# Patient Record
Sex: MALE | Race: WHITE | NOT HISPANIC OR LATINO | Employment: UNEMPLOYED | URBAN - METROPOLITAN AREA
[De-identification: names, ages, dates, MRNs, and addresses within clinical notes are randomized per-mention and may not be internally consistent; named-entity substitution may affect disease eponyms.]

---

## 2017-02-09 ENCOUNTER — OFFICE VISIT (OUTPATIENT)
Dept: TRANSPLANT | Facility: CLINIC | Age: 42
End: 2017-02-09
Attending: INTERNAL MEDICINE
Payer: COMMERCIAL

## 2017-02-09 ENCOUNTER — APPOINTMENT (OUTPATIENT)
Dept: LAB | Facility: CLINIC | Age: 42
End: 2017-02-09
Attending: INTERNAL MEDICINE
Payer: COMMERCIAL

## 2017-02-09 VITALS
RESPIRATION RATE: 16 BRPM | SYSTOLIC BLOOD PRESSURE: 125 MMHG | HEART RATE: 80 BPM | WEIGHT: 152.7 LBS | BODY MASS INDEX: 22.62 KG/M2 | DIASTOLIC BLOOD PRESSURE: 90 MMHG | TEMPERATURE: 97.8 F

## 2017-02-09 DIAGNOSIS — Z71.9 VISIT FOR COUNSELING: Primary | ICD-10-CM

## 2017-02-09 DIAGNOSIS — Z79.01 LONG TERM CURRENT USE OF ANTICOAGULANT THERAPY: Primary | ICD-10-CM

## 2017-02-09 DIAGNOSIS — D46.9 MDS (MYELODYSPLASTIC SYNDROME) (H): ICD-10-CM

## 2017-02-09 DIAGNOSIS — L03.031 CELLULITIS OF TOE OF RIGHT FOOT: ICD-10-CM

## 2017-02-09 DIAGNOSIS — D46.9 MDS (MYELODYSPLASTIC SYNDROME) (H): Primary | ICD-10-CM

## 2017-02-09 LAB
ABO + RH BLD: NORMAL
ABO + RH BLD: NORMAL
ALBUMIN SERPL-MCNC: 3.4 G/DL (ref 3.4–5)
ALP SERPL-CCNC: 92 U/L (ref 40–150)
ALT SERPL W P-5'-P-CCNC: 21 U/L (ref 0–70)
ANION GAP SERPL CALCULATED.3IONS-SCNC: 8 MMOL/L (ref 3–14)
AST SERPL W P-5'-P-CCNC: 15 U/L (ref 0–45)
BASOPHILS # BLD AUTO: 0 10E9/L (ref 0–0.2)
BASOPHILS NFR BLD AUTO: 0.5 %
BILIRUB SERPL-MCNC: 0.2 MG/DL (ref 0.2–1.3)
BLD GP AB SCN SERPL QL: NORMAL
BLOOD BANK CMNT PATIENT-IMP: NORMAL
BUN SERPL-MCNC: 16 MG/DL (ref 7–30)
CALCIUM SERPL-MCNC: 9 MG/DL (ref 8.5–10.1)
CHLORIDE SERPL-SCNC: 105 MMOL/L (ref 94–109)
CO2 SERPL-SCNC: 29 MMOL/L (ref 20–32)
CREAT SERPL-MCNC: 0.83 MG/DL (ref 0.66–1.25)
DIFFERENTIAL METHOD BLD: ABNORMAL
EOSINOPHIL # BLD AUTO: 0.1 10E9/L (ref 0–0.7)
EOSINOPHIL NFR BLD AUTO: 1 %
ERYTHROCYTE [DISTWIDTH] IN BLOOD BY AUTOMATED COUNT: 14.4 % (ref 10–15)
GFR SERPL CREATININE-BSD FRML MDRD: NORMAL ML/MIN/1.7M2
GLUCOSE SERPL-MCNC: 81 MG/DL (ref 70–99)
HCT VFR BLD AUTO: 39.1 % (ref 40–53)
HGB BLD-MCNC: 12.8 G/DL (ref 13.3–17.7)
IMM GRANULOCYTES # BLD: 0.1 10E9/L (ref 0–0.4)
IMM GRANULOCYTES NFR BLD: 1.3 %
INR PPP: 1.22 (ref 0.86–1.14)
LYMPHOCYTES # BLD AUTO: 2.5 10E9/L (ref 0.8–5.3)
LYMPHOCYTES NFR BLD AUTO: 39.7 %
MCH RBC QN AUTO: 31.8 PG (ref 26.5–33)
MCHC RBC AUTO-ENTMCNC: 32.7 G/DL (ref 31.5–36.5)
MCV RBC AUTO: 97 FL (ref 78–100)
MONOCYTES # BLD AUTO: 0.5 10E9/L (ref 0–1.3)
MONOCYTES NFR BLD AUTO: 7.3 %
NEUTROPHILS # BLD AUTO: 3.2 10E9/L (ref 1.6–8.3)
NEUTROPHILS NFR BLD AUTO: 50.2 %
NRBC # BLD AUTO: 0 10*3/UL
NRBC BLD AUTO-RTO: 0 /100
PLATELET # BLD AUTO: 317 10E9/L (ref 150–450)
POTASSIUM SERPL-SCNC: 4.1 MMOL/L (ref 3.4–5.3)
PROT SERPL-MCNC: 8.7 G/DL (ref 6.8–8.8)
RBC # BLD AUTO: 4.02 10E12/L (ref 4.4–5.9)
RETICS # AUTO: 78 10E9/L (ref 25–95)
RETICS/RBC NFR AUTO: 1.9 % (ref 0.5–2)
SODIUM SERPL-SCNC: 141 MMOL/L (ref 133–144)
SPECIMEN EXP DATE BLD: NORMAL
WBC # BLD AUTO: 6.3 10E9/L (ref 4–11)

## 2017-02-09 PROCEDURE — 85045 AUTOMATED RETICULOCYTE COUNT: CPT | Performed by: INTERNAL MEDICINE

## 2017-02-09 PROCEDURE — 86850 RBC ANTIBODY SCREEN: CPT | Performed by: INTERNAL MEDICINE

## 2017-02-09 PROCEDURE — 85610 PROTHROMBIN TIME: CPT | Performed by: INTERNAL MEDICINE

## 2017-02-09 PROCEDURE — 80053 COMPREHEN METABOLIC PANEL: CPT | Performed by: INTERNAL MEDICINE

## 2017-02-09 PROCEDURE — 86901 BLOOD TYPING SEROLOGIC RH(D): CPT | Performed by: INTERNAL MEDICINE

## 2017-02-09 PROCEDURE — 85025 COMPLETE CBC W/AUTO DIFF WBC: CPT | Performed by: INTERNAL MEDICINE

## 2017-02-09 PROCEDURE — 36415 COLL VENOUS BLD VENIPUNCTURE: CPT

## 2017-02-09 PROCEDURE — 86900 BLOOD TYPING SEROLOGIC ABO: CPT | Performed by: INTERNAL MEDICINE

## 2017-02-09 RX ORDER — SENNOSIDES 8.6 MG
1 TABLET ORAL DAILY PRN
COMMUNITY
End: 2023-08-02

## 2017-02-09 RX ORDER — CLOPIDOGREL BISULFATE 75 MG/1
TABLET ORAL
Qty: 30 TABLET | COMMUNITY
Start: 2017-02-09 | End: 2018-07-11

## 2017-02-09 RX ORDER — ASPIRIN 325 MG
TABLET ORAL DAILY
COMMUNITY
End: 2018-07-11

## 2017-02-09 RX ORDER — CEPHALEXIN 500 MG/1
500 CAPSULE ORAL 2 TIMES DAILY
Qty: 20 CAPSULE | Refills: 0 | Status: SHIPPED | OUTPATIENT
Start: 2017-02-09 | End: 2017-03-29

## 2017-02-09 NOTE — MR AVS SNAPSHOT
After Visit Summary   2/9/2017    Ziggy Hallman    MRN: [id number]           Patient Information     Date Of Birth          1975        Visit Information        Provider Department      2/9/2017 9:00 AM Olive Valverde MD [facility] Blood and Marrow Transplant        Today's Diagnoses     Long term current use of anticoagulant therapy    -  1    Cellulitis of toe of right foot        MDS (myelodysplastic syndrome) (H)              [facility] ([facility])  [address] [place] [address]  Phone: [phone]  Clinic Hours:   Monday-Friday:    8am to 4:30pm   Weekends and holidays:    8am to noon (in general)  If your fever is 100.5  or greater,   call the clinic.  After hours call the   hospital at [phone] or   [phone]. Ask for the BMT   fellow for pediatric or adult patients           Care Instructions    2/13 @ 3:45p Dr. Craven    3/8 @ 1:45p labs        Follow-ups after your visit        Your next 10 appointments already scheduled     Feb 13, 2017  3:45 PM CST   (Arrive by 3:30 PM)   New Patient Visit with Janine Craven MD   [facility] ([facility])    [address] [place] [address] [phone]            Mar 08, 2017  1:45 PM CST   Masonic Lab Draw with  MASONIC LAB DRAW   [facility])    [address] [place] [address] [phone]            Mar 08, 2017  2:15 PM CST   RETURN ONC with Olive Valverde MD   [facility] Blood and Marrow Transplant ([facility])    [address] [place] [address] [phone]              Who to contact     If you have questions or need follow up information about today's clinic visit or your schedule please contact [facility] BLOOD AND MARROW TRANSPLANT directly at [phone].  Normal or non-critical lab and  imaging results will be communicated to you by WonderHowTohart, letter or phone within 4 business days after the clinic has received the results. If you do not hear from us within 7 days, please contact the clinic through locr or phone. If you have a critical or abnormal lab result, we will notify you by phone as soon as possible.  Submit refill requests through locr or call your pharmacy and they will forward the refill request to us. Please allow 3 business days for your refill to be completed.          Additional Information About Your Visit        locr Information     locr gives you secure access to your electronic health record. If you see a primary care provider, you can also send messages to your care team and make appointments. If you have questions, please call your primary care clinic.  If you do not have a primary care provider, please call [phone] and they will assist you.        Care EveryWhere ID     This is your Care EveryWhere ID. This could be used by other organizations to access your [place] medical records  [id number]        Your Vitals Were     Pulse Temperature Respirations BMI (Body Mass Index)          80 97.8  F (36.6  C) (Oral) 16 22.62 kg/m2         Blood Pressure from Last 3 Encounters:   02/09/17 125/90   11/20/15 135/89   09/01/15 115/74    Weight from Last 3 Encounters:   02/09/17 69.3 kg (152 lb 11.2 oz)   11/20/15 81.8 kg (180 lb 5.4 oz)   09/01/15 82.1 kg (180 lb 14.4 oz)              We Performed the Following     ABO/Rh type and screen     CBC with platelets differential     Comprehensive metabolic panel     INR     Reticulocyte count          Today's Medication Changes          These changes are accurate as of: 2/9/17 11:59 PM.  If you have any questions, ask your nurse or doctor.               Start taking these medicines.        Dose/Directions    cephALEXin 500 MG capsule   Commonly known as:  KEFLEX   Used for:  Cellulitis of toe of right foot   Started by:   Olive Valverde MD        Dose:  500 mg   Take 1 capsule (500 mg) by mouth 2 times daily   Quantity:  20 capsule   Refills:  0         These medicines have changed or have updated prescriptions.        Dose/Directions    PLAVIX 75 MG tablet   This may have changed:    - how much to take  - how to take this  - when to take this  - additional instructions   Used for:  Long term current use of anticoagulant therapy   Generic drug:  clopidogrel   Changed by:  Olive Valverde MD        Prescribed but patient says he is not taking this   Quantity:  30 tablet   Refills:  0         Stop taking these medicines if you haven't already. Please contact your care team if you have questions.     enoxaparin 60 MG/0.6ML injection   Commonly known as:  LOVENOX   Stopped by:  Olive Valverde MD           IBUPROFEN PO   Stopped by:  Olive Valverde MD           salicylic acid 17 % topical gel   Stopped by:  Olive Valverde MD                Where to get your medicines      These medications were sent to [id number] - [place], [place] - [address] AT [facility] [address], [place] [facility] [address]     Phone:  [phone]     cephALEXin 500 MG capsule                Recent Review Flowsheet Data     BMT Recent Results Latest Ref Rng & Units 9/3/2015 9/17/2015 10/1/2015 11/10/2015 11/19/2015 1/11/2016 2/9/2017    WBC 4.0 - 11.0 10e9/L - - - - - - 6.3    Hemoglobin 13.3 - 17.7 g/dL - - - - - - 12.8(L)    Platelet Count 150 - 450 10e9/L - - - - - - 317    Neutrophils (Absolute) 1.6 - 8.3 10e9/L - - - - - - 3.2    INR 0.86 - 1.14 2.2(A) 2.3(A) 2.5(A) 1.3(A) 2.6(A) 2.3(A) 1.22(H)    Sodium 133 - 144 mmol/L - - - - - - 141    Potassium 3.4 - 5.3 mmol/L - - - - - - 4.1    Chloride 94 - 109 mmol/L - - - - - - 105    Glucose 70 - 99 mg/dL - - - - - - 81    Urea Nitrogen 7 - 30 mg/dL - - - - - - 16    Creatinine 0.66 - 1.25 mg/dL - - - - - - 0.83    Calcium (Total) 8.5 - 10.1  mg/dL - - - - - - 9.0    Protein (Total) 6.8 - 8.8 g/dL - - - - - - 8.7    Albumin 3.4 - 5.0 g/dL - - - - - - 3.4    Alkaline Phosphatase 40 - 150 U/L - - - - - - 92    AST 0 - 45 U/L - - - - - - 15    ALT 0 - 70 U/L - - - - - - 21    MCV 78 - 100 fl - - - - - - 97               Primary Care Provider Office Phone # Fax #    Adeel Morris [phone]       [facility] [place] [address] [facility] [id number]        Thank you!     Thank you for choosing [facility] BLOOD AND MARROW TRANSPLANT  for your care. Our goal is always to provide you with excellent care. Hearing back from our patients is one way we can continue to improve our services. Please take a few minutes to complete the written survey that you may receive in the mail after your visit with us. Thank you!             Your Updated Medication List - Protect others around you: Learn how to safely use, store and throw away your medicines at www.disposemymeds.org.          This list is accurate as of: 2/9/17 11:59 PM.  Always use your most recent med list.                   Brand Name Dispense Instructions for use    AMBIEN PO      Take by mouth as needed for sleep       aspirin 325 MG tablet      Take by mouth daily       cephALEXin 500 MG capsule    KEFLEX    20 capsule    Take 1 capsule (500 mg) by mouth 2 times daily       GABAPENTIN PO      Take 300 mg by mouth 3 times daily       HYDROMORPHONE HCL PO      Take 2 mg by mouth every 6 hours as needed for moderate to severe pain       LIPITOR 80 MG tablet   Generic drug:  atorvastatin      Take 80 mg by mouth daily       methylPREDNISolone 4 MG tablet    MEDROL DOSEPAK    21 tablet    FOLLOW PACKAGE INSTRUCTIONS       PLAVIX 75 MG tablet   Generic drug:  clopidogrel     30 tablet    Prescribed but patient says he is not taking this       sennosides 8.6 MG tablet    SENOKOT     Take 1 tablet by mouth daily as needed for constipation       VISTARIL PO      Take 25 mg by mouth  every 4 hours as needed for itching       warfarin 5 MG tablet    COUMADIN    30 tablet    Take 10 mg daily or as advised by coumadin clinic

## 2017-02-09 NOTE — MR AVS SNAPSHOT
After Visit Summary   2/9/2017    Ziggy Hallman    MRN: [id number]           Patient Information     Date Of Birth          1975        Visit Information        Provider Department      2/9/2017 10:30 AM Coordinator,  Bmt Nurse;  2 [address] CONSULT [facility] Blood and Marrow Transplant        Today's Diagnoses     MDS (myelodysplastic syndrome) (H)    -  1          [facility] ([facility])  [address] [place] [address]  Phone: [phone]  Clinic Hours:   Monday-Friday:    8am to 4:30pm   Weekends and holidays:    8am to noon (in general)  If your fever is 100.5  or greater,   call the clinic.  After hours call the   hospital at [phone] or   [phone]. Ask for the BMT   fellow for pediatric or adult patients            Follow-ups after your visit        Your next 10 appointments already scheduled     Mar 08, 2017  1:45 PM CST   Masonic Lab Draw with  MASONIC LAB DRAW   [facility] Masonic Lab Draw ([facility])    [address] [place] [address] [phone]            Mar 08, 2017  2:15 PM CST   RETURN ONC with Olive Valverde MD   [facility] Blood and Marrow Transplant ([facility])    [address] [place] [address] [phone]              Who to contact     If you have questions or need follow up information about today's clinic visit or your schedule please contact [facility] BLOOD AND MARROW TRANSPLANT directly at [phone].  Normal or non-critical lab and imaging results will be communicated to you by MyChart, letter or phone within 4 business days after the clinic has received the results. If you do not hear from us within 7 days, please contact the clinic through MyChart or phone. If you have a critical or abnormal lab result, we will notify you by phone as soon as possible.  Submit refill requests through Vivonet or call your pharmacy and they  will forward the refill request to us. Please allow 3 business days for your refill to be completed.          Additional Information About Your Visit        [employer] Information     [employer] gives you secure access to your electronic health record. If you see a primary care provider, you can also send messages to your care team and make appointments. If you have questions, please call your primary care clinic.  If you do not have a primary care provider, please call [phone] and they will assist you.        Care EveryWhere ID     This is your Care EveryWhere ID. This could be used by other organizations to access your [place] medical records  [id number]         Blood Pressure from Last 3 Encounters:   02/09/17 125/90   11/20/15 135/89   09/01/15 115/74    Weight from Last 3 Encounters:   02/09/17 69.3 kg (152 lb 11.2 oz)   11/20/15 81.8 kg (180 lb 5.4 oz)   09/01/15 82.1 kg (180 lb 14.4 oz)              Today, you had the following     No orders found for display         Today's Medication Changes          These changes are accurate as of: 2/9/17 11:59 PM.  If you have any questions, ask your nurse or doctor.               Start taking these medicines.        Dose/Directions    cephALEXin 500 MG capsule   Commonly known as:  KEFLEX   Used for:  Cellulitis of toe of right foot   Started by:  Olive Valverde MD        Dose:  500 mg   Take 1 capsule (500 mg) by mouth 2 times daily   Quantity:  20 capsule   Refills:  0         These medicines have changed or have updated prescriptions.        Dose/Directions    PLAVIX 75 MG tablet   This may have changed:    - how much to take  - how to take this  - when to take this  - additional instructions   Used for:  Long term current use of anticoagulant therapy   Generic drug:  clopidogrel   Changed by:  Olive Valverde MD        Prescribed but patient says he is not taking this   Quantity:  30 tablet   Refills:  0         Stop taking these medicines if you  haven't already. Please contact your care team if you have questions.     enoxaparin 60 MG/0.6ML injection   Commonly known as:  LOVENOX   Stopped by:  Olive Valverde MD           IBUPROFEN PO   Stopped by:  Olive Valverde MD           salicylic acid 17 % topical gel   Stopped by:  Olive Valverde MD                Where to get your medicines      These medications were sent to [id number] - [place], [place] - [address] AT [facility] [address], [place] [address]     Phone:  [phone]     cephALEXin 500 MG capsule                Recent Review Flowsheet Data     BMT Recent Results Latest Ref Rng & Units 9/3/2015 9/17/2015 10/1/2015 11/10/2015 11/19/2015 1/11/2016 2/9/2017    WBC 4.0 - 11.0 10e9/L - - - - - - 6.3    Hemoglobin 13.3 - 17.7 g/dL - - - - - - 12.8(L)    Platelet Count 150 - 450 10e9/L - - - - - - 317    Neutrophils (Absolute) 1.6 - 8.3 10e9/L - - - - - - 3.2    INR 0.86 - 1.14 2.2(A) 2.3(A) 2.5(A) 1.3(A) 2.6(A) 2.3(A) 1.22(H)    Sodium 133 - 144 mmol/L - - - - - - 141    Potassium 3.4 - 5.3 mmol/L - - - - - - 4.1    Chloride 94 - 109 mmol/L - - - - - - 105    Glucose 70 - 99 mg/dL - - - - - - 81    Urea Nitrogen 7 - 30 mg/dL - - - - - - 16    Creatinine 0.66 - 1.25 mg/dL - - - - - - 0.83    Calcium (Total) 8.5 - 10.1 mg/dL - - - - - - 9.0    Protein (Total) 6.8 - 8.8 g/dL - - - - - - 8.7    Albumin 3.4 - 5.0 g/dL - - - - - - 3.4    Alkaline Phosphatase 40 - 150 U/L - - - - - - 92    AST 0 - 45 U/L - - - - - - 15    ALT 0 - 70 U/L - - - - - - 21    MCV 78 - 100 fl - - - - - - 97               Primary Care Provider Office Phone # Fax #    Adeel Morris [phone]       [facility] [place] [address] [facility]  [place] [address]        Thank you!     Thank you for choosing [facility] BLOOD AND MARROW TRANSPLANT  for your care. Our goal is always to provide you with excellent care. Hearing back from our patients is  one way we can continue to improve our services. Please take a few minutes to complete the written survey that you may receive in the mail after your visit with us. Thank you!             Your Updated Medication List - Protect others around you: Learn how to safely use, store and throw away your medicines at www.disposemymeds.org.          This list is accurate as of: 2/9/17 11:59 PM.  Always use your most recent med list.                   Brand Name Dispense Instructions for use    AMBIEN PO      Take by mouth as needed for sleep       aspirin 325 MG tablet      Take by mouth daily       cephALEXin 500 MG capsule    KEFLEX    20 capsule    Take 1 capsule (500 mg) by mouth 2 times daily       GABAPENTIN PO      Take 300 mg by mouth 3 times daily       HYDROMORPHONE HCL PO      Take 2 mg by mouth every 6 hours as needed for moderate to severe pain       LIPITOR 80 MG tablet   Generic drug:  atorvastatin      Take 80 mg by mouth daily       methylPREDNISolone 4 MG tablet    MEDROL DOSEPAK    21 tablet    FOLLOW PACKAGE INSTRUCTIONS       PLAVIX 75 MG tablet   Generic drug:  clopidogrel     30 tablet    Prescribed but patient says he is not taking this       sennosides 8.6 MG tablet    SENOKOT     Take 1 tablet by mouth daily as needed for constipation       VISTARIL PO      Take 25 mg by mouth every 4 hours as needed for itching       warfarin 5 MG tablet    COUMADIN    30 tablet    Take 10 mg daily or as advised by coumadin clinic

## 2017-02-09 NOTE — PROGRESS NOTES
BMT Repeat Consult for MDS  2/9/2017    Disease and Treatment History:  1. Ill-defined hypercoagulable state who has had numerous embolic and arterial thrombotic events.    - Renal infarct in November 2011,  - Left Popliteal embolic episode in December 2011 and was treated with surgery, aspirin and Coumadin.   - Embolic episode with his right carotid artery and had some TIA/stroke-like symptoms: December 2012  - He also had a right renal infarct in October 2013.  -  Embolic MI in July 2015.   - He notes another Renal infarct April but unclear if 2015 or 2016.   ---- He was initially treated with aspirin in 2011 and was transitioned to aspirin plus Coumadin in December 2011.  Plavix was added to the combination in October 2013 and he has remained on those medications minus the aspirin going forward.   2. He was found to have some renal artery stenosis approximately 50% to 60% on a full body angiogram and was seen by Vascular Surgery in October 2015, and they did not feel that invasive interventions were needed and felt like it could cause more harm than good.    3. He has had extensive hypercoagulable workup to date with a factor V and factor II mutation analysis that was negative, protein S and C and antithrombin III levels that were within normal limits, homocysteine that was within normal limits, and lupus anticoagulant and beta-2 glycoprotein assessments that were all negative.  He has had JAK2 testing which was negative and PNH testing which was negative.    4. He was noted to have some mild anemia dropping down into the 11 range.  He had B12 and folate levels that were within normal limits.  He had an EPO level that was 25.  He had a retic count of 2.5% and had a ferritin that was slightly elevated at 350.   -- Bone marrow biopsy on 10/07/2015 and this was read as trilineage hematopoiesis with dysgranulopoiesis and dysmegakaryopoiesis with less than 1% blasts consistent with RCMD 40% to 50% cellular.  Notably,  the cytogenetics were complex with a karyotype of 45,XY, a derivative of 5 and 17, addition of 9p13, trisomy 11, deletion 111, a minus 17 in 18 cells, and 46,XY in 3.   --IPSS scoring system for which he would get 4 points for cytogenetics, 0 points for blast percentage, 0 points for hemoglobin, 0 points for platelets and 0 points for ANC, giving him a total score of 4, putting him in the intermediate risk category.   5. Consult in BMT clinic 11/2015 for MDS. Siblings typed and 2 sibs are full matches    HPI: HISTORY OF PRESENT ILLNESS:  Since I last saw Mr. Hallman, he has had an unbelievably bad year.  They lost their home in [place] to [facility], and had moved to [place]; his wife has been struggling with drug abuse with methamphetamines, and also has been having some psychiatric breakdown with some paranoid schizophrenic type issues; his wife lost her job and was on unemployment, and is now intermittently back at work.  Medically, he lost his primary hematologist, Dr. Davis, in the summer of 2016, and, unfortunately, he has not had anyone following him closely since that time.  His primary doctor has been following his INR, but over the last 6 weeks he has had increasing right great toe pain, and has been in and out of numerous emergency rooms.  There was no major intervention completed until he finally developed some blackness to the toe, and was found to have a blockage.  He then underwent 2 different vascular surgeries in January.  He notes significant nerve pain, and they are talking about amputation a part of the toe.  Of note, he had in the fall stopped his Plavix and aspirin, and was really only taking warfarin when all this developed.      When he was discharged from the vascular surgery at [facility], he was discharged on aspirin, Lovenox as a bridge, and warfarin, and he is currently only taking warfarin at this point.  He has been following with the [facility], and they have been  having his INR goal be 2.5-3.5.      He comes in today for a repeat consult, as he has had no followup, in terms of his myelodysplastic syndrome, and is wondering what the next steps are there.  He currently notes some significant foot pain and redness.  He notes no fevers, chills or night sweats.  No chest pain, but does have some dyspnea on exertion and need to take a deep breath.  He notes episodes where he will get boils in the skin and then pop them, and a pus type fluid comes out.  He notes no recent pneumonias, no recent sinus infections or bladder infections.  He notes no recent kidney stones, and notes no recent bleeding issues.  He has lost about 40 pounds over the last year, but gained some of that back, and he attributes a lot of that to stress.  He is tearful in our discussion today, going through all of the issues that he has been going through.       Past medical history is notable for:    1.  Numerous embolic clotting events as noted in HPI with recent LILIAM showing an EF of 60% with a small PFO.   2.  History of significant pneumonia at age 14 that required hospitalization x2 with high fevers to greater than 105 and a recent pneumonia about 3 years ago.    3.  History of back and neck issues with some disk issues in his lower back and bone spurs in his neck.      Medications are updated in Epic     Social History:  He is  to his wife of 15+ years.  They have three children, ages 18, 15 and 11, all boys.  He is a  and is independently employed.  He has had major stressors since our last visit. Mother and Father both . Wife has gotten into methamphetamine abuse and is having some psychiatric issues of paranoia and has been on and off work. They lost their house in [place] and moved here to [place] but are planning on moving again shortly. He smokes intermittent pot, drinks 1-2+ drinks a night and has tried the methamphetamines a couple of times. He is struggling  emotionally with all of this. He spent some time out of the house for a couple months when his wife was heavy into the drugs but then came back     Family history is notable for a mother who  of a stroke within the last year and a father who  of a stroke within the las 1-2 years.  His sister recently had a clotting event and was found to be factor V Leiden positive, and there is extensive factor V Leiden noted on his father's side.  His other sister, Radha, has no clotting disorders that she is aware of. There are no other noted cancers.      10 point ROS otherwise negative     Physical Examination:    /90 mmHg  Pulse 80  Temp(Src) 97.8  F (36.6  C) (Oral)  Resp 16  Wt 69.264 kg (152 lb 11.2 oz)    General:  He has normal development, quite tearful on exam today  HEENT:  No icterus or injection.  Oropharynx is clear, no thrush.   Lymphatics:  No palpable cervical or supraclavicular lymphadenopathy.   Lungs:  Clear to auscultation bilaterally.   Cardiac:  Regular without any rubs, murmurs or gallops.   Abdomen:  Soft, nontender, no hepatosplenomegaly.   Extremities:  No edema, no petechiae.  Right great toe with dark, necrotic area and erythema around a swollen toe. See picture below     2017     Labs:  Results for TOD WHARTON (MRN [id number]) as of 2017 13:08   Ref. Range 2017 10:47   Sodium Latest Ref Range: 133 - 144 mmol/L 141   Potassium Latest Ref Range: 3.4 - 5.3 mmol/L 4.1   Chloride Latest Ref Range: 94 - 109 mmol/L 105   Carbon Dioxide Latest Ref Range: 20 - 32 mmol/L 29   Urea Nitrogen Latest Ref Range: 7 - 30 mg/dL 16   Creatinine Latest Ref Range: 0.66 - 1.25 mg/dL 0.83   GFR Estimate Latest Ref Range: >60 mL/min/1.7m2 >90...   GFR Estimate If Black Latest Ref Range: >60 mL/min/1.7m2 >90...   Calcium Latest Ref Range: 8.5 - 10.1 mg/dL 9.0   Anion Gap Latest Ref Range: 3 - 14 mmol/L 8   Albumin Latest Ref Range: 3.4 - 5.0 g/dL 3.4   Protein Total Latest Ref Range: 6.8  - 8.8 g/dL 8.7   Bilirubin Total Latest Ref Range: 0.2 - 1.3 mg/dL 0.2   Alkaline Phosphatase Latest Ref Range: 40 - 150 U/L 92   ALT Latest Ref Range: 0 - 70 U/L 21   AST Latest Ref Range: 0 - 45 U/L 15   Glucose Latest Ref Range: 70 - 99 mg/dL 81   WBC Latest Ref Range: 4.0 - 11.0 10e9/L 6.3   Hemoglobin Latest Ref Range: 13.3 - 17.7 g/dL 12.8 (L)   Hematocrit Latest Ref Range: 40.0 - 53.0 % 39.1 (L)   Platelet Count Latest Ref Range: 150 - 450 10e9/L 317     INR 1.22    Assessment and Plan:  Mr. Hallman is a 40-year-old gentleman with an extensive clotting history, on anticoagulation currently with no identified specific abnormality, who now is diagnosed with myelodysplastic syndrome with complex karyotype.      1.  Myelodysplastic syndrome. Ziggy had a lot of questions about his MDS treatment and next steps and timing of transplant. We discussed today that his counts are still quite stable and he hasn't required any transfusion or had any infections. We discussed that it would be ideal to have a repeat bone marrow biopsy to reassess the MDS and see if there are any new cytogenetic changes or change in blasts. We discussed that this will help further determine timing of the transplant. We discussed that we will also do some additional molecular testing on his sample to assess for any TP53 mutations which could help guide next steps as well. We also discussed that with his current social situation, now is not the time to move forward to a transplant.  -- He will plan for follow with me going forward for his MDS        2.  Coagulopathy: Previously followed by Dr. Payne at [facility] but has not had follow up since she left. Discussed his case with Dr. Craven who will see him as a new patient on Monday, 2/13. In the meantime I encouraged him to continue the warfarin and restart the aspirin for now since he hasn't been taking this and she will come up with his long term plan going forward.    3. ID: His toe shows  surrounding erythema concerning for cellulitis. Prescribed keflex X 10 days for this. Told him this would push up his INR so he needed to have that monitored.    4. Toe: following with podiatry. Scheduled to see them on 2/14 or 2/15. At this point I would wait to see what Dr. Craven says before doing any debridement to the toe.    5. Social Issues: Spent a lot of time discussing the drug issues with his wife, his use, the safety of their kids during this, and his alcohol, marijuana use. Lizzie spent a great deal of time helping with this as well    A total of 80 minutes spent with Mr. Hallman today rediscussing things, coordinating care for Dr. Craven appointment, etc.    He will return to see me in 1 month on 3/8 with labs prior      Olive Valverde

## 2017-02-10 NOTE — PROGRESS NOTES
"Clinical   Blood and Marrow Transplant Service    Significant time spent with patient talking about his home situation, relationship with their children, relationship with his wife, chemical and mental health concerns. CP report made to [facility]. Patient is not a transplant candidate at this time. Provided written material and we talked about how to find individual and family counseling options through his [facility] insurance. Also provided Cancer Legal line information. Updated MD. Angelesian was tearful during our conversation - he indicated no history of depression, anxiety or other mental health concerns. We talked about self-harm - he endorsed having thoughts of self-harm during his lifetime but not currently, has had no plan, and \"would never do that to my kids.\" We talked about ensuring that he has the support he needs. During our meeting Ziggy's affect was flat, he displayed ally candor re: his situation, and was interactive. He declined to take community resource information about crisis services. Writer encouraged him to seek follow-up re: counseling.     Lizzie TINEO LICSW  2/10/2017       "

## 2017-02-12 ENCOUNTER — TELEPHONE (OUTPATIENT)
Dept: NURSING | Facility: CLINIC | Age: 42
End: 2017-02-12

## 2017-02-12 NOTE — TELEPHONE ENCOUNTER
"Call Type: Triage Call    Presenting Problem: Ziggy calling to inquire if he can get refill of  Neurontin which was originally prescribed by [facility]  provider, for great toe pain after \"blood clot surgery\".  Med is  listed in EPIC as \"patient reported\", advised we will not refill  this med.  Ziggy went \"through a month's worth of meds in 2 weeks\"  and pharmacy will not refill.  Advised he can try to call [facility],  which  may or may not work.  Otherwise he will have to go to UC/ED  despite the long waits he complains about.  Triage Note:  Guideline Title: Medication Questions - Adult  Recommended Disposition: Provide Health Information  Original Inclination: Wanted to speak with a nurse  Override Disposition:  Intended Action: Follow advice given  Physician Contacted: No  Caller has medication question(s) that was answered with available resources ?  YES  Pregnant and has medication questions regarding prescribed and/or nonprescribed  medication(s) not covered by available resources ? NO  Breastfeeding and has medication questions regarding prescribed and/or  nonprescribed medication(s) not covered by available resources ? NO  Requested information on how to safely dispose of  or unused medications ?  NO  Sign(s) or symptom(s) associated with a diagnosed condition or with a new illness  ? NO  Prescription ordered today and not available at pharmacy putting patient at  clinical risk ? NO  Recurrence of a symptom(s) or illness post prescribed medication treatment AND  provider instructed patient to call if symptom(s) returned. ? NO  Unable to obtain prescribed medication related to available resources AND  situation poses immediate clinical risk ? NO  Pharmacy calling to clarify prescription order. ? NO  Requests refill of prescribed medication that does NOT have a valid refill; lack  of medication may cause clinical risk to patient if not available. ? NO  Has questions about prescribed and/or " nonprescribed medications not covered by  available resources ? NO  Pharmacy calling with prescription question; answered per department policy. ? NO  Requests refill of prescribed medication without valid refills OR requests refill  of prescribed medication with valid refills but does not have prescription number  (no RX container); lack of medication does not put patient at clinical risk ? NO  Physician Instructions:  Care Advice:

## 2017-02-20 NOTE — PROGRESS NOTES
Met with patient after new evaluation with Dr Valverde regarding plan and BMT nurse coordinator role. Provided patient with Jeny Steiner's contact information for future questions and plan. HLA already done. Patient verbalized understanding of provided information.

## 2017-03-01 ENCOUNTER — CARE COORDINATION (OUTPATIENT)
Dept: ONCOLOGY | Facility: CLINIC | Age: 42
End: 2017-03-01

## 2017-03-01 NOTE — PROGRESS NOTES
Called patient this afternoon per the request of Dr. Valverde to follow up with him to make sure that he is okay as he had missed his scheduled appointment with Dr. Craven.  Patient did not answer his phone so a detailed message was left for him stating that we were concerned with how he is doing and asked him to call back with an update.  Will attempt to call him tomorrow if he does not call back today.

## 2017-03-02 ENCOUNTER — CARE COORDINATION (OUTPATIENT)
Dept: ONCOLOGY | Facility: CLINIC | Age: 42
End: 2017-03-02

## 2017-03-07 ENCOUNTER — TELEPHONE (OUTPATIENT)
Dept: ONCOLOGY | Facility: CLINIC | Age: 42
End: 2017-03-07

## 2017-03-07 NOTE — TELEPHONE ENCOUNTER
Patient called to cancel his appointment with Dr. Olive Valverde for 3/8/17.  He reports he is scheduled to have his toe amputated at [facility] on 3/8/17 at 8am.   He reports with the added stress of recent family member's murder and the pain from his toe, he forgot about his appointment with Dr. Craven.  He wishes to reschedule with Dr. Valverde after he recovers a bit from his same day procedure tomorrow.

## 2017-03-29 ENCOUNTER — APPOINTMENT (OUTPATIENT)
Dept: LAB | Facility: CLINIC | Age: 42
End: 2017-03-29
Attending: INTERNAL MEDICINE
Payer: COMMERCIAL

## 2017-03-29 ENCOUNTER — OFFICE VISIT (OUTPATIENT)
Dept: TRANSPLANT | Facility: CLINIC | Age: 42
End: 2017-03-29
Attending: INTERNAL MEDICINE
Payer: COMMERCIAL

## 2017-03-29 VITALS
WEIGHT: 162 LBS | DIASTOLIC BLOOD PRESSURE: 82 MMHG | BODY MASS INDEX: 23.99 KG/M2 | RESPIRATION RATE: 16 BRPM | TEMPERATURE: 97.6 F | HEART RATE: 99 BPM | OXYGEN SATURATION: 99 % | SYSTOLIC BLOOD PRESSURE: 130 MMHG

## 2017-03-29 DIAGNOSIS — D46.9 MDS (MYELODYSPLASTIC SYNDROME) (H): ICD-10-CM

## 2017-03-29 DIAGNOSIS — Z79.01 LONG TERM CURRENT USE OF ANTICOAGULANT THERAPY: ICD-10-CM

## 2017-03-29 DIAGNOSIS — F32.89 OTHER DEPRESSION: Primary | ICD-10-CM

## 2017-03-29 LAB
ANION GAP SERPL CALCULATED.3IONS-SCNC: 9 MMOL/L (ref 3–14)
BASOPHILS # BLD AUTO: 0 10E9/L (ref 0–0.2)
BASOPHILS NFR BLD AUTO: 0.6 %
BUN SERPL-MCNC: 17 MG/DL (ref 7–30)
CALCIUM SERPL-MCNC: 8.7 MG/DL (ref 8.5–10.1)
CHLORIDE SERPL-SCNC: 110 MMOL/L (ref 94–109)
CO2 SERPL-SCNC: 25 MMOL/L (ref 20–32)
CREAT SERPL-MCNC: 0.79 MG/DL (ref 0.66–1.25)
DIFFERENTIAL METHOD BLD: ABNORMAL
EOSINOPHIL # BLD AUTO: 0.1 10E9/L (ref 0–0.7)
EOSINOPHIL NFR BLD AUTO: 1.8 %
ERYTHROCYTE [DISTWIDTH] IN BLOOD BY AUTOMATED COUNT: 14.7 % (ref 10–15)
GFR SERPL CREATININE-BSD FRML MDRD: ABNORMAL ML/MIN/1.7M2
GLUCOSE SERPL-MCNC: 118 MG/DL (ref 70–99)
HCT VFR BLD AUTO: 40.7 % (ref 40–53)
HGB BLD-MCNC: 13.6 G/DL (ref 13.3–17.7)
IMM GRANULOCYTES # BLD: 0.1 10E9/L (ref 0–0.4)
IMM GRANULOCYTES NFR BLD: 1.4 %
INR PPP: 2.68 (ref 0.86–1.14)
LYMPHOCYTES # BLD AUTO: 1.9 10E9/L (ref 0.8–5.3)
LYMPHOCYTES NFR BLD AUTO: 38.8 %
MCH RBC QN AUTO: 31.3 PG (ref 26.5–33)
MCHC RBC AUTO-ENTMCNC: 33.4 G/DL (ref 31.5–36.5)
MCV RBC AUTO: 94 FL (ref 78–100)
MONOCYTES # BLD AUTO: 0.3 10E9/L (ref 0–1.3)
MONOCYTES NFR BLD AUTO: 6.7 %
NEUTROPHILS # BLD AUTO: 2.5 10E9/L (ref 1.6–8.3)
NEUTROPHILS NFR BLD AUTO: 50.7 %
NRBC # BLD AUTO: 0 10*3/UL
NRBC BLD AUTO-RTO: 0 /100
PLATELET # BLD AUTO: 359 10E9/L (ref 150–450)
POTASSIUM SERPL-SCNC: 3.9 MMOL/L (ref 3.4–5.3)
RBC # BLD AUTO: 4.35 10E12/L (ref 4.4–5.9)
SODIUM SERPL-SCNC: 144 MMOL/L (ref 133–144)
WBC # BLD AUTO: 4.9 10E9/L (ref 4–11)

## 2017-03-29 PROCEDURE — 85025 COMPLETE CBC W/AUTO DIFF WBC: CPT | Performed by: INTERNAL MEDICINE

## 2017-03-29 PROCEDURE — 85610 PROTHROMBIN TIME: CPT | Performed by: INTERNAL MEDICINE

## 2017-03-29 PROCEDURE — 99212 OFFICE O/P EST SF 10 MIN: CPT

## 2017-03-29 PROCEDURE — 80048 BASIC METABOLIC PNL TOTAL CA: CPT | Performed by: INTERNAL MEDICINE

## 2017-03-29 PROCEDURE — 36415 COLL VENOUS BLD VENIPUNCTURE: CPT

## 2017-03-29 NOTE — MR AVS SNAPSHOT
After Visit Summary   3/29/2017    Ziggy Hallman    MRN: [id number]           Patient Information     Date Of Birth          1975        Visit Information        Provider Department      3/29/2017 2:15 PM Olive Valverde MD [facility] Blood and Marrow Transplant        Today's Diagnoses     Other depression    -  1    Long term current use of anticoagulant therapy        MDS (myelodysplastic syndrome) ()              [facility] ([facility])  [address] [place] [address]  Phone: [phone]  Clinic Hours:   Monday-Friday:    8am to 4:30pm   Weekends and holidays:    8am to noon (in general)  If your fever is 100.5  or greater,   call the clinic.  After hours call the   hospital at [phone] or   [phone]. Ask for the BMT   fellow for pediatric or adult patients            Follow-ups after your visit        Additional Services     MENTAL HEALTH REFERRAL       steve or other health psychology apt as soon as possible                  Your next 10 appointments already scheduled     Apr 06, 2017  9:00 AM CDT   (Arrive by 8:45 AM)   New Patient Visit with Janine Craven MD   [facility] ([facility])    [address] [place] [id number] [phone]            Jun 29, 2017  3:00 PM CDT   [facility] Lab Draw with  [facility] LAB DRAW   [facility] ([facility])    [address] [place] [id number] [phone]            Jun 29, 2017  3:30 PM CDT   RETURN ONC with Olive Valverde MD   [facility] Blood and Marrow Transplant ([facility])    [address] [place] [id number] [phone]              Who to contact     If you have questions or need follow up information about today's clinic visit or your schedule please contact [facility] BLOOD AND MARROW TRANSPLANT directly at  [phone].  Normal or non-critical lab and imaging results will be communicated to you by Workfacehart, letter or phone within 4 business days after the clinic has received the results. If you do not hear from us within 7 days, please contact the clinic through [facility] or phone. If you have a critical or abnormal lab result, we will notify you by phone as soon as possible.  Submit refill requests through Portapure or call your pharmacy and they will forward the refill request to us. Please allow 3 business days for your refill to be completed.          Additional Information About Your Visit        Workfacehart Information     Portapure gives you secure access to your electronic health record. If you see a primary care provider, you can also send messages to your care team and make appointments. If you have questions, please call your primary care clinic.  If you do not have a primary care provider, please call [phone] and they will assist you.        Care EveryWhere ID     This is your Care EveryWhere ID. This could be used by other organizations to access your [facility] medical records  [id number]        Your Vitals Were     Pulse Temperature Respirations Pulse Oximetry BMI (Body Mass Index)       99 97.6  F (36.4  C) (Tympanic) 16 99% 23.99 kg/m2        Blood Pressure from Last 3 Encounters:   03/29/17 130/82   02/09/17 125/90   11/20/15 135/89    Weight from Last 3 Encounters:   03/29/17 73.5 kg (162 lb)   02/09/17 69.3 kg (152 lb 11.2 oz)   11/20/15 81.8 kg (180 lb 5.4 oz)              We Performed the Following     Basic metabolic panel     CBC with platelets differential     INR     MENTAL HEALTH REFERRAL          Today's Medication Changes          These changes are accurate as of: 3/29/17  3:07 PM.  If you have any questions, ask your nurse or doctor.               Stop taking these medicines if you haven't already. Please contact your care team if you have questions.     cephALEXin 500 MG capsule   Commonly  known as:  KEFLEX   Stopped by:  Olive Valverde MD           methylPREDNISolone 4 MG tablet   Commonly known as:  MEDROL DOSEPAK   Stopped by:  Olive Valverde MD           VISTARIL PO   Stopped by:  Olive Valverde MD                    Recent Review Flowsheet Data     BMT Recent Results Latest Ref Rng & Units 9/17/2015 10/1/2015 11/10/2015 11/19/2015 1/11/2016 2/9/2017 3/29/2017    WBC 4.0 - 11.0 10e9/L - - - - - 6.3 4.9    Hemoglobin 13.3 - 17.7 g/dL - - - - - 12.8(L) 13.6    Platelet Count 150 - 450 10e9/L - - - - - 317 359    Neutrophils (Absolute) 1.6 - 8.3 10e9/L - - - - - 3.2 2.5    INR 0.86 - 1.14 2.3(A) 2.5(A) 1.3(A) 2.6(A) 2.3(A) 1.22(H) 2.68(H)    Sodium 133 - 144 mmol/L - - - - - 141 144    Potassium 3.4 - 5.3 mmol/L - - - - - 4.1 3.9    Chloride 94 - 109 mmol/L - - - - - 105 110(H)    Glucose 70 - 99 mg/dL - - - - - 81 118(H)    Urea Nitrogen 7 - 30 mg/dL - - - - - 16 17    Creatinine 0.66 - 1.25 mg/dL - - - - - 0.83 0.79    Calcium (Total) 8.5 - 10.1 mg/dL - - - - - 9.0 8.7    Protein (Total) 6.8 - 8.8 g/dL - - - - - 8.7 -    Albumin 3.4 - 5.0 g/dL - - - - - 3.4 -    Alkaline Phosphatase 40 - 150 U/L - - - - - 92 -    AST 0 - 45 U/L - - - - - 15 -    ALT 0 - 70 U/L - - - - - 21 -    MCV 78 - 100 fl - - - - - 97 94               Primary Care Provider Office Phone # Fax #    Adeel Morris [phone]       [facility] [place] [address] [facility]  [place] [address]        Thank you!     Thank you for choosing [facility] BLOOD AND MARROW TRANSPLANT  for your care. Our goal is always to provide you with excellent care. Hearing back from our patients is one way we can continue to improve our services. Please take a few minutes to complete the written survey that you may receive in the mail after your visit with us. Thank you!             Your Updated Medication List - Protect others around you: Learn how to safely use, store and throw away your medicines at  www.disposemymeds.org.          This list is accurate as of: 3/29/17  3:07 PM.  Always use your most recent med list.                   Brand Name Dispense Instructions for use    AMBIEN PO      Take by mouth as needed for sleep       aspirin 325 MG tablet      Take by mouth daily       GABAPENTIN PO      Take 300 mg by mouth 3 times daily       HYDROMORPHONE HCL PO      Take 2 mg by mouth every 6 hours as needed for moderate to severe pain       LIPITOR 80 MG tablet   Generic drug:  atorvastatin      Take 80 mg by mouth daily Reported on 3/29/2017       PLAVIX 75 MG tablet   Generic drug:  clopidogrel     30 tablet    Prescribed but patient says he is not taking this       sennosides 8.6 MG tablet    SENOKOT     Take 1 tablet by mouth daily as needed for constipation Reported on 3/29/2017       warfarin 5 MG tablet    COUMADIN    30 tablet    Take 10 mg daily or as advised by coumadin clinic

## 2017-03-29 NOTE — PROGRESS NOTES
[facility] Follow-Up  3/29/2017    Disease and Treatment History:  1. Ill-defined hypercoagulable state who has had numerous embolic and arterial thrombotic events.    - Renal infarct in November 2011,  - Left Popliteal embolic episode in December 2011 and was treated with surgery, aspirin and Coumadin.   - Embolic episode with his right carotid artery and had some TIA/stroke-like symptoms: December 2012  - He also had a right renal infarct in October 2013.  -  Embolic MI in July 2015.   - He notes another Renal infarct April but unclear if 2015 or 2016.   ---- He was initially treated with aspirin in 2011 and was transitioned to aspirin plus Coumadin in December 2011.  Plavix was added to the combination in October 2013 and he has remained on those medications minus the aspirin going forward.   2. He was found to have some renal artery stenosis approximately 50% to 60% on a full body angiogram and was seen by Vascular Surgery in October 2015, and they did not feel that invasive interventions were needed and felt like it could cause more harm than good.    3. He has had extensive hypercoagulable workup to date with a factor V and factor II mutation analysis that was negative, protein S and C and antithrombin III levels that were within normal limits, homocysteine that was within normal limits, and lupus anticoagulant and beta-2 glycoprotein assessments that were all negative.  He has had JAK2 testing which was negative and PNH testing which was negative.    4. He was noted to have some mild anemia dropping down into the 11 range.  He had B12 and folate levels that were within normal limits.  He had an EPO level that was 25.  He had a retic count of 2.5% and had a ferritin that was slightly elevated at 350.   -- Bone marrow biopsy on 10/07/2015 and this was read as trilineage hematopoiesis with dysgranulopoiesis and dysmegakaryopoiesis with less than 1% blasts consistent with RCMD 40% to 50% cellular.  Notably, the  cytogenetics were complex with a karyotype of 45,XY, a derivative of 5 and 17, addition of 9p13, trisomy 11, deletion 111, a minus 17 in 18 cells, and 46,XY in 3.   --IPSS scoring system for which he would get 4 points for cytogenetics, 0 points for blast percentage, 0 points for hemoglobin, 0 points for platelets and 0 points for ANC, giving him a total score of 4, putting him in the intermediate risk category.   5. Consult in BMT clinic 11/2015 for MDS. Siblings typed and 2 sibs are full matches    HPI: HISTORY OF PRESENT ILLNESS:  I last saw Ziggy in early February for repeat BMT consult. He had had recent vascular event again leading to gangrenous right toe requiring vascular surgery and recently had amputation about 3 weeks ago. No current fevers or chills, no chest pain or SOB, no nausea or vomiting. Taking the coumadin and the aspirin but not taking plavix. Never ended up going to the coagulation consult with Dr. Craven due to some social issues but wants to get established here. No recent infections. Notes that he did go back to work and feels like the toe stitches busted up a little. Also interested in getting into some counseling here. Notes his wife stopped the meth but is still having paranoid thoughts.     Past medical history is notable for:    1.  Numerous embolic clotting events as noted in HPI with recent LILIAM showing an EF of 60% with a small PFO.   2.  History of significant pneumonia at age 14 that required hospitalization x2 with high fevers to greater than 105 and a recent pneumonia about 3 years ago.    3.  History of back and neck issues with some disk issues in his lower back and bone spurs in his neck.      Medications are updated in Epic         10 point ROS otherwise negative     Physical Examination:    /82  Pulse 99  Temp 97.6  F (36.4  C) (Tympanic)  Resp 16  Wt 73.5 kg (162 lb)  SpO2 99%  BMI 23.99 kg/m2    General:  He has normal development. KPS 80  HEENT:  No icterus or  injection.  Oropharynx is clear, no thrush.   Lymphatics:  No palpable cervical or supraclavicular lymphadenopathy.   Lungs:  Clear to auscultation bilaterally.   Cardiac:  Regular without any rubs, murmurs or gallops.   Abdomen:  Soft, nontender, no hepatosplenomegaly.   Extremities:  No edema, no petechiae.  Right great toe amputation well healed aside from one area of clear drainage. Sutures still in place     Labs:  Results for TOD WHARTON (MRN [id number]) as of 3/29/2017 16:12   Ref. Range 3/29/2017 14:31   Sodium Latest Ref Range: 133 - 144 mmol/L 144   Potassium Latest Ref Range: 3.4 - 5.3 mmol/L 3.9   Chloride Latest Ref Range: 94 - 109 mmol/L 110 (H)   Carbon Dioxide Latest Ref Range: 20 - 32 mmol/L 25   Urea Nitrogen Latest Ref Range: 7 - 30 mg/dL 17   Creatinine Latest Ref Range: 0.66 - 1.25 mg/dL 0.79   GFR Estimate Latest Ref Range: >60 mL/min/1.7m2 >90...   GFR Estimate If Black Latest Ref Range: >60 mL/min/1.7m2 >90...   Calcium Latest Ref Range: 8.5 - 10.1 mg/dL 8.7   Anion Gap Latest Ref Range: 3 - 14 mmol/L 9   Glucose Latest Ref Range: 70 - 99 mg/dL 118 (H)   WBC Latest Ref Range: 4.0 - 11.0 10e9/L 4.9   Hemoglobin Latest Ref Range: 13.3 - 17.7 g/dL 13.6   Hematocrit Latest Ref Range: 40.0 - 53.0 % 40.7   Platelet Count Latest Ref Range: 150 - 450 10e9/L 359       Assessment and Plan:  Mr. Wharton is a 40-year-old gentleman with an extensive clotting history, on anticoagulation currently with no identified specific abnormality, who now is diagnosed with myelodysplastic syndrome with complex karyotype.      1.  Myelodysplastic syndrome. Counts stable. Currently monitoring        2.  Coagulopathy: Previously followed by Dr. Payne at [facility] but has not had follow up since she left. Discussed his case with Dr. Craven who was going to see him as a new patient on Monday, 2/13, but he couldn't keep that so I put in a repeat referral to get him scheduled with her    3. ID: No active issues    4.  Toe:status post amputation. Has follow-up with surgeon in 1 week    5. Depression: health psychology referral    Final Plan:  -  Data evaluation/coagulation consult  - Lab follow-up with me in 3 months with labs prior      Olive Valverde

## 2017-03-29 NOTE — NURSING NOTE
"Ziggy Hallman is a 41 year old male who presents for:  Chief Complaint   Patient presents with     Blood Draw     Oncology Clinic Visit     Patient with MDS here for provider visit and lab reveiw         Initial Vitals:  /82  Pulse 99  Temp 97.6  F (36.4  C) (Tympanic)  Resp 16  Wt 73.5 kg (162 lb)  SpO2 99%  BMI 23.99 kg/m2 Estimated body mass index is 23.99 kg/(m^2) as calculated from the following:    Height as of 11/20/15: 1.75 m (5' 8.9\").    Weight as of this encounter: 73.5 kg (162 lb).. Body surface area is 1.89 meters squared. vitals were done in the lab   Data Unavailable No LMP for male patient. Allergies and medications reviewed.     Medications:   Pharmacy name entered into [facility]:    [facility] [address] [place], [place] - [address] [id number] - [place], [place] - [address] AT [facility] [address]    Comments:     5 minutes for nursing intake (face to face time)   Grace Diggs CMA        "

## 2017-03-29 NOTE — NURSING NOTE
Chief Complaint   Patient presents with     Blood Draw     Vitals done and labs drawn peripherally from right arm.    Elisa Bean, SALEEMN

## 2017-04-11 ENCOUNTER — CARE COORDINATION (OUTPATIENT)
Dept: ONCOLOGY | Facility: CLINIC | Age: 42
End: 2017-04-11

## 2017-04-11 NOTE — PROGRESS NOTES
Called to follow up with patient as to why he has missed his new patient appointment with Dr. Craven yet again.  Both the patient's number and his significant others number are invalid and out of service.  Will update Dr. Valverde.

## 2017-05-15 ENCOUNTER — TELEPHONE (OUTPATIENT)
Dept: ONCOLOGY | Facility: CLINIC | Age: 42
End: 2017-05-15

## 2017-05-15 NOTE — TELEPHONE ENCOUNTER
Pt's sister called to let Dr Valverde know Ziggy is in [facility]. He had a clot in his cardiac stent. They were able to place a new stent inside the old one. Pt is on Plavix. Sister is asking if pt's warfarin could ne changed to xarelto.  Pt was to see  Dr Craven as a new pt for consult last month. I let her know the appt would be rescheduled and we would let him know.  She was fine with this plan. Message sent to scheduling pool.

## 2017-05-19 ENCOUNTER — ONCOLOGY VISIT (OUTPATIENT)
Dept: ONCOLOGY | Facility: CLINIC | Age: 42
End: 2017-05-19
Attending: INTERNAL MEDICINE
Payer: COMMERCIAL

## 2017-05-19 VITALS
SYSTOLIC BLOOD PRESSURE: 152 MMHG | OXYGEN SATURATION: 98 % | HEART RATE: 95 BPM | RESPIRATION RATE: 18 BRPM | TEMPERATURE: 98.3 F | BODY MASS INDEX: 23.91 KG/M2 | HEIGHT: 69 IN | DIASTOLIC BLOOD PRESSURE: 98 MMHG | WEIGHT: 161.4 LBS

## 2017-05-19 DIAGNOSIS — Z98.61 CAD S/P PERCUTANEOUS CORONARY ANGIOPLASTY: ICD-10-CM

## 2017-05-19 DIAGNOSIS — D68.59 HYPERCOAGULABLE STATE (H): Primary | ICD-10-CM

## 2017-05-19 DIAGNOSIS — I25.10 CAD S/P PERCUTANEOUS CORONARY ANGIOPLASTY: ICD-10-CM

## 2017-05-19 LAB
CHOLEST SERPL-MCNC: 209 MG/DL
HDLC SERPL-MCNC: 49 MG/DL
LDLC SERPL CALC-MCNC: 138 MG/DL
NONHDLC SERPL-MCNC: 160 MG/DL
PLATELET FUNCTION ASA: 537 ARU
TRIGL SERPL-MCNC: 109 MG/DL

## 2017-05-19 PROCEDURE — 99212 OFFICE O/P EST SF 10 MIN: CPT | Mod: ZF

## 2017-05-19 PROCEDURE — 36415 COLL VENOUS BLD VENIPUNCTURE: CPT | Performed by: INTERNAL MEDICINE

## 2017-05-19 PROCEDURE — 85576 BLOOD PLATELET AGGREGATION: CPT | Performed by: INTERNAL MEDICINE

## 2017-05-19 PROCEDURE — 99215 OFFICE O/P EST HI 40 MIN: CPT | Mod: ZP | Performed by: INTERNAL MEDICINE

## 2017-05-19 PROCEDURE — 80061 LIPID PANEL: CPT | Performed by: INTERNAL MEDICINE

## 2017-05-19 PROCEDURE — 36415 COLL VENOUS BLD VENIPUNCTURE: CPT | Mod: ZF

## 2017-05-19 PROCEDURE — 99358 PROLONG SERVICE W/O CONTACT: CPT | Mod: ZP | Performed by: INTERNAL MEDICINE

## 2017-05-19 ASSESSMENT — PAIN SCALES - GENERAL: PAINLEVEL: NO PAIN (0)

## 2017-05-19 NOTE — LETTER
5/19/2017       RE: Ziggy Hallman  [address]  [place] [address]     Dear Colleague,    Thank you for referring your patient, Ziggy Hallman, to the [facility]. Please see a copy of my visit note below.    CHIEF COMPLAINT:  History of numerous arterial thrombotic events.      HISTORY OF PRESENT ILLNESS:  The patient is a 41-year-old man referred by Dr. Olive Valverde for consultation regarding repeated arterial thrombotic events.  The patient's history is quite complex and dates back to 2011, when he presented with flank pain and was found to have a right renal artery branch vessel thrombosis.  He had a hypercoagulable workup on 11/27/2011 which was negative for a lupus anticoagulant, anticardiolipin antibodies, beta-2 glycoprotein 1 antibodies, factor V Leiden mutation and prothrombin gene mutation.  An DIXON was negative.  C- and P-ANCA were negative.  Also, protein S was 89%, protein C 103% and antithrombin 99% (all normal).  He had an echocardiogram which showed a small patent foramen ovale.  He had lower extremity venous Dopplers and no DVT was found.  He was placed on aspirin and warfarin.       About a month later he presented with left leg pain and had a leg venous ultrasound which was negative for thrombosis.  He had lower extremity arterial ultrasound with ankle brachial index on 12/21/2011 which showed little or no detectable flow below the left popliteal artery, suggesting an embolic phenomenon.  Angiography showed that he had a popliteal artery with 100% occlusion.  He underwent thrombectomy for that.  On 12/05/2012 he presented with some arm numbness.  Upper extremity venous ultrasound was negative.  MR angio showed mild narrowing of the proximal right internal artery with a small filling defect in the right ICA that could possibly be acute thrombus.  With these events he had been on warfarin and aspirin, but at times his INR was subtherapeutic.       He presented on  "01/30/2014 with right lower leg numbness and tingling and had a venous ultrasound which showed no DVT.       On 07/16/2015 he presented with chest pain and a non-STEMI and was found to have single vessel coronary artery disease with  99% thrombotic occlusion in the mid LAD.  He underwent thrombectomy and a Promus drug-eluting stent was placed.  After that, he was placed on aspirin, Plavix and warfarin.       On 09/07/2016 He presented with shortness of breath.  He had a chest PE protocol which was negative for PE or any acute cardiopulmonary process.  At that same time, a right leg ultrasound was negative for DVT.       On 01/04/2017 he presented with right toe ischemia and was found to have the right posterior tibial artery occluded.  He underwent thrombolysis and balloon angioplasty of the dorsalis pedis and laser atherectomy of the dorsalis pedis on 01/12/2017.  On 02/16/2017 he again had evidence of toe ischemia and was shown to have evidence of diffuse below-the-knee atherosclerotic disease.  There was no evidence of focal stenosis.  He underwent right toe amputation on 03/08/2017.       Then most recently on 05/14/17 he had chest pain for 24 hours and was diagnosed with an NSTEMI.  He initially was checked for a PE with chest PE protocol which was negative.  Coronary angiogram showed \"60% hazy in-stent restenosis in the proximal LAD.\"  He had a new drug-eluting stent placed.       Throughout all this, the patient admits that prior to this most recent NSTEMI he was not taking his aspirin and Plavix.  He would take his warfarin, but occasionally missed doses.  The goal INR had been raised to 2.5-3.5, but he was not always at that level.  With this most recent event he was advised to be on beta blocker and ACE inhibitor, but has refused to take them because he says he does not normally have high blood pressure and he does not like the effect of decreased sexual function.  He has not been on a statin at all " recently because he says his cholesterol was never all that high.  He had a total cholesterol of 178 on 07/17/2015 with a low HDL.       The patient is a smoker, about a half a pack a day.  He says he quit with the event on 05/14/2017.  He says that he has not tolerated nicotine gum or patch in the past.  He is not on Wellbutrin or any medications to help him quit smoking.  He has a distant history of methamphetamine and cocaine use.  He denies using any such drugs currently.  He says that he occasionally smokes marijuana, especially at night to help relieve anxiety.  He says that since his most recent cardiac stent placement, he has been taking the aspirin and Plavix, along with the warfarin.      PAST MEDICAL HISTORY:   1.  Arterial thrombotic events as mentioned above.  He has been seen by numerous cardiologists and assessment when he had some of his first events and the PFO was found.  Assessment was that it was not large enough to consider closure.    2.  Myelodysplastic syndrome.  He has been seen by Dr. Valverde here.  He had a bone marrow biopsy and aspirate done at an outside institution.  I have been unable to find actual report.  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  However, recently his CBC has been entirely normal, dipping into a very mild anemia intermittently.    3.  Hypertension.   4.  Anxiety.    5.  Status post arthroscopic knee surgery, both right and left.      FAMILY HISTORY:  I had a difficult time getting a clear history from him.  Apparently his mother has some cardiovascular disease, as does his father.  He has a sister who has maybe had some cardiovascular disease, but it is not clear to me if that is venous versus arterial clot.       SOCIAL HISTORY:  As mentioned above, he smokes half a pack a day and just quit.  He has a distant history of substance abuse.  He drinks alcohol about 1 drink per night.  He is currently homeless and is staying  "with a friend along with one of his children.  The other two children are staying with an uncle.  His wife has had problems with substance abuse. 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       MEDICATIONS:   1.  Flexeril 10 mg p.r.n., not very often.    2.  Percocet p.r.n.   3.  Gabapentin 300 mg each day at bedtime.   4.  Warfarin as directed.    5.  Enoxaparin 80 mg q.12 h.   6.  Aspirin 81 mg daily (he says he is taking it now in the evening).   7.  Plavix 75 mg daily.    8.  Zolpidem 10 mg each day at bedtime.      REVIEW OF SYSTEMS:  As in HPI. The patient complains of a lot of anxiety with what has been going on with his health and with his family and the homelessness.  .   He has seen a psychologist and psychiatrist and was given medication at one time, but said that it did not help him and he only tried it for 3 weeks.  He says he does have an upcoming visit with a psychologist.  He has no significant issues with bleeding. He has significant pain on the lateral aspect of his right foot. He is wondering about whether he really has a bone marrow problem, since his CBC is normal. The rest of the >10 pont ROS is negative.       PHYSICAL EXAMINATION:  BP (!) 152/98 (BP Location: Left arm, Patient Position: Chair, Cuff Size: Adult Regular)  Pulse 95  Temp 98.3  F (36.8  C) (Oral)  Resp 18  Ht 1.75 m (5' 8.9\")  Wt 73.2 kg (161 lb 6.4 oz)  SpO2 98%  BMI 23.91 kg/m2    General appearance:  Patient is 42 yo man in no acute distress.     HEENT:  No pallor, icterus, or mucositis.  No thyromegaly.   Lymph nodes:  No cervical, supraclavicular, axillary, or inguinal lymphadenopathy.   Lungs:  Clear to auscultation bilaterally.   Heart:  Regular rate and rhythm; no S3 S4 or murmer.     Abdomen:  Positive bowel sounds, soft and nontender, nondistended.  No hepatomegaly. No splenomegaly appreciated.    Extremities:  Normal left foot with good DP and PT pulses.  The right foot " has had an amputated great toe that is reddish with a small ulcerated area, and the lateral side of the foot has mild erythema with an approximately 3-mm blistering area that is tender to palpate.  The DP and PT pulses are not palpable on the right. There is poor capillary refill in toes on the right.   Skin:  No rash, no petechiae or ecchymoses.    Labs from [facility] reviewed.   Non-fasting lipid profile:   Results for TOD WHARTON (MRN [id number]) as of 5/27/2017 08:17   Ref. Range 5/19/2017 14:03   Cholesterol Latest Ref Range: <200 mg/dL 209 (H)   HDL Cholesterol Latest Ref Range: >39 mg/dL 49   LDL Cholesterol Calculated Latest Ref Range: <100 mg/dL 138 (H)   Non HDL Cholesterol Latest Ref Range: <130 mg/dL 160 (H)   Triglycerides Latest Ref Range: <150 mg/dL 109     Platelet Function    ARU Final 05/19/2017  2:03 PM 51      Comment:     Aids in the detection of platelet inhibition due to aspirin therapy. >550 is   non    therapeutic for aspirin, and <550 is considered therapeutic for aspirin.        ASSESSMENT :  This is a 41-year-old man who has had problems with arterial thrombotic events.  There has been concern that some of these have been embolic events, especially related to the right kidney.  The initial lower extremity problems may have been embolic,but this is not clear.  On more recent imaging (2/16/17),  least on the right, have some evidence of some below-the-knee atherosclerosis.  He has had over the past 4 years numerous examinations of the venous system of the legs including leg venous ultrasound, arm venous ultrasound and chest CT angiograms, and he has never had any evidence of venous thrombosis.  He has been checked for venous hypercoagulable problems such as factor V Leiden, prothrombin gene mutation, protein C, protein S and antithrombin.  Those are all normal.  He has been checked twice for lupus anticoagulant, beta-2 glycoprotein-1 and anticardiolipin, and they  have always been normal, so he does not have antiphospholipid syndrome as the cause of his problems.  He does not have evidence of a vasculitis on the p-ANCA, and c-ANCA and DIXON have been checked a couple of times.     He does have risk factors for atherosclerosis, includingfamily history of cardiovascular disease, although it is rather vague, along with hypertension, smoking, and hypercholesterolemia. He also has not been routinely taking aspirin and Plavix.   This overall picture is not really consistent with Buerger's disease, at least not the kidney and the coronary artery disease, but the peripheral vascular disease does seem to be a Buerger's-like situation.  Certainly smoking is not helping his situation. Based on PFA testing, his platelets are aspirin sensitive.      I discussed with him that given all of his events have been arterial, which in rather simplistic terms is viewed as more related to platelets than a clotting factors. At the time of his most recent cardiac ischemia, he was taking his warfarin and his INR was 2.0, but he said he had not been taking his aspirin and Plavix.  I emphasized  it is extremely important that he stays on his aspirin and Plavix.  He was unaware of the risk of sudden stent thrombosis if the aspirin and plavix are not taken.   I also emphasized the importance of not smoking.  He is having an active issue right now with his right foot and I encouraged him to follow up with a podiatrist regarding this.        There has been a question of why he is on warfarin and if he can be on a direct oral anticoagulant (DOAC).  Being on a direct oral anticoagulant will not really help with compliance (if a person skips warfarin, thy are probably just as likely to skip a DOAC). But a DOAC does not have the frustration of frequent INRs, especially if he is having difficulty staying in range.  I think rivaroxaban is a good medication in the fact that it is only a once a day, which makes it  easier to take.  The downside of rivaroxaban and the other DOACs it that their half-life is much shorter, so if he misses doses, then he will not being anticoagulated.  Again, it is not completely clear to me the role of warfarin in his overall management since he has not had overt venous thrombosis.  However, given the serious nature of all of his thrombotic events and that platelets and coagulation factors are both needed to form clots, it is reasonable for him to be on triple therapy.  I went ahead and prescribed rivaroxaban 20 mg once daily.  I told him to stop the warfarin and Lovenox only after he obtains the rivaroxaban prescription. If  there are any problems with big copays or lack of coverage with the rivaroxaban, then he should stay with the plan of the Lovenox transitioning to warfarin.  I only gave him a 1-month supply of rivaroxaban because I am not going to be seeing him on any sort of routine basis, and either his primary care or cardiologist can be managing his overall anticoagulation and antiplatelet agents.          RECOMMENDATIONS:  1) Switch from warfarin to rivaroxaban 20 mg daily, if insurance will pay for it. Anticoagulate indefinitely.  2) Continue aspirin and Plavix indefinitely.  3) Stop smoking- contact primary care physician regarding aids to smoking abstinence  4) Antihypertensive agent- discuss with cardiologist or PCP about agents that have fewer side effects  5) Start a statin  6) f/u with podiatrist regarding new foot lesion  7) f/u with Dr. Valverde about MDS. If considering bone marrow biopsy, hold off for at least 3 months.  Would be ok to hold rivaroxaban for procedure, but must stay on aspirin and Plavix.    8) It is important that he follow up with his cardiologist and primary care physician, who are the specialists who normally handle these sorts of issues. I did not schedule a routine followup with me.     I spent 60 minutes with the patient , of which >50 % was in  counseling.    I spent an additional 65 minutes extended non-contact care reviewing records from [facility] and [place].   Janine Craven MD  Hematology

## 2017-05-19 NOTE — PROGRESS NOTES
CHIEF COMPLAINT:  History of numerous arterial thrombotic events.      HISTORY OF PRESENT ILLNESS:  The patient is a 41-year-old man referred by Dr. Olive Valverde for consultation regarding repeated arterial thrombotic events.  The patient's history is quite complex and dates back to 2011, when he presented with flank pain and was found to have a right renal artery branch vessel thrombosis.  He had a hypercoagulable workup on 11/27/2011 which was negative for a lupus anticoagulant, anticardiolipin antibodies, beta-2 glycoprotein 1 antibodies, factor V Leiden mutation and prothrombin gene mutation.  An DIXON was negative.  C- and P-ANCA were negative.  Also, protein S was 89%, protein C 103% and antithrombin 99% (all normal).  He had an echocardiogram which showed a small patent foramen ovale.  He had lower extremity venous Dopplers and no DVT was found.  He was placed on aspirin and warfarin.       About a month later he presented with left leg pain and had a leg venous ultrasound which was negative for thrombosis.  He had lower extremity arterial ultrasound with ankle brachial index on 12/21/2011 which showed little or no detectable flow below the left popliteal artery, suggesting an embolic phenomenon.  Angiography showed that he had a popliteal artery with 100% occlusion.  He underwent thrombectomy for that.  On 12/05/2012 he presented with some arm numbness.  Upper extremity venous ultrasound was negative.  MR angio showed mild narrowing of the proximal right internal artery with a small filling defect in the right ICA that could possibly be acute thrombus.  With these events he had been on warfarin and aspirin, but at times his INR was subtherapeutic.       He presented on 01/30/2014 with right lower leg numbness and tingling and had a venous ultrasound which showed no DVT.       On 07/16/2015 he presented with chest pain and a non-STEMI and was found to have single vessel coronary artery disease with  99%  "thrombotic occlusion in the mid LAD.  He underwent thrombectomy and a Promus drug-eluting stent was placed.  After that, he was placed on aspirin, Plavix and warfarin.       On 09/07/2016 He presented with shortness of breath.  He had a chest PE protocol which was negative for PE or any acute cardiopulmonary process.  At that same time, a right leg ultrasound was negative for DVT.       On 01/04/2017 he presented with right toe ischemia and was found to have the right posterior tibial artery occluded.  He underwent thrombolysis and balloon angioplasty of the dorsalis pedis and laser atherectomy of the dorsalis pedis on 01/12/2017.  On 02/16/2017 he again had evidence of toe ischemia and was shown to have evidence of diffuse below-the-knee atherosclerotic disease.  There was no evidence of focal stenosis.  He underwent right toe amputation on 03/08/2017.       Then most recently on 05/14/17 he had chest pain for 24 hours and was diagnosed with an NSTEMI.  He initially was checked for a PE with chest PE protocol which was negative.  Coronary angiogram showed \"60% hazy in-stent restenosis in the proximal LAD.\"  He had a new drug-eluting stent placed.       Throughout all this, the patient admits that prior to this most recent NSTEMI he was not taking his aspirin and Plavix.  He would take his warfarin, but occasionally missed doses.  The goal INR had been raised to 2.5-3.5, but he was not always at that level.  With this most recent event he was advised to be on beta blocker and ACE inhibitor, but has refused to take them because he says he does not normally have high blood pressure and he does not like the effect of decreased sexual function.  He has not been on a statin at all recently because he says his cholesterol was never all that high.  He had a total cholesterol of 178 on 07/17/2015 with a low HDL.       The patient is a smoker, about a half a pack a day.  He says he quit with the event on 05/14/2017.  He says " that he has not tolerated nicotine gum or patch in the past.  He is not on Wellbutrin or any medications to help him quit smoking.  He has a distant history of methamphetamine and cocaine use.  He denies using any such drugs currently.  He says that he occasionally smokes marijuana, especially at night to help relieve anxiety.  He says that since his most recent cardiac stent placement, he has been taking the aspirin and Plavix, along with the warfarin.      PAST MEDICAL HISTORY:   1.  Arterial thrombotic events as mentioned above.  He has been seen by numerous cardiologists and assessment when he had some of his first events and the PFO was found.  Assessment was that it was not large enough to consider closure.    2.  Myelodysplastic syndrome.  He has been seen by Dr. Valverde here.  He had a bone marrow biopsy and aspirate done at an outside institution.  I have been unable to find actual report.  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  However, recently his CBC has been entirely normal, dipping into a very mild anemia intermittently.    3.  Hypertension.   4.  Anxiety.    5.  Status post arthroscopic knee surgery, both right and left.      FAMILY HISTORY:  I had a difficult time getting a clear history from him.  Apparently his mother has some cardiovascular disease, as does his father.  He has a sister who has maybe had some cardiovascular disease, but it is not clear to me if that is venous versus arterial clot.       SOCIAL HISTORY:  As mentioned above, he smokes half a pack a day and just quit.  He has a distant history of substance abuse.  He drinks alcohol about 1 drink per night.  He is currently homeless and is staying with a friend along with one of his children.  The other two children are staying with an uncle.  His wife has had problems with substance abuse. He works placing [employer], but this ha been interrupted because of his health issues.      "  MEDICATIONS:   1.  Flexeril 10 mg p.r.n., not very often.    2.  Percocet p.r.n.   3.  Gabapentin 300 mg each day at bedtime.   4.  Warfarin as directed.    5.  Enoxaparin 80 mg q.12 h.   6.  Aspirin 81 mg daily (he says he is taking it now in the evening).   7.  Plavix 75 mg daily.    8.  Zolpidem 10 mg each day at bedtime.      REVIEW OF SYSTEMS:  As in HPI. The patient complains of a lot of anxiety with what has been going on with his health and with his family and the homelessness.  .   He has seen a psychologist and psychiatrist and was given medication at one time, but said that it did not help him and he only tried it for 3 weeks.  He says he does have an upcoming visit with a psychologist.  He has no significant issues with bleeding. He has significant pain on the lateral aspect of his right foot. He is wondering about whether he really has a bone marrow problem, since his CBC is normal. The rest of the >10 pont ROS is negative.       PHYSICAL EXAMINATION:  BP (!) 152/98 (BP Location: Left arm, Patient Position: Chair, Cuff Size: Adult Regular)  Pulse 95  Temp 98.3  F (36.8  C) (Oral)  Resp 18  Ht 1.75 m (5' 8.9\")  Wt 73.2 kg (161 lb 6.4 oz)  SpO2 98%  BMI 23.91 kg/m2    General appearance:  Patient is 42 yo man in no acute distress.     HEENT:  No pallor, icterus, or mucositis.  No thyromegaly.   Lymph nodes:  No cervical, supraclavicular, axillary, or inguinal lymphadenopathy.   Lungs:  Clear to auscultation bilaterally.   Heart:  Regular rate and rhythm; no S3 S4 or murmer.     Abdomen:  Positive bowel sounds, soft and nontender, nondistended.  No hepatomegaly. No splenomegaly appreciated.    Extremities:  Normal left foot with good DP and PT pulses.  The right foot has had an amputated great toe that is reddish with a small ulcerated area, and the lateral side of the foot has mild erythema with an approximately 3-mm blistering area that is tender to palpate.  The DP and PT pulses are not " palpable on the right. There is poor capillary refill in toes on the right.   Skin:  No rash, no petechiae or ecchymoses.    Labs from [facility] reviewed.   Non-fasting lipid profile:   Results for TOD WHARTON (MRN [id number]) as of 5/27/2017 08:17   Ref. Range 5/19/2017 14:03   Cholesterol Latest Ref Range: <200 mg/dL 209 (H)   HDL Cholesterol Latest Ref Range: >39 mg/dL 49   LDL Cholesterol Calculated Latest Ref Range: <100 mg/dL 138 (H)   Non HDL Cholesterol Latest Ref Range: <130 mg/dL 160 (H)   Triglycerides Latest Ref Range: <150 mg/dL 109     Platelet Function    ARU Final 05/19/2017  2:03 PM 51      Comment:     Aids in the detection of platelet inhibition due to aspirin therapy. >550 is   non    therapeutic for aspirin, and <550 is considered therapeutic for aspirin.        ASSESSMENT :  This is a 41-year-old man who has had problems with arterial thrombotic events.  There has been concern that some of these have been embolic events, especially related to the right kidney.  The initial lower extremity problems may have been embolic,but this is not clear.  On more recent imaging (2/16/17),  least on the right, have some evidence of some below-the-knee atherosclerosis.  He has had over the past 4 years numerous examinations of the venous system of the legs including leg venous ultrasound, arm venous ultrasound and chest CT angiograms, and he has never had any evidence of venous thrombosis.  He has been checked for venous hypercoagulable problems such as factor V Leiden, prothrombin gene mutation, protein C, protein S and antithrombin.  Those are all normal.  He has been checked twice for lupus anticoagulant, beta-2 glycoprotein-1 and anticardiolipin, and they have always been normal, so he does not have antiphospholipid syndrome as the cause of his problems.  He does not have evidence of a vasculitis on the p-ANCA, and c-ANCA and DIXON have been checked a couple of times.     He does  have risk factors for atherosclerosis, includingfamily history of cardiovascular disease, although it is rather vague, along with hypertension, smoking, and hypercholesterolemia. He also has not been routinely taking aspirin and Plavix.   This overall picture is not really consistent with Buerger's disease, at least not the kidney and the coronary artery disease, but the peripheral vascular disease does seem to be a Buerger's-like situation.  Certainly smoking is not helping his situation. Based on PFA testing, his platelets are aspirin sensitive.      I discussed with him that given all of his events have been arterial, which in rather simplistic terms is viewed as more related to platelets than a clotting factors. At the time of his most recent cardiac ischemia, he was taking his warfarin and his INR was 2.0, but he said he had not been taking his aspirin and Plavix.  I emphasized  it is extremely important that he stays on his aspirin and Plavix.  He was unaware of the risk of sudden stent thrombosis if the aspirin and plavix are not taken.   I also emphasized the importance of not smoking.  He is having an active issue right now with his right foot and I encouraged him to follow up with a podiatrist regarding this.        There has been a question of why he is on warfarin and if he can be on a direct oral anticoagulant (DOAC).  Being on a direct oral anticoagulant will not really help with compliance (if a person skips warfarin, thy are probably just as likely to skip a DOAC). But a DOAC does not have the frustration of frequent INRs, especially if he is having difficulty staying in range.  I think rivaroxaban is a good medication in the fact that it is only a once a day, which makes it easier to take.  The downside of rivaroxaban and the other DOACs it that their half-life is much shorter, so if he misses doses, then he will not being anticoagulated.  Again, it is not completely clear to me the role of warfarin  in his overall management since he has not had overt venous thrombosis.  However, given the serious nature of all of his thrombotic events and that platelets and coagulation factors are both needed to form clots, it is reasonable for him to be on triple therapy.  I went ahead and prescribed rivaroxaban 20 mg once daily.  I told him to stop the warfarin and Lovenox only after he obtains the rivaroxaban prescription. If  there are any problems with big copays or lack of coverage with the rivaroxaban, then he should stay with the plan of the Lovenox transitioning to warfarin.  I only gave him a 1-month supply of rivaroxaban because I am not going to be seeing him on any sort of routine basis, and either his primary care or cardiologist can be managing his overall anticoagulation and antiplatelet agents.          RECOMMENDATIONS:  1) Switch from warfarin to rivaroxaban 20 mg daily, if insurance will pay for it. Anticoagulate indefinitely.  2) Continue aspirin and Plavix indefinitely.  3) Stop smoking- contact primary care physician regarding aids to smoking abstinence  4) Antihypertensive agent- discuss with cardiologist or PCP about agents that have fewer side effects  5) Start a statin  6) f/u with podiatrist regarding new foot lesion  7) f/u with Dr. Valverde about MDS. If considering bone marrow biopsy, hold off for at least 3 months.  Would be ok to hold rivaroxaban for procedure, but must stay on aspirin and Plavix.    8) It is important that he follow up with his cardiologist and primary care physician, who are the specialists who normally handle these sorts of issues. I did not schedule a routine followup with me.     I spent 60 minutes with the patient , of which >50 % was in counseling.    I spent an additional 65 minutes extended non-contact care reviewing records from [facility] and [facility].   Janine Craven MD  Hematology

## 2017-05-19 NOTE — MR AVS SNAPSHOT
After Visit Summary   5/19/2017    Ziggy Hallman    MRN: [id number]           Patient Information     Date Of Birth          1975        Visit Information        Provider Department      5/19/2017 12:00 PM Janine Craven MD [facility]        Today's Diagnoses     Hypercoagulable state (H)    -  1    CAD S/P percutaneous coronary angioplasty           Follow-ups after your visit        Your next 10 appointments already scheduled     Jun 29, 2017  3:00 PM CDT   [facility] Draw with  [facility] DRAW   [facility] ([facility])    [address] [place] [id number] [phone]            Jun 29, 2017  3:30 PM CDT   RETURN ONC with Olive Valverde MD   [facility] Blood and Marrow Transplant ([facility])    [address] [place] [id number] [phone]              Who to contact     If you have questions or need follow up information about today's clinic visit or your schedule please contact [facility] directly at [phone].  Normal or non-critical lab and imaging results will be communicated to you by [employer], letter or phone within 4 business days after the clinic has received the results. If you do not hear from us within 7 days, please contact the clinic through [employer] or phone. If you have a critical or abnormal lab result, we will notify you by phone as soon as possible.  Submit refill requests through [employer] or call your pharmacy and they will forward the refill request to us. Please allow 3 business days for your refill to be completed.          Additional Information About Your Visit        MyChart Information     [employer] gives you secure access to your electronic health record. If you see a primary care provider, you can also send messages to your care team and make appointments. If you have questions, please call your  "primary care clinic.  If you do not have a primary care provider, please call [phone] and they will assist you.        Care [facility] ID     This is your Care [facility] ID. This could be used by other organizations to access your [facility] medical records  [id number]        Your Vitals Were     Pulse Temperature Respirations Height Pulse Oximetry BMI (Body Mass Index)    95 98.3  F (36.8  C) (Oral) 18 1.75 m (5' 8.9\") 98% 23.91 kg/m2       Blood Pressure from Last 3 Encounters:   05/19/17 (!) 152/98   03/29/17 130/82   02/09/17 125/90    Weight from Last 3 Encounters:   05/19/17 73.2 kg (161 lb 6.4 oz)   03/29/17 73.5 kg (162 lb)   02/09/17 69.3 kg (152 lb 11.2 oz)              We Performed the Following     Lipid Profile     Platelet Function ASA          Today's Medication Changes          These changes are accurate as of: 5/19/17 11:59 PM.  If you have any questions, ask your nurse or doctor.               Start taking these medicines.        Dose/Directions    rivaroxaban ANTICOAGULANT 20 MG Tabs tablet   Commonly known as:  XARELTO   Used for:  Hypercoagulable state (H)   Started by:  Janine Craven MD        Dose:  20 mg   Take 1 tablet (20 mg) by mouth daily (with dinner)   Quantity:  30 tablet   Refills:  0         Stop taking these medicines if you haven't already. Please contact your care team if you have questions.     warfarin 5 MG tablet   Commonly known as:  COUMADIN   Stopped by:  Janine Craven MD                Where to get your medicines      These medications were sent to [id number] - [place], [place] - [address] AT [facility] [address], [facility] [address]     Phone:  [phone]     rivaroxaban ANTICOAGULANT 20 MG Tabs tablet                Primary Care Provider Office Phone # Fax #    Adeel Morris [phone]       [facility] [id number] [place] [address]        Thank you!     " Thank you for choosing [facility]  for your care. Our goal is always to provide you with excellent care. Hearing back from our patients is one way we can continue to improve our services. Please take a few minutes to complete the written survey that you may receive in the mail after your visit with us. Thank you!             Your Updated Medication List - Protect others around you: Learn how to safely use, store and throw away your medicines at www.disposemymeds.org.          This list is accurate as of: 5/19/17 11:59 PM.  Always use your most recent med list.                   Brand Name Dispense Instructions for use    AMBIEN PO      Take by mouth as needed for sleep       aspirin 325 MG tablet      Take by mouth daily       GABAPENTIN PO      Take 300 mg by mouth 3 times daily       HYDROMORPHONE HCL PO      Take 2 mg by mouth every 6 hours as needed for moderate to severe pain       LIPITOR 80 MG tablet   Generic drug:  atorvastatin      Take 80 mg by mouth daily Reported on 3/29/2017       PLAVIX 75 MG tablet   Generic drug:  clopidogrel     30 tablet    Prescribed but patient says he is not taking this       rivaroxaban ANTICOAGULANT 20 MG Tabs tablet    XARELTO    30 tablet    Take 1 tablet (20 mg) by mouth daily (with dinner)       sennosides 8.6 MG tablet    SENOKOT     Take 1 tablet by mouth daily as needed for constipation Reported on 3/29/2017

## 2017-05-19 NOTE — NURSING NOTE
"Oncology Rooming Note    May 19, 2017 12:17 PM   Ziggy Hallman is a 41 year old male who presents for:    Chief Complaint   Patient presents with     Oncology Clinic Visit     MDS (myelodysplastic syndrome)      Initial Vitals: BP (!) 152/98 (BP Location: Left arm, Patient Position: Chair, Cuff Size: Adult Regular)  Pulse 95  Temp 98.3  F (36.8  C) (Oral)  Resp 18  Ht 1.75 m (5' 8.9\")  Wt 73.2 kg (161 lb 6.4 oz)  SpO2 98%  BMI 23.91 kg/m2 Estimated body mass index is 23.91 kg/(m^2) as calculated from the following:    Height as of this encounter: 1.75 m (5' 8.9\").    Weight as of this encounter: 73.2 kg (161 lb 6.4 oz). Body surface area is 1.89 meters squared.  No Pain (0) Comment: Data Unavailable   No LMP for male patient.  Allergies reviewed: Yes  Medications reviewed: Yes    Medications: Medication refills not needed today.  Pharmacy name entered into [facility]:    [facility] [address] [place], [place] - [address] [facility] [address] [place], [place] - [address] [facility] [address]    Clinical concerns: No new concerns Provider was notified.    7 minutes for nursing intake (face to face time)     Gayla Curry MA              "

## 2017-06-22 DIAGNOSIS — D68.59 HYPERCOAGULABLE STATE (H): ICD-10-CM

## 2017-06-25 ENCOUNTER — TELEPHONE (OUTPATIENT)
Dept: OTHER | Facility: CLINIC | Age: 42
End: 2017-06-25

## 2017-06-25 DIAGNOSIS — I74.9 ARTERIAL THROMBOSIS (H): Primary | ICD-10-CM

## 2017-06-25 NOTE — TELEPHONE ENCOUNTER
Paged by patient regarding need for refill of rivaroxaban.    Chart reviewed, last saw Herber 5/19 and prescribed a month's supply. Notes indicate recommendation for lifelong rivaroxaban.    Patient reports tolerating medication well. No issues with bleeding, no melena, no headaches.    30 tabs with 3 refills sent to preferred pharmacy ([facility] in [facility]).    Will route message to Dr. Craven.

## 2017-06-29 ENCOUNTER — OFFICE VISIT (OUTPATIENT)
Dept: TRANSPLANT | Facility: CLINIC | Age: 42
End: 2017-06-29
Attending: INTERNAL MEDICINE
Payer: COMMERCIAL

## 2017-06-29 ENCOUNTER — APPOINTMENT (OUTPATIENT)
Dept: LAB | Facility: CLINIC | Age: 42
End: 2017-06-29
Attending: INTERNAL MEDICINE
Payer: COMMERCIAL

## 2017-06-29 VITALS
WEIGHT: 164.6 LBS | TEMPERATURE: 98.3 F | HEART RATE: 88 BPM | BODY MASS INDEX: 24.38 KG/M2 | OXYGEN SATURATION: 97 % | DIASTOLIC BLOOD PRESSURE: 83 MMHG | SYSTOLIC BLOOD PRESSURE: 135 MMHG | RESPIRATION RATE: 18 BRPM

## 2017-06-29 DIAGNOSIS — E78.00 HYPERCHOLESTEROLEMIA: Primary | ICD-10-CM

## 2017-06-29 DIAGNOSIS — G62.9 NEUROPATHY: ICD-10-CM

## 2017-06-29 DIAGNOSIS — D46.9 MDS (MYELODYSPLASTIC SYNDROME) (H): ICD-10-CM

## 2017-06-29 LAB
ALBUMIN SERPL-MCNC: 3.5 G/DL (ref 3.4–5)
ALP SERPL-CCNC: 76 U/L (ref 40–150)
ALT SERPL W P-5'-P-CCNC: 22 U/L (ref 0–70)
ANION GAP SERPL CALCULATED.3IONS-SCNC: 8 MMOL/L (ref 3–14)
AST SERPL W P-5'-P-CCNC: 15 U/L (ref 0–45)
BASOPHILS # BLD AUTO: 0 10E9/L (ref 0–0.2)
BASOPHILS NFR BLD AUTO: 0.4 %
BILIRUB SERPL-MCNC: 0.3 MG/DL (ref 0.2–1.3)
BUN SERPL-MCNC: 16 MG/DL (ref 7–30)
CALCIUM SERPL-MCNC: 9 MG/DL (ref 8.5–10.1)
CHLORIDE SERPL-SCNC: 109 MMOL/L (ref 94–109)
CO2 SERPL-SCNC: 25 MMOL/L (ref 20–32)
CREAT SERPL-MCNC: 0.82 MG/DL (ref 0.66–1.25)
DIFFERENTIAL METHOD BLD: ABNORMAL
EOSINOPHIL # BLD AUTO: 0.1 10E9/L (ref 0–0.7)
EOSINOPHIL NFR BLD AUTO: 2 %
ERYTHROCYTE [DISTWIDTH] IN BLOOD BY AUTOMATED COUNT: 14.5 % (ref 10–15)
GFR SERPL CREATININE-BSD FRML MDRD: ABNORMAL ML/MIN/1.7M2
GLUCOSE SERPL-MCNC: 106 MG/DL (ref 70–99)
HCT VFR BLD AUTO: 36.6 % (ref 40–53)
HGB BLD-MCNC: 12.1 G/DL (ref 13.3–17.7)
IMM GRANULOCYTES # BLD: 0.1 10E9/L (ref 0–0.4)
IMM GRANULOCYTES NFR BLD: 1.6 %
INR PPP: 1.09 (ref 0.86–1.14)
LYMPHOCYTES # BLD AUTO: 2 10E9/L (ref 0.8–5.3)
LYMPHOCYTES NFR BLD AUTO: 35.3 %
MCH RBC QN AUTO: 32.2 PG (ref 26.5–33)
MCHC RBC AUTO-ENTMCNC: 33.1 G/DL (ref 31.5–36.5)
MCV RBC AUTO: 97 FL (ref 78–100)
MONOCYTES # BLD AUTO: 0.3 10E9/L (ref 0–1.3)
MONOCYTES NFR BLD AUTO: 6 %
NEUTROPHILS # BLD AUTO: 3 10E9/L (ref 1.6–8.3)
NEUTROPHILS NFR BLD AUTO: 54.7 %
NRBC # BLD AUTO: 0 10*3/UL
NRBC BLD AUTO-RTO: 0 /100
PLATELET # BLD AUTO: 310 10E9/L (ref 150–450)
POTASSIUM SERPL-SCNC: 3.7 MMOL/L (ref 3.4–5.3)
PROT SERPL-MCNC: 7.7 G/DL (ref 6.8–8.8)
RBC # BLD AUTO: 3.76 10E12/L (ref 4.4–5.9)
SODIUM SERPL-SCNC: 141 MMOL/L (ref 133–144)
WBC # BLD AUTO: 5.5 10E9/L (ref 4–11)

## 2017-06-29 PROCEDURE — 36415 COLL VENOUS BLD VENIPUNCTURE: CPT | Performed by: INTERNAL MEDICINE

## 2017-06-29 PROCEDURE — 80053 COMPREHEN METABOLIC PANEL: CPT | Performed by: INTERNAL MEDICINE

## 2017-06-29 PROCEDURE — 85610 PROTHROMBIN TIME: CPT | Performed by: INTERNAL MEDICINE

## 2017-06-29 PROCEDURE — 99212 OFFICE O/P EST SF 10 MIN: CPT

## 2017-06-29 PROCEDURE — 85025 COMPLETE CBC W/AUTO DIFF WBC: CPT | Performed by: INTERNAL MEDICINE

## 2017-06-29 RX ORDER — HYDROMORPHONE HYDROCHLORIDE 2 MG/1
2 TABLET ORAL EVERY 6 HOURS PRN
Qty: 20 TABLET | Refills: 0 | Status: SHIPPED | OUTPATIENT
Start: 2017-06-29 | End: 2017-06-29

## 2017-06-29 RX ORDER — HYDROMORPHONE HYDROCHLORIDE 2 MG/1
2 TABLET ORAL EVERY 6 HOURS PRN
Qty: 20 TABLET | Refills: 0 | Status: SHIPPED | OUTPATIENT
Start: 2017-06-29 | End: 2023-08-02

## 2017-06-29 RX ORDER — HYDROXYZINE PAMOATE 25 MG/1
CAPSULE ORAL
Refills: 0 | COMMUNITY
Start: 2017-01-31 | End: 2018-07-11

## 2017-06-29 RX ORDER — GABAPENTIN 100 MG/1
100 CAPSULE ORAL 3 TIMES DAILY
Qty: 90 CAPSULE | Refills: 1 | Status: SHIPPED | OUTPATIENT
Start: 2017-06-29 | End: 2018-07-11

## 2017-06-29 RX ORDER — ATORVASTATIN CALCIUM 40 MG/1
40 TABLET, FILM COATED ORAL DAILY
Qty: 90 TABLET | Refills: 3 | Status: SHIPPED | OUTPATIENT
Start: 2017-06-29 | End: 2018-07-11

## 2017-06-29 ASSESSMENT — PAIN SCALES - GENERAL: PAINLEVEL: MILD PAIN (3)

## 2017-06-29 NOTE — NURSING NOTE
Chief Complaint   Patient presents with     Blood Draw     Vitals and peripheral labs completed in lab by LPN.     See flowsheet    Nga Roy LPN

## 2017-06-29 NOTE — MR AVS SNAPSHOT
After Visit Summary   6/29/2017    Ziggy Hallman    MRN: [id number]           Patient Information     Date Of Birth          1975        Visit Information        Provider Department      6/29/2017 3:30 PM Olive aVlverde MD [facility] Blood and Marrow Transplant        Today's Diagnoses     Hypercholesterolemia    -  1    MDS (myelodysplastic syndrome) (H)        Neuropathy (H)              [facility] ([facility] [place] [place])  [address] [place] [address]  Phone: [phone]  Clinic Hours:   Monday-Thursday:7am to 7pm   Friday: 7am to 5pm   Weekends and holidays:    8am to noon (in general)  If your fever is 100.5  or greater,   call the clinic.  After hours call the   hospital at [phone] or   [phone]. Ask for the BMT   fellow on-call           Care Instructions    [facility] in 4 months with labs prior          Follow-ups after your visit        Your next 10 appointments already scheduled     Oct 25, 2017  2:15 PM CDT   [facility] with  [facility]   [facility] ([facility])    [address] [place] [id number] [phone]            Oct 25, 2017  2:45 PM CDT   RETURN ONC with Olive Valverde MD   [facility] Blood and Marrow Transplant ([facility])    [address] [place] [id number] [phone]              Who to contact     If you have questions or need follow up information about today's clinic visit or your schedule please contact [facility] BLOOD AND MARROW TRANSPLANT directly at [phone].  Normal or non-critical lab and imaging results will be communicated to you by MyChart, letter or phone within 4 business days after the clinic has received the results. If you do not hear from us within 7 days, please contact the clinic through MyChart or phone. If you have a critical or abnormal lab result, we will notify you by phone  as soon as possible.  Submit refill requests through [facility] or call your pharmacy and they will forward the refill request to us. Please allow 3 business days for your refill to be completed.          Additional Information About Your Visit        [facility] [employer] Information     [facility] gives you secure access to your electronic health record. If you see a primary care provider, you can also send messages to your care team and make appointments. If you have questions, please call your primary care clinic.  If you do not have a primary care provider, please call [phone] and they will assist you.        Care EveryWhere ID     This is your Care EveryWhere ID. This could be used by other organizations to access your [facility] medical records  [id number]        Your Vitals Were     Pulse Temperature Respirations Pulse Oximetry BMI (Body Mass Index)       88 98.3  F (36.8  C) (Oral) 18 97% 24.38 kg/m2        Blood Pressure from Last 3 Encounters:   06/29/17 135/83   05/19/17 (!) 152/98   03/29/17 130/82    Weight from Last 3 Encounters:   06/29/17 74.7 kg (164 lb 9.6 oz)   05/19/17 73.2 kg (161 lb 6.4 oz)   03/29/17 73.5 kg (162 lb)              We Performed the Following     CBC with platelets differential     Comprehensive metabolic panel     INR          Today's Medication Changes          These changes are accurate as of: 6/29/17  4:28 PM.  If you have any questions, ask your nurse or doctor.               These medicines have changed or have updated prescriptions.        Dose/Directions    atorvastatin 40 MG tablet   Commonly known as:  LIPITOR   This may have changed:    - medication strength  - how much to take  - additional instructions   Used for:  Hypercholesterolemia   Changed by:  Olive Valverde MD        Dose:  40 mg   Take 1 tablet (40 mg) by mouth daily   Quantity:  90 tablet   Refills:  3       * GABAPENTIN PO   This may have changed:  Another medication with the same name was added. Make sure you  understand how and when to take each.   Changed by:  Olive Valverde MD        Dose:  300 mg   Take 300 mg by mouth daily   Refills:  0       * gabapentin 100 MG capsule   Commonly known as:  NEURONTIN   This may have changed:  You were already taking a medication with the same name, and this prescription was added. Make sure you understand how and when to take each.   Used for:  Neuropathy (H)   Changed by:  Olive Valverde MD        Dose:  100 mg   Take 1 capsule (100 mg) by mouth 3 times daily   Quantity:  90 capsule   Refills:  1       HYDROmorphone 2 MG tablet   Commonly known as:  DILAUDID   This may have changed:  medication strength   Used for:  Neuropathy (H)   Changed by:  Olive Valverde MD        Dose:  2 mg   Take 1 tablet (2 mg) by mouth every 6 hours as needed for moderate to severe pain   Quantity:  20 tablet   Refills:  0       rivaroxaban ANTICOAGULANT 20 MG Tabs tablet   Commonly known as:  XARELTO   This may have changed:  Another medication with the same name was removed. Continue taking this medication, and follow the directions you see here.   Used for:  Arterial thrombosis (H)   Changed by:  Paty Mack MD        Dose:  20 mg   Take 1 tablet (20 mg) by mouth daily (with dinner)   Quantity:  30 tablet   Refills:  3       * Notice:  This list has 2 medication(s) that are the same as other medications prescribed for you. Read the directions carefully, and ask your doctor or other care provider to review them with you.         Where to get your medicines      These medications were sent to [facility] - [place], [place] - [address] AT [facility] [address], [facility] [address]     Phone:  [phone]     atorvastatin 40 MG tablet    gabapentin 100 MG capsule         Some of these will need a paper prescription and others can be bought over the counter.  Ask your nurse if you have questions.     Bring a paper  prescription for each of these medications     HYDROmorphone 2 MG tablet                Recent Review Flowsheet Data     BMT Recent Results Latest Ref Rng & Units 10/1/2015 11/10/2015 11/19/2015 1/11/2016 2/9/2017 3/29/2017 6/29/2017    WBC 4.0 - 11.0 10e9/L - - - - 6.3 4.9 5.5    Hemoglobin 13.3 - 17.7 g/dL - - - - 12.8(L) 13.6 12.1(L)    Platelet Count 150 - 450 10e9/L - - - - 317 359 310    Neutrophils (Absolute) 1.6 - 8.3 10e9/L - - - - 3.2 2.5 3.0    INR 0.86 - 1.14 2.5(A) 1.3(A) 2.6(A) 2.3(A) 1.22(H) 2.68(H) 1.09    Sodium 133 - 144 mmol/L - - - - 141 144 141    Potassium 3.4 - 5.3 mmol/L - - - - 4.1 3.9 3.7    Chloride 94 - 109 mmol/L - - - - 105 110(H) 109    Glucose 70 - 99 mg/dL - - - - 81 118(H) 106(H)    Urea Nitrogen 7 - 30 mg/dL - - - - 16 17 16    Creatinine 0.66 - 1.25 mg/dL - - - - 0.83 0.79 0.82    Calcium (Total) 8.5 - 10.1 mg/dL - - - - 9.0 8.7 9.0    Protein (Total) 6.8 - 8.8 g/dL - - - - 8.7 - 7.7    Albumin 3.4 - 5.0 g/dL - - - - 3.4 - 3.5    Alkaline Phosphatase 40 - 150 U/L - - - - 92 - 76    AST 0 - 45 U/L - - - - 15 - 15    ALT 0 - 70 U/L - - - - 21 - 22    MCV 78 - 100 fl - - - - 97 94 97               Primary Care Provider Office Phone # Fax #    Adeel Morris [phone]       [facility] [place] [address] [facility]  [place] [address]        Equal Access to Services     CASSIE BISHOP : Hadii ingris Uribe, wadustinda rupinder, qaconita kaalsonali muro, chandrika villaseñor. So [facility] [phone].    ATENCIÓN: Si habla español, tiene a murphy disposición servicios gratuitos de asistencia lingüística. Llame al [phone].    We comply with applicable federal civil rights laws and [place] laws. We do not discriminate on the basis of race, color, national origin, age, disability sex, sexual orientation or gender identity.            Thank you!     Thank you for choosing [facility] BLOOD [facility] MARROW TRANSPLANT  for your care. Our goal is  always to provide you with excellent care. Hearing back from our patients is one way we can continue to improve our services. Please take a few minutes to complete the written survey that you may receive in the mail after your visit with us. Thank you!             Your Updated Medication List - Protect others around you: Learn how to safely use, store and throw away your medicines at www.disposemymeds.org.          This list is accurate as of: 6/29/17  4:28 PM.  Always use your most recent med list.                   Brand Name Dispense Instructions for use Diagnosis    AMBIEN PO      Take by mouth as needed for sleep    Long term current use of anticoagulant therapy       aspirin 325 MG tablet      Take by mouth daily        atorvastatin 40 MG tablet    LIPITOR    90 tablet    Take 1 tablet (40 mg) by mouth daily    Hypercholesterolemia       * GABAPENTIN PO      Take 300 mg by mouth daily        * gabapentin 100 MG capsule    NEURONTIN    90 capsule    Take 1 capsule (100 mg) by mouth 3 times daily    Neuropathy (H)       HYDROmorphone 2 MG tablet    DILAUDID    20 tablet    Take 1 tablet (2 mg) by mouth every 6 hours as needed for moderate to severe pain    Neuropathy (H)       hydrOXYzine 25 MG capsule    VISTARIL     TK 1 TO 2 CS PO Q 4 H PRA OR ITCHING OR PAIN        PLAVIX 75 MG tablet   Generic drug:  clopidogrel     30 tablet    Prescribed but patient says he is not taking this    Long term current use of anticoagulant therapy       rivaroxaban ANTICOAGULANT 20 MG Tabs tablet    XARELTO    30 tablet    Take 1 tablet (20 mg) by mouth daily (with dinner)    Arterial thrombosis (H)       sennosides 8.6 MG tablet    SENOKOT     Take 1 tablet by mouth daily as needed for constipation Reported on 3/29/2017        * Notice:  This list has 2 medication(s) that are the same as other medications prescribed for you. Read the directions carefully, and ask your doctor or other care provider to review them with you.

## 2017-06-29 NOTE — PROGRESS NOTES
[facility] Follow-Up  6/29/2017    Disease and Treatment History:  1. Ill-defined hypercoagulable state who has had numerous embolic and arterial thrombotic events.    - Renal infarct in November 2011,  - Left Popliteal embolic episode in December 2011 and was treated with surgery, aspirin and Coumadin.   - Embolic episode with his right carotid artery and had some TIA/stroke-like symptoms: December 2012  - He also had a right renal infarct in October 2013.  -  Embolic MI in July 2015.   - He notes another Renal infarct April but unclear if 2015 or 2016.   ---- He was initially treated with aspirin in 2011 and was transitioned to aspirin plus Coumadin in December 2011.  Plavix was added to the combination in October 2013 and he has remained on those medications minus the aspirin going forward.   2. He was found to have some renal artery stenosis approximately 50% to 60% on a full body angiogram and was seen by Vascular Surgery in October 2015, and they did not feel that invasive interventions were needed and felt like it could cause more harm than good.    3. He has had extensive hypercoagulable workup to date with a factor V and factor II mutation analysis that was negative, protein S and C and antithrombin III levels that were within normal limits, homocysteine that was within normal limits, and lupus anticoagulant and beta-2 glycoprotein assessments that were all negative.  He has had JAK2 testing which was negative and PNH testing which was negative.    4. He was noted to have some mild anemia dropping down into the 11 range.  He had B12 and folate levels that were within normal limits.  He had an EPO level that was 25.  He had a retic count of 2.5% and had a ferritin that was slightly elevated at 350.   -- Bone marrow biopsy on 10/07/2015 and this was read as trilineage hematopoiesis with dysgranulopoiesis and dysmegakaryopoiesis with less than 1% blasts consistent with RCMD 40% to 50% cellular.  Notably, the  cytogenetics were complex with a karyotype of 45,XY, a derivative of 5 and 17, addition of 9p13, trisomy 11, deletion 111, a minus 17 in 18 cells, and 46,XY in 3.   --IPSS scoring system for which he would get 4 points for cytogenetics, 0 points for blast percentage, 0 points for hemoglobin, 0 points for platelets and 0 points for ANC, giving him a total score of 4, putting him in the intermediate risk category.   5. Consult in BMT clinic 11/2015 for MDS. Siblings typed and 2 sibs are full matches  6. Repeat consult in 2/2017 with stable MDS. Transfer of care here  7. Winter 2017 gangrenous right great toe requiring vascular surgery and amputation  8. 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 Saw Dr. Craven in 5/2017 and importance of plavix and aspirin stressed and switched to Xarelto.    HPI: HISTORY OF PRESENT ILLNESS:  I last saw Ziggy in March and we got him referred to Dr. Craven who he saw in May after a recent NSTEMI. He was switched to xarelto and the importance of plavix and aspirin stressed. Since then he has overall been doing ok. Still has significant pain in the right foot that is burning/sharp. Is taking gabapentin in the evening to help with sleep, it also helps with his pain, but he is having a hard time waking up in the morning. He is no longer with his wife and is working to get a place for he and his kids. Currently living with his sister. He notes no current chest pain or SOB, no nausea or vomiting, no bleeding or bruising. Back at work       Medications are updated in Epic         10 point ROS otherwise negative     Physical Examination:    /83  Pulse 88  Temp 98.3  F (36.8  C) (Oral)  Resp 18  Wt 74.7 kg (164 lb 9.6 oz)  SpO2 97%  BMI 24.38 kg/m2    General:  He has normal development. KPS 80  HEENT:  No icterus or injection.  Oropharynx is clear, no thrush.   Lungs:  Clear to auscultation bilaterally.   Cardiac:  Regular without any rubs, murmurs or gallops.   Abdomen:  Soft, nontender,  no hepatosplenomegaly.   Extremities:  No edema, no petechiae.  Right great toe amputation well healed      Labs:  Results for TOD WHARTON (MRN [id number]) as of 6/29/2017 16:28   Ref. Range 6/29/2017 15:28   Sodium Latest Ref Range: 133 - 144 mmol/L 141   Potassium Latest Ref Range: 3.4 - 5.3 mmol/L 3.7   Chloride Latest Ref Range: 94 - 109 mmol/L 109   Carbon Dioxide Latest Ref Range: 20 - 32 mmol/L 25   Urea Nitrogen Latest Ref Range: 7 - 30 mg/dL 16   Creatinine Latest Ref Range: 0.66 - 1.25 mg/dL 0.82   GFR Estimate Latest Ref Range: >60 mL/min/1.7m2 >90...   GFR Estimate If Black Latest Ref Range: >60 mL/min/1.7m2 >90...   Calcium Latest Ref Range: 8.5 - 10.1 mg/dL 9.0   Anion Gap Latest Ref Range: 3 - 14 mmol/L 8   Albumin Latest Ref Range: 3.4 - 5.0 g/dL 3.5   Protein Total Latest Ref Range: 6.8 - 8.8 g/dL 7.7   Bilirubin Total Latest Ref Range: 0.2 - 1.3 mg/dL 0.3   Alkaline Phosphatase Latest Ref Range: 40 - 150 U/L 76   ALT Latest Ref Range: 0 - 70 U/L 22   AST Latest Ref Range: 0 - 45 U/L 15   Glucose Latest Ref Range: 70 - 99 mg/dL 106 (H)   WBC Latest Ref Range: 4.0 - 11.0 10e9/L 5.5   Hemoglobin Latest Ref Range: 13.3 - 17.7 g/dL 12.1 (L)   Hematocrit Latest Ref Range: 40.0 - 53.0 % 36.6 (L)   Platelet Count Latest Ref Range: 150 - 450 10e9/L 310         Assessment and Plan:  Mr. Wharton is a 40-year-old gentleman with an extensive clotting history, on anticoagulation currently with no identified specific abnormality, who now is diagnosed with myelodysplastic syndrome with complex karyotype.      1.  Myelodysplastic syndrome. Counts stable. Currently monitoring. No indication for treatment or transplant at this time.         2.  Coagulopathy: Previously followed by Dr. Payne at [facility] but has not had follow up since she left.No following with Dr. Craven. Continues on Plavix and aspirin and now on Xarelto    3. ID: No active issues    4. Toe:status post amputation. Healing well    5. Neuropathy  Pain: Changed his gabapentin to 100 mg TID for better pain control and less sleepiness as opposed to the 300 mg at bedtime. Gave him a script of dilaudid for 2 mg tabs X 20 to help get through while ramping up/adjusting the gabapentin    Final Plan:  - change gabapentin dosing  - RTC 4 months      Olive Valverde

## 2017-06-29 NOTE — NURSING NOTE
"Oncology Rooming Note    June 29, 2017 3:40 PM   Ziggy Hallman is a 41 year old male who presents for:    Chief Complaint   Patient presents with     Blood Draw     Vitals and peripheral labs completed in lab by LPN.     Initial Vitals: /83  Pulse 88  Temp 98.3  F (36.8  C) (Oral)  Resp 18  Wt 74.7 kg (164 lb 9.6 oz)  SpO2 97%  BMI 24.38 kg/m2 Estimated body mass index is 24.38 kg/(m^2) as calculated from the following:    Height as of 5/19/17: 1.75 m (5' 8.9\").    Weight as of this encounter: 74.7 kg (164 lb 9.6 oz). Body surface area is 1.91 meters squared.  Mild Pain (3) Comment: Right Foot   No LMP for male patient.  Allergies reviewed: Yes  Medications reviewed: Yes    Medications: Medication refills not needed today.  Pharmacy name entered into [facility]:    [facility] [address] [place], [place] - [address] [facility] [id number] [place] [address] - [address] AT [facility] OF [facility] [address]    Clinical concerns: none MD was NOT notified.    6 minutes for nursing intake (face to face time)     Julychristopher Bhatt MA              "

## 2018-05-07 ENCOUNTER — TELEPHONE (OUTPATIENT)
Dept: ONCOLOGY | Facility: CLINIC | Age: 43
End: 2018-05-07

## 2018-05-07 NOTE — TELEPHONE ENCOUNTER
[facility] Telephone Triage Note    Assessment: Patient called in to triage reporting the following symptoms: shortness of breath and all over bone pain. Bone pain x 1-2 months. This is affecting his sleep. Has dilaudid for pain - he cannot remember if this is effective. OTC pain medication (tyelnol/ibuprofen) not effective. Shortness of breath started this weekend. He has woke up for the past 2-3 night feeling short of breath. He said the out of breath feeling lasted up to several hours. Denies chest pain (he says he's had a heart attack before so he know what cardiac pain feels like). He also denied pleuritic pain. He is not currently SOB when talking to me. Negative for: dry cough, fever. Positive for fatigue. He has a hx of blood clots, on warfarin and aspirin. He says his anticoagulants are managed by his PCP. Denies any new leg pain/redness/warmth/swelling. He's familiar with s/s of DVT. He would like to see Dr. Valverde sooner regarding his symptoms as he thinks they are due to his MDS. .    Recommendations: .  If SOB occurs again, given his hx, he should go to ED. He verbalized understanding. .    Follow-Up: Patient voiced understanding of advice and/or instructions given.  - Will route to Dr. Valverde to see if she would like to add him onto her schedule this week.

## 2018-05-08 DIAGNOSIS — D46.9 MDS (MYELODYSPLASTIC SYNDROME) (H): Primary | ICD-10-CM

## 2018-05-08 NOTE — TELEPHONE ENCOUNTER
RN[facility] called patient this afternoon in regards to the [facility] Telephone Triage Note from yesterday (5/7/18) evening.  RNCC per Dr. Valverde's request scheduled patient for labs and follow up with N[facility] tomorrow evening.

## 2018-05-09 ENCOUNTER — ONCOLOGY VISIT (OUTPATIENT)
Dept: ONCOLOGY | Facility: CLINIC | Age: 43
End: 2018-05-09
Attending: PHYSICIAN ASSISTANT
Payer: COMMERCIAL

## 2018-05-09 VITALS
RESPIRATION RATE: 18 BRPM | TEMPERATURE: 97.4 F | WEIGHT: 173.28 LBS | OXYGEN SATURATION: 96 % | SYSTOLIC BLOOD PRESSURE: 182 MMHG | HEART RATE: 95 BPM | BODY MASS INDEX: 25.67 KG/M2 | HEIGHT: 69 IN | DIASTOLIC BLOOD PRESSURE: 103 MMHG

## 2018-05-09 DIAGNOSIS — D46.9 MDS (MYELODYSPLASTIC SYNDROME) (H): ICD-10-CM

## 2018-05-09 DIAGNOSIS — D46.9 MDS (MYELODYSPLASTIC SYNDROME) (H): Primary | ICD-10-CM

## 2018-05-09 LAB
BASOPHILS # BLD AUTO: 0 10E9/L (ref 0–0.2)
BASOPHILS NFR BLD AUTO: 0.6 %
DIFFERENTIAL METHOD BLD: ABNORMAL
EOSINOPHIL # BLD AUTO: 0.1 10E9/L (ref 0–0.7)
EOSINOPHIL NFR BLD AUTO: 1 %
ERYTHROCYTE [DISTWIDTH] IN BLOOD BY AUTOMATED COUNT: 14.9 % (ref 10–15)
HCT VFR BLD AUTO: 38.1 % (ref 40–53)
HGB BLD-MCNC: 12.8 G/DL (ref 13.3–17.7)
IMM GRANULOCYTES # BLD: 0.1 10E9/L (ref 0–0.4)
IMM GRANULOCYTES NFR BLD: 1.4 %
LYMPHOCYTES # BLD AUTO: 2.1 10E9/L (ref 0.8–5.3)
LYMPHOCYTES NFR BLD AUTO: 42.8 %
MCH RBC QN AUTO: 32 PG (ref 26.5–33)
MCHC RBC AUTO-ENTMCNC: 33.6 G/DL (ref 31.5–36.5)
MCV RBC AUTO: 95 FL (ref 78–100)
MONOCYTES # BLD AUTO: 0.3 10E9/L (ref 0–1.3)
MONOCYTES NFR BLD AUTO: 6.7 %
NEUTROPHILS # BLD AUTO: 2.4 10E9/L (ref 1.6–8.3)
NEUTROPHILS NFR BLD AUTO: 47.5 %
NRBC # BLD AUTO: 0 10*3/UL
NRBC BLD AUTO-RTO: 0 /100
PLATELET # BLD AUTO: 307 10E9/L (ref 150–450)
RBC # BLD AUTO: 4 10E12/L (ref 4.4–5.9)
WBC # BLD AUTO: 5 10E9/L (ref 4–11)

## 2018-05-09 PROCEDURE — G0463 HOSPITAL OUTPT CLINIC VISIT: HCPCS | Mod: ZF

## 2018-05-09 PROCEDURE — 85025 COMPLETE CBC W/AUTO DIFF WBC: CPT

## 2018-05-09 PROCEDURE — 99214 OFFICE O/P EST MOD 30 MIN: CPT | Mod: ZP | Performed by: PHYSICIAN ASSISTANT

## 2018-05-09 RX ORDER — WARFARIN SODIUM 5 MG/1
10 TABLET ORAL 2 TIMES DAILY
COMMUNITY
Start: 2018-04-24 | End: 2020-05-28

## 2018-05-09 RX ORDER — LISINOPRIL 5 MG/1
5 TABLET ORAL DAILY
COMMUNITY
Start: 2017-10-07 | End: 2018-07-11

## 2018-05-09 ASSESSMENT — PAIN SCALES - GENERAL: PAINLEVEL: MODERATE PAIN (4)

## 2018-05-09 NOTE — MR AVS SNAPSHOT
"              After Visit Summary   5/9/2018    Ziggy Hallman    MRN: [id number]           Patient Information     Date Of Birth          1975        Visit Information        Provider Department      5/9/2018 6:00 PM Lalo Thrasher PA [facility]        Today's Diagnoses     MDS (myelodysplastic syndrome) (H)    -  1       Follow-ups after your visit        Who to contact     If you have questions or need follow up information about today's clinic visit or your schedule please contact [facility] directly at [phone].  Normal or non-critical lab and imaging results will be communicated to you by [employer], letter or phone within 4 business days after the clinic has received the results. If you do not hear from us within 7 days, please contact the clinic through [employer] or phone. If you have a critical or abnormal lab result, we will notify you by phone as soon as possible.  Submit refill requests through [employer]iq or call your pharmacy and they will forward the refill request to us. Please allow 3 business days for your refill to be completed.          Additional Information About Your Visit        MyChart Information     Physiq gives you secure access to your electronic health record. If you see a primary care provider, you can also send messages to your care team and make appointments. If you have questions, please call your primary care clinic.  If you do not have a primary care provider, please call [phone] and they will assist you.        Care EveryWhere ID     This is your Care EveryWhere ID. This could be used by other organizations to access your [place] medical records  [id number]        Your Vitals Were     Pulse Temperature Respirations Height Pulse Oximetry BMI (Body Mass Index)    95 97.4  F (36.3  C) (Oral) 18 1.75 m (5' 8.9\") 96% 25.66 kg/m2       Blood Pressure from Last 3 Encounters:   05/09/18 (!) 182/103   06/29/17 135/83   05/19/17 (!) " 152/98    Weight from Last 3 Encounters:   05/09/18 78.6 kg (173 lb 4.5 oz)   06/29/17 74.7 kg (164 lb 9.6 oz)   05/19/17 73.2 kg (161 lb 6.4 oz)              Today, you had the following     No orders found for display       Primary Care Provider Office Phone # Fax #    Adeel Morris [phone] [address] [facility] [address]        Equal Access to Services     CASSIE BISHOP : Hadii aad ku hadasho Soomaali, waaxda luqadaha, qaybta kaalmada adeegyada, waxay inderjitin hayaan juan stout . So [facility] [phone].    ATENCIÓN: Si habla español, tiene a murphy disposición servicios gratuitos de asistencia lingüística. [facility] [phone].    We comply with applicable federal civil rights laws and [place] laws. We do not discriminate on the basis of race, color, national origin, age, disability, sex, sexual orientation, or gender identity.            Thank you!     Thank you for choosing [facility]  for your care. Our goal is always to provide you with excellent care. Hearing back from our patients is one way we can continue to improve our services. Please take a few minutes to complete the written survey that you may receive in the mail after your visit with us. Thank you!             Your Updated Medication List - Protect others around you: Learn how to safely use, store and throw away your medicines at www.disposemymeds.org.          This list is accurate as of 5/9/18 11:59 PM.  Always use your most recent med list.                   Brand Name Dispense Instructions for use Diagnosis    AMBIEN PO      Take by mouth as needed for sleep    Long term current use of anticoagulant therapy       aspirin 325 MG tablet      Take by mouth daily        atorvastatin 40 MG tablet    LIPITOR    90 tablet    Take 1 tablet (40 mg) by mouth daily    Hypercholesterolemia       * GABAPENTIN PO      Take 300 mg by mouth daily        * gabapentin 100 MG capsule    NEURONTIN     90 capsule    Take 1 capsule (100 mg) by mouth 3 times daily    Neuropathy       HYDROmorphone 2 MG tablet    DILAUDID    20 tablet    Take 1 tablet (2 mg) by mouth every 6 hours as needed for moderate to severe pain    Neuropathy       hydrOXYzine 25 MG capsule    VISTARIL     TK 1 TO 2 CS PO Q 4 H PRA OR ITCHING OR PAIN        lisinopril 5 MG tablet    PRINIVIL/ZESTRIL     Take 5 mg by mouth daily        PLAVIX 75 MG tablet   Generic drug:  clopidogrel     30 tablet    Prescribed but patient says he is not taking this    Long term current use of anticoagulant therapy       rivaroxaban ANTICOAGULANT 20 MG Tabs tablet    XARELTO    30 tablet    Take 1 tablet (20 mg) by mouth daily (with dinner)    Arterial thrombosis (H)       sennosides 8.6 MG tablet    SENOKOT     Take 1 tablet by mouth daily as needed for constipation Reported on 3/29/2017        warfarin 5 MG tablet    COUMADIN     Take 10 mg by mouth 2 times daily        * Notice:  This list has 2 medication(s) that are the same as other medications prescribed for you. Read the directions carefully, and ask your doctor or other care provider to review them with you.

## 2018-05-09 NOTE — NURSING NOTE
"Oncology Rooming Note    May 9, 2018 5:52 PM   Ziggy Hallman is a 42 year old male who presents for:    Chief Complaint   Patient presents with     Oncology Clinic Visit     MDS (myelodysplastic syndrome)      Initial Vitals: BP (!) 178/112 (BP Location: Left arm, Patient Position: Chair, Cuff Size: Adult Large)  Pulse 95  Temp 97.4  F (36.3  C) (Oral)  Resp 18  Ht 1.75 m (5' 8.9\")  Wt 78.6 kg (173 lb 4.5 oz)  SpO2 96%  BMI 25.66 kg/m2 Estimated body mass index is 25.66 kg/(m^2) as calculated from the following:    Height as of this encounter: 1.75 m (5' 8.9\").    Weight as of this encounter: 78.6 kg (173 lb 4.5 oz). Body surface area is 1.95 meters squared.  Moderate Pain (4) Comment: hands, legs    No LMP for male patient.  Allergies reviewed: Yes  Medications reviewed: Yes    Medications: Medication refills not needed today.  Pharmacy name entered into [facility]:    [facility] - [place], [place] - [address] [facility] [place] [facility] [address] [place], [place] - [address] [facility] [address]    Clinical concerns:  No new concerns  Provider was notified.    5 minutes for nursing intake (face to face time)     Gayla Curry MA              "

## 2018-05-09 NOTE — LETTER
5/9/2018       RE: Ziggy Hallman  [address] [place] [address]     Dear Colleague,    Thank you for referring your patient, Ziggy Hallman, to the [facility]. Please see a copy of my visit note below.    No notes on file    Again, thank you for allowing me to participate in the care of your patient.      Sincerely,    DELL Coburn

## 2018-05-09 NOTE — PROGRESS NOTES
Oncology/Hematology Visit Note  May 9, 2018    Reason for Visit: Follow up of MDS    History of Present Illness: Ziggy Hallman is a 42 year old male with MDS. His hematologic history is as follows:    1. Ill-defined hypercoagulable state who has had numerous embolic and arterial thrombotic events.    - Renal infarct in November 2011,  - Left Popliteal embolic episode in December 2011 and was treated with surgery, aspirin and Coumadin.   - Embolic episode with his right carotid artery and had some TIA/stroke-like symptoms: December 2012  - He also had a right renal infarct in October 2013.  -  Embolic MI in July 2015.   - He notes another Renal infarct April but unclear if 2015 or 2016.   ---- He was initially treated with aspirin in 2011 and was transitioned to aspirin plus Coumadin in December 2011.  Plavix was added to the combination in October 2013 and he has remained on those medications minus the aspirin going forward.   2. He was found to have some renal artery stenosis approximately 50% to 60% on a full body angiogram and was seen by Vascular Surgery in October 2015, and they did not feel that invasive interventions were needed and felt like it could cause more harm than good.    3. He has had extensive hypercoagulable workup to date with a factor V and factor II mutation analysis that was negative, protein S and C and antithrombin III levels that were within normal limits, homocysteine that was within normal limits, and lupus anticoagulant and beta-2 glycoprotein assessments that were all negative.  He has had JAK2 testing which was negative and PNH testing which was negative.    4. He was noted to have some mild anemia dropping down into the 11 range.  He had B12 and folate levels that were within normal limits.  He had an EPO level that was 25.  He had a retic count of 2.5% and had a ferritin that was slightly elevated at 350.   -- Bone marrow biopsy on 10/07/2015 and this was read as trilineage  hematopoiesis with dysgranulopoiesis and dysmegakaryopoiesis with less than 1% blasts consistent with RCMD 40% to 50% cellular.  Notably, the cytogenetics were complex with a karyotype of 45,XY, a derivative of 5 and 17, addition of 9p13, trisomy 11, deletion 111, a minus 17 in 18 cells, and 46,XY in 3.   --IPSS scoring system for which he would get 4 points for cytogenetics, 0 points for blast percentage, 0 points for hemoglobin, 0 points for platelets and 0 points for ANC, giving him a total score of 4, putting him in the intermediate risk category.   5. Consult in BMT clinic 11/2015 for MDS. Siblings typed and 2 sibs are full matches  6. Repeat consult in 2/2017 with stable MDS. Transfer of care here  7. Winter 2017 gangrenous right great toe requiring vascular surgery and amputation  8. 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 Saw Dr. Craven in 5/2017 and importance of plavix and aspirin stressed and switched to Xarelto.  10. Saw Dr. Valverde June 2017. MDS stable. Plan to follow-up in 4 months but patient missed appointment.    Mr. Hallman returns to clinic today as an add on for a few months of joint aches and one episode of shortness of breath and concern for MDS progression.     Interval History:  Mr. Hallman returns to clinic today unaccompanied. He states that for the past 3-4 months he has noted more generalized joint aches. He states it is in his arms and legs with no one particular joint that is bothering him. 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 He denies rashes or joint swelling. No personal or family history of arthritis. No extremity weakness. 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 No dizziness. No vision changes other that slight blurred vision as he ages.    His other complaint is one episode a few nights ago where he woke up and found it hard the breath. This lasted through the night  but then resolved. He has not had any recurrent episodes of SOB since then and denies any breathing concerns at this time. No chest pain or cough. No peripheral edema. His last lower extremity DVT was sometime last year but he doesn't know exactly when.    He tells me he had two strokes this last December for which he was hospitalized. He has not had any hypercoagulable events since that time. He is currently on ASA, Warfarin, and Effient for his hypercoagulability. He was prescribed blood pressure medications but is not taking them currently. He denies fevers, chills, night sweats, lymphadenopathy, bleeding issues, or bruising. No GI concerns.     Current Outpatient Prescriptions   Medication Sig Dispense Refill     HYDROmorphone (DILAUDID) 2 MG tablet Take 1 tablet (2 mg) by mouth every 6 hours as needed for moderate to severe pain 20 tablet 0     lisinopril (PRINIVIL/ZESTRIL) 5 MG tablet Take 5 mg by mouth daily       warfarin (COUMADIN) 5 MG tablet Take 10 mg by mouth 2 times daily       Zolpidem Tartrate (AMBIEN PO) Take by mouth as needed for sleep       aspirin 325 MG tablet Take by mouth daily       atorvastatin (LIPITOR) 40 MG tablet Take 1 tablet (40 mg) by mouth daily (Patient not taking: Reported on 5/9/2018) 90 tablet 3     clopidogrel (PLAVIX) 75 MG tablet Prescribed but patient says he is not taking this 30 tablet      gabapentin (NEURONTIN) 100 MG capsule Take 1 capsule (100 mg) by mouth 3 times daily (Patient not taking: Reported on 5/9/2018) 90 capsule 1     GABAPENTIN PO Take 300 mg by mouth daily        hydrOXYzine (VISTARIL) 25 MG capsule TK 1 TO 2 CS PO Q 4 H PRA OR ITCHING OR PAIN  0     rivaroxaban ANTICOAGULANT (XARELTO) 20 MG TABS tablet Take 1 tablet (20 mg) by mouth daily (with dinner) (Patient not taking: Reported on 6/29/2017) 30 tablet 3     sennosides (SENOKOT) 8.6 MG tablet Take 1 tablet by mouth daily as needed for constipation Reported on 3/29/2017         Physical Examination:  BP  "(!) 178/112 (BP Location: Left arm, Patient Position: Chair, Cuff Size: Adult Large)  Pulse 95  Temp 97.4  F (36.3  C) (Oral)  Resp 18  Ht 1.75 m (5' 8.9\")  Wt 78.6 kg (173 lb 4.5 oz)  SpO2 96%  BMI 25.66 kg/m2  Wt Readings from Last 10 Encounters:   05/09/18 78.6 kg (173 lb 4.5 oz)   06/29/17 74.7 kg (164 lb 9.6 oz)   05/19/17 73.2 kg (161 lb 6.4 oz)   03/29/17 73.5 kg (162 lb)   02/09/17 69.3 kg (152 lb 11.2 oz)   11/20/15 81.8 kg (180 lb 5.4 oz)   09/01/15 82.1 kg (180 lb 14.4 oz)   08/18/15 80.3 kg (177 lb)     Constitutional: Well-appearing male in no acute distress.  Eyes: EOMI, PERRL. No scleral icterus.  ENT: Oral mucosa is moist without lesions or thrush.   Lymphatic: Neck is supple without cervical or supraclavicular lymphadenopathy.   Cardiovascular: Regular rate and rhythm. No murmurs, gallops, or rubs. No peripheral edema.  Respiratory: Clear to auscultation bilaterally. No wheezes or crackles.  Gastrointestinal: Bowel sounds present. Abdomen soft, non-tender. No palpable hepatosplenomegaly or masses.   Neurologic: Cranial nerves II through XII are grossly intact.  Skin: No rashes, petechiae, or bruising noted on exposed skin.  Musculoskeletal: No swelling or rashes over extremity joints.    Laboratory Data:  Results for TOD WHARTON (MRN [id number]) as of 5/10/2018 09:15   5/9/2018 17:36   WBC 5.0   Hemoglobin 12.8 (L)   Hematocrit 38.1 (L)   Platelet Count 307   RBC Count 4.00 (L)   MCV 95   MCH 32.0   MCHC 33.6   RDW 14.9   Diff Method Automated Method   % Neutrophils 47.5   % Lymphocytes 42.8   % Monocytes 6.7   % Eosinophils 1.0   % Basophils 0.6   % Immature Granulocytes 1.4   Nucleated RBCs 0   Absolute Neutrophil 2.4   Absolute Lymphocytes 2.1   Absolute Monocytes 0.3   Absolute Eosinophils 0.1   Absolute Basophils 0.0   Abs Immature Granulocytes 0.1   Absolute Nucleated RBC 0.0       Assessment and Plan:  1. Heme  Diagnosed with MDS with complex karyotype 2015. Counts continue to be " stable today. He had a second opinion done at [place] in November where they performed a bone marrow biopsy that was also stable. Very unlikely his below symptoms are related to MDS given no change in counts. Will have him see Dr. Valverde in the next month or so to discuss a plan moving forward.    In regards to his hypercoagulable state, he has not had any clotting events since December 2017. He is currently on ASA, Warfarin, and Effient and follows with his PCP. He is due to have his INR checked at his local clinic and plans to do so this next week. Will ask care coordinator to see if he would like to reestablish care with Dr. Craven.    2. Musculoskeletal  Non-specific arthralgias that have been ongoing for the past few months. No edema or rashes over joints and as above, no progression of MDS to explain these findings from a hematology prespective. Asked him to talk to his PCP about this to see if perhaps a rheumatology work-up is necessary.    3. Pulm  One episode of SOB a few nights ago with no recurrent respiratory symptoms. Did walk him in clinic and SpO2 95-97% at all times. No signs or symptoms to suggest PE. Did discuss these with him though given his extensive clotting history.    4. Cards  History of HTN and he tells me he was prescribed amlodipine and lisinopril but hasn't been taking them. Today his BP is quite high at 178/112, recheck 180/103. He is asymptomatic from this. Instructed him to start his Lisinopril tonight and he needs to follow-up with his PCP in the next week or so to discuss BP management.    Lalo Thrasher PA-C  [facility]  [address] [place] [address]  [phone]

## 2018-05-09 NOTE — LETTER
5/9/2018      RE: Ziggy Hallman  [address] [place] [address]       Oncology/Hematology Visit Note  May 9, 2018    Reason for Visit: Follow up of MDS    History of Present Illness: Ziggy Hallman is a 42 year old male with MDS. His hematologic history is as follows:    1. Ill-defined hypercoagulable state who has had numerous embolic and arterial thrombotic events.    - Renal infarct in November 2011,  - Left Popliteal embolic episode in December 2011 and was treated with surgery, aspirin and Coumadin.   - Embolic episode with his right carotid artery and had some TIA/stroke-like symptoms: December 2012  - He also had a right renal infarct in October 2013.  -  Embolic MI in July 2015.   - He notes another Renal infarct April but unclear if 2015 or 2016.   ---- He was initially treated with aspirin in 2011 and was transitioned to aspirin plus Coumadin in December 2011.  Plavix was added to the combination in October 2013 and he has remained on those medications minus the aspirin going forward.   2. He was found to have some renal artery stenosis approximately 50% to 60% on a full body angiogram and was seen by Vascular Surgery in October 2015, and they did not feel that invasive interventions were needed and felt like it could cause more harm than good.    3. He has had extensive hypercoagulable workup to date with a factor V and factor II mutation analysis that was negative, protein S and C and antithrombin III levels that were within normal limits, homocysteine that was within normal limits, and lupus anticoagulant and beta-2 glycoprotein assessments that were all negative.  He has had JAK2 testing which was negative and PNH testing which was negative.    4. He was noted to have some mild anemia dropping down into the 11 range.  He had B12 and folate levels that were within normal limits.  He had an EPO level that was 25.  He had a retic count of 2.5% and had a ferritin that was slightly elevated at 350.    -- Bone marrow biopsy on 10/07/2015 and this was read as trilineage hematopoiesis with dysgranulopoiesis and dysmegakaryopoiesis with less than 1% blasts consistent with RCMD 40% to 50% cellular.  Notably, the cytogenetics were complex with a karyotype of 45,XY, a derivative of 5 and 17, addition of 9p13, trisomy 11, deletion 111, a minus 17 in 18 cells, and 46,XY in 3.   --IPSS scoring system for which he would get 4 points for cytogenetics, 0 points for blast percentage, 0 points for hemoglobin, 0 points for platelets and 0 points for ANC, giving him a total score of 4, putting him in the intermediate risk category.   5. Consult in BMT clinic 11/2015 for MDS. Siblings typed and 2 sibs are full matches  6. Repeat consult in 2/2017 with stable MDS. Transfer of care here  7. Winter 2017 gangrenous right great toe requiring vascular surgery and amputation  8. 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 Saw Dr. Craven in 5/2017 and importance of plavix and aspirin stressed and switched to Xarelto.  10. Saw Dr. Valverde June 2017. MDS stable. Plan to follow-up in 4 months but patient missed appointment.    Mr. Hallman returns to clinic today as an add on for a few months of joint aches and one episode of shortness of breath and concern for MDS progression.     Interval History:  Mr. Hallman returns to clinic today unaccompanied. He states that for the past 3-4 months he has noted more generalized joint aches. He states it is in his arms and legs with no one particular joint that is bothering him. 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 He denies rashes or joint swelling. No personal or family history of arthritis. No extremity weakness. 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 No dizziness. No vision changes other that slight blurred vision as he ages.    His other complaint is one episode a few nights ago where he  woke up and found it hard the breath. This lasted through the night but then resolved. He has not had any recurrent episodes of SOB since then and denies any breathing concerns at this time. No chest pain or cough. No peripheral edema. His last lower extremity DVT was sometime last year but he doesn't know exactly when.    He tells me he had two strokes this last December for which he was hospitalized. He has not had any hypercoagulable events since that time. He is currently on ASA, Warfarin, and Effient for his hypercoagulability. He was prescribed blood pressure medications but is not taking them currently. He denies fevers, chills, night sweats, lymphadenopathy, bleeding issues, or bruising. No GI concerns.     Current Outpatient Prescriptions   Medication Sig Dispense Refill     HYDROmorphone (DILAUDID) 2 MG tablet Take 1 tablet (2 mg) by mouth every 6 hours as needed for moderate to severe pain 20 tablet 0     lisinopril (PRINIVIL/ZESTRIL) 5 MG tablet Take 5 mg by mouth daily       warfarin (COUMADIN) 5 MG tablet Take 10 mg by mouth 2 times daily       Zolpidem Tartrate (AMBIEN PO) Take by mouth as needed for sleep       aspirin 325 MG tablet Take by mouth daily       atorvastatin (LIPITOR) 40 MG tablet Take 1 tablet (40 mg) by mouth daily (Patient not taking: Reported on 5/9/2018) 90 tablet 3     clopidogrel (PLAVIX) 75 MG tablet Prescribed but patient says he is not taking this 30 tablet      gabapentin (NEURONTIN) 100 MG capsule Take 1 capsule (100 mg) by mouth 3 times daily (Patient not taking: Reported on 5/9/2018) 90 capsule 1     GABAPENTIN PO Take 300 mg by mouth daily        hydrOXYzine (VISTARIL) 25 MG capsule TK 1 TO 2 CS PO Q 4 H PRA OR ITCHING OR PAIN  0     rivaroxaban ANTICOAGULANT (XARELTO) 20 MG TABS tablet Take 1 tablet (20 mg) by mouth daily (with dinner) (Patient not taking: Reported on 6/29/2017) 30 tablet 3     sennosides (SENOKOT) 8.6 MG tablet Take 1 tablet by mouth daily as needed for  "constipation Reported on 3/29/2017         Physical Examination:  BP (!) 178/112 (BP Location: Left arm, Patient Position: Chair, Cuff Size: Adult Large)  Pulse 95  Temp 97.4  F (36.3  C) (Oral)  Resp 18  Ht 1.75 m (5' 8.9\")  Wt 78.6 kg (173 lb 4.5 oz)  SpO2 96%  BMI 25.66 kg/m2  Wt Readings from Last 10 Encounters:   05/09/18 78.6 kg (173 lb 4.5 oz)   06/29/17 74.7 kg (164 lb 9.6 oz)   05/19/17 73.2 kg (161 lb 6.4 oz)   03/29/17 73.5 kg (162 lb)   02/09/17 69.3 kg (152 lb 11.2 oz)   11/20/15 81.8 kg (180 lb 5.4 oz)   09/01/15 82.1 kg (180 lb 14.4 oz)   08/18/15 80.3 kg (177 lb)     Constitutional: Well-appearing male in no acute distress.  Eyes: EOMI, PERRL. No scleral icterus.  ENT: Oral mucosa is moist without lesions or thrush.   Lymphatic: Neck is supple without cervical or supraclavicular lymphadenopathy.   Cardiovascular: Regular rate and rhythm. No murmurs, gallops, or rubs. No peripheral edema.  Respiratory: Clear to auscultation bilaterally. No wheezes or crackles.  Gastrointestinal: Bowel sounds present. Abdomen soft, non-tender. No palpable hepatosplenomegaly or masses.   Neurologic: Cranial nerves II through XII are grossly intact.  Skin: No rashes, petechiae, or bruising noted on exposed skin.  Musculoskeletal: No swelling or rashes over extremity joints.    Laboratory Data:  Results for TOD WHARTON (MRN [id number]) as of 5/10/2018 09:15   5/9/2018 17:36   WBC 5.0   Hemoglobin 12.8 (L)   Hematocrit 38.1 (L)   Platelet Count 307   RBC Count 4.00 (L)   MCV 95   MCH 32.0   MCHC 33.6   RDW 14.9   Diff Method Automated Method   % Neutrophils 47.5   % Lymphocytes 42.8   % Monocytes 6.7   % Eosinophils 1.0   % Basophils 0.6   % Immature Granulocytes 1.4   Nucleated RBCs 0   Absolute Neutrophil 2.4   Absolute Lymphocytes 2.1   Absolute Monocytes 0.3   Absolute Eosinophils 0.1   Absolute Basophils 0.0   Abs Immature Granulocytes 0.1   Absolute Nucleated RBC 0.0       Assessment and Plan:  1. " Heme  Diagnosed with MDS with complex karyotype 2015. Counts continue to be stable today. He had a second opinion done at [facility] in November where they performed a bone marrow biopsy that was also stable. Very unlikely his below symptoms are related to MDS given no change in counts. Will have him see Dr. Valverde in the next month or so to discuss a plan moving forward.    In regards to his hypercoagulable state, he has not had any clotting events since December 2017. He is currently on ASA, Warfarin, and Effient and follows with his PCP. He is due to have his INR checked at his local clinic and plans to do so this next week. Will ask care coordinator to see if he would like to reestablish care with Dr. Craven.    2. Musculoskeletal  Non-specific arthralgias that have been ongoing for the past few months. No edema or rashes over joints and as above, no progression of MDS to explain these findings from a hematology prespective. Asked him to talk to his PCP about this to see if perhaps a rheumatology work-up is necessary.    3. Pulm  One episode of SOB a few nights ago with no recurrent respiratory symptoms. Did walk him in clinic and SpO2 95-97% at all times. No signs or symptoms to suggest PE. Did discuss these with him though given his extensive clotting history.    4. Cards  History of HTN and he tells me he was prescribed amlodipine and lisinopril but hasn't been taking them. Today his BP is quite high at 178/112, recheck 180/103. He is asymptomatic from this. Instructed him to start his Lisinopril tonight and he needs to follow-up with his PCP in the next week or so to discuss BP management.    Lalo Thrasher PA-C  [facility]  [address] [place] [address] [phone]

## 2018-07-11 ENCOUNTER — OFFICE VISIT (OUTPATIENT)
Dept: TRANSPLANT | Facility: CLINIC | Age: 43
End: 2018-07-11
Attending: INTERNAL MEDICINE
Payer: COMMERCIAL

## 2018-07-11 ENCOUNTER — APPOINTMENT (OUTPATIENT)
Dept: LAB | Facility: CLINIC | Age: 43
End: 2018-07-11
Attending: INTERNAL MEDICINE
Payer: COMMERCIAL

## 2018-07-11 VITALS
RESPIRATION RATE: 16 BRPM | HEART RATE: 79 BPM | TEMPERATURE: 97.9 F | BODY MASS INDEX: 25.55 KG/M2 | HEIGHT: 69 IN | DIASTOLIC BLOOD PRESSURE: 105 MMHG | OXYGEN SATURATION: 97 % | WEIGHT: 172.5 LBS | SYSTOLIC BLOOD PRESSURE: 172 MMHG

## 2018-07-11 DIAGNOSIS — I10 HYPERTENSION, UNSPECIFIED TYPE: ICD-10-CM

## 2018-07-11 DIAGNOSIS — D46.9 MDS (MYELODYSPLASTIC SYNDROME) (H): Primary | ICD-10-CM

## 2018-07-11 LAB
ALBUMIN SERPL-MCNC: 3.5 G/DL (ref 3.4–5)
ALP SERPL-CCNC: 101 U/L (ref 40–150)
ALT SERPL W P-5'-P-CCNC: 21 U/L (ref 0–70)
ANION GAP SERPL CALCULATED.3IONS-SCNC: 6 MMOL/L (ref 3–14)
AST SERPL W P-5'-P-CCNC: 18 U/L (ref 0–45)
BASOPHILS # BLD AUTO: 0 10E9/L (ref 0–0.2)
BASOPHILS NFR BLD AUTO: 0.6 %
BILIRUB SERPL-MCNC: 0.4 MG/DL (ref 0.2–1.3)
BUN SERPL-MCNC: 16 MG/DL (ref 7–30)
CALCIUM SERPL-MCNC: 9.3 MG/DL (ref 8.5–10.1)
CHLORIDE SERPL-SCNC: 108 MMOL/L (ref 94–109)
CO2 SERPL-SCNC: 24 MMOL/L (ref 20–32)
CREAT SERPL-MCNC: 0.78 MG/DL (ref 0.66–1.25)
DIFFERENTIAL METHOD BLD: ABNORMAL
EOSINOPHIL # BLD AUTO: 0.1 10E9/L (ref 0–0.7)
EOSINOPHIL NFR BLD AUTO: 2.3 %
ERYTHROCYTE [DISTWIDTH] IN BLOOD BY AUTOMATED COUNT: 15.1 % (ref 10–15)
GFR SERPL CREATININE-BSD FRML MDRD: >90 ML/MIN/1.7M2
GLUCOSE SERPL-MCNC: 86 MG/DL (ref 70–99)
HCT VFR BLD AUTO: 38.6 % (ref 40–53)
HGB BLD-MCNC: 13 G/DL (ref 13.3–17.7)
IMM GRANULOCYTES # BLD: 0.1 10E9/L (ref 0–0.4)
IMM GRANULOCYTES NFR BLD: 1.5 %
LYMPHOCYTES # BLD AUTO: 2 10E9/L (ref 0.8–5.3)
LYMPHOCYTES NFR BLD AUTO: 41.8 %
MCH RBC QN AUTO: 32.4 PG (ref 26.5–33)
MCHC RBC AUTO-ENTMCNC: 33.7 G/DL (ref 31.5–36.5)
MCV RBC AUTO: 96 FL (ref 78–100)
MONOCYTES # BLD AUTO: 0.3 10E9/L (ref 0–1.3)
MONOCYTES NFR BLD AUTO: 5.2 %
NEUTROPHILS # BLD AUTO: 2.3 10E9/L (ref 1.6–8.3)
NEUTROPHILS NFR BLD AUTO: 48.6 %
NRBC # BLD AUTO: 0 10*3/UL
NRBC BLD AUTO-RTO: 0 /100
PLATELET # BLD AUTO: 293 10E9/L (ref 150–450)
POTASSIUM SERPL-SCNC: 3.9 MMOL/L (ref 3.4–5.3)
PROT SERPL-MCNC: 8.5 G/DL (ref 6.8–8.8)
RBC # BLD AUTO: 4.01 10E12/L (ref 4.4–5.9)
SODIUM SERPL-SCNC: 137 MMOL/L (ref 133–144)
WBC # BLD AUTO: 4.8 10E9/L (ref 4–11)

## 2018-07-11 PROCEDURE — 80053 COMPREHEN METABOLIC PANEL: CPT | Performed by: INTERNAL MEDICINE

## 2018-07-11 PROCEDURE — G0463 HOSPITAL OUTPT CLINIC VISIT: HCPCS

## 2018-07-11 PROCEDURE — 36415 COLL VENOUS BLD VENIPUNCTURE: CPT

## 2018-07-11 PROCEDURE — 85025 COMPLETE CBC W/AUTO DIFF WBC: CPT | Performed by: INTERNAL MEDICINE

## 2018-07-11 RX ORDER — LISINOPRIL 5 MG/1
10 TABLET ORAL DAILY
Qty: 30 TABLET | COMMUNITY
Start: 2018-07-11 | End: 2020-05-28

## 2018-07-11 RX ORDER — AMLODIPINE BESYLATE 10 MG/1
10 TABLET ORAL DAILY
Qty: 30 TABLET | COMMUNITY
Start: 2018-07-11 | End: 2023-08-02

## 2018-07-11 RX ORDER — PRASUGREL 10 MG/1
TABLET, FILM COATED ORAL
Refills: 2 | Status: ON HOLD | COMMUNITY
Start: 2018-06-11 | End: 2024-02-17

## 2018-07-11 ASSESSMENT — PAIN SCALES - GENERAL: PAINLEVEL: NO PAIN (0)

## 2018-07-11 NOTE — NURSING NOTE
"Oncology Rooming Note    July 11, 2018 2:52 PM   Ziggy Hallman is a 42 year old male who presents for:    Chief Complaint   Patient presents with     Blood Draw     no orders in chart for labs, please place orders and draw in clinic     Oncology Clinic Visit     Return: MDS (myelodysplastic syndrome)     Initial Vitals: BP (!) 172/105 (BP Location: Right arm, Patient Position: Sitting, Cuff Size: Adult Regular)  Pulse 79  Temp 97.9  F (36.6  C) (Oral)  Resp 16  Ht 1.75 m (5' 8.9\")  Wt 78.2 kg (172 lb 8 oz)  SpO2 97%  BMI 25.55 kg/m2 Estimated body mass index is 25.55 kg/(m^2) as calculated from the following:    Height as of this encounter: 1.75 m (5' 8.9\").    Weight as of this encounter: 78.2 kg (172 lb 8 oz). Body surface area is 1.95 meters squared.  No Pain (0) Comment: Data Unavailable   No LMP for male patient.  Allergies reviewed: Yes  Medications reviewed: Yes    Medications: MEDICATION REFILLS NEEDED TODAY. Provider was notified.  Pharmacy name entered into [employer]:    [facility] - [place], [place] - [address] [id number] - [place], [place] - [address] AT [facility] OF [facility] [address]    Clinical concerns: Pt requesting refill of Warfarin and Prasugrel. Dr. Valverde was notified.    5 minutes for nursing intake (face to face time)     Lucinda Deluca CMA              "

## 2018-07-11 NOTE — NURSING NOTE
Chief Complaint   Patient presents with     Blood Draw     no orders in chart for labs, please place orders and draw in clinic    Patient seated down in provider hallway so vitals not taken by lab.    Lurdes Guo CMA on 7/11/2018 at 2:45 PM

## 2018-07-11 NOTE — PATIENT INSTRUCTIONS
Double check the blood pressure doses you are taking and call us    Get a blood pressure cuff to check at home    See primary doc within the next week to get blood pressure controlled    Nicolle in 3 months with labs prior

## 2018-07-11 NOTE — MR AVS SNAPSHOT
After Visit Summary   7/11/2018    Ziggy Hallman    MRN: [id number]           Patient Information     Date Of Birth          1975        Visit Information        Provider Department      7/11/2018 2:45 PM Olive Valverde MD [facility]        Today's Diagnoses     MDS (myelodysplastic syndrome) (H)    -  1    Hypertension, unspecified type              [facility]  [address] [place] [address]  Phone: [phone]  Clinic Hours:   Monday-Thursday:7am to 7pm   Friday: 7am to 5pm   Weekends and holidays:    8am to noon (in general)  If your fever is 100.5  or greater,   call the clinic.  After hours call the   hospital at [phone] or   [phone]. Ask for the BMT   fellow on-call           Care Instructions    Double check the blood pressure doses you are taking and call us    Get a blood pressure cuff to check at home    See primary doc within the next week to get blood pressure controlled    Nicolle in 3 months with labs prior           Follow-ups after your visit        Future tests that were ordered for you today     Open Future Orders        Priority Expected Expires Ordered    Comprehensive metabolic panel Routine 10/11/2018 12/11/2018 7/11/2018    CBC with platelets differential Routine 10/11/2018 12/11/2018 7/11/2018            Who to contact     If you have questions or need follow up information about today's clinic visit or your schedule please contact [facility] directly at [phone].  Normal or non-critical lab and imaging results will be communicated to you by MyChart, letter or phone within 4 business days after the clinic has received the results. If you do not hear from us within 7 days, please contact the clinic through [employer] or phone. If you have a critical or abnormal lab result, we will notify you by phone as soon as possible.  Submit refill requests through [employer] or call  "your pharmacy and they will forward the refill request to us. Please allow 3 business days for your refill to be completed.          Additional Information About Your Visit        "[employer]hart Information     [employer] gives you secure access to your electronic health record. If you see a primary care provider, you can also send messages to your care team and make appointments. If you have questions, please call your primary care clinic.  If you do not have a primary care provider, please call [phone] and they will assist you.        Care EveryWhere ID     This is your Care EveryWhere ID. This could be used by other organizations to access your [place] medical records  [id number]        Your Vitals Were     Pulse Temperature Respirations Height Pulse Oximetry BMI (Body Mass Index)    79 97.9  F (36.6  C) (Oral) 16 1.75 m (5' 8.9\") 97% 25.55 kg/m2       Blood Pressure from Last 3 Encounters:   07/11/18 (!) 172/105   05/09/18 (!) 182/103   06/29/17 135/83    Weight from Last 3 Encounters:   07/11/18 78.2 kg (172 lb 8 oz)   05/09/18 78.6 kg (173 lb 4.5 oz)   06/29/17 74.7 kg (164 lb 9.6 oz)              We Performed the Following     CBC with platelets differential     Comprehensive metabolic panel          Today's Medication Changes          These changes are accurate as of 7/11/18  3:49 PM.  If you have any questions, ask your nurse or doctor.               These medicines have changed or have updated prescriptions.        Dose/Directions    lisinopril 5 MG tablet   Commonly known as:  PRINIVIL/ZESTRIL   This may have changed:  how much to take   Changed by:  Olive Valverde MD        Dose:  10 mg   Take 2 tablets (10 mg) by mouth daily   Quantity:  30 tablet   Refills:  0         Stop taking these medicines if you haven't already. Please contact your care team if you have questions.     aspirin 325 MG tablet   Stopped by:  Olive Valverde MD           atorvastatin 40 MG tablet   Commonly known as:  " LIPITOR   Stopped by:  Olive Valverde MD           gabapentin 100 MG capsule   Commonly known as:  NEURONTIN   Stopped by:  Olive Valverde MD           GABAPENTIN PO   Stopped by:  Olive Valverde MD           hydrOXYzine 25 MG capsule   Commonly known as:  VISTARIL   Stopped by:  Olive Valverde MD           PLAVIX 75 MG tablet   Generic drug:  clopidogrel   Stopped by:  Olive Valverde MD           rivaroxaban ANTICOAGULANT 20 MG Tabs tablet   Commonly known as:  XARELTO   Stopped by:  Olive Valverde MD                    Recent Review Flowsheet Data     BMT Recent Results Latest Ref Rng & Units 11/19/2015 1/11/2016 2/9/2017 3/29/2017 6/29/2017 5/9/2018 7/11/2018    WBC 4.0 - 11.0 10e9/L - - 6.3 4.9 5.5 5.0 4.8    Hemoglobin 13.3 - 17.7 g/dL - - 12.8(L) 13.6 12.1(L) 12.8(L) 13.0(L)    Platelet Count 150 - 450 10e9/L - - 317 359 310 307 293    Neutrophils (Absolute) 1.6 - 8.3 10e9/L - - 3.2 2.5 3.0 2.4 2.3    INR 0.86 - 1.14 2.6(A) 2.3(A) 1.22(H) 2.68(H) 1.09 - -    Sodium 133 - 144 mmol/L - - 141 144 141 - 137    Potassium 3.4 - 5.3 mmol/L - - 4.1 3.9 3.7 - 3.9    Chloride 94 - 109 mmol/L - - 105 110(H) 109 - 108    Glucose 70 - 99 mg/dL - - 81 118(H) 106(H) - 86    Urea Nitrogen 7 - 30 mg/dL - - 16 17 16 - 16    Creatinine 0.66 - 1.25 mg/dL - - 0.83 0.79 0.82 - 0.78    Calcium (Total) 8.5 - 10.1 mg/dL - - 9.0 8.7 9.0 - 9.3    Protein (Total) 6.8 - 8.8 g/dL - - 8.7 - 7.7 - 8.5    Albumin 3.4 - 5.0 g/dL - - 3.4 - 3.5 - 3.5    Alkaline Phosphatase 40 - 150 U/L - - 92 - 76 - 101    AST 0 - 45 U/L - - 15 - 15 - 18    ALT 0 - 70 U/L - - 21 - 22 - 21    MCV 78 - 100 fl - - 97 94 97 95 96               Primary Care Provider Office Phone # Fax #    Adeel Mook Morris [phone] [address] [facility] [address]        Equal Access to Services     CASSIE BISHOP AH: Nancy Uribe, michael bucio, chandrika tavares  ahmetwilliammayela kernKirtiaan ah. So [facility] [phone].    ATENCIÓN: Si awilda swift, tiene a murphy disposición servicios gratuitos de asistencia lingüística. Henok gomez [phone].    We comply with applicable federal civil rights laws and [place] laws. We do not discriminate on the basis of race, color, national origin, age, disability, sex, sexual orientation, or gender identity.            Thank you!     Thank you for choosing [facility]  for your care. Our goal is always to provide you with excellent care. Hearing back from our patients is one way we can continue to improve our services. Please take a few minutes to complete the written survey that you may receive in the mail after your visit with us. Thank you!             Your Updated Medication List - Protect others around you: Learn how to safely use, store and throw away your medicines at www.disposemymeds.org.          This list is accurate as of 7/11/18  3:49 PM.  Always use your most recent med list.                   Brand Name Dispense Instructions for use Diagnosis    AMBIEN PO      Take by mouth as needed for sleep    Long term current use of anticoagulant therapy       amLODIPine 10 MG tablet    NORVASC    30 tablet    Take 1 tablet (10 mg) by mouth daily    Hypertension, unspecified type       HYDROmorphone 2 MG tablet    DILAUDID    20 tablet    Take 1 tablet (2 mg) by mouth every 6 hours as needed for moderate to severe pain    Neuropathy       lisinopril 5 MG tablet    PRINIVIL/ZESTRIL    30 tablet    Take 2 tablets (10 mg) by mouth daily        prasugrel 10 MG Tabs tablet    EFFIENT     TK 1 T PO QAM        sennosides 8.6 MG tablet    SENOKOT     Take 1 tablet by mouth daily as needed for constipation Reported on 3/29/2017        warfarin 5 MG tablet    COUMADIN     Take 10 mg by mouth 2 times daily

## 2019-09-29 ENCOUNTER — HEALTH MAINTENANCE LETTER (OUTPATIENT)
Age: 44
End: 2019-09-29

## 2020-04-02 ENCOUNTER — PATIENT OUTREACH (OUTPATIENT)
Dept: ONCOLOGY | Facility: CLINIC | Age: 45
End: 2020-04-02

## 2020-04-02 NOTE — PROGRESS NOTES
Writer contacted pt to let him know that Dr Valverde has reviewed his chart and  recent admission events and that she feels that the lab/MD visit today is not needed as he has stable blood counts  ,MDS is stable, recommends rescheduling around June 2020 and we will try to have the visit coincide with a Dr. Craven follow up as well for the clotting issues. Pt voiced understanding of above instructions and information and denied further questions. Notfied pt to expect appt list mailed to him from [facility] Scheduling dept with dates/times, inbasket message sent to scheduling pool re: same.  Phuong Garcia, RN  RN Care Coordinator  [facility]  [address] [place] [address] [phone]

## 2020-05-27 DIAGNOSIS — D46.9 MDS (MYELODYSPLASTIC SYNDROME) (H): Primary | ICD-10-CM

## 2020-05-27 NOTE — PROGRESS NOTES
In person interview and exam     Hematology Clinic consult note    Reasons for Consult: - h/o arterial thormbosis    History of Present Illness: Mr. Hallman is a 44 year old man with an extensive history of arterial thrombosis(see my note 5/19/17). I saw him just that one time in consultation.  At that time he had advised him to switch from warfarin to rivaroxaban, mainly because it was easier because it can require monitoring.  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  He was treated with heparin and then enoxaparin and was told to follow-up with me which he did not.  At some point he was switched to apixaban.  He had a second pending consultation at [facility] and they did P2 Y 12 testing which indicated that he was a poor responder to clopidogrel, so we switched to Prasugrel (I do not have those records).  He then presented to [facility] on 12/17/2017 with CVA symptoms and was found to have thrombus in the left carotid artery.  He was started on enoxaparin and switched warfarin and Prasugrel.  He continued to be noted to occasionally miss doses of his anticoagulation.      He was admitted to [facility] with hypertensive emergency and non-ST elevation MI on 12/5/2018.  Coronary angiogram showed no new lesions, EF 40%.  He was started on simvastatin and no other changes  Made.  He was admitted to [facility] on 1/22/2024 another CVA.  He had been taking his warfarin and was therapeutic, but he had not been taking the prasugrel.  Again transthoracic echo showed a PFO and it was suggested he follow-up with cardiology.  He underwent closure of the PFO on 1/19/2020.  In spite of this, he suffered a left parietal lobe and temporal lobe CVA on 3/30/2020.  His INR was subtherapeutic at 1.5 admission and it was not clear if he was taking the Prasugrel.  He was told to  stop the warfarin and switched back to rivaroxaban.  He had a follow-up with his neurologist on 5/14/2020 who made several recommendations, and recommended follow-up with primary care physician    Today he says he is feeling okay.  He reports that he still has numbness in his right third fourth and fifth fingers extending up to the wrist, and that he has decreased .  He is right-handed.  He denies any other weakness or blurred or double vision.  He states that he is taking rivaroxaban and Prasugrel.  He says he has not been on any aspirin for a long time-he does not know why, he denies being allergic to aspirin.  He says he is taking one blood pressure medicine and that he was supposed to have a refill of metoprolol, but that is clinic refused to sign a repeat fill because it was prescribed in the hospital.  He has not been back to the cardiologist since having his PFO closed.  He reports that he is now try to keep better track of his medications by using a pillbox.      Past Medical History:  -1.  Arterial thrombotic events- MI, CVA, peripheral vascular disease, real artery thrombosis. All of the thromboses have been arterial.    He has never had a DVT or PE.    2.  Myelodysplastic syndrome.  He has been seen by Dr. Valverde here.  He had a bone marrow biopsy and aspirate done at an outside institution.  I have been unable to find actual report.  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  However, recently his CBC has been entirely normal, dipping into a very mild anemia intermittently.    3.  Hypertension.   4.  Anxiety.    5.  h/o substance abuse- metamphetamine, cocaine; current marijuana use  6.  Poor adherence to medications  7. H/o GERD- EGD 4/18/19 - biopsy showed non-erosive reactive gastropathy, negative h pylori      Medications:  - Rivaroxaban 20 mg daily  - Vygmxemly26 mg daily  -Pantoprazole 20 mg daily  -Nitroglycerin as needed  Prescribed but currently  not taking- metoprolol 25 mg twice daily, Atorvastatin 40 mg nightly.    Family History: mother- of a stroke at age 72, father-patient does not know much about his father's history, but thinks he had a stroke, ,  siblings -1 sister with factor V Leiden, possibly a DVT.    Social History: smoking-many years at 1/2-1 pack/day, says he quit in late 2019.  Uses chewing tobacco.  Alcohol- drinks alcohol once a week.  He smokes marijuana every single evening.  He denies other illicit drugs.    Review of systems:  As in HPI.  He reports in mid winter having total body aches that lasted for about a month but are now improved.  I confirmed with him that to his knowledge he has never had a DVT or PE.  The rest of the > 10 point review of systems was negative.      PHYSICAL EXAMINATION:  BP (!) 137/91   Pulse 95   Temp 98.1  F (36.7  C) (Oral)   Resp 16   Wt 80.9 kg (178 lb 6.4 oz)   SpO2 98%   BMI 26.42 kg/m      General appearance:  Patient is 44 year old man in no acute distress.     HEENT:  No pallor, icterus, or mucositis.  No thyromegaly.   Lymph nodes:  No cervical, supraclavicular, axillary, or inguinal lymphadenopathy.   Lungs:  Clear to auscultation bilaterally.   Heart:  Regular rate and rhythm; no S3 S4 or murmer.     Abdomen:  Positive bowel sounds, soft and nontender, nondistended.  No hepatomegaly. No splenomegaly appreciated.    Extremities:  No joint swelling or tenderness.  No ankle edema.     Skin:  No rash, no petechiae or ecchymoses.  Neurologic: Alert, oriented x3, speech normal, cranial nerves II through XII grossly intact, I did not formally test strength or sensation.  He was able to climb up onto the exam table without difficulty.  His gait is normal.      Labs: Extensively reviewed in epic and care everywhere- below are a few of the most relevant for this consultation.   [facility] [place] 2017  JAK2 [id number] and exon 12-15, MPL exon 10 and CALR mutation all negative   PNH  screen negative   Lupus Anticoagulant  Negative  Beta-2 glycoprotein 1 IgG, IgM negative  Phospholipid antibody IgG IgM negative    [facility]  12/19/2017   lupus anticoagulant  Negative cardiolipin IgA, IgG, IgM 12/19/2017, 10/5/2013, 12/22/2011  Negative beta-2 glycoprotein 1 IgA, IgE IgM  Negative C-ANCA, negative P-ANCA 10/6/2013, 12/22/2011    Results for TOD WHARTON (MRN [id number]) as of 5/28/2020 12:33   Ref. Range 5/28/2020 10:18   Sodium Latest Ref Range: 133 - 144 mmol/L 138   Potassium Latest Ref Range: 3.4 - 5.3 mmol/L 3.8   Chloride Latest Ref Range: 94 - 109 mmol/L 107   Carbon Dioxide Latest Ref Range: 20 - 32 mmol/L 24   Urea Nitrogen Latest Ref Range: 7 - 30 mg/dL 10   Creatinine Latest Ref Range: 0.66 - 1.25 mg/dL 0.80   GFR Estimate Latest Ref Range: >60 mL/min/1.73_m2 >90   GFR Estimate If Black Latest Ref Range: >60 mL/min/1.73_m2 >90   Calcium Latest Ref Range: 8.5 - 10.1 mg/dL 9.3   Anion Gap Latest Ref Range: 3 - 14 mmol/L 7   Albumin Latest Ref Range: 3.4 - 5.0 g/dL 3.4   Protein Total Latest Ref Range: 6.8 - 8.8 g/dL 8.7   Bilirubin Total Latest Ref Range: 0.2 - 1.3 mg/dL 0.8   Alkaline Phosphatase Latest Ref Range: 40 - 150 U/L 114   ALT Latest Ref Range: 0 - 70 U/L 25   AST Latest Ref Range: 0 - 45 U/L 20   Glucose Latest Ref Range: 70 - 99 mg/dL 102 (H)   WBC Latest Ref Range: 4.0 - 11.0 10e9/L 4.9   Hemoglobin Latest Ref Range: 13.3 - 17.7 g/dL 12.6 (L)   Hematocrit Latest Ref Range: 40.0 - 53.0 % 37.6 (L)   Platelet Count Latest Ref Range: 150 - 450 10e9/L 280   RBC Count Latest Ref Range: 4.4 - 5.9 10e12/L 3.86 (L)   MCV Latest Ref Range: 78 - 100 fl 97   MCH Latest Ref Range: 26.5 - 33.0 pg 32.6   MCHC Latest Ref Range: 31.5 - 36.5 g/dL 33.5   RDW Latest Ref Range: 10.0 - 15.0 % 15.1 (H)   Diff Method Unknown Automated Method   % Neutrophils Latest Units: % 59.2   % Lymphocytes Latest Units: % 29.4   % Monocytes Latest Units: % 7.0   % Eosinophils Latest Units: % 2.0    % Basophils Latest Units: % 0.4   % Immature Granulocytes Latest Units: % 2.0   Nucleated RBCs Latest Ref Range: 0 /100 0   Absolute Neutrophil Latest Ref Range: 1.6 - 8.3 10e9/L 2.9   Absolute Lymphocytes Latest Ref Range: 0.8 - 5.3 10e9/L 1.4   Absolute Monocytes Latest Ref Range: 0.0 - 1.3 10e9/L 0.3   Absolute Eosinophils Latest Ref Range: 0.0 - 0.7 10e9/L 0.1   Absolute Basophils Latest Ref Range: 0.0 - 0.2 10e9/L 0.0   Abs Immature Granulocytes Latest Ref Range: 0 - 0.4 10e9/L 0.1   Absolute Nucleated RBC Unknown 0.0       Imaging:  I reviewed numerous imaging reports- the ones below seem to be the most relevant at this time.  EXAM: MR HEAD BRAIN WO  LOCATION: [facility]  DATE/TIME: 3/30/2020 6:51 PM    INDICATION: Left ear pain, right hand numbness for 2 days, history of prior  strokes. (No obvious focal deficits today).  COMPARISON: 03/11/2020 brain MRI.  TECHNIQUE: Routine multiplanar multisequence head MRI without intravenous  contrast.    FINDINGS:  INTRACRANIAL CONTENTS: Multiple new acute cortical infarcts involving the  postcentral gyrus in the region of the hand knob, superior and inferior parietal  lobules and adjacent subcortical parietal white matter, and at the left  temporoparietal junction. There is associated FLAIR hyperintensity indicating  infarction greater than 6 hours of age. No hemorrhagic transformation. Old right  temporoparietal junction infarction with laminar necrosis and petechial  hemorrhage. Old left parietal and occipitotemporal infarctions. No shift of  midline or basal cistern effacement. No intracranial hemorrhage. Normal  ventricles. No hydrocephalus. The posterior fossa structures are unremarkable.    SELLA: No abnormality accounting for technique.    OSSEOUS STRUCTURES/SOFT TISSUES: Normal marrow signal. The major intracranial  vascular flow voids are maintained.     ORBITS: No abnormality accounting for technique.     SINUSES/MASTOIDS: No paranasal sinus mucosal  disease. No middle ear or mastoid  effusion.     IMPRESSION:  1.  Multifocal acute cortical and subcortical infarcts involving the left  parietal lobe and the left temporoparietal junction. These are new since the  comparison examination of 2020.  2.  Old right temporoparietal junction, left parietal and occipitotemporal  junction infarcts    ECHOCARDIOGRAM    TOD WHARTON  MRN:    [id number]        Accession#:   [id number]  :    1975 44 years Study Date:   2020 12:52:17 PM  Gender: M                 BP:           155/95 mmHg  Height: 175.00 cm         BSA:          1.95 mÂ   Weight: 79.00 kg          Tech:         SPK                            Referring MD: LIBRADO CARRIZALES  Site:             ANW  Reading Location: ANW IP      Procedure: Limited Echo w/ Bubbles, Color Doppler and Limited Spectral Doppler.    Indication for study: CVA, recent PFO closure.  Cardiac Rhythm: Regular.Study quality: Good.    Final Impressions:  Limited Echocardiogram performed   1. Echo contrast was administrered to enhance visualization of all left ventricular segments.   2. Mildly increased LV size, normal global systolic function with an estimated EF of 55 - 60%.   3. Normal right ventricular systolic function.   4. An ASD or PFO occluder device is seen on the atrial septum.   5. No shunt seen across the interatrial septum. Negative bubble study.   6. No significant valve disease detected.    Comparison  Compared to prior exam of 20:  There has been interval placement of an ASD/PFO closure device.  Chamber Sizes and Function  Mildly increased left ventricular size, normal global systolic function with an estimated EF of 55 - 60%. Left atrial size is normal. The right atrium is normal.    Valves, RV Pressures and Diastolic Function    The aortic valve is normal in structure and trileaflet, no stenosis and no regurgitation. The mitral valve is normal in structure, trace mitral regurgitation. The tricuspid valve is  normal in structure. Tricuspid regurgitation is not evident. The pulmonic valve is not well visualized. Trace pulmonic regurgitation is present on color flow.    Masses, Effusion, Shunts  An ASD/PFO occluder device is seen on the atrial septum. No left to right shunting was detected by limited color flow Doppler interrogation of the interatrial septum. Agitated saline injection showed no right to left shunt at rest or following Valsalva manuever.    MEASUREMENTS AND CALCULATIONS    2-D Measurements and LV Function:    LVID (d) 5.7 cm LV FS% (2D)   31 %  LVID (s) 3.9 cm LVOT diameter 2.0 cm  IVS (d)  1.0 cm HR            61 bpm  LVPW (d) 1.0 cm  Ao Sinus 3.2 cm  Asc Ao   3.2 cm    Diastology:  Mitral          Tissue Doppler  E Peak 0.74 m/s e', Septum     0.10 m/s  A Peak 0.65 m/s e', Lateral    0.12 m/s  E/A    1.1      E/e' Average   6.83  DT     216 msec    Aortic Valve:    Vmax       1.2 m/s  CARLIE (V)   2.93 cmÂ   VTI        0.25 m   CARLIE (I)   2.74 cmÂ   LVOT V max 1.1 m/s  Max PG    6 mmHg  LVOT VTI   0.21 m   Mean PG   3 mmHg  SV         69 ml    Dim Index 0.83  SV index   35 ml/mÂ  CO        4.2 l/min                      CI        2.2 l/min/mÂ     Mitral Valve:    MVA      3.5 cmÂ   MV P 1/2 63 msec      Contrast documentation: 2 ml diluted Definity was administered peripherally to enhance visualization of all left ventricular segments.    Electronically signed by Tripp Batista MD on 4/1/2020 2:37:21 PM.    Abdominal CT 8/14/2018  Interface, [employer] - 08/14/2018 11:00 PM CDT  Clinical History: Abdominal Pain    Technique: Following the administration of intravenous contrast, a CT angiogram was obtained of the chest and abdomen. 3D Maximum intensity projections (MIPS) were obtained. 3D rendering was performed on an independent workstation. This CT Scan used dose modulation, iterative reconstruction, and/or weight based dosing when appropriate to reduce radiation dose to as low as reasonably achievable.      Comparison: None.    Findings:    Aorta: No evidence of aneurysm, dissection, or significant stenosis.    Major Aortic Branches: The innominate artery and proximal portions of bilateral subclavian and common carotid arteries are patent. The celiac artery, SMA, and RUDDY  are patent. There is focal thrombus within the abdominal aorta at the origin of the right renal artery with severe atrophy of the right kidney that appears chronic.    Additional Findings: No large central pulmonary emboli. No pulmonary infiltrates, consolidation, or nodules. Within the limitations of an arterial phase study, the visualized portions of the liver, pancreas, spleen, kidneys, and adrenal glands are unremarkable. Colonic diverticulosis.    Impression:   1. No evidence of aortic aneurysm or dissection.  2. Focal thrombus is present within the abdominal aorta at the origin of the right renal artery with severe atrophy of the right kidney that appears chronic.  3. Diverticulosis. No evidence of diverticulitis.    [facility]    Abdominal CT 1/19/18  1. Chronic, near total occlusion of the right renal artery.   2. Moderate amount of mural thrombus throughout the abdominal aorta.     FINDINGS     VASCULAR:   Normal caliber thoracic and abdominal aorta. The innominate, left common carotid, left subclavian, celiac, superior mesenteric, left renal and inferior mesenteric arteries are widely patent. No intracardiac mass or filling defect. No central pulmonary artery filling defect.     Chronic occlusion of the right renal artery. The mid and distal portions of the right renal artery reconstitute with flow, likely secondary to subtotal proximal occlusion or arterial collateralization.     Moderate amount of noncalcified mural thrombus extending from the mid abdominal aorta through the iliac bifurcation. No significant narrowing of the abdominal aorta. The common and external iliac arteries are normal in caliber  bilaterally. Moderate narrowing of the proximal right superficial femoral artery secondary to atherosclerotic plaque.     NONVASCULAR:   Azygos fissure. Atrophic right kidney with delayed nephrogram.   LAD stent.       Electronically signed by:    Jaden Self MD  [id number] 19-Jan-2018 12:53        CARYN Danielle -[id number] 19-Jan-2018 12:53   Assessment: -  This is a 44-year-old man with recurrent arterial thromboses.      He has never had a DVT or PE.      The risk factors for arterial versus venous thromboses are different.  Importantly he has been tested numerous times for antiphospholipid syndrome and he does not have that.  He also has been checked and was negative for myeloproliferative disorder (Ronen 2, CALR, MPL mutatios) and for PNH-which are unusual thrombophilias that could cause arterial clotting.  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  This does not seem to be progressing, although there is an association of clonal hematopoiesis and cardiovascular disease.  This is only in association, and there would be no reason to give him chemotherapy at this time as a means to try to prevent further arterial thromboses.    He does have several risk factors for arterial thrombosis, including a strong family history of stroke, uncontrolled hypertension, and smoking-cigarettes and marijuana.  His cholesterol has not been high, but the HDL cholesterol has been low.  He has not been routinely on a statin in spite of having these multiple arterial thrombosis events.  There was concern that he may have a patent foramen ovale that led to paradoxical emboli and stroke,  unfortunately he still had a stroke after the PFO was closed.  He does in 2018 have documented thrombus in his aorta on abdominal CT imaging that could be chronically present and embolizing.  There is nothing on the brain MRI reports that suggest he has vasculitis.  His blood pressure is not optimally  managed- with his history, he should have a goal blood pressure of 120/80.    Unfortunately he has not always been diligent about taking his medications and also he has seen multiple different providers at different systems.  He does not appear to have one physician who is prescribing his medications, monitoring his condition and counseling.  He does not have any thrombotic disorder that is traditionally managed by hematologist.  I agree with Dr. Martin's recommendations (his neurologist).  The one thing I would suggest adding is that he be on dual anti--platelet therapy, adding aspirin 81 mg daily.    Recommendations-   - Continue rivaroxaban 20 mg daily  -Continue prasugrel 10 mg daily  - Add aspirin 81 mg daily  - Restart atorvastatin 40 mg nightly  - Restart metoprolol  - Add additional blood pressure meds as needed to control blood pressure with a goal of <120/80  - No cigarette smoking  - No marijuana smoking    All of these recommendations are best managed by his primary care physician or his cardiologist.  I strongly recommend that only one physician be in charge of managing all these medications and that he follow-up frequently with that physician.  I strongly urged him to contact his primary care physician for follow-up. I also strongly recommended to him to make sure he does not miss medications, and that he not smoke tobacco or marijuana.     He does not need routine follow-up in hematology clinic regarding his arterial thrombosis problems.    Janine Craven MD  Hematology- coagulation specialist

## 2020-05-28 ENCOUNTER — VIRTUAL VISIT (OUTPATIENT)
Dept: ONCOLOGY | Facility: CLINIC | Age: 45
End: 2020-05-28
Attending: INTERNAL MEDICINE
Payer: COMMERCIAL

## 2020-05-28 ENCOUNTER — OFFICE VISIT (OUTPATIENT)
Dept: TRANSPLANT | Facility: CLINIC | Age: 45
End: 2020-05-28
Attending: INTERNAL MEDICINE
Payer: COMMERCIAL

## 2020-05-28 VITALS
SYSTOLIC BLOOD PRESSURE: 137 MMHG | WEIGHT: 178.4 LBS | TEMPERATURE: 98.1 F | BODY MASS INDEX: 26.42 KG/M2 | RESPIRATION RATE: 16 BRPM | HEART RATE: 95 BPM | DIASTOLIC BLOOD PRESSURE: 91 MMHG | OXYGEN SATURATION: 98 %

## 2020-05-28 VITALS
HEART RATE: 95 BPM | TEMPERATURE: 98.1 F | RESPIRATION RATE: 16 BRPM | DIASTOLIC BLOOD PRESSURE: 91 MMHG | SYSTOLIC BLOOD PRESSURE: 137 MMHG | OXYGEN SATURATION: 98 % | BODY MASS INDEX: 26.42 KG/M2 | WEIGHT: 178.4 LBS

## 2020-05-28 DIAGNOSIS — Z86.73 HISTORY OF CVA (CEREBROVASCULAR ACCIDENT): Primary | ICD-10-CM

## 2020-05-28 DIAGNOSIS — I25.2 H/O ACUTE MYOCARDIAL INFARCTION: ICD-10-CM

## 2020-05-28 DIAGNOSIS — D46.9 MDS (MYELODYSPLASTIC SYNDROME) (H): ICD-10-CM

## 2020-05-28 DIAGNOSIS — I73.9 PERIPHERAL VASCULAR DISEASE (H): ICD-10-CM

## 2020-05-28 DIAGNOSIS — D46.9 MDS (MYELODYSPLASTIC SYNDROME) (H): Primary | ICD-10-CM

## 2020-05-28 LAB
ALBUMIN SERPL-MCNC: 3.4 G/DL (ref 3.4–5)
ALP SERPL-CCNC: 114 U/L (ref 40–150)
ALT SERPL W P-5'-P-CCNC: 25 U/L (ref 0–70)
ANION GAP SERPL CALCULATED.3IONS-SCNC: 7 MMOL/L (ref 3–14)
AST SERPL W P-5'-P-CCNC: 20 U/L (ref 0–45)
BASOPHILS # BLD AUTO: 0 10E9/L (ref 0–0.2)
BASOPHILS NFR BLD AUTO: 0.4 %
BILIRUB SERPL-MCNC: 0.8 MG/DL (ref 0.2–1.3)
BUN SERPL-MCNC: 10 MG/DL (ref 7–30)
CALCIUM SERPL-MCNC: 9.3 MG/DL (ref 8.5–10.1)
CHLORIDE SERPL-SCNC: 107 MMOL/L (ref 94–109)
CO2 SERPL-SCNC: 24 MMOL/L (ref 20–32)
CREAT SERPL-MCNC: 0.8 MG/DL (ref 0.66–1.25)
DIFFERENTIAL METHOD BLD: ABNORMAL
EOSINOPHIL # BLD AUTO: 0.1 10E9/L (ref 0–0.7)
EOSINOPHIL NFR BLD AUTO: 2 %
ERYTHROCYTE [DISTWIDTH] IN BLOOD BY AUTOMATED COUNT: 15.1 % (ref 10–15)
GFR SERPL CREATININE-BSD FRML MDRD: >90 ML/MIN/{1.73_M2}
GLUCOSE SERPL-MCNC: 102 MG/DL (ref 70–99)
HCT VFR BLD AUTO: 37.6 % (ref 40–53)
HGB BLD-MCNC: 12.6 G/DL (ref 13.3–17.7)
IMM GRANULOCYTES # BLD: 0.1 10E9/L (ref 0–0.4)
IMM GRANULOCYTES NFR BLD: 2 %
LYMPHOCYTES # BLD AUTO: 1.4 10E9/L (ref 0.8–5.3)
LYMPHOCYTES NFR BLD AUTO: 29.4 %
MCH RBC QN AUTO: 32.6 PG (ref 26.5–33)
MCHC RBC AUTO-ENTMCNC: 33.5 G/DL (ref 31.5–36.5)
MCV RBC AUTO: 97 FL (ref 78–100)
MONOCYTES # BLD AUTO: 0.3 10E9/L (ref 0–1.3)
MONOCYTES NFR BLD AUTO: 7 %
NEUTROPHILS # BLD AUTO: 2.9 10E9/L (ref 1.6–8.3)
NEUTROPHILS NFR BLD AUTO: 59.2 %
NRBC # BLD AUTO: 0 10*3/UL
NRBC BLD AUTO-RTO: 0 /100
PLATELET # BLD AUTO: 280 10E9/L (ref 150–450)
POTASSIUM SERPL-SCNC: 3.8 MMOL/L (ref 3.4–5.3)
PROT SERPL-MCNC: 8.7 G/DL (ref 6.8–8.8)
RBC # BLD AUTO: 3.86 10E12/L (ref 4.4–5.9)
SODIUM SERPL-SCNC: 138 MMOL/L (ref 133–144)
WBC # BLD AUTO: 4.9 10E9/L (ref 4–11)

## 2020-05-28 PROCEDURE — 85025 COMPLETE CBC W/AUTO DIFF WBC: CPT | Performed by: INTERNAL MEDICINE

## 2020-05-28 PROCEDURE — G0463 HOSPITAL OUTPT CLINIC VISIT: HCPCS | Mod: ZF

## 2020-05-28 PROCEDURE — 80053 COMPREHEN METABOLIC PANEL: CPT | Performed by: INTERNAL MEDICINE

## 2020-05-28 PROCEDURE — 36415 COLL VENOUS BLD VENIPUNCTURE: CPT

## 2020-05-28 PROCEDURE — 99204 OFFICE O/P NEW MOD 45 MIN: CPT | Mod: ZP | Performed by: INTERNAL MEDICINE

## 2020-05-28 RX ORDER — RIVAROXABAN 20 MG/1
TABLET, FILM COATED ORAL
COMMUNITY
Start: 2020-05-05 | End: 2023-08-02

## 2020-05-28 ASSESSMENT — PAIN SCALES - GENERAL
PAINLEVEL: NO PAIN (0)
PAINLEVEL: NO PAIN (0)

## 2020-05-28 NOTE — PROGRESS NOTES
[facility] Follow-Up  May 28, 2020      Disease and Treatment History:  1. Ill-defined hypercoagulable state who has had numerous embolic and arterial thrombotic events.    - Renal infarct in November 2011,  - Left Popliteal embolic episode in December 2011 and was treated with surgery, aspirin and Coumadin.   - Embolic episode with his right carotid artery and had some TIA/stroke-like symptoms: December 2012  - He also had a right renal infarct in October 2013.  -  Embolic MI in July 2015.   - He notes another Renal infarct April but unclear if 2015 or 2016.   ---- He was initially treated with aspirin in 2011 and was transitioned to aspirin plus Coumadin in December 2011.  Plavix was added to the combination in October 2013 and he has remained on those medications minus the aspirin going forward.   2. He was found to have some renal artery stenosis approximately 50% to 60% on a full body angiogram and was seen by Vascular Surgery in October 2015, and they did not feel that invasive interventions were needed and felt like it could cause more harm than good.    3. He has had extensive hypercoagulable workup to date with a factor V and factor II mutation analysis that was negative, protein S and C and antithrombin III levels that were within normal limits, homocysteine that was within normal limits, and lupus anticoagulant and beta-2 glycoprotein assessments that were all negative.  He has had JAK2 testing which was negative and PNH testing which was negative.    4. He was noted to have some mild anemia dropping down into the 11 range.  He had B12 and folate levels that were within normal limits.  He had an EPO level that was 25.  He had a retic count of 2.5% and had a ferritin that was slightly elevated at 350.   -- Bone marrow biopsy on 10/07/2015 and this was read as trilineage hematopoiesis with dysgranulopoiesis and dysmegakaryopoiesis with less than 1% blasts consistent with RCMD 40% to 50% cellular.  Notably, the  cytogenetics were complex with a karyotype of 45,XY, a derivative of 5 and 17, addition of 9p13, trisomy 11, deletion 111, a minus 17 in 18 cells, and 46,XY in 3.   --IPSS scoring system for which he would get 4 points for cytogenetics, 0 points for blast percentage, 0 points for hemoglobin, 0 points for platelets and 0 points for ANC, giving him a total score of 4, putting him in the intermediate risk category.   5. Consult in BMT clinic 11/2015 for MDS. Siblings typed and 2 sibs are full matches  6. Repeat consult in 2/2017 with stable MDS. Transfer of care here  7. Winter 2017 gangrenous right great toe requiring vascular surgery and amputation  8. 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 Saw Dr. Craven in 5/2017 and importance of plavix and aspirin stressed and switched to Xarelto.  10. Last visit with me in 2018 and then lost to follow-up  11. Recent admission for stroke in 3.2020 with multifocal cortical and subcortical infarctions. Changed to Rivaroxaban and continue Prasugrel     HPI:  I last saw Tod in 2018. Since then he missed appointments here and had another stroke and was switched from Warfarin to Rivaroxaban. Presents today for MDS follow-up and to see Dr. Craven regarding hypercoagulable state. He notes some residual right hand weakness and numbness from the stroke. No other current chest pain or SOB. Hasn't taken his blood pressure meds for 3 weeks as hasn't reached out to his primary doc to get the refill       Medications are updated in Epic         10 point ROS otherwise negative     Physical Examination:    BP (!) 137/91   Pulse 95   Temp 98.1  F (36.7  C) (Oral)   Resp 16   Wt 80.9 kg (178 lb 6.4 oz)   SpO2 98%   BMI 26.42 kg/m      General:  Looks well. KPS 80-90  Good speech. No issues  Normal gait  No rash       Labs:  Results for TOD WHARTON (MRN [id number]) as of 5/28/2020 11:18   Ref. Range 5/28/2020 10:18   Sodium Latest Ref Range: 133 - 144 mmol/L 138   Potassium Latest Ref Range:  3.4 - 5.3 mmol/L 3.8   Chloride Latest Ref Range: 94 - 109 mmol/L 107   Carbon Dioxide Latest Ref Range: 20 - 32 mmol/L 24   Urea Nitrogen Latest Ref Range: 7 - 30 mg/dL 10   Creatinine Latest Ref Range: 0.66 - 1.25 mg/dL 0.80   GFR Estimate Latest Ref Range: >60 mL/min/1.73_m2 >90   GFR Estimate If Black Latest Ref Range: >60 mL/min/1.73_m2 >90   Calcium Latest Ref Range: 8.5 - 10.1 mg/dL 9.3   Anion Gap Latest Ref Range: 3 - 14 mmol/L 7   Albumin Latest Ref Range: 3.4 - 5.0 g/dL 3.4   Protein Total Latest Ref Range: 6.8 - 8.8 g/dL 8.7   Bilirubin Total Latest Ref Range: 0.2 - 1.3 mg/dL 0.8   Alkaline Phosphatase Latest Ref Range: 40 - 150 U/L 114   ALT Latest Ref Range: 0 - 70 U/L 25   AST Latest Ref Range: 0 - 45 U/L 20   Glucose Latest Ref Range: 70 - 99 mg/dL 102 (H)   WBC Latest Ref Range: 4.0 - 11.0 10e9/L 4.9   Hemoglobin Latest Ref Range: 13.3 - 17.7 g/dL 12.6 (L)   Hematocrit Latest Ref Range: 40.0 - 53.0 % 37.6 (L)   Platelet Count Latest Ref Range: 150 - 450 10e9/L 280   RBC Count Latest Ref Range: 4.4 - 5.9 10e12/L 3.86 (L)   MCV Latest Ref Range: 78 - 100 fl 97   MCH Latest Ref Range: 26.5 - 33.0 pg 32.6   MCHC Latest Ref Range: 31.5 - 36.5 g/dL 33.5   RDW Latest Ref Range: 10.0 - 15.0 % 15.1 (H)   Diff Method Unknown Automated Method   % Neutrophils Latest Units: % 59.2   % Lymphocytes Latest Units: % 29.4   % Monocytes Latest Units: % 7.0   % Eosinophils Latest Units: % 2.0   % Basophils Latest Units: % 0.4   % Immature Granulocytes Latest Units: % 2.0   Nucleated RBCs Latest Ref Range: 0 /100 0   Absolute Neutrophil Latest Ref Range: 1.6 - 8.3 10e9/L 2.9   Absolute Lymphocytes Latest Ref Range: 0.8 - 5.3 10e9/L 1.4   Absolute Monocytes Latest Ref Range: 0.0 - 1.3 10e9/L 0.3   Absolute Eosinophils Latest Ref Range: 0.0 - 0.7 10e9/L 0.1   Absolute Basophils Latest Ref Range: 0.0 - 0.2 10e9/L 0.0   Abs Immature Granulocytes Latest Ref Range: 0 - 0.4 10e9/L 0.1   Absolute Nucleated RBC Unknown 0.0        Assessment and Plan:  Mr. Hallman is a 44-year-old gentleman with an extensive clotting history, on anticoagulation currently with no identified specific abnormality, diagnosed with myelodysplastic syndrome with complex karyotype.      1.  Myelodysplastic syndrome. Counts stable. Currently monitoring. No indication for treatment or transplant at this time. Repeat evaluation at [facility] in 11/2017 and no sign of progression or change. Did have a variant of unknown significance in TP53 that was not the standard higher risk mutation.    Stable counts. Recommend 6 month follow-up for the MDS        2.  Coagulopathy: now on Rivaroxaban and Prasugrel. Per hematology, Dr. Craven, aspirin recommended as well.     3. ID: No active issues    4. CV: BP elevated but not dramatically. Expressed the importance of blood pressure and cholesterol control too. Encouraged him to call his primary doc to get his metoprolol and other meds filled     Plan:  - [facility] in 6 months with labs prior    Olive Valverde

## 2020-05-28 NOTE — LETTER
5/28/2020     RE: Ziggy Hallman  [address] [place] [facility] [place] [address]    Dear Colleague,    Thank you for referring your patient, Ziggy Hallman, to the [facility]. Please see a copy of my visit note below.        In person interview and exam     Hematology Clinic consult note    Reasons for Consult: - h/o arterial thormbosis    History of Present Illness: Mr. Hallman is a 44 year old man with an extensive history of arterial thrombosis(see my note 5/19/17). I saw him just that one time in consultation.  At that time he had advised him to switch from warfarin to rivaroxaban, mainly because it was easier because it can require monitoring.  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  He was treated with heparin and then enoxaparin and was told to follow-up with me which he did not.  At some point he was switched to apixaban.  He had a second pending consultation at [facility] and they did P2 Y 12 testing which indicated that he was a poor responder to clopidogrel, so we switched to Prasugrel (I do not have those records).  He then presented to [facility] on 12/17/2017 with CVA symptoms and was found to have thrombus in the left carotid artery.  He was started on enoxaparin and switched warfarin and Prasugrel.  He continued to be noted to occasionally miss doses of his anticoagulation.      He was admitted to [facility] with hypertensive emergency and non-ST elevation MI on 12/5/2018.  Coronary angiogram showed no new lesions, EF 40%.  He was started on simvastatin and no other changes  Made.  He was admitted to [facility] on 1/22/2024 another CVA.  He had been taking his warfarin and was therapeutic, but he had not been taking the prasugrel.  Again transthoracic echo showed a PFO and it was suggested he follow-up with cardiology.  He underwent  closure of the PFO on 1/19/2020.  In spite of this, he suffered a left parietal lobe and temporal lobe CVA on 3/30/2020.  His INR was subtherapeutic at 1.5 admission and it was not clear if he was taking the Prasugrel.  He was told to stop the warfarin and switched back to rivaroxaban.  He had a follow-up with his neurologist on 5/14/2020 who made several recommendations, and recommended follow-up with primary care physician    Today he says he is feeling okay.  He reports that he still has numbness in his right third fourth and fifth fingers extending up to the wrist, and that he has decreased .  He is right-handed.  He denies any other weakness or blurred or double vision.  He states that he is taking rivaroxaban and Prasugrel.  He says he has not been on any aspirin for a long time-he does not know why, he denies being allergic to aspirin.  He says he is taking one blood pressure medicine and that he was supposed to have a refill of metoprolol, but that is clinic refused to sign a repeat fill because it was prescribed in the hospital.  He has not been back to the cardiologist since having his PFO closed.  He reports that he is now try to keep better track of his medications by using a pillbox.      Past Medical History:  -1.  Arterial thrombotic events- MI, CVA, peripheral vascular disease, real artery thrombosis. All of the thromboses have been arterial.    He has never had a DVT or PE.    2.  Myelodysplastic syndrome.  He has been seen by Dr. Valverde here.  He had a bone marrow biopsy and aspirate done at an outside institution.  I have been unable to find actual report.  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  However, recently his CBC has been entirely normal, dipping into a very mild anemia intermittently.    3.  Hypertension.   4.  Anxiety.    5.  h/o substance abuse- metamphetamine, cocaine; current marijuana use  6.  Poor adherence to medications  7. H/o  GERD- EGD 19 - biopsy showed non-erosive reactive gastropathy, negative h pylori      Medications:  - Rivaroxaban 20 mg daily  - [id number] mg daily  -Pantoprazole 20 mg daily  -Nitroglycerin as needed  Prescribed but currently not taking- metoprolol 25 mg twice daily, Atorvastatin 40 mg nightly.    Family History: mother- of a stroke at age 72, father-patient does not know much about his father's history, but thinks he had a stroke, ,  siblings -1 sister with factor V Leiden, possibly a DVT.    Social History: smoking-many years at 1/2-1 pack/day, says he quit in late 2019.  Uses chewing tobacco.  Alcohol- drinks alcohol once a week.  He smokes marijuana every single evening.  He denies other illicit drugs.    Review of systems:  As in HPI.  He reports in mid winter having total body aches that lasted for about a month but are now improved.  I confirmed with him that to his knowledge he has never had a DVT or PE.  The rest of the > 10 point review of systems was negative.      PHYSICAL EXAMINATION:  BP (!) 137/91   Pulse 95   Temp 98.1  F (36.7  C) (Oral)   Resp 16   Wt 80.9 kg (178 lb 6.4 oz)   SpO2 98%   BMI 26.42 kg/m      General appearance:  Patient is 44 year old man in no acute distress.     HEENT:  No pallor, icterus, or mucositis.  No thyromegaly.   Lymph nodes:  No cervical, supraclavicular, axillary, or inguinal lymphadenopathy.   Lungs:  Clear to auscultation bilaterally.   Heart:  Regular rate and rhythm; no S3 S4 or murmer.     Abdomen:  Positive bowel sounds, soft and nontender, nondistended.  No hepatomegaly. No splenomegaly appreciated.    Extremities:  No joint swelling or tenderness.  No ankle edema.     Skin:  No rash, no petechiae or ecchymoses.  Neurologic: Alert, oriented x3, speech normal, cranial nerves II through XII grossly intact, I did not formally test strength or sensation.  He was able to climb up onto the exam table without difficulty.  His gait is  normal.      Labs: Extensively reviewed in epic and care everywhere- below are a few of the most relevant for this consultation.   [facility] 11/27/2017  JAK2 [id number] and exon 12-15, MPL exon 10 and CALR mutation all negative   PNH screen negative   Lupus Anticoagulant  Negative  Beta-2 glycoprotein 1 IgG, IgM negative  Phospholipid antibody IgG IgM negative    [facility]  12/19/2017   lupus anticoagulant  Negative cardiolipin IgA, IgG, IgM 12/19/2017, 10/5/2013, 12/22/2011  Negative beta-2 glycoprotein 1 IgA, IgE IgM  Negative C-ANCA, negative P-ANCA 10/6/2013, 12/22/2011    Results for TOD WHARTON (MRN [id number]) as of 5/28/2020 12:33   Ref. Range 5/28/2020 10:18   Sodium Latest Ref Range: 133 - 144 mmol/L 138   Potassium Latest Ref Range: 3.4 - 5.3 mmol/L 3.8   Chloride Latest Ref Range: 94 - 109 mmol/L 107   Carbon Dioxide Latest Ref Range: 20 - 32 mmol/L 24   Urea Nitrogen Latest Ref Range: 7 - 30 mg/dL 10   Creatinine Latest Ref Range: 0.66 - 1.25 mg/dL 0.80   GFR Estimate Latest Ref Range: >60 mL/min/1.73_m2 >90   GFR Estimate If Black Latest Ref Range: >60 mL/min/1.73_m2 >90   Calcium Latest Ref Range: 8.5 - 10.1 mg/dL 9.3   Anion Gap Latest Ref Range: 3 - 14 mmol/L 7   Albumin Latest Ref Range: 3.4 - 5.0 g/dL 3.4   Protein Total Latest Ref Range: 6.8 - 8.8 g/dL 8.7   Bilirubin Total Latest Ref Range: 0.2 - 1.3 mg/dL 0.8   Alkaline Phosphatase Latest Ref Range: 40 - 150 U/L 114   ALT Latest Ref Range: 0 - 70 U/L 25   AST Latest Ref Range: 0 - 45 U/L 20   Glucose Latest Ref Range: 70 - 99 mg/dL 102 (H)   WBC Latest Ref Range: 4.0 - 11.0 10e9/L 4.9   Hemoglobin Latest Ref Range: 13.3 - 17.7 g/dL 12.6 (L)   Hematocrit Latest Ref Range: 40.0 - 53.0 % 37.6 (L)   Platelet Count Latest Ref Range: 150 - 450 10e9/L 280   RBC Count Latest Ref Range: 4.4 - 5.9 10e12/L 3.86 (L)   MCV Latest Ref Range: 78 - 100 fl 97   MCH Latest Ref Range: 26.5 - 33.0 pg 32.6   MCHC Latest Ref Range: 31.5 - 36.5  g/dL 33.5   RDW Latest Ref Range: 10.0 - 15.0 % 15.1 (H)   Diff Method Unknown Automated Method   % Neutrophils Latest Units: % 59.2   % Lymphocytes Latest Units: % 29.4   % Monocytes Latest Units: % 7.0   % Eosinophils Latest Units: % 2.0   % Basophils Latest Units: % 0.4   % Immature Granulocytes Latest Units: % 2.0   Nucleated RBCs Latest Ref Range: 0 /100 0   Absolute Neutrophil Latest Ref Range: 1.6 - 8.3 10e9/L 2.9   Absolute Lymphocytes Latest Ref Range: 0.8 - 5.3 10e9/L 1.4   Absolute Monocytes Latest Ref Range: 0.0 - 1.3 10e9/L 0.3   Absolute Eosinophils Latest Ref Range: 0.0 - 0.7 10e9/L 0.1   Absolute Basophils Latest Ref Range: 0.0 - 0.2 10e9/L 0.0   Abs Immature Granulocytes Latest Ref Range: 0 - 0.4 10e9/L 0.1   Absolute Nucleated RBC Unknown 0.0       Imaging:  I reviewed numerous imaging reports- the ones below seem to be the most relevant at this time.  EXAM: MR HEAD BRAIN WO  LOCATION: [facility]  DATE/TIME: 3/30/2020 6:51 PM    INDICATION: Left ear pain, right hand numbness for 2 days, history of prior  strokes. (No obvious focal deficits today).  COMPARISON: 03/11/2020 brain MRI.  TECHNIQUE: Routine multiplanar multisequence head MRI without intravenous  contrast.    FINDINGS:  INTRACRANIAL CONTENTS: Multiple new acute cortical infarcts involving the  postcentral gyrus in the region of the hand knob, superior and inferior parietal  lobules and adjacent subcortical parietal white matter, and at the left  temporoparietal junction. There is associated FLAIR hyperintensity indicating  infarction greater than 6 hours of age. No hemorrhagic transformation. Old right  temporoparietal junction infarction with laminar necrosis and petechial  hemorrhage. Old left parietal and occipitotemporal infarctions. No shift of  midline or basal cistern effacement. No intracranial hemorrhage. Normal  ventricles. No hydrocephalus. The posterior fossa structures are unremarkable.    SELLA: No abnormality  accounting for technique.    OSSEOUS STRUCTURES/SOFT TISSUES: Normal marrow signal. The major intracranial  vascular flow voids are maintained.     ORBITS: No abnormality accounting for technique.     SINUSES/MASTOIDS: No paranasal sinus mucosal disease. No middle ear or mastoid  effusion.     IMPRESSION:  1.  Multifocal acute cortical and subcortical infarcts involving the left  parietal lobe and the left temporoparietal junction. These are new since the  comparison examination of 2020.  2.  Old right temporoparietal junction, left parietal and occipitotemporal  junction infarcts    ECHOCARDIOGRAM    TOD WHARTON  MRN:    [id number]        Accession#:   [id number]  :    1975 44 years Study Date:   2020 12:52:17 PM  Gender: M                 BP:           155/95 mmHg  Height: 175.00 cm         BSA:          1.95 mÂ   Weight: 79.00 kg          Tech:         ISABELA                            Referring MD: LIBRADO CARRIZALES  Site:             ANW  Reading Location: ANW IP      Procedure: Limited Echo w/ Bubbles, Color Doppler and Limited Spectral Doppler.    Indication for study: CVA, recent PFO closure.  Cardiac Rhythm: Regular.Study quality: Good.    Final Impressions:  Limited Echocardiogram performed   1. Echo contrast was administrered to enhance visualization of all left ventricular segments.   2. Mildly increased LV size, normal global systolic function with an estimated EF of 55 - 60%.   3. Normal right ventricular systolic function.   4. An ASD or PFO occluder device is seen on the atrial septum.   5. No shunt seen across the interatrial septum. Negative bubble study.   6. No significant valve disease detected.    Comparison  Compared to prior exam of 20:  There has been interval placement of an ASD/PFO closure device.  Chamber Sizes and Function  Mildly increased left ventricular size, normal global systolic function with an estimated EF of 55 - 60%. Left atrial size is normal. The right  atrium is normal.    Valves, RV Pressures and Diastolic Function    The aortic valve is normal in structure and trileaflet, no stenosis and no regurgitation. The mitral valve is normal in structure, trace mitral regurgitation. The tricuspid valve is normal in structure. Tricuspid regurgitation is not evident. The pulmonic valve is not well visualized. Trace pulmonic regurgitation is present on color flow.    Masses, Effusion, Shunts  An ASD/PFO occluder device is seen on the atrial septum. No left to right shunting was detected by limited color flow Doppler interrogation of the interatrial septum. Agitated saline injection showed no right to left shunt at rest or following Valsalva manuever.    MEASUREMENTS AND CALCULATIONS    2-D Measurements and LV Function:    LVID (d) 5.7 cm LV FS% (2D)   31 %  LVID (s) 3.9 cm LVOT diameter 2.0 cm  IVS (d)  1.0 cm HR            61 bpm  LVPW (d) 1.0 cm  Ao Sinus 3.2 cm  Asc Ao   3.2 cm    Diastology:  Mitral          Tissue Doppler  E Peak 0.74 m/s e', Septum     0.10 m/s  A Peak 0.65 m/s e', Lateral    0.12 m/s  E/A    1.1      E/e' Average   6.83  DT     216 msec    Aortic Valve:    Vmax       1.2 m/s  CARLIE (V)   2.93 cmÂ   VTI        0.25 m   CARLIE (I)   2.74 cmÂ   LVOT V max 1.1 m/s  Max PG    6 mmHg  LVOT VTI   0.21 m   Mean PG   3 mmHg  SV         69 ml    Dim Index 0.83  SV index   35 ml/mÂ  CO        4.2 l/min                      CI        2.2 l/min/mÂ     Mitral Valve:    MVA      3.5 cmÂ   MV P 1/2 63 msec      Contrast documentation: 2 ml diluted Definity was administered peripherally to enhance visualization of all left ventricular segments.    Electronically signed by Tripp Batista MD on 4/1/2020 2:37:21 PM.    Abdominal CT 8/14/2018  Interface, [employer] - 08/14/2018 11:00 PM CDT  Clinical History: Abdominal Pain    Technique: Following the administration of intravenous contrast, a CT angiogram was obtained of the chest and abdomen. 3D Maximum intensity projections  (MIPS) were obtained. 3D rendering was performed on an independent workstation. This CT Scan used dose modulation, iterative reconstruction, and/or weight based dosing when appropriate to reduce radiation dose to as low as reasonably achievable.     Comparison: None.    Findings:    Aorta: No evidence of aneurysm, dissection, or significant stenosis.    Major Aortic Branches: The innominate artery and proximal portions of bilateral subclavian and common carotid arteries are patent. The celiac artery, SMA, and RUDDY  are patent. There is focal thrombus within the abdominal aorta at the origin of the right renal artery with severe atrophy of the right kidney that appears chronic.    Additional Findings: No large central pulmonary emboli. No pulmonary infiltrates, consolidation, or nodules. Within the limitations of an arterial phase study, the visualized portions of the liver, pancreas, spleen, kidneys, and adrenal glands are unremarkable. Colonic diverticulosis.    Impression:   1. No evidence of aortic aneurysm or dissection.  2. Focal thrombus is present within the abdominal aorta at the origin of the right renal artery with severe atrophy of the right kidney that appears chronic.  3. Diverticulosis. No evidence of diverticulitis.    [facility]    Abdominal CT 1/19/18  1. Chronic, near total occlusion of the right renal artery.   2. Moderate amount of mural thrombus throughout the abdominal aorta.     FINDINGS     VASCULAR:   Normal caliber thoracic and abdominal aorta. The innominate, left common carotid, left subclavian, celiac, superior mesenteric, left renal and inferior mesenteric arteries are widely patent. No intracardiac mass or filling defect. No central pulmonary artery filling defect.     Chronic occlusion of the right renal artery. The mid and distal portions of the right renal artery reconstitute with flow, likely secondary to subtotal proximal occlusion or arterial  collateralization.     Moderate amount of noncalcified mural thrombus extending from the mid abdominal aorta through the iliac bifurcation. No significant narrowing of the abdominal aorta. The common and external iliac arteries are normal in caliber bilaterally. Moderate narrowing of the proximal right superficial femoral artery secondary to atherosclerotic plaque.     NONVASCULAR:   Azygos fissure. Atrophic right kidney with delayed nephrogram.   LAD stent.       Electronically signed by:    Jaden Self MD  [id number] 19-Jan-2018 12:53        CARYN Danielle -[id number] 19-Jan-2018 12:53   Assessment: -  This is a 44-year-old man with recurrent arterial thromboses.      He has never had a DVT or PE.      The risk factors for arterial versus venous thromboses are different.  Importantly he has been tested numerous times for antiphospholipid syndrome and he does not have that.  He also has been checked and was negative for myeloproliferative disorder (Ronen 2, CALR, MPL mutatios) and for PNH-which are unusual thrombophilias that could cause arterial clotting.  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  This does not seem to be progressing, although there is an association of clonal hematopoiesis and cardiovascular disease.  This is only in association, and there would be no reason to give him chemotherapy at this time as a means to try to prevent further arterial thromboses.    He does have several risk factors for arterial thrombosis, including a strong family history of stroke, uncontrolled hypertension, and smoking-cigarettes and marijuana.  His cholesterol has not been high, but the HDL cholesterol has been low.  He has not been routinely on a statin in spite of having these multiple arterial thrombosis events.  There was concern that he may have a patent foramen ovale that led to paradoxical emboli and stroke,  unfortunately he still had a stroke after the PFO was  closed.  He does in 2018 have documented thrombus in his aorta on abdominal CT imaging that could be chronically present and embolizing.  There is nothing on the brain MRI reports that suggest he has vasculitis.  His blood pressure is not optimally managed- with his history, he should have a goal blood pressure of 120/80.    Unfortunately he has not always been diligent about taking his medications and also he has seen multiple different providers at different systems.  He does not appear to have one physician who is prescribing his medications, monitoring his condition and counseling.  He does not have any thrombotic disorder that is traditionally managed by hematologist.  I agree with Dr. Martin's recommendations (his neurologist).  The one thing I would suggest adding is that he be on dual anti--platelet therapy, adding aspirin 81 mg daily.    Recommendations-   - Continue rivaroxaban 20 mg daily  -Continue prasugrel 10 mg daily  - Add aspirin 81 mg daily  - Restart atorvastatin 40 mg nightly  - Restart metoprolol  - Add additional blood pressure meds as needed to control blood pressure with a goal of <120/80  - No cigarette smoking  - No marijuana smoking    All of these recommendations are best managed by his primary care physician or his cardiologist.  I strongly recommend that only one physician be in charge of managing all these medications and that he follow-up frequently with that physician.  I strongly urged him to contact his primary care physician for follow-up. I also strongly recommended to him to make sure he does not miss medications, and that he not smoke tobacco or marijuana.     He does not need routine follow-up in hematology clinic regarding his arterial thrombosis problems.    Janine Craven MD  Hematology- coagulation specialist

## 2020-05-28 NOTE — LETTER
5/28/2020     RE: Ziggy Hallman  [address] [facility] [address]      Dear Colleague,    Thank you for referring your patient, Ziggy Hallman, to the [facility]OD AND MARROW TRANSPL[facility]. Please see a copy of my visit note below.    Chief Complaint   Patient presents with     Blood Draw     VItals and blood drawn by LPN.      [facility] Follow-Up  May 28, 2020      Disease and Treatment History:  1. Ill-defined hypercoagulable state who has had numerous embolic and arterial thrombotic events.    - Renal infarct in November 2011,  - Left Popliteal embolic episode in December 2011 and was treated with surgery, aspirin and Coumadin.   - Embolic episode with his right carotid artery and had some TIA/stroke-like symptoms: December 2012  - He also had a right renal infarct in October 2013.  -  Embolic MI in July 2015.   - He notes another Renal infarct April but unclear if 2015 or 2016.   ---- He was initially treated with aspirin in 2011 and was transitioned to aspirin plus Coumadin in December 2011.  Plavix was added to the combination in October 2013 and he has remained on those medications minus the aspirin going forward.   2. He was found to have some renal artery stenosis approximately 50% to 60% on a full body angiogram and was seen by Vascular Surgery in October 2015, and they did not feel that invasive interventions were needed and felt like it could cause more harm than good.    3. He has had extensive hypercoagulable workup to date with a factor V and factor II mutation analysis that was negative, protein S and C and antithrombin III levels that were within normal limits, homocysteine that was within normal limits, and lupus anticoagulant and beta-2 glycoprotein assessments that were all negative.  He has had JAK2 testing which was negative and PNH testing which was negative.    4. He was noted to have some mild anemia dropping down into the 11 range.  He had B12 and folate levels that were  within normal limits.  He had an EPO level that was 25.  He had a retic count of 2.5% and had a ferritin that was slightly elevated at 350.   -- Bone marrow biopsy on 10/07/2015 and this was read as trilineage hematopoiesis with dysgranulopoiesis and dysmegakaryopoiesis with less than 1% blasts consistent with RCMD 40% to 50% cellular.  Notably, the cytogenetics were complex with a karyotype of 45,XY, a derivative of 5 and 17, addition of 9p13, trisomy 11, deletion 111, a minus 17 in 18 cells, and 46,XY in 3.   --IPSS scoring system for which he would get 4 points for cytogenetics, 0 points for blast percentage, 0 points for hemoglobin, 0 points for platelets and 0 points for ANC, giving him a total score of 4, putting him in the intermediate risk category.   5. Consult in BMT clinic 11/2015 for MDS. Siblings typed and 2 sibs are full matches  6. Repeat consult in 2/2017 with stable MDS. Transfer of care here  7. Winter 2017 gangrenous right great toe requiring vascular surgery and amputation  8. 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 Saw Dr. Craven in 5/2017 and importance of plavix and aspirin stressed and switched to Xarelto.  10. Last visit with me in 2018 and then lost to follow-up  11. Recent admission for stroke in 3.2020 with multifocal cortical and subcortical infarctions. Changed to Rivaroxaban and continue Prasugrel     HPI:  I last saw Ziggy in 2018. Since then he missed appointments here and had another stroke and was switched from Warfarin to Rivaroxaban. Presents today for MDS follow-up and to see Dr. Craven regarding hypercoagulable state. He notes some residual right hand weakness and numbness from the stroke. No other current chest pain or SOB. Hasn't taken his blood pressure meds for 3 weeks as hasn't reached out to his primary doc to get the refill       Medications are updated in Epic         10 point ROS otherwise negative     Physical Examination:    BP (!) 137/91   Pulse 95   Temp 98.1  F (36.7   C) (Oral)   Resp 16   Wt 80.9 kg (178 lb 6.4 oz)   SpO2 98%   BMI 26.42 kg/m      General:  Looks well. KPS 80-90  Good speech. No issues  Normal gait  No rash       Labs:  Results for TOD WHARTON (MRN [id number]) as of 5/28/2020 11:18   Ref. Range 5/28/2020 10:18   Sodium Latest Ref Range: 133 - 144 mmol/L 138   Potassium Latest Ref Range: 3.4 - 5.3 mmol/L 3.8   Chloride Latest Ref Range: 94 - 109 mmol/L 107   Carbon Dioxide Latest Ref Range: 20 - 32 mmol/L 24   Urea Nitrogen Latest Ref Range: 7 - 30 mg/dL 10   Creatinine Latest Ref Range: 0.66 - 1.25 mg/dL 0.80   GFR Estimate Latest Ref Range: >60 mL/min/1.73_m2 >90   GFR Estimate If Black Latest Ref Range: >60 mL/min/1.73_m2 >90   Calcium Latest Ref Range: 8.5 - 10.1 mg/dL 9.3   Anion Gap Latest Ref Range: 3 - 14 mmol/L 7   Albumin Latest Ref Range: 3.4 - 5.0 g/dL 3.4   Protein Total Latest Ref Range: 6.8 - 8.8 g/dL 8.7   Bilirubin Total Latest Ref Range: 0.2 - 1.3 mg/dL 0.8   Alkaline Phosphatase Latest Ref Range: 40 - 150 U/L 114   ALT Latest Ref Range: 0 - 70 U/L 25   AST Latest Ref Range: 0 - 45 U/L 20   Glucose Latest Ref Range: 70 - 99 mg/dL 102 (H)   WBC Latest Ref Range: 4.0 - 11.0 10e9/L 4.9   Hemoglobin Latest Ref Range: 13.3 - 17.7 g/dL 12.6 (L)   Hematocrit Latest Ref Range: 40.0 - 53.0 % 37.6 (L)   Platelet Count Latest Ref Range: 150 - 450 10e9/L 280   RBC Count Latest Ref Range: 4.4 - 5.9 10e12/L 3.86 (L)   MCV Latest Ref Range: 78 - 100 fl 97   MCH Latest Ref Range: 26.5 - 33.0 pg 32.6   MCHC Latest Ref Range: 31.5 - 36.5 g/dL 33.5   RDW Latest Ref Range: 10.0 - 15.0 % 15.1 (H)   Diff Method Unknown Automated Method   % Neutrophils Latest Units: % 59.2   % Lymphocytes Latest Units: % 29.4   % Monocytes Latest Units: % 7.0   % Eosinophils Latest Units: % 2.0   % Basophils Latest Units: % 0.4   % Immature Granulocytes Latest Units: % 2.0   Nucleated RBCs Latest Ref Range: 0 /100 0   Absolute Neutrophil Latest Ref Range: 1.6 - 8.3 10e9/L 2.9    Absolute Lymphocytes Latest Ref Range: 0.8 - 5.3 10e9/L 1.4   Absolute Monocytes Latest Ref Range: 0.0 - 1.3 10e9/L 0.3   Absolute Eosinophils Latest Ref Range: 0.0 - 0.7 10e9/L 0.1   Absolute Basophils Latest Ref Range: 0.0 - 0.2 10e9/L 0.0   Abs Immature Granulocytes Latest Ref Range: 0 - 0.4 10e9/L 0.1   Absolute Nucleated RBC Unknown 0.0       Assessment and Plan:  Mr. Hallman is a 44-year-old gentleman with an extensive clotting history, on anticoagulation currently with no identified specific abnormality, diagnosed with myelodysplastic syndrome with complex karyotype.      1.  Myelodysplastic syndrome. Counts stable. Currently monitoring. No indication for treatment or transplant at this time. Repeat evaluation at [facility] in 11/2017 and no sign of progression or change. Did have a variant of unknown significance in TP53 that was not the standard higher risk mutation.    Stable counts. Recommend 6 month follow-up for the MDS        2.  Coagulopathy: now on Rivaroxaban and Prasugrel. Per hematology, Dr. Craven, aspirin recommended as well.     3. ID: No active issues    4. CV: BP elevated but not dramatically. Expressed the importance of blood pressure and cholesterol control too. Encouraged him to call his primary doc to get his metoprolol and other meds filled     Plan:  - [facility] [facility] in 6 months with labs prior    Olive Valverde

## 2020-05-28 NOTE — NURSING NOTE
"Oncology Rooming Note    May 28, 2020 11:26 AM   Ziggy Hallman is a 44 year old male who presents for:    Chief Complaint   Patient presents with     Blood Draw     VItals and blood drawn by LPN.      Oncology Clinic Visit     MDS     Initial Vitals: BP (!) 137/91   Pulse 95   Temp 98.1  F (36.7  C) (Oral)   Resp 16   Wt 80.9 kg (178 lb 6.4 oz)   SpO2 98%   BMI 26.42 kg/m   Estimated body mass index is 26.42 kg/m  as calculated from the following:    Height as of 7/11/18: 1.75 m (5' 8.9\").    Weight as of this encounter: 80.9 kg (178 lb 6.4 oz). Body surface area is 1.98 meters squared.  No Pain (0) Comment: Data Unavailable   No LMP for male patient.  Allergies reviewed: Yes  Medications reviewed: Yes    Medications: Medication refills not needed today.  Pharmacy name entered into [facility]:    [facility] - [place], [place] - [address] [id number] - [place], [place] - [address] AT [facility] & [address] [place] [address] [facility]ARMACY #[id number] - [place], [place] - [address]    Clinical concerns: None       Concetta Nieves CMA              "

## 2020-05-28 NOTE — PROGRESS NOTES
Chief Complaint   Patient presents with     Blood Draw     VItals and blood drawn by LPN.      LUCIE Zimmerman LPN

## 2020-05-28 NOTE — NURSING NOTE
"Oncology Rooming Note    May 28, 2020 10:54 AM   Ziggy Hallman is a 44 year old male who presents for:    Chief Complaint   Patient presents with     Telephone     MDS     Initial Vitals: BP (!) 137/91   Pulse 95   Temp 98.1  F (36.7  C) (Oral)   Resp 16   Wt 80.9 kg (178 lb 6.4 oz)   SpO2 98%   BMI 26.42 kg/m   Estimated body mass index is 26.42 kg/m  as calculated from the following:    Height as of 7/11/18: 1.75 m (5' 8.9\").    Weight as of this encounter: 80.9 kg (178 lb 6.4 oz). Body surface area is 1.98 meters squared.  No Pain (0) Comment: Data Unavailable   No LMP for male patient.  Allergies reviewed: Yes  Medications reviewed: Yes    Medications: Medication refills not needed today.  Pharmacy name entered into EPIC:    [facility] [address] [place] - [address] [id number] - [facility] [address] [facility][id number] - [place], [place] - [address]    Clinical concerns: No new concerns. Provider was notified.      Martin Martinez LPN            "

## 2020-11-23 ENCOUNTER — PATIENT OUTREACH (OUTPATIENT)
Dept: ONCOLOGY | Facility: CLINIC | Age: 45
End: 2020-11-23

## 2020-11-24 NOTE — PROGRESS NOTES
Ziggy is due for follow up with Dr. Olive Valverde on 12/3. Sent orders for local lab, including CBC w/ diff to fax #: [phone]

## 2020-12-01 ENCOUNTER — VIRTUAL VISIT (OUTPATIENT)
Dept: TRANSPLANT | Facility: CLINIC | Age: 45
End: 2020-12-01
Attending: INTERNAL MEDICINE
Payer: COMMERCIAL

## 2020-12-01 DIAGNOSIS — D46.9 MDS (MYELODYSPLASTIC SYNDROME) (H): Primary | ICD-10-CM

## 2020-12-01 PROCEDURE — 99211 OFF/OP EST MAY X REQ PHY/QHP: CPT | Mod: 95 | Performed by: INTERNAL MEDICINE

## 2020-12-01 NOTE — LETTER
"    12/1/2020         RE: Ziggy Hallman  [address] [facility] [address]        Dear Colleague,    Thank you for referring your patient, Ziggy Hallman, to the [facility] BLOOD AND MARROW TRANSPLANT PROGRAM [place]. Please see a copy of my visit note below.    Ziggy Hallman is a 45 year old male who is being evaluated via a billable video visit.      The patient has been notified of following:     \"This video visit will be conducted via a call between you and your physician/provider. We have found that certain health care needs can be provided without the need for an in-person physical exam.  This service lets us provide the care you need with a video conversation.  If a prescription is necessary we can send it directly to your pharmacy.  If lab work is needed we can place an order for that and you can then stop by our lab to have the test done at a later time.    Video visits are billed at different rates depending on your insurance coverage.  Please reach out to your insurance provider with any questions.    If during the course of the call the physician/provider feels a video visit is not appropriate, you will not be charged for this service.\"    Patient has given verbal consent for Video visit? Yes  How would you like to obtain your AVS? MyChart  If you are dropped from the video visit, the video invite should be resent to: Text to cell phone: [phone]  Will anyone else be joining your video visit? No        ADLEA Toussaint      Video-Visit Details    Type of service:  Video Visit-- started as video and converted to phone due to technical difficulties    Video Start Time: 12:04  Phone End Time: 12:10  Care coordination: 1 minute  Total time: 7 minutes    Originating Location (pt. Location): Home    Distant Location (provider location):  [facility] BLOOD AND MARROW TRANSPLANT PROGRAM [place]     Platform used for Video Visit: [employer] Video Visit  Dec 1, " 2020          Disease and Treatment History:  1. Ill-defined hypercoagulable state who has had numerous embolic and arterial thrombotic events.    - Renal infarct in November 2011,  - Left Popliteal embolic episode in December 2011 and was treated with surgery, aspirin and Coumadin.   - Embolic episode with his right carotid artery and had some TIA/stroke-like symptoms: December 2012  - He also had a right renal infarct in October 2013.  -  Embolic MI in July 2015.   - He notes another Renal infarct April but unclear if 2015 or 2016.   ---- He was initially treated with aspirin in 2011 and was transitioned to aspirin plus Coumadin in December 2011.  Plavix was added to the combination in October 2013 and he has remained on those medications minus the aspirin going forward.   2. He was found to have some renal artery stenosis approximately 50% to 60% on a full body angiogram and was seen by Vascular Surgery in October 2015, and they did not feel that invasive interventions were needed and felt like it could cause more harm than good.    3. He has had extensive hypercoagulable workup to date with a factor V and factor II mutation analysis that was negative, protein S and C and antithrombin III levels that were within normal limits, homocysteine that was within normal limits, and lupus anticoagulant and beta-2 glycoprotein assessments that were all negative.  He has had JAK2 testing which was negative and PNH testing which was negative.    4. He was noted to have some mild anemia dropping down into the 11 range.  He had B12 and folate levels that were within normal limits.  He had an EPO level that was 25.  He had a retic count of 2.5% and had a ferritin that was slightly elevated at 350.   -- Bone marrow biopsy on 10/07/2015 and this was read as trilineage hematopoiesis with dysgranulopoiesis and dysmegakaryopoiesis with less than 1% blasts consistent with RCMD 40% to 50% cellular.  Notably, the cytogenetics were  complex with a karyotype of 45,XY, a derivative of 5 and 17, addition of 9p13, trisomy 11, deletion 111, a minus 17 in 18 cells, and 46,XY in 3.   --IPSS scoring system for which he would get 4 points for cytogenetics, 0 points for blast percentage, 0 points for hemoglobin, 0 points for platelets and 0 points for ANC, giving him a total score of 4, putting him in the intermediate risk category.   5. Consult in BMT clinic 2015 for MDS. Siblings typed and 2 sibs are full matches  6. Repeat consult in 2017 with stable MDS. Transfer of care here  7. Winter 2017 gangrenous right great toe requiring vascular surgery and amputation  8. 2017 MI (in stent restenosis)   9. Saw Dr. Craven in 2017 and importance of plavix and aspirin stressed and switched to Xarelto.  10. Last visit with me in  and then lost to follow-up  11. Recent admission for stroke in 3.2020 with multifocal cortical and subcortical infarctions. Changed to Rivaroxaban and continue Prasugrel      HPI:  I last saw Ziggy in 2020. He went back to the hospital in 2020 with some new MRI changes but all symptoms went away. Has some issues with ongoing numbness in his hand that was an issue last spring. Notes he remains on the Xarelto, Effient, and aspirin. He notes infection issues or bleeding issues. Has been able to avoid COVID. Some employees have had it but have recovered.  Uncle (mid 70's)  just  from acute leukemia. Has been working. Has been compliant with meds.     Medications are updated in Epic          10 point ROS otherwise negative           Labs:  Care Everywhere 2020:  WBC 4.7  Hgb 11.4  Platelets 259    Assessment and Plan:  Mr. Hallman is a 45-year-old gentleman with an extensive clotting history, on anticoagulation currently with no identified specific abnormality, diagnosed with myelodysplastic syndrome with complex karyotype.       1.  Myelodysplastic syndrome. Counts remain stable. Currently monitoring. No indication for  treatment or transplant at this time. Repeat evaluation at [facility] in 11/2017 and no sign of progression or change. Did have a variant of unknown significance in TP53 that was not the standard higher risk mutation.     Stable counts for now 5 years from diagnosis. Thus, space visits out to annual         2.  Coagulopathy: now on Rivaroxaban and Prasugrel. Per hematology, Dr. Craven, aspirin recommended as well.      3. ID: No active issues    Final Plan:  - [facility] for MDS follow-up in 1 year with labs prior    Olive Valverde MD

## 2020-12-01 NOTE — PROGRESS NOTES
"Ziggy Hallman is a 45 year old male who is being evaluated via a billable video visit.      The patient has been notified of following:     \"This video visit will be conducted via a call between you and your physician/provider. We have found that certain health care needs can be provided without the need for an in-person physical exam.  This service lets us provide the care you need with a video conversation.  If a prescription is necessary we can send it directly to your pharmacy.  If lab work is needed we can place an order for that and you can then stop by our lab to have the test done at a later time.    Video visits are billed at different rates depending on your insurance coverage.  Please reach out to your insurance provider with any questions.    If during the course of the call the physician/provider feels a video visit is not appropriate, you will not be charged for this service.\"    Patient has given verbal consent for Video visit? Yes  How would you like to obtain your AVS? MyChart  If you are dropped from the video visit, the video invite should be resent to: Text to cell phone: [phone]  Will anyone else be joining your video visit? No        ADELA Toussaint      Video-Visit Details    Type of service:  Video Visit-- started as video and converted to phone due to technical difficulties    Video Start Time: 12:04  Phone End Time: 12:10  Care coordination: 1 minute  Total time: 7 minutes    Originating Location (pt. Location): Home    Distant Location (provider location):  [facility] BLOOD AND MARROW TRANSPLANT PROGRAM [place]     Platform used for Video Visit: [employer] Video Visit  Dec 1, 2020          Disease and Treatment History:  1. Ill-defined hypercoagulable state who has had numerous embolic and arterial thrombotic events.    - Renal infarct in November 2011,  - Left Popliteal embolic episode in December 2011 and was treated with surgery, aspirin and Coumadin.   - " Embolic episode with his right carotid artery and had some TIA/stroke-like symptoms: December 2012  - He also had a right renal infarct in October 2013.  -  Embolic MI in July 2015.   - He notes another Renal infarct April but unclear if 2015 or 2016.   ---- He was initially treated with aspirin in 2011 and was transitioned to aspirin plus Coumadin in December 2011.  Plavix was added to the combination in October 2013 and he has remained on those medications minus the aspirin going forward.   2. He was found to have some renal artery stenosis approximately 50% to 60% on a full body angiogram and was seen by Vascular Surgery in October 2015, and they did not feel that invasive interventions were needed and felt like it could cause more harm than good.    3. He has had extensive hypercoagulable workup to date with a factor V and factor II mutation analysis that was negative, protein S and C and antithrombin III levels that were within normal limits, homocysteine that was within normal limits, and lupus anticoagulant and beta-2 glycoprotein assessments that were all negative.  He has had JAK2 testing which was negative and PNH testing which was negative.    4. He was noted to have some mild anemia dropping down into the 11 range.  He had B12 and folate levels that were within normal limits.  He had an EPO level that was 25.  He had a retic count of 2.5% and had a ferritin that was slightly elevated at 350.   -- Bone marrow biopsy on 10/07/2015 and this was read as trilineage hematopoiesis with dysgranulopoiesis and dysmegakaryopoiesis with less than 1% blasts consistent with RCMD 40% to 50% cellular.  Notably, the cytogenetics were complex with a karyotype of 45,XY, a derivative of 5 and 17, addition of 9p13, trisomy 11, deletion 111, a minus 17 in 18 cells, and 46,XY in 3.   --IPSS scoring system for which he would get 4 points for cytogenetics, 0 points for blast percentage, 0 points for hemoglobin, 0 points for  platelets and 0 points for ANC, giving him a total score of 4, putting him in the intermediate risk category.   5. Consult in BMT clinic 2015 for MDS. Siblings typed and 2 sibs are full matches  6. Repeat consult in 2017 with stable MDS. Transfer of care here  7. Winter 2017 gangrenous right great toe requiring vascular surgery and amputation  8. 2017 MI (in stent restenosis)   9. Saw Dr. Craven in 2017 and importance of plavix and aspirin stressed and switched to Xarelto.  10. Last visit with me in  and then lost to follow-up  11. Recent admission for stroke in 3.2020 with multifocal cortical and subcortical infarctions. Changed to Rivaroxaban and continue Prasugrel      HPI:  I last saw Ziggy in 2020. He went back to the hospital in 2020 with some new MRI changes but all symptoms went away. Has some issues with ongoing numbness in his hand that was an issue last spring. Notes he remains on the Xarelto, Effient, and aspirin. He notes infection issues or bleeding issues. Has been able to avoid COVID. Some employees have had it but have recovered.  Uncle (mid 70's)  just  from acute leukemia. Has been working. Has been compliant with meds.     Medications are updated in Epic          10 point ROS otherwise negative           Labs:  Care Everywhere 2020:  WBC 4.7  Hgb 11.4  Platelets 259    Assessment and Plan:  Mr. Hallman is a 45-year-old gentleman with an extensive clotting history, on anticoagulation currently with no identified specific abnormality, diagnosed with myelodysplastic syndrome with complex karyotype.       1.  Myelodysplastic syndrome. Counts remain stable. Currently monitoring. No indication for treatment or transplant at this time. Repeat evaluation at [place] in 2017 and no sign of progression or change. Did have a variant of unknown significance in TP53 that was not the standard higher risk mutation.     Stable counts for now 5 years from diagnosis. Thus, space visits out to  annual         2.  Coagulopathy: now on Rivaroxaban and Prasugrel. Per hematology, Dr. Craven, aspirin recommended as well.      3. ID: No active issues    Final Plan:  - [facility] for MDS follow-up in 1 year with labs prior    Olive Valverde MD

## 2021-01-15 ENCOUNTER — HEALTH MAINTENANCE LETTER (OUTPATIENT)
Age: 46
End: 2021-01-15

## 2021-10-24 ENCOUNTER — HEALTH MAINTENANCE LETTER (OUTPATIENT)
Age: 46
End: 2021-10-24

## 2021-12-01 DIAGNOSIS — D46.9 MDS (MYELODYSPLASTIC SYNDROME) (H): Primary | ICD-10-CM

## 2022-02-13 ENCOUNTER — HEALTH MAINTENANCE LETTER (OUTPATIENT)
Age: 47
End: 2022-02-13

## 2022-10-15 ENCOUNTER — HEALTH MAINTENANCE LETTER (OUTPATIENT)
Age: 47
End: 2022-10-15

## 2023-01-15 ENCOUNTER — MEDICAL CORRESPONDENCE (OUTPATIENT)
Dept: TRANSPLANT | Facility: CLINIC | Age: 48
End: 2023-01-15

## 2023-02-20 DIAGNOSIS — D46.9 MDS (MYELODYSPLASTIC SYNDROME) (H): Primary | ICD-10-CM

## 2023-04-18 ENCOUNTER — LAB (OUTPATIENT)
Dept: LAB | Facility: CLINIC | Age: 48
End: 2023-04-18
Payer: COMMERCIAL

## 2023-04-18 DIAGNOSIS — D46.9 MDS (MYELODYSPLASTIC SYNDROME) (H): ICD-10-CM

## 2023-04-18 LAB
ALBUMIN SERPL BCG-MCNC: 2.7 G/DL (ref 3.5–5.2)
ALP SERPL-CCNC: 101 U/L (ref 40–129)
ALT SERPL W P-5'-P-CCNC: 25 U/L (ref 10–50)
ANION GAP SERPL CALCULATED.3IONS-SCNC: 9 MMOL/L (ref 7–15)
AST SERPL W P-5'-P-CCNC: 28 U/L (ref 10–50)
BASOPHILS # BLD AUTO: 0 10E3/UL (ref 0–0.2)
BASOPHILS NFR BLD AUTO: 1 %
BILIRUB SERPL-MCNC: 0.3 MG/DL
BUN SERPL-MCNC: 29.4 MG/DL (ref 6–20)
CALCIUM SERPL-MCNC: 9.1 MG/DL (ref 8.6–10)
CHLORIDE SERPL-SCNC: 108 MMOL/L (ref 98–107)
CREAT SERPL-MCNC: 0.92 MG/DL (ref 0.67–1.17)
DEPRECATED HCO3 PLAS-SCNC: 22 MMOL/L (ref 22–29)
EOSINOPHIL # BLD AUTO: 0.1 10E3/UL (ref 0–0.7)
EOSINOPHIL NFR BLD AUTO: 2 %
ERYTHROCYTE [DISTWIDTH] IN BLOOD BY AUTOMATED COUNT: 14.9 % (ref 10–15)
FERRITIN SERPL-MCNC: 541 NG/ML (ref 31–409)
GFR SERPL CREATININE-BSD FRML MDRD: >90 ML/MIN/1.73M2
GLUCOSE SERPL-MCNC: 110 MG/DL (ref 70–99)
HCT VFR BLD AUTO: 28.3 % (ref 40–53)
HGB BLD-MCNC: 9.5 G/DL (ref 13.3–17.7)
IMM GRANULOCYTES # BLD: 0.1 10E3/UL
IMM GRANULOCYTES NFR BLD: 1 %
IRON BINDING CAPACITY (ROCHE): 249 UG/DL (ref 240–430)
IRON SATN MFR SERPL: 43 % (ref 15–46)
IRON SERPL-MCNC: 108 UG/DL (ref 61–157)
LYMPHOCYTES # BLD AUTO: 1.6 10E3/UL (ref 0.8–5.3)
LYMPHOCYTES NFR BLD AUTO: 40 %
MCH RBC QN AUTO: 35.1 PG (ref 26.5–33)
MCHC RBC AUTO-ENTMCNC: 33.6 G/DL (ref 31.5–36.5)
MCV RBC AUTO: 104 FL (ref 78–100)
MONOCYTES # BLD AUTO: 0.2 10E3/UL (ref 0–1.3)
MONOCYTES NFR BLD AUTO: 6 %
NEUTROPHILS # BLD AUTO: 2 10E3/UL (ref 1.6–8.3)
NEUTROPHILS NFR BLD AUTO: 50 %
NRBC # BLD AUTO: 0 10E3/UL
NRBC BLD AUTO-RTO: 0 /100
PLATELET # BLD AUTO: 298 10E3/UL (ref 150–450)
POTASSIUM SERPL-SCNC: 4.3 MMOL/L (ref 3.4–5.3)
PROT SERPL-MCNC: 7.2 G/DL (ref 6.4–8.3)
RBC # BLD AUTO: 2.71 10E6/UL (ref 4.4–5.9)
SODIUM SERPL-SCNC: 139 MMOL/L (ref 136–145)
VIT B12 SERPL-MCNC: 312 PG/ML (ref 232–1245)
WBC # BLD AUTO: 3.9 10E3/UL (ref 4–11)

## 2023-04-18 PROCEDURE — 36415 COLL VENOUS BLD VENIPUNCTURE: CPT

## 2023-04-18 PROCEDURE — 82728 ASSAY OF FERRITIN: CPT

## 2023-04-18 PROCEDURE — 82668 ASSAY OF ERYTHROPOIETIN: CPT

## 2023-04-18 PROCEDURE — 83550 IRON BINDING TEST: CPT

## 2023-04-18 PROCEDURE — 82607 VITAMIN B-12: CPT

## 2023-04-18 PROCEDURE — 85025 COMPLETE CBC W/AUTO DIFF WBC: CPT

## 2023-04-18 PROCEDURE — 80053 COMPREHEN METABOLIC PANEL: CPT

## 2023-04-19 LAB — EPO SERPL-ACNC: 178 MU/ML

## 2023-04-24 ENCOUNTER — ONCOLOGY VISIT (OUTPATIENT)
Dept: ONCOLOGY | Facility: CLINIC | Age: 48
End: 2023-04-24
Attending: INTERNAL MEDICINE
Payer: COMMERCIAL

## 2023-04-24 VITALS
OXYGEN SATURATION: 97 % | SYSTOLIC BLOOD PRESSURE: 139 MMHG | HEIGHT: 68 IN | TEMPERATURE: 98.1 F | DIASTOLIC BLOOD PRESSURE: 86 MMHG | BODY MASS INDEX: 28.64 KG/M2 | WEIGHT: 189 LBS | HEART RATE: 74 BPM | RESPIRATION RATE: 12 BRPM

## 2023-04-24 DIAGNOSIS — D46.9 MDS (MYELODYSPLASTIC SYNDROME) (H): Primary | ICD-10-CM

## 2023-04-24 PROCEDURE — 99215 OFFICE O/P EST HI 40 MIN: CPT | Performed by: INTERNAL MEDICINE

## 2023-04-24 PROCEDURE — G0463 HOSPITAL OUTPT CLINIC VISIT: HCPCS | Performed by: INTERNAL MEDICINE

## 2023-04-24 RX ORDER — AMLODIPINE BESYLATE 5 MG/1
1 TABLET ORAL DAILY
Status: ON HOLD | COMMUNITY
Start: 2022-11-29 | End: 2024-02-17

## 2023-04-24 RX ORDER — CYCLOBENZAPRINE HCL 10 MG
TABLET ORAL
COMMUNITY
Start: 2022-07-27 | End: 2024-01-17

## 2023-04-24 RX ORDER — ATORVASTATIN CALCIUM 40 MG/1
40 TABLET, FILM COATED ORAL DAILY
Status: ON HOLD | COMMUNITY
Start: 2023-03-24 | End: 2024-02-17

## 2023-04-24 ASSESSMENT — PAIN SCALES - GENERAL: PAINLEVEL: NO PAIN (0)

## 2023-04-24 NOTE — PROGRESS NOTES
[facility] Hematology and Oncology Consult Note    Patient: Ziggy Hallman  MRN: [id number]  Date of Service: 04/24/2023      Reason for Visit    Chief Complaint   Patient presents with     Oncology Clinic Visit     MDS (myelodysplastic syndrome) - Provider visit only         Assessment/Plan    Problem List Items Addressed This Visit        Hematologic    MDS (myelodysplastic syndrome) (H) - Primary    Relevant Orders    Bone marrow biopsy (Completed)    Flow Cytometry Bone Marrow (Completed)    CHROMOSOME ANALYSIS, BONE MARROW, DIAGNOSIS/RELAPSE With Professional Interpretation    FISH With Professional Interpretation (Completed)    Myeloid Malignancy Comprehensive NGS Panel (Completed)    Process and hold DNA - Bone Marrow     Low risk myelodysplastic syndrome  Reviewed his chart in detail.  His history of MDS dates back to at least 2015.  At the time he looked like he had a complex cytogenetics.  Counts were okay.  Was evaluated for transplant.  Looks like one of his siblings was a full match.  Pursued observation only since his counts were okay.  He was lost to follow-up.  Recently noticed more fatigue and tiredness.  Lab studies have shown drop in his hemoglobin levels.  Reviewed his labs from last week.  Total white count is low at 3.9.  Hemoglobin is 9.5.  Platelet count is normal at 298.  MCV was 104.  CMP shows normal LFTs.  Creatinine is normal at 0.9.  Electrolytes are okay.  Calcium is 9.1.  Iron studies show a transferrin saturation of 43%.  Ferritin was 541.  Vitamin B12 was 312.    In the setting I reviewed diagnosis and natural history of MDS.  I explained to him that MDS is a dynamic problem which can evolve over the period of time.  With the drop in his counts I am worried about clonal evolution.  I recommended repeating a bone marrow biopsy to reevaluate the problem.  We will also send for cytogenetics and comprehensive myeloid NGS panel.  Recently I checked his erythropoietin level which  was up at 178. If he still has low risk MDS without any increase in blasts or high risk molecular mutations, potentially we could consider EPO supplementation to improve his hemoglobin levels.  But if he has evolved into a higher risk MDS then considering his age I think he is a candidate for bone marrow transplant and we will make appropriate referral.  He is agreeable to the plan.    I reviewed potential complications associated with bone marrow biopsy.  He has agreed to proceed with the procedure.      Recurrent arterial thromboembolism  Currently on apixaban and prasugrel. Managed by vascular surgery.        ECOG Performance    0 - Independent    Problem List    Patient Active Problem List   Diagnosis     Hyperlipidemia LDL goal <100     Hypercoagulable state (H)     CVA (cerebrovascular accident) (H)     CAD S/P percutaneous coronary angioplasty     Low back pain     MDS (myelodysplastic syndrome) (H)     ______________________________________________________________________________    Staging History     Cancer Staging   No matching staging information was found for the patient.      History of presenting illness:  Ziggy Hallman is a 47-year-old male with history of MDS with refractory cytopenias, complex cytogenetics, history of recurrent malignant thromboembolic events currently on anticoagulation with apixaban who is here to establish care.    He was seen in hematology clinic 3 years ago for the above-mentioned problems.  His history of MDS dates back to 2015.  He had a bone marrow biopsy at that time which was done as a part of evaluation for anemia.  Bone marrow biopsy at that time apparently showed trilineage hematopoiesis with dysgranulopoiesis and dysmegakaryopoesis less than 1% blasts.  Cellularity was 40 to 50%.  Cytogenetics showed a complex karyotype with 45, XY,a derivative of chromosome 5 and 17, addition of 9p13, trisomy 11, deletion 17 in 18 cells and normal karyotype with 46 XY in the 3 of 20  cells.  IPSS was 4 which put him in the intermediate risk category.  He also saw BMT in November 2015.  Underwent evaluation for potential allogenic stem cell transplant.  2 of his siblings were a full match.  Since his counts were pretty stable he was just followed.  His EPO level was 25.  He has not had any RICHMOND supplementation.  Looks like he also underwent evaluation for bone marrow failure syndromes.  Genetic studies for Fanconi anemia and dyskeratosis congenita were negative.    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  Also had an MI in May 2017 with in-stent restenosis.  Is currently on apixaban and prasugrel.  Denies any bleeding issues.    Complains of significant fatigue.  His hemoglobin has down trended.  Labs a couple months ago showed that hemoglobin has dropped to less than 10.  Has no energy.  Denies any weight loss.  No recurrent infections.  He is here to discuss further management.    Former smoker.  Does drink alcohol regularly.      Past History    Past Medical History:   Diagnosis Date     AMI anterior wall (H)     Mid LAD on cath with stent     CAD S/P percutaneous coronary angioplasty 7/2016    Unstable agina with anterior myocardial damage reported without mycardial damage by patient's history     CVA (cerebrovascular accident) (H) 2012    Reporting thromboembolic episode on the right neck      Hypercoagulable state (H)     Uncertain etiology--seeing Hematologist     MEDICAL HISTORY OF -     Possibly PFO    Family History   Problem Relation Age of Onset     Diabetes No family hx of      Coronary Artery Disease No family hx of      Hypertension No family hx of      Hyperlipidemia No family hx of      Breast Cancer No family hx of       Past Surgical History:   Procedure Laterality Date     ARTHROSCOPY KNEE RT/LT      Right       ARTHROSCOPY KNEE RT/LT      Left     CARDIAC CATHERIZATION      With stent placement    Social History     Socioeconomic History     Marital status: Single     Spouse name: Not on file     Number of children: Not on file     Years of education: Not on file     Highest education level: Not on file   Occupational History     Not on file   Tobacco Use     Smoking status: Former     Packs/day: 0.50     Types: Cigarettes     Smokeless tobacco: Never     Tobacco comments:     cigarette once in a while   Vaping Use     Vaping status: Not on file   Substance and Sexual Activity     Alcohol use: Yes     Alcohol/week: 0.0 standard drinks of alcohol     Comment: 4 times per week 5 drinks     Drug use: No     Sexual activity: Yes     Partners: Female   Other Topics Concern     Parent/sibling w/ CABG, MI or angioplasty before 65F 55M? No   Social History Narrative     Not on file     Social Determinants of Health     Financial Resource Strain: Not on file   Food Insecurity: Not on file   Transportation Needs: Not on file   Physical Activity: Not on file   Stress: Not on file   Social Connections: Not on file   Intimate Partner Violence: Not on file   Housing Stability: Not on file        Allergies    No Known Allergies    Review of Systems    Pertinent items are noted in HPI.      Physical Exam        5/3/2023     2:04 PM   Oncology Vitals   /69   Pulse 75   Temp 98.1  F (36.7  C)   Temp src Oral   SpO2 97 %   Pain Score 0 (None)       General: alert and cooperative  HEENT: Head: Normal, normocephalic, atraumatic.  Eye: Normal external eye, conjunctiva, lids cornea, GARY.  Chest: Clear to auscultation bilaterally  Cardiac: S1, S2 normal, regular rate and rhythm  Abdomen: Soft and nontender.  No splenomegaly  Extremities: atraumatic, no peripheral edema  Skin: Rashes or bruises  CNS: Alert and oriented x3, neurologic exam grossly normal.  Lymphatics: No significant peripheral adenopathy noted      Lab Results    No results  found for this or any previous visit (from the past 168 hour(s)).    Imaging Results    No results found.    A total of 45 minutes was spent today on this visit including face to face conversation with the patient, EMR review (labs, imaging studies, pathology reports and outside records), counseling and care co-ordination and documentation.    Complex patient with multiple medical issues requiring complex coordination of care.    Signed by: Lynda Mcbride MD

## 2023-04-24 NOTE — LETTER
"    4/24/2023         RE: Ziggy Hallman  [address] [facility] [address]        Dear Colleague,    Thank you for referring your patient, Ziggy Hallman, to the [facility]. Please see a copy of my visit note below.    Oncology Rooming Note    April 24, 2023 8:06 AM   Ziggy Hallman is a 47 year old male who presents for:    Chief Complaint   Patient presents with     Oncology Clinic Visit     MDS (myelodysplastic syndrome) - Provider visit only     Initial Vitals: /86 (BP Location: Right arm, Patient Position: Sitting, Cuff Size: Adult Regular)   Pulse 74   Temp 98.1  F (36.7  C) (Tympanic)   Resp 12   Ht 1.735 m (5' 8.31\")   Wt 85.7 kg (189 lb)   SpO2 97%   BMI 28.48 kg/m   Estimated body mass index is 28.48 kg/m  as calculated from the following:    Height as of this encounter: 1.735 m (5' 8.31\").    Weight as of this encounter: 85.7 kg (189 lb). Body surface area is 2.03 meters squared.  No Pain (0) Comment: Data Unavailable   No LMP for male patient.  Allergies reviewed: Yes  Medications reviewed: Yes    Medications: Medication refills not needed today.  Pharmacy name entered into [facility]:    [facility] - [place], [place] - [address] [facility][id number] - [place], [place] - [address] [facility] [address]  [facility][id number] - [place], [place] - [address]  [facility][id number] - [place], [place] - [address] [facility]    Clinical concerns:  None      Elena Reyes, [facility] Hematology and Oncology Consult Note    Patient: Ziggy Hallman  MRN: [id number]  Date of Service: 04/24/2023      Reason for Visit    Chief Complaint   Patient presents with     Oncology Clinic Visit     MDS (myelodysplastic syndrome) - Provider visit only         Assessment/Plan    Problem List Items Addressed This Visit        Hematologic    MDS (myelodysplastic syndrome) (H) - Primary    Relevant Orders    Bone marrow " biopsy (Completed)    Flow Cytometry Bone Marrow (Completed)    CHROMOSOME ANALYSIS, BONE MARROW, DIAGNOSIS/RELAPSE With Professional Interpretation    FISH With Professional Interpretation (Completed)    Myeloid Malignancy Comprehensive NGS Panel (Completed)    Process and hold DNA - Bone Marrow     Low risk myelodysplastic syndrome  Reviewed his chart in detail.  His history of MDS dates back to at least 2015.  At the time he looked like he had a complex cytogenetics.  Counts were okay.  Was evaluated for transplant.  Looks like one of his siblings was a full match.  Pursued observation only since his counts were okay.  He was lost to follow-up.  Recently noticed more fatigue and tiredness.  Lab studies have shown drop in his hemoglobin levels.  Reviewed his labs from last week.  Total white count is low at 3.9.  Hemoglobin is 9.5.  Platelet count is normal at 298.  MCV was 104.  CMP shows normal LFTs.  Creatinine is normal at 0.9.  Electrolytes are okay.  Calcium is 9.1.  Iron studies show a transferrin saturation of 43%.  Ferritin was 541.  Vitamin B12 was 312.    In the setting I reviewed diagnosis and natural history of MDS.  I explained to him that MDS is a dynamic problem which can evolve over the period of time.  With the drop in his counts I am worried about clonal evolution.  I recommended repeating a bone marrow biopsy to reevaluate the problem.  We will also send for cytogenetics and comprehensive myeloid NGS panel.  Recently I checked his erythropoietin level which was up at 178. If he still has low risk MDS without any increase in blasts or high risk molecular mutations, potentially we could consider EPO supplementation to improve his hemoglobin levels.  But if he has evolved into a higher risk MDS then considering his age I think he is a candidate for bone marrow transplant and we will make appropriate referral.  He is agreeable to the plan.    I reviewed potential complications associated with bone  marrow biopsy.  He has agreed to proceed with the procedure.      Recurrent arterial thromboembolism  Currently on apixaban and prasugrel. Managed by vascular surgery.        ECOG Performance    0 - Independent    Problem List    Patient Active Problem List   Diagnosis     Hyperlipidemia LDL goal <100     Hypercoagulable state (H)     CVA (cerebrovascular accident) (H)     CAD S/P percutaneous coronary angioplasty     Low back pain     MDS (myelodysplastic syndrome) (H)     ______________________________________________________________________________    Staging History     Cancer Staging   No matching staging information was found for the patient.      History of presenting illness:  Ziggy Hallman is a 47-year-old male with history of MDS with refractory cytopenias, complex cytogenetics, history of recurrent malignant thromboembolic events currently on anticoagulation with apixaban who is here to establish care.    He was seen in hematology clinic 3 years ago for the above-mentioned problems.  His history of MDS dates back to 2015.  He had a bone marrow biopsy at that time which was done as a part of evaluation for anemia.  Bone marrow biopsy at that time apparently showed trilineage hematopoiesis with dysgranulopoiesis and dysmegakaryopoesis less than 1% blasts.  Cellularity was 40 to 50%.  Cytogenetics showed a complex karyotype with 45, XY,a derivative of chromosome 5 and 17, addition of 9p13, trisomy 11, deletion 17 in 18 cells and normal karyotype with 46 XY in the 3 of 20 cells.  IPSS was 4 which put him in the intermediate risk category.  He also saw BMT in November 2015.  Underwent evaluation for potential allogenic stem cell transplant.  2 of his siblings were a full match.  Since his counts were pretty stable he was just followed.  His EPO level was 25.  He has not had any RICHMOND supplementation.  Looks like he also underwent evaluation for bone marrow failure syndromes.  Genetic studies for Fanconi anemia  and dyskeratosis congenita were negative.    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  Also had an MI in May 2017 with in-stent restenosis.  Is currently on apixaban and prasugrel.  Denies any bleeding issues.    Complains of significant fatigue.  His hemoglobin has down trended.  Labs a couple months ago showed that hemoglobin has dropped to less than 10.  Has no energy.  Denies any weight loss.  No recurrent infections.  He is here to discuss further management.    Former smoker.  Does drink alcohol regularly.      Past History    Past Medical History:   Diagnosis Date     AMI anterior wall (H)     Mid LAD on cath with stent     CAD S/P percutaneous coronary angioplasty 7/2016    Unstable agina with anterior myocardial damage reported without mycardial damage by patient's history     CVA (cerebrovascular accident) (H) 2012    Reporting thromboembolic episode on the right neck      Hypercoagulable state (H)     Uncertain etiology--seeing Hematologist     MEDICAL HISTORY OF -     Possibly PFO    Family History   Problem Relation Age of Onset     Diabetes No family hx of      Coronary Artery Disease No family hx of      Hypertension No family hx of      Hyperlipidemia No family hx of      Breast Cancer No family hx of       Past Surgical History:   Procedure Laterality Date     ARTHROSCOPY KNEE RT/LT      Right      ARTHROSCOPY KNEE RT/LT      Left     CARDIAC CATHERIZATION      With stent placement    Social History     Socioeconomic History     Marital status: Single     Spouse name: Not on file     Number of children: Not on file     Years of education: Not on file     Highest education level: Not on file   Occupational History     Not on file   Tobacco Use     Smoking status: Former     Packs/day: 0.50     Types: Cigarettes     Smokeless tobacco: Never      Tobacco comments:     cigarette once in a while   Vaping Use     Vaping status: Not on file   Substance and Sexual Activity     Alcohol use: Yes     Alcohol/week: 0.0 standard drinks of alcohol     Comment: 4 times per week 5 drinks     Drug use: No     Sexual activity: Yes     Partners: Female   Other Topics Concern     Parent/sibling w/ CABG, MI or angioplasty before 65F 55M? No   Social History Narrative     Not on file     Social Determinants of Health     Financial Resource Strain: Not on file   Food Insecurity: Not on file   Transportation Needs: Not on file   Physical Activity: Not on file   Stress: Not on file   Social Connections: Not on file   Intimate Partner Violence: Not on file   Housing Stability: Not on file        Allergies    No Known Allergies    Review of Systems    Pertinent items are noted in HPI.      Physical Exam        5/3/2023     2:04 PM   Oncology Vitals   /69   Pulse 75   Temp 98.1  F (36.7  C)   Temp src Oral   SpO2 97 %   Pain Score 0 (None)       General: alert and cooperative  HEENT: Head: Normal, normocephalic, atraumatic.  Eye: Normal external eye, conjunctiva, lids cornea, GARY.  Chest: Clear to auscultation bilaterally  Cardiac: S1, S2 normal, regular rate and rhythm  Abdomen: Soft and nontender.  No splenomegaly  Extremities: atraumatic, no peripheral edema  Skin: Rashes or bruises  CNS: Alert and oriented x3, neurologic exam grossly normal.  Lymphatics: No significant peripheral adenopathy noted      Lab Results    No results found for this or any previous visit (from the past 168 hour(s)).    Imaging Results    No results found.    A total of 45 minutes was spent today on this visit including face to face conversation with the patient, EMR review (labs, imaging studies, pathology reports and outside records), counseling and care co-ordination and documentation.    Complex patient with multiple medical issues requiring complex coordination of care.    Signed by:  Lynda Mcbride MD      Again, thank you for allowing me to participate in the care of your patient.        Sincerely,        Lynda Mcbride MD

## 2023-04-24 NOTE — PROGRESS NOTES
"Oncology Rooming Note    April 24, 2023 8:06 AM   Ziggy Hallman is a 47 year old male who presents for:    Chief Complaint   Patient presents with     Oncology Clinic Visit     MDS (myelodysplastic syndrome) - Provider visit only     Initial Vitals: /86 (BP Location: Right arm, Patient Position: Sitting, Cuff Size: Adult Regular)   Pulse 74   Temp 98.1  F (36.7  C) (Tympanic)   Resp 12   Ht 1.735 m (5' 8.31\")   Wt 85.7 kg (189 lb)   SpO2 97%   BMI 28.48 kg/m   Estimated body mass index is 28.48 kg/m  as calculated from the following:    Height as of this encounter: 1.735 m (5' 8.31\").    Weight as of this encounter: 85.7 kg (189 lb). Body surface area is 2.03 meters squared.  No Pain (0) Comment: Data Unavailable   No LMP for male patient.  Allergies reviewed: Yes  Medications reviewed: Yes    Medications: Medication refills not needed today.  Pharmacy name entered into [facility]:    [facility] [address] - [place], [place] - [address] [facility] #[id number] - [place], [place] - [address] [facility] [address]  [facility] #[id number] - [place], [place] - [address]  [facility] #[id number] - [place], [place] - [address] [facility]    Clinical concerns:  None      Elena Reyes CMA            "

## 2023-05-03 ENCOUNTER — PROCEDURE ONLY VISIT (OUTPATIENT)
Dept: ONCOLOGY | Facility: HOSPITAL | Age: 48
End: 2023-05-03
Attending: INTERNAL MEDICINE
Payer: COMMERCIAL

## 2023-05-03 ENCOUNTER — LAB (OUTPATIENT)
Dept: INFUSION THERAPY | Facility: HOSPITAL | Age: 48
End: 2023-05-03
Attending: INTERNAL MEDICINE
Payer: COMMERCIAL

## 2023-05-03 VITALS
DIASTOLIC BLOOD PRESSURE: 69 MMHG | OXYGEN SATURATION: 97 % | SYSTOLIC BLOOD PRESSURE: 128 MMHG | WEIGHT: 184.2 LBS | TEMPERATURE: 98.1 F | BODY MASS INDEX: 27.76 KG/M2 | HEART RATE: 75 BPM | RESPIRATION RATE: 12 BRPM

## 2023-05-03 DIAGNOSIS — D46.9 MDS (MYELODYSPLASTIC SYNDROME) (H): ICD-10-CM

## 2023-05-03 DIAGNOSIS — D46.9 MDS (MYELODYSPLASTIC SYNDROME) (H): Primary | ICD-10-CM

## 2023-05-03 LAB
BASOPHILS # BLD AUTO: 0 10E3/UL (ref 0–0.2)
BASOPHILS NFR BLD AUTO: 0 %
EOSINOPHIL # BLD AUTO: 0.1 10E3/UL (ref 0–0.7)
EOSINOPHIL NFR BLD AUTO: 3 %
ERYTHROCYTE [DISTWIDTH] IN BLOOD BY AUTOMATED COUNT: 14.7 % (ref 10–15)
HCT VFR BLD AUTO: 26.7 % (ref 40–53)
HGB BLD-MCNC: 9.3 G/DL (ref 13.3–17.7)
HOLD SPECIMEN: NORMAL
IMM GRANULOCYTES # BLD: 0.1 10E3/UL
IMM GRANULOCYTES NFR BLD: 1 %
LAB DIRECTOR COMMENTS: NORMAL
LAB DIRECTOR DISCLAIMER: NORMAL
LAB DIRECTOR INTERPRETATION: NORMAL
LAB DIRECTOR METHODOLOGY: NORMAL
LAB DIRECTOR RESULTS: NORMAL
LYMPHOCYTES # BLD AUTO: 1.8 10E3/UL (ref 0.8–5.3)
LYMPHOCYTES NFR BLD AUTO: 48 %
MCH RBC QN AUTO: 34.8 PG (ref 26.5–33)
MCHC RBC AUTO-ENTMCNC: 34.8 G/DL (ref 31.5–36.5)
MCV RBC AUTO: 100 FL (ref 78–100)
MONOCYTES # BLD AUTO: 0.2 10E3/UL (ref 0–1.3)
MONOCYTES NFR BLD AUTO: 5 %
NEUTROPHILS # BLD AUTO: 1.6 10E3/UL (ref 1.6–8.3)
NEUTROPHILS NFR BLD AUTO: 43 %
NRBC # BLD AUTO: 0 10E3/UL
NRBC BLD AUTO-RTO: 0 /100
PLATELET # BLD AUTO: 332 10E3/UL (ref 150–450)
RBC # BLD AUTO: 2.67 10E6/UL (ref 4.4–5.9)
SPECIMEN DESCRIPTION: NORMAL
WBC # BLD AUTO: 3.7 10E3/UL (ref 4–11)

## 2023-05-03 PROCEDURE — 88189 FLOWCYTOMETRY/READ 16 & >: CPT | Performed by: PATHOLOGY

## 2023-05-03 PROCEDURE — 88291 CYTO/MOLECULAR REPORT: CPT | Performed by: MEDICAL GENETICS

## 2023-05-03 PROCEDURE — 36415 COLL VENOUS BLD VENIPUNCTURE: CPT

## 2023-05-03 PROCEDURE — 88365 INSITU HYBRIDIZATION (FISH): CPT | Mod: 26 | Performed by: PATHOLOGY

## 2023-05-03 PROCEDURE — 88365 INSITU HYBRIDIZATION (FISH): CPT | Mod: TC | Performed by: INTERNAL MEDICINE

## 2023-05-03 PROCEDURE — 88311 DECALCIFY TISSUE: CPT | Mod: 26 | Performed by: PATHOLOGY

## 2023-05-03 PROCEDURE — 88305 TISSUE EXAM BY PATHOLOGIST: CPT | Mod: TC | Performed by: INTERNAL MEDICINE

## 2023-05-03 PROCEDURE — G0452 MOLECULAR PATHOLOGY INTERPR: HCPCS | Mod: 26 | Performed by: PATHOLOGY

## 2023-05-03 PROCEDURE — 88271 CYTOGENETICS DNA PROBE: CPT | Performed by: INTERNAL MEDICINE

## 2023-05-03 PROCEDURE — 85004 AUTOMATED DIFF WBC COUNT: CPT

## 2023-05-03 PROCEDURE — 88368 INSITU HYBRIDIZATION MANUAL: CPT | Mod: 26 | Performed by: MEDICAL GENETICS

## 2023-05-03 PROCEDURE — 88342 IMHCHEM/IMCYTCHM 1ST ANTB: CPT | Mod: 26 | Performed by: PATHOLOGY

## 2023-05-03 PROCEDURE — 88369 M/PHMTRC ALYSISHQUANT/SEMIQ: CPT | Mod: 26 | Performed by: MEDICAL GENETICS

## 2023-05-03 PROCEDURE — 38222 DX BONE MARROW BX & ASPIR: CPT

## 2023-05-03 PROCEDURE — 81450 HL NEO GSAP 5-50DNA/DNA&RNA: CPT | Performed by: INTERNAL MEDICINE

## 2023-05-03 PROCEDURE — 88313 SPECIAL STAINS GROUP 2: CPT | Mod: 26 | Performed by: PATHOLOGY

## 2023-05-03 PROCEDURE — 88264 CHROMOSOME ANALYSIS 20-25: CPT | Performed by: INTERNAL MEDICINE

## 2023-05-03 PROCEDURE — 88305 TISSUE EXAM BY PATHOLOGIST: CPT | Mod: 26 | Performed by: PATHOLOGY

## 2023-05-03 PROCEDURE — 88341 IMHCHEM/IMCYTCHM EA ADD ANTB: CPT | Mod: 26 | Performed by: PATHOLOGY

## 2023-05-03 PROCEDURE — 85097 BONE MARROW INTERPRETATION: CPT | Performed by: PATHOLOGY

## 2023-05-03 PROCEDURE — 38222 DX BONE MARROW BX & ASPIR: CPT | Performed by: INTERNAL MEDICINE

## 2023-05-03 PROCEDURE — 88184 FLOWCYTOMETRY/ TC 1 MARKER: CPT | Performed by: INTERNAL MEDICINE

## 2023-05-03 PROCEDURE — 88364 INSITU HYBRIDIZATION (FISH): CPT | Mod: 26 | Performed by: PATHOLOGY

## 2023-05-03 ASSESSMENT — PAIN SCALES - GENERAL
PAINLEVEL: NO PAIN (0)
PAINLEVEL: NO PAIN (0)

## 2023-05-03 NOTE — PROGRESS NOTES
"Oncology Rooming Note    May 3, 2023 2:03 PM   Ziggy Hallman is a 47 year old male who presents for:    Chief Complaint   Patient presents with     Oncology Clinic Visit     Initial Vitals: /63 (BP Location: Left arm, Patient Position: Sitting, Cuff Size: Adult Regular)   Pulse 69   Temp 98.2  F (36.8  C) (Oral)   Resp 12   Wt 83.6 kg (184 lb 3.2 oz)   SpO2 97%   BMI 27.76 kg/m   Estimated body mass index is 27.76 kg/m  as calculated from the following:    Height as of 4/24/23: 1.735 m (5' 8.31\").    Weight as of this encounter: 83.6 kg (184 lb 3.2 oz). Body surface area is 2.01 meters squared.  No Pain (0) Comment: Data Unavailable   No LMP for male patient.  Allergies reviewed: Yes  Medications reviewed: Yes    Medications: Medication refills not needed today.  Pharmacy name entered into [facility]:    [facility] [address] - [place], [place] - [address] [facility] #[id number] - [place], [place] - [address] [facility] [address]  [facility] #[id number] - [place], [place] - [address]  [facility] #[id number] - [place], [place] - [address] [facility]    Clinical concerns: SHELLEY Pedersen RN            "

## 2023-05-03 NOTE — PROGRESS NOTES
Adult Bone Marrow Biopsy Procedure Note  May 3, 2023  /63 (BP Location: Left arm, Patient Position: Sitting, Cuff Size: Adult Regular)   Pulse 69   Temp 98.2  F (36.8  C) (Oral)   Resp 12   Wt 83.6 kg (184 lb 3.2 oz)   SpO2 97%   BMI 27.76 kg/m       DIAGNOSIS: MDS    PROCEDURE: Unilateral Bone Marrow Biopsy    LOCATION: [facility]    Patient s identification was positively verified by verbal identification and invasive procedure safety checklist was completed. Informed consent was obtained. Patient was placed in the prone position and prepped and draped in a sterile manner. Approximately 7 cc of 1% Lidocaine was used over the left posterior iliac spine. Following this a 3 mm incision was made. Trephine bone marrow core(s) was (were) obtained from the [facility]. Bone marrow aspirates were obtained from the [facility]. Aspirates were sent for morphology, immunophenotyping, cytogenetics and molecular studies. A total of approximately 20 ml of marrow was aspirated. Following this procedure a sterile dressing was applied to the bone marrow biopsy site(s). The patient was placed in the supine position to maintain pressure on the biopsy site. Post-procedure wound care instructions were given.     Complications: NO    Interventions: NO    Length of procedure:21 minutes to 45 minutes    Procedure performed by: Lynda Mcbride MD

## 2023-05-05 LAB
PATH REPORT.COMMENTS IMP SPEC: NORMAL
PATH REPORT.FINAL DX SPEC: NORMAL
PATH REPORT.MICROSCOPIC SPEC OTHER STN: NORMAL
PATH REPORT.RELEVANT HX SPEC: NORMAL

## 2023-05-08 LAB
PATH REPORT.COMMENTS IMP SPEC: ABNORMAL
PATH REPORT.COMMENTS IMP SPEC: YES
PATH REPORT.FINAL DX SPEC: ABNORMAL
PATH REPORT.MICROSCOPIC SPEC OTHER STN: ABNORMAL
PATH REPORT.RELEVANT HX SPEC: ABNORMAL

## 2023-05-12 LAB
CULTURE HARVEST COMPLETE DATE: NORMAL
INTERPRETATION: NORMAL
INTERPRETATION: NORMAL
SIGNIFICANT RESULTS: NORMAL
TEST DETAILS, MDL: NORMAL

## 2023-05-19 LAB
ADDITIONAL COMMENTS: NORMAL
CULTURE HARVEST COMPLETE DATE: NORMAL
INTERPRETATION: NORMAL
ISCN: NORMAL
METHODS: NORMAL

## 2023-05-26 ENCOUNTER — VIRTUAL VISIT (OUTPATIENT)
Dept: ONCOLOGY | Facility: HOSPITAL | Age: 48
End: 2023-05-26
Attending: INTERNAL MEDICINE
Payer: COMMERCIAL

## 2023-05-26 DIAGNOSIS — D46.9 MDS (MYELODYSPLASTIC SYNDROME) (H): ICD-10-CM

## 2023-05-26 DIAGNOSIS — D64.9 ANEMIA, UNSPECIFIED TYPE: Primary | ICD-10-CM

## 2023-05-26 DIAGNOSIS — I25.10 CAD S/P PERCUTANEOUS CORONARY ANGIOPLASTY: ICD-10-CM

## 2023-05-26 DIAGNOSIS — Z98.61 CAD S/P PERCUTANEOUS CORONARY ANGIOPLASTY: ICD-10-CM

## 2023-05-26 PROCEDURE — 99215 OFFICE O/P EST HI 40 MIN: CPT | Mod: 95 | Performed by: INTERNAL MEDICINE

## 2023-05-26 RX ORDER — LANOLIN ALCOHOL/MO/W.PET/CERES
1000 CREAM (GRAM) TOPICAL DAILY
Qty: 60 TABLET | Refills: 1 | Status: SHIPPED | OUTPATIENT
Start: 2023-05-26 | End: 2023-08-02

## 2023-05-26 NOTE — PROGRESS NOTES
Virtual Visit Details    Type of service:  Video Visit   Video Start Time: 10:31 AM  Video End Time: 10:47 AM    Originating Location (pt. Location): Home    Distant Location (provider location):  On-site  Platform used for Video Visit: Richard Noriega LPN on 5/26/2023 at 10:10 AM        [facility] Hematology and Oncology Progress Note    Patient: Ziggy Hallman  MRN: [id number]  Date of Service: 05/26/2023        Reason for Visit    Chief Complaint   Patient presents with     Oncology Clinic Visit     MDS (myelodysplastic syndrome) (H)         Problem List Items Addressed This Visit        Circulatory    CAD S/P percutaneous coronary angioplasty - Primary    Relevant Orders    Echocardiogram Complete       Hematologic    MDS (myelodysplastic syndrome) (H)    Relevant Orders    Blood and Marrow Transplant (Adult)   Other Visit Diagnoses     Anemia, unspecified type        Relevant Medications    cyanocobalamin (VITAMIN B-12) 1000 MCG tablet            Assessment and Plan  Myelodysplastic syndrome excess blasts 1  Cytogenetics: 45,XY,-5,add(9)(p13),-17,add(17)(p13),+mar[20]  FISH and comprehensive myeloid neoplasm NGS: Loss of 5 p15, no loss of T p53 noted, FLT3 wild-type  R-IPSS; high risk if we consider him as complex karyotype (more than 3 abnormalities on cytogenetics along with a bone marrow blast percentage of 5 to 10%)    I reviewed his recent bone marrow biopsy report in detail with him.  It is showing hypocellular marrow involved by MDS with a blast percentage of 5 to 10%.  Also has significant fibrosis at 3+.  Karyotyping came back showing complex karyotype in all the examined cells with hyperdiploid clone characterized by loss of 1 copy of chromosome 5 and 17 with an unknown material replacing the short arms of chromosome 9 and of the remaining chromosome 17 along with a marker chromosome.  No losses of 5q31 or TP53 noted by FISH.  Comprehensive myeloid NGS does not show any high  risk mutations.  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    This is a interesting and complex situation.  He had similar chromosome abnormalities back in 2015.  There has been some clonal evolution but nothing very significant.  His bone marrow blast percentage has significantly increased (on the core biopsy).  Flow did not show any evidence of increase in blasts but this could have been contaminated by peripheral blood.  I had some difficulty in pulling the aspirate from his marrow which in retrospect could be due to significant bone marrow fibrosis.    Reviewed these findings with him in detail.  His baseline April level is at 178.  Iron studies show iron sufficiency.  We will was borderline low at 312 and recommended to start oral B12 supplementation just in case.  He is significantly symptomatic with hemoglobin less than 10.  Previously he was evaluated for potential allogenic bone marrow transplant.  I think it is time to revisit this issue.  On paper he looks like he has high risk MDS with complex karyotype and increased bone marrow blast percentage but clinically he is not behaving like 1. So I would like to get an opinion from the transplant team again to see if he is a candidate for allogenic transplant now or in the near future.  If he is a candidate for another esophageal transplant that probably will need induction with HMA to decrease the blast percentage.  He does not have any targetable mutation.    He also has significant cardiovascular disease history and is status post stent placement.  2 some extent his degree of fatigue and shortness of breath on exertion cannot be solely explained by a hemoglobin of 9-10.  So I would like to get an echocardiogram.  Previously he has been extensively worked up for hypercoagulable states and so far all the tests have been negative.  He continues to be on apixaban and prasugrel.  He was not taking Dexilant appropriately as prescribed.  He was only taking 5 mg once a day  and at times even missed doses.  I have strongly encouraged him to stick with 5 mg twice daily as it is very important to prevent further arterial/venous thromboembolism.  Looks like he has been doing so.  Denies any bleeding issues.      Cancer Staging   No matching staging information was found for the patient.    ECOG Performance    0 - Independent         Problem List    Patient Active Problem List   Diagnosis     Hyperlipidemia LDL goal <100     Hypercoagulable state (H)     CVA (cerebrovascular accident) (H)     CAD S/P percutaneous coronary angioplasty     Low back pain     MDS (myelodysplastic syndrome) (H)        Oncology history  His history of MDS dates back to 2015.  He had a bone marrow biopsy at that time which was done as a part of evaluation for anemia.  Bone marrow biopsy at that time apparently showed trilineage hematopoiesis with dysgranulopoiesis and dysmegakaryopoesis less than 1% blasts.  Cellularity was 40 to 50%.  Cytogenetics showed a complex karyotype with 45, XY,a derivative of chromosome 5 and 17, addition of 9p13, trisomy 11, deletion 17 in 18 cells and normal karyotype with 46 XY in the 3 of 20 cells.  IPSS was 4 which put him in the intermediate risk category.  He also saw BMT in November 2015.  Underwent evaluation for potential allogenic stem cell transplant.  2 of his siblings were a full match.  Since his counts were pretty stable he was just followed.  His EPO level was 25.  He has not had any RICHMOND supplementation.  Looks like he also underwent evaluation for bone marrow failure syndromes.  Genetic studies for Fanconi anemia and dyskeratosis congenita were negative.     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  Also had an MI in May 2017 with in-stent restenosis.  Is currently on apixaban and prasugrel.    Interval History    Ziggy Hallman is a 47 year old male with history of low risk MDS who is here to discuss recent bone marrow biopsy report and make further follow-up plans.  This is a video visit.    Continues to have significant fatigue issues.  Denies any chest pain.  He does have significant cardiovascular disease history and thromboembolism.  On apixaban and prasugrel.  No bleeding reported.  He has not been taking his apixaban as prescribed.  He was taking only 5 mg once daily.  But after I saw him I commended to stick with the regimen and has been on 5 mg twice daily since then.        Review of Systems  A comprehensive review of systems was negative except for what is noted in the interval history    Current Outpatient Medications   Medication     amLODIPine (NORVASC) 10 MG tablet     amLODIPine (NORVASC) 5 MG tablet     apixaban ANTICOAGULANT (ELIQUIS) 5 MG tablet     atorvastatin (LIPITOR) 40 MG tablet     cyanocobalamin (VITAMIN B-12) 1000 MCG tablet     cyclobenzaprine (FLEXERIL) 10 MG tablet     prasugrel (EFFIENT) 10 MG TABS tablet     Zolpidem Tartrate (AMBIEN PO)     HYDROmorphone (DILAUDID) 2 MG tablet     sennosides (SENOKOT) 8.6 MG tablet     XARELTO ANTICOAGULANT 20 MG TABS tablet     No current facility-administered medications for this visit.        Physical Exam    No flowsheet data found.    General: alert and cooperative      Lab Results    No results found for this or any previous visit (from the past 168 hour(s)).    Imaging    No results found.    Very complex patient.    Signed by: Lynda Mcbride MD

## 2023-05-31 ENCOUNTER — TELEPHONE (OUTPATIENT)
Dept: TRANSPLANT | Facility: CLINIC | Age: 48
End: 2023-05-31
Payer: COMMERCIAL

## 2023-05-31 DIAGNOSIS — D46.9 MDS (MYELODYSPLASTIC SYNDROME) (H): Primary | ICD-10-CM

## 2023-06-26 ENCOUNTER — CARE COORDINATION (OUTPATIENT)
Dept: TRANSPLANT | Facility: CLINIC | Age: 48
End: 2023-06-26
Payer: COMMERCIAL

## 2023-06-26 NOTE — PROGRESS NOTES
[facility] BMT and Cell Therapy Program  RN Coordinator Pre-Visit Documentation      Ziggy Hallman is a 47 year old male who has been referred to the [facility] BMT and Cell Therapy Program for hematopoietic cell transplant or immune effector cell therapy.      Referring MD Name: Dr Mcbride    Referring RN Coordinator: Jennifer    Reason for referral: Allo consult for MDS    Link to BMT & CT Program Algorithms      For allos only:    Previous HLA typing? Yes - should get updated high res typing    Previous formal donor search? No    PRA needed? Yes    CMV IGG needed? Yes    and ABO needed? Yes    Potential family donors to type? Yes - has matched sibs at low res, will need high res    Need URD consents? Yes    For CAR-T Candidates Only:    Orders placed for virologies: N/A      All relevant clinical notes, labs, imaging, and pathology may be reviewed in Epic Bookmarks under name: Vesta Cotton      Patient Care Team       Relationship Specialty Notifications Start End    Adeel Morris MD PCP - General [facility]  8/18/15          Lynda Mcbride MD Assigned Cancer Care Provider   5/6/23     Phone: [phone] Fax: [phone]         [facility] [facility] [address]            Vesta Cotton RN

## 2023-07-07 ENCOUNTER — ONCOLOGY VISIT (OUTPATIENT)
Dept: ONCOLOGY | Facility: HOSPITAL | Age: 48
End: 2023-07-07
Attending: INTERNAL MEDICINE
Payer: COMMERCIAL

## 2023-07-07 VITALS
OXYGEN SATURATION: 97 % | RESPIRATION RATE: 20 BRPM | TEMPERATURE: 98.8 F | DIASTOLIC BLOOD PRESSURE: 82 MMHG | BODY MASS INDEX: 28.34 KG/M2 | WEIGHT: 187 LBS | HEART RATE: 84 BPM | HEIGHT: 68 IN | SYSTOLIC BLOOD PRESSURE: 141 MMHG

## 2023-07-07 DIAGNOSIS — D64.9 ANEMIA, UNSPECIFIED TYPE: ICD-10-CM

## 2023-07-07 LAB
BASOPHILS # BLD AUTO: 0 10E3/UL (ref 0–0.2)
BASOPHILS NFR BLD AUTO: 1 %
EOSINOPHIL # BLD AUTO: 0.1 10E3/UL (ref 0–0.7)
EOSINOPHIL NFR BLD AUTO: 3 %
ERYTHROCYTE [DISTWIDTH] IN BLOOD BY AUTOMATED COUNT: 16.4 % (ref 10–15)
HCT VFR BLD AUTO: 26.6 % (ref 40–53)
HGB BLD-MCNC: 8.9 G/DL (ref 13.3–17.7)
IMM GRANULOCYTES # BLD: 0.1 10E3/UL
IMM GRANULOCYTES NFR BLD: 2 %
LYMPHOCYTES # BLD AUTO: 1.5 10E3/UL (ref 0.8–5.3)
LYMPHOCYTES NFR BLD AUTO: 46 %
MCH RBC QN AUTO: 35.6 PG (ref 26.5–33)
MCHC RBC AUTO-ENTMCNC: 33.5 G/DL (ref 31.5–36.5)
MCV RBC AUTO: 106 FL (ref 78–100)
MONOCYTES # BLD AUTO: 0.2 10E3/UL (ref 0–1.3)
MONOCYTES NFR BLD AUTO: 6 %
NEUTROPHILS # BLD AUTO: 1.4 10E3/UL (ref 1.6–8.3)
NEUTROPHILS NFR BLD AUTO: 42 %
NRBC # BLD AUTO: 0 10E3/UL
NRBC BLD AUTO-RTO: 0 /100
PLATELET # BLD AUTO: 276 10E3/UL (ref 150–450)
RBC # BLD AUTO: 2.5 10E6/UL (ref 4.4–5.9)
WBC # BLD AUTO: 3.3 10E3/UL (ref 4–11)

## 2023-07-07 PROCEDURE — 85025 COMPLETE CBC W/AUTO DIFF WBC: CPT | Performed by: INTERNAL MEDICINE

## 2023-07-07 PROCEDURE — 99214 OFFICE O/P EST MOD 30 MIN: CPT | Performed by: INTERNAL MEDICINE

## 2023-07-07 PROCEDURE — G0463 HOSPITAL OUTPT CLINIC VISIT: HCPCS | Performed by: INTERNAL MEDICINE

## 2023-07-07 PROCEDURE — 36415 COLL VENOUS BLD VENIPUNCTURE: CPT | Performed by: INTERNAL MEDICINE

## 2023-07-07 RX ORDER — ACETAMINOPHEN 500 MG
500 TABLET ORAL EVERY 6 HOURS PRN
COMMUNITY
End: 2023-08-02

## 2023-07-07 ASSESSMENT — PAIN SCALES - GENERAL: PAINLEVEL: SEVERE PAIN (7)

## 2023-07-07 NOTE — PROGRESS NOTES
[facility] Hematology and Oncology Progress Note    Patient: Ziggy Hallman  MRN: [id number]  Date of Service: 05/26/2023        Reason for Visit    Chief Complaint   Patient presents with     Oncology Clinic Visit     4 week return MDS (myelodysplastic syndrome), review labs          Problem List Items Addressed This Visit    None  Visit Diagnoses     Anemia, unspecified type                Assessment and Plan  Myelodysplastic syndrome excess blasts 1  Cytogenetics: 45,XY,-5,add(9)(p13),-17,add(17)(p13),+mar[20]  FISH and comprehensive myeloid neoplasm NGS: Loss of 5 p15, no loss of T p53 noted, FLT3 wild-type  R-IPSS; high risk if we consider him as complex karyotype (more than 3 abnormalities on cytogenetics along with a bone marrow blast percentage of 5 to 10%)    He had a repeat bone marrow biopsy recently.  It showed hypocellular marrow involved by MDS with a blast percentage of 5 to 10%.  Also has significant fibrosis at 3+.  Karyotyping came back showing complex karyotype in all the examined cells with hyperdiploid clone characterized by loss of 1 copy of chromosome 5 and 17 with an unknown material replacing the short arms of chromosome 9 and of the remaining chromosome 17 along with a marker chromosome.  No losses of 5q31 or TP53 noted by FISH.  Comprehensive myeloid NGS does not show any high risk mutations.  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    Referred him to the transplant team for consideration for allogenic stem cell transplant in the setting of MDS with excess blasts 1 and complex karyotype.  But he missed that appointment.  It has been rescheduled to August 1.  I strongly encouraged him to keep up with that appointment which will be important going forward.  Considering that he has further decline in his hemoglobin levels and the fact that he has evidence of clonal evolution, I think he will be a candidate for allogenic stem cell transplant in the near future.  With increase in the blast  percentage may need HMA therapy prior to that.  It looks like his sister is a full match?.  We will look forward to his meeting with the bone marrow transplant team before making further treatment plans.  I will see him back in 6 weeks with repeat labs.  If his hemoglobin further declines and if he were to need transfusion support then would recommend using irradiated blood.    He also has significant cardiovascular disease history and is status post stent placement.  I repeated an echocardiogram but I do not have the results.  He continues to take apixaban 5 mg twice daily.  He has an undiagnosed clotting disorder and has had recurrent arterial clots.  Prior extensive testing for hypercoagulable status has been negative.  Fortunately no evidence of any recurrent clots in the recent past.       Cancer Staging   No matching staging information was found for the patient.    ECOG Performance    0 - Independent         Problem List    Patient Active Problem List   Diagnosis     Hyperlipidemia LDL goal <100     Hypercoagulable state (H)     CVA (cerebrovascular accident) (H)     CAD S/P percutaneous coronary angioplasty     Low back pain     MDS (myelodysplastic syndrome) (H)        Oncology history  His history of MDS dates back to 2015.  He had a bone marrow biopsy at that time which was done as a part of evaluation for anemia.  Bone marrow biopsy at that time apparently showed trilineage hematopoiesis with dysgranulopoiesis and dysmegakaryopoesis less than 1% blasts.  Cellularity was 40 to 50%.  Cytogenetics showed a complex karyotype with 45, XY,a derivative of chromosome 5 and 17, addition of 9p13, trisomy 11, deletion 17 in 18 cells and normal karyotype with 46 XY in the 3 of 20 cells.  IPSS was 4 which put him in the intermediate risk category.  He also saw BMT in November 2015.  Underwent evaluation for potential allogenic stem cell transplant.  2 of his siblings were a full match.  Since his counts were pretty  stable he was just followed.  His EPO level was 25.  He has not had any RICHMOND supplementation.  Looks like he also underwent evaluation for bone marrow failure syndromes.  Genetic studies for Fanconi anemia and dyskeratosis congenita were negative.     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  Also had an MI in May 2017 with in-stent restenosis.  Is currently on apixaban and prasugrel.    Interval History   Ziggy Hallman is a 47 year old male with MDS with excess blasts 1 with complex karyotype who is here for follow-up.    He had an appointment with bone marrow transplant team on 6/27/2023 but he missed that appointment.  He has been rescheduled to 8/1/2023.  His hemoglobin continues to be on the lower side.  It has come down to less than 9 now.  He continues to have some shortness of breath on exertion.  No infectious symptoms.  No bleeding issues.  He continues to be on apixaban 5 mg twice daily.  No evidence of recurrent VTE or arterial thrombosis.        Review of Systems  A comprehensive review of systems was negative except for what is noted in the interval history    Current Outpatient Medications   Medication     acetaminophen (TYLENOL) 500 MG tablet     amLODIPine (NORVASC) 10 MG tablet     apixaban ANTICOAGULANT (ELIQUIS) 5 MG tablet     atorvastatin (LIPITOR) 40 MG tablet     cyclobenzaprine (FLEXERIL) 10 MG tablet     prasugrel (EFFIENT) 10 MG TABS tablet     Zolpidem Tartrate (AMBIEN PO)     amLODIPine (NORVASC) 5 MG tablet     cyanocobalamin (VITAMIN B-12) 1000 MCG tablet     HYDROmorphone (DILAUDID) 2 MG tablet     sennosides (SENOKOT) 8.6 MG tablet     XARELTO ANTICOAGULANT 20 MG TABS tablet     No current facility-administered medications for this visit.        Physical Exam    No flowsheet data found.    General: alert and  cooperative      Lab Results    Recent Results (from the past 168 hour(s))   CBC with platelets and differential   Result Value Ref Range    WBC Count 3.3 (L) 4.0 - 11.0 10e3/uL    RBC Count 2.50 (L) 4.40 - 5.90 10e6/uL    Hemoglobin 8.9 (L) 13.3 - 17.7 g/dL    Hematocrit 26.6 (L) 40.0 - 53.0 %     (H) 78 - 100 fL    MCH 35.6 (H) 26.5 - 33.0 pg    MCHC 33.5 31.5 - 36.5 g/dL    RDW 16.4 (H) 10.0 - 15.0 %    Platelet Count 276 150 - 450 10e3/uL    % Neutrophils 42 %    % Lymphocytes 46 %    % Monocytes 6 %    % Eosinophils 3 %    % Basophils 1 %    % Immature Granulocytes 2 %    NRBCs per 100 WBC 0 <1 /100    Absolute Neutrophils 1.4 (L) 1.6 - 8.3 10e3/uL    Absolute Lymphocytes 1.5 0.8 - 5.3 10e3/uL    Absolute Monocytes 0.2 0.0 - 1.3 10e3/uL    Absolute Eosinophils 0.1 0.0 - 0.7 10e3/uL    Absolute Basophils 0.0 0.0 - 0.2 10e3/uL    Absolute Immature Granulocytes 0.1 <=0.4 10e3/uL    Absolute NRBCs 0.0 10e3/uL       Imaging    No results found.    Very complex patient.    Signed by: Lynda Mcbride MD

## 2023-07-07 NOTE — LETTER
"    7/7/2023         RE: Ziggy Hallman  [address] [address]        Dear Colleague,    Thank you for referring your patient, Ziggy Hallman, to the [facility] [place]. Please see a copy of my visit note below.    Oncology Rooming Note    July 7, 2023 10:20 AM   Ziggy Hallman is a 47 year old male who presents for:    Chief Complaint   Patient presents with     Oncology Clinic Visit     4 week return MDS (myelodysplastic syndrome), review labs      Initial Vitals: BP (!) 141/82 (BP Location: Right arm, Patient Position: Sitting, Cuff Size: Adult Regular)   Pulse 84   Temp 98.8  F (37.1  C) (Oral)   Resp 20   Ht 1.735 m (5' 8.31\")   Wt 84.8 kg (187 lb)   SpO2 97%   BMI 28.18 kg/m   Estimated body mass index is 28.18 kg/m  as calculated from the following:    Height as of this encounter: 1.735 m (5' 8.31\").    Weight as of this encounter: 84.8 kg (187 lb). Body surface area is 2.02 meters squared.  Severe Pain (7) Comment: Data Unavailable   No LMP for male patient.  Allergies reviewed: Yes  Medications reviewed: Yes    Medications: Medication refills not needed today.  Pharmacy name entered into [employer]: [id number] - [address]    Clinical concerns: 4 week return MDS (myelodysplastic syndrome), review labs       Leah Bahen[place], [facility] Hematology and Oncology Progress Note    Patient: Ziggy Hallman  MRN: [id number]  Date of Service: 05/26/2023        Reason for Visit    Chief Complaint   Patient presents with     Oncology Clinic Visit     4 week return MDS (myelodysplastic syndrome), review labs          Problem List Items Addressed This Visit    None  Visit Diagnoses     Anemia, unspecified type                Assessment and Plan  Myelodysplastic syndrome excess blasts 1  Cytogenetics: 45,XY,-5,add(9)(p13),-17,add(17)(p13),+mar[20]  FISH and comprehensive myeloid neoplasm NGS: Loss of 5 p15, no loss " of T p53 noted, FLT3 wild-type  R-IPSS; high risk if we consider him as complex karyotype (more than 3 abnormalities on cytogenetics along with a bone marrow blast percentage of 5 to 10%)    He had a repeat bone marrow biopsy recently.  It showed hypocellular marrow involved by MDS with a blast percentage of 5 to 10%.  Also has significant fibrosis at 3+.  Karyotyping came back showing complex karyotype in all the examined cells with hyperdiploid clone characterized by loss of 1 copy of chromosome 5 and 17 with an unknown material replacing the short arms of chromosome 9 and of the remaining chromosome 17 along with a marker chromosome.  No losses of 5q31 or TP53 noted by FISH.  Comprehensive myeloid NGS does not show any high risk mutations.  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    Referred him to the transplant team for consideration for allogenic stem cell transplant in the setting of MDS with excess blasts 1 and complex karyotype.  But he missed that appointment.  It has been rescheduled to August 1.  I strongly encouraged him to keep up with that appointment which will be important going forward.  Considering that he has further decline in his hemoglobin levels and the fact that he has evidence of clonal evolution, I think he will be a candidate for allogenic stem cell transplant in the near future.  With increase in the blast percentage may need HMA therapy prior to that.  It looks like his sister is a full match?.  We will look forward to his meeting with the bone marrow transplant team before making further treatment plans.  I will see him back in 6 weeks with repeat labs.  If his hemoglobin further declines and if he were to need transfusion support then would recommend using irradiated blood.    He also has significant cardiovascular disease history and is status post stent placement.  I repeated an echocardiogram but I do not have the results.  He continues to take apixaban 5 mg twice daily.  He has an  undiagnosed clotting disorder and has had recurrent arterial clots.  Prior extensive testing for hypercoagulable status has been negative.  Fortunately no evidence of any recurrent clots in the recent past.       Cancer Staging   No matching staging information was found for the patient.    ECOG Performance    0 - Independent         Problem List    Patient Active Problem List   Diagnosis     Hyperlipidemia LDL goal <100     Hypercoagulable state (H)     CVA (cerebrovascular accident) (H)     CAD S/P percutaneous coronary angioplasty     Low back pain     MDS (myelodysplastic syndrome) (H)        Oncology history  His history of MDS dates back to 2015.  He had a bone marrow biopsy at that time which was done as a part of evaluation for anemia.  Bone marrow biopsy at that time apparently showed trilineage hematopoiesis with dysgranulopoiesis and dysmegakaryopoesis less than 1% blasts.  Cellularity was 40 to 50%.  Cytogenetics showed a complex karyotype with 45, XY,a derivative of chromosome 5 and 17, addition of 9p13, trisomy 11, deletion 17 in 18 cells and normal karyotype with 46 XY in the 3 of 20 cells.  IPSS was 4 which put him in the intermediate risk category.  He also saw BMT in November 2015.  Underwent evaluation for potential allogenic stem cell transplant.  2 of his siblings were a full match.  Since his counts were pretty stable he was just followed.  His EPO level was 25.  He has not had any RICHMOND supplementation.  Looks like he also underwent evaluation for bone marrow failure syndromes.  Genetic studies for Fanconi anemia and dyskeratosis congenita were negative.     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  Also had an MI in May 2017 with in-stent restenosis.  Is currently on apixaban and prasugrel.    Interval History   Ziggy Hallman is a 47  year old male with MDS with excess blasts 1 with complex karyotype who is here for follow-up.    He had an appointment with bone marrow transplant team on 6/27/2023 but he missed that appointment.  He has been rescheduled to 8/1/2023.  His hemoglobin continues to be on the lower side.  It has come down to less than 9 now.  He continues to have some shortness of breath on exertion.  No infectious symptoms.  No bleeding issues.  He continues to be on apixaban 5 mg twice daily.  No evidence of recurrent VTE or arterial thrombosis.        Review of Systems  A comprehensive review of systems was negative except for what is noted in the interval history    Current Outpatient Medications   Medication     acetaminophen (TYLENOL) 500 MG tablet     amLODIPine (NORVASC) 10 MG tablet     apixaban ANTICOAGULANT (ELIQUIS) 5 MG tablet     atorvastatin (LIPITOR) 40 MG tablet     cyclobenzaprine (FLEXERIL) 10 MG tablet     prasugrel (EFFIENT) 10 MG TABS tablet     Zolpidem Tartrate (AMBIEN PO)     amLODIPine (NORVASC) 5 MG tablet     cyanocobalamin (VITAMIN B-12) 1000 MCG tablet     HYDROmorphone (DILAUDID) 2 MG tablet     sennosides (SENOKOT) 8.6 MG tablet     XARELTO ANTICOAGULANT 20 MG TABS tablet     No current facility-administered medications for this visit.        Physical Exam    No flowsheet data found.    General: alert and cooperative      Lab Results    Recent Results (from the past 168 hour(s))   CBC with platelets and differential   Result Value Ref Range    WBC Count 3.3 (L) 4.0 - 11.0 10e3/uL    RBC Count 2.50 (L) 4.40 - 5.90 10e6/uL    Hemoglobin 8.9 (L) 13.3 - 17.7 g/dL    Hematocrit 26.6 (L) 40.0 - 53.0 %     (H) 78 - 100 fL    MCH 35.6 (H) 26.5 - 33.0 pg    MCHC 33.5 31.5 - 36.5 g/dL    RDW 16.4 (H) 10.0 - 15.0 %    Platelet Count 276 150 - 450 10e3/uL    % Neutrophils 42 %    % Lymphocytes 46 %    % Monocytes 6 %    % Eosinophils 3 %    % Basophils 1 %    % Immature Granulocytes 2 %    NRBCs per 100 WBC 0  <1 /100    Absolute Neutrophils 1.4 (L) 1.6 - 8.3 10e3/uL    Absolute Lymphocytes 1.5 0.8 - 5.3 10e3/uL    Absolute Monocytes 0.2 0.0 - 1.3 10e3/uL    Absolute Eosinophils 0.1 0.0 - 0.7 10e3/uL    Absolute Basophils 0.0 0.0 - 0.2 10e3/uL    Absolute Immature Granulocytes 0.1 <=0.4 10e3/uL    Absolute NRBCs 0.0 10e3/uL       Imaging    No results found.    Very complex patient.    Signed by: Lynda Mcbride MD      Again, thank you for allowing me to participate in the care of your patient.        Sincerely,        Lynda Mcbride MD

## 2023-07-07 NOTE — PROGRESS NOTES
"Oncology Rooming Note    July 7, 2023 10:20 AM   Ziggy Hallman is a 47 year old male who presents for:    Chief Complaint   Patient presents with     Oncology Clinic Visit     4 week return MDS (myelodysplastic syndrome), review labs      Initial Vitals: BP (!) 141/82 (BP Location: Right arm, Patient Position: Sitting, Cuff Size: Adult Regular)   Pulse 84   Temp 98.8  F (37.1  C) (Oral)   Resp 20   Ht 1.735 m (5' 8.31\")   Wt 84.8 kg (187 lb)   SpO2 97%   BMI 28.18 kg/m   Estimated body mass index is 28.18 kg/m  as calculated from the following:    Height as of this encounter: 1.735 m (5' 8.31\").    Weight as of this encounter: 84.8 kg (187 lb). Body surface area is 2.02 meters squared.  Severe Pain (7) Comment: Data Unavailable   No LMP for male patient.  Allergies reviewed: Yes  Medications reviewed: Yes    Medications: Medication refills not needed today.  Pharmacy name entered into [employer]: [id number] - [place], [place] - [address] [place] [address]    Clinical concerns: 4 week return MDS (myelodysplastic syndrome), review labs       Leah Bahena CMA            "

## 2023-07-28 ENCOUNTER — TELEPHONE (OUTPATIENT)
Dept: TRANSPLANT | Facility: CLINIC | Age: 48
End: 2023-07-28
Payer: COMMERCIAL

## 2023-07-31 LAB
ABO/RH(D): NORMAL
ANTIBODY SCREEN: NEGATIVE
SPECIMEN EXPIRATION DATE: NORMAL

## 2023-07-31 NOTE — PROGRESS NOTES
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      Disease presentation and baseline characteristics:   Bone marrow biopsy on 10/07/2015 due top mild anemia in the context of his hypercoaguable state, and this was read as trilineage hematopoiesis with dysgranulopoiesis and dysmegakaryopoiesis with less than 1% blasts consistent with RCMD 40% to 50% cellular.  Cytogenetics were complex with a karyotype of 45,XY, a derivative of 5 and 17, addition of 9p13, trisomy 11, deletion 111, a minus 17 in 18 cells, and 46,XY in 3.   IPSS was 4 points for cytogenetics, 0 points for blast percentage, 0 points for hemoglobin, 0 points for platelets and 0 points for ANC, giving him a total score of 4, putting him in the intermediate risk category. Consult in BMT clinic 11/2015 for MDS. Siblings typed and 2 sibs (sisters) are full matches.  Worsening anemia in 4/2023, with hemoglobin down to 9 range. Repeat bone marrow biopsy was normocellular at 40-50%. Trilineage hematopoiesis is left shifted and dysplastic.  Reticulin stain demonstrates at least 3+ reticulin fibrosis.  CD34/ positive blasts are increased at 5-10%.  CD56 expression approximately 5%.  No increase in CD20 positive B cells and CD3 positive T cells.   positive plasma cells are somewhat increased at 10-15%; kappa and lambda immunohistochemistry are negative for clonality. NGS negative, FISH with Loss of 5p15.2 (37.625%) and negative for TP53 loss. Cytogenetics showed 45,XY, -5, add(9)(p13), -17, add(17)(p13), +mar[20].  He has not had any therapy, and is here to discuss transplant       Other notable history:  Ill-defined hypercoagulable state with numerous arterial embolic/thrombotic events.  Renal infarct in November 2011  Left Popliteal embolic episode in December 2011 and was treated with surgery, aspirin and Coumadin.   Embolic episode with his right carotid artery  and had some TIA/stroke-like symptoms: December 2012  Right renal infarct in October 2013 and again in ~2015 He was found to have some renal artery stenosis approximately 50% to 60% on a full body angiogram and was seen by Vascular Surgery in October 2015, and they did not feel that invasive interventions were needed and felt like it could cause more harm than good.    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 Changed to Rivaroxaban and continue Prasugrel.  PFO closed 4/2020     He was initially treated with aspirin in 2011 and was transitioned to aspirin plus Coumadin in December 2011.  Plavix was added to the combination in October 2013. Extensive hypercoagulable workup to date with a factor V and factor II mutation analysis that was negative, protein S and C and antithrombin III levels that were within normal limits, homocysteine that was within normal limits, and lupus anticoagulant and beta-2 glycoprotein assessments that were all negative.  He has had JAK2 testing which was negative and PNH testing which was negative. He was noted to have an elevated IgA of unknown significance, no clear sign of plasma cell disorder.      HPI:  Please see my entry above for hematologic history.      Ziggy is here with his significant other, and his sister Radha is on the phone.    Ziggy has been feeling more tired and rundown over the past few months, with slow worsening of symptoms for about a year or so.  He is having difficulty walking up stairs due to dyspnea.  At this point, he would have to stop to catch his breath going up 1-2 flights of stairs.  Previously, he had been walking 15-18k steps per day at his job.  In the last year and a half his functional status has worsened.      ASSESSMENT AND PLAN:  Ziggy is here today to discuss transplant options  for his MDS. His initial IPSS was ~4 in 2015, mostly due to cytogenetics. Currently, with his worsening counts and 5-10% marrow blasts, his IPSS-R is 7, putting him in the very high risk category. Generally, the recommendation at this point would be to move forward with alloHSCT upon selection of a suitable donor. However, the most recent bone marrow biopsy showed at least 3+ reticulin fibrosis, suggesting significant fibrotic features in the marrow. Marrow fibrosis reduces conditioning efficacy, and can increase the risk of primary transplant failure. Furthermore, without having had any therapy thus far, he is overall immunocompetent in the T-cell compartment, which increases the risk of graft rejection. With this in mind, we discussed that 4 cycles of HMA +/- Bradford would likely be beneficial prior to transplant, and we will discuss this with Dr. Mcbride.    In terms of the transplant itself, there are several factors that will need to be considered. Ziggy's performance status is worrying, especially as it pertains to his fitness for transplant and the selection of conditioning regimen. With fibrosis and his age, we would prefer a myeloablative transplant, but he is unable to walk up stairs without getting winded, suggesting that his performance status may preclude myeloablative conditioning. We encouraged him to increase resistance training and activity as best he can in the coming months ahead of pre-transplant work-up. If this is related to his disease/worsening Hgb, then he may improve with his therapy. His last TTE in 2/2023 showed some LVH, but was grossly normal, and he has never had a PE to raise the spectre of pulmonary HTN. Pending his pre-transplant evaluation, and performance status after 4 cycles of HMA, we can again discuss OTIS vs myeloablative strategies.     His recurrent arterial thromboses, and the multitude of sequelae, is difficult to account for in his HCTCI/sHCTCI, but would be considered an  additional risk factor for adverse outcomes related to transplant as well. We would plan to continue his apixaban and prasugrel as long as possible in the daniel-transplant period to mitigate these risk of further arterial thromboses.     We discussed the above, including risks related to transplant, including organ toxicity, infections and GVHD, as well as strategies to mitigate relapse post-transplant, including starting maintenance therapy post-transplant, which we would recommend in the setting of very high risk MDS.     Ziggy and his family asked many excellent questions, and we are all in agreement with the plan for HMA, followed by pre-transplant evaluation after repeat BMBx. His sister (56 years old) has already had repeat high resolution HLA typing, and appears to be a match based on the previous typing we have from Ziggy, although this is from 2015. We will repeat high resolution typing of Ziggy to ensure a match; there is no need for MUD search at this time.    Will a transplant be safe?  HCTCI estimated to be at least 2 (cardiac, CVA). Simplified comorbidity index estimated to be at least 1 (composite cardiac). Estimated 2-year non-relapse mortality is at least 10-15%. With PTCy as the backbone of GVHD prophylaxis, life-threatening grade III-IV acute GVHD and moderate/severe chronic GVHD incidence is <10%.    Will a transplant be efficacious?   CIBMTR Risk Calculator predicts 1-year survival to be 83%. Relapse of the underlying disease is the most frequent cause of transplant failure. Relapse risk is approximately 20% at 1 year and as high as 40% at 1 years. This risk might be mitigated with post-transplant HMA maintenance. He also has a significant risk of graft rejection given immune competence and marrow fibrosis, which is why I would suggest a longer course of MDS-directed therapy before moving forward with transplant.      Summary: Proceed with high resolution typing of patient. No need for MUD search  at the moment. Will discuss with Dr. Mcbride about 4 cycles of HMA +/- Bradford and then repeat BMBx for additional evaluation. In the interim, encouraged patient to increase his resistance training/activity and muscle mass, as best he can.     I spent 92 minutes in the care of this patient today, which included time necessary for preparation for the visit, obtaining history, ordering medications/tests/procedures as medically indicated, review of pertinent medical literature, counseling of the patient, communication of recommendations to the care team, and documentation time.    Patient seen and staffed with Dr. Cabral who agrees with the above assessment and plan.     Chapin George MD PhD  Heme/Onc/Transplant Fellow  Pgr #[id number]    _______________________________________________    ATTENDING NOTE    I have seen and personally evaluated the patient today.  I reviewed vitals, medications, laboratory results, and I viewed pertinent imaging studies.  After doing so, I formulated a plan with Dr. George as documented in this note.  I spent >50% of a 92 minute in person visit personally communicating my assessment and plan. I personally performed all of the medical decision making associated with this visit regarding monitoring on immune function, prevention of infections, prevention/management of GVHD, and organ toxicities of transplantation. I was face to face with the patient for the entire visit.       Mariluz Cabral MD         ROS: Pertinent positive and negative systems described in HPI; the remainder of the 14 systems are negative       Past Medical History:   Diagnosis Date    AMI anterior wall (H)     Mid LAD on cath with stent    CAD S/P percutaneous coronary angioplasty 7/2016    Unstable agina with anterior myocardial damage reported without mycardial damage by patient's history    CVA (cerebrovascular accident) (H) 2012    Reporting thromboembolic episode on the right neck     Hypercoagulable  state (H)     Uncertain etiology--seeing Hematologist    MEDICAL HISTORY OF -     Possibly PFO       Past Surgical History:   Procedure Laterality Date    ARTHROSCOPY KNEE RT/LT      Right     ARTHROSCOPY KNEE RT/LT      Left    CARDIAC CATHERIZATION      With stent placement       Family History   Problem Relation Age of Onset    Diabetes No family hx of     Coronary Artery Disease No family hx of     Hypertension No family hx of     Hyperlipidemia No family hx of     Breast Cancer No family hx of        Social History     Tobacco Use    Smoking status: Former     Packs/day: 0.50     Types: Cigarettes     Passive exposure: Past (Mom smoked cigs indoors)    Smokeless tobacco: Never    Tobacco comments:     cigarette once in a while   Substance Use Topics    Alcohol use: Yes     Alcohol/week: 0.0 standard drinks of alcohol     Comment: 4 times per week 5 drinks    Drug use: No         No Known Allergies     Current Outpatient Medications   Medication Sig Dispense Refill    acetaminophen (TYLENOL) 500 MG tablet Take 500 mg by mouth every 6 hours as needed for mild pain      amLODIPine (NORVASC) 10 MG tablet Take 1 tablet (10 mg) by mouth daily 30 tablet     amLODIPine (NORVASC) 5 MG tablet Take 1 tablet by mouth daily      apixaban ANTICOAGULANT (ELIQUIS) 5 MG tablet       atorvastatin (LIPITOR) 40 MG tablet Take 40 mg by mouth daily      cyanocobalamin (VITAMIN B-12) 1000 MCG tablet Take 1 tablet (1,000 mcg) by mouth daily (Patient not taking: Reported on 7/7/2023) 60 tablet 1    cyclobenzaprine (FLEXERIL) 10 MG tablet Take 1 Tablet (10 mg) by mouth at bedtime if needed for Muscle Spasm.      HYDROmorphone (DILAUDID) 2 MG tablet Take 1 tablet (2 mg) by mouth every 6 hours as needed for moderate to severe pain (Patient not taking: Reported on 7/7/2023) 20 tablet 0    prasugrel (EFFIENT) 10 MG TABS tablet TK 1 T PO QAM  2    sennosides (SENOKOT) 8.6 MG tablet Take 1 tablet by mouth daily as needed for constipation  Reported on 3/29/2017 (Patient not taking: Reported on 7/7/2023)      XARELTO ANTICOAGULANT 20 MG TABS tablet       Zolpidem Tartrate (AMBIEN PO) Take by mouth as needed for sleep           Physical Exam:     Physical exam  BP (!) 165/108 (BP Location: Right arm, Patient Position: Sitting, Cuff Size: Adult Regular)   Pulse 95   Temp 97.9  F (36.6  C) (Oral)   Resp 16   Wt 82.3 kg (181 lb 8 oz)   SpO2 98%   BMI 27.35 kg/m      General: Well appearing.  HEENT: Sclerae anicteric.  Lungs: Breathing comfortably. No cough.  CV: Warm and well perfused  MSK: Moving all extremities without deficits.  Neuro: Grossly non-focal.  Skin/access: Normal skin tone.  Psych: Alert and oriented. No distress.    KPS:  70    Blood Counts       Recent Labs   Lab Test 07/07/23  1004 05/03/23  1142 04/18/23  0914   HGB 8.9* 9.3* 9.5*   HCT 26.6* 26.7* 28.3*   WBC 3.3* 3.7* 3.9*   ANEUTAUTO 1.4* 1.6 2.0   ALYMPAUTO 1.5 1.8 1.6   AMONOAUTO 0.2 0.2 0.2   AEOSAUTO 0.1 0.1 0.1   ABSBASO 0.0 0.0 0.0   NRBCMAN 0.0 0.0 0.0    332 298           Chemistries     Basic Panel  Recent Labs   Lab Test 04/18/23 0914 05/28/20  1018 07/11/18  1458    138 137   POTASSIUM 4.3 3.8 3.9   CHLORIDE 108* 107 108   CO2 22 24 24   BUN 29.4* 10 16   CR 0.92 0.80 0.78   * 102* 86        Calcium, Magnesium, Phosphorus  Recent Labs   Lab Test 04/18/23 0914 05/28/20  1018 07/11/18  1458   REMY 9.1 9.3 9.3        LFTs  Recent Labs   Lab Test 04/18/23  0914 05/28/20  1018 07/11/18  1458   BILITOTAL 0.3 0.8 0.4   ALKPHOS 101 114 101   AST 28 20 18   ALT 25 25 21   ALBUMIN 2.7* 3.4 3.5       Bone Marrow Biopsy       Morphology    Results for orders placed or performed in visit on 05/03/23 (from the past 8760 hour(s))   Bone marrow biopsy   Result Value    Final Diagnosis      Peripheral blood  - Anemia with borderline macrocytosis  - Leukopenia    Bone marrow aspirate clot section and biopsy  - Hypercellular marrow involved by myelodysplastic  syndrome with excess blasts-1 and fibrosis  - Adequate iron stores  - Pending [facility] studies      Comment      The clinical history of myelodysplastic syndrome with refractory cytopenias and a complex karyotype (2015), thrombosis and elevated IgA  is reviewed; the diagnosis may change based on the original diagnostic study and/or additional studies.      Prior immunofixation studies and current immunoarchitecture are negative for IgA clonality.  Recommend evaluating for PNH given history of thrombosis.  Status post bone marrow transplant.  I am happy to review any subsequent data.      Clinical Information      MDS      Disclaimer      Analyte Specific Reagents (ASRs) are used in many laboratory tests necessary for standard medical care and generally do not require FDA approval. This test was developed and its performance characteristics determined by [facility]. It has not been cleared or approved by the U.S. Food and Drug Administration.  [facility] are certified for the performance of high-complexity clinical testing under the Clinical Laboratory Improvement Amendments of 1988 (CLIA), and in keeping with the certification requirements, the laboratory has verified this test's accuracy, precision and/or validity of the method.        Microscopic Description      2 touch preparation slides labeled B1.    3 Iron stains performed, A1 aspirate smear and A2 and A3 clot sections.    Neutrophils and precursors 45% erythroids 16.5% lymphocytes 32% eosinophils 3% plasma cells 3.5% blasts 0%.  M:E ratio equals 2-3: 1.    Core Biopsy/Aspirate Clot:    Tissue block B2: Bone marrow biopsy sections are normocellular at 40-50%. Trilineage hematopoiesis is left shifted and dysplastic.  Reticulin stain demonstrates at least 3+3 reticulin fibrosis.  CD34/ positive blasts are increased at 5-10%.  CD56 expression approximately 5%.  No increase in CD20 positive B cells and  CD3 positive T cells.   positive plasma cells are somewhat increased at 10-15%; kappa and lambda immunohistochemistry are negative for clonality.      Tissue block A2: Bone marrow clot sections are hypocellular at 20%. Trilineage hematopoiesis is left shifted and dysplastic.  No increase in CD20 positive B cells are CD3 positive T cells.   positive plasma cells are increased at 10-20%.  No aberrant coexpression of CD56 on plasma cells.  Kappa and lambda in situ hybridization studies appear negative for clonality.  P53 demonstrates a wild-type expression pattern.    All controls stain appropriately.     When performed, bone marrow core biopsies are decalcified prior to tissue processing.       MCRS Yes (A)    Performing Labs      The technical component of this testing was completed at the [facility] and [facility]          Ziggy understood the above assessment and recommendations.  Multiple questions answered.  No barriers to learning identified.       Known issues that I take into account for medical decisions, with salient changes to the plan considering these complexities noted above.    Patient Active Problem List   Diagnosis    Hyperlipidemia LDL goal <100    Hypercoagulable state (H)    CVA (cerebrovascular accident) (H)    CAD S/P percutaneous coronary angioplasty    Low back pain    MDS (myelodysplastic syndrome) (H)   - Undefined clotting disorder    ------------------------------------------------------------------------------------------------------------------------------------------------    Patient Care Team         Relationship Specialty Notifications Start End    Ronald Chamberlain PCP - General Family Medicine  7/7/23     Phone: [phone] Fax: [phone] [address] [place] [address]    Lynda Mcbride MD Assigned Cancer Care Provider   5/6/23     Phone: [phone] Fax: [phone] [address]  [facility] [address]    Jennifer Pedersen, RN Specialty Care Coordinator Hematology & Oncology Admissions 7/7/23

## 2023-08-01 ENCOUNTER — OFFICE VISIT (OUTPATIENT)
Dept: TRANSPLANT | Facility: CLINIC | Age: 48
End: 2023-08-01
Attending: INTERNAL MEDICINE
Payer: COMMERCIAL

## 2023-08-01 VITALS
TEMPERATURE: 97.9 F | HEART RATE: 95 BPM | BODY MASS INDEX: 27.35 KG/M2 | WEIGHT: 181.5 LBS | OXYGEN SATURATION: 98 % | SYSTOLIC BLOOD PRESSURE: 111 MMHG | DIASTOLIC BLOOD PRESSURE: 76 MMHG | RESPIRATION RATE: 16 BRPM

## 2023-08-01 DIAGNOSIS — D46.9 MDS (MYELODYSPLASTIC SYNDROME) (H): Primary | ICD-10-CM

## 2023-08-01 DIAGNOSIS — Z71.9 VISIT FOR COUNSELING: Primary | ICD-10-CM

## 2023-08-01 PROCEDURE — 86850 RBC ANTIBODY SCREEN: CPT | Performed by: INTERNAL MEDICINE

## 2023-08-01 PROCEDURE — G0463 HOSPITAL OUTPT CLINIC VISIT: HCPCS | Performed by: INTERNAL MEDICINE

## 2023-08-01 PROCEDURE — 86828 HLA CLASS I&II ANTIBODY QUAL: CPT | Performed by: INTERNAL MEDICINE

## 2023-08-01 PROCEDURE — 86644 CMV ANTIBODY: CPT | Performed by: INTERNAL MEDICINE

## 2023-08-01 PROCEDURE — 36415 COLL VENOUS BLD VENIPUNCTURE: CPT | Performed by: INTERNAL MEDICINE

## 2023-08-01 PROCEDURE — 99215 OFFICE O/P EST HI 40 MIN: CPT | Mod: GC | Performed by: INTERNAL MEDICINE

## 2023-08-01 PROCEDURE — 86901 BLOOD TYPING SEROLOGIC RH(D): CPT | Performed by: INTERNAL MEDICINE

## 2023-08-01 PROCEDURE — 99417 PROLNG OP E/M EACH 15 MIN: CPT | Performed by: INTERNAL MEDICINE

## 2023-08-01 PROCEDURE — 81378 HLA I & II TYPING HR: CPT | Performed by: INTERNAL MEDICINE

## 2023-08-01 ASSESSMENT — PAIN SCALES - GENERAL: PAINLEVEL: NO PAIN (0)

## 2023-08-01 NOTE — LETTER
8/1/2023         RE: Ziggy Hallman  [address] [facility] [address]        Dear Colleague,    Thank you for referring your patient, Ziggy Hallman, to the [facility] BLOOD AND MARROW TRANSPLANT PROGRAM [place]. Please see a copy of my visit note below.           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      Disease presentation and baseline characteristics:   Bone marrow biopsy on 10/07/2015 due top mild anemia in the context of his hypercoaguable state, and this was read as trilineage hematopoiesis with dysgranulopoiesis and dysmegakaryopoiesis with less than 1% blasts consistent with RCMD 40% to 50% cellular.  Cytogenetics were complex with a karyotype of 45,XY, a derivative of 5 and 17, addition of 9p13, trisomy 11, deletion 111, a minus 17 in 18 cells, and 46,XY in 3.   IPSS was 4 points for cytogenetics, 0 points for blast percentage, 0 points for hemoglobin, 0 points for platelets and 0 points for ANC, giving him a total score of 4, putting him in the intermediate risk category. Consult in BMT clinic 11/2015 for MDS. Siblings typed and 2 sibs (sisters) are full matches.  Worsening anemia in 4/2023, with hemoglobin down to 9 range. Repeat bone marrow biopsy was normocellular at 40-50%. Trilineage hematopoiesis is left shifted and dysplastic.  Reticulin stain demonstrates at least 3+ reticulin fibrosis.  CD34/ positive blasts are increased at 5-10%.  CD56 expression approximately 5%.  No increase in CD20 positive B cells and CD3 positive T cells.   positive plasma cells are somewhat increased at 10-15%; kappa and lambda immunohistochemistry are negative for clonality. NGS negative, FISH with Loss of 5p15.2 (37.625%) and negative for TP53 loss. Cytogenetics showed 45,XY, -5, add(9)(p13), -17, add(17)(p13), +mar[20].  He has not had any therapy, and is here to discuss transplant       Other  notable history:  Ill-defined hypercoagulable state with numerous arterial embolic/thrombotic events.  Renal infarct in November 2011  Left Popliteal embolic episode in December 2011 and was treated with surgery, aspirin and Coumadin.   Embolic episode with his right carotid artery and had some TIA/stroke-like symptoms: December 2012  Right renal infarct in October 2013 and again in ~2015 He was found to have some renal artery stenosis approximately 50% to 60% on a full body angiogram and was seen by Vascular Surgery in October 2015, and they did not feel that invasive interventions were needed and felt like it could cause more harm than good.    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 Changed to Rivaroxaban and continue Prasugrel.  PFO closed 4/2020     He was initially treated with aspirin in 2011 and was transitioned to aspirin plus Coumadin in December 2011.  Plavix was added to the combination in October 2013. Extensive hypercoagulable workup to date with a factor V and factor II mutation analysis that was negative, protein S and C and antithrombin III levels that were within normal limits, homocysteine that was within normal limits, and lupus anticoagulant and beta-2 glycoprotein assessments that were all negative.  He has had JAK2 testing which was negative and PNH testing which was negative. He was noted to have an elevated IgA of unknown significance, no clear sign of plasma cell disorder.      HPI:  Please see my entry above for hematologic history.      Ziggy is here with his significant other, and his sister Radha is on the phone.    Ziggy has been feeling more tired and rundown over the past few months, with slow worsening of symptoms for about a year or so.  He is having difficulty walking up stairs due to dyspnea.  At this point,  he would have to stop to catch his breath going up 1-2 flights of stairs.  Previously, he had been walking 15-18k steps per day at his job.  In the last year and a half his functional status has worsened.      ASSESSMENT AND PLAN:  Ziggy is here today to discuss transplant options for his MDS. His initial IPSS was ~4 in 2015, mostly due to cytogenetics. Currently, with his worsening counts and 5-10% marrow blasts, his IPSS-R is 7, putting him in the very high risk category. Generally, the recommendation at this point would be to move forward with alloHSCT upon selection of a suitable donor. However, the most recent bone marrow biopsy showed at least 3+ reticulin fibrosis, suggesting significant fibrotic features in the marrow. Marrow fibrosis reduces conditioning efficacy, and can increase the risk of primary transplant failure. Furthermore, without having had any therapy thus far, he is overall immunocompetent in the T-cell compartment, which increases the risk of graft rejection. With this in mind, we discussed that 4 cycles of HMA +/- Bradford would likely be beneficial prior to transplant, and we will discuss this with Dr. Mcbride.    In terms of the transplant itself, there are several factors that will need to be considered. Ziggy's performance status is worrying, especially as it pertains to his fitness for transplant and the selection of conditioning regimen. With fibrosis and his age, we would prefer a myeloablative transplant, but he is unable to walk up stairs without getting winded, suggesting that his performance status may preclude myeloablative conditioning. We encouraged him to increase resistance training and activity as best he can in the coming months ahead of pre-transplant work-up. If this is related to his disease/worsening Hgb, then he may improve with his therapy. His last TTE in 2/2023 showed some LVH, but was grossly normal, and he has never had a PE to raise the spectre of pulmonary HTN.  Pending his pre-transplant evaluation, and performance status after 4 cycles of HMA, we can again discuss OTIS vs myeloablative strategies.     His recurrent arterial thromboses, and the multitude of sequelae, is difficult to account for in his HCTCI/sHCTCI, but would be considered an additional risk factor for adverse outcomes related to transplant as well. We would plan to continue his apixaban and prasugrel as long as possible in the daniel-transplant period to mitigate these risk of further arterial thromboses.     We discussed the above, including risks related to transplant, including organ toxicity, infections and GVHD, as well as strategies to mitigate relapse post-transplant, including starting maintenance therapy post-transplant, which we would recommend in the setting of very high risk MDS.     Ziggy and his family asked many excellent questions, and we are all in agreement with the plan for HMA, followed by pre-transplant evaluation after repeat BMBx. His sister (56 years old) has already had repeat high resolution HLA typing, and appears to be a match based on the previous typing we have from Ziggy, although this is from 2015. We will repeat high resolution typing of Ziggy to ensure a match; there is no need for MUD search at this time.    Will a transplant be safe?  HCTCI estimated to be at least 2 (cardiac, CVA). Simplified comorbidity index estimated to be at least 1 (composite cardiac). Estimated 2-year non-relapse mortality is at least 10-15%. With PTCy as the backbone of GVHD prophylaxis, life-threatening grade III-IV acute GVHD and moderate/severe chronic GVHD incidence is <10%.    Will a transplant be efficacious?   CIBMTR Risk Calculator predicts 1-year survival to be 83%. Relapse of the underlying disease is the most frequent cause of transplant failure. Relapse risk is approximately 20% at 1 year and as high as 40% at 1 years. This risk might be mitigated with post-transplant HMA maintenance. He  also has a significant risk of graft rejection given immune competence and marrow fibrosis, which is why I would suggest a longer course of MDS-directed therapy before moving forward with transplant.      Summary: Proceed with high resolution typing of patient. No need for MUD search at the moment. Will discuss with Dr. Mcbride about 4 cycles of HMA +/- Bradford and then repeat BMBx for additional evaluation. In the interim, encouraged patient to increase his resistance training/activity and muscle mass, as best he can.     I spent 92 minutes in the care of this patient today, which included time necessary for preparation for the visit, obtaining history, ordering medications/tests/procedures as medically indicated, review of pertinent medical literature, counseling of the patient, communication of recommendations to the care team, and documentation time.    Patient seen and staffed with Dr. Cabral who agrees with the above assessment and plan.     Chapin George MD PhD  Heme/Onc/Transplant Fellow  Pgr #[id number]    _______________________________________________    ATTENDING NOTE    I have seen and personally evaluated the patient today.  I reviewed vitals, medications, laboratory results, and I viewed pertinent imaging studies.  After doing so, I formulated a plan with Dr. Geogre as documented in this note.  I spent >50% of a 92 minute in person visit personally communicating my assessment and plan. I personally performed all of the medical decision making associated with this visit regarding monitoring on immune function, prevention of infections, prevention/management of GVHD, and organ toxicities of transplantation. I was face to face with the patient for the entire visit.       Mariluz Cabral MD         ROS: Pertinent positive and negative systems described in HPI; the remainder of the 14 systems are negative       Past Medical History:   Diagnosis Date    AMI anterior wall (H)     Mid LAD on cath  with stent    CAD S/P percutaneous coronary angioplasty 7/2016    Unstable agina with anterior myocardial damage reported without mycardial damage by patient's history    CVA (cerebrovascular accident) (H) 2012    Reporting thromboembolic episode on the right neck     Hypercoagulable state (H)     Uncertain etiology--seeing Hematologist    MEDICAL HISTORY OF -     Possibly PFO       Past Surgical History:   Procedure Laterality Date    ARTHROSCOPY KNEE RT/LT      Right     ARTHROSCOPY KNEE RT/LT      Left    CARDIAC CATHERIZATION      With stent placement       Family History   Problem Relation Age of Onset    Diabetes No family hx of     Coronary Artery Disease No family hx of     Hypertension No family hx of     Hyperlipidemia No family hx of     Breast Cancer No family hx of        Social History     Tobacco Use    Smoking status: Former     Packs/day: 0.50     Types: Cigarettes     Passive exposure: Past (Mom smoked cigs indoors)    Smokeless tobacco: Never    Tobacco comments:     cigarette once in a while   Substance Use Topics    Alcohol use: Yes     Alcohol/week: 0.0 standard drinks of alcohol     Comment: 4 times per week 5 drinks    Drug use: No         No Known Allergies     Current Outpatient Medications   Medication Sig Dispense Refill    acetaminophen (TYLENOL) 500 MG tablet Take 500 mg by mouth every 6 hours as needed for mild pain      amLODIPine (NORVASC) 10 MG tablet Take 1 tablet (10 mg) by mouth daily 30 tablet     amLODIPine (NORVASC) 5 MG tablet Take 1 tablet by mouth daily      apixaban ANTICOAGULANT (ELIQUIS) 5 MG tablet       atorvastatin (LIPITOR) 40 MG tablet Take 40 mg by mouth daily      cyanocobalamin (VITAMIN B-12) 1000 MCG tablet Take 1 tablet (1,000 mcg) by mouth daily (Patient not taking: Reported on 7/7/2023) 60 tablet 1    cyclobenzaprine (FLEXERIL) 10 MG tablet Take 1 Tablet (10 mg) by mouth at bedtime if needed for Muscle Spasm.      HYDROmorphone (DILAUDID) 2 MG tablet Take 1  tablet (2 mg) by mouth every 6 hours as needed for moderate to severe pain (Patient not taking: Reported on 7/7/2023) 20 tablet 0    prasugrel (EFFIENT) 10 MG TABS tablet TK 1 T PO QAM  2    sennosides (SENOKOT) 8.6 MG tablet Take 1 tablet by mouth daily as needed for constipation Reported on 3/29/2017 (Patient not taking: Reported on 7/7/2023)      XARELTO ANTICOAGULANT 20 MG TABS tablet       Zolpidem Tartrate (AMBIEN PO) Take by mouth as needed for sleep           Physical Exam:     Physical exam  BP (!) 165/108 (BP Location: Right arm, Patient Position: Sitting, Cuff Size: Adult Regular)   Pulse 95   Temp 97.9  F (36.6  C) (Oral)   Resp 16   Wt 82.3 kg (181 lb 8 oz)   SpO2 98%   BMI 27.35 kg/m      General: Well appearing.  HEENT: Sclerae anicteric.  Lungs: Breathing comfortably. No cough.  CV: Warm and well perfused  MSK: Moving all extremities without deficits.  Neuro: Grossly non-focal.  Skin/access: Normal skin tone.  Psych: Alert and oriented. No distress.    KPS:  70    Blood Counts       Recent Labs   Lab Test 07/07/23  1004 05/03/23  1142 04/18/23  0914   HGB 8.9* 9.3* 9.5*   HCT 26.6* 26.7* 28.3*   WBC 3.3* 3.7* 3.9*   ANEUTAUTO 1.4* 1.6 2.0   ALYMPAUTO 1.5 1.8 1.6   AMONOAUTO 0.2 0.2 0.2   AEOSAUTO 0.1 0.1 0.1   ABSBASO 0.0 0.0 0.0   NRBCMAN 0.0 0.0 0.0    332 298           Chemistries     Basic Panel  Recent Labs   Lab Test 04/18/23  0914 05/28/20  1018 07/11/18  1458    138 137   POTASSIUM 4.3 3.8 3.9   CHLORIDE 108* 107 108   CO2 22 24 24   BUN 29.4* 10 16   CR 0.92 0.80 0.78   * 102* 86        Calcium, Magnesium, Phosphorus  Recent Labs   Lab Test 04/18/23  0914 05/28/20  1018 07/11/18  1458   REMY 9.1 9.3 9.3        LFTs  Recent Labs   Lab Test 04/18/23  0914 05/28/20  1018 07/11/18  1458   BILITOTAL 0.3 0.8 0.4   ALKPHOS 101 114 101   AST 28 20 18   ALT 25 25 21   ALBUMIN 2.7* 3.4 3.5       Bone Marrow Biopsy       Morphology    Results for orders placed or performed in  visit on 05/03/23 (from the past 8760 hour(s))   Bone marrow biopsy   Result Value    Final Diagnosis      Peripheral blood  - Anemia with borderline macrocytosis  - Leukopenia    Bone marrow aspirate clot section and biopsy  - Hypercellular marrow involved by myelodysplastic syndrome with excess blasts-1 and fibrosis  - Adequate iron stores  - Pending [facility] studies      Comment      The clinical history of myelodysplastic syndrome with refractory cytopenias and a complex karyotype (2015), thrombosis and elevated IgA  is reviewed; the diagnosis may change based on the original diagnostic study and/or additional studies.      Prior immunofixation studies and current immunoarchitecture are negative for IgA clonality.  Recommend evaluating for PNH given history of thrombosis.  Status post bone marrow transplant.  I am happy to review any subsequent data.      Clinical Information      MDS      Disclaimer      Analyte Specific Reagents (ASRs) are used in many laboratory tests necessary for standard medical care and generally do not require FDA approval. This test was developed and its performance characteristics determined by [facility]. It has not been cleared or approved by the U.S. Food and Drug Administration.  [facility] are certified for the performance of high-complexity clinical testing under the Clinical Laboratory Improvement Amendments of 1988 (CLIA), and in keeping with the certification requirements, the laboratory has verified this test's accuracy, precision and/or validity of the method.        Microscopic Description      2 touch preparation slides labeled B1.    3 Iron stains performed, A1 aspirate smear and A2 and A3 clot sections.    Neutrophils and precursors 45% erythroids 16.5% lymphocytes 32% eosinophils 3% plasma cells 3.5% blasts 0%.  M:E ratio equals 2-3: 1.    Core Biopsy/Aspirate Clot:    Tissue block B2: Bone marrow biopsy sections  are normocellular at 40-50%. Trilineage hematopoiesis is left shifted and dysplastic.  Reticulin stain demonstrates at least 3+3 reticulin fibrosis.  CD34/ positive blasts are increased at 5-10%.  CD56 expression approximately 5%.  No increase in CD20 positive B cells and CD3 positive T cells.   positive plasma cells are somewhat increased at 10-15%; kappa and lambda immunohistochemistry are negative for clonality.      Tissue block A2: Bone marrow clot sections are hypocellular at 20%. Trilineage hematopoiesis is left shifted and dysplastic.  No increase in CD20 positive B cells are CD3 positive T cells.   positive plasma cells are increased at 10-20%.  No aberrant coexpression of CD56 on plasma cells.  Kappa and lambda in situ hybridization studies appear negative for clonality.  P53 demonstrates a wild-type expression pattern.    All controls stain appropriately.     When performed, bone marrow core biopsies are decalcified prior to tissue processing.       MCRS Yes (A)    Performing Labs      The technical component of this testing was completed at the [facility] and [facility]          Ziggy understood the above assessment and recommendations.  Multiple questions answered.  No barriers to learning identified.       Known issues that I take into account for medical decisions, with salient changes to the plan considering these complexities noted above.    Patient Active Problem List   Diagnosis    Hyperlipidemia LDL goal <100    Hypercoagulable state (H)    CVA (cerebrovascular accident) (H)    CAD S/P percutaneous coronary angioplasty    Low back pain    MDS (myelodysplastic syndrome) (H)   - Undefined clotting disorder    ------------------------------------------------------------------------------------------------------------------------------------------------    Patient Care Team         Relationship Specialty Notifications Start End     Ronald Chamberlain PCP - General Family Medicine  7/7/23     Phone: [phone] Fax: [phone] [address] [place] [address]    Lynda Mcbride MD Assigned Cancer Care Provider   5/6/23     Phone: [phone] Fax: [phone]         [facility] [address]    Jennifer Pedersen, RN Specialty Care Coordinator Hematology & Oncology Admissions 7/7/23             Mariluz Cabral MD

## 2023-08-01 NOTE — NURSING NOTE
"Oncology Rooming Note    August 1, 2023 12:15 PM   Ziggy Hallman is a 47 year old male who presents for:    Chief Complaint   Patient presents with    Oncology Clinic Visit     Myelodysplastic syndrome     Initial Vitals: /76 (BP Location: Right arm, Patient Position: Sitting, Cuff Size: Adult Regular)   Pulse 95   Temp 97.9  F (36.6  C) (Oral)   Resp 16   Wt 82.3 kg (181 lb 8 oz)   SpO2 98%   BMI 27.35 kg/m   Estimated body mass index is 27.35 kg/m  as calculated from the following:    Height as of 7/7/23: 1.735 m (5' 8.31\").    Weight as of this encounter: 82.3 kg (181 lb 8 oz). Body surface area is 1.99 meters squared.  No Pain (0) Comment: Data Unavailable   No LMP for male patient.  Allergies reviewed: Yes  Medications reviewed: Yes    Medications: Medication refills not needed today.  Pharmacy name entered into [employer]: [id number] - [place], [place] - [address] [place] [address]    Clinical concerns:       Jamar Powell              "

## 2023-08-01 NOTE — PROGRESS NOTES
Impression: Dry eye syndrome of bilateral lacrimal glands: H04.123. Plan:  For dry eye syndrome, I have recommended patient use a good quality artificial tear 4-6 times per day Spoke with Ziggy and his partner , patient's following new transplant visit with Dr. Cabral and Dr Steele. Reviewed plan of care per NT conversation for Stem Cell Transplant. Explained role of the Nurse Coordinator throughout the BMT process as well as general time line and expectations for transplant. Discussed necessity of caregiver and program's proximity requirements. All questions were answered.     Plan: Allogeneic Transplant, pending four more cycles of therapy and response to treatment.    Timeline Notes:roughly four months    Contact information provided for :  yes    Allo:  HLA typing drawn: Yes - fausto confirmatory typing today - patient needed high resolution HLA    PRA typing drawn:  Yes    CMV-IgG and ABO-Rh drawn or in record: Yes    Contact information provided for : No    Will sibling typing kits need to be sent? No - already have typing on matched sib    Financial Release for URD search obtained:  No - patient has matched sibling      EOC Reason updated: yes

## 2023-08-02 LAB
CMV IGG SERPL IA-ACNC: <0.2 U/ML
CMV IGG SERPL IA-ACNC: NORMAL
SCR 1 TEST METHOD: NORMAL
SCR1 CELL: NORMAL
SCR1 RESULT: NORMAL
SCR2 CELL: NORMAL
SCR2 RESULT: NORMAL
SCR2 TEST METHOD: NORMAL
ZZZSCR1 COMMENTS: NORMAL
ZZZSCR2 COMMENTS: NORMAL

## 2023-08-02 NOTE — PROGRESS NOTES
"BMT Clinical Social Work New Transplant Evaluation    Information for this evaluation on 2023 was collected via Pt and significant other report, consultation with medical team, and medical chart review.     Present:  Patient: Ziggy Hallman   Significant Other: Mireya Collins  Sister: Radha \"Yaritza\" Lexx - on telephone  Clinical  (CSW): RIVKA Euceda, [facility]    Medical Team:   Nurse Coordinator: Vesta Cotton RN  BMT Physician: Mariluz Cabral MD    Diagnosis: Myelodysplastic Syndrome (MDS)  Diagnosis Date: 10/2015      Presenting Information:   Pt is a 47 year old male diagnosed with MDS who presents to [facility] for consultation regarding an allogeneic stem cell transplant. Pt was accompanied by his significant other Mireya.      Contact Information:  Pt Phone: [phone]  Pt Email: [email or web address]  Pt's significant other Mireya Phone: [phone]    Special Needs:  No needs identified at this time. Ziggy lives in [place], [place] with his 2 son's and does not need to relocate.     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 He is planning to give his 2 weeks notice soon now that he will be starting treatment again.     Finances/Insurance:  Ziggy is currently supported by his [employer], but this will stop at resignation. He does not have the options for short or long term disability at his job. He is worried about income once he stops and how he will make ends meet. CSW encourage him to consider talking to the county about support (SNAP, energy assistance, etc) as well as possible grants to apply for. CSW also discussed the value of applying for SSDI sooner rather then later.     CSW provided information regarding the " insurance authorization process and the role of the BMT financial case-mangers. CSW provided contact info for the BMT financial case-managers and referred pt to them for future insurance questions.     Caregiver:  CSW discussed with Pt the caregiver role and expectation at length. Pt is agreeable to having a full time caregiver for a minimum of 100 days until cleared by the BMT physician. Pt's identified caregivers are his significant other and family. Caregiver education and information provided. No caregiver concerns identified.     Healthcare Directive:  No. CSW provided education and forms. CSW encouraged Pt to have discussions with their family regarding their health care wishes.     Resources Provided:  BMT Information Book   BMT Resources Packet:   Healthcare Directive:   Tour of Unit NO   Transplant unit description and information provided.     Identified Concerns:   No concerns identified at this time.     Summary:   Pt presents to [facility] for consultation regarding an allogeneic stem cell transplant. Pt/family asked good questions regarding psychosocial factors related to BMT; all of which were answered. Pt presented as friendly and open. Pt's affect was engaged. Family's affect was engaged and supportive. Pt's sister did ask for the BMT book be emailed to her, this was emailed to [email or web address]      Plan:   CSW provided contact information and encouraged Pt to contact CSW with questions, concerns, resources and for support. CSW will continue to follow Pt to provide supportive counseling and assistance with resources as needed.      RIVKA Euceda, [facility]  Phone [phone]  Pager [phone]

## 2023-08-09 DIAGNOSIS — D46.9 MDS (MYELODYSPLASTIC SYNDROME) (H): Primary | ICD-10-CM

## 2023-08-09 LAB
A*: NORMAL
A*LOCUS SEROLOGIC EQUIVALENT: 1
A*LOCUS: NORMAL
A*SEROLOGIC EQUIVALENT: 2
ABTEST METHOD: NORMAL
B*: NORMAL
B*LOCUS SEROLOGIC EQUIVALENT: 7
B*LOCUS: NORMAL
B*SEROLOGIC EQUIVALENT: 8
BW-1: NORMAL
C*: NORMAL
C*LOCUS SEROLOGIC EQUIVALENT: 7
C*LOCUS: NORMAL
C*SEROLOGIC EQUIVALENT: 7
DPA1*: NORMAL
DPA1*LOCUS: NORMAL
DPB1*: NORMAL
DPB1*LOCUS NMDP: NORMAL
DPB1*LOCUS: NORMAL
DPB1*NMDP: NORMAL
DQA1*: NORMAL
DQA1*LOCUS: NORMAL
DQB1*: NORMAL
DQB1*LOCUS SEROLOGIC EQUIVALENT: 9
DQB1*LOCUS: NORMAL
DQB1*SEROLOGIC EQUIVALENT: 6
DRB1*: NORMAL
DRB1*LOCUS SEROLOGIC EQUIVALENT: 9
DRB1*LOCUS: NORMAL
DRB1*SEROLOGIC EQUIVALENT: 15
DRB4*LOCUS SEROLOGIC EQUIVALENT: 53
DRB4*LOCUS: NORMAL
DRB5*: NORMAL
DRB5*SEROLOGIC EQUIVALENT: 51
DRSSO TEST METHOD: NORMAL

## 2023-08-18 ENCOUNTER — LAB (OUTPATIENT)
Dept: INFUSION THERAPY | Facility: HOSPITAL | Age: 48
End: 2023-08-18
Attending: INTERNAL MEDICINE
Payer: COMMERCIAL

## 2023-08-18 ENCOUNTER — ONCOLOGY VISIT (OUTPATIENT)
Dept: ONCOLOGY | Facility: HOSPITAL | Age: 48
End: 2023-08-18
Attending: INTERNAL MEDICINE
Payer: COMMERCIAL

## 2023-08-18 VITALS
BODY MASS INDEX: 28.22 KG/M2 | RESPIRATION RATE: 18 BRPM | TEMPERATURE: 98.1 F | SYSTOLIC BLOOD PRESSURE: 140 MMHG | OXYGEN SATURATION: 97 % | HEART RATE: 78 BPM | DIASTOLIC BLOOD PRESSURE: 78 MMHG | WEIGHT: 187.3 LBS

## 2023-08-18 DIAGNOSIS — D46.9 MDS (MYELODYSPLASTIC SYNDROME) (H): ICD-10-CM

## 2023-08-18 DIAGNOSIS — D46.9 MDS (MYELODYSPLASTIC SYNDROME) (H): Primary | ICD-10-CM

## 2023-08-18 LAB
BASOPHILS # BLD AUTO: 0 10E3/UL (ref 0–0.2)
BASOPHILS NFR BLD AUTO: 1 %
EOSINOPHIL # BLD AUTO: 0.1 10E3/UL (ref 0–0.7)
EOSINOPHIL NFR BLD AUTO: 2 %
ERYTHROCYTE [DISTWIDTH] IN BLOOD BY AUTOMATED COUNT: 15.5 % (ref 10–15)
HCT VFR BLD AUTO: 28.1 % (ref 40–53)
HGB BLD-MCNC: 9.3 G/DL (ref 13.3–17.7)
IMM GRANULOCYTES # BLD: 0.1 10E3/UL
IMM GRANULOCYTES NFR BLD: 2 %
LYMPHOCYTES # BLD AUTO: 1.5 10E3/UL (ref 0.8–5.3)
LYMPHOCYTES NFR BLD AUTO: 37 %
MCH RBC QN AUTO: 35.9 PG (ref 26.5–33)
MCHC RBC AUTO-ENTMCNC: 33.1 G/DL (ref 31.5–36.5)
MCV RBC AUTO: 109 FL (ref 78–100)
MONOCYTES # BLD AUTO: 0.3 10E3/UL (ref 0–1.3)
MONOCYTES NFR BLD AUTO: 6 %
NEUTROPHILS # BLD AUTO: 2.1 10E3/UL (ref 1.6–8.3)
NEUTROPHILS NFR BLD AUTO: 52 %
NRBC # BLD AUTO: 0 10E3/UL
NRBC BLD AUTO-RTO: 0 /100
PLATELET # BLD AUTO: 301 10E3/UL (ref 150–450)
RBC # BLD AUTO: 2.59 10E6/UL (ref 4.4–5.9)
WBC # BLD AUTO: 3.9 10E3/UL (ref 4–11)

## 2023-08-18 PROCEDURE — 36415 COLL VENOUS BLD VENIPUNCTURE: CPT

## 2023-08-18 PROCEDURE — G0463 HOSPITAL OUTPT CLINIC VISIT: HCPCS | Performed by: INTERNAL MEDICINE

## 2023-08-18 PROCEDURE — 85025 COMPLETE CBC W/AUTO DIFF WBC: CPT

## 2023-08-18 PROCEDURE — 99215 OFFICE O/P EST HI 40 MIN: CPT | Performed by: INTERNAL MEDICINE

## 2023-08-18 NOTE — PROGRESS NOTES
[facility] Hematology and Oncology Progress Note    Patient: Ziggy Hallman  MRN: [id number]  Date of Service: 05/26/2023        Reason for Visit    No chief complaint on file.        Problem List Items Addressed This Visit          Hematologic    MDS (myelodysplastic syndrome) (H) - Primary           Assessment and Plan  Myelodysplastic syndrome excess blasts 1  Cytogenetics: 45,XY,-5,add(9)(p13),-17,add(17)(p13),+mar[20]  FISH and comprehensive myeloid neoplasm NGS: Loss of 5 p15, no loss of T p53 noted, FLT3 wild-type  R-IPSS; very high risk if we consider him as complex karyotype (more than 3 abnormalities on cytogenetics along with a bone marrow blast percentage of 5 to 10%)    He had a repeat bone marrow biopsy recently.  It showed hypocellular marrow involved by MDS with a blast percentage of 5 to 10%.  Also has significant fibrosis at 3+.  Karyotyping came back showing complex karyotype in all the examined cells with hyperdiploid clone characterized by loss of 1 copy of chromosome 5 and 17 with an unknown material replacing the short arms of chromosome 9 and of the remaining chromosome 17 along with a marker chromosome.  No losses of 5q31 or TP53 noted by FISH.  Comprehensive myeloid NGS does not show any high risk mutations.  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    Referred him to the transplant team for consideration for allogenic stem cell transplant in the setting of MDS with excess blasts 1 and complex karyotype.  They are in agreement with allogeneic transplant but recommended 4 cycles of HMA to decrease the blast percentage.    Today I reviewed the rationale for doing HMA therapy prior to transplant.  I explained to him that the idea is to decrease the blast percentage prior to proceeding with stem cell transplant.  I reviewed potential options including Vidaza or decitabine with or without venetoclax.  I reviewed the potential side effects and complications including but not limited to cytopenias,  transfusion requirement, infections, etc..  My plan is to give him 4 cycles of Vidaza.  Dose will be 75 mg per metered squared for 7 consecutive days every 4 weeks (or we can do 5 days from Monday through Friday and then 2 days off and resume day 6 and 7 the next week).  Since his blast percentage is less than 10, I do not think adding venetoclax will give us a huge benefit but definitely increased toxicity.  I explained to him that we expect significant cytopenias after starting hypomethylating agents and he will probably require transfusion support.  He is also at a very high risk for infections.  He will need a repeat bone marrow biopsy probably at the end of 4 cycles to look for response.    Since and anticipating repeat blood draw sentences a hard stick I think a central venous line placement is not unreasonable and we can give Vidaza as an IV infusion.  Explained to him that we also have a subcutaneous injection but he will need repeat labs and it will be easier to draw it on a port.  He is agreeable to the plan.    With him being on prasugrel and Eliquis, he is at risk for bleeding.  So we will have to monitor his platelet count pretty closely and my threshold for transfusion will be higher than it would be for other patients.    I gave him information on Vidaza today.  We will schedule follow-ups in [place] where he wants to continue his treatment.       Cancer Staging   No matching staging information was found for the patient.    ECOG Performance    0 - Independent         Problem List    Patient Active Problem List   Diagnosis    Hyperlipidemia LDL goal <100    Hypercoagulable state (H)    CVA (cerebrovascular accident) (H)    CAD S/P percutaneous coronary angioplasty    Low back pain    MDS (myelodysplastic syndrome) (H)        Oncology history  His history of MDS dates back to 2015.  He had a bone marrow biopsy at that time which was done as a part of evaluation for anemia.  Bone marrow biopsy at that  time apparently showed trilineage hematopoiesis with dysgranulopoiesis and dysmegakaryopoesis less than 1% blasts.  Cellularity was 40 to 50%.  Cytogenetics showed a complex karyotype with 45, XY,a derivative of chromosome 5 and 17, addition of 9p13, trisomy 11, deletion 17 in 18 cells and normal karyotype with 46 XY in the 3 of 20 cells.  IPSS was 4 which put him in the intermediate risk category.  He also saw BMT in November 2015.  Underwent evaluation for potential allogenic stem cell transplant.  2 of his siblings were a full match.  Since his counts were pretty stable he was just followed.  His EPO level was 25.  He has not had any [place] supplementation.  Looks like he also underwent evaluation for bone marrow failure syndromes.  Genetic studies for Fanconi anemia and dyskeratosis congenita were negative.     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  Also had an MI in May 2017 with in-stent restenosis.  Is currently on apixaban and prasugrel.    Interval History   Ziggy Hallman is a 47 year old male with MDS with excess blasts 1 with complex karyotype who is here for follow-up.    Bone marrow biopsy recently showed increased blast percentage between 5 to 10% and with complex karyotype.  He belongs to very high risk category on our IPSS scoring system.  I sent him to bone marrow transplant team at the [facility] for consideration for allogenic stem cell transplant.  I anticipated that there would need some pretransplant HMA therapy.  He has met with them since and they do recommend stem cell transplant but after about 4 cycles of HMA with or without venetoclax.  He is here to discuss the same.        Review of Systems  A comprehensive review of systems was negative except for what is noted in the interval history    Current Outpatient Medications    Medication    amLODIPine (NORVASC) 5 MG tablet    apixaban ANTICOAGULANT (ELIQUIS) 5 MG tablet    atorvastatin (LIPITOR) 40 MG tablet    cyclobenzaprine (FLEXERIL) 10 MG tablet    prasugrel (EFFIENT) 10 MG TABS tablet    Zolpidem Tartrate (AMBIEN PO)     No current facility-administered medications for this visit.        Physical Exam      General: alert and cooperative      Lab Results    Recent Results (from the past 168 hour(s))   CBC with platelets and differential   Result Value Ref Range    WBC Count 3.9 (L) 4.0 - 11.0 10e3/uL    RBC Count 2.59 (L) 4.40 - 5.90 10e6/uL    Hemoglobin 9.3 (L) 13.3 - 17.7 g/dL    Hematocrit 28.1 (L) 40.0 - 53.0 %     (H) 78 - 100 fL    MCH 35.9 (H) 26.5 - 33.0 pg    MCHC 33.1 31.5 - 36.5 g/dL    RDW 15.5 (H) 10.0 - 15.0 %    Platelet Count 301 150 - 450 10e3/uL    % Neutrophils 52 %    % Lymphocytes 37 %    % Monocytes 6 %    % Eosinophils 2 %    % Basophils 1 %    % Immature Granulocytes 2 %    NRBCs per 100 WBC 0 <1 /100    Absolute Neutrophils 2.1 1.6 - 8.3 10e3/uL    Absolute Lymphocytes 1.5 0.8 - 5.3 10e3/uL    Absolute Monocytes 0.3 0.0 - 1.3 10e3/uL    Absolute Eosinophils 0.1 0.0 - 0.7 10e3/uL    Absolute Basophils 0.0 0.0 - 0.2 10e3/uL    Absolute Immature Granulocytes 0.1 <=0.4 10e3/uL    Absolute NRBCs 0.0 10e3/uL         Imaging    CT ANGIO HEAD AND NECK CAROTID    Result Date: 7/26/2023  For Patients: As a result of the 21st Century Cures Act, medical imaging exams and procedure reports are released immediately into your electronic medical record. You may view this report before your referring provider. If you have questions, please contact your health care provider. EXAM: CT ANGIO HEAD AND NECK CAROTID LOCATION: [facility] DATE: 7/26/2023 INDICATION: 2 wks ha, hx of stroke, on eliquis COMPARISON: Head and neck CTA 11/01/2022, 03/11/2020. CONTRAST: Omnipaque 350 40cc TECHNIQUE: Head and neck CT angiogram with IV contrast. Axial helical CT images of the  head and neck vessels obtained during the arterial phase of intravenous contrast administration. Axial 2D reconstructed images and multiplanar 3D MIP reconstructed images of the head and neck vessels were performed by the technologist. Dose reduction techniques were used. All stenosis measurements made according to NASCET criteria unless otherwise specified. FINDINGS: HEAD CTA: ANTERIOR CIRCULATION: No stenosis/occlusion, aneurysm, or high flow vascular malformation. Standard [place] anatomy. Stable left posterior indicating artery infundibulum dating back to at least 03/11/2020. POSTERIOR CIRCULATION: No stenosis/occlusion, aneurysm, or high flow vascular malformation. Balanced vertebral arteries supply a normal basilar artery. DURAL VENOUS SINUSES: Expected enhancement of the major dural venous sinuses. NECK CTA: RIGHT CAROTID: Mild eccentric atherosclerotic plaque in the mid to distal common carotid artery and at the carotid bifurcation. No focal plaque ulceration. No measurable stenosis or evidence of dissection. LEFT CAROTID: Mild stenosis of the distal common carotid artery due to eccentric fibrofatty atherosclerotic plaque. No focal plaque ulceration or evidence of dissection. The internal carotid artery is widely patent. VERTEBRAL ARTERIES: No focal stenosis or dissection. Balanced vertebral arteries. AORTIC ARCH: Classic aortic arch anatomy with no significant stenosis at the origin of the great vessels. NONVASCULAR STRUCTURES: Cervical spondylosis.    HEAD CTA: 1.  Normal CTA [place]. No significant change since 11/01/2022. NECK CTA: 1.  No significant change since 11/01/2022. Patent major cervical arteries. No large vessel occlusion or evidence of dissection. 2.  Mild stenosis of the distal left common carotid artery. Widely patent left internal carotid artery. 3.  Mild atherosclerotic plaque in the mid to distal right common carotid artery and right carotid bifurcation. No significant  right carotid stenosis. 4.  Widely patent vertebral arteries.    CT HEAD W/O CONTRAST    Result Date: 7/26/2023  For Patients: As a result of the 21st Century Cures Act, medical imaging exams and procedure reports are released immediately into your electronic medical record. You may view this report before your referring provider. If you have questions, please contact your health care provider. EXAM: CT HEAD BRAIN WO LOCATION: [facility] DATE: 7/26/2023 INDICATION: Headache constant, 2-week duration COMPARISON: None. TECHNIQUE: Routine CT Head without IV contrast. Multiplanar reformats. Dose reduction techniques were used. FINDINGS: INTRACRANIAL CONTENTS: No evidence of acute intracranial hemorrhage or mass effect. Chronic right parietal lobe infarct. Scattered foci of decreased attenuation within the cerebral hemispheric white matter which are nonspecific, though most commonly ascribed to chronic small vessel ischemic disease. The ventricles and sulci are prominent consistent with mild brain parenchymal volume loss. Gray-white matter differentiation is maintained. The basilar cisterns are patent. VISUALIZED ORBITS/SINUSES/MASTOIDS: The globes are unremarkable. The partially imaged paranasal sinuses, mastoid air cells and middle ear cavities are unremarkable. BONES/SOFT TISSUES: The visualized skull base and calvarium are unremarkable.    1.  No evidence of acute intracranial hemorrhage or mass effect. 2.  Chronic right parietal lobe infarct. 3.  Mild nonspecific white matter changes. 4.  Mild brain parenchymal volume loss.     A total of 40 min were spent today on this visit which included face to face conversation with the patient, EMR review, literature review, counseling and co-ordination of care and medical documentation.      Signed by: Lynda Mcbride MD

## 2023-08-18 NOTE — LETTER
8/18/2023         RE: Ziggy Hallman  [address] [facility] [address]        Dear Colleague,    Thank you for referring your patient, Ziggy Hallman, to the [facility] [place]. Please see a copy of my visit note below.    [facility] Hematology and Oncology Progress Note    Patient: Ziggy Hallman  MRN: [id number]  Date of Service: 05/26/2023        Reason for Visit    No chief complaint on file.        Problem List Items Addressed This Visit          Hematologic    MDS (myelodysplastic syndrome) (H) - Primary           Assessment and Plan  Myelodysplastic syndrome excess blasts 1  Cytogenetics: 45,XY,-5,add(9)(p13),-17,add(17)(p13),+mar[20]  FISH and comprehensive myeloid neoplasm NGS: Loss of 5 p15, no loss of T p53 noted, FLT3 wild-type  R-IPSS; very high risk if we consider him as complex karyotype (more than 3 abnormalities on cytogenetics along with a bone marrow blast percentage of 5 to 10%)    He had a repeat bone marrow biopsy recently.  It showed hypocellular marrow involved by MDS with a blast percentage of 5 to 10%.  Also has significant fibrosis at 3+.  Karyotyping came back showing complex karyotype in all the examined cells with hyperdiploid clone characterized by loss of 1 copy of chromosome 5 and 17 with an unknown material replacing the short arms of chromosome 9 and of the remaining chromosome 17 along with a marker chromosome.  No losses of 5q31 or TP53 noted by FISH.  Comprehensive myeloid NGS does not show any high risk mutations.  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    Referred him to the transplant team for consideration for allogenic stem cell transplant in the setting of MDS with excess blasts 1 and complex karyotype.  They are in agreement with allogeneic transplant but recommended 4 cycles of HMA to decrease the blast percentage.    Today I reviewed the rationale for doing HMA therapy prior to transplant.  I explained to him that the idea is to  decrease the blast percentage prior to proceeding with stem cell transplant.  I reviewed potential options including Vidaza or decitabine with or without venetoclax.  I reviewed the potential side effects and complications including but not limited to cytopenias, transfusion requirement, infections, etc..  My plan is to give him 4 cycles of Vidaza.  Dose will be 75 mg per metered squared for 7 consecutive days every 4 weeks (or we can do 5 days from Monday through Friday and then 2 days off and resume day 6 and 7 the next week).  Since his blast percentage is less than 10, I do not think adding venetoclax will give us a huge benefit but definitely increased toxicity.  I explained to him that we expect significant cytopenias after starting hypomethylating agents and he will probably require transfusion support.  He is also at a very high risk for infections.  He will need a repeat bone marrow biopsy probably at the end of 4 cycles to look for response.    Since and anticipating repeat blood draw sentences a hard stick I think a central venous line placement is not unreasonable and we can give Vidaza as an IV infusion.  Explained to him that we also have a subcutaneous injection but he will need repeat labs and it will be easier to draw it on a port.  He is agreeable to the plan.    With him being on prasugrel and Eliquis, he is at risk for bleeding.  So we will have to monitor his platelet count pretty closely and my threshold for transfusion will be higher than it would be for other patients.    I gave him information on Vidaza today.  We will schedule follow-ups in [place] where he wants to continue his treatment.       Cancer Staging   No matching staging information was found for the patient.    ECOG Performance    0 - Independent         Problem List    Patient Active Problem List   Diagnosis     Hyperlipidemia LDL goal <100     Hypercoagulable state (H)     CVA (cerebrovascular accident) (H)     CAD S/P  percutaneous coronary angioplasty     Low back pain     MDS (myelodysplastic syndrome) (H)        Oncology history  His history of MDS dates back to 2015.  He had a bone marrow biopsy at that time which was done as a part of evaluation for anemia.  Bone marrow biopsy at that time apparently showed trilineage hematopoiesis with dysgranulopoiesis and dysmegakaryopoesis less than 1% blasts.  Cellularity was 40 to 50%.  Cytogenetics showed a complex karyotype with 45, XY,a derivative of chromosome 5 and 17, addition of 9p13, trisomy 11, deletion 17 in 18 cells and normal karyotype with 46 XY in the 3 of 20 cells.  IPSS was 4 which put him in the intermediate risk category.  He also saw BMT in November 2015.  Underwent evaluation for potential allogenic stem cell transplant.  2 of his siblings were a full match.  Since his counts were pretty stable he was just followed.  His EPO level was 25.  He has not had any [place] supplementation.  Looks like he also underwent evaluation for bone marrow failure syndromes.  Genetic studies for Fanconi anemia and dyskeratosis congenita were negative.     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  Also had an MI in May 2017 with in-stent restenosis.  Is currently on apixaban and prasugrel.    Interval History   Ziggy Hallman is a 47 year old male with MDS with excess blasts 1 with complex karyotype who is here for follow-up.    Bone marrow biopsy recently showed increased blast percentage between 5 to 10% and with complex karyotype.  He belongs to very high risk category on our IPSS scoring system.  I sent him to bone marrow transplant team at the [place] for consideration for allogenic stem cell transplant.  I anticipated that there would need some pretransplant HMA therapy.  He has met with them since and they do recommend stem  cell transplant but after about 4 cycles of HMA with or without venetoclax.  He is here to discuss the same.        Review of Systems  A comprehensive review of systems was negative except for what is noted in the interval history    Current Outpatient Medications   Medication     amLODIPine (NORVASC) 5 MG tablet     apixaban ANTICOAGULANT (ELIQUIS) 5 MG tablet     atorvastatin (LIPITOR) 40 MG tablet     cyclobenzaprine (FLEXERIL) 10 MG tablet     prasugrel (EFFIENT) 10 MG TABS tablet     Zolpidem Tartrate (AMBIEN PO)     No current facility-administered medications for this visit.        Physical Exam      General: alert and cooperative      Lab Results    Recent Results (from the past 168 hour(s))   CBC with platelets and differential   Result Value Ref Range    WBC Count 3.9 (L) 4.0 - 11.0 10e3/uL    RBC Count 2.59 (L) 4.40 - 5.90 10e6/uL    Hemoglobin 9.3 (L) 13.3 - 17.7 g/dL    Hematocrit 28.1 (L) 40.0 - 53.0 %     (H) 78 - 100 fL    MCH 35.9 (H) 26.5 - 33.0 pg    MCHC 33.1 31.5 - 36.5 g/dL    RDW 15.5 (H) 10.0 - 15.0 %    Platelet Count 301 150 - 450 10e3/uL    % Neutrophils 52 %    % Lymphocytes 37 %    % Monocytes 6 %    % Eosinophils 2 %    % Basophils 1 %    % Immature Granulocytes 2 %    NRBCs per 100 WBC 0 <1 /100    Absolute Neutrophils 2.1 1.6 - 8.3 10e3/uL    Absolute Lymphocytes 1.5 0.8 - 5.3 10e3/uL    Absolute Monocytes 0.3 0.0 - 1.3 10e3/uL    Absolute Eosinophils 0.1 0.0 - 0.7 10e3/uL    Absolute Basophils 0.0 0.0 - 0.2 10e3/uL    Absolute Immature Granulocytes 0.1 <=0.4 10e3/uL    Absolute NRBCs 0.0 10e3/uL         Imaging    CT ANGIO HEAD AND NECK CAROTID    Result Date: 7/26/2023  For Patients: As a result of the 21st Century Cures Act, medical imaging exams and procedure reports are released immediately into your electronic medical record. You may view this report before your referring provider. If you have questions, please contact your health care provider. EXAM: CT ANGIO HEAD AND  NECK CAROTID LOCATION: [facility] DATE: 7/26/2023 INDICATION: 2 wks ha, hx of stroke, on eliquis COMPARISON: Head and neck CTA 11/01/2022, 03/11/2020. CONTRAST: Omnipaque 350 40cc TECHNIQUE: Head and neck CT angiogram with IV contrast. Axial helical CT images of the head and neck vessels obtained during the arterial phase of intravenous contrast administration. Axial 2D reconstructed images and multiplanar 3D MIP reconstructed images of the head and neck vessels were performed by the technologist. Dose reduction techniques were used. All stenosis measurements made according to NASCET criteria unless otherwise specified. FINDINGS: HEAD CTA: ANTERIOR CIRCULATION: No stenosis/occlusion, aneurysm, or high flow vascular malformation. Standard [place] anatomy. Stable left posterior indicating artery infundibulum dating back to at least 03/11/2020. POSTERIOR CIRCULATION: No stenosis/occlusion, aneurysm, or high flow vascular malformation. Balanced vertebral arteries supply a normal basilar artery. DURAL VENOUS SINUSES: Expected enhancement of the major dural venous sinuses. NECK CTA: RIGHT CAROTID: Mild eccentric atherosclerotic plaque in the mid to distal common carotid artery and at the carotid bifurcation. No focal plaque ulceration. No measurable stenosis or evidence of dissection. LEFT CAROTID: Mild stenosis of the distal common carotid artery due to eccentric fibrofatty atherosclerotic plaque. No focal plaque ulceration or evidence of dissection. The internal carotid artery is widely patent. VERTEBRAL ARTERIES: No focal stenosis or dissection. Balanced vertebral arteries. AORTIC ARCH: Classic aortic arch anatomy with no significant stenosis at the origin of the great vessels. NONVASCULAR STRUCTURES: Cervical spondylosis.    HEAD CTA: 1.  Normal CTA [place]. No significant change since 11/01/2022. NECK CTA: 1.  No significant change since 11/01/2022. Patent major cervical arteries. No large  vessel occlusion or evidence of dissection. 2.  Mild stenosis of the distal left common carotid artery. Widely patent left internal carotid artery. 3.  Mild atherosclerotic plaque in the mid to distal right common carotid artery and right carotid bifurcation. No significant right carotid stenosis. 4.  Widely patent vertebral arteries.    CT HEAD W/O CONTRAST    Result Date: 7/26/2023  For Patients: As a result of the 21st Century Cures Act, medical imaging exams and procedure reports are released immediately into your electronic medical record. You may view this report before your referring provider. If you have questions, please contact your health care provider. EXAM: CT HEAD BRAIN WO LOCATION: [facility] DATE: 7/26/2023 INDICATION: Headache constant, 2-week duration COMPARISON: None. TECHNIQUE: Routine CT Head without IV contrast. Multiplanar reformats. Dose reduction techniques were used. FINDINGS: INTRACRANIAL CONTENTS: No evidence of acute intracranial hemorrhage or mass effect. Chronic right parietal lobe infarct. Scattered foci of decreased attenuation within the cerebral hemispheric white matter which are nonspecific, though most commonly ascribed to chronic small vessel ischemic disease. The ventricles and sulci are prominent consistent with mild brain parenchymal volume loss. Gray-white matter differentiation is maintained. The basilar cisterns are patent. VISUALIZED ORBITS/SINUSES/MASTOIDS: The globes are unremarkable. The partially imaged paranasal sinuses, mastoid air cells and middle ear cavities are unremarkable. BONES/SOFT TISSUES: The visualized skull base and calvarium are unremarkable.    1.  No evidence of acute intracranial hemorrhage or mass effect. 2.  Chronic right parietal lobe infarct. 3.  Mild nonspecific white matter changes. 4.  Mild brain parenchymal volume loss.     A total of 40 min were spent today on this visit which included face to face conversation with the patient, EMR  "review, literature review, counseling and co-ordination of care and medical documentation.      Signed by: Lynda Mcbride MD    Oncology Rooming Note    August 18, 2023 9:30 AM   Ziggy Hallman is a 48 year old male who presents for:    No chief complaint on file.    Initial Vitals: BP (!) 140/78 (BP Location: Right arm, Patient Position: Sitting, Cuff Size: Adult Regular)   Pulse 78   Temp 98.1  F (36.7  C)   Resp 18   Wt 85 kg (187 lb 4.8 oz)   SpO2 97%   BMI 28.22 kg/m   Estimated body mass index is 28.22 kg/m  as calculated from the following:    Height as of 7/7/23: 1.735 m (5' 8.31\").    Weight as of this encounter: 85 kg (187 lb 4.8 oz). Body surface area is 2.02 meters squared.  Data Unavailable Comment: Data Unavailable   No LMP for male patient.  Allergies reviewed: Yes  Medications reviewed: Yes    Medications: Medication refills not needed today.  Pharmacy name entered into [facility]: [id number] [address]    Clinical concerns: Increase Headaches and fatigue  Dr. Mcbride was notified.      Sherie Tellez RN                 Again, thank you for allowing me to participate in the care of your patient.        Sincerely,        Lynda Mcbride MD  "

## 2023-08-18 NOTE — PROGRESS NOTES
"Oncology Rooming Note    August 18, 2023 9:30 AM   Ziggy Hallman is a 48 year old male who presents for:    No chief complaint on file.    Initial Vitals: BP (!) 140/78 (BP Location: Right arm, Patient Position: Sitting, Cuff Size: Adult Regular)   Pulse 78   Temp 98.1  F (36.7  C)   Resp 18   Wt 85 kg (187 lb 4.8 oz)   SpO2 97%   BMI 28.22 kg/m   Estimated body mass index is 28.22 kg/m  as calculated from the following:    Height as of 7/7/23: 1.735 m (5' 8.31\").    Weight as of this encounter: 85 kg (187 lb 4.8 oz). Body surface area is 2.02 meters squared.  Data Unavailable Comment: Data Unavailable   No LMP for male patient.  Allergies reviewed: Yes  Medications reviewed: Yes    Medications: Medication refills not needed today.  Pharmacy name entered into [employer]: [id number] - [place], [place] - [address] [place] [address]    Clinical concerns: Increase Headaches and fatigue  Dr. Mcbride was notified.      Sherie Tellez RN             "

## 2023-08-20 ENCOUNTER — HEALTH MAINTENANCE LETTER (OUTPATIENT)
Age: 48
End: 2023-08-20

## 2023-09-07 ENCOUNTER — TELEPHONE (OUTPATIENT)
Dept: INTERVENTIONAL RADIOLOGY/VASCULAR | Facility: HOSPITAL | Age: 48
End: 2023-09-07
Payer: COMMERCIAL

## 2023-09-12 NOTE — PROGRESS NOTES
Interventional Radiology - Pre-Procedure Note:  [facility]  09/13/2023     Procedure Requested: Port placement  Requested by: Lynda Mcbride MD    History and Physical Reviewed: H&P documented within 30 days (by Lynda Mcbride MD on 08/18/2023). I have personally reviewed the patient's medical history and have updated the medical record as necessary.    Brief HPI: Ziggy Hallman is a 48 year old male with a PMH of AMI, CAD s/p angioplasty/stent, CVA, HLD, PAD s/p right toe amputation, and myelodysplastic syndrome who presents for a port placement. Originally diagnosed in 2015. He recently underwent bone marrow biopsy which demonstrated excess blasts. He was referred to the transplant team for consideration of allogenic stem cell transplant, who recommended 4 cycles of HMA prior to transplant. Requesting port placement as he will require repeat blood draws and infusions.    He endorses some body aches, but denies any fever/chills or other symptoms of infection. WBC 3.9 today.     IMAGING:  CT CHEST PE STUDY 02/06/2023  EXAM: CT CHEST PE STUDY   LOCATION: [facility]   DATE/TIME: 2/6/2023 11:23 AM     INDICATION: Pulmonary embolism (PE) suspected, unknown D-dimer. Hemoptysis.   COMPARISON: CT of the chest, abdomen and pelvis dated 12/10/2019. Chest x-ray dated 10/31/2022.   TECHNIQUE: CT chest pulmonary angiogram during arterial phase injection of IV contrast. Multiplanar reformats and MIP reconstructions were performed. Dose reduction techniques were used.   CONTRAST: Omnipaque 350, 60.     FINDINGS:   ANGIOGRAM CHEST: Pulmonary arteries are normal caliber and negative for pulmonary emboli. Curvilinear hypoattenuation along the wall of the descending thoracic aorta, without significant stenosis (series 4, image 165). Appearance is suggestive of moderate noncalcified atherosclerosis. However, this is new since 12/10/2019. No intimal flap or perfused portions  identified. No CT evidence of right heart strain.     LUNGS AND PLEURA: No consolidation, infiltrate or mass. Minimal linear atelectasis/scarring in the right lower lobe. Accessory azygos fissure. Few punctate calcified granulomas. No pleural effusion or pneumothorax.     MEDIASTINUM/AXILLAE: Cardiac size is within normal limits. Atrial septal closure device is in place. No pericardial effusion. No lymphadenopathy.     CORONARY ARTERY CALCIFICATION: Previous intervention (stent in the LAD).     UPPER ABDOMEN: Normal.     MUSCULOSKELETAL: Minimal hypertrophic degenerative changes of the thoracic spine.     Impression    1.  No pulmonary embolism.    2.  No consolidation or infiltrate.    3.  Diffuse curvilinear hypoattenuation along the wall of the descending thoracic aorta. Appearance is suggestive of moderate noncalcified atherosclerosis, new since 12/10/2019. However, given the rapid progression intraluminal thrombus or thrombosed aortic dissection is in the differential. Recommend surveillance. An MR angiogram may be able to exclude dissection.    NPO: Midnight  ANTICOAGULANTS:  Eliquis BID, does not need to hold for procedure  Prasugrel daily, does not need to hold for procedure  ANTIBIOTICS: Ancef 2 g    ALLERGIES  No Known Allergies      LABS:   INR   Date Value Ref Range Status   06/29/2017 1.09 0.86 - 1.14 Final      Hemoglobin   Date Value Ref Range Status   09/13/2023 8.4 (L) 13.3 - 17.7 g/dL Final   05/28/2020 12.6 (L) 13.3 - 17.7 g/dL Final     Platelet Count   Date Value Ref Range Status   09/13/2023 273 150 - 450 10e3/uL Final   05/28/2020 280 150 - 450 10e9/L Final     Creatinine   Date Value Ref Range Status   04/18/2023 0.92 0.67 - 1.17 mg/dL Final   05/28/2020 0.80 0.66 - 1.25 mg/dL Final     Potassium   Date Value Ref Range Status   04/18/2023 4.3 3.4 - 5.3 mmol/L Final   05/28/2020 3.8 3.4 - 5.3 mmol/L Final     WBC   Date Value Ref Range Status   05/28/2020 4.9 4.0 - 11.0 10e9/L Final     WBC  "Count   Date Value Ref Range Status   09/13/2023 3.9 (L) 4.0 - 11.0 10e3/uL Final       EXAM:  BP (!) 171/101 (BP Location: Left arm, Cuff Size: Adult Regular)   Pulse 79   Temp 98.1  F (36.7  C) (Oral)   Resp 16   Ht 1.753 m (5' 9\")   Wt 81.6 kg (180 lb)   SpO2 99%   BMI 26.58 kg/m    General:  Stable.  In no acute distress.    Neuro:  A&O x 3. Moves all extremities equally.  Resp:  Lungs clear to auscultation bilaterally.  Cardio:  S1S2 and reg, without murmur, clicks or rubs.   Skin:  Without excoriations, ecchymosis, erythema, lesions or open sores on chest.    Pre-Sedation Assessment:  Mallampati Airway Classification:  I - Faucial pillars, soft palate, and uvula are visible  Previous reaction to anesthesia/sedation:  No  Sedation plan based on assessment: Moderate (conscious) sedation  ASA Classification: Class 3 - SEVERE SYSTEMIC DISEASE, DEFINITE FUNCTIONAL LIMITATIONS.   Code Status: FULL CODE      ASSESSMENT/PLAN:   Discussed with Dr. Calderon. Ok to place port.    Port placement with sedation, laterality per proceduralist. No contraindications to RIGHT sided port placement.    Procedural education reviewed with patient/family in detail including, but not limited to risks, benefits and alternatives with understanding verbalized by patient/family.    Total time spent on the date of the encounter: 30 minutes.      MARYURI MORLEY CNP  Interventional Radiology    "

## 2023-09-13 ENCOUNTER — HOSPITAL ENCOUNTER (OUTPATIENT)
Dept: INTERVENTIONAL RADIOLOGY/VASCULAR | Facility: HOSPITAL | Age: 48
Discharge: HOME | End: 2023-09-13
Attending: INTERNAL MEDICINE | Admitting: RADIOLOGY
Payer: COMMERCIAL

## 2023-09-13 VITALS
RESPIRATION RATE: 16 BRPM | HEIGHT: 69 IN | SYSTOLIC BLOOD PRESSURE: 170 MMHG | DIASTOLIC BLOOD PRESSURE: 87 MMHG | TEMPERATURE: 97.6 F | WEIGHT: 180 LBS | OXYGEN SATURATION: 99 % | BODY MASS INDEX: 26.66 KG/M2 | HEART RATE: 78 BPM

## 2023-09-13 DIAGNOSIS — D46.9 MDS (MYELODYSPLASTIC SYNDROME) (H): ICD-10-CM

## 2023-09-13 LAB
HGB BLD-MCNC: 8.4 G/DL (ref 13.3–17.7)
PLATELET # BLD AUTO: 273 10E3/UL (ref 150–450)
WBC # BLD AUTO: 3.9 10E3/UL (ref 4–11)

## 2023-09-13 PROCEDURE — 85018 HEMOGLOBIN: CPT

## 2023-09-13 PROCEDURE — C1769 GUIDE WIRE: HCPCS

## 2023-09-13 PROCEDURE — 99152 MOD SED SAME PHYS/QHP 5/>YRS: CPT

## 2023-09-13 PROCEDURE — 250N000011 HC RX IP 250 OP 636: Mod: JZ | Performed by: RADIOLOGY

## 2023-09-13 PROCEDURE — 250N000009 HC RX 250: Performed by: RADIOLOGY

## 2023-09-13 PROCEDURE — 76937 US GUIDE VASCULAR ACCESS: CPT

## 2023-09-13 PROCEDURE — 99153 MOD SED SAME PHYS/QHP EA: CPT

## 2023-09-13 PROCEDURE — 85049 AUTOMATED PLATELET COUNT: CPT

## 2023-09-13 PROCEDURE — 250N000009 HC RX 250

## 2023-09-13 PROCEDURE — 250N000011 HC RX IP 250 OP 636

## 2023-09-13 PROCEDURE — C1788 PORT, INDWELLING, IMP: HCPCS

## 2023-09-13 PROCEDURE — 85048 AUTOMATED LEUKOCYTE COUNT: CPT

## 2023-09-13 PROCEDURE — 272N000500 HC NEEDLE CR2

## 2023-09-13 PROCEDURE — 36415 COLL VENOUS BLD VENIPUNCTURE: CPT

## 2023-09-13 RX ORDER — FENTANYL CITRATE 50 UG/ML
25-50 INJECTION, SOLUTION INTRAMUSCULAR; INTRAVENOUS EVERY 5 MIN PRN
Status: DISCONTINUED | OUTPATIENT
Start: 2023-09-13 | End: 2023-09-14 | Stop reason: HOSPADM

## 2023-09-13 RX ORDER — LIDOCAINE HYDROCHLORIDE AND EPINEPHRINE 10; 10 MG/ML; UG/ML
10 INJECTION, SOLUTION INFILTRATION; PERINEURAL ONCE
Status: COMPLETED | OUTPATIENT
Start: 2023-09-13 | End: 2023-09-13

## 2023-09-13 RX ORDER — SODIUM CHLORIDE 9 MG/ML
INJECTION, SOLUTION INTRAVENOUS CONTINUOUS
Status: DISCONTINUED | OUTPATIENT
Start: 2023-09-13 | End: 2023-09-14 | Stop reason: HOSPADM

## 2023-09-13 RX ORDER — CEFAZOLIN SODIUM/WATER 2 G/20 ML
2 SYRINGE (ML) INTRAVENOUS
Status: COMPLETED | OUTPATIENT
Start: 2023-09-13 | End: 2023-09-13

## 2023-09-13 RX ORDER — NALOXONE HYDROCHLORIDE 0.4 MG/ML
0.2 INJECTION, SOLUTION INTRAMUSCULAR; INTRAVENOUS; SUBCUTANEOUS
Status: DISCONTINUED | OUTPATIENT
Start: 2023-09-13 | End: 2023-09-14 | Stop reason: HOSPADM

## 2023-09-13 RX ORDER — NALOXONE HYDROCHLORIDE 0.4 MG/ML
0.4 INJECTION, SOLUTION INTRAMUSCULAR; INTRAVENOUS; SUBCUTANEOUS
Status: DISCONTINUED | OUTPATIENT
Start: 2023-09-13 | End: 2023-09-14 | Stop reason: HOSPADM

## 2023-09-13 RX ORDER — FLUMAZENIL 0.1 MG/ML
0.2 INJECTION, SOLUTION INTRAVENOUS
Status: DISCONTINUED | OUTPATIENT
Start: 2023-09-13 | End: 2023-09-14 | Stop reason: HOSPADM

## 2023-09-13 RX ORDER — HEPARIN SODIUM (PORCINE) LOCK FLUSH IV SOLN 100 UNIT/ML 100 UNIT/ML
500 SOLUTION INTRAVENOUS ONCE
Status: COMPLETED | OUTPATIENT
Start: 2023-09-13 | End: 2023-09-13

## 2023-09-13 RX ORDER — LIDOCAINE 40 MG/G
CREAM TOPICAL
Status: DISCONTINUED | OUTPATIENT
Start: 2023-09-13 | End: 2023-09-14 | Stop reason: HOSPADM

## 2023-09-13 RX ADMIN — FENTANYL CITRATE 25 MCG: 50 INJECTION, SOLUTION INTRAMUSCULAR; INTRAVENOUS at 10:52

## 2023-09-13 RX ADMIN — MIDAZOLAM HYDROCHLORIDE 0.5 MG: 1 INJECTION, SOLUTION INTRAMUSCULAR; INTRAVENOUS at 10:50

## 2023-09-13 RX ADMIN — Medication 500 UNITS: at 10:58

## 2023-09-13 RX ADMIN — FENTANYL CITRATE 50 MCG: 50 INJECTION, SOLUTION INTRAMUSCULAR; INTRAVENOUS at 10:43

## 2023-09-13 RX ADMIN — LIDOCAINE HYDROCHLORIDE 15 ML: 10 INJECTION, SOLUTION INFILTRATION; PERINEURAL at 10:54

## 2023-09-13 RX ADMIN — MIDAZOLAM HYDROCHLORIDE 2 MG: 1 INJECTION, SOLUTION INTRAMUSCULAR; INTRAVENOUS at 10:38

## 2023-09-13 RX ADMIN — LIDOCAINE HYDROCHLORIDE 1 ML: 10 INJECTION, SOLUTION EPIDURAL; INFILTRATION; INTRACAUDAL; PERINEURAL at 10:48

## 2023-09-13 RX ADMIN — LIDOCAINE HYDROCHLORIDE,EPINEPHRINE BITARTRATE 10 ML: 10; .01 INJECTION, SOLUTION INFILTRATION; PERINEURAL at 10:54

## 2023-09-13 RX ADMIN — Medication 2 G: at 10:36

## 2023-09-13 NOTE — PRE-PROCEDURE
GENERAL PRE-PROCEDURE:   Procedure:  Port placement  Date/Time:  9/13/2023 10:17 AM    Written consent obtained?: Yes    Risks and benefits: Risks, benefits and alternatives were discussed    Consent given by:  Patient  Patient states understanding of procedure being performed: Yes    Patient's understanding of procedure matches consent: Yes    Procedure consent matches procedure scheduled: Yes    Expected level of sedation:  Moderate  Appropriately NPO:  Yes  ASA Class:  3  Mallampati  :  Grade 1- soft palate, uvula, tonsillar pillars, and posterior pharyngeal wall visible  Lungs:  Lungs clear with good breath sounds bilaterally  Heart:  Normal heart sounds and rate  History & Physical reviewed:  History and physical reviewed and no updates needed  Statement of review:  I have reviewed the lab findings, diagnostic data, medications, and the plan for sedation

## 2023-09-13 NOTE — DISCHARGE INSTRUCTIONS
Port Placement Procedure Discharge Instructions:  You had a port placed. A port is a small medical device that is placed under the skin and is connected to a vein with a catheter (thin, flexible tube). Ports can be used to administer IV medications (including chemotherapy), fluids or blood products or for blood lab draws. Please follow the below instructions after your procedure:    Care Instructions:  - If you received sedation for your procedure, do not drive or operate heavy machinery for the rest of the day.  - You may shower beginning tomorrow (post procedure day #1). Do not scrub site until well healed; pat dry gently with a towel.  - You likely have skin adhesive over your port site. Skin adhesive works like a bandage to keep the site covered and protected. Do not use antibiotic ointment or creams/lotions over adhesive as it can break it down. The skin adhesive will peel off on its own (typically in 5-14 days).  - Avoid submerging the port site under water (ex: tub baths, Jacuzzis, lakes, hot tubs and pools) for 10 days or until your site is well healed.  - You may have some discomfort, minimal swelling, redness and/or bruising at your port site/procedure site. You may take over the counter pain medication for discomfort (follow the package directions) or apply an ice pack wrapped in a towel over the site (rotating 20 minutes with ice pack on and 20 minutes with ice pack off) for comfort as needed. It can take several days for these to resolve.  - Avoid heavy lifting (greater than 10 pounds) and strenuous activities for 2 days following your procedure.   - If you experience significant bleeding at site, apply pressure with hands above the clavicle bone, sit upright and seek immediate medical assistance.  - Ports need to be flushed approximately every 4-6 weeks, if not being used more frequently. Follow up with the provider who ordered your port placement for further instructions for this.    Seek medical  evaluation or contact [facility] ([phone])*** if you experience the following:   - Uncontrolled bleeding from port site  - Fever (greater than 101 F (38.3C))  - Purulent (yellow/green/foul smelling) drainage from port insertion site  - Increasing pain at port site  - Increasing redness at port site

## 2023-09-13 NOTE — PROCEDURES
[facility]    Procedure: IR Procedure Note    Date/Time: 9/13/2023 11:05 AM    Performed by: Piyush Calderon MD  Authorized by: Piyush Calderon MD      UNIVERSAL PROTOCOL   Site Marked: NA  Prior Images Obtained and Reviewed:  Yes  Required items: Required blood products, implants, devices and special equipment available    Patient identity confirmed:  Verbally with patient, arm band, provided demographic data and hospital-assigned identification number  Patient was reevaluated immediately before administering moderate or deep sedation or anesthesia  Confirmation Checklist:  Patient's identity using two indicators, relevant allergies, procedure was appropriate and matched the consent or emergent situation and correct equipment/implants were available  Time out: Immediately prior to the procedure a time out was called    Universal Protocol: the Joint Commission Universal Protocol was followed    Preparation: Patient was prepped and draped in usual sterile fashion    ESBL (mL):  0     ANESTHESIA    Anesthesia:  Local infiltration  Local Anesthetic:  Lidocaine 1% without epinephrine  Anesthetic Total (mL):  25      SEDATION  Patient Sedated: Yes    Sedation Type:  Moderate (conscious) sedation  Vital signs: Vital signs monitored during sedation    Fluoroscopy Time: 1 minute(s)  See dictated procedure note for full details.  Findings: Successful placement of right internal jugular port.  OK to use port.  Discharge in 1 hour.    Specimens: none    Complications: None    Condition: Stable    Plan: Successful placement of right internal jugular port.  OK to use port.  Discharge in 1 hour.      PROCEDURE  Describe Procedure: Successful placement of right internal jugular port.  OK to use port.  Discharge in 1 hour.  Patient Tolerance:  Patient tolerated the procedure well with no immediate complications  Length of time physician/provider present for 1:1 monitoring during sedation: 30

## 2023-09-13 NOTE — PROVIDER NOTIFICATION
Discharged ambulatory with , both verbalized understanding of discharge instructions. Belongings returned.

## 2023-09-18 ENCOUNTER — TELEPHONE (OUTPATIENT)
Dept: TRANSPLANT | Facility: CLINIC | Age: 48
End: 2023-09-18

## 2023-09-18 ENCOUNTER — INFUSION THERAPY VISIT (OUTPATIENT)
Dept: INFUSION THERAPY | Facility: CLINIC | Age: 48
End: 2023-09-18
Attending: INTERNAL MEDICINE
Payer: COMMERCIAL

## 2023-09-18 ENCOUNTER — PATIENT OUTREACH (OUTPATIENT)
Dept: ONCOLOGY | Facility: CLINIC | Age: 48
End: 2023-09-18

## 2023-09-18 VITALS — DIASTOLIC BLOOD PRESSURE: 90 MMHG | TEMPERATURE: 98 F | HEART RATE: 89 BPM | SYSTOLIC BLOOD PRESSURE: 174 MMHG

## 2023-09-18 DIAGNOSIS — D46.9 MDS (MYELODYSPLASTIC SYNDROME) (H): Primary | ICD-10-CM

## 2023-09-18 LAB
ALBUMIN SERPL BCG-MCNC: 2.1 G/DL (ref 3.5–5.2)
ALP SERPL-CCNC: 106 U/L (ref 40–129)
ALT SERPL W P-5'-P-CCNC: 17 U/L (ref 0–70)
ANION GAP SERPL CALCULATED.3IONS-SCNC: 7 MMOL/L (ref 7–15)
AST SERPL W P-5'-P-CCNC: 26 U/L (ref 0–45)
BASOPHILS # BLD AUTO: 0 10E3/UL (ref 0–0.2)
BASOPHILS NFR BLD AUTO: 0 %
BILIRUB SERPL-MCNC: 0.2 MG/DL
BUN SERPL-MCNC: 19.5 MG/DL (ref 6–20)
CALCIUM SERPL-MCNC: 8.6 MG/DL (ref 8.6–10)
CHLORIDE SERPL-SCNC: 106 MMOL/L (ref 98–107)
CREAT SERPL-MCNC: 0.98 MG/DL (ref 0.67–1.17)
DEPRECATED HCO3 PLAS-SCNC: 27 MMOL/L (ref 22–29)
EGFRCR SERPLBLD CKD-EPI 2021: >90 ML/MIN/1.73M2
EOSINOPHIL # BLD AUTO: 0.1 10E3/UL (ref 0–0.7)
EOSINOPHIL NFR BLD AUTO: 2 %
ERYTHROCYTE [DISTWIDTH] IN BLOOD BY AUTOMATED COUNT: 14.5 % (ref 10–15)
GLUCOSE SERPL-MCNC: 112 MG/DL (ref 70–99)
HCT VFR BLD AUTO: 22.7 % (ref 40–53)
HGB BLD-MCNC: 7.7 G/DL (ref 13.3–17.7)
IMM GRANULOCYTES # BLD: 0.1 10E3/UL
IMM GRANULOCYTES NFR BLD: 2 %
LYMPHOCYTES # BLD AUTO: 1.5 10E3/UL (ref 0.8–5.3)
LYMPHOCYTES NFR BLD AUTO: 39 %
MCH RBC QN AUTO: 35.3 PG (ref 26.5–33)
MCHC RBC AUTO-ENTMCNC: 33.9 G/DL (ref 31.5–36.5)
MCV RBC AUTO: 104 FL (ref 78–100)
MONOCYTES # BLD AUTO: 0.2 10E3/UL (ref 0–1.3)
MONOCYTES NFR BLD AUTO: 6 %
NEUTROPHILS # BLD AUTO: 2 10E3/UL (ref 1.6–8.3)
NEUTROPHILS NFR BLD AUTO: 51 %
NRBC # BLD AUTO: 0 10E3/UL
NRBC BLD AUTO-RTO: 1 /100
PLAT MORPH BLD: NORMAL
PLATELET # BLD AUTO: 213 10E3/UL (ref 150–450)
POTASSIUM SERPL-SCNC: 3.7 MMOL/L (ref 3.4–5.3)
PROT SERPL-MCNC: 5.8 G/DL (ref 6.4–8.3)
RBC # BLD AUTO: 2.18 10E6/UL (ref 4.4–5.9)
RBC MORPH BLD: NORMAL
SODIUM SERPL-SCNC: 140 MMOL/L (ref 136–145)
WBC # BLD AUTO: 3.9 10E3/UL (ref 4–11)

## 2023-09-18 PROCEDURE — 85025 COMPLETE CBC W/AUTO DIFF WBC: CPT | Performed by: INTERNAL MEDICINE

## 2023-09-18 PROCEDURE — 80053 COMPREHEN METABOLIC PANEL: CPT | Performed by: INTERNAL MEDICINE

## 2023-09-18 PROCEDURE — 96409 CHEMO IV PUSH SNGL DRUG: CPT

## 2023-09-18 PROCEDURE — 96375 TX/PRO/DX INJ NEW DRUG ADDON: CPT

## 2023-09-18 PROCEDURE — 258N000003 HC RX IP 258 OP 636: Performed by: INTERNAL MEDICINE

## 2023-09-18 PROCEDURE — 250N000011 HC RX IP 250 OP 636: Mod: JZ | Performed by: INTERNAL MEDICINE

## 2023-09-18 RX ORDER — HEPARIN SODIUM,PORCINE 10 UNIT/ML
5-20 VIAL (ML) INTRAVENOUS DAILY PRN
Status: CANCELLED | OUTPATIENT
Start: 2023-09-18

## 2023-09-18 RX ORDER — METHYLPREDNISOLONE SODIUM SUCCINATE 125 MG/2ML
125 INJECTION, POWDER, LYOPHILIZED, FOR SOLUTION INTRAMUSCULAR; INTRAVENOUS
Status: CANCELLED
Start: 2023-09-26

## 2023-09-18 RX ORDER — DIPHENHYDRAMINE HYDROCHLORIDE 50 MG/ML
50 INJECTION INTRAMUSCULAR; INTRAVENOUS
Status: CANCELLED
Start: 2023-09-25

## 2023-09-18 RX ORDER — ONDANSETRON 2 MG/ML
8 INJECTION INTRAMUSCULAR; INTRAVENOUS ONCE
Status: COMPLETED | OUTPATIENT
Start: 2023-09-18 | End: 2023-09-18

## 2023-09-18 RX ORDER — HEPARIN SODIUM,PORCINE 10 UNIT/ML
5-20 VIAL (ML) INTRAVENOUS DAILY PRN
Status: CANCELLED | OUTPATIENT
Start: 2023-09-26

## 2023-09-18 RX ORDER — ALBUTEROL SULFATE 0.83 MG/ML
2.5 SOLUTION RESPIRATORY (INHALATION)
Status: CANCELLED | OUTPATIENT
Start: 2023-09-21

## 2023-09-18 RX ORDER — ALBUTEROL SULFATE 90 UG/1
1-2 AEROSOL, METERED RESPIRATORY (INHALATION)
Status: CANCELLED
Start: 2023-09-18

## 2023-09-18 RX ORDER — HEPARIN SODIUM (PORCINE) LOCK FLUSH IV SOLN 100 UNIT/ML 100 UNIT/ML
5 SOLUTION INTRAVENOUS
Status: CANCELLED | OUTPATIENT
Start: 2023-09-19

## 2023-09-18 RX ORDER — MEPERIDINE HYDROCHLORIDE 25 MG/ML
25 INJECTION INTRAMUSCULAR; INTRAVENOUS; SUBCUTANEOUS EVERY 30 MIN PRN
Status: CANCELLED | OUTPATIENT
Start: 2023-09-25

## 2023-09-18 RX ORDER — ONDANSETRON 2 MG/ML
8 INJECTION INTRAMUSCULAR; INTRAVENOUS ONCE
Status: CANCELLED | OUTPATIENT
Start: 2023-09-20

## 2023-09-18 RX ORDER — ALBUTEROL SULFATE 90 UG/1
1-2 AEROSOL, METERED RESPIRATORY (INHALATION)
Status: CANCELLED
Start: 2023-09-26

## 2023-09-18 RX ORDER — HEPARIN SODIUM (PORCINE) LOCK FLUSH IV SOLN 100 UNIT/ML 100 UNIT/ML
5 SOLUTION INTRAVENOUS
Status: CANCELLED | OUTPATIENT
Start: 2023-09-26

## 2023-09-18 RX ORDER — ALBUTEROL SULFATE 90 UG/1
1-2 AEROSOL, METERED RESPIRATORY (INHALATION)
Status: CANCELLED
Start: 2023-09-21

## 2023-09-18 RX ORDER — HEPARIN SODIUM,PORCINE 10 UNIT/ML
5-20 VIAL (ML) INTRAVENOUS DAILY PRN
Status: CANCELLED | OUTPATIENT
Start: 2023-09-21

## 2023-09-18 RX ORDER — EPINEPHRINE 1 MG/ML
0.3 INJECTION, SOLUTION INTRAMUSCULAR; SUBCUTANEOUS EVERY 5 MIN PRN
Status: CANCELLED | OUTPATIENT
Start: 2023-09-19

## 2023-09-18 RX ORDER — ALBUTEROL SULFATE 0.83 MG/ML
2.5 SOLUTION RESPIRATORY (INHALATION)
Status: CANCELLED | OUTPATIENT
Start: 2023-09-20

## 2023-09-18 RX ORDER — MEPERIDINE HYDROCHLORIDE 25 MG/ML
25 INJECTION INTRAMUSCULAR; INTRAVENOUS; SUBCUTANEOUS EVERY 30 MIN PRN
Status: CANCELLED | OUTPATIENT
Start: 2023-09-18

## 2023-09-18 RX ORDER — ALBUTEROL SULFATE 90 UG/1
1-2 AEROSOL, METERED RESPIRATORY (INHALATION)
Status: CANCELLED
Start: 2023-09-22

## 2023-09-18 RX ORDER — HEPARIN SODIUM,PORCINE 10 UNIT/ML
5-20 VIAL (ML) INTRAVENOUS DAILY PRN
Status: CANCELLED | OUTPATIENT
Start: 2023-09-20

## 2023-09-18 RX ORDER — MEPERIDINE HYDROCHLORIDE 25 MG/ML
25 INJECTION INTRAMUSCULAR; INTRAVENOUS; SUBCUTANEOUS EVERY 30 MIN PRN
Status: CANCELLED | OUTPATIENT
Start: 2023-09-20

## 2023-09-18 RX ORDER — HEPARIN SODIUM,PORCINE 10 UNIT/ML
5-20 VIAL (ML) INTRAVENOUS DAILY PRN
Status: CANCELLED | OUTPATIENT
Start: 2023-09-22

## 2023-09-18 RX ORDER — ALBUTEROL SULFATE 0.83 MG/ML
2.5 SOLUTION RESPIRATORY (INHALATION)
Status: CANCELLED | OUTPATIENT
Start: 2023-09-26

## 2023-09-18 RX ORDER — LORAZEPAM 2 MG/ML
0.5 INJECTION INTRAMUSCULAR EVERY 4 HOURS PRN
Status: CANCELLED | OUTPATIENT
Start: 2023-09-25

## 2023-09-18 RX ORDER — ONDANSETRON 2 MG/ML
8 INJECTION INTRAMUSCULAR; INTRAVENOUS ONCE
Status: CANCELLED | OUTPATIENT
Start: 2023-09-18

## 2023-09-18 RX ORDER — DIPHENHYDRAMINE HYDROCHLORIDE 50 MG/ML
50 INJECTION INTRAMUSCULAR; INTRAVENOUS
Status: CANCELLED
Start: 2023-09-18

## 2023-09-18 RX ORDER — MEPERIDINE HYDROCHLORIDE 25 MG/ML
25 INJECTION INTRAMUSCULAR; INTRAVENOUS; SUBCUTANEOUS EVERY 30 MIN PRN
Status: CANCELLED | OUTPATIENT
Start: 2023-09-19

## 2023-09-18 RX ORDER — MEPERIDINE HYDROCHLORIDE 25 MG/ML
25 INJECTION INTRAMUSCULAR; INTRAVENOUS; SUBCUTANEOUS EVERY 30 MIN PRN
Status: CANCELLED | OUTPATIENT
Start: 2023-09-26

## 2023-09-18 RX ORDER — HEPARIN SODIUM (PORCINE) LOCK FLUSH IV SOLN 100 UNIT/ML 100 UNIT/ML
5 SOLUTION INTRAVENOUS
Status: CANCELLED | OUTPATIENT
Start: 2023-09-20

## 2023-09-18 RX ORDER — DIPHENHYDRAMINE HYDROCHLORIDE 50 MG/ML
50 INJECTION INTRAMUSCULAR; INTRAVENOUS
Status: CANCELLED
Start: 2023-09-20

## 2023-09-18 RX ORDER — LORAZEPAM 2 MG/ML
0.5 INJECTION INTRAMUSCULAR EVERY 4 HOURS PRN
Status: CANCELLED | OUTPATIENT
Start: 2023-09-21

## 2023-09-18 RX ORDER — HEPARIN SODIUM (PORCINE) LOCK FLUSH IV SOLN 100 UNIT/ML 100 UNIT/ML
5 SOLUTION INTRAVENOUS
Status: CANCELLED | OUTPATIENT
Start: 2023-09-25

## 2023-09-18 RX ORDER — ALBUTEROL SULFATE 0.83 MG/ML
2.5 SOLUTION RESPIRATORY (INHALATION)
Status: CANCELLED | OUTPATIENT
Start: 2023-09-19

## 2023-09-18 RX ORDER — ALBUTEROL SULFATE 0.83 MG/ML
2.5 SOLUTION RESPIRATORY (INHALATION)
Status: CANCELLED | OUTPATIENT
Start: 2023-09-18

## 2023-09-18 RX ORDER — EPINEPHRINE 1 MG/ML
0.3 INJECTION, SOLUTION INTRAMUSCULAR; SUBCUTANEOUS EVERY 5 MIN PRN
Status: CANCELLED | OUTPATIENT
Start: 2023-09-20

## 2023-09-18 RX ORDER — ALBUTEROL SULFATE 90 UG/1
1-2 AEROSOL, METERED RESPIRATORY (INHALATION)
Status: CANCELLED
Start: 2023-09-25

## 2023-09-18 RX ORDER — METHYLPREDNISOLONE SODIUM SUCCINATE 125 MG/2ML
125 INJECTION, POWDER, LYOPHILIZED, FOR SOLUTION INTRAMUSCULAR; INTRAVENOUS
Status: CANCELLED
Start: 2023-09-19

## 2023-09-18 RX ORDER — MEPERIDINE HYDROCHLORIDE 25 MG/ML
25 INJECTION INTRAMUSCULAR; INTRAVENOUS; SUBCUTANEOUS EVERY 30 MIN PRN
Status: CANCELLED | OUTPATIENT
Start: 2023-09-22

## 2023-09-18 RX ORDER — DIPHENHYDRAMINE HYDROCHLORIDE 50 MG/ML
50 INJECTION INTRAMUSCULAR; INTRAVENOUS
Status: CANCELLED
Start: 2023-09-26

## 2023-09-18 RX ORDER — EPINEPHRINE 1 MG/ML
0.3 INJECTION, SOLUTION INTRAMUSCULAR; SUBCUTANEOUS EVERY 5 MIN PRN
Status: CANCELLED | OUTPATIENT
Start: 2023-09-21

## 2023-09-18 RX ORDER — ALBUTEROL SULFATE 90 UG/1
1-2 AEROSOL, METERED RESPIRATORY (INHALATION)
Status: CANCELLED
Start: 2023-09-19

## 2023-09-18 RX ORDER — METHYLPREDNISOLONE SODIUM SUCCINATE 125 MG/2ML
125 INJECTION, POWDER, LYOPHILIZED, FOR SOLUTION INTRAMUSCULAR; INTRAVENOUS
Status: CANCELLED
Start: 2023-09-20

## 2023-09-18 RX ORDER — LORAZEPAM 2 MG/ML
0.5 INJECTION INTRAMUSCULAR EVERY 4 HOURS PRN
Status: CANCELLED | OUTPATIENT
Start: 2023-09-26

## 2023-09-18 RX ORDER — ONDANSETRON 2 MG/ML
8 INJECTION INTRAMUSCULAR; INTRAVENOUS ONCE
Status: CANCELLED | OUTPATIENT
Start: 2023-09-26

## 2023-09-18 RX ORDER — EPINEPHRINE 1 MG/ML
0.3 INJECTION, SOLUTION INTRAMUSCULAR; SUBCUTANEOUS EVERY 5 MIN PRN
Status: CANCELLED | OUTPATIENT
Start: 2023-09-22

## 2023-09-18 RX ORDER — HEPARIN SODIUM,PORCINE 10 UNIT/ML
5-20 VIAL (ML) INTRAVENOUS DAILY PRN
Status: CANCELLED | OUTPATIENT
Start: 2023-09-25

## 2023-09-18 RX ORDER — METHYLPREDNISOLONE SODIUM SUCCINATE 125 MG/2ML
125 INJECTION, POWDER, LYOPHILIZED, FOR SOLUTION INTRAMUSCULAR; INTRAVENOUS
Status: CANCELLED
Start: 2023-09-21

## 2023-09-18 RX ORDER — METHYLPREDNISOLONE SODIUM SUCCINATE 125 MG/2ML
125 INJECTION, POWDER, LYOPHILIZED, FOR SOLUTION INTRAMUSCULAR; INTRAVENOUS
Status: CANCELLED
Start: 2023-09-18

## 2023-09-18 RX ORDER — EPINEPHRINE 1 MG/ML
0.3 INJECTION, SOLUTION INTRAMUSCULAR; SUBCUTANEOUS EVERY 5 MIN PRN
Status: CANCELLED | OUTPATIENT
Start: 2023-09-18

## 2023-09-18 RX ORDER — ALBUTEROL SULFATE 0.83 MG/ML
2.5 SOLUTION RESPIRATORY (INHALATION)
Status: CANCELLED | OUTPATIENT
Start: 2023-09-22

## 2023-09-18 RX ORDER — LORAZEPAM 2 MG/ML
0.5 INJECTION INTRAMUSCULAR EVERY 4 HOURS PRN
Status: CANCELLED | OUTPATIENT
Start: 2023-09-20

## 2023-09-18 RX ORDER — ONDANSETRON 2 MG/ML
8 INJECTION INTRAMUSCULAR; INTRAVENOUS ONCE
Status: CANCELLED | OUTPATIENT
Start: 2023-09-21

## 2023-09-18 RX ORDER — EPINEPHRINE 1 MG/ML
0.3 INJECTION, SOLUTION INTRAMUSCULAR; SUBCUTANEOUS EVERY 5 MIN PRN
Status: CANCELLED | OUTPATIENT
Start: 2023-09-25

## 2023-09-18 RX ORDER — ONDANSETRON 2 MG/ML
8 INJECTION INTRAMUSCULAR; INTRAVENOUS ONCE
Status: CANCELLED | OUTPATIENT
Start: 2023-09-22

## 2023-09-18 RX ORDER — HEPARIN SODIUM,PORCINE 10 UNIT/ML
5-20 VIAL (ML) INTRAVENOUS DAILY PRN
Status: CANCELLED | OUTPATIENT
Start: 2023-09-19

## 2023-09-18 RX ORDER — ALBUTEROL SULFATE 90 UG/1
1-2 AEROSOL, METERED RESPIRATORY (INHALATION)
Status: CANCELLED
Start: 2023-09-20

## 2023-09-18 RX ORDER — HEPARIN SODIUM (PORCINE) LOCK FLUSH IV SOLN 100 UNIT/ML 100 UNIT/ML
5 SOLUTION INTRAVENOUS
Status: DISCONTINUED | OUTPATIENT
Start: 2023-09-18 | End: 2023-09-18 | Stop reason: HOSPADM

## 2023-09-18 RX ORDER — DIPHENHYDRAMINE HYDROCHLORIDE 50 MG/ML
50 INJECTION INTRAMUSCULAR; INTRAVENOUS
Status: CANCELLED
Start: 2023-09-22

## 2023-09-18 RX ORDER — LORAZEPAM 2 MG/ML
0.5 INJECTION INTRAMUSCULAR EVERY 4 HOURS PRN
Status: CANCELLED | OUTPATIENT
Start: 2023-09-22

## 2023-09-18 RX ORDER — HEPARIN SODIUM (PORCINE) LOCK FLUSH IV SOLN 100 UNIT/ML 100 UNIT/ML
5 SOLUTION INTRAVENOUS
Status: CANCELLED | OUTPATIENT
Start: 2023-09-18

## 2023-09-18 RX ORDER — ONDANSETRON 2 MG/ML
8 INJECTION INTRAMUSCULAR; INTRAVENOUS ONCE
Status: CANCELLED | OUTPATIENT
Start: 2023-09-25

## 2023-09-18 RX ORDER — METHYLPREDNISOLONE SODIUM SUCCINATE 125 MG/2ML
125 INJECTION, POWDER, LYOPHILIZED, FOR SOLUTION INTRAMUSCULAR; INTRAVENOUS
Status: CANCELLED
Start: 2023-09-22

## 2023-09-18 RX ORDER — ALBUTEROL SULFATE 0.83 MG/ML
2.5 SOLUTION RESPIRATORY (INHALATION)
Status: CANCELLED | OUTPATIENT
Start: 2023-09-25

## 2023-09-18 RX ORDER — PROCHLORPERAZINE MALEATE 10 MG
10 TABLET ORAL EVERY 6 HOURS PRN
Qty: 30 TABLET | Refills: 2 | Status: ON HOLD | OUTPATIENT
Start: 2023-09-18 | End: 2024-01-19

## 2023-09-18 RX ORDER — LORAZEPAM 2 MG/ML
0.5 INJECTION INTRAMUSCULAR EVERY 4 HOURS PRN
Status: CANCELLED | OUTPATIENT
Start: 2023-09-18

## 2023-09-18 RX ORDER — DIPHENHYDRAMINE HYDROCHLORIDE 50 MG/ML
50 INJECTION INTRAMUSCULAR; INTRAVENOUS
Status: CANCELLED
Start: 2023-09-19

## 2023-09-18 RX ORDER — MEPERIDINE HYDROCHLORIDE 25 MG/ML
25 INJECTION INTRAMUSCULAR; INTRAVENOUS; SUBCUTANEOUS EVERY 30 MIN PRN
Status: CANCELLED | OUTPATIENT
Start: 2023-09-21

## 2023-09-18 RX ORDER — METHYLPREDNISOLONE SODIUM SUCCINATE 125 MG/2ML
125 INJECTION, POWDER, LYOPHILIZED, FOR SOLUTION INTRAMUSCULAR; INTRAVENOUS
Status: CANCELLED
Start: 2023-09-25

## 2023-09-18 RX ORDER — EPINEPHRINE 1 MG/ML
0.3 INJECTION, SOLUTION INTRAMUSCULAR; SUBCUTANEOUS EVERY 5 MIN PRN
Status: CANCELLED | OUTPATIENT
Start: 2023-09-26

## 2023-09-18 RX ORDER — LORAZEPAM 2 MG/ML
0.5 INJECTION INTRAMUSCULAR EVERY 4 HOURS PRN
Status: CANCELLED | OUTPATIENT
Start: 2023-09-19

## 2023-09-18 RX ORDER — HEPARIN SODIUM (PORCINE) LOCK FLUSH IV SOLN 100 UNIT/ML 100 UNIT/ML
5 SOLUTION INTRAVENOUS
Status: CANCELLED | OUTPATIENT
Start: 2023-09-22

## 2023-09-18 RX ORDER — HEPARIN SODIUM (PORCINE) LOCK FLUSH IV SOLN 100 UNIT/ML 100 UNIT/ML
5 SOLUTION INTRAVENOUS
Status: CANCELLED | OUTPATIENT
Start: 2023-09-21

## 2023-09-18 RX ORDER — ONDANSETRON 2 MG/ML
8 INJECTION INTRAMUSCULAR; INTRAVENOUS ONCE
Status: CANCELLED | OUTPATIENT
Start: 2023-09-19

## 2023-09-18 RX ORDER — DIPHENHYDRAMINE HYDROCHLORIDE 50 MG/ML
50 INJECTION INTRAMUSCULAR; INTRAVENOUS
Status: CANCELLED
Start: 2023-09-21

## 2023-09-18 RX ADMIN — ONDANSETRON 8 MG: 2 INJECTION INTRAMUSCULAR; INTRAVENOUS at 12:21

## 2023-09-18 RX ADMIN — SODIUM CHLORIDE 250 ML: 9 INJECTION, SOLUTION INTRAVENOUS at 12:17

## 2023-09-18 RX ADMIN — SODIUM CHLORIDE, PRESERVATIVE FREE 5 ML: 5 INJECTION INTRAVENOUS at 12:50

## 2023-09-18 RX ADMIN — AZACITIDINE 150 MG: 100 INJECTION, POWDER, LYOPHILIZED, FOR SOLUTION INTRAVENOUS; SUBCUTANEOUS at 12:30

## 2023-09-18 NOTE — PROGRESS NOTES
Infusion Nursing Note:  Ziggydana Hallman presents today for C1D1 Vidaza.    Patient seen by provider today: No   present during visit today: Not Applicable.    Note: Answered all questions regarding starting this treatment and procedure for blood/platelet transfusions since it is all new to him.      Intravenous Access:  Implanted Port.    Treatment Conditions:  Results reviewed, labs MET treatment parameters, ok to proceed with treatment.      Post Infusion Assessment:  Patient tolerated infusion without incident.  Blood return noted pre and post infusion.  Site patent and intact, free from redness, edema or discomfort.  No evidence of extravasations.  Needle remains intact for use tomorrow.       Discharge Plan:   Patient discharged in stable condition accompanied by: S.O.  Departure Mode: Ambulatory.      Marlen Salas RN

## 2023-09-18 NOTE — PROGRESS NOTES
[facility]: Cancer Care Plan of Care Education Note                                    Discussion with Patient:                                                      CHemo teach completed     Antiemetics: Compazine        Assessment:                                                      Assessment completed with:: Patient;Friend    Current living arrangement  I live in a private home with spouse    Plan of Care Education   Yearly learning assessment completed?: Yes (see Education tab)  Diagnosis:: MDS  Does patient understand diagnosis?: Yes  Tx plan/regimen:: Vidaza infusion - 7 days  Does patient understand treatment plan/regimen?: Yes  Preparing for treatment:: Reviewed treatment preparation information with patient (vascular access, day of chemo, visitor policy, what to bring, etc.)  Vascular access:: Port  Side effect education:: Diarrhea/Constipation;Infection;Lab value monitoring (anemia, neutropenia, thrombocytopenia);Mylosuppression;Nausea/Vomiting  Safety/self care at home reviewed with patient:: Yes  Informal Support system:: Significant other  Plan of Care:: [facility] follow-up appointment;Treatment schedule  When to call provider:: Bleeding;Increased shortness of breath;Temperature >100.4F;Shaking chills;New/worsening pain;Uncontrolled diarrhea/constipation;Uncontrolled nausea/vomiting    Evaluation of Learning  Patient Education Provided: Yes  Readiness:: Acceptance  Method:: Booklet/Handout;Explanation  Response:: Verbalizes understanding    No assessment indicated    Intervention/Education provided during outreach:                                                       Pan to start Vidaza today days 1-5 this week and 6-7 next week.   Weekly CBC - plan for 1 unit of PRBC if HGB <7  1 week follow up next week with Lurdes with tentative blood transfusion    Patient to follow up as scheduled at next appt    Signature:  Mercedes Maradiaga RN

## 2023-09-19 ENCOUNTER — INFUSION THERAPY VISIT (OUTPATIENT)
Dept: INFUSION THERAPY | Facility: CLINIC | Age: 48
End: 2023-09-19
Attending: INTERNAL MEDICINE
Payer: COMMERCIAL

## 2023-09-19 VITALS — SYSTOLIC BLOOD PRESSURE: 170 MMHG | DIASTOLIC BLOOD PRESSURE: 89 MMHG | HEART RATE: 74 BPM | TEMPERATURE: 98.4 F

## 2023-09-19 DIAGNOSIS — D46.9 MDS (MYELODYSPLASTIC SYNDROME) (H): Primary | ICD-10-CM

## 2023-09-19 PROCEDURE — 258N000003 HC RX IP 258 OP 636: Performed by: INTERNAL MEDICINE

## 2023-09-19 PROCEDURE — 96409 CHEMO IV PUSH SNGL DRUG: CPT

## 2023-09-19 PROCEDURE — 96375 TX/PRO/DX INJ NEW DRUG ADDON: CPT

## 2023-09-19 PROCEDURE — 250N000011 HC RX IP 250 OP 636: Mod: JZ | Performed by: INTERNAL MEDICINE

## 2023-09-19 RX ORDER — ONDANSETRON 2 MG/ML
8 INJECTION INTRAMUSCULAR; INTRAVENOUS ONCE
Status: COMPLETED | OUTPATIENT
Start: 2023-09-19 | End: 2023-09-19

## 2023-09-19 RX ORDER — HEPARIN SODIUM (PORCINE) LOCK FLUSH IV SOLN 100 UNIT/ML 100 UNIT/ML
5 SOLUTION INTRAVENOUS
Status: DISCONTINUED | OUTPATIENT
Start: 2023-09-19 | End: 2023-09-19 | Stop reason: HOSPADM

## 2023-09-19 RX ADMIN — SODIUM CHLORIDE 250 ML: 9 INJECTION, SOLUTION INTRAVENOUS at 12:35

## 2023-09-19 RX ADMIN — ONDANSETRON 8 MG: 2 INJECTION INTRAMUSCULAR; INTRAVENOUS at 12:55

## 2023-09-19 RX ADMIN — FAMOTIDINE 20 MG: 10 INJECTION INTRAVENOUS at 12:58

## 2023-09-19 RX ADMIN — AZACITIDINE 150 MG: 100 INJECTION, POWDER, LYOPHILIZED, FOR SOLUTION INTRAVENOUS; SUBCUTANEOUS at 13:05

## 2023-09-19 RX ADMIN — Medication 5 ML: at 13:20

## 2023-09-19 NOTE — TELEPHONE ENCOUNTER
BMT CLINICAL SOCIAL WORK NOTE:    Focus:Resources    Data: Pt is a 48 year old  male who had a NT with the BMT program on 8/1/23 to discuss possible BMT.    Interventions: Clinical  (CSW) received call from the Pt's sister Yaritza to assist with questions she has.  Yaritza asked for an updated regarding the pt's SSDI application. 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 We also discussed during his NT we talked about [employer] helping the pt, so the pt may have reached out to them. Yaritza also asked about a letter for the pt's work to indicate why he can not work. CSW explained that BMT can provided a letter once it is decided that he will return to the BMT program, but at this time he is still working with the oncology clinic for his chemo, so he should reach out to them to discuss his current limitations at work with his chemo. Yaritza expressed understanding of this. Yaritza did ask if the pt signed an ALEJANDRA for family to communicate with the pt's medial team. CSW explained that it does not appear we a have a form on file. CSW agreed to send the pt a consent to communicate form and the pt could return to an any [facility] clinic once completed.   Yaritza expressed an understanding of this all and will follow-up with her brother on where things are at.    Plan: CSW will continue to work with Pt and family to provide supportive counseling and assist with resources as needed. CSW will continue to collaborate with multidisciplinary team regarding Pt's plan of care.     RIVKA Euceda, [facility]  Pager: [phone]  Phone: [phone]

## 2023-09-19 NOTE — PROGRESS NOTES
Infusion Nursing Note:  Ziggy Hallman presents today for C1D2 Vidaza.    Patient seen by provider today: No   present during visit today: Not Applicable.    Note: N/A.      Intravenous Access:  Implanted Port; accessed yesterday.    Treatment Conditions:  Not Applicable.      Post Infusion Assessment:  Patient tolerated infusion without incident.  Blood return noted pre and post infusion.  Site patent and intact, free from redness, edema or discomfort.  No evidence of extravasations.  Port flushed per protocol, needle left intact for use tomorrow.       Discharge Plan:   Patient discharged in stable condition accompanied by: self.  Departure Mode: Ambulatory.      Marlen Salas RN

## 2023-09-20 ENCOUNTER — INFUSION THERAPY VISIT (OUTPATIENT)
Dept: INFUSION THERAPY | Facility: CLINIC | Age: 48
End: 2023-09-20
Attending: INTERNAL MEDICINE
Payer: COMMERCIAL

## 2023-09-20 VITALS — SYSTOLIC BLOOD PRESSURE: 125 MMHG | TEMPERATURE: 98.4 F | DIASTOLIC BLOOD PRESSURE: 82 MMHG | HEART RATE: 86 BPM

## 2023-09-20 DIAGNOSIS — D46.9 MDS (MYELODYSPLASTIC SYNDROME) (H): Primary | ICD-10-CM

## 2023-09-20 PROCEDURE — 96409 CHEMO IV PUSH SNGL DRUG: CPT

## 2023-09-20 PROCEDURE — 250N000011 HC RX IP 250 OP 636: Mod: JZ | Performed by: INTERNAL MEDICINE

## 2023-09-20 PROCEDURE — 258N000003 HC RX IP 258 OP 636: Performed by: INTERNAL MEDICINE

## 2023-09-20 PROCEDURE — 96375 TX/PRO/DX INJ NEW DRUG ADDON: CPT

## 2023-09-20 RX ORDER — HEPARIN SODIUM (PORCINE) LOCK FLUSH IV SOLN 100 UNIT/ML 100 UNIT/ML
5 SOLUTION INTRAVENOUS
Status: DISCONTINUED | OUTPATIENT
Start: 2023-09-20 | End: 2023-09-20 | Stop reason: HOSPADM

## 2023-09-20 RX ORDER — ONDANSETRON 2 MG/ML
8 INJECTION INTRAMUSCULAR; INTRAVENOUS ONCE
Status: COMPLETED | OUTPATIENT
Start: 2023-09-20 | End: 2023-09-20

## 2023-09-20 RX ADMIN — FAMOTIDINE 20 MG: 10 INJECTION INTRAVENOUS at 12:57

## 2023-09-20 RX ADMIN — SODIUM CHLORIDE 250 ML: 9 INJECTION, SOLUTION INTRAVENOUS at 12:54

## 2023-09-20 RX ADMIN — AZACITIDINE 150 MG: 100 INJECTION, POWDER, LYOPHILIZED, FOR SOLUTION INTRAVENOUS; SUBCUTANEOUS at 13:07

## 2023-09-20 RX ADMIN — ONDANSETRON 8 MG: 2 INJECTION INTRAMUSCULAR; INTRAVENOUS at 12:54

## 2023-09-20 RX ADMIN — Medication 5 ML: at 13:23

## 2023-09-20 NOTE — PROGRESS NOTES
Infusion Nursing Note:  Ziggy Hallman presents today for C1D3 Vidaza.    Patient seen by provider today: No   present during visit today: Not Applicable.    Note: N/A.      Intravenous Access:  Implanted Port.    Treatment Conditions:  Not Applicable.      Post Infusion Assessment:  Patient tolerated infusion without incident.  Blood return noted pre and post infusion.  Site patent and intact, free from redness, edema or discomfort.  No evidence of extravasations.  Needle remains intact for use tomorrow.       Discharge Plan:   Patient discharged in stable condition accompanied by: self.  Departure Mode: Ambulatory.      Marlen Salas RN

## 2023-09-21 ENCOUNTER — INFUSION THERAPY VISIT (OUTPATIENT)
Dept: INFUSION THERAPY | Facility: CLINIC | Age: 48
End: 2023-09-21
Attending: INTERNAL MEDICINE
Payer: COMMERCIAL

## 2023-09-21 VITALS
DIASTOLIC BLOOD PRESSURE: 75 MMHG | RESPIRATION RATE: 18 BRPM | TEMPERATURE: 98.7 F | HEART RATE: 75 BPM | SYSTOLIC BLOOD PRESSURE: 145 MMHG

## 2023-09-21 DIAGNOSIS — D46.9 MDS (MYELODYSPLASTIC SYNDROME) (H): Primary | ICD-10-CM

## 2023-09-21 PROCEDURE — 258N000003 HC RX IP 258 OP 636: Performed by: INTERNAL MEDICINE

## 2023-09-21 PROCEDURE — 250N000011 HC RX IP 250 OP 636: Mod: JW | Performed by: INTERNAL MEDICINE

## 2023-09-21 PROCEDURE — 250N000011 HC RX IP 250 OP 636: Mod: JZ

## 2023-09-21 PROCEDURE — 96375 TX/PRO/DX INJ NEW DRUG ADDON: CPT

## 2023-09-21 PROCEDURE — 96409 CHEMO IV PUSH SNGL DRUG: CPT

## 2023-09-21 RX ORDER — ONDANSETRON 2 MG/ML
8 INJECTION INTRAMUSCULAR; INTRAVENOUS ONCE
Status: COMPLETED | OUTPATIENT
Start: 2023-09-21 | End: 2023-09-21

## 2023-09-21 RX ORDER — HEPARIN SODIUM (PORCINE) LOCK FLUSH IV SOLN 100 UNIT/ML 100 UNIT/ML
SOLUTION INTRAVENOUS
Status: COMPLETED
Start: 2023-09-21 | End: 2023-09-21

## 2023-09-21 RX ADMIN — ONDANSETRON 8 MG: 2 INJECTION INTRAMUSCULAR; INTRAVENOUS at 12:45

## 2023-09-21 RX ADMIN — SODIUM CHLORIDE 250 ML: 9 INJECTION, SOLUTION INTRAVENOUS at 12:50

## 2023-09-21 RX ADMIN — AZACITIDINE 150 MG: 100 INJECTION, POWDER, LYOPHILIZED, FOR SOLUTION INTRAVENOUS; SUBCUTANEOUS at 13:13

## 2023-09-21 RX ADMIN — Medication 500 UNITS: at 13:26

## 2023-09-21 RX ADMIN — FAMOTIDINE 20 MG: 10 INJECTION INTRAVENOUS at 12:45

## 2023-09-21 NOTE — PROGRESS NOTES
Infusion Nursing Note:  Ziggy Hallman presents today for Vidaza C4D1.    Patient seen by provider today: No   present during visit today: Not Applicable.    Note: denies any new medical concerns since yesterday.    Intravenous Access:  Implanted Port.    Treatment Conditions:  Results reviewed, labs MET treatment parameters, ok to proceed with treatment.    Post Infusion Assessment:  Patient tolerated infusion without incident.  Blood return noted pre and post infusion.  Site patent and intact, free from redness, edema or discomfort.  No evidence of extravasations.  Access discontinued per protocol.     Discharge Plan:   Discharge instructions reviewed with: Patient.  Patient and/or family verbalized understanding of discharge instructions and all questions answered.  AVS to patient via [employer].  Patient will return 9/22/2023 for next appointment.   Patient discharged in stable condition accompanied by: self.  Departure Mode: Ambulatory.    Blanka Esparza RN

## 2023-09-22 ENCOUNTER — INFUSION THERAPY VISIT (OUTPATIENT)
Dept: INFUSION THERAPY | Facility: CLINIC | Age: 48
End: 2023-09-22
Attending: INTERNAL MEDICINE
Payer: COMMERCIAL

## 2023-09-22 VITALS — DIASTOLIC BLOOD PRESSURE: 79 MMHG | HEART RATE: 80 BPM | SYSTOLIC BLOOD PRESSURE: 137 MMHG | TEMPERATURE: 98.2 F

## 2023-09-22 DIAGNOSIS — D46.9 MDS (MYELODYSPLASTIC SYNDROME) (H): Primary | ICD-10-CM

## 2023-09-22 PROCEDURE — 96375 TX/PRO/DX INJ NEW DRUG ADDON: CPT

## 2023-09-22 PROCEDURE — 250N000011 HC RX IP 250 OP 636: Mod: JZ | Performed by: INTERNAL MEDICINE

## 2023-09-22 PROCEDURE — 96409 CHEMO IV PUSH SNGL DRUG: CPT

## 2023-09-22 PROCEDURE — 258N000003 HC RX IP 258 OP 636: Performed by: INTERNAL MEDICINE

## 2023-09-22 RX ORDER — HEPARIN SODIUM (PORCINE) LOCK FLUSH IV SOLN 100 UNIT/ML 100 UNIT/ML
5 SOLUTION INTRAVENOUS
Status: DISCONTINUED | OUTPATIENT
Start: 2023-09-22 | End: 2023-09-22 | Stop reason: HOSPADM

## 2023-09-22 RX ORDER — ONDANSETRON 2 MG/ML
8 INJECTION INTRAMUSCULAR; INTRAVENOUS ONCE
Status: COMPLETED | OUTPATIENT
Start: 2023-09-22 | End: 2023-09-22

## 2023-09-22 RX ADMIN — Medication 5 ML: at 13:53

## 2023-09-22 RX ADMIN — AZACITIDINE 150 MG: 100 INJECTION, POWDER, LYOPHILIZED, FOR SOLUTION INTRAVENOUS; SUBCUTANEOUS at 13:36

## 2023-09-22 RX ADMIN — ONDANSETRON 8 MG: 2 INJECTION INTRAMUSCULAR; INTRAVENOUS at 12:57

## 2023-09-22 RX ADMIN — SODIUM CHLORIDE 250 ML: 9 INJECTION, SOLUTION INTRAVENOUS at 13:39

## 2023-09-22 NOTE — PROGRESS NOTES
Infusion Nursing Note:  Ziggy Hallman presents today for Vidaza .    Patient seen by provider today: No   present during visit today: Not Applicable.    Note: N/A.    Intravenous Access:  Implanted Port.    Treatment Conditions:  Results reviewed, labs MET treatment parameters, ok to proceed with treatment.    Post Infusion Assessment:  Patient tolerated infusion without incident.  Blood return noted pre and post infusion.  Site patent and intact, free from redness, edema or discomfort.  No evidence of extravasations.  Access discontinued per protocol.     Discharge Plan:   Patient discharged in stable condition accompanied by: self.  Departure Mode: Ambulatory.    Jayesh Burdick RN

## 2023-09-25 ENCOUNTER — LAB (OUTPATIENT)
Dept: INFUSION THERAPY | Facility: CLINIC | Age: 48
End: 2023-09-25
Attending: INTERNAL MEDICINE
Payer: COMMERCIAL

## 2023-09-25 ENCOUNTER — ONCOLOGY VISIT (OUTPATIENT)
Dept: ONCOLOGY | Facility: CLINIC | Age: 48
End: 2023-09-25
Attending: NURSE PRACTITIONER
Payer: COMMERCIAL

## 2023-09-25 VITALS
DIASTOLIC BLOOD PRESSURE: 77 MMHG | SYSTOLIC BLOOD PRESSURE: 114 MMHG | HEIGHT: 69 IN | TEMPERATURE: 97.8 F | BODY MASS INDEX: 27.4 KG/M2 | RESPIRATION RATE: 12 BRPM | HEART RATE: 81 BPM | WEIGHT: 185 LBS | OXYGEN SATURATION: 96 %

## 2023-09-25 VITALS — SYSTOLIC BLOOD PRESSURE: 127 MMHG | DIASTOLIC BLOOD PRESSURE: 83 MMHG | HEART RATE: 79 BPM

## 2023-09-25 DIAGNOSIS — D46.9 MDS (MYELODYSPLASTIC SYNDROME) (H): Primary | ICD-10-CM

## 2023-09-25 DIAGNOSIS — T45.1X5A ANTINEOPLASTIC CHEMOTHERAPY INDUCED PANCYTOPENIA (H): ICD-10-CM

## 2023-09-25 DIAGNOSIS — R11.0 CHEMOTHERAPY-INDUCED NAUSEA: ICD-10-CM

## 2023-09-25 DIAGNOSIS — T45.1X5A CHEMOTHERAPY-INDUCED NAUSEA: ICD-10-CM

## 2023-09-25 DIAGNOSIS — D46.9 MDS (MYELODYSPLASTIC SYNDROME) (H): ICD-10-CM

## 2023-09-25 DIAGNOSIS — D61.810 ANTINEOPLASTIC CHEMOTHERAPY INDUCED PANCYTOPENIA (H): ICD-10-CM

## 2023-09-25 LAB
BASOPHILS # BLD MANUAL: 0 10E3/UL (ref 0–0.2)
BASOPHILS NFR BLD MANUAL: 0 %
EOSINOPHIL # BLD MANUAL: 0 10E3/UL (ref 0–0.7)
EOSINOPHIL NFR BLD MANUAL: 1 %
ERYTHROCYTE [DISTWIDTH] IN BLOOD BY AUTOMATED COUNT: 14.7 % (ref 10–15)
HCT VFR BLD AUTO: 26.4 % (ref 40–53)
HGB BLD-MCNC: 9 G/DL (ref 13.3–17.7)
LYMPHOCYTES # BLD MANUAL: 2.4 10E3/UL (ref 0.8–5.3)
LYMPHOCYTES NFR BLD MANUAL: 50 %
MCH RBC QN AUTO: 35.2 PG (ref 26.5–33)
MCHC RBC AUTO-ENTMCNC: 34.1 G/DL (ref 31.5–36.5)
MCV RBC AUTO: 103 FL (ref 78–100)
METAMYELOCYTES # BLD MANUAL: 0.4 10E3/UL
METAMYELOCYTES NFR BLD MANUAL: 8 %
MONOCYTES # BLD MANUAL: 0.1 10E3/UL (ref 0–1.3)
MONOCYTES NFR BLD MANUAL: 3 %
MYELOCYTES # BLD MANUAL: 0.1 10E3/UL
MYELOCYTES NFR BLD MANUAL: 3 %
NEUTROPHILS # BLD MANUAL: 1.6 10E3/UL (ref 1.6–8.3)
NEUTROPHILS NFR BLD MANUAL: 35 %
PLAT MORPH BLD: ABNORMAL
PLATELET # BLD AUTO: 396 10E3/UL (ref 150–450)
RBC # BLD AUTO: 2.56 10E6/UL (ref 4.4–5.9)
RBC MORPH BLD: ABNORMAL
WBC # BLD AUTO: 4.7 10E3/UL (ref 4–11)

## 2023-09-25 PROCEDURE — 258N000003 HC RX IP 258 OP 636: Performed by: INTERNAL MEDICINE

## 2023-09-25 PROCEDURE — 85007 BL SMEAR W/DIFF WBC COUNT: CPT | Performed by: INTERNAL MEDICINE

## 2023-09-25 PROCEDURE — 36591 DRAW BLOOD OFF VENOUS DEVICE: CPT

## 2023-09-25 PROCEDURE — 250N000011 HC RX IP 250 OP 636: Mod: JZ | Performed by: INTERNAL MEDICINE

## 2023-09-25 PROCEDURE — 85027 COMPLETE CBC AUTOMATED: CPT | Performed by: INTERNAL MEDICINE

## 2023-09-25 PROCEDURE — 96413 CHEMO IV INFUSION 1 HR: CPT

## 2023-09-25 PROCEDURE — 96375 TX/PRO/DX INJ NEW DRUG ADDON: CPT

## 2023-09-25 PROCEDURE — 99214 OFFICE O/P EST MOD 30 MIN: CPT | Performed by: NURSE PRACTITIONER

## 2023-09-25 PROCEDURE — G0463 HOSPITAL OUTPT CLINIC VISIT: HCPCS | Mod: 25 | Performed by: NURSE PRACTITIONER

## 2023-09-25 RX ORDER — ONDANSETRON 2 MG/ML
8 INJECTION INTRAMUSCULAR; INTRAVENOUS ONCE
Status: COMPLETED | OUTPATIENT
Start: 2023-09-25 | End: 2023-09-25

## 2023-09-25 RX ORDER — HEPARIN SODIUM (PORCINE) LOCK FLUSH IV SOLN 100 UNIT/ML 100 UNIT/ML
5 SOLUTION INTRAVENOUS
Status: DISCONTINUED | OUTPATIENT
Start: 2023-09-25 | End: 2023-09-25 | Stop reason: HOSPADM

## 2023-09-25 RX ORDER — ONDANSETRON 8 MG/1
8 TABLET, FILM COATED ORAL EVERY 8 HOURS PRN
Qty: 20 TABLET | Refills: 1 | Status: ON HOLD | OUTPATIENT
Start: 2023-09-25 | End: 2024-02-17

## 2023-09-25 RX ADMIN — Medication 5 ML: at 08:59

## 2023-09-25 RX ADMIN — AZACITIDINE 150 MG: 100 INJECTION, POWDER, LYOPHILIZED, FOR SOLUTION INTRAVENOUS; SUBCUTANEOUS at 08:42

## 2023-09-25 RX ADMIN — FAMOTIDINE 20 MG: 10 INJECTION INTRAVENOUS at 08:36

## 2023-09-25 RX ADMIN — SODIUM CHLORIDE 250 ML: 9 INJECTION, SOLUTION INTRAVENOUS at 08:36

## 2023-09-25 RX ADMIN — ONDANSETRON 8 MG: 2 INJECTION INTRAMUSCULAR; INTRAVENOUS at 08:36

## 2023-09-25 ASSESSMENT — PAIN SCALES - GENERAL: PAINLEVEL: NO PAIN (0)

## 2023-09-25 NOTE — PROGRESS NOTES
Infusion Nursing Note:  Ziggy Domingoel presents today for Vidaza C1D8.    Patient seen by provider today: Yes: Lurdes Dee NP therefore, assessment deferred   present during visit today: Not Applicable.    Note: N/A.    Intravenous Access:  Implanted Port.    Treatment Conditions:  Lab Results   Component Value Date    HGB 9.0 (L) 09/25/2023    WBC 4.7 09/25/2023    ANEU 1.6 09/25/2023    ANEUTAUTO 2.0 09/18/2023     09/25/2023     Post Infusion Assessment:  Patient tolerated infusion without incident.  Blood return noted pre and post infusion.  Site patent and intact, free from redness, edema or discomfort.  No evidence of extravasations.  Access discontinued per protocol.     Discharge Plan:   Discharge instructions reviewed with: Patient.  Patient and/or family verbalized understanding of discharge instructions and all questions answered.  AVS to patient via [employer].  Patient will return 9/26/2023 for next appointment.   Patient discharged in stable condition accompanied by: self.  Departure Mode: Ambulatory.    Blanka Esparza RN

## 2023-09-25 NOTE — PROGRESS NOTES
"Oncology Rooming Note    September 25, 2023 8:10 AM   Ziggy Hallman is a 48 year old male who presents for:    Chief Complaint   Patient presents with    Oncology Clinic Visit     MDS (myelodysplastic syndrome) - Labs provider and infusion     Initial Vitals: /77 (BP Location: Right arm, Patient Position: Sitting, Cuff Size: Adult Regular)   Pulse 81   Temp 97.8  F (36.6  C) (Tympanic)   Resp 12   Ht 1.753 m (5' 9\")   Wt 83.9 kg (185 lb)   SpO2 96%   BMI 27.32 kg/m   Estimated body mass index is 27.32 kg/m  as calculated from the following:    Height as of this encounter: 1.753 m (5' 9\").    Weight as of this encounter: 83.9 kg (185 lb). Body surface area is 2.02 meters squared.  No Pain (0) Comment: Data Unavailable   No LMP for male patient.  Allergies reviewed: Yes  Medications reviewed: Yes    Medications: Medication refills not needed today.  Pharmacy name entered into [employer]: [id number] - [place], [place] - [address] [place] [address]    Clinical concerns:  None      Elena Reyes CMA              "

## 2023-09-25 NOTE — PROGRESS NOTES
[facility] Hematology and Oncology Progress Note    Patient: Ziggy Hallman  MRN: [id number]  Date of Service: Sep 25, 2023          Reason for Visit    MDS    Primary hematologist: Dr. Mcbride      Assessment and Plan  Myelodysplastic syndrome excess blasts 1  -Cytogenetics: 45,XY,-5,add(9)(p13),-17,add(17)(p13),+mar[20]  -FISH and comprehensive myeloid neoplasm NGS: Loss of 5 p15, no loss of T p53 noted, FLT3 wild-type  -R-IPSS; very high risk if we consider him as complex karyotype (more than 3 abnormalities on cytogenetics along with a bone marrow blast percentage of 5 to 10%)    Referred back to transplant team for consideration for allogenic stem cell transplant in the setting of MDS with excess blasts and complex karyotype.  BMT recommended allogeneic transplant after completing 4 cycles of HMA to decrease the blast percentage.    He initiated Vidaza a week ago.  He is on a 7-day regimen and is due for days 8 and 9 to complete his first cycle.  So far, tolerating this fair with some fatigue and nausea (without emesis).    Labwork: Hemoglobin 9.0, WBC/ANC normal, platelets normal    We expect significant cytopenias on his treatments.  High risk for infection.  He is on anticoagulation/antiplatelet therapy so is at a high risk for bleeding if he has thrombocytopenia.  May need transfusion support.     Plan:  -Proceed with cycle 1 days 8 + 9 Vidaza today and tomorrow  -Weekly lab work to monitor cytopenias, with transfusion support as needed  -Threshold for platelet transfusion will be platelets <40K due to him being on antiplatelet and anticoagulation that should not be interrupted, placing him at higher risk for bleeding  -RBC tx parameters: <7 g/dL  -Return in 3 weeks, ahead of cycle 2.   -Planning to complete 4 cycles   -Following completion, repeat bone marrow biopsy to gauge response and consider transplant     Chemo nausea  He did not see a lot of benefit with Compazine.    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  Given his history of in-stent restenosis, these should not be held/interrupted if possible.      Plan:   -We will monitor for thrombocytopenia closely and transfuse if platelets are less than 40K  -If platelets running consistently <40K, we will also consider decreasing apixaban dose to prophylactic dosing, but will need to continue prasugrel due to his prior in-stent restenois      ECOG Performance    0 - Independent       Hematologic history  2015: MDS  -Presented with anemia  -Bone marrow biopsy: 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 <1% blasts. Cellularity 40 - 50%.  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  IPSS was 4 = intermediate risk category.    -EPO level 25   -Genetic testing to assess for bone marrow failure syndromes (Fanconi anemia and dyskeratosis congenita) were negative he also saw BMT in November 2015.      5/2023 repeat bone marrow biopsy: hypocellular marrow involved by MDS with blast percentage of 5 -10%. Significant fibrosis at 3+.  Karyotyping came back showing complex karyotype in all the examined cells with hyperdiploid clone characterized by loss of 1 copy of chromosome 5 and 17 with an unknown material replacing the short arms of chromosome 9 and of the remaining chromosome 17 along with a marker chromosome.  No losses of 5q31 or TP53 noted by FISH.  Comprehensive myeloid NGS does not show any high risk mutations.  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    Treatment:   2015 BMT eval for potential allogenic stem cell transplant.  2 siblings were a full match.  Deferred since counts generally stable.        2023 BMT re- eval.  Allogenic stem cell transplant recommended after induction HMA    9/18/2023 -present: Vidaza 75 mg/m2 x 7 days every 4 weeks (planning 4 cycles)    Interval History   Ziggy Hallman is a 48 year old male with MDS with excess blasts 1 with complex karyotype.  He is undergoing induction chemotherapy for a future allogenic stem cell transplant.  He initiated 5 days a (7-day regimen) a week ago.  Returns ahead of days 8 + 9 this week.    He is tolerating this generally well.  Notes fatigue. Nausea without emesis, compazine as not effective. Eating/drinking well.  Bowels are regular.  No stomatitis.  No neuropathy.  No bleeding.  No fevers.      Physical Exam      General: alert and cooperative  HEENT: No alopecia  Lymph: No palpable cervical adenopathy  Heart: RRR  Lungs: Clear bilaterally  Abd: Nondistended  Extremities: No edema    Lab Results    Recent Results (from the past 168 hour(s))   Comprehensive metabolic panel   Result Value Ref Range    Sodium 140 136 - 145 mmol/L    Potassium 3.7 3.4 - 5.3 mmol/L    Chloride 106 98 - 107 mmol/L    Carbon Dioxide (CO2) 27 22 - 29 mmol/L    Anion Gap 7 7 - 15 mmol/L    Urea Nitrogen 19.5 6.0 - 20.0 mg/dL    Creatinine 0.98 0.67 - 1.17 mg/dL    Calcium 8.6 8.6 - 10.0 mg/dL    Glucose 112 (H) 70 - 99 mg/dL    Alkaline Phosphatase 106 40 - 129 U/L    AST 26 0 - 45 U/L    ALT 17 0 - 70 U/L    Protein Total 5.8 (L) 6.4 - 8.3 g/dL    Albumin 2.1 (L) 3.5 - 5.2 g/dL    Bilirubin Total 0.2 <=1.2 mg/dL    GFR Estimate >90 >60 mL/min/1.73m2   CBC with platelets and differential   Result Value Ref Range    WBC Count 3.9 (L) 4.0 - 11.0 10e3/uL    RBC Count 2.18 (L) 4.40 - 5.90 10e6/uL    Hemoglobin 7.7 (L) 13.3 - 17.7 g/dL    Hematocrit 22.7 (L) 40.0 - 53.0 %     (H) 78 - 100 fL    MCH 35.3 (H) 26.5 - 33.0 pg    MCHC 33.9 31.5 - 36.5 g/dL    RDW 14.5 10.0 - 15.0 %    Platelet Count 213 150 - 450 10e3/uL    % Neutrophils 51 %    % Lymphocytes 39 %    %  Monocytes 6 %    % Eosinophils 2 %    % Basophils 0 %    % Immature Granulocytes 2 %    NRBCs per 100 WBC 1 (H) <1 /100    Absolute Neutrophils 2.0 1.6 - 8.3 10e3/uL    Absolute Lymphocytes 1.5 0.8 - 5.3 10e3/uL    Absolute Monocytes 0.2 0.0 - 1.3 10e3/uL    Absolute Eosinophils 0.1 0.0 - 0.7 10e3/uL    Absolute Basophils 0.0 0.0 - 0.2 10e3/uL    Absolute Immature Granulocytes 0.1 <=0.4 10e3/uL    Absolute NRBCs 0.0 10e3/uL   RBC and Platelet Morphology   Result Value Ref Range    Platelet Assessment  Automated Count Confirmed. Platelet morphology is normal.     Automated Count Confirmed. Platelet morphology is normal.    RBC Morphology Confirmed RBC Indices    CBC with platelets and differential   Result Value Ref Range    WBC Count 4.7 4.0 - 11.0 10e3/uL    RBC Count 2.56 (L) 4.40 - 5.90 10e6/uL    Hemoglobin 9.0 (L) 13.3 - 17.7 g/dL    Hematocrit 26.4 (L) 40.0 - 53.0 %     (H) 78 - 100 fL    MCH 35.2 (H) 26.5 - 33.0 pg    MCHC 34.1 31.5 - 36.5 g/dL    RDW 14.7 10.0 - 15.0 %    Platelet Count 396 150 - 450 10e3/uL   Manual Differential   Result Value Ref Range    % Neutrophils 35 %    % Lymphocytes 50 %    % Monocytes 3 %    % Eosinophils 1 %    % Basophils 0 %    % Metamyelocytes 8 %    % Myelocytes 3 %    Absolute Neutrophils 1.6 1.6 - 8.3 10e3/uL    Absolute Lymphocytes 2.4 0.8 - 5.3 10e3/uL    Absolute Monocytes 0.1 0.0 - 1.3 10e3/uL    Absolute Eosinophils 0.0 0.0 - 0.7 10e3/uL    Absolute Basophils 0.0 0.0 - 0.2 10e3/uL    Absolute Metamyelocytes 0.4 (H) <=0.0 10e3/uL    Absolute Myelocytes 0.1 (H) <=0.0 10e3/uL    RBC Morphology Confirmed RBC Indices     Platelet Assessment  Automated Count Confirmed. Platelet morphology is normal.     Automated Count Confirmed. Platelet morphology is normal.         Imaging    IR Chest Port Placement > 5 Yrs of Age    Result Date: 9/13/2023  [place] [facility] LOCATION: [facility] DATE: 9/13/2023 PROCEDURE: IMPLANTABLE VENOUS PORT PLACEMENT:  1. ULTRASOUND GUIDANCE FOR VASCULAR ACCESS. A PERMANENT IMAGE WAS STORED. 2. IMPLANTABLE VENOUS ACCESS PORT PLACEMENT. 3. FLUOROSCOPIC GUIDANCE FOR CENTRAL VENOUS ACCESS DEVICE PLACEMENT. INTERVENTIONAL RADIOLOGIST: Piyush Calderon MD. INDICATION: Myelodysplastic syndrome with plans for therapy requesting port placement. CONSENT: The risks, benefits and alternatives of port placement were discussed with the patient  in detail. All questions were answered. Informed consent was given to proceed with the procedure. MODERATE SEDATION: Versed 2.5 mg IV; Fentanyl 75 mcg IV.  Under physician supervision, Versed and fentanyl were administered for moderate sedation. Pulse oximetry, heart rate and blood pressure were continuously monitored by an independent trained observer. The physician spent 30 minutes of face-to-face sedation time with the patient. During the timeout, immediately prior to administration of medications, the patient was reassessed for adequacy to receive conscious sedation. CONTRAST: None ANTIBIOTICS: 2 g of Ancef IV ADDITIONAL MEDICATIONS: None. FLUOROSCOPIC TIME: 0.5 minutes. RADIATION DOSE: Air Kerma: 10 mGy. COMPLICATIONS: No immediate complications. STERILE BARRIER TECHNIQUE: Maximum sterile barrier technique was used. Cutaneous antisepsis was performed at the operative site with application of 2% chlorhexidine and large sterile drape. Prior to the procedure, the  and assistant performed hand hygiene and wore hat, mask, sterile gown, and sterile gloves during the entire procedure. PROCEDURE: Ultrasound demonstrated a patent and compressible right internal  jugular vein, and an ultrasound image was obtained and placed in the patient's permanent medical record. The right neck and chest were prepped and draped in usual sterile fashion. Using real-time ultrasound guidance, the right internal jugular vein was accessed. A subcutaneous pocket was created and irrigated with sterile normal saline. The  catheter was tunneled in an antegrade fashion. Over the guidewire, a peel-away sheath was advanced with fluoroscopic monitoring. Through the peel-away sheath, the catheter was advanced until the tip was at the cavoatrial junction. The catheter was cut to length and attached firmly to the port. The port was anchored with 2-0 Prolene in the subcutaneous pocket.  The incisions were closed with layered absorbable suture and surgical glue. FINDINGS: Ultrasound shows an anechoic and compressible jugular vein. At the completion of the study, the port tip lies at the cavoatrial junction. The central venous catheter insertion checklist was reviewed prior to placement and followed throughout the procedure.     IMPRESSION: Successful power-injectable venous port placement. CPT codes for physician reference only: 31854 [id number]     Total time 30 minutes, to include face to face visit, review of EMR, ordering, documentation and coordination of care on date of service      Signed by: Lurdes Dee NP

## 2023-09-25 NOTE — LETTER
9/25/2023         RE: Ziggy Hallman  [address] [facility] [address]        Dear Colleague,    Thank you for referring your patient, Ziggy Hallman, to the [facility] [place]. Please see a copy of my visit note below.    [facility] Hematology and Oncology Progress Note    Patient: Ziggy Hallman  MRN: [id number]  Date of Service: Sep 25, 2023          Reason for Visit    MDS    Primary hematologist: Dr. Mcbride      Assessment and Plan  Myelodysplastic syndrome excess blasts 1  -Cytogenetics: 45,XY,-5,add(9)(p13),-17,add(17)(p13),+mar[20]  -FISH and comprehensive myeloid neoplasm NGS: Loss of 5 p15, no loss of T p53 noted, FLT3 wild-type  -R-IPSS; very high risk if we consider him as complex karyotype (more than 3 abnormalities on cytogenetics along with a bone marrow blast percentage of 5 to 10%)    Referred back to transplant team for consideration for allogenic stem cell transplant in the setting of MDS with excess blasts and complex karyotype.  BMT recommended allogeneic transplant after completing 4 cycles of HMA to decrease the blast percentage.    He initiated Vidaza a week ago.  He is on a 7-day regimen and is due for days 8 and 9 to complete his first cycle.  So far, tolerating this fair with some fatigue and nausea (without emesis).    Labwork: Hemoglobin 9.0, WBC/ANC normal, platelets normal    We expect significant cytopenias on his treatments.  High risk for infection.  He is on anticoagulation/antiplatelet therapy so is at a high risk for bleeding if he has thrombocytopenia.  May need transfusion support.     Plan:  -Proceed with cycle 1 days 8 + 9 Vidaza today and tomorrow  -Weekly lab work to monitor cytopenias, with transfusion support as needed  -Threshold for platelet transfusion will be higher (30-50K) due to him being on antiplatelet and anticoagulation that should not be interrupted, placing him at higher risk for bleeding  -Return in 3 weeks, ahead of  cycle 2.   -Planning to complete 4 cycles   -Following completion, repeat bone marrow biopsy to gauge response and consider transplant     Chemo nausea  He did not see a lot of benefit with Compazine.    Plan:  -Prescription for Zofran 8 mg sent  -We will call if he is not getting good relief so we can try something different      Arterial thrombosis history  Stroke history  MI history, with in-stent restenosis  On apixaban and prasugrel.  Given his history of in-stent restenosis, these should not be held/interrupted if possible.      Plan:   -We will monitor for thrombocytopenia closely and transfuse if platelets are less than 30-50K      ECOG Performance    0 - Independent       Hematologic history  2015: MDS  -Presented with anemia  -Bone marrow biopsy: 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 <1% blasts. Cellularity 40 - 50%.  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  IPSS was 4 = intermediate risk category.    -EPO level 25   -Genetic testing to assess for bone marrow failure syndromes (Fanconi anemia and dyskeratosis congenita) were negative he also saw BMT in November 2015.      5/2023 repeat bone marrow biopsy: hypocellular marrow involved by MDS with blast percentage of 5 -10%. Significant fibrosis at 3+.  Karyotyping came back showing complex karyotype in all the examined cells with hyperdiploid clone characterized by loss of 1 copy of chromosome 5 and 17 with an unknown material replacing the short arms of chromosome 9 and of the remaining chromosome 17 along with a marker chromosome.  No losses of 5q31 or TP53 noted by FISH.  Comprehensive myeloid NGS does not show any high risk mutations.  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    Treatment:   2015 BMT eval for potential allogenic stem cell transplant.  2 siblings were a full  match.  Deferred since counts generally stable.       2023 BMT re- eval.  Allogenic stem cell transplant recommended after induction HMA    9/18/2023 -present: Vidaza 75 mg/m2 x 7 days every 4 weeks (planning 4 cycles)    Interval History   Ziggy Hallman is a 48 year old male with MDS with excess blasts 1 with complex karyotype.  He is undergoing induction chemotherapy for a future allogenic stem cell transplant.  He initiated 5 days a (7-day regimen) a week ago.  Returns ahead of days 8 + 9 this week.    He is tolerating this generally well.  Notes fatigue. Nausea without emesis, compazine as not effective. Eating/drinking well.  Bowels are regular.  No stomatitis.  No neuropathy.  No bleeding.  No fevers.      Physical Exam      General: alert and cooperative  HEENT: No alopecia  Lymph: No palpable cervical adenopathy  Heart: RRR  Lungs: Clear bilaterally  Abd: Nondistended  Extremities: No edema    Lab Results    Recent Results (from the past 168 hour(s))   Comprehensive metabolic panel   Result Value Ref Range    Sodium 140 136 - 145 mmol/L    Potassium 3.7 3.4 - 5.3 mmol/L    Chloride 106 98 - 107 mmol/L    Carbon Dioxide (CO2) 27 22 - 29 mmol/L    Anion Gap 7 7 - 15 mmol/L    Urea Nitrogen 19.5 6.0 - 20.0 mg/dL    Creatinine 0.98 0.67 - 1.17 mg/dL    Calcium 8.6 8.6 - 10.0 mg/dL    Glucose 112 (H) 70 - 99 mg/dL    Alkaline Phosphatase 106 40 - 129 U/L    AST 26 0 - 45 U/L    ALT 17 0 - 70 U/L    Protein Total 5.8 (L) 6.4 - 8.3 g/dL    Albumin 2.1 (L) 3.5 - 5.2 g/dL    Bilirubin Total 0.2 <=1.2 mg/dL    GFR Estimate >90 >60 mL/min/1.73m2   CBC with platelets and differential   Result Value Ref Range    WBC Count 3.9 (L) 4.0 - 11.0 10e3/uL    RBC Count 2.18 (L) 4.40 - 5.90 10e6/uL    Hemoglobin 7.7 (L) 13.3 - 17.7 g/dL    Hematocrit 22.7 (L) 40.0 - 53.0 %     (H) 78 - 100 fL    MCH 35.3 (H) 26.5 - 33.0 pg    MCHC 33.9 31.5 - 36.5 g/dL    RDW 14.5 10.0 - 15.0 %    Platelet Count 213 150 - 450 10e3/uL    %  Neutrophils 51 %    % Lymphocytes 39 %    % Monocytes 6 %    % Eosinophils 2 %    % Basophils 0 %    % Immature Granulocytes 2 %    NRBCs per 100 WBC 1 (H) <1 /100    Absolute Neutrophils 2.0 1.6 - 8.3 10e3/uL    Absolute Lymphocytes 1.5 0.8 - 5.3 10e3/uL    Absolute Monocytes 0.2 0.0 - 1.3 10e3/uL    Absolute Eosinophils 0.1 0.0 - 0.7 10e3/uL    Absolute Basophils 0.0 0.0 - 0.2 10e3/uL    Absolute Immature Granulocytes 0.1 <=0.4 10e3/uL    Absolute NRBCs 0.0 10e3/uL   RBC and Platelet Morphology   Result Value Ref Range    Platelet Assessment  Automated Count Confirmed. Platelet morphology is normal.     Automated Count Confirmed. Platelet morphology is normal.    RBC Morphology Confirmed RBC Indices    CBC with platelets and differential   Result Value Ref Range    WBC Count 4.7 4.0 - 11.0 10e3/uL    RBC Count 2.56 (L) 4.40 - 5.90 10e6/uL    Hemoglobin 9.0 (L) 13.3 - 17.7 g/dL    Hematocrit 26.4 (L) 40.0 - 53.0 %     (H) 78 - 100 fL    MCH 35.2 (H) 26.5 - 33.0 pg    MCHC 34.1 31.5 - 36.5 g/dL    RDW 14.7 10.0 - 15.0 %    Platelet Count 396 150 - 450 10e3/uL   Manual Differential   Result Value Ref Range    % Neutrophils 35 %    % Lymphocytes 50 %    % Monocytes 3 %    % Eosinophils 1 %    % Basophils 0 %    % Metamyelocytes 8 %    % Myelocytes 3 %    Absolute Neutrophils 1.6 1.6 - 8.3 10e3/uL    Absolute Lymphocytes 2.4 0.8 - 5.3 10e3/uL    Absolute Monocytes 0.1 0.0 - 1.3 10e3/uL    Absolute Eosinophils 0.0 0.0 - 0.7 10e3/uL    Absolute Basophils 0.0 0.0 - 0.2 10e3/uL    Absolute Metamyelocytes 0.4 (H) <=0.0 10e3/uL    Absolute Myelocytes 0.1 (H) <=0.0 10e3/uL    RBC Morphology Confirmed RBC Indices     Platelet Assessment  Automated Count Confirmed. Platelet morphology is normal.     Automated Count Confirmed. Platelet morphology is normal.         Imaging    IR Chest Port Placement > 5 Yrs of Age    Result Date: 9/13/2023  [facility] LOCATION: [facility] DATE: 9/13/2023  PROCEDURE: IMPLANTABLE VENOUS PORT PLACEMENT: 1. ULTRASOUND GUIDANCE FOR VASCULAR ACCESS. A PERMANENT IMAGE WAS STORED. 2. IMPLANTABLE VENOUS ACCESS PORT PLACEMENT. 3. FLUOROSCOPIC GUIDANCE FOR CENTRAL VENOUS ACCESS DEVICE PLACEMENT. INTERVENTIONAL RADIOLOGIST: Piyush Calderon MD. INDICATION: Myelodysplastic syndrome with plans for therapy requesting port placement. CONSENT: The risks, benefits and alternatives of port placement were discussed with the patient  in detail. All questions were answered. Informed consent was given to proceed with the procedure. MODERATE SEDATION: Versed 2.5 mg IV; Fentanyl 75 mcg IV.  Under physician supervision, Versed and fentanyl were administered for moderate sedation. Pulse oximetry, heart rate and blood pressure were continuously monitored by an independent trained observer. The physician spent 30 minutes of face-to-face sedation time with the patient. During the timeout, immediately prior to administration of medications, the patient was reassessed for adequacy to receive conscious sedation. CONTRAST: None ANTIBIOTICS: 2 g of Ancef IV ADDITIONAL MEDICATIONS: None. FLUOROSCOPIC TIME: 0.5 minutes. RADIATION DOSE: Air Kerma: 10 mGy. COMPLICATIONS: No immediate complications. STERILE BARRIER TECHNIQUE: Maximum sterile barrier technique was used. Cutaneous antisepsis was performed at the operative site with application of 2% chlorhexidine and large sterile drape. Prior to the procedure, the  and assistant performed hand hygiene and wore hat, mask, sterile gown, and sterile gloves during the entire procedure. PROCEDURE: Ultrasound demonstrated a patent and compressible right internal  jugular vein, and an ultrasound image was obtained and placed in the patient's permanent medical record. The right neck and chest were prepped and draped in usual sterile fashion. Using real-time ultrasound guidance, the right internal jugular vein was accessed. A subcutaneous pocket was created and  "irrigated with sterile normal saline. The catheter was tunneled in an antegrade fashion. Over the guidewire, a peel-away sheath was advanced with fluoroscopic monitoring. Through the peel-away sheath, the catheter was advanced until the tip was at the cavoatrial junction. The catheter was cut to length and attached firmly to the port. The port was anchored with 2-0 Prolene in the subcutaneous pocket.  The incisions were closed with layered absorbable suture and surgical glue. FINDINGS: Ultrasound shows an anechoic and compressible jugular vein. At the completion of the study, the port tip lies at the cavoatrial junction. The central venous catheter insertion checklist was reviewed prior to placement and followed throughout the procedure.     IMPRESSION: Successful power-injectable venous port placement. CPT codes for physician reference only: 29718 [id number]     Total time 30 minutes, to include face to face visit, review of EMR, ordering, documentation and coordination of care on date of service      Signed by: Lurdes Dee NP    Oncology Rooming Note    September 25, 2023 8:10 AM   Ziggy Hallman is a 48 year old male who presents for:    Chief Complaint   Patient presents with     Oncology Clinic Visit     MDS (myelodysplastic syndrome) - Labs provider and infusion     Initial Vitals: /77 (BP Location: Right arm, Patient Position: Sitting, Cuff Size: Adult Regular)   Pulse 81   Temp 97.8  F (36.6  C) (Tympanic)   Resp 12   Ht 1.753 m (5' 9\")   Wt 83.9 kg (185 lb)   SpO2 96%   BMI 27.32 kg/m   Estimated body mass index is 27.32 kg/m  as calculated from the following:    Height as of this encounter: 1.753 m (5' 9\").    Weight as of this encounter: 83.9 kg (185 lb). Body surface area is 2.02 meters squared.  No Pain (0) Comment: Data Unavailable   No LMP for male patient.  Allergies reviewed: Yes  Medications reviewed: Yes    Medications: Medication refills not needed today.  Pharmacy name entered " into [facility]: [id number] - [place], [place] - [address]    Clinical concerns:  None      Elena Reyes, [facility]                Again, thank you for allowing me to participate in the care of your patient.        Sincerely,        Lurdes Dee, NP

## 2023-09-26 ENCOUNTER — INFUSION THERAPY VISIT (OUTPATIENT)
Dept: INFUSION THERAPY | Facility: CLINIC | Age: 48
End: 2023-09-26
Attending: INTERNAL MEDICINE
Payer: COMMERCIAL

## 2023-09-26 VITALS
HEART RATE: 86 BPM | TEMPERATURE: 97.6 F | RESPIRATION RATE: 18 BRPM | DIASTOLIC BLOOD PRESSURE: 72 MMHG | SYSTOLIC BLOOD PRESSURE: 110 MMHG

## 2023-09-26 DIAGNOSIS — D46.9 MDS (MYELODYSPLASTIC SYNDROME) (H): Primary | ICD-10-CM

## 2023-09-26 PROBLEM — T45.1X5A ANTINEOPLASTIC CHEMOTHERAPY INDUCED PANCYTOPENIA (H): Status: ACTIVE | Noted: 2023-09-26

## 2023-09-26 PROBLEM — D61.810 ANTINEOPLASTIC CHEMOTHERAPY INDUCED PANCYTOPENIA (H): Status: ACTIVE | Noted: 2023-09-26

## 2023-09-26 PROCEDURE — 96375 TX/PRO/DX INJ NEW DRUG ADDON: CPT

## 2023-09-26 PROCEDURE — 96409 CHEMO IV PUSH SNGL DRUG: CPT

## 2023-09-26 PROCEDURE — 250N000011 HC RX IP 250 OP 636: Mod: JZ | Performed by: INTERNAL MEDICINE

## 2023-09-26 PROCEDURE — 258N000003 HC RX IP 258 OP 636: Performed by: INTERNAL MEDICINE

## 2023-09-26 RX ORDER — ONDANSETRON 2 MG/ML
8 INJECTION INTRAMUSCULAR; INTRAVENOUS ONCE
Status: COMPLETED | OUTPATIENT
Start: 2023-09-26 | End: 2023-09-26

## 2023-09-26 RX ORDER — HEPARIN SODIUM,PORCINE 10 UNIT/ML
5-20 VIAL (ML) INTRAVENOUS DAILY PRN
Status: CANCELLED | OUTPATIENT
Start: 2023-09-26

## 2023-09-26 RX ORDER — HEPARIN SODIUM (PORCINE) LOCK FLUSH IV SOLN 100 UNIT/ML 100 UNIT/ML
5 SOLUTION INTRAVENOUS
Status: DISCONTINUED | OUTPATIENT
Start: 2023-09-26 | End: 2023-09-26 | Stop reason: HOSPADM

## 2023-09-26 RX ORDER — HEPARIN SODIUM (PORCINE) LOCK FLUSH IV SOLN 100 UNIT/ML 100 UNIT/ML
5 SOLUTION INTRAVENOUS
Status: CANCELLED | OUTPATIENT
Start: 2023-09-26

## 2023-09-26 RX ADMIN — ONDANSETRON 8 MG: 2 INJECTION INTRAMUSCULAR; INTRAVENOUS at 13:35

## 2023-09-26 RX ADMIN — FAMOTIDINE 20 MG: 10 INJECTION INTRAVENOUS at 13:35

## 2023-09-26 RX ADMIN — AZACITIDINE 150 MG: 100 INJECTION, POWDER, LYOPHILIZED, FOR SOLUTION INTRAVENOUS; SUBCUTANEOUS at 14:23

## 2023-09-26 RX ADMIN — Medication 5 ML: at 14:39

## 2023-09-26 RX ADMIN — SODIUM CHLORIDE 250 ML: 9 INJECTION, SOLUTION INTRAVENOUS at 13:34

## 2023-09-26 NOTE — PROGRESS NOTES
Infusion Nursing Note:  Ziggy Hallman presents today for Vidaza C1D9.    Patient seen by provider today: No   present during visit today: Not Applicable.    Note: N/A.    Intravenous Access:  Implanted Port.    Treatment Conditions:  Results reviewed, labs MET treatment parameters, ok to proceed with treatment.    Post Infusion Assessment:  Patient tolerated infusion without incident.  Blood return noted pre and post infusion.  Site patent and intact, free from redness, edema or discomfort.  No evidence of extravasations.  Access discontinued per protocol.     Discharge Plan:   Discharge instructions reviewed with: Patient.  Patient and/or family verbalized understanding of discharge instructions and all questions answered.  AVS to patient via [employer].  Patient will return 10/2/2023 for next appointment.   Patient discharged in stable condition accompanied by: self.  Departure Mode: Ambulatory.    Blanka Esparza RN

## 2023-10-04 ENCOUNTER — LAB (OUTPATIENT)
Dept: INFUSION THERAPY | Facility: CLINIC | Age: 48
End: 2023-10-04
Attending: INTERNAL MEDICINE
Payer: COMMERCIAL

## 2023-10-04 DIAGNOSIS — T45.1X5A ANTINEOPLASTIC CHEMOTHERAPY INDUCED PANCYTOPENIA (H): Primary | ICD-10-CM

## 2023-10-04 DIAGNOSIS — D46.9 MDS (MYELODYSPLASTIC SYNDROME) (H): ICD-10-CM

## 2023-10-04 DIAGNOSIS — D46.9 MDS (MYELODYSPLASTIC SYNDROME) (H): Primary | ICD-10-CM

## 2023-10-04 DIAGNOSIS — D61.810 ANTINEOPLASTIC CHEMOTHERAPY INDUCED PANCYTOPENIA (H): Primary | ICD-10-CM

## 2023-10-04 LAB
ACANTHOCYTES BLD QL SMEAR: ABNORMAL
AUER BODIES BLD QL SMEAR: ABNORMAL
BASO STIPL BLD QL SMEAR: ABNORMAL
BASO+EOS+MONOS # BLD AUTO: ABNORMAL 10*3/UL
BASO+EOS+MONOS NFR BLD AUTO: ABNORMAL %
BASOPHILS # BLD AUTO: 0 10E3/UL (ref 0–0.2)
BASOPHILS NFR BLD AUTO: 1 %
BITE CELLS BLD QL SMEAR: ABNORMAL
BLISTER CELLS BLD QL SMEAR: ABNORMAL
BURR CELLS BLD QL SMEAR: ABNORMAL
DACRYOCYTES BLD QL SMEAR: ABNORMAL
ELLIPTOCYTES BLD QL SMEAR: ABNORMAL
EOSINOPHIL # BLD AUTO: 0.1 10E3/UL (ref 0–0.7)
EOSINOPHIL NFR BLD AUTO: 2 %
ERYTHROCYTE [DISTWIDTH] IN BLOOD BY AUTOMATED COUNT: 15.2 % (ref 10–15)
FRAGMENTS BLD QL SMEAR: ABNORMAL
HCT VFR BLD AUTO: 23.6 % (ref 40–53)
HGB BLD-MCNC: 8 G/DL (ref 13.3–17.7)
HGB C CRYSTALS: ABNORMAL
HOWELL-JOLLY BOD BLD QL SMEAR: ABNORMAL
IMM GRANULOCYTES # BLD: 0.1 10E3/UL
IMM GRANULOCYTES NFR BLD: 2 %
LYMPHOCYTES # BLD AUTO: 1.8 10E3/UL (ref 0.8–5.3)
LYMPHOCYTES NFR BLD AUTO: 45 %
MCH RBC QN AUTO: 34.8 PG (ref 26.5–33)
MCHC RBC AUTO-ENTMCNC: 33.9 G/DL (ref 31.5–36.5)
MCV RBC AUTO: 103 FL (ref 78–100)
MONOCYTES # BLD AUTO: 0.2 10E3/UL (ref 0–1.3)
MONOCYTES NFR BLD AUTO: 5 %
NEUTROPHILS # BLD AUTO: 1.8 10E3/UL (ref 1.6–8.3)
NEUTROPHILS NFR BLD AUTO: 45 %
NEUTS HYPERSEG BLD QL SMEAR: ABNORMAL
NRBC # BLD AUTO: 0 10E3/UL
NRBC BLD AUTO-RTO: 0 /100
PLAT MORPH BLD: ABNORMAL
PLATELET # BLD AUTO: 287 10E3/UL (ref 150–450)
POLYCHROMASIA BLD QL SMEAR: ABNORMAL
RBC # BLD AUTO: 2.3 10E6/UL (ref 4.4–5.9)
RBC AGGLUT BLD QL: ABNORMAL
RBC MORPH BLD: ABNORMAL
ROULEAUX BLD QL SMEAR: ABNORMAL
SICKLE CELLS BLD QL SMEAR: ABNORMAL
SMUDGE CELLS BLD QL SMEAR: ABNORMAL
SPHEROCYTES BLD QL SMEAR: ABNORMAL
STOMATOCYTES BLD QL SMEAR: ABNORMAL
TARGETS BLD QL SMEAR: ABNORMAL
TOXIC GRANULES BLD QL SMEAR: ABNORMAL
VARIANT LYMPHS BLD QL SMEAR: PRESENT
WBC # BLD AUTO: 3.9 10E3/UL (ref 4–11)

## 2023-10-04 PROCEDURE — 250N000011 HC RX IP 250 OP 636: Mod: JZ | Performed by: INTERNAL MEDICINE

## 2023-10-04 PROCEDURE — 85025 COMPLETE CBC W/AUTO DIFF WBC: CPT | Performed by: NURSE PRACTITIONER

## 2023-10-04 PROCEDURE — 36591 DRAW BLOOD OFF VENOUS DEVICE: CPT

## 2023-10-04 RX ORDER — HEPARIN SODIUM (PORCINE) LOCK FLUSH IV SOLN 100 UNIT/ML 100 UNIT/ML
5 SOLUTION INTRAVENOUS
Status: CANCELLED | OUTPATIENT
Start: 2023-10-04

## 2023-10-04 RX ORDER — HEPARIN SODIUM (PORCINE) LOCK FLUSH IV SOLN 100 UNIT/ML 100 UNIT/ML
5 SOLUTION INTRAVENOUS ONCE
Status: COMPLETED | OUTPATIENT
Start: 2023-10-04 | End: 2023-10-04

## 2023-10-04 RX ORDER — HEPARIN SODIUM,PORCINE 10 UNIT/ML
5-20 VIAL (ML) INTRAVENOUS DAILY PRN
Status: CANCELLED | OUTPATIENT
Start: 2023-10-04

## 2023-10-04 RX ADMIN — Medication 5 ML: at 10:15

## 2023-10-04 NOTE — PROGRESS NOTES
PAC accessed and labs drawn per protocol, for possible blood transfusion today. Marlen Salas RN

## 2023-10-09 ENCOUNTER — LAB (OUTPATIENT)
Dept: INFUSION THERAPY | Facility: CLINIC | Age: 48
End: 2023-10-09
Attending: INTERNAL MEDICINE
Payer: COMMERCIAL

## 2023-10-09 VITALS
OXYGEN SATURATION: 98 % | HEART RATE: 75 BPM | RESPIRATION RATE: 16 BRPM | DIASTOLIC BLOOD PRESSURE: 78 MMHG | SYSTOLIC BLOOD PRESSURE: 120 MMHG

## 2023-10-09 DIAGNOSIS — D46.9 MDS (MYELODYSPLASTIC SYNDROME) (H): Primary | ICD-10-CM

## 2023-10-09 DIAGNOSIS — D46.9 MDS (MYELODYSPLASTIC SYNDROME) (H): ICD-10-CM

## 2023-10-09 LAB
ACANTHOCYTES BLD QL SMEAR: NORMAL
AUER BODIES BLD QL SMEAR: NORMAL
BASO STIPL BLD QL SMEAR: NORMAL
BASO+EOS+MONOS # BLD AUTO: ABNORMAL 10*3/UL
BASO+EOS+MONOS NFR BLD AUTO: ABNORMAL %
BASOPHILS # BLD AUTO: 0 10E3/UL (ref 0–0.2)
BASOPHILS NFR BLD AUTO: 1 %
BITE CELLS BLD QL SMEAR: NORMAL
BLISTER CELLS BLD QL SMEAR: NORMAL
BURR CELLS BLD QL SMEAR: NORMAL
DACRYOCYTES BLD QL SMEAR: NORMAL
ELLIPTOCYTES BLD QL SMEAR: NORMAL
EOSINOPHIL # BLD AUTO: 0 10E3/UL (ref 0–0.7)
EOSINOPHIL NFR BLD AUTO: 1 %
ERYTHROCYTE [DISTWIDTH] IN BLOOD BY AUTOMATED COUNT: 15.9 % (ref 10–15)
FRAGMENTS BLD QL SMEAR: NORMAL
HCT VFR BLD AUTO: 21.7 % (ref 40–53)
HGB BLD-MCNC: 7.4 G/DL (ref 13.3–17.7)
HGB C CRYSTALS: NORMAL
HOWELL-JOLLY BOD BLD QL SMEAR: NORMAL
IMM GRANULOCYTES # BLD: 0 10E3/UL
IMM GRANULOCYTES NFR BLD: 1 %
LYMPHOCYTES # BLD AUTO: 2 10E3/UL (ref 0.8–5.3)
LYMPHOCYTES NFR BLD AUTO: 58 %
MCH RBC QN AUTO: 35.2 PG (ref 26.5–33)
MCHC RBC AUTO-ENTMCNC: 34.1 G/DL (ref 31.5–36.5)
MCV RBC AUTO: 103 FL (ref 78–100)
MONOCYTES # BLD AUTO: 0.2 10E3/UL (ref 0–1.3)
MONOCYTES NFR BLD AUTO: 5 %
NEUTROPHILS # BLD AUTO: 1.2 10E3/UL (ref 1.6–8.3)
NEUTROPHILS NFR BLD AUTO: 34 %
NEUTS HYPERSEG BLD QL SMEAR: NORMAL
NRBC # BLD AUTO: 0 10E3/UL
NRBC BLD AUTO-RTO: 1 /100
PLAT MORPH BLD: NORMAL
PLATELET # BLD AUTO: 275 10E3/UL (ref 150–450)
POLYCHROMASIA BLD QL SMEAR: NORMAL
RBC # BLD AUTO: 2.1 10E6/UL (ref 4.4–5.9)
RBC AGGLUT BLD QL: NORMAL
RBC MORPH BLD: NORMAL
ROULEAUX BLD QL SMEAR: NORMAL
SICKLE CELLS BLD QL SMEAR: NORMAL
SMUDGE CELLS BLD QL SMEAR: NORMAL
SPHEROCYTES BLD QL SMEAR: NORMAL
STOMATOCYTES BLD QL SMEAR: NORMAL
TARGETS BLD QL SMEAR: NORMAL
TOXIC GRANULES BLD QL SMEAR: NORMAL
VARIANT LYMPHS BLD QL SMEAR: NORMAL
WBC # BLD AUTO: 3.4 10E3/UL (ref 4–11)

## 2023-10-09 PROCEDURE — 85025 COMPLETE CBC W/AUTO DIFF WBC: CPT | Performed by: NURSE PRACTITIONER

## 2023-10-09 PROCEDURE — 250N000011 HC RX IP 250 OP 636: Mod: JZ | Performed by: INTERNAL MEDICINE

## 2023-10-09 PROCEDURE — 36591 DRAW BLOOD OFF VENOUS DEVICE: CPT

## 2023-10-09 RX ORDER — HEPARIN SODIUM (PORCINE) LOCK FLUSH IV SOLN 100 UNIT/ML 100 UNIT/ML
5 SOLUTION INTRAVENOUS ONCE
Status: COMPLETED | OUTPATIENT
Start: 2023-10-09 | End: 2023-10-09

## 2023-10-09 RX ADMIN — Medication 5 ML: at 09:39

## 2023-10-09 NOTE — PROGRESS NOTES
Infusion Nursing Note:  Ziggyadna Hallman presents today for possible blood transfusion.    Patient seen by provider today: No   present during visit today: Not Applicable.    Note: N/A.      Intravenous Access:  Implanted Port.    Treatment Conditions:  Lab Results   Component Value Date    HGB 7.4 (L) 10/09/2023    WBC 3.4 (L) 10/09/2023    ANEU 1.6 09/25/2023    ANEUTAUTO 1.8 10/04/2023     10/09/2023        Results reviewed, labs did NOT meet treatment parameters: Hgb >7.0, VSS & asymptomatic with th exception of fatigue.      Post Infusion Assessment:  Site patent and intact, free from redness, edema or discomfort.  No evidence of extravasations.  Access discontinued per protocol.       Discharge Plan:   Patient discharged in stable condition accompanied by: self.  Departure Mode: Ambulatory.      Richard Mae RN

## 2023-10-16 ENCOUNTER — VIRTUAL VISIT (OUTPATIENT)
Dept: ONCOLOGY | Facility: HOSPITAL | Age: 48
End: 2023-10-16
Attending: NURSE PRACTITIONER
Payer: COMMERCIAL

## 2023-10-16 ENCOUNTER — INFUSION THERAPY VISIT (OUTPATIENT)
Dept: INFUSION THERAPY | Facility: CLINIC | Age: 48
End: 2023-10-16
Attending: INTERNAL MEDICINE
Payer: COMMERCIAL

## 2023-10-16 VITALS
SYSTOLIC BLOOD PRESSURE: 152 MMHG | TEMPERATURE: 97.9 F | WEIGHT: 190.4 LBS | DIASTOLIC BLOOD PRESSURE: 85 MMHG | BODY MASS INDEX: 28.12 KG/M2

## 2023-10-16 VITALS — DIASTOLIC BLOOD PRESSURE: 85 MMHG | SYSTOLIC BLOOD PRESSURE: 136 MMHG

## 2023-10-16 DIAGNOSIS — D46.9 MDS (MYELODYSPLASTIC SYNDROME) (H): Primary | ICD-10-CM

## 2023-10-16 LAB
ALBUMIN SERPL BCG-MCNC: 2.5 G/DL (ref 3.5–5.2)
ALP SERPL-CCNC: 103 U/L (ref 40–129)
ALT SERPL W P-5'-P-CCNC: 14 U/L (ref 0–70)
ANION GAP SERPL CALCULATED.3IONS-SCNC: 9 MMOL/L (ref 7–15)
AST SERPL W P-5'-P-CCNC: 26 U/L (ref 0–45)
BASO+EOS+MONOS # BLD AUTO: ABNORMAL 10*3/UL
BASO+EOS+MONOS NFR BLD AUTO: ABNORMAL %
BASOPHILS # BLD AUTO: 0 10E3/UL (ref 0–0.2)
BASOPHILS NFR BLD AUTO: 1 %
BILIRUB SERPL-MCNC: 0.3 MG/DL
BUN SERPL-MCNC: 14.9 MG/DL (ref 6–20)
CALCIUM SERPL-MCNC: 9.1 MG/DL (ref 8.6–10)
CHLORIDE SERPL-SCNC: 105 MMOL/L (ref 98–107)
CREAT SERPL-MCNC: 0.89 MG/DL (ref 0.67–1.17)
DEPRECATED HCO3 PLAS-SCNC: 25 MMOL/L (ref 22–29)
EGFRCR SERPLBLD CKD-EPI 2021: >90 ML/MIN/1.73M2
EOSINOPHIL # BLD AUTO: 0.1 10E3/UL (ref 0–0.7)
EOSINOPHIL NFR BLD AUTO: 3 %
ERYTHROCYTE [DISTWIDTH] IN BLOOD BY AUTOMATED COUNT: 15.5 % (ref 10–15)
GLUCOSE SERPL-MCNC: 101 MG/DL (ref 70–99)
HCT VFR BLD AUTO: 22.7 % (ref 40–53)
HGB BLD-MCNC: 7.6 G/DL (ref 13.3–17.7)
IMM GRANULOCYTES # BLD: 0.1 10E3/UL
IMM GRANULOCYTES NFR BLD: 2 %
LYMPHOCYTES # BLD AUTO: 1.4 10E3/UL (ref 0.8–5.3)
LYMPHOCYTES NFR BLD AUTO: 37 %
MCH RBC QN AUTO: 34.2 PG (ref 26.5–33)
MCHC RBC AUTO-ENTMCNC: 33.5 G/DL (ref 31.5–36.5)
MCV RBC AUTO: 102 FL (ref 78–100)
MONOCYTES # BLD AUTO: 0.1 10E3/UL (ref 0–1.3)
MONOCYTES NFR BLD AUTO: 3 %
NEUTROPHILS # BLD AUTO: 2 10E3/UL (ref 1.6–8.3)
NEUTROPHILS NFR BLD AUTO: 54 %
NRBC # BLD AUTO: 0 10E3/UL
NRBC BLD AUTO-RTO: 0 /100
PLATELET # BLD AUTO: 343 10E3/UL (ref 150–450)
POTASSIUM SERPL-SCNC: 3.7 MMOL/L (ref 3.4–5.3)
PROT SERPL-MCNC: 7 G/DL (ref 6.4–8.3)
RBC # BLD AUTO: 2.22 10E6/UL (ref 4.4–5.9)
SODIUM SERPL-SCNC: 139 MMOL/L (ref 135–145)
WBC # BLD AUTO: 3.8 10E3/UL (ref 4–11)

## 2023-10-16 PROCEDURE — 250N000011 HC RX IP 250 OP 636: Mod: JZ | Performed by: NURSE PRACTITIONER

## 2023-10-16 PROCEDURE — 36591 DRAW BLOOD OFF VENOUS DEVICE: CPT | Performed by: NURSE PRACTITIONER

## 2023-10-16 PROCEDURE — 258N000003 HC RX IP 258 OP 636: Performed by: NURSE PRACTITIONER

## 2023-10-16 PROCEDURE — 99213 OFFICE O/P EST LOW 20 MIN: CPT | Mod: VID | Performed by: NURSE PRACTITIONER

## 2023-10-16 PROCEDURE — 96375 TX/PRO/DX INJ NEW DRUG ADDON: CPT

## 2023-10-16 PROCEDURE — 96413 CHEMO IV INFUSION 1 HR: CPT

## 2023-10-16 PROCEDURE — 85004 AUTOMATED DIFF WBC COUNT: CPT | Performed by: NURSE PRACTITIONER

## 2023-10-16 PROCEDURE — 80053 COMPREHEN METABOLIC PANEL: CPT | Performed by: NURSE PRACTITIONER

## 2023-10-16 RX ORDER — DIPHENHYDRAMINE HYDROCHLORIDE 50 MG/ML
50 INJECTION INTRAMUSCULAR; INTRAVENOUS
Status: CANCELLED
Start: 2023-10-23

## 2023-10-16 RX ORDER — ONDANSETRON 2 MG/ML
8 INJECTION INTRAMUSCULAR; INTRAVENOUS ONCE
Status: COMPLETED | OUTPATIENT
Start: 2023-10-16 | End: 2023-10-16

## 2023-10-16 RX ORDER — LORAZEPAM 2 MG/ML
0.5 INJECTION INTRAMUSCULAR EVERY 4 HOURS PRN
Status: CANCELLED | OUTPATIENT
Start: 2023-10-24

## 2023-10-16 RX ORDER — EPINEPHRINE 1 MG/ML
0.3 INJECTION, SOLUTION INTRAMUSCULAR; SUBCUTANEOUS EVERY 5 MIN PRN
Status: CANCELLED | OUTPATIENT
Start: 2023-10-20

## 2023-10-16 RX ORDER — HEPARIN SODIUM (PORCINE) LOCK FLUSH IV SOLN 100 UNIT/ML 100 UNIT/ML
5 SOLUTION INTRAVENOUS
Status: CANCELLED | OUTPATIENT
Start: 2023-10-18

## 2023-10-16 RX ORDER — ONDANSETRON 2 MG/ML
8 INJECTION INTRAMUSCULAR; INTRAVENOUS ONCE
Status: CANCELLED | OUTPATIENT
Start: 2023-10-24

## 2023-10-16 RX ORDER — ALBUTEROL SULFATE 90 UG/1
1-2 AEROSOL, METERED RESPIRATORY (INHALATION)
Status: CANCELLED
Start: 2023-10-24

## 2023-10-16 RX ORDER — METHYLPREDNISOLONE SODIUM SUCCINATE 125 MG/2ML
125 INJECTION, POWDER, LYOPHILIZED, FOR SOLUTION INTRAMUSCULAR; INTRAVENOUS
Status: CANCELLED
Start: 2023-10-16

## 2023-10-16 RX ORDER — METHYLPREDNISOLONE SODIUM SUCCINATE 125 MG/2ML
125 INJECTION, POWDER, LYOPHILIZED, FOR SOLUTION INTRAMUSCULAR; INTRAVENOUS
Status: CANCELLED
Start: 2023-10-17

## 2023-10-16 RX ORDER — MEPERIDINE HYDROCHLORIDE 25 MG/ML
25 INJECTION INTRAMUSCULAR; INTRAVENOUS; SUBCUTANEOUS EVERY 30 MIN PRN
Status: CANCELLED | OUTPATIENT
Start: 2023-10-20

## 2023-10-16 RX ORDER — ALBUTEROL SULFATE 0.83 MG/ML
2.5 SOLUTION RESPIRATORY (INHALATION)
Status: CANCELLED | OUTPATIENT
Start: 2023-10-24

## 2023-10-16 RX ORDER — ALBUTEROL SULFATE 0.83 MG/ML
2.5 SOLUTION RESPIRATORY (INHALATION)
Status: CANCELLED | OUTPATIENT
Start: 2023-10-23

## 2023-10-16 RX ORDER — LORAZEPAM 2 MG/ML
0.5 INJECTION INTRAMUSCULAR EVERY 4 HOURS PRN
Status: CANCELLED | OUTPATIENT
Start: 2023-10-23

## 2023-10-16 RX ORDER — ALBUTEROL SULFATE 90 UG/1
1-2 AEROSOL, METERED RESPIRATORY (INHALATION)
Status: CANCELLED
Start: 2023-10-18

## 2023-10-16 RX ORDER — ALBUTEROL SULFATE 90 UG/1
1-2 AEROSOL, METERED RESPIRATORY (INHALATION)
Status: CANCELLED
Start: 2023-10-20

## 2023-10-16 RX ORDER — LORAZEPAM 2 MG/ML
0.5 INJECTION INTRAMUSCULAR EVERY 4 HOURS PRN
Status: CANCELLED | OUTPATIENT
Start: 2023-10-20

## 2023-10-16 RX ORDER — MEPERIDINE HYDROCHLORIDE 25 MG/ML
25 INJECTION INTRAMUSCULAR; INTRAVENOUS; SUBCUTANEOUS EVERY 30 MIN PRN
Status: CANCELLED | OUTPATIENT
Start: 2023-10-23

## 2023-10-16 RX ORDER — DIPHENHYDRAMINE HYDROCHLORIDE 50 MG/ML
50 INJECTION INTRAMUSCULAR; INTRAVENOUS
Status: CANCELLED
Start: 2023-10-17

## 2023-10-16 RX ORDER — EPINEPHRINE 1 MG/ML
0.3 INJECTION, SOLUTION INTRAMUSCULAR; SUBCUTANEOUS EVERY 5 MIN PRN
Status: CANCELLED | OUTPATIENT
Start: 2023-10-16

## 2023-10-16 RX ORDER — LORAZEPAM 2 MG/ML
0.5 INJECTION INTRAMUSCULAR EVERY 4 HOURS PRN
Status: CANCELLED | OUTPATIENT
Start: 2023-10-16

## 2023-10-16 RX ORDER — DIPHENHYDRAMINE HYDROCHLORIDE 50 MG/ML
50 INJECTION INTRAMUSCULAR; INTRAVENOUS
Status: CANCELLED
Start: 2023-10-16

## 2023-10-16 RX ORDER — DIPHENHYDRAMINE HYDROCHLORIDE 50 MG/ML
50 INJECTION INTRAMUSCULAR; INTRAVENOUS
Status: CANCELLED
Start: 2023-10-20

## 2023-10-16 RX ORDER — HEPARIN SODIUM (PORCINE) LOCK FLUSH IV SOLN 100 UNIT/ML 100 UNIT/ML
5 SOLUTION INTRAVENOUS
Status: CANCELLED | OUTPATIENT
Start: 2023-10-17

## 2023-10-16 RX ORDER — ALBUTEROL SULFATE 0.83 MG/ML
2.5 SOLUTION RESPIRATORY (INHALATION)
Status: CANCELLED | OUTPATIENT
Start: 2023-10-16

## 2023-10-16 RX ORDER — ALBUTEROL SULFATE 90 UG/1
1-2 AEROSOL, METERED RESPIRATORY (INHALATION)
Status: CANCELLED
Start: 2023-10-17

## 2023-10-16 RX ORDER — EPINEPHRINE 1 MG/ML
0.3 INJECTION, SOLUTION INTRAMUSCULAR; SUBCUTANEOUS EVERY 5 MIN PRN
Status: CANCELLED | OUTPATIENT
Start: 2023-10-19

## 2023-10-16 RX ORDER — HEPARIN SODIUM (PORCINE) LOCK FLUSH IV SOLN 100 UNIT/ML 100 UNIT/ML
5 SOLUTION INTRAVENOUS
Status: DISCONTINUED | OUTPATIENT
Start: 2023-10-16 | End: 2023-10-16 | Stop reason: HOSPADM

## 2023-10-16 RX ORDER — MEPERIDINE HYDROCHLORIDE 25 MG/ML
25 INJECTION INTRAMUSCULAR; INTRAVENOUS; SUBCUTANEOUS EVERY 30 MIN PRN
Status: CANCELLED | OUTPATIENT
Start: 2023-10-17

## 2023-10-16 RX ORDER — EPINEPHRINE 1 MG/ML
0.3 INJECTION, SOLUTION INTRAMUSCULAR; SUBCUTANEOUS EVERY 5 MIN PRN
Status: CANCELLED | OUTPATIENT
Start: 2023-10-24

## 2023-10-16 RX ORDER — EPINEPHRINE 1 MG/ML
0.3 INJECTION, SOLUTION INTRAMUSCULAR; SUBCUTANEOUS EVERY 5 MIN PRN
Status: CANCELLED | OUTPATIENT
Start: 2023-10-23

## 2023-10-16 RX ORDER — LORAZEPAM 2 MG/ML
0.5 INJECTION INTRAMUSCULAR EVERY 4 HOURS PRN
Status: CANCELLED | OUTPATIENT
Start: 2023-10-19

## 2023-10-16 RX ORDER — METHYLPREDNISOLONE SODIUM SUCCINATE 125 MG/2ML
125 INJECTION, POWDER, LYOPHILIZED, FOR SOLUTION INTRAMUSCULAR; INTRAVENOUS
Status: CANCELLED
Start: 2023-10-24

## 2023-10-16 RX ORDER — ALBUTEROL SULFATE 0.83 MG/ML
2.5 SOLUTION RESPIRATORY (INHALATION)
Status: CANCELLED | OUTPATIENT
Start: 2023-10-19

## 2023-10-16 RX ORDER — METHYLPREDNISOLONE SODIUM SUCCINATE 125 MG/2ML
125 INJECTION, POWDER, LYOPHILIZED, FOR SOLUTION INTRAMUSCULAR; INTRAVENOUS
Status: CANCELLED
Start: 2023-10-23

## 2023-10-16 RX ORDER — MEPERIDINE HYDROCHLORIDE 25 MG/ML
25 INJECTION INTRAMUSCULAR; INTRAVENOUS; SUBCUTANEOUS EVERY 30 MIN PRN
Status: CANCELLED | OUTPATIENT
Start: 2023-10-24

## 2023-10-16 RX ORDER — ONDANSETRON 2 MG/ML
8 INJECTION INTRAMUSCULAR; INTRAVENOUS ONCE
Status: CANCELLED | OUTPATIENT
Start: 2023-10-18

## 2023-10-16 RX ORDER — ALBUTEROL SULFATE 0.83 MG/ML
2.5 SOLUTION RESPIRATORY (INHALATION)
Status: CANCELLED | OUTPATIENT
Start: 2023-10-18

## 2023-10-16 RX ORDER — HEPARIN SODIUM (PORCINE) LOCK FLUSH IV SOLN 100 UNIT/ML 100 UNIT/ML
5 SOLUTION INTRAVENOUS
Status: CANCELLED | OUTPATIENT
Start: 2023-10-24

## 2023-10-16 RX ORDER — ONDANSETRON 2 MG/ML
8 INJECTION INTRAMUSCULAR; INTRAVENOUS ONCE
Status: CANCELLED | OUTPATIENT
Start: 2023-10-16

## 2023-10-16 RX ORDER — ALBUTEROL SULFATE 90 UG/1
1-2 AEROSOL, METERED RESPIRATORY (INHALATION)
Status: CANCELLED
Start: 2023-10-19

## 2023-10-16 RX ORDER — ALBUTEROL SULFATE 0.83 MG/ML
2.5 SOLUTION RESPIRATORY (INHALATION)
Status: CANCELLED | OUTPATIENT
Start: 2023-10-20

## 2023-10-16 RX ORDER — LORAZEPAM 2 MG/ML
0.5 INJECTION INTRAMUSCULAR EVERY 4 HOURS PRN
Status: CANCELLED | OUTPATIENT
Start: 2023-10-17

## 2023-10-16 RX ORDER — ALBUTEROL SULFATE 90 UG/1
1-2 AEROSOL, METERED RESPIRATORY (INHALATION)
Status: CANCELLED
Start: 2023-10-23

## 2023-10-16 RX ORDER — LORAZEPAM 2 MG/ML
0.5 INJECTION INTRAMUSCULAR EVERY 4 HOURS PRN
Status: CANCELLED | OUTPATIENT
Start: 2023-10-18

## 2023-10-16 RX ORDER — DIPHENHYDRAMINE HYDROCHLORIDE 50 MG/ML
50 INJECTION INTRAMUSCULAR; INTRAVENOUS
Status: CANCELLED
Start: 2023-10-24

## 2023-10-16 RX ORDER — ONDANSETRON 2 MG/ML
8 INJECTION INTRAMUSCULAR; INTRAVENOUS ONCE
Status: CANCELLED | OUTPATIENT
Start: 2023-10-17

## 2023-10-16 RX ORDER — HEPARIN SODIUM (PORCINE) LOCK FLUSH IV SOLN 100 UNIT/ML 100 UNIT/ML
5 SOLUTION INTRAVENOUS
Status: CANCELLED | OUTPATIENT
Start: 2023-10-19

## 2023-10-16 RX ORDER — ONDANSETRON 2 MG/ML
8 INJECTION INTRAMUSCULAR; INTRAVENOUS ONCE
Status: CANCELLED | OUTPATIENT
Start: 2023-10-20

## 2023-10-16 RX ORDER — METHYLPREDNISOLONE SODIUM SUCCINATE 125 MG/2ML
125 INJECTION, POWDER, LYOPHILIZED, FOR SOLUTION INTRAMUSCULAR; INTRAVENOUS
Status: CANCELLED
Start: 2023-10-20

## 2023-10-16 RX ORDER — HEPARIN SODIUM (PORCINE) LOCK FLUSH IV SOLN 100 UNIT/ML 100 UNIT/ML
5 SOLUTION INTRAVENOUS
Status: CANCELLED | OUTPATIENT
Start: 2023-10-20

## 2023-10-16 RX ORDER — MEPERIDINE HYDROCHLORIDE 25 MG/ML
25 INJECTION INTRAMUSCULAR; INTRAVENOUS; SUBCUTANEOUS EVERY 30 MIN PRN
Status: CANCELLED | OUTPATIENT
Start: 2023-10-19

## 2023-10-16 RX ORDER — MEPERIDINE HYDROCHLORIDE 25 MG/ML
25 INJECTION INTRAMUSCULAR; INTRAVENOUS; SUBCUTANEOUS EVERY 30 MIN PRN
Status: CANCELLED | OUTPATIENT
Start: 2023-10-18

## 2023-10-16 RX ORDER — ONDANSETRON 2 MG/ML
8 INJECTION INTRAMUSCULAR; INTRAVENOUS ONCE
Status: CANCELLED | OUTPATIENT
Start: 2023-10-23

## 2023-10-16 RX ORDER — METHYLPREDNISOLONE SODIUM SUCCINATE 125 MG/2ML
125 INJECTION, POWDER, LYOPHILIZED, FOR SOLUTION INTRAMUSCULAR; INTRAVENOUS
Status: CANCELLED
Start: 2023-10-18

## 2023-10-16 RX ORDER — MEPERIDINE HYDROCHLORIDE 25 MG/ML
25 INJECTION INTRAMUSCULAR; INTRAVENOUS; SUBCUTANEOUS EVERY 30 MIN PRN
Status: CANCELLED | OUTPATIENT
Start: 2023-10-16

## 2023-10-16 RX ORDER — ALBUTEROL SULFATE 0.83 MG/ML
2.5 SOLUTION RESPIRATORY (INHALATION)
Status: CANCELLED | OUTPATIENT
Start: 2023-10-17

## 2023-10-16 RX ORDER — ONDANSETRON 2 MG/ML
8 INJECTION INTRAMUSCULAR; INTRAVENOUS ONCE
Status: CANCELLED | OUTPATIENT
Start: 2023-10-19

## 2023-10-16 RX ORDER — EPINEPHRINE 1 MG/ML
0.3 INJECTION, SOLUTION INTRAMUSCULAR; SUBCUTANEOUS EVERY 5 MIN PRN
Status: CANCELLED | OUTPATIENT
Start: 2023-10-17

## 2023-10-16 RX ORDER — METHYLPREDNISOLONE SODIUM SUCCINATE 125 MG/2ML
125 INJECTION, POWDER, LYOPHILIZED, FOR SOLUTION INTRAMUSCULAR; INTRAVENOUS
Status: CANCELLED
Start: 2023-10-19

## 2023-10-16 RX ORDER — HEPARIN SODIUM (PORCINE) LOCK FLUSH IV SOLN 100 UNIT/ML 100 UNIT/ML
5 SOLUTION INTRAVENOUS
Status: CANCELLED | OUTPATIENT
Start: 2023-10-16

## 2023-10-16 RX ORDER — DIPHENHYDRAMINE HYDROCHLORIDE 50 MG/ML
50 INJECTION INTRAMUSCULAR; INTRAVENOUS
Status: CANCELLED
Start: 2023-10-18

## 2023-10-16 RX ORDER — ALBUTEROL SULFATE 90 UG/1
1-2 AEROSOL, METERED RESPIRATORY (INHALATION)
Status: CANCELLED
Start: 2023-10-16

## 2023-10-16 RX ORDER — DIPHENHYDRAMINE HYDROCHLORIDE 50 MG/ML
50 INJECTION INTRAMUSCULAR; INTRAVENOUS
Status: CANCELLED
Start: 2023-10-19

## 2023-10-16 RX ORDER — HEPARIN SODIUM (PORCINE) LOCK FLUSH IV SOLN 100 UNIT/ML 100 UNIT/ML
5 SOLUTION INTRAVENOUS
Status: CANCELLED | OUTPATIENT
Start: 2023-10-23

## 2023-10-16 RX ORDER — EPINEPHRINE 1 MG/ML
0.3 INJECTION, SOLUTION INTRAMUSCULAR; SUBCUTANEOUS EVERY 5 MIN PRN
Status: CANCELLED | OUTPATIENT
Start: 2023-10-18

## 2023-10-16 RX ADMIN — Medication 5 ML: at 10:30

## 2023-10-16 RX ADMIN — SODIUM CHLORIDE 250 ML: 9 INJECTION, SOLUTION INTRAVENOUS at 09:37

## 2023-10-16 RX ADMIN — ONDANSETRON 8 MG: 2 INJECTION INTRAMUSCULAR; INTRAVENOUS at 09:37

## 2023-10-16 RX ADMIN — AZACITIDINE 150 MG: 100 INJECTION, POWDER, LYOPHILIZED, FOR SOLUTION INTRAVENOUS; SUBCUTANEOUS at 10:13

## 2023-10-16 ASSESSMENT — PAIN SCALES - GENERAL: PAINLEVEL: MODERATE PAIN (5)

## 2023-10-16 NOTE — PROGRESS NOTES
PAC labs drawn without difficulty via site protocol. Patient tolerated well.    MARIE BRENNAN RN on 10/16/2023 at 8:28 AM

## 2023-10-16 NOTE — PROGRESS NOTES
"Oncology Rooming Note    October 16, 2023 9:13 AM   Ziggy Hallman is a 48 year old male who presents for:    Chief Complaint   Patient presents with    Video Visit     Virtual return with labs/infusion related to MDS (myelodysplastic syndrome)     Initial Vitals: There were no vitals taken for this visit. Estimated body mass index is 28.12 kg/m  as calculated from the following:    Height as of 9/25/23: 1.753 m (5' 9\").    Weight as of an earlier encounter on 10/16/23: 86.4 kg (190 lb 6.4 oz). There is no height or weight on file to calculate BSA.  Data Unavailable Comment: Data Unavailable   No LMP for male patient.  Allergies reviewed: No  Medications reviewed: No    Medications: UNKNOWN  Pharmacy name entered into [employer]: [id number] - [place], [place] - [address] [place] [address]    Clinical concerns: Pt is currently located in [facility], RN called stated rooming staff refusing to use iPad to log pt on for apt. I informed RN I would text pt a link to use his smart phone to log on for visit today. Medications and allergies were not reviewed, due to pt being offsite.     MDS (myelodysplastic syndrome)       JACKIE SOLIS CMA              "

## 2023-10-16 NOTE — PROGRESS NOTES
[facility] Hematology and Oncology Progress Note    Patient: Ziggy Hallman  MRN: [id number]  Date of Service: 10/16/2023        Reason for Visit    Chief Complaint   Patient presents with    Video Visit     Virtual return with labs/infusion related to MDS (myelodysplastic syndrome)       Assessment and Plan     Cancer Staging   No matching staging information was found for the patient.    MDS: currently on vidaza in preparation for BMT. Due for cycle 2 today.  Patient tolerated cycle 1 pretty well.  He will continue his current dosing.  He will return to see Lurdes in 4 weeks for cycle 3.  I believe the overall plan is to do 4 cycles and then head over to the [facility] for transplant.    Pancytopenia: This is overall stable.  No need for any blood products.  I did tell him we would likely want to do a blood transfusion if he is less than 7 and platelets if he is less than 10.  We will check weekly labs.    Nausea: This is likely from the Vidaza.  He says it was mild.  He did take one of his antiemetics and it did not seem to help.  Encouraged him to take the other 1.  Eat frequently.  Use karsten products.  Try heartburn medication like Tums or Pepcid.    ECOG Performance    0 - Independent    Distress Screening (within last 30 days)    1. How concerned are you about your ability to eat? : 0  2. How concerned are you about unintended weight loss or your current weight? : 0  3. How concerned are you about feeling depressed or very sad? : 0  4. How concerned are you about feeling anxious or very scared? : 0  5. Do you struggle with the loss of meaning and stanislaw in your life? : Not at all  6. How concerned are you about work and home life issues that may be affected by your cancer? : 0  7. How concerned are you about knowing what resources are available to help you? : 0  8. Do you currently have what you would describe as Restorationism or spiritual struggles?: Not at all       Pain       Problem  List    Patient Active Problem List   Diagnosis    Hyperlipidemia LDL goal <100    Hypercoagulable state (H24)    CVA (cerebrovascular accident) (H)    CAD S/P percutaneous coronary angioplasty    Low back pain    MDS (myelodysplastic syndrome) (H)    Antineoplastic chemotherapy induced pancytopenia         ______________________________________________________________________________    History of Present Illness    Hematologic history  2015: MDS  -Presented with anemia  -Bone marrow biopsy: 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 <1% blasts. Cellularity 40 - 50%.  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  IPSS was 4 = intermediate risk category.    -EPO level 25   -Genetic testing to assess for bone marrow failure syndromes (Fanconi anemia and dyskeratosis congenita) were negative he also saw BMT in November 2015.       5/2023 repeat bone marrow biopsy: hypocellular marrow involved by MDS with blast percentage of 5 -10%. Significant fibrosis at 3+.  Karyotyping came back showing complex karyotype in all the examined cells with hyperdiploid clone characterized by loss of 1 copy of chromosome 5 and 17 with an unknown material replacing the short arms of chromosome 9 and of the remaining chromosome 17 along with a marker chromosome.  No losses of 5q31 or TP53 noted by FISH.  Comprehensive myeloid NGS does not show any high risk mutations.  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     Treatment:   2015 BMT eval for potential allogenic stem cell transplant.  2 siblings were a full match.  Deferred since counts generally stable.       2023 BMT re- eval.  Allogenic stem cell transplant recommended after induction HMA     9/18/2023 -present: Vidaza 75 mg/m2 x 7 days every 4 weeks (planning 4 cycles).  Today is cycle 2     Interval History   Ziggy is  "a pleasant 48-year-old man who is here today to continue his treatment.  He states that the first cycle which was in September went pretty well.  He said he mainly does have some mild nausea.  The antiemetic that he was taking did not really seem to help but he only took it once.  No vomiting.  Appetite is okay.  He said he did fall 2 weeks ago and hurt his ribs and they are still pretty tender.  He got some pain medication from his primary care doctor.  He states that yesterday was the first day that he feels like it was slightly better.  He denies any other new bone or back pain.  Denies any weight loss.  Denies any constipation from the treatment.  Denies any infectious complaints.  Denies any bleeding or bruising.    Review of Systems    Pertinent items are noted in HPI.    Past History    Past Medical History:   Diagnosis Date    AMI anterior wall (H)     Mid LAD on cath with stent    CAD S/P percutaneous coronary angioplasty 07/01/2016    Unstable agina with anterior myocardial damage reported without mycardial damage by patient's history    CVA (cerebrovascular accident) (H) 01/01/2012    Reporting thromboembolic episode on the right neck. Pt states \"I've had ten strokes.\"    Hypercoagulable state (H)     Uncertain etiology--seeing Hematologist    MEDICAL HISTORY OF -     Possibly PFO       PHYSICAL EXAM  There were no vitals taken for this visit.    GENERAL: no acute distress. Cooperative in conversation. Alone on video  RESP: Regular respiratory rate. No expiratory wheezes   NEURO: non focal. Alert and oriented x3.   PSYCH: within normal limits. No depression or anxiety.  SKIN: exposed skin is dry intact.     Lab Results    Recent Results (from the past 168 hour(s))   Comprehensive metabolic panel   Result Value Ref Range    Sodium 139 135 - 145 mmol/L    Potassium 3.7 3.4 - 5.3 mmol/L    Carbon Dioxide (CO2) 25 22 - 29 mmol/L    Anion Gap 9 7 - 15 mmol/L    Urea Nitrogen 14.9 6.0 - 20.0 mg/dL    Creatinine " 0.89 0.67 - 1.17 mg/dL    GFR Estimate >90 >60 mL/min/1.73m2    Calcium 9.1 8.6 - 10.0 mg/dL    Chloride 105 98 - 107 mmol/L    Glucose 101 (H) 70 - 99 mg/dL    Alkaline Phosphatase 103 40 - 129 U/L    AST 26 0 - 45 U/L    ALT 14 0 - 70 U/L    Protein Total 7.0 6.4 - 8.3 g/dL    Albumin 2.5 (L) 3.5 - 5.2 g/dL    Bilirubin Total 0.3 <=1.2 mg/dL   CBC with platelets and differential   Result Value Ref Range    WBC Count 3.8 (L) 4.0 - 11.0 10e3/uL    RBC Count 2.22 (L) 4.40 - 5.90 10e6/uL    Hemoglobin 7.6 (L) 13.3 - 17.7 g/dL    Hematocrit 22.7 (L) 40.0 - 53.0 %     (H) 78 - 100 fL    MCH 34.2 (H) 26.5 - 33.0 pg    MCHC 33.5 31.5 - 36.5 g/dL    RDW 15.5 (H) 10.0 - 15.0 %    Platelet Count 343 150 - 450 10e3/uL    % Neutrophils 54 %    % Lymphocytes 37 %    % Monocytes 3 %    Mids % (Monos, Eos, Basos)      % Eosinophils 3 %    % Basophils 1 %    % Immature Granulocytes 2 %    NRBCs per 100 WBC 0 <1 /100    Absolute Neutrophils 2.0 1.6 - 8.3 10e3/uL    Absolute Lymphocytes 1.4 0.8 - 5.3 10e3/uL    Absolute Monocytes 0.1 0.0 - 1.3 10e3/uL    Mids Abs (Monos, Eos, Basos)      Absolute Eosinophils 0.1 0.0 - 0.7 10e3/uL    Absolute Basophils 0.0 0.0 - 0.2 10e3/uL    Absolute Immature Granulocytes 0.1 <=0.4 10e3/uL    Absolute NRBCs 0.0 10e3/uL       Imaging    XR CHEST 2 VIEWS PA AND LATERAL    Result Date: 10/6/2023  For Patients: As a result of the 21st Century Cures Act, medical imaging exams and procedure reports are released immediately into your electronic medical record. You may view this report before your referring provider. If you have questions, please contact your health care provider. EXAM: XR CHEST 2 VIEWS PA AND LATERAL LOCATION: [facility] DATE: 10/6/2023 INDICATION: Trauma COMPARISON: 10/31/2022    Right IJ chest port with distal tip at the superior cavoatrial junction. ASD closure device noted. Coronary artery stent noted. Cardiac and mediastinal silhouettes unremarkable. Mild probable right  basilar atelectasis or scarring. No pleural effusion, pneumothorax or suspicious focal consolidation.     Virtual Visit Details    Type of service:  Video Visit   Video Start Time:  9:15am  Video End Time: 9:23am    Originating Location (pt. Location): Other cancer clinic    Distant Location (provider location):  On-site  Platform used for Video Visit: [employer]    Total time spent with patient in face to face time, chart review and documentation was 25 minutes.        Signed by: MARYURI Barrett CNP

## 2023-10-16 NOTE — LETTER
10/16/2023         RE: Ziggy Hallman  [address] [facility] [address]        Dear Colleague,    Thank you for referring your patient, Ziggy Hallman, to the [facility] [place]. Please see a copy of my visit note below.    [facility] Hematology and Oncology Progress Note    Patient: Ziggy Hallman  MRN: [id number]  Date of Service: 10/16/2023        Reason for Visit    Chief Complaint   Patient presents with     Video Visit     Virtual return with labs/infusion related to MDS (myelodysplastic syndrome)       Assessment and Plan     Cancer Staging   No matching staging information was found for the patient.    MDS: currently on vidaza in preparation for BMT. Due for cycle 2 today.  Patient tolerated cycle 1 pretty well.  He will continue his current dosing.  He will return to see Lurdes in 4 weeks for cycle 3.  I believe the overall plan is to do 4 cycles and then head over to the [facility] for transplant.    Pancytopenia: This is overall stable.  No need for any blood products.  I did tell him we would likely want to do a blood transfusion if he is less than 7 and platelets if he is less than 10.  We will check weekly labs.    Nausea: This is likely from the Vidaza.  He says it was mild.  He did take one of his antiemetics and it did not seem to help.  Encouraged him to take the other 1.  Eat frequently.  Use karsten products.  Try heartburn medication like Tums or Pepcid.    ECOG Performance    0 - Independent    Distress Screening (within last 30 days)    1. How concerned are you about your ability to eat? : 0  2. How concerned are you about unintended weight loss or your current weight? : 0  3. How concerned are you about feeling depressed or very sad? : 0  4. How concerned are you about feeling anxious or very scared? : 0  5. Do you struggle with the loss of meaning and stanislaw in your life? : Not at all  6. How concerned are you about work and home life issues  that may be affected by your cancer? : 0  7. How concerned are you about knowing what resources are available to help you? : 0  8. Do you currently have what you would describe as Confucianism or spiritual struggles?: Not at all       Pain       Problem List    Patient Active Problem List   Diagnosis     Hyperlipidemia LDL goal <100     Hypercoagulable state (H24)     CVA (cerebrovascular accident) (H)     CAD S/P percutaneous coronary angioplasty     Low back pain     MDS (myelodysplastic syndrome) (H)     Antineoplastic chemotherapy induced pancytopenia         ______________________________________________________________________________    History of Present Illness    Hematologic history  2015: MDS  -Presented with anemia  -Bone marrow biopsy: 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 <1% blasts. Cellularity 40 - 50%.  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  IPSS was 4 = intermediate risk category.    -EPO level 25   -Genetic testing to assess for bone marrow failure syndromes (Fanconi anemia and dyskeratosis congenita) were negative he also saw BMT in November 2015.       5/2023 repeat bone marrow biopsy: hypocellular marrow involved by MDS with blast percentage of 5 -10%. Significant fibrosis at 3+.  Karyotyping came back showing complex karyotype in all the examined cells with hyperdiploid clone characterized by loss of 1 copy of chromosome 5 and 17 with an unknown material replacing the short arms of chromosome 9 and of the remaining chromosome 17 along with a marker chromosome.  No losses of 5q31 or TP53 noted by FISH.  Comprehensive myeloid NGS does not show any high risk mutations.  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     Treatment:   2015 BMT eval for potential allogenic stem cell transplant.  2 siblings were a full match.   "Deferred since counts generally stable.       2023 BMT re- eval.  Allogenic stem cell transplant recommended after induction HMA     9/18/2023 -present: Vidaza 75 mg/m2 x 7 days every 4 weeks (planning 4 cycles).  Today is cycle 2     Interval History   Ziggy is a pleasant 48-year-old man who is here today to continue his treatment.  He states that the first cycle which was in September went pretty well.  He said he mainly does have some mild nausea.  The antiemetic that he was taking did not really seem to help but he only took it once.  No vomiting.  Appetite is okay.  He said he did fall 2 weeks ago and hurt his ribs and they are still pretty tender.  He got some pain medication from his primary care doctor.  He states that yesterday was the first day that he feels like it was slightly better.  He denies any other new bone or back pain.  Denies any weight loss.  Denies any constipation from the treatment.  Denies any infectious complaints.  Denies any bleeding or bruising.    Review of Systems    Pertinent items are noted in HPI.    Past History    Past Medical History:   Diagnosis Date     AMI anterior wall (H)     Mid LAD on cath with stent     CAD S/P percutaneous coronary angioplasty 07/01/2016    Unstable agina with anterior myocardial damage reported without mycardial damage by patient's history     CVA (cerebrovascular accident) (H) 01/01/2012    Reporting thromboembolic episode on the right neck. Pt states \"I've had ten strokes.\"     Hypercoagulable state (H)     Uncertain etiology--seeing Hematologist     MEDICAL HISTORY OF -     Possibly PFO       PHYSICAL EXAM  There were no vitals taken for this visit.    GENERAL: no acute distress. Cooperative in conversation. Alone on video  RESP: Regular respiratory rate. No expiratory wheezes   NEURO: non focal. Alert and oriented x3.   PSYCH: within normal limits. No depression or anxiety.  SKIN: exposed skin is dry intact.     Lab Results    Recent Results (from " the past 168 hour(s))   Comprehensive metabolic panel   Result Value Ref Range    Sodium 139 135 - 145 mmol/L    Potassium 3.7 3.4 - 5.3 mmol/L    Carbon Dioxide (CO2) 25 22 - 29 mmol/L    Anion Gap 9 7 - 15 mmol/L    Urea Nitrogen 14.9 6.0 - 20.0 mg/dL    Creatinine 0.89 0.67 - 1.17 mg/dL    GFR Estimate >90 >60 mL/min/1.73m2    Calcium 9.1 8.6 - 10.0 mg/dL    Chloride 105 98 - 107 mmol/L    Glucose 101 (H) 70 - 99 mg/dL    Alkaline Phosphatase 103 40 - 129 U/L    AST 26 0 - 45 U/L    ALT 14 0 - 70 U/L    Protein Total 7.0 6.4 - 8.3 g/dL    Albumin 2.5 (L) 3.5 - 5.2 g/dL    Bilirubin Total 0.3 <=1.2 mg/dL   CBC with platelets and differential   Result Value Ref Range    WBC Count 3.8 (L) 4.0 - 11.0 10e3/uL    RBC Count 2.22 (L) 4.40 - 5.90 10e6/uL    Hemoglobin 7.6 (L) 13.3 - 17.7 g/dL    Hematocrit 22.7 (L) 40.0 - 53.0 %     (H) 78 - 100 fL    MCH 34.2 (H) 26.5 - 33.0 pg    MCHC 33.5 31.5 - 36.5 g/dL    RDW 15.5 (H) 10.0 - 15.0 %    Platelet Count 343 150 - 450 10e3/uL    % Neutrophils 54 %    % Lymphocytes 37 %    % Monocytes 3 %    Mids % (Monos, Eos, Basos)      % Eosinophils 3 %    % Basophils 1 %    % Immature Granulocytes 2 %    NRBCs per 100 WBC 0 <1 /100    Absolute Neutrophils 2.0 1.6 - 8.3 10e3/uL    Absolute Lymphocytes 1.4 0.8 - 5.3 10e3/uL    Absolute Monocytes 0.1 0.0 - 1.3 10e3/uL    Mids Abs (Monos, Eos, Basos)      Absolute Eosinophils 0.1 0.0 - 0.7 10e3/uL    Absolute Basophils 0.0 0.0 - 0.2 10e3/uL    Absolute Immature Granulocytes 0.1 <=0.4 10e3/uL    Absolute NRBCs 0.0 10e3/uL       Imaging    XR CHEST 2 VIEWS PA AND LATERAL    Result Date: 10/6/2023  For Patients: As a result of the 21st Century Cures Act, medical imaging exams and procedure reports are released immediately into your electronic medical record. You may view this report before your referring provider. If you have questions, please contact your health care provider. EXAM: XR CHEST 2 VIEWS PA AND LATERAL LOCATION: [facility]  "HOSPITAL DATE: 10/6/2023 INDICATION: Trauma COMPARISON: 10/31/2022    Right IJ chest port with distal tip at the superior cavoatrial junction. ASD closure device noted. Coronary artery stent noted. Cardiac and mediastinal silhouettes unremarkable. Mild probable right basilar atelectasis or scarring. No pleural effusion, pneumothorax or suspicious focal consolidation.     Virtual Visit Details    Type of service:  Video Visit   Video Start Time:  9:15am  Video End Time: 9:23am    Originating Location (pt. Location): Other cancer clinic    Distant Location (provider location):  On-site  Platform used for Video Visit: Departing    Total time spent with patient in face to face time, chart review and documentation was 25 minutes.        Signed by: MARYURI Barrett CNP    Oncology Rooming Note    October 16, 2023 9:13 AM   Ziggy Hallman is a 48 year old male who presents for:    Chief Complaint   Patient presents with     Video Visit     Virtual return with labs/infusion related to MDS (myelodysplastic syndrome)     Initial Vitals: There were no vitals taken for this visit. Estimated body mass index is 28.12 kg/m  as calculated from the following:    Height as of 9/25/23: 1.753 m (5' 9\").    Weight as of an earlier encounter on 10/16/23: 86.4 kg (190 lb 6.4 oz). There is no height or weight on file to calculate BSA.  Data Unavailable Comment: Data Unavailable   No LMP for male patient.  Allergies reviewed: No  Medications reviewed: No    Medications: UNKNOWN  Pharmacy name entered into [employer]: [id number] - [place], [place] - [address] [place] [address]    Clinical concerns: Pt is currently located in [facility], RN called stated rooming staff refusing to use iPad to log pt on for apt. I informed RN I would text pt a link to use his smart phone to log on for visit today. Medications and allergies were not reviewed, due to pt being offsite.     MDS (myelodysplastic syndrome)       JACKIE SOLIS, " CMA                Again, thank you for allowing me to participate in the care of your patient.        Sincerely,        MARYURI Barrett CNP   DISPLAY PLAN FREE TEXT

## 2023-10-16 NOTE — PROGRESS NOTES
Infusion Nursing Note:  Ziggy Hallman presents today for Vidaza.    Patient seen by provider today: Yes: Sarah Bradley NP therefore assessment deferred   present during visit today: Not Applicable.    Note: N/A.      Intravenous Access:  Implanted Port.    Treatment Conditions:  Lab Results   Component Value Date    HGB 7.6 (L) 10/16/2023    WBC 3.8 (L) 10/16/2023    ANEU 1.6 09/25/2023    ANEUTAUTO 2.0 10/16/2023     10/16/2023        Results reviewed, labs MET treatment parameters, ok to proceed with treatment.      Post Infusion Assessment:  Patient tolerated infusion without incident.  Blood return noted pre and post infusion.  Site patent and intact, free from redness, edema or discomfort.  No evidence of extravasations.   Port left accessed per pt request for tomorrow's infusion      Discharge Plan:   Copy of AVS reviewed with patient and/or family.  Patient will return 10/17/23 for next appointment.  Patient discharged in stable condition accompanied by: self.  Departure Mode: Ambulatory.      MARIE BRENNAN RN

## 2023-10-17 ENCOUNTER — INFUSION THERAPY VISIT (OUTPATIENT)
Dept: INFUSION THERAPY | Facility: CLINIC | Age: 48
End: 2023-10-17
Attending: INTERNAL MEDICINE
Payer: COMMERCIAL

## 2023-10-17 VITALS — HEART RATE: 91 BPM | SYSTOLIC BLOOD PRESSURE: 129 MMHG | DIASTOLIC BLOOD PRESSURE: 77 MMHG

## 2023-10-17 DIAGNOSIS — D46.9 MDS (MYELODYSPLASTIC SYNDROME) (H): Primary | ICD-10-CM

## 2023-10-17 PROCEDURE — 250N000011 HC RX IP 250 OP 636: Mod: JZ | Performed by: NURSE PRACTITIONER

## 2023-10-17 PROCEDURE — 96409 CHEMO IV PUSH SNGL DRUG: CPT

## 2023-10-17 PROCEDURE — 258N000003 HC RX IP 258 OP 636: Performed by: NURSE PRACTITIONER

## 2023-10-17 PROCEDURE — 96375 TX/PRO/DX INJ NEW DRUG ADDON: CPT

## 2023-10-17 RX ORDER — HEPARIN SODIUM (PORCINE) LOCK FLUSH IV SOLN 100 UNIT/ML 100 UNIT/ML
5 SOLUTION INTRAVENOUS
Status: DISCONTINUED | OUTPATIENT
Start: 2023-10-17 | End: 2023-10-17 | Stop reason: HOSPADM

## 2023-10-17 RX ORDER — ONDANSETRON 2 MG/ML
8 INJECTION INTRAMUSCULAR; INTRAVENOUS ONCE
Status: COMPLETED | OUTPATIENT
Start: 2023-10-17 | End: 2023-10-17

## 2023-10-17 RX ADMIN — AZACITIDINE 150 MG: 100 INJECTION, POWDER, LYOPHILIZED, FOR SOLUTION INTRAVENOUS; SUBCUTANEOUS at 12:45

## 2023-10-17 RX ADMIN — ONDANSETRON 8 MG: 2 INJECTION INTRAMUSCULAR; INTRAVENOUS at 11:56

## 2023-10-17 RX ADMIN — SODIUM CHLORIDE 250 ML: 9 INJECTION, SOLUTION INTRAVENOUS at 11:53

## 2023-10-17 RX ADMIN — Medication 5 ML: at 13:06

## 2023-10-17 NOTE — PROGRESS NOTES
Infusion Nursing Note:  Ziggy Hallman presents today for Day 2 Vidaza.    Patient seen by provider today: No   present during visit today: Not Applicable.    Note: N/A.      Intravenous Access:  Implanted Port.    Treatment Conditions:  Results reviewed, labs MET treatment parameters, ok to proceed with treatment.      Post Infusion Assessment:  Patient tolerated infusion without incident.  Blood return noted pre and post infusion.       Discharge Plan:   [facility]  Patient will return tomorrow for next appointment.   Patient discharged in stable condition accompanied by: self.  Departure Mode: Ambulatory.      Nella Pinzon RN

## 2023-10-18 ENCOUNTER — INFUSION THERAPY VISIT (OUTPATIENT)
Dept: INFUSION THERAPY | Facility: CLINIC | Age: 48
End: 2023-10-18
Attending: INTERNAL MEDICINE
Payer: COMMERCIAL

## 2023-10-18 VITALS
TEMPERATURE: 98 F | HEART RATE: 79 BPM | RESPIRATION RATE: 16 BRPM | DIASTOLIC BLOOD PRESSURE: 86 MMHG | SYSTOLIC BLOOD PRESSURE: 137 MMHG

## 2023-10-18 DIAGNOSIS — D46.9 MDS (MYELODYSPLASTIC SYNDROME) (H): Primary | ICD-10-CM

## 2023-10-18 PROCEDURE — 258N000003 HC RX IP 258 OP 636: Performed by: NURSE PRACTITIONER

## 2023-10-18 PROCEDURE — 96375 TX/PRO/DX INJ NEW DRUG ADDON: CPT

## 2023-10-18 PROCEDURE — 250N000011 HC RX IP 250 OP 636: Mod: JZ | Performed by: NURSE PRACTITIONER

## 2023-10-18 PROCEDURE — 96409 CHEMO IV PUSH SNGL DRUG: CPT

## 2023-10-18 RX ORDER — HEPARIN SODIUM (PORCINE) LOCK FLUSH IV SOLN 100 UNIT/ML 100 UNIT/ML
5 SOLUTION INTRAVENOUS
Status: DISCONTINUED | OUTPATIENT
Start: 2023-10-18 | End: 2023-10-18 | Stop reason: HOSPADM

## 2023-10-18 RX ORDER — ONDANSETRON 2 MG/ML
8 INJECTION INTRAMUSCULAR; INTRAVENOUS ONCE
Status: COMPLETED | OUTPATIENT
Start: 2023-10-18 | End: 2023-10-18

## 2023-10-18 RX ADMIN — ONDANSETRON 8 MG: 2 INJECTION INTRAMUSCULAR; INTRAVENOUS at 13:35

## 2023-10-18 RX ADMIN — Medication 5 ML: at 14:24

## 2023-10-18 RX ADMIN — AZACITIDINE 150 MG: 100 INJECTION, POWDER, LYOPHILIZED, FOR SOLUTION INTRAVENOUS; SUBCUTANEOUS at 14:07

## 2023-10-18 RX ADMIN — SODIUM CHLORIDE, PRESERVATIVE FREE 250 ML: 5 INJECTION INTRAVENOUS at 13:00

## 2023-10-18 NOTE — PROGRESS NOTES
Infusion Nursing Note:  Ziggy Hallman presents today for C2D3 Vidaza.    Patient seen by provider today: No   present during visit today: Not Applicable.    Note: N/A.      Intravenous Access:  Implanted Port.    Treatment Conditions:  Results reviewed, labs MET treatment parameters, ok to proceed with treatment.      Post Infusion Assessment:  Patient tolerated infusion without incident.  Blood return noted pre and post infusion.  Site patent and intact, free from redness, edema or discomfort.  No evidence of extravasations.   Port left accessed and heparin locked for appointment tomorrow 10/19/23    Discharge Plan:   AVS to patient via MYCHART.  Patient will return 10/19/23 at 1:30 pm for next appointment.   Patient discharged in stable condition accompanied by: self.  Departure Mode: Ambulatory.      Mary Rick RN

## 2023-10-19 ENCOUNTER — INFUSION THERAPY VISIT (OUTPATIENT)
Dept: INFUSION THERAPY | Facility: CLINIC | Age: 48
End: 2023-10-19
Attending: INTERNAL MEDICINE
Payer: COMMERCIAL

## 2023-10-19 VITALS
TEMPERATURE: 98 F | HEART RATE: 106 BPM | SYSTOLIC BLOOD PRESSURE: 179 MMHG | DIASTOLIC BLOOD PRESSURE: 76 MMHG | RESPIRATION RATE: 16 BRPM

## 2023-10-19 DIAGNOSIS — D46.9 MDS (MYELODYSPLASTIC SYNDROME) (H): Primary | ICD-10-CM

## 2023-10-19 PROCEDURE — 96413 CHEMO IV INFUSION 1 HR: CPT

## 2023-10-19 PROCEDURE — 250N000011 HC RX IP 250 OP 636: Performed by: NURSE PRACTITIONER

## 2023-10-19 PROCEDURE — 96375 TX/PRO/DX INJ NEW DRUG ADDON: CPT

## 2023-10-19 PROCEDURE — 258N000003 HC RX IP 258 OP 636: Performed by: NURSE PRACTITIONER

## 2023-10-19 RX ORDER — ONDANSETRON 2 MG/ML
8 INJECTION INTRAMUSCULAR; INTRAVENOUS ONCE
Status: COMPLETED | OUTPATIENT
Start: 2023-10-19 | End: 2023-10-19

## 2023-10-19 RX ORDER — HEPARIN SODIUM (PORCINE) LOCK FLUSH IV SOLN 100 UNIT/ML 100 UNIT/ML
5 SOLUTION INTRAVENOUS
Status: DISCONTINUED | OUTPATIENT
Start: 2023-10-19 | End: 2023-10-19 | Stop reason: HOSPADM

## 2023-10-19 RX ADMIN — Medication 5 ML: at 15:06

## 2023-10-19 RX ADMIN — ONDANSETRON 8 MG: 2 INJECTION INTRAMUSCULAR; INTRAVENOUS at 13:59

## 2023-10-19 RX ADMIN — AZACITIDINE 150 MG: 100 INJECTION, POWDER, LYOPHILIZED, FOR SOLUTION INTRAVENOUS; SUBCUTANEOUS at 14:45

## 2023-10-19 RX ADMIN — SODIUM CHLORIDE 250 ML: 9 INJECTION, SOLUTION INTRAVENOUS at 13:45

## 2023-10-19 ASSESSMENT — PAIN SCALES - GENERAL: PAINLEVEL: MODERATE PAIN (5)

## 2023-10-19 NOTE — PROGRESS NOTES
Infusion Nursing Note:  Ziggy Hallman presents today for Vidaza.    Patient seen by provider today: No   present during visit today: Not Applicable.    Note: N/A.      Intravenous Access:  Implanted Port.    Treatment Conditions:  Not Applicable.      Post Infusion Assessment:  Patient tolerated infusion without incident.  Blood return noted pre and post infusion.  Site patent and intact, free from redness, edema or discomfort.  No evidence of extravasations.   Port left accessed for infusion tomorrow.      Discharge Plan:   Copy of AVS reviewed with patient and/or family.  Patient will return 10/20/23 for next appointment.  Patient discharged in stable condition accompanied by: self.  Departure Mode: Ambulatory.      MARIE BRENNAN RN

## 2023-10-20 ENCOUNTER — INFUSION THERAPY VISIT (OUTPATIENT)
Dept: INFUSION THERAPY | Facility: CLINIC | Age: 48
End: 2023-10-20
Attending: INTERNAL MEDICINE
Payer: COMMERCIAL

## 2023-10-20 VITALS — HEART RATE: 70 BPM | DIASTOLIC BLOOD PRESSURE: 89 MMHG | SYSTOLIC BLOOD PRESSURE: 146 MMHG | TEMPERATURE: 97.9 F

## 2023-10-20 DIAGNOSIS — D46.9 MDS (MYELODYSPLASTIC SYNDROME) (H): Primary | ICD-10-CM

## 2023-10-20 PROCEDURE — 258N000003 HC RX IP 258 OP 636: Performed by: NURSE PRACTITIONER

## 2023-10-20 PROCEDURE — 250N000011 HC RX IP 250 OP 636: Mod: JZ | Performed by: NURSE PRACTITIONER

## 2023-10-20 PROCEDURE — 96409 CHEMO IV PUSH SNGL DRUG: CPT

## 2023-10-20 PROCEDURE — 96375 TX/PRO/DX INJ NEW DRUG ADDON: CPT

## 2023-10-20 RX ORDER — ONDANSETRON 2 MG/ML
8 INJECTION INTRAMUSCULAR; INTRAVENOUS ONCE
Status: COMPLETED | OUTPATIENT
Start: 2023-10-20 | End: 2023-10-20

## 2023-10-20 RX ORDER — HEPARIN SODIUM (PORCINE) LOCK FLUSH IV SOLN 100 UNIT/ML 100 UNIT/ML
5 SOLUTION INTRAVENOUS
Status: DISCONTINUED | OUTPATIENT
Start: 2023-10-20 | End: 2023-10-20 | Stop reason: HOSPADM

## 2023-10-20 RX ADMIN — ONDANSETRON 8 MG: 2 INJECTION INTRAMUSCULAR; INTRAVENOUS at 13:07

## 2023-10-20 RX ADMIN — FAMOTIDINE 20 MG: 10 INJECTION INTRAVENOUS at 13:04

## 2023-10-20 RX ADMIN — Medication 5 ML: at 14:23

## 2023-10-20 RX ADMIN — SODIUM CHLORIDE 250 ML: 9 INJECTION, SOLUTION INTRAVENOUS at 13:11

## 2023-10-20 RX ADMIN — AZACITIDINE 150 MG: 100 INJECTION, POWDER, LYOPHILIZED, FOR SOLUTION INTRAVENOUS; SUBCUTANEOUS at 14:09

## 2023-10-20 NOTE — PROGRESS NOTES
Infusion Nursing Note:  Ziggy Hallman presents today for Vidaza.    Patient seen by provider today: No   present during visit today: Not Applicable.    Note: N/A.    Intravenous Access:  Implanted Port.    Treatment Conditions:  Lab Results   Component Value Date    HGB 7.6 (L) 10/16/2023    WBC 3.8 (L) 10/16/2023    ANEU 1.6 09/25/2023    ANEUTAUTO 2.0 10/16/2023     10/16/2023        Results reviewed, labs MET treatment parameters, ok to proceed with treatment.    Post Infusion Assessment:  Patient tolerated infusion without incident.  Blood return noted pre and post infusion.  Site patent and intact, free from redness, edema or discomfort.  No evidence of extravasations.  Access discontinued per protocol.     Discharge Plan:   Patient discharged in stable condition accompanied by: self.  Departure Mode: Ambulatory.    Jayesh Burdick RN

## 2023-10-23 ENCOUNTER — INFUSION THERAPY VISIT (OUTPATIENT)
Dept: INFUSION THERAPY | Facility: CLINIC | Age: 48
End: 2023-10-23
Attending: INTERNAL MEDICINE
Payer: COMMERCIAL

## 2023-10-23 VITALS — HEART RATE: 71 BPM | SYSTOLIC BLOOD PRESSURE: 132 MMHG | DIASTOLIC BLOOD PRESSURE: 83 MMHG | TEMPERATURE: 98 F

## 2023-10-23 DIAGNOSIS — D46.9 MDS (MYELODYSPLASTIC SYNDROME) (H): Primary | ICD-10-CM

## 2023-10-23 PROCEDURE — 96413 CHEMO IV INFUSION 1 HR: CPT

## 2023-10-23 PROCEDURE — 258N000003 HC RX IP 258 OP 636: Performed by: NURSE PRACTITIONER

## 2023-10-23 PROCEDURE — 96375 TX/PRO/DX INJ NEW DRUG ADDON: CPT

## 2023-10-23 PROCEDURE — 250N000011 HC RX IP 250 OP 636: Mod: JZ | Performed by: NURSE PRACTITIONER

## 2023-10-23 RX ORDER — ONDANSETRON 2 MG/ML
8 INJECTION INTRAMUSCULAR; INTRAVENOUS ONCE
Status: COMPLETED | OUTPATIENT
Start: 2023-10-23 | End: 2023-10-23

## 2023-10-23 RX ORDER — HEPARIN SODIUM (PORCINE) LOCK FLUSH IV SOLN 100 UNIT/ML 100 UNIT/ML
5 SOLUTION INTRAVENOUS
Status: DISCONTINUED | OUTPATIENT
Start: 2023-10-23 | End: 2023-10-23 | Stop reason: HOSPADM

## 2023-10-23 RX ADMIN — Medication 5 ML: at 14:07

## 2023-10-23 RX ADMIN — ONDANSETRON 8 MG: 2 INJECTION INTRAMUSCULAR; INTRAVENOUS at 13:30

## 2023-10-23 RX ADMIN — FAMOTIDINE 20 MG: 10 INJECTION INTRAVENOUS at 13:42

## 2023-10-23 RX ADMIN — AZACITIDINE 150 MG: 100 INJECTION, POWDER, LYOPHILIZED, FOR SOLUTION INTRAVENOUS; SUBCUTANEOUS at 13:49

## 2023-10-23 RX ADMIN — SODIUM CHLORIDE 250 ML: 9 INJECTION, SOLUTION INTRAVENOUS at 13:20

## 2023-10-23 NOTE — PROGRESS NOTES
Infusion Nursing Note:  Ziggy Hallman presents today for C2D8 Vidaza.    Patient seen by provider today: No   present during visit today: Not Applicable.    Note: N/A.      Intravenous Access:  Implanted Port.    Treatment Conditions:  Not Applicable.      Post Infusion Assessment:  Patient tolerated infusion without incident.  Blood return noted pre and post infusion.  Site patent and intact, free from redness, edema or discomfort.  No evidence of extravasations.  Port remains accessed for use tomorrow.       Discharge Plan:   Patient discharged in stable condition accompanied by: self.  Departure Mode: Ambulatory.      Marlen Salas RN

## 2023-10-24 ENCOUNTER — INFUSION THERAPY VISIT (OUTPATIENT)
Dept: INFUSION THERAPY | Facility: CLINIC | Age: 48
End: 2023-10-24
Attending: INTERNAL MEDICINE
Payer: COMMERCIAL

## 2023-10-24 VITALS — HEART RATE: 87 BPM | SYSTOLIC BLOOD PRESSURE: 149 MMHG | DIASTOLIC BLOOD PRESSURE: 96 MMHG

## 2023-10-24 DIAGNOSIS — D46.9 MDS (MYELODYSPLASTIC SYNDROME) (H): Primary | ICD-10-CM

## 2023-10-24 PROCEDURE — 96375 TX/PRO/DX INJ NEW DRUG ADDON: CPT

## 2023-10-24 PROCEDURE — 96409 CHEMO IV PUSH SNGL DRUG: CPT

## 2023-10-24 PROCEDURE — 250N000011 HC RX IP 250 OP 636: Mod: JZ | Performed by: NURSE PRACTITIONER

## 2023-10-24 PROCEDURE — 258N000003 HC RX IP 258 OP 636: Performed by: NURSE PRACTITIONER

## 2023-10-24 RX ORDER — HEPARIN SODIUM (PORCINE) LOCK FLUSH IV SOLN 100 UNIT/ML 100 UNIT/ML
5 SOLUTION INTRAVENOUS
Status: DISCONTINUED | OUTPATIENT
Start: 2023-10-24 | End: 2023-10-24 | Stop reason: HOSPADM

## 2023-10-24 RX ORDER — ONDANSETRON 2 MG/ML
8 INJECTION INTRAMUSCULAR; INTRAVENOUS ONCE
Status: COMPLETED | OUTPATIENT
Start: 2023-10-24 | End: 2023-10-24

## 2023-10-24 RX ADMIN — FAMOTIDINE 20 MG: 10 INJECTION INTRAVENOUS at 13:18

## 2023-10-24 RX ADMIN — AZACITIDINE 150 MG: 100 INJECTION, POWDER, LYOPHILIZED, FOR SOLUTION INTRAVENOUS; SUBCUTANEOUS at 13:32

## 2023-10-24 RX ADMIN — Medication 5 ML: at 13:46

## 2023-10-24 RX ADMIN — ONDANSETRON 8 MG: 2 INJECTION INTRAMUSCULAR; INTRAVENOUS at 12:58

## 2023-10-24 RX ADMIN — SODIUM CHLORIDE 250 ML: 9 INJECTION, SOLUTION INTRAVENOUS at 12:54

## 2023-10-24 NOTE — PROGRESS NOTES
Infusion Nursing Note:  Ziggy Hallman presents today for Vidaza C2D9.    Patient seen by provider today: No   present during visit today: Not Applicable.    Note: Denies any new medical concerns since yesterday.    Intravenous Access:  Implanted Port.    Treatment Conditions:  Results reviewed, labs MET treatment parameters, ok to proceed with treatment.    Post Infusion Assessment:  Patient tolerated infusion without incident.  Blood return noted pre and post infusion.  Site patent and intact, free from redness, edema or discomfort.  No evidence of extravasations.  Access discontinued per protocol.     Discharge Plan:   Discharge instructions reviewed with: Patient.  Patient and/or family verbalized understanding of discharge instructions and all questions answered.  [facility]  Patient will return 11/13/2023 for next appointment.   Patient discharged in stable condition accompanied by: self.  Departure Mode: Ambulatory.    Blanka Esparza RN

## 2023-11-06 ENCOUNTER — LAB (OUTPATIENT)
Dept: INFUSION THERAPY | Facility: CLINIC | Age: 48
End: 2023-11-06
Attending: NURSE PRACTITIONER
Payer: COMMERCIAL

## 2023-11-06 DIAGNOSIS — D46.9 MDS (MYELODYSPLASTIC SYNDROME) (H): ICD-10-CM

## 2023-11-06 DIAGNOSIS — D46.9 MDS (MYELODYSPLASTIC SYNDROME) (H): Primary | ICD-10-CM

## 2023-11-06 DIAGNOSIS — D61.810 ANTINEOPLASTIC CHEMOTHERAPY INDUCED PANCYTOPENIA (H): Primary | ICD-10-CM

## 2023-11-06 DIAGNOSIS — T45.1X5A ANTINEOPLASTIC CHEMOTHERAPY INDUCED PANCYTOPENIA (H): Primary | ICD-10-CM

## 2023-11-06 LAB
ACANTHOCYTES BLD QL SMEAR: ABNORMAL
AUER BODIES BLD QL SMEAR: ABNORMAL
BASO STIPL BLD QL SMEAR: ABNORMAL
BASOPHILS # BLD AUTO: 0 10E3/UL (ref 0–0.2)
BASOPHILS NFR BLD AUTO: 1 %
BITE CELLS BLD QL SMEAR: ABNORMAL
BLISTER CELLS BLD QL SMEAR: ABNORMAL
BURR CELLS BLD QL SMEAR: ABNORMAL
DACRYOCYTES BLD QL SMEAR: SLIGHT
ELLIPTOCYTES BLD QL SMEAR: ABNORMAL
EOSINOPHIL # BLD AUTO: 0.1 10E3/UL (ref 0–0.7)
EOSINOPHIL NFR BLD AUTO: 3 %
ERYTHROCYTE [DISTWIDTH] IN BLOOD BY AUTOMATED COUNT: 16.4 % (ref 10–15)
FRAGMENTS BLD QL SMEAR: ABNORMAL
HCT VFR BLD AUTO: 24.8 % (ref 40–53)
HGB BLD-MCNC: 8.2 G/DL (ref 13.3–17.7)
HGB C CRYSTALS: ABNORMAL
HOWELL-JOLLY BOD BLD QL SMEAR: ABNORMAL
IMM GRANULOCYTES # BLD: 0.1 10E3/UL
IMM GRANULOCYTES NFR BLD: 2 %
LYMPHOCYTES # BLD AUTO: 2.1 10E3/UL (ref 0.8–5.3)
LYMPHOCYTES NFR BLD AUTO: 47 %
MCH RBC QN AUTO: 34.6 PG (ref 26.5–33)
MCHC RBC AUTO-ENTMCNC: 33.1 G/DL (ref 31.5–36.5)
MCV RBC AUTO: 105 FL (ref 78–100)
MONOCYTES # BLD AUTO: 0.2 10E3/UL (ref 0–1.3)
MONOCYTES NFR BLD AUTO: 4 %
NEUTROPHILS # BLD AUTO: 1.9 10E3/UL (ref 1.6–8.3)
NEUTROPHILS NFR BLD AUTO: 43 %
NEUTS HYPERSEG BLD QL SMEAR: ABNORMAL
NRBC # BLD AUTO: 0 10E3/UL
NRBC BLD AUTO-RTO: 1 /100
PLAT MORPH BLD: ABNORMAL
PLATELET # BLD AUTO: 248 10E3/UL (ref 150–450)
POLYCHROMASIA BLD QL SMEAR: ABNORMAL
RBC # BLD AUTO: 2.37 10E6/UL (ref 4.4–5.9)
RBC AGGLUT BLD QL: ABNORMAL
RBC MORPH BLD: ABNORMAL
ROULEAUX BLD QL SMEAR: ABNORMAL
SICKLE CELLS BLD QL SMEAR: ABNORMAL
SMUDGE CELLS BLD QL SMEAR: ABNORMAL
SPHEROCYTES BLD QL SMEAR: ABNORMAL
STOMATOCYTES BLD QL SMEAR: ABNORMAL
TARGETS BLD QL SMEAR: ABNORMAL
TOXIC GRANULES BLD QL SMEAR: ABNORMAL
VARIANT LYMPHS BLD QL SMEAR: ABNORMAL
WBC # BLD AUTO: 4.4 10E3/UL (ref 4–11)

## 2023-11-06 PROCEDURE — 36591 DRAW BLOOD OFF VENOUS DEVICE: CPT

## 2023-11-06 PROCEDURE — 250N000011 HC RX IP 250 OP 636: Mod: JZ | Performed by: NURSE PRACTITIONER

## 2023-11-06 PROCEDURE — 85025 COMPLETE CBC W/AUTO DIFF WBC: CPT | Performed by: NURSE PRACTITIONER

## 2023-11-06 RX ORDER — HEPARIN SODIUM,PORCINE 10 UNIT/ML
5-20 VIAL (ML) INTRAVENOUS DAILY PRN
Status: CANCELLED | OUTPATIENT
Start: 2023-11-06

## 2023-11-06 RX ORDER — HEPARIN SODIUM (PORCINE) LOCK FLUSH IV SOLN 100 UNIT/ML 100 UNIT/ML
5 SOLUTION INTRAVENOUS
Status: DISCONTINUED | OUTPATIENT
Start: 2023-11-06 | End: 2023-11-06 | Stop reason: HOSPADM

## 2023-11-06 RX ORDER — HEPARIN SODIUM (PORCINE) LOCK FLUSH IV SOLN 100 UNIT/ML 100 UNIT/ML
5 SOLUTION INTRAVENOUS
Status: CANCELLED | OUTPATIENT
Start: 2023-11-06

## 2023-11-06 RX ADMIN — Medication 5 ML: at 13:38

## 2023-11-13 ENCOUNTER — INFUSION THERAPY VISIT (OUTPATIENT)
Dept: INFUSION THERAPY | Facility: CLINIC | Age: 48
End: 2023-11-13
Attending: INTERNAL MEDICINE
Payer: COMMERCIAL

## 2023-11-13 ENCOUNTER — ONCOLOGY VISIT (OUTPATIENT)
Dept: ONCOLOGY | Facility: CLINIC | Age: 48
End: 2023-11-13
Attending: NURSE PRACTITIONER
Payer: COMMERCIAL

## 2023-11-13 VITALS
WEIGHT: 195 LBS | BODY MASS INDEX: 28.88 KG/M2 | RESPIRATION RATE: 12 BRPM | OXYGEN SATURATION: 96 % | SYSTOLIC BLOOD PRESSURE: 141 MMHG | TEMPERATURE: 98.3 F | HEIGHT: 69 IN | DIASTOLIC BLOOD PRESSURE: 84 MMHG | HEART RATE: 94 BPM

## 2023-11-13 VITALS — SYSTOLIC BLOOD PRESSURE: 144 MMHG | HEART RATE: 86 BPM | DIASTOLIC BLOOD PRESSURE: 84 MMHG

## 2023-11-13 DIAGNOSIS — T45.1X5A ANTINEOPLASTIC CHEMOTHERAPY INDUCED PANCYTOPENIA (H): ICD-10-CM

## 2023-11-13 DIAGNOSIS — D46.9 MDS (MYELODYSPLASTIC SYNDROME) (H): Primary | ICD-10-CM

## 2023-11-13 DIAGNOSIS — D61.810 ANTINEOPLASTIC CHEMOTHERAPY INDUCED PANCYTOPENIA (H): ICD-10-CM

## 2023-11-13 LAB
ALBUMIN SERPL BCG-MCNC: 2.4 G/DL (ref 3.5–5.2)
ALP SERPL-CCNC: 114 U/L (ref 40–129)
ALT SERPL W P-5'-P-CCNC: 15 U/L (ref 0–70)
ANION GAP SERPL CALCULATED.3IONS-SCNC: 8 MMOL/L (ref 7–15)
AST SERPL W P-5'-P-CCNC: 23 U/L (ref 0–45)
BASOPHILS # BLD AUTO: 0 10E3/UL (ref 0–0.2)
BASOPHILS NFR BLD AUTO: 1 %
BILIRUB SERPL-MCNC: 0.3 MG/DL
BUN SERPL-MCNC: 14.4 MG/DL (ref 6–20)
CALCIUM SERPL-MCNC: 8.9 MG/DL (ref 8.6–10)
CHLORIDE SERPL-SCNC: 104 MMOL/L (ref 98–107)
CREAT SERPL-MCNC: 0.88 MG/DL (ref 0.67–1.17)
DEPRECATED HCO3 PLAS-SCNC: 26 MMOL/L (ref 22–29)
EGFRCR SERPLBLD CKD-EPI 2021: >90 ML/MIN/1.73M2
EOSINOPHIL # BLD AUTO: 0.1 10E3/UL (ref 0–0.7)
EOSINOPHIL NFR BLD AUTO: 2 %
ERYTHROCYTE [DISTWIDTH] IN BLOOD BY AUTOMATED COUNT: 16.1 % (ref 10–15)
GLUCOSE SERPL-MCNC: 112 MG/DL (ref 70–99)
HCT VFR BLD AUTO: 27 % (ref 40–53)
HGB BLD-MCNC: 9.2 G/DL (ref 13.3–17.7)
IMM GRANULOCYTES # BLD: 0.1 10E3/UL
IMM GRANULOCYTES NFR BLD: 1 %
LYMPHOCYTES # BLD AUTO: 2 10E3/UL (ref 0.8–5.3)
LYMPHOCYTES NFR BLD AUTO: 38 %
MCH RBC QN AUTO: 35 PG (ref 26.5–33)
MCHC RBC AUTO-ENTMCNC: 34.1 G/DL (ref 31.5–36.5)
MCV RBC AUTO: 103 FL (ref 78–100)
MONOCYTES # BLD AUTO: 0.2 10E3/UL (ref 0–1.3)
MONOCYTES NFR BLD AUTO: 3 %
NEUTROPHILS # BLD AUTO: 2.9 10E3/UL (ref 1.6–8.3)
NEUTROPHILS NFR BLD AUTO: 55 %
NRBC # BLD AUTO: 0 10E3/UL
NRBC BLD AUTO-RTO: 0 /100
PLATELET # BLD AUTO: 384 10E3/UL (ref 150–450)
POTASSIUM SERPL-SCNC: 3.8 MMOL/L (ref 3.4–5.3)
PROT SERPL-MCNC: 7.1 G/DL (ref 6.4–8.3)
RBC # BLD AUTO: 2.63 10E6/UL (ref 4.4–5.9)
SODIUM SERPL-SCNC: 138 MMOL/L (ref 135–145)
WBC # BLD AUTO: 5.2 10E3/UL (ref 4–11)

## 2023-11-13 PROCEDURE — 36591 DRAW BLOOD OFF VENOUS DEVICE: CPT | Performed by: INTERNAL MEDICINE

## 2023-11-13 PROCEDURE — G0463 HOSPITAL OUTPT CLINIC VISIT: HCPCS | Performed by: NURSE PRACTITIONER

## 2023-11-13 PROCEDURE — 80053 COMPREHEN METABOLIC PANEL: CPT | Performed by: INTERNAL MEDICINE

## 2023-11-13 PROCEDURE — 99214 OFFICE O/P EST MOD 30 MIN: CPT | Performed by: NURSE PRACTITIONER

## 2023-11-13 PROCEDURE — 96375 TX/PRO/DX INJ NEW DRUG ADDON: CPT

## 2023-11-13 PROCEDURE — 96409 CHEMO IV PUSH SNGL DRUG: CPT

## 2023-11-13 PROCEDURE — 85025 COMPLETE CBC W/AUTO DIFF WBC: CPT | Performed by: INTERNAL MEDICINE

## 2023-11-13 PROCEDURE — 250N000011 HC RX IP 250 OP 636: Performed by: NURSE PRACTITIONER

## 2023-11-13 PROCEDURE — 258N000003 HC RX IP 258 OP 636: Performed by: NURSE PRACTITIONER

## 2023-11-13 RX ORDER — HEPARIN SODIUM (PORCINE) LOCK FLUSH IV SOLN 100 UNIT/ML 100 UNIT/ML
5 SOLUTION INTRAVENOUS
Status: CANCELLED | OUTPATIENT
Start: 2023-11-17

## 2023-11-13 RX ORDER — LORAZEPAM 2 MG/ML
0.5 INJECTION INTRAMUSCULAR EVERY 4 HOURS PRN
Status: CANCELLED | OUTPATIENT
Start: 2023-11-13

## 2023-11-13 RX ORDER — ONDANSETRON 2 MG/ML
8 INJECTION INTRAMUSCULAR; INTRAVENOUS ONCE
Status: CANCELLED | OUTPATIENT
Start: 2023-11-21

## 2023-11-13 RX ORDER — ONDANSETRON 2 MG/ML
8 INJECTION INTRAMUSCULAR; INTRAVENOUS ONCE
Status: CANCELLED | OUTPATIENT
Start: 2023-11-15

## 2023-11-13 RX ORDER — HEPARIN SODIUM (PORCINE) LOCK FLUSH IV SOLN 100 UNIT/ML 100 UNIT/ML
5 SOLUTION INTRAVENOUS
Status: CANCELLED | OUTPATIENT
Start: 2023-11-15

## 2023-11-13 RX ORDER — EPINEPHRINE 1 MG/ML
0.3 INJECTION, SOLUTION, CONCENTRATE INTRAVENOUS EVERY 5 MIN PRN
Status: CANCELLED | OUTPATIENT
Start: 2023-11-20

## 2023-11-13 RX ORDER — ALBUTEROL SULFATE 90 UG/1
1-2 AEROSOL, METERED RESPIRATORY (INHALATION)
Status: CANCELLED
Start: 2023-11-14

## 2023-11-13 RX ORDER — MEPERIDINE HYDROCHLORIDE 25 MG/ML
25 INJECTION INTRAMUSCULAR; INTRAVENOUS; SUBCUTANEOUS EVERY 30 MIN PRN
Status: CANCELLED | OUTPATIENT
Start: 2023-11-13

## 2023-11-13 RX ORDER — MEPERIDINE HYDROCHLORIDE 25 MG/ML
25 INJECTION INTRAMUSCULAR; INTRAVENOUS; SUBCUTANEOUS EVERY 30 MIN PRN
Status: CANCELLED | OUTPATIENT
Start: 2023-11-16

## 2023-11-13 RX ORDER — MEPERIDINE HYDROCHLORIDE 25 MG/ML
25 INJECTION INTRAMUSCULAR; INTRAVENOUS; SUBCUTANEOUS EVERY 30 MIN PRN
Status: CANCELLED | OUTPATIENT
Start: 2023-11-17

## 2023-11-13 RX ORDER — ALBUTEROL SULFATE 0.83 MG/ML
2.5 SOLUTION RESPIRATORY (INHALATION)
Status: CANCELLED | OUTPATIENT
Start: 2023-11-16

## 2023-11-13 RX ORDER — HEPARIN SODIUM (PORCINE) LOCK FLUSH IV SOLN 100 UNIT/ML 100 UNIT/ML
5 SOLUTION INTRAVENOUS
Status: CANCELLED | OUTPATIENT
Start: 2023-11-13

## 2023-11-13 RX ORDER — ALBUTEROL SULFATE 0.83 MG/ML
2.5 SOLUTION RESPIRATORY (INHALATION)
Status: CANCELLED | OUTPATIENT
Start: 2023-11-17

## 2023-11-13 RX ORDER — DIPHENHYDRAMINE HYDROCHLORIDE 50 MG/ML
50 INJECTION INTRAMUSCULAR; INTRAVENOUS
Status: CANCELLED
Start: 2023-11-20

## 2023-11-13 RX ORDER — MEPERIDINE HYDROCHLORIDE 25 MG/ML
25 INJECTION INTRAMUSCULAR; INTRAVENOUS; SUBCUTANEOUS EVERY 30 MIN PRN
Status: CANCELLED | OUTPATIENT
Start: 2023-11-21

## 2023-11-13 RX ORDER — EPINEPHRINE 1 MG/ML
0.3 INJECTION, SOLUTION, CONCENTRATE INTRAVENOUS EVERY 5 MIN PRN
Status: CANCELLED | OUTPATIENT
Start: 2023-11-17

## 2023-11-13 RX ORDER — MEPERIDINE HYDROCHLORIDE 25 MG/ML
25 INJECTION INTRAMUSCULAR; INTRAVENOUS; SUBCUTANEOUS EVERY 30 MIN PRN
Status: CANCELLED | OUTPATIENT
Start: 2023-11-15

## 2023-11-13 RX ORDER — DIPHENHYDRAMINE HYDROCHLORIDE 50 MG/ML
50 INJECTION INTRAMUSCULAR; INTRAVENOUS
Status: CANCELLED
Start: 2023-11-21

## 2023-11-13 RX ORDER — ONDANSETRON 2 MG/ML
8 INJECTION INTRAMUSCULAR; INTRAVENOUS ONCE
Status: CANCELLED | OUTPATIENT
Start: 2023-11-13

## 2023-11-13 RX ORDER — METHYLPREDNISOLONE SODIUM SUCCINATE 125 MG/2ML
125 INJECTION, POWDER, LYOPHILIZED, FOR SOLUTION INTRAMUSCULAR; INTRAVENOUS
Status: CANCELLED
Start: 2023-11-16

## 2023-11-13 RX ORDER — HEPARIN SODIUM (PORCINE) LOCK FLUSH IV SOLN 100 UNIT/ML 100 UNIT/ML
5 SOLUTION INTRAVENOUS
Status: CANCELLED | OUTPATIENT
Start: 2023-11-21

## 2023-11-13 RX ORDER — ALBUTEROL SULFATE 90 UG/1
1-2 AEROSOL, METERED RESPIRATORY (INHALATION)
Status: CANCELLED
Start: 2023-11-21

## 2023-11-13 RX ORDER — ALBUTEROL SULFATE 0.83 MG/ML
2.5 SOLUTION RESPIRATORY (INHALATION)
Status: CANCELLED | OUTPATIENT
Start: 2023-11-21

## 2023-11-13 RX ORDER — LORAZEPAM 2 MG/ML
0.5 INJECTION INTRAMUSCULAR EVERY 4 HOURS PRN
Status: CANCELLED | OUTPATIENT
Start: 2023-11-20

## 2023-11-13 RX ORDER — ALBUTEROL SULFATE 90 UG/1
1-2 AEROSOL, METERED RESPIRATORY (INHALATION)
Status: CANCELLED
Start: 2023-11-16

## 2023-11-13 RX ORDER — METHYLPREDNISOLONE SODIUM SUCCINATE 125 MG/2ML
125 INJECTION, POWDER, LYOPHILIZED, FOR SOLUTION INTRAMUSCULAR; INTRAVENOUS
Status: CANCELLED
Start: 2023-11-21

## 2023-11-13 RX ORDER — LORAZEPAM 2 MG/ML
0.5 INJECTION INTRAMUSCULAR EVERY 4 HOURS PRN
Status: CANCELLED | OUTPATIENT
Start: 2023-11-15

## 2023-11-13 RX ORDER — ALBUTEROL SULFATE 0.83 MG/ML
2.5 SOLUTION RESPIRATORY (INHALATION)
Status: CANCELLED | OUTPATIENT
Start: 2023-11-20

## 2023-11-13 RX ORDER — ALBUTEROL SULFATE 0.83 MG/ML
2.5 SOLUTION RESPIRATORY (INHALATION)
Status: CANCELLED | OUTPATIENT
Start: 2023-11-14

## 2023-11-13 RX ORDER — EPINEPHRINE 1 MG/ML
0.3 INJECTION, SOLUTION, CONCENTRATE INTRAVENOUS EVERY 5 MIN PRN
Status: CANCELLED | OUTPATIENT
Start: 2023-11-21

## 2023-11-13 RX ORDER — METHYLPREDNISOLONE SODIUM SUCCINATE 125 MG/2ML
125 INJECTION, POWDER, LYOPHILIZED, FOR SOLUTION INTRAMUSCULAR; INTRAVENOUS
Status: CANCELLED
Start: 2023-11-15

## 2023-11-13 RX ORDER — MEPERIDINE HYDROCHLORIDE 25 MG/ML
25 INJECTION INTRAMUSCULAR; INTRAVENOUS; SUBCUTANEOUS EVERY 30 MIN PRN
Status: CANCELLED | OUTPATIENT
Start: 2023-11-14

## 2023-11-13 RX ORDER — EPINEPHRINE 1 MG/ML
0.3 INJECTION, SOLUTION, CONCENTRATE INTRAVENOUS EVERY 5 MIN PRN
Status: CANCELLED | OUTPATIENT
Start: 2023-11-13

## 2023-11-13 RX ORDER — ONDANSETRON 2 MG/ML
8 INJECTION INTRAMUSCULAR; INTRAVENOUS ONCE
Status: CANCELLED | OUTPATIENT
Start: 2023-11-14

## 2023-11-13 RX ORDER — ALBUTEROL SULFATE 0.83 MG/ML
2.5 SOLUTION RESPIRATORY (INHALATION)
Status: CANCELLED | OUTPATIENT
Start: 2023-11-15

## 2023-11-13 RX ORDER — DIPHENHYDRAMINE HYDROCHLORIDE 50 MG/ML
50 INJECTION INTRAMUSCULAR; INTRAVENOUS
Status: CANCELLED
Start: 2023-11-17

## 2023-11-13 RX ORDER — EPINEPHRINE 1 MG/ML
0.3 INJECTION, SOLUTION, CONCENTRATE INTRAVENOUS EVERY 5 MIN PRN
Status: CANCELLED | OUTPATIENT
Start: 2023-11-15

## 2023-11-13 RX ORDER — METHYLPREDNISOLONE SODIUM SUCCINATE 125 MG/2ML
125 INJECTION, POWDER, LYOPHILIZED, FOR SOLUTION INTRAMUSCULAR; INTRAVENOUS
Status: CANCELLED
Start: 2023-11-13

## 2023-11-13 RX ORDER — ONDANSETRON 2 MG/ML
8 INJECTION INTRAMUSCULAR; INTRAVENOUS ONCE
Status: CANCELLED | OUTPATIENT
Start: 2023-11-20

## 2023-11-13 RX ORDER — DIPHENHYDRAMINE HYDROCHLORIDE 50 MG/ML
50 INJECTION INTRAMUSCULAR; INTRAVENOUS
Status: CANCELLED
Start: 2023-11-13

## 2023-11-13 RX ORDER — ONDANSETRON 2 MG/ML
8 INJECTION INTRAMUSCULAR; INTRAVENOUS ONCE
Status: CANCELLED | OUTPATIENT
Start: 2023-11-17

## 2023-11-13 RX ORDER — LORAZEPAM 2 MG/ML
0.5 INJECTION INTRAMUSCULAR EVERY 4 HOURS PRN
Status: CANCELLED | OUTPATIENT
Start: 2023-11-16

## 2023-11-13 RX ORDER — ONDANSETRON 2 MG/ML
8 INJECTION INTRAMUSCULAR; INTRAVENOUS ONCE
Status: COMPLETED | OUTPATIENT
Start: 2023-11-13 | End: 2023-11-13

## 2023-11-13 RX ORDER — EPINEPHRINE 1 MG/ML
0.3 INJECTION, SOLUTION, CONCENTRATE INTRAVENOUS EVERY 5 MIN PRN
Status: CANCELLED | OUTPATIENT
Start: 2023-11-14

## 2023-11-13 RX ORDER — HEPARIN SODIUM (PORCINE) LOCK FLUSH IV SOLN 100 UNIT/ML 100 UNIT/ML
5 SOLUTION INTRAVENOUS
Status: CANCELLED | OUTPATIENT
Start: 2023-11-20

## 2023-11-13 RX ORDER — EPINEPHRINE 1 MG/ML
0.3 INJECTION, SOLUTION, CONCENTRATE INTRAVENOUS EVERY 5 MIN PRN
Status: CANCELLED | OUTPATIENT
Start: 2023-11-16

## 2023-11-13 RX ORDER — HEPARIN SODIUM (PORCINE) LOCK FLUSH IV SOLN 100 UNIT/ML 100 UNIT/ML
5 SOLUTION INTRAVENOUS
Status: DISCONTINUED | OUTPATIENT
Start: 2023-11-13 | End: 2023-11-13 | Stop reason: HOSPADM

## 2023-11-13 RX ORDER — DIPHENHYDRAMINE HYDROCHLORIDE 50 MG/ML
50 INJECTION INTRAMUSCULAR; INTRAVENOUS
Status: CANCELLED
Start: 2023-11-14

## 2023-11-13 RX ORDER — HEPARIN SODIUM (PORCINE) LOCK FLUSH IV SOLN 100 UNIT/ML 100 UNIT/ML
5 SOLUTION INTRAVENOUS
Status: CANCELLED | OUTPATIENT
Start: 2023-11-16

## 2023-11-13 RX ORDER — MEPERIDINE HYDROCHLORIDE 25 MG/ML
25 INJECTION INTRAMUSCULAR; INTRAVENOUS; SUBCUTANEOUS EVERY 30 MIN PRN
Status: CANCELLED | OUTPATIENT
Start: 2023-11-20

## 2023-11-13 RX ORDER — ONDANSETRON 2 MG/ML
8 INJECTION INTRAMUSCULAR; INTRAVENOUS ONCE
Status: CANCELLED | OUTPATIENT
Start: 2023-11-16

## 2023-11-13 RX ORDER — HEPARIN SODIUM (PORCINE) LOCK FLUSH IV SOLN 100 UNIT/ML 100 UNIT/ML
5 SOLUTION INTRAVENOUS
Status: CANCELLED | OUTPATIENT
Start: 2023-11-14

## 2023-11-13 RX ORDER — LORAZEPAM 2 MG/ML
0.5 INJECTION INTRAMUSCULAR EVERY 4 HOURS PRN
Status: CANCELLED | OUTPATIENT
Start: 2023-11-21

## 2023-11-13 RX ORDER — METHYLPREDNISOLONE SODIUM SUCCINATE 125 MG/2ML
125 INJECTION, POWDER, LYOPHILIZED, FOR SOLUTION INTRAMUSCULAR; INTRAVENOUS
Status: CANCELLED
Start: 2023-11-17

## 2023-11-13 RX ORDER — ALBUTEROL SULFATE 0.83 MG/ML
2.5 SOLUTION RESPIRATORY (INHALATION)
Status: CANCELLED | OUTPATIENT
Start: 2023-11-13

## 2023-11-13 RX ORDER — METHYLPREDNISOLONE SODIUM SUCCINATE 125 MG/2ML
125 INJECTION, POWDER, LYOPHILIZED, FOR SOLUTION INTRAMUSCULAR; INTRAVENOUS
Status: CANCELLED
Start: 2023-11-20

## 2023-11-13 RX ORDER — ALBUTEROL SULFATE 90 UG/1
1-2 AEROSOL, METERED RESPIRATORY (INHALATION)
Status: CANCELLED
Start: 2023-11-15

## 2023-11-13 RX ORDER — ALBUTEROL SULFATE 90 UG/1
1-2 AEROSOL, METERED RESPIRATORY (INHALATION)
Status: CANCELLED
Start: 2023-11-17

## 2023-11-13 RX ORDER — METHYLPREDNISOLONE SODIUM SUCCINATE 125 MG/2ML
125 INJECTION, POWDER, LYOPHILIZED, FOR SOLUTION INTRAMUSCULAR; INTRAVENOUS
Status: CANCELLED
Start: 2023-11-14

## 2023-11-13 RX ORDER — LORAZEPAM 2 MG/ML
0.5 INJECTION INTRAMUSCULAR EVERY 4 HOURS PRN
Status: CANCELLED | OUTPATIENT
Start: 2023-11-14

## 2023-11-13 RX ORDER — ALBUTEROL SULFATE 90 UG/1
1-2 AEROSOL, METERED RESPIRATORY (INHALATION)
Status: CANCELLED
Start: 2023-11-13

## 2023-11-13 RX ORDER — DIPHENHYDRAMINE HYDROCHLORIDE 50 MG/ML
50 INJECTION INTRAMUSCULAR; INTRAVENOUS
Status: CANCELLED
Start: 2023-11-16

## 2023-11-13 RX ORDER — ALBUTEROL SULFATE 90 UG/1
1-2 AEROSOL, METERED RESPIRATORY (INHALATION)
Status: CANCELLED
Start: 2023-11-20

## 2023-11-13 RX ORDER — DIPHENHYDRAMINE HYDROCHLORIDE 50 MG/ML
50 INJECTION INTRAMUSCULAR; INTRAVENOUS
Status: CANCELLED
Start: 2023-11-15

## 2023-11-13 RX ORDER — LORAZEPAM 2 MG/ML
0.5 INJECTION INTRAMUSCULAR EVERY 4 HOURS PRN
Status: CANCELLED | OUTPATIENT
Start: 2023-11-17

## 2023-11-13 RX ADMIN — FAMOTIDINE 20 MG: 10 INJECTION INTRAVENOUS at 14:38

## 2023-11-13 RX ADMIN — ONDANSETRON 8 MG: 2 INJECTION INTRAMUSCULAR; INTRAVENOUS at 14:37

## 2023-11-13 RX ADMIN — Medication 5 ML: at 15:02

## 2023-11-13 RX ADMIN — SODIUM CHLORIDE 250 ML: 9 INJECTION, SOLUTION INTRAVENOUS at 14:18

## 2023-11-13 RX ADMIN — AZACITIDINE 150 MG: 100 INJECTION, POWDER, LYOPHILIZED, FOR SOLUTION INTRAVENOUS; SUBCUTANEOUS at 14:48

## 2023-11-13 ASSESSMENT — PAIN SCALES - GENERAL: PAINLEVEL: NO PAIN (0)

## 2023-11-13 NOTE — PROGRESS NOTES
Infusion Nursing Note:  Ziggy Hallman presents today for Vidaza C3D1.    Patient seen by provider today: Yes: Lurdes Dee NP; therefore, assessment deferred   present during visit today: Not Applicable.    Note: N/A.    Intravenous Access:  Implanted Port.    Treatment Conditions:  Lab Results   Component Value Date    HGB 9.2 (L) 11/13/2023    WBC 5.2 11/13/2023    ANEU 1.6 09/25/2023    ANEUTAUTO 2.9 11/13/2023     11/13/2023        Lab Results   Component Value Date     11/13/2023    POTASSIUM 3.8 11/13/2023    CR 0.88 11/13/2023    REMY 8.9 11/13/2023    BILITOTAL 0.3 11/13/2023    ALBUMIN 2.4 (L) 11/13/2023    ALT 15 11/13/2023    AST 23 11/13/2023   Results reviewed, labs MET treatment parameters, ok to proceed with treatment.    Post Infusion Assessment:  Patient tolerated infusion without incident.  Blood return noted pre and post infusion.  Site patent and intact, free from redness, edema or discomfort.  No evidence of extravasations.  Port left accessed for tomorrow's infusion.     Discharge Plan:   Discharge instructions reviewed with: Patient.  Patient and/or family verbalized understanding of discharge instructions and all questions answered.  AVS to patient via [employer].  Patient will return 11/14/2023 for next appointment.   Patient discharged in stable condition accompanied by: self.  Departure Mode: Ambulatory.    Blanka Esparza RN

## 2023-11-13 NOTE — PROGRESS NOTES
"Oncology Rooming Note    November 13, 2023 1:42 PM   Ziggy Hallman is a 48 year old male who presents for:    Chief Complaint   Patient presents with    Oncology Clinic Visit     MDS (myelodysplastic syndrome) - Labs provider and infusion     Initial Vitals: BP (!) 141/84 (BP Location: Right arm, Patient Position: Sitting, Cuff Size: Adult Regular)   Pulse 94   Temp 98.3  F (36.8  C) (Oral)   Resp 12   Ht 1.753 m (5' 9\")   Wt 88.5 kg (195 lb)   SpO2 96%   BMI 28.80 kg/m   Estimated body mass index is 28.8 kg/m  as calculated from the following:    Height as of this encounter: 1.753 m (5' 9\").    Weight as of this encounter: 88.5 kg (195 lb). Body surface area is 2.08 meters squared.  No Pain (0) Comment: Data Unavailable   No LMP for male patient.  Allergies reviewed: Yes  Medications reviewed: Yes    Medications: Medication refills not needed today.  Pharmacy name entered into [place]: [id number] - [place], [place] - [address]    Clinical concerns:  None      Elena Reyes CMA              "

## 2023-11-13 NOTE — PROGRESS NOTES
[facility] Hematology and Oncology Progress Note    Patient: Ziggy Hallman  MRN: [id number]  Date of Service: Nov 13, 2023          Reason for Visit    MDS    Primary hematologist: Dr. Mcbride    Assessment and Plan    MDS:   Currently on induction vidaza in preparation for BMT.   Has completed 2 cycles with good tolerance. Ongoing fatigue.     Interval labs: hgb 7.6-8.2, WBC and platelets WNL  Current labs: hgb 9.2, WBC and platelets WNL. CMP unremarkable.     Plan:  -Proceed with cycle 3 Vidaza. Continue same dosing 75 m/m2 days 1-5 + 8-9  -Return in 4 weeks for cycle 4  -Overall plan is to do 4 cycles and then return to [facility] for transplant.    Pancytopenia:   This is overall stable.    No need for any blood products so far.    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 <1% blasts. Cellularity 40 - 50%.  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  IPSS was 4 = intermediate risk category.    -EPO level 25   -Genetic testing to assess for bone marrow failure syndromes (Fanconi anemia and dyskeratosis congenita) were negative he also saw BMT in November 2015.       5/2023 repeat bone marrow biopsy: hypocellular marrow involved by MDS with blast percentage of 5 -10%. Significant fibrosis at 3+.  Karyotyping came back showing complex karyotype in all the examined cells with hyperdiploid clone characterized by loss of 1 copy of chromosome 5 and 17 with an  "unknown material replacing the short arms of chromosome 9 and of the remaining chromosome 17 along with a marker chromosome.  No losses of 5q31 or TP53 noted by FISH.  Comprehensive myeloid NGS does not show any high risk mutations.  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     Treatment:   2015 BMT eval for potential allogenic stem cell transplant.  2 siblings were a full match. Deferred since counts generally stable.       2023 BMT re- eval.  Allogenic stem cell transplant recommended after induction HMA     9/18/2023 -present: Vidaza 75 mg/m2 x 7 days every 4 weeks (planning 4 cycles).  Today is cycle 3     Interval History   Ziggy is a pleasant 48-year-old man who is here today to continue induction Vidaza, due for cycle 3.    Tolerating this generally well. Chronic fatigue not significantly changed. Mild nausea, no emesis.   Fair appetite, weight is stable. Bowels regular. No bleeding/bruising. No fevers. He's not required any interval transfusions.    He fell and injured his rib cage last month. Pain is resolved.    ECOG Performance    0 - Independent      PHYSICAL EXAM  BP (!) 141/84 (BP Location: Right arm, Patient Position: Sitting, Cuff Size: Adult Regular)   Pulse 94   Temp 98.3  F (36.8  C) (Oral)   Resp 12   Ht 1.753 m (5' 9\")   Wt 88.5 kg (195 lb)   SpO2 96%   BMI 28.80 kg/m      GENERAL: no acute distress. Cooperative in conversation. Alone.  LYMPH: no palpable cervical or axillary adenopathy  RESP: Regular respiratory rate. Clear bilaterally  ABD: No splenomegaly  NEURO: non focal. Alert and oriented x3.   PSYCH: within normal limits. No depression or anxiety.  SKIN: exposed skin is dry intact.     Lab Results    Recent Results (from the past 168 hour(s))   Comprehensive metabolic panel   Result Value Ref Range    Sodium 138 135 - 145 mmol/L    Potassium 3.8 3.4 - 5.3 mmol/L    Carbon Dioxide (CO2) 26 22 - 29 mmol/L    Anion Gap 8 7 - 15 mmol/L    Urea Nitrogen 14.4 6.0 - 20.0 mg/dL    Creatinine 0.88 " 0.67 - 1.17 mg/dL    GFR Estimate >90 >60 mL/min/1.73m2    Calcium 8.9 8.6 - 10.0 mg/dL    Chloride 104 98 - 107 mmol/L    Glucose 112 (H) 70 - 99 mg/dL    Alkaline Phosphatase 114 40 - 129 U/L    AST 23 0 - 45 U/L    ALT 15 0 - 70 U/L    Protein Total 7.1 6.4 - 8.3 g/dL    Albumin 2.4 (L) 3.5 - 5.2 g/dL    Bilirubin Total 0.3 <=1.2 mg/dL   CBC with platelets and differential   Result Value Ref Range    WBC Count 5.2 4.0 - 11.0 10e3/uL    RBC Count 2.63 (L) 4.40 - 5.90 10e6/uL    Hemoglobin 9.2 (L) 13.3 - 17.7 g/dL    Hematocrit 27.0 (L) 40.0 - 53.0 %     (H) 78 - 100 fL    MCH 35.0 (H) 26.5 - 33.0 pg    MCHC 34.1 31.5 - 36.5 g/dL    RDW 16.1 (H) 10.0 - 15.0 %    Platelet Count 384 150 - 450 10e3/uL    % Neutrophils 55 %    % Lymphocytes 38 %    % Monocytes 3 %    % Eosinophils 2 %    % Basophils 1 %    % Immature Granulocytes 1 %    NRBCs per 100 WBC 0 <1 /100    Absolute Neutrophils 2.9 1.6 - 8.3 10e3/uL    Absolute Lymphocytes 2.0 0.8 - 5.3 10e3/uL    Absolute Monocytes 0.2 0.0 - 1.3 10e3/uL    Absolute Eosinophils 0.1 0.0 - 0.7 10e3/uL    Absolute Basophils 0.0 0.0 - 0.2 10e3/uL    Absolute Immature Granulocytes 0.1 <=0.4 10e3/uL    Absolute NRBCs 0.0 10e3/uL       Imaging    No results found.    Total time 30 minutes, to include face to face visit, review of EMR, ordering, documentation and coordination of care on date of service      Signed by: Lurdes Dee, NP

## 2023-11-13 NOTE — LETTER
11/13/2023         RE: Ziggy Hallman  [address] [facility] [id number]        Dear Colleague,    Thank you for referring your patient, Ziggy Hallman, to the [facility] [place]. Please see a copy of my visit note below.    [facility] Hematology and Oncology Progress Note    Patient: Ziggy Hallman  MRN: [id number]  Date of Service: Nov 13, 2023          Reason for Visit    MDS    Primary hematologist: Dr. Mcbride    Assessment and Plan    MDS:   Currently on induction vidaza in preparation for BMT.   Has completed 2 cycles with good tolerance. Ongoing fatigue.     Interval labs: hgb 7.6-8.2, WBC and platelets WNL  Current labs: hgb 9.2, WBC and platelets WNL. CMP unremarkable.     Plan:  -Proceed with cycle 3 Vidaza. Continue same dosing 75 m/m2 days 1-5 + 8-9  -Return in 4 weeks for cycle 4  -Overall plan is to do 4 cycles and then return to [facility] for transplant.    Pancytopenia:   This is overall stable.    No need for any blood products so far.    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 <1% blasts. Cellularity 40 - 50%.  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  IPSS was 4 = intermediate risk category.    -EPO level 25   -Genetic testing to assess for bone marrow failure syndromes (Fanconi anemia and dyskeratosis congenita) were negative he also saw BMT in November 2015.       5/2023 repeat bone marrow biopsy:  "hypocellular marrow involved by MDS with blast percentage of 5 -10%. Significant fibrosis at 3+.  Karyotyping came back showing complex karyotype in all the examined cells with hyperdiploid clone characterized by loss of 1 copy of chromosome 5 and 17 with an unknown material replacing the short arms of chromosome 9 and of the remaining chromosome 17 along with a marker chromosome.  No losses of 5q31 or TP53 noted by FISH.  Comprehensive myeloid NGS does not show any high risk mutations.  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     Treatment:   2015 BMT eval for potential allogenic stem cell transplant.  2 siblings were a full match. Deferred since counts generally stable.       2023 BMT re- eval.  Allogenic stem cell transplant recommended after induction HMA     9/18/2023 -present: Vidaza 75 mg/m2 x 7 days every 4 weeks (planning 4 cycles).  Today is cycle 3     Interval History   Ziggy is a pleasant 48-year-old man who is here today to continue induction Vidaza, due for cycle 3.    Tolerating this generally well. Chronic fatigue not significantly changed. Mild nausea, no emesis.   Fair appetite, weight is stable. Bowels regular. No bleeding/bruising. No fevers. He's not required any interval transfusions.    He fell and injured his rib cage last month. Pain is resolved.    ECOG Performance    0 - Independent      PHYSICAL EXAM  BP (!) 141/84 (BP Location: Right arm, Patient Position: Sitting, Cuff Size: Adult Regular)   Pulse 94   Temp 98.3  F (36.8  C) (Oral)   Resp 12   Ht 1.753 m (5' 9\")   Wt 88.5 kg (195 lb)   SpO2 96%   BMI 28.80 kg/m      GENERAL: no acute distress. Cooperative in conversation. Alone.  LYMPH: no palpable cervical or axillary adenopathy  RESP: Regular respiratory rate. Clear bilaterally  ABD: No splenomegaly  NEURO: non focal. Alert and oriented x3.   PSYCH: within normal limits. No depression or anxiety.  SKIN: exposed skin is dry intact.     Lab Results    Recent Results (from the past 168 " hour(s))   Comprehensive metabolic panel   Result Value Ref Range    Sodium 138 135 - 145 mmol/L    Potassium 3.8 3.4 - 5.3 mmol/L    Carbon Dioxide (CO2) 26 22 - 29 mmol/L    Anion Gap 8 7 - 15 mmol/L    Urea Nitrogen 14.4 6.0 - 20.0 mg/dL    Creatinine 0.88 0.67 - 1.17 mg/dL    GFR Estimate >90 >60 mL/min/1.73m2    Calcium 8.9 8.6 - 10.0 mg/dL    Chloride 104 98 - 107 mmol/L    Glucose 112 (H) 70 - 99 mg/dL    Alkaline Phosphatase 114 40 - 129 U/L    AST 23 0 - 45 U/L    ALT 15 0 - 70 U/L    Protein Total 7.1 6.4 - 8.3 g/dL    Albumin 2.4 (L) 3.5 - 5.2 g/dL    Bilirubin Total 0.3 <=1.2 mg/dL   CBC with platelets and differential   Result Value Ref Range    WBC Count 5.2 4.0 - 11.0 10e3/uL    RBC Count 2.63 (L) 4.40 - 5.90 10e6/uL    Hemoglobin 9.2 (L) 13.3 - 17.7 g/dL    Hematocrit 27.0 (L) 40.0 - 53.0 %     (H) 78 - 100 fL    MCH 35.0 (H) 26.5 - 33.0 pg    MCHC 34.1 31.5 - 36.5 g/dL    RDW 16.1 (H) 10.0 - 15.0 %    Platelet Count 384 150 - 450 10e3/uL    % Neutrophils 55 %    % Lymphocytes 38 %    % Monocytes 3 %    % Eosinophils 2 %    % Basophils 1 %    % Immature Granulocytes 1 %    NRBCs per 100 WBC 0 <1 /100    Absolute Neutrophils 2.9 1.6 - 8.3 10e3/uL    Absolute Lymphocytes 2.0 0.8 - 5.3 10e3/uL    Absolute Monocytes 0.2 0.0 - 1.3 10e3/uL    Absolute Eosinophils 0.1 0.0 - 0.7 10e3/uL    Absolute Basophils 0.0 0.0 - 0.2 10e3/uL    Absolute Immature Granulocytes 0.1 <=0.4 10e3/uL    Absolute NRBCs 0.0 10e3/uL       Imaging    No results found.    Total time 30 minutes, to include face to face visit, review of EMR, ordering, documentation and coordination of care on date of service      Signed by: Lurdes Dee NP    Oncology Rooming Note    November 13, 2023 1:42 PM   Ziggy Hallman is a 48 year old male who presents for:    Chief Complaint   Patient presents with     Oncology Clinic Visit     MDS (myelodysplastic syndrome) - Labs provider and infusion     Initial Vitals: BP (!) 141/84 (BP Location:  "Right arm, Patient Position: Sitting, Cuff Size: Adult Regular)   Pulse 94   Temp 98.3  F (36.8  C) (Oral)   Resp 12   Ht 1.753 m (5' 9\")   Wt 88.5 kg (195 lb)   SpO2 96%   BMI 28.80 kg/m   Estimated body mass index is 28.8 kg/m  as calculated from the following:    Height as of this encounter: 1.753 m (5' 9\").    Weight as of this encounter: 88.5 kg (195 lb). Body surface area is 2.08 meters squared.  No Pain (0) Comment: Data Unavailable   No LMP for male patient.  Allergies reviewed: Yes  Medications reviewed: Yes    Medications: Medication refills not needed today.  Pharmacy name entered into [facility]: [id number] - [place], [place] - [address] [place] [address]    Clinical concerns:  None      Elena Reyes, [facility]                Again, thank you for allowing me to participate in the care of your patient.        Sincerely,        Lurdes Dee NP  "

## 2023-11-14 ENCOUNTER — INFUSION THERAPY VISIT (OUTPATIENT)
Dept: INFUSION THERAPY | Facility: CLINIC | Age: 48
End: 2023-11-14
Attending: INTERNAL MEDICINE
Payer: COMMERCIAL

## 2023-11-14 VITALS
DIASTOLIC BLOOD PRESSURE: 75 MMHG | RESPIRATION RATE: 18 BRPM | HEART RATE: 97 BPM | TEMPERATURE: 97.6 F | SYSTOLIC BLOOD PRESSURE: 148 MMHG

## 2023-11-14 DIAGNOSIS — D46.9 MDS (MYELODYSPLASTIC SYNDROME) (H): Primary | ICD-10-CM

## 2023-11-14 PROCEDURE — 96375 TX/PRO/DX INJ NEW DRUG ADDON: CPT

## 2023-11-14 PROCEDURE — 250N000011 HC RX IP 250 OP 636: Performed by: NURSE PRACTITIONER

## 2023-11-14 PROCEDURE — 96409 CHEMO IV PUSH SNGL DRUG: CPT

## 2023-11-14 PROCEDURE — 258N000003 HC RX IP 258 OP 636: Performed by: NURSE PRACTITIONER

## 2023-11-14 RX ORDER — ONDANSETRON 2 MG/ML
8 INJECTION INTRAMUSCULAR; INTRAVENOUS ONCE
Status: COMPLETED | OUTPATIENT
Start: 2023-11-14 | End: 2023-11-14

## 2023-11-14 RX ORDER — HEPARIN SODIUM (PORCINE) LOCK FLUSH IV SOLN 100 UNIT/ML 100 UNIT/ML
5 SOLUTION INTRAVENOUS
Status: DISCONTINUED | OUTPATIENT
Start: 2023-11-14 | End: 2023-11-14 | Stop reason: HOSPADM

## 2023-11-14 RX ADMIN — ONDANSETRON 8 MG: 2 INJECTION INTRAMUSCULAR; INTRAVENOUS at 13:36

## 2023-11-14 RX ADMIN — Medication 5 ML: at 14:42

## 2023-11-14 RX ADMIN — SODIUM CHLORIDE 250 ML: 9 INJECTION, SOLUTION INTRAVENOUS at 13:36

## 2023-11-14 RX ADMIN — FAMOTIDINE 20 MG: 10 INJECTION INTRAVENOUS at 13:36

## 2023-11-14 RX ADMIN — AZACITIDINE 150 MG: 100 INJECTION, POWDER, LYOPHILIZED, FOR SOLUTION INTRAVENOUS; SUBCUTANEOUS at 14:29

## 2023-11-14 NOTE — PROGRESS NOTES
Infusion Nursing Note:  Ziggy Hallman presents today for Vidaza C3D2    Patient seen by provider today: No   present during visit today: Not Applicable.    Note: N/A.    Intravenous Access:  Implanted Port.    Treatment Conditions:  Results reviewed, labs MET treatment parameters, ok to proceed with treatment.    Post Infusion Assessment:  Patient tolerated infusion without incident.  Blood return noted pre and post infusion.  Site patent and intact, free from redness, edema or discomfort.  No evidence of extravasations.  Port left accessed for tomorrow's infusion.    Discharge Plan:   Discharge instructions reviewed with: Patient.  Patient and/or family verbalized understanding of discharge instructions and all questions answered.  AVS to patient via [employer].  Patient will return 11/15/2023 for next appointment.   Patient discharged in stable condition accompanied by: self.  Departure Mode: Ambulatory.    Blanka Esparza RN

## 2023-11-15 ENCOUNTER — INFUSION THERAPY VISIT (OUTPATIENT)
Dept: INFUSION THERAPY | Facility: CLINIC | Age: 48
End: 2023-11-15
Attending: INTERNAL MEDICINE
Payer: COMMERCIAL

## 2023-11-15 VITALS — DIASTOLIC BLOOD PRESSURE: 76 MMHG | SYSTOLIC BLOOD PRESSURE: 151 MMHG | TEMPERATURE: 98.3 F

## 2023-11-15 DIAGNOSIS — D46.9 MDS (MYELODYSPLASTIC SYNDROME) (H): Primary | ICD-10-CM

## 2023-11-15 PROCEDURE — 250N000011 HC RX IP 250 OP 636: Performed by: NURSE PRACTITIONER

## 2023-11-15 PROCEDURE — 96413 CHEMO IV INFUSION 1 HR: CPT

## 2023-11-15 PROCEDURE — 258N000003 HC RX IP 258 OP 636: Performed by: NURSE PRACTITIONER

## 2023-11-15 PROCEDURE — 96375 TX/PRO/DX INJ NEW DRUG ADDON: CPT

## 2023-11-15 RX ORDER — ONDANSETRON 2 MG/ML
8 INJECTION INTRAMUSCULAR; INTRAVENOUS ONCE
Status: COMPLETED | OUTPATIENT
Start: 2023-11-15 | End: 2023-11-15

## 2023-11-15 RX ORDER — HEPARIN SODIUM (PORCINE) LOCK FLUSH IV SOLN 100 UNIT/ML 100 UNIT/ML
5 SOLUTION INTRAVENOUS
Status: DISCONTINUED | OUTPATIENT
Start: 2023-11-15 | End: 2023-11-15 | Stop reason: HOSPADM

## 2023-11-15 RX ADMIN — AZACITIDINE 150 MG: 100 INJECTION, POWDER, LYOPHILIZED, FOR SOLUTION INTRAVENOUS; SUBCUTANEOUS at 14:35

## 2023-11-15 RX ADMIN — Medication 5 ML: at 14:53

## 2023-11-15 RX ADMIN — SODIUM CHLORIDE 250 ML: 9 INJECTION, SOLUTION INTRAVENOUS at 13:42

## 2023-11-15 RX ADMIN — ONDANSETRON 8 MG: 2 INJECTION INTRAMUSCULAR; INTRAVENOUS at 13:40

## 2023-11-15 NOTE — PROGRESS NOTES
Infusion Nursing Note:  Ziggy Hallman presents today for Vidaza.    Patient seen by provider today: No   present during visit today: Not Applicable.    Note: N/A.      Intravenous Access:  Implanted Port.    Treatment Conditions:  Not Applicable.      Post Infusion Assessment:  Patient tolerated infusion without incident.  Blood return noted pre and post infusion.  Site patent and intact, free from redness, edema or discomfort.  No evidence of extravasations.   Port left accessed for infusion tomorrow.    Discharge Plan:   Copy of AVS reviewed with patient and/or family.  Patient will return 11/16/23 for next appointment.  Patient discharged in stable condition accompanied by: self.  Departure Mode: Ambulatory.      MARIE BRENNAN RN

## 2023-11-16 ENCOUNTER — INFUSION THERAPY VISIT (OUTPATIENT)
Dept: INFUSION THERAPY | Facility: CLINIC | Age: 48
End: 2023-11-16
Attending: INTERNAL MEDICINE
Payer: COMMERCIAL

## 2023-11-16 VITALS
HEART RATE: 105 BPM | TEMPERATURE: 98.1 F | SYSTOLIC BLOOD PRESSURE: 154 MMHG | DIASTOLIC BLOOD PRESSURE: 81 MMHG | RESPIRATION RATE: 16 BRPM

## 2023-11-16 DIAGNOSIS — D46.9 MDS (MYELODYSPLASTIC SYNDROME) (H): Primary | ICD-10-CM

## 2023-11-16 PROCEDURE — 96409 CHEMO IV PUSH SNGL DRUG: CPT

## 2023-11-16 PROCEDURE — 96375 TX/PRO/DX INJ NEW DRUG ADDON: CPT

## 2023-11-16 PROCEDURE — 258N000003 HC RX IP 258 OP 636: Performed by: NURSE PRACTITIONER

## 2023-11-16 PROCEDURE — 250N000011 HC RX IP 250 OP 636: Mod: JZ | Performed by: NURSE PRACTITIONER

## 2023-11-16 RX ORDER — HEPARIN SODIUM (PORCINE) LOCK FLUSH IV SOLN 100 UNIT/ML 100 UNIT/ML
5 SOLUTION INTRAVENOUS
Status: DISCONTINUED | OUTPATIENT
Start: 2023-11-16 | End: 2023-11-16 | Stop reason: HOSPADM

## 2023-11-16 RX ORDER — ONDANSETRON 2 MG/ML
8 INJECTION INTRAMUSCULAR; INTRAVENOUS ONCE
Status: COMPLETED | OUTPATIENT
Start: 2023-11-16 | End: 2023-11-16

## 2023-11-16 RX ADMIN — SODIUM CHLORIDE 250 ML: 9 INJECTION, SOLUTION INTRAVENOUS at 13:42

## 2023-11-16 RX ADMIN — Medication 5 ML: at 15:02

## 2023-11-16 RX ADMIN — ONDANSETRON 8 MG: 2 INJECTION INTRAMUSCULAR; INTRAVENOUS at 13:44

## 2023-11-16 RX ADMIN — AZACITIDINE 150 MG: 100 INJECTION, POWDER, LYOPHILIZED, FOR SOLUTION INTRAVENOUS; SUBCUTANEOUS at 14:49

## 2023-11-16 NOTE — PROGRESS NOTES
Infusion Nursing Note:  Ziggy Hallman presents today for C3D4 Vidaza.    Patient seen by provider today: No   present during visit today: Not Applicable.    Note: Pt report difficulty sleeping at night.      Intravenous Access:  Implanted Port.    Treatment Conditions:  Not Applicable.      Post Infusion Assessment:  Patient tolerated infusion without incident.  Blood return noted pre and post infusion.  Site patent and intact, free from redness, edema or discomfort.  No evidence of extravasations.  Access hep locked for tomorrow's appointment.      Discharge Plan:   Discharge instructions reviewed with: Patient.  Patient and/or family verbalized understanding of discharge instructions and all questions answered.  Patient discharged in stable condition accompanied by: self.  Departure Mode: Ambulatory.      Yudelka Astorga RN

## 2023-11-17 ENCOUNTER — INFUSION THERAPY VISIT (OUTPATIENT)
Dept: INFUSION THERAPY | Facility: CLINIC | Age: 48
End: 2023-11-17
Attending: INTERNAL MEDICINE
Payer: COMMERCIAL

## 2023-11-17 VITALS — DIASTOLIC BLOOD PRESSURE: 85 MMHG | SYSTOLIC BLOOD PRESSURE: 127 MMHG | HEART RATE: 96 BPM | TEMPERATURE: 98.6 F

## 2023-11-17 DIAGNOSIS — D46.9 MDS (MYELODYSPLASTIC SYNDROME) (H): Primary | ICD-10-CM

## 2023-11-17 PROCEDURE — 96413 CHEMO IV INFUSION 1 HR: CPT

## 2023-11-17 PROCEDURE — 258N000003 HC RX IP 258 OP 636: Performed by: NURSE PRACTITIONER

## 2023-11-17 PROCEDURE — 96375 TX/PRO/DX INJ NEW DRUG ADDON: CPT

## 2023-11-17 PROCEDURE — 250N000011 HC RX IP 250 OP 636: Mod: JW | Performed by: NURSE PRACTITIONER

## 2023-11-17 RX ORDER — ONDANSETRON 2 MG/ML
8 INJECTION INTRAMUSCULAR; INTRAVENOUS ONCE
Status: COMPLETED | OUTPATIENT
Start: 2023-11-17 | End: 2023-11-17

## 2023-11-17 RX ORDER — HEPARIN SODIUM (PORCINE) LOCK FLUSH IV SOLN 100 UNIT/ML 100 UNIT/ML
SOLUTION INTRAVENOUS
Status: DISPENSED
Start: 2023-11-17 | End: 2023-11-18

## 2023-11-17 RX ORDER — HEPARIN SODIUM (PORCINE) LOCK FLUSH IV SOLN 100 UNIT/ML 100 UNIT/ML
5 SOLUTION INTRAVENOUS
Status: DISCONTINUED | OUTPATIENT
Start: 2023-11-17 | End: 2023-11-17 | Stop reason: HOSPADM

## 2023-11-17 RX ADMIN — AZACITIDINE 150 MG: 100 INJECTION, POWDER, LYOPHILIZED, FOR SOLUTION INTRAVENOUS; SUBCUTANEOUS at 13:56

## 2023-11-17 RX ADMIN — SODIUM CHLORIDE 250 ML: 9 INJECTION, SOLUTION INTRAVENOUS at 13:53

## 2023-11-17 RX ADMIN — Medication 5 ML: at 14:17

## 2023-11-17 RX ADMIN — ONDANSETRON 8 MG: 2 INJECTION INTRAMUSCULAR; INTRAVENOUS at 13:52

## 2023-11-17 NOTE — PROGRESS NOTES
Infusion Nursing Note:  Ziggy Hallman presents today for C3D5 Vidaza.    Patient seen by provider today: No   present during visit today: Not Applicable.    Note: N/A.      Intravenous Access:  Implanted Port.    Treatment Conditions:  Lab Results   Component Value Date    HGB 9.2 (L) 11/13/2023    WBC 5.2 11/13/2023    ANEU 1.6 09/25/2023    ANEUTAUTO 2.9 11/13/2023     11/13/2023        Lab Results   Component Value Date     11/13/2023    POTASSIUM 3.8 11/13/2023    CR 0.88 11/13/2023    REMY 8.9 11/13/2023    BILITOTAL 0.3 11/13/2023    ALBUMIN 2.4 (L) 11/13/2023    ALT 15 11/13/2023    AST 23 11/13/2023         Post Infusion Assessment:  Patient tolerated infusion without incident.  Blood return noted pre and post infusion.  Site patent and intact, free from redness, edema or discomfort.  No evidence of extravasations.  Access discontinued per protocol.       Discharge Plan:   Patient discharged in stable condition accompanied by: self.  Departure Mode: Ambulatory.  Pt to return on 11/20/23 at 1:15 pm for labs followed by NOHEMY Buck.       Aleja Mercado RN

## 2023-11-20 ENCOUNTER — INFUSION THERAPY VISIT (OUTPATIENT)
Dept: INFUSION THERAPY | Facility: CLINIC | Age: 48
End: 2023-11-20
Attending: INTERNAL MEDICINE
Payer: COMMERCIAL

## 2023-11-20 ENCOUNTER — LAB (OUTPATIENT)
Dept: INFUSION THERAPY | Facility: CLINIC | Age: 48
End: 2023-11-20
Attending: NURSE PRACTITIONER
Payer: COMMERCIAL

## 2023-11-20 VITALS
HEART RATE: 87 BPM | DIASTOLIC BLOOD PRESSURE: 97 MMHG | BODY MASS INDEX: 28.91 KG/M2 | SYSTOLIC BLOOD PRESSURE: 164 MMHG | TEMPERATURE: 98.1 F | WEIGHT: 195.8 LBS

## 2023-11-20 DIAGNOSIS — D46.9 MDS (MYELODYSPLASTIC SYNDROME) (H): Primary | ICD-10-CM

## 2023-11-20 PROCEDURE — 258N000003 HC RX IP 258 OP 636: Performed by: NURSE PRACTITIONER

## 2023-11-20 PROCEDURE — 96409 CHEMO IV PUSH SNGL DRUG: CPT

## 2023-11-20 PROCEDURE — 250N000011 HC RX IP 250 OP 636: Mod: JZ | Performed by: NURSE PRACTITIONER

## 2023-11-20 PROCEDURE — 96375 TX/PRO/DX INJ NEW DRUG ADDON: CPT

## 2023-11-20 RX ORDER — ONDANSETRON 2 MG/ML
8 INJECTION INTRAMUSCULAR; INTRAVENOUS ONCE
Status: COMPLETED | OUTPATIENT
Start: 2023-11-20 | End: 2023-11-20

## 2023-11-20 RX ORDER — HEPARIN SODIUM (PORCINE) LOCK FLUSH IV SOLN 100 UNIT/ML 100 UNIT/ML
5 SOLUTION INTRAVENOUS
Status: DISCONTINUED | OUTPATIENT
Start: 2023-11-20 | End: 2023-11-20 | Stop reason: HOSPADM

## 2023-11-20 RX ADMIN — SODIUM CHLORIDE 250 ML: 9 INJECTION, SOLUTION INTRAVENOUS at 14:08

## 2023-11-20 RX ADMIN — ONDANSETRON 8 MG: 2 INJECTION INTRAMUSCULAR; INTRAVENOUS at 14:08

## 2023-11-20 RX ADMIN — FAMOTIDINE 20 MG: 10 INJECTION INTRAVENOUS at 14:11

## 2023-11-20 RX ADMIN — AZACITIDINE 150 MG: 100 INJECTION, POWDER, LYOPHILIZED, FOR SOLUTION INTRAVENOUS; SUBCUTANEOUS at 14:15

## 2023-11-20 RX ADMIN — Medication 5 ML: at 14:34

## 2023-11-20 NOTE — PROGRESS NOTES
Infusion Nursing Note:  Ziggy Domingoel presents today for C3D8 Vidaza.    Patient seen by provider today: No   present during visit today: Not Applicable.    Note: N/A.      Intravenous Access:  Implanted Port.    Treatment Conditions:  Lab Results   Component Value Date    HGB 9.2 (L) 11/13/2023    WBC 5.2 11/13/2023    ANEU 1.6 09/25/2023    ANEUTAUTO 2.9 11/13/2023     11/13/2023        Lab Results   Component Value Date     11/13/2023    POTASSIUM 3.8 11/13/2023    CR 0.88 11/13/2023    REMY 8.9 11/13/2023    BILITOTAL 0.3 11/13/2023    ALBUMIN 2.4 (L) 11/13/2023    ALT 15 11/13/2023    AST 23 11/13/2023         Post Infusion Assessment:  Patient tolerated infusion without incident.  Blood return noted pre and post infusion.  Site patent and intact, free from redness, edema or discomfort.  No evidence of extravasations.  Access left in for tomorrow's Vidaza.       Discharge Plan:   Patient discharged in stable condition accompanied by: self.  Departure Mode: Ambulatory.  Pt to return tomorrow at 1:15 pm for labs followed by ALEX Buck.       Aleja Mercado RN

## 2023-11-21 ENCOUNTER — LAB (OUTPATIENT)
Dept: INFUSION THERAPY | Facility: CLINIC | Age: 48
End: 2023-11-21
Attending: NURSE PRACTITIONER
Payer: COMMERCIAL

## 2023-11-21 ENCOUNTER — INFUSION THERAPY VISIT (OUTPATIENT)
Dept: INFUSION THERAPY | Facility: CLINIC | Age: 48
End: 2023-11-21
Attending: INTERNAL MEDICINE
Payer: COMMERCIAL

## 2023-11-21 VITALS — TEMPERATURE: 98 F | HEART RATE: 77 BPM | DIASTOLIC BLOOD PRESSURE: 73 MMHG | SYSTOLIC BLOOD PRESSURE: 114 MMHG

## 2023-11-21 DIAGNOSIS — D46.9 MDS (MYELODYSPLASTIC SYNDROME) (H): Primary | ICD-10-CM

## 2023-11-21 DIAGNOSIS — D46.9 MDS (MYELODYSPLASTIC SYNDROME) (H): ICD-10-CM

## 2023-11-21 LAB
BASOPHILS # BLD AUTO: ABNORMAL 10*3/UL
BASOPHILS # BLD MANUAL: 0 10E3/UL (ref 0–0.2)
BASOPHILS NFR BLD AUTO: ABNORMAL %
BASOPHILS NFR BLD MANUAL: 0 %
EOSINOPHIL # BLD AUTO: ABNORMAL 10*3/UL
EOSINOPHIL # BLD MANUAL: 0 10E3/UL (ref 0–0.7)
EOSINOPHIL NFR BLD AUTO: ABNORMAL %
EOSINOPHIL NFR BLD MANUAL: 0 %
ERYTHROCYTE [DISTWIDTH] IN BLOOD BY AUTOMATED COUNT: 15.8 % (ref 10–15)
HCT VFR BLD AUTO: 24.8 % (ref 40–53)
HGB BLD-MCNC: 8.3 G/DL (ref 13.3–17.7)
IMM GRANULOCYTES # BLD: ABNORMAL 10*3/UL
IMM GRANULOCYTES NFR BLD: ABNORMAL %
LYMPHOCYTES # BLD AUTO: ABNORMAL 10*3/UL
LYMPHOCYTES # BLD MANUAL: 1.1 10E3/UL (ref 0.8–5.3)
LYMPHOCYTES NFR BLD AUTO: ABNORMAL %
LYMPHOCYTES NFR BLD MANUAL: 25 %
MCH RBC QN AUTO: 34.9 PG (ref 26.5–33)
MCHC RBC AUTO-ENTMCNC: 33.5 G/DL (ref 31.5–36.5)
MCV RBC AUTO: 104 FL (ref 78–100)
METAMYELOCYTES # BLD MANUAL: 0.2 10E3/UL
METAMYELOCYTES NFR BLD MANUAL: 5 %
MONOCYTES # BLD AUTO: ABNORMAL 10*3/UL
MONOCYTES # BLD MANUAL: 0.3 10E3/UL (ref 0–1.3)
MONOCYTES NFR BLD AUTO: ABNORMAL %
MONOCYTES NFR BLD MANUAL: 7 %
NEUTROPHILS # BLD AUTO: ABNORMAL 10*3/UL
NEUTROPHILS # BLD MANUAL: 2.6 10E3/UL (ref 1.6–8.3)
NEUTROPHILS NFR BLD AUTO: ABNORMAL %
NEUTROPHILS NFR BLD MANUAL: 59 %
NRBC # BLD AUTO: 0 10E3/UL
NRBC BLD AUTO-RTO: 1 /100
PLAT MORPH BLD: ABNORMAL
PLATELET # BLD AUTO: 284 10E3/UL (ref 150–450)
PROMYELOCYTES # BLD MANUAL: 0.2 10E3/UL
PROMYELOCYTES NFR BLD MANUAL: 4 %
RBC # BLD AUTO: 2.38 10E6/UL (ref 4.4–5.9)
RBC MORPH BLD: ABNORMAL
WBC # BLD AUTO: 4.4 10E3/UL (ref 4–11)

## 2023-11-21 PROCEDURE — 250N000011 HC RX IP 250 OP 636: Mod: JZ | Performed by: NURSE PRACTITIONER

## 2023-11-21 PROCEDURE — 96375 TX/PRO/DX INJ NEW DRUG ADDON: CPT

## 2023-11-21 PROCEDURE — 258N000003 HC RX IP 258 OP 636: Performed by: NURSE PRACTITIONER

## 2023-11-21 PROCEDURE — 85007 BL SMEAR W/DIFF WBC COUNT: CPT | Performed by: NURSE PRACTITIONER

## 2023-11-21 PROCEDURE — 85027 COMPLETE CBC AUTOMATED: CPT | Performed by: NURSE PRACTITIONER

## 2023-11-21 PROCEDURE — 96409 CHEMO IV PUSH SNGL DRUG: CPT

## 2023-11-21 PROCEDURE — 36591 DRAW BLOOD OFF VENOUS DEVICE: CPT

## 2023-11-21 RX ORDER — ONDANSETRON 2 MG/ML
8 INJECTION INTRAMUSCULAR; INTRAVENOUS ONCE
Status: COMPLETED | OUTPATIENT
Start: 2023-11-21 | End: 2023-11-21

## 2023-11-21 RX ORDER — HEPARIN SODIUM (PORCINE) LOCK FLUSH IV SOLN 100 UNIT/ML 100 UNIT/ML
5 SOLUTION INTRAVENOUS
Status: DISCONTINUED | OUTPATIENT
Start: 2023-11-21 | End: 2023-11-21 | Stop reason: HOSPADM

## 2023-11-21 RX ADMIN — Medication 5 ML: at 14:30

## 2023-11-21 RX ADMIN — SODIUM CHLORIDE 250 ML: 9 INJECTION, SOLUTION INTRAVENOUS at 14:09

## 2023-11-21 RX ADMIN — AZACITIDINE 150 MG: 100 INJECTION, POWDER, LYOPHILIZED, FOR SOLUTION INTRAVENOUS; SUBCUTANEOUS at 14:15

## 2023-11-21 RX ADMIN — ONDANSETRON 8 MG: 2 INJECTION INTRAMUSCULAR; INTRAVENOUS at 14:10

## 2023-11-21 NOTE — PROGRESS NOTES
Infusion Nursing Note:  Ziggy ITA Hallman presents today for C3D9 Vidaza.    Patient seen by provider today: No   present during visit today: Not Applicable.    Note: Pt reports difficulty sleeping, appears groggy and states he's fatigued. Reporting itching that he states his care team is aware of.      Intravenous Access:  Implanted Port.    Treatment Conditions:  Lab Results   Component Value Date    HGB 8.3 (L) 11/21/2023    WBC 4.4 11/21/2023    ANEU 2.6 11/21/2023    ANEUTAUTO 2.9 11/13/2023     11/21/2023        Results reviewed, labs MET treatment parameters, ok to proceed with treatment.      Post Infusion Assessment:  Patient tolerated infusion without incident.  Blood return noted pre and post infusion.  Site patent and intact, free from redness, edema or discomfort.  No evidence of extravasations.       Discharge Plan:   Discharge instructions reviewed with: Patient.  Patient and/or family verbalized understanding of discharge instructions and all questions answered.  Patient discharged in stable condition accompanied by: self.  Departure Mode: Ambulatory.      Yudelka Astorga RN

## 2023-11-21 NOTE — PROGRESS NOTES
PAC labs drawn and sent from port. Needle in place from treatment on 11/20/23. Good blood return noted.    MWwilder RN

## 2023-11-27 ENCOUNTER — LAB (OUTPATIENT)
Dept: INFUSION THERAPY | Facility: CLINIC | Age: 48
End: 2023-11-27
Attending: NURSE PRACTITIONER
Payer: COMMERCIAL

## 2023-11-27 DIAGNOSIS — D46.9 MDS (MYELODYSPLASTIC SYNDROME) (H): Primary | ICD-10-CM

## 2023-11-27 LAB
BASOPHILS # BLD AUTO: 0.1 10E3/UL (ref 0–0.2)
BASOPHILS NFR BLD AUTO: 1 %
EOSINOPHIL # BLD AUTO: 0.3 10E3/UL (ref 0–0.7)
EOSINOPHIL NFR BLD AUTO: 5 %
ERYTHROCYTE [DISTWIDTH] IN BLOOD BY AUTOMATED COUNT: 15.9 % (ref 10–15)
HCT VFR BLD AUTO: 27.3 % (ref 40–53)
HGB BLD-MCNC: 9 G/DL (ref 13.3–17.7)
IMM GRANULOCYTES # BLD: 0.1 10E3/UL
IMM GRANULOCYTES NFR BLD: 2 %
LYMPHOCYTES # BLD AUTO: 1.8 10E3/UL (ref 0.8–5.3)
LYMPHOCYTES NFR BLD AUTO: 34 %
MCH RBC QN AUTO: 33.6 PG (ref 26.5–33)
MCHC RBC AUTO-ENTMCNC: 33 G/DL (ref 31.5–36.5)
MCV RBC AUTO: 102 FL (ref 78–100)
MONOCYTES # BLD AUTO: 0.3 10E3/UL (ref 0–1.3)
MONOCYTES NFR BLD AUTO: 5 %
NEUTROPHILS # BLD AUTO: 2.9 10E3/UL (ref 1.6–8.3)
NEUTROPHILS NFR BLD AUTO: 53 %
NRBC # BLD AUTO: 0 10E3/UL
NRBC BLD AUTO-RTO: 0 /100
PLATELET # BLD AUTO: 290 10E3/UL (ref 150–450)
RBC # BLD AUTO: 2.68 10E6/UL (ref 4.4–5.9)
WBC # BLD AUTO: 5.4 10E3/UL (ref 4–11)

## 2023-11-27 PROCEDURE — 250N000011 HC RX IP 250 OP 636: Mod: JZ | Performed by: NURSE PRACTITIONER

## 2023-11-27 PROCEDURE — 36591 DRAW BLOOD OFF VENOUS DEVICE: CPT

## 2023-11-27 PROCEDURE — 85025 COMPLETE CBC W/AUTO DIFF WBC: CPT | Performed by: NURSE PRACTITIONER

## 2023-11-27 RX ORDER — HEPARIN SODIUM (PORCINE) LOCK FLUSH IV SOLN 100 UNIT/ML 100 UNIT/ML
5 SOLUTION INTRAVENOUS
Status: DISCONTINUED | OUTPATIENT
Start: 2023-11-27 | End: 2023-11-27 | Stop reason: HOSPADM

## 2023-11-27 RX ORDER — HEPARIN SODIUM (PORCINE) LOCK FLUSH IV SOLN 100 UNIT/ML 100 UNIT/ML
5 SOLUTION INTRAVENOUS
Status: CANCELLED | OUTPATIENT
Start: 2023-11-27

## 2023-11-27 RX ORDER — HEPARIN SODIUM,PORCINE 10 UNIT/ML
5-20 VIAL (ML) INTRAVENOUS DAILY PRN
OUTPATIENT
Start: 2023-11-27

## 2023-11-27 RX ADMIN — Medication 5 ML: at 13:40

## 2023-11-27 NOTE — PROGRESS NOTES
Infusion Nursing Note:  Ziggy Hallman presents today for Port labs.    Patient seen by provider today: No   present during visit today: Not Applicable.    Note: N/A.    Intravenous Access:  Implanted Port.    Treatment Conditions:  Not Applicable.    Post Infusion Assessment:  Blood return noted.  Site patent and intact, free from redness, edema or discomfort.  No evidence of extravasations.  Access discontinued per protocol.     Discharge Plan:   Discharge instructions reviewed with: Patient.  Patient and/or family verbalized understanding of discharge instructions and all questions answered.  AVS to patient via [employer].  Patient will return 12/11/2023 for next appointment.   Patient discharged in stable condition accompanied by: self.  Departure Mode: Ambulatory.    Blanka Esparza RN

## 2023-12-11 ENCOUNTER — ONCOLOGY VISIT (OUTPATIENT)
Dept: ONCOLOGY | Facility: CLINIC | Age: 48
End: 2023-12-11
Attending: NURSE PRACTITIONER
Payer: COMMERCIAL

## 2023-12-11 ENCOUNTER — INFUSION THERAPY VISIT (OUTPATIENT)
Dept: INFUSION THERAPY | Facility: CLINIC | Age: 48
End: 2023-12-11
Attending: NURSE PRACTITIONER
Payer: COMMERCIAL

## 2023-12-11 ENCOUNTER — APPOINTMENT (OUTPATIENT)
Dept: LAB | Facility: CLINIC | Age: 48
End: 2023-12-11
Payer: COMMERCIAL

## 2023-12-11 VITALS
DIASTOLIC BLOOD PRESSURE: 108 MMHG | WEIGHT: 196 LBS | HEIGHT: 69 IN | OXYGEN SATURATION: 98 % | SYSTOLIC BLOOD PRESSURE: 145 MMHG | TEMPERATURE: 98.6 F | HEART RATE: 95 BPM | BODY MASS INDEX: 29.03 KG/M2 | RESPIRATION RATE: 12 BRPM

## 2023-12-11 VITALS
OXYGEN SATURATION: 100 % | SYSTOLIC BLOOD PRESSURE: 171 MMHG | DIASTOLIC BLOOD PRESSURE: 98 MMHG | RESPIRATION RATE: 16 BRPM | HEART RATE: 84 BPM

## 2023-12-11 DIAGNOSIS — L29.9 ITCHING: ICD-10-CM

## 2023-12-11 DIAGNOSIS — D46.9 MDS (MYELODYSPLASTIC SYNDROME) (H): Primary | ICD-10-CM

## 2023-12-11 LAB
ALBUMIN SERPL BCG-MCNC: 2.1 G/DL (ref 3.5–5.2)
ALP SERPL-CCNC: 105 U/L (ref 40–150)
ALT SERPL W P-5'-P-CCNC: 14 U/L (ref 0–70)
ANION GAP SERPL CALCULATED.3IONS-SCNC: 7 MMOL/L (ref 7–15)
AST SERPL W P-5'-P-CCNC: 23 U/L (ref 0–45)
BASOPHILS # BLD AUTO: 0.1 10E3/UL (ref 0–0.2)
BASOPHILS NFR BLD AUTO: 1 %
BILIRUB SERPL-MCNC: 0.2 MG/DL
BUN SERPL-MCNC: 12.1 MG/DL (ref 6–20)
CALCIUM SERPL-MCNC: 8.8 MG/DL (ref 8.6–10)
CHLORIDE SERPL-SCNC: 107 MMOL/L (ref 98–107)
CREAT SERPL-MCNC: 0.81 MG/DL (ref 0.67–1.17)
DEPRECATED HCO3 PLAS-SCNC: 23 MMOL/L (ref 22–29)
EGFRCR SERPLBLD CKD-EPI 2021: >90 ML/MIN/1.73M2
EOSINOPHIL # BLD AUTO: 0.1 10E3/UL (ref 0–0.7)
EOSINOPHIL NFR BLD AUTO: 2 %
ERYTHROCYTE [DISTWIDTH] IN BLOOD BY AUTOMATED COUNT: 15.2 % (ref 10–15)
GLUCOSE SERPL-MCNC: 109 MG/DL (ref 70–99)
HCT VFR BLD AUTO: 28.1 % (ref 40–53)
HGB BLD-MCNC: 9.5 G/DL (ref 13.3–17.7)
IMM GRANULOCYTES # BLD: 0.1 10E3/UL
IMM GRANULOCYTES NFR BLD: 1 %
LYMPHOCYTES # BLD AUTO: 1.8 10E3/UL (ref 0.8–5.3)
LYMPHOCYTES NFR BLD AUTO: 34 %
MCH RBC QN AUTO: 33.8 PG (ref 26.5–33)
MCHC RBC AUTO-ENTMCNC: 33.8 G/DL (ref 31.5–36.5)
MCV RBC AUTO: 100 FL (ref 78–100)
MONOCYTES # BLD AUTO: 0.1 10E3/UL (ref 0–1.3)
MONOCYTES NFR BLD AUTO: 3 %
NEUTROPHILS # BLD AUTO: 3.1 10E3/UL (ref 1.6–8.3)
NEUTROPHILS NFR BLD AUTO: 59 %
NRBC # BLD AUTO: 0 10E3/UL
NRBC BLD AUTO-RTO: 0 /100
PLATELET # BLD AUTO: 320 10E3/UL (ref 150–450)
POTASSIUM SERPL-SCNC: 3.9 MMOL/L (ref 3.4–5.3)
PROT SERPL-MCNC: 6.5 G/DL (ref 6.4–8.3)
RBC # BLD AUTO: 2.81 10E6/UL (ref 4.4–5.9)
SODIUM SERPL-SCNC: 137 MMOL/L (ref 135–145)
WBC # BLD AUTO: 5.3 10E3/UL (ref 4–11)

## 2023-12-11 PROCEDURE — 36591 DRAW BLOOD OFF VENOUS DEVICE: CPT | Performed by: NURSE PRACTITIONER

## 2023-12-11 PROCEDURE — 96375 TX/PRO/DX INJ NEW DRUG ADDON: CPT

## 2023-12-11 PROCEDURE — 36593 DECLOT VASCULAR DEVICE: CPT

## 2023-12-11 PROCEDURE — 250N000011 HC RX IP 250 OP 636: Mod: JZ | Performed by: NURSE PRACTITIONER

## 2023-12-11 PROCEDURE — G0463 HOSPITAL OUTPT CLINIC VISIT: HCPCS | Performed by: NURSE PRACTITIONER

## 2023-12-11 PROCEDURE — 96409 CHEMO IV PUSH SNGL DRUG: CPT

## 2023-12-11 PROCEDURE — 85014 HEMATOCRIT: CPT | Performed by: NURSE PRACTITIONER

## 2023-12-11 PROCEDURE — 250N000011 HC RX IP 250 OP 636: Mod: JW | Performed by: NURSE PRACTITIONER

## 2023-12-11 PROCEDURE — 84450 TRANSFERASE (AST) (SGOT): CPT | Performed by: NURSE PRACTITIONER

## 2023-12-11 PROCEDURE — 99214 OFFICE O/P EST MOD 30 MIN: CPT | Performed by: NURSE PRACTITIONER

## 2023-12-11 PROCEDURE — 258N000003 HC RX IP 258 OP 636: Performed by: NURSE PRACTITIONER

## 2023-12-11 RX ORDER — HYDROXYZINE HYDROCHLORIDE 25 MG/1
25 TABLET, FILM COATED ORAL 3 TIMES DAILY PRN
Qty: 20 TABLET | Refills: 0 | Status: ON HOLD | OUTPATIENT
Start: 2023-12-11 | End: 2024-02-17

## 2023-12-11 RX ORDER — ALBUTEROL SULFATE 90 UG/1
1-2 AEROSOL, METERED RESPIRATORY (INHALATION)
Status: CANCELLED
Start: 2023-12-11

## 2023-12-11 RX ORDER — LORAZEPAM 2 MG/ML
0.5 INJECTION INTRAMUSCULAR EVERY 4 HOURS PRN
Status: CANCELLED | OUTPATIENT
Start: 2023-12-15

## 2023-12-11 RX ORDER — ALBUTEROL SULFATE 90 UG/1
1-2 AEROSOL, METERED RESPIRATORY (INHALATION)
Status: CANCELLED
Start: 2023-12-12

## 2023-12-11 RX ORDER — ONDANSETRON 2 MG/ML
8 INJECTION INTRAMUSCULAR; INTRAVENOUS ONCE
Status: CANCELLED | OUTPATIENT
Start: 2023-12-14

## 2023-12-11 RX ORDER — METHYLPREDNISOLONE SODIUM SUCCINATE 125 MG/2ML
125 INJECTION, POWDER, LYOPHILIZED, FOR SOLUTION INTRAMUSCULAR; INTRAVENOUS
Status: CANCELLED
Start: 2023-12-14

## 2023-12-11 RX ORDER — ALBUTEROL SULFATE 0.83 MG/ML
2.5 SOLUTION RESPIRATORY (INHALATION)
Status: CANCELLED | OUTPATIENT
Start: 2023-12-13

## 2023-12-11 RX ORDER — HEPARIN SODIUM (PORCINE) LOCK FLUSH IV SOLN 100 UNIT/ML 100 UNIT/ML
5 SOLUTION INTRAVENOUS
Status: CANCELLED | OUTPATIENT
Start: 2023-12-12

## 2023-12-11 RX ORDER — METHYLPREDNISOLONE SODIUM SUCCINATE 125 MG/2ML
125 INJECTION, POWDER, LYOPHILIZED, FOR SOLUTION INTRAMUSCULAR; INTRAVENOUS
Status: CANCELLED
Start: 2023-12-19

## 2023-12-11 RX ORDER — ALBUTEROL SULFATE 90 UG/1
1-2 AEROSOL, METERED RESPIRATORY (INHALATION)
Status: CANCELLED
Start: 2023-12-18

## 2023-12-11 RX ORDER — ALBUTEROL SULFATE 0.83 MG/ML
2.5 SOLUTION RESPIRATORY (INHALATION)
Status: CANCELLED | OUTPATIENT
Start: 2023-12-12

## 2023-12-11 RX ORDER — ONDANSETRON 2 MG/ML
8 INJECTION INTRAMUSCULAR; INTRAVENOUS ONCE
Status: CANCELLED | OUTPATIENT
Start: 2023-12-15

## 2023-12-11 RX ORDER — DIPHENHYDRAMINE HYDROCHLORIDE 50 MG/ML
50 INJECTION INTRAMUSCULAR; INTRAVENOUS
Status: CANCELLED
Start: 2023-12-11

## 2023-12-11 RX ORDER — ONDANSETRON 2 MG/ML
8 INJECTION INTRAMUSCULAR; INTRAVENOUS ONCE
Status: CANCELLED | OUTPATIENT
Start: 2023-12-12

## 2023-12-11 RX ORDER — ONDANSETRON 2 MG/ML
8 INJECTION INTRAMUSCULAR; INTRAVENOUS ONCE
Status: CANCELLED | OUTPATIENT
Start: 2023-12-13

## 2023-12-11 RX ORDER — ALBUTEROL SULFATE 0.83 MG/ML
2.5 SOLUTION RESPIRATORY (INHALATION)
Status: CANCELLED | OUTPATIENT
Start: 2023-12-14

## 2023-12-11 RX ORDER — ALBUTEROL SULFATE 90 UG/1
1-2 AEROSOL, METERED RESPIRATORY (INHALATION)
Status: CANCELLED
Start: 2023-12-13

## 2023-12-11 RX ORDER — HEPARIN SODIUM (PORCINE) LOCK FLUSH IV SOLN 100 UNIT/ML 100 UNIT/ML
5 SOLUTION INTRAVENOUS
Status: CANCELLED | OUTPATIENT
Start: 2023-12-19

## 2023-12-11 RX ORDER — ONDANSETRON 2 MG/ML
8 INJECTION INTRAMUSCULAR; INTRAVENOUS ONCE
Status: CANCELLED | OUTPATIENT
Start: 2023-12-18

## 2023-12-11 RX ORDER — ALBUTEROL SULFATE 90 UG/1
1-2 AEROSOL, METERED RESPIRATORY (INHALATION)
Status: CANCELLED
Start: 2023-12-19

## 2023-12-11 RX ORDER — ALBUTEROL SULFATE 0.83 MG/ML
2.5 SOLUTION RESPIRATORY (INHALATION)
Status: CANCELLED | OUTPATIENT
Start: 2023-12-11

## 2023-12-11 RX ORDER — LORAZEPAM 2 MG/ML
0.5 INJECTION INTRAMUSCULAR EVERY 4 HOURS PRN
Status: CANCELLED | OUTPATIENT
Start: 2023-12-18

## 2023-12-11 RX ORDER — METHYLPREDNISOLONE SODIUM SUCCINATE 125 MG/2ML
125 INJECTION, POWDER, LYOPHILIZED, FOR SOLUTION INTRAMUSCULAR; INTRAVENOUS
Status: CANCELLED
Start: 2023-12-11

## 2023-12-11 RX ORDER — EPINEPHRINE 1 MG/ML
0.3 INJECTION, SOLUTION, CONCENTRATE INTRAVENOUS EVERY 5 MIN PRN
Status: CANCELLED | OUTPATIENT
Start: 2023-12-11

## 2023-12-11 RX ORDER — LORAZEPAM 2 MG/ML
0.5 INJECTION INTRAMUSCULAR EVERY 4 HOURS PRN
Status: CANCELLED | OUTPATIENT
Start: 2023-12-11

## 2023-12-11 RX ORDER — DIPHENHYDRAMINE HYDROCHLORIDE 50 MG/ML
50 INJECTION INTRAMUSCULAR; INTRAVENOUS
Status: CANCELLED
Start: 2023-12-14

## 2023-12-11 RX ORDER — EPINEPHRINE 1 MG/ML
0.3 INJECTION, SOLUTION, CONCENTRATE INTRAVENOUS EVERY 5 MIN PRN
Status: CANCELLED | OUTPATIENT
Start: 2023-12-19

## 2023-12-11 RX ORDER — ALBUTEROL SULFATE 90 UG/1
1-2 AEROSOL, METERED RESPIRATORY (INHALATION)
Status: CANCELLED
Start: 2023-12-15

## 2023-12-11 RX ORDER — HEPARIN SODIUM (PORCINE) LOCK FLUSH IV SOLN 100 UNIT/ML 100 UNIT/ML
5 SOLUTION INTRAVENOUS
Status: CANCELLED | OUTPATIENT
Start: 2023-12-18

## 2023-12-11 RX ORDER — MEPERIDINE HYDROCHLORIDE 25 MG/ML
25 INJECTION INTRAMUSCULAR; INTRAVENOUS; SUBCUTANEOUS EVERY 30 MIN PRN
Status: CANCELLED | OUTPATIENT
Start: 2023-12-11

## 2023-12-11 RX ORDER — DIPHENHYDRAMINE HYDROCHLORIDE 50 MG/ML
50 INJECTION INTRAMUSCULAR; INTRAVENOUS
Status: CANCELLED
Start: 2023-12-19

## 2023-12-11 RX ORDER — MEPERIDINE HYDROCHLORIDE 25 MG/ML
25 INJECTION INTRAMUSCULAR; INTRAVENOUS; SUBCUTANEOUS EVERY 30 MIN PRN
Status: CANCELLED | OUTPATIENT
Start: 2023-12-18

## 2023-12-11 RX ORDER — MEPERIDINE HYDROCHLORIDE 25 MG/ML
25 INJECTION INTRAMUSCULAR; INTRAVENOUS; SUBCUTANEOUS EVERY 30 MIN PRN
Status: CANCELLED | OUTPATIENT
Start: 2023-12-15

## 2023-12-11 RX ORDER — LORAZEPAM 2 MG/ML
0.5 INJECTION INTRAMUSCULAR EVERY 4 HOURS PRN
Status: CANCELLED | OUTPATIENT
Start: 2023-12-13

## 2023-12-11 RX ORDER — EPINEPHRINE 1 MG/ML
0.3 INJECTION, SOLUTION, CONCENTRATE INTRAVENOUS EVERY 5 MIN PRN
Status: CANCELLED | OUTPATIENT
Start: 2023-12-18

## 2023-12-11 RX ORDER — LORAZEPAM 2 MG/ML
0.5 INJECTION INTRAMUSCULAR EVERY 4 HOURS PRN
Status: CANCELLED | OUTPATIENT
Start: 2023-12-14

## 2023-12-11 RX ORDER — ALBUTEROL SULFATE 90 UG/1
1-2 AEROSOL, METERED RESPIRATORY (INHALATION)
Status: CANCELLED
Start: 2023-12-14

## 2023-12-11 RX ORDER — LORAZEPAM 2 MG/ML
0.5 INJECTION INTRAMUSCULAR EVERY 4 HOURS PRN
Status: CANCELLED | OUTPATIENT
Start: 2023-12-12

## 2023-12-11 RX ORDER — HEPARIN SODIUM (PORCINE) LOCK FLUSH IV SOLN 100 UNIT/ML 100 UNIT/ML
5 SOLUTION INTRAVENOUS
Status: CANCELLED | OUTPATIENT
Start: 2023-12-13

## 2023-12-11 RX ORDER — EPINEPHRINE 1 MG/ML
0.3 INJECTION, SOLUTION, CONCENTRATE INTRAVENOUS EVERY 5 MIN PRN
Status: CANCELLED | OUTPATIENT
Start: 2023-12-13

## 2023-12-11 RX ORDER — EPINEPHRINE 1 MG/ML
0.3 INJECTION, SOLUTION, CONCENTRATE INTRAVENOUS EVERY 5 MIN PRN
Status: CANCELLED | OUTPATIENT
Start: 2023-12-14

## 2023-12-11 RX ORDER — ONDANSETRON 2 MG/ML
8 INJECTION INTRAMUSCULAR; INTRAVENOUS ONCE
Status: COMPLETED | OUTPATIENT
Start: 2023-12-11 | End: 2023-12-11

## 2023-12-11 RX ORDER — ONDANSETRON 2 MG/ML
8 INJECTION INTRAMUSCULAR; INTRAVENOUS ONCE
Status: CANCELLED | OUTPATIENT
Start: 2023-12-11

## 2023-12-11 RX ORDER — ALBUTEROL SULFATE 0.83 MG/ML
2.5 SOLUTION RESPIRATORY (INHALATION)
Status: CANCELLED | OUTPATIENT
Start: 2023-12-15

## 2023-12-11 RX ORDER — MEPERIDINE HYDROCHLORIDE 25 MG/ML
25 INJECTION INTRAMUSCULAR; INTRAVENOUS; SUBCUTANEOUS EVERY 30 MIN PRN
Status: CANCELLED | OUTPATIENT
Start: 2023-12-13

## 2023-12-11 RX ORDER — HEPARIN SODIUM (PORCINE) LOCK FLUSH IV SOLN 100 UNIT/ML 100 UNIT/ML
5 SOLUTION INTRAVENOUS
Status: CANCELLED | OUTPATIENT
Start: 2023-12-11

## 2023-12-11 RX ORDER — DIPHENHYDRAMINE HYDROCHLORIDE 50 MG/ML
50 INJECTION INTRAMUSCULAR; INTRAVENOUS
Status: CANCELLED
Start: 2023-12-18

## 2023-12-11 RX ORDER — MEPERIDINE HYDROCHLORIDE 25 MG/ML
25 INJECTION INTRAMUSCULAR; INTRAVENOUS; SUBCUTANEOUS EVERY 30 MIN PRN
Status: CANCELLED | OUTPATIENT
Start: 2023-12-19

## 2023-12-11 RX ORDER — LORAZEPAM 2 MG/ML
0.5 INJECTION INTRAMUSCULAR EVERY 4 HOURS PRN
Status: CANCELLED | OUTPATIENT
Start: 2023-12-19

## 2023-12-11 RX ORDER — HEPARIN SODIUM (PORCINE) LOCK FLUSH IV SOLN 100 UNIT/ML 100 UNIT/ML
5 SOLUTION INTRAVENOUS
Status: CANCELLED | OUTPATIENT
Start: 2023-12-14

## 2023-12-11 RX ORDER — DIPHENHYDRAMINE HYDROCHLORIDE 50 MG/ML
50 INJECTION INTRAMUSCULAR; INTRAVENOUS
Status: CANCELLED
Start: 2023-12-15

## 2023-12-11 RX ORDER — HEPARIN SODIUM (PORCINE) LOCK FLUSH IV SOLN 100 UNIT/ML 100 UNIT/ML
5 SOLUTION INTRAVENOUS
Status: DISCONTINUED | OUTPATIENT
Start: 2023-12-11 | End: 2023-12-11 | Stop reason: HOSPADM

## 2023-12-11 RX ORDER — METHYLPREDNISOLONE SODIUM SUCCINATE 125 MG/2ML
125 INJECTION, POWDER, LYOPHILIZED, FOR SOLUTION INTRAMUSCULAR; INTRAVENOUS
Status: CANCELLED
Start: 2023-12-13

## 2023-12-11 RX ORDER — DIPHENHYDRAMINE HYDROCHLORIDE 50 MG/ML
50 INJECTION INTRAMUSCULAR; INTRAVENOUS
Status: CANCELLED
Start: 2023-12-13

## 2023-12-11 RX ORDER — METHYLPREDNISOLONE SODIUM SUCCINATE 125 MG/2ML
125 INJECTION, POWDER, LYOPHILIZED, FOR SOLUTION INTRAMUSCULAR; INTRAVENOUS
Status: CANCELLED
Start: 2023-12-12

## 2023-12-11 RX ORDER — EPINEPHRINE 1 MG/ML
0.3 INJECTION, SOLUTION, CONCENTRATE INTRAVENOUS EVERY 5 MIN PRN
Status: CANCELLED | OUTPATIENT
Start: 2023-12-15

## 2023-12-11 RX ORDER — METHYLPREDNISOLONE SODIUM SUCCINATE 125 MG/2ML
125 INJECTION, POWDER, LYOPHILIZED, FOR SOLUTION INTRAMUSCULAR; INTRAVENOUS
Status: CANCELLED
Start: 2023-12-15

## 2023-12-11 RX ORDER — ALBUTEROL SULFATE 0.83 MG/ML
2.5 SOLUTION RESPIRATORY (INHALATION)
Status: CANCELLED | OUTPATIENT
Start: 2023-12-18

## 2023-12-11 RX ORDER — MEPERIDINE HYDROCHLORIDE 25 MG/ML
25 INJECTION INTRAMUSCULAR; INTRAVENOUS; SUBCUTANEOUS EVERY 30 MIN PRN
Status: CANCELLED | OUTPATIENT
Start: 2023-12-14

## 2023-12-11 RX ORDER — ONDANSETRON 2 MG/ML
8 INJECTION INTRAMUSCULAR; INTRAVENOUS ONCE
Status: CANCELLED | OUTPATIENT
Start: 2023-12-19

## 2023-12-11 RX ORDER — EPINEPHRINE 1 MG/ML
0.3 INJECTION, SOLUTION, CONCENTRATE INTRAVENOUS EVERY 5 MIN PRN
Status: CANCELLED | OUTPATIENT
Start: 2023-12-12

## 2023-12-11 RX ORDER — DIPHENHYDRAMINE HYDROCHLORIDE 50 MG/ML
50 INJECTION INTRAMUSCULAR; INTRAVENOUS
Status: CANCELLED
Start: 2023-12-12

## 2023-12-11 RX ORDER — MEPERIDINE HYDROCHLORIDE 25 MG/ML
25 INJECTION INTRAMUSCULAR; INTRAVENOUS; SUBCUTANEOUS EVERY 30 MIN PRN
Status: CANCELLED | OUTPATIENT
Start: 2023-12-12

## 2023-12-11 RX ORDER — ALBUTEROL SULFATE 0.83 MG/ML
2.5 SOLUTION RESPIRATORY (INHALATION)
Status: CANCELLED | OUTPATIENT
Start: 2023-12-19

## 2023-12-11 RX ORDER — HEPARIN SODIUM (PORCINE) LOCK FLUSH IV SOLN 100 UNIT/ML 100 UNIT/ML
5 SOLUTION INTRAVENOUS
Status: CANCELLED | OUTPATIENT
Start: 2023-12-15

## 2023-12-11 RX ORDER — METHYLPREDNISOLONE SODIUM SUCCINATE 125 MG/2ML
125 INJECTION, POWDER, LYOPHILIZED, FOR SOLUTION INTRAMUSCULAR; INTRAVENOUS
Status: CANCELLED
Start: 2023-12-18

## 2023-12-11 RX ADMIN — ONDANSETRON 8 MG: 2 INJECTION INTRAMUSCULAR; INTRAVENOUS at 14:49

## 2023-12-11 RX ADMIN — ALTEPLASE 2 MG: 2.2 INJECTION, POWDER, LYOPHILIZED, FOR SOLUTION INTRAVENOUS at 13:44

## 2023-12-11 RX ADMIN — SODIUM CHLORIDE 250 ML: 9 INJECTION, SOLUTION INTRAVENOUS at 14:47

## 2023-12-11 RX ADMIN — Medication 5 ML: at 16:02

## 2023-12-11 RX ADMIN — AZACITIDINE 150 MG: 100 INJECTION, POWDER, LYOPHILIZED, FOR SOLUTION INTRAVENOUS; SUBCUTANEOUS at 15:16

## 2023-12-11 RX ADMIN — FAMOTIDINE 20 MG: 10 INJECTION INTRAVENOUS at 14:51

## 2023-12-11 RX ADMIN — ALTEPLASE 2 MG: 2.2 INJECTION, POWDER, LYOPHILIZED, FOR SOLUTION INTRAVENOUS at 15:27

## 2023-12-11 ASSESSMENT — PAIN SCALES - GENERAL: PAINLEVEL: NO PAIN (0)

## 2023-12-11 NOTE — PROGRESS NOTES
[facility] Hematology and Oncology Progress Note    Patient: Ziggy Hallman  MRN: [id number]  Date of Service: Dec 11, 2023          Reason for Visit    MDS    Primary hematologist: Dr. Mcbride    Assessment and Plan    MDS:   Currently on induction vidaza in preparation for BMT.   Has completed 3 cycles with good tolerance. Ongoing fatigue.   He's not had significant cytopenias on chemo.    Interval labs stable: hgb 8-9, WBC and platelets WNL  Current labs: hgb 9.5, WBC and platelets WNL. CMP unremarkable.     Plan:  -Proceed with cycle 4 Vidaza. Continue same dosing 75 m/m2 days 1-5 + 8-9  -Labs every 2 weeks  -Overall plan is to return to [facility] for consideration of transplant following 4 cycles. Will communicate back with his BMT team to update them on the status of completing 4 cycles and coordinate restaging/follow-up.    ADDENDUM:  [facility] BMT is requesting we get bone marrow biopsy done here and they will see a week later.  We will order BMT to be done with Dr. Mcbride at [facility] in about 3 weeks.      Pancytopenia:   This is overall stable.    No need for any blood products so far.    Plan:  -Continue labs every 2 weeks  -Irradiated transfusions for hgb <7 g/dL or platelets < 40 K (he is on antiplatelet therapy and anticoagulation with hx cardiac stent, so cannot hold antiplatelet med)      3.  Pruritus without rash  Likely side effect of Vidaza.     Plan:  -Keep skin well moisturized  -Prescription for Atarax to take as needed, cautioned this may make him drowsy    4.  Hypercoagulable state, NOS  5.  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    Extensive hypercoagulable workup to date has been unrevealing.  JAK2 testing negative.  PNH testing negative.  Elevated IgA of unknown significance, no evidence of plasma cell disorder.  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 <1% blasts. Cellularity 40 - 50%.  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  IPSS was 4 = intermediate risk category.    -EPO level 25   -Genetic testing to assess for bone marrow failure syndromes (Fanconi anemia and dyskeratosis congenita) were negative he also saw BMT in November 2015.       5/2023 repeat bone marrow biopsy: hypocellular marrow involved by MDS with blast percentage of 5 -10%. Significant fibrosis at 3+.  Karyotyping came back showing complex karyotype in all the examined cells with hyperdiploid clone characterized by loss of 1 copy of chromosome 5 and 17 with an unknown material replacing the short arms of chromosome 9 and of the remaining chromosome 17 along with a marker chromosome.  No losses of 5q31 or TP53 noted by FISH.  Comprehensive myeloid NGS does not show any high risk mutations.  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     Treatment:   2015 BMT eval for potential allogenic stem cell transplant.  2 siblings were a full match. Deferred since counts generally stable.       2023 BMT re- eval.  Allogenic stem cell transplant recommended after induction HMA     9/18/2023 -present: Vidaza 75 mg/m2 x 7 days every 4 weeks (planning 4 cycles).  Today is cycle 3     Interval History   Ziggy is a pleasant 48-year-old man who is here today to continue induction  "Vidaza, due for cycle 4.    Tolerating this generally well. Chronic fatigue not significantly changed. Mild nausea, no emesis.    Fair appetite, weight is stable. Bowels regular. With cycle 3, had some skin dryness/peeling and this cycle he has generalized pruritus. No rashes. Moisturizing well. Benadryl hasn't been very effective. No bleeding/bruising. No fevers. He's not required any interval transfusions.    ECOG Performance    0 - Independent      PHYSICAL EXAM  BP (!) 145/108 (BP Location: Right arm, Patient Position: Sitting, Cuff Size: Adult Regular)   Pulse 95   Temp 98.6  F (37  C) (Tympanic)   Resp 12   Ht 1.753 m (5' 9\")   Wt 88.9 kg (196 lb)   SpO2 98%   BMI 28.94 kg/m      GENERAL: no acute distress. Cooperative in conversation. Alone.  LYMPH: no palpable cervical or axillary adenopathy  RESP: Regular respiratory rate. Clear bilaterally  ABD: No splenomegaly  NEURO: non focal. Alert and oriented x3.   PSYCH: within normal limits. No depression or anxiety.  SKIN: exposed skin is dry intact.  No rashes.  No erythema.    Lab Results    Recent Results (from the past 168 hour(s))   Comprehensive metabolic panel   Result Value Ref Range    Sodium 137 135 - 145 mmol/L    Potassium 3.9 3.4 - 5.3 mmol/L    Carbon Dioxide (CO2) 23 22 - 29 mmol/L    Anion Gap 7 7 - 15 mmol/L    Urea Nitrogen 12.1 6.0 - 20.0 mg/dL    Creatinine 0.81 0.67 - 1.17 mg/dL    GFR Estimate >90 >60 mL/min/1.73m2    Calcium 8.8 8.6 - 10.0 mg/dL    Chloride 107 98 - 107 mmol/L    Glucose 109 (H) 70 - 99 mg/dL    Alkaline Phosphatase 105 40 - 150 U/L    AST 23 0 - 45 U/L    ALT 14 0 - 70 U/L    Protein Total 6.5 6.4 - 8.3 g/dL    Albumin 2.1 (L) 3.5 - 5.2 g/dL    Bilirubin Total 0.2 <=1.2 mg/dL   CBC with platelets and differential   Result Value Ref Range    WBC Count 5.3 4.0 - 11.0 10e3/uL    RBC Count 2.81 (L) 4.40 - 5.90 10e6/uL    Hemoglobin 9.5 (L) 13.3 - 17.7 g/dL    Hematocrit 28.1 (L) 40.0 - 53.0 %     78 - 100 fL    " MCH 33.8 (H) 26.5 - 33.0 pg    MCHC 33.8 31.5 - 36.5 g/dL    RDW 15.2 (H) 10.0 - 15.0 %    Platelet Count 320 150 - 450 10e3/uL    % Neutrophils 59 %    % Lymphocytes 34 %    % Monocytes 3 %    % Eosinophils 2 %    % Basophils 1 %    % Immature Granulocytes 1 %    NRBCs per 100 WBC 0 <1 /100    Absolute Neutrophils 3.1 1.6 - 8.3 10e3/uL    Absolute Lymphocytes 1.8 0.8 - 5.3 10e3/uL    Absolute Monocytes 0.1 0.0 - 1.3 10e3/uL    Absolute Eosinophils 0.1 0.0 - 0.7 10e3/uL    Absolute Basophils 0.1 0.0 - 0.2 10e3/uL    Absolute Immature Granulocytes 0.1 <=0.4 10e3/uL    Absolute NRBCs 0.0 10e3/uL         Imaging    No results found.    Total time 30 minutes, to include face to face visit, review of EMR, ordering, documentation and coordination of care on date of service      Signed by: Lurdes Dee NP

## 2023-12-11 NOTE — LETTER
12/11/2023         RE: Ziggy Hallman  [address]  [facility] [address]        Dear Colleague,    Thank you for referring your patient, Ziggy Hallman, to the [facility] [place]. Please see a copy of my visit note below.    [facility] Hematology and Oncology Progress Note    Patient: Ziggy Hallman  MRN: [id number]  Date of Service: Dec 11, 2023          Reason for Visit    MDS    Primary hematologist: Dr. Mcbride    Assessment and Plan    MDS:   Currently on induction vidaza in preparation for BMT.   Has completed 3 cycles with good tolerance. Ongoing fatigue.   He's not had significant cytopenias on chemo.    Interval labs stable: hgb 8-9, WBC and platelets WNL  Current labs: hgb 9.5, WBC and platelets WNL. CMP unremarkable.     Plan:  -Proceed with cycle 4 Vidaza. Continue same dosing 75 m/m2 days 1-5 + 8-9  -Labs every 2 weeks  -Overall plan is to return to [facility] for consideration of transplant following 4 cycles. Will communicate back with his BMT team to update them on the status of completing 4 cycles and coordinate restaging/follow-up.    Pancytopenia:   This is overall stable.    No need for any blood products so far.    Plan:  -Continue labs every 2 weeks  -Irradiated transfusions for hgb <7 g/dL or platelets < 40 K (he is on antiplatelet therapy and anticoagulation with hx cardiac stent, so cannot hold antiplatelet med)      3.  Pruritus without rash  Likely side effect of Vidaza.     Plan:  -Keep skin well moisturized  -Prescription for Atarax to take as needed, cautioned this may make him drowsy    4.  Hypercoagulable state, NOS  5.  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    Extensive hypercoagulable workup to date has been unrevealing.  JAK2 testing negative.  PNH testing negative.  Elevated IgA of unknown significance, no evidence of plasma cell disorder.  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 <1% blasts. Cellularity 40 - 50%.  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  IPSS was 4 = intermediate risk category.    -EPO level 25   -Genetic testing to assess for bone marrow failure syndromes (Fanconi anemia and dyskeratosis congenita) were negative he also saw BMT in November 2015.       5/2023 repeat bone marrow biopsy: hypocellular marrow involved by MDS with blast percentage of 5 -10%. Significant fibrosis at 3+.  Karyotyping came back showing complex karyotype in all the examined cells with hyperdiploid clone characterized by loss of 1 copy of chromosome 5 and 17 with an unknown material replacing the short arms of chromosome 9 and of the remaining chromosome 17 along with a marker chromosome.  No losses of 5q31 or TP53 noted by FISH.  Comprehensive myeloid NGS does not show any high risk mutations.  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     Treatment:   2015 BMT eval for potential allogenic stem cell transplant.  2 siblings were a full match. Deferred since counts generally stable.       2023 BMT re- eval.  Allogenic stem cell transplant recommended after induction HMA     9/18/2023 -present: Vidaza 75 mg/m2 x 7 days every 4 weeks (planning 4 cycles).  Today is cycle 3     Interval History   Ziggy is  "a pleasant 48-year-old man who is here today to continue induction Vidaza, due for cycle 4.    Tolerating this generally well. Chronic fatigue not significantly changed. Mild nausea, no emesis.    Fair appetite, weight is stable. Bowels regular. With cycle 3, had some skin dryness/peeling and this cycle he has generalized pruritus. No rashes. Moisturizing well. Benadryl hasn't been very effective. No bleeding/bruising. No fevers. He's not required any interval transfusions.    ECOG Performance    0 - Independent      PHYSICAL EXAM  BP (!) 145/108 (BP Location: Right arm, Patient Position: Sitting, Cuff Size: Adult Regular)   Pulse 95   Temp 98.6  F (37  C) (Tympanic)   Resp 12   Ht 1.753 m (5' 9\")   Wt 88.9 kg (196 lb)   SpO2 98%   BMI 28.94 kg/m      GENERAL: no acute distress. Cooperative in conversation. Alone.  LYMPH: no palpable cervical or axillary adenopathy  RESP: Regular respiratory rate. Clear bilaterally  ABD: No splenomegaly  NEURO: non focal. Alert and oriented x3.   PSYCH: within normal limits. No depression or anxiety.  SKIN: exposed skin is dry intact.  No rashes.  No erythema.    Lab Results    Recent Results (from the past 168 hour(s))   Comprehensive metabolic panel   Result Value Ref Range    Sodium 137 135 - 145 mmol/L    Potassium 3.9 3.4 - 5.3 mmol/L    Carbon Dioxide (CO2) 23 22 - 29 mmol/L    Anion Gap 7 7 - 15 mmol/L    Urea Nitrogen 12.1 6.0 - 20.0 mg/dL    Creatinine 0.81 0.67 - 1.17 mg/dL    GFR Estimate >90 >60 mL/min/1.73m2    Calcium 8.8 8.6 - 10.0 mg/dL    Chloride 107 98 - 107 mmol/L    Glucose 109 (H) 70 - 99 mg/dL    Alkaline Phosphatase 105 40 - 150 U/L    AST 23 0 - 45 U/L    ALT 14 0 - 70 U/L    Protein Total 6.5 6.4 - 8.3 g/dL    Albumin 2.1 (L) 3.5 - 5.2 g/dL    Bilirubin Total 0.2 <=1.2 mg/dL   CBC with platelets and differential   Result Value Ref Range    WBC Count 5.3 4.0 - 11.0 10e3/uL    RBC Count 2.81 (L) 4.40 - 5.90 10e6/uL    Hemoglobin 9.5 (L) 13.3 - 17.7 " "g/dL    Hematocrit 28.1 (L) 40.0 - 53.0 %     78 - 100 fL    MCH 33.8 (H) 26.5 - 33.0 pg    MCHC 33.8 31.5 - 36.5 g/dL    RDW 15.2 (H) 10.0 - 15.0 %    Platelet Count 320 150 - 450 10e3/uL    % Neutrophils 59 %    % Lymphocytes 34 %    % Monocytes 3 %    % Eosinophils 2 %    % Basophils 1 %    % Immature Granulocytes 1 %    NRBCs per 100 WBC 0 <1 /100    Absolute Neutrophils 3.1 1.6 - 8.3 10e3/uL    Absolute Lymphocytes 1.8 0.8 - 5.3 10e3/uL    Absolute Monocytes 0.1 0.0 - 1.3 10e3/uL    Absolute Eosinophils 0.1 0.0 - 0.7 10e3/uL    Absolute Basophils 0.1 0.0 - 0.2 10e3/uL    Absolute Immature Granulocytes 0.1 <=0.4 10e3/uL    Absolute NRBCs 0.0 10e3/uL         Imaging    No results found.    Total time 30 minutes, to include face to face visit, review of EMR, ordering, documentation and coordination of care on date of service      Signed by: Lurdes Dee NP    Oncology Rooming Note    December 11, 2023 1:40 PM   Ziggy Hallman is a 48 year old male who presents for:    Chief Complaint   Patient presents with     Oncology Clinic Visit     MDS (myelodysplastic syndrome) - Labs provider and infusion     Initial Vitals: BP (!) 145/108 (BP Location: Right arm, Patient Position: Sitting, Cuff Size: Adult Regular)   Pulse 95   Temp 98.6  F (37  C) (Tympanic)   Resp 12   Ht 1.753 m (5' 9\")   Wt 88.9 kg (196 lb)   SpO2 98%   BMI 28.94 kg/m   Estimated body mass index is 28.94 kg/m  as calculated from the following:    Height as of this encounter: 1.753 m (5' 9\").    Weight as of this encounter: 88.9 kg (196 lb). Body surface area is 2.08 meters squared.  No Pain (0) Comment: Data Unavailable   No LMP for male patient.  Allergies reviewed: Yes  Medications reviewed: Yes    Medications: Medication refills not needed today.  Pharmacy name entered into [employer]: [id number] - [place], [place] - [address] [place] [address]    Clinical concerns: Patient states he has been very itchy especially at night for the past " month.       Elena Reyes, DANNY                Again, thank you for allowing me to participate in the care of your patient.        Sincerely,        Lurdes Dee, NP

## 2023-12-11 NOTE — PROGRESS NOTES
"Port accessed with 20g 3/4\" non coring power needle    Flushed with 40mL NS, several attempts made is several positions to get blood return. No return noted.    Alteplase ordered and instilled for 30 minutes.  Still no return.     Alteplase ordered for second time for 90 minutes.     Patient was added to lab schedule for peripheral lab draw. Then PIV started with good blood return to start infusion process while RN continues to work on port.   "

## 2023-12-11 NOTE — PROGRESS NOTES
Infusion Nursing Note:  Ziggydana Hallman presents today for C4D1 Vidaza.    Patient seen by provider today: Yes: No change for today.    present during visit today: Not Applicable.    Note: Pt has issues with no port blood return, Atp given x2 and blood return after the second dose. Line flushed and hep locked. Port left accessed for tomorrows treatment.       Intravenous Access:  Peripheral IV placed.  Implanted Port.    Treatment Conditions:  Lab Results   Component Value Date    HGB 9.5 (L) 12/11/2023    WBC 5.3 12/11/2023    ANEU 2.6 11/21/2023    ANEUTAUTO 3.1 12/11/2023     12/11/2023        Lab Results   Component Value Date     12/11/2023    POTASSIUM 3.9 12/11/2023    CR 0.81 12/11/2023    REMY 8.8 12/11/2023    BILITOTAL 0.2 12/11/2023    ALBUMIN 2.1 (L) 12/11/2023    ALT 14 12/11/2023    AST 23 12/11/2023       Results reviewed, labs MET treatment parameters, ok to proceed with treatment.      Post Infusion Assessment:    **PIV placed due to no blood return from port.     Patient tolerated infusion without incident.  Blood return noted pre and post infusion.  Site patent and intact, free from redness, edema or discomfort.  No evidence of extravasations.  Access discontinued per protocol.       Discharge Plan:   Discharge instructions reviewed with: Patient.  Patient and/or family verbalized understanding of discharge instructions and all questions answered.  Patient discharged in stable condition accompanied by: self.  Departure Mode: Ambulatory.      Phyllis Carreno RN

## 2023-12-11 NOTE — PROGRESS NOTES
"Oncology Rooming Note    December 11, 2023 1:40 PM   Ziggy Hallman is a 48 year old male who presents for:    Chief Complaint   Patient presents with    Oncology Clinic Visit     MDS (myelodysplastic syndrome) - Labs provider and infusion     Initial Vitals: BP (!) 145/108 (BP Location: Right arm, Patient Position: Sitting, Cuff Size: Adult Regular)   Pulse 95   Temp 98.6  F (37  C) (Tympanic)   Resp 12   Ht 1.753 m (5' 9\")   Wt 88.9 kg (196 lb)   SpO2 98%   BMI 28.94 kg/m   Estimated body mass index is 28.94 kg/m  as calculated from the following:    Height as of this encounter: 1.753 m (5' 9\").    Weight as of this encounter: 88.9 kg (196 lb). Body surface area is 2.08 meters squared.  No Pain (0) Comment: Data Unavailable   No LMP for male patient.  Allergies reviewed: Yes  Medications reviewed: Yes    Medications: Medication refills not needed today.  Pharmacy name entered into [employer]: [id number] - [place], [place] - [address] [place] [address]    Clinical concerns: Patient states he has been very itchy especially at night for the past month.       Elena Reyes, DANNY              "

## 2023-12-12 ENCOUNTER — INFUSION THERAPY VISIT (OUTPATIENT)
Dept: INFUSION THERAPY | Facility: CLINIC | Age: 48
End: 2023-12-12
Attending: INTERNAL MEDICINE
Payer: COMMERCIAL

## 2023-12-12 VITALS
SYSTOLIC BLOOD PRESSURE: 134 MMHG | TEMPERATURE: 97.9 F | HEART RATE: 86 BPM | DIASTOLIC BLOOD PRESSURE: 82 MMHG | RESPIRATION RATE: 16 BRPM | OXYGEN SATURATION: 98 %

## 2023-12-12 DIAGNOSIS — D46.9 MDS (MYELODYSPLASTIC SYNDROME) (H): Primary | ICD-10-CM

## 2023-12-12 PROCEDURE — 96375 TX/PRO/DX INJ NEW DRUG ADDON: CPT

## 2023-12-12 PROCEDURE — 258N000003 HC RX IP 258 OP 636: Performed by: NURSE PRACTITIONER

## 2023-12-12 PROCEDURE — 96409 CHEMO IV PUSH SNGL DRUG: CPT

## 2023-12-12 PROCEDURE — 250N000011 HC RX IP 250 OP 636: Performed by: NURSE PRACTITIONER

## 2023-12-12 RX ORDER — ONDANSETRON 2 MG/ML
8 INJECTION INTRAMUSCULAR; INTRAVENOUS ONCE
Status: COMPLETED | OUTPATIENT
Start: 2023-12-12 | End: 2023-12-12

## 2023-12-12 RX ORDER — HEPARIN SODIUM (PORCINE) LOCK FLUSH IV SOLN 100 UNIT/ML 100 UNIT/ML
5 SOLUTION INTRAVENOUS
Status: DISCONTINUED | OUTPATIENT
Start: 2023-12-12 | End: 2023-12-12 | Stop reason: HOSPADM

## 2023-12-12 RX ADMIN — AZACITIDINE 150 MG: 100 INJECTION, POWDER, LYOPHILIZED, FOR SOLUTION INTRAVENOUS; SUBCUTANEOUS at 14:36

## 2023-12-12 RX ADMIN — ONDANSETRON 8 MG: 2 INJECTION INTRAMUSCULAR; INTRAVENOUS at 14:04

## 2023-12-12 RX ADMIN — SODIUM CHLORIDE 250 ML: 9 INJECTION, SOLUTION INTRAVENOUS at 14:00

## 2023-12-12 RX ADMIN — Medication 5 ML: at 14:54

## 2023-12-12 NOTE — PROGRESS NOTES
Infusion Nursing Note:  Ziggy Hallman presents today for Vidaza.    Patient seen by provider today: No   present during visit today: Not Applicable.    Note: N/A.      Intravenous Access:  Implanted Port.    Treatment Conditions:  Not Applicable.      Post Infusion Assessment:  Patient tolerated infusion without incident.  Blood return noted pre and post infusion.  Site patent and intact, free from redness, edema or discomfort.  No evidence of extravasations.    Port left accessed due to several apts this week.      Discharge Plan:   Discharge instructions reviewed with: Patient.  Patient and/or family verbalized understanding of discharge instructions and all questions answered.  Patient discharged in stable condition accompanied by: self.  Departure Mode: Ambulatory.      Phyllis Carreno RN

## 2023-12-13 ENCOUNTER — INFUSION THERAPY VISIT (OUTPATIENT)
Dept: INFUSION THERAPY | Facility: CLINIC | Age: 48
End: 2023-12-13
Attending: INTERNAL MEDICINE
Payer: COMMERCIAL

## 2023-12-13 VITALS — SYSTOLIC BLOOD PRESSURE: 146 MMHG | TEMPERATURE: 98 F | DIASTOLIC BLOOD PRESSURE: 85 MMHG | HEART RATE: 76 BPM

## 2023-12-13 DIAGNOSIS — D46.9 MDS (MYELODYSPLASTIC SYNDROME) (H): Primary | ICD-10-CM

## 2023-12-13 PROCEDURE — 250N000011 HC RX IP 250 OP 636: Mod: JW | Performed by: NURSE PRACTITIONER

## 2023-12-13 PROCEDURE — 96375 TX/PRO/DX INJ NEW DRUG ADDON: CPT

## 2023-12-13 PROCEDURE — 96413 CHEMO IV INFUSION 1 HR: CPT

## 2023-12-13 PROCEDURE — 258N000003 HC RX IP 258 OP 636: Performed by: NURSE PRACTITIONER

## 2023-12-13 RX ORDER — HEPARIN SODIUM (PORCINE) LOCK FLUSH IV SOLN 100 UNIT/ML 100 UNIT/ML
5 SOLUTION INTRAVENOUS
Status: DISCONTINUED | OUTPATIENT
Start: 2023-12-13 | End: 2023-12-13 | Stop reason: HOSPADM

## 2023-12-13 RX ORDER — ONDANSETRON 2 MG/ML
8 INJECTION INTRAMUSCULAR; INTRAVENOUS ONCE
Status: COMPLETED | OUTPATIENT
Start: 2023-12-13 | End: 2023-12-13

## 2023-12-13 RX ADMIN — AZACITIDINE 150 MG: 100 INJECTION, POWDER, LYOPHILIZED, FOR SOLUTION INTRAVENOUS; SUBCUTANEOUS at 14:02

## 2023-12-13 RX ADMIN — FAMOTIDINE 20 MG: 10 INJECTION INTRAVENOUS at 13:49

## 2023-12-13 RX ADMIN — Medication 5 ML: at 14:26

## 2023-12-13 RX ADMIN — ONDANSETRON 8 MG: 2 INJECTION INTRAMUSCULAR; INTRAVENOUS at 13:47

## 2023-12-13 RX ADMIN — SODIUM CHLORIDE 250 ML: 9 INJECTION, SOLUTION INTRAVENOUS at 13:43

## 2023-12-13 NOTE — PROGRESS NOTES
Infusion Nursing Note:  Ziggy Hallman presents today for C4D3 Vidaza.    Patient seen by provider today: No   present during visit today: Not Applicable.    Note: N/A.      Intravenous Access:  Implanted Port.    Post Infusion Assessment:  Patient tolerated infusion without incident.  Blood return noted pre and post infusion.  Site patent and intact, free from redness, edema or discomfort.  No evidence of extravasations.  Port remains accessed for use tomorrow.      Discharge Plan:   Patient discharged in stable condition accompanied by: self.  Departure Mode: Ambulatory.      Marlen Salas RN

## 2023-12-14 ENCOUNTER — INFUSION THERAPY VISIT (OUTPATIENT)
Dept: INFUSION THERAPY | Facility: CLINIC | Age: 48
End: 2023-12-14
Attending: INTERNAL MEDICINE
Payer: COMMERCIAL

## 2023-12-14 VITALS — SYSTOLIC BLOOD PRESSURE: 114 MMHG | TEMPERATURE: 98.1 F | DIASTOLIC BLOOD PRESSURE: 85 MMHG | HEART RATE: 83 BPM

## 2023-12-14 DIAGNOSIS — D46.9 MDS (MYELODYSPLASTIC SYNDROME) (H): Primary | ICD-10-CM

## 2023-12-14 PROCEDURE — 250N000011 HC RX IP 250 OP 636: Mod: JZ | Performed by: NURSE PRACTITIONER

## 2023-12-14 PROCEDURE — 96409 CHEMO IV PUSH SNGL DRUG: CPT

## 2023-12-14 PROCEDURE — 258N000003 HC RX IP 258 OP 636: Performed by: NURSE PRACTITIONER

## 2023-12-14 PROCEDURE — 96375 TX/PRO/DX INJ NEW DRUG ADDON: CPT

## 2023-12-14 RX ORDER — HEPARIN SODIUM (PORCINE) LOCK FLUSH IV SOLN 100 UNIT/ML 100 UNIT/ML
5 SOLUTION INTRAVENOUS
Status: DISCONTINUED | OUTPATIENT
Start: 2023-12-14 | End: 2023-12-14 | Stop reason: HOSPADM

## 2023-12-14 RX ORDER — ONDANSETRON 2 MG/ML
8 INJECTION INTRAMUSCULAR; INTRAVENOUS ONCE
Status: COMPLETED | OUTPATIENT
Start: 2023-12-14 | End: 2023-12-14

## 2023-12-14 RX ADMIN — FAMOTIDINE 20 MG: 10 INJECTION INTRAVENOUS at 13:59

## 2023-12-14 RX ADMIN — SODIUM CHLORIDE 250 ML: 9 INJECTION, SOLUTION INTRAVENOUS at 13:55

## 2023-12-14 RX ADMIN — AZACITIDINE 150 MG: 100 INJECTION, POWDER, LYOPHILIZED, FOR SOLUTION INTRAVENOUS; SUBCUTANEOUS at 14:15

## 2023-12-14 RX ADMIN — ONDANSETRON 8 MG: 2 INJECTION INTRAMUSCULAR; INTRAVENOUS at 13:56

## 2023-12-14 RX ADMIN — Medication 5 ML: at 14:33

## 2023-12-14 NOTE — PROGRESS NOTES
Infusion Nursing Note:  Ziggy Domingoel presents today for C4D4 Vidaza.    Patient seen by provider today: No   present during visit today: Not Applicable.    Note: N/A.      Intravenous Access:  Implanted Port.    Treatment Conditions:  Lab Results   Component Value Date    HGB 9.5 (L) 12/11/2023    WBC 5.3 12/11/2023    ANEU 2.6 11/21/2023    ANEUTAUTO 3.1 12/11/2023     12/11/2023        Lab Results   Component Value Date     12/11/2023    POTASSIUM 3.9 12/11/2023    CR 0.81 12/11/2023    REMY 8.8 12/11/2023    BILITOTAL 0.2 12/11/2023    ALBUMIN 2.1 (L) 12/11/2023    ALT 14 12/11/2023    AST 23 12/11/2023       Results reviewed, labs MET treatment parameters, ok to proceed with treatment.      Post Infusion Assessment:  Patient tolerated infusion without incident.  Blood return noted pre and post infusion.  Site patent and intact, free from redness, edema or discomfort.  No evidence of extravasations.  Access discontinued per protocol.       Discharge Plan:   Patient discharged in stable condition accompanied by: self.  Departure Mode: Ambulatory.  Pt to return tomorrow at 1:30 pm for C4D5 Vidaza      Aelja Mercado RN

## 2023-12-15 ENCOUNTER — INFUSION THERAPY VISIT (OUTPATIENT)
Dept: INFUSION THERAPY | Facility: CLINIC | Age: 48
End: 2023-12-15
Attending: INTERNAL MEDICINE
Payer: COMMERCIAL

## 2023-12-15 ENCOUNTER — PATIENT OUTREACH (OUTPATIENT)
Dept: ONCOLOGY | Facility: HOSPITAL | Age: 48
End: 2023-12-15
Payer: COMMERCIAL

## 2023-12-15 VITALS — TEMPERATURE: 97.9 F | SYSTOLIC BLOOD PRESSURE: 126 MMHG | DIASTOLIC BLOOD PRESSURE: 84 MMHG | HEART RATE: 78 BPM

## 2023-12-15 DIAGNOSIS — D46.9 MDS (MYELODYSPLASTIC SYNDROME) (H): Primary | ICD-10-CM

## 2023-12-15 PROCEDURE — 258N000003 HC RX IP 258 OP 636: Performed by: NURSE PRACTITIONER

## 2023-12-15 PROCEDURE — 96409 CHEMO IV PUSH SNGL DRUG: CPT

## 2023-12-15 PROCEDURE — 250N000011 HC RX IP 250 OP 636: Mod: JZ | Performed by: NURSE PRACTITIONER

## 2023-12-15 PROCEDURE — 96375 TX/PRO/DX INJ NEW DRUG ADDON: CPT

## 2023-12-15 RX ORDER — ONDANSETRON 2 MG/ML
8 INJECTION INTRAMUSCULAR; INTRAVENOUS ONCE
Status: COMPLETED | OUTPATIENT
Start: 2023-12-15 | End: 2023-12-15

## 2023-12-15 RX ORDER — HEPARIN SODIUM (PORCINE) LOCK FLUSH IV SOLN 100 UNIT/ML 100 UNIT/ML
5 SOLUTION INTRAVENOUS
Status: DISCONTINUED | OUTPATIENT
Start: 2023-12-15 | End: 2023-12-15 | Stop reason: HOSPADM

## 2023-12-15 RX ADMIN — ONDANSETRON 8 MG: 2 INJECTION INTRAMUSCULAR; INTRAVENOUS at 13:45

## 2023-12-15 RX ADMIN — AZACITIDINE 150 MG: 100 INJECTION, POWDER, LYOPHILIZED, FOR SOLUTION INTRAVENOUS; SUBCUTANEOUS at 14:08

## 2023-12-15 RX ADMIN — SODIUM CHLORIDE 250 ML: 9 INJECTION, SOLUTION INTRAVENOUS at 13:41

## 2023-12-15 RX ADMIN — FAMOTIDINE 20 MG: 10 INJECTION INTRAVENOUS at 13:43

## 2023-12-15 RX ADMIN — Medication 5 ML: at 14:25

## 2023-12-15 NOTE — PROGRESS NOTES
Infusion Nursing Note:  Ziggy Domingoel presents today for C4D5 Vidaza.    Patient seen by provider today: No   present during visit today: Not Applicable.    Note: N/A.      Intravenous Access:  Implanted Port.    Treatment Conditions:  Lab Results   Component Value Date    HGB 9.5 (L) 12/11/2023    WBC 5.3 12/11/2023    ANEU 2.6 11/21/2023    ANEUTAUTO 3.1 12/11/2023     12/11/2023        Lab Results   Component Value Date     12/11/2023    POTASSIUM 3.9 12/11/2023    CR 0.81 12/11/2023    ERMY 8.8 12/11/2023    BILITOTAL 0.2 12/11/2023    ALBUMIN 2.1 (L) 12/11/2023    ALT 14 12/11/2023    AST 23 12/11/2023       Results reviewed, labs MET treatment parameters, ok to proceed with treatment.      Post Infusion Assessment:  Patient tolerated infusion without incident.  Blood return noted pre and post infusion.  Site patent and intact, free from redness, edema or discomfort.  No evidence of extravasations.  Access discontinued per protocol.       Discharge Plan:   Patient discharged in stable condition accompanied by: self.  Departure Mode: Ambulatory.  Pt to return on 12/18/23 at 1:30 pm for Vidaza.    Aleja Mercado RN

## 2023-12-15 NOTE — PROGRESS NOTES
[facility]: Cancer Care                                                                                      RN Cancer Care Coordinator called patient to inform him that the BMBX has been scheduled for 1/3/24 at 9:00 AM, meaning he needs to arrive at 8:00 AM.      He tells writer that he has had two previous bmbx's. Asked about his experience, he reports that the first one did not have any pain, but after the most recent one he had pain for a couple of days. He said he doesn't  have anxiety about it. He would appreciate having something to take for as day or two.    Writer relayed this to Dr. Mcbride.    Signature:  Jennifer Pedersen RN

## 2023-12-18 ENCOUNTER — INFUSION THERAPY VISIT (OUTPATIENT)
Dept: INFUSION THERAPY | Facility: CLINIC | Age: 48
End: 2023-12-18
Attending: INTERNAL MEDICINE
Payer: COMMERCIAL

## 2023-12-18 VITALS — SYSTOLIC BLOOD PRESSURE: 136 MMHG | DIASTOLIC BLOOD PRESSURE: 87 MMHG | HEART RATE: 97 BPM | TEMPERATURE: 97.6 F

## 2023-12-18 DIAGNOSIS — D46.9 MDS (MYELODYSPLASTIC SYNDROME) (H): Primary | ICD-10-CM

## 2023-12-18 PROCEDURE — 96409 CHEMO IV PUSH SNGL DRUG: CPT

## 2023-12-18 PROCEDURE — 258N000003 HC RX IP 258 OP 636: Performed by: NURSE PRACTITIONER

## 2023-12-18 PROCEDURE — 250N000011 HC RX IP 250 OP 636: Performed by: NURSE PRACTITIONER

## 2023-12-18 PROCEDURE — 96375 TX/PRO/DX INJ NEW DRUG ADDON: CPT

## 2023-12-18 RX ORDER — ONDANSETRON 2 MG/ML
8 INJECTION INTRAMUSCULAR; INTRAVENOUS ONCE
Status: COMPLETED | OUTPATIENT
Start: 2023-12-18 | End: 2023-12-18

## 2023-12-18 RX ORDER — HEPARIN SODIUM (PORCINE) LOCK FLUSH IV SOLN 100 UNIT/ML 100 UNIT/ML
5 SOLUTION INTRAVENOUS
Status: DISCONTINUED | OUTPATIENT
Start: 2023-12-18 | End: 2023-12-18 | Stop reason: HOSPADM

## 2023-12-18 RX ADMIN — Medication 5 ML: at 14:27

## 2023-12-18 RX ADMIN — ONDANSETRON 8 MG: 2 INJECTION INTRAMUSCULAR; INTRAVENOUS at 13:39

## 2023-12-18 RX ADMIN — FAMOTIDINE 20 MG: 10 INJECTION INTRAVENOUS at 13:38

## 2023-12-18 RX ADMIN — SODIUM CHLORIDE 250 ML: 9 INJECTION, SOLUTION INTRAVENOUS at 13:37

## 2023-12-18 RX ADMIN — AZACITIDINE 150 MG: 100 INJECTION, POWDER, LYOPHILIZED, FOR SOLUTION INTRAVENOUS; SUBCUTANEOUS at 14:00

## 2023-12-18 NOTE — PROGRESS NOTES
Infusion Nursing Note:  Ziggy Hallman presents today for day 8 Vidaza.    Patient seen by provider today: No      Intravenous Access:  Implanted Port.    Treatment Conditions:  Labs were done before day 1 vidaza.    Post Infusion Assessment:  Patient tolerated infusion without incident.  Access discontinued per protocol.       Discharge Plan:   [facility]  Patient will return tomorrow for next appointment.   Patient discharged in stable condition accompanied by: self.  Departure Mode: Ambulatory.      Nella Pinzon RN

## 2023-12-19 ENCOUNTER — INFUSION THERAPY VISIT (OUTPATIENT)
Dept: INFUSION THERAPY | Facility: CLINIC | Age: 48
End: 2023-12-19
Attending: INTERNAL MEDICINE
Payer: COMMERCIAL

## 2023-12-19 VITALS — TEMPERATURE: 97.5 F | SYSTOLIC BLOOD PRESSURE: 130 MMHG | HEART RATE: 87 BPM | DIASTOLIC BLOOD PRESSURE: 90 MMHG

## 2023-12-19 DIAGNOSIS — D46.9 MDS (MYELODYSPLASTIC SYNDROME) (H): Primary | ICD-10-CM

## 2023-12-19 PROCEDURE — 258N000003 HC RX IP 258 OP 636: Performed by: NURSE PRACTITIONER

## 2023-12-19 PROCEDURE — 96413 CHEMO IV INFUSION 1 HR: CPT

## 2023-12-19 PROCEDURE — 250N000011 HC RX IP 250 OP 636: Mod: JZ | Performed by: NURSE PRACTITIONER

## 2023-12-19 PROCEDURE — 96375 TX/PRO/DX INJ NEW DRUG ADDON: CPT

## 2023-12-19 RX ORDER — HEPARIN SODIUM (PORCINE) LOCK FLUSH IV SOLN 100 UNIT/ML 100 UNIT/ML
5 SOLUTION INTRAVENOUS
Status: DISCONTINUED | OUTPATIENT
Start: 2023-12-19 | End: 2023-12-19 | Stop reason: HOSPADM

## 2023-12-19 RX ORDER — ONDANSETRON 2 MG/ML
8 INJECTION INTRAMUSCULAR; INTRAVENOUS ONCE
Status: COMPLETED | OUTPATIENT
Start: 2023-12-19 | End: 2023-12-19

## 2023-12-19 RX ADMIN — SODIUM CHLORIDE 250 ML: 9 INJECTION, SOLUTION INTRAVENOUS at 13:36

## 2023-12-19 RX ADMIN — AZACITIDINE 150 MG: 100 INJECTION, POWDER, LYOPHILIZED, FOR SOLUTION INTRAVENOUS; SUBCUTANEOUS at 14:05

## 2023-12-19 RX ADMIN — ONDANSETRON 8 MG: 2 INJECTION INTRAMUSCULAR; INTRAVENOUS at 13:59

## 2023-12-19 RX ADMIN — FAMOTIDINE 20 MG: 10 INJECTION INTRAVENOUS at 14:02

## 2023-12-19 RX ADMIN — Medication 5 ML: at 14:25

## 2023-12-19 NOTE — PROGRESS NOTES
Infusion Nursing Note:  Ziggy Hallman presents today for Vidaza.    Patient seen by provider today: No   present during visit today: Not Applicable.    Note: N/A.      Intravenous Access:  Implanted Port.    Treatment Conditions:  Results reviewed, labs MET treatment parameters, ok to proceed with treatment.      Post Infusion Assessment:  Patient tolerated infusion without incident.  Good blood return verified before and after chemo administration.  Access discontinued per protocol.         Discharge Plan:   [facility]  Patient will return 12/27 for next appointment.   Patient discharged in stable condition accompanied by: self.  Departure Mode: Ambulatory.      Nella Pinzon RN

## 2023-12-27 ENCOUNTER — INFUSION THERAPY VISIT (OUTPATIENT)
Dept: INFUSION THERAPY | Facility: CLINIC | Age: 48
End: 2023-12-27
Attending: INTERNAL MEDICINE
Payer: COMMERCIAL

## 2023-12-27 VITALS
RESPIRATION RATE: 14 BRPM | SYSTOLIC BLOOD PRESSURE: 135 MMHG | DIASTOLIC BLOOD PRESSURE: 87 MMHG | OXYGEN SATURATION: 98 % | HEART RATE: 81 BPM | TEMPERATURE: 98.1 F

## 2023-12-27 DIAGNOSIS — D46.9 MDS (MYELODYSPLASTIC SYNDROME) (H): ICD-10-CM

## 2023-12-27 DIAGNOSIS — T45.1X5A ANTINEOPLASTIC CHEMOTHERAPY INDUCED PANCYTOPENIA (H): Primary | ICD-10-CM

## 2023-12-27 DIAGNOSIS — D61.810 ANTINEOPLASTIC CHEMOTHERAPY INDUCED PANCYTOPENIA (H): Primary | ICD-10-CM

## 2023-12-27 LAB
ABO/RH(D): NORMAL
ANTIBODY SCREEN: NEGATIVE
BASOPHILS # BLD AUTO: 0.1 10E3/UL (ref 0–0.2)
BASOPHILS NFR BLD AUTO: 1 %
EOSINOPHIL # BLD AUTO: 0.5 10E3/UL (ref 0–0.7)
EOSINOPHIL NFR BLD AUTO: 9 %
ERYTHROCYTE [DISTWIDTH] IN BLOOD BY AUTOMATED COUNT: 15.1 % (ref 10–15)
HCT VFR BLD AUTO: 28.5 % (ref 40–53)
HGB BLD-MCNC: 9.4 G/DL (ref 13.3–17.7)
IMM GRANULOCYTES # BLD: 0.1 10E3/UL
IMM GRANULOCYTES NFR BLD: 1 %
LYMPHOCYTES # BLD AUTO: 2.3 10E3/UL (ref 0.8–5.3)
LYMPHOCYTES NFR BLD AUTO: 39 %
MCH RBC QN AUTO: 33 PG (ref 26.5–33)
MCHC RBC AUTO-ENTMCNC: 33 G/DL (ref 31.5–36.5)
MCV RBC AUTO: 100 FL (ref 78–100)
MONOCYTES # BLD AUTO: 0.3 10E3/UL (ref 0–1.3)
MONOCYTES NFR BLD AUTO: 5 %
NEUTROPHILS # BLD AUTO: 2.7 10E3/UL (ref 1.6–8.3)
NEUTROPHILS NFR BLD AUTO: 45 %
NRBC # BLD AUTO: 0 10E3/UL
NRBC BLD AUTO-RTO: 0 /100
PLATELET # BLD AUTO: 313 10E3/UL (ref 150–450)
RBC # BLD AUTO: 2.85 10E6/UL (ref 4.4–5.9)
SPECIMEN EXPIRATION DATE: NORMAL
WBC # BLD AUTO: 6 10E3/UL (ref 4–11)

## 2023-12-27 PROCEDURE — 85025 COMPLETE CBC W/AUTO DIFF WBC: CPT | Performed by: NURSE PRACTITIONER

## 2023-12-27 PROCEDURE — 86900 BLOOD TYPING SEROLOGIC ABO: CPT | Performed by: NURSE PRACTITIONER

## 2023-12-27 PROCEDURE — 36591 DRAW BLOOD OFF VENOUS DEVICE: CPT | Performed by: NURSE PRACTITIONER

## 2023-12-27 RX ORDER — HEPARIN SODIUM (PORCINE) LOCK FLUSH IV SOLN 100 UNIT/ML 100 UNIT/ML
5 SOLUTION INTRAVENOUS
Status: CANCELLED | OUTPATIENT
Start: 2023-12-27

## 2023-12-27 RX ORDER — HEPARIN SODIUM,PORCINE 10 UNIT/ML
5-20 VIAL (ML) INTRAVENOUS DAILY PRN
Status: CANCELLED | OUTPATIENT
Start: 2023-12-27

## 2023-12-27 NOTE — PROGRESS NOTES
Infusion Nursing Note:  Ziggy Hallman presents today for Possible PRBC's.    Patient seen by provider today: No   present during visit today: Not Applicable.    Note: N/A.      Intravenous Access:  Implanted Port.    Treatment Conditions:  Lab Results   Component Value Date    HGB 9.4 (L) 12/27/2023    WBC 6.0 12/27/2023    ANEU 2.6 11/21/2023    ANEUTAUTO 2.7 12/27/2023     12/27/2023        Results reviewed, labs did NOT meet treatment parameters: No blood transfusion needed.      Post Infusion Assessment:  Site patent and intact, free from redness, edema or discomfort.  No evidence of extravasations.  Access discontinued per protocol.       Discharge Plan:   Discharge instructions reviewed with: Patient.  Patient and/or family verbalized understanding of discharge instructions and all questions answered.  Patient discharged in stable condition accompanied by: self.  Departure Mode: Ambulatory.      Phyllis Carreno RN

## 2023-12-27 NOTE — PROGRESS NOTES
Right chest port easily accessed  Flushed with NaCl  Henson blood return  Labs drawn per protocol  Dressed with gauze dressing and saline locked awaiting lab results

## 2023-12-29 ENCOUNTER — PROCEDURE ONLY VISIT (OUTPATIENT)
Dept: ONCOLOGY | Facility: HOSPITAL | Age: 48
End: 2023-12-29
Attending: INTERNAL MEDICINE
Payer: COMMERCIAL

## 2023-12-29 ENCOUNTER — LAB (OUTPATIENT)
Dept: INFUSION THERAPY | Facility: HOSPITAL | Age: 48
End: 2023-12-29
Attending: INTERNAL MEDICINE
Payer: COMMERCIAL

## 2023-12-29 VITALS
SYSTOLIC BLOOD PRESSURE: 172 MMHG | TEMPERATURE: 97.6 F | HEART RATE: 90 BPM | RESPIRATION RATE: 18 BRPM | OXYGEN SATURATION: 98 % | DIASTOLIC BLOOD PRESSURE: 91 MMHG

## 2023-12-29 DIAGNOSIS — D46.9 MDS (MYELODYSPLASTIC SYNDROME) (H): Primary | ICD-10-CM

## 2023-12-29 LAB
BASOPHILS # BLD AUTO: 0 10E3/UL (ref 0–0.2)
BASOPHILS NFR BLD AUTO: 1 %
EOSINOPHIL # BLD AUTO: 0.4 10E3/UL (ref 0–0.7)
EOSINOPHIL NFR BLD AUTO: 7 %
ERYTHROCYTE [DISTWIDTH] IN BLOOD BY AUTOMATED COUNT: 15.3 % (ref 10–15)
HCT VFR BLD AUTO: 29.1 % (ref 40–53)
HGB BLD-MCNC: 9.5 G/DL (ref 13.3–17.7)
IMM GRANULOCYTES # BLD: 0 10E3/UL
IMM GRANULOCYTES NFR BLD: 1 %
LYMPHOCYTES # BLD AUTO: 1.7 10E3/UL (ref 0.8–5.3)
LYMPHOCYTES NFR BLD AUTO: 32 %
MCH RBC QN AUTO: 33.2 PG (ref 26.5–33)
MCHC RBC AUTO-ENTMCNC: 32.6 G/DL (ref 31.5–36.5)
MCV RBC AUTO: 102 FL (ref 78–100)
MONOCYTES # BLD AUTO: 0.3 10E3/UL (ref 0–1.3)
MONOCYTES NFR BLD AUTO: 6 %
NEUTROPHILS # BLD AUTO: 2.9 10E3/UL (ref 1.6–8.3)
NEUTROPHILS NFR BLD AUTO: 53 %
NRBC # BLD AUTO: 0 10E3/UL
NRBC BLD AUTO-RTO: 0 /100
PLATELET # BLD AUTO: 276 10E3/UL (ref 150–450)
RBC # BLD AUTO: 2.86 10E6/UL (ref 4.4–5.9)
WBC # BLD AUTO: 5.4 10E3/UL (ref 4–11)

## 2023-12-29 PROCEDURE — 88364 INSITU HYBRIDIZATION (FISH): CPT | Mod: 26 | Performed by: PATHOLOGY

## 2023-12-29 PROCEDURE — 88342 IMHCHEM/IMCYTCHM 1ST ANTB: CPT | Mod: 26 | Performed by: PATHOLOGY

## 2023-12-29 PROCEDURE — 88305 TISSUE EXAM BY PATHOLOGIST: CPT | Mod: 26 | Performed by: PATHOLOGY

## 2023-12-29 PROCEDURE — 38222 DX BONE MARROW BX & ASPIR: CPT

## 2023-12-29 PROCEDURE — 88311 DECALCIFY TISSUE: CPT | Mod: TC

## 2023-12-29 PROCEDURE — 88291 CYTO/MOLECULAR REPORT: CPT | Mod: 91 | Performed by: MEDICAL GENETICS

## 2023-12-29 PROCEDURE — 85097 BONE MARROW INTERPRETATION: CPT | Performed by: PATHOLOGY

## 2023-12-29 PROCEDURE — 88275 CYTOGENETICS 100-300: CPT

## 2023-12-29 PROCEDURE — 88313 SPECIAL STAINS GROUP 2: CPT | Mod: 26 | Performed by: PATHOLOGY

## 2023-12-29 PROCEDURE — 88305 TISSUE EXAM BY PATHOLOGIST: CPT | Mod: TC

## 2023-12-29 PROCEDURE — 88237 TISSUE CULTURE BONE MARROW: CPT

## 2023-12-29 PROCEDURE — 88184 FLOWCYTOMETRY/ TC 1 MARKER: CPT

## 2023-12-29 PROCEDURE — 88291 CYTO/MOLECULAR REPORT: CPT | Performed by: MEDICAL GENETICS

## 2023-12-29 PROCEDURE — 88312 SPECIAL STAINS GROUP 1: CPT | Mod: 26 | Performed by: PATHOLOGY

## 2023-12-29 PROCEDURE — 36591 DRAW BLOOD OFF VENOUS DEVICE: CPT

## 2023-12-29 PROCEDURE — 88311 DECALCIFY TISSUE: CPT | Mod: 26 | Performed by: PATHOLOGY

## 2023-12-29 PROCEDURE — 85025 COMPLETE CBC W/AUTO DIFF WBC: CPT

## 2023-12-29 PROCEDURE — 88184 FLOWCYTOMETRY/ TC 1 MARKER: CPT | Performed by: PATHOLOGY

## 2023-12-29 PROCEDURE — 38222 DX BONE MARROW BX & ASPIR: CPT | Performed by: INTERNAL MEDICINE

## 2023-12-29 PROCEDURE — 88365 INSITU HYBRIDIZATION (FISH): CPT | Mod: 26 | Performed by: PATHOLOGY

## 2023-12-29 PROCEDURE — 250N000011 HC RX IP 250 OP 636: Performed by: NURSE PRACTITIONER

## 2023-12-29 PROCEDURE — 88185 FLOWCYTOMETRY/TC ADD-ON: CPT

## 2023-12-29 PROCEDURE — 88189 FLOWCYTOMETRY/READ 16 & >: CPT | Mod: GC | Performed by: PATHOLOGY

## 2023-12-29 RX ORDER — HEPARIN SODIUM (PORCINE) LOCK FLUSH IV SOLN 100 UNIT/ML 100 UNIT/ML
5 SOLUTION INTRAVENOUS
OUTPATIENT
Start: 2023-12-29

## 2023-12-29 RX ORDER — HEPARIN SODIUM,PORCINE 10 UNIT/ML
5-20 VIAL (ML) INTRAVENOUS DAILY PRN
OUTPATIENT
Start: 2023-12-29

## 2023-12-29 RX ORDER — HEPARIN SODIUM (PORCINE) LOCK FLUSH IV SOLN 100 UNIT/ML 100 UNIT/ML
5 SOLUTION INTRAVENOUS
Status: DISCONTINUED | OUTPATIENT
Start: 2023-12-29 | End: 2023-12-29 | Stop reason: HOSPADM

## 2023-12-29 RX ADMIN — Medication 5 ML: at 10:59

## 2023-12-29 ASSESSMENT — PAIN SCALES - GENERAL
PAINLEVEL: NO PAIN (0)
PAINLEVEL: NO PAIN (0)

## 2023-12-29 NOTE — PROGRESS NOTES
Adult Bone Marrow Biopsy Procedure Note  December 29, 2023  BP (!) 159/91 (BP Location: Left arm, Patient Position: Sitting)   Pulse 94   Temp 98.3  F (36.8  C) (Oral)   Resp 16   SpO2 97%        DIAGNOSIS: MDS    PROCEDURE: Unilateral Bone Marrow Biopsy and Unilateral Aspirate    LOCATION: [facility]    Patient s identification was positively verified by verbal identification and invasive procedure safety checklist was completed. Informed consent was obtained. Patient was placed in the p6rone position and prepped and draped in a sterile manner. Approximately 6 cc of 1% Lidocaine was used over the left posterior iliac spine. Following this a 3 mm incision was made. Trephine bone marrow core(s) was (were) obtained from the [facility]. Bone marrow aspirates were obtained from the [facility]. Aspirates were sent for morphology, immunophenotyping, and cytogenetics. A total of approximately 15 ml of marrow was aspirated. Following this procedure a sterile dressing was applied to the bone marrow biopsy site(s). The patient was placed in the supine position to maintain pressure on the biopsy site. Post-procedure wound care instructions were given.     Complications: NO    Interventions: NO    Length of procedure:21 minutes to 45 minutes    Procedure performed by: Lynda Mcbride MD

## 2023-12-29 NOTE — PROGRESS NOTES
[facility]: Cancer Care                                                                                          Patient here for bone marrow biopsy.  He was given written and verbal information on biopsy.  Patient verbalized understanding and consent was signed. Procedure was tolerated, vital signs stable. Biopsy site is clean, dry and intact. Patient was escorted out to lobby.     Signature:  Sunita Thompson RN Care Coordinator  [facility]  Cancer [facility]

## 2024-01-03 ENCOUNTER — MEDICAL CORRESPONDENCE (OUTPATIENT)
Dept: TRANSPLANT | Facility: CLINIC | Age: 49
End: 2024-01-03
Payer: COMMERCIAL

## 2024-01-03 DIAGNOSIS — Z11.59 SCREENING FOR VIRAL DISEASE: ICD-10-CM

## 2024-01-03 DIAGNOSIS — Z86.2 PERSONAL HISTORY OF DISEASES OF BLOOD AND BLOOD-FORMING ORGANS: ICD-10-CM

## 2024-01-03 DIAGNOSIS — D46.9 MDS (MYELODYSPLASTIC SYNDROME) (H): ICD-10-CM

## 2024-01-03 DIAGNOSIS — Z01.818 PREOP EXAMINATION: Primary | ICD-10-CM

## 2024-01-04 ENCOUNTER — TELEPHONE (OUTPATIENT)
Dept: TRANSPLANT | Facility: CLINIC | Age: 49
End: 2024-01-04
Payer: COMMERCIAL

## 2024-01-05 LAB — INTERPRETATION: NORMAL

## 2024-01-09 LAB
ABO/RH(D): NORMAL
ANTIBODY SCREEN: NEGATIVE
SPECIMEN EXPIRATION DATE: NORMAL

## 2024-01-10 ENCOUNTER — LAB (OUTPATIENT)
Dept: LAB | Facility: CLINIC | Age: 49
End: 2024-01-10
Attending: INTERNAL MEDICINE
Payer: COMMERCIAL

## 2024-01-10 ENCOUNTER — ONCOLOGY VISIT (OUTPATIENT)
Dept: TRANSPLANT | Facility: CLINIC | Age: 49
End: 2024-01-10
Attending: INTERNAL MEDICINE
Payer: COMMERCIAL

## 2024-01-10 VITALS
HEART RATE: 101 BPM | WEIGHT: 196.8 LBS | OXYGEN SATURATION: 98 % | TEMPERATURE: 97.6 F | RESPIRATION RATE: 16 BRPM | DIASTOLIC BLOOD PRESSURE: 90 MMHG | SYSTOLIC BLOOD PRESSURE: 134 MMHG | BODY MASS INDEX: 29.06 KG/M2

## 2024-01-10 DIAGNOSIS — D46.9 MDS (MYELODYSPLASTIC SYNDROME) (H): Primary | ICD-10-CM

## 2024-01-10 DIAGNOSIS — D46.9 MDS (MYELODYSPLASTIC SYNDROME) (H): ICD-10-CM

## 2024-01-10 DIAGNOSIS — C90.30 NEOPLASM OF UNCERTAIN BEHAVIOR OF PLASMA CELLS (H): ICD-10-CM

## 2024-01-10 DIAGNOSIS — Z01.818 PREOP EXAMINATION: Primary | ICD-10-CM

## 2024-01-10 DIAGNOSIS — Z11.59 SCREENING FOR VIRAL DISEASE: ICD-10-CM

## 2024-01-10 DIAGNOSIS — Z86.2 PERSONAL HISTORY OF DISEASES OF BLOOD AND BLOOD-FORMING ORGANS: ICD-10-CM

## 2024-01-10 DIAGNOSIS — E78.5 HYPERLIPIDEMIA LDL GOAL <100: Primary | ICD-10-CM

## 2024-01-10 LAB
ALBUMIN SERPL BCG-MCNC: 2.5 G/DL (ref 3.5–5.2)
ALP SERPL-CCNC: 102 U/L (ref 40–150)
ALT SERPL W P-5'-P-CCNC: 14 U/L (ref 0–70)
ANION GAP SERPL CALCULATED.3IONS-SCNC: 7 MMOL/L (ref 7–15)
APTT PPP: 31 SECONDS (ref 22–38)
AST SERPL W P-5'-P-CCNC: 26 U/L (ref 0–45)
BASOPHILS # BLD AUTO: 0.1 10E3/UL (ref 0–0.2)
BASOPHILS NFR BLD AUTO: 1 %
BILIRUB SERPL-MCNC: 0.2 MG/DL
BMT WORKUP IRRADIATED BLOOD REQUIRED: NORMAL
BUN SERPL-MCNC: 16.5 MG/DL (ref 6–20)
CALCIUM SERPL-MCNC: 8.9 MG/DL (ref 8.6–10)
CHLORIDE SERPL-SCNC: 107 MMOL/L (ref 98–107)
CMV IGG SERPL IA-ACNC: <0.2 U/ML
CMV IGG SERPL IA-ACNC: NORMAL
CREAT SERPL-MCNC: 0.77 MG/DL (ref 0.67–1.17)
DEPRECATED HCO3 PLAS-SCNC: 24 MMOL/L (ref 22–29)
EBV VCA IGG SER IA-ACNC: 126 U/ML
EBV VCA IGG SER IA-ACNC: POSITIVE
EGFRCR SERPLBLD CKD-EPI 2021: >90 ML/MIN/1.73M2
EOSINOPHIL # BLD AUTO: 0.1 10E3/UL (ref 0–0.7)
EOSINOPHIL NFR BLD AUTO: 2 %
ERYTHROCYTE [DISTWIDTH] IN BLOOD BY AUTOMATED COUNT: 15.2 % (ref 10–15)
FERRITIN SERPL-MCNC: 357 NG/ML (ref 31–409)
GLUCOSE SERPL-MCNC: 99 MG/DL (ref 70–99)
HCT VFR BLD AUTO: 31.5 % (ref 40–53)
HGB BLD-MCNC: 10.8 G/DL (ref 13.3–17.7)
HOLD SPECIMEN: NORMAL
HOLD SPECIMEN: NORMAL
IMM GRANULOCYTES # BLD: 0.1 10E3/UL
IMM GRANULOCYTES NFR BLD: 1 %
INR PPP: 1.07 (ref 0.85–1.15)
LAB DIRECTOR DISCLAIMER: NORMAL
LAB DIRECTOR METHODOLOGY: NORMAL
LAB DIRECTOR RESULTS: NORMAL
LYMPHOCYTES # BLD AUTO: 2.1 10E3/UL (ref 0.8–5.3)
LYMPHOCYTES NFR BLD AUTO: 35 %
MCH RBC QN AUTO: 33.5 PG (ref 26.5–33)
MCHC RBC AUTO-ENTMCNC: 34.3 G/DL (ref 31.5–36.5)
MCV RBC AUTO: 98 FL (ref 78–100)
MONOCYTES # BLD AUTO: 0.1 10E3/UL (ref 0–1.3)
MONOCYTES NFR BLD AUTO: 2 %
NEUTROPHILS # BLD AUTO: 3.5 10E3/UL (ref 1.6–8.3)
NEUTROPHILS NFR BLD AUTO: 59 %
NRBC # BLD AUTO: 0 10E3/UL
NRBC BLD AUTO-RTO: 0 /100
PLATELET # BLD AUTO: 290 10E3/UL (ref 150–450)
POTASSIUM SERPL-SCNC: 3.8 MMOL/L (ref 3.4–5.3)
PROT SERPL-MCNC: 6.5 G/DL (ref 6.4–8.3)
RBC # BLD AUTO: 3.22 10E6/UL (ref 4.4–5.9)
SODIUM SERPL-SCNC: 138 MMOL/L (ref 135–145)
SPECIMEN DESCRIPTION: NORMAL
SPECIMEN EXPIRATION DATE: NORMAL
T PALLIDUM AB SER QL: NONREACTIVE
URATE SERPL-MCNC: 9.8 MG/DL (ref 3.4–7)
VZV IGG SER QL IA: 1828 INDEX
VZV IGG SER QL IA: POSITIVE
WBC # BLD AUTO: 6.1 10E3/UL (ref 4–11)

## 2024-01-10 PROCEDURE — 86695 HERPES SIMPLEX TYPE 1 TEST: CPT

## 2024-01-10 PROCEDURE — 36591 DRAW BLOOD OFF VENOUS DEVICE: CPT

## 2024-01-10 PROCEDURE — 87340 HEPATITIS B SURFACE AG IA: CPT

## 2024-01-10 PROCEDURE — 36415 COLL VENOUS BLD VENIPUNCTURE: CPT

## 2024-01-10 PROCEDURE — 84165 PROTEIN E-PHORESIS SERUM: CPT | Mod: TC | Performed by: PATHOLOGY

## 2024-01-10 PROCEDURE — 86706 HEP B SURFACE ANTIBODY: CPT

## 2024-01-10 PROCEDURE — 85730 THROMBOPLASTIN TIME PARTIAL: CPT

## 2024-01-10 PROCEDURE — 86644 CMV ANTIBODY: CPT

## 2024-01-10 PROCEDURE — 80053 COMPREHEN METABOLIC PANEL: CPT

## 2024-01-10 PROCEDURE — 86753 PROTOZOA ANTIBODY NOS: CPT

## 2024-01-10 PROCEDURE — 82784 ASSAY IGA/IGD/IGG/IGM EACH: CPT

## 2024-01-10 PROCEDURE — 86787 VARICELLA-ZOSTER ANTIBODY: CPT

## 2024-01-10 PROCEDURE — 999N001093 HLA VIRTUAL CROSSMATCH (VXM), LIVING DONOR

## 2024-01-10 PROCEDURE — 81375 HLA II TYPING AG EQUIV LR: CPT

## 2024-01-10 PROCEDURE — 250N000011 HC RX IP 250 OP 636: Performed by: INTERNAL MEDICINE

## 2024-01-10 PROCEDURE — 84550 ASSAY OF BLOOD/URIC ACID: CPT

## 2024-01-10 PROCEDURE — 83020 HEMOGLOBIN ELECTROPHORESIS: CPT

## 2024-01-10 PROCEDURE — 86696 HERPES SIMPLEX TYPE 2 TEST: CPT

## 2024-01-10 PROCEDURE — 81265 STR MARKERS SPECIMEN ANAL: CPT

## 2024-01-10 PROCEDURE — 87516 HEPATITIS B DNA AMP PROBE: CPT | Mod: XU

## 2024-01-10 PROCEDURE — 85610 PROTHROMBIN TIME: CPT

## 2024-01-10 PROCEDURE — 84165 PROTEIN E-PHORESIS SERUM: CPT | Mod: 26 | Performed by: PATHOLOGY

## 2024-01-10 PROCEDURE — 85025 COMPLETE CBC W/AUTO DIFF WBC: CPT

## 2024-01-10 PROCEDURE — 87389 HIV-1 AG W/HIV-1&-2 AB AG IA: CPT

## 2024-01-10 PROCEDURE — 86777 TOXOPLASMA ANTIBODY: CPT

## 2024-01-10 PROCEDURE — 86334 IMMUNOFIX E-PHORESIS SERUM: CPT | Mod: 26 | Performed by: PATHOLOGY

## 2024-01-10 PROCEDURE — 86780 TREPONEMA PALLIDUM: CPT

## 2024-01-10 PROCEDURE — 86803 HEPATITIS C AB TEST: CPT

## 2024-01-10 PROCEDURE — 86665 EPSTEIN-BARR CAPSID VCA: CPT

## 2024-01-10 PROCEDURE — 82728 ASSAY OF FERRITIN: CPT

## 2024-01-10 PROCEDURE — 83521 IG LIGHT CHAINS FREE EACH: CPT

## 2024-01-10 PROCEDURE — 86704 HEP B CORE ANTIBODY TOTAL: CPT

## 2024-01-10 PROCEDURE — 81382 HLA II TYPING 1 LOC HR: CPT

## 2024-01-10 PROCEDURE — G0463 HOSPITAL OUTPT CLINIC VISIT: HCPCS | Performed by: INTERNAL MEDICINE

## 2024-01-10 PROCEDURE — 86828 HLA CLASS I&II ANTIBODY QUAL: CPT

## 2024-01-10 PROCEDURE — 86334 IMMUNOFIX E-PHORESIS SERUM: CPT | Performed by: PATHOLOGY

## 2024-01-10 PROCEDURE — 86790 VIRUS ANTIBODY NOS: CPT

## 2024-01-10 PROCEDURE — 86900 BLOOD TYPING SEROLOGIC ABO: CPT

## 2024-01-10 PROCEDURE — 99215 OFFICE O/P EST HI 40 MIN: CPT | Performed by: INTERNAL MEDICINE

## 2024-01-10 PROCEDURE — 81372 HLA I TYPING COMPLETE LR: CPT

## 2024-01-10 PROCEDURE — 84155 ASSAY OF PROTEIN SERUM: CPT | Mod: 91

## 2024-01-10 RX ORDER — HEPARIN SODIUM (PORCINE) LOCK FLUSH IV SOLN 100 UNIT/ML 100 UNIT/ML
5 SOLUTION INTRAVENOUS ONCE
Status: COMPLETED | OUTPATIENT
Start: 2024-01-10 | End: 2024-01-10

## 2024-01-10 RX ADMIN — Medication 5 ML: at 14:32

## 2024-01-10 ASSESSMENT — PAIN SCALES - GENERAL: PAINLEVEL: NO PAIN (0)

## 2024-01-10 NOTE — NURSING NOTE
Chief Complaint   Patient presents with    Oncology Clinic Visit     MDS (myelodysplastic syndrome)    Port Draw     Labs drawn via port by RN     Port accessed with gripper needle by RN, labs collected, line flushed with saline and heparin.  De-accessed.  Pt unable to void at this time so UA deferred.  No further appointment(s).

## 2024-01-10 NOTE — LETTER
1/10/2024         RE: Ziggy Hallman  [address] [facility] [address]        Dear Colleague,    Thank you for referring your patient, Ziggy Hallman, to the [facility] BLOOD AND MARROW TRANSPLANT PROGRAM [place]. Please see a copy of my visit note below.    Date: Juanito 10, 2024    CC: MDS    HPI:    Was diagnosed with high risk MDS and along with many clots. 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 He comes in to discuss.    He feels tired but is still independent. He has stopped working since he started his cancer therapy.     ROS: A 14-point ROS has been performed and is negative unless otherwise specified in the HPI.    Brief Onc History:  Bone marrow biopsy on 10/07/2015 due top mild anemia in the context of his hypercoaguable state, and this was read as trilineage hematopoiesis with dysgranulopoiesis and dysmegakaryopoiesis with less than 1% blasts consistent with RCMD 40% to 50% cellular.  Cytogenetics were complex with a karyotype of 45,XY, a derivative of 5 and 17, addition of 9p13, trisomy 11, deletion 111, a minus 17 in 18 cells, and 46,XY in 3.   IPSS was 4 points for cytogenetics, 0 points for blast percentage, 0 points for hemoglobin, 0 points for platelets and 0 points for ANC, giving him a total score of 4, putting him in the intermediate risk category. Consult in BMT clinic 11/2015 for MDS. Siblings typed and 2 sibs (sisters) are full matches.  Worsening anemia in 4/2023, with hemoglobin down to 9 range. Repeat bone marrow biopsy was normocellular at 40-50%. Trilineage hematopoiesis is left shifted and dysplastic.  Reticulin stain demonstrates at least 3+ reticulin fibrosis.  CD34/ positive blasts are increased at 5-10%.  CD56 expression approximately 5%.  No increase in CD20 positive B cells and CD3 positive T cells.   positive  plasma cells are somewhat increased at 10-15%; kappa and lambda immunohistochemistry are negative for clonality. NGS negative, FISH with Loss of 5p15.2 (37.625%) and negative for TP53 loss. Cytogenetics showed 45,XY, -5, add(9)(p13), -17, add(17)(p13), +mar[20].  Renal infarct in November 2011  Left Popliteal embolic episode in December 2011 and was treated with surgery, aspirin and Coumadin.   Embolic episode with his right carotid artery and had some TIA/stroke-like symptoms: December 2012  Right renal infarct in October 2013 and again in ~2015 He was found to have some renal artery stenosis approximately 50% to 60% on a full body angiogram and was seen by Vascular Surgery in October 2015, and they did not feel that invasive interventions were needed and felt like it could cause more harm than good.    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 Changed to Rivaroxaban and continue Prasugrel.  PFO closed 4/2020    Received 4 cycles of azacitidine and had repeat marrow on 12/29/23.    PMH:  Patient Active Problem List    Diagnosis Date Noted    Antineoplastic chemotherapy induced pancytopenia  (H24) 09/26/2023     Priority: Medium    MDS (myelodysplastic syndrome) (H) 11/20/2015     Priority: Medium    Hyperlipidemia LDL goal <100 08/18/2015     Priority: Medium    Low back pain 08/18/2015     Priority: Medium     Diagnosis updated by automated process. Provider to review and confirm.      Hypercoagulable state (H24)      Priority: Medium     Uncertain etiology--seeing Hematologist      CVA (cerebrovascular accident) (H)      Priority: Medium     Reporting thromboembolic episode on the right neck       CAD S/P percutaneous coronary angioplasty      Priority: Medium        PSH:  Past Surgical History:   Procedure Laterality Date    AMPUTATE TOE(S)       ARTHROSCOPY KNEE RT/LT      Right     ARTHROSCOPY KNEE RT/LT      Left    CARDIAC CATHERIZATION      With stent placement    IR CHEST PORT PLACEMENT > 5 YRS OF AGE  9/13/2023       SH:  Social History     Tobacco Use    Smoking status: Former     Packs/day: .5     Types: Cigarettes     Passive exposure: Past (Mom smoked cigs indoors)    Smokeless tobacco: Never    Tobacco comments:     cigarette once in a while   Substance Use Topics    Alcohol use: Yes     Alcohol/week: 0.0 standard drinks of alcohol     Comment: 4 times per week 5 drinks    Drug use: No      FH:  Family History   Problem Relation Age of Onset    Diabetes No family hx of     Coronary Artery Disease No family hx of     Hypertension No family hx of     Hyperlipidemia No family hx of     Breast Cancer No family hx of         ALL:   No Known Allergies    Medications:  Current Outpatient Medications   Medication    amLODIPine (NORVASC) 5 MG tablet    apixaban ANTICOAGULANT (ELIQUIS) 5 MG tablet    atorvastatin (LIPITOR) 40 MG tablet    cyclobenzaprine (FLEXERIL) 10 MG tablet    hydrOXYzine HCl (ATARAX) 25 MG tablet    ondansetron (ZOFRAN) 8 MG tablet    prasugrel (EFFIENT) 10 MG TABS tablet    prochlorperazine (COMPAZINE) 10 MG tablet    Zolpidem Tartrate (AMBIEN PO)     No current facility-administered medications for this visit.     Physical Exam:  Vitals: BP (!) 134/90 (BP Location: Right arm, Patient Position: Sitting, Cuff Size: Adult Large)   Pulse 101   Temp 97.6  F (36.4  C) (Oral)   Resp 16   Wt 89.3 kg (196 lb 12.8 oz)   SpO2 98%   BMI 29.06 kg/m    GA: NAD, appears as stated age  Skin: No acute rashes, no lesions  Psyc: appropriate, reactive    KPS: 80    Labs, pathology and other diagnostics reviewed    A/P    MDS with high risk features  Plasma cell neoplasm  Today I discussed the above diagnosis with Ziggy Hallman. We discussed the natural history of the disease and treatment to date. We reviewed all the available diagnostic  information. We talked about general indications of transplant that include patient, disease and donor factors.     We also reviewed again the role of allogeneic stem cell transplantation in this disease. I described the process of work up of a potential recipient to confirm good organ function and reserve, reassess disease and decide on risks/benefit. We also discussed in great detail the process of the actual transplant itself, with discussion of different donor stem cell options. We discussed the role of the preparative regimen to clear any residual disease and to ablate the bone marrow, followed by infusion of the HSC graft. We discussed the expected toxicities and side effects, including organ toxicity, expected pancytopenia and need for transfusion support, risk of infection or bleeding, possibility of delayed or non-engraftment, and the risk of treatment related mortality (10-20%). We also discussed the risk of acute or chronic GVHD (overall ~50%, 10-20% for severe GVH) and the need for immunosuppressionn within the first 100 days and perhaps beyond, depending on GVHD status then. I also mentioned the possibility of relapse which I would estimate at 30-40%. We discussed supportive care, needing a line with associated complications, and the critical need for a caregiver and proximity to [facility].    All of their questions were answered to their satisfaction.  We will proceed with work up. We will review his bone marrow biopsy here given the extensive qualifications in the comment about disease status and possibly a new plasma cell neoplasm. We will review all these results with him.    Taran Bacon MD  Hematology and Bone Marrow Transplant    40 minutes spent on the date of the encounter doing chart review, interpretation of results, patient visit, documentation and coordination of care.

## 2024-01-10 NOTE — NURSING NOTE
"Oncology Rooming Note    January 10, 2024 12:19 PM   Ziggy Hallman is a 48 year old male who presents for:    Chief Complaint   Patient presents with    Oncology Clinic Visit     MDS (myelodysplastic syndrome)     Initial Vitals: BP (!) 134/90 (BP Location: Right arm, Patient Position: Sitting, Cuff Size: Adult Large)   Pulse 101   Temp 97.6  F (36.4  C) (Oral)   Resp 16   Wt 89.3 kg (196 lb 12.8 oz)   SpO2 98%   BMI 29.06 kg/m   Estimated body mass index is 29.06 kg/m  as calculated from the following:    Height as of 12/11/23: 1.753 m (5' 9\").    Weight as of this encounter: 89.3 kg (196 lb 12.8 oz). Body surface area is 2.09 meters squared.  No Pain (0) Comment: Data Unavailable   No LMP for male patient.  Allergies reviewed: Yes  Medications reviewed: Yes    Medications: Medication refills not needed today.  Pharmacy name entered into [facility]: [id number] - [place], [place] - [address] [place] [address]    Frailty Screening:   Is the patient here for a new oncology consult visit in cancer care? 2. No      Clinical concerns: none       Elissa Myles              "

## 2024-01-11 ENCOUNTER — VIRTUAL VISIT (OUTPATIENT)
Dept: TRANSPLANT | Facility: CLINIC | Age: 49
End: 2024-01-11
Attending: INTERNAL MEDICINE
Payer: COMMERCIAL

## 2024-01-11 DIAGNOSIS — Z86.2 PERSONAL HISTORY OF DISEASES OF BLOOD AND BLOOD-FORMING ORGANS: ICD-10-CM

## 2024-01-11 DIAGNOSIS — Z01.818 PREOP EXAMINATION: Primary | ICD-10-CM

## 2024-01-11 DIAGNOSIS — D46.9 MDS (MYELODYSPLASTIC SYNDROME) (H): ICD-10-CM

## 2024-01-11 DIAGNOSIS — D46.9 MDS (MYELODYSPLASTIC SYNDROME) (H): Primary | ICD-10-CM

## 2024-01-11 DIAGNOSIS — Z11.59 SCREENING FOR VIRAL DISEASE: ICD-10-CM

## 2024-01-11 LAB
ABSSPTEST METHOD: NORMAL
CULTURE HARVEST COMPLETE DATE: NORMAL
DRSSPDPA1*: NORMAL
DRSSPDPA1*LOCUS: NORMAL
DRSSPDPB1*2 NMDP: NORMAL
DRSSPDPB1*2: NORMAL
DRSSPDPB1*LOCUS: NORMAL
DRSSPDPB1*LOCUSNMDP: NORMAL
DRSSPTEST METHOD: NORMAL
FLOWPRA1 CELL: NORMAL
FLOWPRA1 RESULT: NORMAL
FLOWPRA1 TEST METHOD: NORMAL
FLOWPRA2 CELL: NORMAL
FLOWPRA2 RESULT: NORMAL
FLOWPRA2 TEST METHOD: NORMAL
HBV CORE AB SERPL QL IA: NONREACTIVE
HBV SURFACE AB SERPL IA-ACNC: <3.5 M[IU]/ML
HBV SURFACE AB SERPL IA-ACNC: NONREACTIVE M[IU]/ML
HBV SURFACE AG SERPL QL IA: NONREACTIVE
HCV AB SERPL QL IA: NONREACTIVE
HIV 1+2 AB+HIV1 P24 AG SERPL QL IA: NONREACTIVE
HSV1 IGG SERPL QL IA: 0.21 INDEX
HSV1 IGG SERPL QL IA: ABNORMAL
HSV2 IGG SERPL QL IA: 0.98 INDEX
HSV2 IGG SERPL QL IA: ABNORMAL
IGA SERPL-MCNC: 655 MG/DL (ref 84–499)
IGG SERPL-MCNC: 1209 MG/DL (ref 610–1616)
IGM SERPL-MCNC: 111 MG/DL (ref 35–242)
KAPPA LC FREE SER-MCNC: 12.61 MG/DL (ref 0.33–1.94)
KAPPA LC FREE/LAMBDA FREE SER NEPH: 1.76 {RATIO} (ref 0.26–1.65)
LAMBDA LC FREE SERPL-MCNC: 7.17 MG/DL (ref 0.57–2.63)
SSPA* LOCUS: NORMAL
SSPA*: NORMAL
SSPB* LOCUS: NORMAL
SSPB*: NORMAL
SSPBW-1: NORMAL
SSPC* LOCUS: NORMAL
SSPDQA1*: NORMAL
SSPDQA1*LOCUS: NORMAL
SSPDQB1*: NORMAL
SSPDQB1*LOCUS: NORMAL
SSPDRB1* LOCUS: NORMAL
SSPDRB1*: NORMAL
SSPDRB4* LOCUS: NORMAL
SSPDRB5*: NORMAL
SSPTEST METHOD: NORMAL
TOTAL PROTEIN SERUM FOR ELP: 5.7 G/DL (ref 6.4–8.3)
ZZZABSSP COMMENTS: NORMAL
ZZZDRSSP COMMENTS: NORMAL
ZZZFLOWPRA1 COMMENTS: NORMAL
ZZZFLOWPRA2 COMMENTS: NORMAL
ZZZSSP COMMENTS: NORMAL

## 2024-01-11 PROCEDURE — 97802 MEDICAL NUTRITION INDIV IN: CPT | Mod: TEL,95

## 2024-01-11 NOTE — PROGRESS NOTES
"CLINICAL NUTRITION SERVICES    Ziggy is a 48 year old who is being evaluated via a billable telephone visit.      REASON FOR ASSESSMENT  Ziggy Hallman is a/an 48 year old male assessed by the dietitian for a nutrition consult for education prior to a bone marrow transplant.   Referring Provider: Taran Bacon MD.    CLINICAL HISTORY   MDS     NUTRITION HISTORY  Information obtained from Ziggy. He reported prior to his diagnosis, he usually only ate once per day but now has lately been consuming two meals- a later breakfast/lunch and dinner.   Breakfast: soup or eggs- no side options   Dinner: Variety of foods- often protein and vegetable. Example: fish, chicken. Ziggy reported really likes chinese food (mostly vegetables and meat dishes). He has been limiting his carbohydrates and watches what he eats.   No food allergies or other avoidances.   No chewing/swallowing issues.   No constipation or diarrhea.       LABS   Reviewed (1/10)     MEDICATIONS   Compazine- not using currently   Zofran- not using currently     ANTHROPOMETRICS  Height: 175.3 cm (5' 9\")  IBW: 72.7 kg  BMI: Overweight BMI 25-29.9  Weight History:   Usual body weight around 175 lbs- he previously held a very active job. When he changed jobs, activity decreased and reported he hasn't been as careful with what he's eating.   Wt Readings from Last 25 Encounters:   01/10/24 89.3 kg (196 lb 12.8 oz)   12/11/23 88.9 kg (196 lb)   11/20/23 88.8 kg (195 lb 12.8 oz)   11/13/23 88.5 kg (195 lb)   10/16/23 86.4 kg (190 lb 6.4 oz)   09/25/23 83.9 kg (185 lb)   09/13/23 81.6 kg (180 lb)   08/18/23 85 kg (187 lb 4.8 oz)   08/01/23 82.3 kg (181 lb 8 oz)   07/07/23 84.8 kg (187 lb)   05/03/23 83.6 kg (184 lb 3.2 oz)   04/24/23 85.7 kg (189 lb)   05/28/20 80.9 kg (178 lb 6.4 oz)   05/28/20 80.9 kg (178 lb 6.4 oz)   07/11/18 78.2 kg (172 lb 8 oz)   05/09/18 78.6 kg (173 lb 4.5 oz)   06/29/17 74.7 kg (164 lb 9.6 oz)   05/19/17 73.2 kg (161 lb 6.4 oz)   03/29/17 " 73.5 kg (162 lb)   02/09/17 69.3 kg (152 lb 11.2 oz)   11/20/15 81.8 kg (180 lb 5.4 oz)   09/01/15 82.1 kg (180 lb 14.4 oz)   08/18/15 80.3 kg (177 lb)     Dosing Weight: 89.3 kg    ASSESSED NUTRITION NEEDS  Estimated Energy Needs: 3784-3966 kcals/day (25 - 30 kcals/kg)  Justification: Maintenance  Estimated Protein Needs: 110-135 grams protein/day (1.2 - 1.5 grams of pro/kg)  Justification: Increased needs  Estimated Fluid Needs: 0339-7983 mL/day (1 mL/kcal)   Justification: Maintenance    PHYSICAL FINDINGS  See malnutrition section below.    MALNUTRITION  Malnutrition Diagnosis: Unable to complete all parameters with phone interview at this time    NUTRITION DIAGNOSIS  Food- and nutrition-related knowledge deficit related to no previous education on nutrition changes associated with a bone marrow transplant as evidenced by MD consult and pt verbalization      INTERVENTIONS  Implementation  Nutrition Education:   Provided education on how to cope with the side effects of potential upcoming bone marrow transplant. Encouraged optimize current healthy state to maintain or regain lost weight prior to transplant. Discussed how to fortify meals and snacks with more calories and protein. Reviewed oral nutrition supplements and snack options available for consumption during inpatient stay. Reviewed guidelines for food safety during neutropenia phase and while taking immunosuppression medications. Answered all pt's current questions about nutrition.     Goals  1. Maintain weight >185 Ibs throughout SCT course.   2. Verbalized 2-3 ways to maintain nutrition status throughout SCT course.     Monitoring/Evaluation  Progress toward goals will be monitored and evaluated per protocol.    Patient Understanding: Good   Expected Compliance: Good   Follow Up: PRN-pt provided with RD contact information if needs arise.     Phone Duration: 28 minutes    Liliane Odom RD, [place]  5C/BMT pager: [phone]

## 2024-01-11 NOTE — PROGRESS NOTES
Date: Juanito 10, 2024    CC: MDS    HPI:    Was diagnosed with high risk MDS and along with many clots. 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 He comes in to discuss.    He feels tired but is still independent. He has stopped working since he started his cancer therapy.     ROS: A 14-point ROS has been performed and is negative unless otherwise specified in the HPI.    Brief Onc History:  Bone marrow biopsy on 10/07/2015 due top mild anemia in the context of his hypercoaguable state, and this was read as trilineage hematopoiesis with dysgranulopoiesis and dysmegakaryopoiesis with less than 1% blasts consistent with RCMD 40% to 50% cellular.  Cytogenetics were complex with a karyotype of 45,XY, a derivative of 5 and 17, addition of 9p13, trisomy 11, deletion 111, a minus 17 in 18 cells, and 46,XY in 3.   IPSS was 4 points for cytogenetics, 0 points for blast percentage, 0 points for hemoglobin, 0 points for platelets and 0 points for ANC, giving him a total score of 4, putting him in the intermediate risk category. Consult in BMT clinic 11/2015 for MDS. Siblings typed and 2 sibs (sisters) are full matches.  Worsening anemia in 4/2023, with hemoglobin down to 9 range. Repeat bone marrow biopsy was normocellular at 40-50%. Trilineage hematopoiesis is left shifted and dysplastic.  Reticulin stain demonstrates at least 3+ reticulin fibrosis.  CD34/ positive blasts are increased at 5-10%.  CD56 expression approximately 5%.  No increase in CD20 positive B cells and CD3 positive T cells.   positive plasma cells are somewhat increased at 10-15%; kappa and lambda immunohistochemistry are negative for clonality. NGS negative, FISH with Loss of 5p15.2 (37.625%) and negative for TP53 loss. Cytogenetics showed 45,XY, -5, add(9)(p13), -17, add(17)(p13), +mar[20].  Renal infarct in  November 2011  Left Popliteal embolic episode in December 2011 and was treated with surgery, aspirin and Coumadin.   Embolic episode with his right carotid artery and had some TIA/stroke-like symptoms: December 2012  Right renal infarct in October 2013 and again in ~2015 He was found to have some renal artery stenosis approximately 50% to 60% on a full body angiogram and was seen by Vascular Surgery in October 2015, and they did not feel that invasive interventions were needed and felt like it could cause more harm than good.    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 Changed to Rivaroxaban and continue Prasugrel.  PFO closed 4/2020    Received 4 cycles of azacitidine and had repeat marrow on 12/29/23.    PMH:  Patient Active Problem List    Diagnosis Date Noted    Antineoplastic chemotherapy induced pancytopenia  (H24) 09/26/2023     Priority: Medium    MDS (myelodysplastic syndrome) (H) 11/20/2015     Priority: Medium    Hyperlipidemia LDL goal <100 08/18/2015     Priority: Medium    Low back pain 08/18/2015     Priority: Medium     Diagnosis updated by automated process. Provider to review and confirm.      Hypercoagulable state (H24)      Priority: Medium     Uncertain etiology--seeing Hematologist      CVA (cerebrovascular accident) (H)      Priority: Medium     Reporting thromboembolic episode on the right neck       CAD S/P percutaneous coronary angioplasty      Priority: Medium        PSH:  Past Surgical History:   Procedure Laterality Date    AMPUTATE TOE(S)      ARTHROSCOPY KNEE RT/LT      Right     ARTHROSCOPY KNEE RT/LT      Left    CARDIAC CATHERIZATION      With stent placement    IR CHEST PORT PLACEMENT > 5 YRS OF AGE  9/13/2023       SH:  Social History     Tobacco Use    Smoking status: Former     Packs/day: .5     Types:  Cigarettes     Passive exposure: Past (Mom smoked cigs indoors)    Smokeless tobacco: Never    Tobacco comments:     cigarette once in a while   Substance Use Topics    Alcohol use: Yes     Alcohol/week: 0.0 standard drinks of alcohol     Comment: 4 times per week 5 drinks    Drug use: No      FH:  Family History   Problem Relation Age of Onset    Diabetes No family hx of     Coronary Artery Disease No family hx of     Hypertension No family hx of     Hyperlipidemia No family hx of     Breast Cancer No family hx of         ALL:   No Known Allergies    Medications:  Current Outpatient Medications   Medication    amLODIPine (NORVASC) 5 MG tablet    apixaban ANTICOAGULANT (ELIQUIS) 5 MG tablet    atorvastatin (LIPITOR) 40 MG tablet    cyclobenzaprine (FLEXERIL) 10 MG tablet    hydrOXYzine HCl (ATARAX) 25 MG tablet    ondansetron (ZOFRAN) 8 MG tablet    prasugrel (EFFIENT) 10 MG TABS tablet    prochlorperazine (COMPAZINE) 10 MG tablet    Zolpidem Tartrate (AMBIEN PO)     No current facility-administered medications for this visit.     Physical Exam:  Vitals: BP (!) 134/90 (BP Location: Right arm, Patient Position: Sitting, Cuff Size: Adult Large)   Pulse 101   Temp 97.6  F (36.4  C) (Oral)   Resp 16   Wt 89.3 kg (196 lb 12.8 oz)   SpO2 98%   BMI 29.06 kg/m    GA: NAD, appears as stated age  Skin: No acute rashes, no lesions  Psyc: appropriate, reactive    KPS: 80    Labs, pathology and other diagnostics reviewed    A/P    MDS with high risk features  Plasma cell neoplasm  Today I discussed the above diagnosis with Ziggy Hallman. We discussed the natural history of the disease and treatment to date. We reviewed all the available diagnostic information. We talked about general indications of transplant that include patient, disease and donor factors.     We also reviewed again the role of allogeneic stem cell transplantation in this disease. I described the process of work up of a potential recipient to confirm  good organ function and reserve, reassess disease and decide on risks/benefit. We also discussed in great detail the process of the actual transplant itself, with discussion of different donor stem cell options. We discussed the role of the preparative regimen to clear any residual disease and to ablate the bone marrow, followed by infusion of the HSC graft. We discussed the expected toxicities and side effects, including organ toxicity, expected pancytopenia and need for transfusion support, risk of infection or bleeding, possibility of delayed or non-engraftment, and the risk of treatment related mortality (10-20%). We also discussed the risk of acute or chronic GVHD (overall ~50%, 10-20% for severe GVH) and the need for immunosuppressionn within the first 100 days and perhaps beyond, depending on GVHD status then. I also mentioned the possibility of relapse which I would estimate at 30-40%. We discussed supportive care, needing a line with associated complications, and the critical need for a caregiver and proximity to [facility].    All of their questions were answered to their satisfaction.  We will proceed with work up. We will review his bone marrow biopsy here given the extensive qualifications in the comment about disease status and possibly a new plasma cell neoplasm. We will review all these results with him.    Taran Bacon MD  Hematology and Bone Marrow Transplant    40 minutes spent on the date of the encounter doing chart review, interpretation of results, patient visit, documentation and coordination of care.

## 2024-01-11 NOTE — LETTER
"    1/11/2024         RE: Ziggy Hallman  [address] [facility] [address]        Dear Colleague,    Thank you for referring your patient, Ziggy Hallman, to the [facility] BLOOD AND MARROW TRANSPLANT PROGRAM [place]. Please see a copy of my visit note below.    CLINICAL NUTRITION SERVICES    Ziggy is a 48 year old who is being evaluated via a billable telephone visit.      REASON FOR ASSESSMENT  Ziggy Hallman is a/an 48 year old male assessed by the dietitian for a nutrition consult for education prior to a bone marrow transplant.   Referring Provider: Taran Bacon MD.    CLINICAL HISTORY   MDS     NUTRITION HISTORY  Information obtained from Ziggy. He reported prior to his diagnosis, he usually only ate once per day but now has lately been consuming two meals- a later breakfast/lunch and dinner.   Breakfast: soup or eggs- no side options   Dinner: Variety of foods- often protein and vegetable. Example: fish, chicken. Ziggy reported really likes chinese food (mostly vegetables and meat dishes). He has been limiting his carbohydrates and watches what he eats.   No food allergies or other avoidances.   No chewing/swallowing issues.   No constipation or diarrhea.       LABS   Reviewed (1/10)     MEDICATIONS   Compazine- not using currently   Zofran- not using currently     ANTHROPOMETRICS  Height: 175.3 cm (5' 9\")  IBW: 72.7 kg  BMI: Overweight BMI 25-29.9  Weight History:   Usual body weight around 175 lbs- he previously held a very active job. When he changed jobs, activity decreased and reported he hasn't been as careful with what he's eating.   Wt Readings from Last 25 Encounters:   01/10/24 89.3 kg (196 lb 12.8 oz)   12/11/23 88.9 kg (196 lb)   11/20/23 88.8 kg (195 lb 12.8 oz)   11/13/23 88.5 kg (195 lb)   10/16/23 86.4 kg (190 lb 6.4 oz)   09/25/23 83.9 kg (185 lb)   09/13/23 81.6 kg (180 lb)   08/18/23 85 kg (187 lb 4.8 oz)   08/01/23 82.3 kg (181 lb 8 oz)   07/07/23 84.8 kg (187 lb) "   05/03/23 83.6 kg (184 lb 3.2 oz)   04/24/23 85.7 kg (189 lb)   05/28/20 80.9 kg (178 lb 6.4 oz)   05/28/20 80.9 kg (178 lb 6.4 oz)   07/11/18 78.2 kg (172 lb 8 oz)   05/09/18 78.6 kg (173 lb 4.5 oz)   06/29/17 74.7 kg (164 lb 9.6 oz)   05/19/17 73.2 kg (161 lb 6.4 oz)   03/29/17 73.5 kg (162 lb)   02/09/17 69.3 kg (152 lb 11.2 oz)   11/20/15 81.8 kg (180 lb 5.4 oz)   09/01/15 82.1 kg (180 lb 14.4 oz)   08/18/15 80.3 kg (177 lb)     Dosing Weight: 89.3 kg    ASSESSED NUTRITION NEEDS  Estimated Energy Needs: 1139-6193 kcals/day (25 - 30 kcals/kg)  Justification: Maintenance  Estimated Protein Needs: 110-135 grams protein/day (1.2 - 1.5 grams of pro/kg)  Justification: Increased needs  Estimated Fluid Needs: 6908-5157 mL/day (1 mL/kcal)   Justification: Maintenance    PHYSICAL FINDINGS  See malnutrition section below.    MALNUTRITION  Malnutrition Diagnosis: Unable to complete all parameters with phone interview at this time    NUTRITION DIAGNOSIS  Food- and nutrition-related knowledge deficit related to no previous education on nutrition changes associated with a bone marrow transplant as evidenced by MD consult and pt verbalization      INTERVENTIONS  Implementation  Nutrition Education:   Provided education on how to cope with the side effects of potential upcoming bone marrow transplant. Encouraged optimize current healthy state to maintain or regain lost weight prior to transplant. Discussed how to fortify meals and snacks with more calories and protein. Reviewed oral nutrition supplements and snack options available for consumption during inpatient stay. Reviewed guidelines for food safety during neutropenia phase and while taking immunosuppression medications. Answered all pt's current questions about nutrition.     Goals  1. Maintain weight >185 Ibs throughout SCT course.   2. Verbalized 2-3 ways to maintain nutrition status throughout SCT course.     Monitoring/Evaluation  Progress toward goals will be  monitored and evaluated per protocol.    Patient Understanding: Good   Expected Compliance: Good   Follow Up: PRN-pt provided with RD contact information if needs arise.     Phone Duration: 28 minutes    Liliane Odom RD, LD  5C/BMT pager: [phone]

## 2024-01-12 ENCOUNTER — OFFICE VISIT (OUTPATIENT)
Dept: TRANSPLANT | Facility: CLINIC | Age: 49
End: 2024-01-12
Attending: INTERNAL MEDICINE
Payer: COMMERCIAL

## 2024-01-12 ENCOUNTER — HOSPITAL ENCOUNTER (OUTPATIENT)
Dept: PET IMAGING | Facility: CLINIC | Age: 49
Discharge: HOME | End: 2024-01-12
Attending: INTERNAL MEDICINE | Admitting: INTERNAL MEDICINE
Payer: COMMERCIAL

## 2024-01-12 ENCOUNTER — APPOINTMENT (OUTPATIENT)
Dept: LAB | Facility: CLINIC | Age: 49
End: 2024-01-12
Attending: INTERNAL MEDICINE
Payer: COMMERCIAL

## 2024-01-12 VITALS
WEIGHT: 196 LBS | HEART RATE: 96 BPM | BODY MASS INDEX: 28.94 KG/M2 | DIASTOLIC BLOOD PRESSURE: 95 MMHG | SYSTOLIC BLOOD PRESSURE: 152 MMHG | TEMPERATURE: 97.8 F | RESPIRATION RATE: 20 BRPM | OXYGEN SATURATION: 98 %

## 2024-01-12 DIAGNOSIS — Z11.59 SCREENING FOR VIRAL DISEASE: ICD-10-CM

## 2024-01-12 DIAGNOSIS — Z86.2 PERSONAL HISTORY OF DISEASES OF BLOOD AND BLOOD-FORMING ORGANS: ICD-10-CM

## 2024-01-12 DIAGNOSIS — Z01.818 PREOP EXAMINATION: ICD-10-CM

## 2024-01-12 DIAGNOSIS — D46.9 MDS (MYELODYSPLASTIC SYNDROME) (H): ICD-10-CM

## 2024-01-12 DIAGNOSIS — Z94.81 S/P ALLOGENEIC BONE MARROW TRANSPLANT (H): Primary | ICD-10-CM

## 2024-01-12 DIAGNOSIS — C90.30 NEOPLASM OF UNCERTAIN BEHAVIOR OF PLASMA CELLS (H): ICD-10-CM

## 2024-01-12 LAB
ATRIAL RATE - MUSE: 81 BPM
BASOPHILS # BLD AUTO: 0.1 10E3/UL (ref 0–0.2)
BASOPHILS NFR BLD AUTO: 1 %
CROSSMATCHDATEVXM: NORMAL
DIASTOLIC BLOOD PRESSURE - MUSE: NORMAL MMHG
DONOR VXM: NORMAL
DSA VXMT2: NORMAL
EOSINOPHIL # BLD AUTO: 0.2 10E3/UL (ref 0–0.7)
EOSINOPHIL NFR BLD AUTO: 3 %
ERYTHROCYTE [DISTWIDTH] IN BLOOD BY AUTOMATED COUNT: 15.1 % (ref 10–15)
HBV DNA SERPL QL NAA+PROBE: NORMAL
HCT VFR BLD AUTO: 31.2 % (ref 40–53)
HCV RNA SERPL QL NAA+PROBE: NORMAL
HGB BLD-MCNC: 10.5 G/DL (ref 13.3–17.7)
HGB S BLD QL: NEGATIVE
HIV1+2 RNA SERPL QL NAA+PROBE: NORMAL
HTLV I+II AB SER QL IA: NEGATIVE
IMM GRANULOCYTES # BLD: 0.1 10E3/UL
IMM GRANULOCYTES NFR BLD: 1 %
INTERPRETATION ECG - MUSE: NORMAL
LYMPHOCYTES # BLD AUTO: 1.9 10E3/UL (ref 0.8–5.3)
LYMPHOCYTES NFR BLD AUTO: 34 %
MCH RBC QN AUTO: 33 PG (ref 26.5–33)
MCHC RBC AUTO-ENTMCNC: 33.7 G/DL (ref 31.5–36.5)
MCV RBC AUTO: 98 FL (ref 78–100)
MONOCYTES # BLD AUTO: 0.1 10E3/UL (ref 0–1.3)
MONOCYTES NFR BLD AUTO: 3 %
NEUTROPHILS # BLD AUTO: 3.2 10E3/UL (ref 1.6–8.3)
NEUTROPHILS NFR BLD AUTO: 58 %
NRBC # BLD AUTO: 0 10E3/UL
NRBC BLD AUTO-RTO: 0 /100
P AXIS - MUSE: 49 DEGREES
PLATELET # BLD AUTO: 270 10E3/UL (ref 150–450)
PR INTERVAL - MUSE: 152 MS
QRS DURATION - MUSE: 92 MS
QT - MUSE: 366 MS
QTC - MUSE: 425 MS
R AXIS - MUSE: 40 DEGREES
RBC # BLD AUTO: 3.18 10E6/UL (ref 4.4–5.9)
RESULT VXM B1: NORMAL
RESULT VXM B2: NORMAL
RESULT VXM T1: NORMAL
RESULT VXM T2: NORMAL
SERUM DATE VXM B1: NORMAL
SERUM DATE VXM B2: NORMAL
SERUM DATE VXM T1: NORMAL
SERUM DATE VXM T2: NORMAL
SYSTOLIC BLOOD PRESSURE - MUSE: NORMAL MMHG
T AXIS - MUSE: 53 DEGREES
T GONDII IGG SER-ACNC: <3 IU/ML
T GONDII IGM SER-ACNC: <3 AU/ML
TRYPANOSOMA CRUZI: NORMAL
VENTRICULAR RATE- MUSE: 81 BPM
WBC # BLD AUTO: 5.5 10E3/UL (ref 4–11)
WNV RNA SERPL DONR QL NAA+PROBE: NEGATIVE
ZZZCOMMENT VXMB1: NORMAL
ZZZCOMMENT VXMB2: NORMAL
ZZZCOMMENT VXMT2: NORMAL

## 2024-01-12 PROCEDURE — 36415 COLL VENOUS BLD VENIPUNCTURE: CPT

## 2024-01-12 PROCEDURE — 85025 COMPLETE CBC W/AUTO DIFF WBC: CPT

## 2024-01-12 PROCEDURE — 93005 ELECTROCARDIOGRAM TRACING: CPT

## 2024-01-12 PROCEDURE — G0463 HOSPITAL OUTPT CLINIC VISIT: HCPCS

## 2024-01-12 PROCEDURE — 99204 OFFICE O/P NEW MOD 45 MIN: CPT

## 2024-01-12 PROCEDURE — 250N000011 HC RX IP 250 OP 636: Performed by: INTERNAL MEDICINE

## 2024-01-12 PROCEDURE — A9552 F18 FDG: HCPCS | Performed by: INTERNAL MEDICINE

## 2024-01-12 PROCEDURE — 78816 PET IMAGE W/CT FULL BODY: CPT | Mod: 26 | Performed by: RADIOLOGY

## 2024-01-12 PROCEDURE — 343N000001 HC RX 343: Performed by: INTERNAL MEDICINE

## 2024-01-12 PROCEDURE — 78816 PET IMAGE W/CT FULL BODY: CPT | Mod: PI

## 2024-01-12 PROCEDURE — 93010 ELECTROCARDIOGRAM REPORT: CPT | Performed by: INTERNAL MEDICINE

## 2024-01-12 RX ORDER — ESCITALOPRAM OXALATE 10 MG/1
TABLET ORAL
COMMUNITY
End: 2024-01-17

## 2024-01-12 RX ORDER — METOPROLOL SUCCINATE 100 MG/1
100 TABLET, EXTENDED RELEASE ORAL DAILY
Status: ON HOLD | COMMUNITY
End: 2024-02-17

## 2024-01-12 RX ORDER — LISINOPRIL AND HYDROCHLOROTHIAZIDE 20; 25 MG/1; MG/1
1 TABLET ORAL
Status: ON HOLD | COMMUNITY
Start: 2023-09-09 | End: 2024-02-17

## 2024-01-12 RX ORDER — HEPARIN SODIUM (PORCINE) LOCK FLUSH IV SOLN 100 UNIT/ML 100 UNIT/ML
500 SOLUTION INTRAVENOUS ONCE
Status: COMPLETED | OUTPATIENT
Start: 2024-01-12 | End: 2024-01-12

## 2024-01-12 RX ADMIN — FLUDEOXYGLUCOSE F-18 11.36 MILLICURIE: 500 INJECTION, SOLUTION INTRAVENOUS at 14:11

## 2024-01-12 RX ADMIN — Medication 500 UNITS: at 14:17

## 2024-01-12 ASSESSMENT — PAIN SCALES - GENERAL: PAINLEVEL: NO PAIN (0)

## 2024-01-12 NOTE — PROGRESS NOTES
"[facility]  BMTCT OPEN VISIT    January 12, 2024      Ziggy Hallman is a 48 year old male undergoing evaluation prior to hematopoietic cell transplant or immune effector cell therapy.    Reason for BMTCT: MDS/plasma cell neoplasm    Recent chemotherapy: 12/2023--aza x 4    Recent infections: no    Blood thinner use? If yes, why? Yes; eliquis & prasugrel for 10 strokes, 3 MIs & multiple other clots    Treatment for diabetes? No    Today, the patient notes the following symptoms:  Review Of Systems  Skin: negative  Eyes: negative  Ears/Nose/Throat: negative  Respiratory: No shortness of breath, dyspnea on exertion, cough, or hemoptysis  Cardiovascular: hx of heart disease, 3 MIs, 2 stents placed; 'hole' in heart that was \"closed\"  Gastrointestinal: positive for stomach 'pains' that required hospitilization, but he cannot remember specifics  Genitourinary: hematuria secondary to supratherapuetic blood thinners; no current issues  Renal: hx of 1 kidney that \"doesn't work\" secondary to thrombus  Musculoskeletal: s/p amputation of R 1st toe  Neurologic: positive for positive for and numbness or tingling of R hand; 10 CVAs  Psychiatric: negative  Hematologic/Lymphatic/Immunologic: positive for MDS/plasma cell neoplasm; significant clot hx  Endocrine: negative      Ziggy Hallman's History    Past Medical History:   Diagnosis Date    AMI anterior wall (H)     Mid LAD on cath with stent    CAD S/P percutaneous coronary angioplasty 07/01/2016    Unstable agina with anterior myocardial damage reported without mycardial damage by patient's history    CVA (cerebrovascular accident) (H) 01/01/2012    Reporting thromboembolic episode on the right neck. Pt states \"I've had ten strokes.\"    Hypercoagulable state (H24)     Uncertain etiology--seeing Hematologist    MEDICAL HISTORY OF -     Possibly PFO       Past Surgical History:   Procedure Laterality Date    AMPUTATE TOE(S)      ARTHROSCOPY KNEE RT/LT      Right     " ARTHROSCOPY KNEE RT/LT      Left    CARDIAC CATHERIZATION      With stent placement    IR CHEST PORT PLACEMENT > 5 YRS OF AGE  9/13/2023       Family History   Problem Relation Age of Onset    Diabetes No family hx of     Coronary Artery Disease No family hx of     Hypertension No family hx of     Hyperlipidemia No family hx of     Breast Cancer No family hx of        Social History     Tobacco Use    Smoking status: Former     Packs/day: .5     Types: Cigarettes     Passive exposure: Past (Mom smoked cigs indoors)    Smokeless tobacco: Never    Tobacco comments:     cigarette once in a while   Substance Use Topics    Alcohol use: Yes     Alcohol/week: 0.0 standard drinks of alcohol     Comment: 4 times per week 5 drinks           Ziggy Lora Medications and Allergies    Current Outpatient Medications   Medication    amLODIPine (NORVASC) 5 MG tablet    apixaban ANTICOAGULANT (ELIQUIS) 5 MG tablet    atorvastatin (LIPITOR) 40 MG tablet    cyclobenzaprine (FLEXERIL) 10 MG tablet    escitalopram (LEXAPRO) 10 MG tablet    hydrOXYzine HCl (ATARAX) 25 MG tablet    lisinopril-hydrochlorothiazide (ZESTORETIC) 20-25 MG tablet    metoprolol succinate ER (TOPROL XL) 100 MG 24 hr tablet    ondansetron (ZOFRAN) 8 MG tablet    prasugrel (EFFIENT) 10 MG TABS tablet    prochlorperazine (COMPAZINE) 10 MG tablet    Zolpidem Tartrate (AMBIEN PO)     No current facility-administered medications for this visit.        No Known Allergies        Physical Examination    BP (!) 152/95 (BP Location: Right arm, Patient Position: Sitting, Cuff Size: Adult Regular)   Pulse 96   Temp 97.8  F (36.6  C) (Oral)   Resp 20   Wt 88.9 kg (196 lb)   SpO2 98%   BMI 28.94 kg/m      Exam:  Constitutional: NAD  Head: Normocephalic. No masses, lesions, tenderness or abnormalities  ENT: ENT exam normal, no neck nodes or sinus tenderness  Cardiovascular: RRR  Respiratory: CTA b/l  Gastrointestinal: Abdomen soft, non-tender. BS normal. No masses,  organomegaly  Musculoskeletal: extremities normal- no gross deformities noted, gait normal, and normal muscle tone; no notable RUE weakness on exam  Skin: no suspicious lesions or rashes  Neurologic: Gait normal. Sensation grossly WNL. C/o numbness median n. distribution of R hand  Psychiatric: mentation appears normal   Hematologic/Lymphatic/Immunologic: No appreciable LAD      Frailty Screening  BMT Fried Frailty          1/12/2024    10:12   Fried Frailty   Lost>10 pounds unintenionally last year N   Exhaustion Score 0   Slowness Score 0   Weakness/ Strength Score 1   Low Activity Level Score 0   Final Score Not Frail   Final Score Number 1   Sit Stand Assessment   Patient able to perform 5 chair stands Y   Chair Stands in seconds 8   Patient is able to perform stand with Feet Side by Side? Y   First attempt (in seconds): 10   Patient is able to perform Semi-Tandem Stand? Y   First attemp (in seconds): 10   Patient is able to perform Tandem Stand? Y   First attemp (in seconds): 10           I have reviewed the diagnostic data, medications, frailty screening, and general processes prior to BMTCT.  I have notified the Primary BMT Physician/and or Attending Physician in the clinic of any issues. We also discussed in detail the database and biorepository research for which Ziggy JEAN BAPTISTE Lexx is eligible. We discussed the potential risks and potential benefits of each of these protocols individually. We explained potential alternatives to the protocols discussed. We explained to the patient that participation is voluntary and that consent may be withdrawn at any time.       Consents Signed:   Blood transfusion consent form  Ethnicity form  [facility] database  [facility] biorepository   [facility] BMTCT Database    Present during the discussion was pt alone. Copies of the signed consent forms will be provided to the patient on admission. No procedures specific to any studies were performed prior to the patient signing the consent  form.    Ziggy Hallman had the opportunity to ask questions, and I answered all of the questions to the best of my ability.      Debora Claudio PA-C

## 2024-01-12 NOTE — LETTER
"    1/12/2024         RE: Ziggy Hallman  [address] [facility] [address]        Dear Colleague,    Thank you for referring your patient, Ziggy Hallman, to the [facility] BLOOD AND MARROW TRANSPLANT PROGRAM [place]. Please see a copy of my visit note below.    [facility]  BMTCT OPEN VISIT    January 12, 2024      Ziggy Hallman is a 48 year old male undergoing evaluation prior to hematopoietic cell transplant or immune effector cell therapy.    Reason for BMTCT: MDS/plasma cell neoplasm    Recent chemotherapy: 12/2023--aza x 4    Recent infections: no    Blood thinner use? If yes, why? Yes; eliquis & prasugrel for 10 strokes, 3 MIs & multiple other clots    Treatment for diabetes? No    Today, the patient notes the following symptoms:  Review Of Systems  Skin: negative  Eyes: negative  Ears/Nose/Throat: negative  Respiratory: No shortness of breath, dyspnea on exertion, cough, or hemoptysis  Cardiovascular: hx of heart disease, 3 MIs, 2 stents placed; 'hole' in heart that was \"closed\"  Gastrointestinal: positive for stomach 'pains' that required hospitilization, but he cannot remember specifics  Genitourinary: hematuria secondary to supratherapuetic blood thinners; no current issues  Renal: hx of 1 kidney that \"doesn't work\" secondary to thrombus  Musculoskeletal: s/p amputation of R 1st toe  Neurologic: positive for positive for and numbness or tingling of R hand; 10 CVAs  Psychiatric: negative  Hematologic/Lymphatic/Immunologic: positive for MDS/plasma cell neoplasm; significant clot hx  Endocrine: negative      Ziggy Hallman's History    Past Medical History:   Diagnosis Date    AMI anterior wall (H)     Mid LAD on cath with stent    CAD S/P percutaneous coronary angioplasty 07/01/2016    Unstable agina with anterior myocardial damage reported without mycardial damage by patient's history    CVA (cerebrovascular accident) (H) 01/01/2012    Reporting thromboembolic episode on the right " "neck. Pt states \"I've had ten strokes.\"    Hypercoagulable state (H24)     Uncertain etiology--seeing Hematologist    MEDICAL HISTORY OF -     Possibly PFO       Past Surgical History:   Procedure Laterality Date    AMPUTATE TOE(S)      ARTHROSCOPY KNEE RT/LT      Right     ARTHROSCOPY KNEE RT/LT      Left    CARDIAC CATHERIZATION      With stent placement    IR CHEST PORT PLACEMENT > 5 YRS OF AGE  9/13/2023       Family History   Problem Relation Age of Onset    Diabetes No family hx of     Coronary Artery Disease No family hx of     Hypertension No family hx of     Hyperlipidemia No family hx of     Breast Cancer No family hx of        Social History     Tobacco Use    Smoking status: Former     Packs/day: .5     Types: Cigarettes     Passive exposure: Past (Mom smoked cigs indoors)    Smokeless tobacco: Never    Tobacco comments:     cigarette once in a while   Substance Use Topics    Alcohol use: Yes     Alcohol/week: 0.0 standard drinks of alcohol     Comment: 4 times per week 5 drinks           Ziggy Lora Medications and Allergies    Current Outpatient Medications   Medication    amLODIPine (NORVASC) 5 MG tablet    apixaban ANTICOAGULANT (ELIQUIS) 5 MG tablet    atorvastatin (LIPITOR) 40 MG tablet    cyclobenzaprine (FLEXERIL) 10 MG tablet    escitalopram (LEXAPRO) 10 MG tablet    hydrOXYzine HCl (ATARAX) 25 MG tablet    lisinopril-hydrochlorothiazide (ZESTORETIC) 20-25 MG tablet    metoprolol succinate ER (TOPROL XL) 100 MG 24 hr tablet    ondansetron (ZOFRAN) 8 MG tablet    prasugrel (EFFIENT) 10 MG TABS tablet    prochlorperazine (COMPAZINE) 10 MG tablet    Zolpidem Tartrate (AMBIEN PO)     No current facility-administered medications for this visit.        No Known Allergies        Physical Examination    BP (!) 152/95 (BP Location: Right arm, Patient Position: Sitting, Cuff Size: Adult Regular)   Pulse 96   Temp 97.8  F (36.6  C) (Oral)   Resp 20   Wt 88.9 kg (196 lb)   SpO2 98%   BMI 28.94 " kg/m      Exam:  Constitutional: NAD  Head: Normocephalic. No masses, lesions, tenderness or abnormalities  ENT: ENT exam normal, no neck nodes or sinus tenderness  Cardiovascular: RRR  Respiratory: CTA b/l  Gastrointestinal: Abdomen soft, non-tender. BS normal. No masses, organomegaly  Musculoskeletal: extremities normal- no gross deformities noted, gait normal, and normal muscle tone; no notable RUE weakness on exam  Skin: no suspicious lesions or rashes  Neurologic: Gait normal. Sensation grossly WNL. C/o numbness median n. distribution of R hand  Psychiatric: mentation appears normal   Hematologic/Lymphatic/Immunologic: No appreciable LAD      Frailty Screening  BMT Fried Frailty          1/12/2024    10:12   Fried Frailty   Lost>10 pounds unintenionally last year N   Exhaustion Score 0   Slowness Score 0   Weakness/ Strength Score 1   Low Activity Level Score 0   Final Score Not Frail   Final Score Number 1   Sit Stand Assessment   Patient able to perform 5 chair stands Y   Chair Stands in seconds 8   Patient is able to perform stand with Feet Side by Side? Y   First attempt (in seconds): 10   Patient is able to perform Semi-Tandem Stand? Y   First attemp (in seconds): 10   Patient is able to perform Tandem Stand? Y   First attemp (in seconds): 10           I have reviewed the diagnostic data, medications, frailty screening, and general processes prior to BMTCT.  I have notified the Primary BMT Physician/and or Attending Physician in the clinic of any issues. We also discussed in detail the database and biorepository research for which Ziggy Hallman is eligible. We discussed the potential risks and potential benefits of each of these protocols individually. We explained potential alternatives to the protocols discussed. We explained to the patient that participation is voluntary and that consent may be withdrawn at any time.       Consents Signed:   Blood transfusion consent form  Ethnicity form  [facility]  database  [facility]   [facility] BMTCT Database    Present during the discussion was pt alone. Copies of the signed consent forms will be provided to the patient on admission. No procedures specific to any studies were performed prior to the patient signing the consent form.    Ziggy Hallman had the opportunity to ask questions, and I answered all of the questions to the best of my ability.      Debora Claudio PA-C

## 2024-01-12 NOTE — NURSING NOTE
"Oncology Rooming Note    January 12, 2024 9:57 AM   Ziggy Hallman is a 48 year old male who presents for:    Chief Complaint   Patient presents with    Oncology Clinic Visit     Myelodysplastic syndrome      Initial Vitals: BP (!) 152/95 (BP Location: Right arm, Patient Position: Sitting, Cuff Size: Adult Regular)   Pulse 96   Temp 97.8  F (36.6  C) (Oral)   Resp 20   Wt 88.9 kg (196 lb)   SpO2 98%   BMI 28.94 kg/m   Estimated body mass index is 28.94 kg/m  as calculated from the following:    Height as of 12/11/23: 1.753 m (5' 9\").    Weight as of this encounter: 88.9 kg (196 lb). Body surface area is 2.08 meters squared.  No Pain (0) Comment: Data Unavailable   No LMP for male patient.  Allergies reviewed: Yes  Medications reviewed: Yes    Medications: Medication refills not needed today.  Pharmacy name entered into [facility]: [id number] - [place], [place] - [address] [place] [address]    Frailty Screening:   Is the patient here for a new oncology consult visit in cancer care? 2. No      Clinical concerns:  none      Christen Galdamez              "

## 2024-01-12 NOTE — NURSING NOTE
[facility] JULIO CESAR Frailty assessment completed with patient in clinic. Patient had no questions and showed understanding of what assessment was being done.     Christen Galdamez on 1/12/2024 at 10:28 AM

## 2024-01-12 NOTE — LETTER
1/12/2024         RE: Ziggy Hallman  [address] [facility] [facility] [address]        Dear Colleague,    Thank you for referring your patient, Ziggy Hallman, to the [facility] BLOOD AND MARROW TRANSPLANT PROGRAM [place]. Please see a copy of my visit note below.    Pharmacy Assessment - Pre-Stem Cell Transplant    Assessments & Recommendations:  1) Avoid APAP, metronidazole around the Busulfan,   2) Target cAUC for busulfan 82.1 mg*hr/L  3) Keppra prophylaxis around Busulfan through day -1   4) Bridge antifungal with micafungin through day + 45, consider expanding coverage with an azole as he daily smokes marijuana.    5) Consider reducing apixaban to 1/2 dose when plts < 50 and hold when < 30.   6) Consider holding the lipitor during the peritransplant period.   7) Consider Heme consult on when to hold Prasugrel?   8) Ursodiol through day + 60     History of Present Illness:  Ziggy Hallman is a 48 year old year old male diagnosed with MDS.  He has been treated with Aza x 4.  he is now being work up for MA MUD Stem Cell Transplant on protocol FT2003-24, which utilizes Bu/Flu  as a conditioning regimen.    Pertinent labs/tests:  Viral Testing:  CMV(-), donor (?) / HSV(+/+) / EBV(+) / VZV (+)  Ejection Fraction: ______% (future date)  QTc: 425msec (1/12)    Weights:   Wt Readings from Last 3 Encounters:   01/12/24 88.9 kg (196 lb)   01/10/24 89.3 kg (196 lb 12.8 oz)   12/11/23 88.9 kg (196 lb)   Ideal body weight: 70.7 kg (155 lb 13.8 oz)  Adjusted ideal body weight: 78 kg (171 lb 14.7 oz)  % IBW:  126  There is no height or weight on file to calculate BMI.    Primary BMT Physician:   BMT RN Coordinator:  Kandy العلي    Past Medical History:  Past Medical History:   Diagnosis Date    AMI anterior wall (H)     Mid LAD on cath with stent    CAD S/P percutaneous coronary angioplasty 07/01/2016    Unstable agina with anterior myocardial damage reported without mycardial damage by patient's  "history    CVA (cerebrovascular accident) (H) 01/01/2012    Reporting thromboembolic episode on the right neck. Pt states \"I've had ten strokes.\"    Hypercoagulable state (H24)     Uncertain etiology--seeing Hematologist    MEDICAL HISTORY OF -     Possibly PFO       Medication Allergies:  No Known Allergies    Current Medications (pre-admit):  Current Outpatient Medications   Medication Sig Dispense Refill    amLODIPine (NORVASC) 5 MG tablet Take 1 tablet by mouth daily      apixaban ANTICOAGULANT (ELIQUIS) 5 MG tablet       atorvastatin (LIPITOR) 40 MG tablet Take 40 mg by mouth daily      cyclobenzaprine (FLEXERIL) 10 MG tablet Take 1 Tablet (10 mg) by mouth at bedtime if needed for Muscle Spasm.      escitalopram (LEXAPRO) 10 MG tablet Take  by mouth Once Daily.      hydrOXYzine HCl (ATARAX) 25 MG tablet Take 1 tablet (25 mg) by mouth 3 times daily as needed for itching 20 tablet 0    lisinopril-hydrochlorothiazide (ZESTORETIC) 20-25 MG tablet Take 1 tablet by mouth daily at 2 pm      metoprolol succinate ER (TOPROL XL) 100 MG 24 hr tablet Take 100 mg by mouth daily      ondansetron (ZOFRAN) 8 MG tablet Take 1 tablet (8 mg) by mouth every 8 hours as needed for nausea 20 tablet 1    prasugrel (EFFIENT) 10 MG TABS tablet TK 1 T PO QAM  2    prochlorperazine (COMPAZINE) 10 MG tablet Take 1 tablet (10 mg) by mouth every 6 hours as needed for nausea or vomiting 30 tablet 2    Zolpidem Tartrate (AMBIEN PO) Take by mouth as needed for sleep         Herbal Medication/Nutritional Supplements:  No issues     Smoking/Past Drug Use:  Former smoker, Etoh 4 times per week ( 5 drinks) , smokes marijuana often     Nausea/Vomiting, Pain, or other issues:  No issues     Summary:  I met with Ziggy Hallman for approximately 45 minutes.  We discussed his current and transplant medications including the conditioning chemotherapy(Busulfan/Fludarabine), antiemetics ( ondansetron, lorazepam, prochlorperazine, dexamethasone) " prophylactic antibiotics (acyclovir, bactrim, levofloxacin, micafungin) immunosuppression( ptcyclophosphamide, sirolimus, MMF) and miscellaneous medications (keppra, allopurinol, pantoprazole, filgrastim, claritin, mesna and vaccinations.         BMT Pharm D, [facility]

## 2024-01-12 NOTE — PROGRESS NOTES
"Pharmacy Assessment - Pre-Stem Cell Transplant    Assessments & Recommendations:  1) Avoid APAP, metronidazole around the Busulfan,   2) Target cAUC for busulfan 82.1 mg*hr/L  3) Keppra prophylaxis around Busulfan through day -1   4) Bridge antifungal with micafungin through day + 45, consider expanding coverage with an azole as he daily smokes marijuana.    5) Consider reducing apixaban to 1/2 dose when plts < 50 and hold when < 30.   6) Consider holding the lipitor during the peritransplant period.   7) Consider Heme consult on when to hold Prasugrel?   8) Ursodiol through day + 60     History of Present Illness:  Ziggy Hallman is a 48 year old year old male diagnosed with MDS.  He has been treated with Aza x 4.  he is now being work up for [facility] Stem Cell Transplant on protocol [id number], which utilizes Bu/Flu  as a conditioning regimen.    Pertinent labs/tests:  Viral Testing:  CMV(-), donor (?) / HSV(+/+) / EBV(+) / VZV (+)  Ejection Fraction: ______% (future date)  QTc: 425msec (1/12)    Weights:   Wt Readings from Last 3 Encounters:   01/12/24 88.9 kg (196 lb)   01/10/24 89.3 kg (196 lb 12.8 oz)   12/11/23 88.9 kg (196 lb)   Ideal body weight: 70.7 kg (155 lb 13.8 oz)  Adjusted ideal body weight: 78 kg (171 lb 14.7 oz)  % IBW:  126  There is no height or weight on file to calculate BMI.    Primary BMT Physician:   BMT RN Coordinator:  Kandy العلي    Past Medical History:  Past Medical History:   Diagnosis Date    AMI anterior wall (H)     Mid LAD on cath with stent    CAD S/P percutaneous coronary angioplasty 07/01/2016    Unstable agina with anterior myocardial damage reported without mycardial damage by patient's history    CVA (cerebrovascular accident) (H) 01/01/2012    Reporting thromboembolic episode on the right neck. Pt states \"I've had ten strokes.\"    Hypercoagulable state (H24)     Uncertain etiology--seeing Hematologist    MEDICAL HISTORY OF -     Possibly PFO       Medication " Allergies:  No Known Allergies    Current Medications (pre-admit):  Current Outpatient Medications   Medication Sig Dispense Refill    amLODIPine (NORVASC) 5 MG tablet Take 1 tablet by mouth daily      apixaban ANTICOAGULANT (ELIQUIS) 5 MG tablet       atorvastatin (LIPITOR) 40 MG tablet Take 40 mg by mouth daily      cyclobenzaprine (FLEXERIL) 10 MG tablet Take 1 Tablet (10 mg) by mouth at bedtime if needed for Muscle Spasm.      escitalopram (LEXAPRO) 10 MG tablet Take  by mouth Once Daily.      hydrOXYzine HCl (ATARAX) 25 MG tablet Take 1 tablet (25 mg) by mouth 3 times daily as needed for itching 20 tablet 0    lisinopril-hydrochlorothiazide (ZESTORETIC) 20-25 MG tablet Take 1 tablet by mouth daily at 2 pm      metoprolol succinate ER (TOPROL XL) 100 MG 24 hr tablet Take 100 mg by mouth daily      ondansetron (ZOFRAN) 8 MG tablet Take 1 tablet (8 mg) by mouth every 8 hours as needed for nausea 20 tablet 1    prasugrel (EFFIENT) 10 MG TABS tablet TK 1 T PO QAM  2    prochlorperazine (COMPAZINE) 10 MG tablet Take 1 tablet (10 mg) by mouth every 6 hours as needed for nausea or vomiting 30 tablet 2    Zolpidem Tartrate (AMBIEN PO) Take by mouth as needed for sleep         Herbal Medication/Nutritional Supplements:  No issues     Smoking/Past Drug Use:  Former smoker, Etoh 4 times per week ( 5 drinks) , smokes marijuana often     Nausea/Vomiting, Pain, or other issues:  No issues     Summary:  I met with Ziggy Hallman for approximately 45 minutes.  We discussed his current and transplant medications including the conditioning chemotherapy(Busulfan/Fludarabine), antiemetics ( ondansetron, lorazepam, prochlorperazine, dexamethasone) prophylactic antibiotics (acyclovir, bactrim, levofloxacin, micafungin) immunosuppression( ptcyclophosphamide, sirolimus, MMF) and miscellaneous medications (keppra, allopurinol, pantoprazole, filgrastim, claritin, mesna and vaccinations.

## 2024-01-12 NOTE — NURSING NOTE
EKG was performed today per order written by  Taran Bacon. Name and  verified with patient. Patient tolerated well without incident. File transmitted to chart.    Christen Galdamez on 2024 at 10:26 AM

## 2024-01-12 NOTE — NURSING NOTE
Chief Complaint   Patient presents with    Oncology Clinic Visit     Myelodysplastic syndrome     Blood Draw     Labs drawn via  by RN in lab.      Labs collected from venipuncture by RN. Checked in for appointment(s).    Tatum Abdi RN

## 2024-01-13 LAB
ADDITIONAL COMMENTS: NORMAL
INTERPRETATION: NORMAL
ISCN: NORMAL
METHODS: NORMAL

## 2024-01-14 LAB
ALBUMIN SERPL ELPH-MCNC: 2.3 G/DL (ref 3.7–5.1)
ALPHA1 GLOB SERPL ELPH-MCNC: 0.3 G/DL (ref 0.2–0.4)
ALPHA2 GLOB SERPL ELPH-MCNC: 0.8 G/DL (ref 0.5–0.9)
B-GLOBULIN SERPL ELPH-MCNC: 1.1 G/DL (ref 0.6–1)
GAMMA GLOB SERPL ELPH-MCNC: 1.2 G/DL (ref 0.7–1.6)
LOCATION OF TASK: ABNORMAL
LOCATION OF TASK: NORMAL
M PROTEIN SERPL ELPH-MCNC: 0 G/DL
PROT PATTERN SERPL ELPH-IMP: ABNORMAL
PROT PATTERN SERPL IFE-IMP: NORMAL

## 2024-01-15 ENCOUNTER — HOSPITAL ENCOUNTER (OUTPATIENT)
Dept: CARDIOLOGY | Facility: CLINIC | Age: 49
Discharge: HOME | End: 2024-01-15
Attending: INTERNAL MEDICINE
Payer: COMMERCIAL

## 2024-01-15 ENCOUNTER — HOSPITAL ENCOUNTER (OUTPATIENT)
Dept: CT IMAGING | Facility: CLINIC | Age: 49
Discharge: HOME | End: 2024-01-15
Attending: INTERNAL MEDICINE
Payer: COMMERCIAL

## 2024-01-15 ENCOUNTER — OFFICE VISIT (OUTPATIENT)
Dept: PULMONOLOGY | Facility: CLINIC | Age: 49
End: 2024-01-15
Payer: COMMERCIAL

## 2024-01-15 ENCOUNTER — ALLIED HEALTH/NURSE VISIT (OUTPATIENT)
Dept: TRANSPLANT | Facility: CLINIC | Age: 49
End: 2024-01-15
Attending: INTERNAL MEDICINE
Payer: COMMERCIAL

## 2024-01-15 ENCOUNTER — HOSPITAL ENCOUNTER (OUTPATIENT)
Dept: GENERAL RADIOLOGY | Facility: CLINIC | Age: 49
Discharge: HOME | End: 2024-01-15
Attending: INTERNAL MEDICINE
Payer: COMMERCIAL

## 2024-01-15 ENCOUNTER — LAB (OUTPATIENT)
Dept: LAB | Facility: CLINIC | Age: 49
End: 2024-01-15
Attending: INTERNAL MEDICINE
Payer: COMMERCIAL

## 2024-01-15 DIAGNOSIS — D46.9 MDS (MYELODYSPLASTIC SYNDROME) (H): Primary | ICD-10-CM

## 2024-01-15 DIAGNOSIS — Z01.818 PREOP EXAMINATION: ICD-10-CM

## 2024-01-15 DIAGNOSIS — Z71.9 VISIT FOR COUNSELING: Primary | ICD-10-CM

## 2024-01-15 DIAGNOSIS — Z11.59 SCREENING FOR VIRAL DISEASE: ICD-10-CM

## 2024-01-15 DIAGNOSIS — Z86.2 PERSONAL HISTORY OF DISEASES OF BLOOD AND BLOOD-FORMING ORGANS: ICD-10-CM

## 2024-01-15 DIAGNOSIS — D46.9 MDS (MYELODYSPLASTIC SYNDROME) (H): ICD-10-CM

## 2024-01-15 DIAGNOSIS — C94.6 MYELODYSPLASTIC DISEASE (H): Primary | ICD-10-CM

## 2024-01-15 LAB — LVEF ECHO: NORMAL

## 2024-01-15 PROCEDURE — 71046 X-RAY EXAM CHEST 2 VIEWS: CPT | Mod: 26 | Performed by: RADIOLOGY

## 2024-01-15 PROCEDURE — 93306 TTE W/DOPPLER COMPLETE: CPT

## 2024-01-15 PROCEDURE — 94726 PLETHYSMOGRAPHY LUNG VOLUMES: CPT | Performed by: INTERNAL MEDICINE

## 2024-01-15 PROCEDURE — 71250 CT THORAX DX C-: CPT | Mod: 26 | Performed by: RADIOLOGY

## 2024-01-15 PROCEDURE — 71250 CT THORAX DX C-: CPT

## 2024-01-15 PROCEDURE — 94729 DIFFUSING CAPACITY: CPT | Performed by: INTERNAL MEDICINE

## 2024-01-15 PROCEDURE — 94060 EVALUATION OF WHEEZING: CPT | Performed by: INTERNAL MEDICINE

## 2024-01-15 PROCEDURE — 93306 TTE W/DOPPLER COMPLETE: CPT | Mod: 26 | Performed by: INTERNAL MEDICINE

## 2024-01-15 PROCEDURE — 71046 X-RAY EXAM CHEST 2 VIEWS: CPT

## 2024-01-15 ASSESSMENT — ANXIETY QUESTIONNAIRES
6. BECOMING EASILY ANNOYED OR IRRITABLE: NOT AT ALL
5. BEING SO RESTLESS THAT IT IS HARD TO SIT STILL: SEVERAL DAYS
GAD7 TOTAL SCORE: 3
IF YOU CHECKED OFF ANY PROBLEMS ON THIS QUESTIONNAIRE, HOW DIFFICULT HAVE THESE PROBLEMS MADE IT FOR YOU TO DO YOUR WORK, TAKE CARE OF THINGS AT HOME, OR GET ALONG WITH OTHER PEOPLE: NOT DIFFICULT AT ALL
GAD7 TOTAL SCORE: 3
2. NOT BEING ABLE TO STOP OR CONTROL WORRYING: NOT AT ALL
3. WORRYING TOO MUCH ABOUT DIFFERENT THINGS: SEVERAL DAYS
7. FEELING AFRAID AS IF SOMETHING AWFUL MIGHT HAPPEN: NOT AT ALL
1. FEELING NERVOUS, ANXIOUS, OR ON EDGE: SEVERAL DAYS

## 2024-01-15 ASSESSMENT — PATIENT HEALTH QUESTIONNAIRE - PHQ9
SUM OF ALL RESPONSES TO PHQ QUESTIONS 1-9: 4
5. POOR APPETITE OR OVEREATING: NOT AT ALL

## 2024-01-15 NOTE — PROGRESS NOTES
"Blood and Marrow Transplant   Psychosocial Assessment with   Clinical     Assessment completed on 1/15/2024 of Pt's living situation, support system, financial status, functional status, coping, stressors, need for resources and social work intervention provided as needed.  Information for this assessment was provided by Pts report in addition to medical chart review and consultation with medical team.     Present at Assessment:   Patient: Ziggy Hallman  : RIVKA Grider LGSW    Diagnosis: MDS (Myelodysplastic Syndrome)     Date of Diagnosis: 10/2015    Transplant type: Allogeneic    Donor: Unrelated allogeneic donor stem cell transplant    Physician: Taran Bacon MD    Nurse Coordinator: Kandy العلي RN    Social Workers: RIVKA Grider LGSW    Permanent Address:   [address] [place] [address]    Living Situation: Pt lives in [place], [place] with adult sons Tolu (21) and Amandeep (18).    Local Address:   [address] [place] [id number]    Contact Information:  Pt's Home Phone: [phone]  Pt's Cell Phone: [phone]  Pt Email: [email or web address]  Pt's Ex-Significant Other Mireya Collins Phone: [phone]  Pt's Sister Radha \"Yaritza\" Lexx's Phone: [phone]  Pt's Sister Randa's Phone: [phone]    Presenting Information:  Ziggy is a 48 year old male diagnosed with MDS who presents for evaluation for Allogeneic transplant at the [facility] ([facility] Pt was accompanied to today's visit by himself.     Decision Making: Self    Health Care Directive: Pt declined a completed Health Care Directive (HCD) at this time. SW provided pt with a copy of HCD and encouraged Pt to have discussion with family members and complete form. SW provided education. SW encouraged pt to have discussions with their family regarding their health care wishes. SW explained that since pt does not have a healthcare directive, legally pts " "adult children would make decisions on his behalf, if he did not have capacity to make his own medical decisions. Pt understood.     Relationship Status: Single. Pt has  from ex-significant other Mireya who is the mother of pts sons (Tolu Santana, and Jose). They have been  for 7 years.    Special Needs: None identified at this time.     Family/Support System: Pt endorsed a good support system including family and close friends who will be available to support pt throughout transplant process.     Family Information:  Spouse: N/A  Children: Son Max (25); Son Tolu (21); and Son Jose (18)  Siblings: Sisters Radha Hallman ([place]) and Randa ([place])  Parents:   Grandchildren: N/A  Friends:  Pt endorsed a good friend support system.    Caregiver: SW discussed with pt the caregiver role and expectation at length. Pt is agreeable to having a full time caregiver for a minimum of 100 days until cleared by the BMT physician. Pt confirmed understanding of the caregiver requirement. Pt's primary caregiver will be ex-significant other Mireya with Sisters Radha and Randa as a secondary or back-up to assist as needed. Pt reviewed and signed the caregiver contract which will be scanned into the EMR. Caregiver education and resources provided. No caregiver concerns identified.     Caregiver Contact Information:  Pt's Ex-S/O Mireya Collins Phone: [phone]  Pt's Sister Radha \"Yaritza\" Lexx's Phone: [phone]  Pt's Sister Randa's Phone: [phone]    Transportation Mode: Private vehicle to be primary source of transportation anticipated. Pt is aware of driving restrictions post-BMT and the need for the caregiver is to drive until cleared to drive by the BMT physician. SW provided information on parking info and monthly parking pass options.     Insurance: Pt has [employer] [facility] health insurance. Pt denied specific insurance concerns at this time. SW reiterated information about the BMT " Financial  should specific insurance questions arise as pt moves through transplant process.     Sources of Income: Pt's current source of income is SSDI which will transition to SSI in 3/2024. No financial concerns identified at this time.  BOOKER discussed herman options and asked pt to let SW know if they would like to apply in the future.     Employment: Not currently employed. 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 His last date of employment was in 8/2023.     Mental Health: Pt denied a history of mental health concerns, specific diagnoses or medications at this time. We discussed how many patients may see an increase in feelings of anxiety or depression while hospitalized for extended periods of time and pursue  isolation precautions post-BMT. Encouraged pt to let us know if they are noticing an increase in symptoms. Pt believes he would be able to identify symptoms of depression/anxiety throughout the transplant process. We talked about the variety of modalities available to use as coping mechanisms (including but not limited to guided imagery, relaxation techniques, progressive muscle relaxation, counseling/talk therapy and medication).    PHQ-9:  Pt scored a 4 which indicates none on the depression severity scale. Pt endorses this is an accurate reflection of his emotional state.    GAD7:  Pt scored a 3 which indicates no sign of anxiety on the Generalized Anxiety Disorder Questionnaire. Pt endorses this is an accurate reflection of his emotional state.    Chemical Use:   Tobacco: No hx reported.  Alcohol: Pt endorsed alcohol usage approximately once a week. Pt reported last use of alcohol to be last weekend.  Marijuana: Pt endorsed daily usage of marijuana.  Pt shared that he had spoken to the provider about edible use.  Pt has no concerns with ceasing marijuana use during the transplant process.   Other Drugs: No hx reported.  Based on the information  "provided, there appear to be no specific risks or concerns identified at this time.     Trauma/Loss/Abuse History: Multiple losses associated with cancer diagnosis and treatment, including health, employment, changes to physical appearance, etc.     Spirituality: Pt did not identify with any spirituality or judy belief.. SW explained that there are Chaplains on the unit and pt can request to meet with a  at anytime.    Coping: Pt noted that he is currently feeling \"positive, sad, prepared, nervous, and ready to begin\". Pt shared that his main coping mechanisms are \"talking with friends/family, positive self-talk, gathering education information about cancer diagsosis, and working on his hobbies\". Pt noted that he enjoys \"fishing and working outdoors\". SW and pt discussed additional positive coping mechanisms that pt can utilize while in the hospital. While hospitalized, pt plans to \"use his Iphone, watch TV, and walk the halls\".     Caregiver Coping: No caregiver present for assessment.    Education Provided: Transplant process expectations, Caregiver requirements, Caregiver self-care, Financial issues related to transplant, Financial resources/grants available, Common psychosocial stressors pre/post transplant, Support group(s) available, Hospital resources available, Web site information, Social Work role and Resources for children/siblings    Interventions Provided: Psychosocial Support and Education     Assessment and Recommendations for Team:  Pt is a 48 year old male diagnosed with MDS who is here undergoing preparation for a planned Allogeneic transplant.     Pt is pleasant, calm and able to articulate concerns/coping mechanisms in an appropriate manner. During our meeting pt was alert, he was interactive, affect was full, he displayed appropriate eye contact, memory and thought processes. Pt feels comfortable communicating with the medical team. Pt has a strong supportive network of family and " friends who are involved. Pt has developed strong coping mechanisms.     Pt will benefit from ongoing psychosocial support in regards to coping with the adjustment to the BMT process. CSW has discussed  psychosocial support options in regards to coping with the adjustment to the BMT process and support groups opportunities.      Pt has a good support system and a confirmed caregiver plan. Pt verbalizes understanding of the transplant process and wanting to proceed. SW provided contact information and encouraged pt to contact SW with questions, concerns, resources and for support.    Per this assessment, SW did not identify any barriers to this patient moving forward with transplant.    Important Information:   None specified at this time.     Follow up Planned:   Psychosocial support    RIVKA Grider, CEDRICK  Adult Blood & Marrow Transplant   Phone: [phone]  Pager: [phone]

## 2024-01-16 ENCOUNTER — TELEPHONE (OUTPATIENT)
Dept: INTERVENTIONAL RADIOLOGY/VASCULAR | Facility: CLINIC | Age: 49
End: 2024-01-16

## 2024-01-16 DIAGNOSIS — D46.9 MDS (MYELODYSPLASTIC SYNDROME) (H): Primary | ICD-10-CM

## 2024-01-16 NOTE — TELEPHONE ENCOUNTER
[facility] Call Center    Phone Message    May a detailed message be left on voicemail: yes     Reason for Call: Medication Question or concern regarding medication   Prescription Clarification  Name of Medication: Elliquis  Prescribing Provider:    Pharmacy:    What on the order needs clarification? Kandy the nurse coordinator at [facility] called and has a question regarding the pt. Pt has an appt on 1/19 and pt is currently taking Elliquis. She wanted to know if the pt should hold that med prior to the appt or is it okay for the pt to take it as usual? Please call her back. Thanks        Action Taken: Message routed to:  [facility] ([facility] IR    Travel Screening: Not Applicable

## 2024-01-16 NOTE — TELEPHONE ENCOUNTER
Call placed to patient. Informed he may stay on Eliquis and will send a [employer] [place] message with his instructions. He has an appointment to review line care with his BMT nurse tomorrow, so he will review Mychart instructions and contact the IR nurse line if he has additional questions or concerns.    Radha Epperson RN  Interventional Radiology  [phone]

## 2024-01-17 ENCOUNTER — CARE COORDINATION (OUTPATIENT)
Dept: TRANSPLANT | Facility: CLINIC | Age: 49
End: 2024-01-17

## 2024-01-17 ENCOUNTER — OFFICE VISIT (OUTPATIENT)
Dept: TRANSPLANT | Facility: CLINIC | Age: 49
End: 2024-01-17
Attending: INTERNAL MEDICINE
Payer: COMMERCIAL

## 2024-01-17 ENCOUNTER — LAB (OUTPATIENT)
Dept: LAB | Facility: CLINIC | Age: 49
End: 2024-01-17
Attending: INTERNAL MEDICINE
Payer: COMMERCIAL

## 2024-01-17 VITALS
WEIGHT: 198.6 LBS | RESPIRATION RATE: 16 BRPM | OXYGEN SATURATION: 98 % | DIASTOLIC BLOOD PRESSURE: 91 MMHG | TEMPERATURE: 97.9 F | SYSTOLIC BLOOD PRESSURE: 149 MMHG | HEART RATE: 89 BPM | BODY MASS INDEX: 29.33 KG/M2

## 2024-01-17 DIAGNOSIS — D46.9 MDS (MYELODYSPLASTIC SYNDROME) (H): ICD-10-CM

## 2024-01-17 DIAGNOSIS — Z11.59 SCREENING FOR VIRAL DISEASE: ICD-10-CM

## 2024-01-17 DIAGNOSIS — Z86.2 PERSONAL HISTORY OF DISEASES OF BLOOD AND BLOOD-FORMING ORGANS: ICD-10-CM

## 2024-01-17 DIAGNOSIS — Z01.818 PREOP EXAMINATION: ICD-10-CM

## 2024-01-17 DIAGNOSIS — D46.9 MDS (MYELODYSPLASTIC SYNDROME) (H): Primary | ICD-10-CM

## 2024-01-17 LAB
ALBUMIN SERPL BCG-MCNC: 2.4 G/DL (ref 3.5–5.2)
ALBUMIN UR-MCNC: 600 MG/DL
ALP SERPL-CCNC: 82 U/L (ref 40–150)
ALT SERPL W P-5'-P-CCNC: 18 U/L (ref 0–70)
ANION GAP SERPL CALCULATED.3IONS-SCNC: 6 MMOL/L (ref 7–15)
APPEARANCE UR: CLEAR
AST SERPL W P-5'-P-CCNC: 33 U/L (ref 0–45)
BASOPHILS # BLD AUTO: 0.1 10E3/UL (ref 0–0.2)
BASOPHILS NFR BLD AUTO: 1 %
BILIRUB SERPL-MCNC: 0.2 MG/DL
BILIRUB UR QL STRIP: NEGATIVE
BUN SERPL-MCNC: 24.2 MG/DL (ref 6–20)
CALCIUM SERPL-MCNC: 8.5 MG/DL (ref 8.6–10)
CHLORIDE SERPL-SCNC: 110 MMOL/L (ref 98–107)
COLOR UR AUTO: ABNORMAL
CREAT SERPL-MCNC: 0.92 MG/DL (ref 0.67–1.17)
CULTURE HARVEST COMPLETE DATE: NORMAL
DEPRECATED HCO3 PLAS-SCNC: 25 MMOL/L (ref 22–29)
DLCOCOR-%PRED-PRE: 93 %
DLCOCOR-PRE: 26.63 ML/MIN/MMHG
DLCOUNC-%PRED-PRE: 80 %
DLCOUNC-PRE: 22.94 ML/MIN/MMHG
DLCOUNC-PRED: 28.44 ML/MIN/MMHG
EGFRCR SERPLBLD CKD-EPI 2021: >90 ML/MIN/1.73M2
EOSINOPHIL # BLD AUTO: 0.3 10E3/UL (ref 0–0.7)
EOSINOPHIL NFR BLD AUTO: 5 %
ERV-%PRED-PRE: 26 %
ERV-PRE: 0.43 L
ERV-PRED: 1.61 L
ERYTHROCYTE [DISTWIDTH] IN BLOOD BY AUTOMATED COUNT: 14.7 % (ref 10–15)
EXPTIME-PRE: 5.92 SEC
FEF2575-%PRED-POST: 95 %
FEF2575-%PRED-PRE: 76 %
FEF2575-POST: 3.13 L/SEC
FEF2575-PRE: 2.51 L/SEC
FEF2575-PRED: 3.29 L/SEC
FEFMAX-%PRED-PRE: 50 %
FEFMAX-PRE: 4.87 L/SEC
FEFMAX-PRED: 9.58 L/SEC
FEV1-%PRED-PRE: 73 %
FEV1-PRE: 2.6 L
FEV1FEV6-PRE: 80 %
FEV1FEV6-PRED: 80 %
FEV1FVC-PRE: 79 %
FEV1FVC-PRED: 80 %
FEV1SVC-PRE: 79 %
FEV1SVC-PRED: 76 %
FIFMAX-PRE: 4.64 L/SEC
FLUAV RNA SPEC QL NAA+PROBE: NEGATIVE
FLUBV RNA RESP QL NAA+PROBE: NEGATIVE
FRCPLETH-%PRED-PRE: 77 %
FRCPLETH-PRE: 2.69 L
FRCPLETH-PRED: 3.47 L
FVC-%PRED-PRE: 74 %
FVC-PRE: 3.28 L
FVC-PRED: 4.42 L
GLUCOSE SERPL-MCNC: 88 MG/DL (ref 70–99)
GLUCOSE UR STRIP-MCNC: NEGATIVE MG/DL
HCT VFR BLD AUTO: 32.1 % (ref 40–53)
HGB BLD-MCNC: 10.8 G/DL (ref 13.3–17.7)
HGB UR QL STRIP: ABNORMAL
IC-%PRED-PRE: 88 %
IC-PRE: 2.85 L
IC-PRED: 3.22 L
IMM GRANULOCYTES # BLD: 0.1 10E3/UL
IMM GRANULOCYTES NFR BLD: 1 %
INTERPRETATION: NORMAL
INTERPRETATION: NORMAL
KETONES UR STRIP-MCNC: NEGATIVE MG/DL
LEUKOCYTE ESTERASE UR QL STRIP: NEGATIVE
LYMPHOCYTES # BLD AUTO: 2.1 10E3/UL (ref 0.8–5.3)
LYMPHOCYTES NFR BLD AUTO: 39 %
MCH RBC QN AUTO: 33.1 PG (ref 26.5–33)
MCHC RBC AUTO-ENTMCNC: 33.6 G/DL (ref 31.5–36.5)
MCV RBC AUTO: 99 FL (ref 78–100)
MONOCYTES # BLD AUTO: 0.3 10E3/UL (ref 0–1.3)
MONOCYTES NFR BLD AUTO: 5 %
MUCOUS THREADS #/AREA URNS LPF: PRESENT /LPF
NEUTROPHILS # BLD AUTO: 2.8 10E3/UL (ref 1.6–8.3)
NEUTROPHILS NFR BLD AUTO: 49 %
NITRATE UR QL: NEGATIVE
NRBC # BLD AUTO: 0 10E3/UL
NRBC BLD AUTO-RTO: 0 /100
PH UR STRIP: 6 [PH] (ref 5–7)
PLATELET # BLD AUTO: 318 10E3/UL (ref 150–450)
POTASSIUM SERPL-SCNC: 4.5 MMOL/L (ref 3.4–5.3)
PROT SERPL-MCNC: 6.1 G/DL (ref 6.4–8.3)
RBC # BLD AUTO: 3.26 10E6/UL (ref 4.4–5.9)
RBC URINE: 2 /HPF
RSV RNA SPEC NAA+PROBE: NEGATIVE
RVPLETH-%PRED-PRE: 104 %
RVPLETH-PRE: 2.27 L
RVPLETH-PRED: 2.18 L
SARS-COV-2 RNA RESP QL NAA+PROBE: NEGATIVE
SIGNIFICANT RESULTS: NORMAL
SODIUM SERPL-SCNC: 141 MMOL/L (ref 135–145)
SP GR UR STRIP: 1.02 (ref 1–1.03)
SPECIMEN DESCRIPTION: NORMAL
TEST DETAILS, MDL: NORMAL
TLCPLETH-%PRED-PRE: 80 %
TLCPLETH-PRE: 5.54 L
TLCPLETH-PRED: 6.92 L
UROBILINOGEN UR STRIP-MCNC: NORMAL MG/DL
VA-%PRED-PRE: 78 %
VA-PRE: 5.01 L
VC-%PRED-PRE: 70 %
VC-PRE: 3.28 L
VC-PRED: 4.64 L
WBC # BLD AUTO: 5.6 10E3/UL (ref 4–11)
WBC URINE: 1 /HPF

## 2024-01-17 PROCEDURE — 99417 PROLNG OP E/M EACH 15 MIN: CPT | Performed by: INTERNAL MEDICINE

## 2024-01-17 PROCEDURE — 82040 ASSAY OF SERUM ALBUMIN: CPT

## 2024-01-17 PROCEDURE — 88184 FLOWCYTOMETRY/ TC 1 MARKER: CPT

## 2024-01-17 PROCEDURE — 87637 SARSCOV2&INF A&B&RSV AMP PRB: CPT

## 2024-01-17 PROCEDURE — 88185 FLOWCYTOMETRY/TC ADD-ON: CPT

## 2024-01-17 PROCEDURE — 88187 FLOWCYTOMETRY/READ 2-8: CPT | Performed by: PATHOLOGY

## 2024-01-17 PROCEDURE — 250N000011 HC RX IP 250 OP 636: Performed by: INTERNAL MEDICINE

## 2024-01-17 PROCEDURE — 99215 OFFICE O/P EST HI 40 MIN: CPT | Performed by: INTERNAL MEDICINE

## 2024-01-17 PROCEDURE — 85041 AUTOMATED RBC COUNT: CPT

## 2024-01-17 PROCEDURE — G0463 HOSPITAL OUTPT CLINIC VISIT: HCPCS | Performed by: INTERNAL MEDICINE

## 2024-01-17 PROCEDURE — 82374 ASSAY BLOOD CARBON DIOXIDE: CPT

## 2024-01-17 PROCEDURE — 81001 URINALYSIS AUTO W/SCOPE: CPT

## 2024-01-17 PROCEDURE — 36591 DRAW BLOOD OFF VENOUS DEVICE: CPT

## 2024-01-17 PROCEDURE — G0452 MOLECULAR PATHOLOGY INTERPR: HCPCS | Mod: 26 | Performed by: PATHOLOGY

## 2024-01-17 PROCEDURE — 81450 HL NEO GSAP 5-50DNA/DNA&RNA: CPT

## 2024-01-17 RX ORDER — HEPARIN SODIUM (PORCINE) LOCK FLUSH IV SOLN 100 UNIT/ML 100 UNIT/ML
5 SOLUTION INTRAVENOUS
Status: COMPLETED | OUTPATIENT
Start: 2024-01-17 | End: 2024-01-17

## 2024-01-17 RX ADMIN — Medication 5 ML: at 13:59

## 2024-01-17 ASSESSMENT — PAIN SCALES - GENERAL: PAINLEVEL: NO PAIN (0)

## 2024-01-17 NOTE — LETTER
1/17/2024         RE: Ziggy Hallman  [address] [address]        Dear Colleague,    Thank you for referring your patient, Ziggy Hallman, to the [facility] [place]. Please see a copy of my visit note below.    Date: Jan 17, 2024    Chief Complaint: bmt d -7     History Of Present Illness:    I met Ziggy last week. To summarize his history, he has had multiple episodes of arterial and venous thrombosis and developed cytopenias. He was seen in 2015 by Dr. 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 He was also worked up for [facility] and no mutations were found. She recommended continued observation and treating his hypercoagulability. More recently, his anemia worsened and he had a repeat bone marrow biopsy that showed 5-10% blasts. That was at [facility] and he was referred here and met with Dr. Cabral, shortly before her departure. She calculated his R-IPSS as 7.5 and recommended 4 cycles of azacitidine followed by evaluation for transplant. He has a repeat bone marrow on 12/29 also at [facility]. It indicated some improvement in blast count down to 5-6% and a plasma cell neoplasm. We did SPEP and a PET scan that was negative. We also reviewed the bone marrow here. Preliminarily, it appears that the plasma cells may not be monoclonal and the blast count might be a bit less. The flow cytometry was negative. His counts have improved. He has not had any clots for about 2.5 years now. He completed his BMT work up and comes in to discuss his eligibility.    Overall, he reports that he feels OK. 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 He comes in to discuss.    ROS: 14-point ROS is negative unless otherwise stated in HPI.    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    Medications:  Current Outpatient Medications   Medication    amLODIPine  "(NORVASC) 5 MG tablet    apixaban ANTICOAGULANT (ELIQUIS) 5 MG tablet    atorvastatin (LIPITOR) 40 MG tablet    hydrOXYzine HCl (ATARAX) 25 MG tablet    lisinopril-hydrochlorothiazide (ZESTORETIC) 20-25 MG tablet    metoprolol succinate ER (TOPROL XL) 100 MG 24 hr tablet    ondansetron (ZOFRAN) 8 MG tablet    prasugrel (EFFIENT) 10 MG TABS tablet    prochlorperazine (COMPAZINE) 10 MG tablet    Zolpidem Tartrate (AMBIEN PO)     No current facility-administered medications for this visit.     Updated PMH:  Past Medical History:   Diagnosis Date    AMI anterior wall (H)     Mid LAD on cath with stent    CAD S/P percutaneous coronary angioplasty 07/01/2016    Unstable agina with anterior myocardial damage reported without mycardial damage by patient's history    CVA (cerebrovascular accident) (H) 01/01/2012    Reporting thromboembolic episode on the right neck. Pt states \"I've had ten strokes.\"    Hypercoagulable state (H24)     Uncertain etiology--seeing Hematologist    MEDICAL HISTORY OF -     Possibly PFO     Updated PSH:  Past Surgical History:   Procedure Laterality Date    AMPUTATE TOE(S)      ARTHROSCOPY KNEE RT/LT      Right     ARTHROSCOPY KNEE RT/LT      Left    CARDIAC CATHERIZATION      With stent placement    IR CHEST PORT PLACEMENT > 5 YRS OF AGE  9/13/2023     Updated FH:  Family History   Problem Relation Age of Onset    Diabetes No family hx of     Coronary Artery Disease No family hx of     Hypertension No family hx of     Hyperlipidemia No family hx of     Breast Cancer No family hx of      Updated SH:  Social History     Tobacco Use    Smoking status: Former     Packs/day: .5     Types: Cigarettes     Passive exposure: Past (Mom smoked cigs indoors)    Smokeless tobacco: Never    Tobacco comments:     cigarette once in a while   Substance Use Topics    Alcohol use: Yes     Alcohol/week: 0.0 standard drinks of alcohol     Comment: 4 times per week 5 drinks    Drug use: No     Physical Exam:  Vitals: " BP (!) 149/91 (BP Location: Right arm, Patient Position: Sitting, Cuff Size: Adult Regular)   Pulse 89   Temp 97.9  F (36.6  C) (Oral)   Resp 16   Wt 90.1 kg (198 lb 9.6 oz)   SpO2 98%   BMI 29.33 kg/m    Wt Readings from Last 3 Encounters:   01/17/24 90.1 kg (198 lb 9.6 oz)   01/12/24 88.9 kg (196 lb)   01/10/24 89.3 kg (196 lb 12.8 oz)      GA: NAD, appears as stated age  HEENT: EOMI, PERRL, OP clear  Neck: Supple, no LAD  CV: RRR, no m/r/g  Lungs: Clear, no w/c/r  Abd: BS+, soft, NT, ND  Ext: warm and well perfused, no edema  Neuro: AAO, moves all extremities  Lymphatics: no palpable cervical, axillary, or inguinal LAD. No HSM  Skin: No acute rashes, no lesions  Psyc: appropriate, reactive  GVHD: NTD  Access: none    KPS: 80    Labs, pathology and other diagnostics reviewed    WBC   Date Value Ref Range Status   05/28/2020 4.9 4.0 - 11.0 10e9/L Final     WBC Count   Date Value Ref Range Status   01/17/2024 5.6 4.0 - 11.0 10e3/uL Final     Hemoglobin   Date Value Ref Range Status   01/17/2024 10.8 (L) 13.3 - 17.7 g/dL Final   05/28/2020 12.6 (L) 13.3 - 17.7 g/dL Final     Platelet Count   Date Value Ref Range Status   01/17/2024 318 150 - 450 10e3/uL Final   05/28/2020 280 150 - 450 10e9/L Final     Creatinine   Date Value Ref Range Status   01/17/2024 0.92 0.67 - 1.17 mg/dL Final   05/28/2020 0.80 0.66 - 1.25 mg/dL Final     Potassium   Date Value Ref Range Status   01/17/2024 4.5 3.4 - 5.3 mmol/L Final   05/28/2020 3.8 3.4 - 5.3 mmol/L Final         A/P    Date Jan 17, 2024   Diagnosis High risk MDS, R-IPSS 7.5   Planned Treatment MA Flu/Bu   GVHD Prophylaxis PTCy/Siro/MMF   HLA Match 8/8, DP permissive   PRA Negative   CMV Match -/-   Sex Match F -> M   Donor Age 28   ABO Match O -> A (minor)   KPS PS 80   BMI Body mass index is 29.33 kg/m .   PET Negative   LVEF 50-55%   Valvular abnormalities*  Mild mitral insufficiency   History of MI or arrhythmias Yes   ECG/QTC NSR/425   FEV1 73%   DLCOcor 93    Cr/GFR Creatinine   Date Value Ref Range Status   01/17/2024 0.92 0.67 - 1.17 mg/dL Final   05/28/2020 0.80 0.66 - 1.25 mg/dL Final      Bilirubin Lab Results   Component Value Date    BILITOTAL 0.2 01/17/2024    BILITOTAL 0.8 05/28/2020      ALT/AST Lab Results   Component Value Date    ALT 18 01/17/2024    ALT 25 05/28/2020    ;   AST   Date Value Ref Range Status   01/17/2024 33 0 - 45 U/L Final     Comment:     Reference intervals for this test were updated on 6/12/2023 to more accurately reflect our healthy population. There may be differences in the flagging of prior results with similar values performed with this method. Interpretation of those prior results can be made in the context of the updated reference intervals.   05/28/2020 20 0 - 45 U/L Final      Dental Clearance    Adequate Marrow reserve Yes   Chest CT Few sub 4 mm nodules (negative on PET)   Infectious work-up     Pregnancy Test    Lupron Injection    HCT CI 4 points   Disease status at transplant CR with MRD+   LDH    Planned maintenance NGS pending   Anticoagulation On prasugrel and apixaban. Hold when Plt < 50 and resume when able. High risk for thrombosis.   Toxoplasma IgG Negative   Other**    Proposed Clinical Trials      We met and discussed the above. We discussed the need to stop smoking tobacco and switch marijuana to something other than smoked. We also discussed the implications of mild cardiac and pulmonary dysfunction, history of stroke has on the outcomes of a myeloablative transplant. I again discussed the high risk nature of his disease and the need for transplant to be able to control this. We discussed his response to chemotherapy. We also went over the risks of transplant including but not limited to organ damage and death, GVHD, and relapse. He was agreeable to proceed. He said he has been thinking about this for a long time and is willing to proceed. We signed consent to proceed with our myeloablative transplant.    60  minutes spent on the date of the encounter doing chart review, interpretation of results, patient visit, documentation and coordination of care.      Again, thank you for allowing me to participate in the care of your patient.        Sincerely,        Tarna Bacon MD

## 2024-01-17 NOTE — NURSING NOTE
"Oncology Rooming Note    January 17, 2024 11:58 AM   Ziggy Hallman is a 48 year old male who presents for:    Chief Complaint   Patient presents with    Oncology Clinic Visit     MDS (myelodysplastic syndrome)     Initial Vitals: BP (!) 149/91 (BP Location: Right arm, Patient Position: Sitting, Cuff Size: Adult Regular)   Pulse 89   Temp 97.9  F (36.6  C) (Oral)   Resp 16   Wt 90.1 kg (198 lb 9.6 oz)   SpO2 98%   BMI 29.33 kg/m   Estimated body mass index is 29.33 kg/m  as calculated from the following:    Height as of 12/11/23: 1.753 m (5' 9\").    Weight as of this encounter: 90.1 kg (198 lb 9.6 oz). Body surface area is 2.09 meters squared.  No Pain (0) Comment: Data Unavailable   No LMP for male patient.  Allergies reviewed: Yes  Medications reviewed: Yes    Medications: Medication refills not needed today.  Pharmacy name entered into [facility]: [id number] - [place], [place] - [address] [place] [address]    Frailty Screening:   Is the patient here for a new oncology consult visit in cancer care? 2. No      Clinical concerns: none       Elissa Myles              "

## 2024-01-17 NOTE — PROGRESS NOTES
"Pre-admission Nursing Assessment     Patient is seen in person to review health status in preparation for planned admission on: 1/19/2024.    Does patient endorse any of the following (If yes to any question MUST provide characteristics including: location, description, duration, onset as appropriate):    New or worsening pain? No  Recent fever or other infectious symptoms, including but not limited to: runny nose, cough, localized swelling, redness, abnormal discharge, swollen or tender lymph nodes, increased fatigue. No  New rash? No  New onset N/V/D? No  New bleeding from any site? No  New injury? No  Has the patient had any known contact in the past 3 days with a sick contact? No    Has your health changed in any ways since you were last seen in our facility by BMT provider/NIDHI? No    Patient is reminded to contact the BMT office with ANY changes in their health, prior to their planned admission.    Patient has had or will have CBC/CMP draw within 7 days of admission on date: 1/17/24.     If \"YES\" to any of the above questions please contact provider and document in addendum.    Kandy العلي RN, January 17, 2024    "

## 2024-01-17 NOTE — NURSING NOTE
"Chief Complaint   Patient presents with    Lab Only     Labs drawn from port by rn.      Port accessed with 20 gauge 3/4\" gripper needle and labs drawn by rn.  Port flushed with NS and heparin then de-accessed.  Pt tolerated well.      Nasal swab obtained for Covid.  NP swab obtained for RSV, InfA/B    Urine collected and sent as well.    Destiny Fontanez RN      "

## 2024-01-17 NOTE — PROGRESS NOTES
Inpatient Admission Information:      Admit Date:  1/19/2024   Diagnosis:  MDS   Transplant Type:  Allo URD   Protocol:  [id number] Bu/Flu   Sedated bmbx needed?  No   NMDP lab (lab 7033) needed?  Yes  **If YES, NIDHI to please order with admission labs.  **If YES, this lab MUST BE DRAWN PRIOR TO ANY BMT PREP (chemo/TBI).   Notes:  Discussed 8:00am check in time for line placement. Patient verbalized understanding of pre-procedure instructions. CHG given.        New Eval Work-Up   MD Misha Gaitan         Consult Type Date   1     2     3           Long Term Follow-Up   MD Jordi Gaitan      EOC updated? Yes  Care team updated? Yes

## 2024-01-18 NOTE — PROGRESS NOTES
Date: Jan 17, 2024    Chief Complaint: bmt d -7     History Of Present Illness:    I met Ziggy last week. To summarize his history, he has had multiple episodes of arterial and venous thrombosis and developed cytopenias. He was seen in 2015 by Dr. 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 He was also worked up for DKC and no mutations were found. She recommended continued observation and treating his hypercoagulability. More recently, his anemia worsened and he had a repeat bone marrow biopsy that showed 5-10% blasts. That was at [facility] and he was referred here and met with Dr. Cabral, shortly before her departure. She calculated his R-IPSS as 7.5 and recommended 4 cycles of azacitidine followed by evaluation for transplant. He has a repeat bone marrow on 12/29 also at [facility]. It indicated some improvement in blast count down to 5-6% and a plasma cell neoplasm. We did SPEP and a PET scan that was negative. We also reviewed the bone marrow here. Preliminarily, it appears that the plasma cells may not be monoclonal and the blast count might be a bit less. The flow cytometry was negative. His counts have improved. He has not had any clots for about 2.5 years now. He completed his BMT work up and comes in to discuss his eligibility.    Overall, he reports that he feels OK. 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 He comes in to discuss.    ROS: 14-point ROS is negative unless otherwise stated in HPI.    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     Updated PMH:  Past Medical History:   Diagnosis Date    AMI anterior wall (H)     Mid LAD on cath with stent    CAD S/P percutaneous coronary angioplasty 07/01/2016    Unstable agina with anterior myocardial damage reported without mycardial damage by patient's history    CVA (cerebrovascular accident) (H) 01/01/2012    Reporting thromboembolic episode on the right neck. Pt states \"I've had ten strokes.\"    Hypercoagulable state (H24)     Uncertain etiology--seeing Hematologist    MEDICAL HISTORY OF -     Possibly PFO     Updated PSH:  Past Surgical History:   Procedure Laterality Date    AMPUTATE TOE(S)      ARTHROSCOPY KNEE RT/LT      Right     ARTHROSCOPY KNEE RT/LT      Left    CARDIAC CATHERIZATION      With stent placement    IR CHEST PORT PLACEMENT > 5 YRS OF AGE  9/13/2023     Updated FH:  Family History   Problem Relation Age of Onset    Diabetes No family hx of     Coronary Artery Disease No family hx of     Hypertension No family hx of     Hyperlipidemia No family hx of     Breast Cancer No family hx of      Updated SH:  Social History     Tobacco Use    Smoking status: Former     Packs/day: .5     Types: Cigarettes     Passive exposure: Past (Mom smoked cigs indoors)    Smokeless tobacco: Never    Tobacco comments:     cigarette once in a while   Substance Use Topics    Alcohol use: Yes     Alcohol/week: 0.0 standard drinks of alcohol     Comment: 4 times per week 5 drinks    Drug use: No     Physical Exam:  Vitals: BP (!) 149/91 (BP Location: Right arm, Patient Position: Sitting, Cuff Size: Adult Regular)   Pulse 89   Temp 97.9  F (36.6  C) (Oral)   Resp 16   Wt 90.1 kg (198 lb 9.6 oz)   SpO2 98%   BMI 29.33 kg/m    Wt Readings from Last 3 Encounters:   01/17/24 90.1 kg (198 lb 9.6 oz) "   01/12/24 88.9 kg (196 lb)   01/10/24 89.3 kg (196 lb 12.8 oz)      GA: NAD, appears as stated age  HEENT: EOMI, PERRL, OP clear  Neck: Supple, no LAD  CV: RRR, no m/r/g  Lungs: Clear, no w/c/r  Abd: BS+, soft, NT, ND  Ext: warm and well perfused, no edema  Neuro: AAO, moves all extremities  Lymphatics: no palpable cervical, axillary, or inguinal LAD. No HSM  Skin: No acute rashes, no lesions  Psyc: appropriate, reactive  GVHD: NTD  Access: none    KPS: 80    Labs, pathology and other diagnostics reviewed    WBC   Date Value Ref Range Status   05/28/2020 4.9 4.0 - 11.0 10e9/L Final     WBC Count   Date Value Ref Range Status   01/17/2024 5.6 4.0 - 11.0 10e3/uL Final     Hemoglobin   Date Value Ref Range Status   01/17/2024 10.8 (L) 13.3 - 17.7 g/dL Final   05/28/2020 12.6 (L) 13.3 - 17.7 g/dL Final     Platelet Count   Date Value Ref Range Status   01/17/2024 318 150 - 450 10e3/uL Final   05/28/2020 280 150 - 450 10e9/L Final     Creatinine   Date Value Ref Range Status   01/17/2024 0.92 0.67 - 1.17 mg/dL Final   05/28/2020 0.80 0.66 - 1.25 mg/dL Final     Potassium   Date Value Ref Range Status   01/17/2024 4.5 3.4 - 5.3 mmol/L Final   05/28/2020 3.8 3.4 - 5.3 mmol/L Final         A/P    Date Jan 17, 2024   Diagnosis High risk MDS, R-IPSS 7.5   Planned Treatment MA Flu/Bu   GVHD Prophylaxis PTCy/Siro/MMF   HLA Match 8/8, DP permissive   PRA Negative   CMV Match -/-   Sex Match F -> M   Donor Age 26   ABO Match O -> A (minor)   KPS PS 80   BMI Body mass index is 29.33 kg/m .   PET Negative   LVEF 50-55%   Valvular abnormalities*  Mild mitral insufficiency   History of MI or arrhythmias Yes   ECG/QTC NSR/425   FEV1 73%   DLCOcor 93   Cr/GFR Creatinine   Date Value Ref Range Status   01/17/2024 0.92 0.67 - 1.17 mg/dL Final   05/28/2020 0.80 0.66 - 1.25 mg/dL Final      Bilirubin Lab Results   Component Value Date    BILITOTAL 0.2 01/17/2024    BILITOTAL 0.8 05/28/2020      ALT/AST Lab Results   Component Value Date     ALT 18 01/17/2024    ALT 25 05/28/2020    ;   AST   Date Value Ref Range Status   01/17/2024 33 0 - 45 U/L Final     Comment:     Reference intervals for this test were updated on 6/12/2023 to more accurately reflect our healthy population. There may be differences in the flagging of prior results with similar values performed with this method. Interpretation of those prior results can be made in the context of the updated reference intervals.   05/28/2020 20 0 - 45 U/L Final      Dental Clearance    Adequate Marrow reserve Yes   Chest CT Few sub 4 mm nodules (negative on PET)   Infectious work-up     Pregnancy Test    Lupron Injection    HCT CI 4 points   Disease status at transplant CR with MRD+   LDH    Planned maintenance NGS pending   Anticoagulation On prasugrel and apixaban. Hold when Plt < 50 and resume when able. High risk for thrombosis.   Toxoplasma IgG Negative   Other**    Proposed Clinical Trials      We met and discussed the above. We discussed the need to stop smoking tobacco and switch marijuana to something other than smoked. We also discussed the implications of mild cardiac and pulmonary dysfunction, history of stroke has on the outcomes of a myeloablative transplant. I again discussed the high risk nature of his disease and the need for transplant to be able to control this. We discussed his response to chemotherapy. We also went over the risks of transplant including but not limited to organ damage and death, GVHD, and relapse. He was agreeable to proceed. He said he has been thinking about this for a long time and is willing to proceed. We signed consent to proceed with our myeloablative transplant.    60 minutes spent on the date of the encounter doing chart review, interpretation of results, patient visit, documentation and coordination of care.

## 2024-01-19 ENCOUNTER — HOME INFUSION (PRE-WILLOW HOME INFUSION) (OUTPATIENT)
Dept: PHARMACY | Facility: CLINIC | Age: 49
End: 2024-01-19

## 2024-01-19 ENCOUNTER — APPOINTMENT (OUTPATIENT)
Dept: INTERVENTIONAL RADIOLOGY/VASCULAR | Facility: CLINIC | Age: 49
DRG: 014 | End: 2024-01-19
Attending: INTERNAL MEDICINE
Payer: COMMERCIAL

## 2024-01-19 ENCOUNTER — APPOINTMENT (OUTPATIENT)
Dept: MEDSURG UNIT | Facility: CLINIC | Age: 49
DRG: 014 | End: 2024-01-19
Attending: INTERNAL MEDICINE
Payer: COMMERCIAL

## 2024-01-19 ENCOUNTER — HOSPITAL ENCOUNTER (INPATIENT)
Facility: CLINIC | Age: 49
LOS: 32 days | Discharge: HOME | DRG: 014 | End: 2024-02-20
Attending: INTERNAL MEDICINE | Admitting: INTERNAL MEDICINE
Payer: COMMERCIAL

## 2024-01-19 DIAGNOSIS — Z94.81 STATUS POST BONE MARROW TRANSPLANT (H): Primary | ICD-10-CM

## 2024-01-19 DIAGNOSIS — K92.2 GASTROINTESTINAL HEMORRHAGE, UNSPECIFIED GASTROINTESTINAL HEMORRHAGE TYPE: ICD-10-CM

## 2024-01-19 DIAGNOSIS — I26.93 SINGLE SUBSEGMENTAL PULMONARY EMBOLISM WITHOUT ACUTE COR PULMONALE (H): ICD-10-CM

## 2024-01-19 DIAGNOSIS — D46.9 MDS (MYELODYSPLASTIC SYNDROME) (H): ICD-10-CM

## 2024-01-19 LAB
ABO/RH(D): NORMAL
ALBUMIN SERPL BCG-MCNC: 2.3 G/DL (ref 3.5–5.2)
ALP SERPL-CCNC: 77 U/L (ref 40–150)
ALT SERPL W P-5'-P-CCNC: 18 U/L (ref 0–70)
ANION GAP SERPL CALCULATED.3IONS-SCNC: 7 MMOL/L (ref 7–15)
ANTIBODY SCREEN: NEGATIVE
APTT PPP: 25 SECONDS (ref 22–38)
AST SERPL W P-5'-P-CCNC: 24 U/L (ref 0–45)
BASOPHILS # BLD AUTO: 0.1 10E3/UL (ref 0–0.2)
BASOPHILS NFR BLD AUTO: 1 %
BILIRUB SERPL-MCNC: <0.2 MG/DL
BUN SERPL-MCNC: 21.2 MG/DL (ref 6–20)
CALCIUM SERPL-MCNC: 8.5 MG/DL (ref 8.6–10)
CHLORIDE SERPL-SCNC: 108 MMOL/L (ref 98–107)
CREAT SERPL-MCNC: 0.84 MG/DL (ref 0.67–1.17)
DEPRECATED HCO3 PLAS-SCNC: 24 MMOL/L (ref 22–29)
EGFRCR SERPLBLD CKD-EPI 2021: >90 ML/MIN/1.73M2
EOSINOPHIL # BLD AUTO: 0.3 10E3/UL (ref 0–0.7)
EOSINOPHIL NFR BLD AUTO: 5 %
ERYTHROCYTE [DISTWIDTH] IN BLOOD BY AUTOMATED COUNT: 14.8 % (ref 10–15)
GLUCOSE BLDC GLUCOMTR-MCNC: 96 MG/DL (ref 70–99)
GLUCOSE SERPL-MCNC: 92 MG/DL (ref 70–99)
HCT VFR BLD AUTO: 30 % (ref 40–53)
HGB BLD-MCNC: 10.1 G/DL (ref 13.3–17.7)
IMM GRANULOCYTES # BLD: 0.1 10E3/UL
IMM GRANULOCYTES NFR BLD: 1 %
INR PPP: 0.98 (ref 0.85–1.15)
LYMPHOCYTES # BLD AUTO: 2.2 10E3/UL (ref 0.8–5.3)
LYMPHOCYTES NFR BLD AUTO: 38 %
MAGNESIUM SERPL-MCNC: 2 MG/DL (ref 1.7–2.3)
MCH RBC QN AUTO: 33.8 PG (ref 26.5–33)
MCHC RBC AUTO-ENTMCNC: 33.7 G/DL (ref 31.5–36.5)
MCV RBC AUTO: 100 FL (ref 78–100)
MONOCYTES # BLD AUTO: 0.3 10E3/UL (ref 0–1.3)
MONOCYTES NFR BLD AUTO: 4 %
NEUTROPHILS # BLD AUTO: 2.8 10E3/UL (ref 1.6–8.3)
NEUTROPHILS NFR BLD AUTO: 51 %
NRBC # BLD AUTO: 0 10E3/UL
NRBC BLD AUTO-RTO: 0 /100
PLATELET # BLD AUTO: 292 10E3/UL (ref 150–450)
POTASSIUM SERPL-SCNC: 4.1 MMOL/L (ref 3.4–5.3)
PROT SERPL-MCNC: 5.5 G/DL (ref 6.4–8.3)
RBC # BLD AUTO: 2.99 10E6/UL (ref 4.4–5.9)
SODIUM SERPL-SCNC: 139 MMOL/L (ref 135–145)
SPECIMEN EXPIRATION DATE: NORMAL
SPECIMEN STATUS: NORMAL
URATE SERPL-MCNC: 10.6 MG/DL (ref 3.4–7)
WBC # BLD AUTO: 5.7 10E3/UL (ref 4–11)

## 2024-01-19 PROCEDURE — C1751 CATH, INF, PER/CENT/MIDLINE: HCPCS

## 2024-01-19 PROCEDURE — 36558 INSERT TUNNELED CV CATH: CPT | Mod: LT | Performed by: PHYSICIAN ASSISTANT

## 2024-01-19 PROCEDURE — 02H633Z INSERTION OF INFUSION DEVICE INTO RIGHT ATRIUM, PERCUTANEOUS APPROACH: ICD-10-PCS | Performed by: PHYSICIAN ASSISTANT

## 2024-01-19 PROCEDURE — 85025 COMPLETE CBC W/AUTO DIFF WBC: CPT | Performed by: PHYSICIAN ASSISTANT

## 2024-01-19 PROCEDURE — 3E043XZ INTRODUCTION OF VASOPRESSOR INTO CENTRAL VEIN, PERCUTANEOUS APPROACH: ICD-10-PCS | Performed by: PHYSICIAN ASSISTANT

## 2024-01-19 PROCEDURE — 77001 FLUOROGUIDE FOR VEIN DEVICE: CPT | Mod: 26 | Performed by: PHYSICIAN ASSISTANT

## 2024-01-19 PROCEDURE — 250N000011 HC RX IP 250 OP 636: Mod: JZ | Performed by: INTERNAL MEDICINE

## 2024-01-19 PROCEDURE — 85610 PROTHROMBIN TIME: CPT | Performed by: PHYSICIAN ASSISTANT

## 2024-01-19 PROCEDURE — 250N000011 HC RX IP 250 OP 636: Performed by: PHYSICIAN ASSISTANT

## 2024-01-19 PROCEDURE — 999N000132 HC STATISTIC PP CARE STAGE 1

## 2024-01-19 PROCEDURE — 258N000003 HC RX IP 258 OP 636: Performed by: PHYSICIAN ASSISTANT

## 2024-01-19 PROCEDURE — 99153 MOD SED SAME PHYS/QHP EA: CPT

## 2024-01-19 PROCEDURE — 258N000003 HC RX IP 258 OP 636: Performed by: NURSE PRACTITIONER

## 2024-01-19 PROCEDURE — 85730 THROMBOPLASTIN TIME PARTIAL: CPT | Performed by: PHYSICIAN ASSISTANT

## 2024-01-19 PROCEDURE — 272N000192 HC ACCESSORY CR2

## 2024-01-19 PROCEDURE — 250N000013 HC RX MED GY IP 250 OP 250 PS 637: Performed by: PHYSICIAN ASSISTANT

## 2024-01-19 PROCEDURE — 99418 PROLNG IP/OBS E/M EA 15 MIN: CPT | Performed by: PHYSICIAN ASSISTANT

## 2024-01-19 PROCEDURE — 250N000011 HC RX IP 250 OP 636: Performed by: NURSE PRACTITIONER

## 2024-01-19 PROCEDURE — 258N000003 HC RX IP 258 OP 636: Performed by: INTERNAL MEDICINE

## 2024-01-19 PROCEDURE — 76937 US GUIDE VASCULAR ACCESS: CPT | Mod: 26 | Performed by: PHYSICIAN ASSISTANT

## 2024-01-19 PROCEDURE — 99152 MOD SED SAME PHYS/QHP 5/>YRS: CPT | Performed by: PHYSICIAN ASSISTANT

## 2024-01-19 PROCEDURE — 272N000504 HC NEEDLE CR4

## 2024-01-19 PROCEDURE — 99223 1ST HOSP IP/OBS HIGH 75: CPT | Mod: AI | Performed by: PHYSICIAN ASSISTANT

## 2024-01-19 PROCEDURE — 80053 COMPREHEN METABOLIC PANEL: CPT | Performed by: PHYSICIAN ASSISTANT

## 2024-01-19 PROCEDURE — 77001 FLUOROGUIDE FOR VEIN DEVICE: CPT

## 2024-01-19 PROCEDURE — 3E04305 INTRODUCTION OF OTHER ANTINEOPLASTIC INTO CENTRAL VEIN, PERCUTANEOUS APPROACH: ICD-10-PCS | Performed by: INTERNAL MEDICINE

## 2024-01-19 PROCEDURE — 206N000001 HC R&B BMT UMMC

## 2024-01-19 PROCEDURE — 84550 ASSAY OF BLOOD/URIC ACID: CPT | Performed by: PHYSICIAN ASSISTANT

## 2024-01-19 PROCEDURE — 250N000009 HC RX 250: Performed by: PHYSICIAN ASSISTANT

## 2024-01-19 PROCEDURE — C1769 GUIDE WIRE: HCPCS

## 2024-01-19 PROCEDURE — 99152 MOD SED SAME PHYS/QHP 5/>YRS: CPT

## 2024-01-19 PROCEDURE — 250N000012 HC RX MED GY IP 250 OP 636 PS 637: Performed by: PHYSICIAN ASSISTANT

## 2024-01-19 PROCEDURE — 83735 ASSAY OF MAGNESIUM: CPT | Performed by: PHYSICIAN ASSISTANT

## 2024-01-19 PROCEDURE — 86900 BLOOD TYPING SEROLOGIC ABO: CPT | Performed by: PHYSICIAN ASSISTANT

## 2024-01-19 RX ORDER — FLUMAZENIL 0.1 MG/ML
0.2 INJECTION, SOLUTION INTRAVENOUS
Status: DISCONTINUED | OUTPATIENT
Start: 2024-01-19 | End: 2024-01-19 | Stop reason: HOSPADM

## 2024-01-19 RX ORDER — DIPHENHYDRAMINE HCL 25 MG
25 CAPSULE ORAL ONCE
Status: COMPLETED | OUTPATIENT
Start: 2024-01-25 | End: 2024-01-25

## 2024-01-19 RX ORDER — SULFAMETHOXAZOLE/TRIMETHOPRIM 800-160 MG
1 TABLET ORAL
Status: DISCONTINUED | OUTPATIENT
Start: 2024-02-26 | End: 2024-02-20 | Stop reason: HOSPADM

## 2024-01-19 RX ORDER — HEPARIN SODIUM,PORCINE 10 UNIT/ML
5-20 VIAL (ML) INTRAVENOUS EVERY 24 HOURS
Status: DISCONTINUED | OUTPATIENT
Start: 2024-01-19 | End: 2024-02-20 | Stop reason: HOSPADM

## 2024-01-19 RX ORDER — ACETAMINOPHEN 500 MG
500 TABLET ORAL ONCE
Status: COMPLETED | OUTPATIENT
Start: 2024-01-25 | End: 2024-01-25

## 2024-01-19 RX ORDER — NALOXONE HYDROCHLORIDE 0.4 MG/ML
0.2 INJECTION, SOLUTION INTRAMUSCULAR; INTRAVENOUS; SUBCUTANEOUS
Status: DISCONTINUED | OUTPATIENT
Start: 2024-01-19 | End: 2024-02-20 | Stop reason: HOSPADM

## 2024-01-19 RX ORDER — LORAZEPAM 0.5 MG/1
.5-1 TABLET ORAL EVERY 4 HOURS PRN
Status: DISCONTINUED | OUTPATIENT
Start: 2024-01-19 | End: 2024-02-20 | Stop reason: HOSPADM

## 2024-01-19 RX ORDER — ZOLPIDEM TARTRATE 5 MG/1
5 TABLET ORAL
Status: DISCONTINUED | OUTPATIENT
Start: 2024-01-19 | End: 2024-02-08

## 2024-01-19 RX ORDER — CEFEPIME HYDROCHLORIDE 2 G/1
2 INJECTION, POWDER, FOR SOLUTION INTRAVENOUS
Status: COMPLETED | OUTPATIENT
Start: 2024-01-19 | End: 2024-01-28

## 2024-01-19 RX ORDER — OXYCODONE HYDROCHLORIDE 5 MG/1
5 TABLET ORAL EVERY 4 HOURS PRN
Status: DISCONTINUED | OUTPATIENT
Start: 2024-01-19 | End: 2024-01-22

## 2024-01-19 RX ORDER — ACYCLOVIR 800 MG/1
800 TABLET ORAL 2 TIMES DAILY
Status: DISCONTINUED | OUTPATIENT
Start: 2024-01-21 | End: 2024-02-20 | Stop reason: HOSPADM

## 2024-01-19 RX ORDER — NALOXONE HYDROCHLORIDE 0.4 MG/ML
0.4 INJECTION, SOLUTION INTRAMUSCULAR; INTRAVENOUS; SUBCUTANEOUS
Status: DISCONTINUED | OUTPATIENT
Start: 2024-01-19 | End: 2024-01-19 | Stop reason: HOSPADM

## 2024-01-19 RX ORDER — LEVETIRACETAM 500 MG/1
500 TABLET ORAL 2 TIMES DAILY
Status: COMPLETED | OUTPATIENT
Start: 2024-01-19 | End: 2024-01-24

## 2024-01-19 RX ORDER — LORAZEPAM 2 MG/ML
.5-1 INJECTION INTRAMUSCULAR EVERY 4 HOURS PRN
Status: DISCONTINUED | OUTPATIENT
Start: 2024-01-19 | End: 2024-02-20 | Stop reason: HOSPADM

## 2024-01-19 RX ORDER — LIDOCAINE 40 MG/G
CREAM TOPICAL
Status: DISCONTINUED | OUTPATIENT
Start: 2024-01-19 | End: 2024-01-19 | Stop reason: HOSPADM

## 2024-01-19 RX ORDER — MEPERIDINE HYDROCHLORIDE 25 MG/ML
25-50 INJECTION INTRAMUSCULAR; INTRAVENOUS; SUBCUTANEOUS
Status: ACTIVE | OUTPATIENT
Start: 2024-01-25 | End: 2024-01-26

## 2024-01-19 RX ORDER — AMLODIPINE BESYLATE 5 MG/1
5 TABLET ORAL DAILY
Status: DISCONTINUED | OUTPATIENT
Start: 2024-01-19 | End: 2024-01-21

## 2024-01-19 RX ORDER — ONDANSETRON 8 MG/1
8 TABLET, FILM COATED ORAL EVERY 8 HOURS
Status: COMPLETED | OUTPATIENT
Start: 2024-01-19 | End: 2024-01-25

## 2024-01-19 RX ORDER — CEFAZOLIN SODIUM 2 G/100ML
2 INJECTION, SOLUTION INTRAVENOUS
Status: COMPLETED | OUTPATIENT
Start: 2024-01-19 | End: 2024-01-19

## 2024-01-19 RX ORDER — NALOXONE HYDROCHLORIDE 0.4 MG/ML
0.2 INJECTION, SOLUTION INTRAMUSCULAR; INTRAVENOUS; SUBCUTANEOUS
Status: DISCONTINUED | OUTPATIENT
Start: 2024-01-19 | End: 2024-01-19 | Stop reason: HOSPADM

## 2024-01-19 RX ORDER — HEPARIN SODIUM,PORCINE 10 UNIT/ML
2.5 VIAL (ML) INTRAVENOUS
Status: COMPLETED | OUTPATIENT
Start: 2024-01-19 | End: 2024-01-19

## 2024-01-19 RX ORDER — HEPARIN SODIUM (PORCINE) LOCK FLUSH IV SOLN 100 UNIT/ML 100 UNIT/ML
500 SOLUTION INTRAVENOUS
Status: DISCONTINUED | OUTPATIENT
Start: 2024-01-19 | End: 2024-02-20 | Stop reason: HOSPADM

## 2024-01-19 RX ORDER — DEXTROSE MONOHYDRATE 50 MG/ML
10-20 INJECTION, SOLUTION INTRAVENOUS
Status: DISCONTINUED | OUTPATIENT
Start: 2024-01-30 | End: 2024-02-17

## 2024-01-19 RX ORDER — HEPARIN SODIUM,PORCINE 10 UNIT/ML
5-20 VIAL (ML) INTRAVENOUS
Status: DISCONTINUED | OUTPATIENT
Start: 2024-01-19 | End: 2024-02-20 | Stop reason: HOSPADM

## 2024-01-19 RX ORDER — ALLOPURINOL 300 MG/1
300 TABLET ORAL DAILY
Status: COMPLETED | OUTPATIENT
Start: 2024-01-19 | End: 2024-01-24

## 2024-01-19 RX ORDER — ACETAMINOPHEN 325 MG/1
650 TABLET ORAL EVERY 4 HOURS PRN
Status: DISCONTINUED | OUTPATIENT
Start: 2024-01-27 | End: 2024-02-03

## 2024-01-19 RX ORDER — PANTOPRAZOLE SODIUM 40 MG/1
40 TABLET, DELAYED RELEASE ORAL DAILY
Status: DISCONTINUED | OUTPATIENT
Start: 2024-01-19 | End: 2024-02-20

## 2024-01-19 RX ORDER — ACETAMINOPHEN 325 MG/1
325-650 TABLET ORAL EVERY 4 HOURS PRN
Status: DISCONTINUED | OUTPATIENT
Start: 2024-01-27 | End: 2024-02-20 | Stop reason: HOSPADM

## 2024-01-19 RX ORDER — DEXAMETHASONE 4 MG/1
8 TABLET ORAL ONCE
Status: COMPLETED | OUTPATIENT
Start: 2024-01-23 | End: 2024-01-23

## 2024-01-19 RX ORDER — FUROSEMIDE 10 MG/ML
10 INJECTION INTRAMUSCULAR; INTRAVENOUS
Status: DISPENSED | OUTPATIENT
Start: 2024-01-28 | End: 2024-01-30

## 2024-01-19 RX ORDER — METOPROLOL SUCCINATE 50 MG/1
100 TABLET, EXTENDED RELEASE ORAL DAILY
Status: DISCONTINUED | OUTPATIENT
Start: 2024-01-19 | End: 2024-02-20 | Stop reason: HOSPADM

## 2024-01-19 RX ORDER — ACETAMINOPHEN 325 MG/1
325-650 TABLET ORAL EVERY 4 HOURS PRN
Status: DISCONTINUED | OUTPATIENT
Start: 2024-01-19 | End: 2024-01-19

## 2024-01-19 RX ORDER — NALOXONE HYDROCHLORIDE 0.4 MG/ML
0.4 INJECTION, SOLUTION INTRAMUSCULAR; INTRAVENOUS; SUBCUTANEOUS
Status: DISCONTINUED | OUTPATIENT
Start: 2024-01-19 | End: 2024-02-20 | Stop reason: HOSPADM

## 2024-01-19 RX ORDER — HYDROXYZINE HYDROCHLORIDE 25 MG/1
25 TABLET, FILM COATED ORAL 3 TIMES DAILY PRN
Status: DISCONTINUED | OUTPATIENT
Start: 2024-01-19 | End: 2024-01-24

## 2024-01-19 RX ORDER — PRASUGREL 10 MG/1
10 TABLET, FILM COATED ORAL DAILY
Status: DISCONTINUED | OUTPATIENT
Start: 2024-01-19 | End: 2024-02-01

## 2024-01-19 RX ORDER — ONDANSETRON 8 MG/1
8 TABLET, FILM COATED ORAL EVERY 8 HOURS
Status: COMPLETED | OUTPATIENT
Start: 2024-01-28 | End: 2024-01-31

## 2024-01-19 RX ORDER — LEVOFLOXACIN 250 MG/1
250 TABLET, FILM COATED ORAL
Status: DISCONTINUED | OUTPATIENT
Start: 2024-01-24 | End: 2024-02-16

## 2024-01-19 RX ORDER — FENTANYL CITRATE 50 UG/ML
25-50 INJECTION, SOLUTION INTRAMUSCULAR; INTRAVENOUS EVERY 5 MIN PRN
Status: DISCONTINUED | OUTPATIENT
Start: 2024-01-19 | End: 2024-01-19 | Stop reason: HOSPADM

## 2024-01-19 RX ORDER — PROCHLORPERAZINE MALEATE 5 MG
5-10 TABLET ORAL EVERY 6 HOURS PRN
Status: DISCONTINUED | OUTPATIENT
Start: 2024-01-19 | End: 2024-02-20 | Stop reason: HOSPADM

## 2024-01-19 RX ORDER — SIROLIMUS 2 MG/1
12 TABLET, FILM COATED ORAL ONCE
Status: COMPLETED | OUTPATIENT
Start: 2024-01-30 | End: 2024-01-30

## 2024-01-19 RX ORDER — SODIUM CHLORIDE 9 MG/ML
INJECTION, SOLUTION INTRAVENOUS CONTINUOUS
Status: DISCONTINUED | OUTPATIENT
Start: 2024-01-19 | End: 2024-01-19 | Stop reason: HOSPADM

## 2024-01-19 RX ORDER — FUROSEMIDE 10 MG/ML
20 INJECTION INTRAMUSCULAR; INTRAVENOUS EVERY 8 HOURS PRN
Status: DISPENSED | OUTPATIENT
Start: 2024-01-28 | End: 2024-01-30

## 2024-01-19 RX ORDER — URSODIOL 300 MG/1
300 CAPSULE ORAL 3 TIMES DAILY
Status: DISCONTINUED | OUTPATIENT
Start: 2024-01-19 | End: 2024-02-20 | Stop reason: HOSPADM

## 2024-01-19 RX ORDER — LISINOPRIL 10 MG/1
20 TABLET ORAL DAILY
Status: DISCONTINUED | OUTPATIENT
Start: 2024-01-19 | End: 2024-01-20

## 2024-01-19 RX ADMIN — ONDANSETRON HYDROCHLORIDE 8 MG: 8 TABLET, FILM COATED ORAL at 23:31

## 2024-01-19 RX ADMIN — Medication 2.5 ML: at 10:06

## 2024-01-19 RX ADMIN — FENTANYL CITRATE 25 MCG: 50 INJECTION, SOLUTION INTRAMUSCULAR; INTRAVENOUS at 09:44

## 2024-01-19 RX ADMIN — PANTOPRAZOLE SODIUM 40 MG: 40 TABLET, DELAYED RELEASE ORAL at 13:09

## 2024-01-19 RX ADMIN — FENTANYL CITRATE 25 MCG: 50 INJECTION, SOLUTION INTRAMUSCULAR; INTRAVENOUS at 10:02

## 2024-01-19 RX ADMIN — URSODIOL 300 MG: 300 CAPSULE ORAL at 19:34

## 2024-01-19 RX ADMIN — AMLODIPINE BESYLATE 5 MG: 5 TABLET ORAL at 13:09

## 2024-01-19 RX ADMIN — LIDOCAINE HYDROCHLORIDE 10 ML: 10 INJECTION, SOLUTION EPIDURAL; INFILTRATION; INTRACAUDAL; PERINEURAL at 10:04

## 2024-01-19 RX ADMIN — METOPROLOL SUCCINATE 100 MG: 50 TABLET, EXTENDED RELEASE ORAL at 13:09

## 2024-01-19 RX ADMIN — APIXABAN 5 MG: 5 TABLET, FILM COATED ORAL at 19:34

## 2024-01-19 RX ADMIN — LISINOPRIL 20 MG: 10 TABLET ORAL at 13:09

## 2024-01-19 RX ADMIN — DEXAMETHASONE 10 MG: 2 TABLET ORAL at 23:31

## 2024-01-19 RX ADMIN — Medication 2.5 ML: at 10:05

## 2024-01-19 RX ADMIN — APIXABAN 5 MG: 5 TABLET, FILM COATED ORAL at 13:09

## 2024-01-19 RX ADMIN — CEFAZOLIN SODIUM 2 G: 2 INJECTION, SOLUTION INTRAVENOUS at 08:53

## 2024-01-19 RX ADMIN — MIDAZOLAM 0.5 MG: 1 INJECTION INTRAMUSCULAR; INTRAVENOUS at 09:44

## 2024-01-19 RX ADMIN — Medication 500 UNITS: at 16:38

## 2024-01-19 RX ADMIN — MICAFUNGIN SODIUM 150 MG: 50 INJECTION, POWDER, LYOPHILIZED, FOR SOLUTION INTRAVENOUS at 14:50

## 2024-01-19 RX ADMIN — ALLOPURINOL 300 MG: 300 TABLET ORAL at 14:50

## 2024-01-19 RX ADMIN — LEVETIRACETAM 500 MG: 500 TABLET, FILM COATED ORAL at 19:34

## 2024-01-19 RX ADMIN — URSODIOL 300 MG: 300 CAPSULE ORAL at 13:11

## 2024-01-19 RX ADMIN — FENTANYL CITRATE 25 MCG: 50 INJECTION, SOLUTION INTRAMUSCULAR; INTRAVENOUS at 09:58

## 2024-01-19 RX ADMIN — OXYCODONE HYDROCHLORIDE 5 MG: 5 TABLET ORAL at 18:56

## 2024-01-19 RX ADMIN — PRASUGREL 10 MG: 10 TABLET, FILM COATED ORAL at 13:26

## 2024-01-19 RX ADMIN — SODIUM CHLORIDE: 9 INJECTION, SOLUTION INTRAVENOUS at 08:53

## 2024-01-19 RX ADMIN — FLUDARABINE PHOSPHATE 84 MG: 25 INJECTION, SOLUTION INTRAVENOUS at 23:52

## 2024-01-19 ASSESSMENT — ACTIVITIES OF DAILY LIVING (ADL)
ADLS_ACUITY_SCORE: 20
DIFFICULTY_COMMUNICATING: NO
DOING_ERRANDS_INDEPENDENTLY_DIFFICULTY: NO
VISION_MANAGEMENT: GLASSES
ADLS_ACUITY_SCORE: 20
TOILETING_ISSUES: NO
FALL_HISTORY_WITHIN_LAST_SIX_MONTHS: NO
WEAR_GLASSES_OR_BLIND: YES
ADLS_ACUITY_SCORE: 35
ADLS_ACUITY_SCORE: 20
ADLS_ACUITY_SCORE: 20
HEARING_DIFFICULTY_OR_DEAF: NO
DRESSING/BATHING_DIFFICULTY: NO
DIFFICULTY_EATING/SWALLOWING: NO
ADLS_ACUITY_SCORE: 20
WALKING_OR_CLIMBING_STAIRS_DIFFICULTY: NO
CONCENTRATING,_REMEMBERING_OR_MAKING_DECISIONS_DIFFICULTY: NO
CHANGE_IN_FUNCTIONAL_STATUS_SINCE_ONSET_OF_CURRENT_ILLNESS/INJURY: NO

## 2024-01-19 NOTE — PRE-PROCEDURE
GENERAL PRE-PROCEDURE:   Procedure:  TCVC  Date/Time:  1/19/2024 8:14 AM    Verbal consent obtained?: Yes    Written consent obtained?: Yes    Risks and benefits: Risks, benefits and alternatives were discussed    Consent given by:  Patient  Patient states understanding of procedure being performed: Yes    Patient's understanding of procedure matches consent: Yes    Procedure consent matches procedure scheduled: Yes    Expected level of sedation:  Moderate  Appropriately NPO:  Yes  ASA Class:  3  Mallampati  :  Grade 2- soft palate, base of uvula, tonsillar pillars, and portion of posterior pharyngeal wall visible  Lungs:  Lungs clear with good breath sounds bilaterally  Heart:  Normal heart sounds and rate  History & Physical reviewed:  History and physical reviewed and no updates needed  Statement of review:  I have reviewed the lab findings, diagnostic data, medications, and the plan for sedation

## 2024-01-19 NOTE — PHARMACY-ADMISSION MEDICATION HISTORY
Pharmacist Admission Medication History    Admission medication history is complete. The information provided in this note is only as accurate as the sources available at the time of the update.    Information Source(s): Patient via in-person    Pertinent Information: Hold lipitor,     Changes made to PTA medication list:  Added: None  Deleted: None  Changed: None    Medication Affordability:       Allergies reviewed with patient and updates made in EHR: yes    Medication History Completed By: Albert Chandler [facility] 1/19/2024 11:09 AM    PTA Med List   Medication Sig Last Dose    amLODIPine (NORVASC) 5 MG tablet Take 1 tablet by mouth daily 1/18/2024    apixaban ANTICOAGULANT (ELIQUIS) 5 MG tablet  1/18/2024    atorvastatin (LIPITOR) 40 MG tablet Take 40 mg by mouth daily 1/18/2024    hydrOXYzine HCl (ATARAX) 25 MG tablet Take 1 tablet (25 mg) by mouth 3 times daily as needed for itching Past Week    lisinopril-hydrochlorothiazide (ZESTORETIC) 20-25 MG tablet Take 1 tablet by mouth daily at 2 pm 1/18/2024    metoprolol succinate ER (TOPROL XL) 100 MG 24 hr tablet Take 100 mg by mouth daily 1/18/2024    ondansetron (ZOFRAN) 8 MG tablet Take 1 tablet (8 mg) by mouth every 8 hours as needed for nausea More than a month    prasugrel (EFFIENT) 10 MG TABS tablet TK 1 T PO QAM 1/18/2024    Zolpidem Tartrate (AMBIEN PO) Take by mouth as needed for sleep More than a month

## 2024-01-19 NOTE — CONSULTS
"  Assessment completed in BMT Clinic on 1/15/2024, please see info below for inpatient review.    Blood and Marrow Transplant   Psychosocial Assessment with   Clinical      Assessment completed on 1/15/2024 of Pt's living situation, support system, financial status, functional status, coping, stressors, need for resources and social work intervention provided as needed.  Information for this assessment was provided by Pts report in addition to medical chart review and consultation with medical team.      Present at Assessment:   Patient: Ziggy Hallman  : RIVKA Grider LGSW     Diagnosis: MDS (Myelodysplastic Syndrome)      Date of Diagnosis: 10/2015     Transplant type: Allogeneic     Donor: Unrelated allogeneic donor stem cell transplant     Physician: Taran Bacon MD     Nurse Coordinator: Kandy العلي RN     Social Workers: RIVKA Grider LGSW     Permanent Address:   [address] [place] [id number]     Living Situation: Pt lives in [place], [place] with adult sons Tolu (21) and Amandeep (18).     Local Address:   [address] [place] [address].  [place] [address]     Contact Information:  Pt's Home Phone: [phone]  Pt's Cell Phone: [phone]  Pt Email: [email or web address]  Pt's Ex-Significant Other Mireya Collins Phone: [phone]  Pt's Sister Radha \"Yaritza\" Lexx's Phone: [phone]  Pt's Sister Randa's Phone: [phone]     Presenting Information:  Ziggy is a 48 year old male diagnosed with MDS who presents for evaluation for Allogeneic transplant at the [facility] ([facility] Pt was accompanied to today's visit by himself.      Decision Making: Self     Health Care Directive: Pt declined a completed Health Care Directive (HCD) at this time. SW provided pt with a copy of HCD and encouraged Pt to have discussion with family members and complete form. SW provided education. SW encouraged pt to have discussions with their family " "regarding their health care wishes. SW explained that since pt does not have a healthcare directive, legally pts adult children would make decisions on his behalf, if he did not have capacity to make his own medical decisions. Pt understood.      Relationship Status: Single. Pt has  from ex-significant other Mireya who is the mother of pts sons (Max, Tolu, and Jose). They have been  for 7 years.     Special Needs: None identified at this time.      Family/Support System: Pt endorsed a good support system including family and close friends who will be available to support pt throughout transplant process.      Family Information:  Spouse: N/A  Children: Son Max (25); Son Tolu (21); and Son Jose (18)  Siblings: Sisters Radha Hallman ([place]) and Randa ([place])  Parents:   Grandchildren: N/A  Friends:  Pt endorsed a good friend support system.     Caregiver: SW discussed with pt the caregiver role and expectation at length. Pt is agreeable to having a full time caregiver for a minimum of 100 days until cleared by the BMT physician. Pt confirmed understanding of the caregiver requirement. Pt's primary caregiver will be ex-significant other Mireya with Sisters Radha and Randa as a secondary or back-up to assist as needed. Pt reviewed and signed the caregiver contract which will be scanned into the EMR. Caregiver education and resources provided. No caregiver concerns identified.      Caregiver Contact Information:  Pt's Ex-S/O Mireya Collins Phone: [phone]  Pt's Sister Radha \"Yaritza\" Lexx's Phone: [phone]  Pt's Sister Randa's Phone: [phone]     Transportation Mode: Private vehicle to be primary source of transportation anticipated. Pt is aware of driving restrictions post-BMT and the need for the caregiver is to drive until cleared to drive by the BMT physician. SW provided information on parking info and monthly parking pass options.      Insurance: Pt has " [facility] health insurance. Pt denied specific insurance concerns at this time. SW reiterated information about the BMT Financial  should specific insurance questions arise as pt moves through transplant process.      Sources of Income: Pt's current source of income is SSDI which will transition to SSI in 3/2024. No financial concerns identified at this time.  BOOKER discussed herman options and asked pt to let SW know if they would like to apply in the future.      Employment: Not currently employed. 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 His last date of employment was in 8/2023.      Mental Health: Pt denied a history of mental health concerns, specific diagnoses or medications at this time. We discussed how many patients may see an increase in feelings of anxiety or depression while hospitalized for extended periods of time and pursue  isolation precautions post-BMT. Encouraged pt to let us know if they are noticing an increase in symptoms. Pt believes he would be able to identify symptoms of depression/anxiety throughout the transplant process. We talked about the variety of modalities available to use as coping mechanisms (including but not limited to guided imagery, relaxation techniques, progressive muscle relaxation, counseling/talk therapy and medication).     PHQ-9:  Pt scored a 4 which indicates none on the depression severity scale. Pt endorses this is an accurate reflection of his emotional state.     GAD7:  Pt scored a 3 which indicates no sign of anxiety on the Generalized Anxiety Disorder Questionnaire. Pt endorses this is an accurate reflection of his emotional state.     Chemical Use:   Tobacco: No hx reported.  Alcohol: Pt endorsed alcohol usage approximately once a week. Pt reported last use of alcohol to be last weekend.  Marijuana: Pt endorsed daily usage of marijuana.  Pt shared that he had spoken to the provider about edible use.  Pt has no  "concerns with ceasing marijuana use during the transplant process.   Other Drugs: No hx reported.  Based on the information provided, there appear to be no specific risks or concerns identified at this time.      Trauma/Loss/Abuse History: Multiple losses associated with cancer diagnosis and treatment, including health, employment, changes to physical appearance, etc.      Spirituality: Pt did not identify with any spirituality or judy belief.. SW explained that there are Chaplains on the unit and pt can request to meet with a  at anytime.     Coping: Pt noted that he is currently feeling \"positive, sad, prepared, nervous, and ready to begin\". Pt shared that his main coping mechanisms are \"talking with friends/family, positive self-talk, gathering education information about cancer diagsosis, and working on his hobbies\". Pt noted that he enjoys \"fishing and working outdoors\". SW and pt discussed additional positive coping mechanisms that pt can utilize while in the hospital. While hospitalized, pt plans to \"use his Iphone, watch TV, and walk the halls\".      Caregiver Coping: No caregiver present for assessment.     Education Provided: Transplant process expectations, Caregiver requirements, Caregiver self-care, Financial issues related to transplant, Financial resources/grants available, Common psychosocial stressors pre/post transplant, Support group(s) available, Hospital resources available, Web site information, Social Work role and Resources for children/siblings     Interventions Provided: Psychosocial Support and Education      Assessment and Recommendations for Team:  Pt is a 48 year old male diagnosed with MDS who is here undergoing preparation for a planned Allogeneic transplant.      Pt is pleasant, calm and able to articulate concerns/coping mechanisms in an appropriate manner. During our meeting pt was alert, he was interactive, affect was full, he displayed appropriate eye contact, memory and " thought processes. Pt feels comfortable communicating with the medical team. Pt has a strong supportive network of family and friends who are involved. Pt has developed strong coping mechanisms.      Pt will benefit from ongoing psychosocial support in regards to coping with the adjustment to the BMT process. CSW has discussed  psychosocial support options in regards to coping with the adjustment to the BMT process and support groups opportunities.       Pt has a good support system and a confirmed caregiver plan. Pt verbalizes understanding of the transplant process and wanting to proceed. SW provided contact information and encouraged pt to contact SW with questions, concerns, resources and for support.     Per this assessment, SW did not identify any barriers to this patient moving forward with transplant.     Important Information:   None specified at this time.      Follow up Planned:   Psychosocial support     RIVKA Grider, BOOKER  Adult Blood & Marrow Transplant   Phone: [phone]  Pager: [phone]

## 2024-01-19 NOTE — PROGRESS NOTES
Therapy: IV Micafungin and linecare    Pt [facility]     If therapy is IV Micafungin, coverage is 100% and may have a copay per dispense on the drug through his pharmacy plan.     In reference to in-basket referral on 1/19/24 to check IV Micafungin and line care       Please contact Intake with any questions, [phone] or In Basket pool, [facility] ([phone]).

## 2024-01-19 NOTE — IR NOTE
Patient Name: Ziggy Hallman  Medical Record Number: [id number]  Today's Date: 1/19/2024    Procedure: tunneled central line placement  Proceduralist: DESIRAE Hollingsworth PA-C  Pathology present: N/A  Brief completed: N/A    Procedure Start: 0950  Procedure end: 1010  Sedation medications administered: 0.5 mg of versed, 75 mcg of fentanyl  Sedation time: 26 minutes (1120-5712)    Report given to: Evaristo BRYANT RN  : N/A    Other Notes: Pt arrived to IR room 02 from 2A. Consent reviewed. Pt denies any questions or concerns regarding procedure. Pt positioned supine and monitored per protocol. Pt tolerated procedure without any noted complications. Left chest/neck site cleansed and dressed per protocol. Left tunneled CVC placed under fluoroscopy, ready for immediate use. Both lumens of left tunneled CVC heparin locked with 25 units of heparin (2.5 mL).  1 hour bedrest till 1110. Pt transferred back to .

## 2024-01-19 NOTE — PROGRESS NOTES
Patient prepped for central line placement with planned admission to , bed 27 for bone marrow transplant. VSS. Labs drawn on 1-17. Right chest port accessed, IV antibiotic and fluids running. Appropriately NPO. Consent signed. Patient is here alone, sister Amanda is main contact per patient. [phone].

## 2024-01-19 NOTE — PLAN OF CARE
Goal Outcome Evaluation:      Plan of Care Reviewed With: patient    Overall Patient Progress: improvingOverall Patient Progress: improving    Outcome Evaluation: IDT to continue to follow for safe discharge needs pending clinical course.

## 2024-01-19 NOTE — PHARMACY-CONSULT NOTE
"Busulfan - Initial Dose Note     Busulfan is a chemotherapeutic agent used for conditioning regimens in HSCT patients.  Therapeutic drug monitoring (TDM) using area under the plasma concentration curve (AUC) analysis is recommended due to high inter-individual variability in plasma levels.      A high busulfan AUC is associated with an increased risk for sinusoidal obstruction syndrome, and a suboptimal AUC is associated with an increased risk for graft rejection or disease relapse. Levels are analyzed to optimize the targeted drug exposure and minimize drug-related toxicity.     The Goal Cumulative AUC (cAUC) for all 4 doses for this protocol is 82.1 mghr/L (range 76 - 86.2 mghr/L )     Predicted cAUC outside of this range require a dose adjustment.    Per protocol 2015-29, AUC calculations will be performed on  Days 1/2/3 as indicated.  (Note if protocol states specifics on the number of AUCs required versus if the AUC is \"in range/ in goal\" on any particular day/dose, then there is a need or no need for additional levels. Until the protocol specifically states, \"per busulfan standard of care guidelines\" we must follow the protocol to avoid protocol deviations.)    Initial Dose = 240 mg IV q24h according to protocol is based on     Model used to determine initial dose: [employer]      Regimen: 235 mg IV every 24 hours.  Start time: 15:41 on 01/19/2024  Exposure target: AUC mg.hr/L [id number] mg.hr/L cumulative   AUCcum: 82.2 mg.hr/L cumulative  Cav: 856.1 ng/mL      PLAN: Initial dose: 240 mg IV Q24H.  (Per dose rounding policy.      This recommendation is based on an ideal AUC cumulative goal of 82.1 mghr/L (     Thank you,   Albert Chandler     "

## 2024-01-19 NOTE — PROCEDURES
[facility]    Procedure: IR Procedure Note    Date/Time: 1/19/2024 10:13 AM    Performed by: Christo Hollingsworth PA-C  Authorized by: Christo Hollingsworth PA-C      UNIVERSAL PROTOCOL   Site Marked: NA  Prior Images Obtained and Reviewed:  Yes  Required items: Required blood products, implants, devices and special equipment available    Patient identity confirmed:  Verbally with patient, arm band, provided demographic data and hospital-assigned identification number  Patient was reevaluated immediately before administering moderate or deep sedation or anesthesia  Confirmation Checklist:  Patient's identity using two indicators, relevant allergies, procedure was appropriate and matched the consent or emergent situation and correct equipment/implants were available  Time out: Immediately prior to the procedure a time out was called    Universal Protocol: the Joint Commission Universal Protocol was followed    Preparation: Patient was prepped and draped in usual sterile fashion       ANESTHESIA    Anesthesia:  Local infiltration  Local Anesthetic:  Lidocaine 1% without epinephrine      SEDATION  Patient Sedated: Yes    Vital signs: Vital signs monitored during sedation    See dictated procedure note for full details.  Findings: Sedation medications administered: 0.5 mg of versed, 75 mcg of fentanyl  Sedation time: 25 minutes    Specimens: none    Complications: None    Condition: Stable      PROCEDURE  Describe Procedure: Ziggy Hallman  [id number]    Completed placement of 9.5 Nepali 31 cm dual lumen, [employer] brand, tunneled central venous access catheter via LIJV (Right chest port in place). Tip lying in the right atrium. Okay to use immediately. Dx: SURESH Hollingsworth. <1    Sedation medications administered: 0.5 mg of versed, 75 mcg of fentanyl  Sedation time: 25 minutes      Patient Tolerance:  Patient tolerated the procedure well with no immediate  complications  Length of time physician/provider present for 1:1 monitoring during sedation: 25

## 2024-01-19 NOTE — H&P
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     Today he states he is feeling well, no new illness, no sick contacts. No N/V/D. Eating and drinking well. Bowel movements normal. Ready for transplant.     Diagnosis and Treatment Summary     Disease presentation and baseline characteristics: one marrow biopsy on 10/07/2015 due top mild anemia in the context of his hypercoaguable state, and this was read as trilineage hematopoiesis with dysgranulopoiesis and dysmegakaryopoiesis with less than 1% blasts consistent with RCMD 40% to 50% cellular.  Cytogenetics were complex with a karyotype of 45,XY, a derivative of 5 and 17, addition of 9p13, trisomy 11, deletion 111, a minus 17 in 18 cells, and 46,XY in 3.   IPSS was 4 points for cytogenetics, 0 points for blast percentage, 0 points for hemoglobin, 0 points for platelets and 0 points for ANC, giving him a total score of 4, putting him in the intermediate risk category. Consult in BMT clinic 11/2015 for MDS. Siblings typed and 2 sibs  (sisters) are full matches.  Worsening anemia in 4/2023, with hemoglobin down to 9 range. Repeat bone marrow biopsy was normocellular at 40-50%. Trilineage hematopoiesis is left shifted and dysplastic.  Reticulin stain demonstrates at least 3+ reticulin fibrosis.  CD34/ positive blasts are increased at 5-10%.  CD56 expression approximately 5%.  No increase in CD20 positive B cells and CD3 positive T cells.   positive plasma cells are somewhat increased at 10-15%; kappa and lambda immunohistochemistry are negative for clonality. NGS negative, FISH with Loss of 5p15.2 (37.625%) and negative for TP53 loss. Cytogenetics showed 45,XY, -5, add(9)(p13), -17, add(17)(p13), +mar[20].   Date Treatment Name Response Side Effects / Toxicities   12/29 Azacitadinex4 cycles                                Donor Characteristics       Self or Related or Unrelated Donor: URD  Donor Match: 8/8 DP Permissive  Donor Age: 26  Donor Sex: Male  Donor ABO/Rh: O POS  Donor CMV Serostatus: Negative    Donor-Specific Antibodies:  Recent Labs   Lab Test 11/20/15  1522   [id number] A:3 23   FV7QZVNWFR A:43 66         Virtual Crossmatch:    Recent Labs   Lab Test 01/10/24  1411   RESVXMT1 No Interp   RESVXMB1 No Interp         Blood Counts       Recent Labs   Lab Test 01/17/24  1406 01/12/24  1110 01/10/24  1433   HGB 10.8* 10.5* 10.8*   HCT 32.1* 31.2* 31.5*   WBC 5.6 5.5 6.1   ANEUTAUTO 2.8 3.2 3.5   ALYMPAUTO 2.1 1.9 2.1   AMONOAUTO 0.3 0.1 0.1   AEOSAUTO 0.3 0.2 0.1   ABSBASO 0.1 0.1 0.1   NRBCMAN 0.0 0.0 0.0    270 290         Recent Labs   Lab Test 01/10/24  1433   ABORH A NEG         No lab results found.      Chemistries     Basic Panel  Recent Labs   Lab Test 01/17/24  1406 01/10/24  1433 12/11/23  1317    138 137   POTASSIUM 4.5 3.8 3.9   CHLORIDE 110* 107 107   CO2 25 24 23   BUN 24.2* 16.5 12.1   CR 0.92 0.77 0.81   GLC 88 99 109*        Calcium, Magnesium, Phosphorus  Recent Labs   Lab Test 01/17/24  1406  01/10/24  1433 12/11/23  1317   REMY 8.5* 8.9 8.8        LFTs  Recent Labs   Lab Test 01/17/24  1406 01/10/24  1433 12/11/23  1317   BILITOTAL 0.2 0.2 0.2   ALKPHOS 82 102 105   AST 33 26 23   ALT 18 14 14   ALBUMIN 2.4* 2.5* 2.1*       LDH  No lab results found.    B2-Microglobulin  No lab results found.    Vitamin D  No lab results found.      Urine Studies       Recent Labs   Lab Test 01/17/24  1413   COLOR Light Yellow   APPEARANCE Clear   URINEGLC Negative   URINEBILI Negative   URINEKETONE Negative   SG 1.022   UBLD Small*   URINEPH 6.0   PROTEIN 600*   UUROI Normal   NITRITE Negative   LEUKEST Negative   MUCUS Present*   RBCU 2   WBCU 1       Creatinine Clearance    No lab results found.      Infectious Disease Markers     [facility] IDM    Recent Labs   Lab Test 01/10/24  1433   TCRUZI Non-reactive       CMV  Recent Labs   Lab Test 01/10/24  1433   CMVIGG No detectable antibody.         EBV    Recent Labs   Lab Test 01/10/24  1433   EBVCAG Positive*       HSV 1/2    Recent Labs   Lab Test 01/10/24  1433   J6FDLMJ 0.21   H1IGG No HSV-1 IgG antibodies detected.   P2QNSBR 0.98*   H2IGG Equivocal, please recollect in 4-6 weeks or later.*         VZV    Recent Labs   Lab Test 01/10/24  1433   VZVIGG Positive         HTLV    No lab results found.      Toxoplasma  TOXO - IGM - Negative       COVID    Recent Labs   Lab Test 01/17/24  1406   [id number] Negative         Immunoglobulins     Recent Labs   Lab Test 01/10/24  1433   IGG 1,209       Recent Labs   Lab Test 01/10/24  1433   *       Recent Labs   Lab Test 01/10/24  1433            Monocloncal Protein Studies     M spike    Recent Labs   Lab Test 01/10/24  1433   ELPM 0.0       [place] FLC    Recent Labs   Lab Test 01/10/24  1433   KFLCA 12.61*       Lambda FLC    Recent Labs   Lab Test 01/10/24  1433   LFLCA 7.17*       FLC Ratio    Recent Labs   Lab Test 01/10/24  1433   KLRA 1.76*           Bone Marrow Biopsy    12/29/23    Morphology    BONE MARROW, LEFT POSTERIOR ILIAC CREST, ASPIRATION AND CORE BIOPSY:  -HYPERCELLULAR MARROW FOR AGE (70%) WITH TRILINEAGE MATURATION AND MEGAKARYOCYTIC HYPERPLASIA  -SLIGHT INCREASE IN BLASTS (APPROXIMATELY 5-6% OF NUCLEATED CELLS IN MARROW)  -MORPHOLOGIC FINDINGS CONSISTENT WITH CLINICAL HISTORY OF MYELODYSPLASTIC SYNDROME WITH COMPLEX KARYOTYPE  -APPROXIMATELY 8%  (+) PLASMA CELLS; PENDING FURTHER EVALUATION  -MICROGRANULOMA, NONNECROTIZING; PENDING SPECIAL STAINS FOR MICROORGANISMS  -NEGATIVE FOR METASTATIC CARCINOMA  -PLEASE SEE COMMENT     PERIPHERAL BLOOD:  -MACROCYTIC ANEMIA  -GRANULOCYTES WITH DYSPLASTIC CHANGES  -PLEASE SEE COMMENT      Flow Cytometry    Flow cytometric analysis of the current bone marrow specimen (see case [id number], [facility]) shows no increase in blasts; no definite immunophenotypically abnormal myeloid population reported.     The combined morphologic and immunophenotypic features of this lesion (bone marrow and blood) are most consistent with myelodysplastic syndrome.  Although the blasts are CD34 (-), they appear to be  (+), and they have features of monocytic blasts or blast equivalents.  Suggest correlation with clinical and radiographic findings.    A rare focus of nonnecrotizing granulomatous inflammation is identified in the core biopsy sample.  Special stains for acid-fast bacilli and fungi are pending, and the results will be reported in an addendum to this report, along with an immunohistochemical stain for CD68, which is being performed in an attempt to highlight monocytic blasts and/or histiocytes.     Plasma cells appear to be increased, but T-lymphocytes are also moderately increased; therefore, the plasma cells may represent a reactive/inflammatory process.  Nevertheless, an attempt will be made to rule out plasma cell clonality based on staining for kappa and lambda light chains.  The results of the  additional stains will be reported in an addendum.    No immunophenotypic evidence of paroxysmal nocturnal hemoglobinuria (PNH) or PNH-type clone     Molecular Studies          Chest X-Ray - 2 view     Results for orders placed during the hospital encounter of 01/15/24    XR CHEST 2 VIEWS    Status: Normal 1/15/2024    Narrative  Exam: XR CHEST 2 VIEWS, 1/15/2024 10:38 AM    Comparison: Same day chest CT and PET/CT from 1/12/2024    History: Preop examination; Personal history of diseases of blood and  blood-forming organs; Screening for viral disease; MDS  (myelodysplastic syndrome) (H)    Findings:  PA and lateral views of the chest. Right chest wall Port-A-Cath with  tip projecting at the cavoatrial junction. PFO closure device. Trachea  is midline. Cardiomediastinal silhouette is within normal limits. No  focal airspace opacity. No pneumothorax or pleural effusion. The  visualized upper abdomen is unremarkable. No acute osseous  abnormalities.    Impression  Impression: Prior PFO closure otherwise negative.    I have personally reviewed the examination and initial interpretation  and I agree with the findings.    BRADFORD GILLETTE MD      SYSTEM ID:  [id number]        Chest CT without Contrast       Results for orders placed during the hospital encounter of 01/15/24    CT CHEST W/O CONTRAST    Status: Normal 1/15/2024    Narrative  EXAMINATION: Chest CT  1/15/2024 10:58 AM    CLINICAL HISTORY: Preop examination; Personal history of diseases of  blood and blood-forming organs; Screening for viral disease; MDS  (myelodysplastic syndrome) (H)    COMPARISON: PET 1/12/2024    TECHNIQUE: CT imaging obtained through the chest without contrast.  Axial, coronal, and sagittal reconstructions and axial MIP reformatted  images are reviewed.    FINDINGS:    Devices: Right chest wall Port-A-Cath tip terminates at the superior  cavoatrial junction.    Lower neck and axillae: No enlarged supraclavicular nodes are present.  No  actionable nodule is present in the imaged portion of the thyroid  lobes. No axillary lymphadenopathy.    Heart: Normal heart size. No pericardial fluid or thickening. PFO  closure device. Distal LAD stent.    Mediastinum and garfield: No mediastinal mass is present. No enlarged  lymph nodes are present.    Vessels: Normal caliber of the aorta and main pulmonary artery. No  significant atherosclerotic disease.    Airways: The central tracheobronchial tree is clear.    Lungs: Azygos lobe, a normal variant. No consolidation. 3 mm solid  nodule in the right lower lobe (series 4, image 149), and a 2 mm solid  nodule in the left upper lobe (image 76).  No pleural effusion or  pneumothorax.    Upper abdomen: Asymmetric atrophy of the right kidney. The remainder  of the visualized upper abdomen is unremarkable.    Bones: No acute or aggressive osseous abnormality. Mild multilevel  degenerative changes throughout the visualized spine. Multiple chronic  left posterior lateral rib fracture deformities.    Soft tissues: Unremarkable.    Impression  IMPRESSION: 1. Few scattered sub-4 mm solid pulmonary nodules in the  lungs. Considered comparison to prior outside imaging if available to  assess for stability.  2. Asymmetric atrophy of the right kidney.    I have personally reviewed the examination and initial interpretation  and I agree with the findings.    BRADFORD GILLETTE MD      SYSTEM ID:  [id number]        PFTs     FVC%  Recent Labs   Lab Test 01/15/24  1148   10337 74       FEV1%  Recent Labs   Lab Test 01/15/24  1148   19980 73       DLCO%  Recent Labs   Lab Test 01/15/24  1148   10430 93         EKG       ECG results from 01/12/24   EKG 12-lead complete w/read - Clinics     Value    Systolic Blood Pressure     Diastolic Blood Pressure     Ventricular Rate 81    Atrial Rate 81    RI Interval 152    QRS Duration 92        QTc 425    P Axis 49    R AXIS 40    T Axis 53    Interpretation ECG      Sinus rhythm  Normal  ECG  No previous ECGs available  Confirmed by MD JOSE, KEV ([id number]) on 2024 2:43:11 PM           ECHOCARDIOGRAM       Results for orders placed during the hospital encounter of 01/15/24    ECHOCARDIOGRAM COMPLETE    Status: Normal 1/15/2024    Narrative  [id number]  [id number]  [id number]  [id number]^DALE^SOL    [facility],[place]  Echocardiography Laboratory  [address] [place] [id number]    Name: TOD WHARTON  MRN: [id number]  : 1975  Study Date: 01/15/2024 09:46 AM  Age: 48 yrs  Gender: Male  Patient Location: [facility]  Reason For Study: Preop examination, Personal history of diseases of blood and  blo  Ordering Physician: SOL HUNTER  Referring Physician: SOL HUNTER  Performed By: Kate Kaur    BSA: 2.0 m2  Height: 69 in  Weight: 196 lb  BP: 110/76 mmHg  ______________________________________________________________________________  Procedure  Echocardiogram with two-dimensional, color and spectral Doppler performed.  ______________________________________________________________________________  Interpretation Summary  Left ventricular wall thickness is normal. Left ventricular size is normal.  The visual ejection fraction is 50-55%. No regional wall motion abnormalities  are seen.  Right ventricular function, chamber size, wall motion, and thickness are  normal.  Trace to mild mitral insufficiency is present.  The inferior vena cava was normal in size with preserved respiratory  variability. No pericardial effusion is present.    There is no prior study for direct comparison.  ______________________________________________________________________________  Left Ventricle  Left ventricular wall thickness is normal. Left ventricular size is normal.  The visual ejection fraction is 50-55%. Global left ventricular function is  mildly reduced, given the presence of left ventricular volume overload. Grade  I or early diastolic dysfunction. No  regional wall motion abnormalities are  seen.    Right Ventricle  Right ventricular function, chamber size, wall motion, and thickness are  normal.    Atria  Both atria appear normal.    Mitral Valve  The mitral valve is normal. Trace to mild mitral insufficiency is present.    Aortic Valve  Aortic valve is normal in structure and function. The aortic valve is  tricuspid. No aortic regurgitation is present.    Tricuspid Valve  The tricuspid valve is normal.    Pulmonic Valve  The pulmonic valve is normal. Trace pulmonic insufficiency is present.    Vessels  The aorta root is normal. The inferior vena cava was normal in size with  preserved respiratory variability.    Pericardium  No pericardial effusion is present.    Compared to Previous Study  There is no prior study for direct comparison.  ______________________________________________________________________________  MMode/2D Measurements & Calculations    IVSd: 1.1 cm  LVIDd: 5.1 cm  LVIDs: 3.8 cm  LVPWd: 0.99 cm  FS: 25.9 %  LV mass(C)d: 197.4 grams  LV mass(C)dI: 96.4 grams/m2  Ao root diam: 3.0 cm  asc Aorta Diam: 3.2 cm  LVOT diam: 1.9 cm  LVOT area: 2.9 cm2  Ao root diam index Ht(cm/m): 1.7  Ao root diam index BSA (cm/m2): 1.5  Asc Ao diam index BSA (cm/m2): 1.6  Asc Ao diam index Ht(cm/m): 1.8  LA Volume (BP): 40.1 ml    LA Volume Index (BP): 19.6 ml/m2  RWT: 0.39  TAPSE: 1.8 cm    Doppler Measurements & Calculations  MV E max abdias: 68.3 cm/sec  MV A max abdias: 87.7 cm/sec  MV E/A: 0.78  MV dec time: 0.20 sec  Ao V2 max: 144.0 cm/sec  Ao max P.3 mmHg  Ao V2 mean: 98.7 cm/sec  Ao mean P.0 mmHg  Ao V2 VTI: 27.7 cm  CARLIE(I,D): 2.0 cm2  CARLIE(V,D): 2.1 cm2  LV V1 max P.2 mmHg  LV V1 max: 103.0 cm/sec  LV V1 VTI: 19.0 cm  SV(LVOT): 55.1 ml  SI(LVOT): 26.9 ml/m2  PA acc time: 0.12 sec  AV Abdias Ratio (DI): 0.72  CARLIE Index (cm2/m2): 0.97  E/E' av.4    Lateral E/e': 4.7  Medial E/e': 6.2  RV S Abdias: 10.6  cm/sec    ______________________________________________________________________________  Report approved by: Amos MAURICE 01/15/2024 10:30 AM        PET Scan       Results for orders placed during the hospital encounter of 01/12/24    PET ONCOLOGY WHOLE BODY    Status: Normal 1/14/2024    Narrative  Combined Report of:    PET and CT on  1/12/2024 3:28 PM :    1. PET of the neck, chest, abdomen, and pelvis.  2. PET CT Fusion for Attenuation Correction and Anatomical  Localization:  3. 3D MIP and PET-CT fused images were processed on an independent  workstation and archived to PACS and reviewed by a radiologist.    Technique:    1. PET: The patient received 11.36 mCi of F-18-FDG; the serum glucose  was 94 prior to administration, body weight was a kg. Images were  evaluated in the axial, sagittal, and coronal planes as well as the  rotational whole body MIP. Images were acquired from the Vertex to the  Feet.    UPTAKE WAS MEASURED AT 60 MINUTES.    BACKGROUND:  Liver SUV max= 3.01,   Aorta Blood SUV Max: 2.65.    2. CT: CT only obtained for attenuation correction and not diagnostic  purposes.    INDICATION: Neoplasm of uncertain behavior of plasma cells (H)    ADDITIONAL INFORMATION OBTAINED FROM EMR: High risk MDS and along with  many clots. He was treated with aza x4 and recently had a bone marrow  biopsy on 12/29 showed a normal flow cytometry but with possible  residual MDS and a plasma cell neoplasm with 8% plasma cells.      COMPARISON: CT abdomen/pelvis 11/8/2011, outside report CT abdomen and  pelvis 4/3/2019, CT had 7/26/2023    FINDINGS:    HEAD/NECK:  There is no suspicious FDG uptake in the neck. There is reduced uptake  in the right parietal lobe at the area of the known infarct.    CHEST:  There is no suspicious FDG uptake in the chest.    ABDOMEN AND PELVIS:  There is no suspicious FDG uptake in the abdomen or pelvis. The spleen  is enlarged measuring 13.78 cm. Atrophic right kidney and normal  size  of the left kidney.    LOWER EXTREMITIES:  No abnormal masses or hypermetabolic lesions.    BONES:  There is mild diffuse uptake throughout the axial skeleton and  proximal appendicular skeleton. SUV max between the 2.5 and 3.5. Old  left-sided rib fractures.    Impression  IMPRESSION: In this patient with high-risk MDS :  1. Mild increased FDG activity throughout the bones consistent with  MDS. Can also be seen in marrow stimulation.  2. Splenomegaly, spleen was reportedly normal in report of 4/3/2019.    I have personally reviewed the examination and initial interpretation  and I agree with the findings.    JOCY SANCHEZ MD      SYSTEM ID:  [id number]         MRI Brain       No results found for this or any previous visit.         CSF Studies       No lab results found.    No lab results found.    No lab results found.      I have assessed all abnormal lab values for their clinical significance and any values considered clinically significant have been addressed in the assessment and plan    Family History:   Family History   Problem Relation Age of Onset    Diabetes No family hx of     Coronary Artery Disease No family hx of     Hypertension No family hx of     Hyperlipidemia No family hx of     Breast Cancer No family hx of        Social History:   Social History     Socioeconomic History    Marital status: Single     Spouse name: Not on file    Number of children: Not on file    Years of education: Not on file    Highest education level: Not on file   Occupational History    Not on file   Tobacco Use    Smoking status: Former     Packs/day: .5     Types: Cigarettes     Passive exposure: Past (Mom smoked cigs indoors)    Smokeless tobacco: Never    Tobacco comments:     cigarette once in a while   Substance and Sexual Activity    Alcohol use: Yes     Alcohol/week: 0.0 standard drinks of alcohol     Comment: 4 times per week 5 drinks    Drug use: No    Sexual activity: Yes     Partners: Female   Other Topics  "Concern    Parent/sibling w/ CABG, MI or angioplasty before 65F 55M? No   Social History Narrative    Not on file     Social Determinants of Health     Financial Resource Strain: Not on file   Food Insecurity: Not on file   Transportation Needs: Not on file   Physical Activity: Not on file   Stress: Not on file   Social Connections: Not on file   Interpersonal Safety: Not on file   Housing Stability: Not on file       Past Medical History:   Past Medical History:   Diagnosis Date    AMI anterior wall (H)     Mid LAD on cath with stent    CAD S/P percutaneous coronary angioplasty 07/01/2016    Unstable agina with anterior myocardial damage reported without mycardial damage by patient's history    CVA (cerebrovascular accident) (H) 01/01/2012    Reporting thromboembolic episode on the right neck. Pt states \"I've had ten strokes.\"    Hypercoagulable state (H24)     Uncertain etiology--seeing Hematologist    MEDICAL HISTORY OF -     Possibly PFO        Past Surgical History:   Past Surgical History:   Procedure Laterality Date    AMPUTATE TOE(S)      ARTHROSCOPY KNEE RT/LT      Right     ARTHROSCOPY KNEE RT/LT      Left    CARDIAC CATHERIZATION      With stent placement    IR CHEST PORT PLACEMENT > 5 YRS OF AGE  9/13/2023       Allergies: No Known Allergies    Home Medications      Prior to Admission medications    Medication Sig Start Date End Date Taking? Authorizing Provider   amLODIPine (NORVASC) 5 MG tablet Take 1 tablet by mouth daily 11/29/22  Yes Reported, Patient   apixaban ANTICOAGULANT (ELIQUIS) 5 MG tablet  3/22/23  Yes Reported, Patient   atorvastatin (LIPITOR) 40 MG tablet Take 40 mg by mouth daily 3/24/23  Yes Reported, Patient   hydrOXYzine HCl (ATARAX) 25 MG tablet Take 1 tablet (25 mg) by mouth 3 times daily as needed for itching 12/11/23  Yes Lurdes Dee NP   lisinopril-hydrochlorothiazide (ZESTORETIC) 20-25 MG tablet Take 1 tablet by mouth daily at 2 pm 9/9/23  Yes Reported, Patient   metoprolol " "succinate ER (TOPROL XL) 100 MG 24 hr tablet Take 100 mg by mouth daily   Yes Reported, Patient   ondansetron (ZOFRAN) 8 MG tablet Take 1 tablet (8 mg) by mouth every 8 hours as needed for nausea 9/25/23  Yes Lurdes Dee, NP   prasugrel (EFFIENT) 10 MG TABS tablet TK 1 T PO QAM 6/11/18  Yes Reported, Patient   prochlorperazine (COMPAZINE) 10 MG tablet Take 1 tablet (10 mg) by mouth every 6 hours as needed for nausea or vomiting 9/18/23  Yes Lynad Mcbride MD   Zolpidem Tartrate (AMBIEN PO) Take by mouth as needed for sleep   Yes Reported, Patient       Review of Systems    Review of Systems:  CONSTITUTIONAL: NEGATIVE for fever, chills, change in weight  INTEGUMENTARY/SKIN: NEGATIVE for worrisome rashes, moles or lesions  EYES: NEGATIVE for vision changes or irritation  ENT/MOUTH: NEGATIVE for ear, mouth and throat problems  RESP: NEGATIVE for significant cough or SOB  BREAST: NEGATIVE for masses, tenderness or discharge  CV: NEGATIVE for chest pain, palpitations or peripheral edema  GI: NEGATIVE for nausea, abdominal pain, heartburn, or change in bowel habits  : NEGATIVE for frequency, dysuria, or hematuria  MUSCULOSKELETAL: NEGATIVE for significant arthralgias or myalgia  NEURO: NEGATIVE for weakness, dizziness or paresthesias  ENDOCRINE: NEGATIVE for temperature intolerance, skin/hair changes  HEME/ALLERGY: NEGATIVE for bleeding problems  PSYCHIATRIC: NEGATIVE for changes in mood or affect    PHYSICAL EXAM      Weight     Wt Readings from Last 3 Encounters:   01/19/24 90.6 kg (199 lb 12.8 oz)   01/17/24 90.1 kg (198 lb 9.6 oz)   01/12/24 88.9 kg (196 lb)        KPS: 80    BP (!) 146/91 (BP Location: Right arm, Patient Position: Supine, Cuff Size: Adult Regular)   Pulse 68   Temp 97.9  F (36.6  C) (Oral)   Resp 12   Ht 1.753 m (5' 9\")   Wt 90.6 kg (199 lb 12.8 oz)   SpO2 97%   BMI 29.51 kg/m       General: NAD   Eyes: GARY, sclera anicteric   Nose/Mouth/Throat: OP clear, buccal mucosa moist, no " ulcerations   Lungs: CTA bilaterally  Cardiovascular: RRR, no M/R/G   Abdominal/Rectal: +BS, soft, NT, ND, No HSM   Lymphatics: No edema  Skin: No rashes or petechaie  Neuro: A&O   Musculoskeletal: Muscle mass 5/5   Additional Findings: Powell site NT, no drainage.    Current aGVHD staging:  Skin 0, UGI 0, LGI 0, Liver 0 (keep in note through day +180 for allos)      LABS AND IMAGING: I have assessed all abnormal lab values for their clinical significance and any values considered clinically significant have been addressed in the assessment and plan.        Lab Results   Component Value Date    WBC 5.6 01/17/2024    ANEUTAUTO 2.8 01/17/2024    HGB 10.8 (L) 01/17/2024    HCT 32.1 (L) 01/17/2024     01/17/2024     01/17/2024    POTASSIUM 4.5 01/17/2024    CHLORIDE 110 (H) 01/17/2024    CO2 25 01/17/2024    GLC 88 01/17/2024    BUN 24.2 (H) 01/17/2024    CR 0.92 01/17/2024    INR 1.07 01/10/2024           SYSTEMS-BASED ASSESSMENT AND PLAN     Ziggy Hallman is a 48 year old year old male with a PMH of MDS, hx of multiple clots and MI undergoing a MA (Bu/Flu) prep for an 8/8 URD PBSCT day -6        BMT/IEC PROTOCOL for 2015-29  Chemo Prep:   Day -6: Admit  Day -5 through Day -2: Busulfan/Fludarabine  Day -1: Rest.   Keppra prophylaxis   Avoiding Tylenol for 72 hours befre and after last dose of Busulfan due to drug interaction. Ok to start day +2.     8/8 DP permissive. Cell dose pending  ABO: Donor: O+ Recipient A-  (Minor incompatability, no flush required)   GSCF plan: GCSF to start day +5 until ANC >1500 for 3 consecutive days    HEME/COAG  #Risk of Pancytopenia   - Risk of cytopenias due to chemotherapy and radiation  - Transfusion parameters: hemoglobin <7, platelets <10    #Significant Clot History   He has long history of various events including renal infarcts (2011, 2013, 2015), left popliteal embolic episode requiring surgery, anticoagulation (2011), right carotid artery embolic episode with  TIA/stroke-like symptoms (2012), embolic MI (2015), gangrenous right toe requiring vascular surgery/amputation (2017), 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   Extensive hypercoagulable workup to date has been unrevealing.  JAK2 testing negative.  PNH testing negative.  Elevated IgA of unknown significance, no evidence of plasma cell disorder.  - On Eliquis and Prasugrel, continue until platelets <50k. Place hematology consult on Monday 1/22  For further recommendations on holding/managing his anti-platelet/anti-coagulation daniel-transplant      IMMUNOCOMPROMISED  - Active infections: None      -Prophylaxis plan:   AC, (D CMV -, R CMV -),   Megan (HD- Nodules, Smoking hx Plan to continue MWF in the outpatient vs home setting prior to discharge)   Levofloxacin while neutropenic,   Bactrim to start at day +28 (Transition to pentamidine if counts poor    - Vaccination status: Influenza vaccination after day +60, COVID vaccination after day +100, followed by remaining vaccinations at 12, 14, 24, and 26 months.    RISK OF GVHD  - Prophylaxis: PtC day +3, +4, , Siro/MMF to start day +5     CARDIOVASCULAR  #CAD  #Hx of CABG  - Continue metoprolol and lisinopril (PTA lisinoril/hydrochlorothiazide) Baseline Bps around 140/80-90s. Goal to keep euvolemic daniel-transplant.     #Hyperlipidemia  - Holding atorvastatin peritransplant    - Risk of cardiomyopathy:  Baseline EF 50-55%, Global left ventricular function mildly reduced. Grade 1 or early diastolic dysfunction.   - Risk of arrhythmia: Baseline EKG showed NSR, Qtc -425    RESPIRATORY  - Baseline PFTs: Decreased   - Risk of respiratory complications: Frequent ambulation and incentive spirometer.    GI/NUTRITION  - Ulcer prophylaxis: Protonix   - VOD Prophylaxis: Ursodiol  - Risk of nausea/vomiting due to chemo: Ativan, Compazine, Zofran   - Risk of malnutrition: Nutrition to follow.     RENAL/ELECTROLYTES/  -Risk of  "Cytoxan induced SIADH: Follow Na/K BID during flush, change Flush to N.S. if Hyponatremic    - Electrolyte management: replace per sliding scale    DIABETES/ENDOCRINE  - Risk of steroid-induced hyperglyemia: Monitor BG, sliding scale if needed    MUSCULOSKELETAL/FRAILTY  - Baseline Frailty Score: 1, Comorbidity index- 4   - Patient with substantial risk of sarcopenia  - Daily PT/OT as needed while inpatient  - Cancer Rehab as needed outpatient    SYMPTOM MANAGEMENT  - Nausea from chemo Prochlorperazine, ondansetron, lorazepam.  - # Pain Assessment:      9/7/2023    10:42 PM   Current Pain Score   Patient currently in pain? denies   Ziggy medrano pain level was assessed and he currently denies pain.        SOCIAL DETERMINANTS  - Caregiver: Family   - Financial/insurance concerns: See SW notes       Known issues that I take into account for medical decisions, with salient changes to the plan considering these complexities noted above.    Patient Active Problem List   Diagnosis    Amegakaryocytic thrombocytopenia (H)    Anemia due to bone marrow failure, unspecified bone marrow failure type (H)    Aplastic anemia (H)     Clinically Significant Risk Factors Present on Admission              # Hypoalbuminemia: Lowest albumin = 2.3 g/dL at 1/19/2024 10:51 AM, will monitor as appropriate  # Drug Induced Coagulation Defect: home medication list includes an anticoagulant medication  # Drug Induced Platelet Defect: home medication list includes an antiplatelet medication   # Hypertension: Home medication list includes antihypertensive(s)      # Obesity: Estimated body mass index is 30.2 kg/m  as calculated from the following:    Height as of this encounter: 1.725 m (5' 7.91\").    Weight as of this encounter: 89.9 kg (198 lb 1.6 oz).              Today's summary: Admits for transplant     Dispo: Remain admitted through engraftment  - Follow up Micafungin discharge plan.     I spent 60 minutes in the care of this patient today, which " included time necessary for preparation for the visit, obtaining history, ordering medications/tests/procedures as medically indicated, review of pertinent medical literature, counseling of the patient, communication of recommendations to the care team, and documentation time.    Brenda Brunson PA-C  [phone]         ______________________________________________      BMT ATTENDING NOTE    Ziggy Hallman is a 48 year old male, who is day -6 of transplant for MDS/IgA kappa MGUS.     My overall impression is that Ziggy is ready to begin conditioning. No fevers or infections. Past medical history, including extensive thrombosis history, reviewed in detail, and all medical conditions are optimized for transplant. As he becomes more thrombocytopenic, we will plan to consult classical hematology on a strategy for his anticoagulation. Prevent CINV and infections. Remainder of supportive care as above.    I spent 50 minutes in the care of Ziggy today, including an independent face-to-face assessment and time monitoring for restoration of hematopoiesis, high-risk organ toxicities from chemotherapy, and GVHD, managing infectious risk due to his immunocompromised state, and encouraging nutrition and physical activity to prevent debility and sarcopenia.  I personally performed all of the medical decision making associated with this visit, counseled Ziggy on my overall assessment and recommendations, communicated my plan to the care team, and edited the above note to reflect my current plan of care.       Mariluz Cabral MD      Securely message with the Vocera Web Console

## 2024-01-20 LAB
ANION GAP SERPL CALCULATED.3IONS-SCNC: 5 MMOL/L (ref 7–15)
BASOPHILS # BLD AUTO: 0 10E3/UL (ref 0–0.2)
BASOPHILS NFR BLD AUTO: 1 %
BUN SERPL-MCNC: 22.3 MG/DL (ref 6–20)
BUSULFAN SERPL-MCNC: 2225 NG/ML
BUSULFAN SERPL-MCNC: 3174 NG/ML
BUSULFAN SERPL-MCNC: 3689 NG/ML
BUSULFAN SERPL-MCNC: 4109 NG/ML
BUSULFAN SERPL-MCNC: 4473 NG/ML
C DIFF GDH STL QL IA: POSITIVE
C DIFF TOX A+B STL QL IA: POSITIVE
C DIFF TOX B STL QL: POSITIVE
CALCIUM SERPL-MCNC: 8.4 MG/DL (ref 8.6–10)
CHLORIDE SERPL-SCNC: 109 MMOL/L (ref 98–107)
CMV DNA SPEC NAA+PROBE-ACNC: NOT DETECTED IU/ML
CREAT SERPL-MCNC: 0.91 MG/DL (ref 0.67–1.17)
DEPRECATED HCO3 PLAS-SCNC: 24 MMOL/L (ref 22–29)
EGFRCR SERPLBLD CKD-EPI 2021: >90 ML/MIN/1.73M2
EOSINOPHIL # BLD AUTO: 0 10E3/UL (ref 0–0.7)
EOSINOPHIL NFR BLD AUTO: 1 %
ERYTHROCYTE [DISTWIDTH] IN BLOOD BY AUTOMATED COUNT: 14.6 % (ref 10–15)
GLUCOSE BLDC GLUCOMTR-MCNC: 158 MG/DL (ref 70–99)
GLUCOSE SERPL-MCNC: 127 MG/DL (ref 70–99)
HCT VFR BLD AUTO: 31.1 % (ref 40–53)
HGB BLD-MCNC: 10.4 G/DL (ref 13.3–17.7)
IMM GRANULOCYTES # BLD: 0.1 10E3/UL
IMM GRANULOCYTES NFR BLD: 1 %
LYMPHOCYTES # BLD AUTO: 0.4 10E3/UL (ref 0.8–5.3)
LYMPHOCYTES NFR BLD AUTO: 8 %
MAGNESIUM SERPL-MCNC: 1.8 MG/DL (ref 1.7–2.3)
MCH RBC QN AUTO: 33.4 PG (ref 26.5–33)
MCHC RBC AUTO-ENTMCNC: 33.4 G/DL (ref 31.5–36.5)
MCV RBC AUTO: 100 FL (ref 78–100)
MONOCYTES # BLD AUTO: 0 10E3/UL (ref 0–1.3)
MONOCYTES NFR BLD AUTO: 1 %
NEUTROPHILS # BLD AUTO: 4.9 10E3/UL (ref 1.6–8.3)
NEUTROPHILS NFR BLD AUTO: 88 %
NRBC # BLD AUTO: 0 10E3/UL
NRBC BLD AUTO-RTO: 0 /100
PHOSPHATE SERPL-MCNC: 2.4 MG/DL (ref 2.5–4.5)
PLATELET # BLD AUTO: 281 10E3/UL (ref 150–450)
POTASSIUM SERPL-SCNC: 4.2 MMOL/L (ref 3.4–5.3)
RBC # BLD AUTO: 3.11 10E6/UL (ref 4.4–5.9)
SODIUM SERPL-SCNC: 138 MMOL/L (ref 135–145)
WBC # BLD AUTO: 5.5 10E3/UL (ref 4–11)

## 2024-01-20 PROCEDURE — 85025 COMPLETE CBC W/AUTO DIFF WBC: CPT | Performed by: PHYSICIAN ASSISTANT

## 2024-01-20 PROCEDURE — 206N000001 HC R&B BMT UMMC

## 2024-01-20 PROCEDURE — 82374 ASSAY BLOOD CARBON DIOXIDE: CPT | Performed by: PHYSICIAN ASSISTANT

## 2024-01-20 PROCEDURE — 250N000011 HC RX IP 250 OP 636: Performed by: PHYSICIAN ASSISTANT

## 2024-01-20 PROCEDURE — 80299 QUANTITATIVE ASSAY DRUG: CPT | Performed by: PHYSICIAN ASSISTANT

## 2024-01-20 PROCEDURE — 250N000013 HC RX MED GY IP 250 OP 250 PS 637: Performed by: PHYSICIAN ASSISTANT

## 2024-01-20 PROCEDURE — 258N000003 HC RX IP 258 OP 636: Performed by: INTERNAL MEDICINE

## 2024-01-20 PROCEDURE — 250N000012 HC RX MED GY IP 250 OP 636 PS 637: Performed by: PHYSICIAN ASSISTANT

## 2024-01-20 PROCEDURE — 83735 ASSAY OF MAGNESIUM: CPT | Performed by: INTERNAL MEDICINE

## 2024-01-20 PROCEDURE — 99418 PROLNG IP/OBS E/M EA 15 MIN: CPT | Performed by: PHYSICIAN ASSISTANT

## 2024-01-20 PROCEDURE — 258N000003 HC RX IP 258 OP 636: Performed by: PHYSICIAN ASSISTANT

## 2024-01-20 PROCEDURE — 84100 ASSAY OF PHOSPHORUS: CPT | Performed by: INTERNAL MEDICINE

## 2024-01-20 PROCEDURE — 87081 CULTURE SCREEN ONLY: CPT | Performed by: PHYSICIAN ASSISTANT

## 2024-01-20 PROCEDURE — 87324 CLOSTRIDIUM AG IA: CPT | Performed by: INTERNAL MEDICINE

## 2024-01-20 PROCEDURE — 87493 C DIFF AMPLIFIED PROBE: CPT | Performed by: INTERNAL MEDICINE

## 2024-01-20 PROCEDURE — 99233 SBSQ HOSP IP/OBS HIGH 50: CPT | Mod: 24 | Performed by: PHYSICIAN ASSISTANT

## 2024-01-20 PROCEDURE — 250N000011 HC RX IP 250 OP 636: Mod: JZ | Performed by: INTERNAL MEDICINE

## 2024-01-20 PROCEDURE — 250N000009 HC RX 250: Performed by: INTERNAL MEDICINE

## 2024-01-20 PROCEDURE — 80299 QUANTITATIVE ASSAY DRUG: CPT | Performed by: INTERNAL MEDICINE

## 2024-01-20 RX ORDER — LISINOPRIL 10 MG/1
20 TABLET ORAL ONCE
Status: COMPLETED | OUTPATIENT
Start: 2024-01-20 | End: 2024-01-20

## 2024-01-20 RX ORDER — POTASSIUM PHOS IN 0.9 % NACL 15MMOL/250
15 PLASTIC BAG, INJECTION (ML) INTRAVENOUS ONCE
Status: COMPLETED | OUTPATIENT
Start: 2024-01-20 | End: 2024-01-20

## 2024-01-20 RX ORDER — LISINOPRIL 10 MG/1
40 TABLET ORAL DAILY
Status: DISCONTINUED | OUTPATIENT
Start: 2024-01-21 | End: 2024-02-20 | Stop reason: HOSPADM

## 2024-01-20 RX ADMIN — LISINOPRIL 20 MG: 10 TABLET ORAL at 10:22

## 2024-01-20 RX ADMIN — BUSULFAN 240 MG: 6 INJECTION INTRAVENOUS at 01:12

## 2024-01-20 RX ADMIN — FLUDARABINE PHOSPHATE 84 MG: 25 INJECTION, SOLUTION INTRAVENOUS at 23:30

## 2024-01-20 RX ADMIN — POTASSIUM PHOSPHATE, MONOBASIC POTASSIUM PHOSPHATE, DIBASIC 15 MMOL: 224; 236 INJECTION, SOLUTION, CONCENTRATE INTRAVENOUS at 08:11

## 2024-01-20 RX ADMIN — URSODIOL 300 MG: 300 CAPSULE ORAL at 20:02

## 2024-01-20 RX ADMIN — ONDANSETRON HYDROCHLORIDE 8 MG: 8 TABLET, FILM COATED ORAL at 14:56

## 2024-01-20 RX ADMIN — DEXAMETHASONE 10 MG: 2 TABLET ORAL at 22:52

## 2024-01-20 RX ADMIN — APIXABAN 5 MG: 5 TABLET, FILM COATED ORAL at 08:11

## 2024-01-20 RX ADMIN — LEVETIRACETAM 500 MG: 500 TABLET, FILM COATED ORAL at 20:02

## 2024-01-20 RX ADMIN — URSODIOL 300 MG: 300 CAPSULE ORAL at 13:35

## 2024-01-20 RX ADMIN — ONDANSETRON HYDROCHLORIDE 8 MG: 8 TABLET, FILM COATED ORAL at 08:11

## 2024-01-20 RX ADMIN — LEVETIRACETAM 500 MG: 500 TABLET, FILM COATED ORAL at 08:11

## 2024-01-20 RX ADMIN — Medication 5 ML: at 13:35

## 2024-01-20 RX ADMIN — APIXABAN 5 MG: 5 TABLET, FILM COATED ORAL at 20:02

## 2024-01-20 RX ADMIN — PRASUGREL 10 MG: 10 TABLET, FILM COATED ORAL at 08:11

## 2024-01-20 RX ADMIN — LISINOPRIL 20 MG: 10 TABLET ORAL at 08:10

## 2024-01-20 RX ADMIN — AMLODIPINE BESYLATE 5 MG: 5 TABLET ORAL at 08:11

## 2024-01-20 RX ADMIN — METOPROLOL SUCCINATE 100 MG: 50 TABLET, EXTENDED RELEASE ORAL at 08:11

## 2024-01-20 RX ADMIN — ALLOPURINOL 300 MG: 300 TABLET ORAL at 08:11

## 2024-01-20 RX ADMIN — MICAFUNGIN SODIUM 150 MG: 50 INJECTION, POWDER, LYOPHILIZED, FOR SOLUTION INTRAVENOUS at 08:11

## 2024-01-20 RX ADMIN — ONDANSETRON HYDROCHLORIDE 8 MG: 8 TABLET, FILM COATED ORAL at 22:51

## 2024-01-20 RX ADMIN — URSODIOL 300 MG: 300 CAPSULE ORAL at 08:10

## 2024-01-20 RX ADMIN — PANTOPRAZOLE SODIUM 40 MG: 40 TABLET, DELAYED RELEASE ORAL at 08:11

## 2024-01-20 ASSESSMENT — ACTIVITIES OF DAILY LIVING (ADL)
ADLS_ACUITY_SCORE: 20

## 2024-01-20 NOTE — PLAN OF CARE
Afebrile. HTN. OVSS on RA. Complains of tenderness with CVC insertion site, otherwise denies pain, N/V/D and SOB. Received Fludarabine and Busulfan overnight, last Busulfan level for this dose due at 0827. Will need 15mmol phos when it arrives from pharmacy.     Problem: Adult Inpatient Plan of Care  Goal: Plan of Care Review  Description: The Plan of Care Review/Shift note should be completed every shift.  The Outcome Evaluation is a brief statement about your assessment that the patient is improving, declining, or no change.  This information will be displayed automatically on your shift  note.  Outcome: Progressing     Problem: Adult Inpatient Plan of Care  Goal: Absence of Hospital-Acquired Illness or Injury  Outcome: Progressing  Intervention: Identify and Manage Fall Risk  Recent Flowsheet Documentation  Taken 1/19/2024 1947 by Payal Hamlin RN  Safety Promotion/Fall Prevention: increase visualization of patient  Intervention: Prevent Skin Injury  Recent Flowsheet Documentation  Taken 1/19/2024 1947 by Payal Hamlin RN  Body Position: position changed independently  Intervention: Prevent Infection  Recent Flowsheet Documentation  Taken 1/19/2024 1947 by Payal Hamlin RN  Infection Prevention: rest/sleep promoted   Goal Outcome Evaluation:

## 2024-01-20 NOTE — PLAN OF CARE
Patient arrived from IR this morning after line placement. Patient hypertensive other vital signs stable. Patient oriented to floor and routines. Skin check done with Tamanna Araujo no skin issues noted , he's missing his big right toe. He was given some oxycodone this evening for line tenderness, warm packs didn't seem to help much. He will start Busulfan tonight with the timed blood draws. Continue to monitor.  Problem: Adult Inpatient Plan of Care  Goal: Plan of Care Review  Description: The Plan of Care Review/Shift note should be completed every shift.  The Outcome Evaluation is a brief statement about your assessment that the patient is improving, declining, or no change.  This information will be displayed automatically on your shift  note.  Outcome: Progressing   Goal Outcome Evaluation:

## 2024-01-20 NOTE — PROGRESS NOTES
"  BMT Progress Notes      Patient ID: Ziggy Hallman is a 48 year old year old male with a PMH of MDS, hx of multiple clots and MI undergoing a MA (Bu/Flu) prep for an 8/8 URD PBSCT currently day -5    Transplant Essential Data:   Diagnosis MDS-High risk, Plasma Cell neoplasm    BMTCT Type Allogeneic    Prep Regimen Bu/Flu    Donor Match and  Source URD 8/8 DP permissive    GVHD Prophylaxis PTCy, Siro/MMF    Primary BMT MD Bacon    Clinical Trials [id number]         Interval History: Doing well. Mild CVC tenderness relieved with oxycodone. No new complaints otherwise. No new infectious symptoms.     Review of Systems    Review of Systems: 10 point ROS negative unless stated in HPI   PHYSICAL EXAM      Weight     Wt Readings from Last 3 Encounters:   01/20/24 89.1 kg (196 lb 8 oz)   01/17/24 90.1 kg (198 lb 9.6 oz)   01/12/24 88.9 kg (196 lb)        KPS: 80    BP (!) 146/87 (BP Location: Right arm)   Pulse 66   Temp 97.8  F (36.6  C) (Oral)   Resp 16   Ht 1.725 m (5' 7.91\")   Wt 89.1 kg (196 lb 8 oz)   SpO2 97%   BMI 29.95 kg/m       General: NAD   Eyes: GARY, sclera anicteric   Nose/Mouth/Throat: OP clear, buccal mucosa moist, no ulcerations   Lungs: CTA bilaterally  Cardiovascular: RRR, no M/R/G   Abdominal/Rectal: +BS, soft, NT, ND, No HSM   Lymphatics: No edema  Skin: No rashes or petechaie  Neuro: A&O   Musculoskeletal: Muscle mass 5/5   Additional Findings: Powell site NT, no drainage.    Current aGVHD staging:  Skin 0, UGI 0, LGI 0, Liver 0 (keep in note through day +180 for allos)      LABS AND IMAGING: I have assessed all abnormal lab values for their clinical significance and any values considered clinically significant have been addressed in the assessment and plan.        Lab Results   Component Value Date    WBC 5.5 01/20/2024    ANEUTAUTO 4.9 01/20/2024    HGB 10.4 (L) 01/20/2024    HCT 31.1 (L) 01/20/2024     01/20/2024     01/20/2024    POTASSIUM 4.2 01/20/2024    CHLORIDE 109 " (H) 01/20/2024    CO2 24 01/20/2024     (H) 01/20/2024    BUN 22.3 (H) 01/20/2024    CR 0.91 01/20/2024    MAG 1.8 01/20/2024    INR 0.98 01/19/2024           SYSTEMS-BASED ASSESSMENT AND PLAN     Ziggy Hallman is a 48 year old year old male with a PMH of MDS, hx of multiple clots and MI undergoing a MA (Bu/Flu) prep for an 8/8 URD PBSCT day -5        BMT/IEC PROTOCOL for 2015-29  Chemo Prep:   Day -6: Admit  Day -5 through Day -2: Busulfan/Fludarabine  Day -1: Rest.   Keppra prophylaxis   Avoiding Tylenol for 72 hours befre and after last dose of Busulfan due to drug interaction. Ok to start day +2.     8/8 DP permissive. Cell dose pending  ABO: Donor: O+ Recipient A-  (Minor incompatability, no flush required)   GSCF plan: GCSF to start day +5 until ANC >1500 for 3 consecutive days    HEME/COAG  #Risk of Pancytopenia   - Risk of cytopenias due to chemotherapy and radiation  - Transfusion parameters: hemoglobin <7, platelets <10    #Significant Clot History   He has long history of various events including renal infarcts (2011, 2013, 2015), left popliteal embolic episode requiring surgery, anticoagulation (2011), right carotid artery embolic episode with TIA/stroke-like symptoms (2012), embolic MI (2015), gangrenous right toe requiring vascular surgery/amputation (2017), 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   Extensive hypercoagulable workup to date has been unrevealing.  JAK2 testing negative.  PNH testing negative.  Elevated IgA of unknown significance, no evidence of plasma cell disorder.  - On Eliquis and Prasugrel, continue until platelets <50k. Place hematology consult on Monday 1/22  For further recommendations on holding/managing his anti-platelet/anti-coagulation daniel-transplant      IMMUNOCOMPROMISED  - Active infections: None    -Prophylaxis plan:   ACV, (D CMV -, R CMV -),   Megan (HD- Nodules, Smoking hx Plan to continue MWF in the  outpatient vs home setting prior to discharge)   Levofloxacin while neutropenic,   Bactrim to start at day +28 (Transition to pentamidine if counts poor    - Vaccination status: Influenza vaccination after day +60, COVID vaccination after day +100, followed by remaining vaccinations at 12, 14, 24, and 26 months.    RISK OF GVHD  - Prophylaxis: PtC day +3, +4, , Siro/MMF to start day +5     CARDIOVASCULAR  #CAD  #Hx of CABG  - Continue metoprolol and lisinopril - Increased Lisinopril to 40mg every day on 1/20  (PTA lisinoril/hydrochlorothiazide) Baseline Bps around 140/80-90s. Goal to keep euvolemic daniel-transplant.     #Hyperlipidemia  - Holding atorvastatin peritransplant    - Risk of cardiomyopathy:  Baseline EF 50-55%, Global left ventricular function mildly reduced. Grade 1 or early diastolic dysfunction.   - Risk of arrhythmia: Baseline EKG showed NSR, Qtc -425    RESPIRATORY  - Baseline PFTs: Decreased   - Risk of respiratory complications: Frequent ambulation and incentive spirometer.    GI/NUTRITION  - Ulcer prophylaxis: Protonix   - VOD Prophylaxis: Ursodiol  - Risk of nausea/vomiting due to chemo: Ativan, Compazine, Zofran   - Risk of malnutrition: Nutrition to follow.     RENAL/ELECTROLYTES/  -Risk of Cytoxan induced SIADH: Follow Na/K BID during flush, change Flush to N.S. if Hyponatremic    - Electrolyte management: replace per sliding scale    DIABETES/ENDOCRINE  - Risk of steroid-induced hyperglyemia: Monitor BG, sliding scale if needed    MUSCULOSKELETAL/FRAILTY  - Baseline Frailty Score: 1, Comorbidity index- 4   - Patient with substantial risk of sarcopenia  - Daily PT/OT as needed while inpatient  - Cancer Rehab as needed outpatient    SYMPTOM MANAGEMENT  - Nausea from chemo Prochlorperazine, ondansetron, lorazepam.  - # Pain Assessment:      9/7/2023    10:42 PM   Current Pain Score   Patient currently in pain? denies   Ziggy s pain level was assessed and he currently denies pain.        SOCIAL  "DETERMINANTS  - Caregiver: Family   - Financial/insurance concerns: See SW notes       Known issues that I take into account for medical decisions, with salient changes to the plan considering these complexities noted above.    Patient Active Problem List   Diagnosis    Amegakaryocytic thrombocytopenia (H)    Anemia due to bone marrow failure, unspecified bone marrow failure type (H)    Aplastic anemia (H)     Clinically Significant Risk Factors Present on Admission              # Hypoalbuminemia: Lowest albumin = 2.3 g/dL at 1/19/2024 10:51 AM, will monitor as appropriate    # Drug Induced Coagulation Defect: home medication list includes an anticoagulant medication  # Drug Induced Platelet Defect: home medication list includes an antiplatelet medication   # Hypertension: Home medication list includes antihypertensive(s)      # Overweight: Estimated body mass index is 29.95 kg/m  as calculated from the following:    Height as of this encounter: 1.725 m (5' 7.91\").    Weight as of this encounter: 89.1 kg (196 lb 8 oz).              Today's summary: Admits for transplant     Dispo: Remain admitted through engraftment  - Follow up Micafungin discharge plan.     I spent 30 minutes in the care of this patient today, which included time necessary for preparation for the visit, obtaining history, ordering medications/tests/procedures as medically indicated, review of pertinent medical literature, counseling of the patient, communication of recommendations to the care team, and documentation time.    Brenda Brunson PA-C  [phone]         ______________________________________________      BMT ATTENDING NOTE    Ziggy Hallman is a 48 year old male, who is day -5 of transplant for MDS/IgA kappa MGUS.     My overall impression is that Ziggy is tolerating conditioning. Adjust busulfan dose based upon PK today. No fevers or infections. Past medical history, including extensive thrombosis history, reviewed in detail, and all " medical conditions are optimized for transplant. As he becomes more thrombocytopenic, we will plan to consult classical hematology on a strategy for his anticoagulation. Prevent CINV and infections. Remainder of supportive care as above.    I spent 50 minutes in the care of Ziggy today, including an independent face-to-face assessment and time monitoring for restoration of hematopoiesis, high-risk organ toxicities from chemotherapy, and GVHD, managing infectious risk due to his immunocompromised state, and encouraging nutrition and physical activity to prevent debility and sarcopenia.  I personally performed all of the medical decision making associated with this visit, counseled Ziggy on my overall assessment and recommendations, communicated my plan to the care team, and edited the above note to reflect my current plan of care.       Mariluz Cabral MD      Securely message with the Vocera Web Console

## 2024-01-20 NOTE — PROGRESS NOTES
Stop time on MAR & chart I & O  Chemo drug: Fludarabine & Busulfan  Tolerated: Well  Intervention: Premedicated with Decadron and Zofran per orders, good blood return noted pre and post each chemo administration  Response: Tolerated chemo well.   Plan: Next dose tonight

## 2024-01-20 NOTE — PHARMACY-CONSULT NOTE
"  Busulfan - Area Under the Curve  Therapeutic Drug Monitoring Pharmacokinetic Note      Busulfan is a chemotherapeutic agent used for conditioning regimens in HSCT patients.    Therapeutic drug monitoring (TDM) using area under the plasma concentration curve (AUC) analysis is recommended due to high inter-individual variability in plasma levels.       A high busulfan AUC is associated with an increased risk for sinusoidal obstruction syndrome, and a suboptimal AUC is associated with an increased risk for graft rejection or disease relapse. TDM uses busulfan levels to optimize the targeted drug exposure and minimize drug-related toxicity.      The Goal Cumulative AUC (cAUC) for all 4 doses for this protocol is 82.1 mghr/L (range 78 - 86.2 mghr/L )   Predicted cAUC outside of this range require a dose adjustment.    Per protocol 2015-29, AUC calculations will be performed on  Days 1/2/3 if needed per sop  (Note if protocol states specifics on the number of AUCs required versus if the AUC is \"in range/ in goal\" on any particular day/dose, then there is a need or no need for additional levels. Until the protocol specifically states, \"per busulfan standard of care guidelines\" we must follow the protocol to avoid protocol deviations.)    Initial Dose = 240 mg IV q24h according to protocol is based on      Model used to determine initial dose: Abebe     Current Dose = 240 mg IV q24h     Date levels were drawn: 1/20/24 Dose number: 1   Model used for TDM: Abebe  Based on busulfan drug levels and current dose, predicted cAUC = 131.5 mghr/L   T1/2 = 3.37 hr   Clearance = 8.04L/hr/kg     ASSESSMENT: The predicted busulfan cAUC is outside the goal range.     A new dose of 120 mg IV q24h will result in a predicted cAUC within goal range.     PLAN: Discussed result with BMT attending physician .     Recommend to decrease continue current busulfan dose  to 120 mg IV Q24H.    This recommendation is based on an ideal AUC " cumulative goal of 82.1 mghr/L .     Repeat levels will be performed per protocol as indicated      Thank you,   Albert Chandler

## 2024-01-21 LAB
ABO/RH(D): NORMAL
ANION GAP SERPL CALCULATED.3IONS-SCNC: 7 MMOL/L (ref 7–15)
ANTIBODY SCREEN: NEGATIVE
BASOPHILS # BLD AUTO: 0 10E3/UL (ref 0–0.2)
BASOPHILS NFR BLD AUTO: 0 %
BUN SERPL-MCNC: 30.1 MG/DL (ref 6–20)
BUSULFAN SERPL-MCNC: 1089 NG/ML
BUSULFAN SERPL-MCNC: 1538 NG/ML
BUSULFAN SERPL-MCNC: 1761 NG/ML
BUSULFAN SERPL-MCNC: 2040 NG/ML
CALCIUM SERPL-MCNC: 8.2 MG/DL (ref 8.6–10)
CHLORIDE SERPL-SCNC: 111 MMOL/L (ref 98–107)
CREAT SERPL-MCNC: 1.09 MG/DL (ref 0.67–1.17)
DEPRECATED HCO3 PLAS-SCNC: 22 MMOL/L (ref 22–29)
EGFRCR SERPLBLD CKD-EPI 2021: 83 ML/MIN/1.73M2
EOSINOPHIL # BLD AUTO: 0 10E3/UL (ref 0–0.7)
EOSINOPHIL NFR BLD AUTO: 0 %
ERYTHROCYTE [DISTWIDTH] IN BLOOD BY AUTOMATED COUNT: 14.6 % (ref 10–15)
GLUCOSE BLDC GLUCOMTR-MCNC: 148 MG/DL (ref 70–99)
GLUCOSE SERPL-MCNC: 172 MG/DL (ref 70–99)
HCT VFR BLD AUTO: 29.2 % (ref 40–53)
HGB BLD-MCNC: 9.8 G/DL (ref 13.3–17.7)
IMM GRANULOCYTES # BLD: 0.1 10E3/UL
IMM GRANULOCYTES NFR BLD: 1 %
LYMPHOCYTES # BLD AUTO: 0.5 10E3/UL (ref 0.8–5.3)
LYMPHOCYTES NFR BLD AUTO: 9 %
MAGNESIUM SERPL-MCNC: 2 MG/DL (ref 1.7–2.3)
MCH RBC QN AUTO: 33.8 PG (ref 26.5–33)
MCHC RBC AUTO-ENTMCNC: 33.6 G/DL (ref 31.5–36.5)
MCV RBC AUTO: 101 FL (ref 78–100)
MONOCYTES # BLD AUTO: 0 10E3/UL (ref 0–1.3)
MONOCYTES NFR BLD AUTO: 1 %
NEUTROPHILS # BLD AUTO: 5.2 10E3/UL (ref 1.6–8.3)
NEUTROPHILS NFR BLD AUTO: 89 %
NRBC # BLD AUTO: 0 10E3/UL
NRBC BLD AUTO-RTO: 0 /100
PHOSPHATE SERPL-MCNC: 3.1 MG/DL (ref 2.5–4.5)
PLATELET # BLD AUTO: 287 10E3/UL (ref 150–450)
POTASSIUM SERPL-SCNC: 3.9 MMOL/L (ref 3.4–5.3)
RBC # BLD AUTO: 2.9 10E6/UL (ref 4.4–5.9)
SODIUM SERPL-SCNC: 140 MMOL/L (ref 135–145)
SPECIMEN EXPIRATION DATE: NORMAL
WBC # BLD AUTO: 5.9 10E3/UL (ref 4–11)

## 2024-01-21 PROCEDURE — 85025 COMPLETE CBC W/AUTO DIFF WBC: CPT | Performed by: PHYSICIAN ASSISTANT

## 2024-01-21 PROCEDURE — 206N000001 HC R&B BMT UMMC

## 2024-01-21 PROCEDURE — 250N000011 HC RX IP 250 OP 636: Mod: JZ | Performed by: INTERNAL MEDICINE

## 2024-01-21 PROCEDURE — 80048 BASIC METABOLIC PNL TOTAL CA: CPT | Performed by: PHYSICIAN ASSISTANT

## 2024-01-21 PROCEDURE — 258N000003 HC RX IP 258 OP 636: Performed by: PHYSICIAN ASSISTANT

## 2024-01-21 PROCEDURE — 250N000012 HC RX MED GY IP 250 OP 636 PS 637: Performed by: PHYSICIAN ASSISTANT

## 2024-01-21 PROCEDURE — 99418 PROLNG IP/OBS E/M EA 15 MIN: CPT | Performed by: PHYSICIAN ASSISTANT

## 2024-01-21 PROCEDURE — 250N000013 HC RX MED GY IP 250 OP 250 PS 637: Performed by: PHYSICIAN ASSISTANT

## 2024-01-21 PROCEDURE — 80299 QUANTITATIVE ASSAY DRUG: CPT | Performed by: PHYSICIAN ASSISTANT

## 2024-01-21 PROCEDURE — 99233 SBSQ HOSP IP/OBS HIGH 50: CPT | Mod: 24 | Performed by: PHYSICIAN ASSISTANT

## 2024-01-21 PROCEDURE — 250N000011 HC RX IP 250 OP 636: Performed by: PHYSICIAN ASSISTANT

## 2024-01-21 PROCEDURE — 84100 ASSAY OF PHOSPHORUS: CPT | Performed by: INTERNAL MEDICINE

## 2024-01-21 PROCEDURE — 258N000003 HC RX IP 258 OP 636: Performed by: INTERNAL MEDICINE

## 2024-01-21 PROCEDURE — 86900 BLOOD TYPING SEROLOGIC ABO: CPT | Performed by: PHYSICIAN ASSISTANT

## 2024-01-21 PROCEDURE — 83735 ASSAY OF MAGNESIUM: CPT | Performed by: INTERNAL MEDICINE

## 2024-01-21 RX ORDER — AMLODIPINE BESYLATE 5 MG/1
5 TABLET ORAL 2 TIMES DAILY
Status: DISCONTINUED | OUTPATIENT
Start: 2024-01-21 | End: 2024-01-25

## 2024-01-21 RX ORDER — LABETALOL HYDROCHLORIDE 5 MG/ML
10 INJECTION, SOLUTION INTRAVENOUS EVERY 4 HOURS PRN
Status: DISCONTINUED | OUTPATIENT
Start: 2024-01-21 | End: 2024-02-20 | Stop reason: HOSPADM

## 2024-01-21 RX ORDER — LABETALOL HYDROCHLORIDE 5 MG/ML
20 INJECTION, SOLUTION INTRAVENOUS EVERY 4 HOURS PRN
Status: DISCONTINUED | OUTPATIENT
Start: 2024-01-21 | End: 2024-01-21

## 2024-01-21 RX ORDER — VANCOMYCIN HYDROCHLORIDE 125 MG/1
125 CAPSULE ORAL 4 TIMES DAILY
Status: COMPLETED | OUTPATIENT
Start: 2024-01-21 | End: 2024-02-04

## 2024-01-21 RX ADMIN — METOPROLOL SUCCINATE 100 MG: 50 TABLET, EXTENDED RELEASE ORAL at 07:57

## 2024-01-21 RX ADMIN — ONDANSETRON HYDROCHLORIDE 8 MG: 8 TABLET, FILM COATED ORAL at 15:17

## 2024-01-21 RX ADMIN — APIXABAN 5 MG: 5 TABLET, FILM COATED ORAL at 19:30

## 2024-01-21 RX ADMIN — LEVETIRACETAM 500 MG: 500 TABLET, FILM COATED ORAL at 19:30

## 2024-01-21 RX ADMIN — HYDROXYZINE HYDROCHLORIDE 25 MG: 25 TABLET, FILM COATED ORAL at 23:50

## 2024-01-21 RX ADMIN — BUSULFAN 120 MG: 6 INJECTION INTRAVENOUS at 00:35

## 2024-01-21 RX ADMIN — LEVETIRACETAM 500 MG: 500 TABLET, FILM COATED ORAL at 07:57

## 2024-01-21 RX ADMIN — PANTOPRAZOLE SODIUM 40 MG: 40 TABLET, DELAYED RELEASE ORAL at 07:57

## 2024-01-21 RX ADMIN — FLUDARABINE PHOSPHATE 84 MG: 25 INJECTION, SOLUTION INTRAVENOUS at 23:56

## 2024-01-21 RX ADMIN — VANCOMYCIN HYDROCHLORIDE 125 MG: 125 CAPSULE ORAL at 15:17

## 2024-01-21 RX ADMIN — ONDANSETRON HYDROCHLORIDE 8 MG: 8 TABLET, FILM COATED ORAL at 07:57

## 2024-01-21 RX ADMIN — MICAFUNGIN SODIUM 150 MG: 50 INJECTION, POWDER, LYOPHILIZED, FOR SOLUTION INTRAVENOUS at 07:54

## 2024-01-21 RX ADMIN — AMLODIPINE BESYLATE 5 MG: 5 TABLET ORAL at 07:57

## 2024-01-21 RX ADMIN — AMLODIPINE BESYLATE 5 MG: 5 TABLET ORAL at 19:30

## 2024-01-21 RX ADMIN — ACYCLOVIR 800 MG: 800 TABLET ORAL at 19:30

## 2024-01-21 RX ADMIN — LORAZEPAM 1 MG: 0.5 TABLET ORAL at 21:48

## 2024-01-21 RX ADMIN — VANCOMYCIN HYDROCHLORIDE 125 MG: 125 CAPSULE ORAL at 19:30

## 2024-01-21 RX ADMIN — DEXAMETHASONE 10 MG: 2 TABLET ORAL at 23:06

## 2024-01-21 RX ADMIN — LORAZEPAM 1 MG: 0.5 TABLET ORAL at 00:31

## 2024-01-21 RX ADMIN — ONDANSETRON HYDROCHLORIDE 8 MG: 8 TABLET, FILM COATED ORAL at 23:06

## 2024-01-21 RX ADMIN — URSODIOL 300 MG: 300 CAPSULE ORAL at 19:30

## 2024-01-21 RX ADMIN — VANCOMYCIN HYDROCHLORIDE 125 MG: 125 CAPSULE ORAL at 12:01

## 2024-01-21 RX ADMIN — AMLODIPINE BESYLATE 5 MG: 5 TABLET ORAL at 10:20

## 2024-01-21 RX ADMIN — LISINOPRIL 40 MG: 10 TABLET ORAL at 07:57

## 2024-01-21 RX ADMIN — ACYCLOVIR 800 MG: 800 TABLET ORAL at 07:57

## 2024-01-21 RX ADMIN — URSODIOL 300 MG: 300 CAPSULE ORAL at 15:17

## 2024-01-21 RX ADMIN — Medication 5 ML: at 10:20

## 2024-01-21 RX ADMIN — ALLOPURINOL 300 MG: 300 TABLET ORAL at 07:57

## 2024-01-21 RX ADMIN — APIXABAN 5 MG: 5 TABLET, FILM COATED ORAL at 07:57

## 2024-01-21 RX ADMIN — URSODIOL 300 MG: 300 CAPSULE ORAL at 07:57

## 2024-01-21 RX ADMIN — PRASUGREL 10 MG: 10 TABLET, FILM COATED ORAL at 07:57

## 2024-01-21 ASSESSMENT — ACTIVITIES OF DAILY LIVING (ADL)
ADLS_ACUITY_SCORE: 20

## 2024-01-21 NOTE — PROVIDER NOTIFICATION
"Sudarshan Lomeli Notified via [employer] text paging    \" Pt /101 Recheck 174/95. Asymptomatic. Other VSS.\"    Message read, no new orders placed.   "

## 2024-01-21 NOTE — PHARMACY-CONSULT NOTE
"  Busulfan - Area Under the Curve  Therapeutic Drug Monitoring Pharmacokinetic Note      Busulfan is a chemotherapeutic agent used for conditioning regimens in HSCT patients.    Therapeutic drug monitoring (TDM) using area under the plasma concentration curve (AUC) analysis is recommended due to high inter-individual variability in plasma levels.       A high busulfan AUC is associated with an increased risk for sinusoidal obstruction syndrome, and a suboptimal AUC is associated with an increased risk for graft rejection or disease relapse. TDM uses busulfan levels to optimize the targeted drug exposure and minimize drug-related toxicity.      The Goal Cumulative AUC (cAUC) for all 4 doses for this protocol is 82.1 mghr/L (range 78 - 86.2 mghr/L ).    Predicted cAUC outside of this range require a dose adjustment.    Per protocol 2015-29, AUC calculations will be performed on  Days 1/2 following Dose(s)240 mg/120 mg  (Note if protocol states specifics on the number of AUCs required versus if the AUC is \"in range/ in goal\" on any particular day/dose, then there is a need or no need for additional levels. Until the protocol specifically states, \"per busulfan standard of care guidelines\" we must follow the protocol to avoid protocol deviations.)  Model used to determine initial dose: Abebe     Current Dose = 120 mg IV q24h     Date levels were drawn: 1-21 Dose number: 2   Model used for TDM: Abebe    Based on busulfan drug levels and current dose, predicted cAUC = 82.9 mghr/L   Regimen: 120 mg IV every 24 hours.     Exposure target: AUC mg.hr/L cumulative 82.1 mg.hr/L cumulative   AUCcum: 82.9 mg.hr/L cumulative  Cav: 863.4 ng/mL    T1/2 = 3.4 hr   Clearance = 8.06L/hr/kg     ASSESSMENT: The predicted busulfan cAUC is within the goal range.       PLAN: Discussed result with BMT attending physician Dr Cabral.     Recommend to continue current busulfan dose 120 IV Q24H.    This recommendation is based on an ideal AUC " cumulative goal of 82.1 mghr/L   No additional levels are indicated.     Thank you,   Albert DamonD

## 2024-01-21 NOTE — PLAN OF CARE
Patient blood pressure improving with increased Lisinopril other vital signs stable. He offers no complaints, he denies nausea, he denies pain. He is positive for C Diff he was given the education hand out to read and questions were answered. He will be given his chemotherapy tonight Day 2. Continue to monitor.  Problem: Adult Inpatient Plan of Care  Goal: Plan of Care Review  Description: The Plan of Care Review/Shift note should be completed every shift.  The Outcome Evaluation is a brief statement about your assessment that the patient is improving, declining, or no change.  This information will be displayed automatically on your shift  note.  Outcome: Progressing   Goal Outcome Evaluation:

## 2024-01-21 NOTE — PROGRESS NOTES
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 Had difficult news delivered to him last night so he was upset from this but handling it okay. Feels this attributed to his high blood pressure. He continues to have elevated blood pressures today. Will plan to add amlodipine and add prn labetolol. No other concerns. No s/s of infection. To note admit c. Diff positive. No diarrhea or cramping currently.     Review of Systems    Review of Systems: 10 point ROS negative unless stated in HPI   PHYSICAL EXAM      Weight     Wt Readings from Last 3 Encounters:   01/21/24 89.9 kg (198 lb 4.8 oz)   01/17/24 90.1 kg (198 lb 9.6 oz)   01/12/24 88.9 kg (196 lb)        KPS: 80    BP (!) 168/98 (BP Location: Right arm)   Pulse 66   Temp 96.9  F (36.1  C) (Oral)   Resp 16   Ht 1.725 m (5' 7.91\")   Wt 89.9 kg (198 lb 4.8 oz)   SpO2 99%   BMI 30.23 kg/m       General: NAD   Eyes: GARY, sclera anicteric   Nose/Mouth/Throat: OP clear, buccal mucosa moist, no ulcerations   Lungs: CTA bilaterally  Cardiovascular: RRR, no M/R/G   Abdominal/Rectal: +BS, soft, NT, ND, No HSM   Lymphatics: No edema  Skin: No rashes or petechaie  Neuro: A&O   Musculoskeletal: Muscle mass 5/5   Additional Findings: Powell site NT, no drainage.    Current aGVHD staging:  Skin 0, UGI 0, LGI 0, Liver 0 (keep in note through day +180 for allos)      LABS AND IMAGING: I have assessed all abnormal lab values for their clinical significance and any values considered clinically significant have been addressed in the " assessment and plan.        Lab Results   Component Value Date    WBC 5.9 01/21/2024    ANEUTAUTO 5.2 01/21/2024    HGB 9.8 (L) 01/21/2024    HCT 29.2 (L) 01/21/2024     01/21/2024     01/21/2024    POTASSIUM 3.9 01/21/2024    CHLORIDE 111 (H) 01/21/2024    CO2 22 01/21/2024     (H) 01/21/2024    BUN 30.1 (H) 01/21/2024    CR 1.09 01/21/2024    MAG 2.0 01/21/2024    INR 0.98 01/19/2024           SYSTEMS-BASED ASSESSMENT AND PLAN     Ziggy Hallman is a 48 year old year old male with a PMH of MDS, hx of multiple clots and MI undergoing a MA (Bu/Flu) prep for an 8/8 URD PBSCT day -4        BMT/IEC PROTOCOL for 2015-29  Chemo Prep:   Day -6: Admit  Day -5 through Day -2: Busulfan/Fludarabine  Day -1: Rest.   Keppra prophylaxis   Avoiding Tylenol for 72 hours befre and after last dose of Busulfan due to drug interaction. Ok to start day +2.     8/8 DP permissive. Cell dose pending  ABO: Donor: O+ Recipient A-  (Minor incompatability, no flush required)   GSCF plan: GCSF to start day +5 until ANC >1500 for 3 consecutive days    HEME/COAG  #Risk of Pancytopenia   - Risk of cytopenias due to chemotherapy and radiation  - Transfusion parameters: hemoglobin <7, platelets <10    #Significant Clot History   He has long history of various events including renal infarcts (2011, 2013, 2015), left popliteal embolic episode requiring surgery, anticoagulation (2011), right carotid artery embolic episode with TIA/stroke-like symptoms (2012), embolic MI (2015), gangrenous right toe requiring vascular surgery/amputation (2017), 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   Extensive hypercoagulable workup to date has been unrevealing.  JAK2 testing negative.  PNH testing negative.  Elevated IgA of unknown significance, no evidence of plasma cell disorder.  - On Eliquis and Prasugrel, continue until platelets <50k. Place hematology consult on Monday 1/22  For further  recommendations on holding/managing his anti-platelet/anti-coagulation daniel-transplant      IMMUNOCOMPROMISED  C. Diff - Admit c. Diff triple+ - Start PO vanc 1/21 for 14 day course.     -Prophylaxis plan:   ACV, (D CMV -, R CMV -),   Megan (HD- Nodules, Smoking hx Plan to continue MWF in the outpatient vs home setting prior to discharge)   Levofloxacin while neutropenic,   Bactrim to start at day +28 (Transition to pentamidine if counts poor    - Vaccination status: Influenza vaccination after day +60, COVID vaccination after day +100, followed by remaining vaccinations at 12, 14, 24, and 26 months.    RISK OF GVHD  - Prophylaxis: PtC day +3, +4, , Siro/MMF to start day +5     CARDIOVASCULAR  #CAD  #Hx of CABG  #HTN   - Continue metoprolol and lisinopril - Increased Lisinopril to 40mg every day on 1/20  (PTA lisinoril/hydrochlorothiazide) Baseline Bps around 140/80-90s. Goal to keep euvolemic daniel-transplant.   - Added Amlodipine 5mg BID 1/21-x   - PRN labetolol, titrate as needed.     #Hyperlipidemia  - Holding atorvastatin peritransplant    - Risk of cardiomyopathy:  Baseline EF 50-55%, Global left ventricular function mildly reduced. Grade 1 or early diastolic dysfunction.   - Risk of arrhythmia: Baseline EKG showed NSR, Qtc -425    RESPIRATORY  - Baseline PFTs: Decreased   - Risk of respiratory complications: Frequent ambulation and incentive spirometer.    GI/NUTRITION  - Ulcer prophylaxis: Protonix   - VOD Prophylaxis: Ursodiol  - Risk of nausea/vomiting due to chemo: Ativan, Compazine, Zofran   - Risk of malnutrition: Nutrition to follow.     RENAL/ELECTROLYTES/  -Risk of Cytoxan induced SIADH: Follow Na/K BID during flush, change Flush to N.S. if Hyponatremic    - Electrolyte management: replace per sliding scale    DIABETES/ENDOCRINE  - Risk of steroid-induced hyperglyemia: Monitor BG, sliding scale if needed    MUSCULOSKELETAL/FRAILTY  - Baseline Frailty Score: 1, Comorbidity index- 4   - Patient with  "substantial risk of sarcopenia  - Daily PT/OT as needed while inpatient  - Cancer Rehab as needed outpatient    SYMPTOM MANAGEMENT  - Nausea from chemo Prochlorperazine, ondansetron, lorazepam.  - # Pain Assessment:      9/7/2023    10:42 PM   Current Pain Score   Patient currently in pain? denies   Ziggy s pain level was assessed and he currently denies pain.        SOCIAL DETERMINANTS  - Caregiver: Family   - Financial/insurance concerns: See SW notes       Known issues that I take into account for medical decisions, with salient changes to the plan considering these complexities noted above.    Patient Active Problem List   Diagnosis    Amegakaryocytic thrombocytopenia (H)    Anemia due to bone marrow failure, unspecified bone marrow failure type (H)    Aplastic anemia (H)     Clinically Significant Risk Factors              # Hypoalbuminemia: Lowest albumin = 2.3 g/dL at 1/19/2024 10:51 AM, will monitor as appropriate              # Obesity: Estimated body mass index is 30.23 kg/m  as calculated from the following:    Height as of this encounter: 1.725 m (5' 7.91\").    Weight as of this encounter: 89.9 kg (198 lb 4.8 oz)., PRESENT ON ADMISSION            Today's summary: Admits for transplant     Dispo: Remain admitted through engraftment  - Follow up Micafungin discharge plan.     I spent 30 minutes in the care of this patient today, which included time necessary for preparation for the visit, obtaining history, ordering medications/tests/procedures as medically indicated, review of pertinent medical literature, counseling of the patient, communication of recommendations to the care team, and documentation time.    Brenda Brunson, PA-C  [phone]         ______________________________________________      BMT ATTENDING NOTE    Ziggy Hallman is a 48 year old male, who is day -4 of transplant for MDS/IgA kappa MGUS.     My overall impression is that Ziggy is tolerating conditioning. Busulfan PK reviewed, continue " current busulfan dose. No fevers or infections. Past medical history, including extensive thrombosis history, reviewed in detail, and all medical conditions are optimized for transplant. As he becomes more thrombocytopenic, we will plan to consult classical hematology on a strategy for his anticoagulation. Prevent CINV and infections. Treat hypertension. Remainder of supportive care as above.    I spent 50 minutes in the care of Ziggy today, including an independent face-to-face assessment and time monitoring for restoration of hematopoiesis, high-risk organ toxicities from chemotherapy, and GVHD, managing infectious risk due to his immunocompromised state, and encouraging nutrition and physical activity to prevent debility and sarcopenia.  I personally performed all of the medical decision making associated with this visit, counseled Ziggy on my overall assessment and recommendations, communicated my plan to the care team, and edited the above note to reflect my current plan of care.       Mariluz Cabral MD      Securely message with the Vocera Web Console

## 2024-01-21 NOTE — PROGRESS NOTES
Stop time on MAR & chart I & O  Chemo drug: Fludarabine & Busulfan  Tolerated: Tolerated without issue.   Intervention: Premedication with Decadron and Zofran per orders. Good blood return noted pre and post each chemo administration.   Response: Tolerated without issue   Plan: Next dose of Fludarabine and Busulfan tonight.  Labs: Final Busulfan level for this dose due 1/21/24 at 0740

## 2024-01-21 NOTE — PLAN OF CARE
"Afebrile, HTN as high as 186/101. Hospitalist notified and no new orders,  down to 161/95 this morning. Patient reports being very upset when BP was at its highest due to personal/family events that took place yesterday. PRN ativan x1 to help with anxiety/sleep and patient was able to rest after dose. Fludarabine and Busulfan given without issue, tolerated well.  & 172 overnight. No replacements needed.   Continued C. Diff education provided, patient asking good questions. Infection prevention education reinforced along with good hand hygiene.   Continue POC.     Problem: Adult Inpatient Plan of Care  Goal: Plan of Care Review  Description: The Plan of Care Review/Shift note should be completed every shift.  The Outcome Evaluation is a brief statement about your assessment that the patient is improving, declining, or no change.  This information will be displayed automatically on your shift  note.  Outcome: Progressing     Problem: Adult Inpatient Plan of Care  Goal: Patient-Specific Goal (Individualized)  Description: You can add care plan individualizations to a care plan. Examples of Individualization might be:  \"Parent requests to be called daily at 9am for status\", \"I have a hard time hearing out of my right ear\", or \"Do not touch me to wake me up as it startles  me\".  Outcome: Progressing     Problem: Adult Inpatient Plan of Care  Goal: Absence of Hospital-Acquired Illness or Injury  Outcome: Progressing  Intervention: Identify and Manage Fall Risk  Recent Flowsheet Documentation  Taken 1/20/2024 2332 by Payal Hamlin RN  Safety Promotion/Fall Prevention: safety round/check completed  Taken 1/20/2024 2020 by Payal Hamlin RN  Safety Promotion/Fall Prevention: safety round/check completed  Intervention: Prevent Skin Injury  Recent Flowsheet Documentation  Taken 1/20/2024 2020 by Payal Hamlin, RN  Body Position: position changed independently  Intervention: Prevent Infection  Recent " Flowsheet Documentation  Taken 1/20/2024 2332 by Payal Hamlin, RN  Infection Prevention: rest/sleep promoted  Taken 1/20/2024 2020 by Payal Hamlin, RN  Infection Prevention: rest/sleep promoted   Goal Outcome Evaluation:

## 2024-01-22 ENCOUNTER — APPOINTMENT (OUTPATIENT)
Dept: OCCUPATIONAL THERAPY | Facility: CLINIC | Age: 49
DRG: 014 | End: 2024-01-22
Attending: PHYSICIAN ASSISTANT
Payer: COMMERCIAL

## 2024-01-22 LAB
ALBUMIN SERPL BCG-MCNC: 2 G/DL (ref 3.5–5.2)
ALBUMIN UR-MCNC: 600 MG/DL
ALP SERPL-CCNC: 65 U/L (ref 40–150)
ALT SERPL W P-5'-P-CCNC: 10 U/L (ref 0–70)
ANION GAP SERPL CALCULATED.3IONS-SCNC: 5 MMOL/L (ref 7–15)
APPEARANCE UR: CLEAR
AST SERPL W P-5'-P-CCNC: 16 U/L (ref 0–45)
BACTERIA #/AREA URNS HPF: ABNORMAL /HPF
BACTERIA SPEC CULT: NORMAL
BASOPHILS # BLD AUTO: 0 10E3/UL (ref 0–0.2)
BASOPHILS NFR BLD AUTO: 1 %
BILIRUB DIRECT SERPL-MCNC: <0.2 MG/DL (ref 0–0.3)
BILIRUB SERPL-MCNC: <0.2 MG/DL
BILIRUB UR QL STRIP: NEGATIVE
BUN SERPL-MCNC: 34 MG/DL (ref 6–20)
CA-I BLD-MCNC: 4.8 MG/DL (ref 4.4–5.2)
CALCIUM SERPL-MCNC: 8 MG/DL (ref 8.6–10)
CHLORIDE SERPL-SCNC: 113 MMOL/L (ref 98–107)
COLOR UR AUTO: ABNORMAL
CREAT SERPL-MCNC: 0.98 MG/DL (ref 0.67–1.17)
CREAT SERPL-MCNC: 1.11 MG/DL (ref 0.67–1.17)
CREAT UR-MCNC: 76.5 MG/DL
DEPRECATED HCO3 PLAS-SCNC: 22 MMOL/L (ref 22–29)
EGFRCR SERPLBLD CKD-EPI 2021: 81 ML/MIN/1.73M2
EOSINOPHIL # BLD AUTO: 0 10E3/UL (ref 0–0.7)
EOSINOPHIL NFR BLD AUTO: 0 %
ERYTHROCYTE [DISTWIDTH] IN BLOOD BY AUTOMATED COUNT: 14.6 % (ref 10–15)
FRACT EXCRET NA UR+SERPL-RTO: 1 %
GLUCOSE BLDC GLUCOMTR-MCNC: 149 MG/DL (ref 70–99)
GLUCOSE BLDC GLUCOMTR-MCNC: 154 MG/DL (ref 70–99)
GLUCOSE BLDC GLUCOMTR-MCNC: 155 MG/DL (ref 70–99)
GLUCOSE BLDC GLUCOMTR-MCNC: 183 MG/DL (ref 70–99)
GLUCOSE SERPL-MCNC: 145 MG/DL (ref 70–99)
GLUCOSE UR STRIP-MCNC: NEGATIVE MG/DL
HCT VFR BLD AUTO: 28.8 % (ref 40–53)
HGB BLD-MCNC: 9.4 G/DL (ref 13.3–17.7)
HGB UR QL STRIP: ABNORMAL
IMM GRANULOCYTES # BLD: 0.1 10E3/UL
IMM GRANULOCYTES NFR BLD: 2 %
INR PPP: 1.09 (ref 0.85–1.15)
KETONES UR STRIP-MCNC: NEGATIVE MG/DL
LEUKOCYTE ESTERASE UR QL STRIP: NEGATIVE
LYMPHOCYTES # BLD AUTO: 0.5 10E3/UL (ref 0.8–5.3)
LYMPHOCYTES NFR BLD AUTO: 6 %
MAGNESIUM SERPL-MCNC: 2 MG/DL (ref 1.7–2.3)
MCH RBC QN AUTO: 33 PG (ref 26.5–33)
MCHC RBC AUTO-ENTMCNC: 32.6 G/DL (ref 31.5–36.5)
MCV RBC AUTO: 101 FL (ref 78–100)
MONOCYTES # BLD AUTO: 0.1 10E3/UL (ref 0–1.3)
MONOCYTES NFR BLD AUTO: 1 %
NEUTROPHILS # BLD AUTO: 6.9 10E3/UL (ref 1.6–8.3)
NEUTROPHILS NFR BLD AUTO: 90 %
NITRATE UR QL: NEGATIVE
NRBC # BLD AUTO: 0 10E3/UL
NRBC BLD AUTO-RTO: 0 /100
OSMOLALITY SERPL: 313 MMOL/KG (ref 275–295)
OSMOLALITY UR: 767 MMOL/KG (ref 100–1200)
PH UR STRIP: 6.5 [PH] (ref 5–7)
PHOSPHATE SERPL-MCNC: 2.6 MG/DL (ref 2.5–4.5)
PLATELET # BLD AUTO: 260 10E3/UL (ref 150–450)
POTASSIUM SERPL-SCNC: 4 MMOL/L (ref 3.4–5.3)
PROT SERPL-MCNC: 4.7 G/DL (ref 6.4–8.3)
RBC # BLD AUTO: 2.85 10E6/UL (ref 4.4–5.9)
RBC URINE: 1 /HPF
SODIUM SERPL-SCNC: 140 MMOL/L (ref 135–145)
SODIUM SERPL-SCNC: 144 MMOL/L (ref 135–145)
SODIUM UR-SCNC: 112 MMOL/L
SP GR UR STRIP: 1.03 (ref 1–1.03)
UROBILINOGEN UR STRIP-MCNC: NORMAL MG/DL
WBC # BLD AUTO: 7.6 10E3/UL (ref 4–11)
WBC URINE: 1 /HPF

## 2024-01-22 PROCEDURE — 250N000011 HC RX IP 250 OP 636: Performed by: PHYSICIAN ASSISTANT

## 2024-01-22 PROCEDURE — 81001 URINALYSIS AUTO W/SCOPE: CPT

## 2024-01-22 PROCEDURE — 206N000001 HC R&B BMT UMMC

## 2024-01-22 PROCEDURE — 84300 ASSAY OF URINE SODIUM: CPT

## 2024-01-22 PROCEDURE — 84295 ASSAY OF SERUM SODIUM: CPT

## 2024-01-22 PROCEDURE — 80053 COMPREHEN METABOLIC PANEL: CPT | Performed by: PHYSICIAN ASSISTANT

## 2024-01-22 PROCEDURE — 83930 ASSAY OF BLOOD OSMOLALITY: CPT

## 2024-01-22 PROCEDURE — 82330 ASSAY OF CALCIUM: CPT

## 2024-01-22 PROCEDURE — 82565 ASSAY OF CREATININE: CPT

## 2024-01-22 PROCEDURE — 97530 THERAPEUTIC ACTIVITIES: CPT | Mod: GO

## 2024-01-22 PROCEDURE — 99233 SBSQ HOSP IP/OBS HIGH 50: CPT | Mod: 24 | Performed by: PHYSICIAN ASSISTANT

## 2024-01-22 PROCEDURE — 83935 ASSAY OF URINE OSMOLALITY: CPT

## 2024-01-22 PROCEDURE — 84100 ASSAY OF PHOSPHORUS: CPT | Performed by: PHYSICIAN ASSISTANT

## 2024-01-22 PROCEDURE — 250N000012 HC RX MED GY IP 250 OP 636 PS 637: Performed by: PHYSICIAN ASSISTANT

## 2024-01-22 PROCEDURE — 80048 BASIC METABOLIC PNL TOTAL CA: CPT | Performed by: PHYSICIAN ASSISTANT

## 2024-01-22 PROCEDURE — 250N000011 HC RX IP 250 OP 636: Mod: JZ | Performed by: INTERNAL MEDICINE

## 2024-01-22 PROCEDURE — 82248 BILIRUBIN DIRECT: CPT | Performed by: PHYSICIAN ASSISTANT

## 2024-01-22 PROCEDURE — 97165 OT EVAL LOW COMPLEX 30 MIN: CPT | Mod: GO

## 2024-01-22 PROCEDURE — 83735 ASSAY OF MAGNESIUM: CPT | Performed by: PHYSICIAN ASSISTANT

## 2024-01-22 PROCEDURE — 258N000003 HC RX IP 258 OP 636: Performed by: INTERNAL MEDICINE

## 2024-01-22 PROCEDURE — 258N000003 HC RX IP 258 OP 636: Performed by: PHYSICIAN ASSISTANT

## 2024-01-22 PROCEDURE — 99253 IP/OBS CNSLTJ NEW/EST LOW 45: CPT | Mod: 24 | Performed by: STUDENT IN AN ORGANIZED HEALTH CARE EDUCATION/TRAINING PROGRAM

## 2024-01-22 PROCEDURE — 85610 PROTHROMBIN TIME: CPT | Performed by: PHYSICIAN ASSISTANT

## 2024-01-22 PROCEDURE — 99418 PROLNG IP/OBS E/M EA 15 MIN: CPT | Performed by: PHYSICIAN ASSISTANT

## 2024-01-22 PROCEDURE — 250N000013 HC RX MED GY IP 250 OP 250 PS 637: Performed by: PHYSICIAN ASSISTANT

## 2024-01-22 PROCEDURE — 85025 COMPLETE CBC W/AUTO DIFF WBC: CPT | Performed by: PHYSICIAN ASSISTANT

## 2024-01-22 RX ADMIN — DEXAMETHASONE 10 MG: 2 TABLET ORAL at 23:05

## 2024-01-22 RX ADMIN — METOPROLOL SUCCINATE 100 MG: 50 TABLET, EXTENDED RELEASE ORAL at 09:16

## 2024-01-22 RX ADMIN — MICAFUNGIN SODIUM 150 MG: 50 INJECTION, POWDER, LYOPHILIZED, FOR SOLUTION INTRAVENOUS at 09:15

## 2024-01-22 RX ADMIN — ONDANSETRON HYDROCHLORIDE 8 MG: 8 TABLET, FILM COATED ORAL at 09:16

## 2024-01-22 RX ADMIN — LORAZEPAM 1 MG: 0.5 TABLET ORAL at 02:18

## 2024-01-22 RX ADMIN — Medication 5 ML: at 11:20

## 2024-01-22 RX ADMIN — ACYCLOVIR 800 MG: 800 TABLET ORAL at 20:31

## 2024-01-22 RX ADMIN — ONDANSETRON HYDROCHLORIDE 8 MG: 8 TABLET, FILM COATED ORAL at 14:49

## 2024-01-22 RX ADMIN — PRASUGREL 10 MG: 10 TABLET, FILM COATED ORAL at 09:16

## 2024-01-22 RX ADMIN — LISINOPRIL 40 MG: 10 TABLET ORAL at 09:16

## 2024-01-22 RX ADMIN — AMLODIPINE BESYLATE 5 MG: 5 TABLET ORAL at 20:30

## 2024-01-22 RX ADMIN — ONDANSETRON HYDROCHLORIDE 8 MG: 8 TABLET, FILM COATED ORAL at 23:05

## 2024-01-22 RX ADMIN — BUSULFAN 120 MG: 6 INJECTION INTRAVENOUS at 01:05

## 2024-01-22 RX ADMIN — APIXABAN 5 MG: 5 TABLET, FILM COATED ORAL at 20:30

## 2024-01-22 RX ADMIN — VANCOMYCIN HYDROCHLORIDE 125 MG: 125 CAPSULE ORAL at 20:29

## 2024-01-22 RX ADMIN — URSODIOL 300 MG: 300 CAPSULE ORAL at 09:16

## 2024-01-22 RX ADMIN — APIXABAN 5 MG: 5 TABLET, FILM COATED ORAL at 09:16

## 2024-01-22 RX ADMIN — AMLODIPINE BESYLATE 5 MG: 5 TABLET ORAL at 09:16

## 2024-01-22 RX ADMIN — ALLOPURINOL 300 MG: 300 TABLET ORAL at 09:16

## 2024-01-22 RX ADMIN — LEVETIRACETAM 500 MG: 500 TABLET, FILM COATED ORAL at 20:29

## 2024-01-22 RX ADMIN — VANCOMYCIN HYDROCHLORIDE 125 MG: 125 CAPSULE ORAL at 12:59

## 2024-01-22 RX ADMIN — URSODIOL 300 MG: 300 CAPSULE ORAL at 20:29

## 2024-01-22 RX ADMIN — VANCOMYCIN HYDROCHLORIDE 125 MG: 125 CAPSULE ORAL at 09:29

## 2024-01-22 RX ADMIN — PANTOPRAZOLE SODIUM 40 MG: 40 TABLET, DELAYED RELEASE ORAL at 09:16

## 2024-01-22 RX ADMIN — FLUDARABINE PHOSPHATE 84 MG: 25 INJECTION, SOLUTION INTRAVENOUS at 23:39

## 2024-01-22 RX ADMIN — LEVETIRACETAM 500 MG: 500 TABLET, FILM COATED ORAL at 09:16

## 2024-01-22 RX ADMIN — VANCOMYCIN HYDROCHLORIDE 125 MG: 125 CAPSULE ORAL at 16:49

## 2024-01-22 RX ADMIN — URSODIOL 300 MG: 300 CAPSULE ORAL at 14:49

## 2024-01-22 RX ADMIN — LABETALOL HYDROCHLORIDE 10 MG: 5 INJECTION, SOLUTION INTRAVENOUS at 09:23

## 2024-01-22 RX ADMIN — ACYCLOVIR 800 MG: 800 TABLET ORAL at 09:16

## 2024-01-22 RX ADMIN — LORAZEPAM 1 MG: 0.5 TABLET ORAL at 23:08

## 2024-01-22 ASSESSMENT — ACTIVITIES OF DAILY LIVING (ADL)
ADLS_ACUITY_SCORE: 20
PREVIOUS_RESPONSIBILITIES: MEAL PREP;HOUSEKEEPING;LAUNDRY;SHOPPING;YARDWORK;MEDICATION MANAGEMENT;FINANCES;DRIVING
ADLS_ACUITY_SCORE: 20

## 2024-01-22 NOTE — CONSULTS
Hematology Consultation    Patient: Ziggy Hallman  MRN: [id number]  : 1975  JAZMINE: 2024     Reason for Consultation: anticoagulation management in setting of BMT and significant past  thrombosis     Assessment: Ziggy Hallman is a 48 year old year old male with a PMH of MDS, hx of multiple clots and MI undergoing a MA (Bu/Flu) prep for an 8/8 URD PBSCT currently day -3 . Heme consulted for anticoagulation management.     Problem List  # MDS   #Undergoing Bone Marrow Transplant   #Risk of Pancytopenia  #Significant coagulopathy history   #CAD  #Hx of CABG  #HTN     Hematology consulted for anticoagulation management in the setting of preparation of a bone marrow transplant. Patient with extensive history of clotting without current known cause. He is currently receiving Eliquis and Effient. Platelets are currently stable at 260. There are no current clots that we are actively treating. There is a risk of clotting with discontinuation of anticoagulation however the risk of bleeding is higher with thrombocytopenia. Based off the guidelines from [facility], it is safe to continue his current anticoagulation until platelets are 50,000. Once platelets are <50,000 it is recommended to discontinue current anticoagulation and switch to prophylactic?dose low molecular weight heparin (LMWH). After platelets < 25,000, it is recommended that all anticoagulation be discontinued     Recommendations:  Recommend continuing eliquis and prasaquel until platelets less than 50,000 then stop his eliquis and prasaquel and switch to prophylactic dose low molecular weight heparin   Hold all anticoagulation once platelets < 25,000  Hold off on any platelet transfusions   Continue to trend CBC     BEZRA Michele A.A. Khorana, PHILLIP Del Valle C. Ay, M. Carrier,  Management of cancer?associated thrombosis in patients with thrombocytopenia: guidance from the SSC of the [facility], Journal of Thrombosis and  "Haemostasis, Volume 16, Issue 6, 2018, Pages 3995-3063,ISSN [id number], [email or web address]/jth.74944.    This patient was seen and discussed with Dr. Cogan Kari Falaas MD   Internal Medicine PGY1     -------------------------------    History: Ziggy Hallman is a 48 year old year old male with a PMH of MDS, hx of multiple clots and MI undergoing a MA (Bu/Flu) prep for an 8/8 URD PBSCT currently day -3 .     Heme consulted to assist with anticoagulation management in the setting of bone marrow biopsy transplant.     He is currently taking Prasurgrel and Eliquis. Tolerating prep thus far for BMT.     Reports he has had multiple clots. Last one was 2.5 years ago. He reports he was taking his anticoagulation medication every other day prior to admission to the hospital.     He has long history of various events including renal infarcts (2011, 2013, 2015), left popliteal embolic episode requiring surgery, anticoagulation (2011), right carotid artery embolic episode with TIA/stroke-like symptoms (2012), embolic MI (2015), gangrenous right toe requiring vascular surgery/amputation (2017), 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     ROS negative     Past Medical History:   Diagnosis Date    AMI anterior wall (H)     Mid LAD on cath with stent    CAD S/P percutaneous coronary angioplasty 07/01/2016    Unstable agina with anterior myocardial damage reported without mycardial damage by patient's history    CVA (cerebrovascular accident) (H) 01/01/2012    Reporting thromboembolic episode on the right neck. Pt states \"I've had ten strokes.\"    Hypercoagulable state (H24)     Uncertain etiology--seeing Hematologist    MEDICAL HISTORY OF -     Possibly PFO        Past Surgical History:   Procedure Laterality Date    AMPUTATE TOE(S)      ARTHROSCOPY KNEE RT/LT      Right     ARTHROSCOPY KNEE RT/LT      Left    CARDIAC CATHERIZATION      With stent placement    IR CHEST " PORT PLACEMENT > 5 YRS OF AGE  9/13/2023    IR CVC TUNNEL PLACEMENT > 5 YRS OF AGE  1/19/2024        Current Facility-Administered Medications   Medication    [START ON 1/27/2024] acetaminophen (TYLENOL) tablet 325-650 mg    [START ON 1/25/2024] acetaminophen (TYLENOL) tablet 500 mg    [START ON 1/27/2024] acetaminophen (TYLENOL) tablet 650 mg    acyclovir (ZOVIRAX) tablet 800 mg    allopurinol (ZYLOPRIM) tablet 300 mg    amLODIPine (NORVASC) tablet 5 mg    apixaban ANTICOAGULANT (ELIQUIS) tablet 5 mg    busulfan (GENERIC EQUIVALENT) 120 mg in sodium chloride 0.9 % 240 mL infusion    ceFEPIme (MAXIPIME) 2 g vial to attach to  mL bag for ADULTS or 50 mL bag for PEDS    [START ON 1/28/2024] Chemotherapy Infusing-Continuous Infusion    [START ON 1/28/2024] cycloPHOSphamide (CYTOXAN) 3,535 mg in sodium chloride 0.9 % 500 mL infusion    dexAMETHasone (DECADRON) tablet 10 mg    [START ON 1/24/2024] dexAMETHasone (DECADRON) tablet 6 mg    [START ON 1/23/2024] dexAMETHasone (DECADRON) tablet 8 mg    [START ON 1/30/2024] dextrose 5 % flush PRE/POST medication    [START ON 1/30/2024] dextrose 5 % flush PRE/POST medication    [START ON 1/27/2024] dextrose 5% and 0.45% NaCl infusion    [START ON 1/25/2024] diphenhydrAMINE (BENADRYL) capsule 25 mg    [START ON 1/30/2024] filgrastim-aafi (NIVESTYM) 450 mcg in D5W 30 mL infusion    FLUdarabine (FLUDARA) 84 mg in sodium chloride 0.9 % 113.36 mL infusion    [START ON 1/28/2024] furosemide (LASIX) injection 10 mg    [START ON 1/28/2024] furosemide (LASIX) injection 20 mg    heparin 100 unit/mL injection 500 Units    heparin lock flush 10 UNIT/ML injection 5-20 mL    heparin lock flush 10 UNIT/ML injection 5-20 mL    hydrOXYzine HCl (ATARAX) tablet 25 mg    labetalol (NORMODYNE/TRANDATE) injection 10 mg    levETIRAcetam (KEPPRA) tablet 500 mg    [START ON 1/24/2024] levofloxacin (LEVAQUIN) tablet 250 mg    lisinopril (ZESTRIL) tablet 40 mg    LORazepam (ATIVAN) injection 0.5-1  mg    Or    LORazepam (ATIVAN) tablet 0.5-1 mg    MEDICATION INSTRUCTION    [START ON 1/30/2024] MEDICATION INSTRUCTION    [START ON 1/25/2024] meperidine (DEMEROL) injection 25-50 mg    [START ON 1/28/2024] mesna (MESNEX) 3,540 mg in sodium chloride 0.9 % 1,085.4 mL infusion    metoprolol succinate ER (TOPROL XL) 24 hr tablet 100 mg    micafungin (MYCAMINE) 150 mg in sodium chloride 0.9 % 100 mL intermittent infusion    [START ON 1/30/2024] mycophenolate mofetil (CELLCEPT) 1,250 mg in D5W intermittent infusion    naloxone (NARCAN) injection 0.2 mg    Or    naloxone (NARCAN) injection 0.4 mg    Or    naloxone (NARCAN) injection 0.2 mg    Or    naloxone (NARCAN) injection 0.4 mg    ondansetron (ZOFRAN) tablet 8 mg    [START ON 1/28/2024] ondansetron (ZOFRAN) tablet 8 mg    pantoprazole (PROTONIX) EC tablet 40 mg    prasugrel (EFFIENT) tablet 10 mg    prochlorperazine (COMPAZINE) injection 5-10 mg    Or    prochlorperazine (COMPAZINE) tablet 5-10 mg    [START ON 1/30/2024] sirolimus (GENERIC EQUIVALENT) tablet 12 mg    [START ON 1/31/2024] sirolimus (GENERIC EQUIVALENT) tablet 5 mg    sodium chloride (PF) 0.9% PF flush 10-20 mL    sodium chloride (PF) 0.9% PF flush 10-40 mL    [START ON 2/26/2024] sulfamethoxazole-trimethoprim (BACTRIM DS) 800-160 MG per tablet 1 tablet    ursodiol (ACTIGALL) capsule 300 mg    vancomycin (VANCOCIN) capsule 125 mg    zolpidem (AMBIEN) tablet 5 mg          Physical Exam:  Vitals: B/P: 152/90, T: 97.7, P: 73, R: 10, Wt: 200 lbs 14.4 oz Body mass index is 30.63 kg/m .   Exam:   Gen: Appears well, no distress  HEENT: no scleral icterus or hemorrhage, no wet purpura, no lymphadenopathy  CV: regular, no murmurs  Pulm: clear  Abd: soft, nontender, no splenomegaly  Ext: no edema  Skin: no ecchymoses or hematomas  Neuro: no focal deficits, affect and cognition are normal    Labs:  WBC 7.6  HG 9.4  Platelets 260       Imaging: no new imaging

## 2024-01-22 NOTE — PLAN OF CARE
"Afebrile. HTN not meeting parameters for PRN labetolol. Restless most of the evening, PRN Ativan x2 for sleep and Atarax x1 for generalized itching, patient finally able to rest early this morning. Fludarabine and Busulfan given per orders, tolerated well. Continue POC.    Problem: Adult Inpatient Plan of Care  Goal: Patient-Specific Goal (Individualized)  Description: You can add care plan individualizations to a care plan. Examples of Individualization might be:  \"Parent requests to be called daily at 9am for status\", \"I have a hard time hearing out of my right ear\", or \"Do not touch me to wake me up as it startles  me\".  Outcome: Progressing     Problem: Adult Inpatient Plan of Care  Goal: Absence of Hospital-Acquired Illness or Injury  Outcome: Progressing  Intervention: Identify and Manage Fall Risk  Recent Flowsheet Documentation  Taken 1/21/2024 1930 by Payal Hamlin RN  Safety Promotion/Fall Prevention: safety round/check completed  Intervention: Prevent Skin Injury  Recent Flowsheet Documentation  Taken 1/21/2024 1930 by Payal Hamlin RN  Body Position: position changed independently  Intervention: Prevent Infection  Recent Flowsheet Documentation  Taken 1/21/2024 1930 by Payal Hamlin RN  Infection Prevention: rest/sleep promoted   Goal Outcome Evaluation:                        "

## 2024-01-22 NOTE — PLAN OF CARE
Patient hypertensive other vital signs stable, he did not meet labetalol parameters. He has offered no complaints. He denies pain, he denies nausea. He will get day 3 of chemotherapy tonight. Continue to monitor.  Problem: Adult Inpatient Plan of Care  Goal: Absence of Hospital-Acquired Illness or Injury  Intervention: Identify and Manage Fall Risk  Recent Flowsheet Documentation  Taken 1/21/2024 0800 by Evaristo Alfaro, RN  Safety Promotion/Fall Prevention:   assistive device/personal items within reach   nonskid shoes/slippers when out of bed   safety round/check completed   Goal Outcome Evaluation:

## 2024-01-22 NOTE — PROGRESS NOTES
"   Occupational Therapy Evaluation: 01/22/24 1340   Appointment Info   Signing Clinician's Name / Credentials (OT) DORIS Preston, OTR/L   Rehab Comments (OT) PT Holding until 2/2   Living Environment   People in Home child(yimi), adult  (Sons age 18 and 21.)   Current Living Arrangements house   Home Accessibility stairs to enter home;stairs within home   Number of Stairs, Main Entrance 7   Number of Stairs, Within Home, Primary greater than 10 stairs   Transportation Anticipated family or friend will provide   Living Environment Comments Pt living with 2 kids ( ages 21 and 18) in a house with 7-8 PAUL, and flight of stairs down to basement and to upstairs. Pt has bedroom and bathroom on main floor.  Bathroom with a tub shower.   Self-Care   Usual Activity Tolerance good   Current Activity Tolerance moderate   Equipment Currently Used at Home none   Fall history within last six months yes   Number of times patient has fallen within last six months 1  (Pt reports he was cleaning and bringing stuff outside, slipped on steps and fell down, cracked ribs.)   Activity/Exercise/Self-Care Comment Pt reports was ind with ADLs at baseline.   Instrumental Activities of Daily Living (IADL)   Previous Responsibilities meal prep;housekeeping;laundry;shopping;yardwork;medication management;finances;driving  (Pt retired in August, planning to return to working after All[employer] completed.)   General Information   Onset of Illness/Injury or Date of Surgery 01/19/24   Referring Physician Taran Bacon MD   Patient/Family Therapy Goal Statement (OT) Return home   Additional Occupational Profile Info/Pertinent History of Current Problem \"Ziggy Hallman is a 48 year old year old male with a PMH of MDS, hx of multiple clots and MI undergoing a MA (Bu/Flu) prep for an 8/8 URD PBSCT currently day -3.\"   Existing Precautions/Restrictions immunosuppressed   Left Upper Extremity (Weight-bearing Status) full weight-bearing (FWB)   Right Upper " Extremity (Weight-bearing Status) full weight-bearing (FWB)   Left Lower Extremity (Weight-bearing Status) full weight-bearing (FWB)   Right Lower Extremity (Weight-bearing Status) full weight-bearing (FWB)   General Observations and Info Activity: ambulate with assist.   Cognitive Status Examination   Orientation Status orientation to person, place and time   Visual Perception   Visual Impairment/Limitations WFL;other (see comments)  (Pt reports is nearsigned and wears glasses only to drive and watch TV.)   Sensory   Sensory Quick Adds sensation intact   Pain Assessment   Patient Currently in Pain No   Posture   Posture not impaired   Range of Motion Comprehensive   General Range of Motion no range of motion deficits identified   Strength Comprehensive (MMT)   Comment, General Manual Muscle Testing (MMT) Assessment At risk for deconditioning while IP for BMT.   Coordination   Upper Extremity Coordination No deficits were identified   Bed Mobility   Bed Mobility supine-sit   Comment (Bed Mobility) Ind   Transfers   Transfers toilet transfer   Transfer Comments Ind per clinical judgement   Activities of Daily Living   BADL Assessment/Intervention no deficits identified   Clinical Impression   Criteria for Skilled Therapeutic Interventions Met (OT) Yes, treatment indicated   OT Diagnosis Pt at risk for deconditioning while IP for BMT.   OT Problem List-Impairments impacting ADL other (see comments)  (Pt at risk for deconitioning while IP for BMT.)   Assessment of Occupational Performance 1-3 Performance Deficits   Identified Performance Deficits Pt at risk for deconditioning while IP for BMT.   Planned Therapy Interventions (OT) home program guidelines;progressive activity/exercise;risk factor education;strengthening   Clinical Decision Making Complexity (OT) problem focused assessment/low complexity   Risk & Benefits of therapy have been explained evaluation/treatment results reviewed;care plan/treatment goals  "reviewed;risks/benefits reviewed;current/potential barriers reviewed;participants voiced agreement with care plan;participants included;patient   Clinical Impression Comments Pt would benefit from skilled IP OT services as he is at risk for deconditioning while IP for BMT.   OT Total Evaluation Time   OT Eval, Low Complexity Minutes (69583) 5   OT Goals   Therapy Frequency (OT) 2 times/week   OT Predicted Duration/Target Date for Goal Attainment 02/27/24   OT Goals Aerobic Activity;OT Goal 1;OT Goal 2   OT: Perform aerobic activity with stable cardiovascular response continuous activity;20 minutes;ambulation;NuStep   OT: Goal 1 Independently verbalize and demonstrate awareness of platelet, hemoglobin, and neutropenic precautions as they impact exercises and functional mobility.   OT: Goal 2 Pt will ind complete >10 stairs prior to discharge to increase ind with household mobility.   OT Discharge Planning   OT Plan NuStep, Hallway ambulation   OT Discharge Recommendation (DC Rec) home with assist   OT Rationale for DC Rec Anticipate when pt medically stable, able to discharge home with assist from sons/ caregiver (ex-wife) as needed. Pt ind this date in room.   OT Brief overview of current status Ind   Total Session Time   Total Session Time (sum of timed and untimed services) 5       30-Second Sit to Stand Test:  The test is designed to be conducted with a straight back chair, without armrests, with a 17-inch seat height.  (Chair heights: High back & Folding = 18\", ICU/5C recliner & window seat = 18 1/2\"  Actual height of chair used: 18 \"    Patient Score (score =0 if must use arms) 16 reps    The 30 Second Sit to Stand Test is considered a test of fall risk.  Data from [place] [facility], cosponsored by [place] [facility] of Health:  If must use arms = High Fall Risk regardless of reps  8 or less times = High Fall Risk   9 to 12 times = Moderate Risk  13 or more times = Low Risk    The 30 Second Sit to Stand Test is also considered a " test of leg strength and endurance.   Normative Data from Doug et al,. 2001  Age                 Reps: Men        Reps: Women  60-64                14-19                       12-17                               65-69                12-18                       11-16                    70-74                12-17                       10-15              75-79                11-17                       10-15                    80-84                10-15                         9-14  85-89                 8-14                          8-13  90-94                 7-12                          4-11    Assessment (rationale for performing, application to patient s function & care plan): Pt at low fall risk at this time. Ind in room/mayer.

## 2024-01-22 NOTE — PROGRESS NOTES
"  BMT Progress Notes      Patient ID: Ziggy Hallman is a 48 year old year old male with a PMH of MDS, hx of multiple clots and MI undergoing a MA (Bu/Flu) prep for an 8/8 URD PBSCT currently day -3    Transplant Essential Data:   Diagnosis MDS-High risk, Plasma Cell neoplasm    BMTCT Type Allogeneic    Prep Regimen Bu/Flu    Donor Match and  Source URD 8/8 DP permissive    GVHD Prophylaxis PTCy, Siro/MMF    Primary BMT MD Bacon    Clinical Trials [id number]         Interval History:    Doing ok. Very tired this morning. No complaints.Good PO intake recorded.     Review of Systems    Review of Systems: 10 point ROS negative unless stated in HPI   PHYSICAL EXAM      Weight     Wt Readings from Last 3 Encounters:   01/21/24 89.9 kg (198 lb 4.8 oz)   01/17/24 90.1 kg (198 lb 9.6 oz)   01/12/24 88.9 kg (196 lb)        KPS: 80    BP (!) 169/97   Pulse 70   Temp 97.6  F (36.4  C) (Axillary)   Resp 18   Ht 1.725 m (5' 7.91\")   Wt 89.9 kg (198 lb 4.8 oz)   SpO2 99%   BMI 30.23 kg/m       General: NAD   Eyes: GARY, sclera anicteric   Nose/Mouth/Throat: OP clear, buccal mucosa moist, no ulcerations   Lungs: CTA bilaterally  Cardiovascular: RRR, no M/R/G   Abdominal/Rectal: +BS, soft, NT, ND  Lymphatics: No edema  Skin: No rashes or petechaie  Neuro: A&O   Musculoskeletal: Muscle mass 5/5   Additional Findings: Powell site NT, no drainage.    Current aGVHD staging:  Skin 0, UGI 0, LGI 0, Liver 0 (keep in note through day +180 for allos)      LABS AND IMAGING: I have assessed all abnormal lab values for their clinical significance and any values considered clinically significant have been addressed in the assessment and plan.        Lab Results   Component Value Date    WBC 7.6 01/22/2024    ANEUTAUTO 6.9 01/22/2024    HGB 9.4 (L) 01/22/2024    HCT 28.8 (L) 01/22/2024     01/22/2024     01/22/2024    POTASSIUM 4.0 01/22/2024    CHLORIDE 113 (H) 01/22/2024    CO2 22 01/22/2024     (H) 01/22/2024    " BUN 34.0 (H) 01/22/2024    CR 1.11 01/22/2024    MAG 2.0 01/22/2024    INR 1.09 01/22/2024           SYSTEMS-BASED ASSESSMENT AND PLAN     Ziggy Hallman is a 48 year old year old male with a PMH of MDS, hx of multiple clots and MI undergoing a MA (Bu/Flu) prep for an 8/8 URD PBSCT day -3    BMT/IEC PROTOCOL for 2015-29  Chemo Prep:   Day -6: Admit  Day -5 through Day -2: Busulfan/Fludarabine  Day -1: Rest.   Keppra prophylaxis   Avoiding Tylenol for 72 hours befre and after last dose of Busulfan due to drug interaction. Ok to start day +2.     8/8 DP permissive. Cell dose pending  ABO: Donor: O+ Recipient A-  (Minor incompatability, no flush required)   GSCF plan: GCSF to start day +5 until ANC >1500 for 3 consecutive days    HEME/COAG  #Risk of Pancytopenia   - Risk of cytopenias due to chemotherapy and radiation  - Transfusion parameters: hemoglobin <7, platelets <10    #Significant coagulopathy history-  He has long history of various events including renal infarcts (2011, 2013, 2015), left popliteal embolic episode requiring surgery, anticoagulation (2011), right carotid artery embolic episode with TIA/stroke-like symptoms (2012), embolic MI (2015), gangrenous right toe requiring vascular surgery/amputation (2017), 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   Extensive hypercoagulable workup to date has been unrevealing.  JAK2 testing negative.  PNH testing negative.  Elevated IgA of unknown significance, no evidence of plasma cell disorder.  - On Eliquis and Prasugrel, continue until platelets <50k. Placed hematology consult on Monday 1/22  For further recommendations on holding/managing his anti-platelet/anti-coagulation daniel-transplant    IMMUNOCOMPROMISED  C. Diff - Admit c. Diff triple+ - Start PO vanc 1/21 for 14 day course.     -Prophylaxis plan:   ACV, (D CMV -, R CMV -),   Megan (HD- Nodules, Smoking hx Plan to continue MWF in the outpatient vs home  setting prior to discharge)   Levofloxacin while neutropenic,   Bactrim to start at day +28 (Transition to pentamidine if counts poor    - Vaccination status: Influenza vaccination after day +60, COVID vaccination after day +100, followed by remaining vaccinations at 12, 14, 24, and 26 months.    RISK OF GVHD  - Prophylaxis: PtC day +3, +4, , Siro/MMF to start day +5     CARDIOVASCULAR  #CAD  #Hx of CABG  #HTN   - Continue metoprolol and lisinopril - Increased Lisinopril to 40mg every day on 1/20  (PTA lisinoril/hydrochlorothiazide) Baseline Bps around 140/80-90s. Goal to keep euvolemic daniel-transplant.   - Added Amlodipine 5mg BID 1/21-x   - PRN labetolol, titrate as needed.     #Hyperlipidemia  - Holding atorvastatin peritransplant    - Risk of cardiomyopathy:  Baseline EF 50-55%, Global left ventricular function mildly reduced. Grade 1 or early diastolic dysfunction.   - Risk of arrhythmia: Baseline EKG showed NSR, Qtc -425    RESPIRATORY  - Baseline PFTs: Decreased   - Risk of respiratory complications: Frequent ambulation and incentive spirometer.    GI/NUTRITION  - Ulcer prophylaxis: Protonix   - VOD Prophylaxis: Ursodiol  - Risk of nausea/vomiting due to chemo: Ativan, Compazine, Zofran   - Risk of malnutrition: Nutrition to follow.     RENAL/ELECTROLYTES/  - Elevated Cr: Fena, Osms pending. In setting of BUN elevation appears prerenal, but good PO intake. Lisinipril increase recently also possible etiology.  - Electrolyte management: replace per sliding scale    DIABETES/ENDOCRINE  - Risk of steroid-induced hyperglyemia: Monitor BG, sliding scale if needed    MUSCULOSKELETAL/FRAILTY  - Baseline Frailty Score: 1, Comorbidity index- 4   - Patient with substantial risk of sarcopenia  - Daily PT/OT as needed while inpatient  - Cancer Rehab as needed outpatient    SYMPTOM MANAGEMENT  - Nausea from chemo Prochlorperazine, ondansetron, lorazepam.  - # Pain Assessment:      9/7/2023    10:42 PM   Current Pain  "Score   Patient currently in pain? denies   Ziggy s pain level was assessed and he currently denies pain.        SOCIAL DETERMINANTS  - Caregiver: Family   - Financial/insurance concerns: See SW notes       Known issues that I take into account for medical decisions, with salient changes to the plan considering these complexities noted above.    Patient Active Problem List   Diagnosis    Amegakaryocytic thrombocytopenia (H)    Anemia due to bone marrow failure, unspecified bone marrow failure type (H)    Aplastic anemia (H)     Clinically Significant Risk Factors              # Hypoalbuminemia: Lowest albumin = 2 g/dL at 1/22/2024  4:12 AM, will monitor as appropriate              # Obesity: Estimated body mass index is 30.23 kg/m  as calculated from the following:    Height as of this encounter: 1.725 m (5' 7.91\").    Weight as of this encounter: 89.9 kg (198 lb 4.8 oz)., PRESENT ON ADMISSION            Today's summary: Admits for transplant     Dispo: Remain admitted through engraftment  - Follow up Micafungin discharge plan.     I spent 30 minutes in the care of this patient today, which included time necessary for preparation for the visit, obtaining history, ordering medications/tests/procedures as medically indicated, review of pertinent medical literature, counseling of the patient, communication of recommendations to the care team, and documentation time.    Toyin Herrmann PA-C  [phone]    ______________________________________________      BMT ATTENDING NOTE    Ziggy Hallman is a 48 year old male, who is day -3 of transplant for MDS/IgA kappa MGUS.     My overall impression is that Ziggy is tolerating conditioning. Busulfan PK reviewed, continue current busulfan dose. No fevers or infections. Past medical history, including extensive thrombosis history, reviewed in detail, and all medical conditions are optimized for transplant. Consult classical hematology on a strategy for his anticoagulation. Prevent " CINV and infections. Treat hypertension. Remainder of supportive care as above.    I spent 35 minutes in the care of Ziggy today, including an independent face-to-face assessment and time monitoring for restoration of hematopoiesis, high-risk organ toxicities from chemotherapy, and GVHD, managing infectious risk due to his immunocompromised state, and encouraging nutrition and physical activity to prevent debility and sarcopenia.  I personally performed all of the medical decision making associated with this visit, counseled Ziggy on my overall assessment and recommendations, communicated my plan to the care team, and edited the above note to reflect my current plan of care.       Mariluz Cabral MD      Securely message with the Vocera Web Console

## 2024-01-23 LAB
ANION GAP SERPL CALCULATED.3IONS-SCNC: 4 MMOL/L (ref 7–15)
BASOPHILS # BLD AUTO: 0 10E3/UL (ref 0–0.2)
BASOPHILS NFR BLD AUTO: 0 %
BUN SERPL-MCNC: 29.7 MG/DL (ref 6–20)
CALCIUM SERPL-MCNC: 7.8 MG/DL (ref 8.6–10)
CHLORIDE SERPL-SCNC: 114 MMOL/L (ref 98–107)
CREAT SERPL-MCNC: 0.98 MG/DL (ref 0.67–1.17)
DEPRECATED HCO3 PLAS-SCNC: 23 MMOL/L (ref 22–29)
EGFRCR SERPLBLD CKD-EPI 2021: >90 ML/MIN/1.73M2
EOSINOPHIL # BLD AUTO: 0 10E3/UL (ref 0–0.7)
EOSINOPHIL NFR BLD AUTO: 0 %
ERYTHROCYTE [DISTWIDTH] IN BLOOD BY AUTOMATED COUNT: 14.4 % (ref 10–15)
GLUCOSE BLDC GLUCOMTR-MCNC: 142 MG/DL (ref 70–99)
GLUCOSE BLDC GLUCOMTR-MCNC: 150 MG/DL (ref 70–99)
GLUCOSE BLDC GLUCOMTR-MCNC: 151 MG/DL (ref 70–99)
GLUCOSE BLDC GLUCOMTR-MCNC: 180 MG/DL (ref 70–99)
GLUCOSE SERPL-MCNC: 146 MG/DL (ref 70–99)
HCT VFR BLD AUTO: 28.7 % (ref 40–53)
HGB BLD-MCNC: 9.5 G/DL (ref 13.3–17.7)
IMM GRANULOCYTES # BLD: 0.1 10E3/UL
IMM GRANULOCYTES NFR BLD: 2 %
LYMPHOCYTES # BLD AUTO: 0.2 10E3/UL (ref 0.8–5.3)
LYMPHOCYTES NFR BLD AUTO: 4 %
MAGNESIUM SERPL-MCNC: 1.9 MG/DL (ref 1.7–2.3)
MCH RBC QN AUTO: 33.5 PG (ref 26.5–33)
MCHC RBC AUTO-ENTMCNC: 33.1 G/DL (ref 31.5–36.5)
MCV RBC AUTO: 101 FL (ref 78–100)
MONOCYTES # BLD AUTO: 0.1 10E3/UL (ref 0–1.3)
MONOCYTES NFR BLD AUTO: 1 %
NEUTROPHILS # BLD AUTO: 6 10E3/UL (ref 1.6–8.3)
NEUTROPHILS NFR BLD AUTO: 93 %
NRBC # BLD AUTO: 0 10E3/UL
NRBC BLD AUTO-RTO: 0 /100
PATH REPORT.COMMENTS IMP SPEC: ABNORMAL
PATH REPORT.COMMENTS IMP SPEC: YES
PATH REPORT.FINAL DX SPEC: ABNORMAL
PATH REPORT.GROSS SPEC: ABNORMAL
PATH REPORT.MICROSCOPIC SPEC OTHER STN: ABNORMAL
PATH REPORT.RELEVANT HX SPEC: ABNORMAL
PATH REPORT.RELEVANT HX SPEC: ABNORMAL
PATH REPORT.SITE OF ORIGIN SPEC: ABNORMAL
PHOSPHATE SERPL-MCNC: 2.9 MG/DL (ref 2.5–4.5)
PLATELET # BLD AUTO: 233 10E3/UL (ref 150–450)
POTASSIUM SERPL-SCNC: 3.8 MMOL/L (ref 3.4–5.3)
RBC # BLD AUTO: 2.84 10E6/UL (ref 4.4–5.9)
SODIUM SERPL-SCNC: 141 MMOL/L (ref 135–145)
WBC # BLD AUTO: 6.4 10E3/UL (ref 4–11)

## 2024-01-23 PROCEDURE — 250N000013 HC RX MED GY IP 250 OP 250 PS 637: Performed by: PHYSICIAN ASSISTANT

## 2024-01-23 PROCEDURE — 99233 SBSQ HOSP IP/OBS HIGH 50: CPT | Mod: 24

## 2024-01-23 PROCEDURE — 250N000011 HC RX IP 250 OP 636: Mod: JZ | Performed by: PHYSICIAN ASSISTANT

## 2024-01-23 PROCEDURE — 84100 ASSAY OF PHOSPHORUS: CPT | Performed by: INTERNAL MEDICINE

## 2024-01-23 PROCEDURE — 250N000012 HC RX MED GY IP 250 OP 636 PS 637: Performed by: PHYSICIAN ASSISTANT

## 2024-01-23 PROCEDURE — 258N000003 HC RX IP 258 OP 636: Performed by: PHYSICIAN ASSISTANT

## 2024-01-23 PROCEDURE — 250N000011 HC RX IP 250 OP 636: Performed by: PHYSICIAN ASSISTANT

## 2024-01-23 PROCEDURE — 250N000013 HC RX MED GY IP 250 OP 250 PS 637

## 2024-01-23 PROCEDURE — 82374 ASSAY BLOOD CARBON DIOXIDE: CPT | Performed by: PHYSICIAN ASSISTANT

## 2024-01-23 PROCEDURE — 206N000001 HC R&B BMT UMMC

## 2024-01-23 PROCEDURE — 83735 ASSAY OF MAGNESIUM: CPT | Performed by: INTERNAL MEDICINE

## 2024-01-23 PROCEDURE — 85025 COMPLETE CBC W/AUTO DIFF WBC: CPT | Performed by: PHYSICIAN ASSISTANT

## 2024-01-23 PROCEDURE — 250N000011 HC RX IP 250 OP 636: Mod: JZ | Performed by: INTERNAL MEDICINE

## 2024-01-23 PROCEDURE — 82565 ASSAY OF CREATININE: CPT | Performed by: PHYSICIAN ASSISTANT

## 2024-01-23 PROCEDURE — 258N000003 HC RX IP 258 OP 636: Performed by: INTERNAL MEDICINE

## 2024-01-23 PROCEDURE — 99418 PROLNG IP/OBS E/M EA 15 MIN: CPT

## 2024-01-23 RX ORDER — SALIVA STIMULANT COMB. NO.3
2 SPRAY, NON-AEROSOL (ML) MUCOUS MEMBRANE 4 TIMES DAILY
Status: DISCONTINUED | OUTPATIENT
Start: 2024-01-23 | End: 2024-02-20 | Stop reason: HOSPADM

## 2024-01-23 RX ADMIN — BUSULFAN 120 MG: 6 INJECTION INTRAVENOUS at 01:01

## 2024-01-23 RX ADMIN — ACYCLOVIR 800 MG: 800 TABLET ORAL at 08:16

## 2024-01-23 RX ADMIN — ONDANSETRON HYDROCHLORIDE 8 MG: 8 TABLET, FILM COATED ORAL at 23:21

## 2024-01-23 RX ADMIN — Medication 2 SPRAY: at 20:36

## 2024-01-23 RX ADMIN — Medication 2 SPRAY: at 16:11

## 2024-01-23 RX ADMIN — Medication 2 SPRAY: at 10:08

## 2024-01-23 RX ADMIN — APIXABAN 5 MG: 5 TABLET, FILM COATED ORAL at 20:34

## 2024-01-23 RX ADMIN — PRASUGREL 10 MG: 10 TABLET, FILM COATED ORAL at 08:16

## 2024-01-23 RX ADMIN — ONDANSETRON HYDROCHLORIDE 8 MG: 8 TABLET, FILM COATED ORAL at 14:33

## 2024-01-23 RX ADMIN — DEXAMETHASONE 8 MG: 4 TABLET ORAL at 23:21

## 2024-01-23 RX ADMIN — VANCOMYCIN HYDROCHLORIDE 125 MG: 125 CAPSULE ORAL at 20:34

## 2024-01-23 RX ADMIN — ALLOPURINOL 300 MG: 300 TABLET ORAL at 08:16

## 2024-01-23 RX ADMIN — Medication 2 SPRAY: at 12:16

## 2024-01-23 RX ADMIN — URSODIOL 300 MG: 300 CAPSULE ORAL at 08:17

## 2024-01-23 RX ADMIN — ONDANSETRON HYDROCHLORIDE 8 MG: 8 TABLET, FILM COATED ORAL at 08:16

## 2024-01-23 RX ADMIN — LEVETIRACETAM 500 MG: 500 TABLET, FILM COATED ORAL at 08:17

## 2024-01-23 RX ADMIN — AMLODIPINE BESYLATE 5 MG: 5 TABLET ORAL at 08:17

## 2024-01-23 RX ADMIN — LEVETIRACETAM 500 MG: 500 TABLET, FILM COATED ORAL at 20:34

## 2024-01-23 RX ADMIN — Medication 5 ML: at 03:22

## 2024-01-23 RX ADMIN — LISINOPRIL 40 MG: 10 TABLET ORAL at 08:16

## 2024-01-23 RX ADMIN — VANCOMYCIN HYDROCHLORIDE 125 MG: 125 CAPSULE ORAL at 08:16

## 2024-01-23 RX ADMIN — PROCHLORPERAZINE EDISYLATE 5 MG: 5 INJECTION INTRAMUSCULAR; INTRAVENOUS at 03:18

## 2024-01-23 RX ADMIN — LORAZEPAM 1 MG: 0.5 TABLET ORAL at 20:34

## 2024-01-23 RX ADMIN — Medication 5 ML: at 04:49

## 2024-01-23 RX ADMIN — PANTOPRAZOLE SODIUM 40 MG: 40 TABLET, DELAYED RELEASE ORAL at 08:17

## 2024-01-23 RX ADMIN — MICAFUNGIN SODIUM 150 MG: 50 INJECTION, POWDER, LYOPHILIZED, FOR SOLUTION INTRAVENOUS at 08:15

## 2024-01-23 RX ADMIN — APIXABAN 5 MG: 5 TABLET, FILM COATED ORAL at 08:16

## 2024-01-23 RX ADMIN — URSODIOL 300 MG: 300 CAPSULE ORAL at 20:34

## 2024-01-23 RX ADMIN — AMLODIPINE BESYLATE 5 MG: 5 TABLET ORAL at 20:34

## 2024-01-23 RX ADMIN — URSODIOL 300 MG: 300 CAPSULE ORAL at 14:34

## 2024-01-23 RX ADMIN — Medication 10 ML: at 10:08

## 2024-01-23 RX ADMIN — VANCOMYCIN HYDROCHLORIDE 125 MG: 125 CAPSULE ORAL at 16:11

## 2024-01-23 RX ADMIN — VANCOMYCIN HYDROCHLORIDE 125 MG: 125 CAPSULE ORAL at 12:16

## 2024-01-23 RX ADMIN — ACYCLOVIR 800 MG: 800 TABLET ORAL at 20:34

## 2024-01-23 RX ADMIN — METOPROLOL SUCCINATE 100 MG: 50 TABLET, EXTENDED RELEASE ORAL at 08:16

## 2024-01-23 ASSESSMENT — ACTIVITIES OF DAILY LIVING (ADL)
ADLS_ACUITY_SCORE: 20

## 2024-01-23 NOTE — PROGRESS NOTES
Stop time on MAR & chart I & O  Chemo drug: Fludarabine (Fludara).  Tolerated: Well.  Intervention: Good blood return noted pre and post Infusing or Chemotherapy.  Response: Pt denied any discomfort during infusion.  Plan: Will continue to monitor pt during shift, will report any changes to on-call provider.  Lab: Scheduled labs as ordered.  Wt/intervention: Daily weights as ordered.  Shower/drsg/linen: Per routine.      Stop time on MAR & chart I & O  Chemo drug: Busulfan.  Tolerated: Well.  Intervention: Good blood return noted pre and post chemotherapy.  Response: Pt denied any discomfort during infusion.  Plan: Will continue to monitor pt during shift, will report any changes to on-call provider.  Lab: Scheduled labs as ordered.  Wt/intervention: Daily weights as ordered.  Shower/drsg/linen: Per routine.

## 2024-01-23 NOTE — PLAN OF CARE
VSS   Afebrile  Up ad amna, steady gait and balance; alert and oriented x4.  Tolerated Fludarabine and Busulfan well.  PRN ativan at HS for anxiety and sleep.  PRN Compazine for nausea, pt verbalizing good results.  No electrolytes this AM  No blood products this AM.    Problem: Adult Inpatient Plan of Care  Goal: Absence of Hospital-Acquired Illness or Injury  Intervention: Identify and Manage Fall Risk  Recent Flowsheet Documentation  Taken 1/23/2024 0000 by Ned Waddell RN  Safety Promotion/Fall Prevention:   safety round/check completed   room organization consistent   room near nurse's station   nonskid shoes/slippers when out of bed   lighting adjusted   increase visualization of patient   increased rounding and observation   clutter free environment maintained   assistive device/personal items within reach  Taken 1/22/2024 2030 by Ned Waddell RN  Safety Promotion/Fall Prevention:   safety round/check completed   room organization consistent   room near nurse's station   nonskid shoes/slippers when out of bed   lighting adjusted   increase visualization of patient   increased rounding and observation   clutter free environment maintained   assistive device/personal items within reach     Problem: Adult Inpatient Plan of Care  Goal: Absence of Hospital-Acquired Illness or Injury  Intervention: Prevent Infection  Recent Flowsheet Documentation  Taken 1/23/2024 0000 by Ned Waddell RN  Infection Prevention:   cohorting utilized   visitors restricted/screened   single patient room provided   rest/sleep promoted   personal protective equipment utilized   hand hygiene promoted   equipment surfaces disinfected   environmental surveillance performed  Taken 1/22/2024 2030 by Ned Waddell RN  Infection Prevention:   cohorting utilized   visitors restricted/screened   single patient room provided   rest/sleep promoted   personal protective equipment utilized   hand hygiene promoted   equipment surfaces  disinfected   environmental surveillance performed     Problem: Stem Cell/Bone Marrow Transplant  Goal: Blood Counts Within Acceptable Range  Intervention: Monitor and Manage Hematologic Symptoms  Recent Flowsheet Documentation  Taken 1/23/2024 0000 by Ned Waddell RN  Bleeding Precautions:   monitored for signs of bleeding   gentle oral care promoted  Medication Review/Management: medications reviewed  Taken 1/22/2024 2030 by Ned Waddell RN  Sleep/Rest Enhancement:   awakenings minimized   natural light exposure provided   noise level reduced   regular sleep/rest pattern promoted   room darkened   snack  Bleeding Precautions:   monitored for signs of bleeding   gentle oral care promoted  Medication Review/Management: medications reviewed     Problem: Stem Cell/Bone Marrow Transplant  Goal: Absence of Infection  Intervention: Prevent and Manage Infection  Recent Flowsheet Documentation  Taken 1/23/2024 0000 by Ned Waddell RN  Infection Prevention:   cohorting utilized   visitors restricted/screened   single patient room provided   rest/sleep promoted   personal protective equipment utilized   hand hygiene promoted   equipment surfaces disinfected   environmental surveillance performed  Infection Management: aseptic technique maintained  Isolation Precautions:   cytotoxic precautions maintained   enteric precautions initiated   protective environment maintained   contact precautions maintained  Taken 1/22/2024 2030 by Ned Waddell RN  Infection Prevention:   cohorting utilized   visitors restricted/screened   single patient room provided   rest/sleep promoted   personal protective equipment utilized   hand hygiene promoted   equipment surfaces disinfected   environmental surveillance performed  Infection Management: aseptic technique maintained  Isolation Precautions:   cytotoxic precautions maintained   enteric precautions initiated   protective environment maintained   contact precautions  maintained

## 2024-01-23 NOTE — PLAN OF CARE
"BP (!) 162/94 (BP Location: Right arm)   Pulse 74   Temp 96.9  F (36.1  C) (Axillary)   Resp 16   Ht 1.725 m (5' 7.91\")   Wt 91.7 kg (202 lb 3.2 oz)   SpO2 98%   BMI 30.82 kg/m      HTN, OVSS on RA. A&O x4. Up independently. Denies pain and nausea. States his mouth and nose are dry. Started biotene and ocean spray with some relief. Pt slept most of the day. Continue with POC    Problem: Adult Inpatient Plan of Care  Goal: Optimal Comfort and Wellbeing  Outcome: Progressing     Problem: Stem Cell/Bone Marrow Transplant  Goal: Optimal Coping with Transplant  Outcome: Progressing     "

## 2024-01-23 NOTE — PROGRESS NOTES
"CLINICAL NUTRITION SERVICES - ASSESSMENT NOTE     Nutrition Prescription    RECOMMENDATIONS FOR MDs/PROVIDERS TO ORDER:  Encourage oral intake     Malnutrition Status:    Patient does not meet two criteria at this time     Recommendations already ordered by Registered Dietitian (RD):  Continue high kcal/high protein diet + snacks/supplements PRN     Future/Additional Recommendations:  Monitor appetite, oral intake, and use of supplements  Monitor ability to maintain weight   If unable to consume adequate PO intake, consider nutrition support      REASON FOR ASSESSMENT  Ziggy Hallman is a/an 48 year old male assessed by the dietitian for Nutrition Risk Monitoring    CLINICAL HISTORY   PMH of MDS, hx of multiple clots and MI undergoing a MA (Bu/Flu) prep for an 8/8 URD PBSCT currently day -2     NUTRITION HISTORY  Ziggy met with outpatient RD on 1/11/24.   Ziggy reported he has found items on the menu that he likes. He denied any concerns with his appetite at this time.     CURRENT NUTRITION ORDERS  Diet: High Kcal/High Protein + snacks/supplements PRN   Intake/Tolerance: 100%     LABS  Urea Nitrogen 29 (H)  Glucose 146 (H)    MEDICATIONS  Acyclovir  Allopurinol   Biotene  Decadron  Zofran   Protonix  Ursodiol    ANTHROPOMETRICS  Height: 172.5 cm (5' 7.913\")  Most Recent Weight: 91.7 kg (202 lb 3.2 oz)    IBW: 70 kg  BMI: Obesity Grade I BMI 30-34.9  Weight History:   Wt Readings from Last 25 Encounters:   01/23/24 91.7 kg (202 lb 3.2 oz)   01/17/24 90.1 kg (198 lb 9.6 oz)   01/12/24 88.9 kg (196 lb)   01/10/24 89.3 kg (196 lb 12.8 oz)   12/11/23 88.9 kg (196 lb)   11/20/23 88.8 kg (195 lb 12.8 oz)   11/13/23 88.5 kg (195 lb)   10/16/23 86.4 kg (190 lb 6.4 oz)   09/25/23 83.9 kg (185 lb)   09/13/23 81.6 kg (180 lb)   08/18/23 85 kg (187 lb 4.8 oz)   08/01/23 82.3 kg (181 lb 8 oz)   07/07/23 84.8 kg (187 lb)   05/03/23 83.6 kg (184 lb 3.2 oz)   04/24/23 85.7 kg (189 lb)     Dosing Weight: 75.1 kg (adjusted) "     ASSESSED NUTRITION NEEDS  Estimated Energy Needs: 7726-0302 kcals/day (25 - 30 kcals/kg)  Justification: Maintenance  Estimated Protein Needs: 115 grams protein/day (1.5 g/kg)  Justification: Increased needs  Estimated Fluid Needs: 4999-0880 mL/day (25 - 30 mL/kg)   Justification: Maintenance    PHYSICAL FINDINGS  See malnutrition section below.    MALNUTRITION  % Intake: No decreased intake noted  % Weight Loss: None noted  Subcutaneous Fat Loss: None observed  Muscle Loss: None observed  Fluid Accumulation/Edema: None noted  Malnutrition Diagnosis: Patient does not meet two of the established criteria necessary for diagnosing malnutrition but is at risk for malnutrition    NUTRITION DIAGNOSIS  Predicted inadequate nutrient intake (energy/protein intake) related to upcoming BMT as evidenced by potential for nutrition side effects      INTERVENTIONS  Implementation  Nutrition Education: RD role in care, 5c menu hacks, supplement list and Nutrition and BMT    Collaboration with other providers     Goals  Patient to consume % of nutritionally adequate meal trays TID, or the equivalent with supplements/snacks.     Monitoring/Evaluation  Progress toward goals will be monitored and evaluated per protocol.  Liliane Odom RD, LD  5C/BMT pager: [phone]

## 2024-01-23 NOTE — PLAN OF CARE
"BP (!) 142/84 (BP Location: Left arm)   Pulse 79   Temp 97.3  F (36.3  C) (Oral)   Resp 18   Ht 1.725 m (5' 7.91\")   Wt 91.1 kg (200 lb 14.4 oz)   SpO2 97%   BMI 30.63 kg/m      HTN, OVSS on RA. Received labetalol x1. Denies pain and nausea. Feeling tired and slept most of the day. Continue with POC    Problem: Adult Inpatient Plan of Care  Goal: Optimal Comfort and Wellbeing  Outcome: Progressing     Problem: Stem Cell/Bone Marrow Transplant  Goal: Optimal Coping with Transplant  Outcome: Progressing     "

## 2024-01-23 NOTE — PROGRESS NOTES
"  BMT Progress Notes      Patient ID: Ziggy Hallman is a 48 year old year old male with a PMH of MDS, hx of multiple clots and MI undergoing a MA (Bu/Flu) prep for an 8/8 URD PBSCT currently day -2    Transplant Essential Data:   Diagnosis MDS-High risk, Plasma Cell neoplasm    BMTCT Type Allogeneic    Prep Regimen Bu/Flu    Donor Match and  Source URD 8/8 DP permissive    GVHD Prophylaxis PTCy, Siro/MMF    Primary BMT MD Bacon    Clinical Trials [id number]         Interval History:    He feels like the air is really dry and causing some discomfort mucous membranes. Given biotene and ocean spray. He is eating and drinking well. Remains HTN with recent adjustments to his antihypertensives- he is asymptomatic.     Review of Systems    Review of Systems: 10 point ROS negative unless stated in HPI   PHYSICAL EXAM      Weight     Wt Readings from Last 3 Encounters:   01/23/24 91.7 kg (202 lb 3.2 oz)   01/17/24 90.1 kg (198 lb 9.6 oz)   01/12/24 88.9 kg (196 lb)        KPS: 80    BP (!) 158/100   Pulse 71   Temp 97.9  F (36.6  C) (Axillary)   Resp 16   Ht 1.725 m (5' 7.91\")   Wt 91.7 kg (202 lb 3.2 oz)   SpO2 98%   BMI 30.82 kg/m       General: NAD   Eyes: GARY, sclera anicteric   Lungs: CTA bilaterally  Cardiovascular: RRR, no M/R/G   Lymphatics: No edema  Neuro: A&O   Musculoskeletal: Muscle mass 5/5   Additional Findings: Powell site NT, no drainage.    Current aGVHD staging:  Skin 0, UGI 0, LGI 0, Liver 0 (keep in note through day +180 for allos)      LABS AND IMAGING: I have assessed all abnormal lab values for their clinical significance and any values considered clinically significant have been addressed in the assessment and plan.        Lab Results   Component Value Date    WBC 6.4 01/23/2024    ANEUTAUTO 6.0 01/23/2024    HGB 9.5 (L) 01/23/2024    HCT 28.7 (L) 01/23/2024     01/23/2024     01/23/2024    POTASSIUM 3.8 01/23/2024    CHLORIDE 114 (H) 01/23/2024    CO2 23 01/23/2024    GLC " 180 (H) 01/23/2024    BUN 29.7 (H) 01/23/2024    CR 0.98 01/23/2024    MAG 1.9 01/23/2024    INR 1.09 01/22/2024           SYSTEMS-BASED ASSESSMENT AND PLAN     Ziggy Hallman is a 48 year old year old male with a PMH of MDS, hx of multiple clots and MI undergoing a MA (Bu/Flu) prep for an 8/8 URD PBSCT day -2    BMT/IEC PROTOCOL for 2015-29  Chemo Prep:   Day -6: Admit  Day -5 through Day -2: Busulfan/Fludarabine  Day -1: Rest.   Keppra prophylaxis   Avoiding Tylenol for 72 hours befre and after last dose of Busulfan due to drug interaction. Ok to start day +2.     8/8 DP permissive. Cell dose pending  ABO: Donor: O+ Recipient A-  (Minor incompatability, no flush required)   GSCF plan: GCSF to start day +5 until ANC >1500 for 3 consecutive days    HEME/COAG  #Risk of Pancytopenia   - Risk of cytopenias due to chemotherapy and radiation  - Transfusion parameters: hemoglobin <7, platelets <10  #Recurrent arterial thromboembolic events:  He has long history of various events including renal infarcts (2011, 2013, 2015), left popliteal embolic episode requiring surgery, anticoagulation (2011), right carotid artery embolic episode with TIA/stroke-like symptoms (2012), embolic MI (2015), gangrenous right toe requiring vascular surgery/amputation (2017), 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   Extensive hypercoagulable workup to date has been unrevealing.  JAK2 testing negative.  PNH testing negative.  Elevated IgA of unknown significance, no evidence of plasma cell disorder.  - On Eliquis and Prasugrel, continue until platelets <50k. Hematology recommending to continue both medications until platelets are less than or equal to 50k, then transition to ppx lovenox dosing until platelets less than or equal to 25k and then STOP. Restart regimen once engrafted and platelets stable.     IMMUNOCOMPROMISED  C. Diff - Admit c. Diff triple+ - Start PO vanc 1/21 for 14 day  course.   -Prophylaxis plan: ACV LD, Megan HD, Levaquin, Bactrim +28    RISK OF GVHD  - Prophylaxis: PtC day +3, +4, , Siro/MMF to start day +5     CARDIOVASCULAR  #CAD  #Hx of CABG  #HTN   -PTA metoprolol, Lisinopril dose increased 2/2 HTN, Amlodipine 5mg BID added 1/21, labetolol PRN  #Hyperlipidemia: Holding atorvastatin peritransplant  - Risk of cardiomyopathy:  Baseline EF 50-55%, Global left ventricular function mildly reduced. Grade 1 or early diastolic dysfunction.   - Risk of arrhythmia: Baseline EKG showed NSR, Qtc -425    GI/NUTRITION  - Ulcer prophylaxis: Protonix   - VOD Prophylaxis: Ursodiol  - Risk of nausea/vomiting due to chemo: Ativan, Compazine, Zofran   - Risk of malnutrition: Nutrition to follow.     RENAL/ELECTROLYTES/  #Proteinuria- possibly exacerbated by persistent HTN (See CV),   #BUN/Cr elevation- FENA, osms, Na+, UA work up indicate prerenal etiology, likely intravascular depletion 2/2 osmotic diuresis with proteinuria. Will encourage PO fluid intake as Cr improved 1/23, IVF boluses as necessary, control HTN.  - Hypocalcemia in setting hypoalbuminemia, iCa2+, corrected WNL   - Electrolyte management: replace per sliding scale    DIABETES/ENDOCRINE  - Risk of steroid-induced hyperglyemia: Monitor BG, sliding scale if needed    MUSCULOSKELETAL/FRAILTY  - Baseline Frailty Score: 1, Comorbidity index- 4   - Patient with substantial risk of sarcopenia  - Daily PT/OT as needed while inpatient  - Cancer Rehab as needed outpatient    SYMPTOM MANAGEMENT  - Nausea from chemo Prochlorperazine, ondansetron, lorazepam.  - # Pain Assessment:      9/7/2023    10:42 PM   Current Pain Score   Patient currently in pain? denies   Ziggy s pain level was assessed and he currently denies pain.        SOCIAL DETERMINANTS  - Caregiver: Family   - Financial/insurance concerns: See SW notes       Known issues that I take into account for medical decisions, with salient changes to the plan considering these  "complexities noted above.    Patient Active Problem List   Diagnosis    Amegakaryocytic thrombocytopenia (H)    Anemia due to bone marrow failure, unspecified bone marrow failure type (H)    Aplastic anemia (H)     Clinically Significant Risk Factors              # Hypoalbuminemia: Lowest albumin = 2 g/dL at 1/22/2024  4:12 AM, will monitor as appropriate              # Obesity: Estimated body mass index is 30.82 kg/m  as calculated from the following:    Height as of this encounter: 1.725 m (5' 7.91\").    Weight as of this encounter: 91.7 kg (202 lb 3.2 oz)., PRESENT ON ADMISSION            Today's summary: Admits for transplant     Dispo: Remain admitted through engraftment  - Follow up Micafungin discharge plan.     I spent 30 minutes in the care of this patient today, which included time necessary for preparation for the visit, obtaining history, ordering medications/tests/procedures as medically indicated, review of pertinent medical literature, counseling of the patient, communication of recommendations to the care team, and documentation time.    Toyin Herrmann PA-C  [phone]    ______________________________________________      BMT ATTENDING NOTE    Ziggy Hallman is a 48 year old male, who is day -2 of transplant for MDS/IgA kappa MGUS.     My overall impression is that Ziggy is tolerating conditioning. Busulfan PK reviewed, continue current busulfan dose. No fevers or infections. Past medical history, including extensive thrombosis history, reviewed in detail, and all medical conditions are optimized for transplant. Consult classical hematology on a strategy for his anticoagulation; please see plan as described above for management. Prevent CINV and infections. Treat hypertension. Remainder of supportive care as above.    I spent 35 minutes in the care of Ziggy today, including an independent face-to-face assessment and time monitoring for restoration of hematopoiesis, high-risk organ toxicities from " chemotherapy, and GVHD, managing infectious risk due to his immunocompromised state, and encouraging nutrition and physical activity to prevent debility and sarcopenia.  I personally performed all of the medical decision making associated with this visit, counseled Ziggy on my overall assessment and recommendations, communicated my plan to the care team, and edited the above note to reflect my current plan of care.       Mariluz Cabral MD      Securely message with the Vocera Web Console

## 2024-01-24 LAB
ABO/RH(D): NORMAL
ANION GAP SERPL CALCULATED.3IONS-SCNC: 6 MMOL/L (ref 7–15)
ANTIBODY SCREEN: NEGATIVE
BASOPHILS # BLD AUTO: 0 10E3/UL (ref 0–0.2)
BASOPHILS NFR BLD AUTO: 1 %
BUN SERPL-MCNC: 27.7 MG/DL (ref 6–20)
CALCIUM SERPL-MCNC: 8.2 MG/DL (ref 8.6–10)
CHLORIDE SERPL-SCNC: 112 MMOL/L (ref 98–107)
CREAT SERPL-MCNC: 1.03 MG/DL (ref 0.67–1.17)
DEPRECATED HCO3 PLAS-SCNC: 22 MMOL/L (ref 22–29)
EGFRCR SERPLBLD CKD-EPI 2021: 88 ML/MIN/1.73M2
EOSINOPHIL # BLD AUTO: 0 10E3/UL (ref 0–0.7)
EOSINOPHIL NFR BLD AUTO: 0 %
ERYTHROCYTE [DISTWIDTH] IN BLOOD BY AUTOMATED COUNT: 14.5 % (ref 10–15)
GLUCOSE BLDC GLUCOMTR-MCNC: 160 MG/DL (ref 70–99)
GLUCOSE SERPL-MCNC: 172 MG/DL (ref 70–99)
HCT VFR BLD AUTO: 29.8 % (ref 40–53)
HGB BLD-MCNC: 10.1 G/DL (ref 13.3–17.7)
IMM GRANULOCYTES # BLD: 0.1 10E3/UL
IMM GRANULOCYTES NFR BLD: 2 %
LYMPHOCYTES # BLD AUTO: 0.1 10E3/UL (ref 0.8–5.3)
LYMPHOCYTES NFR BLD AUTO: 1 %
MAGNESIUM SERPL-MCNC: 1.9 MG/DL (ref 1.7–2.3)
MCH RBC QN AUTO: 33.7 PG (ref 26.5–33)
MCHC RBC AUTO-ENTMCNC: 33.9 G/DL (ref 31.5–36.5)
MCV RBC AUTO: 99 FL (ref 78–100)
MONOCYTES # BLD AUTO: 0 10E3/UL (ref 0–1.3)
MONOCYTES NFR BLD AUTO: 0 %
NEUTROPHILS # BLD AUTO: 6 10E3/UL (ref 1.6–8.3)
NEUTROPHILS NFR BLD AUTO: 96 %
NRBC # BLD AUTO: 0 10E3/UL
NRBC BLD AUTO-RTO: 0 /100
PHOSPHATE SERPL-MCNC: 2.8 MG/DL (ref 2.5–4.5)
PLATELET # BLD AUTO: 228 10E3/UL (ref 150–450)
POTASSIUM SERPL-SCNC: 3.9 MMOL/L (ref 3.4–5.3)
RBC # BLD AUTO: 3 10E6/UL (ref 4.4–5.9)
SODIUM SERPL-SCNC: 140 MMOL/L (ref 135–145)
SPECIMEN EXPIRATION DATE: NORMAL
WBC # BLD AUTO: 6.2 10E3/UL (ref 4–11)

## 2024-01-24 PROCEDURE — 250N000011 HC RX IP 250 OP 636: Performed by: PHYSICIAN ASSISTANT

## 2024-01-24 PROCEDURE — 85004 AUTOMATED DIFF WBC COUNT: CPT | Performed by: PHYSICIAN ASSISTANT

## 2024-01-24 PROCEDURE — 258N000003 HC RX IP 258 OP 636: Performed by: PHYSICIAN ASSISTANT

## 2024-01-24 PROCEDURE — 83735 ASSAY OF MAGNESIUM: CPT | Performed by: PHYSICIAN ASSISTANT

## 2024-01-24 PROCEDURE — 250N000013 HC RX MED GY IP 250 OP 250 PS 637: Performed by: PHYSICIAN ASSISTANT

## 2024-01-24 PROCEDURE — 99254 IP/OBS CNSLTJ NEW/EST MOD 60: CPT | Performed by: PSYCHIATRY & NEUROLOGY

## 2024-01-24 PROCEDURE — 250N000012 HC RX MED GY IP 250 OP 636 PS 637: Performed by: PHYSICIAN ASSISTANT

## 2024-01-24 PROCEDURE — 250N000013 HC RX MED GY IP 250 OP 250 PS 637: Performed by: STUDENT IN AN ORGANIZED HEALTH CARE EDUCATION/TRAINING PROGRAM

## 2024-01-24 PROCEDURE — 206N000001 HC R&B BMT UMMC

## 2024-01-24 PROCEDURE — 86900 BLOOD TYPING SEROLOGIC ABO: CPT | Performed by: PHYSICIAN ASSISTANT

## 2024-01-24 PROCEDURE — 250N000013 HC RX MED GY IP 250 OP 250 PS 637

## 2024-01-24 PROCEDURE — 99233 SBSQ HOSP IP/OBS HIGH 50: CPT | Mod: 24 | Performed by: PHYSICIAN ASSISTANT

## 2024-01-24 PROCEDURE — 84100 ASSAY OF PHOSPHORUS: CPT | Performed by: INTERNAL MEDICINE

## 2024-01-24 PROCEDURE — 80048 BASIC METABOLIC PNL TOTAL CA: CPT | Performed by: PHYSICIAN ASSISTANT

## 2024-01-24 RX ORDER — HYDROXYZINE HYDROCHLORIDE 25 MG/1
25 TABLET, FILM COATED ORAL EVERY 6 HOURS PRN
Status: DISCONTINUED | OUTPATIENT
Start: 2024-01-24 | End: 2024-02-20 | Stop reason: HOSPADM

## 2024-01-24 RX ORDER — OLANZAPINE 2.5 MG/1
5 TABLET, FILM COATED ORAL AT BEDTIME
Status: DISCONTINUED | OUTPATIENT
Start: 2024-01-24 | End: 2024-02-04

## 2024-01-24 RX ORDER — TRAZODONE HYDROCHLORIDE 50 MG/1
50 TABLET, FILM COATED ORAL AT BEDTIME
Status: DISCONTINUED | OUTPATIENT
Start: 2024-01-24 | End: 2024-01-26

## 2024-01-24 RX ADMIN — ZOLPIDEM TARTRATE 5 MG: 5 TABLET, FILM COATED ORAL at 21:52

## 2024-01-24 RX ADMIN — VANCOMYCIN HYDROCHLORIDE 125 MG: 125 CAPSULE ORAL at 20:59

## 2024-01-24 RX ADMIN — ONDANSETRON HYDROCHLORIDE 8 MG: 8 TABLET, FILM COATED ORAL at 16:14

## 2024-01-24 RX ADMIN — ALLOPURINOL 300 MG: 300 TABLET ORAL at 08:14

## 2024-01-24 RX ADMIN — ACYCLOVIR 800 MG: 800 TABLET ORAL at 20:59

## 2024-01-24 RX ADMIN — ACYCLOVIR 800 MG: 800 TABLET ORAL at 08:15

## 2024-01-24 RX ADMIN — LEVETIRACETAM 500 MG: 500 TABLET, FILM COATED ORAL at 20:59

## 2024-01-24 RX ADMIN — Medication 5 ML: at 04:29

## 2024-01-24 RX ADMIN — Medication 2 SPRAY: at 16:13

## 2024-01-24 RX ADMIN — METOPROLOL SUCCINATE 100 MG: 50 TABLET, EXTENDED RELEASE ORAL at 08:14

## 2024-01-24 RX ADMIN — VANCOMYCIN HYDROCHLORIDE 125 MG: 125 CAPSULE ORAL at 16:14

## 2024-01-24 RX ADMIN — DEXAMETHASONE 6 MG: 2 TABLET ORAL at 23:37

## 2024-01-24 RX ADMIN — LEVOFLOXACIN 250 MG: 250 TABLET, FILM COATED ORAL at 10:37

## 2024-01-24 RX ADMIN — Medication 2 SPRAY: at 12:37

## 2024-01-24 RX ADMIN — URSODIOL 300 MG: 300 CAPSULE ORAL at 14:22

## 2024-01-24 RX ADMIN — LEVETIRACETAM 500 MG: 500 TABLET, FILM COATED ORAL at 08:14

## 2024-01-24 RX ADMIN — Medication 5 ML: at 10:37

## 2024-01-24 RX ADMIN — APIXABAN 5 MG: 5 TABLET, FILM COATED ORAL at 20:59

## 2024-01-24 RX ADMIN — VANCOMYCIN HYDROCHLORIDE 125 MG: 125 CAPSULE ORAL at 08:14

## 2024-01-24 RX ADMIN — AMLODIPINE BESYLATE 5 MG: 5 TABLET ORAL at 20:59

## 2024-01-24 RX ADMIN — URSODIOL 300 MG: 300 CAPSULE ORAL at 20:59

## 2024-01-24 RX ADMIN — MICAFUNGIN SODIUM 150 MG: 50 INJECTION, POWDER, LYOPHILIZED, FOR SOLUTION INTRAVENOUS at 08:13

## 2024-01-24 RX ADMIN — PRASUGREL 10 MG: 10 TABLET, FILM COATED ORAL at 08:14

## 2024-01-24 RX ADMIN — VANCOMYCIN HYDROCHLORIDE 125 MG: 125 CAPSULE ORAL at 12:37

## 2024-01-24 RX ADMIN — PANTOPRAZOLE SODIUM 40 MG: 40 TABLET, DELAYED RELEASE ORAL at 08:14

## 2024-01-24 RX ADMIN — ONDANSETRON HYDROCHLORIDE 8 MG: 8 TABLET, FILM COATED ORAL at 08:14

## 2024-01-24 RX ADMIN — LABETALOL HYDROCHLORIDE 10 MG: 5 INJECTION, SOLUTION INTRAVENOUS at 12:42

## 2024-01-24 RX ADMIN — OLANZAPINE 5 MG: 2.5 TABLET, FILM COATED ORAL at 21:52

## 2024-01-24 RX ADMIN — LISINOPRIL 40 MG: 10 TABLET ORAL at 08:14

## 2024-01-24 RX ADMIN — Medication 2 SPRAY: at 21:03

## 2024-01-24 RX ADMIN — AMLODIPINE BESYLATE 5 MG: 5 TABLET ORAL at 08:14

## 2024-01-24 RX ADMIN — URSODIOL 300 MG: 300 CAPSULE ORAL at 08:14

## 2024-01-24 RX ADMIN — Medication 5 ML: at 12:42

## 2024-01-24 RX ADMIN — TRAZODONE HYDROCHLORIDE 50 MG: 50 TABLET ORAL at 23:37

## 2024-01-24 RX ADMIN — ONDANSETRON HYDROCHLORIDE 8 MG: 8 TABLET, FILM COATED ORAL at 23:37

## 2024-01-24 RX ADMIN — Medication 2 SPRAY: at 08:15

## 2024-01-24 RX ADMIN — APIXABAN 5 MG: 5 TABLET, FILM COATED ORAL at 08:14

## 2024-01-24 ASSESSMENT — ACTIVITIES OF DAILY LIVING (ADL)
ADLS_ACUITY_SCORE: 20

## 2024-01-24 NOTE — PROGRESS NOTES
"BMT CLINICAL SOCIAL WORK NOTE:    Focus: Supportive Counseling/Resources/Discharge Planning    Data:  Ziggy Hallman is a 48 year old year old male with a PMH of MDS, hx of multiple clots and MI undergoing a MA (Bu/Flu) prep for an 8/8 URD PBSCT currently day -1.    Interventions: Clinical  (CSW) met with Pt to assess coping, provide supportive counseling and assist with resources as needed. Pt shared that he has not been sleeping well due to \"racing and anxious thoughts about his work\".  Pt endorses that there are no specific issues or problems at work and cannot pinpoint the exact reasons why he is currently have work-related anxiety. Pt shared that his work is a significant part of his life and it has been challenging to \"turn off\" work-related thoughts. Pt also shared that he had received some upsetting news a couple of days ago but declined to share about it further.  Pt did not specify any coping methods that he currently uses to help manage his anxiety except for alcohol and THC.  CSW introduced various coping/relaxation techniques such as breathing exercises, meditation, and guided imagery.  CSW also offered to engage in some brief talk therapy.  However, pt is currently not interested.  Pt shared that he has already spoken with the medical team and they have decided to explore medication to help ease some of his anxiety. Pt shared that he is planning to meet with the Psychiatrist tomorrow. Pt shared that his sister is planning to visit him today and that she plans to bring him some food. CSW provided empathic listening, validation of concerns, and encouragement. CSW encouraged Pt to contact CSW for support, questions and/or resources. CSW updated NIDHI Debora with CSW's visit with patient.    Assessment: Pt presented as open and pleasant.  Pt appears to be coping appropriately at this time. Pt continues to be supported by family and friends.     Plan: CSW will continue to provide supportive " counseling and assistance with resources as needed. CSW will continue to collaborate with multidisciplinary team regarding Pt's plan of care.     RIVKA Grider, [facility]  Adult Blood & Marrow Transplant   Phone: [phone]  Pager: [phone]

## 2024-01-24 NOTE — PLAN OF CARE
VSS   Afebrile  Up ad amna, steady gait and balance.  Alert and Oriented x4.  PRN ativan for sleep and anxiety.  Pt not saving urine.  Pt educated on importance of saving urine during Cytoxan flush.  Pt educated on BID orthostatic B/P from day -1 to day +5. (01/24/2024 to 01/30/2024).  No blood products needed this AM.  No electrolytes needed this AM.      Problem: Stem Cell/Bone Marrow Transplant  Goal: Blood Counts Within Acceptable Range  Intervention: Monitor and Manage Hematologic Symptoms  Recent Flowsheet Documentation  Taken 1/24/2024 0000 by Ned Waddell RN  Bleeding Precautions: gentle oral care promoted  Medication Review/Management: medications reviewed  Taken 1/23/2024 2030 by Ned Waddell, RN  Sleep/Rest Enhancement:   awakenings minimized   consistent schedule promoted   natural light exposure provided   noise level reduced   regular sleep/rest pattern promoted   room darkened   snack  Bleeding Precautions: gentle oral care promoted  Medication Review/Management: medications reviewed     Problem: Adult Inpatient Plan of Care  Goal: Absence of Hospital-Acquired Illness or Injury  Intervention: Prevent Infection  Recent Flowsheet Documentation  Taken 1/24/2024 0000 by Ned Waddell, RN  Infection Prevention:   cohorting utilized   personal protective equipment utilized   visitors restricted/screened   single patient room provided   rest/sleep promoted   hand hygiene promoted   equipment surfaces disinfected   environmental surveillance performed  Taken 1/23/2024 2030 by Ned Waddell, RN  Infection Prevention:   cohorting utilized   personal protective equipment utilized   visitors restricted/screened   single patient room provided   rest/sleep promoted   hand hygiene promoted   equipment surfaces disinfected   environmental surveillance performed

## 2024-01-24 NOTE — CONSULTS
Initial Psychiatric Consult   Consult date: 2024         Reason for Consult, requesting source:    Anxiety   Requesting source: Mariluz Cabral    Labs and imaging reviewed.     Total time spent in chart review, patient interview and coordination of care; 65 min          HPI:   From H and P :   Patient ID: Ziggy Hallman is a 48 year old year old male with a PMH of MDS, hx of multiple clots and MI undergoing a MA (Bu/Flu) prep for an 8/8 URD PBSCT currently day -6   He is seen today by psychiatry in regards to anxiety.  During periods of stress, such as now, he will commonly develop problems with excessive rumination and a lot of worry, but no panic attacks.  Ruminations tend to keep him awake; he normally uses cannabis to quiet down his mind but this is not an option in the hospital.  He denies any significant depression with no mood swings or thought disorder symptoms.  We discussed options such as SSRIs thinks he would prefer to have something like to use just as needed.    He pointed out to me that the last 12 years have been quite stressful for him; during that time he has had several heart attacks along with some strokes in both his parents  and he had to separate from his wife 7 years ago when she got into a drug habit.        Past Psychiatric History:   No psychiatric hospitalizations.  I see a psychiatric consultation from 2016 and at that time he was prescribed Wellbutrin.  Also, according to epic he has tried Lexapro on several occasions that did not seem to be effective and he cannot remember taking it.        Substance Use and History:   He does use alcohol on a fairly regular basis and also uses cannabis to help him sleep.  He used to smoke cigars on a regular basis and now he has an occasional cigarette.  No other drug use.        Past Medical History:   PAST MEDICAL HISTORY:   Past Medical History:   Diagnosis Date    AMI anterior wall (H)     Mid LAD on cath with  "stent    CAD S/P percutaneous coronary angioplasty 07/01/2016    Unstable agina with anterior myocardial damage reported without mycardial damage by patient's history    CVA (cerebrovascular accident) (H) 01/01/2012    Reporting thromboembolic episode on the right neck. Pt states \"I've had ten strokes.\"    Hypercoagulable state (H24)     Uncertain etiology--seeing Hematologist    MEDICAL HISTORY OF -     Possibly PFO       PAST SURGICAL HISTORY:   Past Surgical History:   Procedure Laterality Date    AMPUTATE TOE(S)      ARTHROSCOPY KNEE RT/LT      Right     ARTHROSCOPY KNEE RT/LT      Left    CARDIAC CATHERIZATION      With stent placement    IR CHEST PORT PLACEMENT > 5 YRS OF AGE  9/13/2023    IR CVC TUNNEL PLACEMENT > 5 YRS OF AGE  1/19/2024             Family History:   FAMILY HISTORY:   Family History   Problem Relation Age of Onset    Diabetes No family hx of     Coronary Artery Disease No family hx of     Hypertension No family hx of     Hyperlipidemia No family hx of     Breast Cancer No family hx of    No history of anxiety, depression or substance use.        Social History:   He grew up in [place] with 2 sisters in an intact family.  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  He has a 25-year-old son who is certainly in the [employer] in [employer] and his other sons aged 18 and 21 I believe live with him..         Physical ROS:   The 10 point Review of Systems is negative other than noted in the HPI or here.           Medications:      [START ON 1/25/2024] acetaminophen  500 mg Oral Once    acyclovir  800 mg Oral BID    amLODIPine  5 mg Oral BID    apixaban ANTICOAGULANT  5 mg Oral BID    artificial saliva  2 spray Swish & Spit 4x Daily    [START ON 1/28/2024] Chemotherapy Infusing-Continuous Infusion   Does not apply Q8H    [START ON 1/28/2024] cycloPHOSphamide (CYTOXAN) 3,535 mg in sodium chloride 0.9 % " 500 mL infusion  50 mg/kg (Treatment Plan Ideal) Intravenous Q24H    dexAMETHasone  6 mg Oral Once    [START ON 1/30/2024] dextrose 5% water  10-20 mL Intracatheter Daily at 8 pm    [START ON 1/30/2024] dextrose 5% water  10-20 mL Intracatheter Daily at 8 pm    [START ON 1/25/2024] diphenhydrAMINE  25 mg Oral Once    [START ON 1/30/2024] filgrastim-aafi  5 mcg/kg (Treatment Plan Recorded) Intravenous Daily at 8 pm    heparin  500 Units Intracatheter Q30 Days    heparin lock flush  5-20 mL Intracatheter Q24H    levETIRAcetam  500 mg Oral BID    levofloxacin  250 mg Oral Daily at 10 am    lisinopril  40 mg Oral Daily    [START ON 1/28/2024] mesna (MESNEX) 3,540 mg in sodium chloride 0.9 % 1,085.4 mL infusion  50 mg/kg (Treatment Plan Ideal) Intravenous Q24H    metoprolol succinate ER  100 mg Oral Daily    micafungin (MYCAMINE) 150 mg in sodium chloride 0.9 % 100 mL intermittent infusion  150 mg Intravenous Daily    [START ON 1/30/2024] mycophenolate mofetil  1,250 mg Intravenous Q12H [facility] (08/20)    OLANZapine  5 mg Oral At Bedtime    ondansetron  8 mg Oral Q8H    [START ON 1/28/2024] ondansetron  8 mg Oral Q8H    pantoprazole  40 mg Oral Daily    prasugrel  10 mg Oral Daily    [START ON 1/30/2024] sirolimus  12 mg Oral Once    [START ON 1/31/2024] sirolimus  2.5 mg/m2 (Treatment Plan Recorded) Oral Daily    sodium chloride (PF)  10-40 mL Intracatheter Q8H    [START ON 2/26/2024] sulfamethoxazole-trimethoprim  1 tablet Oral Q Mon Tues BID    ursodiol  300 mg Oral TID    vancomycin  125 mg Oral 4x Daily              Allergies:     Allergies   Allergen Reactions    Blood Transfusion Related (Informational Only) Other (See Comments)     Give O RBC's and WBC's only per Cell Therapy    Tylenol [Acetaminophen] Other (See Comments)     DO NOT give while receiving Busulfan          Labs:     Recent Results (from the past 48 hour(s))   Sodium    Collection Time: 01/22/24  2:52 PM   Result Value Ref Range    Sodium 144 135 - 145  mmol/L   Creatinine, serum    Collection Time: 01/22/24  2:52 PM   Result Value Ref Range    Creatinine 0.98 0.67 - 1.17 mg/dL   Glucose by meter    Collection Time: 01/22/24  5:44 PM   Result Value Ref Range    GLUCOSE BY METER POCT 183 (H) 70 - 99 mg/dL   Glucose by meter    Collection Time: 01/22/24 10:07 PM   Result Value Ref Range    GLUCOSE BY METER POCT 154 (H) 70 - 99 mg/dL   Basic metabolic panel    Collection Time: 01/23/24  3:39 AM   Result Value Ref Range    Sodium 141 135 - 145 mmol/L    Potassium 3.8 3.4 - 5.3 mmol/L    Chloride 114 (H) 98 - 107 mmol/L    Carbon Dioxide (CO2) 23 22 - 29 mmol/L    Anion Gap 4 (L) 7 - 15 mmol/L    Urea Nitrogen 29.7 (H) 6.0 - 20.0 mg/dL    Creatinine 0.98 0.67 - 1.17 mg/dL    GFR Estimate >90 >60 mL/min/1.73m2    Calcium 7.8 (L) 8.6 - 10.0 mg/dL    Glucose 146 (H) 70 - 99 mg/dL   Magnesium    Collection Time: 01/23/24  3:39 AM   Result Value Ref Range    Magnesium 1.9 1.7 - 2.3 mg/dL   Phosphorus    Collection Time: 01/23/24  3:39 AM   Result Value Ref Range    Phosphorus 2.9 2.5 - 4.5 mg/dL   CBC with platelets and differential    Collection Time: 01/23/24  3:39 AM   Result Value Ref Range    WBC Count 6.4 4.0 - 11.0 10e3/uL    RBC Count 2.84 (L) 4.40 - 5.90 10e6/uL    Hemoglobin 9.5 (L) 13.3 - 17.7 g/dL    Hematocrit 28.7 (L) 40.0 - 53.0 %     (H) 78 - 100 fL    MCH 33.5 (H) 26.5 - 33.0 pg    MCHC 33.1 31.5 - 36.5 g/dL    RDW 14.4 10.0 - 15.0 %    Platelet Count 233 150 - 450 10e3/uL    % Neutrophils 93 %    % Lymphocytes 4 %    % Monocytes 1 %    % Eosinophils 0 %    % Basophils 0 %    % Immature Granulocytes 2 %    NRBCs per 100 WBC 0 <1 /100    Absolute Neutrophils 6.0 1.6 - 8.3 10e3/uL    Absolute Lymphocytes 0.2 (L) 0.8 - 5.3 10e3/uL    Absolute Monocytes 0.1 0.0 - 1.3 10e3/uL    Absolute Eosinophils 0.0 0.0 - 0.7 10e3/uL    Absolute Basophils 0.0 0.0 - 0.2 10e3/uL    Absolute Immature Granulocytes 0.1 <=0.4 10e3/uL    Absolute NRBCs 0.0 10e3/uL   Glucose  by meter    Collection Time: 01/23/24  8:57 AM   Result Value Ref Range    GLUCOSE BY METER POCT 180 (H) 70 - 99 mg/dL   Glucose by meter    Collection Time: 01/23/24 11:52 AM   Result Value Ref Range    GLUCOSE BY METER POCT 150 (H) 70 - 99 mg/dL   Glucose by meter    Collection Time: 01/23/24  5:52 PM   Result Value Ref Range    GLUCOSE BY METER POCT 142 (H) 70 - 99 mg/dL   Glucose by meter    Collection Time: 01/23/24 11:36 PM   Result Value Ref Range    GLUCOSE BY METER POCT 151 (H) 70 - 99 mg/dL   Basic metabolic panel    Collection Time: 01/24/24  4:30 AM   Result Value Ref Range    Sodium 140 135 - 145 mmol/L    Potassium 3.9 3.4 - 5.3 mmol/L    Chloride 112 (H) 98 - 107 mmol/L    Carbon Dioxide (CO2) 22 22 - 29 mmol/L    Anion Gap 6 (L) 7 - 15 mmol/L    Urea Nitrogen 27.7 (H) 6.0 - 20.0 mg/dL    Creatinine 1.03 0.67 - 1.17 mg/dL    GFR Estimate 88 >60 mL/min/1.73m2    Calcium 8.2 (L) 8.6 - 10.0 mg/dL    Glucose 172 (H) 70 - 99 mg/dL   Magnesium    Collection Time: 01/24/24  4:30 AM   Result Value Ref Range    Magnesium 1.9 1.7 - 2.3 mg/dL   Phosphorus    Collection Time: 01/24/24  4:30 AM   Result Value Ref Range    Phosphorus 2.8 2.5 - 4.5 mg/dL   CBC with platelets and differential    Collection Time: 01/24/24  4:30 AM   Result Value Ref Range    WBC Count 6.2 4.0 - 11.0 10e3/uL    RBC Count 3.00 (L) 4.40 - 5.90 10e6/uL    Hemoglobin 10.1 (L) 13.3 - 17.7 g/dL    Hematocrit 29.8 (L) 40.0 - 53.0 %    MCV 99 78 - 100 fL    MCH 33.7 (H) 26.5 - 33.0 pg    MCHC 33.9 31.5 - 36.5 g/dL    RDW 14.5 10.0 - 15.0 %    Platelet Count 228 150 - 450 10e3/uL    % Neutrophils 96 %    % Lymphocytes 1 %    % Monocytes 0 %    % Eosinophils 0 %    % Basophils 1 %    % Immature Granulocytes 2 %    NRBCs per 100 WBC 0 <1 /100    Absolute Neutrophils 6.0 1.6 - 8.3 10e3/uL    Absolute Lymphocytes 0.1 (L) 0.8 - 5.3 10e3/uL    Absolute Monocytes 0.0 0.0 - 1.3 10e3/uL    Absolute Eosinophils 0.0 0.0 - 0.7 10e3/uL    Absolute Basophils  "0.0 0.0 - 0.2 10e3/uL    Absolute Immature Granulocytes 0.1 <=0.4 10e3/uL    Absolute NRBCs 0.0 10e3/uL   Adult Type and Screen    Collection Time: 01/24/24  4:30 AM   Result Value Ref Range    ABO/RH(D) A NEG     Antibody Screen Negative Negative    SPECIMEN EXPIRATION DATE [id number]    Glucose by meter    Collection Time: 01/24/24  8:21 AM   Result Value Ref Range    GLUCOSE BY METER POCT 160 (H) 70 - 99 mg/dL          Physical and Psychiatric Examination:     BP (!) 158/98 (BP Location: Right arm)   Pulse 70   Temp 97.7  F (36.5  C) (Oral)   Resp 16   Ht 1.725 m (5' 7.91\")   Wt 90.4 kg (199 lb 4.8 oz)   SpO2 98%   BMI 30.38 kg/m    Weight is 199 lbs 4.8 oz  Body mass index is 30.38 kg/m .    Physical Exam:  I have reviewed the physical exam as documented by by the medical team and agree with findings and assessment and have no additional findings to add at this time.         MSE:   Appearance: awake, alert and adequately groomed  Attitude:  cooperative  Eye Contact:  good  Mood:  \"fair\"  Affect:  slightly restricted  Speech:  clear, coherent  Psychomotor Behavior:  no evidence of tardive dyskinesia, dystonia, or tics  Muscle strength and tone: intact   Thought Process:  logical and linear  Associations:  no loose associations  Thought Content:  no evidence of suicidal ideation or homicidal ideation and no evidence of psychotic thought  Insight:  fair  Judgement:  fair  Oriented to:  time, person, and place  Attention Span and Concentration:  intact  Recent and Remote Memory:  intact             DSM-5 Diagnosis:   300.02 (F41.1) Generalized Anxiety Disorder  Cannabis use           Assessment:   I think he may benefit from an SSRI but his preference would be to use just something as needed instead, and during the BMT process is probably best to minimize medications.          Summary of Recommendations:   For sleep he could try trazodone 50 to 100 mg at bedtime  For anxiety can try hydroxyzine 25 mg " "every 6 hours as needed.  Try to avoid 50 mg due to the anticholinergic burden.  A visit from health psychology and/or the  may help    Page me or re-consult psychiatry as needed (psychiatry is signing off).     Douglas Rogers M.D.   Consult liaison psychiatry   [facility]   Securely message with [employer] (more info)  Text page via [facility] Paging/Directory  If I am not available, then [facility] intake ([phone]) should know who   Is on call               \"This dictation was performed with voice recognition software and may contain errors,  omissions and inadvertent word substitution.\"           "

## 2024-01-24 NOTE — PROGRESS NOTES
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  He has had insomnia in the past but seems now to be worse this hospitalization.  Outpatient, he manages his anxiety by using THC edibles and alcohol.  He tells me he is active during the day.  He is eating/drinking ok.  No fever, bleeding, rash, SOB.      Review of Systems    Review of Systems: 10 point ROS negative unless stated in HPI   PHYSICAL EXAM      Weight     Wt Readings from Last 3 Encounters:   01/24/24 90.4 kg (199 lb 4.8 oz)   01/17/24 90.1 kg (198 lb 9.6 oz)   01/12/24 88.9 kg (196 lb)        KPS: 70    BP (!) 153/84 (BP Location: Right arm)   Pulse 71   Temp 97.7  F (36.5  C) (Oral)   Resp 16   Ht 1.725 m (5' 7.91\")   Wt 90.4 kg (199 lb 4.8 oz)   SpO2 99%   BMI 30.38 kg/m       General: NAD   Eyes: GARY, sclera anicteric   Lungs: CTA bilaterally  Cardiovascular: RRR, no M/R/G   Lymphatics: No edema  Neuro: A&O   Additional Findings: Powell site NT, no drainage.    Current aGVHD staging:  Skin 0, UGI 0, LGI 0, Liver 0 (keep in note through day +180 for allos)      LABS AND IMAGING: I have assessed all abnormal lab values for their clinical significance and any values considered clinically significant have been addressed in the assessment and plan.        Lab Results   Component Value Date    WBC 6.2 01/24/2024    ANEUTAUTO 6.0 01/24/2024    HGB 10.1 (L) 01/24/2024    HCT 29.8 (L) 01/24/2024     01/24/2024     01/24/2024    POTASSIUM " 3.9 01/24/2024    CHLORIDE 112 (H) 01/24/2024    CO2 22 01/24/2024     (H) 01/24/2024    BUN 27.7 (H) 01/24/2024    CR 1.03 01/24/2024    MAG 1.9 01/24/2024    INR 1.09 01/22/2024         SYSTEMS-BASED ASSESSMENT AND PLAN     Ziggy Hallman is a 48 year old year old male with a PMH of MDS, hx of multiple clots and MI undergoing a MA (Bu/Flu) prep for an 8/8 URD PBSCT day -1    BMT/IEC PROTOCOL for 2015-29  Chemo Prep:   Day -6: Admit  Day -5 through Day -2: Busulfan/Fludarabine  Day -1: Rest.   Keppra prophylaxis   Avoiding Tylenol for 72 hours befre and after last dose of Busulfan due to drug interaction. Ok to start day +2.     8/8 DP permissive. Cell dose pending  ABO: Donor: O+ Recipient A-  (Minor incompatability, no flush required)   GSCF plan: GCSF to start day +5 until ANC >1500 for 3 consecutive days    HEME/COAG  #Risk of Pancytopenia   - Risk of cytopenias due to chemotherapy and radiation  - Transfusion parameters: hemoglobin <7, platelets <10  #Recurrent arterial thromboembolic events:  He has long history of various events including renal infarcts (2011, 2013, 2015), left popliteal embolic episode requiring surgery, anticoagulation (2011), right carotid artery embolic episode with TIA/stroke-like symptoms (2012), embolic MI (2015), gangrenous right toe requiring vascular surgery/amputation (2017), 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   Extensive hypercoagulable workup to date has been unrevealing.  JAK2 testing negative.  PNH testing negative.  Elevated IgA of unknown significance, no evidence of plasma cell disorder.  - On Eliquis and Prasugrel, continue until platelets <50k. Hematology recommending to continue both medications until platelets are less than or equal to 50k, then transition to ppx lovenox dosing until platelets less than or equal to 25k and then STOP. Restart regimen once engrafted and platelets stable.      IMMUNOCOMPROMISED  C. Diff - Admit c. Diff triple+ - Start PO vanc 1/21 for 14 day course.   -Prophylaxis plan: ACV LD, Megan HD, Levaquin, Bactrim +28    RISK OF GVHD  - Prophylaxis: PtC day +3, +4, , Siro/MMF to start day +5     CARDIOVASCULAR  #CAD  #Hx of CABG  #HTN   -PTA metoprolol, Lisinopril dose increased 2/2 HTN, Amlodipine 5mg BID added 1/21, labetolol PRN; watch 1 more day; if still htn, consider adding coreg or changing norvasc to nifedipine  #Hyperlipidemia: Holding atorvastatin peritransplant  - Risk of cardiomyopathy:  Baseline EF 50-55%, Global left ventricular function mildly reduced. Grade 1 or early diastolic dysfunction.   - Risk of arrhythmia: Baseline EKG showed NSR, Qtc -425    GI/NUTRITION  - Ulcer prophylaxis: Protonix   - VOD Prophylaxis: Ursodiol  - Risk of nausea/vomiting due to chemo: Ativan, Compazine, Zofran   - Risk of malnutrition: Nutrition to follow.     RENAL/ELECTROLYTES/  #Proteinuria- possibly exacerbated by persistent HTN (See CV),   #BUN/Cr elevation- FENA, osms, Na+, UA work up indicate prerenal etiology, likely intravascular depletion 2/2 osmotic diuresis with proteinuria. Will encourage PO fluid intake as Cr improved 1/23, IVF boluses as necessary, control HTN.  - Hypocalcemia in setting hypoalbuminemia, iCa2+, corrected WNL   - Electrolyte management: replace per sliding scale    DIABETES/ENDOCRINE  - Risk of steroid-induced hyperglyemia: ok to stop glucochecks today    MUSCULOSKELETAL/FRAILTY  - Baseline Frailty Score: 1, Comorbidity index- 4   - Patient with substantial risk of sarcopenia  - Daily PT/OT as needed while inpatient  - Cancer Rehab as needed outpatient    SYMPTOM MANAGEMENT  - Nausea from chemo Prochlorperazine, ondansetron, lorazepam.  - # Pain Assessment:      9/7/2023    10:42 PM   Current Pain Score   Patient currently in pain? denies   Ziggy s pain level was assessed and he currently denies pain.          Psych:  admits to feeling anxious.  Start  "zyprexa.  Connect w/ SW.  Psychaitry consult .      SOCIAL DETERMINANTS  - Caregiver: Family   - Financial/insurance concerns: See SW notes       Known issues that I take into account for medical decisions, with salient changes to the plan considering these complexities noted above.    Patient Active Problem List   Diagnosis    Amegakaryocytic thrombocytopenia (H)    Anemia due to bone marrow failure, unspecified bone marrow failure type (H)    Aplastic anemia (H)     Clinically Significant Risk Factors              # Hypoalbuminemia: Lowest albumin = 2 g/dL at 1/22/2024  4:12 AM, will monitor as appropriate              # Obesity: Estimated body mass index is 30.38 kg/m  as calculated from the following:    Height as of this encounter: 1.725 m (5' 7.91\").    Weight as of this encounter: 90.4 kg (199 lb 4.8 oz).                 Dispo: Remain admitted through engraftment  - Follow up Micafungin discharge plan.     I spent 30 minutes in the care of this patient today, which included time necessary for preparation for the visit, obtaining history, ordering medications/tests/procedures as medically indicated, review of pertinent medical literature, counseling of the patient, communication of recommendations to the care team, and documentation time.    Debora Claudio pa-c  [phone]      ______________________________________________    Physician Attestation     I saw and evaluated Ziggy Hallman as part of a shared APRN/PA visit. I personally performed the substantive portion of the medical decision making for this visit - please see the [facility] s documentation for full details. Key management decisions made by me and carried out under my direction as below.     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 Admitted on 1/19/2024 for allogenic stem cell transplant: myeloablative conditioning with fludarabine/ busulfan, 8/8 matched unrelated donor, peripheral blood stem cell graft, GVHD ppx " with PTCy/ MMF/ sirolimus, minor ABO mismatch, donor O+, recipient A+ , and CMV donor and recipient negative. 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     BMT: D-1   Heme: anticoagulation plan as detailed above  ID: C.diff positive on admission, PO vanc started on 1/21 to continue for 14 days  HTN: on metoprolol, lisinopril and amlodipine. Will monitor, can change amlodipine to nifedipine if BP persistently elevated  Psych: endorses significant anxiety, unable to control racing thoughts, unable to sleep. Used THC gummies at home. Will consult psychiatry.   GI: monitor regimen related toxicity.  Renal/FEN: Monitor renal function and electrolytes  PPx: fabi, acyclovir, levofloxacin  Supportive care: encourage ambulation, PT/OT, optimize nutrition.    On the date of service, 01/24/2024, I spent 30 minutes on the patient unit personally reviewing medical records and medications, reviewing vital signs, labs, and imaging results as summarized above, discussing the patient's case on rounds with the [facility], obtaining a history from the patient, performing a physical exam, intensively monitoring treatments with high risk of toxicity, coordinating care, and documenting in the electronic medical record.    Capri Dawson  Department of Hematology, Oncology and Transplantation  [facility] Pager 1300/Text via "[employer]

## 2024-01-25 LAB
ANION GAP SERPL CALCULATED.3IONS-SCNC: 7 MMOL/L (ref 7–15)
BASOPHILS # BLD AUTO: 0 10E3/UL (ref 0–0.2)
BASOPHILS NFR BLD AUTO: 1 %
BILL ONLY ALLO PBPC PROCESSING: NORMAL
BUN SERPL-MCNC: 28.9 MG/DL (ref 6–20)
CALCIUM SERPL-MCNC: 8.1 MG/DL (ref 8.6–10)
CHLORIDE SERPL-SCNC: 111 MMOL/L (ref 98–107)
CREAT SERPL-MCNC: 1.05 MG/DL (ref 0.67–1.17)
DEPRECATED HCO3 PLAS-SCNC: 23 MMOL/L (ref 22–29)
EGFRCR SERPLBLD CKD-EPI 2021: 86 ML/MIN/1.73M2
EOSINOPHIL # BLD AUTO: 0 10E3/UL (ref 0–0.7)
EOSINOPHIL NFR BLD AUTO: 0 %
ERYTHROCYTE [DISTWIDTH] IN BLOOD BY AUTOMATED COUNT: 14.5 % (ref 10–15)
GLUCOSE SERPL-MCNC: 155 MG/DL (ref 70–99)
HCT VFR BLD AUTO: 31.1 % (ref 40–53)
HGB BLD-MCNC: 10.4 G/DL (ref 13.3–17.7)
IMM GRANULOCYTES # BLD: 0.2 10E3/UL
IMM GRANULOCYTES NFR BLD: 3 %
LYMPHOCYTES # BLD AUTO: 0.1 10E3/UL (ref 0.8–5.3)
LYMPHOCYTES NFR BLD AUTO: 1 %
MAGNESIUM SERPL-MCNC: 1.9 MG/DL (ref 1.7–2.3)
MCH RBC QN AUTO: 33 PG (ref 26.5–33)
MCHC RBC AUTO-ENTMCNC: 33.4 G/DL (ref 31.5–36.5)
MCV RBC AUTO: 99 FL (ref 78–100)
MONOCYTES # BLD AUTO: 0 10E3/UL (ref 0–1.3)
MONOCYTES NFR BLD AUTO: 0 %
NEUTROPHILS # BLD AUTO: 6.4 10E3/UL (ref 1.6–8.3)
NEUTROPHILS NFR BLD AUTO: 95 %
NRBC # BLD AUTO: 0 10E3/UL
NRBC BLD AUTO-RTO: 0 /100
PATH REPORT.ADDENDUM SPEC: ABNORMAL
PATH REPORT.ADDENDUM SPEC: ABNORMAL
PATH REPORT.COMMENTS IMP SPEC: ABNORMAL
PATH REPORT.COMMENTS IMP SPEC: YES
PATH REPORT.FINAL DX SPEC: ABNORMAL
PATH REPORT.GROSS SPEC: ABNORMAL
PATH REPORT.MICROSCOPIC SPEC OTHER STN: ABNORMAL
PATH REPORT.RELEVANT HX SPEC: ABNORMAL
PHOSPHATE SERPL-MCNC: 3.1 MG/DL (ref 2.5–4.5)
PLATELET # BLD AUTO: 229 10E3/UL (ref 150–450)
POTASSIUM SERPL-SCNC: 4 MMOL/L (ref 3.4–5.3)
RBC # BLD AUTO: 3.15 10E6/UL (ref 4.4–5.9)
SODIUM SERPL-SCNC: 141 MMOL/L (ref 135–145)
WBC # BLD AUTO: 6.8 10E3/UL (ref 4–11)

## 2024-01-25 PROCEDURE — 250N000013 HC RX MED GY IP 250 OP 250 PS 637: Performed by: STUDENT IN AN ORGANIZED HEALTH CARE EDUCATION/TRAINING PROGRAM

## 2024-01-25 PROCEDURE — 250N000013 HC RX MED GY IP 250 OP 250 PS 637: Performed by: PHYSICIAN ASSISTANT

## 2024-01-25 PROCEDURE — 815N000004 HC ACQUISITION BONE MARROW NMDP

## 2024-01-25 PROCEDURE — 250N000013 HC RX MED GY IP 250 OP 250 PS 637

## 2024-01-25 PROCEDURE — 85025 COMPLETE CBC W/AUTO DIFF WBC: CPT | Performed by: PHYSICIAN ASSISTANT

## 2024-01-25 PROCEDURE — 84100 ASSAY OF PHOSPHORUS: CPT | Performed by: INTERNAL MEDICINE

## 2024-01-25 PROCEDURE — 38240 TRANSPLT ALLO HCT/DONOR: CPT | Performed by: STUDENT IN AN ORGANIZED HEALTH CARE EDUCATION/TRAINING PROGRAM

## 2024-01-25 PROCEDURE — 80048 BASIC METABOLIC PNL TOTAL CA: CPT | Performed by: PHYSICIAN ASSISTANT

## 2024-01-25 PROCEDURE — 250N000011 HC RX IP 250 OP 636: Performed by: PHYSICIAN ASSISTANT

## 2024-01-25 PROCEDURE — 99233 SBSQ HOSP IP/OBS HIGH 50: CPT | Mod: 24 | Performed by: PHYSICIAN ASSISTANT

## 2024-01-25 PROCEDURE — 83735 ASSAY OF MAGNESIUM: CPT | Performed by: INTERNAL MEDICINE

## 2024-01-25 PROCEDURE — 30243Y3 TRANSFUSION OF ALLOGENEIC UNRELATED HEMATOPOIETIC STEM CELLS INTO CENTRAL VEIN, PERCUTANEOUS APPROACH: ICD-10-PCS | Performed by: STUDENT IN AN ORGANIZED HEALTH CARE EDUCATION/TRAINING PROGRAM

## 2024-01-25 PROCEDURE — 258N000003 HC RX IP 258 OP 636: Performed by: PHYSICIAN ASSISTANT

## 2024-01-25 PROCEDURE — 206N000001 HC R&B BMT UMMC

## 2024-01-25 PROCEDURE — 38214 VOLUME DEPLETE OF HARVEST: CPT | Performed by: INTERNAL MEDICINE

## 2024-01-25 RX ORDER — NIFEDIPINE 30 MG/1
30 TABLET, EXTENDED RELEASE ORAL 2 TIMES DAILY
Status: DISCONTINUED | OUTPATIENT
Start: 2024-01-25 | End: 2024-02-20 | Stop reason: HOSPADM

## 2024-01-25 RX ADMIN — DIPHENHYDRAMINE HYDROCHLORIDE 25 MG: 25 CAPSULE ORAL at 12:19

## 2024-01-25 RX ADMIN — ACYCLOVIR 800 MG: 800 TABLET ORAL at 20:00

## 2024-01-25 RX ADMIN — NIFEDIPINE 30 MG: 30 TABLET, FILM COATED, EXTENDED RELEASE ORAL at 20:00

## 2024-01-25 RX ADMIN — PANTOPRAZOLE SODIUM 40 MG: 40 TABLET, DELAYED RELEASE ORAL at 08:27

## 2024-01-25 RX ADMIN — Medication 2 SPRAY: at 12:21

## 2024-01-25 RX ADMIN — MICAFUNGIN SODIUM 150 MG: 50 INJECTION, POWDER, LYOPHILIZED, FOR SOLUTION INTRAVENOUS at 08:26

## 2024-01-25 RX ADMIN — LORAZEPAM 0.5 MG: 0.5 TABLET ORAL at 02:06

## 2024-01-25 RX ADMIN — PRASUGREL 10 MG: 10 TABLET, FILM COATED ORAL at 08:28

## 2024-01-25 RX ADMIN — URSODIOL 300 MG: 300 CAPSULE ORAL at 13:29

## 2024-01-25 RX ADMIN — Medication 5 ML: at 03:32

## 2024-01-25 RX ADMIN — AMLODIPINE BESYLATE 5 MG: 5 TABLET ORAL at 08:27

## 2024-01-25 RX ADMIN — APIXABAN 5 MG: 5 TABLET, FILM COATED ORAL at 20:00

## 2024-01-25 RX ADMIN — Medication 2 SPRAY: at 08:26

## 2024-01-25 RX ADMIN — LEVOFLOXACIN 250 MG: 250 TABLET, FILM COATED ORAL at 10:48

## 2024-01-25 RX ADMIN — Medication 2 SPRAY: at 20:02

## 2024-01-25 RX ADMIN — ACETAMINOPHEN 500 MG: 500 TABLET ORAL at 12:20

## 2024-01-25 RX ADMIN — VANCOMYCIN HYDROCHLORIDE 125 MG: 125 CAPSULE ORAL at 12:20

## 2024-01-25 RX ADMIN — Medication 10 ML: at 10:49

## 2024-01-25 RX ADMIN — VANCOMYCIN HYDROCHLORIDE 125 MG: 125 CAPSULE ORAL at 16:26

## 2024-01-25 RX ADMIN — ACYCLOVIR 800 MG: 800 TABLET ORAL at 08:27

## 2024-01-25 RX ADMIN — LISINOPRIL 40 MG: 10 TABLET ORAL at 08:27

## 2024-01-25 RX ADMIN — METOPROLOL SUCCINATE 100 MG: 50 TABLET, EXTENDED RELEASE ORAL at 08:27

## 2024-01-25 RX ADMIN — URSODIOL 300 MG: 300 CAPSULE ORAL at 20:00

## 2024-01-25 RX ADMIN — TRAZODONE HYDROCHLORIDE 50 MG: 50 TABLET ORAL at 23:01

## 2024-01-25 RX ADMIN — Medication 2 SPRAY: at 16:26

## 2024-01-25 RX ADMIN — Medication 3 CAPSULE: at 10:48

## 2024-01-25 RX ADMIN — APIXABAN 5 MG: 5 TABLET, FILM COATED ORAL at 08:27

## 2024-01-25 RX ADMIN — VANCOMYCIN HYDROCHLORIDE 125 MG: 125 CAPSULE ORAL at 20:00

## 2024-01-25 RX ADMIN — OLANZAPINE 5 MG: 2.5 TABLET, FILM COATED ORAL at 23:01

## 2024-01-25 RX ADMIN — ONDANSETRON HYDROCHLORIDE 8 MG: 8 TABLET, FILM COATED ORAL at 08:27

## 2024-01-25 RX ADMIN — ONDANSETRON HYDROCHLORIDE 8 MG: 8 TABLET, FILM COATED ORAL at 16:25

## 2024-01-25 RX ADMIN — URSODIOL 300 MG: 300 CAPSULE ORAL at 08:27

## 2024-01-25 RX ADMIN — VANCOMYCIN HYDROCHLORIDE 125 MG: 125 CAPSULE ORAL at 08:27

## 2024-01-25 ASSESSMENT — ACTIVITIES OF DAILY LIVING (ADL)
ADLS_ACUITY_SCORE: 20

## 2024-01-25 NOTE — PROGRESS NOTES
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 Tried hydroxizine and trazadone without improvement. Eating well. No N/V. Diarrhea having multiple loose stools a day, mixed texture.     Review of Systems    Review of Systems: 10 point ROS negative unless stated in HPI   PHYSICAL EXAM      Weight     Wt Readings from Last 3 Encounters:   01/25/24 90.5 kg (199 lb 9.6 oz)   01/17/24 90.1 kg (198 lb 9.6 oz)   01/12/24 88.9 kg (196 lb)        KPS: 70    BP (!) 152/91 (BP Location: Right arm)   Pulse 76   Temp (!) 96.4  F (35.8  C) (Oral)   Resp 20   Ht 1.725 m (5' 7.91\")   Wt 90.5 kg (199 lb 9.6 oz)   SpO2 98%   BMI 30.43 kg/m       General: NAD   Eyes: GARY, sclera anicteric   Lungs: CTA bilaterally  Cardiovascular: RRR, no M/R/G   Lymphatics: No edema  Neuro: A&O   Additional Findings: Powell site NT, no drainage.    Current aGVHD staging:  Skin 0, UGI 0, LGI 0, Liver 0 (keep in note through day +180 for allos)      LABS AND IMAGING: I have assessed all abnormal lab values for their clinical significance and any values considered clinically significant have been addressed in the assessment and plan.        Lab Results   Component Value Date    WBC 6.8 01/25/2024    ANEUTAUTO 6.4 01/25/2024    HGB 10.4 (L) 01/25/2024    HCT 31.1 (L) 01/25/2024     01/25/2024     01/25/2024    POTASSIUM 4.0 01/25/2024    CHLORIDE 111 (H) 01/25/2024    CO2 23 01/25/2024     " (H) 01/25/2024    BUN 28.9 (H) 01/25/2024    CR 1.05 01/25/2024    MAG 1.9 01/25/2024    INR 1.09 01/22/2024         SYSTEMS-BASED ASSESSMENT AND PLAN     Ziggy Hallman is a 48 year old year old male with a PMH of MDS, hx of multiple clots and MI undergoing a MA (Bu/Flu) prep for an 8/8 URD PBSCT day 0    BMT/IEC PROTOCOL for 2015-29  Chemo Prep:   Day -6: Admit  Day -5 through Day -2: Busulfan/Fludarabine  Day -1: Rest.   Keppra prophylaxis   Avoiding Tylenol for 72 hours befre and after last dose of Busulfan due to drug interaction. Ok to start day +2.     8/8 DP permissive. Cell dose pending  ABO: Donor: O+ Recipient A-  (Minor incompatability, no flush required)   GSCF plan: GCSF to start day +5 until ANC >1500 for 3 consecutive days    HEME/COAG  #Risk of Pancytopenia   - Risk of cytopenias due to chemotherapy and radiation  - Transfusion parameters: hemoglobin <7, platelets <10  #Recurrent arterial thromboembolic events:  He has long history of various events including renal infarcts (2011, 2013, 2015), left popliteal embolic episode requiring surgery, anticoagulation (2011), right carotid artery embolic episode with TIA/stroke-like symptoms (2012), embolic MI (2015), gangrenous right toe requiring vascular surgery/amputation (2017), 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   Extensive hypercoagulable workup to date has been unrevealing.  JAK2 testing negative.  PNH testing negative.  Elevated IgA of unknown significance, no evidence of plasma cell disorder.  - On Eliquis and Prasugrel, continue until platelets <50k. Hematology recommending to continue both medications until platelets are less than or equal to 50k, then transition to ppx lovenox dosing until platelets less than or equal to 25k and then STOP. Restart regimen once engrafted and platelets stable.     IMMUNOCOMPROMISED  C. Diff - Admit c. Diff triple+ - Start PO vanc 1/21 for 14 day course.    -Prophylaxis plan: ACV LD, Megan HD, Levaquin, Bactrim +28    RISK OF GVHD  - Prophylaxis: PtC day +3, +4, , Siro/MMF to start day +5     CARDIOVASCULAR  #CAD  #Hx of CABG  #HTN   -PTA metoprolol, Lisinopril dose increased 2/2 HTN, Amlodipine 5mg BID switch to nifidepine ER 60mg qday, labetolol PRN;   #Hyperlipidemia: Holding atorvastatin peritransplant  - Risk of cardiomyopathy:  Baseline EF 50-55%, Global left ventricular function mildly reduced. Grade 1 or early diastolic dysfunction.   - Risk of arrhythmia: Baseline EKG showed NSR, Qtc -425    GI/NUTRITION  - Ulcer prophylaxis: Protonix   - VOD Prophylaxis: Ursodiol  - Risk of nausea/vomiting due to chemo: Ativan, Compazine, Zofran   - Risk of malnutrition: Nutrition to follow.     RENAL/ELECTROLYTES/  #Proteinuria- possibly exacerbated by persistent HTN (See CV),   #BUN/Cr elevation- FENA, osms, Na+, UA work up indicate prerenal etiology, likely intravascular depletion 2/2 osmotic diuresis with proteinuria. Will encourage PO fluid intake as Cr improved 1/23, IVF boluses as necessary, control HTN.  - Hypocalcemia in setting hypoalbuminemia, iCa2+, corrected WNL   - Electrolyte management: replace per sliding scale    MUSCULOSKELETAL/FRAILTY  - Baseline Frailty Score: 1, Comorbidity index- 4   - Patient with substantial risk of sarcopenia  - Daily PT/OT as needed while inpatient  - Cancer Rehab as needed outpatient    SYMPTOM MANAGEMENT  - Nausea from chemo Prochlorperazine, ondansetron, lorazepam.  - # Pain Assessment:      9/7/2023    10:42 PM   Current Pain Score   Patient currently in pain? denies   Ziggy s pain level was assessed and he currently denies pain.          Psych:  admits to feeling anxious.  Start zyprexa.  Connect w/ SW.  Psychaitry consult .      SOCIAL DETERMINANTS  - Caregiver: Family   - Financial/insurance concerns: See SW notes       Known issues that I take into account for medical decisions, with salient changes to the plan  "considering these complexities noted above.    Patient Active Problem List   Diagnosis    Amegakaryocytic thrombocytopenia (H)    Anemia due to bone marrow failure, unspecified bone marrow failure type (H)    Aplastic anemia (H)     Clinically Significant Risk Factors              # Hypoalbuminemia: Lowest albumin = 2 g/dL at 1/22/2024  4:12 AM, will monitor as appropriate              # Obesity: Estimated body mass index is 30.43 kg/m  as calculated from the following:    Height as of this encounter: 1.725 m (5' 7.91\").    Weight as of this encounter: 90.5 kg (199 lb 9.6 oz).                 Dispo: Remain admitted through engraftment  - Follow up Micafungin discharge plan.     I spent 30 minutes in the care of this patient today, which included time necessary for preparation for the visit, obtaining history, ordering medications/tests/procedures as medically indicated, review of pertinent medical literature, counseling of the patient, communication of recommendations to the care team, and documentation time.    Toyin Herrmann PA-C  [phone]      ______________________________________________    Physician Attestation     I saw and evaluated Ziggy Hallman as part of a shared APRN/PA visit. I personally performed the substantive portion of the medical decision making for this visit - please see the NIDHI s documentation for full details. Key management decisions made by me and carried out under my direction as below.     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 Admitted on 1/19/2024 for allogenic stem cell transplant: myeloablative conditioning with fludarabine/ busulfan, 8/8 matched unrelated donor, peripheral blood stem cell graft, GVHD ppx with PTCy/ MMF/ sirolimus, minor ABO mismatch, donor O+, recipient A+ , and CMV donor and recipient negative. 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     Ziggy is very sleepy on my " exam this morning. He didn't get much sleep last night. Has started having diarrhea. BP in the 150s/90s. Creat has trended up slightly. I/Os not documented.     BMT: D0   Heme: anticoagulation plan as detailed above  ID: C.diff positive on admission, PO vanc started on 1/21 to continue for 14 days  HTN: Continue lisinopril and metoprolol. Switch amlodipine to nifedipine.   Psych: anxiety and insomnia. Declines health psychology, says that he has social support. Psychiatry consulted, recommend hydroxyzine PRN.   GI: monitor regimen related toxicity.  Renal/FEN: Monitor renal function and electrolytes  PPx: fabi, acyclovir, levofloxacin  Supportive care: encourage ambulation, PT/OT, optimize nutrition.    On the date of service, 01/25/2024, I spent 30 minutes on the patient unit personally reviewing medical records and medications, reviewing vital signs, labs, and imaging results as summarized above, discussing the patient's case on rounds with the N[facility], obtaining a history from the patient, performing a physical exam, intensively monitoring treatments with high risk of toxicity, coordinating care, and documenting in the electronic medical record.    Capri Dawson  Department of Hematology, Oncology and Transplantation  Amcom Pager [phone]/Text via [email or web address]                  [phone]

## 2024-01-25 NOTE — PLAN OF CARE
AVSS on room air. Alert & oriented x4. No complains of pain, nausea, numbness or tingling. Pt. Had trouble sleeping last night. Administered zolpidem, trazodone and ativan x 1. Pt. States meds were not helpful. Pt. Requesting to try melatonin tonight. Voiding appropriately. No BM overnight. No replacements needed. Continue plan of care.    Problem: Stem Cell/Bone Marrow Transplant  Goal: Optimal Coping with Transplant  Outcome: Progressing  Intervention: Optimize Patient/Family Adjustment to Transplant  Recent Flowsheet Documentation  Taken 1/24/2024 2000 by Geni Hood RN  Supportive Measures:   active listening utilized   decision-making supported   goal-setting facilitated   positive reinforcement provided   problem-solving facilitated   self-care encouraged   self-reflection promoted   self-responsibility promoted   verbalization of feelings encouraged     Problem: Adult Inpatient Plan of Care  Goal: Absence of Hospital-Acquired Illness or Injury  Intervention: Identify and Manage Fall Risk  Recent Flowsheet Documentation  Taken 1/25/2024 0000 by Geni Hood RN  Safety Promotion/Fall Prevention:   safety round/check completed   nonskid shoes/slippers when out of bed   patient and family education   room near nurse's station   mobility aid in reach  Taken 1/24/2024 2200 by Geni Hood RN  Safety Promotion/Fall Prevention: safety round/check completed  Taken 1/24/2024 2000 by Geni Hood RN  Safety Promotion/Fall Prevention:   safety round/check completed   clutter free environment maintained   mobility aid in reach   nonskid shoes/slippers when out of bed   patient and family education   room near nurse's station     Problem: Adult Inpatient Plan of Care  Goal: Absence of Hospital-Acquired Illness or Injury  Intervention: Prevent Skin Injury  Recent Flowsheet Documentation  Taken 1/24/2024 2000 by Geni Hood RN  Body Position: position changed independently  Skin  Protection:   adhesive use limited   transparent dressing maintained  Device Skin Pressure Protection:   adhesive use limited   tubing/devices free from skin contact

## 2024-01-25 NOTE — PROGRESS NOTES
Infusion Procedure Note    Type: Allogeneic transplant  Diagnosis: high risk MDS   Indication for Infusion: Reconstitution of hematopoiesis (transplant graft)  Reviewed and Confirmed for the Infusion: Consent previously obtained  Donor: Unrelated  Product Characteristics, Cell Dose and Volume: as described on the infusion form (641960).  Patient was Premedicated as Ordered: Yes  Complications: See infusion form    I was present at the beginning of the infusion and available on the floor/unit/clinic through the entire infusion.    FERNANDO Oconnor

## 2024-01-25 NOTE — PLAN OF CARE
"BP (!) 141/75 (BP Location: Right arm)   Pulse 74   Temp 97.6  F (36.4  C) (Axillary)   Resp 16   Ht 1.725 m (5' 7.91\")   Wt 90.5 kg (199 lb 9.6 oz)   SpO2 99%   BMI 30.43 kg/m      HTN, within parameters. OVSS on RA. A&O x4. Up independently. Diarrhea x2 and reports a sore bottom, provided creams. Pt re-educated on saving urine, still did not save this shift. Received transplant, tolerated well. Refused afternoon orthostatics, states he will do them with 2000 vitals. Continue with POC    Problem: Adult Inpatient Plan of Care  Goal: Optimal Comfort and Wellbeing  Outcome: Progressing     Problem: Stem Cell/Bone Marrow Transplant  Goal: Optimal Coping with Transplant  Outcome: Progressing  Goal: Diarrhea Symptom Control  Outcome: Progressing     "

## 2024-01-25 NOTE — PLAN OF CARE
Goal Outcome Evaluation:  Hypertensive otherwise VSS. Afebrile. Alert & oriented x4. Adequate intake & output. Denies pain, nausea, vomiting, or diarrhea. CVC caps + dressing changed. Needs CHG wipes this evening. Plan for transplant around ~1300 tomorrow. Will continue with plan of care.    Goal: Absence of Hospital-Acquired Illness or Injury  Outcome: Progressing  Intervention: Identify and Manage Fall Risk  Recent Flowsheet Documentation  Taken 1/24/2024 1600 by Leonardo De La Rosa, RN  Safety Promotion/Fall Prevention:   assistive device/personal items within reach   clutter free environment maintained   increased rounding and observation   nonskid shoes/slippers when out of bed   patient and family education   room organization consistent   safety round/check completed  Taken 1/24/2024 1200 by Leonardo De La Rosa, RN  Safety Promotion/Fall Prevention:   assistive device/personal items within reach   clutter free environment maintained   increased rounding and observation   nonskid shoes/slippers when out of bed   patient and family education   room organization consistent   safety round/check completed  Taken 1/24/2024 0800 by Leonardo De La Rosa, RN  Safety Promotion/Fall Prevention:   activity supervised   assistive device/personal items within reach   clutter free environment maintained   increased rounding and observation   nonskid shoes/slippers when out of bed   patient and family education   room organization consistent   safety round/check completed  Intervention: Prevent Skin Injury  Recent Flowsheet Documentation  Taken 1/24/2024 0800 by Leonardo De La Rosa, RN  Body Position: position changed independently  Skin Protection:   adhesive use limited   transparent dressing maintained  Device Skin Pressure Protection:   adhesive use limited   tubing/devices free from skin contact  Intervention: Prevent and Manage VTE (Venous Thromboembolism) Risk  Recent Flowsheet Documentation  Taken 1/24/2024 0800 by Leonardo De La Rosa,  RN  VTE Prevention/Management: (Ambulation promoted) other (see comments)  Intervention: Prevent Infection  Recent Flowsheet Documentation  Taken 1/24/2024 1600 by Leonardo De La Rosa RN  Infection Prevention:   cohorting utilized   environmental surveillance performed   equipment surfaces disinfected   hand hygiene promoted   personal protective equipment utilized   rest/sleep promoted   single patient room provided   visitors restricted/screened  Taken 1/24/2024 1200 by Leonardo De La Rosa RN  Infection Prevention:   cohorting utilized   environmental surveillance performed   equipment surfaces disinfected   hand hygiene promoted   personal protective equipment utilized   rest/sleep promoted   single patient room provided   visitors restricted/screened  Taken 1/24/2024 0800 by Leonardo De La Rosa RN  Infection Prevention:   cohorting utilized   environmental surveillance performed   equipment surfaces disinfected   hand hygiene promoted   personal protective equipment utilized   rest/sleep promoted   single patient room provided   visitors restricted/screened  Goal: Optimal Comfort and Wellbeing  Outcome: Progressing  Goal: Readiness for Transition of Care  Outcome: Progressing     Problem: Stem Cell/Bone Marrow Transplant  Goal: Optimal Coping with Transplant  Outcome: Progressing  Intervention: Optimize Patient/Family Adjustment to Transplant  Recent Flowsheet Documentation  Taken 1/24/2024 0800 by Leonardo De La Rosa RN  Supportive Measures:   active listening utilized   decision-making supported   goal-setting facilitated   positive reinforcement provided   problem-solving facilitated   self-care encouraged   self-reflection promoted   self-responsibility promoted   verbalization of feelings encouraged  Goal: Symptom-Free Urinary Elimination  Outcome: Progressing  Intervention: Prevent or Manage Bladder Irritation  Recent Flowsheet Documentation  Taken 1/24/2024 0800 by Leonardo De La Rosa RN  Hyperhydration Management: fluids  provided  Goal: Diarrhea Symptom Control  Outcome: Progressing  Intervention: Manage Diarrhea  Recent Flowsheet Documentation  Taken 1/24/2024 0800 by Leonardo De La Rosa RN  Skin Protection:   adhesive use limited   transparent dressing maintained  Fluid/Electrolyte Management: fluids provided  Goal: Improved Activity Tolerance  Outcome: Progressing  Intervention: Promote Improved Energy  Recent Flowsheet Documentation  Taken 1/24/2024 0800 by Leonardo De La Rosa RN  Fatigue Management: frequent rest breaks encouraged  Activity Management:   activity adjusted per tolerance   activity encouraged  Environmental Support:   calm environment promoted   caregiver consistency promoted   distractions minimized   environmental consistency promoted   personal routine supported   rest periods encouraged  Goal: Blood Counts Within Acceptable Range  Outcome: Progressing  Intervention: Monitor and Manage Hematologic Symptoms  Recent Flowsheet Documentation  Taken 1/24/2024 1600 by Leonardo De La Rosa RN  Bleeding Precautions:   blood pressure closely monitored   coagulation study results reviewed   gentle oral care promoted   monitored for signs of bleeding  Medication Review/Management: medications reviewed  Taken 1/24/2024 1200 by Leonardo De La Rosa RN  Bleeding Precautions:   blood pressure closely monitored   coagulation study results reviewed   gentle oral care promoted   monitored for signs of bleeding  Medication Review/Management: medications reviewed  Taken 1/24/2024 0800 by Leonardo De La Rosa RN  Bleeding Precautions:   blood pressure closely monitored   coagulation study results reviewed   gentle oral care promoted   monitored for signs of bleeding  Medication Review/Management: medications reviewed  Goal: Absence of Hypersensitivity Reaction  Outcome: Progressing  Goal: Absence of Infection  Outcome: Progressing  Intervention: Prevent and Manage Infection  Recent Flowsheet Documentation  Taken 1/24/2024 1600 by Leonardo De La Rosa  RN  Infection Prevention:   cohorting utilized   environmental surveillance performed   equipment surfaces disinfected   hand hygiene promoted   personal protective equipment utilized   rest/sleep promoted   single patient room provided   visitors restricted/screened  Infection Management: aseptic technique maintained  Isolation Precautions:   contact precautions maintained   enteric precautions maintained   protective environment maintained  Taken 1/24/2024 1200 by Leonardo De La Rosa RN  Infection Prevention:   cohorting utilized   environmental surveillance performed   equipment surfaces disinfected   hand hygiene promoted   personal protective equipment utilized   rest/sleep promoted   single patient room provided   visitors restricted/screened  Infection Management: aseptic technique maintained  Isolation Precautions:   contact precautions maintained   enteric precautions maintained   protective environment maintained  Taken 1/24/2024 0800 by Leonardo De La Rosa RN  Infection Prevention:   cohorting utilized   environmental surveillance performed   equipment surfaces disinfected   hand hygiene promoted   personal protective equipment utilized   rest/sleep promoted   single patient room provided   visitors restricted/screened  Infection Management: aseptic technique maintained  Isolation Precautions:   contact precautions maintained   enteric precautions maintained   protective environment maintained  Goal: Improved Oral Mucous Membrane Health and Integrity  Outcome: Progressing  Goal: Nausea and Vomiting Symptom Relief  Outcome: Progressing  Intervention: Prevent and Manage Nausea and Vomiting  Recent Flowsheet Documentation  Taken 1/24/2024 0800 by Leonardo De La Rosa RN  Nausea/Vomiting Interventions: (Denies at this time) other (see comments)  Goal: Optimal Nutrition Intake  Outcome: Progressing  Intervention: Minimize and Manage Barriers to Oral Intake  Recent Flowsheet Documentation  Taken 1/24/2024 0800 by Rj  Leonardo PETERSON RN  Oral Nutrition Promotion: rest periods promoted

## 2024-01-25 NOTE — PROGRESS NOTES
Type of transplant: Donor: Allogeneic - Unrelated  Product:   BMT INFUSION DOCUMENTATION (last 48 hours)       BMT/Cellular Product Infusion       Row Name 01/25/24 1100                [REMOVED] Product 01/25/24 1145 HPC, Apheresis    Product Details Product Release Date: 01/25/24  -MS Product Release Time: 1145  -MS Product Type: HPC, Apheresis  -MS DIN: [id number]  -MS Product Description Code: [id number]  -MS Volume Dispensed (mL): 242 mL  -MS Completion Date (RN to complete): 01/25/24  -AG Completion Time (RN to complete): 1319  -AG    Checked by (Patient RN) Lalo Rene RN  -AG       Checked by (Witness) Lizzie Rehman RN  -EE       Product Volume Infused (mL) 242 mL  -AG       Flush Volume (mL) 50 mL  -AG       Volume Dispensed (mL) --                 User Key  (r) = Recorded By, (t) = Taken By, (c) = Cosigned By      Initials Name Effective Dates    AG Lalo Rene RN 01/08/24 -     MS Mili Burgos 01/08/24 -     EE Lizzie Rehman RN 01/08/19 -                   Preparation: RN Documentation  Patient was premedicated as ordered: yes  Line Type: central line, left  Patient Stable Prior to Infusion: yes  Time Infusion Started: 1304  Teaching: side effects/monitoring  Tolerated/Reaction: Patient tolerance of product infusion  Immediate suspected transfusion reaction to the product: none  Did patient have prior history of similar signs/symptoms during this hospitalization?: NA  Symptoms during/after infusion: other (comment) (None)  Did the patient tolerate the infusion well: yes  Medications and treatment for symptoms: NA  Did the symptoms resolve?:  (NA)  Enter comments if clots, leaks, broken bag, infusion delays, other issues with bag/infusion: NA

## 2024-01-26 ENCOUNTER — APPOINTMENT (OUTPATIENT)
Dept: OCCUPATIONAL THERAPY | Facility: CLINIC | Age: 49
DRG: 014 | End: 2024-01-26
Attending: INTERNAL MEDICINE
Payer: COMMERCIAL

## 2024-01-26 LAB
ANION GAP SERPL CALCULATED.3IONS-SCNC: 5 MMOL/L (ref 7–15)
BASOPHILS # BLD AUTO: 0 10E3/UL (ref 0–0.2)
BASOPHILS NFR BLD AUTO: 0 %
BUN SERPL-MCNC: 26.7 MG/DL (ref 6–20)
CALCIUM SERPL-MCNC: 8 MG/DL (ref 8.6–10)
CHLORIDE SERPL-SCNC: 111 MMOL/L (ref 98–107)
CMV DNA SPEC NAA+PROBE-ACNC: NOT DETECTED IU/ML
CREAT SERPL-MCNC: 0.94 MG/DL (ref 0.67–1.17)
DEPRECATED HCO3 PLAS-SCNC: 25 MMOL/L (ref 22–29)
EGFRCR SERPLBLD CKD-EPI 2021: >90 ML/MIN/1.73M2
EOSINOPHIL # BLD AUTO: 0.2 10E3/UL (ref 0–0.7)
EOSINOPHIL NFR BLD AUTO: 3 %
ERYTHROCYTE [DISTWIDTH] IN BLOOD BY AUTOMATED COUNT: 14.6 % (ref 10–15)
GLUCOSE SERPL-MCNC: 97 MG/DL (ref 70–99)
HCT VFR BLD AUTO: 24.4 % (ref 40–53)
HGB BLD-MCNC: 8.3 G/DL (ref 13.3–17.7)
IMM GRANULOCYTES # BLD: 0.2 10E3/UL
IMM GRANULOCYTES NFR BLD: 3 %
LYMPHOCYTES # BLD AUTO: 0.2 10E3/UL (ref 0.8–5.3)
LYMPHOCYTES NFR BLD AUTO: 4 %
MAGNESIUM SERPL-MCNC: 1.9 MG/DL (ref 1.7–2.3)
MCH RBC QN AUTO: 33.7 PG (ref 26.5–33)
MCHC RBC AUTO-ENTMCNC: 34 G/DL (ref 31.5–36.5)
MCV RBC AUTO: 99 FL (ref 78–100)
MONOCYTES # BLD AUTO: 0.1 10E3/UL (ref 0–1.3)
MONOCYTES NFR BLD AUTO: 2 %
NEUTROPHILS # BLD AUTO: 4.8 10E3/UL (ref 1.6–8.3)
NEUTROPHILS NFR BLD AUTO: 88 %
NRBC # BLD AUTO: 0 10E3/UL
NRBC BLD AUTO-RTO: 1 /100
PHOSPHATE SERPL-MCNC: 3.4 MG/DL (ref 2.5–4.5)
PLATELET # BLD AUTO: 192 10E3/UL (ref 150–450)
POTASSIUM SERPL-SCNC: 3.9 MMOL/L (ref 3.4–5.3)
RBC # BLD AUTO: 2.46 10E6/UL (ref 4.4–5.9)
SODIUM SERPL-SCNC: 141 MMOL/L (ref 135–145)
WBC # BLD AUTO: 5.4 10E3/UL (ref 4–11)

## 2024-01-26 PROCEDURE — 250N000013 HC RX MED GY IP 250 OP 250 PS 637: Performed by: PHYSICIAN ASSISTANT

## 2024-01-26 PROCEDURE — 84100 ASSAY OF PHOSPHORUS: CPT | Performed by: INTERNAL MEDICINE

## 2024-01-26 PROCEDURE — 250N000011 HC RX IP 250 OP 636: Performed by: PHYSICIAN ASSISTANT

## 2024-01-26 PROCEDURE — 250N000013 HC RX MED GY IP 250 OP 250 PS 637

## 2024-01-26 PROCEDURE — 85025 COMPLETE CBC W/AUTO DIFF WBC: CPT | Performed by: PHYSICIAN ASSISTANT

## 2024-01-26 PROCEDURE — 258N000003 HC RX IP 258 OP 636: Performed by: PHYSICIAN ASSISTANT

## 2024-01-26 PROCEDURE — 83735 ASSAY OF MAGNESIUM: CPT | Performed by: PHYSICIAN ASSISTANT

## 2024-01-26 PROCEDURE — 80048 BASIC METABOLIC PNL TOTAL CA: CPT | Performed by: PHYSICIAN ASSISTANT

## 2024-01-26 PROCEDURE — 250N000011 HC RX IP 250 OP 636: Mod: JZ | Performed by: PHYSICIAN ASSISTANT

## 2024-01-26 PROCEDURE — 97530 THERAPEUTIC ACTIVITIES: CPT | Mod: GO

## 2024-01-26 PROCEDURE — 99233 SBSQ HOSP IP/OBS HIGH 50: CPT | Mod: 24

## 2024-01-26 PROCEDURE — 206N000001 HC R&B BMT UMMC

## 2024-01-26 RX ORDER — TRAZODONE HYDROCHLORIDE 50 MG/1
100 TABLET, FILM COATED ORAL AT BEDTIME
Status: COMPLETED | OUTPATIENT
Start: 2024-01-26 | End: 2024-01-28

## 2024-01-26 RX ADMIN — ZOLPIDEM TARTRATE 5 MG: 5 TABLET, FILM COATED ORAL at 21:57

## 2024-01-26 RX ADMIN — MICAFUNGIN SODIUM 150 MG: 50 INJECTION, POWDER, LYOPHILIZED, FOR SOLUTION INTRAVENOUS at 09:06

## 2024-01-26 RX ADMIN — LISINOPRIL 40 MG: 10 TABLET ORAL at 09:08

## 2024-01-26 RX ADMIN — APIXABAN 5 MG: 5 TABLET, FILM COATED ORAL at 09:08

## 2024-01-26 RX ADMIN — VANCOMYCIN HYDROCHLORIDE 125 MG: 125 CAPSULE ORAL at 12:28

## 2024-01-26 RX ADMIN — Medication 2 SPRAY: at 16:57

## 2024-01-26 RX ADMIN — Medication 5 ML: at 10:18

## 2024-01-26 RX ADMIN — VANCOMYCIN HYDROCHLORIDE 125 MG: 125 CAPSULE ORAL at 19:52

## 2024-01-26 RX ADMIN — URSODIOL 300 MG: 300 CAPSULE ORAL at 09:08

## 2024-01-26 RX ADMIN — OLANZAPINE 5 MG: 2.5 TABLET, FILM COATED ORAL at 21:57

## 2024-01-26 RX ADMIN — PANTOPRAZOLE SODIUM 40 MG: 40 TABLET, DELAYED RELEASE ORAL at 09:08

## 2024-01-26 RX ADMIN — NIFEDIPINE 30 MG: 30 TABLET, FILM COATED, EXTENDED RELEASE ORAL at 09:08

## 2024-01-26 RX ADMIN — Medication 2 SPRAY: at 09:08

## 2024-01-26 RX ADMIN — ACYCLOVIR 800 MG: 800 TABLET ORAL at 19:53

## 2024-01-26 RX ADMIN — URSODIOL 300 MG: 300 CAPSULE ORAL at 14:29

## 2024-01-26 RX ADMIN — URSODIOL 300 MG: 300 CAPSULE ORAL at 19:52

## 2024-01-26 RX ADMIN — METOPROLOL SUCCINATE 100 MG: 50 TABLET, EXTENDED RELEASE ORAL at 09:08

## 2024-01-26 RX ADMIN — TRAZODONE HYDROCHLORIDE 100 MG: 50 TABLET ORAL at 21:57

## 2024-01-26 RX ADMIN — ACYCLOVIR 800 MG: 800 TABLET ORAL at 09:08

## 2024-01-26 RX ADMIN — VANCOMYCIN HYDROCHLORIDE 125 MG: 125 CAPSULE ORAL at 16:57

## 2024-01-26 RX ADMIN — PRASUGREL 10 MG: 10 TABLET, FILM COATED ORAL at 09:08

## 2024-01-26 RX ADMIN — NIFEDIPINE 30 MG: 30 TABLET, FILM COATED, EXTENDED RELEASE ORAL at 19:52

## 2024-01-26 RX ADMIN — Medication 2 SPRAY: at 12:30

## 2024-01-26 RX ADMIN — VANCOMYCIN HYDROCHLORIDE 125 MG: 125 CAPSULE ORAL at 09:08

## 2024-01-26 RX ADMIN — Medication 5 ML: at 02:52

## 2024-01-26 RX ADMIN — ZOLPIDEM TARTRATE 5 MG: 5 TABLET, FILM COATED ORAL at 02:40

## 2024-01-26 RX ADMIN — Medication 3 CAPSULE: at 09:08

## 2024-01-26 RX ADMIN — LEVOFLOXACIN 250 MG: 250 TABLET, FILM COATED ORAL at 09:08

## 2024-01-26 RX ADMIN — APIXABAN 5 MG: 5 TABLET, FILM COATED ORAL at 19:53

## 2024-01-26 ASSESSMENT — ACTIVITIES OF DAILY LIVING (ADL)
ADLS_ACUITY_SCORE: 20

## 2024-01-26 NOTE — PROGRESS NOTES
BMT CLINICAL SOCIAL WORK NOTE-MISSED VISIT:    Focus: Supportive Counseling/Resources/Discharge Planning    Data: Ziggy Hallman is a 48 year old year old male with a PMH of MDS, hx of multiple clots and MI undergoing a MA (Bu/Flu) prep for an 8/8 URD PBSCT currently day 1.    Interventions: Clinical  (CSW) attempted to meet with Pt to assess coping, provide supportive counseling and assist with resources as needed. However, pt asleep at time of visit.  Pt has been reporting a difficult time sleeping while admitted.  Therefore, CSW did not disturb Ziggy. CSW previously encouraged Pt to contact CSW for support, questions and/or resources.     Assessment:Pt continues to be supported by friends/family.     Plan: CSW will continue to provide supportive counseling and assistance with resources as needed. CSW will continue to collaborate with multidisciplinary team regarding Pt's plan of care.     RIVKA Grider, [facility]  Adult Blood & Marrow Transplant   Phone: [phone]  Pager: [phone]

## 2024-01-26 NOTE — PLAN OF CARE
Alert & oriented x 4. AVSS on room air. Hypertensive, within labetalol parameters. Denies pain, nausea, numbness and tingling. C/O of difficulty falling asleep. Ambien administered. Voiding appropriately. Saved urine overnight. No BM this shift. No replacements needed. Continue plan of care.    Problem: Stem Cell/Bone Marrow Transplant  Goal: Optimal Coping with Transplant  1/26/2024 0350 by Geni Hood RN  Outcome: Progressing  1/26/2024 0238 by Geni Hood RN  Outcome: Progressing  Intervention: Optimize Patient/Family Adjustment to Transplant  Recent Flowsheet Documentation  Taken 1/25/2024 2000 by Geni Hood RN  Supportive Measures:   active listening utilized   decision-making supported   goal-setting facilitated   positive reinforcement provided   problem-solving facilitated   self-care encouraged   self-reflection promoted   self-responsibility promoted   verbalization of feelings encouraged     Problem: Adult Inpatient Plan of Care  Goal: Absence of Hospital-Acquired Illness or Injury  Intervention: Identify and Manage Fall Risk  Recent Flowsheet Documentation  Taken 1/26/2024 0000 by Geni Hood RN  Safety Promotion/Fall Prevention: safety round/check completed  Taken 1/25/2024 2200 by Geni Hood RN  Safety Promotion/Fall Prevention: safety round/check completed  Taken 1/25/2024 2000 by Geni Hood RN  Safety Promotion/Fall Prevention:   safety round/check completed   nonskid shoes/slippers when out of bed   patient and family education   room near nurse's station   clutter free environment maintained  I

## 2024-01-26 NOTE — PROGRESS NOTES
BMT/Cellular Allogeneic Product Infusion       Patient Vitals for the past 24 hrs:   Temp Temp src Pulse Resp BP   01/25/24 1300 97  F (36.1  C) -- 83 18 (!) 158/83   01/25/24 1319 96.8  F (36  C) -- 77 16 136/80   01/25/24 1408 (!) 96.6  F (35.9  C) Axillary 90 18 (!) 150/96   01/25/24 1616 97.6  F (36.4  C) Axillary 74 16 (!) 141/75   01/25/24 2026 97.5  F (36.4  C) Oral 73 16 (!) 147/81   01/25/24 2029 -- -- -- -- (!) 147/81   01/25/24 2032 -- -- -- -- (!) 141/102   01/25/24 2304 97.5  F (36.4  C) Oral 71 20 (!) 163/93   01/26/24 0301 98.1  F (36.7  C) Oral 75 18 (!) 156/81   01/26/24 0750 (!) 96.6  F (35.9  C) Oral 77 18 --   01/26/24 1101 97.6  F (36.4  C) Oral 77 16 (!) 144/85      BMT INFUSION DOCUMENTATION (last 48 hours)       BMT/Cellular Product Infusion       Row Name 01/25/24 1100                [REMOVED] Product 01/25/24 1145 HPC, Apheresis    Product Details Product Release Date: 01/25/24  -MS Product Release Time: 1145  -MS Product Type: HPC, Apheresis  -MS DIN: [id number]  -MS Product Description Code: [id number]  -MS Volume Dispensed (mL): 242 mL  -MS Completion Date (RN to complete): 01/25/24  -AG Completion Time (RN to complete): 1319  -AG    Checked by (Patient RN) Lalo Rene RN  -AG       Checked by (Witness) Lizzie Rehman RN  -EE       Product Volume Infused (mL) 242 mL  -AG       Flush Volume (mL) 50 mL  -AG       Volume Dispensed (mL) --                 User Key  (r) = Recorded By, (t) = Taken By, (c) = Cosigned By      Initials Name Effective Dates    Lalo Escobar RN 01/08/24 -     Mili Benitez 01/08/24 -     Lizzie Florez RN 01/08/19 -                   Allogeneic Donor Eligibility Determination and Summary of Records: Eligible        Type of Infusion: Allogeneic      Baseline Pre-Infusion Evaluation (to be completed by Provider):   Dyspnea: Grade 0 - none  Hypoxia: Grade 0 - not present  Fever: Grade 0 - afebrile  Chills: Grade 0 - none  Febrile Neutropenia:  Grade 0 - not present  Sinus Bradycardia: Grade 0 - none  Hypertension: Grade 3 - stage 2 hypertension (systolic BP >/ 160 mm Hg or diastolic BP >/ 100 mm Hg); medical intervention indicated; more than one drug or more intensive therapy than previously used indicated  Hypotension: Grade 0 - none  Chest Pain: Grade 0 - none  Bronchospasm: Grade 0 - none  Pain: Grade 0 - none  Rash: Grade 0 - None  Neurologic Specify: none    If adverse reactions, events or complications occur (fever greater than 2 degrees fahrenheit increase, and severe reactions of the following types: chills, dyspnea, bronchospasm, hyper/hypotension, hypoxia, bradycardia, chest pain, back/flank pain, hypoxia, and any other reaction deemed severe or life threatening; any instance of product bag breakage or unusual product appearance)    Any other events that are >= grade 3, then immediately contact the BMT Attending physician, the Cell Therapy Laboratory Medical Director (pager [phone]) and the Cell Therapy Laboratory ([phone]).  After midnight, holidays & weekends contact the [facility] on the appropriate campus ([facility] [place]: [phone]; [facility] [place]: [phone]).    Toyin Herrmann PA-C

## 2024-01-26 NOTE — PROGRESS NOTES
BMT Post Infusion Documentation    Data   Patient Vitals for the past 72 hrs:   Temp Temp src Pulse Resp BP   01/23/24 1149 97.5  F (36.4  C) Axillary 67 16 (!) 153/88   01/23/24 1552 96.9  F (36.1  C) Axillary 74 16 (!) 162/94   01/23/24 2007 98  F (36.7  C) Oral 69 16 (!) (P) 160/88   01/23/24 2330 98  F (36.7  C) Oral 70 16 (!) 153/84   01/24/24 0420 98  F (36.7  C) Oral -- 16 --   01/24/24 0823 97.7  F (36.5  C) Oral 71 16 --   01/24/24 1134 97.7  F (36.5  C) Oral 69 16 (!) 159/100   01/24/24 1136 -- -- -- -- (!) 169/108   01/24/24 1346 -- -- 70 -- (!) 158/98   01/24/24 1603 97.7  F (36.5  C) Oral 75 16 (!) 154/101   01/24/24 2034 97.8  F (36.6  C) Oral 88 16 (!) 151/51   01/24/24 2059 -- -- -- -- (!) 144/95   01/24/24 2339 97.6  F (36.4  C) Oral 82 -- (!) 148/89   01/25/24 0329 97.8  F (36.6  C) Oral 79 20 (!) 137/92   01/25/24 0813 (!) 96.4  F (35.8  C) Oral 76 -- (!) 143/80   01/25/24 0817 -- -- -- -- (!) 153/90   01/25/24 1300 97  F (36.1  C) -- 83 18 (!) 158/83   01/25/24 1319 96.8  F (36  C) -- 77 16 136/80   01/25/24 1408 (!) 96.6  F (35.9  C) Axillary 90 18 (!) 150/96   01/25/24 1616 97.6  F (36.4  C) Axillary 74 16 (!) 141/75   01/25/24 2026 97.5  F (36.4  C) Oral 73 16 (!) 147/81   01/25/24 2029 -- -- -- -- (!) 147/81   01/25/24 2032 -- -- -- -- (!) 141/102   01/25/24 2304 97.5  F (36.4  C) Oral 71 20 (!) 163/93   01/26/24 0301 98.1  F (36.7  C) Oral 75 18 (!) 156/81   01/26/24 0750 (!) 96.6  F (35.9  C) Oral 77 18 --   01/26/24 1101 97.6  F (36.4  C) Oral 77 16 (!) 144/85     BMT INFUSION DOCUMENTATION (last 24 hours)       BMT/Cellular Product Infusion       Row Name                  Cell Therapy Documentation    Product Release Date --       Recipient Study ID --       Donor --       Donor MRN/ID --       Donor ABO/Rh --       Allogeneic Donor Eligibility Determination and Summary of Records --       Type of Infusion --       Total Volume Dispensed (mL) --       Total NC Dose --       Total CD34  Dose --       Total CD3 dose --       Total NC Dose Left in Storage --       Comments for Product Issues --       Donor ABO/Rh --       Product Types --       Product Numbers --       Product Types and Numbers --       Volume --       ABO Mismatch --       ZZTotal NC Dose --       ZZTotal CD34 Dose --       ZZTotal NC Dose Left in Storage --          [REMOVED] Product 01/25/24 1145 HPC, Apheresis    Product Details Product Release Date: 01/25/24  -MS Product Release Time: 1145  -MS Product Type: HPC, Apheresis  -MS DIN: [id number]  -MS Product Description Code: [id number]  -MS Volume Dispensed (mL): 242 mL  -MS Completion Date (RN to complete): 01/25/24  -AG Completion Time (RN to complete): 1319  -AG    Checked by (Patient RN) --       Checked by (Witness) --       Product Volume Infused (mL) --       Flush Volume (mL) --       Volume Dispensed (mL) --          RN Documentation    Patient was premedicated as ordered --       Line Type --       Patient Stable Prior to Infusion --       Time Infusion Started --       Checked by (Patient RN) --       Checked by (RN 2) --       Broken Bag? --       Immediate suspected transfusion reaction to the product --       Time Infusion Stopped --       Total Flush Volume (mL) --       Checked by (Witness) --       Date Infusion Started --       Date Infusion Stopped --       Volume Infused (mL) --       Total Volume Infused (cc) --          Patient tolerance of product infusion    Immediate suspected transfusion reaction to the product --       Did patient have prior history of similar signs/symptoms during this hospitalization? --       Symptoms during/after infusion --       Did the patient tolerate the infusion well --       Medications and treatment for symptoms --       Did the symptoms resolve? --       Enter comments if clots, leaks, broken bag, infusion delays, other issues with bag/infusion --       Describe symptoms --                 User Key  (r) = Recorded By,  (t) = Taken By, (c) = Cosigned By      Initials Name Effective Dates    AG Lalo Rene RN 01/08/24 -     Mili Benitez 01/08/24 -                       Post-Infusion Evaluation:   Infusion Related Reaction: Grade 0 - none  Dyspnea: Grade 0 - none  Hypoxia: Grade 0 - not present  Fever: Grade 0 - afebrile  Chills: Grade 0 - none  Febrile Neutropenia: Grade 0 - not present  Sinus Bradycardia: Grade 0 - none  Hypertension: Grade 3 - stage 2 hypertension (systolic BP >/ 160 mm Hg or diastolic BP >/ 100 mm Hg); medical intervention indicated; more than one drug or more intensive therapy than previously used indicated  Hypotension: Grade 0 - none  Chest Pain: Grade 0 - none  Bronchospasm: Grade 0 - none  Pain: Grade 0 - none  Rash: Grade 0 - None  Neurologic Specify: none    Toyin Herrmann PA-C

## 2024-01-26 NOTE — PROGRESS NOTES
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 No belly pain. Eating well, no N/V. Still not sleeping, he hasn't noticed any changes with adjustments of medications. Not falling asleep until like 4a most nights. BP improved.     Review of Systems    Review of Systems: 10 point ROS negative unless stated in HPI   PHYSICAL EXAM      Weight     Wt Readings from Last 3 Encounters:   01/26/24 90.9 kg (200 lb 8 oz)   01/17/24 90.1 kg (198 lb 9.6 oz)   01/12/24 88.9 kg (196 lb)        KPS: 70    BP (!) 156/81 (BP Location: Right arm)   Pulse 77   Temp (!) 96.6  F (35.9  C) (Oral)   Resp 18   Ht 1.725 m (5' 7.91\")   Wt 90.9 kg (200 lb 8 oz)   SpO2 97%   BMI 30.56 kg/m       General: NAD   Eyes: GARY, sclera anicteric   Lungs: CTA bilaterally  Cardiovascular: RRR, no M/R/G   Lymphatics: No edema  Neuro: A&O   Additional Findings: Powell site NT, no drainage.    Current aGVHD staging:  Skin 0, UGI 0, LGI 0, Liver 0 (keep in note through day +180 for allos)      LABS AND IMAGING: I have assessed all abnormal lab values for their clinical significance and any values considered clinically significant have been addressed in the assessment and plan.        Lab Results   Component Value Date    WBC 5.4 01/26/2024    ANEUTAUTO 4.8 01/26/2024    HGB 8.3 (L) 01/26/2024    HCT 24.4 (L) 01/26/2024     01/26/2024     01/26/2024    POTASSIUM 3.9 01/26/2024    CHLORIDE 111 (H) 01/26/2024    CO2 25 01/26/2024    GLC 97 01/26/2024    BUN 26.7 (H) " 01/26/2024    CR 0.94 01/26/2024    MAG 1.9 01/26/2024    INR 1.09 01/22/2024         SYSTEMS-BASED ASSESSMENT AND PLAN     Ziggy Hallman is a 48 year old year old male with a PMH of MDS, hx of multiple clots and MI undergoing a MA (Bu/Flu) prep for an 8/8 URD PBSCT day 1    BMT/IEC PROTOCOL for 2015-29  Chemo Prep:   Day -6: Admit  Day -5 through Day -2: Busulfan/Fludarabine  Day -1: Rest.   Keppra prophylaxis   Avoiding Tylenol for 72 hours befre and after last dose of Busulfan due to drug interaction. Ok to start day +2.     8/8 DP permissive. Cell dose pending  ABO: Donor: O+ Recipient A-  (Minor incompatability, no flush required)   GSCF plan: GCSF to start day +5 until ANC >1500 for 3 consecutive days    HEME/COAG  #Risk of Pancytopenia 2/2 chemo- anemia  - Transfusion parameters: hemoglobin <7, platelets <10  #Recurrent arterial thromboembolic events:  He has long history of various events including renal infarcts (2011, 2013, 2015), left popliteal embolic episode requiring surgery, anticoagulation (2011), right carotid artery embolic episode with TIA/stroke-like symptoms (2012), embolic MI (2015), gangrenous right toe requiring vascular surgery/amputation (2017), 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   Extensive hypercoagulable workup to date has been unrevealing.  JAK2 testing negative.  PNH testing negative.  Elevated IgA of unknown significance, no evidence of plasma cell disorder.  - On Eliquis and Prasugrel, continue until platelets <50k. Hematology recommending to continue both medications until platelets are less than or equal to 50k, then transition to ppx lovenox dosing until platelets less than or equal to 25k and then STOP. Restart regimen once engrafted and platelets stable.     IMMUNOCOMPROMISED  C. Diff - Admit c. Diff triple+ - Start PO vanc 1/21 for 14 day course.   -Prophylaxis plan: ACV LD, Megan HD, Levaquin, Bactrim +28    RISK OF  GVHD  - Prophylaxis: PtC day +3, +4, , Siro/MMF to start day +5     CARDIOVASCULAR  #CAD  #Hx of CABG  #HTN   -PTA metoprolol, Lisinopril dose increased 2/2 HTN, Amlodipine 5mg BID switch to nifidepine ER 30mg BID, labetolol PRN;   #Hyperlipidemia: Holding atorvastatin peritransplant  - Risk of cardiomyopathy:  Baseline EF 50-55%, Global left ventricular function mildly reduced. Grade 1 or early diastolic dysfunction.   - Risk of arrhythmia: Baseline EKG showed NSR, Qtc -425    GI/NUTRITION  - Ulcer prophylaxis: Protonix   - VOD Prophylaxis: Ursodiol  - Risk of nausea/vomiting due to chemo: Ativan, Compazine, Zofran   - Risk of malnutrition: Nutrition to follow.     RENAL/ELECTROLYTES/  #Proteinuria- possibly exacerbated by persistent HTN (See CV),   #BUN/Cr elevation- FENA, osms, Na+, UA work up indicate prerenal etiology, likely intravascular depletion 2/2 osmotic diuresis with proteinuria. Will encourage PO fluid intake as Cr improved 1/23, IVF boluses as necessary, control HTN.  - Hypocalcemia in setting hypoalbuminemia, iCa2+, corrected WNL   - Electrolyte management: replace per sliding scale    Psych:    #Anxiety: admits to feeling anxious.  Start zyprexa 5mg HS.  Connect w/ SW.  Psychaitry consult added hydroxyzine.  #Insomnia- PTA ambien, Trazadone added. Has not experienced relief with either.     SOCIAL DETERMINANTS  - Caregiver: Family   - Financial/insurance concerns: See SW notes       Known issues that I take into account for medical decisions, with salient changes to the plan considering these complexities noted above.    Patient Active Problem List   Diagnosis    Amegakaryocytic thrombocytopenia (H)    Anemia due to bone marrow failure, unspecified bone marrow failure type (H)    Aplastic anemia (H)     Clinically Significant Risk Factors              # Hypoalbuminemia: Lowest albumin = 2 g/dL at 1/22/2024  4:12 AM, will monitor as appropriate              # Obesity: Estimated body mass index is  "30.56 kg/m  as calculated from the following:    Height as of this encounter: 1.725 m (5' 7.91\").    Weight as of this encounter: 90.9 kg (200 lb 8 oz).                 Dispo: Remain admitted through engraftment    I spent 30 minutes in the care of this patient today, which included time necessary for preparation for the visit, obtaining history, ordering medications/tests/procedures as medically indicated, review of pertinent medical literature, counseling of the patient, communication of recommendations to the care team, and documentation time.    Toyin Herrmann PA-C  [phone]    ______________________________________________    Physician Attestation     I saw and evaluated Ziggy Hallman as part of a shared APRN/PA visit. I personally performed the substantive portion of the medical decision making for this visit - please see the [facility] s documentation for full details. Key management decisions made by me and carried out under my direction as below.     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 Admitted on 1/19/2024 for allogenic stem cell transplant: myeloablative conditioning with fludarabine/ busulfan, 8/8 matched unrelated donor, peripheral blood stem cell graft, GVHD ppx with PTCy/ MMF/ sirolimus, minor ABO mismatch, donor O+, recipient A+ , and CMV donor and recipient negative. 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     Very somnolent on my exam today. Unable to fall asleep till the early hours of the morning. Had diarrhea yesterday but none since this morning.     BMT: D1   Heme: anticoagulation plan as detailed above  ID: C.diff positive on admission, PO vanc started on 1/21 to continue for 14 days  HTN: Continue lisinopril, metoprolol and nifedipine. Reported some orthostatic symptoms today.  Psych: anxiety and insomnia. Declines health psychology, says that he has social support. Psychiatry consulted, recommend " hydroxyzine PRN. Continue Ambien PRN for insomnia. Increase trazodone to 100mg at bedtime PRN.   GI: monitor regimen related toxicity.  Renal/FEN: Atrophic right kidney and proteinuria, likely due to renal infarcts.  PPx: fabi, acyclovir, levofloxacin  Supportive care: encourage ambulation, PT/OT, optimize nutrition.    On the date of service, 01/26/2024, I spent 30 minutes on the patient unit personally reviewing medical records and medications, reviewing vital signs, labs, and imaging results as summarized above, discussing the patient's case on rounds with the [facility], obtaining a history from the patient, performing a physical exam, intensively monitoring treatments with high risk of toxicity, coordinating care, and documenting in the electronic medical record.    Capri Dawson  Department of Hematology, Oncology and Transplantation  [facility] Pager 1300/Text via Deepak

## 2024-01-27 LAB
ABO/RH(D): NORMAL
ALBUMIN SERPL BCG-MCNC: 2.1 G/DL (ref 3.5–5.2)
ANION GAP SERPL CALCULATED.3IONS-SCNC: 5 MMOL/L (ref 7–15)
ANTIBODY SCREEN: NEGATIVE
BASOPHILS # BLD AUTO: 0 10E3/UL (ref 0–0.2)
BASOPHILS NFR BLD AUTO: 0 %
BUN SERPL-MCNC: 21.8 MG/DL (ref 6–20)
CALCIUM SERPL-MCNC: 7.7 MG/DL (ref 8.6–10)
CHLORIDE SERPL-SCNC: 110 MMOL/L (ref 98–107)
CREAT SERPL-MCNC: 0.83 MG/DL (ref 0.67–1.17)
DEPRECATED HCO3 PLAS-SCNC: 26 MMOL/L (ref 22–29)
EGFRCR SERPLBLD CKD-EPI 2021: >90 ML/MIN/1.73M2
EOSINOPHIL # BLD AUTO: 0.2 10E3/UL (ref 0–0.7)
EOSINOPHIL NFR BLD AUTO: 5 %
ERYTHROCYTE [DISTWIDTH] IN BLOOD BY AUTOMATED COUNT: 14.4 % (ref 10–15)
GLUCOSE SERPL-MCNC: 92 MG/DL (ref 70–99)
HCT VFR BLD AUTO: 22.5 % (ref 40–53)
HGB BLD-MCNC: 7.6 G/DL (ref 13.3–17.7)
IMM GRANULOCYTES # BLD: 0.1 10E3/UL
IMM GRANULOCYTES NFR BLD: 2 %
LYMPHOCYTES # BLD AUTO: 0.3 10E3/UL (ref 0.8–5.3)
LYMPHOCYTES NFR BLD AUTO: 9 %
MAGNESIUM SERPL-MCNC: 1.8 MG/DL (ref 1.7–2.3)
MCH RBC QN AUTO: 32.8 PG (ref 26.5–33)
MCHC RBC AUTO-ENTMCNC: 33.8 G/DL (ref 31.5–36.5)
MCV RBC AUTO: 97 FL (ref 78–100)
MONOCYTES # BLD AUTO: 0.1 10E3/UL (ref 0–1.3)
MONOCYTES NFR BLD AUTO: 2 %
NEUTROPHILS # BLD AUTO: 2.7 10E3/UL (ref 1.6–8.3)
NEUTROPHILS NFR BLD AUTO: 82 %
NRBC # BLD AUTO: 0 10E3/UL
NRBC BLD AUTO-RTO: 1 /100
PHOSPHATE SERPL-MCNC: 4.4 MG/DL (ref 2.5–4.5)
PLATELET # BLD AUTO: 178 10E3/UL (ref 150–450)
POTASSIUM SERPL-SCNC: 3.5 MMOL/L (ref 3.4–5.3)
RBC # BLD AUTO: 2.32 10E6/UL (ref 4.4–5.9)
SODIUM SERPL-SCNC: 141 MMOL/L (ref 135–145)
SPECIMEN EXPIRATION DATE: NORMAL
WBC # BLD AUTO: 3.3 10E3/UL (ref 4–11)

## 2024-01-27 PROCEDURE — 86900 BLOOD TYPING SEROLOGIC ABO: CPT | Performed by: PHYSICIAN ASSISTANT

## 2024-01-27 PROCEDURE — 250N000011 HC RX IP 250 OP 636: Performed by: PHYSICIAN ASSISTANT

## 2024-01-27 PROCEDURE — 250N000013 HC RX MED GY IP 250 OP 250 PS 637: Performed by: PHYSICIAN ASSISTANT

## 2024-01-27 PROCEDURE — 80048 BASIC METABOLIC PNL TOTAL CA: CPT | Performed by: PHYSICIAN ASSISTANT

## 2024-01-27 PROCEDURE — 83735 ASSAY OF MAGNESIUM: CPT | Performed by: STUDENT IN AN ORGANIZED HEALTH CARE EDUCATION/TRAINING PROGRAM

## 2024-01-27 PROCEDURE — 99233 SBSQ HOSP IP/OBS HIGH 50: CPT | Mod: 24

## 2024-01-27 PROCEDURE — 250N000013 HC RX MED GY IP 250 OP 250 PS 637

## 2024-01-27 PROCEDURE — 258N000003 HC RX IP 258 OP 636: Performed by: PHYSICIAN ASSISTANT

## 2024-01-27 PROCEDURE — 84100 ASSAY OF PHOSPHORUS: CPT | Performed by: STUDENT IN AN ORGANIZED HEALTH CARE EDUCATION/TRAINING PROGRAM

## 2024-01-27 PROCEDURE — 86923 COMPATIBILITY TEST ELECTRIC: CPT | Performed by: PHYSICIAN ASSISTANT

## 2024-01-27 PROCEDURE — 85025 COMPLETE CBC W/AUTO DIFF WBC: CPT | Performed by: PHYSICIAN ASSISTANT

## 2024-01-27 PROCEDURE — 206N000001 HC R&B BMT UMMC

## 2024-01-27 RX ADMIN — Medication 5 ML: at 09:26

## 2024-01-27 RX ADMIN — VANCOMYCIN HYDROCHLORIDE 125 MG: 125 CAPSULE ORAL at 12:12

## 2024-01-27 RX ADMIN — PRASUGREL 10 MG: 10 TABLET, FILM COATED ORAL at 09:29

## 2024-01-27 RX ADMIN — Medication 2 SPRAY: at 12:23

## 2024-01-27 RX ADMIN — Medication 2 SPRAY: at 09:31

## 2024-01-27 RX ADMIN — LEVOFLOXACIN 250 MG: 250 TABLET, FILM COATED ORAL at 12:12

## 2024-01-27 RX ADMIN — APIXABAN 5 MG: 5 TABLET, FILM COATED ORAL at 09:28

## 2024-01-27 RX ADMIN — TRAZODONE HYDROCHLORIDE 100 MG: 50 TABLET ORAL at 21:55

## 2024-01-27 RX ADMIN — ACYCLOVIR 800 MG: 800 TABLET ORAL at 09:28

## 2024-01-27 RX ADMIN — LISINOPRIL 40 MG: 10 TABLET ORAL at 09:28

## 2024-01-27 RX ADMIN — DEXTROSE AND SODIUM CHLORIDE 1000 ML: 5; 450 INJECTION, SOLUTION INTRAVENOUS at 21:54

## 2024-01-27 RX ADMIN — NIFEDIPINE 30 MG: 30 TABLET, FILM COATED, EXTENDED RELEASE ORAL at 09:28

## 2024-01-27 RX ADMIN — URSODIOL 300 MG: 300 CAPSULE ORAL at 14:20

## 2024-01-27 RX ADMIN — URSODIOL 300 MG: 300 CAPSULE ORAL at 19:59

## 2024-01-27 RX ADMIN — ACYCLOVIR 800 MG: 800 TABLET ORAL at 19:59

## 2024-01-27 RX ADMIN — PROCHLORPERAZINE MALEATE 5 MG: 5 TABLET ORAL at 12:24

## 2024-01-27 RX ADMIN — OLANZAPINE 5 MG: 2.5 TABLET, FILM COATED ORAL at 21:55

## 2024-01-27 RX ADMIN — PANTOPRAZOLE SODIUM 40 MG: 40 TABLET, DELAYED RELEASE ORAL at 09:28

## 2024-01-27 RX ADMIN — APIXABAN 5 MG: 5 TABLET, FILM COATED ORAL at 19:59

## 2024-01-27 RX ADMIN — VANCOMYCIN HYDROCHLORIDE 125 MG: 125 CAPSULE ORAL at 09:28

## 2024-01-27 RX ADMIN — Medication 5 ML: at 03:49

## 2024-01-27 RX ADMIN — VANCOMYCIN HYDROCHLORIDE 125 MG: 125 CAPSULE ORAL at 16:39

## 2024-01-27 RX ADMIN — NIFEDIPINE 30 MG: 30 TABLET, FILM COATED, EXTENDED RELEASE ORAL at 19:59

## 2024-01-27 RX ADMIN — METOPROLOL SUCCINATE 100 MG: 50 TABLET, EXTENDED RELEASE ORAL at 09:28

## 2024-01-27 RX ADMIN — Medication 3 CAPSULE: at 09:29

## 2024-01-27 RX ADMIN — MICAFUNGIN SODIUM 150 MG: 50 INJECTION, POWDER, LYOPHILIZED, FOR SOLUTION INTRAVENOUS at 08:16

## 2024-01-27 RX ADMIN — ZOLPIDEM TARTRATE 5 MG: 5 TABLET, FILM COATED ORAL at 21:55

## 2024-01-27 RX ADMIN — VANCOMYCIN HYDROCHLORIDE 125 MG: 125 CAPSULE ORAL at 19:59

## 2024-01-27 RX ADMIN — LORAZEPAM 0.5 MG: 0.5 TABLET ORAL at 16:39

## 2024-01-27 RX ADMIN — URSODIOL 300 MG: 300 CAPSULE ORAL at 09:29

## 2024-01-27 ASSESSMENT — ACTIVITIES OF DAILY LIVING (ADL)
ADLS_ACUITY_SCORE: 20

## 2024-01-27 NOTE — PLAN OF CARE
"/75 (BP Location: Right arm, Cuff Size: Adult Regular)   Pulse 80   Temp 97.3  F (36.3  C) (Oral)   Resp 16   Ht 1.725 m (5' 7.91\")   Wt 90.9 kg (200 lb 8 oz)   SpO2 99%   BMI 30.56 kg/m   . Central line dressing is C/D/I and HL. Patient is A & O x 4. No c/o pain or nausea or emesis this shift. Patient had orthostatic BP done x 2, at 1434 pt c/o dizziness while he was getting into the shower. Patient sat down, drink some water and it resolved and orthostatic BP recheck at 8 pm night. Patient provided education relates to dizziness or lightheadedness and how to preventive fall/injury. Patient is eating and drinking good. Patient had bowel movement x 1, soft, brown. Patient walked around the hallway with PT without issues. Continue with plan of care and notify MD for status changes.     Problem: Adult Inpatient Plan of Care  Goal: Plan of Care Review  Description: The Plan of Care Review/Shift note should be completed every shift.  The Outcome Evaluation is a brief statement about your assessment that the patient is improving, declining, or no change.  This information will be displayed automatically on your shift  note.  Outcome: Progressing  Flowsheets (Taken 1/26/2024 1807)  Plan of Care Reviewed With:   patient   caregiver  Overall Patient Progress: no change     Problem: Adult Inpatient Plan of Care  Goal: Absence of Hospital-Acquired Illness or Injury  Intervention: Identify and Manage Fall Risk  Recent Flowsheet Documentation  Taken 1/26/2024 0905 by Yvonne Lr RN  Safety Promotion/Fall Prevention:   increased rounding and observation   increase visualization of patient   lighting adjusted   clutter free environment maintained   mobility aid in reach   nonskid shoes/slippers when out of bed   patient and family education     Problem: Adult Inpatient Plan of Care  Goal: Absence of Hospital-Acquired Illness or Injury  Intervention: Prevent Skin Injury  Recent Flowsheet " Documentation  Taken 1/26/2024 0905 by Yvonne Lr RN  Body Position: position changed independently  Skin Protection: adhesive use limited  Device Skin Pressure Protection: adhesive use limited     Problem: Adult Inpatient Plan of Care  Goal: Absence of Hospital-Acquired Illness or Injury  Intervention: Prevent Infection  Recent Flowsheet Documentation  Taken 1/26/2024 0905 by Yvonne Lr RN  Infection Prevention:   cohorting utilized   environmental surveillance performed   equipment surfaces disinfected   hand hygiene promoted   personal protective equipment utilized     Problem: Stem Cell/Bone Marrow Transplant  Goal: Optimal Coping with Transplant  Intervention: Optimize Patient/Family Adjustment to Transplant  Recent Flowsheet Documentation  Taken 1/26/2024 0905 by Yvonne Lr RN  Supportive Measures: active listening utilized     Problem: Stem Cell/Bone Marrow Transplant  Goal: Improved Activity Tolerance  Intervention: Promote Improved Energy  Recent Flowsheet Documentation  Taken 1/26/2024 0905 by Yvonne Lr RN  Fatigue Management:   activity schedule adjusted   activity assistance provided   fatigue-related activity identified   frequent rest breaks encouraged   paced activity encouraged  Sleep/Rest Enhancement: relaxation techniques promoted  Activity Management:   activity adjusted per tolerance   activity encouraged  Environmental Support:   calm environment promoted   caregiver consistency promoted   comfort object encouraged   environmental consistency promoted   personal routine supported     Problem: Stem Cell/Bone Marrow Transplant  Goal: Blood Counts Within Acceptable Range  Intervention: Monitor and Manage Hematologic Symptoms  Recent Flowsheet Documentation  Taken 1/26/2024 0905 by Yvonne Lr RN  Sleep/Rest Enhancement: relaxation techniques promoted  Bleeding Precautions:   blood pressure closely monitored   gentle oral care  promoted   monitored for signs of bleeding  Medication Review/Management: medications reviewed     Problem: Stem Cell/Bone Marrow Transplant  Goal: Absence of Infection  Intervention: Prevent and Manage Infection  Recent Flowsheet Documentation  Taken 1/26/2024 0905 by Yvonne Lr RN  Infection Prevention:   cohorting utilized   environmental surveillance performed   equipment surfaces disinfected   hand hygiene promoted   personal protective equipment utilized  Infection Management: aseptic technique maintained  Isolation Precautions: enteric precautions maintained     Problem: Stem Cell/Bone Marrow Transplant  Goal: Improved Oral Mucous Membrane Health and Integrity  Intervention: Promote Oral Comfort and Health  Recent Flowsheet Documentation  Taken 1/26/2024 0905 by Yvonne Lr RN  Oral Mucous Membrane Protection:   lip/mouth moisturizer applied   nonirritating oral fluids promoted  Oral Care:   lip/mouth moisturizer applied   mouth wash rinse   oral rinse provided   teeth brushed   tongue brushed    Goal Outcome Evaluation:      Plan of Care Reviewed With: patient, caregiver    Overall Patient Progress: no changeOverall Patient Progress: no change

## 2024-01-27 NOTE — PROGRESS NOTES
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 Only 1 stool yesterday, no pain. Eating ok. No N/V.     Review of Systems    Review of Systems: 10 point ROS negative unless stated in HPI   PHYSICAL EXAM      Weight     Wt Readings from Last 3 Encounters:   01/27/24 88.2 kg (194 lb 8 oz)   01/17/24 90.1 kg (198 lb 9.6 oz)   01/12/24 88.9 kg (196 lb)        KPS: 70    /74 (BP Location: Right arm)   Pulse 90   Temp 98.7  F (37.1  C) (Oral)   Resp 16   Ht 1.725 m (5' 7.91\")   Wt 88.2 kg (194 lb 8 oz)   SpO2 97%   BMI 29.65 kg/m       General: NAD   Eyes: GARY, sclera anicteric   Lungs: CTA bilaterally  Cardiovascular: RRR, no M/R/G   Lymphatics: No edema  Neuro: A&O   Additional Findings: Powell site NT, no drainage.    Current aGVHD staging:  Skin 0, UGI 0, LGI 0, Liver 0 (keep in note through day +180 for allos)      LABS AND IMAGING: I have assessed all abnormal lab values for their clinical significance and any values considered clinically significant have been addressed in the assessment and plan.        Lab Results   Component Value Date    WBC 3.3 (L) 01/27/2024    ANEUTAUTO 2.7 01/27/2024    HGB 7.6 (L) 01/27/2024    HCT 22.5 (L) 01/27/2024     01/27/2024     01/27/2024    POTASSIUM 3.5 01/27/2024    CHLORIDE 110 (H) 01/27/2024    CO2 26 01/27/2024    GLC 92 01/27/2024    BUN 21.8 (H) 01/27/2024    CR 0.83 01/27/2024    MAG 1.8 01/27/2024    INR 1.09 01/22/2024         SYSTEMS-BASED ASSESSMENT AND PLAN     Ziggy Hallman is a 48 " year old year old male with a PMH of MDS, hx of multiple clots and MI undergoing a MA (Bu/Flu) prep for an 8/8 URD PBSCT day 2    BMT/IEC PROTOCOL for 2015-29  Chemo Prep:   Day -6: Admit  Day -5 through Day -2: Busulfan/Fludarabine  Day -1: Rest.   Keppra prophylaxis   Avoiding Tylenol for 72 hours befre and after last dose of Busulfan due to drug interaction. Ok to start day +2.     8/8 DP permissive. Cell dose pending  ABO: Donor: O+ Recipient A-  (Minor incompatability, no flush required)   GSCF plan: GCSF to start day +5 until ANC >1500 for 3 consecutive days    HEME/COAG  #Risk of Pancytopenia 2/2 chemo- anemia  - Transfusion parameters: hemoglobin <7, platelets <10  #Recurrent arterial thromboembolic events:  He has long history of various events including renal infarcts (2011, 2013, 2015), left popliteal embolic episode requiring surgery, anticoagulation (2011), right carotid artery embolic episode with TIA/stroke-like symptoms (2012), embolic MI (2015), gangrenous right toe requiring vascular surgery/amputation (2017), 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   Extensive hypercoagulable workup to date has been unrevealing.  JAK2 testing negative.  PNH testing negative.  Elevated IgA of unknown significance, no evidence of plasma cell disorder.  - On Eliquis and Prasugrel, continue until platelets <50k. Hematology recommending to continue both medications until platelets are less than or equal to 50k, then transition to ppx lovenox dosing until platelets less than or equal to 25k and then STOP. Restart regimen once engrafted and platelets stable.     IMMUNOCOMPROMISED  C. Diff - Admit c. Diff triple+ - Start PO vanc 1/21 for 14 day course.   -Prophylaxis plan: ACV LD, Megan HD, Levaquin, Bactrim +28    RISK OF GVHD  - Prophylaxis: PtC day +3, +4, , Siro/MMF to start day +5     CARDIOVASCULAR  #CAD  #Hx of CABG  #HTN   -PTA metoprolol, Lisinopril dose  "increased 2/2 HTN, Amlodipine 5mg BID switch to nifidepine ER 30mg BID, labetolol PRN;   #Hyperlipidemia: Holding atorvastatin peritransplant  - Risk of cardiomyopathy:  Baseline EF 50-55%, Global left ventricular function mildly reduced. Grade 1 or early diastolic dysfunction.   - Risk of arrhythmia: Baseline EKG showed NSR, Qtc -425    GI/NUTRITION  - Ulcer prophylaxis: Protonix   - VOD Prophylaxis: Ursodiol  - Risk of nausea/vomiting due to chemo: Ativan, Compazine, Zofran   - Risk of malnutrition: Nutrition to follow.     RENAL/ELECTROLYTES/  #Proteinuria- possibly exacerbated by persistent HTN (See CV),   #BUN/Cr elevation- FENA, osms, Na+, UA work up indicate prerenal etiology, likely intravascular depletion 2/2 osmotic diuresis with proteinuria. Will encourage PO fluid intake as Cr improved 1/23, IVF boluses as necessary, control HTN. RESOLVED.  - Hypocalcemia in setting hypoalbuminemia, iCa2+, corrected WNL   - Electrolyte management: replace per sliding scale    Psych:    #Anxiety: admits to feeling anxious.  Start zyprexa 5mg HS.  Connect w/ SW.  Psychaitry consult added hydroxyzine.  #Insomnia- PTA ambien, Trazadone added and increased to 100mg.    SOCIAL DETERMINANTS  - Caregiver: Family   - Financial/insurance concerns: See SW notes       Known issues that I take into account for medical decisions, with salient changes to the plan considering these complexities noted above.    Patient Active Problem List   Diagnosis    Amegakaryocytic thrombocytopenia (H)    Anemia due to bone marrow failure, unspecified bone marrow failure type (H)    Aplastic anemia (H)     Clinically Significant Risk Factors              # Hypoalbuminemia: Lowest albumin = 2 g/dL at 1/22/2024  4:12 AM, will monitor as appropriate              # Overweight: Estimated body mass index is 29.65 kg/m  as calculated from the following:    Height as of this encounter: 1.725 m (5' 7.91\").    Weight as of this encounter: 88.2 kg (194 lb 8 " oz).                 Dispo: Remain admitted through engraftment    I spent 30 minutes in the care of this patient today, which included time necessary for preparation for the visit, obtaining history, ordering medications/tests/procedures as medically indicated, review of pertinent medical literature, counseling of the patient, communication of recommendations to the care team, and documentation time.    Toyin Herrmann PA-C  [phone]    ______________________________________________    Physician Attestation     I saw and evaluated Ziggy Hallman as part of a shared APRN/PA visit. I personally performed the substantive portion of the medical decision making for this visit - please see the NIDHI s documentation for full details. Key management decisions made by me and carried out under my direction as below.     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 Admitted on 1/19/2024 for allogenic stem cell transplant: myeloablative conditioning with fludarabine/ busulfan, 8/8 matched unrelated donor, peripheral blood stem cell graft, GVHD ppx with PTCy/ MMF/ sirolimus, minor ABO mismatch, donor O+, recipient A+ , and CMV donor and recipient negative. 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     Sleeping on my exam. Says that he slept better last night. Has been having some loose stools. No mouth sores. Good appetite. Orthostatic vitals negative.     BMT: D2   Heme: anticoagulation plan as detailed above  ID: C.diff positive on admission, PO vanc started on 1/21 to continue for 14 days  HTN: Continue lisinopril, metoprolol and nifedipine.   Psych: anxiety and insomnia. Declines health psychology, says that he has social support. Psychiatry consulted, recommend hydroxyzine PRN. Continue Ambien PRN, trazodone 100mg at bedtime PRN.   GI: monitor regimen related toxicity.  Renal/FEN: Atrophic right kidney and proteinuria, likely due to renal  infarcts.  PPx: fabi, acyclovir, levofloxacin  Supportive care: encourage ambulation, PT/OT, optimize nutrition.    On the date of service, 01/27/2024, I spent 30 minutes on the patient unit personally reviewing medical records and medications, reviewing vital signs, labs, and imaging results as summarized above, discussing the patient's case on rounds with the N[facility], obtaining a history from the patient, performing a physical exam, intensively monitoring treatments with high risk of toxicity, coordinating care, and documenting in the electronic medical record.    Capri Dawson  Department of Hematology, Oncology and Transplantation  [facility] Pager 1300/Text via [employer]

## 2024-01-27 NOTE — PLAN OF CARE
"/65 (BP Location: Right arm)   Pulse 79   Temp 98  F (36.7  C) (Oral)   Resp 16   Ht 1.725 m (5' 7.91\")   Wt 90.9 kg (200 lb 8 oz)   SpO2 97%   BMI 30.56 kg/m      AVSS on RA. Denies pain and nausea. Reports feeling well. Ambien x1 at bedtime.  Voiding well. No replacements needed. Continue plan of care.     Problem: Adult Inpatient Plan of Care  Goal: Plan of Care Review  Description: The Plan of Care Review/Shift note should be completed every shift.  The Outcome Evaluation is a brief statement about your assessment that the patient is improving, declining, or no change.  This information will be displayed automatically on your shift  note.  Outcome: Progressing   Goal Outcome Evaluation:                        "

## 2024-01-28 ENCOUNTER — APPOINTMENT (OUTPATIENT)
Dept: GENERAL RADIOLOGY | Facility: CLINIC | Age: 49
DRG: 014 | End: 2024-01-28
Attending: INTERNAL MEDICINE
Payer: COMMERCIAL

## 2024-01-28 LAB
ALBUMIN UR-MCNC: 200 MG/DL
ANION GAP SERPL CALCULATED.3IONS-SCNC: 7 MMOL/L (ref 7–15)
APPEARANCE UR: CLEAR
BACTERIA #/AREA URNS HPF: ABNORMAL /HPF
BASOPHILS # BLD AUTO: 0 10E3/UL (ref 0–0.2)
BASOPHILS NFR BLD AUTO: 0 %
BILIRUB UR QL STRIP: NEGATIVE
BUN SERPL-MCNC: 15.3 MG/DL (ref 6–20)
CA-I BLD-MCNC: 4.4 MG/DL (ref 4.4–5.2)
CALCIUM SERPL-MCNC: 7.6 MG/DL (ref 8.6–10)
CHLORIDE SERPL-SCNC: 108 MMOL/L (ref 98–107)
COLOR UR AUTO: ABNORMAL
CREAT SERPL-MCNC: 0.77 MG/DL (ref 0.67–1.17)
DEPRECATED HCO3 PLAS-SCNC: 24 MMOL/L (ref 22–29)
EGFRCR SERPLBLD CKD-EPI 2021: >90 ML/MIN/1.73M2
EOSINOPHIL # BLD AUTO: 0.1 10E3/UL (ref 0–0.7)
EOSINOPHIL NFR BLD AUTO: 5 %
ERYTHROCYTE [DISTWIDTH] IN BLOOD BY AUTOMATED COUNT: 14.3 % (ref 10–15)
GLUCOSE SERPL-MCNC: 153 MG/DL (ref 70–99)
GLUCOSE UR STRIP-MCNC: NEGATIVE MG/DL
HCT VFR BLD AUTO: 20.8 % (ref 40–53)
HGB BLD-MCNC: 7.2 G/DL (ref 13.3–17.7)
HGB UR QL STRIP: ABNORMAL
IMM GRANULOCYTES # BLD: 0.1 10E3/UL
IMM GRANULOCYTES NFR BLD: 3 %
KETONES UR STRIP-MCNC: 60 MG/DL
LEUKOCYTE ESTERASE UR QL STRIP: NEGATIVE
LYMPHOCYTES # BLD AUTO: 0.2 10E3/UL (ref 0.8–5.3)
LYMPHOCYTES NFR BLD AUTO: 6 %
MAGNESIUM SERPL-MCNC: 1.7 MG/DL (ref 1.7–2.3)
MCH RBC QN AUTO: 33.8 PG (ref 26.5–33)
MCHC RBC AUTO-ENTMCNC: 34.6 G/DL (ref 31.5–36.5)
MCV RBC AUTO: 98 FL (ref 78–100)
MONOCYTES # BLD AUTO: 0.1 10E3/UL (ref 0–1.3)
MONOCYTES NFR BLD AUTO: 3 %
MUCOUS THREADS #/AREA URNS LPF: PRESENT /LPF
NEUTROPHILS # BLD AUTO: 2 10E3/UL (ref 1.6–8.3)
NEUTROPHILS NFR BLD AUTO: 83 %
NITRATE UR QL: NEGATIVE
NRBC # BLD AUTO: 0 10E3/UL
NRBC BLD AUTO-RTO: 0 /100
PH UR STRIP: 6.5 [PH] (ref 5–7)
PHOSPHATE SERPL-MCNC: 3.5 MG/DL (ref 2.5–4.5)
PLATELET # BLD AUTO: 159 10E3/UL (ref 150–450)
POTASSIUM SERPL-SCNC: 3.1 MMOL/L (ref 3.4–5.3)
POTASSIUM SERPL-SCNC: 3.2 MMOL/L (ref 3.4–5.3)
POTASSIUM SERPL-SCNC: 4.1 MMOL/L (ref 3.4–5.3)
RBC # BLD AUTO: 2.13 10E6/UL (ref 4.4–5.9)
RBC URINE: 1 /HPF
SODIUM SERPL-SCNC: 137 MMOL/L (ref 135–145)
SODIUM SERPL-SCNC: 139 MMOL/L (ref 135–145)
SP GR UR STRIP: 1.01 (ref 1–1.03)
UROBILINOGEN UR STRIP-MCNC: NORMAL MG/DL
WBC # BLD AUTO: 2.4 10E3/UL (ref 4–11)
WBC URINE: <1 /HPF

## 2024-01-28 PROCEDURE — 250N000013 HC RX MED GY IP 250 OP 250 PS 637

## 2024-01-28 PROCEDURE — 206N000001 HC R&B BMT UMMC

## 2024-01-28 PROCEDURE — 87040 BLOOD CULTURE FOR BACTERIA: CPT | Performed by: INTERNAL MEDICINE

## 2024-01-28 PROCEDURE — 82330 ASSAY OF CALCIUM: CPT

## 2024-01-28 PROCEDURE — 71045 X-RAY EXAM CHEST 1 VIEW: CPT | Mod: 26 | Performed by: STUDENT IN AN ORGANIZED HEALTH CARE EDUCATION/TRAINING PROGRAM

## 2024-01-28 PROCEDURE — 250N000013 HC RX MED GY IP 250 OP 250 PS 637: Performed by: PHYSICIAN ASSISTANT

## 2024-01-28 PROCEDURE — 250N000011 HC RX IP 250 OP 636

## 2024-01-28 PROCEDURE — 84132 ASSAY OF SERUM POTASSIUM: CPT | Performed by: PHYSICIAN ASSISTANT

## 2024-01-28 PROCEDURE — 80048 BASIC METABOLIC PNL TOTAL CA: CPT | Performed by: PHYSICIAN ASSISTANT

## 2024-01-28 PROCEDURE — 258N000003 HC RX IP 258 OP 636: Performed by: PHYSICIAN ASSISTANT

## 2024-01-28 PROCEDURE — 250N000011 HC RX IP 250 OP 636: Performed by: PHYSICIAN ASSISTANT

## 2024-01-28 PROCEDURE — 84295 ASSAY OF SERUM SODIUM: CPT | Performed by: PHYSICIAN ASSISTANT

## 2024-01-28 PROCEDURE — 84100 ASSAY OF PHOSPHORUS: CPT | Performed by: STUDENT IN AN ORGANIZED HEALTH CARE EDUCATION/TRAINING PROGRAM

## 2024-01-28 PROCEDURE — 250N000011 HC RX IP 250 OP 636: Performed by: STUDENT IN AN ORGANIZED HEALTH CARE EDUCATION/TRAINING PROGRAM

## 2024-01-28 PROCEDURE — 36415 COLL VENOUS BLD VENIPUNCTURE: CPT | Performed by: INTERNAL MEDICINE

## 2024-01-28 PROCEDURE — 87103 BLOOD FUNGUS CULTURE: CPT | Performed by: PHYSICIAN ASSISTANT

## 2024-01-28 PROCEDURE — 85025 COMPLETE CBC W/AUTO DIFF WBC: CPT | Performed by: PHYSICIAN ASSISTANT

## 2024-01-28 PROCEDURE — 87086 URINE CULTURE/COLONY COUNT: CPT | Performed by: PHYSICIAN ASSISTANT

## 2024-01-28 PROCEDURE — 250N000011 HC RX IP 250 OP 636: Performed by: INTERNAL MEDICINE

## 2024-01-28 PROCEDURE — 258N000003 HC RX IP 258 OP 636: Performed by: INTERNAL MEDICINE

## 2024-01-28 PROCEDURE — 71045 X-RAY EXAM CHEST 1 VIEW: CPT

## 2024-01-28 PROCEDURE — 83735 ASSAY OF MAGNESIUM: CPT | Performed by: STUDENT IN AN ORGANIZED HEALTH CARE EDUCATION/TRAINING PROGRAM

## 2024-01-28 PROCEDURE — 99233 SBSQ HOSP IP/OBS HIGH 50: CPT | Mod: 24

## 2024-01-28 PROCEDURE — 84132 ASSAY OF SERUM POTASSIUM: CPT | Performed by: STUDENT IN AN ORGANIZED HEALTH CARE EDUCATION/TRAINING PROGRAM

## 2024-01-28 PROCEDURE — 81001 URINALYSIS AUTO W/SCOPE: CPT | Performed by: PHYSICIAN ASSISTANT

## 2024-01-28 RX ORDER — CEFEPIME HYDROCHLORIDE 2 G/1
2 INJECTION, POWDER, FOR SOLUTION INTRAVENOUS EVERY 8 HOURS
Status: DISCONTINUED | OUTPATIENT
Start: 2024-01-29 | End: 2024-02-01

## 2024-01-28 RX ORDER — POTASSIUM CHLORIDE 29.8 MG/ML
20 INJECTION INTRAVENOUS
Status: COMPLETED | OUTPATIENT
Start: 2024-01-28 | End: 2024-01-28

## 2024-01-28 RX ADMIN — URSODIOL 300 MG: 300 CAPSULE ORAL at 14:04

## 2024-01-28 RX ADMIN — ACETAMINOPHEN 650 MG: 325 TABLET, FILM COATED ORAL at 19:48

## 2024-01-28 RX ADMIN — PRASUGREL 10 MG: 10 TABLET, FILM COATED ORAL at 09:31

## 2024-01-28 RX ADMIN — APIXABAN 5 MG: 5 TABLET, FILM COATED ORAL at 09:31

## 2024-01-28 RX ADMIN — LISINOPRIL 40 MG: 10 TABLET ORAL at 09:30

## 2024-01-28 RX ADMIN — LEVOFLOXACIN 250 MG: 250 TABLET, FILM COATED ORAL at 10:36

## 2024-01-28 RX ADMIN — CEFEPIME HYDROCHLORIDE 2 G: 2 INJECTION, POWDER, FOR SOLUTION INTRAVENOUS at 19:49

## 2024-01-28 RX ADMIN — URSODIOL 300 MG: 300 CAPSULE ORAL at 19:49

## 2024-01-28 RX ADMIN — OLANZAPINE 5 MG: 2.5 TABLET, FILM COATED ORAL at 22:57

## 2024-01-28 RX ADMIN — MESNA 3540 MG: 100 INJECTION, SOLUTION INTRAVENOUS at 08:36

## 2024-01-28 RX ADMIN — Medication 2 SPRAY: at 11:49

## 2024-01-28 RX ADMIN — POTASSIUM CHLORIDE 20 MEQ: 29.8 INJECTION, SOLUTION INTRAVENOUS at 06:28

## 2024-01-28 RX ADMIN — ACYCLOVIR 800 MG: 800 TABLET ORAL at 09:31

## 2024-01-28 RX ADMIN — POTASSIUM CHLORIDE 20 MEQ: 29.8 INJECTION, SOLUTION INTRAVENOUS at 14:05

## 2024-01-28 RX ADMIN — ONDANSETRON HYDROCHLORIDE 8 MG: 8 TABLET, FILM COATED ORAL at 17:56

## 2024-01-28 RX ADMIN — CYCLOPHOSPHAMIDE 3535 MG: 2 INJECTION, POWDER, FOR SOLUTION INTRAVENOUS; ORAL at 10:36

## 2024-01-28 RX ADMIN — NIFEDIPINE 30 MG: 30 TABLET, FILM COATED, EXTENDED RELEASE ORAL at 09:31

## 2024-01-28 RX ADMIN — VANCOMYCIN HYDROCHLORIDE 125 MG: 125 CAPSULE ORAL at 11:46

## 2024-01-28 RX ADMIN — TRAZODONE HYDROCHLORIDE 100 MG: 50 TABLET ORAL at 22:57

## 2024-01-28 RX ADMIN — VANCOMYCIN HYDROCHLORIDE 125 MG: 125 CAPSULE ORAL at 19:48

## 2024-01-28 RX ADMIN — ACYCLOVIR 800 MG: 800 TABLET ORAL at 19:49

## 2024-01-28 RX ADMIN — DEXTROSE AND SODIUM CHLORIDE 1000 ML: 5; 450 INJECTION, SOLUTION INTRAVENOUS at 12:42

## 2024-01-28 RX ADMIN — URSODIOL 300 MG: 300 CAPSULE ORAL at 09:30

## 2024-01-28 RX ADMIN — DEXTROSE AND SODIUM CHLORIDE 1000 ML: 5; 450 INJECTION, SOLUTION INTRAVENOUS at 14:05

## 2024-01-28 RX ADMIN — DEXTROSE AND SODIUM CHLORIDE 1000 ML: 5; 450 INJECTION, SOLUTION INTRAVENOUS at 19:49

## 2024-01-28 RX ADMIN — ONDANSETRON HYDROCHLORIDE 8 MG: 8 TABLET, FILM COATED ORAL at 09:31

## 2024-01-28 RX ADMIN — VANCOMYCIN HYDROCHLORIDE 125 MG: 125 CAPSULE ORAL at 09:31

## 2024-01-28 RX ADMIN — MICAFUNGIN SODIUM 150 MG: 50 INJECTION, POWDER, LYOPHILIZED, FOR SOLUTION INTRAVENOUS at 07:36

## 2024-01-28 RX ADMIN — APIXABAN 5 MG: 5 TABLET, FILM COATED ORAL at 19:49

## 2024-01-28 RX ADMIN — NIFEDIPINE 30 MG: 30 TABLET, FILM COATED, EXTENDED RELEASE ORAL at 19:48

## 2024-01-28 RX ADMIN — Medication 3 CAPSULE: at 09:31

## 2024-01-28 RX ADMIN — DEXTROSE AND SODIUM CHLORIDE 1000 ML: 5; 450 INJECTION, SOLUTION INTRAVENOUS at 03:47

## 2024-01-28 RX ADMIN — POTASSIUM CHLORIDE 20 MEQ: 29.8 INJECTION, SOLUTION INTRAVENOUS at 12:43

## 2024-01-28 RX ADMIN — METOPROLOL SUCCINATE 100 MG: 50 TABLET, EXTENDED RELEASE ORAL at 09:31

## 2024-01-28 RX ADMIN — POTASSIUM CHLORIDE 20 MEQ: 29.8 INJECTION, SOLUTION INTRAVENOUS at 07:36

## 2024-01-28 RX ADMIN — FUROSEMIDE 20 MG: 10 INJECTION, SOLUTION INTRAMUSCULAR; INTRAVENOUS at 15:18

## 2024-01-28 RX ADMIN — PANTOPRAZOLE SODIUM 40 MG: 40 TABLET, DELAYED RELEASE ORAL at 09:31

## 2024-01-28 RX ADMIN — VANCOMYCIN HYDROCHLORIDE 125 MG: 125 CAPSULE ORAL at 15:18

## 2024-01-28 ASSESSMENT — ACTIVITIES OF DAILY LIVING (ADL)
ADLS_ACUITY_SCORE: 20

## 2024-01-28 NOTE — PROGRESS NOTES
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 Some nausea during the day yesterday. No diarrhea (2x loose stools). No belly pain. Eating well. No infectious symptoms.     Review of Systems    Review of Systems: 10 point ROS negative unless stated in HPI   PHYSICAL EXAM      Weight     Wt Readings from Last 3 Encounters:   01/28/24 88.9 kg (196 lb)   01/17/24 90.1 kg (198 lb 9.6 oz)   01/12/24 88.9 kg (196 lb)        KPS: 70    /82 (BP Location: Right arm)   Pulse 90   Temp 98.4  F (36.9  C) (Oral)   Resp 16   Ht 1.725 m (5' 7.91\")   Wt 88.9 kg (196 lb)   SpO2 99%   BMI 29.88 kg/m       General: NAD   Eyes: GARY, sclera anicteric   Lungs: CTA bilaterally  Cardiovascular: RRR, no M/R/G   Lymphatics: No edema  Neuro: A&O   Additional Findings: Powell site NT, no drainage.    Current aGVHD staging:  Skin 0, UGI 0, LGI 0, Liver 0 (keep in note through day +180 for allos)      LABS AND IMAGING: I have assessed all abnormal lab values for their clinical significance and any values considered clinically significant have been addressed in the assessment and plan.        Lab Results   Component Value Date    WBC 2.4 (L) 01/28/2024    ANEUTAUTO 2.0 01/28/2024    HGB 7.2 (L) 01/28/2024    HCT 20.8 (L) 01/28/2024     01/28/2024     01/28/2024    POTASSIUM 3.1 (L) 01/28/2024    CHLORIDE 108 (H) 01/28/2024    CO2 24 01/28/2024     (H) 01/28/2024    BUN 15.3 01/28/2024    CR 0.77 01/28/2024    MAG 1.7 01/28/2024    INR 1.09 " 01/22/2024         SYSTEMS-BASED ASSESSMENT AND PLAN     Ziggy Hallman is a 48 year old year old male with a PMH of MDS, hx of multiple clots and MI undergoing a MA (Bu/Flu) prep for an 8/8 URD PBSCT day 3    BMT/IEC PROTOCOL for 2015-29  Chemo Prep:   Day -6: Admit  Day -5 through Day -2: Busulfan/Fludarabine  Day -1: Rest.   Keppra prophylaxis   Avoiding Tylenol for 72 hours befre and after last dose of Busulfan due to drug interaction. Ok to start day +2.     8/8 DP permissive. Cell dose pending  ABO: Donor: O+ Recipient A-  (Minor incompatability, no flush required)   GSCF plan: GCSF to start day +5 until ANC >1500 for 3 consecutive days    HEME/COAG  #Risk of Pancytopenia 2/2 chemo- anemia  - Transfusion parameters: hemoglobin <7, platelets <10  #Recurrent arterial thromboembolic events:  He has long history of various events including renal infarcts (2011, 2013, 2015), left popliteal embolic episode requiring surgery, anticoagulation (2011), right carotid artery embolic episode with TIA/stroke-like symptoms (2012), embolic MI (2015), gangrenous right toe requiring vascular surgery/amputation (2017), 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   Extensive hypercoagulable workup to date has been unrevealing.  JAK2 testing negative.  PNH testing negative.  Elevated IgA of unknown significance, no evidence of plasma cell disorder.  - On Eliquis and Prasugrel, continue until platelets <50k. Hematology recommending to continue both medications until platelets are less than or equal to 50k, then transition to ppx lovenox dosing until platelets less than or equal to 25k and then STOP. Restart regimen once engrafted and platelets stable.     IMMUNOCOMPROMISED  C. Diff - Admit c. Diff triple+ - Start PO vanc 1/21 for 14 day course.   -Prophylaxis plan: ACV LD, Megan HD, Levaquin, Bactrim +28    RISK OF GVHD  - Prophylaxis: PtC day +3, +4, , Siro/MMF to start day +5      CARDIOVASCULAR  #CAD  #Hx of CABG  #HTN   -PTA metoprolol, Lisinopril dose increased 2/2 HTN, Amlodipine 5mg BID switch to nifidepine ER 30mg BID, labetolol PRN;   #Hyperlipidemia: Holding atorvastatin peritransplant  - Risk of cardiomyopathy:  Baseline EF 50-55%, Global left ventricular function mildly reduced. Grade 1 or early diastolic dysfunction.   - Risk of arrhythmia: Baseline EKG showed NSR, Qtc -425    GI/NUTRITION  - Ulcer prophylaxis: Protonix   - VOD Prophylaxis: Ursodiol  - Risk of nausea/vomiting due to chemo: Ativan, Compazine, Zofran   - Risk of malnutrition: Nutrition to follow.     RENAL/ELECTROLYTES/  #Proteinuria- possibly exacerbated by persistent HTN (See CV),   #BUN/Cr elevation- FENA, osms, Na+, UA work up indicate prerenal etiology, likely intravascular depletion 2/2 osmotic diuresis with proteinuria. Will encourage PO fluid intake as Cr improved 1/23, IVF boluses as necessary, control HTN. RESOLVED.  - Hypocalcemia in setting hypoalbuminemia, iCa2+, corrected WNL   - Electrolyte management: replace per sliding scale    Psych:    #Anxiety: admits to feeling anxious.  Start zyprexa 5mg HS.  Connect w/ SW.  Psychaitry consult added hydroxyzine.  #Insomnia- PTA ambien, Trazadone added and increased to 100mg.    SOCIAL DETERMINANTS  - Caregiver: Family   - Financial/insurance concerns: See SW notes       Known issues that I take into account for medical decisions, with salient changes to the plan considering these complexities noted above.    Patient Active Problem List   Diagnosis    Amegakaryocytic thrombocytopenia (H)    Anemia due to bone marrow failure, unspecified bone marrow failure type (H)    Aplastic anemia (H)     Clinically Significant Risk Factors        # Hypokalemia: Lowest K = 3.1 mmol/L in last 2 days, will replace as needed       # Hypoalbuminemia: Lowest albumin = 2 g/dL at 1/22/2024  4:12 AM, will monitor as appropriate              # Overweight: Estimated body mass  "index is 29.88 kg/m  as calculated from the following:    Height as of this encounter: 1.725 m (5' 7.91\").    Weight as of this encounter: 88.9 kg (196 lb).                 Dispo: Remain admitted through engraftment    I spent 30 minutes in the care of this patient today, which included time necessary for preparation for the visit, obtaining history, ordering medications/tests/procedures as medically indicated, review of pertinent medical literature, counseling of the patient, communication of recommendations to the care team, and documentation time.    Toyin Herrmann PA-C  [phone]    ______________________________________________    Physician Attestation     I saw and evaluated Ziggy Hallman as part of a shared APRN/PA visit. I personally performed the substantive portion of the medical decision making for this visit - please see the NIDHI s documentation for full details. Key management decisions made by me and carried out under my direction as below.     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 Admitted on 1/19/2024 for allogenic stem cell transplant: myeloablative conditioning with fludarabine/ busulfan, 8/8 matched unrelated donor, peripheral blood stem cell graft, GVHD ppx with PTCy/ MMF/ sirolimus, minor ABO mismatch, donor O+, recipient A+ , and CMV donor and recipient negative. 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     Ziggy is laying comfortably in bed today morning. He is able to sleep better - has been taking ambien and trazodone at night. Per nursing, he was quite disoriented and unsteady last night due to the ambien. Will hold ambien tonight while he is on cytoxan flush and will need to void frequently through the night. Patient agreeable.     BMT: D3   Heme: anticoagulation plan as detailed above  ID: C.diff positive on admission, PO vanc started on 1/21 to continue for 14 days  HTN: Continue lisinopril, metoprolol " and nifedipine.   Psych: anxiety and insomnia. Declines health psychology, says that he has social support. Psychiatry consulted, recommend hydroxyzine PRN. Ambien PRN, trazodone 100mg at bedtime PRN. Hold tonight.   GI: monitor regimen related toxicity.  Renal/FEN: Atrophic right kidney and proteinuria, likely due to renal infarcts.  PPx: fabi, acyclovir, levofloxacin  Supportive care: encourage ambulation, PT/OT, optimize nutrition.    On the date of service, 01/28/2024, I spent 30 minutes on the patient unit personally reviewing medical records and medications, reviewing vital signs, labs, and imaging results as summarized above, discussing the patient's case on rounds with the N[facility], obtaining a history from the patient, performing a physical exam, intensively monitoring treatments with high risk of toxicity, coordinating care, and documenting in the electronic medical record.    Capri Dawson  Department of Hematology, Oncology and Transplantation  [facility] Pager 1300/Text via [employer]

## 2024-01-28 NOTE — PLAN OF CARE
Temp: 98.4  F (36.9  C) Temp src: Oral BP: 137/82 Pulse: 89   Resp: 16 SpO2: 99 % O2 Device: None (Room air)      A&Ox4. Pt slept in between cares until ~10 am. Pt denied pain and SOB. Pt denied nausea, but had little appetite. Pt had 1 watery/loose BM.  Mesna and Cytoxan given; tolerated well.  I>O by 1.8+L; 20 IV lasix given.  1.6L UO.  Plan to hold Ambien tonight and tomorrow night d/t need to void Q2.  Pt agreeable to plan.  Pt up ad amna and calls appropriately. Pt needs to shower and use CHG wipes tonight.       Problem: Stem Cell/Bone Marrow Transplant  Goal: Optimal Coping with Transplant  Outcome: Progressing  Goal: Symptom-Free Urinary Elimination  Outcome: Progressing  Intervention: Prevent or Manage Bladder Irritation  Recent Flowsheet Documentation  Taken 1/28/2024 1600 by Brittni Desir RN  Urinary Elimination Promotion: voiding relaxation promoted  Hyperhydration Management:   fluids provided   hyperhydration initiated  Taken 1/28/2024 0924 by Brittni Desir RN  Urinary Elimination Promotion: voiding relaxation promoted  Hyperhydration Management:   fluids provided   hyperhydration initiated  Goal: Diarrhea Symptom Control  Outcome: Progressing  Intervention: Manage Diarrhea  Recent Flowsheet Documentation  Taken 1/28/2024 1600 by Brittni Desir RN  Skin Protection: adhesive use limited  Fluid/Electrolyte Management: fluids adjusted  Perineal Care: perineal hygiene encouraged  Taken 1/28/2024 0924 by Brittni Desir RN  Skin Protection: adhesive use limited  Fluid/Electrolyte Management: fluids adjusted  Perineal Care: perineal hygiene encouraged  Goal: Improved Activity Tolerance  Outcome: Progressing  Intervention: Promote Improved Energy  Recent Flowsheet Documentation  Taken 1/28/2024 1600 by Brittni Desir RN  Activity Management:   up ad amna   activity adjusted per tolerance   ambulated in room   ambulated to bathroom  Taken 1/28/2024 0924 by Brittni Desir RN  Activity Management:   up ad amna    activity adjusted per tolerance   ambulated in room   ambulated to bathroom  Goal: Blood Counts Within Acceptable Range  Outcome: Progressing  Intervention: Monitor and Manage Hematologic Symptoms  Recent Flowsheet Documentation  Taken 1/28/2024 1600 by Brittni Desir RN  Bleeding Precautions:   blood pressure closely monitored   gentle oral care promoted   monitored for signs of bleeding   coagulation study results reviewed  Medication Review/Management: medications reviewed  Taken 1/28/2024 0924 by Brittni Desir RN  Bleeding Precautions:   blood pressure closely monitored   gentle oral care promoted   monitored for signs of bleeding   coagulation study results reviewed  Medication Review/Management: medications reviewed  Goal: Absence of Hypersensitivity Reaction  Outcome: Progressing  Goal: Absence of Infection  Outcome: Progressing  Intervention: Prevent and Manage Infection  Recent Flowsheet Documentation  Taken 1/28/2024 1600 by Brittni Desir RN  Infection Prevention: rest/sleep promoted  Isolation Precautions:   enteric precautions maintained   contact precautions maintained   protective environment maintained  Taken 1/28/2024 0924 by Brittni Desir RN  Infection Prevention: rest/sleep promoted  Isolation Precautions:   enteric precautions maintained   contact precautions maintained   protective environment maintained  Goal: Improved Oral Mucous Membrane Health and Integrity  Outcome: Progressing  Intervention: Promote Oral Comfort and Health  Recent Flowsheet Documentation  Taken 1/28/2024 1600 by Brittni Desir RN  Oral Mucous Membrane Protection:   lip/mouth moisturizer applied   nonirritating oral fluids promoted  Oral Care:   lip/mouth moisturizer applied   mouth wash rinse   oral rinse provided   teeth brushed   tongue brushed  Taken 1/28/2024 0924 by Brittni Desir RN  Oral Mucous Membrane Protection:   lip/mouth moisturizer applied   nonirritating oral fluids promoted  Oral Care:   lip/mouth  "moisturizer applied   mouth wash rinse   oral rinse provided   teeth brushed   tongue brushed  Goal: Nausea and Vomiting Symptom Relief  Outcome: Progressing  Intervention: Prevent and Manage Nausea and Vomiting  Recent Flowsheet Documentation  Taken 1/28/2024 1600 by Brittni Desir RN  Nausea/Vomiting Interventions:   antiemetic   sips of clear liquids given   slow deep breathing encouraged  Taken 1/28/2024 0924 by Brittni Desir RN  Nausea/Vomiting Interventions:   antiemetic   sips of clear liquids given   slow deep breathing encouraged  Goal: Optimal Nutrition Intake  Outcome: Progressing  Intervention: Minimize and Manage Barriers to Oral Intake  Recent Flowsheet Documentation  Taken 1/28/2024 1600 by Brittni Desir RN  Oral Nutrition Promotion: rest periods promoted  Taken 1/28/2024 0924 by Brittni Desir RN  Oral Nutrition Promotion: rest periods promoted     Problem: Adult Inpatient Plan of Care  Goal: Plan of Care Review  Description: The Plan of Care Review/Shift note should be completed every shift.  The Outcome Evaluation is a brief statement about your assessment that the patient is improving, declining, or no change.  This information will be displayed automatically on your shift  note.  Outcome: Progressing  Goal: Patient-Specific Goal (Individualized)  Description: You can add care plan individualizations to a care plan. Examples of Individualization might be:  \"Parent requests to be called daily at 9am for status\", \"I have a hard time hearing out of my right ear\", or \"Do not touch me to wake me up as it startles  me\".  Outcome: Progressing  Goal: Absence of Hospital-Acquired Illness or Injury  Outcome: Progressing  Intervention: Identify and Manage Fall Risk  Recent Flowsheet Documentation  Taken 1/28/2024 1600 by Brittni Desir RN  Safety Promotion/Fall Prevention:   safety round/check completed   check orthostatic blood pressure   chemotherapeutic precautions   clutter free environment " maintained   increased rounding and observation   nonskid shoes/slippers when out of bed  Taken 1/28/2024 0924 by Brittni Desir RN  Safety Promotion/Fall Prevention:   safety round/check completed   check orthostatic blood pressure   chemotherapeutic precautions   clutter free environment maintained   increased rounding and observation   nonskid shoes/slippers when out of bed  Intervention: Prevent Skin Injury  Recent Flowsheet Documentation  Taken 1/28/2024 1600 by Brittni Desir RN  Body Position: position changed independently  Skin Protection: adhesive use limited  Device Skin Pressure Protection: adhesive use limited  Taken 1/28/2024 0924 by Brittni Desir RN  Body Position: position changed independently  Skin Protection: adhesive use limited  Device Skin Pressure Protection: adhesive use limited  Intervention: Prevent Infection  Recent Flowsheet Documentation  Taken 1/28/2024 1600 by Brittni Desir RN  Infection Prevention: rest/sleep promoted  Taken 1/28/2024 0924 by Brittni Desir RN  Infection Prevention: rest/sleep promoted  Goal: Optimal Comfort and Wellbeing  Outcome: Progressing  Goal: Readiness for Transition of Care  Outcome: Progressing

## 2024-01-28 NOTE — PLAN OF CARE
"/82 (BP Location: Right arm)   Pulse 92   Temp 98.5  F (36.9  C) (Oral)   Resp 16   Ht 1.725 m (5' 7.91\")   Wt 88.2 kg (194 lb 8 oz)   SpO2 97%   BMI 29.65 kg/m      AVSS on RA. Denies pain. Reports nausea has improved. Ambien x1 at bedtime.  Cytoxan flush started at 2200. Intermittently not saving urine throughout the night, will need further education today about cytoxan flush.  Potassium infusing, will need one more bag and a recheck at 1030. Plan for cytoxan today. Continue plan of care.       Problem: Stem Cell/Bone Marrow Transplant  Goal: Optimal Coping with Transplant  Outcome: Progressing   Goal Outcome Evaluation:                        "

## 2024-01-28 NOTE — PLAN OF CARE
Temp: 98.5  F (36.9  C) Temp src: Oral BP: 127/69 Pulse: 81   Resp: 16 SpO2: 98 % O2 Device: None (Room air)      A&Ox4, but very tired today.  Pt slept in between cares until ~11am.  Pt denied pain and SOB.  Pt c/o intermittent nausea; compazine and ativan given 1x to control symptoms.  Sea bands applied.  Appetite fair.  Pt had chinese food, which greatly increased nausea.  No emesis noted.  Pt had 2 watery/loose BMs.  Pt up ad amna and calls appropriately.  Plan to start Cytoxan flush tonight.  Pt needs to shower and use CHG wipes tonight.        Problem: Stem Cell/Bone Marrow Transplant  Goal: Optimal Coping with Transplant  Outcome: Progressing  Goal: Symptom-Free Urinary Elimination  Outcome: Progressing  Goal: Diarrhea Symptom Control  Outcome: Not Progressing  Intervention: Manage Diarrhea  Recent Flowsheet Documentation  Taken 1/27/2024 1544 by Brittni Desir RN  Perineal Care: perineal hygiene encouraged  Taken 1/27/2024 0957 by Brittni Desir RN  Perineal Care: perineal hygiene encouraged  Goal: Improved Activity Tolerance  Outcome: Not Progressing  Intervention: Promote Improved Energy  Recent Flowsheet Documentation  Taken 1/27/2024 1544 by Brittni Desir RN  Activity Management:   up ad amna   activity adjusted per tolerance   ambulated in room   ambulated to bathroom  Taken 1/27/2024 0957 by Brittni Desir RN  Activity Management:   up ad amna   activity adjusted per tolerance   ambulated in room   ambulated to bathroom  Goal: Blood Counts Within Acceptable Range  Outcome: Progressing  Intervention: Monitor and Manage Hematologic Symptoms  Recent Flowsheet Documentation  Taken 1/27/2024 1544 by Brittni Desir RN  Bleeding Precautions:   blood pressure closely monitored   gentle oral care promoted   monitored for signs of bleeding   coagulation study results reviewed  Medication Review/Management: medications reviewed  Taken 1/27/2024 0957 by Brittni Desir RN  Bleeding Precautions:   blood pressure  closely monitored   gentle oral care promoted   monitored for signs of bleeding   coagulation study results reviewed  Medication Review/Management: medications reviewed  Goal: Absence of Hypersensitivity Reaction  Outcome: Progressing  Goal: Absence of Infection  Outcome: Progressing  Intervention: Prevent and Manage Infection  Recent Flowsheet Documentation  Taken 1/27/2024 1544 by Brittni Desir RN  Infection Prevention: rest/sleep promoted  Isolation Precautions:   enteric precautions maintained   contact precautions maintained   protective environment maintained  Taken 1/27/2024 0957 by Brittni Desir RN  Infection Prevention: rest/sleep promoted  Isolation Precautions:   enteric precautions maintained   contact precautions maintained   protective environment maintained  Goal: Improved Oral Mucous Membrane Health and Integrity  Outcome: Progressing  Intervention: Promote Oral Comfort and Health  Recent Flowsheet Documentation  Taken 1/27/2024 1544 by Brittni Desir RN  Oral Mucous Membrane Protection:   lip/mouth moisturizer applied   nonirritating oral fluids promoted  Oral Care:   lip/mouth moisturizer applied   mouth wash rinse   oral rinse provided   teeth brushed   tongue brushed  Taken 1/27/2024 0957 by Brittni Desir RN  Oral Mucous Membrane Protection:   lip/mouth moisturizer applied   nonirritating oral fluids promoted  Oral Care:   lip/mouth moisturizer applied   mouth wash rinse   oral rinse provided   teeth brushed   tongue brushed  Goal: Nausea and Vomiting Symptom Relief  Outcome: Progressing  Intervention: Prevent and Manage Nausea and Vomiting  Recent Flowsheet Documentation  Taken 1/27/2024 1544 by Brittni Desir RN  Nausea/Vomiting Interventions:   antiemetic   sips of clear liquids given   slow deep breathing encouraged  Taken 1/27/2024 0957 by Brittni Desir RN  Nausea/Vomiting Interventions:   antiemetic   sips of clear liquids given   slow deep breathing encouraged  Goal: Optimal Nutrition  "Intake  Outcome: Not Progressing     Problem: Adult Inpatient Plan of Care  Goal: Plan of Care Review  Description: The Plan of Care Review/Shift note should be completed every shift.  The Outcome Evaluation is a brief statement about your assessment that the patient is improving, declining, or no change.  This information will be displayed automatically on your shift  note.  Outcome: Progressing  Goal: Patient-Specific Goal (Individualized)  Description: You can add care plan individualizations to a care plan. Examples of Individualization might be:  \"Parent requests to be called daily at 9am for status\", \"I have a hard time hearing out of my right ear\", or \"Do not touch me to wake me up as it startles  me\".  Outcome: Progressing  Goal: Absence of Hospital-Acquired Illness or Injury  Outcome: Progressing  Intervention: Identify and Manage Fall Risk  Recent Flowsheet Documentation  Taken 1/27/2024 0957 by Brittni Desir RN  Safety Promotion/Fall Prevention:   safety round/check completed   clutter free environment maintained   check orthostatic blood pressure   lighting adjusted   patient and family education   nonskid shoes/slippers when out of bed  Intervention: Prevent Skin Injury  Recent Flowsheet Documentation  Taken 1/27/2024 1544 by Brittni Desir RN  Body Position: position changed independently  Taken 1/27/2024 0957 by Brittni Desir RN  Body Position: position changed independently  Intervention: Prevent Infection  Recent Flowsheet Documentation  Taken 1/27/2024 1544 by Brittni Desir RN  Infection Prevention: rest/sleep promoted  Taken 1/27/2024 0957 by Brittni Desir RN  Infection Prevention: rest/sleep promoted  Goal: Optimal Comfort and Wellbeing  Outcome: Progressing  Goal: Readiness for Transition of Care  Outcome: Progressing         "

## 2024-01-29 LAB
ALBUMIN SERPL BCG-MCNC: 2.1 G/DL (ref 3.5–5.2)
ALP SERPL-CCNC: 49 U/L (ref 40–150)
ALT SERPL W P-5'-P-CCNC: 38 U/L (ref 0–70)
ANION GAP SERPL CALCULATED.3IONS-SCNC: 7 MMOL/L (ref 7–15)
AST SERPL W P-5'-P-CCNC: 29 U/L (ref 0–45)
BACTERIA UR CULT: NO GROWTH
BASOPHILS # BLD AUTO: ABNORMAL 10*3/UL
BASOPHILS # BLD MANUAL: 0 10E3/UL (ref 0–0.2)
BASOPHILS NFR BLD AUTO: ABNORMAL %
BASOPHILS NFR BLD MANUAL: 0 %
BILIRUB DIRECT SERPL-MCNC: <0.2 MG/DL (ref 0–0.3)
BILIRUB SERPL-MCNC: 0.5 MG/DL
BLD PROD TYP BPU: NORMAL
BLOOD COMPONENT TYPE: NORMAL
BUN SERPL-MCNC: 12.3 MG/DL (ref 6–20)
CALCIUM SERPL-MCNC: 7.6 MG/DL (ref 8.6–10)
CHLORIDE SERPL-SCNC: 108 MMOL/L (ref 98–107)
CODING SYSTEM: NORMAL
CREAT SERPL-MCNC: 0.96 MG/DL (ref 0.67–1.17)
CROSSMATCH: NORMAL
DEPRECATED HCO3 PLAS-SCNC: 22 MMOL/L (ref 22–29)
EGFRCR SERPLBLD CKD-EPI 2021: >90 ML/MIN/1.73M2
EOSINOPHIL # BLD AUTO: ABNORMAL 10*3/UL
EOSINOPHIL # BLD MANUAL: 0.2 10E3/UL (ref 0–0.7)
EOSINOPHIL NFR BLD AUTO: ABNORMAL %
EOSINOPHIL NFR BLD MANUAL: 8 %
ERYTHROCYTE [DISTWIDTH] IN BLOOD BY AUTOMATED COUNT: 14.1 % (ref 10–15)
GLUCOSE SERPL-MCNC: 117 MG/DL (ref 70–99)
HCT VFR BLD AUTO: 20.2 % (ref 40–53)
HGB BLD-MCNC: 7 G/DL (ref 13.3–17.7)
IMM GRANULOCYTES # BLD: ABNORMAL 10*3/UL
IMM GRANULOCYTES NFR BLD: ABNORMAL %
INR PPP: 1.23 (ref 0.85–1.15)
ISSUE DATE AND TIME: NORMAL
LACTATE SERPL-SCNC: 0.6 MMOL/L (ref 0.7–2)
LACTATE SERPL-SCNC: 1.2 MMOL/L (ref 0.7–2)
LYMPHOCYTES # BLD AUTO: ABNORMAL 10*3/UL
LYMPHOCYTES # BLD MANUAL: 0 10E3/UL (ref 0.8–5.3)
LYMPHOCYTES NFR BLD AUTO: ABNORMAL %
LYMPHOCYTES NFR BLD MANUAL: 2 %
MAGNESIUM SERPL-MCNC: 1.5 MG/DL (ref 1.7–2.3)
MAGNESIUM SERPL-MCNC: 2.4 MG/DL (ref 1.7–2.3)
MCH RBC QN AUTO: 33.7 PG (ref 26.5–33)
MCHC RBC AUTO-ENTMCNC: 34.7 G/DL (ref 31.5–36.5)
MCV RBC AUTO: 97 FL (ref 78–100)
MONOCYTES # BLD AUTO: ABNORMAL 10*3/UL
MONOCYTES # BLD MANUAL: 0 10E3/UL (ref 0–1.3)
MONOCYTES NFR BLD AUTO: ABNORMAL %
MONOCYTES NFR BLD MANUAL: 0 %
NEUTROPHILS # BLD AUTO: ABNORMAL 10*3/UL
NEUTROPHILS # BLD MANUAL: 2.2 10E3/UL (ref 1.6–8.3)
NEUTROPHILS NFR BLD AUTO: ABNORMAL %
NEUTROPHILS NFR BLD MANUAL: 90 %
NRBC # BLD AUTO: 0 10E3/UL
NRBC # BLD AUTO: 0 10E3/UL
NRBC BLD AUTO-RTO: 0 /100
NRBC BLD MANUAL-RTO: 1 %
PHOSPHATE SERPL-MCNC: 3 MG/DL (ref 2.5–4.5)
PLAT MORPH BLD: ABNORMAL
PLATELET # BLD AUTO: 116 10E3/UL (ref 150–450)
POTASSIUM SERPL-SCNC: 3.4 MMOL/L (ref 3.4–5.3)
POTASSIUM SERPL-SCNC: 3.6 MMOL/L (ref 3.4–5.3)
POTASSIUM SERPL-SCNC: 3.6 MMOL/L (ref 3.4–5.3)
PROT SERPL-MCNC: 4.4 G/DL (ref 6.4–8.3)
RBC # BLD AUTO: 2.08 10E6/UL (ref 4.4–5.9)
RBC MORPH BLD: ABNORMAL
SODIUM SERPL-SCNC: 135 MMOL/L (ref 135–145)
SODIUM SERPL-SCNC: 137 MMOL/L (ref 135–145)
SODIUM SERPL-SCNC: 137 MMOL/L (ref 135–145)
UNIT ABO/RH: NORMAL
UNIT NUMBER: NORMAL
UNIT STATUS: NORMAL
UNIT TYPE ISBT: 9500
WBC # BLD AUTO: 2.4 10E3/UL (ref 4–11)

## 2024-01-29 PROCEDURE — 83735 ASSAY OF MAGNESIUM: CPT | Performed by: PHYSICIAN ASSISTANT

## 2024-01-29 PROCEDURE — 250N000011 HC RX IP 250 OP 636: Mod: JZ | Performed by: PHYSICIAN ASSISTANT

## 2024-01-29 PROCEDURE — 84132 ASSAY OF SERUM POTASSIUM: CPT | Performed by: PHYSICIAN ASSISTANT

## 2024-01-29 PROCEDURE — P9040 RBC LEUKOREDUCED IRRADIATED: HCPCS | Performed by: PHYSICIAN ASSISTANT

## 2024-01-29 PROCEDURE — 83605 ASSAY OF LACTIC ACID: CPT | Performed by: STUDENT IN AN ORGANIZED HEALTH CARE EDUCATION/TRAINING PROGRAM

## 2024-01-29 PROCEDURE — 250N000011 HC RX IP 250 OP 636

## 2024-01-29 PROCEDURE — 84100 ASSAY OF PHOSPHORUS: CPT | Performed by: PHYSICIAN ASSISTANT

## 2024-01-29 PROCEDURE — 84132 ASSAY OF SERUM POTASSIUM: CPT | Performed by: STUDENT IN AN ORGANIZED HEALTH CARE EDUCATION/TRAINING PROGRAM

## 2024-01-29 PROCEDURE — 250N000013 HC RX MED GY IP 250 OP 250 PS 637

## 2024-01-29 PROCEDURE — 258N000003 HC RX IP 258 OP 636: Performed by: INTERNAL MEDICINE

## 2024-01-29 PROCEDURE — 206N000001 HC R&B BMT UMMC

## 2024-01-29 PROCEDURE — 87103 BLOOD FUNGUS CULTURE: CPT | Performed by: PHYSICIAN ASSISTANT

## 2024-01-29 PROCEDURE — 99233 SBSQ HOSP IP/OBS HIGH 50: CPT | Mod: 24 | Performed by: PHYSICIAN ASSISTANT

## 2024-01-29 PROCEDURE — 82248 BILIRUBIN DIRECT: CPT | Performed by: PHYSICIAN ASSISTANT

## 2024-01-29 PROCEDURE — 85007 BL SMEAR W/DIFF WBC COUNT: CPT | Performed by: PHYSICIAN ASSISTANT

## 2024-01-29 PROCEDURE — 250N000011 HC RX IP 250 OP 636: Performed by: INTERNAL MEDICINE

## 2024-01-29 PROCEDURE — 84295 ASSAY OF SERUM SODIUM: CPT | Performed by: PHYSICIAN ASSISTANT

## 2024-01-29 PROCEDURE — 250N000013 HC RX MED GY IP 250 OP 250 PS 637: Performed by: PHYSICIAN ASSISTANT

## 2024-01-29 PROCEDURE — 83735 ASSAY OF MAGNESIUM: CPT | Performed by: STUDENT IN AN ORGANIZED HEALTH CARE EDUCATION/TRAINING PROGRAM

## 2024-01-29 PROCEDURE — 85610 PROTHROMBIN TIME: CPT | Performed by: PHYSICIAN ASSISTANT

## 2024-01-29 PROCEDURE — 85027 COMPLETE CBC AUTOMATED: CPT | Performed by: PHYSICIAN ASSISTANT

## 2024-01-29 PROCEDURE — 258N000003 HC RX IP 258 OP 636: Performed by: PHYSICIAN ASSISTANT

## 2024-01-29 PROCEDURE — 250N000011 HC RX IP 250 OP 636: Performed by: STUDENT IN AN ORGANIZED HEALTH CARE EDUCATION/TRAINING PROGRAM

## 2024-01-29 RX ORDER — POTASSIUM CHLORIDE 29.8 MG/ML
20 INJECTION INTRAVENOUS
Status: COMPLETED | OUTPATIENT
Start: 2024-01-29 | End: 2024-01-29

## 2024-01-29 RX ORDER — FUROSEMIDE 10 MG/ML
20 INJECTION INTRAMUSCULAR; INTRAVENOUS ONCE
Qty: 2 ML | Refills: 0 | Status: COMPLETED | OUTPATIENT
Start: 2024-01-29 | End: 2024-01-29

## 2024-01-29 RX ORDER — MAGNESIUM SULFATE HEPTAHYDRATE 40 MG/ML
4 INJECTION, SOLUTION INTRAVENOUS ONCE
Status: COMPLETED | OUTPATIENT
Start: 2024-01-29 | End: 2024-01-29

## 2024-01-29 RX ADMIN — Medication 2 SPRAY: at 16:34

## 2024-01-29 RX ADMIN — APIXABAN 5 MG: 5 TABLET, FILM COATED ORAL at 20:01

## 2024-01-29 RX ADMIN — ONDANSETRON HYDROCHLORIDE 8 MG: 8 TABLET, FILM COATED ORAL at 09:45

## 2024-01-29 RX ADMIN — POTASSIUM CHLORIDE 20 MEQ: 29.8 INJECTION, SOLUTION INTRAVENOUS at 18:47

## 2024-01-29 RX ADMIN — URSODIOL 300 MG: 300 CAPSULE ORAL at 08:07

## 2024-01-29 RX ADMIN — FUROSEMIDE 20 MG: 10 INJECTION, SOLUTION INTRAMUSCULAR; INTRAVENOUS at 11:49

## 2024-01-29 RX ADMIN — FUROSEMIDE 10 MG: 10 INJECTION, SOLUTION INTRAMUSCULAR; INTRAVENOUS at 08:15

## 2024-01-29 RX ADMIN — ONDANSETRON HYDROCHLORIDE 8 MG: 8 TABLET, FILM COATED ORAL at 16:32

## 2024-01-29 RX ADMIN — POTASSIUM CHLORIDE 20 MEQ: 29.8 INJECTION, SOLUTION INTRAVENOUS at 19:57

## 2024-01-29 RX ADMIN — VANCOMYCIN HYDROCHLORIDE 125 MG: 125 CAPSULE ORAL at 20:01

## 2024-01-29 RX ADMIN — VANCOMYCIN HYDROCHLORIDE 125 MG: 125 CAPSULE ORAL at 08:08

## 2024-01-29 RX ADMIN — CEFEPIME HYDROCHLORIDE 2 G: 2 INJECTION, POWDER, FOR SOLUTION INTRAVENOUS at 03:35

## 2024-01-29 RX ADMIN — VANCOMYCIN HYDROCHLORIDE 125 MG: 125 CAPSULE ORAL at 16:32

## 2024-01-29 RX ADMIN — METOPROLOL SUCCINATE 100 MG: 50 TABLET, EXTENDED RELEASE ORAL at 08:08

## 2024-01-29 RX ADMIN — NIFEDIPINE 30 MG: 30 TABLET, FILM COATED, EXTENDED RELEASE ORAL at 20:01

## 2024-01-29 RX ADMIN — MAGNESIUM SULFATE IN WATER 4 G: 4 INJECTION, SOLUTION INTRAVENOUS at 05:33

## 2024-01-29 RX ADMIN — ACETAMINOPHEN 650 MG: 325 TABLET, FILM COATED ORAL at 23:23

## 2024-01-29 RX ADMIN — OLANZAPINE 5 MG: 2.5 TABLET, FILM COATED ORAL at 23:17

## 2024-01-29 RX ADMIN — FUROSEMIDE 10 MG: 10 INJECTION, SOLUTION INTRAMUSCULAR; INTRAVENOUS at 23:23

## 2024-01-29 RX ADMIN — MESNA 3540 MG: 100 INJECTION, SOLUTION INTRAVENOUS at 08:00

## 2024-01-29 RX ADMIN — CEFEPIME HYDROCHLORIDE 2 G: 2 INJECTION, POWDER, FOR SOLUTION INTRAVENOUS at 19:57

## 2024-01-29 RX ADMIN — Medication 3 CAPSULE: at 08:06

## 2024-01-29 RX ADMIN — DEXTROSE AND SODIUM CHLORIDE 1000 ML: 5; 450 INJECTION, SOLUTION INTRAVENOUS at 08:03

## 2024-01-29 RX ADMIN — PANTOPRAZOLE SODIUM 40 MG: 40 TABLET, DELAYED RELEASE ORAL at 08:07

## 2024-01-29 RX ADMIN — LORAZEPAM 1 MG: 0.5 TABLET ORAL at 23:17

## 2024-01-29 RX ADMIN — CYCLOPHOSPHAMIDE 3535 MG: 2 INJECTION, POWDER, FOR SOLUTION INTRAVENOUS; ORAL at 10:39

## 2024-01-29 RX ADMIN — LISINOPRIL 40 MG: 10 TABLET ORAL at 08:07

## 2024-01-29 RX ADMIN — URSODIOL 300 MG: 300 CAPSULE ORAL at 14:16

## 2024-01-29 RX ADMIN — VANCOMYCIN HYDROCHLORIDE 125 MG: 125 CAPSULE ORAL at 11:50

## 2024-01-29 RX ADMIN — CEFEPIME HYDROCHLORIDE 2 G: 2 INJECTION, POWDER, FOR SOLUTION INTRAVENOUS at 11:49

## 2024-01-29 RX ADMIN — ACYCLOVIR 800 MG: 800 TABLET ORAL at 20:01

## 2024-01-29 RX ADMIN — ACYCLOVIR 800 MG: 800 TABLET ORAL at 08:07

## 2024-01-29 RX ADMIN — ONDANSETRON HYDROCHLORIDE 8 MG: 8 TABLET, FILM COATED ORAL at 00:56

## 2024-01-29 RX ADMIN — APIXABAN 5 MG: 5 TABLET, FILM COATED ORAL at 08:08

## 2024-01-29 RX ADMIN — FUROSEMIDE 20 MG: 10 INJECTION, SOLUTION INTRAMUSCULAR; INTRAVENOUS at 15:01

## 2024-01-29 RX ADMIN — ACETAMINOPHEN 650 MG: 325 TABLET, FILM COATED ORAL at 03:37

## 2024-01-29 RX ADMIN — MICAFUNGIN SODIUM 150 MG: 50 INJECTION, POWDER, LYOPHILIZED, FOR SOLUTION INTRAVENOUS at 08:09

## 2024-01-29 RX ADMIN — ONDANSETRON HYDROCHLORIDE 8 MG: 8 TABLET, FILM COATED ORAL at 23:17

## 2024-01-29 RX ADMIN — DEXTROSE AND SODIUM CHLORIDE 1000 ML: 5; 450 INJECTION, SOLUTION INTRAVENOUS at 15:00

## 2024-01-29 RX ADMIN — ACETAMINOPHEN 650 MG: 325 TABLET, FILM COATED ORAL at 11:59

## 2024-01-29 RX ADMIN — FUROSEMIDE 10 MG: 10 INJECTION, SOLUTION INTRAMUSCULAR; INTRAVENOUS at 01:06

## 2024-01-29 RX ADMIN — PRASUGREL 10 MG: 10 TABLET, FILM COATED ORAL at 08:13

## 2024-01-29 RX ADMIN — URSODIOL 300 MG: 300 CAPSULE ORAL at 20:01

## 2024-01-29 RX ADMIN — DEXTROSE AND SODIUM CHLORIDE 1000 ML: 5; 450 INJECTION, SOLUTION INTRAVENOUS at 23:19

## 2024-01-29 RX ADMIN — NIFEDIPINE 30 MG: 30 TABLET, FILM COATED, EXTENDED RELEASE ORAL at 08:12

## 2024-01-29 RX ADMIN — Medication 2 SPRAY: at 20:01

## 2024-01-29 ASSESSMENT — ACTIVITIES OF DAILY LIVING (ADL)
ADLS_ACUITY_SCORE: 20

## 2024-01-29 NOTE — PROGRESS NOTES
Cross Cover Note:    Paged about fever to 100.6, first fever. Patient non-neutropenic, rest of vital signs stable. Nursing gave 1x dose of cefepime and collected urine culture per orders.     Plan:  -CXR  -Blood cultures  -Cefepime 2 g q8 h for now, reassess need after testing results given patient is not neutropenic

## 2024-01-29 NOTE — PROGRESS NOTES
"BMT CLINICAL SOCIAL WORK NOTE:    Focus: Supportive Counseling/Resources/Discharge Planning    Data: Ziggy Hallman is a 48 year old year old male with a PMH of MDS, hx of multiple clots and MI undergoing a MA (Bu/Flu) prep for an 8/8 URD PBSCT currently day +4.     Interventions: Clinical  (CSW) spoke with Pt to assess coping, provide supportive counseling and assist with resources as needed. Pt shared that he has been dealing with some work-related insurance issues due to an incident at work a couple of years ago. Pt shared that he did not sleep well last night due to needing to urinate often.  He shared that Ambien has been helping with his sleep.  Pt explained that typically, it is difficult for him to sleep in a new environment. Pt shared briefly about his enjoyment of cooking as he was previously a .  Pt shared that he is trying to \"just push through\" and that despite his new fever, he is feeling generally ok today. Pt shared appreciation for the care and support that he has been receiving at the hospital. CSW offered other options for coping and support.  Pt declined at this time. CSW provided empathic listening, validation of concerns, and encouragement. CSW encouraged Pt to contact CSW for support, questions and/or resources.     Assessment: Pt presented as pleasant and friendly.  Pt appears to be coping appropriately at this time. Pt continues to be supported by friends/family.     Plan: CSW will continue to provide supportive counseling and assistance with resources as needed. CSW will continue to collaborate with multidisciplinary team regarding Pt's plan of care.     RIVKA Grider, [facility]  Adult Blood & Marrow Transplant   Phone: [phone]  Pager: [phone]    "

## 2024-01-29 NOTE — PROGRESS NOTES
Stop time on MAR & chart I & O  Chemo drug: Cytoxan  Tolerated: Well  Intervention: NA  Response: NA  Plan: Per BMT calendar- will get Cytoxan tomorrow as well  Lab: Na, K to be monitored BID  Wt/intervention: To be monitored BID  Shower/drsg/linen: Needs to be completed today.

## 2024-01-29 NOTE — PROGRESS NOTES
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 Started on cefepime. States he had a rough night with poor sleep due to using the bathroom multiple times due to needing lasix. He denies any focal symptoms this morning. No SOB/CP, abdominal pain, N/V. Does endorse diarrhea that's been ongoing for the past several days    Review of Systems    Review of Systems: 10 point ROS negative unless stated in HPI   PHYSICAL EXAM      Weight     Wt Readings from Last 3 Encounters:   01/29/24 89.2 kg (196 lb 11.2 oz)   01/17/24 90.1 kg (198 lb 9.6 oz)   01/12/24 88.9 kg (196 lb)        KPS: 70    /61 (BP Location: Right arm)   Pulse 119   Temp 99.1  F (37.3  C) (Oral)   Resp 20   Ht 1.725 m (5' 7.91\")   Wt 89.2 kg (196 lb 11.2 oz)   SpO2 97%   BMI 29.98 kg/m       General: NAD   Eyes: GARY, sclera anicteric   Lungs: CTA bilaterally  Cardiovascular: RRR, no M/R/G   Lymphatics: No edema  Neuro: A&O   Additional Findings: Powell site NT, no drainage.    Current aGVHD staging:  Skin 0, UGI 0, LGI 0, Liver 0 (keep in note through day +180 for allos)      LABS AND IMAGING: I have assessed all abnormal lab values for their clinical significance and any values considered clinically significant have been addressed in the assessment and plan.        Lab Results   Component Value Date    WBC 2.4 (L) 01/29/2024    ANEUTAUTO 2.0 01/28/2024    HGB 7.0 (L) 01/29/2024    HCT 20.2 (L) 01/29/2024     (L) 01/29/2024    NA " 137 01/29/2024     01/29/2024    POTASSIUM 3.6 01/29/2024    POTASSIUM 3.6 01/29/2024    CHLORIDE 108 (H) 01/29/2024    CO2 22 01/29/2024     (H) 01/29/2024    BUN 12.3 01/29/2024    CR 0.96 01/29/2024    MAG 1.5 (L) 01/29/2024    INR 1.23 (H) 01/29/2024         SYSTEMS-BASED ASSESSMENT AND PLAN     Ziggy Hallman is a 48 year old year old male with a PMH of MDS, hx of multiple clots and MI undergoing a MA (Bu/Flu) prep for an 8/8 URD PBSCT day 4    BMT/IEC PROTOCOL for 2015-29  Chemo Prep:   Day -6: Admit  Day -5 through Day -2: Busulfan/Fludarabine  Day -1: Rest.   Keppra prophylaxis   Avoiding Tylenol for 72 hours befre and after last dose of Busulfan due to drug interaction. Ok to start day +2.     8/8 DP permissive. Cell dose pending  ABO: Donor: O+ Recipient A-  (Minor incompatability, no flush required)   GSCF plan: GCSF to start day +5 until ANC >1500 for 3 consecutive days    HEME/COAG  #Risk of Pancytopenia 2/2 chemo- anemia  - Transfusion parameters: hemoglobin <7, platelets <10  #Recurrent arterial thromboembolic events:  He has long history of various events including renal infarcts (2011, 2013, 2015), left popliteal embolic episode requiring surgery, anticoagulation (2011), right carotid artery embolic episode with TIA/stroke-like symptoms (2012), embolic MI (2015), gangrenous right toe requiring vascular surgery/amputation (2017), 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   Extensive hypercoagulable workup to date has been unrevealing.  JAK2 testing negative.  PNH testing negative.  Elevated IgA of unknown significance, no evidence of plasma cell disorder.  - On Eliquis and Prasugrel, continue until platelets <50k. Hematology recommending to continue both medications until platelets are less than or equal to 50k, then transition to ppx lovenox dosing until platelets less than or equal to 25k and then STOP. Restart regimen once engrafted  and platelets stable.     IMMUNOCOMPROMISED  #Febrile Neutropenia   Spiked first fever on 1/28 PM, cultures and cxr completed. Cefepime started. Possibly 2/2 to T-cell expansion. No hypotension  - Cefepime 1/28-x     C. Diff - Admit c. Diff triple+ - Start PO vanc 1/21 for 14 day course.   -Prophylaxis plan: ACV LD, Megan HD, Levaquin, Bactrim +28    RISK OF GVHD  - Prophylaxis: PtC day +3, +4, , Siro/MMF to start day +5     CARDIOVASCULAR  #CAD  #Hx of CABG  #HTN   -PTA metoprolol, Lisinopril dose increased 2/2 HTN, Amlodipine 5mg BID switch to nifidepine ER 30mg BID, labetolol PRN;     #Hyperlipidemia: Holding atorvastatin peritransplant  - Risk of cardiomyopathy:  Baseline EF 50-55%, Global left ventricular function mildly reduced. Grade 1 or early diastolic dysfunction.   - Risk of arrhythmia: Baseline EKG showed NSR, Qtc -425    GI/NUTRITION  - Ulcer prophylaxis: Protonix   - VOD Prophylaxis: Ursodiol  - Risk of nausea/vomiting due to chemo: Ativan, Compazine, Zofran   - Risk of malnutrition: Nutrition to follow.     RENAL/ELECTROLYTES/  #Hypervolemia 2/2 cytoxan flush - diurese as needed   #Proteinuria- possibly exacerbated by persistent HTN (See CV),   #BUN/Cr elevation- FENA, osms, Na+, UA work up indicate prerenal etiology, likely intravascular depletion 2/2 osmotic diuresis with proteinuria. Will encourage PO fluid intake as Cr improved 1/23, IVF boluses as necessary, control HTN. RESOLVED.  - Hypocalcemia in setting hypoalbuminemia, iCa2+, corrected WNL   - Electrolyte management: replace per sliding scale    Psych:    #Anxiety: admits to feeling anxious.  Start zyprexa 5mg HS.  Connect w/ SW.  Psychaitry consult added hydroxyzine.  #Insomnia- PTA ambien, Trazadone added and increased to 100mg.    SOCIAL DETERMINANTS  - Caregiver: Family   - Financial/insurance concerns: See SW notes       Known issues that I take into account for medical decisions, with salient changes to the plan considering these  "complexities noted above.    Patient Active Problem List   Diagnosis    Amegakaryocytic thrombocytopenia (H)    Anemia due to bone marrow failure, unspecified bone marrow failure type (H)    Aplastic anemia (H)     Clinically Significant Risk Factors        # Hypokalemia: Lowest K = 3.1 mmol/L in last 2 days, will replace as needed     # Hypomagnesemia: Lowest Mg = 1.5 mg/dL in last 2 days, will replace as needed   # Hypoalbuminemia: Lowest albumin = 2 g/dL at 1/22/2024  4:12 AM, will monitor as appropriate    # Coagulation Defect: INR = 1.23 (Ref range: 0.85 - 1.15) and/or PTT = 25 Seconds (Ref range: 22 - 38 Seconds), will monitor for bleeding  # Thrombocytopenia: Lowest platelets = 116 in last 2 days, will monitor for bleeding          # Overweight: Estimated body mass index is 29.98 kg/m  as calculated from the following:    Height as of this encounter: 1.725 m (5' 7.91\").    Weight as of this encounter: 89.2 kg (196 lb 11.2 oz).                 Dispo: Remain admitted through engraftment    I spent 30 minutes in the care of this patient today, which included time necessary for preparation for the visit, obtaining history, ordering medications/tests/procedures as medically indicated, review of pertinent medical literature, counseling of the patient, communication of recommendations to the care team, and documentation time.    Brenda Brunson PA-C  [phone]    ______________________________________________    Physician Attestation     I saw and evaluated Ziggy Hallman as part of a shared APRN/PA visit. I personally performed the substantive portion of the medical decision making for this visit - please see the NIDHI s documentation for full details. Key management decisions made by me and carried out under my direction as below.     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 Admitted on 1/19/2024 for allogenic stem cell transplant: myeloablative conditioning with fludarabine/ " busulfan, 8/8 matched unrelated donor, peripheral blood stem cell graft, GVHD ppx with PTCy/ MMF/ sirolimus, minor ABO mismatch, donor O+, recipient A+ , and CMV donor and recipient negative. 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     Febrile overnight, started on cefepime. Unable to sleep well. Net +4L yesterday.     BMT: D4 . PT Cy today.   Heme: anticoagulation plan as detailed above  ID: Febrile neutropenia, starting D3 post transplant. Cefepime. Infectious workup negative.   C.diff positive on admission, PO vanc started on 1/21 to continue for 14 days  HTN: Continue lisinopril, metoprolol and nifedipine.   Psych: anxiety and insomnia. Psychiatry consulted, recommend hydroxyzine PRN. Ambien PRN, trazodone 100mg at bedtime PRN. Hold tonight while on PT Cy.   Renal/FEN: Atrophic right kidney and proteinuria, likely due to renal infarcts. Diuresis per protocol  PPx: fabi, acyclovir, levofloxacin  Supportive care: encourage ambulation, PT/OT, optimize nutrition.    On the date of service, 01/29/2024, I spent 30 minutes on the patient unit personally reviewing medical records and medications, reviewing vital signs, labs, and imaging results as summarized above, discussing the patient's case on rounds with the NID[facility], obtaining a history from the patient, performing a physical exam, intensively monitoring treatments with high risk of toxicity, coordinating care, and documenting in the electronic medical record.    Capri Dawson  Department of Hematology, Oncology and Transplantation  [facility] Pager [phone]/Text via [employer]

## 2024-01-29 NOTE — PLAN OF CARE
Hours of care: 2639-1802    Neuro: A&Ox4.   Cardiac: Afebrile, VSS.   Respiratory: RA, lung sounds clear, denies SOB  GI/: Voiding spontaneously, gave lasix 2x for not meeting voiding parameters. 1 incontinent BM this shift.  Diet/appetite: Tolerating high calorie/ high protein diet, appetite very poor . Denies nausea.   Activity: Up independently     Pain: Denies   Skin: No new deficits noted.  Lines: CVC, cytoxan flush running at 200mL/hr (D5 1/2 NS + [place])  Drains: none  Replacements: 1 unit RBCs given, will need K replaced    Plan: Continue with current POC. Report changes to primary team.            Goal Outcome Evaluation:      Plan of Care Reviewed With: patient    Overall Patient Progress: no changeOverall Patient Progress: no change

## 2024-01-29 NOTE — PROVIDER NOTIFICATION
"Provider Lynn Ureña notified via [employer], \"Ziggy spiked another fever of 100.8. I drew cultures from his line and gave tylenol. Already on cefepime.\"  "

## 2024-01-29 NOTE — PLAN OF CARE
"/69   Pulse 89   Temp 98  F (36.7  C) (Axillary)   Resp 20   Ht 1.725 m (5' 7.91\")   Wt 89.2 kg (196 lb 11.2 oz)   SpO2 98%   BMI 29.98 kg/m      Pt is AOx4 and independent. No complaints of pain or N/V.He is drinking minimally and eating poorly this AM. He had a fever of 102.1 and received tylenol with relief. AP Brenda Brunson Notified and no other orders given. He received post cytoxan without any noted issues. He is receiving one unit of blood currently. He has received lasix 3x today, continue to monitor I/O every 2 hours. He states his stools continue to be on the soft/loose side. Will continue to follow the plan of care.    Goal Outcome Evaluation:           Overall Patient Progress: improving    Problem: Adult Inpatient Plan of Care  Goal: Plan of Care Review  Description: The Plan of Care Review/Shift note should be completed every shift.  The Outcome Evaluation is a brief statement about your assessment that the patient is improving, declining, or no change.  This information will be displayed automatically on your shift  note.  Outcome: Progressing  Flowsheets (Taken 1/29/2024 1445)  Overall Patient Progress: improving  Goal: Patient-Specific Goal (Individualized)  Description: You can add care plan individualizations to a care plan. Examples of Individualization might be:  \"Parent requests to be called daily at 9am for status\", \"I have a hard time hearing out of my right ear\", or \"Do not touch me to wake me up as it startles  me\".  Outcome: Progressing  Goal: Absence of Hospital-Acquired Illness or Injury  Outcome: Progressing  Intervention: Identify and Manage Fall Risk  Recent Flowsheet Documentation  Taken 1/29/2024 0836 by Phyllis Mitchell, RN  Safety Promotion/Fall Prevention: safety round/check completed  Taken 1/29/2024 0817 by Phyllis Mitchell RN  Safety Promotion/Fall Prevention:   safety round/check completed   assistive device/personal items within reach   chemotherapeutic " precautions   clutter free environment maintained   lighting adjusted   nonskid shoes/slippers when out of bed   patient and family education   room near nurse's station   room organization consistent  Intervention: Prevent Skin Injury  Recent Flowsheet Documentation  Taken 1/29/2024 0817 by Phyllis Mitchell RN  Body Position: position changed independently  Skin Protection: adhesive use limited  Device Skin Pressure Protection: adhesive use limited  Intervention: Prevent and Manage VTE (Venous Thromboembolism) Risk  Recent Flowsheet Documentation  Taken 1/29/2024 0817 by Phyllis Mitchell RN  VTE Prevention/Management: SCDs (sequential compression devices) off  Intervention: Prevent Infection  Recent Flowsheet Documentation  Taken 1/29/2024 0817 by Phyllis Mitchell RN  Infection Prevention: rest/sleep promoted  Goal: Optimal Comfort and Wellbeing  Outcome: Progressing  Goal: Readiness for Transition of Care  Outcome: Progressing     Problem: Stem Cell/Bone Marrow Transplant  Goal: Optimal Coping with Transplant  Outcome: Progressing  Intervention: Optimize Patient/Family Adjustment to Transplant  Recent Flowsheet Documentation  Taken 1/29/2024 0817 by Phyllis Mitchell RN  Supportive Measures:   active listening utilized   decision-making supported   positive reinforcement provided   self-care encouraged   self-responsibility promoted   verbalization of feelings encouraged  Goal: Symptom-Free Urinary Elimination  Outcome: Progressing  Intervention: Prevent or Manage Bladder Irritation  Recent Flowsheet Documentation  Taken 1/29/2024 0817 by Phyllis Mitchell RN  Urinary Elimination Promotion: voiding relaxation promoted  Hyperhydration Management:   fluids provided   hyperhydration initiated  Goal: Diarrhea Symptom Control  Outcome: Progressing  Intervention: Manage Diarrhea  Recent Flowsheet Documentation  Taken 1/29/2024 0817 by Phyllis Mitchell RN  Skin Protection: adhesive use limited  Goal: Improved Activity  Tolerance  Outcome: Progressing  Intervention: Promote Improved Energy  Recent Flowsheet Documentation  Taken 1/29/2024 0817 by Phyllis Mitchell RN  Activity Management: activity adjusted per tolerance  Environmental Support: calm environment promoted  Goal: Blood Counts Within Acceptable Range  Outcome: Progressing  Intervention: Monitor and Manage Hematologic Symptoms  Recent Flowsheet Documentation  Taken 1/29/2024 0817 by Phyllis Mitchell RN  Bleeding Precautions:   coagulation study results reviewed   foot protection facilitated   gentle oral care promoted   monitored for signs of bleeding  Medication Review/Management: medications reviewed  Goal: Absence of Hypersensitivity Reaction  Outcome: Progressing  Goal: Absence of Infection  Outcome: Progressing  Intervention: Prevent and Manage Infection  Recent Flowsheet Documentation  Taken 1/29/2024 0817 by Phyllis Mitchell RN  Infection Prevention: rest/sleep promoted  Infection Management: aseptic technique maintained  Isolation Precautions:   contact precautions maintained   cytotoxic precautions maintained   protective environment maintained  Goal: Improved Oral Mucous Membrane Health and Integrity  Outcome: Progressing  Goal: Nausea and Vomiting Symptom Relief  Outcome: Progressing  Goal: Optimal Nutrition Intake  Outcome: Progressing

## 2024-01-29 NOTE — PLAN OF CARE
"BP (!) 140/69 (BP Location: Right arm)   Pulse 97   Temp (!) 100.8  F (38.2  C) (Oral)   Resp 16   Ht 1.725 m (5' 7.91\")   Wt 89.4 kg (197 lb)   SpO2 98%   BMI 30.03 kg/m      Tmax 100.8.  Blood cultures drawn from CVC x2, peripheral blood culture x1. UA/UC and chest x-ray completed.  Tylenol x2 given and cefepime initiated.  OVSS on RA. Denies pain and nausea. Reports a decrease in appetite. Triggered lactic, resulted at 0.6.   Continues on cytoxan flush, 10 mg lasix given for not meeting voiding parameter x1. Due to void next at 0730. Magnesium replaced, recheck at 1100. Will need RBCs. Plan for cytoxan today.  Continue plan of care.     Problem: Stem Cell/Bone Marrow Transplant  Goal: Blood Counts Within Acceptable Range  Outcome: Not Progressing  Intervention: Monitor and Manage Hematologic Symptoms  Recent Flowsheet Documentation  Taken 1/29/2024 0400 by Lora Moore, RN  Medication Review/Management: medications reviewed  Taken 1/29/2024 0000 by Lora Moore, RN  Medication Review/Management: medications reviewed  Taken 1/28/2024 2000 by Lora Moore, RN  Medication Review/Management: medications reviewed  Goal: Absence of Infection  Outcome: Not Progressing  Intervention: Prevent and Manage Infection  Recent Flowsheet Documentation  Taken 1/29/2024 0400 by Lora Moore, RN  Infection Prevention: rest/sleep promoted  Isolation Precautions:   contact precautions maintained   cytotoxic precautions maintained   protective environment maintained  Taken 1/29/2024 0000 by Lora Moore RN  Infection Prevention: rest/sleep promoted  Isolation Precautions:   contact precautions maintained   cytotoxic precautions maintained   enteric precautions maintained   protective environment maintained  Taken 1/28/2024 2000 by Lora Moore, RN  Infection Prevention: rest/sleep promoted  Isolation Precautions:   contact precautions maintained   cytotoxic precautions maintained   enteric precautions maintained   protective " environment maintained   Goal Outcome Evaluation:

## 2024-01-29 NOTE — PROGRESS NOTES
Stop time on MAR & chart I & O  Chemo drug Cytoxan  Tolerated yes  Plan continue flush until 1/30/2024  Lab: Na 137  K 3.6  Wt/intervention lasix given    Cytoxan   Cytoxan infused at 250 ml/hr  [place] infusing at 45.2 ml/hr and ends 1/30/2024 at 0840  Flush infusing at 155 ml/hr and ends 1/30/2024   at 1240   Detail Level: Simple

## 2024-01-29 NOTE — PROVIDER NOTIFICATION
"Provider Rhina Burt notified via [employer], \"Ziggy spiked his first fever of 100.6. OVSS. I drew blood cultures from his central line, gave tylenol and gave a one time dose of cefepime.  I have an order to collect a UA/UC and will collect when able. Can you order a portable chest x-ray and cefepime q8?\"    Response: Orders placed  "

## 2024-01-30 ENCOUNTER — APPOINTMENT (OUTPATIENT)
Dept: OCCUPATIONAL THERAPY | Facility: CLINIC | Age: 49
DRG: 014 | End: 2024-01-30
Attending: INTERNAL MEDICINE
Payer: COMMERCIAL

## 2024-01-30 LAB
ABO/RH(D): NORMAL
ANION GAP SERPL CALCULATED.3IONS-SCNC: 6 MMOL/L (ref 7–15)
ANTIBODY SCREEN: NEGATIVE
BASOPHILS # BLD AUTO: ABNORMAL 10*3/UL
BASOPHILS # BLD MANUAL: 0 10E3/UL (ref 0–0.2)
BASOPHILS NFR BLD AUTO: ABNORMAL %
BASOPHILS NFR BLD MANUAL: 0 %
BLD PROD TYP BPU: NORMAL
BLOOD COMPONENT TYPE: NORMAL
BUN SERPL-MCNC: 15.1 MG/DL (ref 6–20)
CALCIUM SERPL-MCNC: 7.5 MG/DL (ref 8.6–10)
CHLORIDE SERPL-SCNC: 109 MMOL/L (ref 98–107)
CODING SYSTEM: NORMAL
CREAT SERPL-MCNC: 0.98 MG/DL (ref 0.67–1.17)
CROSSMATCH: NORMAL
DEPRECATED HCO3 PLAS-SCNC: 21 MMOL/L (ref 22–29)
EGFRCR SERPLBLD CKD-EPI 2021: >90 ML/MIN/1.73M2
EOSINOPHIL # BLD AUTO: ABNORMAL 10*3/UL
EOSINOPHIL # BLD MANUAL: 0 10E3/UL (ref 0–0.7)
EOSINOPHIL NFR BLD AUTO: ABNORMAL %
EOSINOPHIL NFR BLD MANUAL: 0 %
ERYTHROCYTE [DISTWIDTH] IN BLOOD BY AUTOMATED COUNT: 15.8 % (ref 10–15)
GLUCOSE SERPL-MCNC: 104 MG/DL (ref 70–99)
HCT VFR BLD AUTO: 20.9 % (ref 40–53)
HGB BLD-MCNC: 7.1 G/DL (ref 13.3–17.7)
IMM GRANULOCYTES # BLD: ABNORMAL 10*3/UL
IMM GRANULOCYTES NFR BLD: ABNORMAL %
ISSUE DATE AND TIME: NORMAL
LACTATE SERPL-SCNC: 0.7 MMOL/L (ref 0.7–2)
LYMPHOCYTES # BLD AUTO: ABNORMAL 10*3/UL
LYMPHOCYTES # BLD MANUAL: 0.1 10E3/UL (ref 0.8–5.3)
LYMPHOCYTES NFR BLD AUTO: ABNORMAL %
LYMPHOCYTES NFR BLD MANUAL: 4 %
MAGNESIUM SERPL-MCNC: 2.1 MG/DL (ref 1.7–2.3)
MCH RBC QN AUTO: 31.4 PG (ref 26.5–33)
MCHC RBC AUTO-ENTMCNC: 34 G/DL (ref 31.5–36.5)
MCV RBC AUTO: 93 FL (ref 78–100)
MONOCYTES # BLD AUTO: ABNORMAL 10*3/UL
MONOCYTES # BLD MANUAL: 0 10E3/UL (ref 0–1.3)
MONOCYTES NFR BLD AUTO: ABNORMAL %
MONOCYTES NFR BLD MANUAL: 0 %
NEUTROPHILS # BLD AUTO: ABNORMAL 10*3/UL
NEUTROPHILS # BLD MANUAL: 1.9 10E3/UL (ref 1.6–8.3)
NEUTROPHILS NFR BLD AUTO: ABNORMAL %
NEUTROPHILS NFR BLD MANUAL: 96 %
NRBC # BLD AUTO: 0 10E3/UL
NRBC BLD AUTO-RTO: 0 /100
PHOSPHATE SERPL-MCNC: 3.4 MG/DL (ref 2.5–4.5)
PLAT MORPH BLD: ABNORMAL
PLATELET # BLD AUTO: 87 10E3/UL (ref 150–450)
POTASSIUM SERPL-SCNC: 3.4 MMOL/L (ref 3.4–5.3)
POTASSIUM SERPL-SCNC: 3.9 MMOL/L (ref 3.4–5.3)
POTASSIUM SERPL-SCNC: 4.3 MMOL/L (ref 3.4–5.3)
RBC # BLD AUTO: 2.26 10E6/UL (ref 4.4–5.9)
RBC MORPH BLD: ABNORMAL
SODIUM SERPL-SCNC: 136 MMOL/L (ref 135–145)
SODIUM SERPL-SCNC: 136 MMOL/L (ref 135–145)
SODIUM SERPL-SCNC: 138 MMOL/L (ref 135–145)
SPECIMEN EXPIRATION DATE: NORMAL
UNIT ABO/RH: NORMAL
UNIT NUMBER: NORMAL
UNIT STATUS: NORMAL
UNIT TYPE ISBT: 9500
WBC # BLD AUTO: 2 10E3/UL (ref 4–11)

## 2024-01-30 PROCEDURE — 84295 ASSAY OF SERUM SODIUM: CPT | Performed by: PHYSICIAN ASSISTANT

## 2024-01-30 PROCEDURE — 250N000011 HC RX IP 250 OP 636: Performed by: PHYSICIAN ASSISTANT

## 2024-01-30 PROCEDURE — 250N000013 HC RX MED GY IP 250 OP 250 PS 637

## 2024-01-30 PROCEDURE — 250N000013 HC RX MED GY IP 250 OP 250 PS 637: Performed by: PHYSICIAN ASSISTANT

## 2024-01-30 PROCEDURE — 250N000012 HC RX MED GY IP 250 OP 636 PS 637: Performed by: PHYSICIAN ASSISTANT

## 2024-01-30 PROCEDURE — 97530 THERAPEUTIC ACTIVITIES: CPT | Mod: GO

## 2024-01-30 PROCEDURE — 85027 COMPLETE CBC AUTOMATED: CPT | Performed by: PHYSICIAN ASSISTANT

## 2024-01-30 PROCEDURE — 86923 COMPATIBILITY TEST ELECTRIC: CPT | Performed by: PHYSICIAN ASSISTANT

## 2024-01-30 PROCEDURE — 83605 ASSAY OF LACTIC ACID: CPT | Performed by: STUDENT IN AN ORGANIZED HEALTH CARE EDUCATION/TRAINING PROGRAM

## 2024-01-30 PROCEDURE — 83735 ASSAY OF MAGNESIUM: CPT | Performed by: STUDENT IN AN ORGANIZED HEALTH CARE EDUCATION/TRAINING PROGRAM

## 2024-01-30 PROCEDURE — 99233 SBSQ HOSP IP/OBS HIGH 50: CPT | Mod: 24

## 2024-01-30 PROCEDURE — 206N000001 HC R&B BMT UMMC

## 2024-01-30 PROCEDURE — 250N000011 HC RX IP 250 OP 636: Performed by: STUDENT IN AN ORGANIZED HEALTH CARE EDUCATION/TRAINING PROGRAM

## 2024-01-30 PROCEDURE — P9040 RBC LEUKOREDUCED IRRADIATED: HCPCS | Performed by: PHYSICIAN ASSISTANT

## 2024-01-30 PROCEDURE — 84132 ASSAY OF SERUM POTASSIUM: CPT | Performed by: PHYSICIAN ASSISTANT

## 2024-01-30 PROCEDURE — 258N000003 HC RX IP 258 OP 636: Performed by: PHYSICIAN ASSISTANT

## 2024-01-30 PROCEDURE — 84132 ASSAY OF SERUM POTASSIUM: CPT | Performed by: STUDENT IN AN ORGANIZED HEALTH CARE EDUCATION/TRAINING PROGRAM

## 2024-01-30 PROCEDURE — 250N000011 HC RX IP 250 OP 636: Performed by: INTERNAL MEDICINE

## 2024-01-30 PROCEDURE — 85007 BL SMEAR W/DIFF WBC COUNT: CPT | Performed by: PHYSICIAN ASSISTANT

## 2024-01-30 PROCEDURE — 250N000011 HC RX IP 250 OP 636

## 2024-01-30 PROCEDURE — 86900 BLOOD TYPING SEROLOGIC ABO: CPT | Performed by: PHYSICIAN ASSISTANT

## 2024-01-30 PROCEDURE — 84100 ASSAY OF PHOSPHORUS: CPT | Performed by: STUDENT IN AN ORGANIZED HEALTH CARE EDUCATION/TRAINING PROGRAM

## 2024-01-30 RX ORDER — POTASSIUM CHLORIDE 29.8 MG/ML
20 INJECTION INTRAVENOUS
Status: COMPLETED | OUTPATIENT
Start: 2024-01-30 | End: 2024-01-30

## 2024-01-30 RX ORDER — FUROSEMIDE 10 MG/ML
10 INJECTION INTRAMUSCULAR; INTRAVENOUS ONCE
Qty: 2 ML | Refills: 0 | Status: COMPLETED | OUTPATIENT
Start: 2024-01-30 | End: 2024-01-30

## 2024-01-30 RX ORDER — LOPERAMIDE HCL 2 MG
2-4 CAPSULE ORAL 2 TIMES DAILY PRN
Status: DISCONTINUED | OUTPATIENT
Start: 2024-01-30 | End: 2024-02-10

## 2024-01-30 RX ADMIN — SIROLIMUS 12 MG: 2 TABLET ORAL at 08:40

## 2024-01-30 RX ADMIN — ONDANSETRON HYDROCHLORIDE 8 MG: 8 TABLET, FILM COATED ORAL at 23:29

## 2024-01-30 RX ADMIN — LOPERAMIDE HYDROCHLORIDE 4 MG: 2 CAPSULE ORAL at 20:58

## 2024-01-30 RX ADMIN — FUROSEMIDE 10 MG: 10 INJECTION, SOLUTION INTRAMUSCULAR; INTRAVENOUS at 10:38

## 2024-01-30 RX ADMIN — URSODIOL 300 MG: 300 CAPSULE ORAL at 13:31

## 2024-01-30 RX ADMIN — DEXTROSE MONOHYDRATE 20 ML: 50 INJECTION, SOLUTION INTRAVENOUS at 20:45

## 2024-01-30 RX ADMIN — Medication 2 SPRAY: at 16:36

## 2024-01-30 RX ADMIN — METOPROLOL SUCCINATE 100 MG: 50 TABLET, EXTENDED RELEASE ORAL at 08:40

## 2024-01-30 RX ADMIN — PANTOPRAZOLE SODIUM 40 MG: 40 TABLET, DELAYED RELEASE ORAL at 08:41

## 2024-01-30 RX ADMIN — VANCOMYCIN HYDROCHLORIDE 125 MG: 125 CAPSULE ORAL at 20:57

## 2024-01-30 RX ADMIN — LOPERAMIDE HYDROCHLORIDE 4 MG: 2 CAPSULE ORAL at 12:26

## 2024-01-30 RX ADMIN — Medication 5 ML: at 12:27

## 2024-01-30 RX ADMIN — VANCOMYCIN HYDROCHLORIDE 125 MG: 125 CAPSULE ORAL at 11:34

## 2024-01-30 RX ADMIN — ACYCLOVIR 800 MG: 800 TABLET ORAL at 08:41

## 2024-01-30 RX ADMIN — URSODIOL 300 MG: 300 CAPSULE ORAL at 08:40

## 2024-01-30 RX ADMIN — DEXTROSE MONOHYDRATE 20 ML: 50 INJECTION, SOLUTION INTRAVENOUS at 20:40

## 2024-01-30 RX ADMIN — LORAZEPAM 1 MG: 0.5 TABLET ORAL at 22:31

## 2024-01-30 RX ADMIN — VANCOMYCIN HYDROCHLORIDE 125 MG: 125 CAPSULE ORAL at 08:41

## 2024-01-30 RX ADMIN — VANCOMYCIN HYDROCHLORIDE 125 MG: 125 CAPSULE ORAL at 16:36

## 2024-01-30 RX ADMIN — Medication 2 SPRAY: at 08:41

## 2024-01-30 RX ADMIN — ONDANSETRON HYDROCHLORIDE 8 MG: 8 TABLET, FILM COATED ORAL at 08:41

## 2024-01-30 RX ADMIN — Medication 5 ML: at 16:36

## 2024-01-30 RX ADMIN — MYCOPHENOLATE MOFETIL 1250 MG: 500 INJECTION, POWDER, LYOPHILIZED, FOR SOLUTION INTRAVENOUS at 08:53

## 2024-01-30 RX ADMIN — DEXTROSE MONOHYDRATE 450 MCG: 50 INJECTION, SOLUTION INTRAVENOUS at 20:41

## 2024-01-30 RX ADMIN — MYCOPHENOLATE MOFETIL 1250 MG: 500 INJECTION, POWDER, LYOPHILIZED, FOR SOLUTION INTRAVENOUS at 21:04

## 2024-01-30 RX ADMIN — Medication 5 ML: at 22:37

## 2024-01-30 RX ADMIN — DEXTROSE AND SODIUM CHLORIDE 1000 ML: 5; 450 INJECTION, SOLUTION INTRAVENOUS at 06:14

## 2024-01-30 RX ADMIN — URSODIOL 300 MG: 300 CAPSULE ORAL at 20:57

## 2024-01-30 RX ADMIN — APIXABAN 5 MG: 5 TABLET, FILM COATED ORAL at 08:41

## 2024-01-30 RX ADMIN — Medication 5 ML: at 13:35

## 2024-01-30 RX ADMIN — PRASUGREL 10 MG: 10 TABLET, FILM COATED ORAL at 08:41

## 2024-01-30 RX ADMIN — LISINOPRIL 40 MG: 10 TABLET ORAL at 08:40

## 2024-01-30 RX ADMIN — Medication 2 SPRAY: at 21:11

## 2024-01-30 RX ADMIN — FUROSEMIDE 10 MG: 10 INJECTION, SOLUTION INTRAMUSCULAR; INTRAVENOUS at 04:08

## 2024-01-30 RX ADMIN — NIFEDIPINE 30 MG: 30 TABLET, FILM COATED, EXTENDED RELEASE ORAL at 08:40

## 2024-01-30 RX ADMIN — OLANZAPINE 5 MG: 2.5 TABLET, FILM COATED ORAL at 22:31

## 2024-01-30 RX ADMIN — CEFEPIME HYDROCHLORIDE 2 G: 2 INJECTION, POWDER, FOR SOLUTION INTRAVENOUS at 20:52

## 2024-01-30 RX ADMIN — CEFEPIME HYDROCHLORIDE 2 G: 2 INJECTION, POWDER, FOR SOLUTION INTRAVENOUS at 11:28

## 2024-01-30 RX ADMIN — POTASSIUM CHLORIDE 20 MEQ: 29.8 INJECTION, SOLUTION INTRAVENOUS at 08:53

## 2024-01-30 RX ADMIN — ACYCLOVIR 800 MG: 800 TABLET ORAL at 20:58

## 2024-01-30 RX ADMIN — Medication 2 SPRAY: at 11:34

## 2024-01-30 RX ADMIN — MICAFUNGIN SODIUM 150 MG: 50 INJECTION, POWDER, LYOPHILIZED, FOR SOLUTION INTRAVENOUS at 08:54

## 2024-01-30 RX ADMIN — Medication 5 ML: at 23:20

## 2024-01-30 RX ADMIN — NIFEDIPINE 30 MG: 30 TABLET, FILM COATED, EXTENDED RELEASE ORAL at 20:57

## 2024-01-30 RX ADMIN — CEFEPIME HYDROCHLORIDE 2 G: 2 INJECTION, POWDER, FOR SOLUTION INTRAVENOUS at 04:08

## 2024-01-30 RX ADMIN — APIXABAN 5 MG: 5 TABLET, FILM COATED ORAL at 20:57

## 2024-01-30 RX ADMIN — ONDANSETRON HYDROCHLORIDE 8 MG: 8 TABLET, FILM COATED ORAL at 16:36

## 2024-01-30 RX ADMIN — Medication 3 CAPSULE: at 08:40

## 2024-01-30 RX ADMIN — POTASSIUM CHLORIDE 20 MEQ: 29.8 INJECTION, SOLUTION INTRAVENOUS at 09:34

## 2024-01-30 ASSESSMENT — ACTIVITIES OF DAILY LIVING (ADL)
ADLS_ACUITY_SCORE: 22
ADLS_ACUITY_SCORE: 20
ADLS_ACUITY_SCORE: 22
ADLS_ACUITY_SCORE: 20
ADLS_ACUITY_SCORE: 22
ADLS_ACUITY_SCORE: 20
ADLS_ACUITY_SCORE: 22
ADLS_ACUITY_SCORE: 22

## 2024-01-30 NOTE — PROGRESS NOTES
CLINICAL NUTRITION SERVICES - REASSESSMENT NOTE     Nutrition Prescription    RECOMMENDATIONS FOR MDs/PROVIDERS TO ORDER:  Encourage oral intake     Malnutrition Status:    Patient does not meet two of the established criteria necessary for diagnosing malnutrition but is at risk for malnutrition    Recommendations already ordered by Registered Dietitian (RD):  Ordered chocolate and vanilla ensure for trial     Future/Additional Recommendations:  If unable to consume adequate PO intake or maintain weight- consider nutrition support:   Dosing weight:  75.1 kg  Access: Central     Initial parameters (per day)  Volume:  1200 mL  Dextrose: 150 g  AA: 115 g  Lipids: 250 mL 20%, 7x days per week     Dextrose titration:   Monitor lytes and if within acceptable parameters (Mg++ > or = 1.5, K+ is > or = 3, and PO4 > or = 1.9), increase dextrose by 60-65 g/day to goal of 275 g dextrose.    Additives: 10 mL infuvite + 1 mL MTE-4   Goal PN provides 275 g dextrose, 115  g AA, and 250 mL 20% lipids 7 days per week for total provision of 1895 Kcals (25 Kcals/kg), 1.5 g/kg protein, GIR = 2.54 mg/kg/minute, and 26% fat kcals on average daily.        EVALUATION OF THE PROGRESS TOWARD GOALS   Diet: High Kcal/High Protein + snacks/supplements PRN   Intake: 100% last documented on 1/28   -Mostly 2 full meals documented daily since admission., decline in intake on 1/28     NEW FINDINGS   Day + 5,  Ziggy reported largest barrier to eating is diarrhea. He took the day off from eating yesterday but had omelet, potatoes, and cream of wheat on tray this morning     Weight Trends:   Date/Time Weight Weight Method   01/30/24 0733 88.5 kg (195 lb 3.2 oz) Standing scale   01/28/24 0754 88.9 kg (196 lb) Standing scale   01/25/24 0817 90.5 kg (199 lb 9.6 oz) Standing scale   01/23/24 0725 91.7 kg (202 lb 3.2 oz) Standing scale   01/20/24 0801 89.1 kg (196 lb 8 oz) Standing scale   01/19/24 1035 89.9 kg (198 lb 1.6 oz) Standing scale   01/19/24 0800  90.6 kg (199 lb 12.8 oz) Standing scale   2.3% weight loss in one week     GI: Intermittent nausea and diarrhea. Last bowel movement, 1/28.    Skin: William 21, no rash or wounds     Labs:   Na 136, K+ 3.4, Mg 2.1 , Po4 3.4 (WNL)  Glucose 104 (H)  WBC 2 (L)    Medications:   Acyclovir  Biotene  Mycophenolate  Zyprexa  Protonix  Psyllium   Sirolimus  Ativan PRN     MALNUTRITION  % Intake: Decreased intake does not meet criteria  % Weight Loss: > 2% in 1 week (severe)  Subcutaneous Fat Loss: None observed  Muscle Loss: None observed  Fluid Accumulation/Edema: None noted  Malnutrition Diagnosis: Patient does not meet two of the established criteria necessary for diagnosing malnutrition but is at risk for malnutrition    Previous Goals   Patient to consume % of nutritionally adequate meal trays TID, or the equivalent with supplements/snacks.   Evaluation: Not met    Previous Nutrition Diagnosis  Predicted inadequate nutrient intake (energy/protein intake) related to upcoming BMT as evidenced by potential for nutrition side effects    Evaluation: Declining    CURRENT NUTRITION DIAGNOSIS  Unintended weight loss related to reduced intake and increased metabolic demand as evidenced by >2% weight loss in one week       INTERVENTIONS  Implementation  Collaboration with other providers- 5c rounds   Medical food supplement therapy    Goals  Patient to consume % of nutritionally adequate meal trays TID, or the equivalent with supplements/snacks.    Monitoring/Evaluation  Progress toward goals will be monitored and evaluated per protocol.    Liliane Odom RD, CHUCHO  5C/BMT pager: [phone]

## 2024-01-30 NOTE — PLAN OF CARE
Goal Outcome Evaluation:      Plan of Care Reviewed With: patient    Overall Patient Progress: decliningOverall Patient Progress: declining    Outcome Evaluation: appetite low yesterday trying to eat more today

## 2024-01-30 NOTE — PROGRESS NOTES
AM Orthostatic VS:     01/30/24 0733 01/30/24 0735 01/30/24 0737   Lying Orthostatic BP   Lying Orthostatic /79  --   --    Lying Orthostatic Pulse 101 bpm  --   --    Sitting Orthostatic BP   Sitting Orthostatic BP  --  150/91  --    Sitting Orthostatic Pulse  --  107 bpm  --    Standing Orthostatic BP   Standing Orthostatic BP  --   --  160/104   Standing Orthostatic Pulse  --   --  127 bpm

## 2024-01-30 NOTE — PROGRESS NOTES
01/30/24 1626   Lying Orthostatic BP   Lying Orthostatic /77   Lying Orthostatic Pulse 94 bpm   Sitting Orthostatic BP   Sitting Orthostatic /84   Sitting Orthostatic Pulse 109 bpm   Standing Orthostatic BP   Standing Orthostatic /93   Standing Orthostatic Pulse 125 bpm     1700 - NIDHI re: positive orthostatics.     1751 - On-Call MD paged re: positive orthostatics above. Pt asymptomatic. Fluid flush finished earlier today. Lasix given this AM for fluid up.     1753 - Provider responded. No immediate intervention. Continue to monitor VS and any new symptoms.

## 2024-01-30 NOTE — PROGRESS NOTES
Cross Cover Note:     Paged about temp 100.7. Discussed with nursing, remainder of vital signs stable, no new localizing symptoms. Last blood cultures drawn 1/29 at 0335 am. Patient is already on cefepime.     No additional interventions required at this time. Continue to monitor.

## 2024-01-30 NOTE — PROGRESS NOTES
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 Feels better this morning. No N/V. Continues to have diarrhea intermittently. Denies any oropharyngeal mucositis.    Review of Systems    Review of Systems: 10 point ROS negative unless stated in HPI   PHYSICAL EXAM      Weight     Wt Readings from Last 3 Encounters:   01/30/24 88.5 kg (195 lb 3.2 oz)   01/17/24 90.1 kg (198 lb 9.6 oz)   01/12/24 88.9 kg (196 lb)        KPS: 70    BP (!) 159/96   Pulse 101   Temp 98.3  F (36.8  C) (Oral)   Resp 18   Ht 1.725 m (5' 7.91\")   Wt 88.5 kg (195 lb 3.2 oz)   SpO2 99%   BMI 29.76 kg/m       General: NAD   Eyes: GARY, sclera anicteric   Lungs: CTA bilaterally  Cardiovascular: RRR, no M/R/G   Lymphatics: No edema  Neuro: A&O   Additional Findings: Powell site NT, no drainage.    Current aGVHD staging:  Skin 0, UGI 0, LGI 0, Liver 0 (keep in note through day +180 for allos)      LABS AND IMAGING: I have assessed all abnormal lab values for their clinical significance and any values considered clinically significant have been addressed in the assessment and plan.        Lab Results   Component Value Date    WBC 2.0 (L) 01/30/2024    ANEUTAUTO 2.0 01/28/2024    HGB 7.1 (L) 01/30/2024    HCT 20.9 (L) 01/30/2024    PLT 87 (L) 01/30/2024     01/30/2024     01/30/2024    POTASSIUM 3.4 01/30/2024    POTASSIUM 3.4 01/30/2024    CHLORIDE 109 (H) 01/30/2024    CO2 21 (L) 01/30/2024     (H) 01/30/2024    BUN 15.1 " 01/30/2024    CR 0.98 01/30/2024    MAG 2.1 01/30/2024    INR 1.23 (H) 01/29/2024         SYSTEMS-BASED ASSESSMENT AND PLAN     Ziggy Hallman is a 48 year old year old male with a PMH of MDS, hx of multiple clots and MI undergoing a MA (Bu/Flu) prep for an 8/8 URD PBSCT day 5    BMT/IEC PROTOCOL for 2015-29  Chemo Prep:   Day -6: Admit  Day -5 through Day -2: Busulfan/Fludarabine  Day -1: Rest.   Keppra prophylaxis   Avoiding Tylenol for 72 hours befre and after last dose of Busulfan due to drug interaction. Ok to start day +2.     8/8 DP permissive. Cell dose-9.24x10^6  ABO: Donor: O+ Recipient A-  (Minor incompatability, no flush required)   GSCF plan: GCSF to start day +5 until ANC >1500 for 3 consecutive days    HEME/COAG  #Risk of Pancytopenia 2/2 chemo- anemia  - Transfusion parameters: hemoglobin <8 (cardiac hx), platelets <10  #Recurrent arterial thromboembolic events:  He has long history of various events including renal infarcts (2011, 2013, 2015), left popliteal embolic episode requiring surgery, anticoagulation (2011), right carotid artery embolic episode with TIA/stroke-like symptoms (2012), embolic MI (2015), gangrenous right toe requiring vascular surgery/amputation (2017), 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   Extensive hypercoagulable workup to date has been unrevealing.  JAK2 testing negative.  PNH testing negative.  Elevated IgA of unknown significance, no evidence of plasma cell disorder.  - On Eliquis and Prasugrel, continue until platelets <50k. Hematology recommending to continue both medications until platelets are less than or equal to 50k, then transition to ppx lovenox dosing until platelets less than or equal to 25k and then STOP. Restart regimen once engrafted and platelets stable.     IMMUNOCOMPROMISED  #Febrile Neutropenia   Spiked first fever on 1/28 PM, cultures and cxr completed. Cefepime started. Possibly 2/2 to T-cell  expansion. No hypotension  - Cefepime 1/28-x     C. Diff - Admit c. Diff triple+ - Start PO vanc 1/21 for 14 day course.   -Prophylaxis plan: ACV LD, Megan HD, Levaquin, Bactrim +28    RISK OF GVHD  - Prophylaxis: PtC day +3, +4, , Siro/MMF to start day +5     CARDIOVASCULAR  #CAD  #Hx of CABG  #HTN   -PTA metoprolol, Lisinopril dose increased 2/2 HTN, Amlodipine 5mg BID switch to nifidepine ER 30mg BID, labetolol PRN;     #Hyperlipidemia: Holding atorvastatin peritransplant  - Risk of cardiomyopathy:  Baseline EF 50-55%, Global left ventricular function mildly reduced. Grade 1 or early diastolic dysfunction.   - Risk of arrhythmia: Baseline EKG showed NSR, Qtc -425    GI/NUTRITION  - Ulcer prophylaxis: Protonix   - VOD Prophylaxis: Ursodiol  - Risk of nausea/vomiting due to chemo: Ativan, Compazine, Zofran   - Risk of malnutrition: Nutrition to follow.     RENAL/ELECTROLYTES/  #Hypervolemia 2/2 cytoxan flush - diurese as needed 1/30- Lasix 10mg   #Proteinuria- possibly exacerbated by persistent HTN (See CV),   #BUN/Cr elevation- FENA, osms, Na+, UA work up indicate prerenal etiology, likely intravascular depletion 2/2 osmotic diuresis with proteinuria. Will encourage PO fluid intake as Cr improved 1/23, IVF boluses as necessary, control HTN. RESOLVED.  - Hypocalcemia in setting hypoalbuminemia, iCa2+, corrected WNL   - Electrolyte management: replace per sliding scale    Psych:    #Anxiety: admits to feeling anxious.  Start zyprexa 5mg HS.  Connect w/ SW.  Psychaitry consult added hydroxyzine.  #Insomnia- PTA ambien, Trazadone added and increased to 100mg. - Ambien and trazadone held during cytoxan period due to frequently urination and concern for falls. Plan to resume following cytoxan flush perhaps 1/31.     SOCIAL DETERMINANTS  - Caregiver: Family   - Financial/insurance concerns: See SW notes       Known issues that I take into account for medical decisions, with salient changes to the plan considering these  "complexities noted above.    Patient Active Problem List   Diagnosis    Amegakaryocytic thrombocytopenia (H)    Anemia due to bone marrow failure, unspecified bone marrow failure type (H)    Aplastic anemia (H)     Clinically Significant Risk Factors        # Hypokalemia: Lowest K = 3.2 mmol/L in last 2 days, will replace as needed     # Hypomagnesemia: Lowest Mg = 1.5 mg/dL in last 2 days, will replace as needed   # Hypoalbuminemia: Lowest albumin = 2 g/dL at 1/22/2024  4:12 AM, will monitor as appropriate    # Coagulation Defect: INR = 1.23 (Ref range: 0.85 - 1.15) and/or PTT = 25 Seconds (Ref range: 22 - 38 Seconds), will monitor for bleeding  # Thrombocytopenia: Lowest platelets = 87 in last 2 days, will monitor for bleeding          # Overweight: Estimated body mass index is 29.76 kg/m  as calculated from the following:    Height as of this encounter: 1.725 m (5' 7.91\").    Weight as of this encounter: 88.5 kg (195 lb 3.2 oz).                 Dispo: Remain admitted through engraftment    I spent 30 minutes in the care of this patient today, which included time necessary for preparation for the visit, obtaining history, ordering medications/tests/procedures as medically indicated, review of pertinent medical literature, counseling of the patient, communication of recommendations to the care team, and documentation time.    Brenda Brunson PA-C  [phone]    ______________________________________________    Physician Attestation     I saw and evaluated Ziggy Hallman as part of a shared APRN/PA visit. I personally performed the substantive portion of the medical decision making for this visit - please see the N[facility] s documentation for full details. Key management decisions made by me and carried out under my direction as below.     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 Admitted on 1/19/2024 for allogenic stem cell transplant: myeloablative conditioning with fludarabine/ busulfan, " 8/8 matched unrelated donor, peripheral blood stem cell graft, GVHD ppx with PTCy/ MMF/ sirolimus, minor ABO mismatch, donor O+, recipient A+ , and CMV donor and recipient negative. 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     Continue to have fevers. Also having diarrhea, already on treatment for C.diff. Eating and ambulating.     BMT: D5 .   Heme: anticoagulation plan as detailed above  ID: Febrile neutropenia, starting D3 post transplant. Cefepime. Infectious workup negative.   C.diff positive on admission, PO vanc started on 1/21 to continue for 14 days  HTN: Continue lisinopril, metoprolol and nifedipine.   Psych: anxiety and insomnia. Psychiatry consulted, recommend hydroxyzine PRN. Ambien PRN, trazodone 100mg at bedtime PRN.   Renal/FEN: Atrophic right kidney and proteinuria, likely due to renal infarcts. Diuresis with lasix 10mg IV once in addition to protocol directed diuresis.   PPx: fabi, acyclovir, levofloxacin  Supportive care: encourage ambulation, PT/OT, optimize nutrition.    On the date of service, 01/30/2024, I spent 30 minutes on the patient unit personally reviewing medical records and medications, reviewing vital signs, labs, and imaging results as summarized above, discussing the patient's case on rounds with the [facility], obtaining a history from the patient, performing a physical exam, intensively monitoring treatments with high risk of toxicity, coordinating care, and documenting in the electronic medical record.    Capri Dawson  Department of Hematology, Oncology and Transplantation  [facility] Pager 1300/Text via [employer]

## 2024-01-31 LAB
ALBUMIN UR-MCNC: 200 MG/DL
ANION GAP SERPL CALCULATED.3IONS-SCNC: 6 MMOL/L (ref 7–15)
APPEARANCE UR: CLEAR
BASOPHILS # BLD AUTO: ABNORMAL 10*3/UL
BASOPHILS # BLD MANUAL: 0 10E3/UL (ref 0–0.2)
BASOPHILS NFR BLD AUTO: ABNORMAL %
BASOPHILS NFR BLD MANUAL: 0 %
BILIRUB UR QL STRIP: NEGATIVE
BLD PROD TYP BPU: NORMAL
BLOOD COMPONENT TYPE: NORMAL
BUN SERPL-MCNC: 20 MG/DL (ref 6–20)
BURR CELLS BLD QL SMEAR: SLIGHT
CALCIUM SERPL-MCNC: 7.9 MG/DL (ref 8.6–10)
CHLORIDE SERPL-SCNC: 113 MMOL/L (ref 98–107)
CODING SYSTEM: NORMAL
COLOR UR AUTO: ABNORMAL
CREAT SERPL-MCNC: 0.89 MG/DL (ref 0.67–1.17)
CROSSMATCH: NORMAL
DEPRECATED HCO3 PLAS-SCNC: 20 MMOL/L (ref 22–29)
EGFRCR SERPLBLD CKD-EPI 2021: >90 ML/MIN/1.73M2
EOSINOPHIL # BLD AUTO: ABNORMAL 10*3/UL
EOSINOPHIL # BLD MANUAL: 0 10E3/UL (ref 0–0.7)
EOSINOPHIL NFR BLD AUTO: ABNORMAL %
EOSINOPHIL NFR BLD MANUAL: 0 %
ERYTHROCYTE [DISTWIDTH] IN BLOOD BY AUTOMATED COUNT: 15.7 % (ref 10–15)
GLUCOSE SERPL-MCNC: 81 MG/DL (ref 70–99)
GLUCOSE UR STRIP-MCNC: NEGATIVE MG/DL
HCT VFR BLD AUTO: 22 % (ref 40–53)
HGB BLD-MCNC: 7.7 G/DL (ref 13.3–17.7)
HGB UR QL STRIP: ABNORMAL
IMM GRANULOCYTES # BLD: ABNORMAL 10*3/UL
IMM GRANULOCYTES NFR BLD: ABNORMAL %
ISSUE DATE AND TIME: NORMAL
KETONES UR STRIP-MCNC: NEGATIVE MG/DL
LEUKOCYTE ESTERASE UR QL STRIP: NEGATIVE
LYMPHOCYTES # BLD AUTO: ABNORMAL 10*3/UL
LYMPHOCYTES # BLD MANUAL: 0.1 10E3/UL (ref 0.8–5.3)
LYMPHOCYTES NFR BLD AUTO: ABNORMAL %
LYMPHOCYTES NFR BLD MANUAL: 6 %
MAGNESIUM SERPL-MCNC: 2 MG/DL (ref 1.7–2.3)
MCH RBC QN AUTO: 32.4 PG (ref 26.5–33)
MCHC RBC AUTO-ENTMCNC: 35 G/DL (ref 31.5–36.5)
MCV RBC AUTO: 92 FL (ref 78–100)
MONOCYTES # BLD AUTO: ABNORMAL 10*3/UL
MONOCYTES # BLD MANUAL: 0 10E3/UL (ref 0–1.3)
MONOCYTES NFR BLD AUTO: ABNORMAL %
MONOCYTES NFR BLD MANUAL: 0 %
MUCOUS THREADS #/AREA URNS LPF: PRESENT /LPF
NEUTROPHILS # BLD AUTO: ABNORMAL 10*3/UL
NEUTROPHILS # BLD MANUAL: 2.2 10E3/UL (ref 1.6–8.3)
NEUTROPHILS NFR BLD AUTO: ABNORMAL %
NEUTROPHILS NFR BLD MANUAL: 94 %
NITRATE UR QL: NEGATIVE
NRBC # BLD AUTO: 0 10E3/UL
NRBC BLD AUTO-RTO: 0 /100
PH UR STRIP: 6 [PH] (ref 5–7)
PHOSPHATE SERPL-MCNC: 3.2 MG/DL (ref 2.5–4.5)
PLAT MORPH BLD: ABNORMAL
PLATELET # BLD AUTO: 75 10E3/UL (ref 150–450)
POTASSIUM SERPL-SCNC: 3.6 MMOL/L (ref 3.4–5.3)
POTASSIUM SERPL-SCNC: 3.6 MMOL/L (ref 3.4–5.3)
RBC # BLD AUTO: 2.38 10E6/UL (ref 4.4–5.9)
RBC MORPH BLD: ABNORMAL
RBC URINE: 1 /HPF
SODIUM SERPL-SCNC: 139 MMOL/L (ref 135–145)
SODIUM SERPL-SCNC: 139 MMOL/L (ref 135–145)
SP GR UR STRIP: 1.01 (ref 1–1.03)
UNIT ABO/RH: NORMAL
UNIT NUMBER: NORMAL
UNIT STATUS: NORMAL
UNIT TYPE ISBT: 9500
UROBILINOGEN UR STRIP-MCNC: NORMAL MG/DL
WBC # BLD AUTO: 2.3 10E3/UL (ref 4–11)
WBC URINE: 1 /HPF

## 2024-01-31 PROCEDURE — 80048 BASIC METABOLIC PNL TOTAL CA: CPT | Performed by: PHYSICIAN ASSISTANT

## 2024-01-31 PROCEDURE — 83735 ASSAY OF MAGNESIUM: CPT | Performed by: PHYSICIAN ASSISTANT

## 2024-01-31 PROCEDURE — 250N000011 HC RX IP 250 OP 636: Mod: JZ | Performed by: PHYSICIAN ASSISTANT

## 2024-01-31 PROCEDURE — 85007 BL SMEAR W/DIFF WBC COUNT: CPT | Performed by: PHYSICIAN ASSISTANT

## 2024-01-31 PROCEDURE — 250N000013 HC RX MED GY IP 250 OP 250 PS 637: Performed by: PHYSICIAN ASSISTANT

## 2024-01-31 PROCEDURE — 250N000013 HC RX MED GY IP 250 OP 250 PS 637

## 2024-01-31 PROCEDURE — 250N000011 HC RX IP 250 OP 636: Performed by: INTERNAL MEDICINE

## 2024-01-31 PROCEDURE — 250N000012 HC RX MED GY IP 250 OP 636 PS 637: Performed by: PHYSICIAN ASSISTANT

## 2024-01-31 PROCEDURE — 250N000011 HC RX IP 250 OP 636: Performed by: PHYSICIAN ASSISTANT

## 2024-01-31 PROCEDURE — 99233 SBSQ HOSP IP/OBS HIGH 50: CPT | Mod: FS | Performed by: PHYSICIAN ASSISTANT

## 2024-01-31 PROCEDURE — 206N000001 HC R&B BMT UMMC

## 2024-01-31 PROCEDURE — 84295 ASSAY OF SERUM SODIUM: CPT | Performed by: PHYSICIAN ASSISTANT

## 2024-01-31 PROCEDURE — 81001 URINALYSIS AUTO W/SCOPE: CPT | Performed by: PHYSICIAN ASSISTANT

## 2024-01-31 PROCEDURE — P9040 RBC LEUKOREDUCED IRRADIATED: HCPCS | Performed by: PHYSICIAN ASSISTANT

## 2024-01-31 PROCEDURE — 258N000003 HC RX IP 258 OP 636: Performed by: PHYSICIAN ASSISTANT

## 2024-01-31 PROCEDURE — 85027 COMPLETE CBC AUTOMATED: CPT | Performed by: PHYSICIAN ASSISTANT

## 2024-01-31 PROCEDURE — 84100 ASSAY OF PHOSPHORUS: CPT | Performed by: STUDENT IN AN ORGANIZED HEALTH CARE EDUCATION/TRAINING PROGRAM

## 2024-01-31 RX ADMIN — APIXABAN 5 MG: 5 TABLET, FILM COATED ORAL at 09:16

## 2024-01-31 RX ADMIN — ONDANSETRON HYDROCHLORIDE 8 MG: 8 TABLET, FILM COATED ORAL at 17:40

## 2024-01-31 RX ADMIN — METOPROLOL SUCCINATE 100 MG: 50 TABLET, EXTENDED RELEASE ORAL at 09:28

## 2024-01-31 RX ADMIN — PRASUGREL 10 MG: 10 TABLET, FILM COATED ORAL at 09:16

## 2024-01-31 RX ADMIN — VANCOMYCIN HYDROCHLORIDE 125 MG: 125 CAPSULE ORAL at 19:50

## 2024-01-31 RX ADMIN — MYCOPHENOLATE MOFETIL 1250 MG: 500 INJECTION, POWDER, LYOPHILIZED, FOR SOLUTION INTRAVENOUS at 09:14

## 2024-01-31 RX ADMIN — Medication 2 SPRAY: at 12:43

## 2024-01-31 RX ADMIN — ZOLPIDEM TARTRATE 5 MG: 5 TABLET, FILM COATED ORAL at 21:23

## 2024-01-31 RX ADMIN — NIFEDIPINE 30 MG: 30 TABLET, FILM COATED, EXTENDED RELEASE ORAL at 19:50

## 2024-01-31 RX ADMIN — DEXTROSE MONOHYDRATE 450 MCG: 50 INJECTION, SOLUTION INTRAVENOUS at 19:50

## 2024-01-31 RX ADMIN — ACYCLOVIR 800 MG: 800 TABLET ORAL at 09:15

## 2024-01-31 RX ADMIN — PANTOPRAZOLE SODIUM 40 MG: 40 TABLET, DELAYED RELEASE ORAL at 09:15

## 2024-01-31 RX ADMIN — LOPERAMIDE HYDROCHLORIDE 4 MG: 2 CAPSULE ORAL at 19:50

## 2024-01-31 RX ADMIN — Medication 5 CAPSULE: at 09:15

## 2024-01-31 RX ADMIN — MYCOPHENOLATE MOFETIL 1250 MG: 500 INJECTION, POWDER, LYOPHILIZED, FOR SOLUTION INTRAVENOUS at 19:50

## 2024-01-31 RX ADMIN — NIFEDIPINE 30 MG: 30 TABLET, FILM COATED, EXTENDED RELEASE ORAL at 09:28

## 2024-01-31 RX ADMIN — ACYCLOVIR 800 MG: 800 TABLET ORAL at 19:50

## 2024-01-31 RX ADMIN — Medication 5 ML: at 13:46

## 2024-01-31 RX ADMIN — CEFEPIME HYDROCHLORIDE 2 G: 2 INJECTION, POWDER, FOR SOLUTION INTRAVENOUS at 19:50

## 2024-01-31 RX ADMIN — CEFEPIME HYDROCHLORIDE 2 G: 2 INJECTION, POWDER, FOR SOLUTION INTRAVENOUS at 03:51

## 2024-01-31 RX ADMIN — OLANZAPINE 5 MG: 2.5 TABLET, FILM COATED ORAL at 21:23

## 2024-01-31 RX ADMIN — URSODIOL 300 MG: 300 CAPSULE ORAL at 09:16

## 2024-01-31 RX ADMIN — DEXTROSE MONOHYDRATE 20 ML: 50 INJECTION, SOLUTION INTRAVENOUS at 19:50

## 2024-01-31 RX ADMIN — VANCOMYCIN HYDROCHLORIDE 125 MG: 125 CAPSULE ORAL at 12:40

## 2024-01-31 RX ADMIN — APIXABAN 5 MG: 5 TABLET, FILM COATED ORAL at 19:50

## 2024-01-31 RX ADMIN — VANCOMYCIN HYDROCHLORIDE 125 MG: 125 CAPSULE ORAL at 09:14

## 2024-01-31 RX ADMIN — CEFEPIME HYDROCHLORIDE 2 G: 2 INJECTION, POWDER, FOR SOLUTION INTRAVENOUS at 12:40

## 2024-01-31 RX ADMIN — LOPERAMIDE HYDROCHLORIDE 4 MG: 2 CAPSULE ORAL at 09:16

## 2024-01-31 RX ADMIN — SIROLIMUS 5 MG: 2 TABLET ORAL at 09:15

## 2024-01-31 RX ADMIN — Medication 5 ML: at 19:51

## 2024-01-31 RX ADMIN — URSODIOL 300 MG: 300 CAPSULE ORAL at 19:50

## 2024-01-31 RX ADMIN — VANCOMYCIN HYDROCHLORIDE 125 MG: 125 CAPSULE ORAL at 17:40

## 2024-01-31 RX ADMIN — ONDANSETRON HYDROCHLORIDE 8 MG: 8 TABLET, FILM COATED ORAL at 09:16

## 2024-01-31 RX ADMIN — URSODIOL 300 MG: 300 CAPSULE ORAL at 13:46

## 2024-01-31 RX ADMIN — MICAFUNGIN SODIUM 150 MG: 50 INJECTION, POWDER, LYOPHILIZED, FOR SOLUTION INTRAVENOUS at 09:14

## 2024-01-31 RX ADMIN — ONDANSETRON HYDROCHLORIDE 8 MG: 8 TABLET, FILM COATED ORAL at 23:38

## 2024-01-31 RX ADMIN — LISINOPRIL 40 MG: 10 TABLET ORAL at 09:28

## 2024-01-31 ASSESSMENT — ACTIVITIES OF DAILY LIVING (ADL)
ADLS_ACUITY_SCORE: 22
ADLS_ACUITY_SCORE: 24
ADLS_ACUITY_SCORE: 22
ADLS_ACUITY_SCORE: 22
ADLS_ACUITY_SCORE: 24
ADLS_ACUITY_SCORE: 22

## 2024-01-31 NOTE — PROGRESS NOTES
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 Slept great. No N/V. Diarrhea improving. No CP, SOB, or new infectious symptoms. Tolerating PO intake well. Platelets remain >50k.     Review of Systems    Review of Systems: 10 point ROS negative unless stated in HPI   PHYSICAL EXAM      Weight     Wt Readings from Last 3 Encounters:   01/31/24 87.8 kg (193 lb 9.6 oz)   01/17/24 90.1 kg (198 lb 9.6 oz)   01/12/24 88.9 kg (196 lb)        KPS: 70    BP (!) 142/79 (BP Location: Right arm, Cuff Size: Adult Large)   Pulse 101   Temp 97.9  F (36.6  C) (Axillary)   Resp 18   Ht 1.725 m (5' 7.91\")   Wt 87.8 kg (193 lb 9.6 oz)   SpO2 97%   BMI 29.51 kg/m       General: NAD   Eyes: GARY, sclera anicteric   Lungs: CTA bilaterally  Cardiovascular: RRR, no M/R/G   Lymphatics: No edema  Neuro: A&O   Additional Findings: Powell site NT, no drainage.    Current aGVHD staging:  Skin 0, UGI 0, LGI 0, Liver 0 (keep in note through day +180 for allos)      LABS AND IMAGING: I have assessed all abnormal lab values for their clinical significance and any values considered clinically significant have been addressed in the assessment and plan.        Lab Results   Component Value Date    WBC 2.3 (L) 01/31/2024    ANEUTAUTO 2.0 01/28/2024    HGB 7.7 (L) 01/31/2024    HCT 22.0 (L) 01/31/2024    PLT 75 (L) 01/31/2024     01/31/2024     01/31/2024    POTASSIUM 3.6 01/31/2024    POTASSIUM 3.6 01/31/2024    CHLORIDE 113 (H) 01/31/2024    CO2 20 (L) 01/31/2024    GLC 81 01/31/2024 "    BUN 20.0 01/31/2024    CR 0.89 01/31/2024    MAG 2.0 01/31/2024    INR 1.23 (H) 01/29/2024         SYSTEMS-BASED ASSESSMENT AND PLAN     Ziggy Hallman is a 48 year old year old male with a PMH of MDS, hx of multiple clots and MI undergoing a MA (Bu/Flu) prep for an 8/8 URD PBSCT day 6    BMT/IEC PROTOCOL for 2015-29  Chemo Prep:   Day -6: Admit  Day -5 through Day -2: Busulfan/Fludarabine  Day -1: Rest.   Keppra prophylaxis   Avoiding Tylenol for 72 hours befre and after last dose of Busulfan due to drug interaction. Ok to start day +2.     8/8 DP permissive. Cell dose-9.24x10^6  ABO: Donor: O+ Recipient A-  (Minor incompatability, no flush required)   GSCF plan: GCSF to start day +5 until ANC >1500 for 3 consecutive days    HEME/COAG  #Risk of Pancytopenia 2/2 chemo- anemia  - Transfusion parameters: hemoglobin <8 (cardiac hx), platelets <10    #Recurrent arterial thromboembolic events:  He has long history of various events including renal infarcts (2011, 2013, 2015), left popliteal embolic episode requiring surgery, anticoagulation (2011), right carotid artery embolic episode with TIA/stroke-like symptoms (2012), embolic MI (2015), gangrenous right toe requiring vascular surgery/amputation (2017), 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   Extensive hypercoagulable workup to date has been unrevealing.  JAK2 testing negative.  PNH testing negative.  Elevated IgA of unknown significance, no evidence of plasma cell disorder.  - On Eliquis and Prasugrel, continue until platelets <50k. Hematology recommending to continue both medications until platelets are less than or equal to 50k, then transition to ppx lovenox dosing until platelets less than or equal to 25k and then STOP. Restart regimen once engrafted and platelets stable.     IMMUNOCOMPROMISED  #Febrile Neutropenia   Spiked first fever on 1/28 PM, cultures and cxr completed. Cefepime started. Possibly  2/2 to T-cell expansion. No hypotension  - Cefepime 1/28-x     C. Diff - Admit c. Diff triple+ - Start PO vanc 1/21 for 14 day course.   -Prophylaxis plan: ACV LD, Megan HD, Levaquin, Bactrim +28    RISK OF GVHD  - Prophylaxis: PtC day +3, +4, , Siro/MMF to start day +5     CARDIOVASCULAR  #CAD  #Hx of CABG  #HTN   -PTA metoprolol, Lisinopril dose increased 2/2 HTN, Amlodipine 5mg BID switch to nifidepine ER 30mg BID, labetolol PRN;     #Hyperlipidemia: Holding atorvastatin peritransplant  - Risk of cardiomyopathy:  Baseline EF 50-55%, Global left ventricular function mildly reduced. Grade 1 or early diastolic dysfunction.   - Risk of arrhythmia: Baseline EKG showed NSR, Qtc -425    GI/NUTRITION  - Ulcer prophylaxis: Protonix   - VOD Prophylaxis: Ursodiol  - Risk of nausea/vomiting due to chemo: Ativan, Compazine, Zofran   - Risk of malnutrition: Nutrition to follow.     RENAL/ELECTROLYTES/  #Hypervolemia 2/2 cytoxan flush - diurese as needed 1/30- Lasix 10mg   #Proteinuria- possibly exacerbated by persistent HTN (See CV),   #BUN/Cr elevation- FENA, osms, Na+, UA work up indicate prerenal etiology, likely intravascular depletion 2/2 osmotic diuresis with proteinuria. Will encourage PO fluid intake as Cr improved 1/23, IVF boluses as necessary, control HTN. RESOLVED.  - Hypocalcemia in setting hypoalbuminemia, iCa2+, corrected WNL   - Electrolyte management: replace per sliding scale    Psych:    #Anxiety: admits to feeling anxious.  Start zyprexa 5mg HS.  Connect w/ SW.  Psychaitry consult added hydroxyzine.  #Insomnia- PTA ambien, Trazadone added and increased to 100mg. - Ambien and trazadone held during cytoxan period due to frequent urination and concern for falls. Plan to resume following cytoxan flush perhaps 1/31.     SOCIAL DETERMINANTS  - Caregiver: Family   - Financial/insurance concerns: See SW notes       Known issues that I take into account for medical decisions, with salient changes to the plan  "considering these complexities noted above.    Patient Active Problem List   Diagnosis    Amegakaryocytic thrombocytopenia (H)    Anemia due to bone marrow failure, unspecified bone marrow failure type (H)    Aplastic anemia (H)     Clinically Significant Risk Factors              # Hypoalbuminemia: Lowest albumin = 2 g/dL at 1/22/2024  4:12 AM, will monitor as appropriate    # Coagulation Defect: INR = 1.23 (Ref range: 0.85 - 1.15) and/or PTT = 25 Seconds (Ref range: 22 - 38 Seconds), will monitor for bleeding  # Thrombocytopenia: Lowest platelets = 75 in last 2 days, will monitor for bleeding          # Overweight: Estimated body mass index is 29.51 kg/m  as calculated from the following:    Height as of this encounter: 1.725 m (5' 7.91\").    Weight as of this encounter: 87.8 kg (193 lb 9.6 oz).                 Dispo: Remain admitted through engraftment    I spent 30 minutes in the care of this patient today, which included time necessary for preparation for the visit, obtaining history, ordering medications/tests/procedures as medically indicated, review of pertinent medical literature, counseling of the patient, communication of recommendations to the care team, and documentation time.    Brenda Brunson PA-C  [phone]  "

## 2024-01-31 NOTE — PLAN OF CARE
"BP (!) 152/77 (Cuff Size: Adult Regular)   Pulse 97   Temp 99.1  F (37.3  C) (Oral)   Resp 18   Ht 1.725 m (5' 7.91\")   Wt 88.1 kg (194 lb 4.8 oz)   SpO2 99%   BMI 29.62 kg/m      T-max 99.2. Persistently hypertensive, though within parameters. HR 90s-low 100s. RR and O2 sats stable on RA. No complaints of pain. Nausea controlled on scheduled Zofran. Appetite poor; nothing tastes good. Frequent loose/watery stools and intermittent stool incontinence. Cytoxan fluid flush finished this afternoon and pt had been meeting voiding parameters up until that point, though difficult to assess true output given frequency of incontinent episodes (pt not saving urine when rushing to bathroom to have a BM). Metamucil in use and imodium x1 given on shift. Lasix given x1 per provider order. Afternoon weight and electrolytes stable. MD notified of positive orthostatic VS this evening; no immediate intervention. Pt walking the halls this evening. Showered and CHG's done. Continue POC.     Goal Outcome Evaluation:    Plan of Care Reviewed With: patient  Overall Patient Progress: improvingOverall Patient Progress: improving    Problem: Adult Inpatient Plan of Care  Goal: Optimal Comfort and Wellbeing  Outcome: Not Progressing  Goal: Readiness for Transition of Care  Outcome: Not Progressing  Flowsheets (Taken 1/30/2024 1800)  Anticipated Changes Related to Illness: inability to care for self  Intervention: Mutually Develop Transition Plan  Recent Flowsheet Documentation  Taken 1/30/2024 1800 by Nayla Arias, RN  Anticipated Changes Related to Illness: inability to care for self     Problem: Stem Cell/Bone Marrow Transplant  Goal: Diarrhea Symptom Control  Outcome: Not Progressing  Intervention: Manage Diarrhea  Recent Flowsheet Documentation  Taken 1/30/2024 0900 by Nayla Arias, RN  Skin Protection: adhesive use limited  Fluid/Electrolyte Management:   fluids adjusted   electrolyte supplement adjusted  Perineal Care: " "perineal hygiene encouraged  Goal: Optimal Nutrition Intake  Outcome: Not Progressing  Intervention: Minimize and Manage Barriers to Oral Intake  Recent Flowsheet Documentation  Taken 1/30/2024 0900 by Nayla Arias RN  Oral Nutrition Promotion: physical activity promoted     Problem: Adult Inpatient Plan of Care  Goal: Plan of Care Review  Description: The Plan of Care Review/Shift note should be completed every shift.  The Outcome Evaluation is a brief statement about your assessment that the patient is improving, declining, or no change.  This information will be displayed automatically on your shift  note.  Outcome: Progressing  Flowsheets (Taken 1/30/2024 1800)  Plan of Care Reviewed With: patient  Overall Patient Progress: improving  Goal: Patient-Specific Goal (Individualized)  Description: You can add care plan individualizations to a care plan. Examples of Individualization might be:  \"Parent requests to be called daily at 9am for status\", \"I have a hard time hearing out of my right ear\", or \"Do not touch me to wake me up as it startles  me\".  Outcome: Progressing  Goal: Absence of Hospital-Acquired Illness or Injury  Outcome: Progressing  Intervention: Identify and Manage Fall Risk  Recent Flowsheet Documentation  Taken 1/30/2024 1600 by Nayla Arias, RN  Safety Promotion/Fall Prevention:   safety round/check completed   assistive device/personal items within reach   clutter free environment maintained   nonskid shoes/slippers when out of bed   patient and family education   room organization consistent  Taken 1/30/2024 1251 by Nayla Arias, RN  Safety Promotion/Fall Prevention:   safety round/check completed   assistive device/personal items within reach   clutter free environment maintained   nonskid shoes/slippers when out of bed   patient and family education   room organization consistent  Taken 1/30/2024 0900 by Nayla Arias, RN  Safety Promotion/Fall Prevention:   safety round/check " completed   assistive device/personal items within reach   clutter free environment maintained   nonskid shoes/slippers when out of bed   patient and family education   room organization consistent  Intervention: Prevent Skin Injury  Recent Flowsheet Documentation  Taken 1/30/2024 0900 by Nayla Arias RN  Body Position: position changed independently  Skin Protection: adhesive use limited  Device Skin Pressure Protection: adhesive use limited  Intervention: Prevent Infection  Recent Flowsheet Documentation  Taken 1/30/2024 1600 by Nayla Arias RN  Infection Prevention:   cohorting utilized   environmental surveillance performed   equipment surfaces disinfected   hand hygiene promoted   personal protective equipment utilized   rest/sleep promoted   single patient room provided   visitors restricted/screened  Taken 1/30/2024 1251 by Nayla Arias RN  Infection Prevention:   cohorting utilized   environmental surveillance performed   equipment surfaces disinfected   hand hygiene promoted   personal protective equipment utilized   rest/sleep promoted   single patient room provided   visitors restricted/screened  Taken 1/30/2024 0900 by Nayla Arias RN  Infection Prevention:   cohorting utilized   environmental surveillance performed   equipment surfaces disinfected   hand hygiene promoted   personal protective equipment utilized   rest/sleep promoted   single patient room provided   visitors restricted/screened     Problem: Stem Cell/Bone Marrow Transplant  Goal: Optimal Coping with Transplant  Outcome: Progressing  Goal: Symptom-Free Urinary Elimination  Outcome: Progressing  Intervention: Prevent or Manage Bladder Irritation  Recent Flowsheet Documentation  Taken 1/30/2024 0900 by Nayla Arias RN  Urinary Elimination Promotion:   absorbent pad/diaper use encouraged   toileting scheduled  Hyperhydration Management: encouraged to void  Goal: Improved Activity Tolerance  Outcome:  Progressing  Intervention: Promote Improved Energy  Recent Flowsheet Documentation  Taken 1/30/2024 0900 by Nayla Arias RN  Fatigue Management: activity schedule adjusted  Activity Management: activity adjusted per tolerance  Goal: Blood Counts Within Acceptable Range  Outcome: Progressing  Intervention: Monitor and Manage Hematologic Symptoms  Recent Flowsheet Documentation  Taken 1/30/2024 1600 by Nayla Arias RN  Medication Review/Management:   medications reviewed   high-risk medications identified  Taken 1/30/2024 1251 by Nayla Arias RN  Medication Review/Management:   medications reviewed   high-risk medications identified  Taken 1/30/2024 0900 by Nayla Arias RN  Bleeding Precautions:   blood pressure closely monitored   foot protection facilitated   gentle oral care promoted   monitored for signs of bleeding  Medication Review/Management:   medications reviewed   high-risk medications identified  Goal: Absence of Hypersensitivity Reaction  Outcome: Progressing  Intervention: Manage Infusion-Related Hypersensitivity  Recent Flowsheet Documentation  Taken 1/30/2024 0900 by Nayla Arias RN  Stabilization Measures: blood products administered  Goal: Absence of Infection  Outcome: Progressing  Intervention: Prevent and Manage Infection  Recent Flowsheet Documentation  Taken 1/30/2024 1600 by Nayla Arias RN  Infection Prevention:   cohorting utilized   environmental surveillance performed   equipment surfaces disinfected   hand hygiene promoted   personal protective equipment utilized   rest/sleep promoted   single patient room provided   visitors restricted/screened  Isolation Precautions:   contact precautions maintained   enteric precautions maintained  Taken 1/30/2024 1251 by Nayla Arias RN  Infection Prevention:   cohorting utilized   environmental surveillance performed   equipment surfaces disinfected   hand hygiene promoted   personal protective equipment utilized    rest/sleep promoted   single patient room provided   visitors restricted/screened  Isolation Precautions:   contact precautions maintained   enteric precautions maintained  Taken 1/30/2024 0900 by Nayla Arias RN  Infection Prevention:   cohorting utilized   environmental surveillance performed   equipment surfaces disinfected   hand hygiene promoted   personal protective equipment utilized   rest/sleep promoted   single patient room provided   visitors restricted/screened  Infection Management: aseptic technique maintained  Isolation Precautions:   contact precautions maintained   enteric precautions maintained  Goal: Improved Oral Mucous Membrane Health and Integrity  Outcome: Progressing  Intervention: Promote Oral Comfort and Health  Recent Flowsheet Documentation  Taken 1/30/2024 0900 by Nayla Arias RN  Oral Mucous Membrane Protection:   nonirritating oral fluids promoted   lip/mouth moisturizer applied  Oral Care: oral rinse provided  Goal: Nausea and Vomiting Symptom Relief  Outcome: Progressing  Intervention: Prevent and Manage Nausea and Vomiting  Recent Flowsheet Documentation  Taken 1/30/2024 0900 by Nayla Arias RN  Nausea/Vomiting Interventions:   antiemetic   nausea triggers minimized

## 2024-01-31 NOTE — PLAN OF CARE
"/58 (BP Location: Right arm)   Pulse 86   Temp 98.2  F (36.8  C) (Oral)   Resp 16   Ht 1.725 m (5' 7.91\")   Wt 88.1 kg (194 lb 4.8 oz)   SpO2 99%   BMI 29.62 kg/m       Patient afebrile, vital signs stable, no reports of pain or nausea. Patient received ativan x1 for sleep, and imodium x1 for diarrhea. Patient getting blood this morning. Patient will need education on the importance of saving urine. Patient assist level independent. Continue with plan of care.    Goal Outcome Evaluation:  Problem: Adult Inpatient Plan of Care  Goal: Optimal Comfort and Wellbeing  Outcome: Progressing     Problem: Stem Cell/Bone Marrow Transplant  Goal: Optimal Coping with Transplant  Outcome: Progressing  Goal: Symptom-Free Urinary Elimination  Outcome: Progressing  Goal: Diarrhea Symptom Control  Outcome: Progressing  Goal: Improved Activity Tolerance  Outcome: Progressing  Intervention: Promote Improved Energy  Recent Flowsheet Documentation  Taken 1/31/2024 0000 by Misael May RN  Activity Management: activity adjusted per tolerance  Taken 1/30/2024 2008 by Misael May RN  Activity Management: activity adjusted per tolerance  Goal: Blood Counts Within Acceptable Range  Outcome: Progressing  Intervention: Monitor and Manage Hematologic Symptoms  Recent Flowsheet Documentation  Taken 1/31/2024 0000 by Misael May RN  Bleeding Precautions:   blood pressure closely monitored   foot protection facilitated   gentle oral care promoted   monitored for signs of bleeding  Medication Review/Management:   medications reviewed   high-risk medications identified  Taken 1/30/2024 2008 by Misael May RN  Bleeding Precautions:   blood pressure closely monitored   foot protection facilitated   gentle oral care promoted   monitored for signs of bleeding  Medication Review/Management:   medications reviewed   high-risk medications identified  Goal: Absence of Hypersensitivity Reaction  Outcome: " Progressing  Goal: Absence of Infection  Outcome: Progressing  Intervention: Prevent and Manage Infection  Recent Flowsheet Documentation  Taken 1/31/2024 0000 by Misael May RN  Infection Prevention:   cohorting utilized   environmental surveillance performed   equipment surfaces disinfected   hand hygiene promoted   personal protective equipment utilized   rest/sleep promoted   single patient room provided   visitors restricted/screened  Isolation Precautions:   contact precautions maintained   enteric precautions maintained  Taken 1/30/2024 2008 by Misael May RN  Infection Prevention:   cohorting utilized   environmental surveillance performed   equipment surfaces disinfected   hand hygiene promoted   personal protective equipment utilized   rest/sleep promoted   single patient room provided   visitors restricted/screened  Infection Management: aseptic technique maintained  Isolation Precautions:   contact precautions maintained   enteric precautions maintained  Goal: Improved Oral Mucous Membrane Health and Integrity  Outcome: Progressing  Goal: Nausea and Vomiting Symptom Relief  Outcome: Progressing

## 2024-02-01 ENCOUNTER — DOCUMENTATION ONLY (OUTPATIENT)
Dept: ONCOLOGY | Facility: CLINIC | Age: 49
End: 2024-02-01

## 2024-02-01 ENCOUNTER — APPOINTMENT (OUTPATIENT)
Dept: OCCUPATIONAL THERAPY | Facility: CLINIC | Age: 49
DRG: 014 | End: 2024-02-01
Attending: INTERNAL MEDICINE
Payer: COMMERCIAL

## 2024-02-01 LAB
ALBUMIN SERPL BCG-MCNC: 2.2 G/DL (ref 3.5–5.2)
ALP SERPL-CCNC: 50 U/L (ref 40–150)
ALT SERPL W P-5'-P-CCNC: 27 U/L (ref 0–70)
ANION GAP SERPL CALCULATED.3IONS-SCNC: 6 MMOL/L (ref 7–15)
AST SERPL W P-5'-P-CCNC: 28 U/L (ref 0–45)
BASOPHILS # BLD AUTO: ABNORMAL 10*3/UL
BASOPHILS # BLD MANUAL: 0 10E3/UL (ref 0–0.2)
BASOPHILS NFR BLD AUTO: ABNORMAL %
BASOPHILS NFR BLD MANUAL: 0 %
BILIRUB SERPL-MCNC: 0.3 MG/DL
BLD PROD TYP BPU: NORMAL
BLOOD COMPONENT TYPE: NORMAL
BUN SERPL-MCNC: 21.2 MG/DL (ref 6–20)
CALCIUM SERPL-MCNC: 8.2 MG/DL (ref 8.6–10)
CHLORIDE SERPL-SCNC: 113 MMOL/L (ref 98–107)
CODING SYSTEM: NORMAL
CREAT SERPL-MCNC: 0.88 MG/DL (ref 0.67–1.17)
CROSSMATCH: NORMAL
DEPRECATED HCO3 PLAS-SCNC: 21 MMOL/L (ref 22–29)
EGFRCR SERPLBLD CKD-EPI 2021: >90 ML/MIN/1.73M2
EOSINOPHIL # BLD AUTO: ABNORMAL 10*3/UL
EOSINOPHIL # BLD MANUAL: 0 10E3/UL (ref 0–0.7)
EOSINOPHIL NFR BLD AUTO: ABNORMAL %
EOSINOPHIL NFR BLD MANUAL: 4 %
ERYTHROCYTE [DISTWIDTH] IN BLOOD BY AUTOMATED COUNT: 14.7 % (ref 10–15)
GLUCOSE SERPL-MCNC: 114 MG/DL (ref 70–99)
HCT VFR BLD AUTO: 22.3 % (ref 40–53)
HGB BLD-MCNC: 7.7 G/DL (ref 13.3–17.7)
IMM GRANULOCYTES # BLD: ABNORMAL 10*3/UL
IMM GRANULOCYTES NFR BLD: ABNORMAL %
ISSUE DATE AND TIME: NORMAL
LYMPHOCYTES # BLD AUTO: ABNORMAL 10*3/UL
LYMPHOCYTES # BLD MANUAL: 0.1 10E3/UL (ref 0.8–5.3)
LYMPHOCYTES NFR BLD AUTO: ABNORMAL %
LYMPHOCYTES NFR BLD MANUAL: 11 %
MAGNESIUM SERPL-MCNC: 2 MG/DL (ref 1.7–2.3)
MCH RBC QN AUTO: 31.7 PG (ref 26.5–33)
MCHC RBC AUTO-ENTMCNC: 34.5 G/DL (ref 31.5–36.5)
MCV RBC AUTO: 92 FL (ref 78–100)
MONOCYTES # BLD AUTO: ABNORMAL 10*3/UL
MONOCYTES # BLD MANUAL: 0 10E3/UL (ref 0–1.3)
MONOCYTES NFR BLD AUTO: ABNORMAL %
MONOCYTES NFR BLD MANUAL: 0 %
NEUTROPHILS # BLD AUTO: ABNORMAL 10*3/UL
NEUTROPHILS # BLD MANUAL: 0.9 10E3/UL (ref 1.6–8.3)
NEUTROPHILS NFR BLD AUTO: ABNORMAL %
NEUTROPHILS NFR BLD MANUAL: 85 %
NRBC # BLD AUTO: 0 10E3/UL
NRBC BLD AUTO-RTO: 0 /100
PHOSPHATE SERPL-MCNC: 3.6 MG/DL (ref 2.5–4.5)
PLAT MORPH BLD: ABNORMAL
PLATELET # BLD AUTO: 52 10E3/UL (ref 150–450)
POTASSIUM SERPL-SCNC: 3.6 MMOL/L (ref 3.4–5.3)
PROT SERPL-MCNC: 4.9 G/DL (ref 6.4–8.3)
RBC # BLD AUTO: 2.43 10E6/UL (ref 4.4–5.9)
RBC MORPH BLD: ABNORMAL
SIROLIMUS BLD-MCNC: 3.9 UG/L (ref 5–15)
SODIUM SERPL-SCNC: 140 MMOL/L (ref 135–145)
TME LAST DOSE: ABNORMAL H
TME LAST DOSE: ABNORMAL H
UNIT ABO/RH: NORMAL
UNIT NUMBER: NORMAL
UNIT STATUS: NORMAL
UNIT TYPE ISBT: 9500
WBC # BLD AUTO: 1 10E3/UL (ref 4–11)

## 2024-02-01 PROCEDURE — 85007 BL SMEAR W/DIFF WBC COUNT: CPT | Performed by: PHYSICIAN ASSISTANT

## 2024-02-01 PROCEDURE — 250N000013 HC RX MED GY IP 250 OP 250 PS 637: Performed by: PHYSICIAN ASSISTANT

## 2024-02-01 PROCEDURE — 85027 COMPLETE CBC AUTOMATED: CPT | Performed by: PHYSICIAN ASSISTANT

## 2024-02-01 PROCEDURE — 83735 ASSAY OF MAGNESIUM: CPT | Performed by: STUDENT IN AN ORGANIZED HEALTH CARE EDUCATION/TRAINING PROGRAM

## 2024-02-01 PROCEDURE — 250N000013 HC RX MED GY IP 250 OP 250 PS 637

## 2024-02-01 PROCEDURE — 99233 SBSQ HOSP IP/OBS HIGH 50: CPT | Mod: FS | Performed by: PHYSICIAN ASSISTANT

## 2024-02-01 PROCEDURE — 250N000012 HC RX MED GY IP 250 OP 636 PS 637: Performed by: PHYSICIAN ASSISTANT

## 2024-02-01 PROCEDURE — 250N000011 HC RX IP 250 OP 636: Mod: JZ | Performed by: PHYSICIAN ASSISTANT

## 2024-02-01 PROCEDURE — 258N000003 HC RX IP 258 OP 636: Performed by: PHYSICIAN ASSISTANT

## 2024-02-01 PROCEDURE — 250N000011 HC RX IP 250 OP 636: Performed by: INTERNAL MEDICINE

## 2024-02-01 PROCEDURE — 84100 ASSAY OF PHOSPHORUS: CPT | Performed by: STUDENT IN AN ORGANIZED HEALTH CARE EDUCATION/TRAINING PROGRAM

## 2024-02-01 PROCEDURE — 250N000011 HC RX IP 250 OP 636: Performed by: PHYSICIAN ASSISTANT

## 2024-02-01 PROCEDURE — 80053 COMPREHEN METABOLIC PANEL: CPT | Performed by: PHYSICIAN ASSISTANT

## 2024-02-01 PROCEDURE — 80195 ASSAY OF SIROLIMUS: CPT | Performed by: PHYSICIAN ASSISTANT

## 2024-02-01 PROCEDURE — 206N000001 HC R&B BMT UMMC

## 2024-02-01 PROCEDURE — 97530 THERAPEUTIC ACTIVITIES: CPT | Mod: GO

## 2024-02-01 PROCEDURE — P9040 RBC LEUKOREDUCED IRRADIATED: HCPCS | Performed by: PHYSICIAN ASSISTANT

## 2024-02-01 RX ORDER — ENOXAPARIN SODIUM 100 MG/ML
40 INJECTION SUBCUTANEOUS EVERY 24 HOURS
Status: DISCONTINUED | OUTPATIENT
Start: 2024-02-01 | End: 2024-02-01

## 2024-02-01 RX ORDER — ENOXAPARIN SODIUM 100 MG/ML
40 INJECTION SUBCUTANEOUS EVERY 24 HOURS
Status: DISCONTINUED | OUTPATIENT
Start: 2024-02-02 | End: 2024-02-07

## 2024-02-01 RX ORDER — SIROLIMUS 2 MG/1
4 TABLET, FILM COATED ORAL ONCE
Status: COMPLETED | OUTPATIENT
Start: 2024-02-01 | End: 2024-02-01

## 2024-02-01 RX ADMIN — ACYCLOVIR 800 MG: 800 TABLET ORAL at 08:49

## 2024-02-01 RX ADMIN — OLANZAPINE 5 MG: 2.5 TABLET, FILM COATED ORAL at 21:11

## 2024-02-01 RX ADMIN — MYCOPHENOLATE MOFETIL 1250 MG: 500 INJECTION, POWDER, LYOPHILIZED, FOR SOLUTION INTRAVENOUS at 19:41

## 2024-02-01 RX ADMIN — Medication 5 CAPSULE: at 08:48

## 2024-02-01 RX ADMIN — Medication 5 ML: at 12:55

## 2024-02-01 RX ADMIN — NIFEDIPINE 30 MG: 30 TABLET, FILM COATED, EXTENDED RELEASE ORAL at 08:48

## 2024-02-01 RX ADMIN — SIROLIMUS 5 MG: 2 TABLET ORAL at 08:48

## 2024-02-01 RX ADMIN — METOPROLOL SUCCINATE 100 MG: 50 TABLET, EXTENDED RELEASE ORAL at 08:49

## 2024-02-01 RX ADMIN — CEFEPIME HYDROCHLORIDE 2 G: 2 INJECTION, POWDER, FOR SOLUTION INTRAVENOUS at 03:50

## 2024-02-01 RX ADMIN — LORAZEPAM 1 MG: 0.5 TABLET ORAL at 21:11

## 2024-02-01 RX ADMIN — LEVOFLOXACIN 250 MG: 250 TABLET, FILM COATED ORAL at 12:55

## 2024-02-01 RX ADMIN — URSODIOL 300 MG: 300 CAPSULE ORAL at 19:42

## 2024-02-01 RX ADMIN — PANTOPRAZOLE SODIUM 40 MG: 40 TABLET, DELAYED RELEASE ORAL at 08:48

## 2024-02-01 RX ADMIN — ACYCLOVIR 800 MG: 800 TABLET ORAL at 19:42

## 2024-02-01 RX ADMIN — NIFEDIPINE 30 MG: 30 TABLET, FILM COATED, EXTENDED RELEASE ORAL at 19:42

## 2024-02-01 RX ADMIN — APIXABAN 5 MG: 5 TABLET, FILM COATED ORAL at 08:48

## 2024-02-01 RX ADMIN — DEXTROSE MONOHYDRATE 450 MCG: 50 INJECTION, SOLUTION INTRAVENOUS at 19:41

## 2024-02-01 RX ADMIN — LISINOPRIL 40 MG: 10 TABLET ORAL at 08:48

## 2024-02-01 RX ADMIN — VANCOMYCIN HYDROCHLORIDE 125 MG: 125 CAPSULE ORAL at 12:55

## 2024-02-01 RX ADMIN — Medication 2 SPRAY: at 08:50

## 2024-02-01 RX ADMIN — DEXTROSE MONOHYDRATE 20 ML: 50 INJECTION, SOLUTION INTRAVENOUS at 19:41

## 2024-02-01 RX ADMIN — Medication 2 SPRAY: at 15:30

## 2024-02-01 RX ADMIN — URSODIOL 300 MG: 300 CAPSULE ORAL at 13:00

## 2024-02-01 RX ADMIN — SODIUM CHLORIDE 500 ML: 9 INJECTION, SOLUTION INTRAVENOUS at 10:59

## 2024-02-01 RX ADMIN — Medication 5 ML: at 03:50

## 2024-02-01 RX ADMIN — VANCOMYCIN HYDROCHLORIDE 125 MG: 125 CAPSULE ORAL at 19:42

## 2024-02-01 RX ADMIN — MYCOPHENOLATE MOFETIL 1250 MG: 500 INJECTION, POWDER, LYOPHILIZED, FOR SOLUTION INTRAVENOUS at 08:44

## 2024-02-01 RX ADMIN — SIROLIMUS 4 MG: 2 TABLET ORAL at 15:30

## 2024-02-01 RX ADMIN — MICAFUNGIN SODIUM 150 MG: 50 INJECTION, POWDER, LYOPHILIZED, FOR SOLUTION INTRAVENOUS at 08:51

## 2024-02-01 RX ADMIN — VANCOMYCIN HYDROCHLORIDE 125 MG: 125 CAPSULE ORAL at 08:48

## 2024-02-01 RX ADMIN — URSODIOL 300 MG: 300 CAPSULE ORAL at 08:48

## 2024-02-01 RX ADMIN — PRASUGREL 10 MG: 10 TABLET, FILM COATED ORAL at 08:48

## 2024-02-01 ASSESSMENT — ACTIVITIES OF DAILY LIVING (ADL)
ADLS_ACUITY_SCORE: 22
ADLS_ACUITY_SCORE: 22
ADLS_ACUITY_SCORE: 24
ADLS_ACUITY_SCORE: 22
ADLS_ACUITY_SCORE: 24
ADLS_ACUITY_SCORE: 24
ADLS_ACUITY_SCORE: 22
ADLS_ACUITY_SCORE: 24
ADLS_ACUITY_SCORE: 22
ADLS_ACUITY_SCORE: 24
ADLS_ACUITY_SCORE: 22
ADLS_ACUITY_SCORE: 22

## 2024-02-01 NOTE — PLAN OF CARE
"/77 (BP Location: Right arm)   Pulse 109   Temp 98.2  F (36.8  C) (Axillary)   Resp 18   Ht 1.725 m (5' 7.91\")   Wt 87.8 kg (193 lb 9.6 oz)   SpO2 99%   BMI 29.51 kg/m      Afebrile. Hypertensive this AM, BP's improved throughout the day after scheduled antihypertensives. Remainder of VSS on RA. No complaints of pain or nausea; on scheduled Zofran. Appetite better today. Diarrhea improving with PRN imodium BID and scheduled metamucil. Pt reported one episode of hematuria this afternoon, though difficult to assess because void was in the toilet. UA ordered and sent this evening. Only occasionally saving urine despite ongoing re-education. Pt walking the halls this evening. Showered and CHG's done. Continue POC.      Goal Outcome Evaluation:    Plan of Care Reviewed With: patient  Overall Patient Progress: improvingOverall Patient Progress: improving  Problem: Adult Inpatient Plan of Care  Goal: Plan of Care Review  Description: The Plan of Care Review/Shift note should be completed every shift.  The Outcome Evaluation is a brief statement about your assessment that the patient is improving, declining, or no change.  This information will be displayed automatically on your shift  note.  Outcome: Progressing  Flowsheets (Taken 1/31/2024 1931)  Plan of Care Reviewed With: patient  Overall Patient Progress: improving  Goal: Patient-Specific Goal (Individualized)  Description: You can add care plan individualizations to a care plan. Examples of Individualization might be:  \"Parent requests to be called daily at 9am for status\", \"I have a hard time hearing out of my right ear\", or \"Do not touch me to wake me up as it startles  me\".  Outcome: Progressing  Goal: Absence of Hospital-Acquired Illness or Injury  Outcome: Progressing  Intervention: Identify and Manage Fall Risk  Recent Flowsheet Documentation  Taken 1/31/2024 1200 by Nayla Arias RN  Safety Promotion/Fall Prevention:   safety round/check " completed   assistive device/personal items within reach   clutter free environment maintained   nonskid shoes/slippers when out of bed   patient and family education   room organization consistent  Taken 1/31/2024 0900 by Nayla Arias RN  Safety Promotion/Fall Prevention:   safety round/check completed   assistive device/personal items within reach   clutter free environment maintained   nonskid shoes/slippers when out of bed   patient and family education   room organization consistent  Intervention: Prevent Skin Injury  Recent Flowsheet Documentation  Taken 1/31/2024 0900 by Nayla Arias RN  Body Position: position changed independently  Skin Protection: adhesive use limited  Device Skin Pressure Protection: adhesive use limited  Intervention: Prevent Infection  Recent Flowsheet Documentation  Taken 1/31/2024 1200 by Nayla Arias RN  Infection Prevention:   cohorting utilized   environmental surveillance performed   equipment surfaces disinfected   hand hygiene promoted   personal protective equipment utilized   rest/sleep promoted   single patient room provided   visitors restricted/screened  Taken 1/31/2024 0900 by Nayla Arias RN  Infection Prevention:   cohorting utilized   environmental surveillance performed   equipment surfaces disinfected   hand hygiene promoted   personal protective equipment utilized   rest/sleep promoted   single patient room provided   visitors restricted/screened  Goal: Optimal Comfort and Wellbeing  Outcome: Progressing  Goal: Readiness for Transition of Care  Outcome: Progressing     Problem: Stem Cell/Bone Marrow Transplant  Goal: Optimal Coping with Transplant  Outcome: Progressing  Goal: Symptom-Free Urinary Elimination  Outcome: Progressing  Intervention: Prevent or Manage Bladder Irritation  Recent Flowsheet Documentation  Taken 1/31/2024 0900 by Nayla Arias RN  Urinary Elimination Promotion:   absorbent pad/diaper use encouraged   toileting  scheduled  Hyperhydration Management: encouraged to void  Goal: Diarrhea Symptom Control  Outcome: Progressing  Intervention: Manage Diarrhea  Recent Flowsheet Documentation  Taken 1/31/2024 0900 by Nayla Arias RN  Skin Protection: adhesive use limited  Fluid/Electrolyte Management:   fluids adjusted   electrolyte supplement adjusted  Perineal Care: perineal hygiene encouraged  Goal: Improved Activity Tolerance  Outcome: Progressing  Intervention: Promote Improved Energy  Recent Flowsheet Documentation  Taken 1/31/2024 0900 by Nayla Arias RN  Fatigue Management: activity schedule adjusted  Activity Management: activity adjusted per tolerance  Goal: Blood Counts Within Acceptable Range  Outcome: Progressing  Intervention: Monitor and Manage Hematologic Symptoms  Recent Flowsheet Documentation  Taken 1/31/2024 1200 by Nayla Arias RN  Medication Review/Management:   medications reviewed   high-risk medications identified  Taken 1/31/2024 0900 by Nayla Arias RN  Bleeding Precautions:   blood pressure closely monitored   foot protection facilitated   gentle oral care promoted   monitored for signs of bleeding  Medication Review/Management:   medications reviewed   high-risk medications identified  Goal: Absence of Hypersensitivity Reaction  Outcome: Progressing  Intervention: Manage Infusion-Related Hypersensitivity  Recent Flowsheet Documentation  Taken 1/31/2024 0900 by Nayla Arias RN  Stabilization Measures: blood products administered  Goal: Absence of Infection  Outcome: Progressing  Intervention: Prevent and Manage Infection  Recent Flowsheet Documentation  Taken 1/31/2024 1200 by Nayla Arias RN  Infection Prevention:   cohorting utilized   environmental surveillance performed   equipment surfaces disinfected   hand hygiene promoted   personal protective equipment utilized   rest/sleep promoted   single patient room provided   visitors restricted/screened  Isolation  Precautions:   contact precautions maintained   enteric precautions maintained  Taken 1/31/2024 0900 by Nayla Arias RN  Infection Prevention:   cohorting utilized   environmental surveillance performed   equipment surfaces disinfected   hand hygiene promoted   personal protective equipment utilized   rest/sleep promoted   single patient room provided   visitors restricted/screened  Infection Management: aseptic technique maintained  Isolation Precautions:   contact precautions maintained   enteric precautions maintained  Goal: Improved Oral Mucous Membrane Health and Integrity  Outcome: Progressing  Intervention: Promote Oral Comfort and Health  Recent Flowsheet Documentation  Taken 1/31/2024 0900 by Nayla Arias RN  Oral Mucous Membrane Protection:   nonirritating oral fluids promoted   lip/mouth moisturizer applied  Oral Care: oral rinse provided  Goal: Nausea and Vomiting Symptom Relief  Outcome: Progressing  Intervention: Prevent and Manage Nausea and Vomiting  Recent Flowsheet Documentation  Taken 1/31/2024 0900 by Nayla Arias RN  Nausea/Vomiting Interventions:   antiemetic   nausea triggers minimized  Goal: Optimal Nutrition Intake  Outcome: Progressing  Intervention: Minimize and Manage Barriers to Oral Intake  Recent Flowsheet Documentation  Taken 1/31/2024 0900 by Nayla Arias RN  Oral Nutrition Promotion: physical activity promoted

## 2024-02-01 NOTE — PROGRESS NOTES
Prior Authorization Initiated    Medication: SIROLIMUS (GENERIC EQUIV) 1 MG PO TABS  Insurance Company: [employer] - Phone [phone] Fax [phone]  Filling Pharmacy: [facility] - [address]  Start Date: 2/1/2024  [facility] / Reference #: [id number] / [id number]     Leydi Dawson  [facility] Liaison  Ph: [phone]  Fax: [phone]  Available on Vocera (learn more here)

## 2024-02-01 NOTE — PROGRESS NOTES
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 Slept well. No N/V/D. No abdominal pain. No mucositis. Tolerating PO intake fine.   Stated his legs sore from walking so much yesterday.     Review of Systems    Review of Systems: 10 point ROS negative unless stated in HPI   PHYSICAL EXAM      Weight     Wt Readings from Last 3 Encounters:   02/01/24 86.9 kg (191 lb 9.6 oz)   01/17/24 90.1 kg (198 lb 9.6 oz)   01/12/24 88.9 kg (196 lb)        KPS: 70    /79 (BP Location: Right arm, Cuff Size: Adult Large)   Pulse 93   Temp 98.2  F (36.8  C) (Oral)   Resp 16   Ht 1.725 m (5' 7.91\")   Wt 86.9 kg (191 lb 9.6 oz)   SpO2 98%   BMI 29.21 kg/m       General: NAD   Eyes: GARY, sclera anicteric   Lungs: CTA bilaterally  Cardiovascular: RRR, no M/R/G   Lymphatics: No edema  Neuro: A&O   Additional Findings: Powell site NT, no drainage.    Current aGVHD staging:  Skin 0, UGI 0, LGI 0, Liver 0 (keep in note through day +180 for allos)      LABS AND IMAGING: I have assessed all abnormal lab values for their clinical significance and any values considered clinically significant have been addressed in the assessment and plan.        Lab Results   Component Value Date    WBC 1.0 (L) 02/01/2024    ANEUTAUTO 2.0 01/28/2024    HGB 7.7 (L) 02/01/2024    HCT 22.3 (L) 02/01/2024    PLT 52 (L) 02/01/2024     02/01/2024    POTASSIUM 3.6 02/01/2024    CHLORIDE 113 (H) 02/01/2024    CO2 21 (L) 02/01/2024     (H) 02/01/2024    BUN 21.2 (H) 02/01/2024    CR 0.88 02/01/2024 "    MAG 2.0 02/01/2024    INR 1.23 (H) 01/29/2024         SYSTEMS-BASED ASSESSMENT AND PLAN     Ziggy Hallman is a 48 year old year old male with a PMH of MDS, hx of multiple clots and MI undergoing a MA (Bu/Flu) prep for an 8/8 URD PBSCT day 7    BMT/IEC PROTOCOL for 2015-29  Chemo Prep:   Day -6: Admit  Day -5 through Day -2: Busulfan/Fludarabine  Day -1: Rest.   Keppra prophylaxis        8/8 DP permissive. Cell dose-9.24x10^6  ABO: Donor: O+ Recipient A-  (Minor incompatability, no flush required)   GSCF plan: GCSF to start day +5 until ANC >1500 for 3 consecutive days    HEME/COAG  #Risk of Pancytopenia 2/2 chemo- anemia  - Transfusion parameters: hemoglobin <8 (cardiac hx), platelets <10    #Recurrent arterial thromboembolic events:  He has long history of various events including renal infarcts (2011, 2013, 2015), left popliteal embolic episode requiring surgery, anticoagulation (2011), right carotid artery embolic episode with TIA/stroke-like symptoms (2012), embolic MI (2015), gangrenous right toe requiring vascular surgery/amputation (2017), 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   Extensive hypercoagulable workup to date has been unrevealing.  JAK2 testing negative.  PNH testing negative.  Elevated IgA of unknown significance, no evidence of plasma cell disorder.  - Eliquis and Prasugrel now HELD starting 2/1-x.  - Transitioned to prophylaxis Lovenox 2/1- x (until platelets 25k or less). Restart regimen once engrafted and platelets are stable.       IMMUNOCOMPROMISED  #Febrile Neutropenia   Spiked first fever on 1/28 PM, cultures and cxr completed. Cefepime started. Possibly 2/2 to T-cell expansion. No hypotension  - Cefepime 1/28-2/1     C. Diff - Admit c. Diff triple+ - Start PO vanc 1/21 for 14 day course.   -Prophylaxis plan: ACV LD, Megan HD, Levaquin while neutropenic , Bactrim +28    RISK OF GVHD  - Prophylaxis: PtC day +3, +4, , Siro/MMF to  start day +5     CARDIOVASCULAR  #CAD  #Hx of CABG  #HTN   -PTA metoprolol, Lisinopril dose increased 2/2 HTN, Amlodipine 5mg BID switch to nifidepine ER 30mg BID, labetolol PRN;     #Hyperlipidemia: Holding atorvastatin peritransplant  - Risk of cardiomyopathy:  Baseline EF 50-55%, Global left ventricular function mildly reduced. Grade 1 or early diastolic dysfunction.   - Risk of arrhythmia: Baseline EKG showed NSR, Qtc -425    GI/NUTRITION  #Hematuria- No dysuria, self resolved.     - Ulcer prophylaxis: Protonix   - VOD Prophylaxis: Ursodiol  - Risk of nausea/vomiting due to chemo: Ativan, Compazine, Zofran   - Risk of malnutrition: Nutrition to follow.     RENAL/ELECTROLYTES/  #Hypervolemia 2/2 cytoxan flush - diurese as needed 1/30- Lasix 10mg   #Proteinuria- possibly exacerbated by persistent HTN (See CV),   #BUN/Cr elevation- FENA, osms, Na+, UA work up indicate prerenal etiology, likely intravascular depletion 2/2 osmotic diuresis with proteinuria. Will encourage PO fluid intake as Cr improved 1/23, IVF boluses as necessary, control HTN. RESOLVED.  - Hypocalcemia in setting hypoalbuminemia, iCa2+, corrected WNL   - Electrolyte management: replace per sliding scale    Psych:    #Anxiety: admits to feeling anxious.  Start zyprexa 5mg HS.  Connect w/ SW.  Psychaitry consult added hydroxyzine.  #Insomnia- PTA ambien, Trazadone added and increased to 100mg. - Ambien and trazadone held during cytoxan period due to frequent urination and concern for falls. resumed following cytoxan flush 1/31.      SOCIAL DETERMINANTS  - Caregiver: Family   - Financial/insurance concerns: See SW notes       Known issues that I take into account for medical decisions, with salient changes to the plan considering these complexities noted above.    Patient Active Problem List   Diagnosis    Amegakaryocytic thrombocytopenia (H)    Anemia due to bone marrow failure, unspecified bone marrow failure type (H)    Aplastic anemia (H)  "    Clinically Significant Risk Factors              # Hypoalbuminemia: Lowest albumin = 2 g/dL at 1/22/2024  4:12 AM, will monitor as appropriate     # Thrombocytopenia: Lowest platelets = 52 in last 2 days, will monitor for bleeding          # Overweight: Estimated body mass index is 29.21 kg/m  as calculated from the following:    Height as of this encounter: 1.725 m (5' 7.91\").    Weight as of this encounter: 86.9 kg (191 lb 9.6 oz).                 Dispo: Remain admitted through engraftment    I spent 30 minutes in the care of this patient today, which included time necessary for preparation for the visit, obtaining history, ordering medications/tests/procedures as medically indicated, review of pertinent medical literature, counseling of the patient, communication of recommendations to the care team, and documentation time.    Brenda Brunson PA-C  [phone]  "

## 2024-02-01 NOTE — PLAN OF CARE
"BP 96/64 (BP Location: Right arm)   Pulse 74   Temp 98.1  F (36.7  C) (Axillary)   Resp 18   Ht 1.725 m (5' 7.91\")   Wt 87.8 kg (193 lb 9.6 oz)   SpO2 98%   BMI 29.51 kg/m      Intermittently hypertensive, OVSS on RA. Denies pain. Nausea controlled with scheduled zofran.  Imodium x1 given with good relief. Voiding well, no blood noted in urine.  Ambien x1 at bedtime. RBCs transfusing. No other replacements needed. Continue plan of care.     Problem: Stem Cell/Bone Marrow Transplant  Goal: Optimal Coping with Transplant  Outcome: Progressing   Goal Outcome Evaluation:                        "

## 2024-02-01 NOTE — PLAN OF CARE
"BP (!) 144/89 (BP Location: Right arm)   Pulse 77   Temp 98  F (36.7  C) (Oral)   Resp 16   Ht 1.725 m (5' 7.91\")   Wt 86.9 kg (191 lb 9.6 oz)   SpO2 100%   BMI 29.21 kg/m      Afebrile. Intermittent HTN, though did not meet parameters for PRN labetalol. Remainder of VSS on RA. No complaints of pain or nausea. Eating and drinking with encouragement. Stools improving per pt - not as frequent or loose; no PRN imodium today. Sometimes saving urine; ongoing re-education on I/O monitoring. 500mL fluid bolus administered per order. RBC's transfused this AM without issues. Pt slept for majority of the day. Family to visit this evening. Continue POC.     Goal Outcome Evaluation:    Plan of Care Reviewed With: patient  Overall Patient Progress: improvingOverall Patient Progress: improving  Problem: Adult Inpatient Plan of Care  Goal: Plan of Care Review  Description: The Plan of Care Review/Shift note should be completed every shift.  The Outcome Evaluation is a brief statement about your assessment that the patient is improving, declining, or no change.  This information will be displayed automatically on your shift  note.  Outcome: Progressing  Flowsheets (Taken 2/1/2024 1229)  Plan of Care Reviewed With: patient  Overall Patient Progress: improving  Goal: Patient-Specific Goal (Individualized)  Description: You can add care plan individualizations to a care plan. Examples of Individualization might be:  \"Parent requests to be called daily at 9am for status\", \"I have a hard time hearing out of my right ear\", or \"Do not touch me to wake me up as it startles  me\".  Outcome: Progressing  Goal: Absence of Hospital-Acquired Illness or Injury  Outcome: Progressing  Intervention: Identify and Manage Fall Risk  Recent Flowsheet Documentation  Taken 2/1/2024 1108 by Nayla Arias RN  Safety Promotion/Fall Prevention:   safety round/check completed   room organization consistent   patient and family education   nonskid " shoes/slippers when out of bed   clutter free environment maintained  Taken 2/1/2024 0900 by Nayla Arias RN  Safety Promotion/Fall Prevention:   safety round/check completed   room organization consistent   patient and family education   nonskid shoes/slippers when out of bed   clutter free environment maintained  Intervention: Prevent Skin Injury  Recent Flowsheet Documentation  Taken 2/1/2024 0900 by Nayla Arias RN  Body Position: position changed independently  Skin Protection: adhesive use limited  Device Skin Pressure Protection: adhesive use limited  Intervention: Prevent Infection  Recent Flowsheet Documentation  Taken 2/1/2024 1108 by Nayla Arias RN  Infection Prevention:   cohorting utilized   environmental surveillance performed   equipment surfaces disinfected   hand hygiene promoted   personal protective equipment utilized   rest/sleep promoted   single patient room provided   visitors restricted/screened  Taken 2/1/2024 0900 by Nayla Arias RN  Infection Prevention:   cohorting utilized   environmental surveillance performed   equipment surfaces disinfected   hand hygiene promoted   personal protective equipment utilized   rest/sleep promoted   single patient room provided   visitors restricted/screened  Goal: Optimal Comfort and Wellbeing  Outcome: Progressing  Goal: Readiness for Transition of Care  Outcome: Progressing     Problem: Stem Cell/Bone Marrow Transplant  Goal: Optimal Coping with Transplant  Outcome: Progressing  Goal: Symptom-Free Urinary Elimination  Outcome: Progressing  Intervention: Prevent or Manage Bladder Irritation  Recent Flowsheet Documentation  Taken 2/1/2024 0900 by Nayla Arias RN  Urinary Elimination Promotion: frequent voiding encouraged  Hyperhydration Management:   encouraged to void   fluids adjusted  Goal: Diarrhea Symptom Control  Outcome: Progressing  Intervention: Manage Diarrhea  Recent Flowsheet Documentation  Taken 2/1/2024 0900 by  Labresh, Nayla, RN  Skin Protection: adhesive use limited  Fluid/Electrolyte Management: fluids adjusted  Perineal Care: perineal hygiene encouraged  Goal: Improved Activity Tolerance  Outcome: Progressing  Intervention: Promote Improved Energy  Recent Flowsheet Documentation  Taken 2/1/2024 0900 by Nayla Arias RN  Fatigue Management: activity schedule adjusted  Activity Management: activity adjusted per tolerance  Goal: Blood Counts Within Acceptable Range  Outcome: Progressing  Intervention: Monitor and Manage Hematologic Symptoms  Recent Flowsheet Documentation  Taken 2/1/2024 1108 by Nayla Arias RN  Medication Review/Management:   medications reviewed   high-risk medications identified  Taken 2/1/2024 0900 by Nayla Arias RN  Bleeding Precautions:   blood pressure closely monitored   foot protection facilitated   gentle oral care promoted   monitored for signs of bleeding  Medication Review/Management:   medications reviewed   high-risk medications identified  Goal: Absence of Hypersensitivity Reaction  Outcome: Progressing  Intervention: Manage Infusion-Related Hypersensitivity  Recent Flowsheet Documentation  Taken 2/1/2024 0900 by Nayla Arias RN  Stabilization Measures: blood products administered  Goal: Absence of Infection  Outcome: Progressing  Intervention: Prevent and Manage Infection  Recent Flowsheet Documentation  Taken 2/1/2024 1108 by Nayla Arias RN  Infection Prevention:   cohorting utilized   environmental surveillance performed   equipment surfaces disinfected   hand hygiene promoted   personal protective equipment utilized   rest/sleep promoted   single patient room provided   visitors restricted/screened  Isolation Precautions:   enteric precautions maintained   contact precautions maintained  Taken 2/1/2024 0900 by Nayla Arias RN  Infection Prevention:   cohorting utilized   environmental surveillance performed   equipment surfaces disinfected   hand  hygiene promoted   personal protective equipment utilized   rest/sleep promoted   single patient room provided   visitors restricted/screened  Isolation Precautions:   enteric precautions maintained   contact precautions maintained  Goal: Improved Oral Mucous Membrane Health and Integrity  Outcome: Progressing  Intervention: Promote Oral Comfort and Health  Recent Flowsheet Documentation  Taken 2/1/2024 0900 by Nayla Arias RN  Oral Mucous Membrane Protection: nonirritating oral fluids promoted  Oral Care: oral rinse provided  Goal: Nausea and Vomiting Symptom Relief  Outcome: Progressing  Intervention: Prevent and Manage Nausea and Vomiting  Recent Flowsheet Documentation  Taken 2/1/2024 0900 by Nayla Arias RN  Nausea/Vomiting Interventions: nausea triggers minimized  Goal: Optimal Nutrition Intake  Outcome: Progressing  Intervention: Minimize and Manage Barriers to Oral Intake  Recent Flowsheet Documentation  Taken 2/1/2024 0900 by Nayla Arias RN  Oral Nutrition Promotion:   nutrition counseling provided   physical activity promoted

## 2024-02-02 ENCOUNTER — APPOINTMENT (OUTPATIENT)
Dept: OCCUPATIONAL THERAPY | Facility: CLINIC | Age: 49
DRG: 014 | End: 2024-02-02
Attending: INTERNAL MEDICINE
Payer: COMMERCIAL

## 2024-02-02 LAB
ABO/RH(D): NORMAL
ANION GAP SERPL CALCULATED.3IONS-SCNC: 7 MMOL/L (ref 7–15)
ANTIBODY SCREEN: NEGATIVE
BACTERIA BLD CULT: NO GROWTH
BASOPHILS # BLD AUTO: ABNORMAL 10*3/UL
BASOPHILS NFR BLD AUTO: ABNORMAL %
BUN SERPL-MCNC: 14.7 MG/DL (ref 6–20)
CALCIUM SERPL-MCNC: 8.1 MG/DL (ref 8.6–10)
CHLORIDE SERPL-SCNC: 114 MMOL/L (ref 98–107)
CMV DNA SPEC NAA+PROBE-ACNC: NOT DETECTED IU/ML
CREAT SERPL-MCNC: 0.7 MG/DL (ref 0.67–1.17)
DEPRECATED HCO3 PLAS-SCNC: 22 MMOL/L (ref 22–29)
EGFRCR SERPLBLD CKD-EPI 2021: >90 ML/MIN/1.73M2
EOSINOPHIL # BLD AUTO: ABNORMAL 10*3/UL
EOSINOPHIL NFR BLD AUTO: ABNORMAL %
ERYTHROCYTE [DISTWIDTH] IN BLOOD BY AUTOMATED COUNT: 14.2 % (ref 10–15)
GLUCOSE SERPL-MCNC: 90 MG/DL (ref 70–99)
HCT VFR BLD AUTO: 25.4 % (ref 40–53)
HGB BLD-MCNC: 8.9 G/DL (ref 13.3–17.7)
IMM GRANULOCYTES # BLD: ABNORMAL 10*3/UL
IMM GRANULOCYTES NFR BLD: ABNORMAL %
LYMPHOCYTES # BLD AUTO: ABNORMAL 10*3/UL
LYMPHOCYTES NFR BLD AUTO: ABNORMAL %
MAGNESIUM SERPL-MCNC: 1.8 MG/DL (ref 1.7–2.3)
MCH RBC QN AUTO: 31 PG (ref 26.5–33)
MCHC RBC AUTO-ENTMCNC: 35 G/DL (ref 31.5–36.5)
MCV RBC AUTO: 89 FL (ref 78–100)
MONOCYTES # BLD AUTO: ABNORMAL 10*3/UL
MONOCYTES NFR BLD AUTO: ABNORMAL %
NEUTROPHILS # BLD AUTO: ABNORMAL 10*3/UL
NEUTROPHILS NFR BLD AUTO: ABNORMAL %
PHOSPHATE SERPL-MCNC: 3.2 MG/DL (ref 2.5–4.5)
PLATELET # BLD AUTO: 29 10E3/UL (ref 150–450)
POTASSIUM SERPL-SCNC: 3.5 MMOL/L (ref 3.4–5.3)
RBC # BLD AUTO: 2.87 10E6/UL (ref 4.4–5.9)
SIROLIMUS BLD-MCNC: 8 UG/L (ref 5–15)
SODIUM SERPL-SCNC: 143 MMOL/L (ref 135–145)
SPECIMEN EXPIRATION DATE: NORMAL
TME LAST DOSE: NORMAL H
TME LAST DOSE: NORMAL H
WBC # BLD AUTO: 0.3 10E3/UL (ref 4–11)

## 2024-02-02 PROCEDURE — 250N000013 HC RX MED GY IP 250 OP 250 PS 637: Performed by: PHYSICIAN ASSISTANT

## 2024-02-02 PROCEDURE — 250N000009 HC RX 250: Performed by: PHYSICIAN ASSISTANT

## 2024-02-02 PROCEDURE — 250N000012 HC RX MED GY IP 250 OP 636 PS 637: Performed by: PHYSICIAN ASSISTANT

## 2024-02-02 PROCEDURE — 99233 SBSQ HOSP IP/OBS HIGH 50: CPT | Mod: FS | Performed by: PHYSICIAN ASSISTANT

## 2024-02-02 PROCEDURE — 999N000147 HC STATISTIC PT IP EVAL DEFER

## 2024-02-02 PROCEDURE — 84100 ASSAY OF PHOSPHORUS: CPT | Performed by: STUDENT IN AN ORGANIZED HEALTH CARE EDUCATION/TRAINING PROGRAM

## 2024-02-02 PROCEDURE — 80195 ASSAY OF SIROLIMUS: CPT | Performed by: STUDENT IN AN ORGANIZED HEALTH CARE EDUCATION/TRAINING PROGRAM

## 2024-02-02 PROCEDURE — 250N000011 HC RX IP 250 OP 636: Mod: JZ | Performed by: PHYSICIAN ASSISTANT

## 2024-02-02 PROCEDURE — 97110 THERAPEUTIC EXERCISES: CPT | Mod: GO

## 2024-02-02 PROCEDURE — 86923 COMPATIBILITY TEST ELECTRIC: CPT | Performed by: PHYSICIAN ASSISTANT

## 2024-02-02 PROCEDURE — 83735 ASSAY OF MAGNESIUM: CPT | Performed by: PHYSICIAN ASSISTANT

## 2024-02-02 PROCEDURE — 86900 BLOOD TYPING SEROLOGIC ABO: CPT | Performed by: PHYSICIAN ASSISTANT

## 2024-02-02 PROCEDURE — 80048 BASIC METABOLIC PNL TOTAL CA: CPT | Performed by: PHYSICIAN ASSISTANT

## 2024-02-02 PROCEDURE — 250N000011 HC RX IP 250 OP 636: Performed by: PHYSICIAN ASSISTANT

## 2024-02-02 PROCEDURE — 258N000003 HC RX IP 258 OP 636: Performed by: PHYSICIAN ASSISTANT

## 2024-02-02 PROCEDURE — 206N000001 HC R&B BMT UMMC

## 2024-02-02 PROCEDURE — 85027 COMPLETE CBC AUTOMATED: CPT | Performed by: PHYSICIAN ASSISTANT

## 2024-02-02 PROCEDURE — 250N000013 HC RX MED GY IP 250 OP 250 PS 637

## 2024-02-02 RX ORDER — DIPHENHYDRAMINE HYDROCHLORIDE AND LIDOCAINE HYDROCHLORIDE AND ALUMINUM HYDROXIDE AND MAGNESIUM HYDRO
10 KIT EVERY 6 HOURS PRN
Status: DISCONTINUED | OUTPATIENT
Start: 2024-02-02 | End: 2024-02-09

## 2024-02-02 RX ORDER — HYDROMORPHONE HYDROCHLORIDE 2 MG/1
2 TABLET ORAL
Status: DISCONTINUED | OUTPATIENT
Start: 2024-02-02 | End: 2024-02-03

## 2024-02-02 RX ORDER — LIDOCAINE HYDROCHLORIDE 20 MG/ML
JELLY TOPICAL EVERY 4 HOURS PRN
Status: DISCONTINUED | OUTPATIENT
Start: 2024-02-02 | End: 2024-02-20 | Stop reason: HOSPADM

## 2024-02-02 RX ADMIN — MYCOPHENOLATE MOFETIL 1250 MG: 500 INJECTION, POWDER, LYOPHILIZED, FOR SOLUTION INTRAVENOUS at 22:12

## 2024-02-02 RX ADMIN — HYDROMORPHONE HYDROCHLORIDE 2 MG: 2 TABLET ORAL at 11:28

## 2024-02-02 RX ADMIN — MYCOPHENOLATE MOFETIL 1250 MG: 500 INJECTION, POWDER, LYOPHILIZED, FOR SOLUTION INTRAVENOUS at 08:06

## 2024-02-02 RX ADMIN — DEXTROSE MONOHYDRATE 450 MCG: 50 INJECTION, SOLUTION INTRAVENOUS at 21:04

## 2024-02-02 RX ADMIN — Medication 10 ML: at 03:34

## 2024-02-02 RX ADMIN — VANCOMYCIN HYDROCHLORIDE 125 MG: 125 CAPSULE ORAL at 07:53

## 2024-02-02 RX ADMIN — DIPHENHYDRAMINE HYDROCHLORIDE AND LIDOCAINE HYDROCHLORIDE AND ALUMINUM HYDROXIDE AND MAGNESIUM HYDRO 10 ML: KIT at 14:05

## 2024-02-02 RX ADMIN — URSODIOL 300 MG: 300 CAPSULE ORAL at 21:08

## 2024-02-02 RX ADMIN — Medication 10 ML: at 10:55

## 2024-02-02 RX ADMIN — NIFEDIPINE 30 MG: 30 TABLET, FILM COATED, EXTENDED RELEASE ORAL at 21:08

## 2024-02-02 RX ADMIN — ACYCLOVIR 800 MG: 800 TABLET ORAL at 07:53

## 2024-02-02 RX ADMIN — LISINOPRIL 40 MG: 10 TABLET ORAL at 07:53

## 2024-02-02 RX ADMIN — BENZOCAINE 0.5 ML: 220 SPRAY, METERED PERIODONTAL at 20:58

## 2024-02-02 RX ADMIN — Medication 5 CAPSULE: at 07:53

## 2024-02-02 RX ADMIN — Medication 2 SPRAY: at 21:10

## 2024-02-02 RX ADMIN — VANCOMYCIN HYDROCHLORIDE 125 MG: 125 CAPSULE ORAL at 11:02

## 2024-02-02 RX ADMIN — Medication 2 SPRAY: at 07:55

## 2024-02-02 RX ADMIN — MICAFUNGIN SODIUM 150 MG: 50 INJECTION, POWDER, LYOPHILIZED, FOR SOLUTION INTRAVENOUS at 08:10

## 2024-02-02 RX ADMIN — VANCOMYCIN HYDROCHLORIDE 125 MG: 125 CAPSULE ORAL at 15:55

## 2024-02-02 RX ADMIN — ACYCLOVIR 800 MG: 800 TABLET ORAL at 21:08

## 2024-02-02 RX ADMIN — BENZOCAINE 0.5 ML: 220 SPRAY, METERED PERIODONTAL at 15:55

## 2024-02-02 RX ADMIN — LORAZEPAM 1 MG: 0.5 TABLET ORAL at 22:11

## 2024-02-02 RX ADMIN — Medication 2 SPRAY: at 11:02

## 2024-02-02 RX ADMIN — DEXTROSE MONOHYDRATE 20 ML: 50 INJECTION, SOLUTION INTRAVENOUS at 21:05

## 2024-02-02 RX ADMIN — SIROLIMUS 5 MG: 2 TABLET ORAL at 08:22

## 2024-02-02 RX ADMIN — DEXTROSE MONOHYDRATE 20 ML: 50 INJECTION, SOLUTION INTRAVENOUS at 21:00

## 2024-02-02 RX ADMIN — Medication 2 SPRAY: at 15:59

## 2024-02-02 RX ADMIN — VANCOMYCIN HYDROCHLORIDE 125 MG: 125 CAPSULE ORAL at 21:08

## 2024-02-02 RX ADMIN — OLANZAPINE 5 MG: 2.5 TABLET, FILM COATED ORAL at 21:08

## 2024-02-02 RX ADMIN — HYDROMORPHONE HYDROCHLORIDE 2 MG: 2 TABLET ORAL at 15:55

## 2024-02-02 RX ADMIN — HYDROMORPHONE HYDROCHLORIDE 2 MG: 2 TABLET ORAL at 21:00

## 2024-02-02 RX ADMIN — PANTOPRAZOLE SODIUM 40 MG: 40 TABLET, DELAYED RELEASE ORAL at 07:53

## 2024-02-02 RX ADMIN — METOPROLOL SUCCINATE 100 MG: 50 TABLET, EXTENDED RELEASE ORAL at 07:53

## 2024-02-02 RX ADMIN — BENZOCAINE 1 ML: 220 SPRAY, METERED PERIODONTAL at 11:27

## 2024-02-02 RX ADMIN — URSODIOL 300 MG: 300 CAPSULE ORAL at 07:53

## 2024-02-02 RX ADMIN — LEVOFLOXACIN 250 MG: 250 TABLET, FILM COATED ORAL at 09:53

## 2024-02-02 RX ADMIN — NIFEDIPINE 30 MG: 30 TABLET, FILM COATED, EXTENDED RELEASE ORAL at 07:54

## 2024-02-02 RX ADMIN — URSODIOL 300 MG: 300 CAPSULE ORAL at 14:05

## 2024-02-02 ASSESSMENT — ACTIVITIES OF DAILY LIVING (ADL)
ADLS_ACUITY_SCORE: 22

## 2024-02-02 NOTE — PLAN OF CARE
Physical Therapy: Orders received. Chart reviewed and discussed with care team.? Physical Therapy not indicated due to patient remains up IND, has been walking and able to complete HEP. OT will continue to follow, no additional IP PT needs.? Defer discharge recommendations to OT.? Will complete orders.

## 2024-02-02 NOTE — PROGRESS NOTES
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 Slept well. No N/V/D. Developing sore throat and oral lesions related to oropharyngeal mucositis. No rectal pain. Afebrile.     Review of Systems    Review of Systems: 10 point ROS negative unless stated in HPI   PHYSICAL EXAM      Weight     Wt Readings from Last 3 Encounters:   02/02/24 85.9 kg (189 lb 4.8 oz)   01/17/24 90.1 kg (198 lb 9.6 oz)   01/12/24 88.9 kg (196 lb)        KPS: 70    BP (!) 143/78 (BP Location: Right arm)   Pulse 99   Temp 98.3  F (36.8  C) (Oral)   Resp 16   Ht 1.725 m (5' 7.91\")   Wt 85.9 kg (189 lb 4.8 oz)   SpO2 97%   BMI 28.86 kg/m       General: NAD   Mouth/Throat: Multiple scattered erythematous ulcers on bilateral posterior buccal mucosa, posterior pharynx diffusely erythematous   Eyes: GARY, sclera anicteric   Lungs: CTA bilaterally  Cardiovascular: RRR, no M/R/G   Lymphatics: No edema  Neuro: A&O   Additional Findings: Powell site NT, no drainage.    Current aGVHD staging:  Skin 0, UGI 0, LGI 0, Liver 0 (keep in note through day +180 for allos)      LABS AND IMAGING: I have assessed all abnormal lab values for their clinical significance and any values considered clinically significant have been addressed in the assessment and plan.        Lab Results   Component Value Date    WBC 0.3 (LL) 02/02/2024    ANEUTAUTO 2.0 01/28/2024    HGB 8.9 (L) 02/02/2024    HCT 25.4 (L) 02/02/2024    PLT 29 (LL) 02/02/2024     02/02/2024    POTASSIUM 3.5 02/02/2024    CHLORIDE 114 (H) " 02/02/2024    CO2 22 02/02/2024    GLC 90 02/02/2024    BUN 14.7 02/02/2024    CR 0.70 02/02/2024    MAG 1.8 02/02/2024    INR 1.23 (H) 01/29/2024         SYSTEMS-BASED ASSESSMENT AND PLAN     Ziggy Hallman is a 48 year old year old male with a PMH of MDS, hx of multiple clots and MI undergoing a MA (Bu/Flu) prep for an 8/8 URD PBSCT day 8    BMT/IEC PROTOCOL for 2015-29  Chemo Prep:   Day -6: Admit  Day -5 through Day -2: Busulfan/Fludarabine  Day -1: Rest.   Keppra prophylaxis        8/8 DP permissive. Cell dose-9.24x10^6  ABO: Donor: O+ Recipient A-  (Minor incompatability, no flush required)   GSCF plan: GCSF to start day +5 until ANC >1500 for 3 consecutive days    HEME/COAG  #Risk of Pancytopenia 2/2 chemo- anemia  - Transfusion parameters: hemoglobin <8 (cardiac hx), platelets <10    #Recurrent arterial thromboembolic events:  He has long history of various events including renal infarcts (2011, 2013, 2015), left popliteal embolic episode requiring surgery, anticoagulation (2011), right carotid artery embolic episode with TIA/stroke-like symptoms (2012), embolic MI (2015), gangrenous right toe requiring vascular surgery/amputation (2017), 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   Extensive hypercoagulable workup to date has been unrevealing.  JAK2 testing negative.  PNH testing negative.  Elevated IgA of unknown significance, no evidence of plasma cell disorder.  - Eliquis and Prasugrel now HELD starting 2/1-x. Did not start lovenox due to rapid drop of platelets.     IMMUNOCOMPROMISED  #Febrile Neutropenia   Spiked first fever on 1/28 PM, cultures and cxr completed. Cefepime started. Possibly 2/2 to T-cell expansion. No hypotension  - Cefepime 1/28-2/1     C. Diff - Admit c. Diff triple+ - Start PO vanc 1/21 for 14 day course.   -Prophylaxis plan: ACV LD, Megan HD, Levaquin while neutropenic , Bactrim +28    RISK OF GVHD  - Prophylaxis: PtC day +3, +4, ,  Siro/MMF to start day +5     CARDIOVASCULAR  #CAD  #Hx of CABG  #HTN   -PTA metoprolol, Lisinopril dose increased 2/2 HTN, Amlodipine 5mg BID switch to nifidepine ER 30mg BID, labetolol PRN;     #Hyperlipidemia: Holding atorvastatin peritransplant  - Risk of cardiomyopathy:  Baseline EF 50-55%, Global left ventricular function mildly reduced. Grade 1 or early diastolic dysfunction.   - Risk of arrhythmia: Baseline EKG showed NSR, Qtc -425    GI/NUTRITION  #Oropharyngeal mucositis  - MMW, hurricaine spray, dilaudid PO prn.     #Hematuria- No dysuria, self resolved.     - Ulcer prophylaxis: Protonix   - VOD Prophylaxis: Ursodiol  - Risk of nausea/vomiting due to chemo: Ativan, Compazine, Zofran   - Risk of malnutrition: Nutrition to follow.     RENAL/ELECTROLYTES/  #Hypervolemia 2/2 cytoxan flush - diurese as needed 1/30- Lasix 10mg   #Proteinuria- possibly exacerbated by persistent HTN (See CV),   #BUN/Cr elevation- FENA, osms, Na+, UA work up indicate prerenal etiology, likely intravascular depletion 2/2 osmotic diuresis with proteinuria. Will encourage PO fluid intake as Cr improved 1/23, IVF boluses as necessary, control HTN. RESOLVED.  - Hypocalcemia in setting hypoalbuminemia, iCa2+, corrected WNL   - Electrolyte management: replace per sliding scale    Psych:    #Anxiety: admits to feeling anxious.  Start zyprexa 5mg HS.  Connect w/ SW.  Psychaitry consult added hydroxyzine.  #Insomnia- PTA ambien, Trazadone added and increased to 100mg. - Ambien and trazadone held during cytoxan period due to frequent urination and concern for falls. resumed following cytoxan flush 1/31.      SOCIAL DETERMINANTS  - Caregiver: Family   - Financial/insurance concerns: See SW notes       Known issues that I take into account for medical decisions, with salient changes to the plan considering these complexities noted above.    Patient Active Problem List   Diagnosis    Amegakaryocytic thrombocytopenia (H)    Anemia due to bone  "marrow failure, unspecified bone marrow failure type (H)    Aplastic anemia (H)     Clinically Significant Risk Factors              # Hypoalbuminemia: Lowest albumin = 2 g/dL at 1/22/2024  4:12 AM, will monitor as appropriate     # Thrombocytopenia: Lowest platelets = 29 in last 2 days, will monitor for bleeding          # Overweight: Estimated body mass index is 28.86 kg/m  as calculated from the following:    Height as of this encounter: 1.725 m (5' 7.91\").    Weight as of this encounter: 85.9 kg (189 lb 4.8 oz).                 Dispo: Remain admitted through engraftment    I spent 30 minutes in the care of this patient today, which included time necessary for preparation for the visit, obtaining history, ordering medications/tests/procedures as medically indicated, review of pertinent medical literature, counseling of the patient, communication of recommendations to the care team, and documentation time.    Brenda Brunson PA-C  [phone]  "

## 2024-02-02 NOTE — PROGRESS NOTES
BMT CLINICAL SOCIAL WORK NOTE-MISSED VISIT:    Focus: Supportive Counseling/Resources/Discharge Planning    Data: Ziggy Hallman is a 48 year old year old male with a PMH of MDS, hx of multiple clots and MI undergoing a MA (Bu/Flu) prep for an 8/8 URD PBSCT currently day 8.     Interventions: Clinical  (CSW) attempted to meet with Pt to assess coping, provide supportive counseling and assist with resources as needed. Pt asleep at time of visit. CSW previously encouraged Pt to contact CSW for support, questions and/or resources.     Plan: CSW will continue to provide supportive counseling and assistance with resources as needed. CSW will continue to collaborate with multidisciplinary team regarding Pt's plan of care.     RIVKA Grider, LGSW  [facility]  Adult Blood & Marrow Transplant   Phone: [phone]  Pager: [phone]

## 2024-02-02 NOTE — PLAN OF CARE
"BP (!) 159/81   Pulse 91   Temp 98.5  F (36.9  C)   Resp 16   Ht 1.725 m (5' 7.91\")   Wt 86.9 kg (191 lb 9.6 oz)   SpO2 99%   BMI 29.21 kg/m      Hypertensive, not meeting labetalol parameters. OVSS on RA. Denies pain and nausea. Reports feeling well. Did not want ambien d/t vivid dreams, tried ativan x1 instead. Not saving urine overnight despite education. No replacements needed. Up independently. Continue plan of care.     Problem: Stem Cell/Bone Marrow Transplant  Goal: Optimal Coping with Transplant  Outcome: Progressing  Goal: Diarrhea Symptom Control  Outcome: Progressing  Goal: Improved Activity Tolerance  Outcome: Progressing  Intervention: Promote Improved Energy  Recent Flowsheet Documentation  Taken 2/1/2024 2000 by Lora Moore, RN  Activity Management: activity adjusted per tolerance  Goal: Absence of Infection  Outcome: Progressing  Intervention: Prevent and Manage Infection  Recent Flowsheet Documentation  Taken 2/2/2024 0400 by Lora Moore, RN  Infection Prevention: rest/sleep promoted  Isolation Precautions:   contact precautions maintained   enteric precautions maintained   protective environment maintained  Taken 2/2/2024 0000 by Lora Moore, RN  Infection Prevention: rest/sleep promoted  Isolation Precautions:   contact precautions maintained   enteric precautions maintained   protective environment maintained  Taken 2/1/2024 2000 by Lora Moore, RN  Infection Prevention: rest/sleep promoted  Isolation Precautions:   contact precautions maintained   enteric precautions maintained   protective environment maintained   Goal Outcome Evaluation:                        "

## 2024-02-02 NOTE — PLAN OF CARE
"BP (!) 141/91 (BP Location: Right arm)   Pulse 109   Temp 98.7  F (37.1  C) (Oral)   Resp 16   Ht 1.725 m (5' 7.91\")   Wt 85.9 kg (189 lb 4.8 oz)   SpO2 99%   BMI 28.86 kg/m      Pt is AOx4 and independent. He started having mouth and throat pain today. He took dilaudid x1, magic mouthwash x1, and hurricane spray x1 with partial relief. He has been eating and drinking adequately. He is voiding spontaneously, and reminded to save his urine for documentation. Will continue to follow the plan of care.      Goal Outcome Evaluation:      Plan of Care Reviewed With: patient    Overall Patient Progress: no change    Problem: Adult Inpatient Plan of Care  Goal: Plan of Care Review  Description: The Plan of Care Review/Shift note should be completed every shift.  The Outcome Evaluation is a brief statement about your assessment that the patient is improving, declining, or no change.  This information will be displayed automatically on your shift  note.  Outcome: Progressing  Flowsheets (Taken 2/2/2024 3130)  Plan of Care Reviewed With: patient  Overall Patient Progress: no change  Goal: Patient-Specific Goal (Individualized)  Description: You can add care plan individualizations to a care plan. Examples of Individualization might be:  \"Parent requests to be called daily at 9am for status\", \"I have a hard time hearing out of my right ear\", or \"Do not touch me to wake me up as it startles  me\".  Outcome: Progressing  Goal: Absence of Hospital-Acquired Illness or Injury  Outcome: Progressing  Intervention: Identify and Manage Fall Risk  Recent Flowsheet Documentation  Taken 2/2/2024 1240 by Phyllis Mitchell, RN  Safety Promotion/Fall Prevention: safety round/check completed  Taken 2/2/2024 1130 by Phyllis Mitchell, RN  Safety Promotion/Fall Prevention: safety round/check completed  Taken 2/2/2024 0810 by Phyllis Mitchell, RN  Safety Promotion/Fall Prevention:   assistive device/personal items within reach   clutter free " environment maintained   lighting adjusted   nonskid shoes/slippers when out of bed   patient and family education   room near nurse's station   room organization consistent   safety round/check completed  Intervention: Prevent Skin Injury  Recent Flowsheet Documentation  Taken 2/2/2024 0810 by Phyllis Mitchell RN  Body Position: position changed independently  Skin Protection: adhesive use limited  Device Skin Pressure Protection: adhesive use limited  Intervention: Prevent and Manage VTE (Venous Thromboembolism) Risk  Recent Flowsheet Documentation  Taken 2/2/2024 0810 by Phyllis Mitchell RN  VTE Prevention/Management: SCDs (sequential compression devices) off  Intervention: Prevent Infection  Recent Flowsheet Documentation  Taken 2/2/2024 1240 by Phyllis Mitchell RN  Infection Prevention:   environmental surveillance performed   equipment surfaces disinfected   hand hygiene promoted   personal protective equipment utilized   rest/sleep promoted   single patient room provided   visitors restricted/screened  Taken 2/2/2024 0810 by Phyllis Mitchell RN  Infection Prevention:   environmental surveillance performed   equipment surfaces disinfected   hand hygiene promoted   personal protective equipment utilized   rest/sleep promoted   single patient room provided   visitors restricted/screened  Goal: Optimal Comfort and Wellbeing  Outcome: Progressing  Goal: Readiness for Transition of Care  Outcome: Progressing     Problem: Stem Cell/Bone Marrow Transplant  Goal: Optimal Coping with Transplant  Outcome: Progressing  Intervention: Optimize Patient/Family Adjustment to Transplant  Recent Flowsheet Documentation  Taken 2/2/2024 0810 by Phyllis Mitchell RN  Supportive Measures:   active listening utilized   decision-making supported   positive reinforcement provided   self-care encouraged   self-responsibility promoted   verbalization of feelings encouraged  Goal: Symptom-Free Urinary Elimination  Outcome:  Progressing  Intervention: Prevent or Manage Bladder Irritation  Recent Flowsheet Documentation  Taken 2/2/2024 0810 by Phyllis Mitchell RN  Urinary Elimination Promotion: frequent voiding encouraged  Hyperhydration Management:   encouraged to void   fluids provided  Goal: Diarrhea Symptom Control  Outcome: Progressing  Intervention: Manage Diarrhea  Recent Flowsheet Documentation  Taken 2/2/2024 0810 by Phyllis Mitchell RN  Skin Protection: adhesive use limited  Fluid/Electrolyte Management: fluids provided  Goal: Improved Activity Tolerance  Outcome: Progressing  Intervention: Promote Improved Energy  Recent Flowsheet Documentation  Taken 2/2/2024 0810 by Phyllis Mitchell RN  Fatigue Management:   paced activity encouraged   frequent rest breaks encouraged  Activity Management: up ad amna  Environmental Support: calm environment promoted  Goal: Blood Counts Within Acceptable Range  Outcome: Progressing  Intervention: Monitor and Manage Hematologic Symptoms  Recent Flowsheet Documentation  Taken 2/2/2024 1240 by Phyllis Mitchell RN  Medication Review/Management: medications reviewed  Taken 2/2/2024 0810 by Phyllis Mitchell RN  Bleeding Precautions:   coagulation study results reviewed   foot protection facilitated   gentle oral care promoted   monitored for signs of bleeding  Medication Review/Management: medications reviewed  Goal: Absence of Hypersensitivity Reaction  Outcome: Progressing  Goal: Absence of Infection  Outcome: Progressing  Intervention: Prevent and Manage Infection  Recent Flowsheet Documentation  Taken 2/2/2024 1240 by Phyllis Mitchell RN  Infection Prevention:   environmental surveillance performed   equipment surfaces disinfected   hand hygiene promoted   personal protective equipment utilized   rest/sleep promoted   single patient room provided   visitors restricted/screened  Isolation Precautions:   contact precautions maintained   enteric precautions maintained   protective environment  maintained  Taken 2/2/2024 0810 by Phyllis Mitchell, RN  Infection Prevention:   environmental surveillance performed   equipment surfaces disinfected   hand hygiene promoted   personal protective equipment utilized   rest/sleep promoted   single patient room provided   visitors restricted/screened  Infection Management: aseptic technique maintained  Isolation Precautions:   contact precautions maintained   enteric precautions maintained   protective environment maintained  Goal: Improved Oral Mucous Membrane Health and Integrity  Outcome: Progressing  Goal: Nausea and Vomiting Symptom Relief  Outcome: Progressing  Goal: Optimal Nutrition Intake  Outcome: Progressing  Intervention: Minimize and Manage Barriers to Oral Intake  Recent Flowsheet Documentation  Taken 2/2/2024 0810 by Phyllis Mitchell, RN  Oral Nutrition Promotion: calorie-dense foods provided

## 2024-02-03 ENCOUNTER — APPOINTMENT (OUTPATIENT)
Dept: GENERAL RADIOLOGY | Facility: CLINIC | Age: 49
DRG: 014 | End: 2024-02-03
Attending: PHYSICIAN ASSISTANT
Payer: COMMERCIAL

## 2024-02-03 LAB
ACANTHOCYTES BLD QL SMEAR: NORMAL
ALBUMIN UR-MCNC: 300 MG/DL
ANION GAP SERPL CALCULATED.3IONS-SCNC: 8 MMOL/L (ref 7–15)
APPEARANCE UR: CLEAR
AUER BODIES BLD QL SMEAR: NORMAL
BASO STIPL BLD QL SMEAR: NORMAL
BASOPHILS # BLD AUTO: ABNORMAL 10*3/UL
BASOPHILS NFR BLD AUTO: ABNORMAL %
BILIRUB UR QL STRIP: NEGATIVE
BITE CELLS BLD QL SMEAR: NORMAL
BLISTER CELLS BLD QL SMEAR: NORMAL
BUN SERPL-MCNC: 15.9 MG/DL (ref 6–20)
BURR CELLS BLD QL SMEAR: NORMAL
CALCIUM SERPL-MCNC: 8.1 MG/DL (ref 8.6–10)
CHLORIDE SERPL-SCNC: 109 MMOL/L (ref 98–107)
COLOR UR AUTO: ABNORMAL
CREAT SERPL-MCNC: 0.81 MG/DL (ref 0.67–1.17)
DACRYOCYTES BLD QL SMEAR: NORMAL
DEPRECATED HCO3 PLAS-SCNC: 22 MMOL/L (ref 22–29)
EGFRCR SERPLBLD CKD-EPI 2021: >90 ML/MIN/1.73M2
ELLIPTOCYTES BLD QL SMEAR: NORMAL
EOSINOPHIL # BLD AUTO: ABNORMAL 10*3/UL
EOSINOPHIL NFR BLD AUTO: ABNORMAL %
ERYTHROCYTE [DISTWIDTH] IN BLOOD BY AUTOMATED COUNT: 13.4 % (ref 10–15)
FRAGMENTS BLD QL SMEAR: NORMAL
GLUCOSE SERPL-MCNC: 107 MG/DL (ref 70–99)
GLUCOSE UR STRIP-MCNC: NEGATIVE MG/DL
HCT VFR BLD AUTO: 24.4 % (ref 40–53)
HGB BLD-MCNC: 8.7 G/DL (ref 13.3–17.7)
HGB C CRYSTALS: NORMAL
HGB UR QL STRIP: ABNORMAL
HOWELL-JOLLY BOD BLD QL SMEAR: NORMAL
IMM GRANULOCYTES # BLD: ABNORMAL 10*3/UL
IMM GRANULOCYTES NFR BLD: ABNORMAL %
KETONES UR STRIP-MCNC: NEGATIVE MG/DL
LACTATE SERPL-SCNC: 0.5 MMOL/L (ref 0.7–2)
LEUKOCYTE ESTERASE UR QL STRIP: NEGATIVE
LYMPHOCYTES # BLD AUTO: ABNORMAL 10*3/UL
LYMPHOCYTES NFR BLD AUTO: ABNORMAL %
MAGNESIUM SERPL-MCNC: 1.7 MG/DL (ref 1.7–2.3)
MCH RBC QN AUTO: 31.9 PG (ref 26.5–33)
MCHC RBC AUTO-ENTMCNC: 35.7 G/DL (ref 31.5–36.5)
MCV RBC AUTO: 89 FL (ref 78–100)
MONOCYTES # BLD AUTO: ABNORMAL 10*3/UL
MONOCYTES NFR BLD AUTO: ABNORMAL %
MUCOUS THREADS #/AREA URNS LPF: PRESENT /LPF
NEUTROPHILS # BLD AUTO: ABNORMAL 10*3/UL
NEUTROPHILS NFR BLD AUTO: ABNORMAL %
NEUTS HYPERSEG BLD QL SMEAR: NORMAL
NITRATE UR QL: NEGATIVE
PH UR STRIP: 6 [PH] (ref 5–7)
PHOSPHATE SERPL-MCNC: 3 MG/DL (ref 2.5–4.5)
PLAT MORPH BLD: NORMAL
PLATELET # BLD AUTO: 14 10E3/UL (ref 150–450)
POLYCHROMASIA BLD QL SMEAR: NORMAL
POTASSIUM SERPL-SCNC: 3.4 MMOL/L (ref 3.4–5.3)
POTASSIUM SERPL-SCNC: 3.8 MMOL/L (ref 3.4–5.3)
RBC # BLD AUTO: 2.73 10E6/UL (ref 4.4–5.9)
RBC AGGLUT BLD QL: NORMAL
RBC MORPH BLD: NORMAL
RBC URINE: 1 /HPF
ROULEAUX BLD QL SMEAR: NORMAL
SICKLE CELLS BLD QL SMEAR: NORMAL
SMUDGE CELLS BLD QL SMEAR: NORMAL
SODIUM SERPL-SCNC: 139 MMOL/L (ref 135–145)
SP GR UR STRIP: 1.02 (ref 1–1.03)
SPHEROCYTES BLD QL SMEAR: NORMAL
STOMATOCYTES BLD QL SMEAR: NORMAL
TARGETS BLD QL SMEAR: NORMAL
TOXIC GRANULES BLD QL SMEAR: NORMAL
UROBILINOGEN UR STRIP-MCNC: NORMAL MG/DL
VARIANT LYMPHS BLD QL SMEAR: NORMAL
WBC # BLD AUTO: 0.1 10E3/UL (ref 4–11)
WBC URINE: <1 /HPF

## 2024-02-03 PROCEDURE — 99233 SBSQ HOSP IP/OBS HIGH 50: CPT | Mod: FS | Performed by: PHYSICIAN ASSISTANT

## 2024-02-03 PROCEDURE — 250N000011 HC RX IP 250 OP 636: Performed by: HOSPITALIST

## 2024-02-03 PROCEDURE — 84100 ASSAY OF PHOSPHORUS: CPT | Performed by: STUDENT IN AN ORGANIZED HEALTH CARE EDUCATION/TRAINING PROGRAM

## 2024-02-03 PROCEDURE — 250N000013 HC RX MED GY IP 250 OP 250 PS 637

## 2024-02-03 PROCEDURE — 258N000003 HC RX IP 258 OP 636: Performed by: PHYSICIAN ASSISTANT

## 2024-02-03 PROCEDURE — 250N000011 HC RX IP 250 OP 636: Performed by: PHYSICIAN ASSISTANT

## 2024-02-03 PROCEDURE — 250N000013 HC RX MED GY IP 250 OP 250 PS 637: Performed by: PHYSICIAN ASSISTANT

## 2024-02-03 PROCEDURE — 71046 X-RAY EXAM CHEST 2 VIEWS: CPT | Mod: 26 | Performed by: RADIOLOGY

## 2024-02-03 PROCEDURE — 87103 BLOOD FUNGUS CULTURE: CPT | Performed by: PHYSICIAN ASSISTANT

## 2024-02-03 PROCEDURE — 93010 ELECTROCARDIOGRAM REPORT: CPT | Performed by: INTERNAL MEDICINE

## 2024-02-03 PROCEDURE — 93005 ELECTROCARDIOGRAM TRACING: CPT

## 2024-02-03 PROCEDURE — 83735 ASSAY OF MAGNESIUM: CPT | Performed by: STUDENT IN AN ORGANIZED HEALTH CARE EDUCATION/TRAINING PROGRAM

## 2024-02-03 PROCEDURE — 250N000011 HC RX IP 250 OP 636: Mod: JZ | Performed by: PHYSICIAN ASSISTANT

## 2024-02-03 PROCEDURE — 250N000013 HC RX MED GY IP 250 OP 250 PS 637: Performed by: HOSPITALIST

## 2024-02-03 PROCEDURE — 85027 COMPLETE CBC AUTOMATED: CPT | Performed by: PHYSICIAN ASSISTANT

## 2024-02-03 PROCEDURE — 83605 ASSAY OF LACTIC ACID: CPT | Performed by: HOSPITALIST

## 2024-02-03 PROCEDURE — 80048 BASIC METABOLIC PNL TOTAL CA: CPT | Performed by: PHYSICIAN ASSISTANT

## 2024-02-03 PROCEDURE — 999N000065 XR CHEST 2 VIEWS

## 2024-02-03 PROCEDURE — 250N000009 HC RX 250: Performed by: PHYSICIAN ASSISTANT

## 2024-02-03 PROCEDURE — 84132 ASSAY OF SERUM POTASSIUM: CPT | Performed by: HOSPITALIST

## 2024-02-03 PROCEDURE — 81001 URINALYSIS AUTO W/SCOPE: CPT | Performed by: HOSPITALIST

## 2024-02-03 PROCEDURE — 250N000012 HC RX MED GY IP 250 OP 636 PS 637: Performed by: PHYSICIAN ASSISTANT

## 2024-02-03 PROCEDURE — 206N000001 HC R&B BMT UMMC

## 2024-02-03 RX ORDER — POLYETHYLENE GLYCOL 3350 17 G/17G
17 POWDER, FOR SOLUTION ORAL DAILY
Status: DISCONTINUED | OUTPATIENT
Start: 2024-02-03 | End: 2024-02-04

## 2024-02-03 RX ORDER — CEFEPIME HYDROCHLORIDE 2 G/1
2 INJECTION, POWDER, FOR SOLUTION INTRAVENOUS EVERY 8 HOURS
Status: DISCONTINUED | OUTPATIENT
Start: 2024-02-03 | End: 2024-02-06

## 2024-02-03 RX ORDER — METOPROLOL TARTRATE 1 MG/ML
5 INJECTION, SOLUTION INTRAVENOUS ONCE
Status: DISCONTINUED | OUTPATIENT
Start: 2024-02-03 | End: 2024-02-03

## 2024-02-03 RX ORDER — POTASSIUM CHLORIDE 29.8 MG/ML
20 INJECTION INTRAVENOUS
Status: COMPLETED | OUTPATIENT
Start: 2024-02-03 | End: 2024-02-03

## 2024-02-03 RX ORDER — AMOXICILLIN 250 MG
1 CAPSULE ORAL 2 TIMES DAILY
Status: DISCONTINUED | OUTPATIENT
Start: 2024-02-03 | End: 2024-02-04

## 2024-02-03 RX ORDER — AMOXICILLIN 250 MG
2 CAPSULE ORAL 2 TIMES DAILY
Status: DISCONTINUED | OUTPATIENT
Start: 2024-02-03 | End: 2024-02-04

## 2024-02-03 RX ORDER — SODIUM CHLORIDE 9 MG/ML
INJECTION, SOLUTION INTRAVENOUS CONTINUOUS
Status: DISCONTINUED | OUTPATIENT
Start: 2024-02-03 | End: 2024-02-14

## 2024-02-03 RX ADMIN — URSODIOL 300 MG: 300 CAPSULE ORAL at 14:12

## 2024-02-03 RX ADMIN — NIFEDIPINE 30 MG: 30 TABLET, FILM COATED, EXTENDED RELEASE ORAL at 07:54

## 2024-02-03 RX ADMIN — BENZOCAINE 1 ML: 220 SPRAY, METERED PERIODONTAL at 04:21

## 2024-02-03 RX ADMIN — URSODIOL 300 MG: 300 CAPSULE ORAL at 20:10

## 2024-02-03 RX ADMIN — CEFEPIME HYDROCHLORIDE 2 G: 2 INJECTION, POWDER, FOR SOLUTION INTRAVENOUS at 12:37

## 2024-02-03 RX ADMIN — BENZOCAINE 0.5 ML: 220 SPRAY, METERED PERIODONTAL at 14:49

## 2024-02-03 RX ADMIN — CEFEPIME HYDROCHLORIDE 2 G: 2 INJECTION, POWDER, FOR SOLUTION INTRAVENOUS at 20:12

## 2024-02-03 RX ADMIN — MYCOPHENOLATE MOFETIL 1250 MG: 500 INJECTION, POWDER, LYOPHILIZED, FOR SOLUTION INTRAVENOUS at 21:14

## 2024-02-03 RX ADMIN — POTASSIUM CHLORIDE 20 MEQ: 29.8 INJECTION, SOLUTION INTRAVENOUS at 06:32

## 2024-02-03 RX ADMIN — ACETAMINOPHEN 650 MG: 325 TABLET, FILM COATED ORAL at 04:58

## 2024-02-03 RX ADMIN — HYDROMORPHONE HYDROCHLORIDE 2 MG: 2 TABLET ORAL at 07:52

## 2024-02-03 RX ADMIN — LISINOPRIL 40 MG: 10 TABLET ORAL at 07:54

## 2024-02-03 RX ADMIN — NIFEDIPINE 30 MG: 30 TABLET, FILM COATED, EXTENDED RELEASE ORAL at 20:10

## 2024-02-03 RX ADMIN — MICAFUNGIN SODIUM 150 MG: 50 INJECTION, POWDER, LYOPHILIZED, FOR SOLUTION INTRAVENOUS at 08:28

## 2024-02-03 RX ADMIN — VANCOMYCIN HYDROCHLORIDE 125 MG: 125 CAPSULE ORAL at 20:10

## 2024-02-03 RX ADMIN — DIPHENHYDRAMINE HYDROCHLORIDE AND LIDOCAINE HYDROCHLORIDE AND ALUMINUM HYDROXIDE AND MAGNESIUM HYDRO 10 ML: KIT at 08:37

## 2024-02-03 RX ADMIN — PANTOPRAZOLE SODIUM 40 MG: 40 TABLET, DELAYED RELEASE ORAL at 07:54

## 2024-02-03 RX ADMIN — HYDROMORPHONE HYDROCHLORIDE 2 MG: 2 TABLET ORAL at 00:38

## 2024-02-03 RX ADMIN — URSODIOL 300 MG: 300 CAPSULE ORAL at 07:54

## 2024-02-03 RX ADMIN — SIROLIMUS 5 MG: 2 TABLET ORAL at 07:53

## 2024-02-03 RX ADMIN — DEXTROSE MONOHYDRATE 20 ML: 50 INJECTION, SOLUTION INTRAVENOUS at 20:16

## 2024-02-03 RX ADMIN — BENZOCAINE 1 ML: 220 SPRAY, METERED PERIODONTAL at 00:38

## 2024-02-03 RX ADMIN — VANCOMYCIN HYDROCHLORIDE 125 MG: 125 CAPSULE ORAL at 16:33

## 2024-02-03 RX ADMIN — SODIUM CHLORIDE: 9 INJECTION, SOLUTION INTRAVENOUS at 11:16

## 2024-02-03 RX ADMIN — Medication 2 SPRAY: at 20:09

## 2024-02-03 RX ADMIN — VANCOMYCIN HYDROCHLORIDE 125 MG: 125 CAPSULE ORAL at 11:02

## 2024-02-03 RX ADMIN — ACYCLOVIR 800 MG: 800 TABLET ORAL at 20:10

## 2024-02-03 RX ADMIN — Medication 5 ML: at 04:43

## 2024-02-03 RX ADMIN — POTASSIUM CHLORIDE 20 MEQ: 29.8 INJECTION, SOLUTION INTRAVENOUS at 07:44

## 2024-02-03 RX ADMIN — ACYCLOVIR 800 MG: 800 TABLET ORAL at 07:54

## 2024-02-03 RX ADMIN — MYCOPHENOLATE MOFETIL 1250 MG: 500 INJECTION, POWDER, LYOPHILIZED, FOR SOLUTION INTRAVENOUS at 10:00

## 2024-02-03 RX ADMIN — OLANZAPINE 5 MG: 2.5 TABLET, FILM COATED ORAL at 21:14

## 2024-02-03 RX ADMIN — VANCOMYCIN HYDROCHLORIDE 125 MG: 125 CAPSULE ORAL at 07:54

## 2024-02-03 RX ADMIN — METOPROLOL SUCCINATE 100 MG: 50 TABLET, EXTENDED RELEASE ORAL at 07:54

## 2024-02-03 RX ADMIN — Medication: at 11:03

## 2024-02-03 RX ADMIN — BENZOCAINE 0.5 ML: 220 SPRAY, METERED PERIODONTAL at 07:52

## 2024-02-03 RX ADMIN — DEXTROSE MONOHYDRATE 450 MCG: 50 INJECTION, SOLUTION INTRAVENOUS at 20:17

## 2024-02-03 RX ADMIN — BENZOCAINE 0.5 ML: 220 SPRAY, METERED PERIODONTAL at 20:15

## 2024-02-03 RX ADMIN — CEFEPIME HYDROCHLORIDE 2 G: 2 INJECTION, POWDER, FOR SOLUTION INTRAVENOUS at 04:57

## 2024-02-03 RX ADMIN — HYDROMORPHONE HYDROCHLORIDE 2 MG: 2 TABLET ORAL at 04:21

## 2024-02-03 ASSESSMENT — ACTIVITIES OF DAILY LIVING (ADL)
ADLS_ACUITY_SCORE: 22

## 2024-02-03 NOTE — PROGRESS NOTES
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 This AM admits to worsening mucositis pain and some rectal pain. Feels like he can't swallow solid foods today, but states he will continue to take his PO pills and try soft foods. Later this AM noted his PO dilaudid was not providing enough relief. Plan to start PCA today.     Review of Systems    Review of Systems: 10 point ROS negative unless stated in HPI   PHYSICAL EXAM      Weight     Wt Readings from Last 3 Encounters:   02/03/24 86 kg (189 lb 11.2 oz)   01/17/24 90.1 kg (198 lb 9.6 oz)   01/12/24 88.9 kg (196 lb)        KPS: 70    /79 (BP Location: Right arm)   Pulse 87   Temp 97.7  F (36.5  C) (Axillary)   Resp 16   Ht 1.725 m (5' 7.91\")   Wt 86 kg (189 lb 11.2 oz)   SpO2 97%   BMI 28.92 kg/m       General: NAD   Mouth/Throat: Multiple scattered erythematous ulcers on bilateral posterior buccal mucosa, posterior pharynx diffusely erythematous   Eyes: GARY, sclera anicteric   Lungs: CTA bilaterally  Cardiovascular: RRR, no M/R/G   Lymphatics: No edema  Neuro: A&O   Additional Findings: Powell site NT, no drainage.    Current aGVHD staging:  Skin 0, UGI 0, LGI 0, Liver 0 (keep in note through day +180 for allos)      LABS AND IMAGING: I have assessed all abnormal lab values for their clinical significance and any values considered clinically significant have been addressed in the assessment and plan.        Lab Results   Component Value Date    WBC 0.1 (LL) " 02/03/2024    ANEUTAUTO 2.0 01/28/2024    HGB 8.7 (L) 02/03/2024    HCT 24.4 (L) 02/03/2024    PLT 14 (LL) 02/03/2024     02/03/2024    POTASSIUM 3.4 02/03/2024    CHLORIDE 109 (H) 02/03/2024    CO2 22 02/03/2024     (H) 02/03/2024    BUN 15.9 02/03/2024    CR 0.81 02/03/2024    MAG 1.7 02/03/2024    INR 1.23 (H) 01/29/2024         SYSTEMS-BASED ASSESSMENT AND PLAN     Ziggy Hallman is a 48 year old year old male with a PMH of MDS, hx of multiple clots and MI undergoing a MA (Bu/Flu) prep for an 8/8 URD PBSCT day 9    BMT/IEC PROTOCOL for 2015-29  Chemo Prep:   Day -6: Admit  Day -5 through Day -2: Busulfan/Fludarabine  Day -1: Rest.   Keppra prophylaxis        8/8 DP permissive. Cell dose-9.24x10^6  ABO: Donor: O+ Recipient A-  (Minor incompatability, no flush required)   GSCF plan: GCSF to start day +5 until ANC >1500 for 3 consecutive days    HEME/COAG  #Risk of Pancytopenia 2/2 chemo- anemia  - Transfusion parameters: hemoglobin <8 (cardiac hx), platelets <10    #Recurrent arterial thromboembolic events:  He has long history of various events including renal infarcts (2011, 2013, 2015), left popliteal embolic episode requiring surgery, anticoagulation (2011), right carotid artery embolic episode with TIA/stroke-like symptoms (2012), embolic MI (2015), gangrenous right toe requiring vascular surgery/amputation (2017), 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   Extensive hypercoagulable workup to date has been unrevealing.  JAK2 testing negative.  PNH testing negative.  Elevated IgA of unknown significance, no evidence of plasma cell disorder.  - Eliquis and Prasugrel now HELD starting 2/1-x. Did not start lovenox due to rapid drop of platelets.     IMMUNOCOMPROMISED  #Febrile neutropenia  Respiked fever on 2/2, infectious workup pending. Restarted Cefepime. CXR- Mild central bronchial wall thickening without bronchiectasis. Bronchitis should be  considered. No focal  consolidative opacities to suggest a bacterial pneumonia.  Cefepime 2/2-x    #Febrile Neutropenia likely 2/2 to t-cell expansion  Spiked first fever on 1/28 PM, cultures and cxr completed. Cefepime started. Possibly 2/2 to T-cell expansion. No hypotension  - Cefepime 1/28-2/1       C. Diff - Admit c. Diff triple+ - Start PO vanc 1/21 for 14 day course.   -Prophylaxis plan: ACV LD, Megan HD, Levaquin while neutropenic , Bactrim +28    RISK OF GVHD  - Prophylaxis: PtC day +3, +4, , Siro/MMF to start day +5     CARDIOVASCULAR  #CAD  #Hx of CABG  #HTN   -PTA metoprolol, Lisinopril dose increased 2/2 HTN, Amlodipine 5mg BID switch to nifidepine ER 30mg BID, labetolol PRN;     #Hyperlipidemia: Holding atorvastatin peritransplant  - Risk of cardiomyopathy:  Baseline EF 50-55%, Global left ventricular function mildly reduced. Grade 1 or early diastolic dysfunction.   - Risk of arrhythmia: Baseline EKG showed NSR, Qtc -425    GI/NUTRITION  #Oropharyngeal mucositis  - MMW, hurricaine spray, dilaudid PCA 2/3-x     #Hematuria- No dysuria, self resolved.     - Ulcer prophylaxis: Protonix   - VOD Prophylaxis: Ursodiol  - Risk of nausea/vomiting due to chemo: Ativan, Compazine, Zofran   - Risk of malnutrition: Nutrition to follow.     RENAL/ELECTROLYTES/  #Hypervolemia 2/2 cytoxan flush - diurese as needed 1/30- Lasix 10mg   #Proteinuria- possibly exacerbated by persistent HTN (See CV),   #BUN/Cr elevation- FENA, osms, Na+, UA work up indicate prerenal etiology, likely intravascular depletion 2/2 osmotic diuresis with proteinuria. Will encourage PO fluid intake as Cr improved 1/23, IVF boluses as necessary, control HTN. RESOLVED.  - Hypocalcemia in setting hypoalbuminemia, iCa2+, corrected WNL   - Electrolyte management: replace per sliding scale    Psych:    #Anxiety: admits to feeling anxious.  Start zyprexa 5mg HS.  Connect w/ SW.  Psychaitry consult added hydroxyzine.  #Insomnia- PTA ambien, Trazadone  "added and increased to 100mg. - Ambien and trazadone held during cytoxan period due to frequent urination and concern for falls. resumed following cytoxan flush 1/31.      SOCIAL DETERMINANTS  - Caregiver: Family   - Financial/insurance concerns: See SW notes       Known issues that I take into account for medical decisions, with salient changes to the plan considering these complexities noted above.    Patient Active Problem List   Diagnosis    Amegakaryocytic thrombocytopenia (H)    Anemia due to bone marrow failure, unspecified bone marrow failure type (H)    Aplastic anemia (H)     Clinically Significant Risk Factors              # Hypoalbuminemia: Lowest albumin = 2 g/dL at 1/22/2024  4:12 AM, will monitor as appropriate     # Thrombocytopenia: Lowest platelets = 14 in last 2 days, will monitor for bleeding          # Overweight: Estimated body mass index is 28.92 kg/m  as calculated from the following:    Height as of this encounter: 1.725 m (5' 7.91\").    Weight as of this encounter: 86 kg (189 lb 11.2 oz).                 Dispo: Remain admitted through engraftment    I spent 30 minutes in the care of this patient today, which included time necessary for preparation for the visit, obtaining history, ordering medications/tests/procedures as medically indicated, review of pertinent medical literature, counseling of the patient, communication of recommendations to the care team, and documentation time.    Brenda Brunson PA-C  [phone]  "

## 2024-02-03 NOTE — PROVIDER NOTIFICATION
Provider Lexx Graham notified the patients HR is going into the 130's while he's eating lunch. He doesn't have any other symptoms.

## 2024-02-03 NOTE — PLAN OF CARE
Goal Outcome Evaluation:      Plan of Care Reviewed With: patient    Overall Patient Progress: improvingOverall Patient Progress: improving    Pt complained of throat pain requiring hurricaine spray + dilaudid x 2 for adequate relief.  No nausea and ate well.  Heart rate tachy/regular. Continues to have mild hypertension.  Has some numbness/tingling outer right hand/wrist (not new).  Ativan at HS for sleep.

## 2024-02-03 NOTE — PROVIDER NOTIFICATION
"Deepak text paged Provider Ezekiel Perkins:    \"Pt temp 101.1 axillary, do you want port culture in addition to CVC and/or peripheral culture? Ordered a CXR and doing cultures x2 on CVC\"    Orders received: for 650 mg Tylenol, 2 g Cefepime, UA.        "

## 2024-02-03 NOTE — PLAN OF CARE
"BP (!) 146/85 (BP Location: Right arm)   Pulse 119   Temp 97.7  F (36.5  C) (Axillary)   Resp 16   Ht 1.725 m (5' 7.91\")   Wt 86 kg (189 lb 11.2 oz)   SpO2 98%   BMI 28.92 kg/m      Pt is AOx4 and independent. No complaints of N/V/D. In the evening he experienced tachycardia in the 130's and the on-call MD was notified.He has been afebrile today. A EKG was done showing sinus tachycardia.He has mucositis and pain associated with it. He was started on a dilaudid PCA and continues to use magic mouthwash and hurricaine spray with partial relief. He has a poor appetite due to pain but is continuing to drink adequately.He is voiding freely but not always saving his urine, he was educated on the importance of measuring his output. Will continue to follow the plan of care     Goal Outcome Evaluation:      Plan of Care Reviewed With: patient    Overall Patient Progress: declining    Problem: Adult Inpatient Plan of Care  Goal: Plan of Care Review  Description: The Plan of Care Review/Shift note should be completed every shift.  The Outcome Evaluation is a brief statement about your assessment that the patient is improving, declining, or no change.  This information will be displayed automatically on your shift  note.  2/3/2024 1744 by Phyllis Mitchell RN  Outcome: Not Progressing  Flowsheets (Taken 2/3/2024 1744)  Plan of Care Reviewed With: patient  Overall Patient Progress: declining  2/3/2024 1744 by Phyllis Mitchell RN  Outcome: Progressing  Flowsheets (Taken 2/3/2024 1744)  Plan of Care Reviewed With: patient  Overall Patient Progress: declining  Goal: Patient-Specific Goal (Individualized)  Description: You can add care plan individualizations to a care plan. Examples of Individualization might be:  \"Parent requests to be called daily at 9am for status\", \"I have a hard time hearing out of my right ear\", or \"Do not touch me to wake me up as it startles  me\".  2/3/2024 1744 by Phyllis Mitchell RN  Outcome: Not " Progressing  2/3/2024 1744 by Phyllis Mitchell RN  Outcome: Progressing  Goal: Absence of Hospital-Acquired Illness or Injury  2/3/2024 1744 by Phyllis Mitchell RN  Outcome: Progressing  2/3/2024 1744 by Phyllis Mitchell RN  Outcome: Progressing  Intervention: Identify and Manage Fall Risk  Recent Flowsheet Documentation  Taken 2/3/2024 1500 by Phyllis Mitchell RN  Safety Promotion/Fall Prevention: safety round/check completed  Taken 2/3/2024 1400 by Phyllis Mitchell RN  Safety Promotion/Fall Prevention: safety round/check completed  Taken 2/3/2024 1116 by Phyllis Mitchell RN  Safety Promotion/Fall Prevention: safety round/check completed  Taken 2/3/2024 0833 by Phyllis Mitchell RN  Safety Promotion/Fall Prevention:   safety round/check completed   assistive device/personal items within reach   clutter free environment maintained   lighting adjusted   nonskid shoes/slippers when out of bed   patient and family education   room near nurse's station   room organization consistent  Intervention: Prevent Skin Injury  Recent Flowsheet Documentation  Taken 2/3/2024 0833 by Phyllis Mitchell RN  Body Position: position changed independently  Skin Protection: transparent dressing maintained  Device Skin Pressure Protection: adhesive use limited  Intervention: Prevent and Manage VTE (Venous Thromboembolism) Risk  Recent Flowsheet Documentation  Taken 2/3/2024 0833 by Phyllis Mitchell RN  VTE Prevention/Management: SCDs (sequential compression devices) off  Intervention: Prevent Infection  Recent Flowsheet Documentation  Taken 2/3/2024 0833 by Phyllis Mitchell RN  Infection Prevention:   environmental surveillance performed   hand hygiene promoted   personal protective equipment utilized   rest/sleep promoted   single patient room provided   visitors restricted/screened  Goal: Optimal Comfort and Wellbeing  2/3/2024 1744 by Phyllis Mitchell RN  Outcome: Not Progressing  2/3/2024 1744 by Phyllis Mitchell RN  Outcome:  Progressing  Intervention: Monitor Pain and Promote Comfort  Recent Flowsheet Documentation  Taken 2/3/2024 1630 by Phyllis Mitchell RN  Pain Management Interventions: pain pump in use  Taken 2/3/2024 1413 by Phyllis Mitchell RN  Pain Management Interventions: pain pump in use  Taken 2/3/2024 1103 by Phyllis Mitchell RN  Pain Management Interventions: pain pump in use  Goal: Readiness for Transition of Care  2/3/2024 1744 by Phyllis Mitchell RN  Outcome: Not Progressing  2/3/2024 1744 by Phyllis Mitchell RN  Outcome: Progressing     Problem: Stem Cell/Bone Marrow Transplant  Goal: Optimal Coping with Transplant  2/3/2024 1744 by Phyllis Mitchell RN  Outcome: Progressing  2/3/2024 1744 by Phyllis Mitchell RN  Outcome: Progressing  Goal: Symptom-Free Urinary Elimination  2/3/2024 1744 by Phyllis Mitchell RN  Outcome: Progressing  2/3/2024 1744 by Phyllis Mitchell RN  Outcome: Progressing  Intervention: Prevent or Manage Bladder Irritation  Recent Flowsheet Documentation  Taken 2/3/2024 1630 by Phyllis Mitchell RN  Pain Management Interventions: pain pump in use  Taken 2/3/2024 1413 by Phyllis Mitchell RN  Pain Management Interventions: pain pump in use  Taken 2/3/2024 1103 by Phyllis Mitchell RN  Pain Management Interventions: pain pump in use  Taken 2/3/2024 0833 by Phyllis Mitchell RN  Urinary Elimination Promotion: frequent voiding encouraged  Hyperhydration Management: fluids provided  Goal: Diarrhea Symptom Control  2/3/2024 1744 by Phyllis Mitchell RN  Outcome: Progressing  2/3/2024 1744 by Phyllis Mitchell RN  Outcome: Progressing  Intervention: Manage Diarrhea  Recent Flowsheet Documentation  Taken 2/3/2024 0833 by Phyllis Mitchell RN  Skin Protection: transparent dressing maintained  Fluid/Electrolyte Management: fluids provided  Goal: Improved Activity Tolerance  2/3/2024 1744 by Phyllis Mitchell RN  Outcome: Not Progressing  2/3/2024 1744 by Phyllis Mitchell RN  Outcome: Progressing  Intervention: Promote  Improved Energy  Recent Flowsheet Documentation  Taken 2/3/2024 0833 by Phyllis Mitchell RN  Fatigue Management:   paced activity encouraged   frequent rest breaks encouraged  Activity Management: up ad amna  Goal: Blood Counts Within Acceptable Range  2/3/2024 1744 by Phyllis Mitchell RN  Outcome: Not Progressing  2/3/2024 1744 by Phyllis Mitchell RN  Outcome: Progressing  Intervention: Monitor and Manage Hematologic Symptoms  Recent Flowsheet Documentation  Taken 2/3/2024 0833 by Phyllis Mitchell RN  Bleeding Precautions:   coagulation study results reviewed   foot protection facilitated   gentle oral care promoted   monitored for signs of bleeding  Goal: Absence of Hypersensitivity Reaction  2/3/2024 1744 by Phyllis Mitchell RN  Outcome: Progressing  2/3/2024 1744 by Phyllis Mitchell RN  Outcome: Progressing  Goal: Absence of Infection  2/3/2024 1744 by Phyllis Mitchell RN  Outcome: Progressing  2/3/2024 1744 by Phyllis Mitchell RN  Outcome: Progressing  Intervention: Prevent and Manage Infection  Recent Flowsheet Documentation  Taken 2/3/2024 0833 by Phyllis Mitchell RN  Infection Prevention:   environmental surveillance performed   hand hygiene promoted   personal protective equipment utilized   rest/sleep promoted   single patient room provided   visitors restricted/screened  Infection Management: aseptic technique maintained  Isolation Precautions:   contact precautions maintained   enteric precautions maintained   protective environment maintained  Goal: Improved Oral Mucous Membrane Health and Integrity  2/3/2024 1744 by Phyllis Mitchell RN  Outcome: Not Progressing  2/3/2024 1744 by Phyllis Mitchell RN  Outcome: Progressing  Goal: Nausea and Vomiting Symptom Relief  2/3/2024 1744 by Phyllis Mitchell RN  Outcome: Progressing  2/3/2024 1744 by Phyllis Mitchell RN  Outcome: Progressing  Goal: Optimal Nutrition Intake  2/3/2024 1744 by Phyllis Mitchell RN  Outcome: Not Progressing  2/3/2024 1744 by Stephen  Phyllis, RN  Outcome: Progressing  Intervention: Minimize and Manage Barriers to Oral Intake  Recent Flowsheet Documentation  Taken 2/3/2024 0833 by Phyllis Mitchell, RN  Oral Nutrition Promotion:   physical activity promoted   rest periods promoted

## 2024-02-03 NOTE — PLAN OF CARE
Pt A/O x4. VSS on RA, Tachycardic in 110's. T-max 101.1 and UA sent, BC x2 drawn, 650 mg Tylenol given and Cefepime started. BPA fired for STAT lactic, Lactic lab drawn with result of 0.5, Pt using suction at bedside for mucositis symptoms. Ongoing pain intermittently managed with PRN PO 2 mg Dilaudid x2 and Hurricaine spray x2. UA sent, pending lab result. CXR ordered and not completed. Per electrolyte replenishment protocol, Currently infusing (1 of 2) 20 mEq Potassium. Pt sleeping between cares, currently able to handle secretions but mucositis symptoms are not improving at this time. Contact and Enteric precautions maintained. Pt able to make needs known and call light within reach.    Problem: Stem Cell/Bone Marrow Transplant  Goal: Blood Counts Within Acceptable Range  Outcome: Not Progressing  Intervention: Monitor and Manage Hematologic Symptoms  Recent Flowsheet Documentation  Taken 2/3/2024 0031 by Payal Le RN  Sleep/Rest Enhancement:   awakenings minimized   comfort measures   noise level reduced   regular sleep/rest pattern promoted   relaxation techniques promoted   room darkened  Bleeding Precautions:   blood pressure closely monitored   coagulation study results reviewed   foot protection facilitated   gentle oral care promoted   monitored for signs of bleeding  Medication Review/Management:   medications reviewed   high-risk medications identified  Goal: Improved Oral Mucous Membrane Health and Integrity  Outcome: Not Progressing  Intervention: Promote Oral Comfort and Health  Recent Flowsheet Documentation  Taken 2/3/2024 0031 by Payal Le RN  Oral Mucous Membrane Protection:   nonirritating oral foods promoted   topical anesthetic applied   nonirritating oral fluids promoted  Oral Care:   mouth wash rinse   oral rinse provided

## 2024-02-04 LAB
ACANTHOCYTES BLD QL SMEAR: NORMAL
ANION GAP SERPL CALCULATED.3IONS-SCNC: 7 MMOL/L (ref 7–15)
AUER BODIES BLD QL SMEAR: NORMAL
BASO STIPL BLD QL SMEAR: NORMAL
BASOPHILS # BLD AUTO: ABNORMAL 10*3/UL
BASOPHILS NFR BLD AUTO: ABNORMAL %
BITE CELLS BLD QL SMEAR: NORMAL
BLD PROD TYP BPU: NORMAL
BLISTER CELLS BLD QL SMEAR: NORMAL
BLOOD COMPONENT TYPE: NORMAL
BUN SERPL-MCNC: 10 MG/DL (ref 6–20)
BURR CELLS BLD QL SMEAR: NORMAL
CALCIUM SERPL-MCNC: 7.9 MG/DL (ref 8.6–10)
CHLORIDE SERPL-SCNC: 107 MMOL/L (ref 98–107)
CODING SYSTEM: NORMAL
CREAT SERPL-MCNC: 0.68 MG/DL (ref 0.67–1.17)
DACRYOCYTES BLD QL SMEAR: NORMAL
DEPRECATED HCO3 PLAS-SCNC: 22 MMOL/L (ref 22–29)
EGFRCR SERPLBLD CKD-EPI 2021: >90 ML/MIN/1.73M2
ELLIPTOCYTES BLD QL SMEAR: NORMAL
EOSINOPHIL # BLD AUTO: ABNORMAL 10*3/UL
EOSINOPHIL NFR BLD AUTO: ABNORMAL %
ERYTHROCYTE [DISTWIDTH] IN BLOOD BY AUTOMATED COUNT: 13.3 % (ref 10–15)
FRAGMENTS BLD QL SMEAR: NORMAL
GLUCOSE SERPL-MCNC: 109 MG/DL (ref 70–99)
HCT VFR BLD AUTO: 22.9 % (ref 40–53)
HGB BLD-MCNC: 8.3 G/DL (ref 13.3–17.7)
HGB C CRYSTALS: NORMAL
HOWELL-JOLLY BOD BLD QL SMEAR: NORMAL
IMM GRANULOCYTES # BLD: ABNORMAL 10*3/UL
IMM GRANULOCYTES NFR BLD: ABNORMAL %
ISSUE DATE AND TIME: NORMAL
LACTATE SERPL-SCNC: 0.4 MMOL/L (ref 0.7–2)
LACTATE SERPL-SCNC: 0.7 MMOL/L (ref 0.7–2)
LYMPHOCYTES # BLD AUTO: ABNORMAL 10*3/UL
LYMPHOCYTES NFR BLD AUTO: ABNORMAL %
MAGNESIUM SERPL-MCNC: 1.6 MG/DL (ref 1.7–2.3)
MCH RBC QN AUTO: 31.6 PG (ref 26.5–33)
MCHC RBC AUTO-ENTMCNC: 36.2 G/DL (ref 31.5–36.5)
MCV RBC AUTO: 87 FL (ref 78–100)
MONOCYTES # BLD AUTO: ABNORMAL 10*3/UL
MONOCYTES NFR BLD AUTO: ABNORMAL %
NEUTROPHILS # BLD AUTO: ABNORMAL 10*3/UL
NEUTROPHILS NFR BLD AUTO: ABNORMAL %
NEUTS HYPERSEG BLD QL SMEAR: NORMAL
PHOSPHATE SERPL-MCNC: 2.2 MG/DL (ref 2.5–4.5)
PLAT MORPH BLD: NORMAL
PLATELET # BLD AUTO: 27 10E3/UL (ref 150–450)
PLATELET # BLD AUTO: 6 10E3/UL (ref 150–450)
POLYCHROMASIA BLD QL SMEAR: NORMAL
POTASSIUM SERPL-SCNC: 3.6 MMOL/L (ref 3.4–5.3)
RBC # BLD AUTO: 2.63 10E6/UL (ref 4.4–5.9)
RBC AGGLUT BLD QL: NORMAL
RBC MORPH BLD: NORMAL
ROULEAUX BLD QL SMEAR: NORMAL
SICKLE CELLS BLD QL SMEAR: NORMAL
SMUDGE CELLS BLD QL SMEAR: NORMAL
SODIUM SERPL-SCNC: 136 MMOL/L (ref 135–145)
SPHEROCYTES BLD QL SMEAR: NORMAL
STOMATOCYTES BLD QL SMEAR: NORMAL
TARGETS BLD QL SMEAR: NORMAL
TOXIC GRANULES BLD QL SMEAR: NORMAL
UNIT ABO/RH: NORMAL
UNIT NUMBER: NORMAL
UNIT STATUS: NORMAL
UNIT TYPE ISBT: 6200
VARIANT LYMPHS BLD QL SMEAR: NORMAL
WBC # BLD AUTO: <0.1 10E3/UL (ref 4–11)

## 2024-02-04 PROCEDURE — 250N000011 HC RX IP 250 OP 636: Mod: JZ | Performed by: PHYSICIAN ASSISTANT

## 2024-02-04 PROCEDURE — 99233 SBSQ HOSP IP/OBS HIGH 50: CPT | Mod: FS | Performed by: PHYSICIAN ASSISTANT

## 2024-02-04 PROCEDURE — 250N000011 HC RX IP 250 OP 636: Performed by: HOSPITALIST

## 2024-02-04 PROCEDURE — 87103 BLOOD FUNGUS CULTURE: CPT | Performed by: PHYSICIAN ASSISTANT

## 2024-02-04 PROCEDURE — 258N000003 HC RX IP 258 OP 636: Performed by: HOSPITALIST

## 2024-02-04 PROCEDURE — 80048 BASIC METABOLIC PNL TOTAL CA: CPT | Performed by: PHYSICIAN ASSISTANT

## 2024-02-04 PROCEDURE — 258N000003 HC RX IP 258 OP 636: Performed by: PHYSICIAN ASSISTANT

## 2024-02-04 PROCEDURE — 83605 ASSAY OF LACTIC ACID: CPT | Performed by: STUDENT IN AN ORGANIZED HEALTH CARE EDUCATION/TRAINING PROGRAM

## 2024-02-04 PROCEDURE — P9037 PLATE PHERES LEUKOREDU IRRAD: HCPCS | Performed by: PHYSICIAN ASSISTANT

## 2024-02-04 PROCEDURE — 83735 ASSAY OF MAGNESIUM: CPT | Performed by: HOSPITALIST

## 2024-02-04 PROCEDURE — 250N000013 HC RX MED GY IP 250 OP 250 PS 637: Performed by: HOSPITALIST

## 2024-02-04 PROCEDURE — 250N000012 HC RX MED GY IP 250 OP 636 PS 637: Performed by: PHYSICIAN ASSISTANT

## 2024-02-04 PROCEDURE — 250N000009 HC RX 250: Performed by: PHYSICIAN ASSISTANT

## 2024-02-04 PROCEDURE — 85049 AUTOMATED PLATELET COUNT: CPT | Performed by: PHYSICIAN ASSISTANT

## 2024-02-04 PROCEDURE — 206N000001 HC R&B BMT UMMC

## 2024-02-04 PROCEDURE — 250N000013 HC RX MED GY IP 250 OP 250 PS 637: Performed by: PHYSICIAN ASSISTANT

## 2024-02-04 PROCEDURE — 84100 ASSAY OF PHOSPHORUS: CPT | Performed by: HOSPITALIST

## 2024-02-04 PROCEDURE — 85027 COMPLETE CBC AUTOMATED: CPT | Performed by: PHYSICIAN ASSISTANT

## 2024-02-04 PROCEDURE — 250N000009 HC RX 250: Performed by: HOSPITALIST

## 2024-02-04 PROCEDURE — 250N000013 HC RX MED GY IP 250 OP 250 PS 637

## 2024-02-04 RX ORDER — POTASSIUM PHOS IN 0.9 % NACL 15MMOL/250
15 PLASTIC BAG, INJECTION (ML) INTRAVENOUS ONCE
Status: COMPLETED | OUTPATIENT
Start: 2024-02-04 | End: 2024-02-04

## 2024-02-04 RX ADMIN — LISINOPRIL 40 MG: 10 TABLET ORAL at 07:55

## 2024-02-04 RX ADMIN — CEFEPIME HYDROCHLORIDE 2 G: 2 INJECTION, POWDER, FOR SOLUTION INTRAVENOUS at 04:17

## 2024-02-04 RX ADMIN — CEFEPIME HYDROCHLORIDE 2 G: 2 INJECTION, POWDER, FOR SOLUTION INTRAVENOUS at 23:01

## 2024-02-04 RX ADMIN — DEXTROSE MONOHYDRATE 10 ML: 50 INJECTION, SOLUTION INTRAVENOUS at 19:52

## 2024-02-04 RX ADMIN — MYCOPHENOLATE MOFETIL 1250 MG: 500 INJECTION, POWDER, LYOPHILIZED, FOR SOLUTION INTRAVENOUS at 20:55

## 2024-02-04 RX ADMIN — MYCOPHENOLATE MOFETIL 1250 MG: 500 INJECTION, POWDER, LYOPHILIZED, FOR SOLUTION INTRAVENOUS at 09:38

## 2024-02-04 RX ADMIN — ACETAMINOPHEN 650 MG: 325 TABLET, FILM COATED ORAL at 23:13

## 2024-02-04 RX ADMIN — ACYCLOVIR 800 MG: 800 TABLET ORAL at 19:51

## 2024-02-04 RX ADMIN — DIPHENHYDRAMINE HYDROCHLORIDE AND LIDOCAINE HYDROCHLORIDE AND ALUMINUM HYDROXIDE AND MAGNESIUM HYDRO 10 ML: KIT at 07:52

## 2024-02-04 RX ADMIN — DEXTROSE MONOHYDRATE 450 MCG: 50 INJECTION, SOLUTION INTRAVENOUS at 19:52

## 2024-02-04 RX ADMIN — BENZOCAINE 0.5 ML: 220 SPRAY, METERED PERIODONTAL at 19:58

## 2024-02-04 RX ADMIN — URSODIOL 300 MG: 300 CAPSULE ORAL at 19:51

## 2024-02-04 RX ADMIN — SIROLIMUS 5 MG: 2 TABLET ORAL at 07:55

## 2024-02-04 RX ADMIN — URSODIOL 300 MG: 300 CAPSULE ORAL at 07:54

## 2024-02-04 RX ADMIN — MICAFUNGIN SODIUM 150 MG: 50 INJECTION, POWDER, LYOPHILIZED, FOR SOLUTION INTRAVENOUS at 07:56

## 2024-02-04 RX ADMIN — BENZOCAINE 0.5 ML: 220 SPRAY, METERED PERIODONTAL at 19:50

## 2024-02-04 RX ADMIN — METOPROLOL SUCCINATE 100 MG: 50 TABLET, EXTENDED RELEASE ORAL at 07:55

## 2024-02-04 RX ADMIN — URSODIOL 300 MG: 300 CAPSULE ORAL at 14:10

## 2024-02-04 RX ADMIN — DOCUSATE SODIUM 50 MG AND SENNOSIDES 8.6 MG 1 TABLET: 8.6; 5 TABLET, FILM COATED ORAL at 07:54

## 2024-02-04 RX ADMIN — CEFEPIME HYDROCHLORIDE 2 G: 2 INJECTION, POWDER, FOR SOLUTION INTRAVENOUS at 12:48

## 2024-02-04 RX ADMIN — SODIUM CHLORIDE 1000 ML: 9 INJECTION, SOLUTION INTRAVENOUS at 11:22

## 2024-02-04 RX ADMIN — ACETAMINOPHEN 650 MG: 325 TABLET, FILM COATED ORAL at 01:14

## 2024-02-04 RX ADMIN — Medication 5 CAPSULE: at 07:56

## 2024-02-04 RX ADMIN — VANCOMYCIN HYDROCHLORIDE 125 MG: 125 CAPSULE ORAL at 07:55

## 2024-02-04 RX ADMIN — ACYCLOVIR 800 MG: 800 TABLET ORAL at 07:55

## 2024-02-04 RX ADMIN — Medication: at 20:50

## 2024-02-04 RX ADMIN — PANTOPRAZOLE SODIUM 40 MG: 40 TABLET, DELAYED RELEASE ORAL at 07:56

## 2024-02-04 RX ADMIN — POTASSIUM PHOSPHATE, MONOBASIC POTASSIUM PHOSPHATE, DIBASIC 15 MMOL: 224; 236 INJECTION, SOLUTION, CONCENTRATE INTRAVENOUS at 08:16

## 2024-02-04 RX ADMIN — NIFEDIPINE 30 MG: 30 TABLET, FILM COATED, EXTENDED RELEASE ORAL at 19:51

## 2024-02-04 RX ADMIN — NIFEDIPINE 30 MG: 30 TABLET, FILM COATED, EXTENDED RELEASE ORAL at 07:56

## 2024-02-04 ASSESSMENT — ACTIVITIES OF DAILY LIVING (ADL)
ADLS_ACUITY_SCORE: 22

## 2024-02-04 NOTE — PLAN OF CARE
A x O, VSS on RA--temp max 101.5. Temp improved with tylenol. Cultures sent to lab. Mucositis pain managed w/ PCA Dilaudid. Sepsis protocol triggered, lactic 0.4. Platelets 6 and were replaced this morning. Phos 2.2 and needs to be replaced. Voiding adequately. Enteric and Contact precautions maintained. Resting between cares. Continue POC.

## 2024-02-04 NOTE — PROGRESS NOTES
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 Feels he is not getting much relief from the bumps. Rectal pain well managed with creams. No diarrhea. No n/v. Was unable to eat much yesterday due to mucositis but was able to eat some gail. Still tolerating PO medications however low threshold to transition to IV. Did not want to move them to IV today.     Review of Systems    Review of Systems: 10 point ROS negative unless stated in HPI   PHYSICAL EXAM      Weight     Wt Readings from Last 3 Encounters:   02/04/24 86.5 kg (190 lb 12.8 oz)   01/17/24 90.1 kg (198 lb 9.6 oz)   01/12/24 88.9 kg (196 lb)        KPS: 60    /73   Pulse 109   Temp 97.7  F (36.5  C) (Axillary)   Resp 18   Ht 1.725 m (5' 7.91\")   Wt 86.5 kg (190 lb 12.8 oz)   SpO2 98%   BMI 29.09 kg/m       General: NAD   Mouth/Throat: Multiple scattered erythematous ulcers on bilateral posterior buccal mucosa, posterior pharynx diffusely erythematous   Eyes: GARY, sclera anicteric   Lungs: CTA bilaterally  Cardiovascular: RRR, no M/R/G   Lymphatics: No edema  Neuro: A&O   Additional Findings: Powell site NT, no drainage.    Current aGVHD staging:  Skin 0, UGI 0, LGI 0, Liver 0 (keep in note through day +180 for allos)      LABS AND IMAGING: I have assessed all abnormal lab values for their clinical significance and any values considered clinically significant have been addressed in the assessment and plan.        Lab Results   Component Value Date    " WBC <0.1 (LL) 02/04/2024    ANEUTAUTO 2.0 01/28/2024    HGB 8.3 (L) 02/04/2024    HCT 22.9 (L) 02/04/2024    PLT 6 (LL) 02/04/2024     02/04/2024    POTASSIUM 3.6 02/04/2024    CHLORIDE 107 02/04/2024    CO2 22 02/04/2024     (H) 02/04/2024    BUN 10.0 02/04/2024    CR 0.68 02/04/2024    MAG 1.6 (L) 02/04/2024    INR 1.23 (H) 01/29/2024         SYSTEMS-BASED ASSESSMENT AND PLAN     Ziggy Hallman is a 48 year old year old male with a PMH of MDS, hx of multiple clots and MI undergoing a MA (Bu/Flu) prep for an 8/8 URD PBSCT day 10    BMT/IEC PROTOCOL for 2015-29  Chemo Prep:   Day -6: Admit  Day -5 through Day -2: Busulfan/Fludarabine  Day -1: Rest.   Keppra prophylaxis        8/8 DP permissive. Cell dose-9.24x10^6  ABO: Donor: O+ Recipient A-  (Minor incompatability, no flush required)   GSCF plan: GCSF to start day +5 until ANC >1500 for 3 consecutive days    HEME/COAG  #Risk of Pancytopenia 2/2 chemo- anemia  - Transfusion parameters: hemoglobin <8 (cardiac hx), platelets <10  Checking platelets BID today to ensure he has adequate bump - 2/4.     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   Extensive hypercoagulable workup to date has been unrevealing.  JAK2 testing negative.  PNH testing negative.  Elevated IgA of unknown significance, no evidence of plasma cell disorder.  - Eliquis and Prasugrel now HELD starting 2/1-x. Did not start lovenox due to rapid drop of platelets.     IMMUNOCOMPROMISED  #Febrile neutropenia  Respiked fever on 2/2, infectious workup pending. Restarted Cefepime. CXR-  Mild central bronchial wall thickening without bronchiectasis. Bronchitis should be considered. No focal  consolidative opacities to suggest a bacterial pneumonia.  Cefepime 2/2-x    #Febrile Neutropenia likely 2/2 to t-cell expansion  Spiked first fever on 1/28 PM, cultures and cxr completed. Cefepime started. Possibly 2/2 to T-cell expansion. No hypotension  - Cefepime 1/28-2/1       C. Diff - Admit c. Diff triple+ - Start PO vanc 1/21 for 14 day course. - completed on 2/4  -Prophylaxis plan: ACV LD, Megan HD, Levaquin while neutropenic , Bactrim +28    RISK OF GVHD  - Prophylaxis: PtC day +3, +4, , Siro/MMF to start day +5     CARDIOVASCULAR  #CAD  #Hx of CABG  #HTN   -PTA metoprolol, Lisinopril dose increased 2/2 HTN, Amlodipine 5mg BID switch to nifidepine ER 30mg BID, labetolol PRN;     #Hyperlipidemia: Holding atorvastatin peritransplant  - Risk of cardiomyopathy:  Baseline EF 50-55%, Global left ventricular function mildly reduced. Grade 1 or early diastolic dysfunction.   - Risk of arrhythmia: Baseline EKG showed NSR, Qtc -425    GI/NUTRITION  #Oropharyngeal mucositis  - MMW, hurricaine spray, dilaudid PCA 2/3-x     #Hematuria- No dysuria, self resolved.     - Ulcer prophylaxis: Protonix   - VOD Prophylaxis: Ursodiol  - Risk of nausea/vomiting due to chemo: Ativan, Compazine, Zofran   - Risk of malnutrition: Nutrition to follow.     RENAL/ELECTROLYTES/  #Hypervolemia 2/2 cytoxan flush - diurese as needed   #Proteinuria- possibly exacerbated by persistent HTN (See CV),   #BUN/Cr elevation- FENA, osms, Na+, UA work up indicate prerenal etiology, likely intravascular depletion 2/2 osmotic diuresis with proteinuria. Will encourage PO fluid intake as Cr improved 1/23, IVF boluses as necessary, control HTN. RESOLVED.  - Hypocalcemia in setting hypoalbuminemia, iCa2+, corrected WNL   - Electrolyte management: replace per sliding scale    Psych:    #Anxiety: admits to feeling anxious.  Start zyprexa 5mg HS.   "Connect w/ SW.  Psychaitry consult added hydroxyzine.  #Insomnia- PTA ambien, Trazadone added and increased to 100mg. - Ambien and trazadone held during cytoxan period due to frequent urination and concern for falls. resumed following cytoxan flush 1/31.      SOCIAL DETERMINANTS  - Caregiver: Family   - Financial/insurance concerns: See SW notes       Known issues that I take into account for medical decisions, with salient changes to the plan considering these complexities noted above.    Patient Active Problem List   Diagnosis    Amegakaryocytic thrombocytopenia (H)    Anemia due to bone marrow failure, unspecified bone marrow failure type (H)    Aplastic anemia (H)     Clinically Significant Risk Factors            # Hypomagnesemia: Lowest Mg = 1.6 mg/dL in last 2 days, will replace as needed   # Hypoalbuminemia: Lowest albumin = 2 g/dL at 1/22/2024  4:12 AM, will monitor as appropriate     # Thrombocytopenia: Lowest platelets = 6 in last 2 days, will monitor for bleeding          # Overweight: Estimated body mass index is 29.09 kg/m  as calculated from the following:    Height as of this encounter: 1.725 m (5' 7.91\").    Weight as of this encounter: 86.5 kg (190 lb 12.8 oz).                 Dispo: Remain admitted through engraftment    I spent 30 minutes in the care of this patient today, which included time necessary for preparation for the visit, obtaining history, ordering medications/tests/procedures as medically indicated, review of pertinent medical literature, counseling of the patient, communication of recommendations to the care team, and documentation time.    Brenda Brunson PA-C  [phone]  "

## 2024-02-05 LAB
ABO/RH(D): NORMAL
ACANTHOCYTES BLD QL SMEAR: NORMAL
ALBUMIN SERPL BCG-MCNC: 2.6 G/DL (ref 3.5–5.2)
ALBUMIN UR-MCNC: 300 MG/DL
ALP SERPL-CCNC: 60 U/L (ref 40–150)
ALT SERPL W P-5'-P-CCNC: 14 U/L (ref 0–70)
ANION GAP SERPL CALCULATED.3IONS-SCNC: 8 MMOL/L (ref 7–15)
ANTIBODY SCREEN: NEGATIVE
ANTIBODY SCREEN: NEGATIVE
APPEARANCE UR: CLEAR
AST SERPL W P-5'-P-CCNC: 21 U/L (ref 0–45)
ATRIAL RATE - MUSE: 103 BPM
AUER BODIES BLD QL SMEAR: NORMAL
BASO STIPL BLD QL SMEAR: NORMAL
BASOPHILS # BLD AUTO: ABNORMAL 10*3/UL
BASOPHILS NFR BLD AUTO: ABNORMAL %
BILIRUB DIRECT SERPL-MCNC: <0.2 MG/DL (ref 0–0.3)
BILIRUB SERPL-MCNC: 0.4 MG/DL
BILIRUB UR QL STRIP: NEGATIVE
BITE CELLS BLD QL SMEAR: NORMAL
BLD PROD TYP BPU: NORMAL
BLISTER CELLS BLD QL SMEAR: NORMAL
BLOOD COMPONENT TYPE: NORMAL
BUN SERPL-MCNC: 10.7 MG/DL (ref 6–20)
BURR CELLS BLD QL SMEAR: NORMAL
CALCIUM SERPL-MCNC: 7.9 MG/DL (ref 8.6–10)
CHLORIDE SERPL-SCNC: 109 MMOL/L (ref 98–107)
CO COMPONENTS: NORMAL
CODING SYSTEM: NORMAL
COLOR UR AUTO: YELLOW
CREAT SERPL-MCNC: 0.73 MG/DL (ref 0.67–1.17)
CROSSMATCH: NORMAL
DACRYOCYTES BLD QL SMEAR: NORMAL
DAT POLY: NORMAL
DEPRECATED HCO3 PLAS-SCNC: 23 MMOL/L (ref 22–29)
DIASTOLIC BLOOD PRESSURE - MUSE: NORMAL MMHG
EGFRCR SERPLBLD CKD-EPI 2021: >90 ML/MIN/1.73M2
ELLIPTOCYTES BLD QL SMEAR: NORMAL
ELUTION: NORMAL
EOSINOPHIL # BLD AUTO: ABNORMAL 10*3/UL
EOSINOPHIL NFR BLD AUTO: ABNORMAL %
ERYTHROCYTE [DISTWIDTH] IN BLOOD BY AUTOMATED COUNT: 12.8 % (ref 10–15)
FRAGMENTS BLD QL SMEAR: NORMAL
GLUCOSE SERPL-MCNC: 104 MG/DL (ref 70–99)
GLUCOSE UR STRIP-MCNC: NEGATIVE MG/DL
GRAM/CULTURE INDICATED?: YES
HCT VFR BLD AUTO: 20.9 % (ref 40–53)
HGB BLD-MCNC: 7.3 G/DL (ref 13.3–17.7)
HGB C CRYSTALS: NORMAL
HGB UR QL STRIP: ABNORMAL
HOWELL-JOLLY BOD BLD QL SMEAR: NORMAL
IMM GRANULOCYTES # BLD: ABNORMAL 10*3/UL
IMM GRANULOCYTES NFR BLD: ABNORMAL %
INR PPP: 1.15 (ref 0.85–1.15)
INTERPRETATION ECG - MUSE: NORMAL
ISSUE DATE AND TIME: NORMAL
KETONES UR STRIP-MCNC: NEGATIVE MG/DL
LACTATE SERPL-SCNC: 0.4 MMOL/L (ref 0.7–2)
LEUKOCYTE ESTERASE UR QL STRIP: NEGATIVE
LYMPHOCYTES # BLD AUTO: ABNORMAL 10*3/UL
LYMPHOCYTES NFR BLD AUTO: ABNORMAL %
MAGNESIUM SERPL-MCNC: 1.7 MG/DL (ref 1.7–2.3)
MCH RBC QN AUTO: 31.3 PG (ref 26.5–33)
MCHC RBC AUTO-ENTMCNC: 34.9 G/DL (ref 31.5–36.5)
MCV RBC AUTO: 90 FL (ref 78–100)
MONOCYTES # BLD AUTO: ABNORMAL 10*3/UL
MONOCYTES NFR BLD AUTO: ABNORMAL %
MUCOUS THREADS #/AREA URNS LPF: PRESENT /LPF
NEUTROPHILS # BLD AUTO: ABNORMAL 10*3/UL
NEUTROPHILS NFR BLD AUTO: ABNORMAL %
NEUTS HYPERSEG BLD QL SMEAR: NORMAL
NITRATE UR QL: NEGATIVE
OTHER UNITS TRANSFUSED: NORMAL
P AXIS - MUSE: 62 DEGREES
PH UR STRIP: 6 [PH] (ref 5–7)
PHOSPHATE SERPL-MCNC: 2.5 MG/DL (ref 2.5–4.5)
PLAT MORPH BLD: NORMAL
PLATELET # BLD AUTO: 22 10E3/UL (ref 150–450)
POLYCHROMASIA BLD QL SMEAR: NORMAL
POST RXN CLERICAL CHECK: NORMAL
POST SPECIMEN APPEARANCE: NORMAL
POST-RXN ABO/RH: NORMAL
POST-RXN POLY DAT: NORMAL
POTASSIUM SERPL-SCNC: 3.5 MMOL/L (ref 3.4–5.3)
PR INTERVAL - MUSE: 154 MS
PRODUCT CODE: NORMAL
PROT SERPL-MCNC: 5.5 G/DL (ref 6.4–8.3)
QRS DURATION - MUSE: 88 MS
QT - MUSE: 328 MS
QTC - MUSE: 429 MS
R AXIS - MUSE: 48 DEGREES
RBC # BLD AUTO: 2.33 10E6/UL (ref 4.4–5.9)
RBC AGGLUT BLD QL: NORMAL
RBC MORPH BLD: NORMAL
RBC URINE: 4 /HPF
ROULEAUX BLD QL SMEAR: NORMAL
SICKLE CELLS BLD QL SMEAR: NORMAL
SIROLIMUS BLD-MCNC: 9.5 UG/L (ref 5–15)
SMUDGE CELLS BLD QL SMEAR: NORMAL
SODIUM SERPL-SCNC: 140 MMOL/L (ref 135–145)
SP GR UR STRIP: 1.02 (ref 1–1.03)
SPECIMEN EXPIRATION DATE: NORMAL
SPHEROCYTES BLD QL SMEAR: NORMAL
STOMATOCYTES BLD QL SMEAR: NORMAL
SYSTOLIC BLOOD PRESSURE - MUSE: NORMAL MMHG
T AXIS - MUSE: 51 DEGREES
TARGETS BLD QL SMEAR: NORMAL
TME LAST DOSE: NORMAL H
TME LAST DOSE: NORMAL H
TOXIC GRANULES BLD QL SMEAR: NORMAL
UNIT ABO/RH: NORMAL
UNIT BLOOD TYPE TRANSFUSION REACTION: NORMAL
UNIT NUMBER: NORMAL
UNIT NUMBER: NORMAL
UNIT STATUS: NORMAL
UNIT TYPE ISBT: 9500
UROBILINOGEN UR STRIP-MCNC: NORMAL MG/DL
VARIANT LYMPHS BLD QL SMEAR: NORMAL
VENTRICULAR RATE- MUSE: 103 BPM
WBC # BLD AUTO: <0.1 10E3/UL (ref 4–11)
WBC URINE: 0 /HPF

## 2024-02-05 PROCEDURE — 87040 BLOOD CULTURE FOR BACTERIA: CPT | Performed by: INTERNAL MEDICINE

## 2024-02-05 PROCEDURE — 250N000011 HC RX IP 250 OP 636

## 2024-02-05 PROCEDURE — 86870 RBC ANTIBODY IDENTIFICATION: CPT

## 2024-02-05 PROCEDURE — 250N000012 HC RX MED GY IP 250 OP 636 PS 637: Performed by: PHYSICIAN ASSISTANT

## 2024-02-05 PROCEDURE — 80195 ASSAY OF SIROLIMUS: CPT | Performed by: STUDENT IN AN ORGANIZED HEALTH CARE EDUCATION/TRAINING PROGRAM

## 2024-02-05 PROCEDURE — 250N000013 HC RX MED GY IP 250 OP 250 PS 637: Performed by: HOSPITALIST

## 2024-02-05 PROCEDURE — 81001 URINALYSIS AUTO W/SCOPE: CPT

## 2024-02-05 PROCEDURE — 80053 COMPREHEN METABOLIC PANEL: CPT | Performed by: PHYSICIAN ASSISTANT

## 2024-02-05 PROCEDURE — 86880 COOMBS TEST DIRECT: CPT | Performed by: PHYSICIAN ASSISTANT

## 2024-02-05 PROCEDURE — 206N000001 HC R&B BMT UMMC

## 2024-02-05 PROCEDURE — 250N000013 HC RX MED GY IP 250 OP 250 PS 637

## 2024-02-05 PROCEDURE — 250N000009 HC RX 250: Performed by: PHYSICIAN ASSISTANT

## 2024-02-05 PROCEDURE — 83735 ASSAY OF MAGNESIUM: CPT | Performed by: PHYSICIAN ASSISTANT

## 2024-02-05 PROCEDURE — 86078 PHYS BLOOD BANK SERV REACTJ: CPT | Mod: GC | Performed by: PATHOLOGY

## 2024-02-05 PROCEDURE — 250N000011 HC RX IP 250 OP 636: Performed by: HOSPITALIST

## 2024-02-05 PROCEDURE — 86923 COMPATIBILITY TEST ELECTRIC: CPT | Performed by: PHYSICIAN ASSISTANT

## 2024-02-05 PROCEDURE — 85610 PROTHROMBIN TIME: CPT | Performed by: PHYSICIAN ASSISTANT

## 2024-02-05 PROCEDURE — 86900 BLOOD TYPING SEROLOGIC ABO: CPT | Performed by: PHYSICIAN ASSISTANT

## 2024-02-05 PROCEDURE — 85027 COMPLETE CBC AUTOMATED: CPT | Performed by: PHYSICIAN ASSISTANT

## 2024-02-05 PROCEDURE — 83605 ASSAY OF LACTIC ACID: CPT | Performed by: STUDENT IN AN ORGANIZED HEALTH CARE EDUCATION/TRAINING PROGRAM

## 2024-02-05 PROCEDURE — 87103 BLOOD FUNGUS CULTURE: CPT | Performed by: PHYSICIAN ASSISTANT

## 2024-02-05 PROCEDURE — 250N000013 HC RX MED GY IP 250 OP 250 PS 637: Performed by: PHYSICIAN ASSISTANT

## 2024-02-05 PROCEDURE — 258N000003 HC RX IP 258 OP 636: Performed by: PHYSICIAN ASSISTANT

## 2024-02-05 PROCEDURE — 84100 ASSAY OF PHOSPHORUS: CPT | Performed by: PHYSICIAN ASSISTANT

## 2024-02-05 PROCEDURE — 82248 BILIRUBIN DIRECT: CPT | Performed by: PHYSICIAN ASSISTANT

## 2024-02-05 PROCEDURE — 99233 SBSQ HOSP IP/OBS HIGH 50: CPT | Mod: FS

## 2024-02-05 PROCEDURE — P9040 RBC LEUKOREDUCED IRRADIATED: HCPCS | Performed by: PHYSICIAN ASSISTANT

## 2024-02-05 PROCEDURE — 87086 URINE CULTURE/COLONY COUNT: CPT

## 2024-02-05 PROCEDURE — 250N000011 HC RX IP 250 OP 636: Performed by: PHYSICIAN ASSISTANT

## 2024-02-05 RX ADMIN — URSODIOL 300 MG: 300 CAPSULE ORAL at 19:51

## 2024-02-05 RX ADMIN — Medication 2 SPRAY: at 19:51

## 2024-02-05 RX ADMIN — MYCOPHENOLATE MOFETIL 1250 MG: 500 INJECTION, POWDER, LYOPHILIZED, FOR SOLUTION INTRAVENOUS at 08:09

## 2024-02-05 RX ADMIN — LORAZEPAM 1 MG: 0.5 TABLET ORAL at 23:07

## 2024-02-05 RX ADMIN — DEXTROSE MONOHYDRATE 40 ML: 50 INJECTION, SOLUTION INTRAVENOUS at 19:52

## 2024-02-05 RX ADMIN — URSODIOL 300 MG: 300 CAPSULE ORAL at 13:31

## 2024-02-05 RX ADMIN — SIROLIMUS 5 MG: 2 TABLET ORAL at 07:58

## 2024-02-05 RX ADMIN — BENZOCAINE 0.5 ML: 220 SPRAY, METERED PERIODONTAL at 23:07

## 2024-02-05 RX ADMIN — DEXTROSE MONOHYDRATE 450 MCG: 50 INJECTION, SOLUTION INTRAVENOUS at 19:52

## 2024-02-05 RX ADMIN — PANTOPRAZOLE SODIUM 40 MG: 40 TABLET, DELAYED RELEASE ORAL at 07:58

## 2024-02-05 RX ADMIN — NIFEDIPINE 30 MG: 30 TABLET, FILM COATED, EXTENDED RELEASE ORAL at 19:51

## 2024-02-05 RX ADMIN — LORAZEPAM 1 MG: 0.5 TABLET ORAL at 03:37

## 2024-02-05 RX ADMIN — SODIUM CHLORIDE: 9 INJECTION, SOLUTION INTRAVENOUS at 21:03

## 2024-02-05 RX ADMIN — ACETAMINOPHEN 650 MG: 325 TABLET, FILM COATED ORAL at 18:43

## 2024-02-05 RX ADMIN — Medication 5 CAPSULE: at 07:57

## 2024-02-05 RX ADMIN — Medication: at 18:31

## 2024-02-05 RX ADMIN — MYCOPHENOLATE MOFETIL 1250 MG: 500 INJECTION, POWDER, LYOPHILIZED, FOR SOLUTION INTRAVENOUS at 21:03

## 2024-02-05 RX ADMIN — ACYCLOVIR 800 MG: 800 TABLET ORAL at 07:57

## 2024-02-05 RX ADMIN — METOPROLOL SUCCINATE 100 MG: 50 TABLET, EXTENDED RELEASE ORAL at 07:58

## 2024-02-05 RX ADMIN — ACETAMINOPHEN 650 MG: 325 TABLET, FILM COATED ORAL at 08:03

## 2024-02-05 RX ADMIN — NIFEDIPINE 30 MG: 30 TABLET, FILM COATED, EXTENDED RELEASE ORAL at 07:57

## 2024-02-05 RX ADMIN — ACYCLOVIR 800 MG: 800 TABLET ORAL at 19:51

## 2024-02-05 RX ADMIN — MICAFUNGIN SODIUM 150 MG: 50 INJECTION, POWDER, LYOPHILIZED, FOR SOLUTION INTRAVENOUS at 07:58

## 2024-02-05 RX ADMIN — BENZOCAINE 0.5 ML: 220 SPRAY, METERED PERIODONTAL at 13:31

## 2024-02-05 RX ADMIN — CEFEPIME HYDROCHLORIDE 2 G: 2 INJECTION, POWDER, FOR SOLUTION INTRAVENOUS at 05:26

## 2024-02-05 RX ADMIN — BENZOCAINE 1 ML: 220 SPRAY, METERED PERIODONTAL at 07:57

## 2024-02-05 RX ADMIN — LISINOPRIL 40 MG: 10 TABLET ORAL at 07:57

## 2024-02-05 RX ADMIN — URSODIOL 300 MG: 300 CAPSULE ORAL at 07:57

## 2024-02-05 RX ADMIN — CEFEPIME HYDROCHLORIDE 2 G: 2 INJECTION, POWDER, FOR SOLUTION INTRAVENOUS at 13:31

## 2024-02-05 RX ADMIN — CEFEPIME HYDROCHLORIDE 2 G: 2 INJECTION, POWDER, FOR SOLUTION INTRAVENOUS at 21:02

## 2024-02-05 ASSESSMENT — ACTIVITIES OF DAILY LIVING (ADL)
ADLS_ACUITY_SCORE: 22
ADLS_ACUITY_SCORE: 22
ADLS_ACUITY_SCORE: 24
ADLS_ACUITY_SCORE: 24
ADLS_ACUITY_SCORE: 22
ADLS_ACUITY_SCORE: 22
ADLS_ACUITY_SCORE: 24
ADLS_ACUITY_SCORE: 22
ADLS_ACUITY_SCORE: 24
ADLS_ACUITY_SCORE: 22

## 2024-02-05 NOTE — PROGRESS NOTES
Prior Authorization **APPROVED**    Authorization Effective Date: 2/3/2024  Authorization Expiration Date: 2/2/2025  Medication: SIROLIMUS (GENERIC EQUIV) 1 MG PO TABS  Approved Dose/Quantity: -  Reference #: [id number] / [id number]   Insurance Company: [employer] - Phone [phone] Fax [phone]  Expected CoPay: $ 0    CoPay Card Available: No    Foundation Assistance Needed: n/a  Which Pharmacy is filling the prescription (Not needed for infusion/clinic administered): [facility] - [place], [place] - [address]  Pharmacy Notified: Yes  Patient Notified: Yes  Comments:  Proactive PA. Please send a script to the discharge pharmacy. [employer] brand preferred by patient's plan however [employer] has discontinued manufacturing 12/31/2023.          Laura Amador [facility]  Discharge Pharmacy Liaison  [facility] [place]  Pronouns: She/Her/Hers    Securely message with [employer] Keyboard, Epic Secure Chat, or [employer]  Phone: [phone]  Fax: [phone]  [email or web address]

## 2024-02-05 NOTE — PROGRESS NOTES
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 Given 1L fluids yesterday afternoon. Infectious work up shows possible bronchitis although he tells me he has no coughing, dyspnea, congestion or rhinorrhea. Cultures negative. No dysuria, but feels a lot of frequency and low volume, no hesitation. Bladders scan pending. No solid foods, 2x ensure a day.    Review of Systems    Review of Systems: 10 point ROS negative unless stated in HPI   PHYSICAL EXAM      Weight     Wt Readings from Last 3 Encounters:   02/05/24 87.5 kg (192 lb 12.8 oz)   01/17/24 90.1 kg (198 lb 9.6 oz)   01/12/24 88.9 kg (196 lb)        KPS: 60    BP (!) 156/84   Pulse (!) 121   Temp (!) 101.4  F (38.6  C) (Axillary)   Resp 16   Ht 1.725 m (5' 7.91\")   Wt 87.5 kg (192 lb 12.8 oz)   SpO2 94%   BMI 29.39 kg/m       General: NAD   Mouth/Throat: Multiple scattered erythematous ulcers on bilateral posterior buccal mucosa, posterior pharynx diffusely erythematous   Eyes: GARY, sclera anicteric   Lungs: decreased lung sounds in bilateral bases  Cardiovascular: RRR, no M/R/G   Lymphatics: No edema in extremities  Neuro: A&O   Additional Findings: Powell site NT, no drainage.    Current aGVHD staging:  Skin 0, UGI 0, LGI 0, Liver 0 (keep in note through day +180 for allos)      LABS AND IMAGING: I have assessed all abnormal lab values for their clinical significance and any values considered clinically significant have been addressed in the assessment and plan.        Lab " Results   Component Value Date    WBC <0.1 (LL) 02/05/2024    ANEUTAUTO 2.0 01/28/2024    HGB 7.3 (L) 02/05/2024    HCT 20.9 (L) 02/05/2024    PLT 22 (LL) 02/05/2024     02/05/2024    POTASSIUM 3.5 02/05/2024    CHLORIDE 109 (H) 02/05/2024    CO2 23 02/05/2024     (H) 02/05/2024    BUN 10.7 02/05/2024    CR 0.73 02/05/2024    MAG 1.7 02/05/2024    INR 1.15 02/05/2024         SYSTEMS-BASED ASSESSMENT AND PLAN     Ziggy Hallman is a 48 year old year old male with a PMH of MDS, hx of multiple clots and MI undergoing a MA (Bu/Flu) prep for an 8/8 URD PBSCT day 11    BMT/IEC PROTOCOL for 2015-29  Chemo Prep:   Day -6: Admit  Day -5 through Day -2: Busulfan/Fludarabine  Day -1: Rest.   Keppra prophylaxis      8/8 DP permissive. Cell dose-9.24x10^6  ABO: Donor: O+ Recipient A-  (Minor incompatability, no flush required)   GSCF plan: GCSF to start day +5 until ANC >1500 for 3 consecutive days    HEME/COAG  #Risk of Pancytopenia 2/2 chemo- anemia, leukopenia, thrombocytopenia  - Transfusion parameters: hemoglobin <8 (cardiac hx), platelets <10  #Recurrent arterial thromboembolic events:  He has long history of various events including renal infarcts (2011, 2013, 2015), left popliteal embolic episode requiring surgery, anticoagulation (2011), right carotid artery embolic episode with TIA/stroke-like symptoms (2012), embolic MI (2015), gangrenous right toe requiring vascular surgery/amputation (2017), 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   Extensive hypercoagulable workup to date has been unrevealing.  JAK2 testing negative.  PNH testing negative.  Elevated IgA of unknown significance, no evidence of plasma cell disorder.  - Eliquis and Prasugrel now HELD starting 2/1-x.     IMMUNOCOMPROMISED  #Febrile neutropenia Cefepime (2/2-x)  #Possible bronchitis although asymptomatic beside fevers   -UC pending, BC negative  C. Diff - Admit c. Diff triple+ - Start  PO vanc 1/21 for 14 day course. - completed on 2/4  -Prophylaxis plan: ACV LD, Megan HD, Levaquin while neutropenic , Bactrim +28    RISK OF GVHD  - Prophylaxis: PtC day +3, +4, , Siro/MMF to start day +5     CARDIOVASCULAR  #CAD  #Hx of CABG  #HTN   -PTA metoprolol, Lisinopril dose increased 2/2 HTN, Amlodipine 5mg BID switch to nifidepine ER 30mg BID, labetolol PRN;     #Hyperlipidemia: Holding atorvastatin peritransplant  - Risk of cardiomyopathy:  Baseline EF 50-55%, Global left ventricular function mildly reduced. Grade 1 or early diastolic dysfunction.   - Risk of arrhythmia: Baseline EKG showed NSR, Qtc -425    GI/NUTRITION  #Oropharyngeal mucositis  - MMW, hurricaine spray, dilaudid PCA 2/3-x   #Bladder scan rule out urinary retention pending  #Hematuria- No dysuria, self resolved.   - Ulcer prophylaxis: Protonix   - VOD Prophylaxis: Ursodiol  - Risk of nausea/vomiting due to chemo: Ativan, Compazine, Zofran   - Risk of malnutrition: Nutrition to follow.     RENAL/ELECTROLYTES/  #Hypervolemia 2/2 cytoxan flush - diurese as needed   #Proteinuria- possibly exacerbated by persistent HTN (See CV),   #BUN/Cr elevation- FENA, osms, Na+, UA work up indicate prerenal etiology, likely intravascular depletion 2/2 osmotic diuresis with proteinuria. Will encourage PO fluid intake as Cr improved 1/23, IVF boluses as necessary, control HTN. RESOLVED.  - Hypocalcemia in setting hypoalbuminemia, iCa2+, corrected WNL   - Electrolyte management: replace per sliding scale    Psych:    #Anxiety: admits to feeling anxious.  Start zyprexa 5mg HS.  Connect w/ SW.  Psychaitry consult added hydroxyzine.  #Insomnia- PTA ambien, Trazadone added and increased to 100mg. - Ambien and trazadone held during cytoxan period due to frequent urination and concern for falls. resumed following cytoxan flush 1/31.      SOCIAL DETERMINANTS  - Caregiver: Family   - Financial/insurance concerns: See SW notes       Known issues that I take into  "account for medical decisions, with salient changes to the plan considering these complexities noted above.    Patient Active Problem List   Diagnosis    Amegakaryocytic thrombocytopenia (H)    Anemia due to bone marrow failure, unspecified bone marrow failure type (H)    Aplastic anemia (H)     Clinically Significant Risk Factors            # Hypomagnesemia: Lowest Mg = 1.6 mg/dL in last 2 days, will replace as needed   # Hypoalbuminemia: Lowest albumin = 2 g/dL at 1/22/2024  4:12 AM, will monitor as appropriate     # Thrombocytopenia: Lowest platelets = 6 in last 2 days, will monitor for bleeding          # Overweight: Estimated body mass index is 29.39 kg/m  as calculated from the following:    Height as of this encounter: 1.725 m (5' 7.91\").    Weight as of this encounter: 87.5 kg (192 lb 12.8 oz).               Dispo: Remain admitted through engraftment    I spent 30 minutes in the care of this patient today, which included time necessary for preparation for the visit, obtaining history, ordering medications/tests/procedures as medically indicated, review of pertinent medical literature, counseling of the patient, communication of recommendations to the care team, and documentation time.    Toyin Herrmann PA-C  [phone]  "

## 2024-02-05 NOTE — PLAN OF CARE
Hours of care: 7516-5623    Neuro: A&Ox4.   Cardiac: Afebrile, VSS.    Respiratory: RA, lung sounds clear, denies SOB  GI/: Voiding spontaneously. No BM this shift.  Diet/appetite: Tolerating high calorie/ high protein diet, appetite poor d/t mucositis . Denies nausea.  Activity: Up independently     Pain: Mucositis pain controlled with PCA and hurricane spray before pills   Skin: No new deficits noted.  Lines: CVC, PCA in purple line  Drains: none  Replacements: 1 unit RBCs given    Plan: Continue with current POC. Report changes to primary team.        Goal Outcome Evaluation:      Plan of Care Reviewed With: patient    Overall Patient Progress: no changeOverall Patient Progress: no change

## 2024-02-05 NOTE — PROGRESS NOTES
Transfusion reaction work up for fever when patient finished blood infusion.  Notified Toyin Ocampo PA-C and blood bank.  Blood product and tubing sent to lab.  Blood specimen sent to lab.  Tylenol given for fever.              02/05/24 0751 02/05/24 0753   Vitals   Temp 100.4  F (38  C) (!) 101.4  F (38.6  C)   Temp src Axillary Axillary   Pulse (!) 121  --    BP (!) 156/84  --    Resp 16  --    Oxygen Therapy   SpO2 96 %  --    O2 Device None (Room air)  --

## 2024-02-05 NOTE — PLAN OF CARE
"BP (!) 146/85 (BP Location: Right arm)   Pulse 107   Temp 99.9  F (37.7  C) (Axillary)   Resp 16   Ht 1.725 m (5' 7.91\")   Wt 86.5 kg (190 lb 12.8 oz)   SpO2 96%   BMI 29.09 kg/m      Pt is AOx4 and independent. He has had an elevated BP not requiring intervention. He was tachycardic throughout the day and spiked a fever of 100.4, PA Brenda Brunson notified and he received 1 L bolus over 4 hours.  He has mucositis pain which is being controlled with a Dilaudid PCA that was increased today as well as magic mouthwash. He is drinking adequately and drank 2 ensure. He is voiding spontaneously. He is fluid up over 2L and Provider Lexx Graham was notified. Phos was replaced today. His dressing was changed on his CVC. Will continue to follow the plan of care.      Goal Outcome Evaluation:      Plan of Care Reviewed With: patient    Overall Patient Progress: improving      Problem: Adult Inpatient Plan of Care  Goal: Plan of Care Review  Description: The Plan of Care Review/Shift note should be completed every shift.  The Outcome Evaluation is a brief statement about your assessment that the patient is improving, declining, or no change.  This information will be displayed automatically on your shift  note.  Outcome: Progressing  Flowsheets (Taken 2/4/2024 1850)  Plan of Care Reviewed With: patient  Overall Patient Progress: improving  Goal: Patient-Specific Goal (Individualized)  Description: You can add care plan individualizations to a care plan. Examples of Individualization might be:  \"Parent requests to be called daily at 9am for status\", \"I have a hard time hearing out of my right ear\", or \"Do not touch me to wake me up as it startles  me\".  Outcome: Progressing  Goal: Absence of Hospital-Acquired Illness or Injury  Outcome: Progressing  Intervention: Identify and Manage Fall Risk  Recent Flowsheet Documentation  Taken 2/4/2024 8797 by Phyllis Mitchell RN  Safety Promotion/Fall Prevention:   safety round/check " completed   assistive device/personal items within reach   clutter free environment maintained   lighting adjusted   nonskid shoes/slippers when out of bed   patient and family education   room near nurse's station   room organization consistent  Taken 2/4/2024 1252 by Phyllis Mitchell RN  Safety Promotion/Fall Prevention:   safety round/check completed   assistive device/personal items within reach   clutter free environment maintained   lighting adjusted   nonskid shoes/slippers when out of bed   patient and family education   room near nurse's station   room organization consistent  Taken 2/4/2024 1124 by Phyllis Mitchell RN  Safety Promotion/Fall Prevention:   safety round/check completed   assistive device/personal items within reach   clutter free environment maintained   lighting adjusted   nonskid shoes/slippers when out of bed   patient and family education   room near nurse's station   room organization consistent  Taken 2/4/2024 0930 by Phyllis Mitchell RN  Safety Promotion/Fall Prevention:   safety round/check completed   assistive device/personal items within reach   clutter free environment maintained   lighting adjusted   nonskid shoes/slippers when out of bed   patient and family education   room near nurse's station   room organization consistent  Taken 2/4/2024 0805 by Phyllis Mitchell RN  Safety Promotion/Fall Prevention:   safety round/check completed   assistive device/personal items within reach   clutter free environment maintained   lighting adjusted   nonskid shoes/slippers when out of bed   patient and family education   room near nurse's station   room organization consistent  Intervention: Prevent Skin Injury  Recent Flowsheet Documentation  Taken 2/4/2024 0805 by Phyllis Mitchell RN  Body Position: position changed independently  Skin Protection: transparent dressing maintained  Device Skin Pressure Protection: adhesive use limited  Intervention: Prevent and Manage VTE (Venous  Thromboembolism) Risk  Recent Flowsheet Documentation  Taken 2/4/2024 0805 by Phyllis Mitchell RN  VTE Prevention/Management: SCDs (sequential compression devices) off  Intervention: Prevent Infection  Recent Flowsheet Documentation  Taken 2/4/2024 1624 by Phyllis Mitchell RN  Infection Prevention:   environmental surveillance performed   hand hygiene promoted   personal protective equipment utilized   rest/sleep promoted   single patient room provided   visitors restricted/screened  Taken 2/4/2024 1230 by Phyllis Mitchell RN  Infection Prevention:   environmental surveillance performed   hand hygiene promoted   personal protective equipment utilized   rest/sleep promoted   single patient room provided   visitors restricted/screened  Taken 2/4/2024 0805 by Phyllis Mitchell RN  Infection Prevention:   environmental surveillance performed   hand hygiene promoted   personal protective equipment utilized   rest/sleep promoted   single patient room provided   visitors restricted/screened  Goal: Optimal Comfort and Wellbeing  Outcome: Progressing  Intervention: Monitor Pain and Promote Comfort  Recent Flowsheet Documentation  Taken 2/4/2024 0801 by Phyllis Mitchell RN  Pain Management Interventions: pain pump in use  Goal: Readiness for Transition of Care  Outcome: Progressing     Problem: Stem Cell/Bone Marrow Transplant  Goal: Optimal Coping with Transplant  Outcome: Progressing  Intervention: Optimize Patient/Family Adjustment to Transplant  Recent Flowsheet Documentation  Taken 2/4/2024 0805 by Phyllis Mitchell RN  Supportive Measures:   active listening utilized   decision-making supported   positive reinforcement provided   self-care encouraged   self-responsibility promoted   verbalization of feelings encouraged  Goal: Symptom-Free Urinary Elimination  Outcome: Progressing  Intervention: Prevent or Manage Bladder Irritation  Recent Flowsheet Documentation  Taken 2/4/2024 0801 by Phyllis Mitchell RN  Pain Management  Interventions: pain pump in use  Goal: Diarrhea Symptom Control  Outcome: Progressing  Intervention: Manage Diarrhea  Recent Flowsheet Documentation  Taken 2/4/2024 0805 by Phyllis Mitchell RN  Skin Protection: transparent dressing maintained  Fluid/Electrolyte Management: fluids provided  Goal: Improved Activity Tolerance  Outcome: Progressing  Intervention: Promote Improved Energy  Recent Flowsheet Documentation  Taken 2/4/2024 0805 by Phyllis Mitchell RN  Fatigue Management:   paced activity encouraged   frequent rest breaks encouraged  Activity Management: up ad amna  Environmental Support:   calm environment promoted   distractions minimized   rest periods encouraged  Goal: Blood Counts Within Acceptable Range  Outcome: Progressing  Intervention: Monitor and Manage Hematologic Symptoms  Recent Flowsheet Documentation  Taken 2/4/2024 0805 by Phyllis Mitchell RN  Bleeding Precautions:   coagulation study results reviewed   foot protection facilitated   gentle oral care promoted   monitored for signs of bleeding  Medication Review/Management: medications reviewed  Goal: Absence of Hypersensitivity Reaction  Outcome: Progressing  Goal: Absence of Infection  Outcome: Progressing  Intervention: Prevent and Manage Infection  Recent Flowsheet Documentation  Taken 2/4/2024 1624 by Phyllis Mitchell RN  Infection Prevention:   environmental surveillance performed   hand hygiene promoted   personal protective equipment utilized   rest/sleep promoted   single patient room provided   visitors restricted/screened  Isolation Precautions:   contact precautions maintained   enteric precautions maintained   protective environment maintained  Taken 2/4/2024 1230 by Phyllis Mitchell RN  Infection Prevention:   environmental surveillance performed   hand hygiene promoted   personal protective equipment utilized   rest/sleep promoted   single patient room provided   visitors restricted/screened  Isolation Precautions:   contact  precautions maintained   enteric precautions maintained   protective environment maintained  Taken 2/4/2024 0805 by Phyllis Mitchell RN  Infection Prevention:   environmental surveillance performed   hand hygiene promoted   personal protective equipment utilized   rest/sleep promoted   single patient room provided   visitors restricted/screened  Infection Management: aseptic technique maintained  Isolation Precautions:   contact precautions maintained   enteric precautions maintained   protective environment maintained  Goal: Improved Oral Mucous Membrane Health and Integrity  Outcome: Progressing  Goal: Nausea and Vomiting Symptom Relief  Outcome: Progressing  Goal: Optimal Nutrition Intake  Outcome: Progressing  Intervention: Minimize and Manage Barriers to Oral Intake  Recent Flowsheet Documentation  Taken 2/4/2024 0805 by Phyllis Mitchell, RN  Oral Nutrition Promotion:   physical activity promoted   rest periods promoted

## 2024-02-05 NOTE — PROVIDER NOTIFICATION
"Paged BMT cross cover: \"Pt spiked fever of 101.5. Gave tylenol, cultures already drawn for the day. Pt is asymptomatic.\"  "

## 2024-02-06 ENCOUNTER — APPOINTMENT (OUTPATIENT)
Dept: CT IMAGING | Facility: CLINIC | Age: 49
DRG: 014 | End: 2024-02-06
Payer: COMMERCIAL

## 2024-02-06 LAB
ACANTHOCYTES BLD QL SMEAR: NORMAL
ALBUMIN SERPL BCG-MCNC: 2.6 G/DL (ref 3.5–5.2)
ALBUMIN UR-MCNC: 300 MG/DL
ALP SERPL-CCNC: 61 U/L (ref 40–150)
ALT SERPL W P-5'-P-CCNC: 14 U/L (ref 0–70)
ANION GAP SERPL CALCULATED.3IONS-SCNC: 7 MMOL/L (ref 7–15)
APPEARANCE UR: CLEAR
APTT PPP: 40 SECONDS (ref 22–38)
AST SERPL W P-5'-P-CCNC: 19 U/L (ref 0–45)
ATRIAL RATE - MUSE: 117 BPM
AUER BODIES BLD QL SMEAR: NORMAL
BACTERIA UR CULT: NO GROWTH
BASO STIPL BLD QL SMEAR: NORMAL
BASOPHILS # BLD AUTO: ABNORMAL 10*3/UL
BASOPHILS NFR BLD AUTO: ABNORMAL %
BILIRUB DIRECT SERPL-MCNC: <0.2 MG/DL (ref 0–0.3)
BILIRUB SERPL-MCNC: 0.3 MG/DL
BILIRUB UR QL STRIP: NEGATIVE
BITE CELLS BLD QL SMEAR: NORMAL
BLD PROD TYP BPU: NORMAL
BLISTER CELLS BLD QL SMEAR: NORMAL
BLOOD COMPONENT TYPE: NORMAL
BUN SERPL-MCNC: 13.9 MG/DL (ref 6–20)
BURR CELLS BLD QL SMEAR: NORMAL
CALCIUM SERPL-MCNC: 8.1 MG/DL (ref 8.6–10)
CHLORIDE SERPL-SCNC: 108 MMOL/L (ref 98–107)
CODING SYSTEM: NORMAL
COLOR UR AUTO: YELLOW
CREAT SERPL-MCNC: 0.68 MG/DL (ref 0.67–1.17)
DACRYOCYTES BLD QL SMEAR: NORMAL
DEPRECATED HCO3 PLAS-SCNC: 25 MMOL/L (ref 22–29)
DIASTOLIC BLOOD PRESSURE - MUSE: NORMAL MMHG
EGFRCR SERPLBLD CKD-EPI 2021: >90 ML/MIN/1.73M2
ELLIPTOCYTES BLD QL SMEAR: NORMAL
EOSINOPHIL # BLD AUTO: ABNORMAL 10*3/UL
EOSINOPHIL NFR BLD AUTO: ABNORMAL %
ERYTHROCYTE [DISTWIDTH] IN BLOOD BY AUTOMATED COUNT: 12.8 % (ref 10–15)
FRAGMENTS BLD QL SMEAR: NORMAL
GLUCOSE SERPL-MCNC: 122 MG/DL (ref 70–99)
GLUCOSE UR STRIP-MCNC: NEGATIVE MG/DL
HCT VFR BLD AUTO: 22.4 % (ref 40–53)
HGB BLD-MCNC: 8.1 G/DL (ref 13.3–17.7)
HGB C CRYSTALS: NORMAL
HGB UR QL STRIP: ABNORMAL
HOWELL-JOLLY BOD BLD QL SMEAR: NORMAL
IMM GRANULOCYTES # BLD: ABNORMAL 10*3/UL
IMM GRANULOCYTES NFR BLD: ABNORMAL %
INTERPRETATION ECG - MUSE: NORMAL
ISSUE DATE AND TIME: NORMAL
ISSUE DATE AND TIME: NORMAL
KETONES UR STRIP-MCNC: ABNORMAL MG/DL
LACTATE SERPL-SCNC: 0.6 MMOL/L (ref 0.7–2)
LACTATE SERPL-SCNC: 0.6 MMOL/L (ref 0.7–2)
LEUKOCYTE ESTERASE UR QL STRIP: NEGATIVE
LYMPHOCYTES # BLD AUTO: ABNORMAL 10*3/UL
LYMPHOCYTES NFR BLD AUTO: ABNORMAL %
MAGNESIUM SERPL-MCNC: 1.8 MG/DL (ref 1.7–2.3)
MCH RBC QN AUTO: 31.5 PG (ref 26.5–33)
MCHC RBC AUTO-ENTMCNC: 36.2 G/DL (ref 31.5–36.5)
MCV RBC AUTO: 87 FL (ref 78–100)
MONOCYTES # BLD AUTO: ABNORMAL 10*3/UL
MONOCYTES NFR BLD AUTO: ABNORMAL %
MUCOUS THREADS #/AREA URNS LPF: PRESENT /LPF
NEUTROPHILS # BLD AUTO: ABNORMAL 10*3/UL
NEUTROPHILS NFR BLD AUTO: ABNORMAL %
NEUTS HYPERSEG BLD QL SMEAR: NORMAL
NITRATE UR QL: NEGATIVE
P AXIS - MUSE: 67 DEGREES
PH UR STRIP: 6 [PH] (ref 5–7)
PHOSPHATE SERPL-MCNC: 2 MG/DL (ref 2.5–4.5)
PLAT MORPH BLD: NORMAL
PLATELET # BLD AUTO: 12 10E3/UL (ref 150–450)
PLATELET # BLD AUTO: 31 10E3/UL (ref 150–450)
POLYCHROMASIA BLD QL SMEAR: NORMAL
POTASSIUM SERPL-SCNC: 3.5 MMOL/L (ref 3.4–5.3)
PR INTERVAL - MUSE: 148 MS
PROT SERPL-MCNC: 5.9 G/DL (ref 6.4–8.3)
QRS DURATION - MUSE: 86 MS
QT - MUSE: 322 MS
QTC - MUSE: 449 MS
R AXIS - MUSE: 72 DEGREES
RADIOLOGIST FLAGS: ABNORMAL
RBC # BLD AUTO: 2.57 10E6/UL (ref 4.4–5.9)
RBC AGGLUT BLD QL: NORMAL
RBC MORPH BLD: NORMAL
RBC URINE: 2 /HPF
ROULEAUX BLD QL SMEAR: NORMAL
SICKLE CELLS BLD QL SMEAR: NORMAL
SMUDGE CELLS BLD QL SMEAR: NORMAL
SODIUM SERPL-SCNC: 140 MMOL/L (ref 135–145)
SP GR UR STRIP: 1.02 (ref 1–1.03)
SPHEROCYTES BLD QL SMEAR: NORMAL
STOMATOCYTES BLD QL SMEAR: NORMAL
SYSTOLIC BLOOD PRESSURE - MUSE: NORMAL MMHG
T AXIS - MUSE: 62 DEGREES
TARGETS BLD QL SMEAR: NORMAL
TOXIC GRANULES BLD QL SMEAR: NORMAL
UFH PPP CHRO-ACNC: <0.1 IU/ML
UNIT ABO/RH: NORMAL
UNIT NUMBER: NORMAL
UNIT STATUS: NORMAL
UNIT TYPE ISBT: 6200
UROBILINOGEN UR STRIP-MCNC: NORMAL MG/DL
VARIANT LYMPHS BLD QL SMEAR: NORMAL
VENTRICULAR RATE- MUSE: 117 BPM
WBC # BLD AUTO: <0.1 10E3/UL (ref 4–11)
WBC URINE: 2 /HPF

## 2024-02-06 PROCEDURE — 250N000013 HC RX MED GY IP 250 OP 250 PS 637

## 2024-02-06 PROCEDURE — 85520 HEPARIN ASSAY: CPT | Performed by: INTERNAL MEDICINE

## 2024-02-06 PROCEDURE — 85306 CLOT INHIBIT PROT S FREE: CPT

## 2024-02-06 PROCEDURE — 83735 ASSAY OF MAGNESIUM: CPT | Performed by: STUDENT IN AN ORGANIZED HEALTH CARE EDUCATION/TRAINING PROGRAM

## 2024-02-06 PROCEDURE — 250N000011 HC RX IP 250 OP 636: Performed by: INTERNAL MEDICINE

## 2024-02-06 PROCEDURE — 74177 CT ABD & PELVIS W/CONTRAST: CPT | Mod: 26 | Performed by: RADIOLOGY

## 2024-02-06 PROCEDURE — 71260 CT THORAX DX C+: CPT

## 2024-02-06 PROCEDURE — 85730 THROMBOPLASTIN TIME PARTIAL: CPT

## 2024-02-06 PROCEDURE — P9037 PLATE PHERES LEUKOREDU IRRAD: HCPCS

## 2024-02-06 PROCEDURE — 99233 SBSQ HOSP IP/OBS HIGH 50: CPT | Mod: FS

## 2024-02-06 PROCEDURE — 71260 CT THORAX DX C+: CPT | Mod: 26 | Performed by: RADIOLOGY

## 2024-02-06 PROCEDURE — 258N000003 HC RX IP 258 OP 636: Performed by: PHYSICIAN ASSISTANT

## 2024-02-06 PROCEDURE — 85390 FIBRINOLYSINS SCREEN I&R: CPT | Mod: 26 | Performed by: PATHOLOGY

## 2024-02-06 PROCEDURE — 258N000003 HC RX IP 258 OP 636: Performed by: INTERNAL MEDICINE

## 2024-02-06 PROCEDURE — 80069 RENAL FUNCTION PANEL: CPT | Performed by: PHYSICIAN ASSISTANT

## 2024-02-06 PROCEDURE — 87103 BLOOD FUNGUS CULTURE: CPT | Performed by: PHYSICIAN ASSISTANT

## 2024-02-06 PROCEDURE — 82248 BILIRUBIN DIRECT: CPT

## 2024-02-06 PROCEDURE — 86146 BETA-2 GLYCOPROTEIN ANTIBODY: CPT

## 2024-02-06 PROCEDURE — 85049 AUTOMATED PLATELET COUNT: CPT | Performed by: INTERNAL MEDICINE

## 2024-02-06 PROCEDURE — 250N000013 HC RX MED GY IP 250 OP 250 PS 637: Performed by: PHYSICIAN ASSISTANT

## 2024-02-06 PROCEDURE — 81001 URINALYSIS AUTO W/SCOPE: CPT | Performed by: INTERNAL MEDICINE

## 2024-02-06 PROCEDURE — 250N000011 HC RX IP 250 OP 636: Performed by: HOSPITALIST

## 2024-02-06 PROCEDURE — 206N000001 HC R&B BMT UMMC

## 2024-02-06 PROCEDURE — 85613 RUSSELL VIPER VENOM DILUTED: CPT

## 2024-02-06 PROCEDURE — 85303 CLOT INHIBIT PROT C ACTIVITY: CPT

## 2024-02-06 PROCEDURE — 250N000011 HC RX IP 250 OP 636: Mod: JZ | Performed by: PHYSICIAN ASSISTANT

## 2024-02-06 PROCEDURE — 86147 CARDIOLIPIN ANTIBODY EA IG: CPT

## 2024-02-06 PROCEDURE — 93010 ELECTROCARDIOGRAM REPORT: CPT | Performed by: INTERNAL MEDICINE

## 2024-02-06 PROCEDURE — 83605 ASSAY OF LACTIC ACID: CPT | Performed by: STUDENT IN AN ORGANIZED HEALTH CARE EDUCATION/TRAINING PROGRAM

## 2024-02-06 PROCEDURE — 250N000013 HC RX MED GY IP 250 OP 250 PS 637: Performed by: HOSPITALIST

## 2024-02-06 PROCEDURE — 0152U NFCT DS DNA UNTRGT NGNRJ SEQ: CPT | Performed by: INTERNAL MEDICINE

## 2024-02-06 PROCEDURE — 258N000003 HC RX IP 258 OP 636

## 2024-02-06 PROCEDURE — 999N000248 HC STATISTIC IV INSERT WITH US BY RN

## 2024-02-06 PROCEDURE — 83605 ASSAY OF LACTIC ACID: CPT | Performed by: INTERNAL MEDICINE

## 2024-02-06 PROCEDURE — 93005 ELECTROCARDIOGRAM TRACING: CPT

## 2024-02-06 PROCEDURE — 250N000011 HC RX IP 250 OP 636: Mod: JZ

## 2024-02-06 PROCEDURE — 85027 COMPLETE CBC AUTOMATED: CPT | Performed by: PHYSICIAN ASSISTANT

## 2024-02-06 PROCEDURE — 250N000012 HC RX MED GY IP 250 OP 636 PS 637: Performed by: PHYSICIAN ASSISTANT

## 2024-02-06 PROCEDURE — 84100 ASSAY OF PHOSPHORUS: CPT | Performed by: STUDENT IN AN ORGANIZED HEALTH CARE EDUCATION/TRAINING PROGRAM

## 2024-02-06 PROCEDURE — 250N000009 HC RX 250: Performed by: PHYSICIAN ASSISTANT

## 2024-02-06 RX ORDER — MEROPENEM 1 G/1
1 INJECTION, POWDER, FOR SOLUTION INTRAVENOUS EVERY 8 HOURS
Status: DISCONTINUED | OUTPATIENT
Start: 2024-02-06 | End: 2024-02-10

## 2024-02-06 RX ORDER — HEPARIN SODIUM 10000 [USP'U]/100ML
0-5000 INJECTION, SOLUTION INTRAVENOUS CONTINUOUS
Status: DISCONTINUED | OUTPATIENT
Start: 2024-02-06 | End: 2024-02-06

## 2024-02-06 RX ORDER — POTASSIUM PHOS IN 0.9 % NACL 15MMOL/250
15 PLASTIC BAG, INJECTION (ML) INTRAVENOUS ONCE
Status: DISCONTINUED | OUTPATIENT
Start: 2024-02-06 | End: 2024-02-06

## 2024-02-06 RX ORDER — HEPARIN SODIUM 10000 [USP'U]/100ML
0-5000 INJECTION, SOLUTION INTRAVENOUS CONTINUOUS
Status: DISCONTINUED | OUTPATIENT
Start: 2024-02-06 | End: 2024-02-16

## 2024-02-06 RX ORDER — LIDOCAINE 40 MG/G
CREAM TOPICAL
Status: DISCONTINUED | OUTPATIENT
Start: 2024-02-06 | End: 2024-02-20 | Stop reason: HOSPADM

## 2024-02-06 RX ORDER — POTASSIUM PHOS IN 0.9 % NACL 15MMOL/250
15 PLASTIC BAG, INJECTION (ML) INTRAVENOUS ONCE
Status: COMPLETED | OUTPATIENT
Start: 2024-02-06 | End: 2024-02-06

## 2024-02-06 RX ORDER — IOPAMIDOL 755 MG/ML
119 INJECTION, SOLUTION INTRAVASCULAR ONCE
Status: COMPLETED | OUTPATIENT
Start: 2024-02-06 | End: 2024-02-06

## 2024-02-06 RX ADMIN — BENZOCAINE 1 ML: 220 SPRAY, METERED PERIODONTAL at 08:13

## 2024-02-06 RX ADMIN — HEPARIN SODIUM 1050 UNITS/HR: 10000 INJECTION, SOLUTION INTRAVENOUS at 18:21

## 2024-02-06 RX ADMIN — Medication 2 SPRAY: at 20:38

## 2024-02-06 RX ADMIN — Medication 15 MMOL: at 04:45

## 2024-02-06 RX ADMIN — VANCOMYCIN HYDROCHLORIDE 1250 MG: 10 INJECTION, POWDER, LYOPHILIZED, FOR SOLUTION INTRAVENOUS at 14:49

## 2024-02-06 RX ADMIN — METOPROLOL SUCCINATE 100 MG: 50 TABLET, EXTENDED RELEASE ORAL at 08:00

## 2024-02-06 RX ADMIN — BENZOCAINE 0.5 ML: 220 SPRAY, METERED PERIODONTAL at 20:39

## 2024-02-06 RX ADMIN — AZITHROMYCIN MONOHYDRATE 500 MG: 500 INJECTION, POWDER, LYOPHILIZED, FOR SOLUTION INTRAVENOUS at 15:04

## 2024-02-06 RX ADMIN — ALTEPLASE 2 MG: 2.2 INJECTION, POWDER, LYOPHILIZED, FOR SOLUTION INTRAVENOUS at 16:14

## 2024-02-06 RX ADMIN — PANTOPRAZOLE SODIUM 40 MG: 40 TABLET, DELAYED RELEASE ORAL at 07:54

## 2024-02-06 RX ADMIN — MEROPENEM 1 G: 1 INJECTION, POWDER, FOR SOLUTION INTRAVENOUS at 19:09

## 2024-02-06 RX ADMIN — NIFEDIPINE 30 MG: 30 TABLET, FILM COATED, EXTENDED RELEASE ORAL at 08:00

## 2024-02-06 RX ADMIN — SIROLIMUS 5 MG: 2 TABLET ORAL at 07:59

## 2024-02-06 RX ADMIN — MEROPENEM 1 G: 1 INJECTION, POWDER, FOR SOLUTION INTRAVENOUS at 10:46

## 2024-02-06 RX ADMIN — IOPAMIDOL 119 ML: 755 INJECTION, SOLUTION INTRAVENOUS at 10:19

## 2024-02-06 RX ADMIN — BENZOCAINE 0.5 ML: 220 SPRAY, METERED PERIODONTAL at 14:49

## 2024-02-06 RX ADMIN — ACETAMINOPHEN 650 MG: 325 TABLET, FILM COATED ORAL at 05:02

## 2024-02-06 RX ADMIN — MYCOPHENOLATE MOFETIL 1250 MG: 500 INJECTION, POWDER, LYOPHILIZED, FOR SOLUTION INTRAVENOUS at 08:21

## 2024-02-06 RX ADMIN — Medication 5 CAPSULE: at 08:00

## 2024-02-06 RX ADMIN — MICAFUNGIN SODIUM 150 MG: 50 INJECTION, POWDER, LYOPHILIZED, FOR SOLUTION INTRAVENOUS at 07:59

## 2024-02-06 RX ADMIN — CEFEPIME HYDROCHLORIDE 2 G: 2 INJECTION, POWDER, FOR SOLUTION INTRAVENOUS at 04:45

## 2024-02-06 RX ADMIN — ACYCLOVIR 800 MG: 800 TABLET ORAL at 20:39

## 2024-02-06 RX ADMIN — LORAZEPAM 1 MG: 0.5 TABLET ORAL at 22:09

## 2024-02-06 RX ADMIN — URSODIOL 300 MG: 300 CAPSULE ORAL at 14:49

## 2024-02-06 RX ADMIN — DEXTROSE MONOHYDRATE 450 MCG: 50 INJECTION, SOLUTION INTRAVENOUS at 20:39

## 2024-02-06 RX ADMIN — DEXTROSE MONOHYDRATE 40 ML: 50 INJECTION, SOLUTION INTRAVENOUS at 20:39

## 2024-02-06 RX ADMIN — LISINOPRIL 40 MG: 10 TABLET ORAL at 08:00

## 2024-02-06 RX ADMIN — VANCOMYCIN HYDROCHLORIDE 1250 MG: 10 INJECTION, POWDER, LYOPHILIZED, FOR SOLUTION INTRAVENOUS at 22:09

## 2024-02-06 RX ADMIN — URSODIOL 300 MG: 300 CAPSULE ORAL at 08:00

## 2024-02-06 RX ADMIN — URSODIOL 300 MG: 300 CAPSULE ORAL at 20:39

## 2024-02-06 RX ADMIN — MYCOPHENOLATE MOFETIL 1250 MG: 500 INJECTION, POWDER, LYOPHILIZED, FOR SOLUTION INTRAVENOUS at 20:40

## 2024-02-06 RX ADMIN — ACETAMINOPHEN 650 MG: 325 TABLET, FILM COATED ORAL at 15:21

## 2024-02-06 RX ADMIN — NIFEDIPINE 30 MG: 30 TABLET, FILM COATED, EXTENDED RELEASE ORAL at 20:39

## 2024-02-06 RX ADMIN — Medication: at 15:15

## 2024-02-06 RX ADMIN — ACYCLOVIR 800 MG: 800 TABLET ORAL at 08:00

## 2024-02-06 ASSESSMENT — ACTIVITIES OF DAILY LIVING (ADL)
ADLS_ACUITY_SCORE: 24

## 2024-02-06 NOTE — PLAN OF CARE
"Tmax 102.4, tachycardic with fevers. Hypertension this morning- did not meet parameters for labetalol.  Notified Provider Lexx Graham with fever.  Blood cultures drawn and urine culture done.   Transfusion reaction work up completed after blood transfusion finished, see previous note.  Alert and oriented x4. Sleeping most of the day, reports poor sleep overnight.  Mucositis pain, dilaudid PCA increased today also using hurricane spray. Able to swallow pills. Had eggs and yogurt this morning and two ensures.  Post void bladder scan 65ml. Kandy is urine.            Problem: Adult Inpatient Plan of Care  Goal: Plan of Care Review  Description: The Plan of Care Review/Shift note should be completed every shift.  The Outcome Evaluation is a brief statement about your assessment that the patient is improving, declining, or no change.  This information will be displayed automatically on your shift  note.  Outcome: Progressing  Goal: Patient-Specific Goal (Individualized)  Description: You can add care plan individualizations to a care plan. Examples of Individualization might be:  \"Parent requests to be called daily at 9am for status\", \"I have a hard time hearing out of my right ear\", or \"Do not touch me to wake me up as it startles  me\".  Outcome: Progressing  Goal: Absence of Hospital-Acquired Illness or Injury  Outcome: Progressing  Intervention: Identify and Manage Fall Risk  Recent Flowsheet Documentation  Taken 2/5/2024 1800 by Leydi Hunter RN  Safety Promotion/Fall Prevention: safety round/check completed  Taken 2/5/2024 1600 by Leydi Hunter RN  Safety Promotion/Fall Prevention: safety round/check completed  Taken 2/5/2024 1400 by Leydi Hunter RN  Safety Promotion/Fall Prevention: safety round/check completed  Taken 2/5/2024 1200 by Leydi Hunter RN  Safety Promotion/Fall Prevention: safety round/check completed  Taken 2/5/2024 1000 by Leydi Hunter RN  Safety Promotion/Fall Prevention: safety round/check " completed  Taken 2/5/2024 0900 by Leydi Hunter RN  Safety Promotion/Fall Prevention: safety round/check completed  Taken 2/5/2024 0800 by Leydi Hunter RN  Safety Promotion/Fall Prevention:   assistive device/personal items within reach   clutter free environment maintained   nonskid shoes/slippers when out of bed   safety round/check completed   patient and family education   increased rounding and observation  Intervention: Prevent Skin Injury  Recent Flowsheet Documentation  Taken 2/5/2024 0800 by Leydi Hunter RN  Body Position: position changed independently  Goal: Optimal Comfort and Wellbeing  Outcome: Progressing  Goal: Readiness for Transition of Care  Outcome: Progressing

## 2024-02-06 NOTE — PROGRESS NOTES
CLINICAL NUTRITION SERVICES - REASSESSMENT NOTE     Nutrition Prescription    RECOMMENDATIONS FOR MDs/PROVIDERS TO ORDER:  Encourage oral intake and consider nutrition support if unable to maintain weight or further decline in intake     Malnutrition Status:    Moderate malnutrition in the context of acute illness    Recommendations already ordered by Registered Dietitian (RD):  Ordered Boost soothe @ 2:00- continue supplements PRN     Future/Additional Recommendations:  If unable to consume adequate PO intake or maintain weight- consider nutrition support:   Dosing weight:  75.1 kg  Access: Central      Initial parameters (per day)  Volume:  1200 mL  Dextrose: 150 g  AA: 115 g  Lipids: 250 mL 20%, 7x days per week      Dextrose titration:   Monitor lytes and if within acceptable parameters (Mg++ > or = 1.5, K+ is > or = 3, and PO4 > or = 1.9), increase dextrose by 60-65 g/day to goal of 275 g dextrose.     Additives: 10 mL infuvite + 1 mL MTE-4   Goal PN provides 275 g dextrose, 115  g AA, and 250 mL 20% lipids 7 days per week for total provision of 1895 Kcals (25 Kcals/kg), 1.5 g/kg protein, GIR = 2.54 mg/kg/minute, and 26% fat kcals on average daily.      EVALUATION OF THE PROGRESS TOWARD GOALS   Diet: High Kcal/High Protein + snacks/supplements PRN   Intake: 100% (eggs/yogurt), 100% ensure, 50%      NEW FINDINGS   Day +12  Ziggy's mucositis is becoming more bothersome, having more throat pain. Ziggy reported he drank 4 ensures today but they are bothering his throat and making it more raw. Discussed option of boost soothe.     Weight Trends:   02/06/24 0806 87.9 kg (193 lb 12.8 oz) Standing scale   02/02/24 0823 85.9 kg (189 lb 4.8 oz) Standing scale   01/30/24 0733 88.5 kg (195 lb 3.2 oz) Standing scale   01/27/24 0758 88.2 kg (194 lb 8 oz) Standing scale   01/23/24 0725 91.7 kg (202 lb 3.2 oz) Standing scale   01/21/24 0822 89.9 kg (198 lb 4.8 oz) Standing scale   01/19/24 0800 90.6 kg (199 lb 12.8 oz)  Standing scale     GI: Diarrhea noted 2/5, c.diff positive.    Skin: William 20. No wounds noted.     Labs:   Na 140, K+ 3.5, Mg 1.8 (WNL), Po4 2 (L)  Glucose 122 (H)  WBC <0.1 (L)    Medications:   Acyclovir  Biotene-- not using consistently   Mycophenolate  Protonix  Psyllium  Sirolimus  Ursodiol     MALNUTRITION  % Intake: < 75% for > 7 days (moderate)  % Weight Loss: 1-2% in 1 week (moderate)  Subcutaneous Fat Loss: None observed  Muscle Loss: Temporal:  mild  Fluid Accumulation/Edema: None noted  Malnutrition Diagnosis: Moderate malnutrition in the context of acute illness     Previous Goals   Patient to consume % of nutritionally adequate meal trays TID, or the equivalent with supplements/snacks.   Evaluation: Not met    Previous Nutrition Diagnosis  Unintended weight loss related to reduced intake and increased metabolic demand as evidenced by >2% weight loss in one week     Evaluation: Modified     CURRENT NUTRITION DIAGNOSIS  Inadequate oral intake related to mucositis as evidenced by additional 1% weight loss and mucositis impacting intake     INTERVENTIONS  Implementation  Collaboration with other providers  Medical food supplement therapy    Goals  Patient to consume % of nutritionally adequate meal trays TID, or the equivalent with supplements/snacks.    Monitoring/Evaluation  Progress toward goals will be monitored and evaluated per protocol.    Liliane Odom RD, LD  5C/BMT pager: [phone]

## 2024-02-06 NOTE — PROGRESS NOTES
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 Malaise, and new diffuse pruritic rash present on back, legs and chest. Cultures remain negative. Pain much better with changes to PCA able to eat some eggs, ensures, yogurts. PO pills still going well. No diarrhea, nausea. Bladder scan unremarkable, weight up trending, but no edema, albumin low.    Review of Systems    Review of Systems: 10 point ROS negative unless stated in HPI   PHYSICAL EXAM      Weight     Wt Readings from Last 3 Encounters:   02/06/24 87.9 kg (193 lb 12.8 oz)   01/17/24 90.1 kg (198 lb 9.6 oz)   01/12/24 88.9 kg (196 lb)        KPS: 60    /69   Pulse (!) 122   Temp 99  F (37.2  C) (Axillary)   Resp 18   Ht 1.725 m (5' 7.91\")   Wt 87.9 kg (193 lb 12.8 oz)   SpO2 93%   BMI 29.54 kg/m       General: NAD   Mouth/Throat: Multiple scattered erythematous ulcers on bilateral posterior buccal mucosa, posterior pharynx diffusely erythematous   Eyes: GARY, sclera anicteric   Lungs: decreased lung sounds in bilateral bases  Cardiovascular: RRR, no M/R/G   Lymphatics: No edema in extremities  Neuro: A&O   Additional Findings: Powell site NT, no drainage.    Current aGVHD staging:  Skin 0, UGI 0, LGI 0, Liver 0 (keep in note through day +180 for allos)      LABS AND IMAGING: I have assessed all abnormal lab values for their clinical significance and any values considered clinically significant have been addressed in the assessment and plan.        Lab Results "   Component Value Date    WBC <0.1 (LL) 02/06/2024    ANEUTAUTO 2.0 01/28/2024    HGB 8.1 (L) 02/06/2024    HCT 22.4 (L) 02/06/2024    PLT 12 (LL) 02/06/2024     02/06/2024    POTASSIUM 3.5 02/06/2024    CHLORIDE 108 (H) 02/06/2024    CO2 25 02/06/2024     (H) 02/06/2024    BUN 13.9 02/06/2024    CR 0.68 02/06/2024    MAG 1.8 02/06/2024    INR 1.15 02/05/2024         SYSTEMS-BASED ASSESSMENT AND PLAN     Ziggy Hallman is a 48 year old year old male with a PMH of MDS, hx of multiple clots and MI undergoing a MA (Bu/Flu) prep for an 8/8 URD PBSCT day 12    BMT/IEC PROTOCOL for 2015-29  Chemo Prep:   Day -6: Admit  Day -5 through Day -2: Busulfan/Fludarabine  Day -1: Rest.   Keppra prophylaxis      8/8 DP permissive. Cell dose-9.24x10^6  ABO: Donor: O+ Recipient A-  (Minor incompatability, no flush required)   GSCF plan: GCSF to start day +5 until ANC >1500 for 3 consecutive days    HEME/COAG  #SEGMENTAL R PE (2/6): Low intensity hep gtt with platelet goal <30k  #Risk of Pancytopenia 2/2 chemo- anemia, leukopenia, thrombocytopenia  - Transfusion parameters: hemoglobin <8 (cardiac hx), platelets <30k (hep gtt)  #Recurrent arterial thromboembolic events:  He has long history of various events including renal infarcts (2011, 2013, 2015), left popliteal embolic episode requiring surgery, anticoagulation (2011), right carotid artery embolic episode with TIA/stroke-like symptoms (2012), embolic MI (2015), gangrenous right toe requiring vascular surgery/amputation (2017), 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   Extensive hypercoagulable workup to date has been unrevealing.  JAK2 testing negative.  PNH testing negative.  Elevated IgA of unknown significance, no evidence of plasma cell disorder.  - Eliquis and Prasugrel now HELD starting 2/1-x.     IMMUNOCOMPROMISED  #Febrile neutropenia: ddx includes- infx (CXR shows possible bronchitis but he is  asymptomatic, CTCAP pending, broadened coverage), drug fevers (switch abx)   -Meropenem (2/6-x), Vanco (2/6-x)  - Cefepime (2/2-2/6)   -UC, BC negative, KARIUS pending   C. Diff - Admit c. Diff triple+ - Start PO vanc 1/21 for 14 day course. - completed on 2/4, with broadening abx low treshold to add PO Vanco ppx.   -Prophylaxis plan: ACV LD, Megan HD, Levaquin while neutropenic , Bactrim +28    RISK OF GVHD  - Prophylaxis: PtC day +3, +4, , Siro/MMF to start day +5     CARDIOVASCULAR  #CAD  #Hx of CABG  #HTN   -PTA metoprolol, Lisinopril dose increased 2/2 HTN, Amlodipine 5mg BID switch to nifidepine ER 30mg BID, labetolol PRN;  #Hyperlipidemia: Holding atorvastatin peritransplant  - Risk of cardiomyopathy:  Baseline EF 50-55%, Global left ventricular function mildly reduced. Grade 1 or early diastolic dysfunction.   - Risk of arrhythmia: Baseline EKG showed NSR, Qtc -425    GI/NUTRITION  #Oropharyngeal mucositis  - MMW, hurricaine spray, dilaudid PCA 2/3-x   #Bladder scan rule out urinary retention pending  #Hematuria- No dysuria, self resolved.   - Ulcer prophylaxis: Protonix   - VOD Prophylaxis: Ursodiol  - Risk of nausea/vomiting due to chemo: Ativan, Compazine, Zofran   - Risk of malnutrition: Nutrition to follow.     RENAL/ELECTROLYTES/  #Hypervolemia 2/2 cytoxan flush - diurese as needed   #Proteinuria- possibly exacerbated by persistent HTN (See CV),   #BUN/Cr elevation- FENA, osms, Na+, UA work up indicate prerenal etiology, likely intravascular depletion 2/2 osmotic diuresis with proteinuria. Will encourage PO fluid intake as Cr improved 1/23, IVF boluses as necessary, control HTN. RESOLVED.  - Hypocalcemia in setting hypoalbuminemia, iCa2+, corrected WNL   - Electrolyte management: replace per sliding scale    Psych:    #Anxiety: admits to feeling anxious.  Start zyprexa 5mg HS.  Connect w/ SW.  Psychaitry consult added hydroxyzine.  #Insomnia- PTA ambien, Trazadone added and increased to 100mg. -  "Ambien and trazadone held during cytoxan period due to frequent urination and concern for falls. resumed following cytoxan flush 1/31.      SOCIAL DETERMINANTS  - Caregiver: Family   - Financial/insurance concerns: See SW notes       Known issues that I take into account for medical decisions, with salient changes to the plan considering these complexities noted above.    Patient Active Problem List   Diagnosis    Amegakaryocytic thrombocytopenia (H)    Anemia due to bone marrow failure, unspecified bone marrow failure type (H)    Aplastic anemia (H)     Clinically Significant Risk Factors              # Hypoalbuminemia: Lowest albumin = 2 g/dL at 1/22/2024  4:12 AM, will monitor as appropriate     # Thrombocytopenia: Lowest platelets = 12 in last 2 days, will monitor for bleeding          # Overweight: Estimated body mass index is 29.54 kg/m  as calculated from the following:    Height as of this encounter: 1.725 m (5' 7.91\").    Weight as of this encounter: 87.9 kg (193 lb 12.8 oz).               Dispo: Remain admitted through engraftment    I spent 30 minutes in the care of this patient today, which included time necessary for preparation for the visit, obtaining history, ordering medications/tests/procedures as medically indicated, review of pertinent medical literature, counseling of the patient, communication of recommendations to the care team, and documentation time.    Toyin Herrmann PA-C  [phone]  "

## 2024-02-06 NOTE — PLAN OF CARE
Goal Outcome Evaluation:      Plan of Care Reviewed With: patient    Overall Patient Progress: no changeOverall Patient Progress: no change    Outcome Evaluation: muscoitis worsening - trial of boost soothe- pt is hopeful to try soft foods tonight

## 2024-02-06 NOTE — PLAN OF CARE
"Tmax 101.7, tachycardic with fevers and activity.  Highest heart rate was 135 noted this morning after patient returned from shower. Notified Toyin Ocampo PA-C. EKG showed normal sinus rhythm.    New pruritic rash on upper thighs, chest and back.    Found to have pulmonary embolus in right lower lung, patient denies chest pain and shortness of breath. On room air. Given 1 unit of platelets to increase his platelet level to above 30k (currently 31K). Heparin drip 1,050unit/hr started at 6:30. Next heparin 10A level at 00:30.     Port accessed and had no blood return, good blood return after alteplase.    Continues to have mucositis. Patient reports pain is manageable with dilaudid PCA. Tolerated eggs, yogurt and ensure x1.    Instructed patient to collect urine sample with next void for urine analysis with culture.         Problem: Adult Inpatient Plan of Care  Goal: Plan of Care Review  Description: The Plan of Care Review/Shift note should be completed every shift.  The Outcome Evaluation is a brief statement about your assessment that the patient is improving, declining, or no change.  This information will be displayed automatically on your shift  note.  Outcome: Progressing  Goal: Patient-Specific Goal (Individualized)  Description: You can add care plan individualizations to a care plan. Examples of Individualization might be:  \"Parent requests to be called daily at 9am for status\", \"I have a hard time hearing out of my right ear\", or \"Do not touch me to wake me up as it startles  me\".  Outcome: Progressing  Goal: Absence of Hospital-Acquired Illness or Injury  Outcome: Progressing  Intervention: Identify and Manage Fall Risk  Recent Flowsheet Documentation  Taken 2/6/2024 0800 by Leydi Hunter RN  Safety Promotion/Fall Prevention:   assistive device/personal items within reach   clutter free environment maintained   nonskid shoes/slippers when out of bed   patient and family education   safety round/check " completed  Intervention: Prevent Skin Injury  Recent Flowsheet Documentation  Taken 2/6/2024 0800 by Leydi Hunter RN  Body Position: position changed independently  Intervention: Prevent Infection  Recent Flowsheet Documentation  Taken 2/6/2024 0800 by Leydi Hunter RN  Infection Prevention: visitors restricted/screened  Goal: Optimal Comfort and Wellbeing  Outcome: Progressing  Goal: Readiness for Transition of Care  Outcome: Progressing

## 2024-02-06 NOTE — PHARMACY-VANCOMYCIN DOSING SERVICE
Pharmacy Vancomycin Initial Note  Date of Service 2024  Patient's  1975  48 year old, male    Indication: Febrile Neutropenia    Current estimated CrCl = Estimated Creatinine Clearance: 139.9 mL/min (based on SCr of 0.68 mg/dL).    Creatinine for last 3 days  2024: 12:17 AM Creatinine 0.68 mg/dL  2024:  3:30 AM Creatinine 0.73 mg/dL  2024:  3:05 AM Creatinine 0.68 mg/dL    Recent Vancomycin Level(s) for last 3 days  No results found for requested labs within last 3 days.      Vancomycin IV Administrations (past 72 hours)        No vancomycin orders with administrations in past 72 hours.                    Nephrotoxins and other renal medications (From now, onward)      Start     Dose/Rate Route Frequency Ordered Stop    24 0800  acyclovir (ZOVIRAX) tablet 800 mg         800 mg Oral 2 TIMES DAILY 24 1146      24 0800  lisinopril (ZESTRIL) tablet 40 mg         40 mg Oral DAILY 24 0914              Contrast Orders - past 72 hours (72h ago, onward)      None            [employer] Prediction of Planned Initial Vancomycin Regimen  Regimen: 1250 mg IV every 12 hours.  Start time: 09:36 on 2024  Exposure target: AUC24 (range)400-600 mg/L.hr   AUC24,ss: 458 mg/L.hr  Probability of AUC24 > 400: 64 %  Ctrough,ss: 13.5 mg/L  Probability of Ctrough,ss > 20: 21 %  Probability of nephrotoxicity (Lodise CANDY ): 9 %      Plan:  Start vancomycin 1250 mg IV every 12 hours.   Vancomycin monitoring method: AUC  Vancomycin therapeutic monitoring goal: 400-600 mg*h/L  Pharmacy will check vancomycin levels as appropriate in 1-3 Days.    Serum creatinine levels will be ordered daily for the first week of therapy and at least twice weekly for subsequent weeks.      Sterling Quiles, NelsonD

## 2024-02-06 NOTE — PLAN OF CARE
Tmax is 102.1 F received prn tylenol for fever.  On PCA Dilaudid. Mucositis and sore throat was noted. Hurricane spray was administered.  Afebrile at 23:11 with temp of 98.8 F.  Patient sleeping in between cares.  Lactic Acid sample sent. Phos of 2.0. replacement given.  Blood Cultures taken.    Problem: Adult Inpatient Plan of Care  Goal: Plan of Care Review  Description: The Plan of Care Review/Shift note should be completed every shift.  The Outcome Evaluation is a brief statement about your assessment that the patient is improving, declining, or no change.  This information will be displayed automatically on your shift  note.  Outcome: Progressing  Goal: Patient-Specific Goal (Individualized)  Outcome: Progressing  Goal: Absence of Hospital-Acquired Illness or Injury  Outcome: Progressing  Intervention: Identify and Manage Fall Risk  Recent Flowsheet Documentation  Taken 2/6/2024 0201 by Elvira Vasquez RN  Safety Promotion/Fall Prevention: safety round/check completed  Taken 2/5/2024 2311 by Elvira Vasquez RN  Safety Promotion/Fall Prevention: safety round/check completed  Taken 2/5/2024 2000 by Elvira Vasquez RN  Safety Promotion/Fall Prevention: safety round/check completed  Intervention: Prevent Skin Injury  Recent Flowsheet Documentation  Taken 2/5/2024 2000 by Elvira Vasquez RN  Body Position: position changed independently  Intervention: Prevent Infection  Recent Flowsheet Documentation  Taken 2/6/2024 0201 by Elvira Vasquez RN  Infection Prevention:   rest/sleep promoted   personal protective equipment utilized   environmental surveillance performed  Taken 2/6/2024 0000 by Elvira Vasquez RN  Infection Prevention:   rest/sleep promoted   personal protective equipment utilized   environmental surveillance performed  Taken 2/5/2024 2311 by Elvira Vasquez RN  Infection Prevention:   environmental surveillance performed   personal protective equipment utilized    rest/sleep promoted  Taken 2/5/2024 2000 by Elvira Vasquez RN  Infection Prevention:   rest/sleep promoted   personal protective equipment utilized   environmental surveillance performed  Goal: Optimal Comfort and Wellbeing  Outcome: Progressing  Goal: Readiness for Transition of Care  Outcome: Progressing     Problem: Stem Cell/Bone Marrow Transplant  Goal: Optimal Coping with Transplant  Outcome: Progressing  Intervention: Optimize Patient/Family Adjustment to Transplant  Recent Flowsheet Documentation  Taken 2/5/2024 2000 by Elvira Vasquez RN  Supportive Measures: active listening utilized  Goal: Symptom-Free Urinary Elimination  Outcome: Progressing  Intervention: Prevent or Manage Bladder Irritation  Recent Flowsheet Documentation  Taken 2/5/2024 2000 by Elvira Vasquez RN  Hyperhydration Management: fluids provided  Goal: Diarrhea Symptom Control  Outcome: Progressing  Intervention: Manage Diarrhea  Recent Flowsheet Documentation  Taken 2/5/2024 2000 by Elvira Vasquez RN  Fluid/Electrolyte Management: fluids provided  Goal: Improved Activity Tolerance  Outcome: Progressing  Intervention: Promote Improved Energy  Recent Flowsheet Documentation  Taken 2/6/2024 0200 by Elvira Vasquez RN  Sleep/Rest Enhancement: awakenings minimized  Taken 2/5/2024 2000 by Elvira Vasquez RN  Sleep/Rest Enhancement: awakenings minimized  Activity Management: activity adjusted per tolerance  Environmental Support:   calm environment promoted   rest periods encouraged  Goal: Blood Counts Within Acceptable Range  Outcome: Progressing  Intervention: Monitor and Manage Hematologic Symptoms  Recent Flowsheet Documentation  Taken 2/6/2024 0200 by Elvira Vasquez RN  Sleep/Rest Enhancement: awakenings minimized  Taken 2/5/2024 2000 by Elvira Vasquez RN  Sleep/Rest Enhancement: awakenings minimized  Goal: Absence of Hypersensitivity Reaction  Outcome: Progressing  Goal: Absence of  Infection  Outcome: Progressing  Intervention: Prevent and Manage Infection  Recent Flowsheet Documentation  Taken 2/6/2024 0201 by Elvira Vasquez RN  Infection Prevention:   rest/sleep promoted   personal protective equipment utilized   environmental surveillance performed  Isolation Precautions:   contact precautions maintained   protective environment maintained  Taken 2/6/2024 0000 by Elvira Vasquez RN  Infection Prevention:   rest/sleep promoted   personal protective equipment utilized   environmental surveillance performed  Isolation Precautions:   contact precautions maintained   protective environment maintained  Taken 2/5/2024 2311 by Elvira Vasquez RN  Infection Prevention:   environmental surveillance performed   personal protective equipment utilized   rest/sleep promoted  Isolation Precautions:   contact precautions maintained   protective environment maintained  Taken 2/5/2024 2000 by Elvira Vasquez RN  Infection Prevention:   rest/sleep promoted   personal protective equipment utilized   environmental surveillance performed  Isolation Precautions:   contact precautions maintained   protective environment maintained  Goal: Improved Oral Mucous Membrane Health and Integrity  Outcome: Progressing  Intervention: Promote Oral Comfort and Health  Recent Flowsheet Documentation  Taken 2/5/2024 2000 by Elvira Vasquez RN  Oral Mucous Membrane Protection:   nonirritating oral fluids promoted   nonirritating oral foods promoted  Goal: Nausea and Vomiting Symptom Relief  Outcome: Progressing  Goal: Optimal Nutrition Intake  Outcome: Progressing  Intervention: Minimize and Manage Barriers to Oral Intake  Recent Flowsheet Documentation  Taken 2/5/2024 2000 by Elvira Vasquez RN  Oral Nutrition Promotion: rest periods promoted   Goal Outcome Evaluation:

## 2024-02-07 LAB
ANION GAP SERPL CALCULATED.3IONS-SCNC: 11 MMOL/L (ref 7–15)
BKR LAB AP TR RXN INTERPRETATION: NORMAL
BKR LAB AP TRAN RXN RECOMMENDATION: NORMAL
BKR LAB AP TRANS SIGNS AND SX: NORMAL
BKR LAB AP TRANSFUSION REACTION: NORMAL
BLD PROD TYP BPU: NORMAL
BLD PROD TYP BPU: NORMAL
BLOOD COMPONENT TYPE: NORMAL
BLOOD COMPONENT TYPE: NORMAL
BUN SERPL-MCNC: 10.5 MG/DL (ref 6–20)
CALCIUM SERPL-MCNC: 8.1 MG/DL (ref 8.6–10)
CHLORIDE SERPL-SCNC: 108 MMOL/L (ref 98–107)
CMV DNA SPEC NAA+PROBE-ACNC: NOT DETECTED IU/ML
CODING SYSTEM: NORMAL
CODING SYSTEM: NORMAL
CREAT SERPL-MCNC: 0.77 MG/DL (ref 0.67–1.17)
CROSSMATCH: NORMAL
DEPRECATED HCO3 PLAS-SCNC: 22 MMOL/L (ref 22–29)
DRVVT CONFIRM NORMALIZED RATIO: 1.22
DRVVT SCREEN RATIO: 1.38
EGFRCR SERPLBLD CKD-EPI 2021: >90 ML/MIN/1.73M2
ERYTHROCYTE [DISTWIDTH] IN BLOOD BY AUTOMATED COUNT: 12.8 % (ref 10–15)
GLUCOSE SERPL-MCNC: 114 MG/DL (ref 70–99)
HCT VFR BLD AUTO: 19.8 % (ref 40–53)
HGB BLD-MCNC: 7.3 G/DL (ref 13.3–17.7)
INR PPP: 1.14 (ref 0.85–1.15)
ISSUE DATE AND TIME: NORMAL
ISSUE DATE AND TIME: NORMAL
LA PPP-IMP: POSITIVE
LACTATE SERPL-SCNC: 0.8 MMOL/L (ref 0.7–2)
LUPUS INTERPRETATION: ABNORMAL
MAGNESIUM SERPL-MCNC: 1.7 MG/DL (ref 1.7–2.3)
MCH RBC QN AUTO: 31.5 PG (ref 26.5–33)
MCHC RBC AUTO-ENTMCNC: 36.9 G/DL (ref 31.5–36.5)
MCV RBC AUTO: 85 FL (ref 78–100)
PATH REPORT.COMMENTS IMP SPEC: NORMAL
PATH REPORT.COMMENTS IMP SPEC: NORMAL
PHOSPHATE SERPL-MCNC: 1 MG/DL (ref 2.5–4.5)
PHOSPHATE SERPL-MCNC: 3 MG/DL (ref 2.5–4.5)
PLATELET # BLD AUTO: 28 10E3/UL (ref 150–450)
PLATELET # BLD AUTO: 52 10E3/UL (ref 150–450)
PLATELET NEUTRALIZATION: 2 SECONDS
POTASSIUM SERPL-SCNC: 3 MMOL/L (ref 3.4–5.3)
POTASSIUM SERPL-SCNC: 3.4 MMOL/L (ref 3.4–5.3)
POTASSIUM SERPL-SCNC: 3.5 MMOL/L (ref 3.4–5.3)
PROT C ACT/NOR PPP CHRO: 81 % (ref 70–170)
PROT S FREE AG ACT/NOR PPP IA: 105 % (ref 70–148)
PTT RATIO: 1.7
RBC # BLD AUTO: 2.32 10E6/UL (ref 4.4–5.9)
SIROLIMUS BLD-MCNC: 13.8 UG/L (ref 5–15)
SODIUM SERPL-SCNC: 141 MMOL/L (ref 135–145)
THROMBIN TIME: 18.4 SECONDS (ref 13–19)
TME LAST DOSE: NORMAL H
TME LAST DOSE: NORMAL H
UFH PPP CHRO-ACNC: 0.23 IU/ML
UFH PPP CHRO-ACNC: 0.26 IU/ML
UFH PPP CHRO-ACNC: 0.31 IU/ML
UNIT ABO/RH: NORMAL
UNIT ABO/RH: NORMAL
UNIT NUMBER: NORMAL
UNIT NUMBER: NORMAL
UNIT STATUS: NORMAL
UNIT STATUS: NORMAL
UNIT TYPE ISBT: 6200
UNIT TYPE ISBT: 9500
WBC # BLD AUTO: <0.1 10E3/UL (ref 4–11)

## 2024-02-07 PROCEDURE — 84132 ASSAY OF SERUM POTASSIUM: CPT

## 2024-02-07 PROCEDURE — 83735 ASSAY OF MAGNESIUM: CPT | Performed by: PHYSICIAN ASSISTANT

## 2024-02-07 PROCEDURE — 250N000011 HC RX IP 250 OP 636

## 2024-02-07 PROCEDURE — 87103 BLOOD FUNGUS CULTURE: CPT | Performed by: PHYSICIAN ASSISTANT

## 2024-02-07 PROCEDURE — 250N000009 HC RX 250

## 2024-02-07 PROCEDURE — 87798 DETECT AGENT NOS DNA AMP: CPT

## 2024-02-07 PROCEDURE — P9037 PLATE PHERES LEUKOREDU IRRAD: HCPCS

## 2024-02-07 PROCEDURE — 258N000003 HC RX IP 258 OP 636: Performed by: INTERNAL MEDICINE

## 2024-02-07 PROCEDURE — 250N000011 HC RX IP 250 OP 636: Performed by: INTERNAL MEDICINE

## 2024-02-07 PROCEDURE — P9040 RBC LEUKOREDUCED IRRADIATED: HCPCS | Performed by: PHYSICIAN ASSISTANT

## 2024-02-07 PROCEDURE — 99418 PROLNG IP/OBS E/M EA 15 MIN: CPT

## 2024-02-07 PROCEDURE — 250N000013 HC RX MED GY IP 250 OP 250 PS 637

## 2024-02-07 PROCEDURE — 87799 DETECT AGENT NOS DNA QUANT: CPT

## 2024-02-07 PROCEDURE — 85027 COMPLETE CBC AUTOMATED: CPT | Performed by: INTERNAL MEDICINE

## 2024-02-07 PROCEDURE — 85520 HEPARIN ASSAY: CPT

## 2024-02-07 PROCEDURE — 250N000013 HC RX MED GY IP 250 OP 250 PS 637: Performed by: HOSPITALIST

## 2024-02-07 PROCEDURE — 99233 SBSQ HOSP IP/OBS HIGH 50: CPT | Mod: FS

## 2024-02-07 PROCEDURE — 83605 ASSAY OF LACTIC ACID: CPT

## 2024-02-07 PROCEDURE — 80195 ASSAY OF SIROLIMUS: CPT | Performed by: STUDENT IN AN ORGANIZED HEALTH CARE EDUCATION/TRAINING PROGRAM

## 2024-02-07 PROCEDURE — 84132 ASSAY OF SERUM POTASSIUM: CPT | Performed by: INTERNAL MEDICINE

## 2024-02-07 PROCEDURE — 85520 HEPARIN ASSAY: CPT | Performed by: INTERNAL MEDICINE

## 2024-02-07 PROCEDURE — 84100 ASSAY OF PHOSPHORUS: CPT | Performed by: STUDENT IN AN ORGANIZED HEALTH CARE EDUCATION/TRAINING PROGRAM

## 2024-02-07 PROCEDURE — 80048 BASIC METABOLIC PNL TOTAL CA: CPT | Performed by: PHYSICIAN ASSISTANT

## 2024-02-07 PROCEDURE — 250N000011 HC RX IP 250 OP 636: Mod: JZ | Performed by: PHYSICIAN ASSISTANT

## 2024-02-07 PROCEDURE — 258N000003 HC RX IP 258 OP 636: Performed by: PHYSICIAN ASSISTANT

## 2024-02-07 PROCEDURE — 250N000012 HC RX MED GY IP 250 OP 636 PS 637: Performed by: PHYSICIAN ASSISTANT

## 2024-02-07 PROCEDURE — 250N000009 HC RX 250: Performed by: PHYSICIAN ASSISTANT

## 2024-02-07 PROCEDURE — 85049 AUTOMATED PLATELET COUNT: CPT

## 2024-02-07 PROCEDURE — 250N000013 HC RX MED GY IP 250 OP 250 PS 637: Performed by: PHYSICIAN ASSISTANT

## 2024-02-07 PROCEDURE — 206N000001 HC R&B BMT UMMC

## 2024-02-07 PROCEDURE — 87533 HHV-6 DNA QUANT: CPT

## 2024-02-07 PROCEDURE — 84100 ASSAY OF PHOSPHORUS: CPT | Performed by: INTERNAL MEDICINE

## 2024-02-07 PROCEDURE — 250N000009 HC RX 250: Performed by: INTERNAL MEDICINE

## 2024-02-07 RX ORDER — POTASSIUM CHLORIDE 29.8 MG/ML
20 INJECTION INTRAVENOUS
Status: COMPLETED | OUTPATIENT
Start: 2024-02-07 | End: 2024-02-07

## 2024-02-07 RX ORDER — LIDOCAINE HYDROCHLORIDE 20 MG/ML
5 SOLUTION OROPHARYNGEAL
Status: DISCONTINUED | OUTPATIENT
Start: 2024-02-07 | End: 2024-02-19

## 2024-02-07 RX ORDER — AZITHROMYCIN 250 MG/1
500 TABLET, FILM COATED ORAL DAILY
Qty: 4 TABLET | Refills: 0 | Status: COMPLETED | OUTPATIENT
Start: 2024-02-07 | End: 2024-02-08

## 2024-02-07 RX ORDER — FUROSEMIDE 10 MG/ML
20 INJECTION INTRAMUSCULAR; INTRAVENOUS ONCE
Status: COMPLETED | OUTPATIENT
Start: 2024-02-07 | End: 2024-02-07

## 2024-02-07 RX ADMIN — ACETAMINOPHEN 650 MG: 325 TABLET, FILM COATED ORAL at 01:21

## 2024-02-07 RX ADMIN — NIFEDIPINE 30 MG: 30 TABLET, FILM COATED, EXTENDED RELEASE ORAL at 19:54

## 2024-02-07 RX ADMIN — Medication: at 18:01

## 2024-02-07 RX ADMIN — DEXTROSE MONOHYDRATE 450 MCG: 50 INJECTION, SOLUTION INTRAVENOUS at 20:01

## 2024-02-07 RX ADMIN — POTASSIUM CHLORIDE 20 MEQ: 29.8 INJECTION, SOLUTION INTRAVENOUS at 05:57

## 2024-02-07 RX ADMIN — URSODIOL 300 MG: 300 CAPSULE ORAL at 19:53

## 2024-02-07 RX ADMIN — PANTOPRAZOLE SODIUM 40 MG: 40 TABLET, DELAYED RELEASE ORAL at 07:49

## 2024-02-07 RX ADMIN — METOPROLOL SUCCINATE 100 MG: 50 TABLET, EXTENDED RELEASE ORAL at 07:50

## 2024-02-07 RX ADMIN — LIDOCAINE HYDROCHLORIDE 5 ML: 20 SOLUTION OROPHARYNGEAL at 21:41

## 2024-02-07 RX ADMIN — HEPARIN SODIUM 1200 UNITS/HR: 10000 INJECTION, SOLUTION INTRAVENOUS at 20:05

## 2024-02-07 RX ADMIN — VANCOMYCIN HYDROCHLORIDE 1250 MG: 10 INJECTION, POWDER, LYOPHILIZED, FOR SOLUTION INTRAVENOUS at 10:22

## 2024-02-07 RX ADMIN — POTASSIUM CHLORIDE 20 MEQ: 29.8 INJECTION, SOLUTION INTRAVENOUS at 07:01

## 2024-02-07 RX ADMIN — MEROPENEM 1 G: 1 INJECTION, POWDER, FOR SOLUTION INTRAVENOUS at 02:49

## 2024-02-07 RX ADMIN — NIFEDIPINE 30 MG: 30 TABLET, FILM COATED, EXTENDED RELEASE ORAL at 07:50

## 2024-02-07 RX ADMIN — BENZOCAINE 1 ML: 220 SPRAY, METERED PERIODONTAL at 07:50

## 2024-02-07 RX ADMIN — SODIUM PHOSPHATE, MONOBASIC, MONOHYDRATE AND SODIUM PHOSPHATE, DIBASIC, ANHYDROUS 15 MMOL: 142; 276 INJECTION, SOLUTION INTRAVENOUS at 05:57

## 2024-02-07 RX ADMIN — Medication 2 SPRAY: at 19:54

## 2024-02-07 RX ADMIN — URSODIOL 300 MG: 300 CAPSULE ORAL at 14:03

## 2024-02-07 RX ADMIN — SIROLIMUS 5 MG: 2 TABLET ORAL at 07:49

## 2024-02-07 RX ADMIN — MYCOPHENOLATE MOFETIL 1250 MG: 500 INJECTION, POWDER, LYOPHILIZED, FOR SOLUTION INTRAVENOUS at 10:17

## 2024-02-07 RX ADMIN — BENZOCAINE 1 ML: 220 SPRAY, METERED PERIODONTAL at 19:55

## 2024-02-07 RX ADMIN — LIDOCAINE HYDROCHLORIDE 5 ML: 20 SOLUTION OROPHARYNGEAL at 16:18

## 2024-02-07 RX ADMIN — ACYCLOVIR 800 MG: 800 TABLET ORAL at 07:50

## 2024-02-07 RX ADMIN — URSODIOL 300 MG: 300 CAPSULE ORAL at 07:50

## 2024-02-07 RX ADMIN — LISINOPRIL 40 MG: 10 TABLET ORAL at 07:50

## 2024-02-07 RX ADMIN — MEROPENEM 1 G: 1 INJECTION, POWDER, FOR SOLUTION INTRAVENOUS at 19:56

## 2024-02-07 RX ADMIN — POTASSIUM CHLORIDE 20 MEQ: 29.8 INJECTION, SOLUTION INTRAVENOUS at 14:03

## 2024-02-07 RX ADMIN — Medication 2 SPRAY: at 18:08

## 2024-02-07 RX ADMIN — ACYCLOVIR 800 MG: 800 TABLET ORAL at 19:53

## 2024-02-07 RX ADMIN — MEROPENEM 1 G: 1 INJECTION, POWDER, FOR SOLUTION INTRAVENOUS at 12:06

## 2024-02-07 RX ADMIN — DEXTROSE MONOHYDRATE 20 ML: 50 INJECTION, SOLUTION INTRAVENOUS at 20:01

## 2024-02-07 RX ADMIN — VANCOMYCIN HYDROCHLORIDE 1250 MG: 10 INJECTION, POWDER, LYOPHILIZED, FOR SOLUTION INTRAVENOUS at 21:42

## 2024-02-07 RX ADMIN — SODIUM PHOSPHATE, MONOBASIC, MONOHYDRATE AND SODIUM PHOSPHATE, DIBASIC, ANHYDROUS 15 MMOL: 142; 276 INJECTION, SOLUTION INTRAVENOUS at 08:07

## 2024-02-07 RX ADMIN — POTASSIUM CHLORIDE 20 MEQ: 29.8 INJECTION, SOLUTION INTRAVENOUS at 05:00

## 2024-02-07 RX ADMIN — AZITHROMYCIN 500 MG: 250 TABLET, FILM COATED ORAL at 12:06

## 2024-02-07 RX ADMIN — Medication 2 SPRAY: at 10:03

## 2024-02-07 RX ADMIN — MICAFUNGIN SODIUM 150 MG: 50 INJECTION, POWDER, LYOPHILIZED, FOR SOLUTION INTRAVENOUS at 10:03

## 2024-02-07 RX ADMIN — BENZOCAINE 1 ML: 220 SPRAY, METERED PERIODONTAL at 13:08

## 2024-02-07 RX ADMIN — ACETAMINOPHEN 650 MG: 325 TABLET, FILM COATED ORAL at 13:08

## 2024-02-07 RX ADMIN — FUROSEMIDE 20 MG: 10 INJECTION, SOLUTION INTRAMUSCULAR; INTRAVENOUS at 10:03

## 2024-02-07 RX ADMIN — Medication 5 CAPSULE: at 07:49

## 2024-02-07 RX ADMIN — POTASSIUM CHLORIDE 20 MEQ: 29.8 INJECTION, SOLUTION INTRAVENOUS at 12:39

## 2024-02-07 RX ADMIN — DEXTROSE MONOHYDRATE 10 ML: 50 INJECTION, SOLUTION INTRAVENOUS at 20:01

## 2024-02-07 RX ADMIN — BENZOCAINE 1 ML: 220 SPRAY, METERED PERIODONTAL at 16:18

## 2024-02-07 RX ADMIN — MYCOPHENOLATE MOFETIL 1250 MG: 500 INJECTION, POWDER, LYOPHILIZED, FOR SOLUTION INTRAVENOUS at 22:50

## 2024-02-07 RX ADMIN — Medication 2 SPRAY: at 12:07

## 2024-02-07 ASSESSMENT — ACTIVITIES OF DAILY LIVING (ADL)
ADLS_ACUITY_SCORE: 24
ADLS_ACUITY_SCORE: 25
ADLS_ACUITY_SCORE: 24
ADLS_ACUITY_SCORE: 25
ADLS_ACUITY_SCORE: 25
ADLS_ACUITY_SCORE: 24
ADLS_ACUITY_SCORE: 24
ADLS_ACUITY_SCORE: 25
ADLS_ACUITY_SCORE: 24

## 2024-02-07 NOTE — PROGRESS NOTES
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 [id number]         Interval History:   Feeling extremely tired this morning due to cares overnight. Fever curve looks to be improving from yesterday. No new symptoms. Tachycardia continues, but no dyspnea, chest pain. Hep gtt began yesterday. Mucositis pain doing ok with current PCA settings. No N/V/D.     Review of Systems    Review of Systems: 10 point ROS negative unless stated in HPI   PHYSICAL EXAM      Weight     Wt Readings from Last 3 Encounters:   02/07/24 89.4 kg (197 lb 3.2 oz)   01/17/24 90.1 kg (198 lb 9.6 oz)   01/12/24 88.9 kg (196 lb)        KPS: 60    /64 (BP Location: Right arm)   Pulse 95   Temp 97.8  F (36.6  C) (Axillary)   Resp 17   Ht 1.725 m (5' 7.91\")   Wt 89.4 kg (197 lb 3.2 oz)   SpO2 94%   BMI 30.06 kg/m       General: NAD   Mouth/Throat: Multiple scattered erythematous ulcers on bilateral posterior buccal mucosa, posterior pharynx diffusely erythematous   Eyes: GARY, sclera anicteric   Lungs: decreased lung sounds in bilateral bases  Cardiovascular: RRR, no M/R/G   Lymphatics: No edema in extremities  Rash: very faint, erythematous discrete papules on back, upper chest and shins, excoriations present  Neuro: A&O   Additional Findings: Powell site NT, no drainage.    Current aGVHD staging:  Skin 0, UGI 0, LGI 0, Liver 0 (keep in note through day +180 for allos)      LABS AND IMAGING: I have assessed all abnormal lab values for their clinical significance and any values considered clinically significant have been addressed in " the assessment and plan.        Lab Results   Component Value Date    WBC <0.1 (LL) 02/07/2024    ANEUTAUTO 2.0 01/28/2024    HGB 7.3 (L) 02/07/2024    HCT 19.8 (L) 02/07/2024    PLT 52 (L) 02/07/2024     02/07/2024    POTASSIUM 3.0 (L) 02/07/2024    CHLORIDE 108 (H) 02/07/2024    CO2 22 02/07/2024     (H) 02/07/2024    BUN 10.5 02/07/2024    CR 0.77 02/07/2024    MAG 1.7 02/07/2024    INR 1.15 02/05/2024         SYSTEMS-BASED ASSESSMENT AND PLAN     Ziggy Hallman is a 48 year old year old male with a PMH of MDS, hx of multiple clots and MI undergoing a MA (Bu/Flu) prep for an 8/8 URD PBSCT day 13    BMT/IEC PROTOCOL for 2015-29  Chemo Prep:   Day -6: Admit  Day -5 through Day -2: Busulfan/Fludarabine  Day -1: Rest.   Keppra prophylaxis      8/8 DP permissive. Cell dose-9.24x10^6  ABO: Donor: O+ Recipient A-  (Minor incompatability, no flush required)   GSCF plan: GCSF to start day +5 until ANC >1500 for 3 consecutive days    HEME/COAG  #SEGMENTAL R PE (2/6): Low intensity hep gtt with platelet goal <50k with post platelet checks  #Risk of Pancytopenia 2/2 chemo- anemia, leukopenia, thrombocytopenia  - Transfusion parameters: hemoglobin <8 (cardiac hx), platelets <50k (hep gtt)  #Recurrent arterial thromboembolic events:  He has long history of various events including renal infarcts (2011, 2013, 2015), left popliteal embolic episode requiring surgery, anticoagulation (2011), right carotid artery embolic episode with TIA/stroke-like symptoms (2012), embolic MI (2015), gangrenous right toe requiring vascular surgery/amputation (2017), 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   Extensive hypercoagulable workup to date has been unrevealing.  JAK2 testing negative.  PNH testing negative.  Elevated IgA of unknown significance, no evidence of plasma cell disorder.  - Eliquis and Prasugrel now HELD starting 2/1-x.     IMMUNOCOMPROMISED  #Febrile  neutropenia: ddx includes- infx (CXR shows possible bronchitis but he is asymptomatic, CTCAP pending, broadened coverage), drug fevers (switch abx)   -Meropenem (2/6-x), Vanco (2/6-x) Azithro (2/6-2/9)  - Cefepime (2/2-2/6)   -UC, BC negative, KARIUS pending, viral work up (HHV6, adenovirus, CMV, EBV, parvo)  C. Diff - Admit c. Diff triple+ - Start PO vanc 1/21 for 14 day course. - completed on 2/4, with broadening abx low treshold to add PO Vanco ppx.   -Prophylaxis plan: ACV LD, Megan HD, Levaquin while neutropenic , Bactrim +28    RISK OF GVHD  - Prophylaxis: PtC day +3, +4, , Siro/MMF to start day +5     CARDIOVASCULAR  #CAD  #Hx of CABG  #HTN   -PTA metoprolol, Lisinopril dose increased 2/2 HTN, Amlodipine 5mg BID switch to nifidepine ER 30mg BID, labetolol PRN;  #Hyperlipidemia: Holding atorvastatin peritransplant  - Risk of cardiomyopathy:  Baseline EF 50-55%, Global left ventricular function mildly reduced. Grade 1 or early diastolic dysfunction.   - Risk of arrhythmia: Baseline EKG showed NSR, Qtc -425    GI/NUTRITION  #Oropharyngeal mucositis: MMW, hurricaine spray, dilaudid PCA 2/3-x   #Bladder scan rule out urinary retention pending  #Hematuria- No dysuria, self resolved.   - Ulcer prophylaxis: Protonix   - VOD Prophylaxis: Ursodiol  - Risk of nausea/vomiting due to chemo: Ativan, Compazine, Zofran   - Risk of malnutrition: Nutrition to follow.     RENAL/ELECTROLYTES/  #Hypervolemia 2/2 cytoxan flush - diurese as needed lasix 20mg 2/7  #Proteinuria- possibly exacerbated by persistent HTN (See CV),   #BUN/Cr elevation- FENA, osms, Na+, UA work up indicate prerenal etiology, likely intravascular depletion 2/2 osmotic diuresis with proteinuria. Will encourage PO fluid intake as Cr improved 1/23, IVF boluses as necessary, control HTN. RESOLVED.  - Hypocalcemia in setting hypoalbuminemia, iCa2+, corrected WNL   - Electrolyte management: replace per sliding scale    Psych:    #Anxiety: admits to feeling  "anxious.  Start zyprexa 5mg HS.  Connect w/ SW.  Psychaitry consult added hydroxyzine.  #Insomnia- PTA ambien, Trazadone added and increased to 100mg. - Ambien and trazadone held during cytoxan period due to frequent urination and concern for falls. resumed following cytoxan flush 1/31.      SOCIAL DETERMINANTS  - Caregiver: Family   - Financial/insurance concerns: See SW notes       Known issues that I take into account for medical decisions, with salient changes to the plan considering these complexities noted above.    Patient Active Problem List   Diagnosis     Amegakaryocytic thrombocytopenia (H)     Anemia due to bone marrow failure, unspecified bone marrow failure type (H)     Aplastic anemia (H)     Clinically Significant Risk Factors        # Hypokalemia: Lowest K = 3 mmol/L in last 2 days, will replace as needed       # Hypoalbuminemia: Lowest albumin = 2 g/dL at 1/22/2024  4:12 AM, will monitor as appropriate     # Thrombocytopenia: Lowest platelets = 12 in last 2 days, will monitor for bleeding          # Obesity: Estimated body mass index is 30.06 kg/m  as calculated from the following:    Height as of this encounter: 1.725 m (5' 7.91\").    Weight as of this encounter: 89.4 kg (197 lb 3.2 oz).               Dispo: Remain admitted through engraftment    I spent 30 minutes in the care of this patient today, which included time necessary for preparation for the visit, obtaining history, ordering medications/tests/procedures as medically indicated, review of pertinent medical literature, counseling of the patient, communication of recommendations to the care team, and documentation time.    Toyin Herrmann PA-C  [phone]      ______________________________________________      BMT ATTENDING NOTE    Ziggydana Hallman is a 48 year old male, who is day 13 of transplant for MDS.     Subjective:  Fatigued without sleep overnight, pain control, no new infectious signs or symptoms by history.       Vitals reviewed, " labs reviewed  PE:  NAD, oriented x3  HEENT: mucositis, erythema  Heart: RRR  Lungs: CTAB  Abdomen: +BS, soft non tender, non distended, rectal area not examined  LE: 1+ edema  Skin: Faint erythema without obvious rash on shoulders and shins     Assessment and Plan:    1. Heme/BMT:  MDS, here for MAC 8/8 MUD, flu/Bu prep, PTCy/Siro/MMF GVHD prophylaxis, transfusion as needed, history of multiple arterial thrombotic events had been on anticoagulation, 2/6 CT done for fever workup revealed right segmental PE started on low-dose heparin and will maintain platelets above 30,000 to decrease risk of bleeding  2. ID: C. difficile colitis on treatment, neutropenic fevers posttransplant culture negative, currently on vancomycin meropenem and azithromycin switched most recently on 2/6 with decrease in fever trend currently afebrile, pending Karius testing, checking viral PCR's  3. CV: history of hypertension, coronary artery disease, CABG, hyperlipidemia  4. GI/ FEN: Symptom management of regimen related toxicity, encourage fluid PO intake, I/O and daily weights, symptomatic management of hemorrhoids  5. Renal: Maintain euvolemic, hematuria resolved continue to monitor  6. PPx: fabi, ACV  7.  Supportive care: PT,  anxiety management, encourage ambulation, optimize nutrition, avoid sarcopenia, PCA for pain management, other supportive care as detailed above    I spent 50 minutes in the care of Ziggy today, including an independent face-to-face assessment and time monitoring for restoration of hematopoiesis, high-risk organ toxicities from chemotherapy, and GVHD, managing infectious risk due to his immunocompromised state, and encouraging nutrition and physical activity to prevent debility and sarcopenia.  I personally performed all of the medical decision making associated with this visit, counseled Ziggy on my overall assessment and recommendations, communicated my plan to the care team, and edited the above note to reflect  my current plan of care.     William Lagos MD

## 2024-02-07 NOTE — PROVIDER NOTIFICATION
Notified MD Tobin of holding hep gtt due to platelets less than 30, updated we will replace the platelets and asked if the MD wants a post platelet count to see if we should restart the drip, waiting for the MD to respond

## 2024-02-07 NOTE — PLAN OF CARE
Tmax: 103.0 Tylenol was given, blood cultures was taken from CVC and port.    Mucositis noted on PCA dilaudid.     Lactic Acid was drawn results of 0.6. UA done.    1 unit PRBC and 1 unit platelets given. Potassium and phos replacements given.    Heparin drip stopped this Am due to low platelets, platelets replaced. Need an order to recheck the platelets. Heparin level was not therapeutic, so when the  drip is restarted we will need to increase by 150 per protocol heparin needs to be raised to 1200.    Problem: Adult Inpatient Plan of Care  Goal: Plan of Care Review  Description: The Plan of Care Review/Shift note should be completed every shift.  The Outcome Evaluation is a brief statement about your assessment that the patient is improving, declining, or no change.  This information will be displayed automatically on your shift  note.  Outcome: Progressing  Goal: Patient-Specific Goal (Individualized)  Outcome: Progressing  Goal: Absence of Hospital-Acquired Illness or Injury  Outcome: Progressing  Intervention: Identify and Manage Fall Risk  Recent Flowsheet Documentation  Taken 2/7/2024 0200 by Elvira Vasquez RN  Safety Promotion/Fall Prevention: safety round/check completed  Taken 2/6/2024 2000 by Elvira Vasquez RN  Safety Promotion/Fall Prevention: safety round/check completed  Intervention: Prevent Infection  Recent Flowsheet Documentation  Taken 2/6/2024 2000 by Elvira Vasquez RN  Infection Prevention:   environmental surveillance performed   rest/sleep promoted   personal protective equipment utilized  Goal: Optimal Comfort and Wellbeing  Outcome: Progressing  Goal: Readiness for Transition of Care  Outcome: Progressing     Problem: Stem Cell/Bone Marrow Transplant  Goal: Optimal Coping with Transplant  Outcome: Progressing  Intervention: Optimize Patient/Family Adjustment to Transplant  Recent Flowsheet Documentation  Taken 2/6/2024 2000 by Elvira Vasquez RN  Supportive  Measures: active listening utilized  Goal: Symptom-Free Urinary Elimination  Outcome: Progressing  Intervention: Prevent or Manage Bladder Irritation  Recent Flowsheet Documentation  Taken 2/6/2024 2000 by Elvira Vasquez RN  Urinary Elimination Promotion: frequent voiding encouraged  Hyperhydration Management: fluids provided  Goal: Diarrhea Symptom Control  Outcome: Progressing  Intervention: Manage Diarrhea  Recent Flowsheet Documentation  Taken 2/6/2024 2300 by Elvira Vasquez RN  Fluid/Electrolyte Management: fluids provided  Taken 2/6/2024 2000 by Elvira Vasquez RN  Fluid/Electrolyte Management: fluids provided  Goal: Improved Activity Tolerance  Outcome: Progressing  Intervention: Promote Improved Energy  Recent Flowsheet Documentation  Taken 2/7/2024 0200 by Elvira Vasquez RN  Sleep/Rest Enhancement: awakenings minimized  Taken 2/6/2024 2300 by Elvira Vasquez RN  Sleep/Rest Enhancement: awakenings minimized  Taken 2/6/2024 2000 by Elvira Vasquez RN  Sleep/Rest Enhancement: awakenings minimized  Environmental Support: calm environment promoted  Goal: Blood Counts Within Acceptable Range  Outcome: Progressing  Intervention: Monitor and Manage Hematologic Symptoms  Recent Flowsheet Documentation  Taken 2/7/2024 0200 by Elvira Vasquez RN  Sleep/Rest Enhancement: awakenings minimized  Taken 2/6/2024 2300 by Elvira Vasquez RN  Sleep/Rest Enhancement: awakenings minimized  Taken 2/6/2024 2000 by Elvira Vasquez RN  Sleep/Rest Enhancement: awakenings minimized  Bleeding Precautions:   gentle oral care promoted   monitored for signs of bleeding  Medication Review/Management: medications reviewed  Goal: Absence of Hypersensitivity Reaction  Outcome: Progressing  Goal: Absence of Infection  Outcome: Progressing  Intervention: Prevent and Manage Infection  Recent Flowsheet Documentation  Taken 2/6/2024 2000 by Elvira Vasquez RN  Infection Prevention:    environmental surveillance performed   rest/sleep promoted   personal protective equipment utilized  Infection Management: aseptic technique maintained  Isolation Precautions:   enteric precautions maintained   protective environment maintained  Goal: Improved Oral Mucous Membrane Health and Integrity  Outcome: Progressing  Intervention: Promote Oral Comfort and Health  Recent Flowsheet Documentation  Taken 2/6/2024 2000 by Elvira Vasquez RN  Oral Mucous Membrane Protection: nonirritating oral fluids promoted  Oral Care: (hurricane spray applied) other (see comments)  Goal: Nausea and Vomiting Symptom Relief  Outcome: Progressing  Goal: Optimal Nutrition Intake  Outcome: Progressing  Intervention: Minimize and Manage Barriers to Oral Intake  Recent Flowsheet Documentation  Taken 2/6/2024 2000 by Elvira Vasquez RN  Oral Nutrition Promotion: rest periods promoted   Goal Outcome Evaluation:

## 2024-02-07 NOTE — PLAN OF CARE
"  Tmax 100.8 with mild rigors, tachycardic heart rate 100-120s. Heart rate of 135 noted when patient was up to bathroom and was febrile notified Toyin Ocampo PA-C, other vital signs stable.     Mucositis pain- using dilaudid PCA, hurricane spray and tried lidocaine solution. Tolerating pills, had one ensure.     Heparin drip at 1,200 unit/hr, Heparin 10A level 0.26 which is therapeutic- no change in rate. Recheck heparin 10A level at 2130.    Sirolimus level 13.8, order to hold sirolimus tomorrow morning.     Had two episodes of diarrhea. Reports rectal pain related to hemorrhoid. Using soft wipes,  basa spray and barrier cream.    20mg lasix given.  Patient often forgets to save his urine. Reminded patient to save urine so we can monitor his fluid status.    Potassium replaced for level of 3.4, potassium recheck was 3.5. Recheck potassium with morning labs.    Phosphorus replaced for level of 1. Recheck was 3.       Problem: Adult Inpatient Plan of Care  Goal: Plan of Care Review  Description: The Plan of Care Review/Shift note should be completed every shift.  The Outcome Evaluation is a brief statement about your assessment that the patient is improving, declining, or no change.  This information will be displayed automatically on your shift  note.  Outcome: Progressing  Goal: Patient-Specific Goal (Individualized)  Description: You can add care plan individualizations to a care plan. Examples of Individualization might be:  \"Parent requests to be called daily at 9am for status\", \"I have a hard time hearing out of my right ear\", or \"Do not touch me to wake me up as it startles  me\".  Outcome: Progressing  Goal: Absence of Hospital-Acquired Illness or Injury  Outcome: Progressing  Goal: Optimal Comfort and Wellbeing  Outcome: Progressing  Goal: Readiness for Transition of Care  Outcome: Progressing     "

## 2024-02-07 NOTE — PROVIDER NOTIFICATION
Notified MD Tobin of patient's temp of 103.0. Blood cultures done on CVC and port. Tylenol was given.

## 2024-02-08 ENCOUNTER — TELEPHONE (OUTPATIENT)
Dept: TRANSPLANT | Facility: CLINIC | Age: 49
End: 2024-02-08
Payer: COMMERCIAL

## 2024-02-08 LAB
ABO/RH(D): NORMAL
ACANTHOCYTES BLD QL SMEAR: NORMAL
ALBUMIN SERPL BCG-MCNC: 2.6 G/DL (ref 3.5–5.2)
ALP SERPL-CCNC: 63 U/L (ref 40–150)
ALT SERPL W P-5'-P-CCNC: 15 U/L (ref 0–70)
ANION GAP SERPL CALCULATED.3IONS-SCNC: 12 MMOL/L (ref 7–15)
ANTIBODY SCREEN: NEGATIVE
AST SERPL W P-5'-P-CCNC: 27 U/L (ref 0–45)
AUER BODIES BLD QL SMEAR: NORMAL
B2 GLYCOPROT1 IGG SERPL IA-ACNC: 1.7 U/ML
B2 GLYCOPROT1 IGM SERPL IA-ACNC: <2.4 U/ML
BASO STIPL BLD QL SMEAR: NORMAL
BASOPHILS # BLD AUTO: ABNORMAL 10*3/UL
BASOPHILS NFR BLD AUTO: ABNORMAL %
BILIRUB SERPL-MCNC: 0.4 MG/DL
BITE CELLS BLD QL SMEAR: NORMAL
BLD PROD TYP BPU: NORMAL
BLD PROD TYP BPU: NORMAL
BLISTER CELLS BLD QL SMEAR: NORMAL
BLOOD COMPONENT TYPE: NORMAL
BLOOD COMPONENT TYPE: NORMAL
BUN SERPL-MCNC: 12.9 MG/DL (ref 6–20)
BURR CELLS BLD QL SMEAR: NORMAL
CALCIUM SERPL-MCNC: 8 MG/DL (ref 8.6–10)
CARDIOLIPIN IGG SER IA-ACNC: <2 GPL-U/ML
CARDIOLIPIN IGG SER IA-ACNC: NEGATIVE
CARDIOLIPIN IGM SER IA-ACNC: <2 MPL-U/ML
CARDIOLIPIN IGM SER IA-ACNC: NEGATIVE
CHLORIDE SERPL-SCNC: 109 MMOL/L (ref 98–107)
CODING SYSTEM: NORMAL
CODING SYSTEM: NORMAL
CREAT SERPL-MCNC: 0.89 MG/DL (ref 0.67–1.17)
CROSSMATCH: NORMAL
DACRYOCYTES BLD QL SMEAR: NORMAL
DEPRECATED HCO3 PLAS-SCNC: 21 MMOL/L (ref 22–29)
EBV DNA # SPEC NAA+PROBE: NOT DETECTED COPIES/ML
EGFRCR SERPLBLD CKD-EPI 2021: >90 ML/MIN/1.73M2
ELLIPTOCYTES BLD QL SMEAR: NORMAL
EOSINOPHIL # BLD AUTO: ABNORMAL 10*3/UL
EOSINOPHIL NFR BLD AUTO: ABNORMAL %
ERYTHROCYTE [DISTWIDTH] IN BLOOD BY AUTOMATED COUNT: 12.8 % (ref 10–15)
FRAGMENTS BLD QL SMEAR: NORMAL
GLUCOSE SERPL-MCNC: 99 MG/DL (ref 70–99)
HCT VFR BLD AUTO: 22 % (ref 40–53)
HGB BLD-MCNC: 7.9 G/DL (ref 13.3–17.7)
HGB C CRYSTALS: NORMAL
HOWELL-JOLLY BOD BLD QL SMEAR: NORMAL
IMM GRANULOCYTES # BLD: ABNORMAL 10*3/UL
IMM GRANULOCYTES NFR BLD: ABNORMAL %
ISSUE DATE AND TIME: NORMAL
ISSUE DATE AND TIME: NORMAL
LYMPHOCYTES # BLD AUTO: ABNORMAL 10*3/UL
LYMPHOCYTES NFR BLD AUTO: ABNORMAL %
MAGNESIUM SERPL-MCNC: 1.6 MG/DL (ref 1.7–2.3)
MCH RBC QN AUTO: 31.6 PG (ref 26.5–33)
MCHC RBC AUTO-ENTMCNC: 35.9 G/DL (ref 31.5–36.5)
MCV RBC AUTO: 88 FL (ref 78–100)
MONOCYTES # BLD AUTO: ABNORMAL 10*3/UL
MONOCYTES NFR BLD AUTO: ABNORMAL %
NEUTROPHILS # BLD AUTO: ABNORMAL 10*3/UL
NEUTROPHILS NFR BLD AUTO: ABNORMAL %
NEUTS HYPERSEG BLD QL SMEAR: NORMAL
PHOSPHATE SERPL-MCNC: 2.1 MG/DL (ref 2.5–4.5)
PLAT MORPH BLD: NORMAL
PLATELET # BLD AUTO: 49 10E3/UL (ref 150–450)
PLATELET # BLD AUTO: 62 10E3/UL (ref 150–450)
POLYCHROMASIA BLD QL SMEAR: NORMAL
POTASSIUM SERPL-SCNC: 3.2 MMOL/L (ref 3.4–5.3)
POTASSIUM SERPL-SCNC: 3.3 MMOL/L (ref 3.4–5.3)
POTASSIUM SERPL-SCNC: 3.5 MMOL/L (ref 3.4–5.3)
PROT SERPL-MCNC: 6.1 G/DL (ref 6.4–8.3)
RBC # BLD AUTO: 2.5 10E6/UL (ref 4.4–5.9)
RBC AGGLUT BLD QL: NORMAL
RBC MORPH BLD: NORMAL
ROULEAUX BLD QL SMEAR: NORMAL
SICKLE CELLS BLD QL SMEAR: NORMAL
SIROLIMUS BLD-MCNC: 16.7 UG/L (ref 5–15)
SMUDGE CELLS BLD QL SMEAR: NORMAL
SODIUM SERPL-SCNC: 142 MMOL/L (ref 135–145)
SPECIMEN EXPIRATION DATE: NORMAL
SPHEROCYTES BLD QL SMEAR: NORMAL
STOMATOCYTES BLD QL SMEAR: NORMAL
TARGETS BLD QL SMEAR: NORMAL
TME LAST DOSE: ABNORMAL H
TME LAST DOSE: ABNORMAL H
TOXIC GRANULES BLD QL SMEAR: NORMAL
UFH PPP CHRO-ACNC: 0.31 IU/ML
UNIT ABO/RH: NORMAL
UNIT ABO/RH: NORMAL
UNIT NUMBER: NORMAL
UNIT NUMBER: NORMAL
UNIT STATUS: NORMAL
UNIT STATUS: NORMAL
UNIT TYPE ISBT: 6200
UNIT TYPE ISBT: 9500
VANCOMYCIN SERPL-MCNC: 12.2 UG/ML
VARIANT LYMPHS BLD QL SMEAR: NORMAL
WBC # BLD AUTO: 0.1 10E3/UL (ref 4–11)

## 2024-02-08 PROCEDURE — 258N000003 HC RX IP 258 OP 636: Performed by: STUDENT IN AN ORGANIZED HEALTH CARE EDUCATION/TRAINING PROGRAM

## 2024-02-08 PROCEDURE — 258N000003 HC RX IP 258 OP 636: Performed by: INTERNAL MEDICINE

## 2024-02-08 PROCEDURE — 250N000013 HC RX MED GY IP 250 OP 250 PS 637

## 2024-02-08 PROCEDURE — 250N000011 HC RX IP 250 OP 636: Performed by: PHYSICIAN ASSISTANT

## 2024-02-08 PROCEDURE — 84100 ASSAY OF PHOSPHORUS: CPT

## 2024-02-08 PROCEDURE — 250N000011 HC RX IP 250 OP 636: Performed by: INTERNAL MEDICINE

## 2024-02-08 PROCEDURE — 258N000003 HC RX IP 258 OP 636: Performed by: PHYSICIAN ASSISTANT

## 2024-02-08 PROCEDURE — 83735 ASSAY OF MAGNESIUM: CPT | Performed by: INTERNAL MEDICINE

## 2024-02-08 PROCEDURE — 250N000009 HC RX 250

## 2024-02-08 PROCEDURE — 250N000009 HC RX 250: Performed by: INTERNAL MEDICINE

## 2024-02-08 PROCEDURE — P9040 RBC LEUKOREDUCED IRRADIATED: HCPCS | Performed by: PHYSICIAN ASSISTANT

## 2024-02-08 PROCEDURE — 85049 AUTOMATED PLATELET COUNT: CPT

## 2024-02-08 PROCEDURE — 99233 SBSQ HOSP IP/OBS HIGH 50: CPT | Mod: FS

## 2024-02-08 PROCEDURE — 85027 COMPLETE CBC AUTOMATED: CPT | Performed by: PHYSICIAN ASSISTANT

## 2024-02-08 PROCEDURE — 250N000011 HC RX IP 250 OP 636: Mod: JZ

## 2024-02-08 PROCEDURE — 86923 COMPATIBILITY TEST ELECTRIC: CPT | Performed by: PHYSICIAN ASSISTANT

## 2024-02-08 PROCEDURE — 86900 BLOOD TYPING SEROLOGIC ABO: CPT | Performed by: PHYSICIAN ASSISTANT

## 2024-02-08 PROCEDURE — 80202 ASSAY OF VANCOMYCIN: CPT | Performed by: INTERNAL MEDICINE

## 2024-02-08 PROCEDURE — 85520 HEPARIN ASSAY: CPT | Performed by: INTERNAL MEDICINE

## 2024-02-08 PROCEDURE — 250N000013 HC RX MED GY IP 250 OP 250 PS 637: Performed by: PHYSICIAN ASSISTANT

## 2024-02-08 PROCEDURE — 84132 ASSAY OF SERUM POTASSIUM: CPT | Performed by: INTERNAL MEDICINE

## 2024-02-08 PROCEDURE — 99418 PROLNG IP/OBS E/M EA 15 MIN: CPT

## 2024-02-08 PROCEDURE — P9037 PLATE PHERES LEUKOREDU IRRAD: HCPCS

## 2024-02-08 PROCEDURE — 250N000013 HC RX MED GY IP 250 OP 250 PS 637: Performed by: HOSPITALIST

## 2024-02-08 PROCEDURE — 80195 ASSAY OF SIROLIMUS: CPT | Performed by: INTERNAL MEDICINE

## 2024-02-08 PROCEDURE — 250N000013 HC RX MED GY IP 250 OP 250 PS 637: Performed by: STUDENT IN AN ORGANIZED HEALTH CARE EDUCATION/TRAINING PROGRAM

## 2024-02-08 PROCEDURE — 206N000001 HC R&B BMT UMMC

## 2024-02-08 PROCEDURE — 80053 COMPREHEN METABOLIC PANEL: CPT | Performed by: PHYSICIAN ASSISTANT

## 2024-02-08 RX ORDER — POTASSIUM CHLORIDE 29.8 MG/ML
20 INJECTION INTRAVENOUS
Status: COMPLETED | OUTPATIENT
Start: 2024-02-08 | End: 2024-02-08

## 2024-02-08 RX ORDER — POTASSIUM PHOS IN 0.9 % NACL 15MMOL/250
15 PLASTIC BAG, INJECTION (ML) INTRAVENOUS ONCE
Status: COMPLETED | OUTPATIENT
Start: 2024-02-08 | End: 2024-02-08

## 2024-02-08 RX ORDER — FUROSEMIDE 10 MG/ML
20 INJECTION INTRAMUSCULAR; INTRAVENOUS ONCE
Qty: 2 ML | Refills: 0 | Status: COMPLETED | OUTPATIENT
Start: 2024-02-08 | End: 2024-02-08

## 2024-02-08 RX ORDER — VANCOMYCIN HYDROCHLORIDE 125 MG/1
125 CAPSULE ORAL 4 TIMES DAILY
Qty: 40 CAPSULE | Refills: 0 | Status: COMPLETED | OUTPATIENT
Start: 2024-02-08 | End: 2024-02-17

## 2024-02-08 RX ADMIN — MEROPENEM 1 G: 1 INJECTION, POWDER, FOR SOLUTION INTRAVENOUS at 20:12

## 2024-02-08 RX ADMIN — POTASSIUM PHOSPHATE, MONOBASIC POTASSIUM PHOSPHATE, DIBASIC 15 MMOL: 224; 236 INJECTION, SOLUTION, CONCENTRATE INTRAVENOUS at 06:24

## 2024-02-08 RX ADMIN — MEROPENEM 1 G: 1 INJECTION, POWDER, FOR SOLUTION INTRAVENOUS at 11:41

## 2024-02-08 RX ADMIN — POTASSIUM CHLORIDE 20 MEQ: 29.8 INJECTION, SOLUTION INTRAVENOUS at 14:05

## 2024-02-08 RX ADMIN — VANCOMYCIN HYDROCHLORIDE 1250 MG: 10 INJECTION, POWDER, LYOPHILIZED, FOR SOLUTION INTRAVENOUS at 10:50

## 2024-02-08 RX ADMIN — HEPARIN SODIUM 1200 UNITS/HR: 10000 INJECTION, SOLUTION INTRAVENOUS at 16:43

## 2024-02-08 RX ADMIN — HYDROXYZINE HYDROCHLORIDE 25 MG: 25 TABLET, FILM COATED ORAL at 16:03

## 2024-02-08 RX ADMIN — FUROSEMIDE 20 MG: 10 INJECTION, SOLUTION INTRAMUSCULAR; INTRAVENOUS at 09:41

## 2024-02-08 RX ADMIN — MICAFUNGIN SODIUM 150 MG: 50 INJECTION, POWDER, LYOPHILIZED, FOR SOLUTION INTRAVENOUS at 08:23

## 2024-02-08 RX ADMIN — TRAMADOL HYDROCHLORIDE 25 MG: 50 TABLET, COATED ORAL at 18:32

## 2024-02-08 RX ADMIN — URSODIOL 300 MG: 300 CAPSULE ORAL at 08:17

## 2024-02-08 RX ADMIN — ACYCLOVIR 800 MG: 800 TABLET ORAL at 20:11

## 2024-02-08 RX ADMIN — ACETAMINOPHEN 650 MG: 325 TABLET, FILM COATED ORAL at 00:34

## 2024-02-08 RX ADMIN — VANCOMYCIN HYDROCHLORIDE 1250 MG: 10 INJECTION, POWDER, LYOPHILIZED, FOR SOLUTION INTRAVENOUS at 21:42

## 2024-02-08 RX ADMIN — VANCOMYCIN HYDROCHLORIDE 125 MG: 125 CAPSULE ORAL at 11:41

## 2024-02-08 RX ADMIN — VANCOMYCIN HYDROCHLORIDE 125 MG: 125 CAPSULE ORAL at 15:16

## 2024-02-08 RX ADMIN — URSODIOL 300 MG: 300 CAPSULE ORAL at 13:01

## 2024-02-08 RX ADMIN — Medication 2 SPRAY: at 11:14

## 2024-02-08 RX ADMIN — Medication: at 09:43

## 2024-02-08 RX ADMIN — NIFEDIPINE 30 MG: 30 TABLET, FILM COATED, EXTENDED RELEASE ORAL at 08:17

## 2024-02-08 RX ADMIN — LISINOPRIL 40 MG: 10 TABLET ORAL at 08:18

## 2024-02-08 RX ADMIN — BENZOCAINE 1 ML: 220 SPRAY, METERED PERIODONTAL at 11:07

## 2024-02-08 RX ADMIN — DEXTROSE MONOHYDRATE 10 ML: 50 INJECTION, SOLUTION INTRAVENOUS at 20:11

## 2024-02-08 RX ADMIN — PANTOPRAZOLE SODIUM 40 MG: 40 TABLET, DELAYED RELEASE ORAL at 08:18

## 2024-02-08 RX ADMIN — AZITHROMYCIN 500 MG: 250 TABLET, FILM COATED ORAL at 11:41

## 2024-02-08 RX ADMIN — POTASSIUM CHLORIDE 20 MEQ: 29.8 INJECTION, SOLUTION INTRAVENOUS at 12:59

## 2024-02-08 RX ADMIN — SODIUM CHLORIDE, POTASSIUM CHLORIDE, SODIUM LACTATE AND CALCIUM CHLORIDE 500 ML: 600; 310; 30; 20 INJECTION, SOLUTION INTRAVENOUS at 00:31

## 2024-02-08 RX ADMIN — DEXTROSE MONOHYDRATE 450 MCG: 50 INJECTION, SOLUTION INTRAVENOUS at 20:11

## 2024-02-08 RX ADMIN — LOPERAMIDE HYDROCHLORIDE 4 MG: 2 CAPSULE ORAL at 23:01

## 2024-02-08 RX ADMIN — URSODIOL 300 MG: 300 CAPSULE ORAL at 20:11

## 2024-02-08 RX ADMIN — Medication 2 SPRAY: at 08:39

## 2024-02-08 RX ADMIN — LOPERAMIDE HYDROCHLORIDE 4 MG: 2 CAPSULE ORAL at 08:18

## 2024-02-08 RX ADMIN — MYCOPHENOLATE MOFETIL 1250 MG: 500 INJECTION, POWDER, LYOPHILIZED, FOR SOLUTION INTRAVENOUS at 10:04

## 2024-02-08 RX ADMIN — Medication 5 CAPSULE: at 08:18

## 2024-02-08 RX ADMIN — VANCOMYCIN HYDROCHLORIDE 125 MG: 125 CAPSULE ORAL at 09:45

## 2024-02-08 RX ADMIN — LOPERAMIDE HYDROCHLORIDE 4 MG: 2 CAPSULE ORAL at 01:06

## 2024-02-08 RX ADMIN — METOPROLOL SUCCINATE 100 MG: 50 TABLET, EXTENDED RELEASE ORAL at 08:18

## 2024-02-08 RX ADMIN — BENZOCAINE 1 ML: 220 SPRAY, METERED PERIODONTAL at 18:35

## 2024-02-08 RX ADMIN — POTASSIUM CHLORIDE 20 MEQ: 29.8 INJECTION, SOLUTION INTRAVENOUS at 04:42

## 2024-02-08 RX ADMIN — Medication 2 SPRAY: at 20:12

## 2024-02-08 RX ADMIN — MYCOPHENOLATE MOFETIL 1250 MG: 500 INJECTION, POWDER, LYOPHILIZED, FOR SOLUTION INTRAVENOUS at 21:42

## 2024-02-08 RX ADMIN — Medication 2 SPRAY: at 15:16

## 2024-02-08 RX ADMIN — BENZOCAINE 0.5 ML: 220 SPRAY, METERED PERIODONTAL at 21:50

## 2024-02-08 RX ADMIN — LIDOCAINE HYDROCHLORIDE 5 ML: 20 SOLUTION OROPHARYNGEAL at 03:33

## 2024-02-08 RX ADMIN — MEROPENEM 1 G: 1 INJECTION, POWDER, FOR SOLUTION INTRAVENOUS at 03:25

## 2024-02-08 RX ADMIN — NIFEDIPINE 30 MG: 30 TABLET, FILM COATED, EXTENDED RELEASE ORAL at 20:11

## 2024-02-08 RX ADMIN — ACYCLOVIR 800 MG: 800 TABLET ORAL at 08:18

## 2024-02-08 RX ADMIN — Medication 10 ML: at 17:07

## 2024-02-08 RX ADMIN — Medication 5 MG: at 23:02

## 2024-02-08 RX ADMIN — POTASSIUM CHLORIDE 20 MEQ: 29.8 INJECTION, SOLUTION INTRAVENOUS at 06:24

## 2024-02-08 RX ADMIN — VANCOMYCIN HYDROCHLORIDE 125 MG: 125 CAPSULE ORAL at 20:11

## 2024-02-08 RX ADMIN — BENZOCAINE 1 ML: 220 SPRAY, METERED PERIODONTAL at 01:07

## 2024-02-08 RX ADMIN — Medication: at 10:46

## 2024-02-08 ASSESSMENT — ACTIVITIES OF DAILY LIVING (ADL)
ADLS_ACUITY_SCORE: 23
ADLS_ACUITY_SCORE: 25
ADLS_ACUITY_SCORE: 23
ADLS_ACUITY_SCORE: 25
ADLS_ACUITY_SCORE: 23
ADLS_ACUITY_SCORE: 23
ADLS_ACUITY_SCORE: 25
ADLS_ACUITY_SCORE: 25
ADLS_ACUITY_SCORE: 23

## 2024-02-08 NOTE — PROGRESS NOTES
CLINICAL NUTRITION SERVICES    RN Consult: Nutrition brittany less than 3.    RD is already following pt and will continue to follow per protocol. Please see most recent progress note dated 2/6/24 for details.     Liliane Odom RD, LD  5C/BMT pager: [phone]

## 2024-02-08 NOTE — TELEPHONE ENCOUNTER
----- Message from Sheba Hatfield RN sent at 1/25/2024  9:19 AM CST -----  Regarding: Day 0 BAN Notification  Hey,    This patient is ready to schedule for BAN visits.    LT RNCC: Kandy  MD: Jordi Masx: In clinic  Weekly lab day preference:  TBD    Clonoseq: No    Restage per protocol. Orders are scanned into the media tab.  For allos only: please schedule with primary MD at D28, D60, D100, and D180.    Thanks!    Sheba Hatfield RN, MSN  BMT Nurse Coordinator  Phone: [phone]

## 2024-02-08 NOTE — PROGRESS NOTES
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 He was tachy to 135bpm overnight and was given a 500mL bolus overnight. Weight is up trending and labs indicate pt is likely 3rd spacing- will continue to diurese. He feels awful as he has not slept for two nights just due to discomfort, he wants to increase his pain medications today. He is not feeling sedated and notes he would not be napping as much throughout the day, he is able to get up and move around steadily. New worsening frequent loose stools overnight q15 mins. He does not have belly pain.     Review of Systems    Review of Systems: 10 point ROS negative unless stated in HPI   PHYSICAL EXAM      Weight     Wt Readings from Last 3 Encounters:   02/08/24 90.2 kg (198 lb 14.4 oz)   01/17/24 90.1 kg (198 lb 9.6 oz)   01/12/24 88.9 kg (196 lb)        KPS: 60    /67   Pulse 107   Temp 98.8  F (37.1  C) (Axillary)   Resp 16   Ht 1.725 m (5' 7.91\")   Wt 90.2 kg (198 lb 14.4 oz)   SpO2 95%   BMI 30.32 kg/m       General: NAD  Mouth/Throat: Multiple scattered erythematous ulcers on bilateral posterior buccal mucosa, posterior pharynx diffusely erythematous   Eyes: GARY, sclera anicteric   Lungs: decreased lung sounds in bilateral bases  Cardiovascular: RRR, no M/R/G   Lymphatics: No edema in extremities  Rash: very faint, erythematous discrete papules on back, upper chest and shins, excoriations " present  Neuro: A&O   Additional Findings: Powell site NT, no drainage.    Current aGVHD staging:  Skin 0, UGI 0, LGI 0, Liver 0 (keep in note through day +180 for allos)      LABS AND IMAGING: I have assessed all abnormal lab values for their clinical significance and any values considered clinically significant have been addressed in the assessment and plan.        Lab Results   Component Value Date    WBC 0.1 (LL) 02/08/2024    ANEUTAUTO 2.0 01/28/2024    HGB 7.9 (L) 02/08/2024    HCT 22.0 (L) 02/08/2024    PLT 62 (L) 02/08/2024     02/08/2024    POTASSIUM 3.2 (L) 02/08/2024    CHLORIDE 109 (H) 02/08/2024    CO2 21 (L) 02/08/2024    GLC 99 02/08/2024    BUN 12.9 02/08/2024    CR 0.89 02/08/2024    MAG 1.6 (L) 02/08/2024    INR 1.14 02/06/2024         SYSTEMS-BASED ASSESSMENT AND PLAN     Ziggy Hallman is a 48 year old year old male with a PMH of MDS, hx of multiple clots and MI undergoing a MA (Bu/Flu) prep for an 8/8 URD PBSCT day 14    BMT/IEC PROTOCOL for 2015-29  Chemo Prep:   Day -6: Admit  Day -5 through Day -2: Busulfan/Fludarabine  Day -1: Rest.   Keppra prophylaxis      8/8 DP permissive. Cell dose-9.24x10^6  ABO: Donor: O+ Recipient A-  (Minor incompatability, no flush required)   GSCF plan: GCSF to start day +5 until ANC >1500 for 3 consecutive days    HEME/COAG  #SEGMENTAL R PE (2/6): Low intensity hep gtt with platelet goal <50k with post platelet checks  #APS work up- lupus antigen +, will follow outpt as may be unreliable in this acute setting.  #Pancytopenia 2/2 chemo- anemia, leukopenia, thrombocytopenia  - Transfusion parameters: hemoglobin <8 (cardiac hx), platelets <50k (hep gtt)  #Recurrent arterial thromboembolic events:  He has long history of various events including renal infarcts (2011, 2013, 2015), left popliteal embolic episode requiring surgery, anticoagulation (2011), right carotid artery embolic episode with TIA/stroke-like symptoms (2012), embolic MI (2015), gangrenous  right toe requiring vascular surgery/amputation (2017), 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   Extensive hypercoagulable workup to date has been unrevealing.  JAK2 testing negative.  PNH testing negative.  Elevated IgA of unknown significance, no evidence of plasma cell disorder.  - Eliquis and Prasugrel now HELD starting 2/1-x.     IMMUNOCOMPROMISED  #Febrile neutropenia, fever curve improving: ddx includes- infx (CXR shows possible bronchitis but he is asymptomatic, CTCAP unremarkable to explain infx, broadened coverage), drug fevers (switch abx)   -Meropenem (2/6-x), Vanco (2/6-x) Azithro (2/6-2/9)  - Cefepime (2/2-2/6)   -UC, BC negative, KARIUS pending, viral work up pending (HHV6, adenovirus, CMV, EBV, parvo)  C. Diff - Admit c. Diff triple+ - Start PO vanc 1/21 for 14 day course. - completed on 2/4, with broadening abx restart PO Vanco tx dose x 10 days.  -Prophylaxis plan: ACV LD, Megan HD, Levaquin while neutropenic , Bactrim +28    RISK OF GVHD  - Prophylaxis: PtC day +3, +4, , Siro/MMF to start day +5, Siro level high HOLD and recheck.    CARDIOVASCULAR  #CAD  #Hx of CABG  #HTN   -PTA metoprolol, Lisinopril dose increased 2/2 HTN,  nifidepine ER 30mg BID, labetolol PRN;  #Hyperlipidemia: Holding atorvastatin peritransplant  - Risk of cardiomyopathy:  Baseline EF 50-55%, Global left ventricular function mildly reduced. Grade 1 or early diastolic dysfunction.   - Risk of arrhythmia: Baseline EKG showed NSR, Qtc -425    GI/NUTRITION  #Oropharyngeal mucositis: MMW, hurricaine spray, dilaudid PCA 2/3-x (0.1 continuous, 0.3q10, added Tramdol q6 25mg)  #Bladder scan indicates no retention  #Hematuria- No dysuria, self resolved.   - Ulcer prophylaxis: Protonix   - VOD Prophylaxis: Ursodiol  - Risk of nausea/vomiting due to chemo: Ativan, Compazine, Zofran   - Moderate malnutrition 2/2 acute on chronic illness- dietician to  "follow    RENAL/ELECTROLYTES/  #Hypervolemia 2/2 cytoxan flush - diurese as needed lasix 20mg 2/7  #Proteinuria- possibly exacerbated by persistent HTN (See CV),   #BUN/Cr elevation- FENA, osms, Na+, UA work up indicate prerenal etiology, likely intravascular depletion 2/2 osmotic diuresis with proteinuria. Will encourage PO fluid intake as Cr improved 1/23, IVF boluses as necessary, control HTN. RESOLVED.  - Hypocalcemia in setting hypoalbuminemia, iCa2+, corrected WNL   - Electrolyte management: replace per sliding scale    Psych:    #Anxiety: admits to feeling anxious.  Start zyprexa 5mg HS.  Connect w/ SW.  Psychaitry consult added hydroxyzine.  #Insomnia- PTA ambien, Trazadone added and increased to 100mg. - Ambien and trazadone held during cytoxan period due to frequent urination and concern for falls. resumed following cytoxan flush 1/31.      SOCIAL DETERMINANTS  - Caregiver: Family   - Financial/insurance concerns: See SW notes       Known issues that I take into account for medical decisions, with salient changes to the plan considering these complexities noted above.    Patient Active Problem List   Diagnosis    Amegakaryocytic thrombocytopenia (H)    Anemia due to bone marrow failure, unspecified bone marrow failure type (H)    Aplastic anemia (H)     Clinically Significant Risk Factors        # Hypokalemia: Lowest K = 3 mmol/L in last 2 days, will replace as needed     # Hypomagnesemia: Lowest Mg = 1.6 mg/dL in last 2 days, will replace as needed   # Hypoalbuminemia: Lowest albumin = 2 g/dL at 1/22/2024  4:12 AM, will monitor as appropriate     # Thrombocytopenia: Lowest platelets = 28 in last 2 days, will monitor for bleeding          # Obesity: Estimated body mass index is 30.32 kg/m  as calculated from the following:    Height as of this encounter: 1.725 m (5' 7.91\").    Weight as of this encounter: 90.2 kg (198 lb 14.4 oz).               Dispo: Remain admitted through engraftment    I spent 30 " minutes in the care of this patient today, which included time necessary for preparation for the visit, obtaining history, ordering medications/tests/procedures as medically indicated, review of pertinent medical literature, counseling of the patient, communication of recommendations to the care team, and documentation time.    Toyin Herrmann PA-C  [phone]      ______________________________________________      BMT ATTENDING NOTE    Ziggy Hallman is a 48 year old male, who is day 14 of transplant for MDS.     Subjective:  Ongoing fatigue and decreased sleep overnight, pain not optimally controlled, no new infectious signs or symptoms by history.       Vitals reviewed, labs reviewed  PE:  NAD, oriented x3  HEENT: mucositis, erythema  Heart: RRR  Lungs: CTAB  Abdomen: +BS, soft non tender, non distended, rectal area not examined  LE: 1+ edema  Skin: no rash     Assessment and Plan:    1. Heme/BMT:  MDS, here for MAC 8/8 MUD, flu/Bu prep, PTCy/Siro/MMF GVHD prophylaxis, transfusion as needed, history of multiple arterial thrombotic events had been on anticoagulation, 2/6 CT done for fever workup revealed right segmental PE started on low-dose heparin and will maintain platelets above 50,000 to decrease risk of bleeding  2. ID: C. difficile colitis on treatment, neutropenic fevers posttransplant culture negative, currently on vancomycin meropenem and azithromycin switched most recently on 2/6 with decrease in fever trend, pending Karius testing, checking viral PCR's  3. CV: history of hypertension, coronary artery disease, CABG, hyperlipidemia  4. GI/ FEN: Symptom management of regimen related toxicity, encourage fluid PO intake, I/O and daily weights, symptomatic management of hemorrhoids  5. Renal: Maintain euvolemic, hematuria resolved continue to monitor  6. PPx: fabi, ACV  7.  Supportive care: PT,  anxiety management, encourage ambulation, optimize nutrition, avoid sarcopenia, PCA for pain management, other  supportive care as detailed above    I spent 50 minutes in the care of Ziggy today, including an independent face-to-face assessment and time monitoring for restoration of hematopoiesis, high-risk organ toxicities from chemotherapy, and GVHD, managing infectious risk due to his immunocompromised state, and encouraging nutrition and physical activity to prevent debility and sarcopenia.  I personally performed all of the medical decision making associated with this visit, counseled Ziggy on my overall assessment and recommendations, communicated my plan to the care team, and edited the above note to reflect my current plan of care.     William Lagos MD   2-3x/week

## 2024-02-08 NOTE — PLAN OF CARE
"/66 (BP Location: Right arm)   Pulse 115   Temp 98.4  F (36.9  C) (Axillary)   Resp 16   Ht 1.725 m (5' 7.91\")   Wt 89.4 kg (197 lb 3.2 oz)   SpO2 95%   BMI 30.06 kg/m       Assumed cares [id number]    Afebrile. Tachy, 100's-120's at rest, up to 140's with activity, MD paged, 500 mL LR bolus ordered & given, MD also directed RN to give tylenol despite temperature being 100.3 at the time, will need blood cultures this AM if febrile. OVSS on RA. A&Ox4, up independently.    Mucositis mouth/throat pain rated 4-6/10, dilaudid PCA infusing continuous 0.1 mg/hr w Q10 min 0.3mg PCA doses- pt frequently attempting when dose not available (27 PCA doses given vs 203 doses attempted). PRN hurricane spray x2, & lidocaine solution x2. Also utilizing bedside suction for thick oral secretions.     Denies nausea. Drinking fluids well despite throat pain, no other oral intake this shift. Heparin gtt infusing @1200 units/hr, heparin Xa level 0.31 x2, next recheck scheduled for tomorrow AM.    Voiding well per pt, hayes in color, not saving most voids. X4 loose BM's this shift per pt report, PRN imodium given x1. Continued education on saving output provided to patient throughout shift.    Plt 49, 1 unit currently infusing, will need post platelet check after infusion complete. Hbg 7.9, will need 1 unit RBC's this AM.    K+ 3.2, 40 mEq infused, recheck [id number]. Phos 2.1, 15mmol Kphos currently infusing, recheck tomorrow AM. No other replacements indicated this AM.    Continue w plan of care & notify MD fernando changes.    Problem: Adult Inpatient Plan of Care  Goal: Optimal Comfort and Wellbeing  Outcome: Not Progressing  Intervention: Monitor Pain and Promote Comfort  Recent Flowsheet Documentation  Taken 2/8/2024 0315 by Pritesh Abdi, RN  Pain Management Interventions: pain pump in use  Taken 2/8/2024 0121 by Pritesh Abdi, RN  Pain Management Interventions: pain pump in use  Taken 2/7/2024 2300 by [place], " Pritesh JEAN BAPTISTE RN  Pain Management Interventions: pain pump in use  Taken 2/7/2024 2000 by Pritesh Abdi RN  Pain Management Interventions:   pain pump in use   medication (see MAR)     Problem: Stem Cell/Bone Marrow Transplant  Goal: Diarrhea Symptom Control  Outcome: Not Progressing  Intervention: Manage Diarrhea  Recent Flowsheet Documentation  Taken 2/7/2024 2000 by Pritesh Abdi RN  Skin Protection:   adhesive use limited   transparent dressing maintained  Fluid/Electrolyte Management: fluids provided  Perineal Care: perineal hygiene encouraged  Goal: Improved Activity Tolerance  Outcome: Not Progressing  Intervention: Promote Improved Energy  Recent Flowsheet Documentation  Taken 2/7/2024 2000 by Pritesh Abdi RN  Fatigue Management: frequent rest breaks encouraged  Sleep/Rest Enhancement:   awakenings minimized   comfort measures   noise level reduced   regular sleep/rest pattern promoted   relaxation techniques promoted   room darkened  Activity Management:   activity adjusted per tolerance   up ad amna  Environmental Support:   calm environment promoted   distractions minimized   environmental consistency promoted   personal routine supported   rest periods encouraged  Goal: Optimal Nutrition Intake  Outcome: Not Progressing  Intervention: Minimize and Manage Barriers to Oral Intake  Recent Flowsheet Documentation  Taken 2/7/2024 2000 by Pritesh Abdi RN  Oral Nutrition Promotion: rest periods promoted     Problem: Adult Inpatient Plan of Care  Goal: Absence of Hospital-Acquired Illness or Injury  Outcome: Progressing  Intervention: Identify and Manage Fall Risk  Recent Flowsheet Documentation  Taken 2/8/2024 0400 by Pritesh Abdi RN  Safety Promotion/Fall Prevention:   assistive device/personal items within reach   chemotherapeutic precautions   clutter free environment maintained   nonskid shoes/slippers when out of bed   patient and family education   room near nurse's  station   room organization consistent   safety round/check completed  Taken 2/8/2024 0300 by Pritesh Abdi RN  Safety Promotion/Fall Prevention: safety round/check completed  Taken 2/8/2024 0200 by Pritesh Abdi RN  Safety Promotion/Fall Prevention: safety round/check completed  Taken 2/8/2024 0100 by Pritesh Abdi RN  Safety Promotion/Fall Prevention: safety round/check completed  Taken 2/8/2024 0000 by Pritesh Abdi RN  Safety Promotion/Fall Prevention:   assistive device/personal items within reach   chemotherapeutic precautions   clutter free environment maintained   nonskid shoes/slippers when out of bed   patient and family education   room near nurse's station   room organization consistent   safety round/check completed  Taken 2/7/2024 2200 by Pritesh Abdi RN  Safety Promotion/Fall Prevention: safety round/check completed  Taken 2/7/2024 2100 by Pritesh Abdi RN  Safety Promotion/Fall Prevention: safety round/check completed  Taken 2/7/2024 2000 by Pritesh Abdi RN  Safety Promotion/Fall Prevention:   assistive device/personal items within reach   chemotherapeutic precautions   clutter free environment maintained   nonskid shoes/slippers when out of bed   patient and family education   room near nurse's station   room organization consistent   safety round/check completed  Intervention: Prevent Skin Injury  Recent Flowsheet Documentation  Taken 2/7/2024 2000 by Pritesh Abdi RN  Body Position: position changed independently  Skin Protection:   adhesive use limited   transparent dressing maintained  Device Skin Pressure Protection: adhesive use limited  Intervention: Prevent and Manage VTE (Venous Thromboembolism) Risk  Recent Flowsheet Documentation  Taken 2/7/2024 2000 by Pritesh Abdi RN  VTE Prevention/Management: SCDs (sequential compression devices) off  Intervention: Prevent Infection  Recent Flowsheet Documentation  Taken  2/8/2024 0400 by Pritesh Abdi RN  Infection Prevention:   cohorting utilized   environmental surveillance performed   equipment surfaces disinfected   hand hygiene promoted   personal protective equipment utilized   rest/sleep promoted   single patient room provided   visitors restricted/screened  Taken 2/8/2024 0000 by Pritesh Abdi RN  Infection Prevention:   cohorting utilized   environmental surveillance performed   equipment surfaces disinfected   hand hygiene promoted   personal protective equipment utilized   rest/sleep promoted   single patient room provided   visitors restricted/screened  Taken 2/7/2024 2000 by Pritesh Abdi RN  Infection Prevention:   cohorting utilized   environmental surveillance performed   equipment surfaces disinfected   hand hygiene promoted   personal protective equipment utilized   rest/sleep promoted   single patient room provided   visitors restricted/screened     Problem: Stem Cell/Bone Marrow Transplant  Goal: Optimal Coping with Transplant  Outcome: Progressing  Intervention: Optimize Patient/Family Adjustment to Transplant  Recent Flowsheet Documentation  Taken 2/7/2024 2000 by Pritesh Abdi RN  Supportive Measures:   active listening utilized   relaxation techniques promoted   self-care encouraged   verbalization of feelings encouraged  Goal: Symptom-Free Urinary Elimination  Outcome: Progressing  Intervention: Prevent or Manage Bladder Irritation  Recent Flowsheet Documentation  Taken 2/8/2024 0315 by Pritesh Abdi RN  Pain Management Interventions: pain pump in use  Taken 2/8/2024 0121 by Pritesh Abdi RN  Pain Management Interventions: pain pump in use  Taken 2/7/2024 2300 by Pritesh Abdi RN  Pain Management Interventions: pain pump in use  Taken 2/7/2024 2000 by Pritesh Abdi RN  Pain Management Interventions:   pain pump in use   medication (see MAR)  Urinary Elimination Promotion:   frequent voiding  encouraged   voiding relaxation promoted  Hyperhydration Management: fluids provided  Goal: Blood Counts Within Acceptable Range  Outcome: Progressing  Intervention: Monitor and Manage Hematologic Symptoms  Recent Flowsheet Documentation  Taken 2/8/2024 0400 by Pritesh Abdi RN  Bleeding Precautions:   blood pressure closely monitored   foot protection facilitated   gentle oral care promoted   monitored for signs of bleeding  Medication Review/Management:   medications reviewed   high-risk medications identified  Taken 2/8/2024 0000 by Pritesh Abdi RN  Bleeding Precautions:   blood pressure closely monitored   foot protection facilitated   gentle oral care promoted   monitored for signs of bleeding  Medication Review/Management:   medications reviewed   high-risk medications identified  Taken 2/7/2024 2000 by Pritesh Abdi RN  Sleep/Rest Enhancement:   awakenings minimized   comfort measures   noise level reduced   regular sleep/rest pattern promoted   relaxation techniques promoted   room darkened  Bleeding Precautions:   blood pressure closely monitored   foot protection facilitated   gentle oral care promoted   monitored for signs of bleeding  Medication Review/Management:   medications reviewed   high-risk medications identified  Goal: Absence of Hypersensitivity Reaction  Outcome: Progressing  Intervention: Manage Infusion-Related Hypersensitivity  Recent Flowsheet Documentation  Taken 2/8/2024 0400 by Pritesh Abdi RN  Stabilization Measures: blood products administered  Goal: Absence of Infection  Outcome: Progressing  Intervention: Prevent and Manage Infection  Recent Flowsheet Documentation  Taken 2/8/2024 0400 by Pritesh Abdi RN  Infection Prevention:   cohorting utilized   environmental surveillance performed   equipment surfaces disinfected   hand hygiene promoted   personal protective equipment utilized   rest/sleep promoted   single patient room provided    visitors restricted/screened  Infection Management: aseptic technique maintained  Isolation Precautions:   droplet precautions maintained   enteric precautions maintained   contact precautions maintained   cytotoxic precautions maintained   protective environment maintained  Taken 2/8/2024 0000 by Pritesh Abdi RN  Infection Prevention:   cohorting utilized   environmental surveillance performed   equipment surfaces disinfected   hand hygiene promoted   personal protective equipment utilized   rest/sleep promoted   single patient room provided   visitors restricted/screened  Infection Management: aseptic technique maintained  Isolation Precautions:   droplet precautions maintained   enteric precautions maintained   contact precautions maintained   cytotoxic precautions maintained   protective environment maintained  Taken 2/7/2024 2000 by Pritesh Abdi RN  Infection Prevention:   cohorting utilized   environmental surveillance performed   equipment surfaces disinfected   hand hygiene promoted   personal protective equipment utilized   rest/sleep promoted   single patient room provided   visitors restricted/screened  Infection Management: aseptic technique maintained  Isolation Precautions:   droplet precautions maintained   enteric precautions maintained   contact precautions maintained   cytotoxic precautions maintained   protective environment maintained  Goal: Improved Oral Mucous Membrane Health and Integrity  Outcome: Progressing  Intervention: Promote Oral Comfort and Health  Recent Flowsheet Documentation  Taken 2/7/2024 2000 by Pritesh Abdi RN  Oral Mucous Membrane Protection: nonirritating oral fluids promoted  Oral Care: oral rinse provided  Goal: Nausea and Vomiting Symptom Relief  Outcome: Progressing  Intervention: Prevent and Manage Nausea and Vomiting  Recent Flowsheet Documentation  Taken 2/7/2024 2000 by Pritesh Abdi RN  Nausea/Vomiting Interventions:   sips of  clear liquids given   slow deep breathing encouraged   stimuli minimized   Goal Outcome Evaluation:

## 2024-02-08 NOTE — PLAN OF CARE
A&Ox4, Tmax 99, tachycardic to 107, hypertensive to 136/77. OVSS on RA. Mucositis pain managed with PCA pump and PRN tramadol x1. Tolerated 1 unit of blood, 1 unit platelets. Plt recheck 62. K of 3.3, recheck post replacement 3.5. AUOP, received furosemide. Loose stools continue (six this shift), received imodium x1, started on PO vanco today as well. Poor appetite; had one protein milkshake this afternoon. C/o poor sleep at night, and would like to have melatonin tonight. Resting between cares.     PIV: SL  Port: Heparin at 12mL/hr   CVC Red: TKO  CVC Purple: PCA + TKO    Problem: Adult Inpatient Plan of Care  Goal: Optimal Comfort and Wellbeing  Outcome: Not Progressing  Intervention: Monitor Pain and Promote Comfort  Recent Flowsheet Documentation  Taken 2/8/2024 0900 by Tatum Titus RN  Pain Management Interventions: pain pump in use     Problem: Stem Cell/Bone Marrow Transplant  Goal: Diarrhea Symptom Control  Outcome: Not Progressing  Intervention: Manage Diarrhea  Recent Flowsheet Documentation  Taken 2/8/2024 0900 by Tatum Titus RN  Skin Protection:   adhesive use limited   transparent dressing maintained  Perineal Care: perineal hygiene encouraged  Goal: Improved Activity Tolerance  Outcome: Not Progressing  Intervention: Promote Improved Energy  Recent Flowsheet Documentation  Taken 2/8/2024 0900 by Tatum Titus RN  Activity Management: activity adjusted per tolerance  Goal: Optimal Nutrition Intake  Outcome: Not Progressing  Intervention: Minimize and Manage Barriers to Oral Intake  Recent Flowsheet Documentation  Taken 2/8/2024 0900 by Tatum Titus RN  Oral Nutrition Promotion: rest periods promoted     Problem: Adult Inpatient Plan of Care  Goal: Plan of Care Review  Description: The Plan of Care Review/Shift note should be completed every shift.  The Outcome Evaluation is a brief statement about your assessment that the patient is improving, declining, or no change.  This information will be  "displayed automatically on your shift  note.  Outcome: Progressing  Goal: Patient-Specific Goal (Individualized)  Description: You can add care plan individualizations to a care plan. Examples of Individualization might be:  \"Parent requests to be called daily at 9am for status\", \"I have a hard time hearing out of my right ear\", or \"Do not touch me to wake me up as it startles  me\".  Outcome: Progressing  Goal: Absence of Hospital-Acquired Illness or Injury  Outcome: Progressing  Intervention: Identify and Manage Fall Risk  Recent Flowsheet Documentation  Taken 2/8/2024 1400 by Tatum Titus RN  Safety Promotion/Fall Prevention: safety round/check completed  Taken 2/8/2024 1300 by Tatum Titus RN  Safety Promotion/Fall Prevention: safety round/check completed  Taken 2/8/2024 1200 by Tatum Titus RN  Safety Promotion/Fall Prevention:   safety round/check completed   assistive device/personal items within reach   clutter free environment maintained   increased rounding and observation   increase visualization of patient   nonskid shoes/slippers when out of bed   patient and family education   room organization consistent  Taken 2/8/2024 1100 by Tatum Titus RN  Safety Promotion/Fall Prevention: safety round/check completed  Taken 2/8/2024 1000 by Tatum Titus RN  Safety Promotion/Fall Prevention: safety round/check completed  Taken 2/8/2024 0900 by Tatum Titus RN  Safety Promotion/Fall Prevention:   safety round/check completed   assistive device/personal items within reach   clutter free environment maintained   increased rounding and observation   increase visualization of patient   nonskid shoes/slippers when out of bed   patient and family education   room organization consistent  Taken 2/8/2024 0830 by Tatum Titus RN  Safety Promotion/Fall Prevention: safety round/check completed  Intervention: Prevent Skin Injury  Recent Flowsheet Documentation  Taken 2/8/2024 0900 by Tatum Titus RN  Body " Position: position changed independently  Skin Protection:   adhesive use limited   transparent dressing maintained  Device Skin Pressure Protection: adhesive use limited  Intervention: Prevent and Manage VTE (Venous Thromboembolism) Risk  Recent Flowsheet Documentation  Taken 2/8/2024 0900 by Tatum Titus RN  VTE Prevention/Management: SCDs (sequential compression devices) off  Intervention: Prevent Infection  Recent Flowsheet Documentation  Taken 2/8/2024 1200 by Tatum Titus RN  Infection Prevention:   environmental surveillance performed   equipment surfaces disinfected   hand hygiene promoted   personal protective equipment utilized   rest/sleep promoted   single patient room provided   visitors restricted/screened  Taken 2/8/2024 0900 by Tatum Titus RN  Infection Prevention:   environmental surveillance performed   equipment surfaces disinfected   hand hygiene promoted   personal protective equipment utilized   rest/sleep promoted   single patient room provided   visitors restricted/screened  Goal: Readiness for Transition of Care  Outcome: Progressing     Problem: Stem Cell/Bone Marrow Transplant  Goal: Optimal Coping with Transplant  Outcome: Progressing  Goal: Symptom-Free Urinary Elimination  Outcome: Progressing  Intervention: Prevent or Manage Bladder Irritation  Recent Flowsheet Documentation  Taken 2/8/2024 0900 by Tatum Titus RN  Pain Management Interventions: pain pump in use  Urinary Elimination Promotion:   frequent voiding encouraged   voiding relaxation promoted  Goal: Blood Counts Within Acceptable Range  Outcome: Progressing  Goal: Absence of Hypersensitivity Reaction  Outcome: Progressing  Goal: Absence of Infection  Outcome: Progressing  Intervention: Prevent and Manage Infection  Recent Flowsheet Documentation  Taken 2/8/2024 1200 by Tatum Titus RN  Infection Prevention:   environmental surveillance performed   equipment surfaces disinfected   hand hygiene promoted   personal  protective equipment utilized   rest/sleep promoted   single patient room provided   visitors restricted/screened  Isolation Precautions:   droplet precautions maintained   enteric precautions maintained   contact precautions maintained   cytotoxic precautions maintained   protective environment maintained  Taken 2/8/2024 0900 by Tatum Titus RN  Infection Prevention:   environmental surveillance performed   equipment surfaces disinfected   hand hygiene promoted   personal protective equipment utilized   rest/sleep promoted   single patient room provided   visitors restricted/screened  Isolation Precautions:   droplet precautions maintained   enteric precautions maintained   contact precautions maintained   cytotoxic precautions maintained   protective environment maintained  Goal: Improved Oral Mucous Membrane Health and Integrity  Outcome: Progressing  Intervention: Promote Oral Comfort and Health  Recent Flowsheet Documentation  Taken 2/8/2024 0900 by Tatum Titus RN  Oral Mucous Membrane Protection:   nonirritating oral fluids promoted   nonirritating oral foods promoted   lip/mouth moisturizer applied  Oral Care: oral rinse provided  Goal: Nausea and Vomiting Symptom Relief  Outcome: Progressing

## 2024-02-08 NOTE — PHARMACY-VANCOMYCIN DOSING SERVICE
Pharmacy Vancomycin Note  Date of Service 2024  Patient's  1975   48 year old, male    Indication: Febrile Neutropenia  Day of Therapy: 3  Current vancomycin regimen:  1250 mg IV q12h  Current vancomycin monitoring method: AUC  Current vancomycin therapeutic monitoring goal: 400-600 mg*h/L    [employer] Prediction of Current Vancomycin Regimen  Loading dose: N/A  Regimen: 1250 mg IV every 12 hours.  Start time: 22:50 on 2024  Exposure target: AUC24 (range)400-600 mg/L.hr   AUC24,ss: 493 mg/L.hr  Probability of AUC24 > 400: 93 %  Ctrough,ss: 14.8 mg/L  Probability of Ctrough,ss > 20: 5 %  Probability of nephrotoxicity (Lodise CANDY ): 10 %      Current estimated CrCl = Estimated Creatinine Clearance: 108.1 mL/min (based on SCr of 0.89 mg/dL).    Creatinine for last 3 days  2024:  3:05 AM Creatinine 0.68 mg/dL  2024:  1:25 AM Creatinine 0.77 mg/dL  2024:  3:19 AM Creatinine 0.89 mg/dL    Recent Vancomycin Levels (past 3 days)  2024:  9:52 AM Vancomycin 12.2 ug/mL    Vancomycin IV Administrations (past 72 hours)                     vancomycin (VANCOCIN) 1,250 mg in 0.9% NaCl 250 mL intermittent infusion (mg) 1,250 mg New Bag 24 1050     1,250 mg New Bag 24 2142     1,250 mg New Bag  1022     1,250 mg New Bag 24 2209     1,250 mg New Bag  1449                    Nephrotoxins and other renal medications (From now, onward)      Start     Dose/Rate Route Frequency Ordered Stop    24 0930  vancomycin (VANCOCIN) capsule 125 mg         125 mg Oral 4 TIMES DAILY 24 0858 24 0759    24 1030  vancomycin (VANCOCIN) 1,250 mg in 0.9% NaCl 250 mL intermittent infusion         1,250 mg  over 90 Minutes Intravenous EVERY 12 HOURS 24 0938      24 0800  acyclovir (ZOVIRAX) tablet 800 mg         800 mg Oral 2 TIMES DAILY 24 1146      24 0800  lisinopril (ZESTRIL) tablet 40 mg         40 mg Oral DAILY 24 0918                  Contrast Orders - past 72 hours (72h ago, onward)      Start     Dose/Rate Route Frequency Stop    02/06/24 1030  iopamidol (ISOVUE-370) solution 119 mL         119 mL Intravenous ONCE 02/06/24 1019            Interpretation of levels and current regimen:  Vancomycin level is reflective of -600    Has serum creatinine changed greater than 50% in last 72 hours: No    Urine output:  Appears not charted completely but multiple unmeasured occurrences reflects adequate urine output    Renal Function: Stable      Plan:  Continue Current Dose  Vancomycin monitoring method: AUC  Vancomycin therapeutic monitoring goal: 400-600 mg*h/L  Pharmacy will check vancomycin levels as appropriate in 1-3 Days.  Serum creatinine levels will be ordered daily for the first week of therapy and at least twice weekly for subsequent weeks.    TONIE PHAM, [facility]

## 2024-02-09 LAB
ACANTHOCYTES BLD QL SMEAR: ABNORMAL
ANION GAP SERPL CALCULATED.3IONS-SCNC: 12 MMOL/L (ref 7–15)
AUER BODIES BLD QL SMEAR: ABNORMAL
BASO STIPL BLD QL SMEAR: ABNORMAL
BASOPHILS # BLD AUTO: ABNORMAL 10*3/UL
BASOPHILS NFR BLD AUTO: ABNORMAL %
BITE CELLS BLD QL SMEAR: ABNORMAL
BLD PROD TYP BPU: NORMAL
BLD PROD TYP BPU: NORMAL
BLISTER CELLS BLD QL SMEAR: ABNORMAL
BLOOD COMPONENT TYPE: NORMAL
BLOOD COMPONENT TYPE: NORMAL
BUN SERPL-MCNC: 9.5 MG/DL (ref 6–20)
BURR CELLS BLD QL SMEAR: SLIGHT
CALCIUM SERPL-MCNC: 7.9 MG/DL (ref 8.6–10)
CHLORIDE SERPL-SCNC: 109 MMOL/L (ref 98–107)
CMV DNA SPEC NAA+PROBE-ACNC: NOT DETECTED IU/ML
CODING SYSTEM: NORMAL
CODING SYSTEM: NORMAL
CREAT SERPL-MCNC: 0.88 MG/DL (ref 0.67–1.17)
CROSSMATCH: NORMAL
DACRYOCYTES BLD QL SMEAR: ABNORMAL
DEPRECATED HCO3 PLAS-SCNC: 21 MMOL/L (ref 22–29)
EGFRCR SERPLBLD CKD-EPI 2021: >90 ML/MIN/1.73M2
ELLIPTOCYTES BLD QL SMEAR: ABNORMAL
EOSINOPHIL # BLD AUTO: ABNORMAL 10*3/UL
EOSINOPHIL NFR BLD AUTO: ABNORMAL %
ERYTHROCYTE [DISTWIDTH] IN BLOOD BY AUTOMATED COUNT: 12.8 % (ref 10–15)
FRAGMENTS BLD QL SMEAR: ABNORMAL
GLUCOSE SERPL-MCNC: 95 MG/DL (ref 70–99)
HADV DNA # SPEC NAA+PROBE: NOT DETECTED COPIES/ML
HCT VFR BLD AUTO: 22.3 % (ref 40–53)
HGB BLD-MCNC: 8 G/DL (ref 13.3–17.7)
HGB C CRYSTALS: ABNORMAL
HHV6 DNA # SPEC NAA+PROBE: NOT DETECTED COPIES/ML
HOWELL-JOLLY BOD BLD QL SMEAR: ABNORMAL
IMM GRANULOCYTES # BLD: ABNORMAL 10*3/UL
IMM GRANULOCYTES NFR BLD: ABNORMAL %
ISSUE DATE AND TIME: NORMAL
ISSUE DATE AND TIME: NORMAL
LYMPHOCYTES # BLD AUTO: ABNORMAL 10*3/UL
LYMPHOCYTES NFR BLD AUTO: ABNORMAL %
MAGNESIUM SERPL-MCNC: 1.5 MG/DL (ref 1.7–2.3)
MAGNESIUM SERPL-MCNC: 2.5 MG/DL (ref 1.7–2.3)
MCH RBC QN AUTO: 31.3 PG (ref 26.5–33)
MCHC RBC AUTO-ENTMCNC: 35.9 G/DL (ref 31.5–36.5)
MCV RBC AUTO: 87 FL (ref 78–100)
MONOCYTES # BLD AUTO: ABNORMAL 10*3/UL
MONOCYTES NFR BLD AUTO: ABNORMAL %
NEUTROPHILS # BLD AUTO: ABNORMAL 10*3/UL
NEUTROPHILS NFR BLD AUTO: ABNORMAL %
NEUTS HYPERSEG BLD QL SMEAR: ABNORMAL
PHOSPHATE SERPL-MCNC: 2.1 MG/DL (ref 2.5–4.5)
PLAT MORPH BLD: ABNORMAL
PLATELET # BLD AUTO: 39 10E3/UL (ref 150–450)
PLATELET # BLD AUTO: 51 10E3/UL (ref 150–450)
POLYCHROMASIA BLD QL SMEAR: ABNORMAL
POTASSIUM SERPL-SCNC: 3.1 MMOL/L (ref 3.4–5.3)
POTASSIUM SERPL-SCNC: 3.3 MMOL/L (ref 3.4–5.3)
POTASSIUM SERPL-SCNC: 3.7 MMOL/L (ref 3.4–5.3)
RBC # BLD AUTO: 2.56 10E6/UL (ref 4.4–5.9)
RBC AGGLUT BLD QL: ABNORMAL
RBC MORPH BLD: ABNORMAL
ROULEAUX BLD QL SMEAR: ABNORMAL
SICKLE CELLS BLD QL SMEAR: ABNORMAL
SIROLIMUS BLD-MCNC: 13.5 UG/L (ref 5–15)
SMUDGE CELLS BLD QL SMEAR: ABNORMAL
SODIUM SERPL-SCNC: 142 MMOL/L (ref 135–145)
SPHEROCYTES BLD QL SMEAR: ABNORMAL
STOMATOCYTES BLD QL SMEAR: ABNORMAL
TARGETS BLD QL SMEAR: ABNORMAL
TME LAST DOSE: NORMAL H
TME LAST DOSE: NORMAL H
TOXIC GRANULES BLD QL SMEAR: ABNORMAL
UFH PPP CHRO-ACNC: 0.25 IU/ML
UFH PPP CHRO-ACNC: 0.29 IU/ML
UNIT ABO/RH: NORMAL
UNIT ABO/RH: NORMAL
UNIT NUMBER: NORMAL
UNIT NUMBER: NORMAL
UNIT STATUS: NORMAL
UNIT STATUS: NORMAL
UNIT TYPE ISBT: 600
UNIT TYPE ISBT: 9500
VARIANT LYMPHS BLD QL SMEAR: ABNORMAL
WBC # BLD AUTO: 0.1 10E3/UL (ref 4–11)

## 2024-02-09 PROCEDURE — 84100 ASSAY OF PHOSPHORUS: CPT | Performed by: INTERNAL MEDICINE

## 2024-02-09 PROCEDURE — 250N000011 HC RX IP 250 OP 636: Performed by: STUDENT IN AN ORGANIZED HEALTH CARE EDUCATION/TRAINING PROGRAM

## 2024-02-09 PROCEDURE — 84132 ASSAY OF SERUM POTASSIUM: CPT | Performed by: STUDENT IN AN ORGANIZED HEALTH CARE EDUCATION/TRAINING PROGRAM

## 2024-02-09 PROCEDURE — 84132 ASSAY OF SERUM POTASSIUM: CPT | Performed by: INTERNAL MEDICINE

## 2024-02-09 PROCEDURE — 250N000011 HC RX IP 250 OP 636: Performed by: INTERNAL MEDICINE

## 2024-02-09 PROCEDURE — 83735 ASSAY OF MAGNESIUM: CPT | Performed by: STUDENT IN AN ORGANIZED HEALTH CARE EDUCATION/TRAINING PROGRAM

## 2024-02-09 PROCEDURE — 80195 ASSAY OF SIROLIMUS: CPT | Performed by: STUDENT IN AN ORGANIZED HEALTH CARE EDUCATION/TRAINING PROGRAM

## 2024-02-09 PROCEDURE — 250N000009 HC RX 250: Performed by: STUDENT IN AN ORGANIZED HEALTH CARE EDUCATION/TRAINING PROGRAM

## 2024-02-09 PROCEDURE — 250N000013 HC RX MED GY IP 250 OP 250 PS 637

## 2024-02-09 PROCEDURE — 258N000003 HC RX IP 258 OP 636: Performed by: PHYSICIAN ASSISTANT

## 2024-02-09 PROCEDURE — 85049 AUTOMATED PLATELET COUNT: CPT

## 2024-02-09 PROCEDURE — 85520 HEPARIN ASSAY: CPT | Performed by: INTERNAL MEDICINE

## 2024-02-09 PROCEDURE — 99223 1ST HOSP IP/OBS HIGH 75: CPT | Performed by: PHYSICIAN ASSISTANT

## 2024-02-09 PROCEDURE — 85027 COMPLETE CBC AUTOMATED: CPT | Performed by: PHYSICIAN ASSISTANT

## 2024-02-09 PROCEDURE — 258N000003 HC RX IP 258 OP 636: Performed by: INTERNAL MEDICINE

## 2024-02-09 PROCEDURE — 85520 HEPARIN ASSAY: CPT | Performed by: STUDENT IN AN ORGANIZED HEALTH CARE EDUCATION/TRAINING PROGRAM

## 2024-02-09 PROCEDURE — 80048 BASIC METABOLIC PNL TOTAL CA: CPT | Performed by: PHYSICIAN ASSISTANT

## 2024-02-09 PROCEDURE — P9040 RBC LEUKOREDUCED IRRADIATED: HCPCS | Performed by: PHYSICIAN ASSISTANT

## 2024-02-09 PROCEDURE — 250N000011 HC RX IP 250 OP 636: Mod: JZ | Performed by: PHYSICIAN ASSISTANT

## 2024-02-09 PROCEDURE — 258N000003 HC RX IP 258 OP 636: Performed by: STUDENT IN AN ORGANIZED HEALTH CARE EDUCATION/TRAINING PROGRAM

## 2024-02-09 PROCEDURE — P9037 PLATE PHERES LEUKOREDU IRRAD: HCPCS

## 2024-02-09 PROCEDURE — 250N000011 HC RX IP 250 OP 636

## 2024-02-09 PROCEDURE — 99233 SBSQ HOSP IP/OBS HIGH 50: CPT | Performed by: INTERNAL MEDICINE

## 2024-02-09 PROCEDURE — 250N000013 HC RX MED GY IP 250 OP 250 PS 637: Performed by: PHYSICIAN ASSISTANT

## 2024-02-09 PROCEDURE — 83735 ASSAY OF MAGNESIUM: CPT | Performed by: PHYSICIAN ASSISTANT

## 2024-02-09 PROCEDURE — 206N000001 HC R&B BMT UMMC

## 2024-02-09 PROCEDURE — 250N000009 HC RX 250

## 2024-02-09 RX ORDER — DOXEPIN HYDROCHLORIDE 10 MG/ML
10 SOLUTION ORAL EVERY 12 HOURS
Status: DISCONTINUED | OUTPATIENT
Start: 2024-02-09 | End: 2024-02-09

## 2024-02-09 RX ORDER — POTASSIUM PHOS IN 0.9 % NACL 15MMOL/250
15 PLASTIC BAG, INJECTION (ML) INTRAVENOUS ONCE
Qty: 250 ML | Refills: 0 | Status: COMPLETED | OUTPATIENT
Start: 2024-02-09 | End: 2024-02-09

## 2024-02-09 RX ORDER — DIPHENHYDRAMINE HYDROCHLORIDE AND LIDOCAINE HYDROCHLORIDE AND ALUMINUM HYDROXIDE AND MAGNESIUM HYDRO
10 KIT EVERY 4 HOURS
Status: DISCONTINUED | OUTPATIENT
Start: 2024-02-09 | End: 2024-02-19

## 2024-02-09 RX ORDER — POTASSIUM CHLORIDE 29.8 MG/ML
20 INJECTION INTRAVENOUS
Status: COMPLETED | OUTPATIENT
Start: 2024-02-09 | End: 2024-02-09

## 2024-02-09 RX ORDER — MAGNESIUM SULFATE HEPTAHYDRATE 40 MG/ML
4 INJECTION, SOLUTION INTRAVENOUS ONCE
Status: COMPLETED | OUTPATIENT
Start: 2024-02-09 | End: 2024-02-09

## 2024-02-09 RX ORDER — FUROSEMIDE 10 MG/ML
20 INJECTION INTRAMUSCULAR; INTRAVENOUS ONCE
Status: COMPLETED | OUTPATIENT
Start: 2024-02-09 | End: 2024-02-09

## 2024-02-09 RX ORDER — DOXEPIN HYDROCHLORIDE 10 MG/ML
25 SOLUTION ORAL 4 TIMES DAILY
Status: DISCONTINUED | OUTPATIENT
Start: 2024-02-09 | End: 2024-02-16

## 2024-02-09 RX ADMIN — Medication: at 12:59

## 2024-02-09 RX ADMIN — MICAFUNGIN SODIUM 150 MG: 50 INJECTION, POWDER, LYOPHILIZED, FOR SOLUTION INTRAVENOUS at 08:18

## 2024-02-09 RX ADMIN — MYCOPHENOLATE MOFETIL 1250 MG: 500 INJECTION, POWDER, LYOPHILIZED, FOR SOLUTION INTRAVENOUS at 08:18

## 2024-02-09 RX ADMIN — METOPROLOL SUCCINATE 100 MG: 50 TABLET, EXTENDED RELEASE ORAL at 09:27

## 2024-02-09 RX ADMIN — VANCOMYCIN HYDROCHLORIDE 1250 MG: 10 INJECTION, POWDER, LYOPHILIZED, FOR SOLUTION INTRAVENOUS at 11:03

## 2024-02-09 RX ADMIN — FUROSEMIDE 20 MG: 10 INJECTION, SOLUTION INTRAMUSCULAR; INTRAVENOUS at 10:47

## 2024-02-09 RX ADMIN — LOPERAMIDE HYDROCHLORIDE 4 MG: 2 CAPSULE ORAL at 05:55

## 2024-02-09 RX ADMIN — LISINOPRIL 40 MG: 10 TABLET ORAL at 09:27

## 2024-02-09 RX ADMIN — BENZOCAINE 1 ML: 220 SPRAY, METERED PERIODONTAL at 05:59

## 2024-02-09 RX ADMIN — POTASSIUM CHLORIDE 20 MEQ: 29.8 INJECTION, SOLUTION INTRAVENOUS at 15:08

## 2024-02-09 RX ADMIN — DIPHENHYDRAMINE HYDROCHLORIDE AND LIDOCAINE HYDROCHLORIDE AND ALUMINUM HYDROXIDE AND MAGNESIUM HYDRO 10 ML: KIT at 11:46

## 2024-02-09 RX ADMIN — PANTOPRAZOLE SODIUM 40 MG: 40 TABLET, DELAYED RELEASE ORAL at 09:26

## 2024-02-09 RX ADMIN — NIFEDIPINE 30 MG: 30 TABLET, FILM COATED, EXTENDED RELEASE ORAL at 09:27

## 2024-02-09 RX ADMIN — MEROPENEM 1 G: 1 INJECTION, POWDER, FOR SOLUTION INTRAVENOUS at 20:50

## 2024-02-09 RX ADMIN — MAGNESIUM SULFATE IN WATER 4 G: 4 INJECTION, SOLUTION INTRAVENOUS at 05:55

## 2024-02-09 RX ADMIN — MEROPENEM 1 G: 1 INJECTION, POWDER, FOR SOLUTION INTRAVENOUS at 03:58

## 2024-02-09 RX ADMIN — VANCOMYCIN HYDROCHLORIDE 1250 MG: 10 INJECTION, POWDER, LYOPHILIZED, FOR SOLUTION INTRAVENOUS at 23:20

## 2024-02-09 RX ADMIN — NIFEDIPINE 30 MG: 30 TABLET, FILM COATED, EXTENDED RELEASE ORAL at 21:04

## 2024-02-09 RX ADMIN — DIPHENHYDRAMINE HYDROCHLORIDE AND LIDOCAINE HYDROCHLORIDE AND ALUMINUM HYDROXIDE AND MAGNESIUM HYDRO 10 ML: KIT at 15:12

## 2024-02-09 RX ADMIN — Medication 2 SPRAY: at 21:05

## 2024-02-09 RX ADMIN — Medication 2 SPRAY: at 11:47

## 2024-02-09 RX ADMIN — HEPARIN SODIUM 1200 UNITS/HR: 10000 INJECTION, SOLUTION INTRAVENOUS at 13:40

## 2024-02-09 RX ADMIN — ACYCLOVIR 800 MG: 800 TABLET ORAL at 21:01

## 2024-02-09 RX ADMIN — POTASSIUM CHLORIDE 20 MEQ: 29.8 INJECTION, SOLUTION INTRAVENOUS at 09:22

## 2024-02-09 RX ADMIN — VANCOMYCIN HYDROCHLORIDE 125 MG: 125 CAPSULE ORAL at 09:27

## 2024-02-09 RX ADMIN — DEXTROSE MONOHYDRATE 20 ML: 50 INJECTION, SOLUTION INTRAVENOUS at 20:54

## 2024-02-09 RX ADMIN — LIDOCAINE HYDROCHLORIDE 5 ML: 20 SOLUTION OROPHARYNGEAL at 17:38

## 2024-02-09 RX ADMIN — MYCOPHENOLATE MOFETIL 1250 MG: 500 INJECTION, POWDER, LYOPHILIZED, FOR SOLUTION INTRAVENOUS at 20:57

## 2024-02-09 RX ADMIN — VANCOMYCIN HYDROCHLORIDE 125 MG: 125 CAPSULE ORAL at 11:53

## 2024-02-09 RX ADMIN — POTASSIUM CHLORIDE 20 MEQ: 29.8 INJECTION, SOLUTION INTRAVENOUS at 08:02

## 2024-02-09 RX ADMIN — Medication 2 SPRAY: at 15:13

## 2024-02-09 RX ADMIN — DOXEPIN HYDROCHLORIDE 10 MG: 10 SOLUTION ORAL at 09:29

## 2024-02-09 RX ADMIN — VANCOMYCIN HYDROCHLORIDE 125 MG: 125 CAPSULE ORAL at 21:01

## 2024-02-09 RX ADMIN — Medication 2 SPRAY: at 09:29

## 2024-02-09 RX ADMIN — POTASSIUM CHLORIDE 20 MEQ: 29.8 INJECTION, SOLUTION INTRAVENOUS at 16:16

## 2024-02-09 RX ADMIN — DOXEPIN HYDROCHLORIDE 25 MG: 10 SOLUTION ORAL at 21:03

## 2024-02-09 RX ADMIN — ACYCLOVIR 800 MG: 800 TABLET ORAL at 09:27

## 2024-02-09 RX ADMIN — MEROPENEM 1 G: 1 INJECTION, POWDER, FOR SOLUTION INTRAVENOUS at 12:57

## 2024-02-09 RX ADMIN — BENZOCAINE 1 ML: 220 SPRAY, METERED PERIODONTAL at 17:39

## 2024-02-09 RX ADMIN — URSODIOL 300 MG: 300 CAPSULE ORAL at 15:05

## 2024-02-09 RX ADMIN — DEXTROSE MONOHYDRATE 450 MCG: 50 INJECTION, SOLUTION INTRAVENOUS at 20:54

## 2024-02-09 RX ADMIN — VANCOMYCIN HYDROCHLORIDE 125 MG: 125 CAPSULE ORAL at 15:05

## 2024-02-09 RX ADMIN — DIPHENHYDRAMINE HYDROCHLORIDE AND LIDOCAINE HYDROCHLORIDE AND ALUMINUM HYDROXIDE AND MAGNESIUM HYDRO 10 ML: KIT at 09:25

## 2024-02-09 RX ADMIN — TRAMADOL HYDROCHLORIDE 25 MG: 50 TABLET, COATED ORAL at 05:55

## 2024-02-09 RX ADMIN — BENZOCAINE 1 ML: 220 SPRAY, METERED PERIODONTAL at 11:46

## 2024-02-09 RX ADMIN — POTASSIUM PHOSPHATE, MONOBASIC POTASSIUM PHOSPHATE, DIBASIC 15 MMOL: 224; 236 INJECTION, SOLUTION, CONCENTRATE INTRAVENOUS at 10:39

## 2024-02-09 RX ADMIN — DIPHENHYDRAMINE HYDROCHLORIDE AND LIDOCAINE HYDROCHLORIDE AND ALUMINUM HYDROXIDE AND MAGNESIUM HYDRO 10 ML: KIT at 23:27

## 2024-02-09 RX ADMIN — URSODIOL 300 MG: 300 CAPSULE ORAL at 21:01

## 2024-02-09 RX ADMIN — URSODIOL 300 MG: 300 CAPSULE ORAL at 09:27

## 2024-02-09 RX ADMIN — DIPHENHYDRAMINE HYDROCHLORIDE AND LIDOCAINE HYDROCHLORIDE AND ALUMINUM HYDROXIDE AND MAGNESIUM HYDRO 10 ML: KIT at 21:03

## 2024-02-09 RX ADMIN — Medication 5 CAPSULE: at 09:28

## 2024-02-09 RX ADMIN — BENZOCAINE 1 ML: 220 SPRAY, METERED PERIODONTAL at 09:24

## 2024-02-09 ASSESSMENT — ACTIVITIES OF DAILY LIVING (ADL)
ADLS_ACUITY_SCORE: 23

## 2024-02-09 NOTE — CONSULTS
Palliative Care Consultation Note  [facility]    Patient: Ziggy Hallman  Date of Admission:  1/19/2024    Requesting Clinician / Team: BMT  Reason for consult: Pain management     Recommendations & Counseling     MEDICAL MANAGEMENT:   #Acute oropharyngeal mucositis pain  Poorly controlled, limiting sleep and ability to take PO.  INCREASE continuous rate IV dilaudid PCA to 0.3mg/hr, no change to 0.3mg bolus with 10 min lockout, maximum 2.1mg/hr, clinician bolus 0.2mg  CHANGE doxepin solution to mouthwash q6h scheduled: Mix doxepin 25mg (2.5ml) with 7.5ml of water. Swish for 1 minute and spit. Ordered for you.  Continue scheduled magic mouthwash  Agree with PRN benzocaine spray and viscous lidocaine  STOP prn tramadol. Tramadol is a weak opioid analgesic with unpredictable pharmacokinetics and carries risk of increased falls, seizures, serotonin syndrome, hyponatremia and hypoglycemia.    Palliative Care will continue to follow.     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 Course complicated by Cdiff infection, PE and recurrent fevers.    Today, the patient was seen for:  Palliative care  Acute oropharyngeal mucositis  Pain management  MDS    Palliative Care Summary:   Met with Ziggy at bedside today. Introduced role of Palliative team.    Ziggy reports pain is localized to back of the tongue/throat. Pain is constant and he has been unable to eat for 4 days. Taking sips of liquids and can take pills with pre-medication with topicals. Topicals provide minimal relief. PCA bumps provide little relief, though after a few he notes he will fall asleep, then wake up an hour or so later in severe pain.  Sleeping only a few hours a night due to pain. Also having frequent diarrhea, pain with wiping though improved with topicals.    Medications:  I have reviewed this patient's medication profile and medications from this hospitalization. Notable medications:  Doxepin 10mg q12h  Magic mouthwash 10mL q4h  Benzocaine spray q3h prn  Viscous lidocaine q2h prn  Lidocaine gel q4h prn rectal pain  Loperamide 2-4mg BID prn    IV dilaudid PCA 0.1mg continuous, 0.3mg bolus with 10 min lockout, maximum 2.1mg/hr, clinician bolus 0.2mg  Tramadol 25mg prn  Tylenol 325-650 q4h prn    Hydroxyzine 25mg q6h prn anxiety  IV/PO ativan 0.5-1mg q4h prn anxiety, nausea/vomiting, sleep    ROS:  Comprehensive ROS is reviewed and is negative except as here & per HPI:     Physical Exam   Vital Signs with Ranges  Temp:  [97.2  F (36.2  C)-100  F (37.8  C)] 97.6  F (36.4  C)  Pulse:  [] 108  Resp:  [16] 16  BP: (107-138)/(48-80) 121/61  SpO2:  [94 %-99 %] 99 %  199 lbs 1.6 oz    PHYSICAL EXAM:  General: laying in bed, NAD  HEENT: +pharyngeal erythema  Lungs: non-labored  Extremities: no gross abnormalities  Neuro: alert and engaged  Psych: calm    Data reviewed:  CMP  Recent Labs   Lab 02/09/24  1251 02/09/24  0357 02/08/24  0952 02/08/24  0319 02/07/24  0125 02/06/24  0305   NA  --  142  --  142   < > 140   POTASSIUM 3.3* 3.1*   < > 3.2*   < > 3.5   CHLORIDE  --  109*  --  109*   < > 108*   CO2  --  21*  --  21*   < > 25   ANIONGAP  --  12  --  12   < > 7   GLC  --  95  --  99   < > 122*   BUN  --  9.5  --  12.9   < > 13.9   CR  --  0.88  --  0.89   < > 0.68   GFRESTIMATED  --  >90  --  >90   < > >90   REMY  --  7.9*  --  8.0*   < > 8.1*   MAG 2.5* 1.5*  --  1.6*   < > 1.8   PHOS  --  2.1*  --  2.1*   < > 2.0*   PROTTOTAL  --   --   --  6.1*  --  5.9*   ALBUMIN  --   --   --  2.6*  --  2.6*   BILITOTAL  --   --   --  0.4  --  0.3   ALKPHOS  --   --   --  63  --  61   AST  --   --   --  27  --  19   ALT  --   --   --  15  --  14    < > =  values in this interval not displayed.     CBC  Recent Labs   Lab 02/09/24  1010 02/09/24  0357 02/08/24  0816 02/08/24  0319   WBC  --  0.1*  --  0.1*   RBC  --  2.56*  --  2.50*   HGB  --  8.0*  --  7.9*   HCT  --  22.3*  --  22.0*   MCV  --  87  --  88   MCH  --  31.3  --  31.6   MCHC  --  35.9  --  35.9   RDW  --  12.8  --  12.8   PLT 51* 39*   < > 49*    < > = values in this interval not displayed.       Medical Decision Making       MANAGEMENT DISCUSSED with the following over the past 24 hours: BMT   NOTE(S)/MEDICAL RECORDS REVIEWED over the past 24 hours: BMT, ID  Medical complexity over the past 24 hours:  - Parenteral (IV) CONTROLLED SUBSTANCES ordered

## 2024-02-09 NOTE — PROGRESS NOTES
Antimicrobial Stewardship Team Note    Antimicrobial Stewardship Program - A joint venture between [facility] and  Physicians to optimize antibiotic management.  NOT a formal consult - Restricted Antimicrobial Review     Patient: Ziggy Hallman  MRN: [id number]  Allergies: Blood transfusion related (informational only)    Brief Summary:  Ziggy Hallman is a 48 year old male with a PMHx of MI, CAD s/p angioplasty/stent, CVA, HTN, HLD, PAD s/p right toe amputation, multiple clots, and high risk myelodysplastic syndrome. He was admitted on 1/19/24 for hematopoetic stem cell transplant, and today is D+15 from transplant. He has been on prophylactic antimicrobials including acyclovir, levofloxacin, and micafungin since admission. Patient was found to have positive C. Diff PCR, GDH antigen, and toxin on 1/20, completed a 14-day course of oral vancomycin from 1/21 to 2/4. Patient was transitioned from levofloxacin to cefepime from 1/29 to 2/1 for non-neutropenic fevers of unclear etiology, possibly due to T-cell expansion. He was transitioned back to levofloxacin on 2/1-2/2 until he spiked new fevers (now neutropenic) and was restarted on cefepime on 2/3 for neutropenic fevers.     History of Present Illness: Patient continued to have fevers and tachycardia while on cefepime that was restarted on 2/3. CXR showed mild central bronchial wall thickening without bronchiectasis; consider bronchitis. No focal consolidative opacities to suggest a bacterial pneumonia. Antibiotic regimen was broadened to meropenem, IV vancomycin, and 3-day course of azithromycin on 2/6. UA with 2 WBC, small blood, and negative leukocyte esterase and nitrite. CT chest/abdomen/pelvis showed pulmonary embolism in the right lower lobe segmental and subsegmental pulmonary arteries origins. Right renal atrophy and compensatory hypertrophy of the left kidney with new mild left perinephric and periureteral stranding. Patient was started  on heparin drip for treatment of PE.    Patient's last fever was on 2/7 with tmax of 103F and tachycardic up to 135 BPM, but now afebrile for last 48 hours. Multiple blood cultures to date have remained without growth. Urine culture negative, CMV and adenovirus not detected. Karius from 2/6 positive with Staph epidermidis. Lactic acid remains WNL. With new worsening frequent, loose stool, PO vancomycin was restarted on 2/8 with plans to complete another 10 day course. Patient remains on meropenem, IV vancomycin, and PO vancomycin as well as prophylactic antimicrobials per protocol.         Active Anti-infective Medications   (From admission, onward)                 Start     Stop    02/26/24 0800  sulfamethoxazole-trimethoprim  1 tablet,   Oral,   EVERY MONDAY TUESDAY BID        Pneumocystis prophylaxis       --    02/08/24 0930  vancomycin  125 mg,   Oral,   4 TIMES DAILY        Clostridioides difficile       02/18/24 0759    02/06/24 1100  meropenem  1 g,   Intravenous,   EVERY 8 HOURS        Febrile Neutropenia       --    02/06/24 1030  vancomycin  1,250 mg,   Intravenous,   EVERY 12 HOURS        Febrile Neutropenia       --    01/24/24 1000  [Held by provider]  levofloxacin  250 mg,   Oral,   Daily at 10AM        (On hold since Sat 2/3/2024 at 0705 until manually unheld; held by Brenda Brunson PA-CHold Reason: Other)   Bacterial Prophylaxis while Neutropenic      On hold since Sat 2/3/2024 at 0705 until manually unheld   Hold reason: Other    --    01/21/24 0800  acyclovir  800 mg,   Oral,   2 TIMES DAILY        Varicella Zoster Virus prophylaxis       --    01/19/24 1200  micafungin (MYCAMINE) injection  150 mg,   Intravenous,   100 mL/hr,   DAILY        Candidiasis Prophylaxis, Fungal Infection Prophylaxis       --                  Assessment: Febrile neutropenia  Patient's fever curve continues to improve and remains afebrile x48 hours. Patient has new diagnosis of pulmonary embolism which could be  contributing to patient's fevers and tachycardia. Patient does not have localizing symptoms of an active infection and infectious workup has been unremarkable. No history of colonization or infection with ESBL-producing organisms to warrant empiric meropenem. We recommend discontinuing meropenem and resume oral antibiotics for prophylaxis while neutropenic per protocol. It is unclear if Staph epidermidis identified on Karius is contributing to fevers. Given presence of central lines and improvement with IV vancomycin, it is reasonable to continue IV vancomycin to complete a 10-day duration for presumed Staph epi infection.     Patient has completed a 14-day course for treatment of C. diff infection. Worsening diarrhea is likely related to mucositis and neutropenia. If concern for persistent C. diff infection, we recommend repeating C. diff panel; discontinue PO vancomycin if negative for GDH antigen, toxin, or PCR as patient is expected to be colonized with C diff even after completing therapy. Meropenem, IV vancomycin, and PO vancomycin are all restricted to ID beyond empiric therapy. Please consult ID for formal evaluation if disagreeing with these recommendations.     Recommendations:  Discontinue meropenem and resume PO antibiotics for bacterial prophylaxis while neutropenic per protocol.  Discontinue PO vancomycin. If concerned for persistent C. Diff, repeat C. diff panel and reassess    Reasonable to complete a 10-days of IV vancomycin for possible Staph epi infection (end 2/15/24).    Discussed with ID Staff Dr. Lg Desouza MD and Essie Meeks, PharmD, [facility]  Janeen Cordero, PharmD   [phone]    Vital Signs/Clinical Features:  Vitals         02/07 0700  02/08 0659 02/08 0700  02/09 0659 02/09 0700  02/09 1450   Most Recent      Temp ( F) 97.2 -  100.8    97.5 -  100    97.2 -  98.5     97.6 (36.4) 02/09 1222    Pulse 90 -  136    84 -  114    106 -  126     108 02/09 1222    Resp 16 -  17      16      16      16 02/09 1222    /61 -  128/80    107/48 -  137/67    121/61 -  138/80     121/61 02/09 1222    SpO2 (%) 94 -  98    94 -  98    95 -  99     99 02/09 1222            Labs  Estimated Creatinine Clearance: 109.4 mL/min (based on SCr of 0.88 mg/dL).  Recent Labs   Lab Test 02/04/24  0017 02/05/24  0330 02/06/24  0305 02/07/24  0125 02/08/24  0319 02/09/24  0357   CR 0.68 0.73 0.68 0.77 0.89 0.88       Recent Labs   Lab Test 09/25/23  0804 10/04/23  0951 11/21/23  1315 11/27/23  1340 01/29/24  0334 01/30/24  0401 01/31/24  0347 02/01/24  0350 02/02/24  0333 02/04/24  0017 02/04/24  1529 02/05/24  0330 02/06/24  0305 02/06/24  1744 02/07/24  0125 02/07/24  0748 02/08/24  0319 02/08/24  0816 02/09/24  0357 02/09/24  1010   WBC 4.7   < > 4.4   < > 2.4* 2.0* 2.3* 1.0*   < > <0.1*  --  <0.1* <0.1*  --  <0.1*  --  0.1*  --  0.1*  --    ANEU 1.6  --  2.6  --  2.2 1.9 2.2 0.9*  --   --   --   --   --   --   --   --   --   --   --   --    ALYM 2.4  --  1.1  --  0.0* 0.1* 0.1* 0.1*  --   --   --   --   --   --   --   --   --   --   --   --    CLARK 0.1  --  0.3  --  0.0 0.0 0.0 0.0  --   --   --   --   --   --   --   --   --   --   --   --    AEOS 0.0  --  0.0  --  0.2 0.0 0.0 0.0  --   --   --   --   --   --   --   --   --   --   --   --    HGB 9.0*   < > 8.3*   < > 7.0* 7.1* 7.7* 7.7*   < > 8.3*  --  7.3* 8.1*  --  7.3*  --  7.9*  --  8.0*  --    HCT 26.4*   < > 24.8*   < > 20.2* 20.9* 22.0* 22.3*   < > 22.9*  --  20.9* 22.4*  --  19.8*  --  22.0*  --  22.3*  --    *   < > 104*   < > 97 93 92 92   < > 87  --  90 87  --  85  --  88  --  87  --       < > 284   < > 116* 87* 75* 52*   < > 6*   < > 22* 12*   < > 28*   < > 49* 62* 39* 51*    < > = values in this interval not displayed.       Recent Labs   Lab Test 01/22/24  0412 01/27/24  0349 01/29/24  0334 02/01/24  0350 02/05/24  0330 02/06/24  0305 02/08/24  0319   BILITOTAL <0.2  --  0.5 0.3 0.4 0.3 0.4   ALKPHOS 65  --  49 50 60 61 63   ALBUMIN 2.0* 2.1*  2.1* 2.2* 2.6* 2.6* 2.6*   AST 16  --  29 28 21 19 27   ALT 10  --  38 27 14 14 15       Recent Labs   Lab Test 02/04/24  0029 02/04/24  1654 02/05/24  1208 02/06/24  0445 02/06/24  2103 02/07/24  1404   LACT 0.4* 0.7 0.4* 0.6* 0.6* 0.8       Recent Labs   Lab Test 02/08/24  0952 02/09/24  0357   VANCOMYCIN 12.2  --    RAPAMY  --  13.5       Culture Results:  7-Day Micro Results       Procedure Component Value Units Date/Time    CMV DNA quantification [[id number]]  (Normal) Collected: 02/09/24 0357    Order Status: Completed Lab Status: Final result Updated: 02/09/24 1257    Specimen: Blood from Line, venous      CMV DNA IU/mL Not Detected IU/mL     Narrative:      The shannon  CMV assay is a FDA-approved in vitro nucleic acid amplification test for the quantification of cytomegalovirus (CMV) DNA in human EDTA plasma using the Roche shannon  6800 instrument for automated viral nucleic acid extraction and purification (silica-based capture technique), followed by PCR amplification and real-time detection. Selective amplification of target nucleic acid from the sample is achieved by the use of target virus-specific forward and reverse primers which are selected from highly conserved regions of the CMV DNA polymerase (UL54) gene.     This test is intended for use as an aid in the management of CMV in transplant patients. In patients receiving anti-CMV therapy, serial DNA measurements can be used to assess viral response to treatment. Titer results are reported in International Units/mL (IU/mL). This assay has received FDA approval for the testing of human EDTA plasma only. The [facility]ious Diseases Diagnostic Laboratory at [facility] has validated the performance characteristics of the shannon  CMV assay for plasma and urine.    CMV DNA quantification [[id number]] Collected: 02/09/24 0357    Order Status: Canceled Lab Status: No result     Specimen: Blood from Line, venous     Adenovirus, quantitative by PCR  [[id number]]  (Normal) Collected: 02/07/24 1006    Order Status: Completed Lab Status: Final result Updated: 02/09/24 1204    Specimen: Blood from Central Venous Line      Adenovirus DNA copies/mL Not Detected copies/mL     Narrative:      The quantitative Adenovirus DNA assay is a real time polymerase chain reaction (PCR) using analyte specific reagents (ASR) maunfactured by [employer]. Analyte Specific Reagents (ASRs) are used in many laboratory tests necessary for standard medical care and generally do not require FDA approval. This test was developed and its performance characteristics determined by [facility] [employer]. It has not been cleared or approved by the US Food and Drug Administration.    Cytomegalovirus DNA by PCR, Quantitative [[id number]]  (Normal) Collected: 02/07/24 1006    Order Status: Completed Lab Status: Final result Updated: 02/07/24 1706    Specimen: Blood from Central Venous Line      CMV DNA IU/mL Not Detected IU/mL     Narrative:      The shannon  CMV assay is a FDA-approved in vitro nucleic acid amplification test for the quantification of cytomegalovirus (CMV) DNA in human EDTA plasma using the Roche shannon  6800 instrument for automated viral nucleic acid extraction and purification (silica-based capture technique), followed by PCR amplification and real-time detection. Selective amplification of target nucleic acid from the sample is achieved by the use of target virus-specific forward and reverse primers which are selected from highly conserved regions of the CMV DNA polymerase (UL54) gene.     This test is intended for use as an aid in the management of CMV in transplant patients. In patients receiving anti-CMV therapy, serial DNA measurements can be used to assess viral response to treatment. Titer results are reported in International Units/mL (IU/mL). This assay has received FDA approval for the testing of human EDTA plasma only. The Infectious Diseases Diagnostic  Laboratory at [facility] has validated the performance characteristics of the shannon  CMV assay for plasma and urine.    Blood Culture Special Organism [[id number]]  (Normal) Collected: 02/07/24 0129    Order Status: Completed Lab Status: Preliminary result Updated: 02/09/24 0231    Specimen: Blood from Red Lumen      Culture No growth after 2 days    Blood Culture Special Organism [[id number]]  (Normal) Collected: 02/07/24 0128    Order Status: Completed Lab Status: Preliminary result Updated: 02/09/24 0231    Specimen: Blood from Central Venous Line      Culture No growth after 2 days    Blood Culture Special Organism [[id number]]  (Normal) Collected: 02/07/24 0125    Order Status: Completed Lab Status: Preliminary result Updated: 02/09/24 0231    Specimen: Blood from Purple Lumen      Culture No growth after 2 days    Blood Culture Special Organism [[id number]]  (Normal) Collected: 02/06/24 0625    Order Status: Completed Lab Status: Preliminary result Updated: 02/09/24 0847    Specimen: Blood from Purple Lumen      Culture No growth after 3 days    Blood Culture Special Organism [[id number]]  (Normal) Collected: 02/06/24 0625    Order Status: Completed Lab Status: Preliminary result Updated: 02/09/24 0847    Specimen: Blood from Red Lumen      Culture No growth after 3 days    Urine Culture [[id number]] Collected: 02/05/24 1127    Order Status: Completed Lab Status: Final result Updated: 02/06/24 0921    Specimen: Urine, Midstream      Culture No Growth    Transfusion Reaction Culture and Stain [[id number]] Collected: 02/05/24 1122    Order Status: Completed Lab Status: Preliminary result Updated: 02/09/24 1432    Specimen: Blood Product      Culture No growth after 4 days     Gram Stain Result No organisms seen    Blood Culture Red Lumen [[id number]]  (Normal) Collected: 02/05/24 0828    Order Status: Completed Lab Status: Preliminary result Updated: 02/09/24 1031    Specimen: Blood from  Red Lumen      Culture No growth after 4 days    Blood Culture Special Organism [[id number]]  (Normal) Collected: 02/05/24 0828    Order Status: Completed Lab Status: Preliminary result Updated: 02/09/24 1031    Specimen: Blood from Purple Lumen      Culture No growth after 4 days    Blood Culture Special Organism [[id number]]  (Normal) Collected: 02/04/24 0054    Order Status: Completed Lab Status: Preliminary result Updated: 02/09/24 0131    Specimen: Blood from Red Lumen      Culture No growth after 5 days    Blood Culture Special Organism [[id number]]  (Normal) Collected: 02/04/24 0017    Order Status: Completed Lab Status: Preliminary result Updated: 02/09/24 0131    Specimen: Blood from Purple Lumen      Culture No growth after 5 days    Blood Culture Special Organism [[id number]]  (Normal) Collected: 02/03/24 0444    Order Status: Completed Lab Status: Preliminary result Updated: 02/09/24 0531    Specimen: Blood from Central Venous Line      Culture No growth after 6 days    Blood Culture Special Organism [[id number]]  (Normal) Collected: 02/03/24 0444    Order Status: Completed Lab Status: Preliminary result Updated: 02/09/24 0517    Specimen: Blood from Purple Lumen      Culture No growth after 6 days            Recent Labs   Lab Test 01/28/24  2258 01/31/24  1806 02/03/24  0637 02/05/24  1127 02/06/24  2211   URINEPH 6.5 6.0 6.0 6.0 6.0   NITRITE Negative Negative Negative Negative Negative   LEUKEST Negative Negative Negative Negative Negative   WBCU <1 1 <1 0 2                   Recent Labs   Lab Test 01/20/24  1408   CDBPCT Positive*       Imaging: CT Chest/Abdomen/Pelvis w Contrast    Result Date: 2/6/2024  EXAMINATION: CT CHEST/ABDOMEN/PELVIS W CONTRAST 2/6/2024 10:33 AM COMPARISON STUDY: CT chest 1/15/2024, PET CT 1/12/2024 INDICATION: Patient undergoing BMT, N/F of undetermined etiology and no infx sx. rule out infection, evaluate for fever etiology. TECHNIQUE: CT scan of the chest,  abdomen and pelvis was performed on multidetector CT scanner. Multiplanar reconstructions obtained. CONTRAST: 119 mL Isovue-370. Without oral contrast. FINDINGS: Mediastinum: *  Postsurgical changes of atrial septal closure device. *  Partially visualized right and left internal jugular central venous catheters with tips at the right atrium. *  Heart size is normal. No pericardial effusion. Stent in the left anterior descending coronary artery. Extensive atherosclerotic plaque in the descending abdominal aorta. *  Pulmonary embolism in the right lower lobe segmental and subsegmental pulmonary arteries (series 4, image 173). *  Thyroid is unremarkable. *  No thoracic lymphadenopathy. Lungs: *  No pleural effusion or pneumothorax. *  No focal airspace opacities. Mild interlobular septal thickening in the lung bases. *  Central tracheobronchial tree is patent without significant debris or mucus plugging. Mild bronchial wall thickening. *  Several small pulmonary nodules are unchanged. Liver: *  No mass or intrahepatic biliary dilatation. *  Too small to characterize hypoattenuating lesions, statistically simple cysts. Gallbladder/biliary: *  Normal gallbladder. *  Common bile duct within normal limits. Pancreas: *  Parenchymal fatty atrophy without mass or main pancreatic ductal dilatation. Spleen: *  Within normal limits. Adrenals: *  Within normal limits. Kidneys, ureters, bladder: *  Atrophic right kidney with compensatory left kidney enlargement. The left kidney appears to have increased in size, with a maximum AP diameter of 8.5 cm compared to 7.3 cm on 1/12/2024. There is also new mild perinephric and left periureteral stranding. *  No mass, obstructing renal calculi or hydronephrosis. *  Multiple bilateral scattered cortical and subcortical hypoattenuating structures, too small to characterize, statistically simple cysts. Pelvis and reproductive organs: *  No mass. *  Partially distended bladder is unremarkable.  Bowel/peritoneum: *  Nondistended small and large bowel are unremarkable. *  Appendix is unremarkable. *  Stomach and gastroesophageal junction are unremarkable. *  No pneumoperitoneum or ascites. Vessels: *  Patent major abdominal vasculature with significant mural thrombus versus noncalcified atherosclerosis most prominent in the nonaneurysmal descending aorta (series 4, image 194). Soft tissues: *  Umbilical fat-containing hernia, otherwise within normal limits. *  Small bilateral fat-containing inguinal hernias. Lymph nodes: *  No abdominal or pelvic lymphadenopathy. Bones: *  No acute or aggressive appearing osseous abnormality. Multiple old left rib fractures. *  Mild multilevel degenerative changes of the spine. *  Postbiopsy changes in the posterior left iliac bone.     Impression: 1. Pulmonary embolism in the right lower lobe segmental and subsegmental pulmonary arteries origins. 2. Right renal atrophy and compensatory hypertrophy of the left kidney. Additional interval increase in size of the left kidney since 1/12/2024 and new mild left perinephric and periureteral stranding, which is a nonspecific finding suggestive of infection/inflammation or edema. Recommend correlation with urinalysis. [Critical Result: Acute pulmonary embolism in the right lower lobe segmental and subsegmental pulmonary arteries] Finding was identified on 2/6/2024 12:57 PM. Dr. Bacon was contacted by Dr. York on 2/6/2024 1:23 PM and verbalized understanding of the critical result. I have personally reviewed the examination and initial interpretation and I agree with the findings. CARLOS RICHMOND MD   SYSTEM ID:  [id number]    XR Chest 2 Views    Result Date: 2/3/2024  Exam: XR CHEST 2 VIEWS, 2/3/2024 8:20 AM Indication: s/p bmt for MDS Comparison: 1/28/2024 Findings: Bilateral IJ central venous catheter tips are in the mid superior vena cava. ASD occluding device seen over the heart. Coronary stent also present. Heart and  pulmonary vasculature within normal limits. Lungs are clear. Pleural spaces are clear. Mild central bronchial wall thickening suggesting bronchitis. No focal consolidative opacities identified. Left healed fractured rib deformity.     Impression: Mild central bronchial wall thickening without bronchiectasis. Bronchitis should be considered. No focal consolidative opacities to suggest a bacterial pneumonia. JOCY SANCHEZ MD   SYSTEM ID:  [id number]

## 2024-02-09 NOTE — PROVIDER NOTIFICATION
Provider DELL Man verbally notified that patient has HR in 120s with activity prior to morning scheduled BP medications.     No new orders placed; Writer will continue to monitor patient and notify primary team with any new changes.

## 2024-02-09 NOTE — PLAN OF CARE
"/68 (BP Location: Left arm)   Pulse 100   Temp 98.5  F (36.9  C) (Axillary)   Resp 16   Ht 1.725 m (5' 7.91\")   Wt 90.3 kg (199 lb 1.6 oz)   SpO2 95%   BMI 30.35 kg/m      Hours of care: 0700 - 1900     Neuro: A&Ox4.   Cardiac: Afebrile, VSS.            Respiratory: RA, lung sounds clear, denies SOB  GI/: Voiding spontaneously. Multiple BMs this shift. Patient denies nausea.   Activity: Up independently     Pain: Patient complains of mucositis related pain. PCA pump, scheduled magic mouthwash, scheduled doxepin, prn hurricane spray, prn lidocaine oral solution utilized for pain management. Reduced appetite due to mucositis.   Skin: No new deficits noted.  Lines: CVC infusing with PCA pump in purple line. Heparin drip infusing in port. PIV, SL.   Drains: none  Replacements: RBCs replaced, Potassium replaced, Phosphorous replaced. No further replacements needed.     Plan: Continue with current POC. Report changes to primary team.     Goal Outcome Evaluation:      Plan of Care Reviewed With: patient    Overall Patient Progress: improving    Problem: Adult Inpatient Plan of Care  Goal: Plan of Care Review  Description: The Plan of Care Review/Shift note should be completed every shift.  The Outcome Evaluation is a brief statement about your assessment that the patient is improving, declining, or no change.  This information will be displayed automatically on your shift  note.  Outcome: Progressing  Flowsheets (Taken 2/9/2024 1612)  Plan of Care Reviewed With: patient  Overall Patient Progress: improving  Goal: Patient-Specific Goal (Individualized)  Description: You can add care plan individualizations to a care plan. Examples of Individualization might be:  \"Parent requests to be called daily at 9am for status\", \"I have a hard time hearing out of my right ear\", or \"Do not touch me to wake me up as it startles  me\".  Outcome: Progressing  Goal: Absence of Hospital-Acquired Illness or Injury  Outcome: " Progressing  Intervention: Identify and Manage Fall Risk  Recent Flowsheet Documentation  Taken 2/9/2024 1500 by Jocelyn Champion RN  Safety Promotion/Fall Prevention: safety round/check completed  Taken 2/9/2024 1230 by Jocelyn Champion RN  Safety Promotion/Fall Prevention: safety round/check completed  Taken 2/9/2024 0800 by Jocelyn Champion RN  Safety Promotion/Fall Prevention: safety round/check completed  Intervention: Prevent Skin Injury  Recent Flowsheet Documentation  Taken 2/9/2024 0800 by Jocelyn Champion RN  Body Position: position changed independently  Skin Protection:   adhesive use limited   transparent dressing maintained  Device Skin Pressure Protection: adhesive use limited  Intervention: Prevent and Manage VTE (Venous Thromboembolism) Risk  Recent Flowsheet Documentation  Taken 2/9/2024 0800 by Jocelyn Champion RN  VTE Prevention/Management: SCDs (sequential compression devices) off  Intervention: Prevent Infection  Recent Flowsheet Documentation  Taken 2/9/2024 1500 by Jocelyn Champion RN  Infection Prevention:   environmental surveillance performed   equipment surfaces disinfected   hand hygiene promoted   personal protective equipment utilized   rest/sleep promoted   single patient room provided   visitors restricted/screened  Taken 2/9/2024 1230 by Jocelyn Champion RN  Infection Prevention:   environmental surveillance performed   equipment surfaces disinfected   hand hygiene promoted   personal protective equipment utilized   rest/sleep promoted   single patient room provided   visitors restricted/screened  Taken 2/9/2024 0800 by Jocelyn Champion RN  Infection Prevention:   environmental surveillance performed   equipment surfaces disinfected   hand hygiene promoted   personal protective equipment utilized   rest/sleep promoted   single patient room provided   visitors restricted/screened  Goal: Optimal Comfort and Wellbeing  Outcome: Progressing  Intervention:  Monitor Pain and Promote Comfort  Recent Flowsheet Documentation  Taken 2/9/2024 1315 by Jocelyn Champion RN  Pain Management Interventions: pain pump in use  Taken 2/9/2024 1259 by Jocelyn Champion RN  Pain Management Interventions: pain pump in use  Taken 2/9/2024 1200 by Jocelyn Champion RN  Pain Management Interventions: pain pump in use  Taken 2/9/2024 1030 by Jocelyn Champion RN  Pain Management Interventions: pain pump in use  Taken 2/9/2024 0930 by Jocelyn Champion RN  Pain Management Interventions: pain pump in use  Taken 2/9/2024 0830 by Jocelyn Champion RN  Pain Management Interventions: pain pump in use  Goal: Readiness for Transition of Care  Outcome: Progressing     Problem: Stem Cell/Bone Marrow Transplant  Goal: Optimal Coping with Transplant  Outcome: Progressing  Intervention: Optimize Patient/Family Adjustment to Transplant  Recent Flowsheet Documentation  Taken 2/9/2024 0800 by Jocelyn Champion RN  Supportive Measures:   active listening utilized   relaxation techniques promoted   self-care encouraged   verbalization of feelings encouraged  Goal: Symptom-Free Urinary Elimination  Outcome: Progressing  Intervention: Prevent or Manage Bladder Irritation  Recent Flowsheet Documentation  Taken 2/9/2024 1315 by Jocelyn Champion RN  Pain Management Interventions: pain pump in use  Taken 2/9/2024 1259 by Jocelyn Champion RN  Pain Management Interventions: pain pump in use  Taken 2/9/2024 1200 by Jocelyn Champion RN  Pain Management Interventions: pain pump in use  Taken 2/9/2024 1030 by Jocelyn Champion RN  Pain Management Interventions: pain pump in use  Taken 2/9/2024 0930 by Jocelyn Champion RN  Pain Management Interventions: pain pump in use  Taken 2/9/2024 0830 by Jocelyn Champion RN  Pain Management Interventions: pain pump in use  Goal: Diarrhea Symptom Control  Outcome: Progressing  Intervention: Manage Diarrhea  Recent Flowsheet  Documentation  Taken 2/9/2024 0800 by Jocelyn Champion RN  Skin Protection:   adhesive use limited   transparent dressing maintained  Goal: Improved Activity Tolerance  Outcome: Progressing  Intervention: Promote Improved Energy  Recent Flowsheet Documentation  Taken 2/9/2024 0800 by Jocelyn Champion RN  Sleep/Rest Enhancement: consistent schedule promoted  Activity Management: activity adjusted per tolerance  Environmental Support:   calm environment promoted   distractions minimized   environmental consistency promoted   personal routine supported   rest periods encouraged  Goal: Blood Counts Within Acceptable Range  Outcome: Progressing  Intervention: Monitor and Manage Hematologic Symptoms  Recent Flowsheet Documentation  Taken 2/9/2024 1500 by Jocelyn Champion RN  Bleeding Precautions:   blood pressure closely monitored   foot protection facilitated   gentle oral care promoted   monitored for signs of bleeding  Medication Review/Management:   high-risk medications identified   medications reviewed  Taken 2/9/2024 1230 by Jocelyn Champion RN  Bleeding Precautions:   blood pressure closely monitored   foot protection facilitated   gentle oral care promoted   monitored for signs of bleeding  Medication Review/Management:   high-risk medications identified   medications reviewed  Taken 2/9/2024 0800 by Jocelyn Champion RN  Sleep/Rest Enhancement: consistent schedule promoted  Bleeding Precautions:   blood pressure closely monitored   foot protection facilitated   gentle oral care promoted   monitored for signs of bleeding  Medication Review/Management:   high-risk medications identified   medications reviewed  Goal: Absence of Hypersensitivity Reaction  Outcome: Progressing  Intervention: Manage Infusion-Related Hypersensitivity  Recent Flowsheet Documentation  Taken 2/9/2024 1500 by Jocelyn Champion RN  Stabilization Measures: blood products administered  Taken 2/9/2024 1230 by Kevin Valente  MILLI Banks  Stabilization Measures: blood products administered  Taken 2/9/2024 0800 by Jocelyn Champion RN  Stabilization Measures: blood products administered  Goal: Absence of Infection  Outcome: Progressing  Intervention: Prevent and Manage Infection  Recent Flowsheet Documentation  Taken 2/9/2024 1500 by Jocelyn Champion RN  Infection Prevention:   environmental surveillance performed   equipment surfaces disinfected   hand hygiene promoted   personal protective equipment utilized   rest/sleep promoted   single patient room provided   visitors restricted/screened  Infection Management: aseptic technique maintained  Isolation Precautions:   droplet precautions maintained   enteric precautions maintained   contact precautions maintained   cytotoxic precautions maintained   protective environment maintained  Taken 2/9/2024 1230 by Jocelyn Champion RN  Infection Prevention:   environmental surveillance performed   equipment surfaces disinfected   hand hygiene promoted   personal protective equipment utilized   rest/sleep promoted   single patient room provided   visitors restricted/screened  Infection Management: aseptic technique maintained  Isolation Precautions:   droplet precautions maintained   enteric precautions maintained   contact precautions maintained   cytotoxic precautions maintained   protective environment maintained  Taken 2/9/2024 0800 by Jocelyn Champion RN  Infection Prevention:   environmental surveillance performed   equipment surfaces disinfected   hand hygiene promoted   personal protective equipment utilized   rest/sleep promoted   single patient room provided   visitors restricted/screened  Infection Management: aseptic technique maintained  Isolation Precautions:   droplet precautions maintained   enteric precautions maintained   contact precautions maintained   cytotoxic precautions maintained   protective environment maintained  Goal: Improved Oral Mucous Membrane Health  and Integrity  Outcome: Progressing  Goal: Nausea and Vomiting Symptom Relief  Outcome: Progressing  Goal: Optimal Nutrition Intake  Outcome: Progressing

## 2024-02-09 NOTE — PROGRESS NOTES
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 [id number]       Interval History:   Afebrile for last 24 hours, Karius showing Staph Epi >2000 copies. BC remain negative. He is miserable with mucositis pain mostly in the back of the throat, he can't eat or sleep or get comfortable. He is going to try all of the topical agents scheduled today. Stooling x10 a day, often and waking him up when asleep. No N/V.     Review of Systems    Review of Systems: 10 point ROS negative unless stated in HPI   PHYSICAL EXAM      Weight     Wt Readings from Last 3 Encounters:   02/09/24 90.3 kg (199 lb 1.6 oz)   01/17/24 90.1 kg (198 lb 9.6 oz)   01/12/24 88.9 kg (196 lb)        KPS: 60    /70   Pulse 118   Temp 98.3  F (36.8  C) (Axillary)   Resp 16   Ht 1.725 m (5' 7.91\")   Wt 90.3 kg (199 lb 1.6 oz)   SpO2 97%   BMI 30.35 kg/m       General: NAD  Mouth/Throat: Multiple scattered erythematous ulcers on bilateral posterior buccal mucosa, posterior pharynx diffusely erythematous   Eyes: GARY, sclera anicteric   Lungs: decreased lung sounds in bilateral bases  Cardiovascular: RRR, no M/R/G   Lymphatics: No edema in extremities  Rash: very faint, erythematous discrete papules on back, upper chest and shins, excoriations present  Neuro: A&O   Additional Findings: Powell site NT, no drainage.    Current aGVHD staging:  Skin 0, UGI 0, LGI 0, Liver 0 (keep in note through day +180 for allos)      LABS AND IMAGING: I have assessed all abnormal lab values for their clinical significance and any values considered clinically " significant have been addressed in the assessment and plan.        Lab Results   Component Value Date    WBC 0.1 (LL) 02/09/2024    ANEUTAUTO 2.0 01/28/2024    HGB 8.0 (L) 02/09/2024    HCT 22.3 (L) 02/09/2024    PLT 39 (LL) 02/09/2024     02/09/2024    POTASSIUM 3.1 (L) 02/09/2024    CHLORIDE 109 (H) 02/09/2024    CO2 21 (L) 02/09/2024    GLC 95 02/09/2024    BUN 9.5 02/09/2024    CR 0.88 02/09/2024    MAG 1.5 (L) 02/09/2024    INR 1.14 02/06/2024         SYSTEMS-BASED ASSESSMENT AND PLAN     Ziggy Hallman is a 48 year old year old male with a PMH of MDS, hx of multiple clots and MI undergoing a MA (Bu/Flu) prep for an 8/8 URD PBSCT day 15    BMT/IEC PROTOCOL for 2015-29  Chemo Prep:   Day -6: Admit  Day -5 through Day -2: Busulfan/Fludarabine  Day -1: Rest.   Keppra prophylaxis      8/8 DP permissive. Cell dose-9.24x10^6  ABO: Donor: O+ Recipient A-  (Minor incompatability, no flush required)   GSCF plan: GCSF to start day +5 until ANC >1500 for 3 consecutive days    HEME/COAG  #SEGMENTAL R PE (2/6): Low intensity hep gtt with platelet goal <50k with post platelet checks  #APS work up- lupus antigen +, will follow outpt as may be unreliable in this acute setting.  #Pancytopenia 2/2 chemo- anemia, leukopenia, thrombocytopenia  - Transfusion parameters: hemoglobin <8 (cardiac hx), platelets <50k (hep gtt)  #Recurrent arterial thromboembolic events:  He has long history of various events including renal infarcts (2011, 2013, 2015), left popliteal embolic episode requiring surgery, anticoagulation (2011), right carotid artery embolic episode with TIA/stroke-like symptoms (2012), embolic MI (2015), gangrenous right toe requiring vascular surgery/amputation (2017), 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   Extensive hypercoagulable workup to date has been unrevealing.  JAK2 testing negative.  PNH testing negative.  Elevated IgA of unknown significance, no  evidence of plasma cell disorder.  - Eliquis and Prasugrel now HELD starting 2/1-x.     IMMUNOCOMPROMISED  #Possible staph epi bacteremia (Karius >2000 copies)  #Febrile neutropenia, fever curve improving: ddx includes- infx (CXR shows possible bronchitis but he is asymptomatic, CTCAP unremarkable to explain infx, broadened coverage), drug fevers (switch abx)   -Meropenem (2/6-x), Vanco (2/6-x) Azithro (2/6-2/9)  - Cefepime (2/2-2/6)   -UC, BC negative, KARIUS pending, viral work up pending (HHV6, adenovirus, CMV, EBV, parvo)  C. Diff - Admit c. Diff triple+ - Start PO vanc 1/21 for 14 day course. - completed on 2/4, with broadening abx restart PO Vanco tx dose x 10 days.  -Prophylaxis plan: ACV LD, Megan HD, Levaquin while neutropenic , Bactrim +28    RISK OF GVHD  - Prophylaxis: PtC day +3, +4, , Siro/MMF to start day +5, Siro level high HOLD and recheck.    CARDIOVASCULAR  #CAD  #Hx of CABG  #HTN   -PTA metoprolol, Lisinopril dose increased 2/2 HTN,  nifidepine ER 30mg BID, labetolol PRN;  #Hyperlipidemia: Holding atorvastatin peritransplant  - Risk of cardiomyopathy:  Baseline EF 50-55%, Global left ventricular function mildly reduced. Grade 1 or early diastolic dysfunction.   - Risk of arrhythmia: Baseline EKG showed NSR, Qtc -425    GI/NUTRITION  #Oropharyngeal mucositis: MMW, hurricaine spray, dilaudid PCA 2/3-x (0.3 continuous, 0.3q10)   -Palliative consulted 2/9  #Bladder scan indicates no retention  #Hematuria- No dysuria, self resolved.   - Ulcer prophylaxis: Protonix   - VOD Prophylaxis: Ursodiol  - Risk of nausea/vomiting due to chemo: Ativan, Compazine, Zofran   - Moderate malnutrition 2/2 acute on chronic illness- dietician to follow    RENAL/ELECTROLYTES/  #Hypervolemia 2/2 cytoxan flush - diurese as needed lasix 20mg 2/7  #Proteinuria- possibly exacerbated by persistent HTN (See CV),   #BUN/Cr elevation- FENA, osms, Na+, UA work up indicate prerenal etiology, likely intravascular depletion 2/2  "osmotic diuresis with proteinuria. Will encourage PO fluid intake as Cr improved 1/23, IVF boluses as necessary, control HTN. RESOLVED.  - Hypocalcemia in setting hypoalbuminemia, iCa2+, corrected WNL   - Electrolyte management: replace per sliding scale    Psych:    #Anxiety: admits to feeling anxious.  Start zyprexa 5mg HS.  Connect w/ SW.  Psychaitry consult added hydroxyzine.  #Insomnia- PTA ambien, Trazadone added and increased to 100mg. - Ambien and trazadone held during cytoxan period due to frequent urination and concern for falls. resumed following cytoxan flush 1/31.      SOCIAL DETERMINANTS  - Caregiver: Family   - Financial/insurance concerns: See SW notes       Known issues that I take into account for medical decisions, with salient changes to the plan considering these complexities noted above.    Patient Active Problem List   Diagnosis     Amegakaryocytic thrombocytopenia (H)     Anemia due to bone marrow failure, unspecified bone marrow failure type (H)     Aplastic anemia (H)     Clinically Significant Risk Factors        # Hypokalemia: Lowest K = 3.1 mmol/L in last 2 days, will replace as needed     # Hypomagnesemia: Lowest Mg = 1.5 mg/dL in last 2 days, will replace as needed   # Hypoalbuminemia: Lowest albumin = 2 g/dL at 1/22/2024  4:12 AM, will monitor as appropriate     # Thrombocytopenia: Lowest platelets = 39 in last 2 days, will monitor for bleeding          # Obesity: Estimated body mass index is 30.35 kg/m  as calculated from the following:    Height as of this encounter: 1.725 m (5' 7.91\").    Weight as of this encounter: 90.3 kg (199 lb 1.6 oz).               Dispo: Remain admitted through engraftment  Please call sister Radha with updates for care and discharge    I spent 30 minutes in the care of this patient today, which included time necessary for preparation for the visit, obtaining history, ordering medications/tests/procedures as medically indicated, review of pertinent " medical literature, counseling of the patient, communication of recommendations to the care team, and documentation time.    Toyin Herrmann PA-C  [phone]      ______________________________________________      BMT ATTENDING NOTE    Ziggy Hallman is a 48 year old male, who is day 15 of transplant for MDS.     Subjective:  No major symptomatic improvement noted.  Still with ongoing fatigue and decreased sleep overnight, pain not optimally controlled, no new infectious signs or symptoms by history.       Vitals reviewed, labs reviewed  PE:  NAD, oriented x3  HEENT: mucositis, erythema  Heart: RRR  Lungs: CTAB  Abdomen: +BS, soft non tender, non distended, rectal area not examined  LE: 1+ edema  Skin: no rash     Assessment and Plan:    1. Heme/BMT:  MDS, here for MAC 8/8 MUD, flu/Bu prep, PTCy/Siro/MMF GVHD prophylaxis, transfusion as needed, history of multiple arterial thrombotic events had been on anticoagulation, 2/6 CT done for fever workup revealed right segmental PE started on low-dose heparin and will maintain platelets above 50,000 to decrease risk of bleeding  2. ID: C. difficile colitis on treatment, neutropenic fevers posttransplant culture negative, currently on vancomycin meropenem and azithromycin switched most recently on 2/6 with decrease in fever trend,  Karius testing with staph epi bacteria detected, checking viral PCR's  3. CV: history of hypertension, coronary artery disease, CABG, hyperlipidemia  4. GI/ FEN: Symptom management of regimen related toxicity, encourage fluid PO intake, I/O and daily weights, symptomatic management of hemorrhoids  5. Renal: Maintain euvolemic, hematuria resolved continue to monitor  6. PPx: fabi, ACV  7.  Supportive care: PT,  anxiety management, encourage ambulation, optimize nutrition, avoid sarcopenia, PCA for pain management, appreciate palliative care recommendations for pain and supportive care management, other supportive care as detailed above    I spent 50  minutes in the care of Ziggy today, including an independent face-to-face assessment and time monitoring for restoration of hematopoiesis, high-risk organ toxicities from chemotherapy, and GVHD, managing infectious risk due to his immunocompromised state, and encouraging nutrition and physical activity to prevent debility and sarcopenia.  I personally performed all of the medical decision making associated with this visit, counseled Ziggy on my overall assessment and recommendations, communicated my plan to the care team, and edited the above note to reflect my current plan of care.     William Lagos MD

## 2024-02-09 NOTE — PLAN OF CARE
Hours of care: 2154-4735     Neuro: A&Ox4.   Cardiac: MT of 100.0, VSS. Slightly tachy (100s)           Respiratory: RA, lung sounds clear, denies SOB  GI/: Voiding spontaneously. Small loose/watery BMs or gas every 45 min. Overnight. Pt reminded multiple times to save output. Gave lomotil 2x.  Diet/appetite: Tolerating high calorie/ high protein diet, appetite poor d/t mucositis . Denies nausea.  Activity: Up independently     Pain: Mucositis pain increasing overnight, gave tramadol 1x. Paged provider about increasing PCA dose, says he will pass it along to the day team. Using hurricane spray before pills.  Skin: No new deficits noted.  Lines: CVC, PCA in purple line. Heparin @ 12mL/hr in port  Drains: none  Replacements: Mag and platelets given. Will still need K, phos, and blood      Plan: Continue with current POC. Report changes to primary team.            Goal Outcome Evaluation:      Plan of Care Reviewed With: patient    Overall Patient Progress: no changeOverall Patient Progress: no change

## 2024-02-09 NOTE — PROGRESS NOTES
BMT CLINICAL SOCIAL WORK NOTE:    Focus: Supportive Counseling/Resources/Discharge Planning    Data: Ziggy Hallman is a 48 year old year old male with a PMH of MDS, hx of multiple clots and MI undergoing a MA (Bu/Flu) prep for an 8/8 URD PBSCT currently day +15.     Interventions: Clinical  (CSW) met with Pt to assess coping, provide supportive counseling and assist with resources as needed. Also in the pt's room today was bedside nurse. Pt presented as very fatigued. Pt shared no concerns/questions at this time. CSW did not engage in continued conversation as this writer is aware of pts various physical discomforts including lack of sleep and mucositis pain. CSW had brief visit (with pts agreement) in order to let pt rest. He is aware of CSW's contact information to reach out for any support. CSW provided empathic listening, validation of concerns, and encouragement. CSW encouraged Pt to contact CSW for support, questions and/or resources.     Assessment: Pt presented as very fatigued.  Pt appears to be coping appropriately at this time. Pt continues to be supported by friends/family.     Plan: CSW will continue to provide supportive counseling and assistance with resources as needed. CSW will continue to collaborate with multidisciplinary team regarding Pt's plan of care.     RIVKA Grider, LGSW  [facility]  Adult Blood & Marrow Transplant   Phone: [phone]  Pager: [phone]

## 2024-02-10 LAB
ACANTHOCYTES BLD QL SMEAR: NORMAL
ANION GAP SERPL CALCULATED.3IONS-SCNC: 11 MMOL/L (ref 7–15)
AUER BODIES BLD QL SMEAR: NORMAL
BACTERIA BLD CULT: NO GROWTH
BASO STIPL BLD QL SMEAR: NORMAL
BASOPHILS # BLD AUTO: ABNORMAL 10*3/UL
BASOPHILS NFR BLD AUTO: ABNORMAL %
BITE CELLS BLD QL SMEAR: NORMAL
BLD PROD TYP BPU: NORMAL
BLISTER CELLS BLD QL SMEAR: NORMAL
BLOOD COMPONENT TYPE: NORMAL
BUN SERPL-MCNC: 10.3 MG/DL (ref 6–20)
BURR CELLS BLD QL SMEAR: NORMAL
CALCIUM SERPL-MCNC: 8.1 MG/DL (ref 8.6–10)
CHLORIDE SERPL-SCNC: 109 MMOL/L (ref 98–107)
CODING SYSTEM: NORMAL
CREAT SERPL-MCNC: 0.88 MG/DL (ref 0.67–1.17)
DACRYOCYTES BLD QL SMEAR: NORMAL
DEPRECATED HCO3 PLAS-SCNC: 22 MMOL/L (ref 22–29)
EGFRCR SERPLBLD CKD-EPI 2021: >90 ML/MIN/1.73M2
ELLIPTOCYTES BLD QL SMEAR: NORMAL
EOSINOPHIL # BLD AUTO: ABNORMAL 10*3/UL
EOSINOPHIL NFR BLD AUTO: ABNORMAL %
ERYTHROCYTE [DISTWIDTH] IN BLOOD BY AUTOMATED COUNT: 13.2 % (ref 10–15)
FRAGMENTS BLD QL SMEAR: NORMAL
GLUCOSE SERPL-MCNC: 105 MG/DL (ref 70–99)
HCT VFR BLD AUTO: 24.4 % (ref 40–53)
HGB BLD-MCNC: 8.7 G/DL (ref 13.3–17.7)
HGB C CRYSTALS: NORMAL
HOWELL-JOLLY BOD BLD QL SMEAR: NORMAL
IMM GRANULOCYTES # BLD: ABNORMAL 10*3/UL
IMM GRANULOCYTES NFR BLD: ABNORMAL %
ISSUE DATE AND TIME: NORMAL
LYMPHOCYTES # BLD AUTO: ABNORMAL 10*3/UL
LYMPHOCYTES NFR BLD AUTO: ABNORMAL %
MAGNESIUM SERPL-MCNC: 2.2 MG/DL (ref 1.7–2.3)
MCH RBC QN AUTO: 30.4 PG (ref 26.5–33)
MCHC RBC AUTO-ENTMCNC: 35.7 G/DL (ref 31.5–36.5)
MCV RBC AUTO: 85 FL (ref 78–100)
MONOCYTES # BLD AUTO: ABNORMAL 10*3/UL
MONOCYTES NFR BLD AUTO: ABNORMAL %
NEUTROPHILS # BLD AUTO: ABNORMAL 10*3/UL
NEUTROPHILS NFR BLD AUTO: ABNORMAL %
NEUTS HYPERSEG BLD QL SMEAR: NORMAL
PHOSPHATE SERPL-MCNC: 2.4 MG/DL (ref 2.5–4.5)
PLAT MORPH BLD: NORMAL
PLATELET # BLD AUTO: 33 10E3/UL (ref 150–450)
PLATELET # BLD AUTO: 53 10E3/UL (ref 150–450)
POLYCHROMASIA BLD QL SMEAR: NORMAL
POTASSIUM SERPL-SCNC: 3.3 MMOL/L (ref 3.4–5.3)
POTASSIUM SERPL-SCNC: 3.6 MMOL/L (ref 3.4–5.3)
RBC # BLD AUTO: 2.86 10E6/UL (ref 4.4–5.9)
RBC AGGLUT BLD QL: NORMAL
RBC MORPH BLD: NORMAL
ROULEAUX BLD QL SMEAR: NORMAL
SICKLE CELLS BLD QL SMEAR: NORMAL
SIROLIMUS BLD-MCNC: 11 UG/L (ref 5–15)
SMUDGE CELLS BLD QL SMEAR: NORMAL
SODIUM SERPL-SCNC: 142 MMOL/L (ref 135–145)
SPHEROCYTES BLD QL SMEAR: NORMAL
STOMATOCYTES BLD QL SMEAR: NORMAL
TARGETS BLD QL SMEAR: NORMAL
TME LAST DOSE: NORMAL H
TME LAST DOSE: NORMAL H
TOXIC GRANULES BLD QL SMEAR: NORMAL
UFH PPP CHRO-ACNC: 0.19 IU/ML
UFH PPP CHRO-ACNC: 0.27 IU/ML
UFH PPP CHRO-ACNC: <0.1 IU/ML
UNIT ABO/RH: NORMAL
UNIT NUMBER: NORMAL
UNIT STATUS: NORMAL
UNIT TYPE ISBT: 6200
VARIANT LYMPHS BLD QL SMEAR: NORMAL
WBC # BLD AUTO: 0.2 10E3/UL (ref 4–11)

## 2024-02-10 PROCEDURE — 250N000013 HC RX MED GY IP 250 OP 250 PS 637: Performed by: STUDENT IN AN ORGANIZED HEALTH CARE EDUCATION/TRAINING PROGRAM

## 2024-02-10 PROCEDURE — 250N000013 HC RX MED GY IP 250 OP 250 PS 637: Performed by: PHYSICIAN ASSISTANT

## 2024-02-10 PROCEDURE — P9037 PLATE PHERES LEUKOREDU IRRAD: HCPCS

## 2024-02-10 PROCEDURE — 80048 BASIC METABOLIC PNL TOTAL CA: CPT | Performed by: PHYSICIAN ASSISTANT

## 2024-02-10 PROCEDURE — 258N000003 HC RX IP 258 OP 636: Performed by: PHYSICIAN ASSISTANT

## 2024-02-10 PROCEDURE — 85520 HEPARIN ASSAY: CPT

## 2024-02-10 PROCEDURE — 250N000009 HC RX 250: Performed by: INTERNAL MEDICINE

## 2024-02-10 PROCEDURE — 258N000003 HC RX IP 258 OP 636: Performed by: INTERNAL MEDICINE

## 2024-02-10 PROCEDURE — 84132 ASSAY OF SERUM POTASSIUM: CPT | Performed by: INTERNAL MEDICINE

## 2024-02-10 PROCEDURE — 99233 SBSQ HOSP IP/OBS HIGH 50: CPT | Performed by: INTERNAL MEDICINE

## 2024-02-10 PROCEDURE — 83735 ASSAY OF MAGNESIUM: CPT | Performed by: INTERNAL MEDICINE

## 2024-02-10 PROCEDURE — 85027 COMPLETE CBC AUTOMATED: CPT | Performed by: PHYSICIAN ASSISTANT

## 2024-02-10 PROCEDURE — 250N000013 HC RX MED GY IP 250 OP 250 PS 637

## 2024-02-10 PROCEDURE — 85520 HEPARIN ASSAY: CPT | Performed by: INTERNAL MEDICINE

## 2024-02-10 PROCEDURE — 84100 ASSAY OF PHOSPHORUS: CPT | Performed by: STUDENT IN AN ORGANIZED HEALTH CARE EDUCATION/TRAINING PROGRAM

## 2024-02-10 PROCEDURE — 250N000011 HC RX IP 250 OP 636

## 2024-02-10 PROCEDURE — 85049 AUTOMATED PLATELET COUNT: CPT

## 2024-02-10 PROCEDURE — 206N000001 HC R&B BMT UMMC

## 2024-02-10 PROCEDURE — 250N000011 HC RX IP 250 OP 636: Mod: JZ | Performed by: PHYSICIAN ASSISTANT

## 2024-02-10 PROCEDURE — 80195 ASSAY OF SIROLIMUS: CPT | Performed by: INTERNAL MEDICINE

## 2024-02-10 PROCEDURE — 250N000011 HC RX IP 250 OP 636: Performed by: INTERNAL MEDICINE

## 2024-02-10 RX ORDER — LOPERAMIDE HCL 2 MG
4 CAPSULE ORAL 4 TIMES DAILY PRN
Status: DISCONTINUED | OUTPATIENT
Start: 2024-02-10 | End: 2024-02-11

## 2024-02-10 RX ORDER — POTASSIUM CHLORIDE 29.8 MG/ML
20 INJECTION INTRAVENOUS
Status: COMPLETED | OUTPATIENT
Start: 2024-02-10 | End: 2024-02-10

## 2024-02-10 RX ORDER — POTASSIUM PHOS IN 0.9 % NACL 15MMOL/250
15 PLASTIC BAG, INJECTION (ML) INTRAVENOUS ONCE
Status: COMPLETED | OUTPATIENT
Start: 2024-02-10 | End: 2024-02-10

## 2024-02-10 RX ADMIN — NIFEDIPINE 30 MG: 30 TABLET, FILM COATED, EXTENDED RELEASE ORAL at 21:01

## 2024-02-10 RX ADMIN — LEVOFLOXACIN 250 MG: 250 TABLET, FILM COATED ORAL at 10:25

## 2024-02-10 RX ADMIN — ACYCLOVIR 800 MG: 800 TABLET ORAL at 21:01

## 2024-02-10 RX ADMIN — Medication 2 SPRAY: at 12:27

## 2024-02-10 RX ADMIN — Medication 2 SPRAY: at 21:14

## 2024-02-10 RX ADMIN — VANCOMYCIN HYDROCHLORIDE 125 MG: 125 CAPSULE ORAL at 08:56

## 2024-02-10 RX ADMIN — DIPHENHYDRAMINE HYDROCHLORIDE AND LIDOCAINE HYDROCHLORIDE AND ALUMINUM HYDROXIDE AND MAGNESIUM HYDRO 10 ML: KIT at 16:55

## 2024-02-10 RX ADMIN — ACYCLOVIR 800 MG: 800 TABLET ORAL at 08:56

## 2024-02-10 RX ADMIN — DOXEPIN HYDROCHLORIDE 25 MG: 10 SOLUTION ORAL at 08:58

## 2024-02-10 RX ADMIN — BENZOCAINE 1 ML: 220 SPRAY, METERED PERIODONTAL at 12:27

## 2024-02-10 RX ADMIN — POTASSIUM CHLORIDE 20 MEQ: 29.8 INJECTION, SOLUTION INTRAVENOUS at 04:27

## 2024-02-10 RX ADMIN — DOXEPIN HYDROCHLORIDE 25 MG: 10 SOLUTION ORAL at 21:13

## 2024-02-10 RX ADMIN — POTASSIUM CHLORIDE 20 MEQ: 29.8 INJECTION, SOLUTION INTRAVENOUS at 05:36

## 2024-02-10 RX ADMIN — DIPHENHYDRAMINE HYDROCHLORIDE AND LIDOCAINE HYDROCHLORIDE AND ALUMINUM HYDROXIDE AND MAGNESIUM HYDRO 10 ML: KIT at 03:23

## 2024-02-10 RX ADMIN — DEXTROSE MONOHYDRATE 450 MCG: 50 INJECTION, SOLUTION INTRAVENOUS at 21:02

## 2024-02-10 RX ADMIN — DOXEPIN HYDROCHLORIDE 25 MG: 10 SOLUTION ORAL at 12:26

## 2024-02-10 RX ADMIN — MICAFUNGIN SODIUM 150 MG: 50 INJECTION, POWDER, LYOPHILIZED, FOR SOLUTION INTRAVENOUS at 09:02

## 2024-02-10 RX ADMIN — MYCOPHENOLATE MOFETIL 1250 MG: 500 INJECTION, POWDER, LYOPHILIZED, FOR SOLUTION INTRAVENOUS at 10:25

## 2024-02-10 RX ADMIN — METOPROLOL SUCCINATE 100 MG: 50 TABLET, EXTENDED RELEASE ORAL at 08:56

## 2024-02-10 RX ADMIN — DIPHENHYDRAMINE HYDROCHLORIDE AND LIDOCAINE HYDROCHLORIDE AND ALUMINUM HYDROXIDE AND MAGNESIUM HYDRO 10 ML: KIT at 12:26

## 2024-02-10 RX ADMIN — DIPHENHYDRAMINE HYDROCHLORIDE AND LIDOCAINE HYDROCHLORIDE AND ALUMINUM HYDROXIDE AND MAGNESIUM HYDRO 10 ML: KIT at 22:10

## 2024-02-10 RX ADMIN — Medication 2 SPRAY: at 16:54

## 2024-02-10 RX ADMIN — DOXEPIN HYDROCHLORIDE 25 MG: 10 SOLUTION ORAL at 16:55

## 2024-02-10 RX ADMIN — PANTOPRAZOLE SODIUM 40 MG: 40 TABLET, DELAYED RELEASE ORAL at 08:56

## 2024-02-10 RX ADMIN — URSODIOL 300 MG: 300 CAPSULE ORAL at 14:16

## 2024-02-10 RX ADMIN — BENZOCAINE 1 ML: 220 SPRAY, METERED PERIODONTAL at 09:20

## 2024-02-10 RX ADMIN — LOPERAMIDE HYDROCHLORIDE 4 MG: 2 CAPSULE ORAL at 12:37

## 2024-02-10 RX ADMIN — Medication 2 SPRAY: at 08:58

## 2024-02-10 RX ADMIN — Medication 5 CAPSULE: at 08:55

## 2024-02-10 RX ADMIN — BENZOCAINE 1 ML: 220 SPRAY, METERED PERIODONTAL at 03:08

## 2024-02-10 RX ADMIN — Medication: at 07:08

## 2024-02-10 RX ADMIN — HEPARIN SODIUM 1350 UNITS/HR: 10000 INJECTION, SOLUTION INTRAVENOUS at 10:24

## 2024-02-10 RX ADMIN — URSODIOL 300 MG: 300 CAPSULE ORAL at 08:56

## 2024-02-10 RX ADMIN — MEROPENEM 1 G: 1 INJECTION, POWDER, FOR SOLUTION INTRAVENOUS at 03:24

## 2024-02-10 RX ADMIN — VANCOMYCIN HYDROCHLORIDE 1250 MG: 10 INJECTION, POWDER, LYOPHILIZED, FOR SOLUTION INTRAVENOUS at 10:25

## 2024-02-10 RX ADMIN — URSODIOL 300 MG: 300 CAPSULE ORAL at 21:01

## 2024-02-10 RX ADMIN — NIFEDIPINE 30 MG: 30 TABLET, FILM COATED, EXTENDED RELEASE ORAL at 08:59

## 2024-02-10 RX ADMIN — DEXTROSE MONOHYDRATE 20 ML: 50 INJECTION, SOLUTION INTRAVENOUS at 21:02

## 2024-02-10 RX ADMIN — LISINOPRIL 40 MG: 10 TABLET ORAL at 08:55

## 2024-02-10 RX ADMIN — VANCOMYCIN HYDROCHLORIDE 125 MG: 125 CAPSULE ORAL at 16:55

## 2024-02-10 RX ADMIN — POTASSIUM PHOSPHATE, MONOBASIC POTASSIUM PHOSPHATE, DIBASIC 15 MMOL: 224; 236 INJECTION, SOLUTION, CONCENTRATE INTRAVENOUS at 06:08

## 2024-02-10 RX ADMIN — VANCOMYCIN HYDROCHLORIDE 125 MG: 125 CAPSULE ORAL at 21:01

## 2024-02-10 RX ADMIN — MYCOPHENOLATE MOFETIL 1250 MG: 500 INJECTION, POWDER, LYOPHILIZED, FOR SOLUTION INTRAVENOUS at 21:15

## 2024-02-10 RX ADMIN — DIPHENHYDRAMINE HYDROCHLORIDE AND LIDOCAINE HYDROCHLORIDE AND ALUMINUM HYDROXIDE AND MAGNESIUM HYDRO 10 ML: KIT at 09:20

## 2024-02-10 RX ADMIN — VANCOMYCIN HYDROCHLORIDE 125 MG: 125 CAPSULE ORAL at 12:26

## 2024-02-10 RX ADMIN — HYDROXYZINE HYDROCHLORIDE 25 MG: 25 TABLET, FILM COATED ORAL at 05:35

## 2024-02-10 RX ADMIN — LORAZEPAM 0.5 MG: 0.5 TABLET ORAL at 03:08

## 2024-02-10 RX ADMIN — VANCOMYCIN HYDROCHLORIDE 1250 MG: 10 INJECTION, POWDER, LYOPHILIZED, FOR SOLUTION INTRAVENOUS at 22:12

## 2024-02-10 ASSESSMENT — ACTIVITIES OF DAILY LIVING (ADL)
ADLS_ACUITY_SCORE: 23

## 2024-02-10 NOTE — PLAN OF CARE
"Tmax 99.8 Ax. OVSS on RA. A&OX4, complains of not being able to sleep, requesting ativan for sleep, education provided on increased PCA dose yesterday and given 0.5mg PO ativan given for sleep with good response, restless again this morning and Atarax given.   Continues to complain of mucositis pain, managing pain with PCA dilaudid, MMW, Doxepin and hurricane spray overnight, rating pain 4-5/10 throughout the night, and will state pain \"isnt too bad\".  Cleared PCA this morning for 26 bumps given with 80 attempts , pt reports many of the attempts were \"probably accidental when getting up to the bathroom\".  And he does not feel he had been using his PCA more than previously.  Reports he is saving all urine, 220cc out overnight of dark hayes urine. Not saving stool despite encouragement.   Heparin level not therapeutic this morning, increased rate per orders, now running at 1350 units per hour and next Anti Xa ordered for 1020 this morning.   Received 1 unit plts and 40mEq K+ replacement this morning, post plt check 53. K+ recheck ordered to be drawn with siro level this morning. 15mmol phos infusing recheck tomorrow AM. Continue POC.     Problem: Adult Inpatient Plan of Care  Goal: Patient-Specific Goal (Individualized)  Description: You can add care plan individualizations to a care plan. Examples of Individualization might be:  \"Parent requests to be called daily at 9am for status\", \"I have a hard time hearing out of my right ear\", or \"Do not touch me to wake me up as it startles  me\".  Outcome: Progressing     Problem: Adult Inpatient Plan of Care  Goal: Plan of Care Review  Description: The Plan of Care Review/Shift note should be completed every shift.  The Outcome Evaluation is a brief statement about your assessment that the patient is improving, declining, or no change.  This information will be displayed automatically on your shift  note.  Outcome: Progressing   Goal Outcome Evaluation:                  "

## 2024-02-10 NOTE — PROGRESS NOTES
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 [id number]       Interval History:   No fevers, less tachy. BC remain negative. Pain much improved with changes to PCA. Does feel very sleepy and was falling asleeping during our conversation, but once he sat up was able to remain awake and was engaged, following conversation, no respiratory depression, no unsteadiness with gait or impulsiveness. Stool continues 7x, will increase imodium availability. No PO intake. No rashes no N/V. Weight stable from yesterday.    Review of Systems    Review of Systems: 10 point ROS negative unless stated in HPI   PHYSICAL EXAM      Weight     Wt Readings from Last 3 Encounters:   02/10/24 90.5 kg (199 lb 8 oz)   01/17/24 90.1 kg (198 lb 9.6 oz)   01/12/24 88.9 kg (196 lb)        KPS: 60    /67   Pulse 107   Temp 98.3  F (36.8  C) (Axillary)   Resp 18   Ht 1.725 m (5' 7.91\")   Wt 90.5 kg (199 lb 8 oz)   SpO2 96%   BMI 30.41 kg/m       General: NAD  Mouth/Throat: Multiple scattered erythematous ulcers on bilateral posterior buccal mucosa, posterior pharynx diffusely erythematous, dry mucosa   Lungs: CTAB, no respiratory distress  Cardiovascular: RRR, no M/R/G   Lymphatics: mild pitting edema BLE  Abdomen: non tender, soft, BS+  Skin: rash on chest/back resolved.  Neuro: A&Ox3, follows conversation, appears tired, not sedated, gait normal  Additional Findings: Powell site NT, no drainage.    Current aGVHD staging:  Skin 0, UGI 0, LGI 0, Liver 0 (keep in note through day +180 for allos)      LABS AND IMAGING: I have " assessed all abnormal lab values for their clinical significance and any values considered clinically significant have been addressed in the assessment and plan.        Lab Results   Component Value Date    WBC 0.2 (LL) 02/10/2024    ANEUTAUTO 2.0 01/28/2024    HGB 8.7 (L) 02/10/2024    HCT 24.4 (L) 02/10/2024    PLT 53 (L) 02/10/2024     02/10/2024    POTASSIUM 3.3 (L) 02/10/2024    CHLORIDE 109 (H) 02/10/2024    CO2 22 02/10/2024     (H) 02/10/2024    BUN 10.3 02/10/2024    CR 0.88 02/10/2024    MAG 2.2 02/10/2024    INR 1.14 02/06/2024         SYSTEMS-BASED ASSESSMENT AND PLAN     Ziggy Hallman is a 48 year old year old male with a PMH of MDS, hx of multiple clots and MI undergoing a MA (Bu/Flu) prep for an 8/8 URD PBSCT day 16    BMT/IEC PROTOCOL for 2015-29  Chemo Prep:   Day -6: Admit  Day -5 through Day -2: Busulfan/Fludarabine  Day -1: Rest.   Keppra prophylaxis      8/8 DP permissive. Cell dose-9.24x10^6  ABO: Donor: O+ Recipient A-  (Minor incompatability, no flush required)   GSCF plan: GCSF to start day +5 until ANC >1500 for 3 consecutive days    HEME/COAG  #SEGMENTAL R PE (2/6): Low intensity hep gtt with platelet goal <50k with post platelet checks. Can restart PTA anticoagulation once platelets stable. Will need outpatient plan as patient is starting to engraft.  #APS work up- lupus antigen +, will follow outpt as may be unreliable in this acute setting and prior APS work up had been unremarkable prior to admission.  #Pancytopenia 2/2 chemo- anemia, leukopenia, thrombocytopenia  - Transfusion parameters: hemoglobin <8 (cardiac hx), platelets <50k (hep gtt)  #Recurrent arterial thromboembolic events:  He has long history of various events including renal infarcts (2011, 2013, 2015), left popliteal embolic episode requiring surgery, anticoagulation (2011), right carotid artery embolic episode with TIA/stroke-like symptoms (2012), embolic MI (2015), gangrenous right toe requiring vascular  surgery/amputation (2017), 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   Extensive hypercoagulable workup to date has been unrevealing.  JAK2 testing negative.  PNH testing negative.  Elevated IgA of unknown significance, no evidence of plasma cell disorder.  - Eliquis and Prasugrel now HELD starting 2/1-x.     IMMUNOCOMPROMISED  #Staph epi bacteremia (Karius >2000 copies)- afebrile since Vanco addition (2/6-x)  #Febrile neutropenia, fever resolved:    -Meropenem (2/6-2/10) deescalated as infectious source identified, transitioned to Levaquin ppx, Vanco (2/6-x) Azithro (2/6-2/9)  -Cefepime (2/2-2/6)   -UC, BC negative, KARIUS shoed staph epi, viral work up negative (parvo pending)  C. Diff - Admit c. Diff triple+ - Start PO vanc 1/21 for 14 day course. - completed on 2/4, with broadening abx restart PO Vanco tx dose x 10 days.  -Prophylaxis plan: ACV LD, Megan HD, Levaquin while neutropenic , Bactrim +28    RISK OF GVHD  - Prophylaxis: PtC day +3, +4, , Siro/MMF to start day +5, Siro level 13.5, hold and recheck 2/10 as engrafting.    CARDIOVASCULAR  #CAD  #Hx of CABG  #HTN   -PTA metoprolol, Lisinopril dose increased 2/2 HTN,  nifidepine ER 30mg BID, labetolol PRN;  #Hyperlipidemia: Holding atorvastatin peritransplant  - Risk of cardiomyopathy:  Baseline EF 50-55%, Global left ventricular function mildly reduced. Grade 1 or early diastolic dysfunction.   - Risk of arrhythmia: Baseline EKG showed NSR, Qtc -425    GI/NUTRITION  #Oropharyngeal mucositis: MMW, hurricaine spray, doxepin, dilaudid PCA 2/3-x (0.3 continuous, 0.3q10)   -Palliative consulted 2/9, continue to follow and provide recs  - Ulcer prophylaxis: Protonix   - VOD Prophylaxis: Ursodiol  - Risk of nausea/vomiting due to chemo: Ativan, Compazine, Zofran   - Moderate malnutrition 2/2 acute on chronic illness- dietician to follow    RENAL/ELECTROLYTES/  #Hematuria- No dysuria, self  resolved  #Hypervolemia 2/2 cytoxan flush - diurese as needed lasix 20mg 2/7.   #Oliguria- output <1L with diuresis and large amounts of IV given. Cr stable and WNL. Patient is having concurrent diarrhea, so this is likely difficult to measure.   -Bladder scan to rule out retention   -UA to evaluate possible etiology  #Proteinuria- possibly exacerbated by persistent HTN (See CV),   #BUN/Cr elevation- FENA, osms, Na+, UA work up indicate prerenal etiology, likely intravascular depletion 2/2 osmotic diuresis with proteinuria. Will encourage PO fluid intake as Cr improved 1/23, IVF boluses as necessary, control HTN. RESOLVED.  - Hypocalcemia in setting hypoalbuminemia, iCa2+, corrected WNL   - Electrolyte management: replace per sliding scale    Psych:    #Anxiety: admits to feeling anxious.  Start zyprexa 5mg HS.  Connect w/ SW.  Psychaitry consult added hydroxyzine.  #Insomnia- PTA ambien, Trazadone added and increased to 100mg. - Ambien and trazadone held during cytoxan period due to frequent urination and concern for falls. resumed following cytoxan flush 1/31.      SOCIAL DETERMINANTS  - Caregiver: Family   - Financial/insurance concerns: See SW notes       Known issues that I take into account for medical decisions, with salient changes to the plan considering these complexities noted above.    Patient Active Problem List   Diagnosis     Amegakaryocytic thrombocytopenia (H)     Anemia due to bone marrow failure, unspecified bone marrow failure type (H)     Aplastic anemia (H)     Clinically Significant Risk Factors        # Hypokalemia: Lowest K = 3.1 mmol/L in last 2 days, will replace as needed     # Hypomagnesemia: Lowest Mg = 1.5 mg/dL in last 2 days, will replace as needed   # Hypoalbuminemia: Lowest albumin = 2 g/dL at 1/22/2024  4:12 AM, will monitor as appropriate     # Thrombocytopenia: Lowest platelets = 33 in last 2 days, will monitor for bleeding          # Obesity: Estimated body mass index is 30.41  "kg/m  as calculated from the following:    Height as of this encounter: 1.725 m (5' 7.91\").    Weight as of this encounter: 90.5 kg (199 lb 8 oz).               Dispo: Remain admitted through engraftment  Please call sister Radha with updates for care and discharge    I spent 30 minutes in the care of this patient today, which included time necessary for preparation for the visit, obtaining history, ordering medications/tests/procedures as medically indicated, review of pertinent medical literature, counseling of the patient, communication of recommendations to the care team, and documentation time.    Toyin Herrmann PA-C  [phone]      ______________________________________________      BMT ATTENDING NOTE    Ziggy Hallman is a 48 year old male, who is day 16 of transplant for MDS.     Subjective:  Better symptomatic/pain management noted. No new infectious signs or symptoms by history.       Vitals reviewed, labs reviewed  PE:  NAD, oriented x3  HEENT: mucositis, erythema  Heart: RRR  Lungs: CTAB  Abdomen: +BS, soft non tender, non distended, rectal area not examined  LE: 1+ edema  Skin: no rash     Assessment and Plan:    1. Heme/BMT:  MDS, here for MAC 8/8 MUD, flu/Bu prep, PTCy/Siro/MMF GVHD prophylaxis, transfusion as needed, history of multiple arterial thrombotic events had been on anticoagulation, 2/6 CT done for fever workup revealed right segmental PE started on low-dose heparin and will maintain platelets above 50,000 to decrease risk of bleeding  2. ID: C. difficile colitis on treatment, neutropenic fevers posttransplant culture negative, currently on vancomycin meropenem and azithromycin switched most recently on 2/6 with decrease in fever trend,  Karius testing with staph epi bacteria detected, discontinue azithromycin and meropenem- deescalate 2/10, checking viral PCR's  3. CV: history of hypertension, coronary artery disease, CABG, hyperlipidemia  4. GI/ FEN: Symptom management of regimen related " toxicity, encourage fluid PO intake, I/O and daily weights, symptomatic management of hemorrhoids  5. Renal: Maintain euvolemic, hematuria resolved continue to monitor  6. PPx: fabi, ACV  7.  Supportive care: PT,  anxiety management, encourage ambulation, optimize nutrition, avoid sarcopenia, PCA for pain management, appreciate palliative care recommendations for pain and supportive care management, other supportive care as detailed above    I spent 50 minutes in the care of Ziggy today, including an independent face-to-face assessment and time monitoring for restoration of hematopoiesis, high-risk organ toxicities from chemotherapy, and GVHD, managing infectious risk due to his immunocompromised state, and encouraging nutrition and physical activity to prevent debility and sarcopenia.  I personally performed all of the medical decision making associated with this visit, counseled Ziggy on my overall assessment and recommendations, communicated my plan to the care team, and edited the above note to reflect my current plan of care.     William Lagos MD

## 2024-02-10 NOTE — PLAN OF CARE
"/73 (BP Location: Left arm)   Pulse 109   Temp 98.6  F (37  C) (Axillary)   Resp 16   Ht 1.725 m (5' 7.91\")   Wt 90.5 kg (199 lb 8 oz)   SpO2 98%   BMI 30.41 kg/m      Hours of care: 0700 - 1900     Neuro: A&Ox4.   Cardiac: Afebrile, VSS.            Respiratory: RA, lung sounds clear, denies SOB  GI/: Voiding spontaneously. Multiple BMs this shift. prn Imodium given x1. Patient denies nausea. Patient has small urine output. Bladder scan showed only 43mL.  Activity: Up independently     Pain: Patient complains of mucositis related pain. PCA pump, scheduled magic mouthwash, scheduled doxepin, and prn hurricane spray utilized for pain management. Reduced appetite due to mucositis.   Skin: No new deficits noted.  Lines: CVC infusing with PCA pump in purple line. Heparin drip infusing in port. PIV, SL. CVC dressing peeling at edge. CVC dressing replaced with IV clear dressing due to scabs under border of dressing.  Drains: none  Replacements: no further replacements needed.     Plan: Continue with current POC. Report changes to primary team.    Goal Outcome Evaluation:      Plan of Care Reviewed With: patient    Overall Patient Progress: improving    Problem: Adult Inpatient Plan of Care  Goal: Plan of Care Review  Description: The Plan of Care Review/Shift note should be completed every shift.  The Outcome Evaluation is a brief statement about your assessment that the patient is improving, declining, or no change.  This information will be displayed automatically on your shift  note.  2/10/2024 1317 by Jocelyn Champion, RN  Outcome: Progressing  Flowsheets (Taken 2/10/2024 1317)  Overall Patient Progress: improving  2/10/2024 1317 by Jocelyn Champion, RN  Outcome: Progressing  Flowsheets (Taken 2/10/2024 1317)  Plan of Care Reviewed With: patient  Overall Patient Progress: improving  Goal: Patient-Specific Goal (Individualized)  Description: You can add care plan individualizations to a care plan. " "Examples of Individualization might be:  \"Parent requests to be called daily at 9am for status\", \"I have a hard time hearing out of my right ear\", or \"Do not touch me to wake me up as it startles  me\".  2/10/2024 1317 by Jocelyn Champion RN  Outcome: Progressing  2/10/2024 1317 by Jocelyn Champion RN  Outcome: Progressing  Goal: Absence of Hospital-Acquired Illness or Injury  2/10/2024 1317 by Jocelyn Champion RN  Outcome: Progressing  2/10/2024 1317 by Jocelyn Champion RN  Outcome: Progressing  Intervention: Identify and Manage Fall Risk  Recent Flowsheet Documentation  Taken 2/10/2024 1230 by Jocelyn Champion RN  Safety Promotion/Fall Prevention: safety round/check completed  Taken 2/10/2024 0830 by Jocelyn Champion RN  Safety Promotion/Fall Prevention: safety round/check completed  Intervention: Prevent Skin Injury  Recent Flowsheet Documentation  Taken 2/10/2024 0830 by Jocelyn Champion RN  Skin Protection:   adhesive use limited   transparent dressing maintained  Device Skin Pressure Protection: adhesive use limited  Intervention: Prevent and Manage VTE (Venous Thromboembolism) Risk  Recent Flowsheet Documentation  Taken 2/10/2024 0830 by Jocelyn Champion RN  VTE Prevention/Management: SCDs (sequential compression devices) off  Intervention: Prevent Infection  Recent Flowsheet Documentation  Taken 2/10/2024 1230 by Jocelyn Champion RN  Infection Prevention:   environmental surveillance performed   equipment surfaces disinfected   hand hygiene promoted   personal protective equipment utilized   rest/sleep promoted   single patient room provided   visitors restricted/screened  Taken 2/10/2024 0830 by Jocelyn Champion RN  Infection Prevention:   environmental surveillance performed   equipment surfaces disinfected   hand hygiene promoted   personal protective equipment utilized   rest/sleep promoted   single patient room provided   visitors restricted/screened  Goal: Optimal " Comfort and Wellbeing  2/10/2024 1317 by Jocelyn Champino RN  Outcome: Progressing  2/10/2024 1317 by Jocelyn Champion RN  Outcome: Progressing  Intervention: Monitor Pain and Promote Comfort  Recent Flowsheet Documentation  Taken 2/10/2024 1230 by Jocelyn Champion RN  Pain Management Interventions: pain pump in use  Taken 2/10/2024 0830 by Jocelyn Champion RN  Pain Management Interventions: pain pump in use  Goal: Readiness for Transition of Care  2/10/2024 1317 by Jocelyn Champion RN  Outcome: Progressing  2/10/2024 1317 by Jocelyn Champion RN  Outcome: Progressing     Problem: Stem Cell/Bone Marrow Transplant  Goal: Optimal Coping with Transplant  2/10/2024 1317 by Jocelyn Champion RN  Outcome: Progressing  2/10/2024 1317 by Jocelyn Champion RN  Outcome: Progressing  Intervention: Optimize Patient/Family Adjustment to Transplant  Recent Flowsheet Documentation  Taken 2/10/2024 0830 by Jocelyn Champion RN  Supportive Measures:   active listening utilized   relaxation techniques promoted   self-care encouraged   verbalization of feelings encouraged  Goal: Symptom-Free Urinary Elimination  2/10/2024 1317 by Jocelyn Champion RN  Outcome: Progressing  2/10/2024 1317 by Jocelyn Champion RN  Outcome: Progressing  Intervention: Prevent or Manage Bladder Irritation  Recent Flowsheet Documentation  Taken 2/10/2024 1230 by Jocelyn Champion RN  Pain Management Interventions: pain pump in use  Taken 2/10/2024 0830 by Jocelyn Champion RN  Pain Management Interventions: pain pump in use  Urinary Elimination Promotion:   frequent voiding encouraged   voiding relaxation promoted  Hyperhydration Management: fluids provided  Goal: Diarrhea Symptom Control  2/10/2024 1317 by Jocelyn Champion RN  Outcome: Progressing  2/10/2024 1317 by Jocelyn Champion RN  Outcome: Progressing  Intervention: Manage Diarrhea  Recent Flowsheet Documentation  Taken 2/10/2024 0830 by Kevin Valente  MILLI Banks  Skin Protection:   adhesive use limited   transparent dressing maintained  Fluid/Electrolyte Management: fluids provided  Goal: Improved Activity Tolerance  2/10/2024 1317 by Jocelyn Champion RN  Outcome: Progressing  2/10/2024 1317 by Jocelyn Champion RN  Outcome: Progressing  Intervention: Promote Improved Energy  Recent Flowsheet Documentation  Taken 2/10/2024 0830 by Jocelyn Champion RN  Sleep/Rest Enhancement: consistent schedule promoted  Activity Management: activity adjusted per tolerance  Environmental Support:   calm environment promoted   distractions minimized   environmental consistency promoted   personal routine supported   rest periods encouraged  Goal: Blood Counts Within Acceptable Range  2/10/2024 1317 by Jocelyn Champion RN  Outcome: Progressing  2/10/2024 1317 by Jocelyn Champion RN  Outcome: Progressing  Intervention: Monitor and Manage Hematologic Symptoms  Recent Flowsheet Documentation  Taken 2/10/2024 1230 by Jocelyn Champion RN  Bleeding Precautions:   blood pressure closely monitored   foot protection facilitated   gentle oral care promoted   monitored for signs of bleeding  Medication Review/Management:   high-risk medications identified   medications reviewed  Taken 2/10/2024 0830 by Jocelyn Champion RN  Sleep/Rest Enhancement: consistent schedule promoted  Bleeding Precautions:   blood pressure closely monitored   foot protection facilitated   gentle oral care promoted   monitored for signs of bleeding  Medication Review/Management:   high-risk medications identified   medications reviewed  Goal: Absence of Hypersensitivity Reaction  2/10/2024 1317 by Jocelyn Champion RN  Outcome: Progressing  2/10/2024 1317 by Jocelyn Champion RN  Outcome: Progressing  Intervention: Manage Infusion-Related Hypersensitivity  Recent Flowsheet Documentation  Taken 2/10/2024 1230 by Jocelyn Champion RN  Stabilization Measures: blood products  administered  Taken 2/10/2024 0830 by Jocelyn Champion RN  Stabilization Measures: blood products administered  Goal: Absence of Infection  2/10/2024 1317 by Jocelyn Champion RN  Outcome: Progressing  2/10/2024 1317 by Jocelyn Champion RN  Outcome: Progressing  Intervention: Prevent and Manage Infection  Recent Flowsheet Documentation  Taken 2/10/2024 1230 by Jocelyn Champion RN  Infection Prevention:   environmental surveillance performed   equipment surfaces disinfected   hand hygiene promoted   personal protective equipment utilized   rest/sleep promoted   single patient room provided   visitors restricted/screened  Infection Management: aseptic technique maintained  Isolation Precautions:   droplet precautions maintained   enteric precautions maintained   contact precautions maintained   cytotoxic precautions maintained   protective environment maintained  Taken 2/10/2024 0830 by Jocelyn Champion RN  Infection Prevention:   environmental surveillance performed   equipment surfaces disinfected   hand hygiene promoted   personal protective equipment utilized   rest/sleep promoted   single patient room provided   visitors restricted/screened  Infection Management: aseptic technique maintained  Isolation Precautions:   droplet precautions maintained   enteric precautions maintained   contact precautions maintained   cytotoxic precautions maintained   protective environment maintained  Goal: Improved Oral Mucous Membrane Health and Integrity  2/10/2024 1317 by Jocelyn Champion RN  Outcome: Progressing  2/10/2024 1317 by Jocelyn Champion RN  Outcome: Progressing  Goal: Nausea and Vomiting Symptom Relief  2/10/2024 1317 by Jocelyn Champion RN  Outcome: Progressing  2/10/2024 1317 by Jocelyn Champion RN  Outcome: Progressing  Goal: Optimal Nutrition Intake  2/10/2024 1317 by Jocelyn Champion RN  Outcome: Progressing  2/10/2024 1317 by Jocelyn Champion RN  Outcome:  Progressing  Intervention: Minimize and Manage Barriers to Oral Intake  Recent Flowsheet Documentation  Taken 2/10/2024 0830 by Jocelyn Champion, RN  Oral Nutrition Promotion: rest periods promoted

## 2024-02-11 LAB
ABO/RH(D): NORMAL
ALBUMIN UR-MCNC: 200 MG/DL
ANION GAP SERPL CALCULATED.3IONS-SCNC: 2 MMOL/L (ref 7–15)
ANTIBODY SCREEN: NEGATIVE
APPEARANCE UR: ABNORMAL
B19V DNA SER QL NAA+PROBE: NOT DETECTED
BACTERIA #/AREA URNS HPF: ABNORMAL /HPF
BACTERIA BLD CULT: NO GROWTH
BACTERIA BLD CULT: NO GROWTH
BASOPHILS # BLD AUTO: ABNORMAL 10*3/UL
BASOPHILS NFR BLD AUTO: ABNORMAL %
BILIRUB UR QL STRIP: NEGATIVE
BLD PROD TYP BPU: NORMAL
BLOOD COMPONENT TYPE: NORMAL
BUN SERPL-MCNC: 10.6 MG/DL (ref 6–20)
CALCIUM SERPL-MCNC: 7.9 MG/DL (ref 8.6–10)
CHLORIDE SERPL-SCNC: 119 MMOL/L (ref 98–107)
CODING SYSTEM: NORMAL
COLOR UR AUTO: ABNORMAL
CREAT SERPL-MCNC: 0.98 MG/DL (ref 0.67–1.17)
CROSSMATCH: NORMAL
DEPRECATED HCO3 PLAS-SCNC: 22 MMOL/L (ref 22–29)
EGFRCR SERPLBLD CKD-EPI 2021: >90 ML/MIN/1.73M2
EOSINOPHIL # BLD AUTO: ABNORMAL 10*3/UL
EOSINOPHIL NFR BLD AUTO: ABNORMAL %
ERYTHROCYTE [DISTWIDTH] IN BLOOD BY AUTOMATED COUNT: 13 % (ref 10–15)
GLUCOSE SERPL-MCNC: 95 MG/DL (ref 70–99)
GLUCOSE UR STRIP-MCNC: NEGATIVE MG/DL
HCT VFR BLD AUTO: 22.1 % (ref 40–53)
HGB BLD-MCNC: 7.9 G/DL (ref 13.3–17.7)
HGB UR QL STRIP: ABNORMAL
IMM GRANULOCYTES # BLD: ABNORMAL 10*3/UL
IMM GRANULOCYTES NFR BLD: ABNORMAL %
ISSUE DATE AND TIME: NORMAL
KETONES UR STRIP-MCNC: NEGATIVE MG/DL
LEUKOCYTE ESTERASE UR QL STRIP: NEGATIVE
LYMPHOCYTES # BLD AUTO: ABNORMAL 10*3/UL
LYMPHOCYTES NFR BLD AUTO: ABNORMAL %
MAGNESIUM SERPL-MCNC: 1.9 MG/DL (ref 1.7–2.3)
MCH RBC QN AUTO: 30.9 PG (ref 26.5–33)
MCHC RBC AUTO-ENTMCNC: 35.7 G/DL (ref 31.5–36.5)
MCV RBC AUTO: 86 FL (ref 78–100)
MONOCYTES # BLD AUTO: ABNORMAL 10*3/UL
MONOCYTES NFR BLD AUTO: ABNORMAL %
MUCOUS THREADS #/AREA URNS LPF: PRESENT /LPF
NEUTROPHILS # BLD AUTO: ABNORMAL 10*3/UL
NEUTROPHILS NFR BLD AUTO: ABNORMAL %
NITRATE UR QL: NEGATIVE
PH UR STRIP: 6 [PH] (ref 5–7)
PHOSPHATE SERPL-MCNC: 2.3 MG/DL (ref 2.5–4.5)
PLATELET # BLD AUTO: 32 10E3/UL (ref 150–450)
PLATELET # BLD AUTO: 48 10E3/UL (ref 150–450)
PLATELET # BLD AUTO: 54 10E3/UL (ref 150–450)
POTASSIUM SERPL-SCNC: 3.4 MMOL/L (ref 3.4–5.3)
POTASSIUM SERPL-SCNC: 4 MMOL/L (ref 3.4–5.3)
RBC # BLD AUTO: 2.56 10E6/UL (ref 4.4–5.9)
RBC URINE: 1 /HPF
SIROLIMUS BLD-MCNC: 9.2 UG/L (ref 5–15)
SODIUM SERPL-SCNC: 143 MMOL/L (ref 135–145)
SP GR UR STRIP: 1.01 (ref 1–1.03)
SPECIMEN EXPIRATION DATE: NORMAL
TME LAST DOSE: NORMAL H
TME LAST DOSE: NORMAL H
UFH PPP CHRO-ACNC: 0.33 IU/ML
UFH PPP CHRO-ACNC: 0.38 IU/ML
UNIT ABO/RH: NORMAL
UNIT NUMBER: NORMAL
UNIT STATUS: NORMAL
UNIT TYPE ISBT: 6200
UNIT TYPE ISBT: 6200
UNIT TYPE ISBT: 9500
UROBILINOGEN UR STRIP-MCNC: NORMAL MG/DL
VANCOMYCIN SERPL-MCNC: 23 UG/ML
WBC # BLD AUTO: 0.3 10E3/UL (ref 4–11)
WBC URINE: 0 /HPF

## 2024-02-11 PROCEDURE — 206N000001 HC R&B BMT UMMC

## 2024-02-11 PROCEDURE — P9040 RBC LEUKOREDUCED IRRADIATED: HCPCS | Performed by: PHYSICIAN ASSISTANT

## 2024-02-11 PROCEDURE — 250N000011 HC RX IP 250 OP 636: Performed by: STUDENT IN AN ORGANIZED HEALTH CARE EDUCATION/TRAINING PROGRAM

## 2024-02-11 PROCEDURE — 80202 ASSAY OF VANCOMYCIN: CPT | Performed by: INTERNAL MEDICINE

## 2024-02-11 PROCEDURE — 81001 URINALYSIS AUTO W/SCOPE: CPT

## 2024-02-11 PROCEDURE — 85049 AUTOMATED PLATELET COUNT: CPT

## 2024-02-11 PROCEDURE — 250N000011 HC RX IP 250 OP 636: Performed by: INTERNAL MEDICINE

## 2024-02-11 PROCEDURE — 99233 SBSQ HOSP IP/OBS HIGH 50: CPT | Performed by: INTERNAL MEDICINE

## 2024-02-11 PROCEDURE — 85520 HEPARIN ASSAY: CPT | Performed by: INTERNAL MEDICINE

## 2024-02-11 PROCEDURE — P9037 PLATE PHERES LEUKOREDU IRRAD: HCPCS

## 2024-02-11 PROCEDURE — 250N000009 HC RX 250: Performed by: STUDENT IN AN ORGANIZED HEALTH CARE EDUCATION/TRAINING PROGRAM

## 2024-02-11 PROCEDURE — 250N000011 HC RX IP 250 OP 636

## 2024-02-11 PROCEDURE — 83735 ASSAY OF MAGNESIUM: CPT | Performed by: INTERNAL MEDICINE

## 2024-02-11 PROCEDURE — 80048 BASIC METABOLIC PNL TOTAL CA: CPT | Performed by: PHYSICIAN ASSISTANT

## 2024-02-11 PROCEDURE — 250N000013 HC RX MED GY IP 250 OP 250 PS 637: Performed by: PHYSICIAN ASSISTANT

## 2024-02-11 PROCEDURE — 258N000003 HC RX IP 258 OP 636: Performed by: STUDENT IN AN ORGANIZED HEALTH CARE EDUCATION/TRAINING PROGRAM

## 2024-02-11 PROCEDURE — 84100 ASSAY OF PHOSPHORUS: CPT | Performed by: INTERNAL MEDICINE

## 2024-02-11 PROCEDURE — 80195 ASSAY OF SIROLIMUS: CPT | Performed by: INTERNAL MEDICINE

## 2024-02-11 PROCEDURE — 85027 COMPLETE CBC AUTOMATED: CPT | Performed by: PHYSICIAN ASSISTANT

## 2024-02-11 PROCEDURE — 250N000013 HC RX MED GY IP 250 OP 250 PS 637

## 2024-02-11 PROCEDURE — 258N000003 HC RX IP 258 OP 636: Performed by: PHYSICIAN ASSISTANT

## 2024-02-11 PROCEDURE — 250N000011 HC RX IP 250 OP 636: Mod: JZ | Performed by: PHYSICIAN ASSISTANT

## 2024-02-11 PROCEDURE — 86900 BLOOD TYPING SEROLOGIC ABO: CPT | Performed by: PHYSICIAN ASSISTANT

## 2024-02-11 PROCEDURE — 84132 ASSAY OF SERUM POTASSIUM: CPT | Performed by: STUDENT IN AN ORGANIZED HEALTH CARE EDUCATION/TRAINING PROGRAM

## 2024-02-11 RX ORDER — VANCOMYCIN HYDROCHLORIDE 1 G/200ML
1000 INJECTION, SOLUTION INTRAVENOUS EVERY 12 HOURS
Status: COMPLETED | OUTPATIENT
Start: 2024-02-11 | End: 2024-02-13

## 2024-02-11 RX ORDER — POTASSIUM CHLORIDE 29.8 MG/ML
20 INJECTION INTRAVENOUS
Status: COMPLETED | OUTPATIENT
Start: 2024-02-11 | End: 2024-02-11

## 2024-02-11 RX ORDER — LOPERAMIDE HCL 2 MG
4 CAPSULE ORAL 4 TIMES DAILY
Status: DISCONTINUED | OUTPATIENT
Start: 2024-02-11 | End: 2024-02-18

## 2024-02-11 RX ORDER — POTASSIUM PHOS IN 0.9 % NACL 15MMOL/250
15 PLASTIC BAG, INJECTION (ML) INTRAVENOUS ONCE
Status: COMPLETED | OUTPATIENT
Start: 2024-02-11 | End: 2024-02-11

## 2024-02-11 RX ADMIN — BENZOCAINE 1 ML: 220 SPRAY, METERED PERIODONTAL at 21:53

## 2024-02-11 RX ADMIN — NIFEDIPINE 30 MG: 30 TABLET, FILM COATED, EXTENDED RELEASE ORAL at 08:11

## 2024-02-11 RX ADMIN — POTASSIUM CHLORIDE 20 MEQ: 29.8 INJECTION, SOLUTION INTRAVENOUS at 05:38

## 2024-02-11 RX ADMIN — VANCOMYCIN HYDROCHLORIDE 1000 MG: 1 INJECTION, SOLUTION INTRAVENOUS at 12:15

## 2024-02-11 RX ADMIN — DEXTROSE MONOHYDRATE 20 ML: 50 INJECTION, SOLUTION INTRAVENOUS at 21:54

## 2024-02-11 RX ADMIN — VANCOMYCIN HYDROCHLORIDE 125 MG: 125 CAPSULE ORAL at 15:55

## 2024-02-11 RX ADMIN — MYCOPHENOLATE MOFETIL 1250 MG: 500 INJECTION, POWDER, LYOPHILIZED, FOR SOLUTION INTRAVENOUS at 21:55

## 2024-02-11 RX ADMIN — DOXEPIN HYDROCHLORIDE 25 MG: 10 SOLUTION ORAL at 21:52

## 2024-02-11 RX ADMIN — Medication 2 SPRAY: at 15:57

## 2024-02-11 RX ADMIN — LOPERAMIDE HYDROCHLORIDE 4 MG: 2 CAPSULE ORAL at 15:55

## 2024-02-11 RX ADMIN — URSODIOL 300 MG: 300 CAPSULE ORAL at 08:12

## 2024-02-11 RX ADMIN — POTASSIUM PHOSPHATE, MONOBASIC POTASSIUM PHOSPHATE, DIBASIC 15 MMOL: 224; 236 INJECTION, SOLUTION, CONCENTRATE INTRAVENOUS at 05:38

## 2024-02-11 RX ADMIN — Medication: at 05:14

## 2024-02-11 RX ADMIN — VANCOMYCIN HYDROCHLORIDE 125 MG: 125 CAPSULE ORAL at 08:12

## 2024-02-11 RX ADMIN — MYCOPHENOLATE MOFETIL 1250 MG: 500 INJECTION, POWDER, LYOPHILIZED, FOR SOLUTION INTRAVENOUS at 09:48

## 2024-02-11 RX ADMIN — HEPARIN SODIUM 1650 UNITS/HR: 10000 INJECTION, SOLUTION INTRAVENOUS at 21:25

## 2024-02-11 RX ADMIN — LOPERAMIDE HYDROCHLORIDE 4 MG: 2 CAPSULE ORAL at 12:16

## 2024-02-11 RX ADMIN — Medication 2 SPRAY: at 08:10

## 2024-02-11 RX ADMIN — LISINOPRIL 40 MG: 10 TABLET ORAL at 08:11

## 2024-02-11 RX ADMIN — Medication 2 SPRAY: at 12:17

## 2024-02-11 RX ADMIN — NIFEDIPINE 30 MG: 30 TABLET, FILM COATED, EXTENDED RELEASE ORAL at 21:52

## 2024-02-11 RX ADMIN — HEPARIN SODIUM 1650 UNITS/HR: 10000 INJECTION, SOLUTION INTRAVENOUS at 04:50

## 2024-02-11 RX ADMIN — ACYCLOVIR 800 MG: 800 TABLET ORAL at 08:10

## 2024-02-11 RX ADMIN — ACYCLOVIR 800 MG: 800 TABLET ORAL at 21:52

## 2024-02-11 RX ADMIN — Medication 2 SPRAY: at 21:53

## 2024-02-11 RX ADMIN — METOPROLOL SUCCINATE 100 MG: 50 TABLET, EXTENDED RELEASE ORAL at 08:10

## 2024-02-11 RX ADMIN — LOPERAMIDE HYDROCHLORIDE 4 MG: 2 CAPSULE ORAL at 21:52

## 2024-02-11 RX ADMIN — DOXEPIN HYDROCHLORIDE 25 MG: 10 SOLUTION ORAL at 15:55

## 2024-02-11 RX ADMIN — BENZOCAINE 1 ML: 220 SPRAY, METERED PERIODONTAL at 04:25

## 2024-02-11 RX ADMIN — LEVOFLOXACIN 250 MG: 250 TABLET, FILM COATED ORAL at 12:16

## 2024-02-11 RX ADMIN — URSODIOL 300 MG: 300 CAPSULE ORAL at 14:06

## 2024-02-11 RX ADMIN — DEXTROSE MONOHYDRATE 450 MCG: 50 INJECTION, SOLUTION INTRAVENOUS at 21:54

## 2024-02-11 RX ADMIN — DOXEPIN HYDROCHLORIDE 25 MG: 10 SOLUTION ORAL at 08:09

## 2024-02-11 RX ADMIN — DIPHENHYDRAMINE HYDROCHLORIDE AND LIDOCAINE HYDROCHLORIDE AND ALUMINUM HYDROXIDE AND MAGNESIUM HYDRO 10 ML: KIT at 23:05

## 2024-02-11 RX ADMIN — DIPHENHYDRAMINE HYDROCHLORIDE AND LIDOCAINE HYDROCHLORIDE AND ALUMINUM HYDROXIDE AND MAGNESIUM HYDRO 10 ML: KIT at 04:17

## 2024-02-11 RX ADMIN — VANCOMYCIN HYDROCHLORIDE 1000 MG: 1 INJECTION, SOLUTION INTRAVENOUS at 21:53

## 2024-02-11 RX ADMIN — VANCOMYCIN HYDROCHLORIDE 125 MG: 125 CAPSULE ORAL at 12:16

## 2024-02-11 RX ADMIN — DOXEPIN HYDROCHLORIDE 25 MG: 10 SOLUTION ORAL at 12:17

## 2024-02-11 RX ADMIN — URSODIOL 300 MG: 300 CAPSULE ORAL at 21:53

## 2024-02-11 RX ADMIN — MICAFUNGIN SODIUM 150 MG: 50 INJECTION, POWDER, LYOPHILIZED, FOR SOLUTION INTRAVENOUS at 08:12

## 2024-02-11 RX ADMIN — DIPHENHYDRAMINE HYDROCHLORIDE AND LIDOCAINE HYDROCHLORIDE AND ALUMINUM HYDROXIDE AND MAGNESIUM HYDRO 10 ML: KIT at 15:55

## 2024-02-11 RX ADMIN — PANTOPRAZOLE SODIUM 40 MG: 40 TABLET, DELAYED RELEASE ORAL at 08:11

## 2024-02-11 RX ADMIN — VANCOMYCIN HYDROCHLORIDE 125 MG: 125 CAPSULE ORAL at 21:52

## 2024-02-11 RX ADMIN — LOPERAMIDE HYDROCHLORIDE 4 MG: 2 CAPSULE ORAL at 08:11

## 2024-02-11 RX ADMIN — Medication 5 CAPSULE: at 08:11

## 2024-02-11 RX ADMIN — DIPHENHYDRAMINE HYDROCHLORIDE AND LIDOCAINE HYDROCHLORIDE AND ALUMINUM HYDROXIDE AND MAGNESIUM HYDRO 10 ML: KIT at 08:09

## 2024-02-11 RX ADMIN — POTASSIUM CHLORIDE 20 MEQ: 29.8 INJECTION, SOLUTION INTRAVENOUS at 04:17

## 2024-02-11 ASSESSMENT — ACTIVITIES OF DAILY LIVING (ADL)
ADLS_ACUITY_SCORE: 23

## 2024-02-11 NOTE — PROGRESS NOTES
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 [id number]       Interval History:     Pain is ok, he thinks getting better. He is actually feeling comfortable enough to try eating- had some meat, mashed potatoes and veggies last night that went ok he ate about 1/2. Not sleeping well, talked with his RN today about consolidating cares. Stool x7, increased imodium, no rashes or N/V.     Review of Systems    Review of Systems: 10 point ROS negative unless stated in HPI   PHYSICAL EXAM      Weight     Wt Readings from Last 3 Encounters:   02/11/24 92 kg (202 lb 14.4 oz)   01/17/24 90.1 kg (198 lb 9.6 oz)   01/12/24 88.9 kg (196 lb)        KPS: 60    /79 (BP Location: Left arm)   Pulse 110   Temp 97.5  F (36.4  C) (Axillary)   Resp 20   Ht 1.725 m (5' 7.91\")   Wt 92 kg (202 lb 14.4 oz)   SpO2 96%   BMI 30.93 kg/m       General: NAD, appears very tired  Mouth/Throat: Multiple scattered erythematous ulcers on bilateral posterior buccal mucosa, posterior pharynx diffusely erythematous, dry mucosa   Lungs: CTAB, no respiratory distress  Cardiovascular: RRR, no M/R/G   Lymphatics: mild pitting edema BLE  Abdomen: non tender, soft, BS+  Skin: rash on chest/back resolved  Neuro: A&Ox3, follows conversation, appears tired, not sedated, gait normal  Additional Findings: Powell site NT, no drainage.    Current aGVHD staging:  Skin 0, UGI 0, LGI 0, Liver 0 (keep in note through day +180 for allos)      LABS AND IMAGING: I have assessed all abnormal lab values for their clinical significance and any values considered " clinically significant have been addressed in the assessment and plan.        Lab Results   Component Value Date    WBC 0.3 (LL) 02/11/2024    ANEUTAUTO 2.0 01/28/2024    HGB 7.9 (L) 02/11/2024    HCT 22.1 (L) 02/11/2024    PLT 48 (LL) 02/11/2024     02/11/2024    POTASSIUM 3.4 02/11/2024    CHLORIDE 119 (H) 02/11/2024    CO2 22 02/11/2024    GLC 95 02/11/2024    BUN 10.6 02/11/2024    CR 0.98 02/11/2024    MAG 1.9 02/11/2024    INR 1.14 02/06/2024         SYSTEMS-BASED ASSESSMENT AND PLAN     Ziggy Hallman is a 48 year old year old male with a PMH of MDS, hx of multiple clots and MI undergoing a MA (Bu/Flu) prep for an 8/8 URD PBSCT day 17. Stay has been complicated by mucositis requiring PCA, N/F, Staph Epi bacteremia, pulmonary embolism requiring hep gtt.    BMT/IEC PROTOCOL for 2015-29  Chemo Prep:   Day -6: Admit  Day -5 through Day -2: Busulfan/Fludarabine  Day -1: Rest.   Keppra prophylaxis      8/8 DP permissive. Cell dose-9.24x10^6  ABO: Donor: O+ Recipient A-  (Minor incompatability, no flush required)   GSCF plan: GCSF to start day +5 until ANC >1500 for 3 consecutive days    HEME/COAG  #SEGMENTAL R PE (2/6): Low intensity hep gtt with platelet goal <50k with post platelet checks. Can restart PTA anticoagulation once platelets stable. Will need outpatient plan as patient is starting to engraft (lovenox to eliquis/prasagruel likely).  #APS work up- lupus antigen +, will follow outpt as may be unreliable in this acute setting and prior APS work up had been unremarkable prior to admission.  #Pancytopenia 2/2 chemo- anemia, leukopenia, thrombocytopenia  #Transfusion parameters: hemoglobin <8 (cardiac hx), platelets <50k (hep gtt)  #Recurrent arterial thromboembolic events:  He has long history of various events including renal infarcts (2011, 2013, 2015), left popliteal embolic episode requiring surgery, anticoagulation (2011), right carotid artery embolic episode with TIA/stroke-like symptoms (2012),  embolic MI (2015), gangrenous right toe requiring vascular surgery/amputation (2017), 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   Extensive hypercoagulable workup to date has been unrevealing.  JAK2 testing negative.  PNH testing negative.  Elevated IgA of unknown significance, no evidence of plasma cell disorder.  - Eliquis and Prasugrel now HELD starting 2/1-x.     IMMUNOCOMPROMISED  #Staph epi bacteremia (Karius >2000 copies)- afebrile since Vanco added x7 days(2/6-2/12)  #Febrile neutropenia, fever resolved:    -Meropenem (2/6-2/10) deescalated as infectious source identified, transitioned to Levaquin ppx, Vanco (2/6-2/12) Azithro (2/6-2/9)  -Cefepime (2/2-2/6)   -UC, BC negative, KARIUS shoed staph epi, viral work up negative (parvo pending)  #C. Diff - Admit c. Diff triple+ - Start PO vanc 1/21 for 14 day course. - completed on 2/4, with broadening abx restart PO Vanco tx dose x 10 days.  -Prophylaxis plan: ACV LD, Megan HD, Levaquin while neutropenic , Bactrim +28    RISK OF GVHD  - Prophylaxis: PtC day +3, +4, , Siro/MMF to start day +5, Siro level ~11, hold and recheck 2/11 as engrafting.    CARDIOVASCULAR  #CAD  #Hx of CABG  #HTN   -PTA metoprolol, Lisinopril dose increased 2/2 HTN,  nifidepine ER 30mg BID, labetolol PRN;  #Hyperlipidemia: Holding atorvastatin peritransplant  - Risk of cardiomyopathy:  Baseline EF 50-55%, Global left ventricular function mildly reduced. Grade 1 or early diastolic dysfunction.   - Risk of arrhythmia: Baseline EKG showed NSR, Qtc -425    GI/NUTRITION  #Oropharyngeal mucositis: MMW, hurricaine spray, doxepin, dilaudid PCA (2/3-x) (0.3 continuous, 0.3q10)   -Palliative consulted 2/9, continue to follow and provide recs  - Ulcer prophylaxis: Protonix   - VOD Prophylaxis: Ursodiol  - Risk of nausea/vomiting due to chemo: Ativan, Compazine, Zofran   - Moderate malnutrition 2/2 acute on chronic illness- dietician to follow,  "appetite improving with pain control.    RENAL/ELECTROLYTES/  #Hematuria- No dysuria, self resolved  #Hypervolemia 2/2 cytoxan flush - diurese as needed lasix 20mg 2/7, 2/09. Likely repeat lasix dosing if persistent weight gain and edema on 2/12- given break due to diarrhea, pain, no edema/dyspnea.  #Oliguria 2/9 (Resolved)- output <1L with diuresis and large amounts of IV given. Cr stable and WNL. Patient is having concurrent diarrhea, so this is likely difficult to measure.   -Bladder scan unremarkable   -UA shows proteinuria  #Proteinuria- possibly exacerbated by persistent HTN (See CV),   - Hypocalcemia in setting hypoalbuminemia, iCa2+, corrected WNL   - Electrolyte management: replace per sliding scale    Psych:    #Anxiety: admits to feeling anxious.  Start zyprexa 5mg HS.  Connect w/ SW.  Psychaitry consult added hydroxyzine.  #Insomnia- PTA ambien, Trazadone added and increased to 100mg. - Ambien and trazadone held during cytoxan period due to frequent urination and concern for falls. Discontinued with opioid PCA, melatonin added.     SOCIAL DETERMINANTS  - Caregiver: Family   - Financial/insurance concerns: See SW notes       Known issues that I take into account for medical decisions, with salient changes to the plan considering these complexities noted above.    Patient Active Problem List   Diagnosis     Amegakaryocytic thrombocytopenia (H)     Anemia due to bone marrow failure, unspecified bone marrow failure type (H)     Aplastic anemia (H)     Clinically Significant Risk Factors        # Hypokalemia: Lowest K = 3.3 mmol/L in last 2 days, will replace as needed       # Hypoalbuminemia: Lowest albumin = 2 g/dL at 1/22/2024  4:12 AM, will monitor as appropriate     # Thrombocytopenia: Lowest platelets = 32 in last 2 days, will monitor for bleeding          # Obesity: Estimated body mass index is 30.93 kg/m  as calculated from the following:    Height as of this encounter: 1.725 m (5' 7.91\").    Weight " as of this encounter: 92 kg (202 lb 14.4 oz).               Dispo: Remain admitted through engraftment  Please call sister Radha with updates for care and discharge    I spent 30 minutes in the care of this patient today, which included time necessary for preparation for the visit, obtaining history, ordering medications/tests/procedures as medically indicated, review of pertinent medical literature, counseling of the patient, communication of recommendations to the care team, and documentation time.    Toyin Herrmann PA-C  [phone]      ______________________________________________      BMT ATTENDING NOTE    Ziggy Hallman is a 48 year old male, who is day 17 of transplant for MDS.     Subjective:  Better symptomatic/pain management noted. No new infectious signs or symptoms by history.       Vitals reviewed, labs reviewed  PE:  NAD, oriented x3  HEENT: mucositis, erythema  Heart: RRR  Lungs: CTAB  Abdomen: +BS, soft non tender, non distended, rectal area not examined  LE: 1+ edema  Skin: no rash     Assessment and Plan:    1. Heme/BMT:  MDS, here for MAC 8/8 MUD, flu/Bu prep, PTCy/Siro/MMF GVHD prophylaxis, transfusion as needed, history of multiple arterial thrombotic events had been on anticoagulation, 2/6 CT done for fever workup revealed right segmental PE started on low-dose heparin and will maintain platelets above 50,000 to decrease risk of bleeding  2. ID: C. difficile colitis on treatment, neutropenic fevers posttransplant culture negative, currently on vancomycin meropenem and azithromycin switched most recently on 2/6 with decrease in fever trend,  Karius testing with staph epi bacteria detected, discontinue azithromycin and meropenem- deescalate 2/10, checking viral PCR's  3. CV: history of hypertension, coronary artery disease, CABG, hyperlipidemia  4. GI/ FEN: Symptom management of regimen related toxicity, encourage fluid PO intake, I/O and daily weights, symptomatic management of  hemorrhoids  5. Renal: Maintain euvolemic, hematuria resolved continue to monitor  6. PPx: fabi, ACV  7.  Supportive care: PT,  anxiety management, encourage ambulation, optimize nutrition, avoid sarcopenia, PCA for pain management, appreciate palliative care recommendations for pain and supportive care management, other supportive care as detailed above    I spent 50 minutes in the care of Ziggy today, including an independent face-to-face assessment and time monitoring for restoration of hematopoiesis, high-risk organ toxicities from chemotherapy, and GVHD, managing infectious risk due to his immunocompromised state, and encouraging nutrition and physical activity to prevent debility and sarcopenia.  I personally performed all of the medical decision making associated with this visit, counseled Ziggy on my overall assessment and recommendations, communicated my plan to the care team, and edited the above note to reflect my current plan of care.     William Lagos MD

## 2024-02-11 NOTE — PLAN OF CARE
Tmax 99.8. OVSS on RA. Continues to complain of mucositis related pain. Managing pain with PCA dilaudid, MMW, doxepin, and hurricane spray. Pt drowsy throughout the night, arouses to voice and oriented x4 but falls asleep mid conversation.   Heparin drip therapeutic, infusing at 1650 units/hour. Next redraw scheduled for 0400.   40 mEq K+ replaced, recheck ordered for this AM. 15 mmol phos and 1 unit PRBCs infusing. 1 unit plt transfused and post plt count 48, additional unit prepared, will pass to oncoming RN to release when ready.     Problem: Adult Inpatient Plan of Care  Goal: Plan of Care Review  Description: The Plan of Care Review/Shift note should be completed every shift.  The Outcome Evaluation is a brief statement about your assessment that the patient is improving, declining, or no change.  This information will be displayed automatically on your shift  note.  Outcome: Progressing     Problem: Adult Inpatient Plan of Care  Goal: Absence of Hospital-Acquired Illness or Injury  Outcome: Progressing  Intervention: Identify and Manage Fall Risk  Recent Flowsheet Documentation  Taken 2/11/2024 0301 by Payal Hamlin RN  Safety Promotion/Fall Prevention: safety round/check completed  Taken 2/11/2024 0039 by Payal Hamlin RN  Safety Promotion/Fall Prevention: safety round/check completed  Taken 2/10/2024 2100 by Payal Hamlin RN  Safety Promotion/Fall Prevention: safety round/check completed  Intervention: Prevent Skin Injury  Recent Flowsheet Documentation  Taken 2/10/2024 2100 by Payal Hamlin, RN  Body Position: position changed independently  Skin Protection: adhesive use limited  Device Skin Pressure Protection: adhesive use limited   Goal Outcome Evaluation:

## 2024-02-11 NOTE — PLAN OF CARE
"Goal Outcome Evaluation:       Reports mouth pain is well managed with dilaudid pca. Denies nausea. Frustrated by lack of privacy and \"constant interruptions to sleep\". Agreed to batch cares and meds to every 1.5-2 hour increments as much as possible. Platelets transfusing, lab recheck due 1 hour post. Heparin drip unchanged with lab recheck at 0400. Loose stools reported with po imodium scheduled. Poor appetite. Independent to bathroom. VSS.           Problem: Adult Inpatient Plan of Care  Goal: Absence of Hospital-Acquired Illness or Injury  Intervention: Identify and Manage Fall Risk  Recent Flowsheet Documentation  Taken 2/11/2024 1400 by Cecelia Aparicio RN  Safety Promotion/Fall Prevention: safety round/check completed  Taken 2/11/2024 1200 by Cecelia Aparicio RN  Safety Promotion/Fall Prevention: safety round/check completed  Taken 2/11/2024 1100 by Cecelia Aparicio RN  Safety Promotion/Fall Prevention: safety round/check completed  Taken 2/11/2024 0815 by Cecelia Aparicio RN  Safety Promotion/Fall Prevention: safety round/check completed     Problem: Adult Inpatient Plan of Care  Goal: Absence of Hospital-Acquired Illness or Injury  Intervention: Prevent Infection  Recent Flowsheet Documentation  Taken 2/11/2024 0815 by Cecelia Aparicio RN  Infection Prevention: environmental surveillance performed     Problem: Adult Inpatient Plan of Care  Goal: Optimal Comfort and Wellbeing  Outcome: Progressing  Intervention: Monitor Pain and Promote Comfort  Recent Flowsheet Documentation  Taken 2/11/2024 0815 by Cecelia Aparicio, RN  Pain Management Interventions: pain pump in use         "

## 2024-02-11 NOTE — PLAN OF CARE
"/68 (BP Location: Right arm)   Pulse 87   Temp 98.2  F (36.8  C) (Axillary)   Resp 16   Ht 1.725 m (5' 7.91\")   Wt 92 kg (202 lb 14.4 oz)   SpO2 91%   BMI 30.93 kg/m      VSS on room air. Mouth pain is well managed with dilaudid pca per patient. Platelet recheck is 54; no further replacements needed. Heparin unchanged with lab recheck next morning at 0400. Imodium given as scheduled for loose stools. Poor appetite. Independent. Continue with plan of care; writer will notify provider with any new changes.     Goal Outcome Evaluation:      Plan of Care Reviewed With: patient    Overall Patient Progress: improving    Problem: Adult Inpatient Plan of Care  Goal: Plan of Care Review  Description: The Plan of Care Review/Shift note should be completed every shift.  The Outcome Evaluation is a brief statement about your assessment that the patient is improving, declining, or no change.  This information will be displayed automatically on your shift  note.  Outcome: Progressing  Flowsheets (Taken 2/11/2024 1755)  Plan of Care Reviewed With: patient  Overall Patient Progress: improving  Goal: Patient-Specific Goal (Individualized)  Description: You can add care plan individualizations to a care plan. Examples of Individualization might be:  \"Parent requests to be called daily at 9am for status\", \"I have a hard time hearing out of my right ear\", or \"Do not touch me to wake me up as it startles  me\".  Outcome: Progressing  Goal: Absence of Hospital-Acquired Illness or Injury  Outcome: Progressing  Intervention: Identify and Manage Fall Risk  Recent Flowsheet Documentation  Taken 2/11/2024 1630 by Jocelyn Champion RN  Safety Promotion/Fall Prevention: safety round/check completed  Intervention: Prevent Skin Injury  Recent Flowsheet Documentation  Taken 2/11/2024 1630 by Jocelyn Champion, RN  Body Position: position changed independently  Skin Protection: adhesive use limited  Device Skin Pressure Protection: " adhesive use limited  Intervention: Prevent and Manage VTE (Venous Thromboembolism) Risk  Recent Flowsheet Documentation  Taken 2/11/2024 1630 by Jocelyn Champion RN  VTE Prevention/Management: (ambulation promoted) SCDs (sequential compression devices) off  Intervention: Prevent Infection  Recent Flowsheet Documentation  Taken 2/11/2024 1630 by Jocelyn Champion RN  Infection Prevention: environmental surveillance performed  Goal: Optimal Comfort and Wellbeing  Outcome: Progressing  Intervention: Monitor Pain and Promote Comfort  Recent Flowsheet Documentation  Taken 2/11/2024 1630 by Jocelyn Champion RN  Pain Management Interventions: pain pump in use  Goal: Readiness for Transition of Care  Outcome: Progressing     Problem: Stem Cell/Bone Marrow Transplant  Goal: Optimal Coping with Transplant  Outcome: Progressing  Intervention: Optimize Patient/Family Adjustment to Transplant  Recent Flowsheet Documentation  Taken 2/11/2024 1630 by Jocelyn Champion RN  Supportive Measures: active listening utilized  Goal: Symptom-Free Urinary Elimination  Outcome: Progressing  Intervention: Prevent or Manage Bladder Irritation  Recent Flowsheet Documentation  Taken 2/11/2024 1630 by Jocelyn Champion RN  Pain Management Interventions: pain pump in use  Urinary Elimination Promotion: frequent voiding encouraged  Hyperhydration Management: fluids provided  Goal: Diarrhea Symptom Control  Outcome: Progressing  Intervention: Manage Diarrhea  Recent Flowsheet Documentation  Taken 2/11/2024 1630 by Jocelyn Champion RN  Skin Protection: adhesive use limited  Fluid/Electrolyte Management: fluids provided  Goal: Improved Activity Tolerance  Outcome: Progressing  Intervention: Promote Improved Energy  Recent Flowsheet Documentation  Taken 2/11/2024 1630 by Jocelyn Champion RN  Fatigue Management: frequent rest breaks encouraged  Sleep/Rest Enhancement: consistent schedule promoted  Activity Management: activity  adjusted per tolerance  Environmental Support: calm environment promoted  Goal: Blood Counts Within Acceptable Range  Outcome: Progressing  Intervention: Monitor and Manage Hematologic Symptoms  Recent Flowsheet Documentation  Taken 2/11/2024 1630 by Jocelyn Champion RN  Sleep/Rest Enhancement: consistent schedule promoted  Bleeding Precautions: blood pressure closely monitored  Medication Review/Management: high-risk medications identified  Goal: Absence of Hypersensitivity Reaction  Outcome: Progressing  Intervention: Manage Infusion-Related Hypersensitivity  Recent Flowsheet Documentation  Taken 2/11/2024 1630 by Jocelyn Champion RN  Stabilization Measures: blood products administered  Goal: Absence of Infection  Outcome: Progressing  Intervention: Prevent and Manage Infection  Recent Flowsheet Documentation  Taken 2/11/2024 1630 by Jocelyn Champion RN  Infection Prevention: environmental surveillance performed  Infection Management: aseptic technique maintained  Isolation Precautions:   droplet precautions maintained   contact precautions maintained   enteric precautions maintained  Goal: Improved Oral Mucous Membrane Health and Integrity  Outcome: Progressing  Goal: Nausea and Vomiting Symptom Relief  Outcome: Progressing  Goal: Optimal Nutrition Intake  Outcome: Progressing  Intervention: Minimize and Manage Barriers to Oral Intake  Recent Flowsheet Documentation  Taken 2/11/2024 1630 by Jocelyn Champion RN  Oral Nutrition Promotion: rest periods promoted

## 2024-02-11 NOTE — PHARMACY-VANCOMYCIN DOSING SERVICE
Pharmacy Vancomycin Note  Date of Service 2024  Patient's  1975   48 year old, male    Indication: Febrile Neutropenia  Day of Therapy: 6  Current vancomycin regimen:  1250 mg IV q12h  Current vancomycin monitoring method: AUC  Current vancomycin therapeutic monitoring goal: 400-600 mg*h/L    [employer] Prediction of Current Vancomycin Regimen  Loading dose: N/A  Regimen: 1250 mg IV every 12 hours.  Start time: 10:12 on 2024  Exposure target: AUC24 (range)400-600 mg/L.hr   AUC24,ss: 627 mg/L.hr  Probability of AUC24 > 400: 100 %  Ctrough,ss: 21.3 mg/L  Probability of Ctrough,ss > 20: 67 %  Probability of nephrotoxicity (Lodise CANDY ): 20 %      Current estimated CrCl = Estimated Creatinine Clearance: 99.1 mL/min (based on SCr of 0.98 mg/dL).    Creatinine for last 3 days  2024:  3:57 AM Creatinine 0.88 mg/dL  2/10/2024:  3:17 AM Creatinine 0.88 mg/dL  2024:  2:54 AM Creatinine 0.98 mg/dL    Recent Vancomycin Levels (past 3 days)  2024:  9:52 AM Vancomycin 12.2 ug/mL  2024:  8:36 AM Vancomycin 23.0 ug/mL    Vancomycin IV Administrations (past 72 hours)                     vancomycin (VANCOCIN) 1,250 mg in 0.9% NaCl 250 mL intermittent infusion (mg) 1,250 mg New Bag 02/10/24 2212     1,250 mg New Bag  1025     1,250 mg New Bag 24 2320     1,250 mg New Bag  1103     1,250 mg New Bag 24 2142     1,250 mg New Bag  1050                    Nephrotoxins and other renal medications (From now, onward)      Start     Dose/Rate Route Frequency Ordered Stop    24 1030  vancomycin (VANCOCIN) 1,000 mg in 200 mL dextrose intermittent infusion         1,000 mg  200 mL/hr over 1 Hours Intravenous EVERY 12 HOURS 24 0932 24 2359    24 0930  vancomycin (VANCOCIN) capsule 125 mg         125 mg Oral 4 TIMES DAILY 24 0858 24 0759    24 0800  acyclovir (ZOVIRAX) tablet 800 mg         800 mg Oral 2 TIMES DAILY 24 1146      24 0800   lisinopril (ZESTRIL) tablet 40 mg         40 mg Oral DAILY 01/20/24 0914                 Contrast Orders - past 72 hours (72h ago, onward)      None            Interpretation of levels and current regimen:  Vancomycin level is reflective of AUC greater than 600    Has serum creatinine changed greater than 50% in last 72 hours: No    Urine output:  Appears not charted completely but multiple unmeasured occurrences reflects adequate urine output    Renal Function: Stable    [employer]ight[employer] Prediction of Planned New Vancomycin Regimen  Loading dose: N/A  Regimen: 1000 mg IV every 12 hours.  Start time: 10:12 on 02/11/2024  Exposure target: AUC24 (range)400-600 mg/L.hr   AUC24,ss: 510 mg/L.hr  Probability of AUC24 > 400: 99 %  Ctrough,ss: 17.2 mg/L  Probability of Ctrough,ss > 20: 16 %  Probability of nephrotoxicity (Lodise CANDY 2009): 13 %      Plan:  Decrease Dose to 1000 mg q12h  Vancomycin monitoring method: AUC  Vancomycin therapeutic monitoring goal: 400-600 mg*h/L  Pharmacy will check vancomycin levels as appropriate in  1-3 days .  Serum creatinine levels will be ordered daily for the first week of therapy and at least twice weekly for subsequent weeks.    TONIE PHAM [facility]

## 2024-02-12 LAB
ACANTHOCYTES BLD QL SMEAR: NORMAL
ALBUMIN SERPL BCG-MCNC: 2.8 G/DL (ref 3.5–5.2)
ALP SERPL-CCNC: 72 U/L (ref 40–150)
ALT SERPL W P-5'-P-CCNC: 14 U/L (ref 0–70)
ANION GAP SERPL CALCULATED.3IONS-SCNC: 11 MMOL/L (ref 7–15)
AST SERPL W P-5'-P-CCNC: 26 U/L (ref 0–45)
AUER BODIES BLD QL SMEAR: NORMAL
BACTERIA BLD CULT: NO GROWTH
BACTERIA BLD CULT: NO GROWTH
BASO STIPL BLD QL SMEAR: NORMAL
BASOPHILS # BLD AUTO: ABNORMAL 10*3/UL
BASOPHILS NFR BLD AUTO: ABNORMAL %
BILIRUB DIRECT SERPL-MCNC: <0.2 MG/DL (ref 0–0.3)
BILIRUB SERPL-MCNC: 0.3 MG/DL
BITE CELLS BLD QL SMEAR: NORMAL
BLISTER CELLS BLD QL SMEAR: NORMAL
BUN SERPL-MCNC: 10.2 MG/DL (ref 6–20)
BURR CELLS BLD QL SMEAR: NORMAL
CALCIUM SERPL-MCNC: 8.1 MG/DL (ref 8.6–10)
CHLORIDE SERPL-SCNC: 110 MMOL/L (ref 98–107)
CREAT SERPL-MCNC: 0.96 MG/DL (ref 0.67–1.17)
DACRYOCYTES BLD QL SMEAR: NORMAL
DEPRECATED HCO3 PLAS-SCNC: 23 MMOL/L (ref 22–29)
EGFRCR SERPLBLD CKD-EPI 2021: >90 ML/MIN/1.73M2
ELLIPTOCYTES BLD QL SMEAR: NORMAL
EOSINOPHIL # BLD AUTO: ABNORMAL 10*3/UL
EOSINOPHIL NFR BLD AUTO: ABNORMAL %
ERYTHROCYTE [DISTWIDTH] IN BLOOD BY AUTOMATED COUNT: 13.6 % (ref 10–15)
FRAGMENTS BLD QL SMEAR: NORMAL
GLUCOSE SERPL-MCNC: 87 MG/DL (ref 70–99)
HCT VFR BLD AUTO: 24.6 % (ref 40–53)
HGB BLD-MCNC: 8.7 G/DL (ref 13.3–17.7)
HGB C CRYSTALS: NORMAL
HOWELL-JOLLY BOD BLD QL SMEAR: NORMAL
IMM GRANULOCYTES # BLD: ABNORMAL 10*3/UL
IMM GRANULOCYTES NFR BLD: ABNORMAL %
INR PPP: 1.12 (ref 0.85–1.15)
LYMPHOCYTES # BLD AUTO: ABNORMAL 10*3/UL
LYMPHOCYTES NFR BLD AUTO: ABNORMAL %
MAGNESIUM SERPL-MCNC: 1.8 MG/DL (ref 1.7–2.3)
MCH RBC QN AUTO: 30.6 PG (ref 26.5–33)
MCHC RBC AUTO-ENTMCNC: 35.4 G/DL (ref 31.5–36.5)
MCV RBC AUTO: 87 FL (ref 78–100)
MONOCYTES # BLD AUTO: ABNORMAL 10*3/UL
MONOCYTES NFR BLD AUTO: ABNORMAL %
NEUTROPHILS # BLD AUTO: ABNORMAL 10*3/UL
NEUTROPHILS NFR BLD AUTO: ABNORMAL %
NEUTS HYPERSEG BLD QL SMEAR: NORMAL
PHOSPHATE SERPL-MCNC: 2.5 MG/DL (ref 2.5–4.5)
PLAT MORPH BLD: NORMAL
PLATELET # BLD AUTO: 51 10E3/UL (ref 150–450)
POLYCHROMASIA BLD QL SMEAR: NORMAL
POTASSIUM SERPL-SCNC: 3.4 MMOL/L (ref 3.4–5.3)
POTASSIUM SERPL-SCNC: 3.6 MMOL/L (ref 3.4–5.3)
PROT SERPL-MCNC: 5.9 G/DL (ref 6.4–8.3)
RBC # BLD AUTO: 2.84 10E6/UL (ref 4.4–5.9)
RBC AGGLUT BLD QL: NORMAL
RBC MORPH BLD: NORMAL
ROULEAUX BLD QL SMEAR: NORMAL
SCANNED LAB RESULT: ABNORMAL
SICKLE CELLS BLD QL SMEAR: NORMAL
SMUDGE CELLS BLD QL SMEAR: NORMAL
SODIUM SERPL-SCNC: 144 MMOL/L (ref 135–145)
SPHEROCYTES BLD QL SMEAR: NORMAL
STOMATOCYTES BLD QL SMEAR: NORMAL
TARGETS BLD QL SMEAR: NORMAL
TOXIC GRANULES BLD QL SMEAR: NORMAL
UFH PPP CHRO-ACNC: 0.37 IU/ML
VARIANT LYMPHS BLD QL SMEAR: NORMAL
WBC # BLD AUTO: 0.4 10E3/UL (ref 4–11)

## 2024-02-12 PROCEDURE — 258N000003 HC RX IP 258 OP 636: Performed by: PHYSICIAN ASSISTANT

## 2024-02-12 PROCEDURE — 250N000013 HC RX MED GY IP 250 OP 250 PS 637: Performed by: PHYSICIAN ASSISTANT

## 2024-02-12 PROCEDURE — 250N000011 HC RX IP 250 OP 636

## 2024-02-12 PROCEDURE — 84100 ASSAY OF PHOSPHORUS: CPT | Performed by: PHYSICIAN ASSISTANT

## 2024-02-12 PROCEDURE — 206N000001 HC R&B BMT UMMC

## 2024-02-12 PROCEDURE — 250N000013 HC RX MED GY IP 250 OP 250 PS 637

## 2024-02-12 PROCEDURE — 250N000012 HC RX MED GY IP 250 OP 636 PS 637

## 2024-02-12 PROCEDURE — 83735 ASSAY OF MAGNESIUM: CPT | Performed by: PHYSICIAN ASSISTANT

## 2024-02-12 PROCEDURE — 85027 COMPLETE CBC AUTOMATED: CPT | Performed by: PHYSICIAN ASSISTANT

## 2024-02-12 PROCEDURE — 99233 SBSQ HOSP IP/OBS HIGH 50: CPT | Performed by: CLINICAL NURSE SPECIALIST

## 2024-02-12 PROCEDURE — 85610 PROTHROMBIN TIME: CPT | Performed by: PHYSICIAN ASSISTANT

## 2024-02-12 PROCEDURE — 250N000011 HC RX IP 250 OP 636: Performed by: PHYSICIAN ASSISTANT

## 2024-02-12 PROCEDURE — 82248 BILIRUBIN DIRECT: CPT | Performed by: PHYSICIAN ASSISTANT

## 2024-02-12 PROCEDURE — 250N000011 HC RX IP 250 OP 636: Performed by: INTERNAL MEDICINE

## 2024-02-12 PROCEDURE — 80053 COMPREHEN METABOLIC PANEL: CPT | Performed by: PHYSICIAN ASSISTANT

## 2024-02-12 PROCEDURE — 99233 SBSQ HOSP IP/OBS HIGH 50: CPT | Performed by: INTERNAL MEDICINE

## 2024-02-12 PROCEDURE — 85520 HEPARIN ASSAY: CPT | Performed by: STUDENT IN AN ORGANIZED HEALTH CARE EDUCATION/TRAINING PROGRAM

## 2024-02-12 PROCEDURE — 84132 ASSAY OF SERUM POTASSIUM: CPT | Performed by: STUDENT IN AN ORGANIZED HEALTH CARE EDUCATION/TRAINING PROGRAM

## 2024-02-12 PROCEDURE — 250N000011 HC RX IP 250 OP 636: Performed by: STUDENT IN AN ORGANIZED HEALTH CARE EDUCATION/TRAINING PROGRAM

## 2024-02-12 RX ORDER — FUROSEMIDE 10 MG/ML
20 INJECTION INTRAMUSCULAR; INTRAVENOUS ONCE
Status: COMPLETED | OUTPATIENT
Start: 2024-02-12 | End: 2024-02-12

## 2024-02-12 RX ORDER — POTASSIUM CHLORIDE 29.8 MG/ML
20 INJECTION INTRAVENOUS
Qty: 100 ML | Refills: 0 | Status: COMPLETED | OUTPATIENT
Start: 2024-02-12 | End: 2024-02-12

## 2024-02-12 RX ADMIN — VANCOMYCIN HYDROCHLORIDE 125 MG: 125 CAPSULE ORAL at 08:19

## 2024-02-12 RX ADMIN — LOPERAMIDE HYDROCHLORIDE 4 MG: 2 CAPSULE ORAL at 08:19

## 2024-02-12 RX ADMIN — DEXTROSE MONOHYDRATE 20 ML: 50 INJECTION, SOLUTION INTRAVENOUS at 20:14

## 2024-02-12 RX ADMIN — URSODIOL 300 MG: 300 CAPSULE ORAL at 08:18

## 2024-02-12 RX ADMIN — MYCOPHENOLATE MOFETIL 1250 MG: 500 INJECTION, POWDER, LYOPHILIZED, FOR SOLUTION INTRAVENOUS at 09:59

## 2024-02-12 RX ADMIN — HEPARIN SODIUM 1650 UNITS/HR: 10000 INJECTION, SOLUTION INTRAVENOUS at 11:34

## 2024-02-12 RX ADMIN — Medication 2 SPRAY: at 13:18

## 2024-02-12 RX ADMIN — PANTOPRAZOLE SODIUM 40 MG: 40 TABLET, DELAYED RELEASE ORAL at 08:19

## 2024-02-12 RX ADMIN — VANCOMYCIN HYDROCHLORIDE 125 MG: 125 CAPSULE ORAL at 21:35

## 2024-02-12 RX ADMIN — BENZOCAINE 1 ML: 220 SPRAY, METERED PERIODONTAL at 08:19

## 2024-02-12 RX ADMIN — LOPERAMIDE HYDROCHLORIDE 4 MG: 2 CAPSULE ORAL at 18:15

## 2024-02-12 RX ADMIN — ACYCLOVIR 800 MG: 800 TABLET ORAL at 20:14

## 2024-02-12 RX ADMIN — LEVOFLOXACIN 250 MG: 250 TABLET, FILM COATED ORAL at 11:35

## 2024-02-12 RX ADMIN — POTASSIUM CHLORIDE 20 MEQ: 29.8 INJECTION, SOLUTION INTRAVENOUS at 07:50

## 2024-02-12 RX ADMIN — DOXEPIN HYDROCHLORIDE 25 MG: 10 SOLUTION ORAL at 08:31

## 2024-02-12 RX ADMIN — VANCOMYCIN HYDROCHLORIDE 1000 MG: 1 INJECTION, SOLUTION INTRAVENOUS at 23:12

## 2024-02-12 RX ADMIN — MYCOPHENOLATE MOFETIL 1250 MG: 500 INJECTION, POWDER, LYOPHILIZED, FOR SOLUTION INTRAVENOUS at 21:36

## 2024-02-12 RX ADMIN — Medication: at 01:46

## 2024-02-12 RX ADMIN — VANCOMYCIN HYDROCHLORIDE 1000 MG: 1 INJECTION, SOLUTION INTRAVENOUS at 11:34

## 2024-02-12 RX ADMIN — DOXEPIN HYDROCHLORIDE 25 MG: 10 SOLUTION ORAL at 13:17

## 2024-02-12 RX ADMIN — SIROLIMUS 3.5 MG: 2 TABLET, FILM COATED ORAL at 08:18

## 2024-02-12 RX ADMIN — BENZOCAINE 1 ML: 220 SPRAY, METERED PERIODONTAL at 21:37

## 2024-02-12 RX ADMIN — NIFEDIPINE 30 MG: 30 TABLET, FILM COATED, EXTENDED RELEASE ORAL at 08:19

## 2024-02-12 RX ADMIN — Medication 2 SPRAY: at 08:20

## 2024-02-12 RX ADMIN — URSODIOL 300 MG: 300 CAPSULE ORAL at 20:14

## 2024-02-12 RX ADMIN — DOXEPIN HYDROCHLORIDE 25 MG: 10 SOLUTION ORAL at 20:17

## 2024-02-12 RX ADMIN — ACYCLOVIR 800 MG: 800 TABLET ORAL at 08:18

## 2024-02-12 RX ADMIN — URSODIOL 300 MG: 300 CAPSULE ORAL at 15:36

## 2024-02-12 RX ADMIN — DIPHENHYDRAMINE HYDROCHLORIDE AND LIDOCAINE HYDROCHLORIDE AND ALUMINUM HYDROXIDE AND MAGNESIUM HYDRO 10 ML: KIT at 18:15

## 2024-02-12 RX ADMIN — LISINOPRIL 40 MG: 10 TABLET ORAL at 08:18

## 2024-02-12 RX ADMIN — Medication 5 CAPSULE: at 08:19

## 2024-02-12 RX ADMIN — POTASSIUM CHLORIDE 20 MEQ: 29.8 INJECTION, SOLUTION INTRAVENOUS at 05:47

## 2024-02-12 RX ADMIN — Medication 2 SPRAY: at 18:18

## 2024-02-12 RX ADMIN — NIFEDIPINE 30 MG: 30 TABLET, FILM COATED, EXTENDED RELEASE ORAL at 20:14

## 2024-02-12 RX ADMIN — MICAFUNGIN SODIUM 150 MG: 50 INJECTION, POWDER, LYOPHILIZED, FOR SOLUTION INTRAVENOUS at 08:32

## 2024-02-12 RX ADMIN — DIPHENHYDRAMINE HYDROCHLORIDE AND LIDOCAINE HYDROCHLORIDE AND ALUMINUM HYDROXIDE AND MAGNESIUM HYDRO 10 ML: KIT at 03:25

## 2024-02-12 RX ADMIN — VANCOMYCIN HYDROCHLORIDE 125 MG: 125 CAPSULE ORAL at 13:18

## 2024-02-12 RX ADMIN — DIPHENHYDRAMINE HYDROCHLORIDE AND LIDOCAINE HYDROCHLORIDE AND ALUMINUM HYDROXIDE AND MAGNESIUM HYDRO 10 ML: KIT at 08:17

## 2024-02-12 RX ADMIN — DEXTROSE MONOHYDRATE 450 MCG: 50 INJECTION, SOLUTION INTRAVENOUS at 20:14

## 2024-02-12 RX ADMIN — LOPERAMIDE HYDROCHLORIDE 4 MG: 2 CAPSULE ORAL at 21:35

## 2024-02-12 RX ADMIN — LOPERAMIDE HYDROCHLORIDE 4 MG: 2 CAPSULE ORAL at 13:18

## 2024-02-12 RX ADMIN — Medication 2 SPRAY: at 21:35

## 2024-02-12 RX ADMIN — DIPHENHYDRAMINE HYDROCHLORIDE AND LIDOCAINE HYDROCHLORIDE AND ALUMINUM HYDROXIDE AND MAGNESIUM HYDRO 10 ML: KIT at 13:18

## 2024-02-12 RX ADMIN — VANCOMYCIN HYDROCHLORIDE 125 MG: 125 CAPSULE ORAL at 18:15

## 2024-02-12 RX ADMIN — METOPROLOL SUCCINATE 100 MG: 50 TABLET, EXTENDED RELEASE ORAL at 08:18

## 2024-02-12 RX ADMIN — FUROSEMIDE 20 MG: 10 INJECTION, SOLUTION INTRAMUSCULAR; INTRAVENOUS at 09:58

## 2024-02-12 ASSESSMENT — ACTIVITIES OF DAILY LIVING (ADL)
ADLS_ACUITY_SCORE: 23
ADLS_ACUITY_SCORE: 22
ADLS_ACUITY_SCORE: 23
ADLS_ACUITY_SCORE: 22
ADLS_ACUITY_SCORE: 23
ADLS_ACUITY_SCORE: 22
ADLS_ACUITY_SCORE: 23
ADLS_ACUITY_SCORE: 23

## 2024-02-12 NOTE — PLAN OF CARE
Afebrile. VSS on RA.   Continues on PCA dilaudid for mucositis pain. Less drowsy overnight tonight. Also using MMW, Hurricane spray and doxepin for pain control.   Pt not saving stool, reports he thinks they are slowing down, but is unsure of how many times he has gone overnight.   Heparin drip remains therapeutic at 1650 units per hour, infusing through portacath. 20mEq K+ infusing, will need second bag and a recheck this  morning.   Cares clustered as possible to promote sleep, Continue POC.     Problem: Adult Inpatient Plan of Care  Goal: Plan of Care Review  Description: The Plan of Care Review/Shift note should be completed every shift.  The Outcome Evaluation is a brief statement about your assessment that the patient is improving, declining, or no change.  This information will be displayed automatically on your shift  note.  Outcome: Progressing     Problem: Adult Inpatient Plan of Care  Goal: Absence of Hospital-Acquired Illness or Injury  Outcome: Progressing  Intervention: Identify and Manage Fall Risk  Recent Flowsheet Documentation  Taken 2/12/2024 0340 by Payal Hamlin RN  Safety Promotion/Fall Prevention: safety round/check completed  Taken 2/11/2024 2119 by Payal Hamlin RN  Safety Promotion/Fall Prevention: safety round/check completed  Intervention: Prevent Skin Injury  Recent Flowsheet Documentation  Taken 2/11/2024 2119 by Payal Hamlin RN  Body Position: position changed independently  Skin Protection: adhesive use limited  Device Skin Pressure Protection: adhesive use limited  Intervention: Prevent Infection  Recent Flowsheet Documentation  Taken 2/12/2024 0340 by Payal Hamlin RN  Infection Prevention: environmental surveillance performed  Taken 2/11/2024 2119 by Payal Hamlin RN  Infection Prevention: environmental surveillance performed   Goal Outcome Evaluation:

## 2024-02-12 NOTE — PROGRESS NOTES
CLINICAL NUTRITION SERVICES - REASSESSMENT NOTE     Nutrition Prescription    RECOMMENDATIONS FOR MDs/PROVIDERS TO ORDER:  Encourage oral intake     Malnutrition Status:    Moderate malnutrition in the context of acute illness     Recommendations already ordered by Registered Dietitian (RD):  Discontinued boost soothe     Future/Additional Recommendations:  Continue to monitor appetite, oral intake and use of supplements   Monitor weight/fluid status      EVALUATION OF THE PROGRESS TOWARD GOALS   Diet: High Kcal/High Protein + snacks/supplements PRN   Intake: 50% (2/12) Nothing documented 2/11, 2/10 25% of beef and potatoes        NEW FINDINGS   Day +19 Ziggy was very tired during visit, reported his mouth feels a bit better and reported he had a taco yesterday.  He doesn't care for the boost soothe.     Weight Trends:   Date/Time Weight Weight Method   02/13/24 0732 91.4 kg (201 lb 9.6 oz)  Standing scale    02/12/24 0800 93 kg (205 lb 1.6 oz) Standing scale   02/11/24 0841 92 kg (202 lb 14.4 oz) Standing scale   02/10/24 0734 90.5 kg (199 lb 8 oz) Standing scale   02/07/24 0740 89.4 kg (197 lb 3.2 oz) --   02/05/24 0800 87.5 kg (192 lb 12.8 oz) Standing scale   02/01/24 0730 86.9 kg (191 lb 9.6 oz) Standing scale   01/30/24 0733 88.5 kg (195 lb 3.2 oz) Standing scale   01/28/24 0754 88.9 kg (196 lb) Standing scale   01/24/24 0823 90.4 kg (199 lb 4.8 oz) Standing scale   01/21/24 0822 89.9 kg (198 lb 4.8 oz) Standing scale   01/19/24 0800 90.6 kg (199 lb 12.8 oz) Standing scale     GI: Intermittent nausea. Continues to have mucositis. Last bowel movement, 2/12, diarrhea noted.    Skin: William 20, rash on chest and back      Labs:   Na 143, K+ 3.1 (WNL), Cl 110 (H), Mg 1.6 (L), Po4 2.5 (WNL)  WBC 0.8 (L- up trending)     Medications:   Acyclovir  Biotene  Lasix  Imodium  Magic Mouthwash  Mycophenolate  Protonix  Potassium Chloride   Metamucil   Sirolimus  Ursodiol    MALNUTRITION  % Intake: </= 50% for >/= 5 days  (severe)  % Weight Loss: None noted  Subcutaneous Fat Loss: None observed  Muscle Loss: Temporal:  mild  Fluid Accumulation/Edema: Mild lower extremities   Malnutrition Diagnosis: Moderate malnutrition in the context of acute illness     Previous Goals   Patient to consume % of nutritionally adequate meal trays TID, or the equivalent with supplements/snacks.   Evaluation: Not met    Previous Nutrition Diagnosis  Inadequate oral intake related to mucositis as evidenced by additional 1% weight loss and mucositis impacting intake    Evaluation: Modified     CURRENT NUTRITION DIAGNOSIS  Inadequate oral intake related to decreased appetite and mucositis as evidenced by continued low intakes but starting to improving       INTERVENTIONS  Implementation  Collaboration with other providers  Medical food supplement therapy- discontinue per request     Goals  Patient to consume % of nutritionally adequate meal trays TID, or the equivalent with supplements/snacks.    Monitoring/Evaluation  Progress toward goals will be monitored and evaluated per protocol.      Liliane Odom RD, LD  5C/BMT pager: [phone]

## 2024-02-12 NOTE — PROGRESS NOTES
PALLIATIVE CARE PROGRESS NOTE  [facility]     Patient Name: Ziggy Hallman  Date of Admission: 1/19/2024   Today the patient was seen for: symptom management     Recommendations & Counseling       #Acute oropharyngeal mucositis pain  Poorly controlled, limiting sleep and ability to take PO.  Consider stopping continuous rate given pain seems to be improving and drowsiness, no change to 0.3mg bolus with 10 min lockout, maximum 1.8 mg/hr  Continue doxepin solution to mouthwash q6h scheduled: Mix doxepin 25mg (2.5ml) with 7.5ml of water. Swish for 1 minute and spit. Swallowing can cause increased sedation.  Continue scheduled magic mouthwash  Agree with PRN benzocaine spray and viscous lidocaine  Wonder if he can resume some more schedule tylenol, defer to primary team      Palliative Care will continue to follow. Thank you for the consult and allowing us to aid in the care of Ziggy Hallman.    These recommendations have been discussed with primary team via [employer] App.    MARYURI Bocanegra  Securely message with [employer] (more info)  Text page via [facility] Paging/Directory          Interval History:     Multidisciplinary collaboration:  Notes reviewed, no acute events, seems that pain is somewhat improving now allowing some PO intake, got blood products, also having loose stools, maybe somewhat improving as well per notes.    Patient/family narrative  Drowsy during visit, falling asleep. More wakeful toward end of conversation.    Palliative Summary/HPI          Ziggy is a 47 y/o man with complex medical history (MI, CAD s/p angioplasty/stent, CVA, PAD s/p R toe amputation, multiple clots) and high risk MDS admitted 1/19 for hematopoetic stem cell transplant. Course complicated by Cdiff infection, PE and recurrent fevers.     Today, the patient was seen for:  Palliative care  Acute oropharyngeal mucositis  Pain management  MDS     Medications:  Biotene 4 times a  day  Doxepin 25 mg 4 times a day  Loperamide 4 mg 4 times a day  Magic mouthwash q4h  Protonix 40 mg daily  Metamucil daily    Hydromorphone PCA 0.3 mg/hr with 0.3 mg bolus q10min lock out    Acetaminophen PRN- x0  Hurricane spray PRN- x2         Review of Systems:     Besides above, ROS was reviewed and is unremarkable               Physical Exam:   Temp:  [96.8  F (36  C)-99.1  F (37.3  C)] 97.9  F (36.6  C)  Pulse:  [] 99  Resp:  [16-18] 18  BP: (115-142)/(63-87) 142/82  SpO2:  [91 %-98 %] 93 %  205 lbs 1.6 oz      PHYSICAL EXAM:  General: laying in bed, NAD, drowsy, falling alseep during visit, more wakeful at the end  HEENT: +pharyngeal erythema  Lungs: non-labored  Extremities: no gross abnormalities  Neuro: drowsy  Psych: calm               Current Problem List:   Principal Problem:    MDS (myelodysplastic syndrome) (H)      Allergies   Allergen Reactions    Blood Transfusion Related (Informational Only) Other (See Comments)     Give O RBC's and WBC's only per Cell Therapy            Data Reviewed:     Results for orders placed or performed during the hospital encounter of 01/19/24 (from the past 24 hour(s))   Platelet count   Result Value Ref Range    Platelet Count 54 (L) 150 - 450 10e3/uL   CBC with platelets differential    Narrative    The following orders were created for panel order CBC with platelets differential.  Procedure                               Abnormality         Status                     ---------                               -----------         ------                     CBC with platelets and d...[[id number]]  Abnormal            Final result               RBC and Platelet Morphology[[id number]]                      Final result                 Please view results for these tests on the individual orders.   INR   Result Value Ref Range    INR 1.12 0.85 - 1.15   Magnesium   Result Value Ref Range    Magnesium 1.8 1.7 - 2.3 mg/dL   Phosphorus   Result Value Ref Range    Phosphorus  2.5 2.5 - 4.5 mg/dL   Comprehensive metabolic panel   Result Value Ref Range    Sodium 144 135 - 145 mmol/L    Potassium 3.4 3.4 - 5.3 mmol/L    Carbon Dioxide (CO2) 23 22 - 29 mmol/L    Anion Gap 11 7 - 15 mmol/L    Urea Nitrogen 10.2 6.0 - 20.0 mg/dL    Creatinine 0.96 0.67 - 1.17 mg/dL    GFR Estimate >90 >60 mL/min/1.73m2    Calcium 8.1 (L) 8.6 - 10.0 mg/dL    Chloride 110 (H) 98 - 107 mmol/L    Glucose 87 70 - 99 mg/dL    Alkaline Phosphatase 72 40 - 150 U/L    AST 26 0 - 45 U/L    ALT 14 0 - 70 U/L    Protein Total 5.9 (L) 6.4 - 8.3 g/dL    Albumin 2.8 (L) 3.5 - 5.2 g/dL    Bilirubin Total 0.3 <=1.2 mg/dL   Heparin Unfractionated Anti Xa Level   Result Value Ref Range    Anti Xa Unfractionated Heparin 0.37 For Reference Range, See Comment IU/mL    Narrative    Therapeutic Range: UFH: 0.25-0.50 IU/mL for low intensity dosing,  0.30-0.70 IU/mL for high intensity dosing DVT and PE.  This test is not validated for other direct factor X inhibitors (e.g. rivaroxaban, apixaban, edoxaban, betrixaban, fondaparinux) and should not be used for monitoring of other medications.   Bilirubin direct   Result Value Ref Range    Bilirubin Direct <0.20 0.00 - 0.30 mg/dL   CBC with platelets and differential   Result Value Ref Range    WBC Count 0.4 (LL) 4.0 - 11.0 10e3/uL    RBC Count 2.84 (L) 4.40 - 5.90 10e6/uL    Hemoglobin 8.7 (L) 13.3 - 17.7 g/dL    Hematocrit 24.6 (L) 40.0 - 53.0 %    MCV 87 78 - 100 fL    MCH 30.6 26.5 - 33.0 pg    MCHC 35.4 31.5 - 36.5 g/dL    RDW 13.6 10.0 - 15.0 %    Platelet Count 51 (L) 150 - 450 10e3/uL    % Neutrophils      % Lymphocytes      % Monocytes      % Eosinophils      % Basophils      % Immature Granulocytes      Absolute Neutrophils      Absolute Lymphocytes      Absolute Monocytes      Absolute Eosinophils      Absolute Basophils      Absolute Immature Granulocytes     RBC and Platelet Morphology   Result Value Ref Range    Platelet Assessment  Automated Count Confirmed. Platelet  morphology is normal.     Automated Count Confirmed. Platelet morphology is normal.    Acanthocytes      Lucia Rods      Basophilic Stippling      Bite Cells      Blister Cells      Walbridge Cells      Elliptocytes      Hgb C Crystals      Mendoza-Jolly Bodies      Hypersegmented Neutrophils      Polychromasia      RBC agglutination      RBC Fragments      Reactive Lymphocytes      Rouleaux      Sickle Cells      Smudge Cells      Spherocytes      Stomatocytes      Target Cells      Teardrop Cells      Toxic Neutrophils      RBC Morphology Confirmed RBC Indices      Recent Labs   Lab 02/12/24  0336 02/11/24  1655 02/11/24  0836 02/11/24  0549 02/11/24  0254 02/10/24  0603 02/10/24  0317 02/08/24  0816 02/08/24  0319 02/06/24  1744 02/06/24  1446   WBC 0.4*  --   --   --  0.3*  --  0.2*   < > 0.1*   < >  --    HGB 8.7*  --   --   --  7.9*  --  8.7*   < > 7.9*   < >  --    MCV 87  --   --   --  86  --  85   < > 88   < >  --    PLT 51* 54*  --  48* 32*   < > 33*   < > 49*   < >  --    INR 1.12  --   --   --   --   --   --   --   --   --  1.14     --   --   --  143  --  142   < > 142   < >  --    POTASSIUM 3.4  --  4.0  --  3.4   < > 3.3*   < > 3.2*   < >  --    CHLORIDE 110*  --   --   --  119*  --  109*   < > 109*   < >  --    CO2 23  --   --   --  22  --  22   < > 21*   < >  --    BUN 10.2  --   --   --  10.6  --  10.3   < > 12.9   < >  --    CR 0.96  --   --   --  0.98  --  0.88   < > 0.89   < >  --    ANIONGAP 11  --   --   --  2*  --  11   < > 12   < >  --    REMY 8.1*  --   --   --  7.9*  --  8.1*   < > 8.0*   < >  --    GLC 87  --   --   --  95  --  105*   < > 99   < >  --    ALBUMIN 2.8*  --   --   --   --   --   --   --  2.6*  --   --    PROTTOTAL 5.9*  --   --   --   --   --   --   --  6.1*  --   --    BILITOTAL 0.3  --   --   --   --   --   --   --  0.4  --   --    ALKPHOS 72  --   --   --   --   --   --   --  63  --   --    ALT 14  --   --   --   --   --   --   --  15  --   --    AST 26  --   --   --   --    --   --   --  27  --   --     < > = values in this interval not displayed.       No results found for this or any previous visit (from the past 24 hour(s)).      Medical Decision Making       MANAGEMENT DISCUSSED with the following over the past 24 hours: BMT   NOTE(S)/MEDICAL RECORDS REVIEWED over the past 24 hours: BMT, nursing, palliative  Tests REVIEWED in the past 24 hours:  - See lab/imaging results included in the data section of the note  Medical complexity over the past 24 hours:  - Parenteral (IV) CONTROLLED SUBSTANCES ordered

## 2024-02-12 NOTE — PROGRESS NOTES
"  BMT Progress Notes      Patient ID: Ziggy Hallman is a 48 year old year old male with a PMH of MDS, hx of multiple clots and MI undergoing a MA (Bu/Flu) prep for an 8/8 URD PBSCT currently day 18    Transplant Essential Data:   Diagnosis MDS-High risk, Plasma Cell neoplasm    BMTCT Type Allogeneic    Prep Regimen Bu/Flu    Donor Match and  Source URD 8/8 DP permissive    GVHD Prophylaxis PTCy, Siro/MMF    Primary BMT MD Bacon    Clinical Trials ? [id number]       Interval History:   Afebrile overnight. Feels throat and rectal pain is improving. Denies N/V. Continues to have loose stool. Feels like his legs are tight and heavy today. To note he is up 3lbs from yesterday. Last dose of lasix on 2/9.     Review of Systems    Review of Systems: 10 point ROS negative unless stated in HPI   PHYSICAL EXAM      Weight     Wt Readings from Last 3 Encounters:   02/12/24 93 kg (205 lb 1.6 oz)   01/17/24 90.1 kg (198 lb 9.6 oz)   01/12/24 88.9 kg (196 lb)        KPS: 60    BP (!) 142/82 (BP Location: Right arm)   Pulse 99   Temp 97.9  F (36.6  C) (Axillary)   Resp 18   Ht 1.725 m (5' 7.91\")   Wt 93 kg (205 lb 1.6 oz)   SpO2 93%   BMI 31.27 kg/m       General: NAD, appears very tired  Mouth/Throat: Multiple scattered erythematous ulcers on bilateral posterior buccal mucosa, posterior pharynx diffusely erythematous, dry mucosa   Lungs: CTAB, no respiratory distress  Cardiovascular: RRR, no M/R/G   Lymphatics: Bilateral lower extremity edema, skin appears tight bilaterally. Pedal pulses intact.   Abdomen: non tender, soft, BS+  Skin: rash on chest/back resolved  Neuro: A&Ox3  Additional Findings: Powell site NT, no drainage.    Current aGVHD staging:  Skin 0, UGI 0, LGI 0, Liver 0 (keep in note through day +180 for allos)      LABS AND IMAGING: I have assessed all abnormal lab values for their clinical significance and any values considered clinically significant have been addressed in the assessment and plan.  "       Lab Results   Component Value Date    WBC 0.4 (LL) 02/12/2024    ANEUTAUTO 2.0 01/28/2024    HGB 8.7 (L) 02/12/2024    HCT 24.6 (L) 02/12/2024    PLT 51 (L) 02/12/2024     02/12/2024    POTASSIUM 3.4 02/12/2024    CHLORIDE 110 (H) 02/12/2024    CO2 23 02/12/2024    GLC 87 02/12/2024    BUN 10.2 02/12/2024    CR 0.96 02/12/2024    MAG 1.8 02/12/2024    INR 1.12 02/12/2024         SYSTEMS-BASED ASSESSMENT AND PLAN     Ziggy Hallman is a 48 year old year old male with a PMH of MDS, hx of multiple clots and MI undergoing a MA (Bu/Flu) prep for an 8/8 URD PBSCT day 18. Stay has been complicated by mucositis requiring PCA, N/F, Staph Epi bacteremia, pulmonary embolism requiring hep gtt.    BMT/IEC PROTOCOL for 2015-29  Chemo Prep:   Day -6: Admit  Day -5 through Day -2: Busulfan/Fludarabine  Day -1: Rest.   Keppra prophylaxis      8/8 DP permissive. Cell dose-9.24x10^6  ABO: Donor: O+ Recipient A-  (Minor incompatability, no flush required)   GSCF plan: GCSF to start day +5 until ANC >1500 for 3 consecutive days    HEME/COAG  #SEGMENTAL R PE (2/6): Low intensity hep gtt with platelet goal <50k with post platelet checks. Can restart PTA anticoagulation once platelets stable. Will need outpatient plan as patient is starting to engraft (lovenox to eliquis/prasagruel likely).  #APS work up- lupus antigen +, will follow outpt as may be unreliable in this acute setting and prior APS work up had been unremarkable prior to admission.  #Pancytopenia 2/2 chemo- anemia, leukopenia, thrombocytopenia  #Transfusion parameters: hemoglobin <8 (cardiac hx), platelets <50k (hep gtt)  #Recurrent arterial thromboembolic events:  He has long history of various events including renal infarcts (2011, 2013, 2015), left popliteal embolic episode requiring surgery, anticoagulation (2011), right carotid artery embolic episode with TIA/stroke-like symptoms (2012), embolic MI (2015), gangrenous right toe requiring vascular  surgery/amputation (2017), 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   Extensive hypercoagulable workup to date has been unrevealing.  JAK2 testing negative.  PNH testing negative.  Elevated IgA of unknown significance, no evidence of plasma cell disorder.  - Eliquis and Prasugrel now HELD starting 2/1-x.     IMMUNOCOMPROMISED  #Staph epi bacteremia (Karius >2000 copies)- afebrile since Vanco added x7 days(2/6-2/13)  #Febrile neutropenia, fever resolved:    -Meropenem (2/6-2/10) deescalated as infectious source identified, transitioned to Levaquin ppx, Vanco (2/6-2/12) Azithro (2/6-2/9)  -Cefepime (2/2-2/6)   -UC, BC negative, KARIUS showed staph epi, viral work up negative  #C. Diff - Admit c. Diff triple+ - Start PO vanc 1/21 for 14 day course. - completed on 2/4, with broadening abx restart PO Vanco tx dose x 10 days    -Prophylaxis plan: ACV LD, Megan HD, Levaquin while neutropenic , Bactrim +28    RISK OF GVHD  - Prophylaxis: PtC day +3, +4, , Siro/MMF to start day +5, Siro level -9.2- 2/11    CARDIOVASCULAR  #CAD  #Hx of CABG  #HTN   -PTA metoprolol, Lisinopril dose increased 2/2 HTN,  nifidepine ER 30mg BID, labetolol PRN;  #Hyperlipidemia: Holding atorvastatin peritransplant  - Risk of cardiomyopathy:  Baseline EF 50-55%, Global left ventricular function mildly reduced. Grade 1 or early diastolic dysfunction.   - Risk of arrhythmia: Baseline EKG showed NSR, Qtc -425    GI/NUTRITION  #Oropharyngeal mucositis: MMW, hurricaine spray, doxepin, dilaudid PCA (2/3-x) (0.3 continuous, 0.3q10)   -Palliative consulted 2/9, continue to follow and provide recs  - Ulcer prophylaxis: Protonix   - VOD Prophylaxis: Ursodiol  - Risk of nausea/vomiting due to chemo: Ativan, Compazine, Zofran   - Moderate malnutrition 2/2 acute on chronic illness- dietician to follow, appetite improving with pain control.    RENAL/ELECTROLYTES/  #Hematuria- No dysuria, self  "resolved  #Hypervolemia 2/2 cytoxan flush - diurese as needed lasix 20mg 2/7, 2/09, 2/12.  #Oliguria 2/9 (Resolved)- output <1L with diuresis and large amounts of IV given. Cr stable and WNL. Patient is having concurrent diarrhea, so this is likely difficult to measure.   -Bladder scan unremarkable   -UA shows proteinuria  #Proteinuria- possibly exacerbated by persistent HTN (See CV),   - Hypocalcemia in setting hypoalbuminemia, iCa2+, corrected WNL   - Electrolyte management: replace per sliding scale    Psych:    #Anxiety: admits to feeling anxious.  Start zyprexa 5mg HS.  Connect w/ SW.  Psychaitry consult added hydroxyzine.  #Insomnia- PTA ambien, Trazadone added and increased to 100mg. - Ambien and trazadone held during cytoxan period due to frequent urination and concern for falls. Discontinued with opioid PCA, melatonin added.     SOCIAL DETERMINANTS  - Caregiver: Family   - Financial/insurance concerns: See SW notes       Known issues that I take into account for medical decisions, with salient changes to the plan considering these complexities noted above.    Patient Active Problem List   Diagnosis     Amegakaryocytic thrombocytopenia (H)     Anemia due to bone marrow failure, unspecified bone marrow failure type (H)     Aplastic anemia (H)     Clinically Significant Risk Factors              # Hypoalbuminemia: Lowest albumin = 2 g/dL at 1/22/2024  4:12 AM, will monitor as appropriate     # Thrombocytopenia: Lowest platelets = 32 in last 2 days, will monitor for bleeding          # Obesity: Estimated body mass index is 31.27 kg/m  as calculated from the following:    Height as of this encounter: 1.725 m (5' 7.91\").    Weight as of this encounter: 93 kg (205 lb 1.6 oz).               Dispo: Remain admitted through engraftment  Please call sister Radha with updates for care and discharge    I spent 30 minutes in the care of this patient today, which included time necessary for preparation for the visit, " obtaining history, ordering medications/tests/procedures as medically indicated, review of pertinent medical literature, counseling of the patient, communication of recommendations to the care team, and documentation time.    Brenda Brunson PA-C  [phone]      ______________________________________________      BMT ATTENDING NOTE    Ziggy Hallman is a 48 year old male, who is day 18 of transplant for MDS.     Subjective:  Better symptomatic/pain management noted. Eating improved. No new infectious signs or symptoms by history.       Vitals reviewed, labs reviewed  PE:  NAD, oriented x3  HEENT: mucositis, erythema  Heart: RRR  Lungs: CTAB  Abdomen: +BS, soft non tender, non distended, rectal area not examined  LE: 1+ edema  Skin: no rash     Assessment and Plan:    1. Heme/BMT:  MDS, here for MAC 8/8 MUD, flu/Bu prep, PTCy/Siro/MMF GVHD prophylaxis, transfusion as needed, history of multiple arterial thrombotic events had been on anticoagulation, 2/6 CT done for fever workup revealed right segmental PE started on low-dose heparin and will maintain platelets above 50,000 to decrease risk of bleeding  2. ID: C. difficile colitis on treatment, neutropenic fevers posttransplant culture negative, started on vancomycin meropenem and azithromycin switched most recently on 2/6 with decrease in fever trend,  Karius testing with staph epi bacteria detected, discontinue azithromycin and meropenem- deescalate 2/10, checked viral PCR's  3. CV: history of hypertension, coronary artery disease, CABG, hyperlipidemia  4. GI/ FEN: Symptom management of regimen related toxicity, encourage fluid PO intake, I/O and daily weights, symptomatic management of hemorrhoids  5. Renal: Maintain euvolemic, hematuria resolved continue to monitor  6. PPx: fabi, ACV  7.  Supportive care: PT,  anxiety management, encourage ambulation, optimize nutrition, avoid sarcopenia, PCA for pain management, appreciate palliative care recommendations for pain and  supportive care management, other supportive care as detailed above    I spent 50 minutes in the care of Ziggy today, including an independent face-to-face assessment and time monitoring for restoration of hematopoiesis, high-risk organ toxicities from chemotherapy, and GVHD, managing infectious risk due to his immunocompromised state, and encouraging nutrition and physical activity to prevent debility and sarcopenia.  I personally performed all of the medical decision making associated with this visit, counseled Ziggy on my overall assessment and recommendations, communicated my plan to the care team, and edited the above note to reflect my current plan of care.     William Lagos MD

## 2024-02-13 ENCOUNTER — APPOINTMENT (OUTPATIENT)
Dept: OCCUPATIONAL THERAPY | Facility: CLINIC | Age: 49
DRG: 014 | End: 2024-02-13
Attending: INTERNAL MEDICINE
Payer: COMMERCIAL

## 2024-02-13 LAB
ANION GAP SERPL CALCULATED.3IONS-SCNC: 11 MMOL/L (ref 7–15)
BASOPHILS # BLD AUTO: ABNORMAL 10*3/UL
BASOPHILS # BLD MANUAL: 0 10E3/UL (ref 0–0.2)
BASOPHILS NFR BLD AUTO: ABNORMAL %
BASOPHILS NFR BLD MANUAL: 0 %
BLD PROD TYP BPU: NORMAL
BLOOD COMPONENT TYPE: NORMAL
BUN SERPL-MCNC: 8.1 MG/DL (ref 6–20)
BURR CELLS BLD QL SMEAR: SLIGHT
CALCIUM SERPL-MCNC: 8.2 MG/DL (ref 8.6–10)
CHLORIDE SERPL-SCNC: 110 MMOL/L (ref 98–107)
CODING SYSTEM: NORMAL
CREAT SERPL-MCNC: 1.06 MG/DL (ref 0.67–1.17)
DEPRECATED HCO3 PLAS-SCNC: 22 MMOL/L (ref 22–29)
EGFRCR SERPLBLD CKD-EPI 2021: 85 ML/MIN/1.73M2
EOSINOPHIL # BLD AUTO: ABNORMAL 10*3/UL
EOSINOPHIL # BLD MANUAL: 0 10E3/UL (ref 0–0.7)
EOSINOPHIL NFR BLD AUTO: ABNORMAL %
EOSINOPHIL NFR BLD MANUAL: 2 %
ERYTHROCYTE [DISTWIDTH] IN BLOOD BY AUTOMATED COUNT: 13.2 % (ref 10–15)
GLUCOSE SERPL-MCNC: 86 MG/DL (ref 70–99)
HCT VFR BLD AUTO: 23.6 % (ref 40–53)
HGB BLD-MCNC: 8.3 G/DL (ref 13.3–17.7)
IMM GRANULOCYTES # BLD: ABNORMAL 10*3/UL
IMM GRANULOCYTES NFR BLD: ABNORMAL %
ISSUE DATE AND TIME: NORMAL
LYMPHOCYTES # BLD AUTO: ABNORMAL 10*3/UL
LYMPHOCYTES # BLD MANUAL: 0 10E3/UL (ref 0.8–5.3)
LYMPHOCYTES NFR BLD AUTO: ABNORMAL %
LYMPHOCYTES NFR BLD MANUAL: 4 %
MAGNESIUM SERPL-MCNC: 1.6 MG/DL (ref 1.7–2.3)
MCH RBC QN AUTO: 30.5 PG (ref 26.5–33)
MCHC RBC AUTO-ENTMCNC: 35.2 G/DL (ref 31.5–36.5)
MCV RBC AUTO: 87 FL (ref 78–100)
METAMYELOCYTES # BLD MANUAL: 0 10E3/UL
METAMYELOCYTES NFR BLD MANUAL: 2 %
MONOCYTES # BLD AUTO: ABNORMAL 10*3/UL
MONOCYTES # BLD MANUAL: 0.1 10E3/UL (ref 0–1.3)
MONOCYTES NFR BLD AUTO: ABNORMAL %
MONOCYTES NFR BLD MANUAL: 10 %
MYELOCYTES # BLD MANUAL: 0 10E3/UL
MYELOCYTES NFR BLD MANUAL: 4 %
NEUTROPHILS # BLD AUTO: ABNORMAL 10*3/UL
NEUTROPHILS # BLD MANUAL: 0.6 10E3/UL (ref 1.6–8.3)
NEUTROPHILS NFR BLD AUTO: ABNORMAL %
NEUTROPHILS NFR BLD MANUAL: 78 %
NRBC # BLD AUTO: 0 10E3/UL
NRBC BLD AUTO-RTO: 0 /100
PHOSPHATE SERPL-MCNC: 2.5 MG/DL (ref 2.5–4.5)
PLAT MORPH BLD: ABNORMAL
PLATELET # BLD AUTO: 37 10E3/UL (ref 150–450)
PLATELET # BLD AUTO: 65 10E3/UL (ref 150–450)
POTASSIUM SERPL-SCNC: 3 MMOL/L (ref 3.4–5.3)
POTASSIUM SERPL-SCNC: 3.1 MMOL/L (ref 3.4–5.3)
POTASSIUM SERPL-SCNC: 3.4 MMOL/L (ref 3.4–5.3)
RBC # BLD AUTO: 2.72 10E6/UL (ref 4.4–5.9)
RBC MORPH BLD: ABNORMAL
SIROLIMUS BLD-MCNC: 8.2 UG/L (ref 5–15)
SODIUM SERPL-SCNC: 143 MMOL/L (ref 135–145)
TME LAST DOSE: NORMAL H
TME LAST DOSE: NORMAL H
UFH PPP CHRO-ACNC: 0.39 IU/ML
UNIT ABO/RH: NORMAL
UNIT NUMBER: NORMAL
UNIT STATUS: NORMAL
UNIT TYPE ISBT: 2800
WBC # BLD AUTO: 0.8 10E3/UL (ref 4–11)

## 2024-02-13 PROCEDURE — 250N000011 HC RX IP 250 OP 636: Performed by: PHYSICIAN ASSISTANT

## 2024-02-13 PROCEDURE — 80195 ASSAY OF SIROLIMUS: CPT | Performed by: INTERNAL MEDICINE

## 2024-02-13 PROCEDURE — 250N000013 HC RX MED GY IP 250 OP 250 PS 637: Performed by: INTERNAL MEDICINE

## 2024-02-13 PROCEDURE — 250N000011 HC RX IP 250 OP 636: Performed by: INTERNAL MEDICINE

## 2024-02-13 PROCEDURE — 85027 COMPLETE CBC AUTOMATED: CPT | Performed by: PHYSICIAN ASSISTANT

## 2024-02-13 PROCEDURE — 99233 SBSQ HOSP IP/OBS HIGH 50: CPT

## 2024-02-13 PROCEDURE — 85049 AUTOMATED PLATELET COUNT: CPT | Performed by: PHYSICIAN ASSISTANT

## 2024-02-13 PROCEDURE — 250N000012 HC RX MED GY IP 250 OP 636 PS 637

## 2024-02-13 PROCEDURE — 250N000013 HC RX MED GY IP 250 OP 250 PS 637: Performed by: PHYSICIAN ASSISTANT

## 2024-02-13 PROCEDURE — 99233 SBSQ HOSP IP/OBS HIGH 50: CPT | Mod: 24 | Performed by: PHYSICIAN ASSISTANT

## 2024-02-13 PROCEDURE — 84100 ASSAY OF PHOSPHORUS: CPT | Performed by: STUDENT IN AN ORGANIZED HEALTH CARE EDUCATION/TRAINING PROGRAM

## 2024-02-13 PROCEDURE — 80048 BASIC METABOLIC PNL TOTAL CA: CPT | Performed by: PHYSICIAN ASSISTANT

## 2024-02-13 PROCEDURE — 84132 ASSAY OF SERUM POTASSIUM: CPT | Performed by: INTERNAL MEDICINE

## 2024-02-13 PROCEDURE — 83735 ASSAY OF MAGNESIUM: CPT | Performed by: STUDENT IN AN ORGANIZED HEALTH CARE EDUCATION/TRAINING PROGRAM

## 2024-02-13 PROCEDURE — 99418 PROLNG IP/OBS E/M EA 15 MIN: CPT | Performed by: PHYSICIAN ASSISTANT

## 2024-02-13 PROCEDURE — 250N000013 HC RX MED GY IP 250 OP 250 PS 637

## 2024-02-13 PROCEDURE — 206N000001 HC R&B BMT UMMC

## 2024-02-13 PROCEDURE — 85007 BL SMEAR W/DIFF WBC COUNT: CPT | Performed by: PHYSICIAN ASSISTANT

## 2024-02-13 PROCEDURE — 258N000003 HC RX IP 258 OP 636: Performed by: PHYSICIAN ASSISTANT

## 2024-02-13 PROCEDURE — 97110 THERAPEUTIC EXERCISES: CPT | Mod: GO

## 2024-02-13 PROCEDURE — P9037 PLATE PHERES LEUKOREDU IRRAD: HCPCS

## 2024-02-13 PROCEDURE — 85520 HEPARIN ASSAY: CPT | Performed by: STUDENT IN AN ORGANIZED HEALTH CARE EDUCATION/TRAINING PROGRAM

## 2024-02-13 RX ORDER — POTASSIUM CHLORIDE 750 MG/1
40 TABLET, EXTENDED RELEASE ORAL ONCE
Status: COMPLETED | OUTPATIENT
Start: 2024-02-13 | End: 2024-02-13

## 2024-02-13 RX ORDER — POTASSIUM CHLORIDE 29.8 MG/ML
20 INJECTION INTRAVENOUS
Status: COMPLETED | OUTPATIENT
Start: 2024-02-13 | End: 2024-02-13

## 2024-02-13 RX ORDER — FUROSEMIDE 10 MG/ML
20 INJECTION INTRAMUSCULAR; INTRAVENOUS ONCE
Status: COMPLETED | OUTPATIENT
Start: 2024-02-13 | End: 2024-02-13

## 2024-02-13 RX ORDER — POTASSIUM CHLORIDE 750 MG/1
20 TABLET, EXTENDED RELEASE ORAL ONCE
Status: COMPLETED | OUTPATIENT
Start: 2024-02-14 | End: 2024-02-14

## 2024-02-13 RX ADMIN — ACYCLOVIR 800 MG: 800 TABLET ORAL at 09:41

## 2024-02-13 RX ADMIN — DEXTROSE MONOHYDRATE 20 ML: 50 INJECTION, SOLUTION INTRAVENOUS at 20:03

## 2024-02-13 RX ADMIN — DIPHENHYDRAMINE HYDROCHLORIDE AND LIDOCAINE HYDROCHLORIDE AND ALUMINUM HYDROXIDE AND MAGNESIUM HYDRO 10 ML: KIT at 09:43

## 2024-02-13 RX ADMIN — DEXTROSE MONOHYDRATE 450 MCG: 50 INJECTION, SOLUTION INTRAVENOUS at 20:00

## 2024-02-13 RX ADMIN — POTASSIUM CHLORIDE 20 MEQ: 29.8 INJECTION, SOLUTION INTRAVENOUS at 10:42

## 2024-02-13 RX ADMIN — METOPROLOL SUCCINATE 100 MG: 50 TABLET, EXTENDED RELEASE ORAL at 09:40

## 2024-02-13 RX ADMIN — LOPERAMIDE HYDROCHLORIDE 4 MG: 2 CAPSULE ORAL at 13:51

## 2024-02-13 RX ADMIN — VANCOMYCIN HYDROCHLORIDE 125 MG: 125 CAPSULE ORAL at 20:17

## 2024-02-13 RX ADMIN — DOXEPIN HYDROCHLORIDE 25 MG: 10 SOLUTION ORAL at 13:51

## 2024-02-13 RX ADMIN — DIPHENHYDRAMINE HYDROCHLORIDE AND LIDOCAINE HYDROCHLORIDE AND ALUMINUM HYDROXIDE AND MAGNESIUM HYDRO 10 ML: KIT at 20:17

## 2024-02-13 RX ADMIN — VANCOMYCIN HYDROCHLORIDE 1000 MG: 1 INJECTION, SOLUTION INTRAVENOUS at 21:46

## 2024-02-13 RX ADMIN — MICAFUNGIN SODIUM 150 MG: 50 INJECTION, POWDER, LYOPHILIZED, FOR SOLUTION INTRAVENOUS at 10:43

## 2024-02-13 RX ADMIN — Medication 2 SPRAY: at 20:07

## 2024-02-13 RX ADMIN — LOPERAMIDE HYDROCHLORIDE 4 MG: 2 CAPSULE ORAL at 20:09

## 2024-02-13 RX ADMIN — LISINOPRIL 40 MG: 10 TABLET ORAL at 09:41

## 2024-02-13 RX ADMIN — LOPERAMIDE HYDROCHLORIDE 4 MG: 2 CAPSULE ORAL at 16:27

## 2024-02-13 RX ADMIN — HEPARIN SODIUM 1650 UNITS/HR: 10000 INJECTION, SOLUTION INTRAVENOUS at 04:58

## 2024-02-13 RX ADMIN — ACYCLOVIR 800 MG: 800 TABLET ORAL at 20:09

## 2024-02-13 RX ADMIN — Medication 2 SPRAY: at 09:39

## 2024-02-13 RX ADMIN — Medication 2 SPRAY: at 12:23

## 2024-02-13 RX ADMIN — Medication 2 SPRAY: at 16:28

## 2024-02-13 RX ADMIN — NIFEDIPINE 30 MG: 30 TABLET, FILM COATED, EXTENDED RELEASE ORAL at 20:09

## 2024-02-13 RX ADMIN — BENZOCAINE 1 ML: 220 SPRAY, METERED PERIODONTAL at 09:39

## 2024-02-13 RX ADMIN — MYCOPHENOLATE MOFETIL 1250 MG: 500 INJECTION, POWDER, LYOPHILIZED, FOR SOLUTION INTRAVENOUS at 21:46

## 2024-02-13 RX ADMIN — HEPARIN SODIUM 1650 UNITS/HR: 10000 INJECTION, SOLUTION INTRAVENOUS at 21:51

## 2024-02-13 RX ADMIN — DIPHENHYDRAMINE HYDROCHLORIDE AND LIDOCAINE HYDROCHLORIDE AND ALUMINUM HYDROXIDE AND MAGNESIUM HYDRO 10 ML: KIT at 16:27

## 2024-02-13 RX ADMIN — MYCOPHENOLATE MOFETIL 1250 MG: 500 INJECTION, POWDER, LYOPHILIZED, FOR SOLUTION INTRAVENOUS at 10:43

## 2024-02-13 RX ADMIN — VANCOMYCIN HYDROCHLORIDE 1000 MG: 1 INJECTION, SOLUTION INTRAVENOUS at 12:18

## 2024-02-13 RX ADMIN — URSODIOL 300 MG: 300 CAPSULE ORAL at 09:40

## 2024-02-13 RX ADMIN — DOXEPIN HYDROCHLORIDE 25 MG: 10 SOLUTION ORAL at 20:07

## 2024-02-13 RX ADMIN — LOPERAMIDE HYDROCHLORIDE 4 MG: 2 CAPSULE ORAL at 09:41

## 2024-02-13 RX ADMIN — PANTOPRAZOLE SODIUM 40 MG: 40 TABLET, DELAYED RELEASE ORAL at 09:43

## 2024-02-13 RX ADMIN — DIPHENHYDRAMINE HYDROCHLORIDE AND LIDOCAINE HYDROCHLORIDE AND ALUMINUM HYDROXIDE AND MAGNESIUM HYDRO 10 ML: KIT at 03:29

## 2024-02-13 RX ADMIN — Medication: at 03:47

## 2024-02-13 RX ADMIN — DIPHENHYDRAMINE HYDROCHLORIDE AND LIDOCAINE HYDROCHLORIDE AND ALUMINUM HYDROXIDE AND MAGNESIUM HYDRO 10 ML: KIT at 12:19

## 2024-02-13 RX ADMIN — FUROSEMIDE 20 MG: 10 INJECTION, SOLUTION INTRAMUSCULAR; INTRAVENOUS at 12:24

## 2024-02-13 RX ADMIN — URSODIOL 300 MG: 300 CAPSULE ORAL at 20:09

## 2024-02-13 RX ADMIN — SIROLIMUS 3.5 MG: 2 TABLET, FILM COATED ORAL at 09:48

## 2024-02-13 RX ADMIN — LEVOFLOXACIN 250 MG: 250 TABLET, FILM COATED ORAL at 12:22

## 2024-02-13 RX ADMIN — POTASSIUM CHLORIDE 40 MEQ: 750 TABLET, EXTENDED RELEASE ORAL at 23:16

## 2024-02-13 RX ADMIN — VANCOMYCIN HYDROCHLORIDE 125 MG: 125 CAPSULE ORAL at 13:51

## 2024-02-13 RX ADMIN — DOXEPIN HYDROCHLORIDE 25 MG: 10 SOLUTION ORAL at 16:27

## 2024-02-13 RX ADMIN — BENZOCAINE 1 ML: 220 SPRAY, METERED PERIODONTAL at 12:23

## 2024-02-13 RX ADMIN — VANCOMYCIN HYDROCHLORIDE 125 MG: 125 CAPSULE ORAL at 09:42

## 2024-02-13 RX ADMIN — NIFEDIPINE 30 MG: 30 TABLET, FILM COATED, EXTENDED RELEASE ORAL at 09:42

## 2024-02-13 RX ADMIN — URSODIOL 300 MG: 300 CAPSULE ORAL at 13:51

## 2024-02-13 RX ADMIN — Medication 5 CAPSULE: at 09:40

## 2024-02-13 RX ADMIN — DOXEPIN HYDROCHLORIDE 25 MG: 10 SOLUTION ORAL at 09:41

## 2024-02-13 RX ADMIN — VANCOMYCIN HYDROCHLORIDE 125 MG: 125 CAPSULE ORAL at 16:27

## 2024-02-13 RX ADMIN — POTASSIUM CHLORIDE 40 MEQ: 750 TABLET, EXTENDED RELEASE ORAL at 16:27

## 2024-02-13 RX ADMIN — POTASSIUM CHLORIDE 20 MEQ: 29.8 INJECTION, SOLUTION INTRAVENOUS at 06:16

## 2024-02-13 ASSESSMENT — ACTIVITIES OF DAILY LIVING (ADL)
ADLS_ACUITY_SCORE: 22

## 2024-02-13 NOTE — PROGRESS NOTES
PALLIATIVE CARE PROGRESS NOTE  [facility]     Patient Name: Ziggy Hallman  Date of Admission: 1/19/2024   Today the patient was seen for: symptom management     Recommendations & Counseling       #Acute oropharyngeal mucositis pain  Previously poorly controlled, limiting sleep and ability to take PO. Now improving.   Continue PCA Hydromorphone, no basal dose (stopped 2/12), 0.3mg bolus dose with 10 minute lock-out, max 1.8mg/hr.   Tomorrow can consider transitioning to PO/IV as needed, discontinue PCA.   Continue doxepin solution to mouthwash q6h scheduled: Mix doxepin 25mg (2.5ml) with 7.5ml of water. Swish for 1 minute and spit. Swallowing can cause increased sedation.  Continue scheduled magic mouthwash  Continue PRN benzocaine spray and viscous lidocaine  Acetaminophen as needed - will not schedule d/t risk of masking fevers per BMT team.     Palliative Care will continue to follow. Thank you for the consult and allowing us to aid in the care of Ziggy Hallman.    These recommendations have been discussed with primary team via [employer].    MARYURI Mondragon CNP  Securely message with [employer] (more info)  Text page via [facility] Paging/Directory          Interval History:     Multidisciplinary collaboration:  Notes reviewed, no acute events. Pain rated 2-3 evening/overnight. Basal rate stopped yesterday afternoon. WBC count continues to improve.     Dilaudid PCA received:   9.8mg yesterday AM/afternoon  3.3mg overnight (~11 doses)   13.1 mg total in past 24 hours (day prior was 24.3mg total)     Patient/family narrative  Saw patient, reports pain has been controlled with occasional doses of PCA dilaudid. Does feel pain is getting better. Reports PO intake still reduced, slightly <50% of meals he is consuming. States this is a mix of throat pain/decreased appetite. Notes taking throat sprays prior to PO meds which helps reduce pain with swallowing those. Patient much  more alert today compared to yesterday.     Palliative Summary/HPI          Ziggy is a 47 y/o man with complex medical history (MI, CAD s/p angioplasty/stent, CVA, PAD s/p R toe amputation, multiple clots) and high risk MDS admitted 1/19 for hematopoetic stem cell transplant. Course complicated by Cdiff infection, PE and recurrent fevers.     Today, the patient was seen for:  Palliative care  Acute oropharyngeal mucositis  Pain management  MDS     Medications:  Acetaminophen as needed - not used  Biotene 4 times a day  Hurricane spray prn x2-3   Doxepin 25 mg 4 times a day  Loperamide 4 mg 4 times a day  Magic mouthwash q4h - using 2-3x/day  Protonix 40 mg daily  Metamucil daily    Hydromorphone PCA no basal, with 0.3 mg bolus q10min lock out - basal stopped yesterday at 1pm         Review of Systems:     Besides above, ROS was reviewed and is unremarkable             Physical Exam:   Temp:  [97.8  F (36.6  C)-99.7  F (37.6  C)] 99.3  F (37.4  C)  Pulse:  [] 116  Resp:  [16-18] 18  BP: (123-145)/(66-82) 145/82  SpO2:  [93 %-96 %] 96 %  201 lbs 9.6 oz    PHYSICAL EXAM:  General: NAD, walking around room.   HEENT: +pharyngeal erythema   Lungs: non-labored  Extremities: no gross abnormalities  Neuro: alert and oriented   Psych: calm           Current Problem List:   Principal Problem:    MDS (myelodysplastic syndrome) (H)      Allergies   Allergen Reactions    Blood Transfusion Related (Informational Only) Other (See Comments)     Give O RBC's and WBC's only per Cell Therapy            Data Reviewed:       Recent Labs   Lab 02/13/24  0326 02/12/24  1133 02/12/24  0336 02/11/24  1655 02/11/24  0549 02/11/24  0254 02/08/24  0816 02/08/24  0319 02/06/24  1744 02/06/24  1446   WBC 0.8*  --  0.4*  --   --  0.3*   < > 0.1*   < >  --    HGB 8.3*  --  8.7*  --   --  7.9*   < > 7.9*   < >  --    MCV 87  --  87  --   --  86   < > 88   < >  --    PLT 37*  --  51* 54*   < > 32*   < > 49*   < >  --    INR  --   --  1.12  --    --   --   --   --   --  1.14     --  144  --   --  143   < > 142   < >  --    POTASSIUM 3.1* 3.6 3.4  --    < > 3.4   < > 3.2*   < >  --    CHLORIDE 110*  --  110*  --   --  119*   < > 109*   < >  --    CO2 22  --  23  --   --  22   < > 21*   < >  --    BUN 8.1  --  10.2  --   --  10.6   < > 12.9   < >  --    CR 1.06  --  0.96  --   --  0.98   < > 0.89   < >  --    ANIONGAP 11  --  11  --   --  2*   < > 12   < >  --    REMY 8.2*  --  8.1*  --   --  7.9*   < > 8.0*   < >  --    GLC 86  --  87  --   --  95   < > 99   < >  --    ALBUMIN  --   --  2.8*  --   --   --   --  2.6*  --   --    PROTTOTAL  --   --  5.9*  --   --   --   --  6.1*  --   --    BILITOTAL  --   --  0.3  --   --   --   --  0.4  --   --    ALKPHOS  --   --  72  --   --   --   --  63  --   --    ALT  --   --  14  --   --   --   --  15  --   --    AST  --   --  26  --   --   --   --  27  --   --     < > = values in this interval not displayed.       No results found for this or any previous visit (from the past 24 hour(s)).      Medical Decision Making       MANAGEMENT DISCUSSED with the following over the past 24 hours: BMT   NOTE(S)/MEDICAL RECORDS REVIEWED over the past 24 hours: BMT, nursing, palliative  Medical complexity over the past 24 hours:  - Parenteral (IV) CONTROLLED SUBSTANCES ordered

## 2024-02-13 NOTE — PLAN OF CARE
"/84 (BP Location: Right arm)   Pulse 104   Temp 99.5  F (37.5  C) (Axillary)   Resp 18   Ht 1.725 m (5' 7.91\")   Wt 91.4 kg (201 lb 9.6 oz)   SpO2 94%   BMI 30.73 kg/m    /84 (BP Location: Right arm)   Pulse 104   Temp 99.5  F (37.5  C) (Axillary)   Resp 18   Ht 1.725 m (5' 7.91\")   Wt 91.4 kg (201 lb 9.6 oz)   SpO2 94%   BMI 30.73 kg/m      VSS on room air; alert and oriented x4. Patient complains of mucositis pain; dilaudid PCA pump in use as well as scheduled magic mouth wash and doxepin. Prn hurricane spray utilized per patient request. Patient denies nausea, shortness of breath or dizziness. Patient continues to have loose stools. Patient educated on saving stool output, but continues to refuse. Adequate urine output. Heparin drip at therapeutic rate of 1,650 units/hr; heparin lab scheduled for tomorrow morning. Potassium replaced; potassium recheck in evening. No further replacements needed. Continue with plan of care; will continue to monitor patient and notify primary team with changes.     Goal Outcome Evaluation:      Plan of Care Reviewed With: patient    Overall Patient Progress: improving    Problem: Adult Inpatient Plan of Care  Goal: Plan of Care Review  Description: The Plan of Care Review/Shift note should be completed every shift.  The Outcome Evaluation is a brief statement about your assessment that the patient is improving, declining, or no change.  This information will be displayed automatically on your shift  note.  Outcome: Progressing  Flowsheets (Taken 2/13/2024 1717)  Plan of Care Reviewed With: patient  Overall Patient Progress: improving  Goal: Patient-Specific Goal (Individualized)  Description: You can add care plan individualizations to a care plan. Examples of Individualization might be:  \"Parent requests to be called daily at 9am for status\", \"I have a hard time hearing out of my right ear\", or \"Do not touch me to wake me up as it startles  me\".  Outcome: " Progressing  Goal: Absence of Hospital-Acquired Illness or Injury  Outcome: Progressing  Intervention: Identify and Manage Fall Risk  Recent Flowsheet Documentation  Taken 2/13/2024 1530 by Jocelyn Champion RN  Safety Promotion/Fall Prevention: safety round/check completed  Taken 2/13/2024 1230 by Jocelyn Champion RN  Safety Promotion/Fall Prevention: safety round/check completed  Taken 2/13/2024 0830 by Jocelyn Champion RN  Safety Promotion/Fall Prevention: safety round/check completed  Intervention: Prevent Infection  Recent Flowsheet Documentation  Taken 2/13/2024 1530 by Jocelyn Champion RN  Infection Prevention: environmental surveillance performed  Taken 2/13/2024 1230 by Jocelyn Champion RN  Infection Prevention: environmental surveillance performed  Taken 2/13/2024 0830 by Jocelyn Champion RN  Infection Prevention: environmental surveillance performed  Goal: Optimal Comfort and Wellbeing  Outcome: Progressing  Intervention: Monitor Pain and Promote Comfort  Recent Flowsheet Documentation  Taken 2/13/2024 1530 by Jocelyn Champion RN  Pain Management Interventions:   pain pump in use   rest   relaxation techniques promoted   quiet environment facilitated   emotional support   care clustered  Taken 2/13/2024 1354 by Jocelyn Champion RN  Pain Management Interventions:   pain pump in use   rest   relaxation techniques promoted   quiet environment facilitated   emotional support   care clustered  Taken 2/13/2024 1230 by Jocelyn Champion RN  Pain Management Interventions:   pain pump in use   rest   relaxation techniques promoted   quiet environment facilitated   emotional support   care clustered  Taken 2/13/2024 0830 by Jocelyn Champion RN  Pain Management Interventions:   pain pump in use   rest   relaxation techniques promoted   quiet environment facilitated   emotional support   care clustered  Goal: Readiness for Transition of Care  Outcome: Progressing     Problem:  Stem Cell/Bone Marrow Transplant  Goal: Optimal Coping with Transplant  Outcome: Progressing  Goal: Symptom-Free Urinary Elimination  Outcome: Progressing  Intervention: Prevent or Manage Bladder Irritation  Recent Flowsheet Documentation  Taken 2/13/2024 1530 by Jocelyn Champion RN  Pain Management Interventions:   pain pump in use   rest   relaxation techniques promoted   quiet environment facilitated   emotional support   care clustered  Taken 2/13/2024 1354 by Jocelyn Champion RN  Pain Management Interventions:   pain pump in use   rest   relaxation techniques promoted   quiet environment facilitated   emotional support   care clustered  Taken 2/13/2024 1230 by Jocelyn Champion RN  Pain Management Interventions:   pain pump in use   rest   relaxation techniques promoted   quiet environment facilitated   emotional support   care clustered  Taken 2/13/2024 0830 by Jocelyn Champion RN  Pain Management Interventions:   pain pump in use   rest   relaxation techniques promoted   quiet environment facilitated   emotional support   care clustered  Goal: Diarrhea Symptom Control  Outcome: Progressing  Intervention: Manage Diarrhea  Recent Flowsheet Documentation  Taken 2/13/2024 0830 by Jocelyn Champion RN  Perineal Care: perineal hygiene encouraged  Goal: Improved Activity Tolerance  Outcome: Progressing  Intervention: Promote Improved Energy  Recent Flowsheet Documentation  Taken 2/13/2024 0830 by Jocelyn Champion RN  Fatigue Management: activity schedule adjusted  Activity Management: activity adjusted per tolerance  Goal: Blood Counts Within Acceptable Range  Outcome: Progressing  Intervention: Monitor and Manage Hematologic Symptoms  Recent Flowsheet Documentation  Taken 2/13/2024 1530 by Jocelyn Champion RN  Bleeding Precautions:   gentle oral care promoted   monitored for signs of bleeding  Medication Review/Management:   medications reviewed   high-risk medications identified  Taken  2/13/2024 1230 by Jocelyn Champion RN  Bleeding Precautions:   gentle oral care promoted   monitored for signs of bleeding  Medication Review/Management:   medications reviewed   high-risk medications identified  Taken 2/13/2024 0830 by Jocelyn Champion RN  Bleeding Precautions:   gentle oral care promoted   monitored for signs of bleeding  Medication Review/Management:   medications reviewed   high-risk medications identified  Goal: Absence of Hypersensitivity Reaction  Outcome: Progressing  Intervention: Manage Infusion-Related Hypersensitivity  Recent Flowsheet Documentation  Taken 2/13/2024 0830 by Jocelyn Champion RN  Stabilization Measures: blood products administered  Goal: Absence of Infection  Outcome: Progressing  Intervention: Prevent and Manage Infection  Recent Flowsheet Documentation  Taken 2/13/2024 1530 by Jocelyn Champion RN  Infection Prevention: environmental surveillance performed  Infection Management: aseptic technique maintained  Isolation Precautions:   contact precautions maintained   enteric precautions maintained  Taken 2/13/2024 1230 by Jocelyn Champion RN  Infection Prevention: environmental surveillance performed  Infection Management: aseptic technique maintained  Isolation Precautions:   contact precautions maintained   enteric precautions maintained  Taken 2/13/2024 0830 by Jocelyn Champion RN  Infection Prevention: environmental surveillance performed  Infection Management: aseptic technique maintained  Goal: Improved Oral Mucous Membrane Health and Integrity  Outcome: Progressing  Intervention: Promote Oral Comfort and Health  Recent Flowsheet Documentation  Taken 2/13/2024 0830 by Jocelyn Champion RN  Oral Care: oral rinse provided  Goal: Nausea and Vomiting Symptom Relief  Outcome: Progressing  Goal: Optimal Nutrition Intake  Outcome: Progressing

## 2024-02-13 NOTE — PROGRESS NOTES
"/68 (BP Location: Left arm)   Pulse 101   Temp 99.7  F (37.6  C) (Axillary)   Resp 18   Ht 1.725 m (5' 7.91\")   Wt 93 kg (205 lb 1.6 oz)   SpO2 94%   BMI 31.27 kg/m      Neuro: Alert, but intermittently lethargic overnight. Oriented x4  Cardiac: No tele order, VSS.   Respiratory: Sating mid 90's on RA.  GI/: Adequate urine output. Loose stools overnight  Diet/appetite: Regular diet  Activity: UAL w/ steady gait  Pain: C/O mucositis pain - Dilaudid PCA pump in use   Skin: No new deficits noted  LDA's: DL CVC, Port-A-Cath and PIV    Plan: Tmax 99.7, slightly tachy, but within parameters. OVSS on room air. Pt c/o mucositis pain - Dilaudid PCA pump in use, prn hurricane spray x1, scheduled MMW and biotene spray given. Denied nausea and dizziness. Reported loose stools this shift, but pt unable to give clear answer as to how many stools overnight, pt educated on saving stool output, but continues to refuse. Adequate urine output. Heparin drip is at therapeutic rate of 1650 units/hr, Heparin Anti Xa lab scheduled for AM labs. Platelets infusing in PIV. 1st bag of potassium replacement infusing, will need 2nd bag. Continue with POC. Notify primary team with changes.    "

## 2024-02-13 NOTE — PROGRESS NOTES
VS baseline, continue to have mucositis pain however very lethargic discontinue continue pain medication, after that patient is more alerted and orientated, had 1 whole taco this evening, more active this evening continue to be monitor, lasix was admitted after that good out put continue to have diarrhea

## 2024-02-13 NOTE — PROGRESS NOTES
"  BMT Progress Notes      Patient ID: Ziggy Hallman is a 48 year old year old male with a PMH of MDS, hx of multiple clots and MI undergoing a MA (Bu/Flu) prep for an 8/8 URD PBSCT currently day 19    Transplant Essential Data:   Diagnosis MDS-High risk, Plasma Cell neoplasm    BMTCT Type Allogeneic    Prep Regimen Bu/Flu    Donor Match and  Source URD 8/8 DP permissive    GVHD Prophylaxis PTCy, Siro/MMF    Primary BMT MD Bacon    Clinical Trials ? [id number]       Interval History:   Overall had a good night. No new complaints. Feels mucositis continues to improve, continues to have rectal pain. Feels PCA is working well. Palliative continues to follow. Consider transitioning some medication to PO tomorrow. Able to eat some food yesterday.     Review of Systems    Review of Systems: 10 point ROS negative unless stated in HPI   PHYSICAL EXAM      Weight     Wt Readings from Last 3 Encounters:   02/13/24 91.4 kg (201 lb 9.6 oz)   01/17/24 90.1 kg (198 lb 9.6 oz)   01/12/24 88.9 kg (196 lb)        KPS: 60    /67 (BP Location: Left arm)   Pulse 91   Temp 98.8  F (37.1  C) (Axillary)   Resp 17   Ht 1.725 m (5' 7.91\")   Wt 91.4 kg (201 lb 9.6 oz)   SpO2 95%   BMI 30.73 kg/m       General: NAD, appears very tired  Mouth/Throat: Multiple scattered erythematous ulcers on bilateral posterior buccal mucosa, posterior pharynx diffusely erythematous, dry mucosa   Lungs: CTAB, no respiratory distress  Cardiovascular: RRR, no M/R/G   Lymphatics: Bilateral lower extremity edema, skin appears tight bilaterally. Pedal pulses intact.   Abdomen: non tender, soft, BS+  Skin: rash on chest/back resolved  Neuro: A&Ox3  Additional Findings: Powell site NT, no drainage.    Current aGVHD staging:  Skin 0, UGI 0, LGI 0, Liver 0 (keep in note through day +180 for allos)      LABS AND IMAGING: I have assessed all abnormal lab values for their clinical significance and any values considered clinically significant have been " addressed in the assessment and plan.        Lab Results   Component Value Date    WBC 0.8 (LL) 02/13/2024    ANEUTAUTO 2.0 01/28/2024    HGB 8.3 (L) 02/13/2024    HCT 23.6 (L) 02/13/2024    PLT 65 (L) 02/13/2024     02/13/2024    POTASSIUM 3.1 (L) 02/13/2024    CHLORIDE 110 (H) 02/13/2024    CO2 22 02/13/2024    GLC 86 02/13/2024    BUN 8.1 02/13/2024    CR 1.06 02/13/2024    MAG 1.6 (L) 02/13/2024    INR 1.12 02/12/2024         SYSTEMS-BASED ASSESSMENT AND PLAN     Ziggy Hallman is a 48 year old year old male with a PMH of MDS, hx of multiple clots and MI undergoing a MA (Bu/Flu) prep for an 8/8 URD PBSCT day 19. Stay has been complicated by mucositis requiring PCA, N/F, Staph Epi bacteremia, pulmonary embolism requiring hep gtt.    BMT/IEC PROTOCOL for 2015-29  Chemo Prep:   Day -6: Admit  Day -5 through Day -2: Busulfan/Fludarabine  Day -1: Rest.   Keppra prophylaxis      8/8 DP permissive. Cell dose-9.24x10^6  ABO: Donor: O+ Recipient A-  (Minor incompatability, no flush required)   GSCF plan: GCSF to start day +5 until ANC >1500 for 3 consecutive days    HEME/COAG  #SEGMENTAL R PE (2/6): Low intensity hep gtt with platelet goal <50k with post platelet checks. Can restart PTA anticoagulation once platelets stable. Will need outpatient plan as patient is starting to engraft (lovenox to eliquis/prasagruel likely).  #APS work up- lupus antigen +, will follow outpt as may be unreliable in this acute setting and prior APS work up had been unremarkable prior to admission.  #Pancytopenia 2/2 chemo- anemia, leukopenia, thrombocytopenia  #Transfusion parameters: hemoglobin <8 (cardiac hx), platelets <50k (hep gtt)  #Recurrent arterial thromboembolic events:  He has long history of various events including renal infarcts (2011, 2013, 2015), left popliteal embolic episode requiring surgery, anticoagulation (2011), right carotid artery embolic episode with TIA/stroke-like symptoms (2012), embolic MI (2015),  gangrenous right toe requiring vascular surgery/amputation (2017), 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   Extensive hypercoagulable workup to date has been unrevealing.  JAK2 testing negative.  PNH testing negative.  Elevated IgA of unknown significance, no evidence of plasma cell disorder.  - Eliquis and Prasugrel now HELD starting 2/1-x.     IMMUNOCOMPROMISED  #Staph epi bacteremia (Karius >2000 copies)- afebrile since Vanco added x7 days(2/6-2/13)  #Febrile neutropenia, fever resolved:    -Meropenem (2/6-2/10) deescalated as infectious source identified, transitioned to Levaquin ppx, Vanco (2/6-2/12) Azithro (2/6-2/9)  -Cefepime (2/2-2/6)   -UC, BC negative, KARIUS showed staph epi, viral work up negative  #C. Diff - Admit c. Diff triple+ - Start PO vanc 1/21 for 14 day course. - completed on 2/4, with broadening abx restart PO Vanco tx dose x 10 days    -Prophylaxis plan: ACV LD, Megan HD, Levaquin while neutropenic , Bactrim +28    RISK OF GVHD  - Prophylaxis: PtC day +3, +4, , Siro/MMF to start day +5, Siro level -9.2- 2/11    CARDIOVASCULAR  #CAD  #Hx of CABG  #HTN   -PTA metoprolol, Lisinopril dose increased 2/2 HTN,  nifidepine ER 30mg BID, labetolol PRN;  #Hyperlipidemia: Holding atorvastatin peritransplant  - Risk of cardiomyopathy:  Baseline EF 50-55%, Global left ventricular function mildly reduced. Grade 1 or early diastolic dysfunction.   - Risk of arrhythmia: Baseline EKG showed NSR, Qtc -425    GI/NUTRITION  #Oropharyngeal mucositis: MMW, hurricaine spray, doxepin, dilaudid PCA (2/3-x) (0.3 continuous, 0.3q10)   -Palliative consulted 2/9, continue to follow and provide recs  - Ulcer prophylaxis: Protonix   - VOD Prophylaxis: Ursodiol  - Risk of nausea/vomiting due to chemo: Ativan, Compazine, Zofran   - Moderate malnutrition 2/2 acute on chronic illness- dietician to follow, appetite improving with pain  "control.    RENAL/ELECTROLYTES/  #Hematuria- No dysuria, self resolved  #Hypervolemia 2/2 cytoxan flush - diurese as needed lasix 20mg 2/7, 2/09, 2/12, 2/13.   #Oliguria 2/9 (Resolved)- output <1L with diuresis and large amounts of IV given. Cr stable and WNL. Patient is having concurrent diarrhea, so this is likely difficult to measure.   -Bladder scan unremarkable   -UA shows proteinuria  #Proteinuria- possibly exacerbated by persistent HTN (See CV),   - Hypocalcemia in setting hypoalbuminemia, iCa2+, corrected WNL   - Electrolyte management: replace per sliding scale    Psych:    #Anxiety: admits to feeling anxious.  Start zyprexa 5mg HS.  Connect w/ SW.  Psychaitry consult added hydroxyzine.  #Insomnia- PTA ambien, Trazadone added and increased to 100mg. - Ambien and trazadone held during cytoxan period due to frequent urination and concern for falls. Discontinued with opioid PCA, melatonin added.     SOCIAL DETERMINANTS  - Caregiver: Family   - Financial/insurance concerns: See SW notes       Known issues that I take into account for medical decisions, with salient changes to the plan considering these complexities noted above.    Patient Active Problem List   Diagnosis     Amegakaryocytic thrombocytopenia (H)     Anemia due to bone marrow failure, unspecified bone marrow failure type (H)     Aplastic anemia (H)     Clinically Significant Risk Factors        # Hypokalemia: Lowest K = 3.1 mmol/L in last 2 days, will replace as needed     # Hypomagnesemia: Lowest Mg = 1.6 mg/dL in last 2 days, will replace as needed   # Hypoalbuminemia: Lowest albumin = 2 g/dL at 1/22/2024  4:12 AM, will monitor as appropriate     # Thrombocytopenia: Lowest platelets = 37 in last 2 days, will monitor for bleeding          # Obesity: Estimated body mass index is 30.73 kg/m  as calculated from the following:    Height as of this encounter: 1.725 m (5' 7.91\").    Weight as of this encounter: 91.4 kg (201 lb 9.6 oz).         "       Dispo: Remain admitted through engraftment  Please call sister Radha with updates for care and discharge    I spent 30 minutes in the care of this patient today, which included time necessary for preparation for the visit, obtaining history, ordering medications/tests/procedures as medically indicated, review of pertinent medical literature, counseling of the patient, communication of recommendations to the care team, and documentation time.    Brenda Brunson PA-C  [phone]      ______________________________________________      BMT ATTENDING NOTE    Ziggy Hallman is a 48 year old male, who is day 19 of transplant for MDS.     Subjective:  Better today, up in chair, symptomatic/pain management noted. Eating improved. No new infectious signs or symptoms by history.       Vitals reviewed, labs reviewed  PE:  NAD, oriented x3  HEENT: mucositis, erythema  Heart: RRR  Lungs: CTAB  Abdomen: +BS, soft non tender, non distended, rectal area not examined  LE: 1+ edema  Skin: no rash     Assessment and Plan:    1. Heme/BMT:  MDS, here for MAC 8/8 MUD, flu/Bu prep, PTCy/Siro/MMF GVHD prophylaxis, transfusion as needed, history of multiple arterial thrombotic events had been on anticoagulation, 2/6 CT done for fever workup revealed right segmental PE started on low-dose heparin and will maintain platelets above 50,000 to decrease risk of bleeding  2. ID: C. difficile colitis on treatment, neutropenic fevers posttransplant culture negative, started on vancomycin meropenem and azithromycin switched most recently on 2/6 with decrease in fever trend,  Karius testing with staph epi bacteria detected treat 7 days total with neg cx, discontinue azithromycin and meropenem- deescalate 2/10, checked viral PCR's  3. CV: history of hypertension, coronary artery disease, CABG, hyperlipidemia  4. GI/ FEN: Symptom management of regimen related toxicity, encourage fluid PO intake, I/O and daily weights, symptomatic management of  hemorrhoids  5. Renal: Maintain euvolemic, hematuria resolved continue to monitor  6. PPx: fabi, ACV  7.  Supportive care: PT,  anxiety management, encourage ambulation, optimize nutrition, avoid sarcopenia, PCA for pain management, appreciate palliative care recommendations for pain and supportive care management, other supportive care as detailed above    I spent 50 minutes in the care of Ziggy today, including an independent face-to-face assessment and time monitoring for restoration of hematopoiesis, high-risk organ toxicities from chemotherapy, and GVHD, managing infectious risk due to his immunocompromised state, and encouraging nutrition and physical activity to prevent debility and sarcopenia.  I personally performed all of the medical decision making associated with this visit, counseled Ziggy on my overall assessment and recommendations, communicated my plan to the care team, and edited the above note to reflect my current plan of care.     William Lagos MD

## 2024-02-14 LAB
ABO/RH(D): NORMAL
ANION GAP SERPL CALCULATED.3IONS-SCNC: 11 MMOL/L (ref 7–15)
ANTIBODY SCREEN: NEGATIVE
BASOPHILS # BLD AUTO: ABNORMAL 10*3/UL
BASOPHILS # BLD MANUAL: 0 10E3/UL (ref 0–0.2)
BASOPHILS NFR BLD AUTO: ABNORMAL %
BASOPHILS NFR BLD MANUAL: 0 %
BLD PROD TYP BPU: NORMAL
BLOOD COMPONENT TYPE: NORMAL
BUN SERPL-MCNC: 5.5 MG/DL (ref 6–20)
CALCIUM SERPL-MCNC: 8.5 MG/DL (ref 8.6–10)
CHLORIDE SERPL-SCNC: 111 MMOL/L (ref 98–107)
CODING SYSTEM: NORMAL
CREAT SERPL-MCNC: 1.21 MG/DL (ref 0.67–1.17)
DEPRECATED HCO3 PLAS-SCNC: 23 MMOL/L (ref 22–29)
EGFRCR SERPLBLD CKD-EPI 2021: 73 ML/MIN/1.73M2
EOSINOPHIL # BLD AUTO: ABNORMAL 10*3/UL
EOSINOPHIL # BLD MANUAL: 0 10E3/UL (ref 0–0.7)
EOSINOPHIL NFR BLD AUTO: ABNORMAL %
EOSINOPHIL NFR BLD MANUAL: 0 %
ERYTHROCYTE [DISTWIDTH] IN BLOOD BY AUTOMATED COUNT: 13.1 % (ref 10–15)
GLUCOSE SERPL-MCNC: 94 MG/DL (ref 70–99)
HCT VFR BLD AUTO: 23.2 % (ref 40–53)
HGB BLD-MCNC: 8.4 G/DL (ref 13.3–17.7)
IMM GRANULOCYTES # BLD: ABNORMAL 10*3/UL
IMM GRANULOCYTES NFR BLD: ABNORMAL %
ISSUE DATE AND TIME: NORMAL
LYMPHOCYTES # BLD AUTO: ABNORMAL 10*3/UL
LYMPHOCYTES # BLD MANUAL: 0.1 10E3/UL (ref 0.8–5.3)
LYMPHOCYTES NFR BLD AUTO: ABNORMAL %
LYMPHOCYTES NFR BLD MANUAL: 4 %
MAGNESIUM SERPL-MCNC: 1.5 MG/DL (ref 1.7–2.3)
MAGNESIUM SERPL-MCNC: 2.7 MG/DL (ref 1.7–2.3)
MCH RBC QN AUTO: 31.1 PG (ref 26.5–33)
MCHC RBC AUTO-ENTMCNC: 36.2 G/DL (ref 31.5–36.5)
MCV RBC AUTO: 86 FL (ref 78–100)
MONOCYTES # BLD AUTO: ABNORMAL 10*3/UL
MONOCYTES # BLD MANUAL: 0.1 10E3/UL (ref 0–1.3)
MONOCYTES NFR BLD AUTO: ABNORMAL %
MONOCYTES NFR BLD MANUAL: 6 %
NEUTROPHILS # BLD AUTO: ABNORMAL 10*3/UL
NEUTROPHILS # BLD MANUAL: 1.5 10E3/UL (ref 1.6–8.3)
NEUTROPHILS NFR BLD AUTO: ABNORMAL %
NEUTROPHILS NFR BLD MANUAL: 88 %
NRBC # BLD AUTO: 0 10E3/UL
NRBC BLD AUTO-RTO: 0 /100
PHOSPHATE SERPL-MCNC: 2.5 MG/DL (ref 2.5–4.5)
PLAT MORPH BLD: ABNORMAL
PLATELET # BLD AUTO: 42 10E3/UL (ref 150–450)
PLATELET # BLD AUTO: 84 10E3/UL (ref 150–450)
POTASSIUM SERPL-SCNC: 2.9 MMOL/L (ref 3.4–5.3)
POTASSIUM SERPL-SCNC: 2.9 MMOL/L (ref 3.4–5.3)
POTASSIUM SERPL-SCNC: 3 MMOL/L (ref 3.4–5.3)
POTASSIUM SERPL-SCNC: 3.1 MMOL/L (ref 3.4–5.3)
POTASSIUM SERPL-SCNC: 3.4 MMOL/L (ref 3.4–5.3)
PROMYELOCYTES # BLD MANUAL: 0 10E3/UL
PROMYELOCYTES NFR BLD MANUAL: 2 %
RBC # BLD AUTO: 2.7 10E6/UL (ref 4.4–5.9)
RBC MORPH BLD: ABNORMAL
SODIUM SERPL-SCNC: 145 MMOL/L (ref 135–145)
SPECIMEN EXPIRATION DATE: NORMAL
UFH PPP CHRO-ACNC: 0.4 IU/ML
UNIT ABO/RH: NORMAL
UNIT NUMBER: NORMAL
UNIT STATUS: NORMAL
UNIT TYPE ISBT: 6200
WBC # BLD AUTO: 1.7 10E3/UL (ref 4–11)

## 2024-02-14 PROCEDURE — 85027 COMPLETE CBC AUTOMATED: CPT | Performed by: PHYSICIAN ASSISTANT

## 2024-02-14 PROCEDURE — 258N000003 HC RX IP 258 OP 636: Performed by: PHYSICIAN ASSISTANT

## 2024-02-14 PROCEDURE — 250N000013 HC RX MED GY IP 250 OP 250 PS 637: Performed by: PHYSICIAN ASSISTANT

## 2024-02-14 PROCEDURE — 250N000012 HC RX MED GY IP 250 OP 636 PS 637

## 2024-02-14 PROCEDURE — 250N000011 HC RX IP 250 OP 636: Performed by: STUDENT IN AN ORGANIZED HEALTH CARE EDUCATION/TRAINING PROGRAM

## 2024-02-14 PROCEDURE — P9037 PLATE PHERES LEUKOREDU IRRAD: HCPCS

## 2024-02-14 PROCEDURE — 85007 BL SMEAR W/DIFF WBC COUNT: CPT | Performed by: PHYSICIAN ASSISTANT

## 2024-02-14 PROCEDURE — 85520 HEPARIN ASSAY: CPT | Performed by: INTERNAL MEDICINE

## 2024-02-14 PROCEDURE — 250N000013 HC RX MED GY IP 250 OP 250 PS 637

## 2024-02-14 PROCEDURE — 250N000011 HC RX IP 250 OP 636: Performed by: INTERNAL MEDICINE

## 2024-02-14 PROCEDURE — 84132 ASSAY OF SERUM POTASSIUM: CPT | Performed by: INTERNAL MEDICINE

## 2024-02-14 PROCEDURE — 84132 ASSAY OF SERUM POTASSIUM: CPT | Performed by: STUDENT IN AN ORGANIZED HEALTH CARE EDUCATION/TRAINING PROGRAM

## 2024-02-14 PROCEDURE — 80048 BASIC METABOLIC PNL TOTAL CA: CPT | Performed by: PHYSICIAN ASSISTANT

## 2024-02-14 PROCEDURE — 99233 SBSQ HOSP IP/OBS HIGH 50: CPT | Mod: 24 | Performed by: PHYSICIAN ASSISTANT

## 2024-02-14 PROCEDURE — 86900 BLOOD TYPING SEROLOGIC ABO: CPT | Performed by: PHYSICIAN ASSISTANT

## 2024-02-14 PROCEDURE — 206N000001 HC R&B BMT UMMC

## 2024-02-14 PROCEDURE — 250N000013 HC RX MED GY IP 250 OP 250 PS 637: Performed by: INTERNAL MEDICINE

## 2024-02-14 PROCEDURE — 83735 ASSAY OF MAGNESIUM: CPT | Performed by: INTERNAL MEDICINE

## 2024-02-14 PROCEDURE — 83735 ASSAY OF MAGNESIUM: CPT | Performed by: PHYSICIAN ASSISTANT

## 2024-02-14 PROCEDURE — 84132 ASSAY OF SERUM POTASSIUM: CPT | Performed by: PHYSICIAN ASSISTANT

## 2024-02-14 PROCEDURE — 250N000011 HC RX IP 250 OP 636: Performed by: PHYSICIAN ASSISTANT

## 2024-02-14 PROCEDURE — 85049 AUTOMATED PLATELET COUNT: CPT | Performed by: PHYSICIAN ASSISTANT

## 2024-02-14 PROCEDURE — 84100 ASSAY OF PHOSPHORUS: CPT | Performed by: INTERNAL MEDICINE

## 2024-02-14 PROCEDURE — 99232 SBSQ HOSP IP/OBS MODERATE 35: CPT

## 2024-02-14 PROCEDURE — 86923 COMPATIBILITY TEST ELECTRIC: CPT | Performed by: PHYSICIAN ASSISTANT

## 2024-02-14 PROCEDURE — 99418 PROLNG IP/OBS E/M EA 15 MIN: CPT | Performed by: PHYSICIAN ASSISTANT

## 2024-02-14 RX ORDER — POTASSIUM CHLORIDE 750 MG/1
20 TABLET, EXTENDED RELEASE ORAL ONCE
Status: DISCONTINUED | OUTPATIENT
Start: 2024-02-14 | End: 2024-02-14

## 2024-02-14 RX ORDER — OXYCODONE HYDROCHLORIDE 5 MG/1
5 TABLET ORAL EVERY 4 HOURS PRN
Status: DISCONTINUED | OUTPATIENT
Start: 2024-02-14 | End: 2024-02-19

## 2024-02-14 RX ORDER — HYDROMORPHONE HCL IN WATER/PF 6 MG/30 ML
.2-.4 PATIENT CONTROLLED ANALGESIA SYRINGE INTRAVENOUS
Status: DISCONTINUED | OUTPATIENT
Start: 2024-02-14 | End: 2024-02-19

## 2024-02-14 RX ORDER — MAGNESIUM SULFATE HEPTAHYDRATE 40 MG/ML
4 INJECTION, SOLUTION INTRAVENOUS ONCE
Qty: 100 ML | Refills: 0 | Status: COMPLETED | OUTPATIENT
Start: 2024-02-14 | End: 2024-02-14

## 2024-02-14 RX ORDER — POTASSIUM CHLORIDE 29.8 MG/ML
20 INJECTION INTRAVENOUS
Status: COMPLETED | OUTPATIENT
Start: 2024-02-14 | End: 2024-02-14

## 2024-02-14 RX ORDER — POTASSIUM CHLORIDE 750 MG/1
40 TABLET, EXTENDED RELEASE ORAL ONCE
Status: DISCONTINUED | OUTPATIENT
Start: 2024-02-14 | End: 2024-02-14

## 2024-02-14 RX ORDER — HEPARIN SODIUM (PORCINE) LOCK FLUSH IV SOLN 100 UNIT/ML 100 UNIT/ML
5-10 SOLUTION INTRAVENOUS
Status: DISCONTINUED | OUTPATIENT
Start: 2024-02-14 | End: 2024-02-14

## 2024-02-14 RX ADMIN — DOXEPIN HYDROCHLORIDE 25 MG: 10 SOLUTION ORAL at 20:38

## 2024-02-14 RX ADMIN — LOPERAMIDE HYDROCHLORIDE 4 MG: 2 CAPSULE ORAL at 20:05

## 2024-02-14 RX ADMIN — URSODIOL 300 MG: 300 CAPSULE ORAL at 20:05

## 2024-02-14 RX ADMIN — DOXEPIN HYDROCHLORIDE 25 MG: 10 SOLUTION ORAL at 15:27

## 2024-02-14 RX ADMIN — VANCOMYCIN HYDROCHLORIDE 125 MG: 125 CAPSULE ORAL at 12:13

## 2024-02-14 RX ADMIN — DIPHENHYDRAMINE HYDROCHLORIDE AND LIDOCAINE HYDROCHLORIDE AND ALUMINUM HYDROXIDE AND MAGNESIUM HYDRO 10 ML: KIT at 08:30

## 2024-02-14 RX ADMIN — SIROLIMUS 3.5 MG: 2 TABLET, FILM COATED ORAL at 08:37

## 2024-02-14 RX ADMIN — URSODIOL 300 MG: 300 CAPSULE ORAL at 08:29

## 2024-02-14 RX ADMIN — URSODIOL 300 MG: 300 CAPSULE ORAL at 15:27

## 2024-02-14 RX ADMIN — DIPHENHYDRAMINE HYDROCHLORIDE AND LIDOCAINE HYDROCHLORIDE AND ALUMINUM HYDROXIDE AND MAGNESIUM HYDRO 10 ML: KIT at 20:06

## 2024-02-14 RX ADMIN — LISINOPRIL 40 MG: 10 TABLET ORAL at 08:29

## 2024-02-14 RX ADMIN — LEVOFLOXACIN 250 MG: 250 TABLET, FILM COATED ORAL at 10:27

## 2024-02-14 RX ADMIN — Medication 5 CAPSULE: at 08:29

## 2024-02-14 RX ADMIN — HEPARIN SODIUM 1650 UNITS/HR: 10000 INJECTION, SOLUTION INTRAVENOUS at 12:24

## 2024-02-14 RX ADMIN — LOPERAMIDE HYDROCHLORIDE 4 MG: 2 CAPSULE ORAL at 12:13

## 2024-02-14 RX ADMIN — VANCOMYCIN HYDROCHLORIDE 125 MG: 125 CAPSULE ORAL at 15:27

## 2024-02-14 RX ADMIN — DEXTROSE MONOHYDRATE 20 ML: 50 INJECTION, SOLUTION INTRAVENOUS at 19:59

## 2024-02-14 RX ADMIN — NIFEDIPINE 30 MG: 30 TABLET, FILM COATED, EXTENDED RELEASE ORAL at 08:29

## 2024-02-14 RX ADMIN — DOXEPIN HYDROCHLORIDE 25 MG: 10 SOLUTION ORAL at 12:13

## 2024-02-14 RX ADMIN — PANTOPRAZOLE SODIUM 40 MG: 40 TABLET, DELAYED RELEASE ORAL at 08:29

## 2024-02-14 RX ADMIN — VANCOMYCIN HYDROCHLORIDE 125 MG: 125 CAPSULE ORAL at 20:05

## 2024-02-14 RX ADMIN — MICAFUNGIN SODIUM 150 MG: 50 INJECTION, POWDER, LYOPHILIZED, FOR SOLUTION INTRAVENOUS at 08:49

## 2024-02-14 RX ADMIN — LOPERAMIDE HYDROCHLORIDE 4 MG: 2 CAPSULE ORAL at 08:29

## 2024-02-14 RX ADMIN — MAGNESIUM SULFATE 4 G: 4 INJECTION INTRAVENOUS at 05:44

## 2024-02-14 RX ADMIN — MYCOPHENOLATE MOFETIL 1250 MG: 500 INJECTION, POWDER, LYOPHILIZED, FOR SOLUTION INTRAVENOUS at 08:52

## 2024-02-14 RX ADMIN — NIFEDIPINE 30 MG: 30 TABLET, FILM COATED, EXTENDED RELEASE ORAL at 20:05

## 2024-02-14 RX ADMIN — LOPERAMIDE HYDROCHLORIDE 4 MG: 2 CAPSULE ORAL at 15:28

## 2024-02-14 RX ADMIN — POTASSIUM CHLORIDE 20 MEQ: 750 TABLET, EXTENDED RELEASE ORAL at 01:26

## 2024-02-14 RX ADMIN — ACYCLOVIR 800 MG: 800 TABLET ORAL at 08:29

## 2024-02-14 RX ADMIN — POTASSIUM CHLORIDE 20 MEQ: 29.8 INJECTION, SOLUTION INTRAVENOUS at 18:52

## 2024-02-14 RX ADMIN — POTASSIUM CHLORIDE 20 MEQ: 29.8 INJECTION, SOLUTION INTRAVENOUS at 10:27

## 2024-02-14 RX ADMIN — BENZOCAINE 1 ML: 220 SPRAY, METERED PERIODONTAL at 08:52

## 2024-02-14 RX ADMIN — Medication 2 SPRAY: at 15:27

## 2024-02-14 RX ADMIN — DEXTROSE MONOHYDRATE 450 MCG: 50 INJECTION, SOLUTION INTRAVENOUS at 20:00

## 2024-02-14 RX ADMIN — POTASSIUM CHLORIDE 20 MEQ: 29.8 INJECTION, SOLUTION INTRAVENOUS at 08:39

## 2024-02-14 RX ADMIN — POTASSIUM CHLORIDE 20 MEQ: 29.8 INJECTION, SOLUTION INTRAVENOUS at 12:18

## 2024-02-14 RX ADMIN — ACYCLOVIR 800 MG: 800 TABLET ORAL at 20:05

## 2024-02-14 RX ADMIN — POTASSIUM CHLORIDE 20 MEQ: 29.8 INJECTION, SOLUTION INTRAVENOUS at 20:41

## 2024-02-14 RX ADMIN — DIPHENHYDRAMINE HYDROCHLORIDE AND LIDOCAINE HYDROCHLORIDE AND ALUMINUM HYDROXIDE AND MAGNESIUM HYDRO 10 ML: KIT at 15:27

## 2024-02-14 RX ADMIN — Medication 2 SPRAY: at 12:13

## 2024-02-14 RX ADMIN — Medication 2 SPRAY: at 20:05

## 2024-02-14 RX ADMIN — METOPROLOL SUCCINATE 100 MG: 50 TABLET, EXTENDED RELEASE ORAL at 08:29

## 2024-02-14 RX ADMIN — DIPHENHYDRAMINE HYDROCHLORIDE AND LIDOCAINE HYDROCHLORIDE AND ALUMINUM HYDROXIDE AND MAGNESIUM HYDRO 10 ML: KIT at 12:13

## 2024-02-14 RX ADMIN — Medication 2 SPRAY: at 08:30

## 2024-02-14 RX ADMIN — MYCOPHENOLATE MOFETIL 1250 MG: 500 INJECTION, POWDER, LYOPHILIZED, FOR SOLUTION INTRAVENOUS at 22:54

## 2024-02-14 RX ADMIN — Medication 500 UNITS: at 15:36

## 2024-02-14 RX ADMIN — VANCOMYCIN HYDROCHLORIDE 125 MG: 125 CAPSULE ORAL at 08:29

## 2024-02-14 RX ADMIN — DOXEPIN HYDROCHLORIDE 25 MG: 10 SOLUTION ORAL at 08:30

## 2024-02-14 ASSESSMENT — ACTIVITIES OF DAILY LIVING (ADL)
ADLS_ACUITY_SCORE: 22

## 2024-02-14 NOTE — PROGRESS NOTES
BMT CLINICAL SOCIAL WORK NOTE-MISSED VISIT:    Focus: Supportive Counseling/Resources/Discharge Planning    Data: Ziggy Hallman is a 48 year old year old male with a PMH of MDS, hx of multiple clots and MI undergoing a MA (Bu/Flu) prep for an 8/8 URD PBSCT currently day +20.    Interventions: Clinical  (CSW) attempted to meet with Pt to assess coping, provide supportive counseling and assist with resources as needed. Bedside RN also in room providing cares. Pt asleep at time of visit. SW provided a discharge packet with psychosocial post-transplant resources for patient and caregiver. CSW left resources on bedside table. CSW previously encouraged Pt to contact CSW for support, questions and/or resources.     Assessment: Pt continues to be supported by family/friends.     Plan: CSW will continue to provide supportive counseling and assistance with resources as needed. CSW will continue to collaborate with multidisciplinary team regarding Pt's plan of care.     RIVKA Grider, LGBOOKER  [facility]  Adult Blood & Marrow Transplant   Phone: [phone]  Pager: [phone]

## 2024-02-14 NOTE — PROGRESS NOTES
"/81 (BP Location: Left arm)   Pulse 98   Temp 99.5  F (37.5  C) (Axillary)   Resp 18   Ht 1.725 m (5' 7.91\")   Wt 91.4 kg (201 lb 9.6 oz)   SpO2 94%   BMI 30.73 kg/m      Neuro: Alert, but intermittently lethargic overnight. Oriented x4  Cardiac: No tele order, VSS.   Respiratory: Sating mid 90's on RA.  GI/: Adequate urine output. Loose stools overnight  Diet/appetite: Regular diet  Activity: UAL w/ steady gait  Pain: C/O mucositis pain - Dilaudid PCA pump in use   Skin: No new deficits noted  LDA's: DL CVC, Port-A-Cath and PIV    Plan: Tmax 99.9, slightly tachy, but within parameters. OVSS on room air. C/o mucositis pain - Dilaudid PCA pump in use, scheduled MMW and biotene spray given. Denied nausea and dizziness. Reported loose stools this shift, but pt not saving stool output, education provided on importance of saving output, but pt continues to refuse. Adequate urine output. Heparin drip is at therapeutic rate of 1650 units/hr, Heparin Anti Xa lab scheduled for AM labs. Potassium recheck from 2138 was 3.0 - total of 60mEq PO replacement given this shift. MD notified of critical lab result of Plt 42 -1 unit plt given and tolerated well. Mag replacement infusing. Will need potassium this AM. Continue with POC. Notify primary team with changes.    "

## 2024-02-14 NOTE — PROGRESS NOTES
PALLIATIVE CARE PROGRESS NOTE  [facility]     Patient Name: Ziggy Hallman  Date of Admission: 1/19/2024   Today the patient was seen for: symptom management     Recommendations & Counseling       #Acute oropharyngeal mucositis pain  Previously poorly controlled, limiting sleep and ability to take PO. Now improving.   Transition off PCA to PO pain medications  Would recommend oxycodone 5-10 mg every 4 hours as needed for mouth pain.   Continue doxepin solution to mouthwash q6h scheduled: Mix doxepin 25mg (2.5ml) with 7.5ml of water. Swish for 1 minute and spit. Swallowing can cause increased sedation - can start to titrate off as symptoms continue to improve.   Continue scheduled magic mouthwash - can start to titrate off/make prn as symptoms continue to improve.  Continue PRN benzocaine spray and viscous lidocaine  Acetaminophen as needed - will not schedule d/t risk of masking fevers per BMT team.     Palliative Care will sign off on patient.Thank you for the consult and allowing us to aid in the care of Ziggy Hallman. Happy to be re-consulted for any needs in the future.     These recommendations have been discussed with primary team via [employer].    MARYURI Mondragon CNP  Securely message with [employer] (more info)  Text page via [facility] Paging/Directory          Interval History:     Multidisciplinary collaboration:  Notes reviewed, no acute events. Pain rated 0-4 over past 24-hours.     Dilaudid PCA received:   0.6mg dilaudid overnight   1.2mg dilaudid during day yesterday = 1.8mg total in last 24 hours.     Patient/family narrative  Met with patient at bedside. Patient tired, but notes pain improved. Able to take PO medications. Still eating <50% of meals d/t decreased appetite.     Palliative Summary/HPI          Ziggy is a 47 y/o man with complex medical history (MI, CAD s/p angioplasty/stent, CVA, PAD s/p R toe amputation, multiple clots) and high risk MDS  admitted 1/19 for hematopoetic stem cell transplant. Course complicated by Cdiff infection, PE and recurrent fevers.     Today, the patient was seen for:  Palliative care  Acute oropharyngeal mucositis  Pain management  MDS     Medications:  Acetaminophen as needed - not used  Biotene 4 times a day  Hurricane spray prn x2-3   Doxepin 25 mg 4 times a day  Loperamide 4 mg 4 times a day  Magic mouthwash q4h - using 2-3x/day  Protonix 40 mg daily  Metamucil daily  Hydromorphone PCA no basal, with 0.3 mg bolus q10min lock out          Review of Systems:     Besides above, ROS was reviewed and is unremarkable             Physical Exam:   Temp:  [98.6  F (37  C)-99.9  F (37.7  C)] 98.6  F (37  C)  Pulse:  [] 97  Resp:  [16-18] 18  BP: (129-152)/(67-84) 130/73  SpO2:  [94 %-98 %] 96 %  195 lbs 14.4 oz    PHYSICAL EXAM:  General: NAD, lying in bed   HEENT: +pharyngeal erythema   Lungs: non-labored  Extremities: no gross abnormalities  Neuro: alert and oriented   Psych: calm           Current Problem List:   Principal Problem:    MDS (myelodysplastic syndrome) (H)      Allergies   Allergen Reactions    Blood Transfusion Related (Informational Only) Other (See Comments)     Give O RBC's and WBC's only per Cell Therapy            Data Reviewed:       Recent Labs   Lab 02/14/24  1033 02/14/24  0535 02/14/24  0356 02/13/24  2138 02/13/24  1401 02/13/24  0949 02/13/24  0326 02/12/24  1133 02/12/24  0336 02/08/24  0816 02/08/24  0319   WBC  --   --  1.7*  --   --   --  0.8*  --  0.4*   < > 0.1*   HGB  --   --  8.4*  --   --   --  8.3*  --  8.7*   < > 7.9*   MCV  --   --  86  --   --   --  87  --  87   < > 88   PLT 84*  --  42*  --   --  65* 37*  --  51*   < > 49*   INR  --   --   --   --   --   --   --   --  1.12  --   --    NA  --   --  145  --   --   --  143  --  144   < > 142   POTASSIUM  --  3.0* 2.9*  2.9* 3.0*   < >  --  3.1*   < > 3.4   < > 3.2*   CHLORIDE  --   --  111*  --   --   --  110*  --  110*   < > 109*   CO2   --   --  23  --   --   --  22  --  23   < > 21*   BUN  --   --  5.5*  --   --   --  8.1  --  10.2   < > 12.9   CR  --   --  1.21*  --   --   --  1.06  --  0.96   < > 0.89   ANIONGAP  --   --  11  --   --   --  11  --  11   < > 12   REMY  --   --  8.5*  --   --   --  8.2*  --  8.1*   < > 8.0*   GLC  --   --  94  --   --   --  86  --  87   < > 99   ALBUMIN  --   --   --   --   --   --   --   --  2.8*  --  2.6*   PROTTOTAL  --   --   --   --   --   --   --   --  5.9*  --  6.1*   BILITOTAL  --   --   --   --   --   --   --   --  0.3  --  0.4   ALKPHOS  --   --   --   --   --   --   --   --  72  --  63   ALT  --   --   --   --   --   --   --   --  14  --  15   AST  --   --   --   --   --   --   --   --  26  --  27    < > = values in this interval not displayed.       No results found for this or any previous visit (from the past 24 hour(s)).      Medical Decision Making       MANAGEMENT DISCUSSED with the following over the past 24 hours: BMT   NOTE(S)/MEDICAL RECORDS REVIEWED over the past 24 hours: BMT, nursing, palliative  Medical complexity over the past 24 hours:  - Prescription DRUG MANAGEMENT performed

## 2024-02-14 NOTE — PROGRESS NOTES
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 Had difficulty sleeping in general due to urinary frequency and frequent loose bowel movements. He is on scheduled imodium and metamucil. Continues to receive treatment for c. Diff No blood in BM. Otherwise feels his throat pain is improving, ok with stopping PCA today. Was able to eat roast beef and potatoes yesterday. Feels his legs are still somewhat swollen but overall improved. No N/V. No CP or SOB.     Review of Systems    Review of Systems: 10 point ROS negative unless stated in HPI   PHYSICAL EXAM      Weight     Wt Readings from Last 3 Encounters:   02/14/24 88.9 kg (195 lb 14.4 oz)   01/17/24 90.1 kg (198 lb 9.6 oz)   01/12/24 88.9 kg (196 lb)        KPS: 60    BP (!) 148/73   Pulse 104   Temp 98.8  F (37.1  C) (Axillary)   Resp 16   Ht 1.725 m (5' 7.91\")   Wt 88.9 kg (195 lb 14.4 oz)   SpO2 95%   BMI 29.86 kg/m       General: NAD, appears very tired  Mouth/Throat: Mildly erythematous bilateral buccal mucosa  Lungs: CTAB, no respiratory distress  Cardiovascular: RRR, no M/R/G   Lymphatics: Bilateral lower extremity edema 2+   Abdomen: non tender, soft, BS+  Skin: rash on chest/back resolved  Neuro: A&Ox3  Additional Findings: Powell site NT, no drainage.    Current aGVHD staging:  Skin 0, UGI 0, LGI 0, Liver 0 (keep in note through day +180 for allos)      LABS AND IMAGING: I have assessed all abnormal lab values for their clinical significance and any values considered clinically significant have " been addressed in the assessment and plan.        Lab Results   Component Value Date    WBC 1.7 (L) 02/14/2024    ANEUTAUTO 2.0 01/28/2024    HGB 8.4 (L) 02/14/2024    HCT 23.2 (L) 02/14/2024    PLT 42 (LL) 02/14/2024     02/14/2024    POTASSIUM 3.0 (L) 02/14/2024    CHLORIDE 111 (H) 02/14/2024    CO2 23 02/14/2024    GLC 94 02/14/2024    BUN 5.5 (L) 02/14/2024    CR 1.21 (H) 02/14/2024    MAG 1.5 (L) 02/14/2024    INR 1.12 02/12/2024         SYSTEMS-BASED ASSESSMENT AND PLAN     Ziggy Hallman is a 48 year old year old male with a PMH of MDS, hx of multiple clots and MI undergoing a MA (Bu/Flu) prep for an 8/8 URD PBSCT day 20. Stay has been complicated by mucositis requiring PCA, N/F, Staph Epi bacteremia, pulmonary embolism requiring hep gtt.    BMT/IEC PROTOCOL for 2015-29  Chemo Prep:   Day -6: Admit  Day -5 through Day -2: Busulfan/Fludarabine  Day -1: Rest.   Keppra prophylaxis      8/8 DP permissive. Cell dose-9.24x10^6  ABO: Donor: O+ Recipient A-  (Minor incompatability, no flush required)   GSCF plan: GCSF to start day +5 until ANC >1500 for 3 consecutive days    HEME/COAG  #SEGMENTAL R PE (2/6): Low intensity hep gtt with platelet goal <50k with post platelet checks. Can restart PTA anticoagulation once platelets stable. Will need outpatient plan as patient is starting to engraft (lovenox to eliquis/prasagruel likely).    #APS work up- lupus antigen +, will follow outpt as may be unreliable in this acute setting and prior APS work up had been unremarkable prior to admission.    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   Extensive hypercoagulable workup to date has been unrevealing.  JAK2 testing negative.  PNH testing negative.  Elevated IgA of unknown significance, no evidence of plasma cell disorder.  - Eliquis and Prasugrel now HELD starting 2/1-x.     IMMUNOCOMPROMISED  #Staph epi bacteremia (Karius >2000 copies)- afebrile since Vanco added x7 days(2/6-2/13)  #Febrile neutropenia, fever resolved:    -Meropenem (2/6-2/10) deescalated as infectious source identified, transitioned to Levaquin ppx, Vanco (2/6-2/12) Azithro (2/6-2/9)  -Cefepime (2/2-2/6)   -UC, BC negative, KARIUS showed staph epi, viral work up negative  #C. Diff - Admit c. Diff triple+ - Start PO vanc 1/21 for 14 day course. - completed on 2/4, with broadening abx restart PO Vanco tx dose x 10 days    -Prophylaxis plan: ACV LD, Megan HD, Levaquin while neutropenic , Bactrim +28    RISK OF GVHD  - Prophylaxis: PtC day +3, +4, , Siro/MMF to start day +5, Siro level -9.2- 2/11    CARDIOVASCULAR  #CAD  #Hx of CABG  #HTN   -PTA metoprolol, Lisinopril dose increased 2/2 HTN,  nifidepine ER 30mg BID, labetolol PRN;  #Hyperlipidemia: Holding atorvastatin peritransplant  - Risk of cardiomyopathy:  Baseline EF 50-55%, Global left ventricular function mildly reduced. Grade 1 or early diastolic dysfunction.   - Risk of arrhythmia: Baseline EKG showed NSR, Qtc -425    GI/NUTRITION  #Oropharyngeal mucositis: MMW, hurricaine spray, doxepin, dilaudid PCA (2/3-x) (0.3 continuous, 0.3q10)   -Palliative consulted 2/9, continue to follow and provide recs  #Diarrhea - Known c. Diff, see ID for abx therapy. Continues metamucil and loperamide QID.     - Ulcer prophylaxis: Protonix   - VOD Prophylaxis: Ursodiol  - Risk of nausea/vomiting due to chemo: Ativan, Compazine, Zofran   - Moderate malnutrition 2/2 acute on  chronic illness- dietician to follow, appetite improving with pain control.    RENAL/ELECTROLYTES/  #CONNIE   - Likely 2/2 to diuresis. Not anuric. Will hold on one day of lasix today.    #Hematuria- No dysuria, self resolved  #Hypervolemia 2/2 cytoxan flush - diurese as needed lasix 20mg 2/7, 2/09, 2/12, 2/13.   #Oliguria 2/9 (Resolved)- output <1L with diuresis and large amounts of IV given. Cr stable and WNL. Patient is having concurrent diarrhea, so this is likely difficult to measure.   -Bladder scan unremarkable   -UA shows proteinuria  #Proteinuria- possibly exacerbated by persistent HTN (See CV),   - Hypocalcemia in setting hypoalbuminemia, iCa2+, corrected WNL   - Electrolyte management: replace per sliding scale    Psych:    #Anxiety: admits to feeling anxious.  Start zyprexa 5mg HS.  Connect w/ SW.  Psychaitry consult added hydroxyzine.  #Insomnia- PTA ambien, Trazadone added and increased to 100mg. - Ambien and trazadone held during cytoxan period due to frequent urination and concern for falls. Discontinued with opioid PCA, melatonin added.     SOCIAL DETERMINANTS  - Caregiver: Family   - Financial/insurance concerns: See SW notes       Known issues that I take into account for medical decisions, with salient changes to the plan considering these complexities noted above.    Patient Active Problem List   Diagnosis    Amegakaryocytic thrombocytopenia (H)    Anemia due to bone marrow failure, unspecified bone marrow failure type (H)    Aplastic anemia (H)     Clinically Significant Risk Factors        # Hypokalemia: Lowest K = 2.9 mmol/L in last 2 days, will replace as needed     # Hypomagnesemia: Lowest Mg = 1.5 mg/dL in last 2 days, will replace as needed   # Hypoalbuminemia: Lowest albumin = 2 g/dL at 1/22/2024  4:12 AM, will monitor as appropriate     # Thrombocytopenia: Lowest platelets = 37 in last 2 days, will monitor for bleeding  # Acute Kidney Injury, unspecified: based on a >150% or 0.3 mg/dL  "increase in last creatinine compared to past 90 day average, will monitor renal function         # Overweight: Estimated body mass index is 29.86 kg/m  as calculated from the following:    Height as of this encounter: 1.725 m (5' 7.91\").    Weight as of this encounter: 88.9 kg (195 lb 14.4 oz).               Dispo: Remain admitted through engraftment  Please call sister Radha with updates for care and discharge    I spent 30 minutes in the care of this patient today, which included time necessary for preparation for the visit, obtaining history, ordering medications/tests/procedures as medically indicated, review of pertinent medical literature, counseling of the patient, communication of recommendations to the care team, and documentation time.    YIFAN FritzC  [phone]  "

## 2024-02-15 ENCOUNTER — APPOINTMENT (OUTPATIENT)
Dept: OCCUPATIONAL THERAPY | Facility: CLINIC | Age: 49
DRG: 014 | End: 2024-02-15
Attending: INTERNAL MEDICINE
Payer: COMMERCIAL

## 2024-02-15 ENCOUNTER — TELEPHONE (OUTPATIENT)
Dept: TRANSPLANT | Facility: CLINIC | Age: 49
End: 2024-02-15
Payer: COMMERCIAL

## 2024-02-15 LAB
ALBUMIN SERPL BCG-MCNC: 2.9 G/DL (ref 3.5–5.2)
ALP SERPL-CCNC: 86 U/L (ref 40–150)
ALT SERPL W P-5'-P-CCNC: 14 U/L (ref 0–70)
ANION GAP SERPL CALCULATED.3IONS-SCNC: 11 MMOL/L (ref 7–15)
AST SERPL W P-5'-P-CCNC: 27 U/L (ref 0–45)
BASOPHILS # BLD AUTO: 0 10E3/UL (ref 0–0.2)
BASOPHILS NFR BLD AUTO: 1 %
BILIRUB SERPL-MCNC: 0.3 MG/DL
BUN SERPL-MCNC: 4.9 MG/DL (ref 6–20)
CALCIUM SERPL-MCNC: 8.7 MG/DL (ref 8.6–10)
CHLORIDE SERPL-SCNC: 111 MMOL/L (ref 98–107)
CREAT SERPL-MCNC: 1.2 MG/DL (ref 0.67–1.17)
DEPRECATED HCO3 PLAS-SCNC: 22 MMOL/L (ref 22–29)
EGFRCR SERPLBLD CKD-EPI 2021: 74 ML/MIN/1.73M2
EOSINOPHIL # BLD AUTO: 0 10E3/UL (ref 0–0.7)
EOSINOPHIL NFR BLD AUTO: 0 %
ERYTHROCYTE [DISTWIDTH] IN BLOOD BY AUTOMATED COUNT: 13.1 % (ref 10–15)
GLUCOSE SERPL-MCNC: 103 MG/DL (ref 70–99)
HCT VFR BLD AUTO: 24.3 % (ref 40–53)
HGB BLD-MCNC: 8.5 G/DL (ref 13.3–17.7)
IMM GRANULOCYTES # BLD: 0.1 10E3/UL
IMM GRANULOCYTES NFR BLD: 4 %
LYMPHOCYTES # BLD AUTO: 0.1 10E3/UL (ref 0.8–5.3)
LYMPHOCYTES NFR BLD AUTO: 2 %
MAGNESIUM SERPL-MCNC: 2 MG/DL (ref 1.7–2.3)
MCH RBC QN AUTO: 30.6 PG (ref 26.5–33)
MCHC RBC AUTO-ENTMCNC: 35 G/DL (ref 31.5–36.5)
MCV RBC AUTO: 87 FL (ref 78–100)
MONOCYTES # BLD AUTO: 0.2 10E3/UL (ref 0–1.3)
MONOCYTES NFR BLD AUTO: 8 %
NEUTROPHILS # BLD AUTO: 2.4 10E3/UL (ref 1.6–8.3)
NEUTROPHILS NFR BLD AUTO: 85 %
NRBC # BLD AUTO: 0 10E3/UL
NRBC BLD AUTO-RTO: 0 /100
PHOSPHATE SERPL-MCNC: 2.4 MG/DL (ref 2.5–4.5)
PLATELET # BLD AUTO: 52 10E3/UL (ref 150–450)
POTASSIUM SERPL-SCNC: 3.6 MMOL/L (ref 3.4–5.3)
POTASSIUM SERPL-SCNC: 3.6 MMOL/L (ref 3.4–5.3)
PROT SERPL-MCNC: 6 G/DL (ref 6.4–8.3)
RBC # BLD AUTO: 2.78 10E6/UL (ref 4.4–5.9)
SODIUM SERPL-SCNC: 144 MMOL/L (ref 135–145)
UFH PPP CHRO-ACNC: 0.33 IU/ML
WBC # BLD AUTO: 2.8 10E3/UL (ref 4–11)

## 2024-02-15 PROCEDURE — 84100 ASSAY OF PHOSPHORUS: CPT | Performed by: INTERNAL MEDICINE

## 2024-02-15 PROCEDURE — 258N000003 HC RX IP 258 OP 636: Performed by: STUDENT IN AN ORGANIZED HEALTH CARE EDUCATION/TRAINING PROGRAM

## 2024-02-15 PROCEDURE — 258N000003 HC RX IP 258 OP 636: Performed by: PHYSICIAN ASSISTANT

## 2024-02-15 PROCEDURE — 250N000011 HC RX IP 250 OP 636: Mod: JZ | Performed by: PHYSICIAN ASSISTANT

## 2024-02-15 PROCEDURE — 80053 COMPREHEN METABOLIC PANEL: CPT | Performed by: PHYSICIAN ASSISTANT

## 2024-02-15 PROCEDURE — 85520 HEPARIN ASSAY: CPT | Performed by: INTERNAL MEDICINE

## 2024-02-15 PROCEDURE — 206N000001 HC R&B BMT UMMC

## 2024-02-15 PROCEDURE — 97530 THERAPEUTIC ACTIVITIES: CPT | Mod: GO

## 2024-02-15 PROCEDURE — 99418 PROLNG IP/OBS E/M EA 15 MIN: CPT | Performed by: PHYSICIAN ASSISTANT

## 2024-02-15 PROCEDURE — 250N000013 HC RX MED GY IP 250 OP 250 PS 637

## 2024-02-15 PROCEDURE — 250N000011 HC RX IP 250 OP 636: Performed by: STUDENT IN AN ORGANIZED HEALTH CARE EDUCATION/TRAINING PROGRAM

## 2024-02-15 PROCEDURE — 99233 SBSQ HOSP IP/OBS HIGH 50: CPT | Mod: FS | Performed by: PHYSICIAN ASSISTANT

## 2024-02-15 PROCEDURE — 250N000013 HC RX MED GY IP 250 OP 250 PS 637: Performed by: PHYSICIAN ASSISTANT

## 2024-02-15 PROCEDURE — 250N000012 HC RX MED GY IP 250 OP 636 PS 637

## 2024-02-15 PROCEDURE — 250N000011 HC RX IP 250 OP 636: Performed by: INTERNAL MEDICINE

## 2024-02-15 PROCEDURE — 83735 ASSAY OF MAGNESIUM: CPT | Performed by: STUDENT IN AN ORGANIZED HEALTH CARE EDUCATION/TRAINING PROGRAM

## 2024-02-15 PROCEDURE — 85025 COMPLETE CBC W/AUTO DIFF WBC: CPT | Performed by: PHYSICIAN ASSISTANT

## 2024-02-15 PROCEDURE — 250N000009 HC RX 250: Performed by: STUDENT IN AN ORGANIZED HEALTH CARE EDUCATION/TRAINING PROGRAM

## 2024-02-15 RX ORDER — DIPHENOXYLATE HCL/ATROPINE 2.5-.025MG
1 TABLET ORAL 2 TIMES DAILY
Status: DISCONTINUED | OUTPATIENT
Start: 2024-02-15 | End: 2024-02-17

## 2024-02-15 RX ORDER — POTASSIUM PHOS IN 0.9 % NACL 15MMOL/250
15 PLASTIC BAG, INJECTION (ML) INTRAVENOUS ONCE
Status: COMPLETED | OUTPATIENT
Start: 2024-02-15 | End: 2024-02-15

## 2024-02-15 RX ORDER — POTASSIUM CHLORIDE 29.8 MG/ML
20 INJECTION INTRAVENOUS
Status: COMPLETED | OUTPATIENT
Start: 2024-02-15 | End: 2024-02-15

## 2024-02-15 RX ADMIN — DEXTROSE MONOHYDRATE 20 ML: 50 INJECTION, SOLUTION INTRAVENOUS at 21:15

## 2024-02-15 RX ADMIN — Medication 2 SPRAY: at 12:53

## 2024-02-15 RX ADMIN — URSODIOL 300 MG: 300 CAPSULE ORAL at 21:02

## 2024-02-15 RX ADMIN — POTASSIUM PHOSPHATE, MONOBASIC POTASSIUM PHOSPHATE, DIBASIC 15 MMOL: 224; 236 INJECTION, SOLUTION, CONCENTRATE INTRAVENOUS at 08:06

## 2024-02-15 RX ADMIN — DEXTROSE MONOHYDRATE 450 MCG: 50 INJECTION, SOLUTION INTRAVENOUS at 21:02

## 2024-02-15 RX ADMIN — LOPERAMIDE HYDROCHLORIDE 4 MG: 2 CAPSULE ORAL at 08:10

## 2024-02-15 RX ADMIN — METOPROLOL SUCCINATE 100 MG: 50 TABLET, EXTENDED RELEASE ORAL at 08:09

## 2024-02-15 RX ADMIN — VANCOMYCIN HYDROCHLORIDE 125 MG: 125 CAPSULE ORAL at 21:02

## 2024-02-15 RX ADMIN — POTASSIUM CHLORIDE 20 MEQ: 29.8 INJECTION, SOLUTION INTRAVENOUS at 01:36

## 2024-02-15 RX ADMIN — LEVOFLOXACIN 250 MG: 250 TABLET, FILM COATED ORAL at 09:08

## 2024-02-15 RX ADMIN — DOXEPIN HYDROCHLORIDE 25 MG: 10 SOLUTION ORAL at 16:51

## 2024-02-15 RX ADMIN — VANCOMYCIN HYDROCHLORIDE 125 MG: 125 CAPSULE ORAL at 16:50

## 2024-02-15 RX ADMIN — LOPERAMIDE HYDROCHLORIDE 4 MG: 2 CAPSULE ORAL at 21:02

## 2024-02-15 RX ADMIN — LOPERAMIDE HYDROCHLORIDE 4 MG: 2 CAPSULE ORAL at 12:51

## 2024-02-15 RX ADMIN — MYCOPHENOLATE MOFETIL 1250 MG: 500 INJECTION, POWDER, LYOPHILIZED, FOR SOLUTION INTRAVENOUS at 21:46

## 2024-02-15 RX ADMIN — Medication 2 SPRAY: at 16:53

## 2024-02-15 RX ADMIN — MYCOPHENOLATE MOFETIL 1250 MG: 500 INJECTION, POWDER, LYOPHILIZED, FOR SOLUTION INTRAVENOUS at 09:08

## 2024-02-15 RX ADMIN — LOPERAMIDE HYDROCHLORIDE 4 MG: 2 CAPSULE ORAL at 16:50

## 2024-02-15 RX ADMIN — HEPARIN SODIUM 1650 UNITS/HR: 10000 INJECTION, SOLUTION INTRAVENOUS at 20:45

## 2024-02-15 RX ADMIN — DEXTROSE MONOHYDRATE 20 ML: 50 INJECTION, SOLUTION INTRAVENOUS at 21:02

## 2024-02-15 RX ADMIN — DIPHENOXYLATE HYDROCHLORIDE AND ATROPINE SULFATE 1 TABLET: 2.5; .025 TABLET ORAL at 12:51

## 2024-02-15 RX ADMIN — ACYCLOVIR 800 MG: 800 TABLET ORAL at 08:10

## 2024-02-15 RX ADMIN — HEPARIN SODIUM 1650 UNITS/HR: 10000 INJECTION, SOLUTION INTRAVENOUS at 04:32

## 2024-02-15 RX ADMIN — MICAFUNGIN SODIUM 150 MG: 50 INJECTION, POWDER, LYOPHILIZED, FOR SOLUTION INTRAVENOUS at 07:57

## 2024-02-15 RX ADMIN — DIPHENHYDRAMINE HYDROCHLORIDE AND LIDOCAINE HYDROCHLORIDE AND ALUMINUM HYDROXIDE AND MAGNESIUM HYDRO 10 ML: KIT at 12:52

## 2024-02-15 RX ADMIN — DIPHENHYDRAMINE HYDROCHLORIDE AND LIDOCAINE HYDROCHLORIDE AND ALUMINUM HYDROXIDE AND MAGNESIUM HYDRO 10 ML: KIT at 16:52

## 2024-02-15 RX ADMIN — NIFEDIPINE 30 MG: 30 TABLET, FILM COATED, EXTENDED RELEASE ORAL at 08:10

## 2024-02-15 RX ADMIN — NIFEDIPINE 30 MG: 30 TABLET, FILM COATED, EXTENDED RELEASE ORAL at 21:02

## 2024-02-15 RX ADMIN — URSODIOL 300 MG: 300 CAPSULE ORAL at 08:10

## 2024-02-15 RX ADMIN — LISINOPRIL 40 MG: 10 TABLET ORAL at 08:09

## 2024-02-15 RX ADMIN — POTASSIUM CHLORIDE 20 MEQ: 29.8 INJECTION, SOLUTION INTRAVENOUS at 00:28

## 2024-02-15 RX ADMIN — URSODIOL 300 MG: 300 CAPSULE ORAL at 14:03

## 2024-02-15 RX ADMIN — PANTOPRAZOLE SODIUM 40 MG: 40 TABLET, DELAYED RELEASE ORAL at 08:09

## 2024-02-15 RX ADMIN — VANCOMYCIN HYDROCHLORIDE 125 MG: 125 CAPSULE ORAL at 08:10

## 2024-02-15 RX ADMIN — Medication 2 SPRAY: at 08:10

## 2024-02-15 RX ADMIN — DIPHENHYDRAMINE HYDROCHLORIDE AND LIDOCAINE HYDROCHLORIDE AND ALUMINUM HYDROXIDE AND MAGNESIUM HYDRO 10 ML: KIT at 08:08

## 2024-02-15 RX ADMIN — DOXEPIN HYDROCHLORIDE 25 MG: 10 SOLUTION ORAL at 08:11

## 2024-02-15 RX ADMIN — Medication 5 CAPSULE: at 08:10

## 2024-02-15 RX ADMIN — VANCOMYCIN HYDROCHLORIDE 125 MG: 125 CAPSULE ORAL at 12:52

## 2024-02-15 RX ADMIN — DOXEPIN HYDROCHLORIDE 25 MG: 10 SOLUTION ORAL at 12:53

## 2024-02-15 RX ADMIN — SIROLIMUS 3.5 MG: 2 TABLET, FILM COATED ORAL at 08:10

## 2024-02-15 RX ADMIN — ACYCLOVIR 800 MG: 800 TABLET ORAL at 21:02

## 2024-02-15 RX ADMIN — DIPHENOXYLATE HYDROCHLORIDE AND ATROPINE SULFATE 1 TABLET: 2.5; .025 TABLET ORAL at 21:02

## 2024-02-15 ASSESSMENT — ACTIVITIES OF DAILY LIVING (ADL)
ADLS_ACUITY_SCORE: 22

## 2024-02-15 NOTE — PROGRESS NOTES
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 Oropharyngeal mucositis resoled. Tolerating PO intake. No new other infectious symptoms. Hoping to discharge Sunday.     Review of Systems    Review of Systems: 10 point ROS negative unless stated in HPI   PHYSICAL EXAM      Weight     Wt Readings from Last 3 Encounters:   02/15/24 87 kg (191 lb 12.8 oz)   01/17/24 90.1 kg (198 lb 9.6 oz)   01/12/24 88.9 kg (196 lb)        KPS: 60    /64 (BP Location: Left arm, Cuff Size: Adult Regular)   Pulse 99   Temp 98.3  F (36.8  C) (Axillary)   Resp 16   Ht 1.725 m (5' 7.91\")   Wt 87 kg (191 lb 12.8 oz)   SpO2 96%   BMI 29.24 kg/m       General: NAD, appears very tired  Lungs: CTAB, no respiratory distress  Cardiovascular: RRR, no M/R/G   Lymphatics: Bilateral lower extremity edema 2+   Abdomen: non tender, soft, BS+  Skin: rash on chest/back resolved  Neuro: A&Ox3  Additional Findings: Powell site NT, no drainage.    Current aGVHD staging:  Skin 0, UGI 0, LGI 0, Liver 0 (keep in note through day +180 for allos)      LABS AND IMAGING: I have assessed all abnormal lab values for their clinical significance and any values considered clinically significant have been addressed in the assessment and plan.        Lab Results   Component Value Date    WBC 2.8 (L) 02/15/2024    ANEUTAUTO 2.4 02/15/2024    HGB 8.5 (L) 02/15/2024    HCT 24.3 (L) 02/15/2024    PLT 52 (L) 02/15/2024     02/15/2024    POTASSIUM 3.6 " 02/15/2024    POTASSIUM 3.6 02/15/2024    CHLORIDE 111 (H) 02/15/2024    CO2 22 02/15/2024     (H) 02/15/2024    BUN 4.9 (L) 02/15/2024    CR 1.20 (H) 02/15/2024    MAG 2.0 02/15/2024    INR 1.12 02/12/2024         SYSTEMS-BASED ASSESSMENT AND PLAN     Ziggy Hallman is a 48 year old year old male with a PMH of MDS, hx of multiple clots and MI undergoing a MA (Bu/Flu) prep for an 8/8 URD PBSCT day 21. Stay has been complicated by mucositis requiring PCA, N/F, Staph Epi bacteremia, pulmonary embolism requiring hep gtt.    BMT/IEC PROTOCOL for 2015-29  Chemo Prep:   Day -6: Admit  Day -5 through Day -2: Busulfan/Fludarabine  Day -1: Rest.   Keppra prophylaxis      8/8 DP permissive. Cell dose-9.24x10^6  ABO: Donor: O+ Recipient A-  (Minor incompatability, no flush required)   GSCF plan: GCSF to start day +5 until ANC >1500 for 3 consecutive days    HEME/COAG  #SEGMENTAL R PE (2/6): Low intensity hep gtt with platelet goal <50k with post platelet checks. Can restart PTA anticoagulation once platelets stable. Will need outpatient plan as patient is starting to engraft (lovenox to eliquis/prasagruel likely). ----      #APS work up- lupus antigen +, will follow outpt as may be unreliable in this acute setting and prior APS work up had been unremarkable prior to admission.    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   Extensive hypercoagulable workup to date has been  unrevealing.  JAK2 testing negative.  PNH testing negative.  Elevated IgA of unknown significance, no evidence of plasma cell disorder.  - Eliquis and Prasugrel now HELD starting 2/1-x.   - *Plan to discuss on 2/16 - consider just transitioning to Eliquis at reduced dose until platelets more stable.     IMMUNOCOMPROMISED  #Staph epi bacteremia (Karius >2000 copies)- afebrile since Vanco added x7 days(2/6-2/13)  #Febrile neutropenia, fever resolved:    -Meropenem (2/6-2/10) deescalated as infectious source identified, transitioned to Levaquin ppx, Vanco (2/6-2/12) Azithro (2/6-2/9)  -Cefepime (2/2-2/6)   -UC, BC negative, KARIUS showed staph epi, viral work up negative  #C. Diff - Admit c. Diff triple+ - Start PO vanc 1/21 for 14 day course. - completed on 2/4, with broadening abx restart PO Vanco tx dose x 10 days    -Prophylaxis plan: ACV LD, Megan HD, Levaquin while neutropenic , Bactrim +28    RISK OF GVHD  - Prophylaxis: PtC day +3, +4, , Siro/MMF to start day +5, Siro level -9.2- 2/11    CARDIOVASCULAR  #CAD  #Hx of CABG  #HTN   -PTA metoprolol, Lisinopril dose increased 2/2 HTN,  nifidepine ER 30mg BID, labetolol PRN;  #Hyperlipidemia: Holding atorvastatin peritransplant  - Risk of cardiomyopathy:  Baseline EF 50-55%, Global left ventricular function mildly reduced. Grade 1 or early diastolic dysfunction.   - Risk of arrhythmia: Baseline EKG showed NSR, Qtc -425    GI/NUTRITION  #Oropharyngeal mucositis: MMW, hurricaine spray, doxepin, dilaudid PCA (2/3-x) (0.3 continuous, 0.3q10)   -Palliative consulted 2/9, continue to follow and provide recs  #Diarrhea - Known c. Diff, see ID for abx therapy. Continues metamucil and loperamide QID.   - Added Lomotil on 2/15     - Ulcer prophylaxis: Protonix   - VOD Prophylaxis: Ursodiol  - Risk of nausea/vomiting due to chemo: Ativan, Compazine, Zofran   - Moderate malnutrition 2/2 acute on chronic illness- dietician to follow, appetite improving with pain  control.    RENAL/ELECTROLYTES/  #CONNIE   - Likely 2/2 to diuresis. Not anuric. Will hold on diuresis on 2/14 and 2/15.     #Hematuria- No dysuria, self resolved  #Hypervolemia 2/2 cytoxan flush - diurese as needed lasix 20mg 2/7, 2/09, 2/12, 2/13.   #Oliguria 2/9 (Resolved)- output <1L with diuresis and large amounts of IV given. Cr stable and WNL. Patient is having concurrent diarrhea, so this is likely difficult to measure.   -Bladder scan unremarkable   -UA shows proteinuria  #Proteinuria- possibly exacerbated by persistent HTN (See CV),   - Hypocalcemia in setting hypoalbuminemia, iCa2+, corrected WNL   - Electrolyte management: replace per sliding scale    Psych:    #Anxiety: admits to feeling anxious.  Start zyprexa 5mg HS.  Connect w/ SW.  Psychaitry consult added hydroxyzine.  #Insomnia- PTA ambien, Trazadone added and increased to 100mg. - Ambien and trazadone held during cytoxan period due to frequent urination and concern for falls. Discontinued with opioid PCA, melatonin added.     SOCIAL DETERMINANTS  - Caregiver: Family   - Financial/insurance concerns: See SW notes       Known issues that I take into account for medical decisions, with salient changes to the plan considering these complexities noted above.    Patient Active Problem List   Diagnosis    Amegakaryocytic thrombocytopenia (H)    Anemia due to bone marrow failure, unspecified bone marrow failure type (H)    Aplastic anemia (H)     Clinically Significant Risk Factors        # Hypokalemia: Lowest K = 2.9 mmol/L in last 2 days, will replace as needed     # Hypomagnesemia: Lowest Mg = 1.5 mg/dL in last 2 days, will replace as needed   # Hypoalbuminemia: Lowest albumin = 2 g/dL at 1/22/2024  4:12 AM, will monitor as appropriate     # Thrombocytopenia: Lowest platelets = 42 in last 2 days, will monitor for bleeding          # Overweight: Estimated body mass index is 29.24 kg/m  as calculated from the following:    Height as of this encounter:  "1.725 m (5' 7.91\").    Weight as of this encounter: 87 kg (191 lb 12.8 oz).               Dispo: Remain admitted through engraftment  Please call sister Radha with updates for care and discharge    I spent 30 minutes in the care of this patient today, which included time necessary for preparation for the visit, obtaining history, ordering medications/tests/procedures as medically indicated, review of pertinent medical literature, counseling of the patient, communication of recommendations to the care team, and documentation time.    Brenda Brunson PA-C  [phone]  "

## 2024-02-15 NOTE — PROGRESS NOTES
"BP (!) 140/74 (BP Location: Left arm)   Pulse 101   Temp 99.3  F (37.4  C) (Axillary)   Resp 16   Ht 1.725 m (5' 7.91\")   Wt 88.9 kg (195 lb 14.4 oz)   SpO2 96%   BMI 29.86 kg/m      Neuro: AOx4  Cardiac: No tele order, VSS  Respiratory: Sating mid 90's on RA  GI/: Adequate urine output. Loose stools overnight  Diet/appetite: Regular diet  Activity: UAL w/ steady gait  Pain: Denied  Skin: No new deficits noted  LDA's: DL CVC and Right PIV    Plan: Tmax 100, temp resolved w/o intervention. Slightly hypertensive throughout shift, but within parameters. OVSS on room air. Denied pain, nausea and dizziness. Reported loose stools this shift, but pt not saving stool or urine output, reinforced importance of saving output, but pt continues to refuse. Pt stated he feels current regimen is not helping to slow down diarrhea, pt would like to discuss POC w/ day team. Heparin drip is at therapeutic rate of 1650 units/hr, heparin Anti Xa lab scheduled for next AM labs. 20mEq K+ given earlier in shift - recheck from 2300 was 3.4, total of 40mEq given via IV overnight. Potassium recheck 3.6, no further replacement indicated. Will need phosphorus this AM. Continue with POC. Notify primary team with changes.    "

## 2024-02-15 NOTE — PROGRESS NOTES
Home Infusion  Received referral from [facility] for IV Micafungin and Line care.  Benefits verified.  Patient has [employer] PMAP and is covered 100% but may have a copay per dispense through pharmacy plan.  Called and spoke with Ziggy to review home infusion services, review benefits and offer choice of providers.  Patient would like to remain in the [facility] system and will use [facility] for home infusion.  Confirmed discharge address, phone, and emergency contact information. Patient denies recent illness (not related to pre-existing conditions) or travel in the house hold. Confirmed allergies. No home health agency has been seeing the patient.     Ziggy is willing to learn and manage home IV therapy.  Questions answered.  [facility] will continue to follow until discharge and update pt once final orders are determined.    Thank you for the referral    Jacinto Dimas LPN, Coordinator   [facility]   [email or web address]  Office: [phone]

## 2024-02-15 NOTE — PLAN OF CARE
"BP (!) 151/82 (BP Location: Right arm)   Pulse 103   Temp 96.9  F (36.1  C) (Axillary)   Resp 18   Ht 1.725 m (5' 7.91\")   Wt 88.9 kg (195 lb 14.4 oz)   SpO2 100%   BMI 29.86 kg/m      VSS on room air; except for hypertension; does not meet paging or prn labetalol parameters. Patient complains of mucositis pain 2/10. PCA pump discontinued today. Patient declined prn oxycodone or dilaudid. Patient educated on saving stool output, but continues to refuse. Heparin drip at therapeutic rate of 1,650 units/hr; heparin lab scheduled for tomorrow morning. Port de-accessed. PIV saline locked. CVC will need dressing change and cap changes. Potassium currently running. Patient will need second bag of potassium and recheck lab; oncoming nurse is aware. Continue with plan of care.     Goal Outcome Evaluation:      Plan of Care Reviewed With: patient    Overall Patient Progress: improving      Problem: Adult Inpatient Plan of Care  Goal: Plan of Care Review  Description: The Plan of Care Review/Shift note should be completed every shift.  The Outcome Evaluation is a brief statement about your assessment that the patient is improving, declining, or no change.  This information will be displayed automatically on your shift  note.  Outcome: Progressing  Flowsheets (Taken 2/14/2024 1933)  Plan of Care Reviewed With: patient  Overall Patient Progress: improving  Goal: Patient-Specific Goal (Individualized)  Description: You can add care plan individualizations to a care plan. Examples of Individualization might be:  \"Parent requests to be called daily at 9am for status\", \"I have a hard time hearing out of my right ear\", or \"Do not touch me to wake me up as it startles  me\".  Outcome: Progressing  Goal: Absence of Hospital-Acquired Illness or Injury  Outcome: Progressing  Intervention: Identify and Manage Fall Risk  Recent Flowsheet Documentation  Taken 2/14/2024 1530 by Jocelyn Champion RN  Safety Promotion/Fall Prevention: " safety round/check completed  Taken 2/14/2024 1230 by Jocelyn Champion RN  Safety Promotion/Fall Prevention: safety round/check completed  Taken 2/14/2024 0830 by Jocelyn Champion RN  Safety Promotion/Fall Prevention: safety round/check completed  Intervention: Prevent Skin Injury  Recent Flowsheet Documentation  Taken 2/14/2024 0830 by Jocelyn Champion RN  Body Position: position changed independently  Skin Protection: adhesive use limited  Device Skin Pressure Protection: adhesive use limited  Intervention: Prevent Infection  Recent Flowsheet Documentation  Taken 2/14/2024 1530 by Jocelyn Champion RN  Infection Prevention: environmental surveillance performed  Taken 2/14/2024 1230 by Jocelyn Champion RN  Infection Prevention: environmental surveillance performed  Taken 2/14/2024 0830 by Jocelyn Champion RN  Infection Prevention: environmental surveillance performed  Goal: Optimal Comfort and Wellbeing  Outcome: Progressing  Goal: Readiness for Transition of Care  Outcome: Progressing     Problem: Stem Cell/Bone Marrow Transplant  Goal: Optimal Coping with Transplant  Outcome: Progressing  Intervention: Optimize Patient/Family Adjustment to Transplant  Recent Flowsheet Documentation  Taken 2/14/2024 0830 by Jocelyn Champion RN  Supportive Measures: active listening utilized  Goal: Symptom-Free Urinary Elimination  Outcome: Progressing  Goal: Diarrhea Symptom Control  Outcome: Progressing  Intervention: Manage Diarrhea  Recent Flowsheet Documentation  Taken 2/14/2024 0830 by Jocelyn Champion RN  Skin Protection: adhesive use limited  Goal: Improved Activity Tolerance  Outcome: Progressing  Intervention: Promote Improved Energy  Recent Flowsheet Documentation  Taken 2/14/2024 0830 by Jocelyn Champion RN  Fatigue Management: activity schedule adjusted  Activity Management: activity adjusted per tolerance  Environmental Support: calm environment promoted  Goal: Blood Counts Within  Acceptable Range  Outcome: Progressing  Intervention: Monitor and Manage Hematologic Symptoms  Recent Flowsheet Documentation  Taken 2/14/2024 1530 by Jocelyn Champion RN  Bleeding Precautions:   gentle oral care promoted   monitored for signs of bleeding  Medication Review/Management:   medications reviewed   high-risk medications identified  Taken 2/14/2024 1230 by Jocelyn Champion RN  Bleeding Precautions:   gentle oral care promoted   monitored for signs of bleeding  Medication Review/Management:   medications reviewed   high-risk medications identified  Taken 2/14/2024 0830 by Jocelyn Champion RN  Bleeding Precautions:   gentle oral care promoted   monitored for signs of bleeding  Medication Review/Management:   medications reviewed   high-risk medications identified  Goal: Absence of Hypersensitivity Reaction  Outcome: Progressing  Intervention: Manage Infusion-Related Hypersensitivity  Recent Flowsheet Documentation  Taken 2/14/2024 1530 by Jocelyn Champion RN  Stabilization Measures: blood products administered  Taken 2/14/2024 1230 by Jocelyn Champion RN  Stabilization Measures: blood products administered  Taken 2/14/2024 0830 by Jocelyn Champion RN  Stabilization Measures: blood products administered  Goal: Absence of Infection  Outcome: Progressing  Intervention: Prevent and Manage Infection  Recent Flowsheet Documentation  Taken 2/14/2024 1530 by Jocelyn Champion RN  Infection Prevention: environmental surveillance performed  Infection Management: aseptic technique maintained  Isolation Precautions:   contact precautions maintained   enteric precautions maintained  Taken 2/14/2024 1230 by Jocelyn Champion RN  Infection Prevention: environmental surveillance performed  Infection Management: aseptic technique maintained  Isolation Precautions:   contact precautions maintained   enteric precautions maintained  Taken 2/14/2024 0830 by Jocelyn Champion RN  Infection  Prevention: environmental surveillance performed  Infection Management: aseptic technique maintained  Isolation Precautions:   contact precautions maintained   enteric precautions maintained  Goal: Improved Oral Mucous Membrane Health and Integrity  Outcome: Progressing  Goal: Nausea and Vomiting Symptom Relief  Outcome: Progressing  Goal: Optimal Nutrition Intake  Outcome: Progressing

## 2024-02-15 NOTE — PLAN OF CARE
"BP (!) 140/77 (BP Location: Left arm, Cuff Size: Adult Regular)   Pulse 89   Temp 97.5  F (36.4  C) (Axillary)   Resp 16   Ht 1.725 m (5' 7.91\")   Wt 87 kg (191 lb 12.8 oz)   SpO2 98%   BMI 29.24 kg/m  . Central dressing is C/D/I and needs dressing change post shower today. IV patent and C/D/I. Patient is A & O x 4. No c/o pain or nausea or emesis this shift. Patient continue to c/o diarrhea and received imodium with lomotil with some relief. Patient is not eating much and waiting a family member to bring late lunch for him today. Patient continue to c/o fatigue and slept in between cares. Patient had   [facility] education with JD Gray today. Continue with plan of care and notify MD for status changes.     Problem: Adult Inpatient Plan of Care  Goal: Plan of Care Review  Description: The Plan of Care Review/Shift note should be completed every shift.  The Outcome Evaluation is a brief statement about your assessment that the patient is improving, declining, or no change.  This information will be displayed automatically on your shift  note.  Outcome: Progressing  Flowsheets (Taken 2/15/2024 1311)  Plan of Care Reviewed With:   patient   caregiver  Overall Patient Progress: no change     Problem: Adult Inpatient Plan of Care  Goal: Absence of Hospital-Acquired Illness or Injury  Intervention: Identify and Manage Fall Risk  Recent Flowsheet Documentation  Taken 2/15/2024 1251 by Yvonne Lr, RN  Safety Promotion/Fall Prevention:   clutter free environment maintained   increased rounding and observation   increase visualization of patient  Taken 2/15/2024 0756 by Yvonne Lr, RN  Safety Promotion/Fall Prevention:   clutter free environment maintained   increased rounding and observation   increase visualization of patient   lighting adjusted     Problem: Adult Inpatient Plan of Care  Goal: Absence of Hospital-Acquired Illness or Injury  Intervention: Prevent Skin " Injury  Recent Flowsheet Documentation  Taken 2/15/2024 1251 by Yvonne Lr RN  Body Position:   position changed independently   foot of bed elevated   heels elevated   legs elevated  Taken 2/15/2024 0908 by Yvonne Lr RN  Body Position: position changed independently  Taken 2/15/2024 0756 by Yvonne Lr RN  Body Position:   position changed independently   foot of bed elevated   heels elevated   legs elevated  Skin Protection: adhesive use limited  Device Skin Pressure Protection: adhesive use limited     Problem: Adult Inpatient Plan of Care  Goal: Absence of Hospital-Acquired Illness or Injury  Intervention: Prevent and Manage VTE (Venous Thromboembolism) Risk  Recent Flowsheet Documentation  Taken 2/15/2024 0756 by Yvonne Lr RN  VTE Prevention/Management: SCDs (sequential compression devices) on     Problem: Adult Inpatient Plan of Care  Goal: Absence of Hospital-Acquired Illness or Injury  Intervention: Prevent Infection  Recent Flowsheet Documentation  Taken 2/15/2024 0756 by Yvonne Lr RN  Infection Prevention:   cohorting utilized   environmental surveillance performed   equipment surfaces disinfected   hand hygiene promoted   personal protective equipment utilized     Problem: Stem Cell/Bone Marrow Transplant  Goal: Optimal Coping with Transplant  Intervention: Optimize Patient/Family Adjustment to Transplant  Recent Flowsheet Documentation  Taken 2/15/2024 0756 by Yvonne Lr RN  Supportive Measures:   active listening utilized   relaxation techniques promoted     Problem: Stem Cell/Bone Marrow Transplant  Goal: Improved Activity Tolerance  Intervention: Promote Improved Energy  Recent Flowsheet Documentation  Taken 2/15/2024 1251 by Yvonne Lr RN  Activity Management:   activity adjusted per tolerance   activity encouraged  Taken 2/15/2024 0908 by Yvonne Lr RN  Activity Management:   activity adjusted  per tolerance   activity encouraged  Taken 2/15/2024 0756 by Yvonne Lr RN  Fatigue Management:   activity schedule adjusted   fatigue-related activity identified   activity assistance provided   frequent rest breaks encouraged  Sleep/Rest Enhancement: relaxation techniques promoted  Activity Management:   activity adjusted per tolerance   activity encouraged  Environmental Support:   calm environment promoted   caregiver consistency promoted   comfort object encouraged   distractions minimized   environmental consistency promoted   personal routine supported     Problem: Stem Cell/Bone Marrow Transplant  Goal: Blood Counts Within Acceptable Range  Intervention: Monitor and Manage Hematologic Symptoms  Recent Flowsheet Documentation  Taken 2/15/2024 0756 by Yvonne Lr RN  Sleep/Rest Enhancement: relaxation techniques promoted  Bleeding Precautions:   blood pressure closely monitored   gentle oral care promoted   monitored for signs of bleeding   coagulation study results reviewed  Medication Review/Management: medications reviewed     Problem: Stem Cell/Bone Marrow Transplant  Goal: Absence of Infection  Intervention: Prevent and Manage Infection  Recent Flowsheet Documentation  Taken 2/15/2024 0756 by Yvonne Lr RN  Infection Prevention:   cohorting utilized   environmental surveillance performed   equipment surfaces disinfected   hand hygiene promoted   personal protective equipment utilized  Infection Management: other (see comments)  Isolation Precautions:   contact precautions maintained   enteric precautions maintained     Problem: Stem Cell/Bone Marrow Transplant  Goal: Improved Oral Mucous Membrane Health and Integrity  Intervention: Promote Oral Comfort and Health  Recent Flowsheet Documentation  Taken 2/15/2024 0756 by Yvonne Lr RN  Oral Mucous Membrane Protection: lip/mouth moisturizer applied  Oral Care:   lip/mouth moisturizer applied   mouth wash  rinse   oral rinse provided   teeth brushed   tongue brushed     Problem: Stem Cell/Bone Marrow Transplant  Goal: Nausea and Vomiting Symptom Relief  Intervention: Prevent and Manage Nausea and Vomiting  Recent Flowsheet Documentation  Taken 2/15/2024 0756 by Yvonne Lr RN  Nausea/Vomiting Interventions:   antiemetic   slow deep breathing encouraged   sips of clear liquids given      Goal Outcome Evaluation:      Plan of Care Reviewed With: patient, caregiver    Overall Patient Progress: no changeOverall Patient Progress: no change

## 2024-02-15 NOTE — PROGRESS NOTES
Home Infusion  Ziggy is hoping to discharge in the next few days and will be going home on IV micafungin 3x/week.   He has never n=been on home IV therapy before but his S.O. Ablert has had some experience in the past.   Albert will be the primary CG and requires instruction/review for catheter flushing and micafungin administration.  Met with Ziggy and Albert at bedside.  Provided them with information on [facility] services and instructed in micafingin administration via hp bal with SAS flushing.   Had Albert perform hands on with practice equipment and teaching sheets.    Provided information about supplies and supply delivery, storage of medication, checking of label, dosing times, plan for CVC dressing changes and lab draws in clinic and [facility] 24/7 support while on IV therapy.       Ziggy and Albert verbalized understanding of information given.  Albert demonstrated very good technique with practice and verbalized good understanding of process.  Stated she feels comfortable with administering the micafungin and CVC flushing independently.   [facility] will reach out to Ziggy/Albert once discharge is confirmed regarding delivery of drug and supplies.  No concerns about Albert's ability to manage IV administration.  Kimmy OLIVARESNI  Nurse Liaison  [facility] I [email or web address]  [address] [place] [address]  [email or web address]  [phone] M-F I [facility] main: [phone]

## 2024-02-16 ENCOUNTER — DOCUMENTATION ONLY (OUTPATIENT)
Dept: TRANSPLANT | Facility: CLINIC | Age: 49
End: 2024-02-16
Payer: COMMERCIAL

## 2024-02-16 LAB
ALBUMIN SERPL BCG-MCNC: 2.8 G/DL (ref 3.5–5.2)
ALP SERPL-CCNC: 87 U/L (ref 40–150)
ALT SERPL W P-5'-P-CCNC: 14 U/L (ref 0–70)
ANION GAP SERPL CALCULATED.3IONS-SCNC: 11 MMOL/L (ref 7–15)
AST SERPL W P-5'-P-CCNC: 29 U/L (ref 0–45)
BASOPHILS # BLD AUTO: ABNORMAL 10*3/UL
BASOPHILS # BLD MANUAL: 0 10E3/UL (ref 0–0.2)
BASOPHILS NFR BLD AUTO: ABNORMAL %
BASOPHILS NFR BLD MANUAL: 0 %
BILIRUB SERPL-MCNC: 0.3 MG/DL
BLD PROD TYP BPU: NORMAL
BLD PROD TYP BPU: NORMAL
BLOOD COMPONENT TYPE: NORMAL
BLOOD COMPONENT TYPE: NORMAL
BUN SERPL-MCNC: 5.4 MG/DL (ref 6–20)
CALCIUM SERPL-MCNC: 8.5 MG/DL (ref 8.6–10)
CHLORIDE SERPL-SCNC: 111 MMOL/L (ref 98–107)
CMV DNA SPEC NAA+PROBE-ACNC: NOT DETECTED IU/ML
CODING SYSTEM: NORMAL
CODING SYSTEM: NORMAL
CREAT SERPL-MCNC: 1.26 MG/DL (ref 0.67–1.17)
CROSSMATCH: NORMAL
DEPRECATED HCO3 PLAS-SCNC: 22 MMOL/L (ref 22–29)
EGFRCR SERPLBLD CKD-EPI 2021: 69 ML/MIN/1.73M2
EOSINOPHIL # BLD AUTO: ABNORMAL 10*3/UL
EOSINOPHIL # BLD MANUAL: 0 10E3/UL (ref 0–0.7)
EOSINOPHIL NFR BLD AUTO: ABNORMAL %
EOSINOPHIL NFR BLD MANUAL: 0 %
ERYTHROCYTE [DISTWIDTH] IN BLOOD BY AUTOMATED COUNT: 13.2 % (ref 10–15)
GLUCOSE SERPL-MCNC: 99 MG/DL (ref 70–99)
HCT VFR BLD AUTO: 23.2 % (ref 40–53)
HGB BLD-MCNC: 8 G/DL (ref 13.3–17.7)
IMM GRANULOCYTES # BLD: ABNORMAL 10*3/UL
IMM GRANULOCYTES NFR BLD: ABNORMAL %
ISSUE DATE AND TIME: NORMAL
ISSUE DATE AND TIME: NORMAL
LAB DIRECTOR DISCLAIMER: NORMAL
LAB DIRECTOR DISCLAIMER: NORMAL
LAB DIRECTOR INTERPRETATION: NORMAL
LAB DIRECTOR INTERPRETATION: NORMAL
LAB DIRECTOR METHODOLOGY: NORMAL
LAB DIRECTOR METHODOLOGY: NORMAL
LAB DIRECTOR RESULTS: NORMAL
LAB DIRECTOR RESULTS: NORMAL
LYMPHOCYTES # BLD AUTO: ABNORMAL 10*3/UL
LYMPHOCYTES # BLD MANUAL: 0 10E3/UL (ref 0.8–5.3)
LYMPHOCYTES NFR BLD AUTO: ABNORMAL %
LYMPHOCYTES NFR BLD MANUAL: 1 %
MAGNESIUM SERPL-MCNC: 1.7 MG/DL (ref 1.7–2.3)
MCH RBC QN AUTO: 29.7 PG (ref 26.5–33)
MCHC RBC AUTO-ENTMCNC: 34.5 G/DL (ref 31.5–36.5)
MCV RBC AUTO: 86 FL (ref 78–100)
MONOCYTES # BLD AUTO: ABNORMAL 10*3/UL
MONOCYTES # BLD MANUAL: 0.1 10E3/UL (ref 0–1.3)
MONOCYTES NFR BLD AUTO: ABNORMAL %
MONOCYTES NFR BLD MANUAL: 3 %
MYELOCYTES # BLD MANUAL: 0 10E3/UL
MYELOCYTES NFR BLD MANUAL: 1 %
NEUTROPHILS # BLD AUTO: ABNORMAL 10*3/UL
NEUTROPHILS # BLD MANUAL: 3.4 10E3/UL (ref 1.6–8.3)
NEUTROPHILS NFR BLD AUTO: ABNORMAL %
NEUTROPHILS NFR BLD MANUAL: 95 %
NRBC # BLD AUTO: 0 10E3/UL
NRBC BLD AUTO-RTO: 0 /100
PHOSPHATE SERPL-MCNC: 4 MG/DL (ref 2.5–4.5)
PLAT MORPH BLD: ABNORMAL
PLATELET # BLD AUTO: 40 10E3/UL (ref 150–450)
POTASSIUM SERPL-SCNC: 3 MMOL/L (ref 3.4–5.3)
POTASSIUM SERPL-SCNC: 3.8 MMOL/L (ref 3.4–5.3)
PROT SERPL-MCNC: 5.9 G/DL (ref 6.4–8.3)
RBC # BLD AUTO: 2.69 10E6/UL (ref 4.4–5.9)
RBC MORPH BLD: ABNORMAL
SIROLIMUS BLD-MCNC: 6.5 UG/L (ref 5–15)
SODIUM SERPL-SCNC: 144 MMOL/L (ref 135–145)
SPECIMEN DESCRIPTION: NORMAL
SPECIMEN DESCRIPTION: NORMAL
TME LAST DOSE: NORMAL H
TME LAST DOSE: NORMAL H
UFH PPP CHRO-ACNC: 0.5 IU/ML
UNIT ABO/RH: NORMAL
UNIT ABO/RH: NORMAL
UNIT NUMBER: NORMAL
UNIT NUMBER: NORMAL
UNIT STATUS: NORMAL
UNIT STATUS: NORMAL
UNIT TYPE ISBT: 6200
UNIT TYPE ISBT: 9500
WBC # BLD AUTO: 3.6 10E3/UL (ref 4–11)

## 2024-02-16 PROCEDURE — 250N000013 HC RX MED GY IP 250 OP 250 PS 637: Performed by: PHYSICIAN ASSISTANT

## 2024-02-16 PROCEDURE — 80053 COMPREHEN METABOLIC PANEL: CPT | Performed by: PHYSICIAN ASSISTANT

## 2024-02-16 PROCEDURE — 258N000003 HC RX IP 258 OP 636: Performed by: PHYSICIAN ASSISTANT

## 2024-02-16 PROCEDURE — 250N000012 HC RX MED GY IP 250 OP 636 PS 637: Performed by: PHYSICIAN ASSISTANT

## 2024-02-16 PROCEDURE — P9040 RBC LEUKOREDUCED IRRADIATED: HCPCS | Performed by: PHYSICIAN ASSISTANT

## 2024-02-16 PROCEDURE — 85027 COMPLETE CBC AUTOMATED: CPT | Performed by: PHYSICIAN ASSISTANT

## 2024-02-16 PROCEDURE — 81268 CHIMERISM ANAL W/CELL SELECT: CPT | Performed by: PHYSICIAN ASSISTANT

## 2024-02-16 PROCEDURE — 250N000011 HC RX IP 250 OP 636: Performed by: STUDENT IN AN ORGANIZED HEALTH CARE EDUCATION/TRAINING PROGRAM

## 2024-02-16 PROCEDURE — 206N000001 HC R&B BMT UMMC

## 2024-02-16 PROCEDURE — P9037 PLATE PHERES LEUKOREDU IRRAD: HCPCS

## 2024-02-16 PROCEDURE — 2894A VOIDCORRECT: CPT | Performed by: PHYSICIAN ASSISTANT

## 2024-02-16 PROCEDURE — 250N000013 HC RX MED GY IP 250 OP 250 PS 637

## 2024-02-16 PROCEDURE — 85520 HEPARIN ASSAY: CPT | Performed by: STUDENT IN AN ORGANIZED HEALTH CARE EDUCATION/TRAINING PROGRAM

## 2024-02-16 PROCEDURE — 250N000012 HC RX MED GY IP 250 OP 636 PS 637

## 2024-02-16 PROCEDURE — 250N000011 HC RX IP 250 OP 636: Performed by: PHYSICIAN ASSISTANT

## 2024-02-16 PROCEDURE — G0452 MOLECULAR PATHOLOGY INTERPR: HCPCS | Mod: 26 | Performed by: PATHOLOGY

## 2024-02-16 PROCEDURE — 83735 ASSAY OF MAGNESIUM: CPT | Performed by: PHYSICIAN ASSISTANT

## 2024-02-16 PROCEDURE — 84100 ASSAY OF PHOSPHORUS: CPT | Performed by: STUDENT IN AN ORGANIZED HEALTH CARE EDUCATION/TRAINING PROGRAM

## 2024-02-16 PROCEDURE — 84132 ASSAY OF SERUM POTASSIUM: CPT | Performed by: STUDENT IN AN ORGANIZED HEALTH CARE EDUCATION/TRAINING PROGRAM

## 2024-02-16 PROCEDURE — 99232 SBSQ HOSP IP/OBS MODERATE 35: CPT | Mod: FS | Performed by: PHYSICIAN ASSISTANT

## 2024-02-16 PROCEDURE — 85007 BL SMEAR W/DIFF WBC COUNT: CPT | Performed by: PHYSICIAN ASSISTANT

## 2024-02-16 PROCEDURE — 250N000011 HC RX IP 250 OP 636: Mod: JZ | Performed by: PHYSICIAN ASSISTANT

## 2024-02-16 PROCEDURE — 80195 ASSAY OF SIROLIMUS: CPT | Performed by: INTERNAL MEDICINE

## 2024-02-16 RX ORDER — DIPHENOXYLATE HCL/ATROPINE 2.5-.025MG
1 TABLET ORAL 2 TIMES DAILY
Qty: 60 TABLET | Refills: 0 | Status: CANCELLED | OUTPATIENT
Start: 2024-02-16

## 2024-02-16 RX ORDER — MYCOPHENOLATE MOFETIL 250 MG/1
250 CAPSULE ORAL
Status: DISCONTINUED | OUTPATIENT
Start: 2024-02-16 | End: 2024-02-20 | Stop reason: HOSPADM

## 2024-02-16 RX ORDER — MYCOPHENOLATE MOFETIL 500 MG/1
1000 TABLET ORAL
Status: DISCONTINUED | OUTPATIENT
Start: 2024-02-16 | End: 2024-02-20 | Stop reason: HOSPADM

## 2024-02-16 RX ORDER — POTASSIUM CHLORIDE 29.8 MG/ML
20 INJECTION INTRAVENOUS
Status: COMPLETED | OUTPATIENT
Start: 2024-02-16 | End: 2024-02-16

## 2024-02-16 RX ADMIN — PANTOPRAZOLE SODIUM 40 MG: 40 TABLET, DELAYED RELEASE ORAL at 08:51

## 2024-02-16 RX ADMIN — LISINOPRIL 40 MG: 10 TABLET ORAL at 08:52

## 2024-02-16 RX ADMIN — APIXABAN 2.5 MG: 2.5 TABLET, FILM COATED ORAL at 20:12

## 2024-02-16 RX ADMIN — POTASSIUM CHLORIDE 20 MEQ: 29.8 INJECTION, SOLUTION INTRAVENOUS at 06:34

## 2024-02-16 RX ADMIN — LOPERAMIDE HYDROCHLORIDE 4 MG: 2 CAPSULE ORAL at 16:25

## 2024-02-16 RX ADMIN — LOPERAMIDE HYDROCHLORIDE 4 MG: 2 CAPSULE ORAL at 12:00

## 2024-02-16 RX ADMIN — MICAFUNGIN SODIUM 150 MG: 50 INJECTION, POWDER, LYOPHILIZED, FOR SOLUTION INTRAVENOUS at 08:47

## 2024-02-16 RX ADMIN — METOPROLOL SUCCINATE 100 MG: 50 TABLET, EXTENDED RELEASE ORAL at 08:51

## 2024-02-16 RX ADMIN — URSODIOL 300 MG: 300 CAPSULE ORAL at 14:20

## 2024-02-16 RX ADMIN — URSODIOL 300 MG: 300 CAPSULE ORAL at 08:52

## 2024-02-16 RX ADMIN — Medication 2 SPRAY: at 08:53

## 2024-02-16 RX ADMIN — VANCOMYCIN HYDROCHLORIDE 125 MG: 125 CAPSULE ORAL at 16:25

## 2024-02-16 RX ADMIN — ACYCLOVIR 800 MG: 800 TABLET ORAL at 20:12

## 2024-02-16 RX ADMIN — APIXABAN 2.5 MG: 2.5 TABLET, FILM COATED ORAL at 09:45

## 2024-02-16 RX ADMIN — Medication 2 SPRAY: at 11:59

## 2024-02-16 RX ADMIN — POTASSIUM CHLORIDE 20 MEQ: 29.8 INJECTION, SOLUTION INTRAVENOUS at 08:54

## 2024-02-16 RX ADMIN — VANCOMYCIN HYDROCHLORIDE 125 MG: 125 CAPSULE ORAL at 08:53

## 2024-02-16 RX ADMIN — NIFEDIPINE 30 MG: 30 TABLET, FILM COATED, EXTENDED RELEASE ORAL at 20:12

## 2024-02-16 RX ADMIN — Medication 2 SPRAY: at 16:26

## 2024-02-16 RX ADMIN — DIPHENHYDRAMINE HYDROCHLORIDE AND LIDOCAINE HYDROCHLORIDE AND ALUMINUM HYDROXIDE AND MAGNESIUM HYDRO 10 ML: KIT at 08:49

## 2024-02-16 RX ADMIN — Medication 5 ML: at 20:32

## 2024-02-16 RX ADMIN — POTASSIUM CHLORIDE 20 MEQ: 29.8 INJECTION, SOLUTION INTRAVENOUS at 09:35

## 2024-02-16 RX ADMIN — MYCOPHENOLATE MOFETIL 1000 MG: 500 TABLET, FILM COATED ORAL at 20:11

## 2024-02-16 RX ADMIN — NIFEDIPINE 30 MG: 30 TABLET, FILM COATED, EXTENDED RELEASE ORAL at 08:53

## 2024-02-16 RX ADMIN — Medication 5 CAPSULE: at 08:51

## 2024-02-16 RX ADMIN — Medication 2 SPRAY: at 20:22

## 2024-02-16 RX ADMIN — URSODIOL 300 MG: 300 CAPSULE ORAL at 20:11

## 2024-02-16 RX ADMIN — DIPHENHYDRAMINE HYDROCHLORIDE AND LIDOCAINE HYDROCHLORIDE AND ALUMINUM HYDROXIDE AND MAGNESIUM HYDRO 10 ML: KIT at 16:25

## 2024-02-16 RX ADMIN — DOXEPIN HYDROCHLORIDE 25 MG: 10 SOLUTION ORAL at 08:53

## 2024-02-16 RX ADMIN — MYCOPHENOLATE MOFETIL 250 MG: 250 CAPSULE ORAL at 20:11

## 2024-02-16 RX ADMIN — DIPHENOXYLATE HYDROCHLORIDE AND ATROPINE SULFATE 1 TABLET: 2.5; .025 TABLET ORAL at 08:53

## 2024-02-16 RX ADMIN — SIROLIMUS 3.5 MG: 2 TABLET, FILM COATED ORAL at 08:51

## 2024-02-16 RX ADMIN — Medication 5 ML: at 13:03

## 2024-02-16 RX ADMIN — DEXTROSE MONOHYDRATE 450 MCG: 50 INJECTION, SOLUTION INTRAVENOUS at 20:07

## 2024-02-16 RX ADMIN — VANCOMYCIN HYDROCHLORIDE 125 MG: 125 CAPSULE ORAL at 20:11

## 2024-02-16 RX ADMIN — DEXTROSE MONOHYDRATE 20 ML: 50 INJECTION, SOLUTION INTRAVENOUS at 20:10

## 2024-02-16 RX ADMIN — ACYCLOVIR 800 MG: 800 TABLET ORAL at 08:53

## 2024-02-16 RX ADMIN — DEXTROSE MONOHYDRATE 20 ML: 50 INJECTION, SOLUTION INTRAVENOUS at 20:03

## 2024-02-16 RX ADMIN — DIPHENOXYLATE HYDROCHLORIDE AND ATROPINE SULFATE 1 TABLET: 2.5; .025 TABLET ORAL at 20:12

## 2024-02-16 RX ADMIN — VANCOMYCIN HYDROCHLORIDE 125 MG: 125 CAPSULE ORAL at 12:00

## 2024-02-16 RX ADMIN — LOPERAMIDE HYDROCHLORIDE 4 MG: 2 CAPSULE ORAL at 20:12

## 2024-02-16 RX ADMIN — LOPERAMIDE HYDROCHLORIDE 4 MG: 2 CAPSULE ORAL at 08:52

## 2024-02-16 ASSESSMENT — ACTIVITIES OF DAILY LIVING (ADL)
ADLS_ACUITY_SCORE: 22

## 2024-02-16 NOTE — PLAN OF CARE
"BP (!) 142/81 (BP Location: Left arm)   Pulse 92   Temp 99.3  F (37.4  C) (Axillary)   Resp 22   Ht 1.725 m (5' 7.91\")   Wt 86.1 kg (189 lb 14.4 oz)   SpO2 98%   BMI 28.95 kg/m  . Central line is C/D/I and HL. PIV is saline locked. Patient is A & O x 4. No c/o pain or nausea or emesis this shift. Patient is trying to eat and drink fair. Patient received potassium and rechecked x 1 with no replacement needed per order. Patient received unit of RBC and tolerated. Heparin drip is discontinued and patient started eliquis bid. IV medications were changed to oral. Patient will be discharge to home on Sunday. Continue to encourage patient to do good mouth cares, cough, deep breath and sit up while eating and drinking. Continue with plan of care and notify MD for status changes.     Problem: Adult Inpatient Plan of Care  Goal: Plan of Care Review  Description: The Plan of Care Review/Shift note should be completed every shift.  The Outcome Evaluation is a brief statement about your assessment that the patient is improving, declining, or no change.  This information will be displayed automatically on your shift  note.  Outcome: Progressing  Flowsheets (Taken 2/16/2024 1715)  Plan of Care Reviewed With:   patient   caregiver  Overall Patient Progress: no change     Problem: Adult Inpatient Plan of Care  Goal: Absence of Hospital-Acquired Illness or Injury  Intervention: Identify and Manage Fall Risk  Recent Flowsheet Documentation  Taken 2/16/2024 0835 by Yvonne Lr RN  Safety Promotion/Fall Prevention:   increased rounding and observation   increase visualization of patient   lighting adjusted   clutter free environment maintained   mobility aid in reach   nonskid shoes/slippers when out of bed   patient and family education     Problem: Adult Inpatient Plan of Care  Goal: Absence of Hospital-Acquired Illness or Injury  Intervention: Prevent Skin Injury  Recent Flowsheet Documentation  Taken 2/16/2024 " 1149 by Yvonne Lr RN  Body Position: position changed independently  Taken 2/16/2024 0945 by Yvonne Lr RN  Body Position: position changed independently  Taken 2/16/2024 0835 by Yvonne Lr RN  Body Position: position changed independently  Skin Protection: adhesive use limited  Device Skin Pressure Protection: adhesive use limited     Problem: Adult Inpatient Plan of Care  Goal: Absence of Hospital-Acquired Illness or Injury  Intervention: Prevent and Manage VTE (Venous Thromboembolism) Risk  Recent Flowsheet Documentation  Taken 2/16/2024 0835 by Yvonne Lr RN  VTE Prevention/Management: SCDs (sequential compression devices) off     Problem: Stem Cell/Bone Marrow Transplant  Goal: Optimal Coping with Transplant  Intervention: Optimize Patient/Family Adjustment to Transplant  Recent Flowsheet Documentation  Taken 2/16/2024 0835 by Yvonne Lr RN  Supportive Measures:   active listening utilized   relaxation techniques promoted     Problem: Stem Cell/Bone Marrow Transplant  Goal: Improved Activity Tolerance  Intervention: Promote Improved Energy  Recent Flowsheet Documentation  Taken 2/16/2024 1149 by Yvonne Lr RN  Activity Management:   activity adjusted per tolerance   activity encouraged  Taken 2/16/2024 0945 by Yvonne Lr RN  Activity Management:   activity adjusted per tolerance   activity encouraged  Taken 2/16/2024 0835 by Yvonne Lr RN  Fatigue Management:   activity schedule adjusted   activity assistance provided   fatigue-related activity identified   frequent rest breaks encouraged   paced activity encouraged  Sleep/Rest Enhancement: relaxation techniques promoted  Activity Management: activity adjusted per tolerance  Environmental Support:   calm environment promoted   caregiver consistency promoted   comfort object encouraged   distractions minimized   environmental consistency promoted   personal  routine supported     Problem: Stem Cell/Bone Marrow Transplant  Goal: Blood Counts Within Acceptable Range  Intervention: Monitor and Manage Hematologic Symptoms  Recent Flowsheet Documentation  Taken 2/16/2024 0835 by Yvonne Lr RN  Sleep/Rest Enhancement: relaxation techniques promoted  Bleeding Precautions:   blood pressure closely monitored   gentle oral care promoted   monitored for signs of bleeding   coagulation study results reviewed  Medication Review/Management: medications reviewed     Problem: Stem Cell/Bone Marrow Transplant  Goal: Absence of Infection  Intervention: Prevent and Manage Infection  Recent Flowsheet Documentation  Taken 2/16/2024 0835 by Yvonne Lr RN  Infection Prevention:   environmental surveillance performed   personal protective equipment utilized   hand hygiene promoted  Infection Management: aseptic technique maintained  Isolation Precautions:   contact precautions maintained   enteric precautions maintained     Problem: Stem Cell/Bone Marrow Transplant  Goal: Improved Oral Mucous Membrane Health and Integrity  Intervention: Promote Oral Comfort and Health  Recent Flowsheet Documentation  Taken 2/16/2024 0835 by Yvonne Lr RN  Oral Mucous Membrane Protection: lip/mouth moisturizer applied  Oral Care:   lip/mouth moisturizer applied   mouth wash rinse   oral rinse provided   teeth brushed   tongue brushed        Goal Outcome Evaluation:      Plan of Care Reviewed With: patient, caregiver    Overall Patient Progress: no changeOverall Patient Progress: no change

## 2024-02-16 NOTE — PLAN OF CARE
"Alert and oriented x 4. Denies pain and nausea. Heparin drip infusing at 1650 units/hr. Hep 10a was 0.5, next check in am. Up independently to the BR. Platelets 40 and platelet transfusion going on. Potassium was 3.0, #1 of 3 infusing. Hemoglobin 8 and will need transfusion.  Problem: Adult Inpatient Plan of Care  Goal: Plan of Care Review  Description: The Plan of Care Review/Shift note should be completed every shift.  The Outcome Evaluation is a brief statement about your assessment that the patient is improving, declining, or no change.  This information will be displayed automatically on your shift  note.  Outcome: Progressing  Goal: Patient-Specific Goal (Individualized)  Description: You can add care plan individualizations to a care plan. Examples of Individualization might be:  \"Parent requests to be called daily at 9am for status\", \"I have a hard time hearing out of my right ear\", or \"Do not touch me to wake me up as it startles  me\".  Outcome: Progressing  Goal: Absence of Hospital-Acquired Illness or Injury  Outcome: Progressing  Intervention: Identify and Manage Fall Risk  Recent Flowsheet Documentation  Taken 2/15/2024 2351 by Pamela Yeung RN  Safety Promotion/Fall Prevention:   clutter free environment maintained   lighting adjusted   assistive device/personal items within reach   nonskid shoes/slippers when out of bed   safety round/check completed  Intervention: Prevent Skin Injury  Recent Flowsheet Documentation  Taken 2/15/2024 2351 by Pamela Yeung RN  Body Position: position changed independently  Intervention: Prevent and Manage VTE (Venous Thromboembolism) Risk  Recent Flowsheet Documentation  Taken 2/15/2024 2351 by Pamela Yeung RN  VTE Prevention/Management: SCDs (sequential compression devices) off  Intervention: Prevent Infection  Recent Flowsheet Documentation  Taken 2/15/2024 2351 by Pamela Yeung RN  Infection Prevention:   rest/sleep promoted   hand hygiene " promoted  Goal: Optimal Comfort and Wellbeing  Outcome: Progressing  Goal: Readiness for Transition of Care  Outcome: Progressing   Goal Outcome Evaluation:

## 2024-02-16 NOTE — PLAN OF CARE
Goal Outcome Evaluation:    Day +21. Reports improvement in oral and throat pain. Was able to eat regular food today with minimal pain. No pain meds requested. Up ad amna in room. Not saving urine-reminded to save. Hoping to discharge on Sunday. Continue plan of care.

## 2024-02-16 NOTE — PROGRESS NOTES
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 Tired this AM. Had mcdonalds yesterday but ended up throwing up last evening. Feels diarrhea is slightly improved. No mouth or rectal pain this AM. No new infectious symptoms.     Review of Systems    Review of Systems: 10 point ROS negative unless stated in HPI   PHYSICAL EXAM      Weight     Wt Readings from Last 3 Encounters:   02/16/24 86.1 kg (189 lb 14.4 oz)   01/17/24 90.1 kg (198 lb 9.6 oz)   01/12/24 88.9 kg (196 lb)        KPS: 60    /56 (BP Location: Left arm, Cuff Size: Adult Regular)   Pulse 91   Temp 99.8  F (37.7  C) (Axillary)   Resp 18   Ht 1.725 m (5' 7.91\")   Wt 86.1 kg (189 lb 14.4 oz)   SpO2 96%   BMI 28.95 kg/m       General: NAD, appears very tired  Lungs:  no respiratory distress  Cardiovascular: Appears well perfused, no cyanosis   Lymphatics: Bilateral lower extremity edema 2+   Abdomen: non tender, soft, BS+  Skin: rash on chest/back resolved  Neuro: A&Ox3  Additional Findings: Powell site NT, no drainage.    Current aGVHD staging:  Skin 0, UGI 0, LGI 0, Liver 0 (keep in note through day +180 for allos)      LABS AND IMAGING: I have assessed all abnormal lab values for their clinical significance and any values considered clinically significant have been addressed in the assessment and plan.        Lab Results   Component Value Date    WBC 3.6 (L) 02/16/2024    ANEUTAUTO 2.4 02/15/2024    HGB 8.0 (L) 02/16/2024    HCT 23.2 (L) 02/16/2024    PLT 40 (LL) 02/16/2024     " 02/16/2024    POTASSIUM 3.0 (L) 02/16/2024    CHLORIDE 111 (H) 02/16/2024    CO2 22 02/16/2024    GLC 99 02/16/2024    BUN 5.4 (L) 02/16/2024    CR 1.26 (H) 02/16/2024    MAG 1.7 02/16/2024    INR 1.12 02/12/2024         SYSTEMS-BASED ASSESSMENT AND PLAN     Ziggy Hallman is a 48 year old year old male with a PMH of MDS, hx of multiple clots and MI undergoing a MA (Bu/Flu) prep for an 8/8 URD PBSCT day 22. Stay has been complicated by mucositis requiring PCA, N/F, Staph Epi bacteremia, pulmonary embolism requiring hep gtt.    BMT/IEC PROTOCOL for 2015-29  Chemo Prep:   Day -6: Admit  Day -5 through Day -2: Busulfan/Fludarabine  Day -1: Rest.   Keppra prophylaxis      8/8 DP permissive. Cell dose-9.24x10^6  ABO: Donor: O+ Recipient A-  (Minor incompatability, no flush required)   GSCF plan: GCSF to start day +5 until ANC >1500 for 3 consecutive days    HEME/COAG  #SEGMENTAL R PE (2/6): Low intensity hep gtt with platelet goal <50k with post platelet checks. See below      #APS work up- lupus antigen +, will follow outpt as may be unreliable in this acute setting and prior APS work up had been unremarkable prior to admission.    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   Extensive hypercoagulable workup to date has been unrevealing.  JAK2 testing negative.  PNH testing negative.  Elevated IgA of unknown significance, no evidence of plasma cell disorder.  - Eliquis and  Prasugrel HELD starting 2/1-2/16 . Restarted Low dose Apixiban on 2/16 (2.5mg BID) as platelets stably above 30k. Will plan to increase to 5mg BID and re-add prasugrel once his platelets are stable above 50k. Will need to be followed closely in clinic.       IMMUNOCOMPROMISED  #Staph epi bacteremia - Resolved. Received IV vancomycin.   #Febrile neutropenia, fever resolved. See previous notes for abx therapy.   #C. Diff - Admit c. Diff triple+ - Start PO vanc 1/21 for 14 day course. - completed on 2/4, with broadening abx restart PO Vanco tx dose x 10 days    -Prophylaxis plan: ACV LD, Megan HD, Levaquin while neutropenic , Bactrim +28    RISK OF GVHD  - Prophylaxis: PtC day +3, +4, , Siro/MMF to start day +5,     CARDIOVASCULAR  #CAD  #Hx of CABG  #HTN   -PTA metoprolol, Lisinopril dose increased 2/2 HTN,  nifidepine ER 30mg BID, labetolol PRN;  #Hyperlipidemia: Holding atorvastatin peritransplant  - Risk of cardiomyopathy:  Baseline EF 50-55%, Global left ventricular function mildly reduced. Grade 1 or early diastolic dysfunction.   - Risk of arrhythmia: Baseline EKG showed NSR, Qtc -425    GI/NUTRITION  #Oropharyngeal mucositis:Supportive cares, improved.     #Diarrhea - Known c. Diff, see ID for abx therapy. Continues metamucil and loperamide QID, added lomotil now that he has been treated.      - Ulcer prophylaxis: Protonix   - VOD Prophylaxis: Ursodiol  - Risk of nausea/vomiting due to chemo: Ativan, Compazine, Zofran   - Moderate malnutrition 2/2 acute on chronic illness- dietician to follow, appetite improving with pain control.    RENAL/ELECTROLYTES/  #CONNIE   - Likely 2/2 to diuresis. Not anuric. Will hold on diuresis on 2/14 and 2/15. Monitor creatinine daily. He is not tracking I/Os. Weights have been trending down.     #Hematuria- No dysuria, self resolved  #Hypervolemia 2/2 cytoxan flush - diurese as needed lasix 20mg 2/7, 2/09, 2/12, 2/13.   #Oliguria 2/9 (Resolved)- output <1L with diuresis and large  "amounts of IV given. Cr stable and WNL. Patient is having concurrent diarrhea, so this is likely difficult to measure.   -Bladder scan unremarkable   -UA shows proteinuria  #Proteinuria- possibly exacerbated by persistent HTN (See CV),   - Hypocalcemia in setting hypoalbuminemia, iCa2+, corrected WNL   - Electrolyte management: replace per sliding scale    Psych:    #Anxiety: admits to feeling anxious.  Start zyprexa 5mg HS.  Connect w/ SW.  Psychaitry consult added hydroxyzine.  #Insomnia- PTA ambien, Trazadone added and increased to 100mg. - Ambien and trazadone held during cytoxan period due to frequent urination and concern for falls. Discontinued with opioid PCA, melatonin added.     SOCIAL DETERMINANTS  - Caregiver: Family   - Financial/insurance concerns: See SW notes       Known issues that I take into account for medical decisions, with salient changes to the plan considering these complexities noted above.    Patient Active Problem List   Diagnosis    Amegakaryocytic thrombocytopenia (H)    Anemia due to bone marrow failure, unspecified bone marrow failure type (H)    Aplastic anemia (H)     Clinically Significant Risk Factors        # Hypokalemia: Lowest K = 3 mmol/L in last 2 days, will replace as needed       # Hypoalbuminemia: Lowest albumin = 2 g/dL at 1/22/2024  4:12 AM, will monitor as appropriate     # Thrombocytopenia: Lowest platelets = 40 in last 2 days, will monitor for bleeding  # Acute Kidney Injury, unspecified: based on a >150% or 0.3 mg/dL increase in last creatinine compared to past 90 day average, will monitor renal function         # Overweight: Estimated body mass index is 28.95 kg/m  as calculated from the following:    Height as of this encounter: 1.725 m (5' 7.91\").    Weight as of this encounter: 86.1 kg (189 lb 14.4 oz).               Dispo: Remain admitted through engraftment  Please call sister Radha with updates for care and discharge    I spent 30 minutes in the care of " this patient today, which included time necessary for preparation for the visit, obtaining history, ordering medications/tests/procedures as medically indicated, review of pertinent medical literature, counseling of the patient, communication of recommendations to the care team, and documentation time.    Brenda Brunson PA-C  [phone]

## 2024-02-16 NOTE — PROGRESS NOTES
Care Management Discharge Note    Discharge Date: 02/18/2024     Discharge Disposition: Home    Discharge Services: None    Discharge DME: None    Discharge Transportation: family or friend will provide    Private pay costs discussed: Not applicable    Does the patient's insurance plan have a 3 day qualifying hospital stay waiver?  No    PAS Confirmation Code:  N/A    Patient/family educated on Medicare website which has current facility and service quality ratings: no    Education Provided on the Discharge Plan: Yes    Persons Notified of Discharge Plans: pt, treatment team, nursing staff, and social work    Patient/Family in Agreement with the Plan: yes    Handoff Referral Completed: No    Additional Information:  Ziggy Hallman is a 48 year old year old male with a PMH of MDS, hx of multiple clots and MI undergoing a MA (Bu/Flu) prep for an 8/8 URD PBSCT currently day +22.     Per Med Team, pt is anticipated to be medically stable for discharge on Sunday 2/18/2024. SW spoke with pt to assess coping, provide psychosocial support and check in. SW previously provided a discharge packet with psychosocial post-transplant resources for patient and caregiver. Pt confirmed plan for discharge on Sunday and shared that he had been given some discharge education yesterday. SW provided empathetic listening, validation of concerns, and encouragement. No other questions or concerns indicated at this time. SW encouraged pt to contact [facility] for support, questions and/or resources.     Assessment: Pt presented as pleasant. Pt continues to be supported by his friends/family.     Discharge Plan: Pt to discharge home in [place], [place] with friends/family as primary caregiver(s).     RIVKA Grider, CEDRICK  [facility]  Adult Blood & Marrow Transplant   Phone: [phone]  Pager: [phone]

## 2024-02-16 NOTE — PROGRESS NOTES
Blood and Marrow Transplant Discharge Teach    RNCC spoke with Ziggy and called caregiver, Mireya, to discuss upcoming discharge. Reviewed plan for line care supplies, PT/OT recommendations and upcoming clinic visits.      Patient demonstrates understanding of the following:  Which situations necessitate calling provider and whom to contact: Yes  Proper use and care of (medical equipment, care aids, etc.) Yes  Reviewed Post Allo Transplant guidelines and patient verbalizes understanding      Infection Control:  Patient instructed on hand hygiene: Yes  Signs and symptoms of infection taught: Yes      For CAR-T patient: Verified that patient has product specific wallet card. Patient instructed to remain within close proximity of facility (within 30-40 minutes per program standard) for at least four weeks post infusion. CAR-T patient is instructed not to operate motorized vehicle or heavy machinery for a period of 8 weeks.    Time spent with patient: 35 minutes.  Specific Concerns: Yes. Patient stated his son has pet snakes. Ziggy states he does not care for the snake and that the snake stays in a room with the door closed. I further educated him and Mireya that Ziggy should not touch and feces or care for the snake. He should bring home any new animals within the first 100 days. I reiterated the importance of hand washing.    Discharge location: Home    [ x ] Home Infusion Company: [facility]    [ x ] Pharmacy needs: [facility] (home)     Sirolimus- $0     MMF- $0     Prevymis- CMV neg    [ x ] Can fill meds at  pharmacy (BMT benefits soft check): yes    [ x ] PT/OT recommendations: home with assist    [ x ] 1st time discharge? yes    [ x ] Discharge teach    [ x ] [facility] teach- done 02/15    [ x ] Weekly Lab Day Preference? No preference, mid day    [ x ] D0 BAN scheduling notification    [ x ] clonoSEQ testing needed? no    Chelsy Ramesh  BMT Nurse Coordinator  Phone: [phone]  Pager: [phone]

## 2024-02-17 ENCOUNTER — APPOINTMENT (OUTPATIENT)
Dept: GENERAL RADIOLOGY | Facility: CLINIC | Age: 49
DRG: 014 | End: 2024-02-17
Attending: STUDENT IN AN ORGANIZED HEALTH CARE EDUCATION/TRAINING PROGRAM
Payer: COMMERCIAL

## 2024-02-17 PROBLEM — Z94.81 STATUS POST BONE MARROW TRANSPLANT (H): Status: ACTIVE | Noted: 2024-02-17

## 2024-02-17 LAB
ABO/RH(D): NORMAL
ADV 40+41 DNA STL QL NAA+NON-PROBE: NEGATIVE
ALBUMIN UR-MCNC: 200 MG/DL
ANION GAP SERPL CALCULATED.3IONS-SCNC: 12 MMOL/L (ref 7–15)
ANTIBODY SCREEN: NEGATIVE
APPEARANCE UR: CLEAR
ASTRO TYP 1-8 RNA STL QL NAA+NON-PROBE: NEGATIVE
BACTERIA BLD CULT: NO GROWTH
BACTERIA BLD CULT: NO GROWTH
BASOPHILS # BLD AUTO: ABNORMAL 10*3/UL
BASOPHILS # BLD MANUAL: 0 10E3/UL (ref 0–0.2)
BASOPHILS NFR BLD AUTO: ABNORMAL %
BASOPHILS NFR BLD MANUAL: 0 %
BILIRUB UR QL STRIP: NEGATIVE
BUN SERPL-MCNC: 7.1 MG/DL (ref 6–20)
C CAYETANENSIS DNA STL QL NAA+NON-PROBE: NEGATIVE
C DIFF TOX B STL QL: NEGATIVE
CALCIUM SERPL-MCNC: 8.7 MG/DL (ref 8.6–10)
CAMPYLOBACTER DNA SPEC NAA+PROBE: NEGATIVE
CHLORIDE SERPL-SCNC: 109 MMOL/L (ref 98–107)
COLOR UR AUTO: ABNORMAL
CREAT SERPL-MCNC: 1.3 MG/DL (ref 0.67–1.17)
CRYPTOSP DNA STL QL NAA+NON-PROBE: NEGATIVE
DEPRECATED HCO3 PLAS-SCNC: 22 MMOL/L (ref 22–29)
E COLI O157 DNA STL QL NAA+NON-PROBE: NORMAL
E HISTOLYT DNA STL QL NAA+NON-PROBE: NEGATIVE
EAEC ASTA GENE ISLT QL NAA+PROBE: NEGATIVE
EC STX1+STX2 GENES STL QL NAA+NON-PROBE: NEGATIVE
EGFRCR SERPLBLD CKD-EPI 2021: 67 ML/MIN/1.73M2
EOSINOPHIL # BLD AUTO: ABNORMAL 10*3/UL
EOSINOPHIL # BLD MANUAL: 0 10E3/UL (ref 0–0.7)
EOSINOPHIL NFR BLD AUTO: ABNORMAL %
EOSINOPHIL NFR BLD MANUAL: 0 %
EPEC EAE GENE STL QL NAA+NON-PROBE: NEGATIVE
ERYTHROCYTE [DISTWIDTH] IN BLOOD BY AUTOMATED COUNT: 13.4 % (ref 10–15)
ETEC LTA+ST1A+ST1B TOX ST NAA+NON-PROBE: NEGATIVE
G LAMBLIA DNA STL QL NAA+NON-PROBE: NEGATIVE
GLUCOSE SERPL-MCNC: 97 MG/DL (ref 70–99)
GLUCOSE UR STRIP-MCNC: NEGATIVE MG/DL
HCT VFR BLD AUTO: 26.3 % (ref 40–53)
HGB BLD-MCNC: 9.2 G/DL (ref 13.3–17.7)
HGB UR QL STRIP: ABNORMAL
IMM GRANULOCYTES # BLD: ABNORMAL 10*3/UL
IMM GRANULOCYTES NFR BLD: ABNORMAL %
KETONES UR STRIP-MCNC: NEGATIVE MG/DL
LACTATE SERPL-SCNC: 0.6 MMOL/L (ref 0.7–2)
LEUKOCYTE ESTERASE UR QL STRIP: NEGATIVE
LYMPHOCYTES # BLD AUTO: ABNORMAL 10*3/UL
LYMPHOCYTES # BLD MANUAL: 0 10E3/UL (ref 0.8–5.3)
LYMPHOCYTES NFR BLD AUTO: ABNORMAL %
LYMPHOCYTES NFR BLD MANUAL: 0 %
MAGNESIUM SERPL-MCNC: 1.6 MG/DL (ref 1.7–2.3)
MCH RBC QN AUTO: 29.6 PG (ref 26.5–33)
MCHC RBC AUTO-ENTMCNC: 35 G/DL (ref 31.5–36.5)
MCV RBC AUTO: 85 FL (ref 78–100)
METAMYELOCYTES # BLD MANUAL: 0.1 10E3/UL
METAMYELOCYTES NFR BLD MANUAL: 2 %
MONOCYTES # BLD AUTO: ABNORMAL 10*3/UL
MONOCYTES # BLD MANUAL: 0.2 10E3/UL (ref 0–1.3)
MONOCYTES NFR BLD AUTO: ABNORMAL %
MONOCYTES NFR BLD MANUAL: 3 %
NEUTROPHILS # BLD AUTO: ABNORMAL 10*3/UL
NEUTROPHILS # BLD MANUAL: 5.6 10E3/UL (ref 1.6–8.3)
NEUTROPHILS NFR BLD AUTO: ABNORMAL %
NEUTROPHILS NFR BLD MANUAL: 95 %
NITRATE UR QL: NEGATIVE
NOROVIRUS GI+II RNA STL QL NAA+NON-PROBE: NEGATIVE
NRBC # BLD AUTO: 0 10E3/UL
NRBC BLD AUTO-RTO: 0 /100
P SHIGELLOIDES DNA STL QL NAA+NON-PROBE: NEGATIVE
PH UR STRIP: 6 [PH] (ref 5–7)
PHOSPHATE SERPL-MCNC: 4.3 MG/DL (ref 2.5–4.5)
PLAT MORPH BLD: ABNORMAL
PLATELET # BLD AUTO: 51 10E3/UL (ref 150–450)
POTASSIUM SERPL-SCNC: 3 MMOL/L (ref 3.4–5.3)
POTASSIUM SERPL-SCNC: 3.7 MMOL/L (ref 3.4–5.3)
RBC # BLD AUTO: 3.11 10E6/UL (ref 4.4–5.9)
RBC MORPH BLD: ABNORMAL
RBC URINE: 3 /HPF
RVA RNA STL QL NAA+NON-PROBE: NEGATIVE
SALMONELLA SP RPOD STL QL NAA+PROBE: NEGATIVE
SAPO I+II+IV+V RNA STL QL NAA+NON-PROBE: NEGATIVE
SHIGELLA SP+EIEC IPAH ST NAA+NON-PROBE: NEGATIVE
SODIUM SERPL-SCNC: 143 MMOL/L (ref 135–145)
SP GR UR STRIP: 1.01 (ref 1–1.03)
SPECIMEN EXPIRATION DATE: NORMAL
SPERM #/AREA URNS HPF: PRESENT /HPF
UFH PPP CHRO-ACNC: 0.7 IU/ML
UROBILINOGEN UR STRIP-MCNC: NORMAL MG/DL
V CHOLERAE DNA SPEC QL NAA+PROBE: NEGATIVE
VIBRIO DNA SPEC NAA+PROBE: NEGATIVE
WBC # BLD AUTO: 5.9 10E3/UL (ref 4–11)
WBC URINE: 1 /HPF
Y ENTEROCOL DNA STL QL NAA+PROBE: NEGATIVE

## 2024-02-17 PROCEDURE — 250N000011 HC RX IP 250 OP 636: Performed by: STUDENT IN AN ORGANIZED HEALTH CARE EDUCATION/TRAINING PROGRAM

## 2024-02-17 PROCEDURE — 206N000001 HC R&B BMT UMMC

## 2024-02-17 PROCEDURE — 87799 DETECT AGENT NOS DNA QUANT: CPT | Performed by: PHYSICIAN ASSISTANT

## 2024-02-17 PROCEDURE — 80180 DRUG SCRN QUAN MYCOPHENOLATE: CPT | Performed by: PHYSICIAN ASSISTANT

## 2024-02-17 PROCEDURE — 250N000013 HC RX MED GY IP 250 OP 250 PS 637: Performed by: HOSPITALIST

## 2024-02-17 PROCEDURE — 250N000013 HC RX MED GY IP 250 OP 250 PS 637

## 2024-02-17 PROCEDURE — 258N000003 HC RX IP 258 OP 636: Performed by: PHYSICIAN ASSISTANT

## 2024-02-17 PROCEDURE — 83605 ASSAY OF LACTIC ACID: CPT | Performed by: STUDENT IN AN ORGANIZED HEALTH CARE EDUCATION/TRAINING PROGRAM

## 2024-02-17 PROCEDURE — 85027 COMPLETE CBC AUTOMATED: CPT | Performed by: PHYSICIAN ASSISTANT

## 2024-02-17 PROCEDURE — 250N000012 HC RX MED GY IP 250 OP 636 PS 637

## 2024-02-17 PROCEDURE — 87493 C DIFF AMPLIFIED PROBE: CPT | Performed by: PHYSICIAN ASSISTANT

## 2024-02-17 PROCEDURE — 71045 X-RAY EXAM CHEST 1 VIEW: CPT

## 2024-02-17 PROCEDURE — 80048 BASIC METABOLIC PNL TOTAL CA: CPT | Performed by: PHYSICIAN ASSISTANT

## 2024-02-17 PROCEDURE — 250N000012 HC RX MED GY IP 250 OP 636 PS 637: Performed by: PHYSICIAN ASSISTANT

## 2024-02-17 PROCEDURE — 87449 NOS EACH ORGANISM AG IA: CPT | Performed by: PHYSICIAN ASSISTANT

## 2024-02-17 PROCEDURE — 83735 ASSAY OF MAGNESIUM: CPT | Performed by: STUDENT IN AN ORGANIZED HEALTH CARE EDUCATION/TRAINING PROGRAM

## 2024-02-17 PROCEDURE — 84132 ASSAY OF SERUM POTASSIUM: CPT | Performed by: STUDENT IN AN ORGANIZED HEALTH CARE EDUCATION/TRAINING PROGRAM

## 2024-02-17 PROCEDURE — 250N000013 HC RX MED GY IP 250 OP 250 PS 637: Performed by: PHYSICIAN ASSISTANT

## 2024-02-17 PROCEDURE — 250N000011 HC RX IP 250 OP 636: Performed by: PHYSICIAN ASSISTANT

## 2024-02-17 PROCEDURE — 85007 BL SMEAR W/DIFF WBC COUNT: CPT | Performed by: PHYSICIAN ASSISTANT

## 2024-02-17 PROCEDURE — 250N000011 HC RX IP 250 OP 636: Mod: JZ | Performed by: PHYSICIAN ASSISTANT

## 2024-02-17 PROCEDURE — 84100 ASSAY OF PHOSPHORUS: CPT | Performed by: STUDENT IN AN ORGANIZED HEALTH CARE EDUCATION/TRAINING PROGRAM

## 2024-02-17 PROCEDURE — 85520 HEPARIN ASSAY: CPT | Performed by: STUDENT IN AN ORGANIZED HEALTH CARE EDUCATION/TRAINING PROGRAM

## 2024-02-17 PROCEDURE — 71045 X-RAY EXAM CHEST 1 VIEW: CPT | Mod: 26 | Performed by: RADIOLOGY

## 2024-02-17 PROCEDURE — 87533 HHV-6 DNA QUANT: CPT | Performed by: PHYSICIAN ASSISTANT

## 2024-02-17 PROCEDURE — 99418 PROLNG IP/OBS E/M EA 15 MIN: CPT | Performed by: PHYSICIAN ASSISTANT

## 2024-02-17 PROCEDURE — 87103 BLOOD FUNGUS CULTURE: CPT | Performed by: PHYSICIAN ASSISTANT

## 2024-02-17 PROCEDURE — 99233 SBSQ HOSP IP/OBS HIGH 50: CPT | Mod: FS | Performed by: PHYSICIAN ASSISTANT

## 2024-02-17 PROCEDURE — 87507 IADNA-DNA/RNA PROBE TQ 12-25: CPT | Performed by: PHYSICIAN ASSISTANT

## 2024-02-17 PROCEDURE — 86900 BLOOD TYPING SEROLOGIC ABO: CPT | Performed by: PHYSICIAN ASSISTANT

## 2024-02-17 PROCEDURE — 81001 URINALYSIS AUTO W/SCOPE: CPT | Performed by: STUDENT IN AN ORGANIZED HEALTH CARE EDUCATION/TRAINING PROGRAM

## 2024-02-17 RX ORDER — HEPARIN SODIUM (PORCINE) LOCK FLUSH IV SOLN 100 UNIT/ML 100 UNIT/ML
5 SOLUTION INTRAVENOUS
Status: CANCELLED | OUTPATIENT
Start: 2024-02-19

## 2024-02-17 RX ORDER — PANTOPRAZOLE SODIUM 40 MG/1
40 TABLET, DELAYED RELEASE ORAL DAILY
Qty: 30 TABLET | Refills: 1 | Status: SHIPPED | OUTPATIENT
Start: 2024-02-17 | End: 2024-02-20

## 2024-02-17 RX ORDER — URSODIOL 300 MG/1
300 CAPSULE ORAL 3 TIMES DAILY
Qty: 90 CAPSULE | Refills: 1 | Status: SHIPPED | OUTPATIENT
Start: 2024-02-17 | End: 2024-03-20

## 2024-02-17 RX ORDER — DIPHENOXYLATE HCL/ATROPINE 2.5-.025MG
1 TABLET ORAL 4 TIMES DAILY PRN
Qty: 30 TABLET | Refills: 0 | Status: SHIPPED | OUTPATIENT
Start: 2024-02-17 | End: 2024-02-20

## 2024-02-17 RX ORDER — HEPARIN SODIUM,PORCINE 10 UNIT/ML
5-20 VIAL (ML) INTRAVENOUS DAILY PRN
Status: CANCELLED | OUTPATIENT
Start: 2024-02-19

## 2024-02-17 RX ORDER — DIPHENOXYLATE HCL/ATROPINE 2.5-.025MG
1 TABLET ORAL 4 TIMES DAILY
Status: DISCONTINUED | OUTPATIENT
Start: 2024-02-17 | End: 2024-02-18

## 2024-02-17 RX ORDER — LORAZEPAM 0.5 MG/1
.5-1 TABLET ORAL EVERY 4 HOURS PRN
Qty: 15 TABLET | Refills: 0 | Status: SHIPPED | OUTPATIENT
Start: 2024-02-17 | End: 2024-03-27

## 2024-02-17 RX ORDER — LISINOPRIL 40 MG/1
40 TABLET ORAL DAILY
Qty: 30 TABLET | Refills: 1 | Status: SHIPPED | OUTPATIENT
Start: 2024-02-17 | End: 2024-02-21

## 2024-02-17 RX ORDER — ACYCLOVIR 800 MG/1
800 TABLET ORAL 2 TIMES DAILY
Qty: 60 TABLET | Refills: 1 | Status: SHIPPED | OUTPATIENT
Start: 2024-02-17 | End: 2024-04-11

## 2024-02-17 RX ORDER — POTASSIUM CHLORIDE 750 MG/1
40 TABLET, EXTENDED RELEASE ORAL ONCE
Status: DISCONTINUED | OUTPATIENT
Start: 2024-02-17 | End: 2024-02-17

## 2024-02-17 RX ORDER — MYCOPHENOLATE MOFETIL 250 MG/1
250 CAPSULE ORAL 2 TIMES DAILY
Qty: 22 CAPSULE | Refills: 0 | Status: SHIPPED | OUTPATIENT
Start: 2024-02-18 | End: 2024-02-20

## 2024-02-17 RX ORDER — NIFEDIPINE 30 MG
30 TABLET, EXTENDED RELEASE ORAL 2 TIMES DAILY
Qty: 60 TABLET | Refills: 1 | Status: SHIPPED | OUTPATIENT
Start: 2024-02-17 | End: 2024-02-21

## 2024-02-17 RX ORDER — POTASSIUM CHLORIDE 29.8 MG/ML
20 INJECTION INTRAVENOUS
Status: COMPLETED | OUTPATIENT
Start: 2024-02-17 | End: 2024-02-17

## 2024-02-17 RX ORDER — SULFAMETHOXAZOLE/TRIMETHOPRIM 800-160 MG
1 TABLET ORAL
Qty: 16 TABLET | Refills: 1 | Status: SHIPPED | OUTPATIENT
Start: 2024-02-26 | End: 2024-02-26

## 2024-02-17 RX ORDER — CEFEPIME HYDROCHLORIDE 2 G/1
2 INJECTION, POWDER, FOR SOLUTION INTRAVENOUS EVERY 8 HOURS
Status: DISCONTINUED | OUTPATIENT
Start: 2024-02-17 | End: 2024-02-17

## 2024-02-17 RX ORDER — SIROLIMUS 0.5 MG/1
TABLET, FILM COATED ORAL
Qty: 30 TABLET | Refills: 1 | Status: SHIPPED | OUTPATIENT
Start: 2024-02-17 | End: 2024-02-20

## 2024-02-17 RX ORDER — POTASSIUM CHLORIDE 750 MG/1
20 TABLET, EXTENDED RELEASE ORAL ONCE
Status: DISCONTINUED | OUTPATIENT
Start: 2024-02-17 | End: 2024-02-17

## 2024-02-17 RX ORDER — SIROLIMUS 1 MG/1
TABLET, FILM COATED ORAL
Qty: 90 TABLET | Refills: 1 | Status: SHIPPED | OUTPATIENT
Start: 2024-02-17 | End: 2024-02-20

## 2024-02-17 RX ORDER — LOPERAMIDE HCL 2 MG
4 CAPSULE ORAL 4 TIMES DAILY PRN
Qty: 120 CAPSULE | Refills: 1 | Status: SHIPPED | OUTPATIENT
Start: 2024-02-17 | End: 2024-02-20

## 2024-02-17 RX ORDER — MYCOPHENOLATE MOFETIL 500 MG/1
1000 TABLET ORAL 2 TIMES DAILY
Qty: 22 TABLET | Refills: 0 | Status: SHIPPED | OUTPATIENT
Start: 2024-02-18 | End: 2024-02-18

## 2024-02-17 RX ORDER — PROCHLORPERAZINE MALEATE 5 MG
5-10 TABLET ORAL EVERY 6 HOURS PRN
Qty: 30 TABLET | Refills: 1 | Status: SHIPPED | OUTPATIENT
Start: 2024-02-17 | End: 2024-03-28

## 2024-02-17 RX ORDER — METOPROLOL SUCCINATE 100 MG/1
100 TABLET, EXTENDED RELEASE ORAL DAILY
Qty: 30 TABLET | Refills: 1 | Status: SHIPPED | OUTPATIENT
Start: 2024-02-17 | End: 2024-02-21

## 2024-02-17 RX ORDER — MICAFUNGIN 10 MG/ML
300 INJECTION, POWDER, LYOPHILIZED, FOR SOLUTION INTRAVENOUS
Status: ACTIVE | DISCHARGE
Start: 2024-02-17 | End: 2024-03-07

## 2024-02-17 RX ADMIN — CEFEPIME HYDROCHLORIDE 2 G: 2 INJECTION, POWDER, FOR SOLUTION INTRAVENOUS at 09:17

## 2024-02-17 RX ADMIN — ACYCLOVIR 800 MG: 800 TABLET ORAL at 20:03

## 2024-02-17 RX ADMIN — Medication 5 ML: at 17:18

## 2024-02-17 RX ADMIN — VANCOMYCIN HYDROCHLORIDE 125 MG: 125 CAPSULE ORAL at 17:15

## 2024-02-17 RX ADMIN — POTASSIUM CHLORIDE 20 MEQ: 29.8 INJECTION, SOLUTION INTRAVENOUS at 06:12

## 2024-02-17 RX ADMIN — Medication 2 SPRAY: at 08:12

## 2024-02-17 RX ADMIN — LOPERAMIDE HYDROCHLORIDE 4 MG: 2 CAPSULE ORAL at 12:21

## 2024-02-17 RX ADMIN — POTASSIUM CHLORIDE 20 MEQ: 29.8 INJECTION, SOLUTION INTRAVENOUS at 09:18

## 2024-02-17 RX ADMIN — NIFEDIPINE 30 MG: 30 TABLET, FILM COATED, EXTENDED RELEASE ORAL at 08:11

## 2024-02-17 RX ADMIN — URSODIOL 300 MG: 300 CAPSULE ORAL at 14:09

## 2024-02-17 RX ADMIN — PANTOPRAZOLE SODIUM 40 MG: 40 TABLET, DELAYED RELEASE ORAL at 08:11

## 2024-02-17 RX ADMIN — URSODIOL 300 MG: 300 CAPSULE ORAL at 20:03

## 2024-02-17 RX ADMIN — DIPHENOXYLATE HYDROCHLORIDE AND ATROPINE SULFATE 1 TABLET: 2.5; .025 TABLET ORAL at 17:14

## 2024-02-17 RX ADMIN — ACETAMINOPHEN 650 MG: 325 TABLET, FILM COATED ORAL at 00:52

## 2024-02-17 RX ADMIN — POTASSIUM CHLORIDE 20 MEQ: 29.8 INJECTION, SOLUTION INTRAVENOUS at 08:12

## 2024-02-17 RX ADMIN — LOPERAMIDE HYDROCHLORIDE 4 MG: 2 CAPSULE ORAL at 17:14

## 2024-02-17 RX ADMIN — ACYCLOVIR 800 MG: 800 TABLET ORAL at 08:11

## 2024-02-17 RX ADMIN — MYCOPHENOLATE MOFETIL 1000 MG: 500 TABLET, FILM COATED ORAL at 08:11

## 2024-02-17 RX ADMIN — LOPERAMIDE HYDROCHLORIDE 4 MG: 2 CAPSULE ORAL at 20:15

## 2024-02-17 RX ADMIN — DIPHENOXYLATE HYDROCHLORIDE AND ATROPINE SULFATE 1 TABLET: 2.5; .025 TABLET ORAL at 08:11

## 2024-02-17 RX ADMIN — MYCOPHENOLATE MOFETIL 250 MG: 250 CAPSULE ORAL at 20:03

## 2024-02-17 RX ADMIN — DIPHENHYDRAMINE HYDROCHLORIDE AND LIDOCAINE HYDROCHLORIDE AND ALUMINUM HYDROXIDE AND MAGNESIUM HYDRO 10 ML: KIT at 08:10

## 2024-02-17 RX ADMIN — MYCOPHENOLATE MOFETIL 250 MG: 250 CAPSULE ORAL at 08:10

## 2024-02-17 RX ADMIN — MICAFUNGIN SODIUM 150 MG: 50 INJECTION, POWDER, LYOPHILIZED, FOR SOLUTION INTRAVENOUS at 08:09

## 2024-02-17 RX ADMIN — LISINOPRIL 40 MG: 10 TABLET ORAL at 08:11

## 2024-02-17 RX ADMIN — SODIUM CHLORIDE 1000 ML: 9 INJECTION, SOLUTION INTRAVENOUS at 09:18

## 2024-02-17 RX ADMIN — Medication 2 SPRAY: at 20:07

## 2024-02-17 RX ADMIN — MYCOPHENOLATE MOFETIL 1000 MG: 500 TABLET, FILM COATED ORAL at 20:03

## 2024-02-17 RX ADMIN — DIPHENOXYLATE HYDROCHLORIDE AND ATROPINE SULFATE 1 TABLET: 2.5; .025 TABLET ORAL at 12:21

## 2024-02-17 RX ADMIN — LOPERAMIDE HYDROCHLORIDE 4 MG: 2 CAPSULE ORAL at 08:11

## 2024-02-17 RX ADMIN — VANCOMYCIN HYDROCHLORIDE 125 MG: 125 CAPSULE ORAL at 12:21

## 2024-02-17 RX ADMIN — APIXABAN 2.5 MG: 2.5 TABLET, FILM COATED ORAL at 20:03

## 2024-02-17 RX ADMIN — DIPHENOXYLATE HYDROCHLORIDE AND ATROPINE SULFATE 1 TABLET: 2.5; .025 TABLET ORAL at 20:03

## 2024-02-17 RX ADMIN — SIROLIMUS 3.5 MG: 2 TABLET, FILM COATED ORAL at 08:11

## 2024-02-17 RX ADMIN — Medication 2 SPRAY: at 12:21

## 2024-02-17 RX ADMIN — Medication 5 CAPSULE: at 08:11

## 2024-02-17 RX ADMIN — VANCOMYCIN HYDROCHLORIDE 125 MG: 125 CAPSULE ORAL at 08:11

## 2024-02-17 RX ADMIN — METOPROLOL SUCCINATE 100 MG: 50 TABLET, EXTENDED RELEASE ORAL at 08:11

## 2024-02-17 RX ADMIN — CEFEPIME HYDROCHLORIDE 2 G: 2 INJECTION, POWDER, FOR SOLUTION INTRAVENOUS at 01:30

## 2024-02-17 RX ADMIN — VANCOMYCIN HYDROCHLORIDE 125 MG: 125 CAPSULE ORAL at 20:03

## 2024-02-17 RX ADMIN — URSODIOL 300 MG: 300 CAPSULE ORAL at 08:11

## 2024-02-17 RX ADMIN — NIFEDIPINE 30 MG: 30 TABLET, FILM COATED, EXTENDED RELEASE ORAL at 20:03

## 2024-02-17 RX ADMIN — Medication 5 ML: at 18:48

## 2024-02-17 RX ADMIN — APIXABAN 2.5 MG: 2.5 TABLET, FILM COATED ORAL at 08:11

## 2024-02-17 ASSESSMENT — ACTIVITIES OF DAILY LIVING (ADL)
ADLS_ACUITY_SCORE: 22

## 2024-02-17 NOTE — PLAN OF CARE
"/62 (BP Location: Left arm)   Pulse 98   Temp 98.3  F (36.8  C) (Axillary)   Resp 18   Ht 1.725 m (5' 7.91\")   Wt 86.1 kg (189 lb 14.4 oz)   SpO2 95%   BMI 28.95 kg/m       Patient had a fever last night of 100.5, cultures were drawn, UA completed, provider ordered cefepime and chest x-ray. Acetaminophen x1 was administered for fever. Patient is currently getting first bag of potassium and will need 2 more bags. Lactic triggered and resulted at 0.6. Continue with plan of care.    Goal Outcome Evaluation:  Problem: Stem Cell/Bone Marrow Transplant  Goal: Absence of Infection  Outcome: Not Progressing  Intervention: Prevent and Manage Infection  Recent Flowsheet Documentation  Taken 2/17/2024 0400 by Misael May RN  Infection Prevention:   environmental surveillance performed   personal protective equipment utilized   hand hygiene promoted  Infection Management: aseptic technique maintained  Isolation Precautions:   contact precautions maintained   enteric precautions maintained  Taken 2/17/2024 0000 by Misael May RN  Infection Prevention:   environmental surveillance performed   personal protective equipment utilized   hand hygiene promoted  Infection Management: aseptic technique maintained  Isolation Precautions:   contact precautions maintained   enteric precautions maintained  Taken 2/16/2024 2000 by Misael May RN  Infection Management: aseptic technique maintained  Taken 2/16/2024 1942 by Misael May, RN  Infection Prevention:   environmental surveillance performed   personal protective equipment utilized   hand hygiene promoted  Isolation Precautions:   contact precautions maintained   enteric precautions maintained     Problem: Adult Inpatient Plan of Care  Goal: Optimal Comfort and Wellbeing  Outcome: Progressing     Problem: Stem Cell/Bone Marrow Transplant  Goal: Optimal Coping with Transplant  Outcome: Progressing  Goal: Symptom-Free Urinary Elimination  Outcome: " Progressing  Goal: Blood Counts Within Acceptable Range  Outcome: Progressing  Intervention: Monitor and Manage Hematologic Symptoms  Recent Flowsheet Documentation  Taken 2/17/2024 0400 by Misael May RN  Bleeding Precautions:   blood pressure closely monitored   gentle oral care promoted   monitored for signs of bleeding   coagulation study results reviewed  Medication Review/Management: medications reviewed  Taken 2/17/2024 0000 by Misael May RN  Bleeding Precautions:   blood pressure closely monitored   gentle oral care promoted   monitored for signs of bleeding   coagulation study results reviewed  Medication Review/Management: medications reviewed  Taken 2/16/2024 2000 by Misael May RN  Bleeding Precautions:   blood pressure closely monitored   gentle oral care promoted   monitored for signs of bleeding   coagulation study results reviewed  Medication Review/Management: medications reviewed  Goal: Improved Oral Mucous Membrane Health and Integrity  Outcome: Progressing  Goal: Nausea and Vomiting Symptom Relief  Outcome: Progressing

## 2024-02-17 NOTE — PROGRESS NOTES
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 Has been afebrile this AM. He denies any new focal symptoms. States he is a \"hot sleeper\". To note his blood pressure is intermittently elevated. No worsening diarrhea but not significantly improved. No N/V. Ate okay yesterday. Feeling overwhelmed with pill burden.     Review of Systems    Review of Systems: 10 point ROS negative unless stated in HPI   PHYSICAL EXAM      Weight     Wt Readings from Last 3 Encounters:   02/17/24 84.4 kg (186 lb 1.6 oz)   01/17/24 90.1 kg (198 lb 9.6 oz)   01/12/24 88.9 kg (196 lb)        KPS: 60    BP (!) 151/82 (BP Location: Left arm, Cuff Size: Adult Regular)   Pulse 93   Temp 98.9  F (37.2  C) (Axillary)   Resp 16   Ht 1.725 m (5' 7.91\")   Wt 84.4 kg (186 lb 1.6 oz)   SpO2 97%   BMI 28.37 kg/m       General: NAD, appears very tired  Lungs:  no respiratory distress  Cardiovascular: Appears well perfused, no cyanosis   Lymphatics: Bilateral lower extremity edema 2+   Abdomen: non tender, soft, BS+  Skin: rash on chest/back resolved  Neuro: A&Ox3  Additional Findings: Powell site NT, no drainage.    Current aGVHD staging:  Skin 0, UGI 0, LGI 0, Liver 0 (keep in note through day +180 for allos)      LABS AND IMAGING: I have assessed all abnormal lab values for their clinical significance and any values considered clinically significant have been addressed in the assessment and plan.        Lab Results   Component Value Date    " WBC 5.9 02/17/2024    ANEUTAUTO 2.4 02/15/2024    HGB 9.2 (L) 02/17/2024    HCT 26.3 (L) 02/17/2024    PLT 51 (L) 02/17/2024     02/17/2024    POTASSIUM 3.0 (L) 02/17/2024    CHLORIDE 109 (H) 02/17/2024    CO2 22 02/17/2024    GLC 97 02/17/2024    BUN 7.1 02/17/2024    CR 1.30 (H) 02/17/2024    MAG 1.6 (L) 02/17/2024    INR 1.12 02/12/2024         SYSTEMS-BASED ASSESSMENT AND PLAN     Ziggy Hallman is a 48 year old year old male with a PMH of MDS, hx of multiple clots and MI undergoing a MA (Bu/Flu) prep for an 8/8 URD PBSCT day 23. Stay has been complicated by mucositis requiring PCA, N/F, Staph Epi bacteremia, pulmonary embolism requiring hep gtt.    BMT/IEC PROTOCOL for 2015-29  Chemo Prep:   Day -6: Admit  Day -5 through Day -2: Busulfan/Fludarabine  Day -1: Rest.   Keppra prophylaxis      8/8 DP permissive. Cell dose-9.24x10^6  ABO: Donor: O+ Recipient A-  (Minor incompatability, no flush required)   GSCF plan: GCSF to start day +5 until ANC >1500 for 3 consecutive days- stopped on 2/17    HEME/COAG  #SEGMENTAL R PE (2/6): Low intensity hep gtt with platelet goal <50k with post platelet checks. See below      #APS work up- lupus antigen +, will follow outpt as may be unreliable in this acute setting and prior APS work up had been unremarkable prior to admission.    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   Extensive hypercoagulable workup to date has been  unrevealing.  JAK2 testing negative.  PNH testing negative.  Elevated IgA of unknown significance, no evidence of plasma cell disorder.  - Eliquis and Prasugrel HELD starting 2/1-2/16 . Restarted Low dose Apixiban on 2/16 (2.5mg BID) as platelets stably above 30k. Will plan to increase to 5mg BID and re-add prasugrel once his platelets are stable above 50k. Will need to be followed closely in clinic.       IMMUNOCOMPROMISED  #Febrile non-neutropenia  - Respiked fever 100.5 on 2/17. Restarted cefepime 2/17-x. Monitoring cultures. Ordered additional enteric panel, c. Diff, adenovirus, HHV6, EBV - pending.        #Staph epi bacteremia - Resolved. Received IV vancomycin.   #Febrile neutropenia, fever resolved. See previous notes for abx therapy.   #C. Diff - Admit c. Diff triple+ - Start PO vanc 1/21 for 14 day course. - completed on 2/4, with broadening abx restart PO Vanco tx dose x 10 days. Given repeat fever on 2/17, repeat c. Diff testing, consider fidoxamicin.     -Prophylaxis plan: ACV LD, Megan HD, Levaquin while neutropenic , Bactrim +28    RISK OF GVHD  - Prophylaxis: PtC day +3, +4, , Siro/MMF to start day +5,     CARDIOVASCULAR  #CAD  #Hx of CABG  #HTN   -PTA metoprolol, Lisinopril dose increased 2/2 HTN,  nifidepine ER 30mg BID, labetolol PRN;  #Hyperlipidemia: Holding atorvastatin peritransplant  - Risk of cardiomyopathy:  Baseline EF 50-55%, Global left ventricular function mildly reduced. Grade 1 or early diastolic dysfunction.   - Risk of arrhythmia: Baseline EKG showed NSR, Qtc -425    GI/NUTRITION  #Diarrhea - Known c. Diff, see ID for abx therapy. Continues metamucil and loperamide QID, added lomotil now that he has been treated.      #Oropharyngeal mucositis- resolved.   - Ulcer prophylaxis: Protonix   - VOD Prophylaxis: Ursodiol  - Risk of nausea/vomiting due to chemo: Ativan, Compazine, Zofran   #Moderate malnutrition 2/2 acute on chronic illness- dietician to follow, appetite improving with pain  control.    RENAL/ELECTROLYTES/  #CONNIE   - Likely 2/2 to diuresis. Not anuric. Will hold on diuresis on 2/14 and 2/15. Monitor creatinine daily. He is not tracking I/Os. Weights have been trending down. Will replete with IVF on 2/17    #Hematuria- No dysuria, self resolved  #Hypervolemia 2/2 cytoxan flush - resolved.   #Oliguria 2/9 (Resolved)- output <1L with diuresis and large amounts of IV given. Cr stable and WNL. Patient is having concurrent diarrhea, so this is likely difficult to measure.   -Bladder scan unremarkable   -UA shows proteinuria  #Proteinuria- possibly exacerbated by persistent HTN (See CV),   - Hypocalcemia in setting hypoalbuminemia, iCa2+, corrected WNL   - Electrolyte management: replace per sliding scale    Psych:    #Anxiety: admits to feeling anxious.  Start zyprexa 5mg HS.  Connect w/ SW.  Psychaitry consult added hydroxyzine.  #Insomnia- PTA ambien, Trazadone added and increased to 100mg. - Ambien and trazadone held during cytoxan period due to frequent urination and concern for falls. Discontinued with opioid PCA, melatonin added.     SOCIAL DETERMINANTS  - Caregiver: Family   - Financial/insurance concerns: See SW notes       Known issues that I take into account for medical decisions, with salient changes to the plan considering these complexities noted above.    Patient Active Problem List   Diagnosis    Amegakaryocytic thrombocytopenia (H)    Anemia due to bone marrow failure, unspecified bone marrow failure type (H)    Aplastic anemia (H)     Clinically Significant Risk Factors        # Hypokalemia: Lowest K = 3 mmol/L in last 2 days, will replace as needed     # Hypomagnesemia: Lowest Mg = 1.6 mg/dL in last 2 days, will replace as needed   # Hypoalbuminemia: Lowest albumin = 2 g/dL at 1/22/2024  4:12 AM, will monitor as appropriate     # Thrombocytopenia: Lowest platelets = 40 in last 2 days, will monitor for bleeding  # Acute Kidney Injury, unspecified: based on a >150% or 0.3  "mg/dL increase in last creatinine compared to past 90 day average, will monitor renal function         # Overweight: Estimated body mass index is 28.37 kg/m  as calculated from the following:    Height as of this encounter: 1.725 m (5' 7.91\").    Weight as of this encounter: 84.4 kg (186 lb 1.6 oz).               Dispo: Remain admitted through engraftment  Please call sister Radha with updates for care and discharge    I spent 30 minutes in the care of this patient today, which included time necessary for preparation for the visit, obtaining history, ordering medications/tests/procedures as medically indicated, review of pertinent medical literature, counseling of the patient, communication of recommendations to the care team, and documentation time.    Brenda Brunson PA-C  [phone]  "

## 2024-02-17 NOTE — PROVIDER NOTIFICATION
Provider notified of patient fever of 100.5 and that acetaminophen will be administered and blood cultures will be drawn.    Provider ordered a chest x-ray, UA, and Cefepime

## 2024-02-17 NOTE — PLAN OF CARE
"BP (!) 140/75 (BP Location: Left arm, Cuff Size: Adult Regular)   Pulse 96   Temp 99.9  F (37.7  C) (Axillary)   Resp 16   Ht 1.725 m (5' 7.91\")   Wt 84.4 kg (186 lb 1.6 oz)   SpO2 97%   BMI 28.37 kg/m  . PIV is saline locked. Central line dressing is C/D/I and infusion. Patient is A & O x 4. No c/o pain or nausea or emesis this shift. Patient continue to c/o increase fatigue. Potassium replaced and rechecked with no replacement needed per order. Stool culture and chest x-ray are done. Patient does not like the food in hospital and his family to brings food. Continue to encourage patient to do good mouth cares, cough, deep breath and sit up in the chair. Continue with plan of care and notify MD for status changes.     Problem: Adult Inpatient Plan of Care  Goal: Plan of Care Review  Description: The Plan of Care Review/Shift note should be completed every shift.  The Outcome Evaluation is a brief statement about your assessment that the patient is improving, declining, or no change.  This information will be displayed automatically on your shift  note.  Outcome: Progressing  Flowsheets (Taken 2/17/2024 1312)  Plan of Care Reviewed With: patient  Overall Patient Progress: no change     Problem: Adult Inpatient Plan of Care  Goal: Absence of Hospital-Acquired Illness or Injury  Intervention: Identify and Manage Fall Risk  Recent Flowsheet Documentation  Taken 2/17/2024 0815 by Yvonne Lr RN  Safety Promotion/Fall Prevention:   activity supervised   clutter free environment maintained   increased rounding and observation   increase visualization of patient   lighting adjusted   nonskid shoes/slippers when out of bed   mobility aid in reach   patient and family education     Problem: Adult Inpatient Plan of Care  Goal: Absence of Hospital-Acquired Illness or Injury  Intervention: Prevent Skin Injury  Recent Flowsheet Documentation  Taken 2/17/2024 0815 by Yvonne Lr RN  Body Position:   " position changed independently   foot of bed elevated   heels elevated   legs elevated  Skin Protection: adhesive use limited  Taken 2/17/2024 0808 by Yvonne Lr RN  Body Position: position changed independently     Problem: Adult Inpatient Plan of Care  Goal: Absence of Hospital-Acquired Illness or Injury  Intervention: Prevent and Manage VTE (Venous Thromboembolism) Risk  Recent Flowsheet Documentation  Taken 2/17/2024 0815 by Yvonne Lr RN  VTE Prevention/Management: SCDs (sequential compression devices) off     Problem: Adult Inpatient Plan of Care  Goal: Absence of Hospital-Acquired Illness or Injury  Intervention: Prevent Infection  Recent Flowsheet Documentation  Taken 2/17/2024 0815 by Yvonne Lr RN  Infection Prevention:   environmental surveillance performed   cohorting utilized   equipment surfaces disinfected   hand hygiene promoted   personal protective equipment utilized     Problem: Stem Cell/Bone Marrow Transplant  Goal: Optimal Coping with Transplant  Intervention: Optimize Patient/Family Adjustment to Transplant  Recent Flowsheet Documentation  Taken 2/17/2024 0815 by Yvonne Lr RN  Supportive Measures:   active listening utilized   relaxation techniques promoted     Problem: Stem Cell/Bone Marrow Transplant  Goal: Diarrhea Symptom Control  Intervention: Manage Diarrhea  Recent Flowsheet Documentation  Taken 2/17/2024 0815 by Yvonne Lr RN  Skin Protection: adhesive use limited  Fluid/Electrolyte Management: fluids provided  Perineal Care: perineal hygiene encouraged     Problem: Stem Cell/Bone Marrow Transplant  Goal: Improved Activity Tolerance  Intervention: Promote Improved Energy  Recent Flowsheet Documentation  Taken 2/17/2024 1121 by Yvonne Lr RN  Activity Management:   activity adjusted per tolerance   activity encouraged  Taken 2/17/2024 0815 by Yvonne Lr RN  Fatigue Management:   activity schedule  adjusted   activity assistance provided   fatigue-related activity identified   frequent rest breaks encouraged   paced activity encouraged  Sleep/Rest Enhancement: relaxation techniques promoted  Activity Management:   activity adjusted per tolerance   activity encouraged  Environmental Support:   calm environment promoted   comfort object encouraged   caregiver consistency promoted   distractions minimized   environmental consistency promoted   personal routine supported  Taken 2/17/2024 0808 by Yvonne Lr RN  Activity Management:   activity adjusted per tolerance   activity encouraged     Problem: Stem Cell/Bone Marrow Transplant  Goal: Blood Counts Within Acceptable Range  Intervention: Monitor and Manage Hematologic Symptoms  Recent Flowsheet Documentation  Taken 2/17/2024 0815 by Yvonne Lr RN  Sleep/Rest Enhancement: relaxation techniques promoted  Bleeding Precautions:   blood pressure closely monitored   gentle oral care promoted   monitored for signs of bleeding  Medication Review/Management: medications reviewed     Problem: Stem Cell/Bone Marrow Transplant  Goal: Absence of Infection  Intervention: Prevent and Manage Infection  Recent Flowsheet Documentation  Taken 2/17/2024 0815 by Yvonne Lr RN  Infection Prevention:   environmental surveillance performed   cohorting utilized   equipment surfaces disinfected   hand hygiene promoted   personal protective equipment utilized  Infection Management: aseptic technique maintained  Isolation Precautions: enteric precautions maintained     Problem: Stem Cell/Bone Marrow Transplant  Goal: Absence of Infection  Intervention: Prevent and Manage Infection  Recent Flowsheet Documentation  Taken 2/17/2024 0815 by Yvonne Lr RN  Infection Prevention:   environmental surveillance performed   cohorting utilized   equipment surfaces disinfected   hand hygiene promoted   personal protective equipment utilized  Infection  Management: aseptic technique maintained  Isolation Precautions: enteric precautions maintained     Problem: Stem Cell/Bone Marrow Transplant  Goal: Optimal Nutrition Intake  Intervention: Minimize and Manage Barriers to Oral Intake  Recent Flowsheet Documentation  Taken 2/17/2024 0815 by Yvonne Lr RN  Oral Nutrition Promotion: physical activity promoted     Goal Outcome Evaluation:      Plan of Care Reviewed With: patient    Overall Patient Progress: no changeOverall Patient Progress: no change

## 2024-02-18 LAB
ANION GAP SERPL CALCULATED.3IONS-SCNC: 14 MMOL/L (ref 7–15)
BACTERIA BLD CULT: NO GROWTH
BACTERIA BLD CULT: NO GROWTH
BASOPHILS # BLD AUTO: ABNORMAL 10*3/UL
BASOPHILS # BLD MANUAL: 0 10E3/UL (ref 0–0.2)
BASOPHILS NFR BLD AUTO: ABNORMAL %
BASOPHILS NFR BLD MANUAL: 0 %
BUN SERPL-MCNC: 9.6 MG/DL (ref 6–20)
CALCIUM SERPL-MCNC: 9 MG/DL (ref 8.6–10)
CHLORIDE SERPL-SCNC: 106 MMOL/L (ref 98–107)
CREAT SERPL-MCNC: 1.28 MG/DL (ref 0.67–1.17)
DEPRECATED HCO3 PLAS-SCNC: 21 MMOL/L (ref 22–29)
EGFRCR SERPLBLD CKD-EPI 2021: 68 ML/MIN/1.73M2
EOSINOPHIL # BLD AUTO: ABNORMAL 10*3/UL
EOSINOPHIL # BLD MANUAL: 0 10E3/UL (ref 0–0.7)
EOSINOPHIL NFR BLD AUTO: ABNORMAL %
EOSINOPHIL NFR BLD MANUAL: 0 %
ERYTHROCYTE [DISTWIDTH] IN BLOOD BY AUTOMATED COUNT: 13.2 % (ref 10–15)
GLUCOSE SERPL-MCNC: 105 MG/DL (ref 70–99)
HCT VFR BLD AUTO: 26.5 % (ref 40–53)
HGB BLD-MCNC: 9.3 G/DL (ref 13.3–17.7)
IMM GRANULOCYTES # BLD: ABNORMAL 10*3/UL
IMM GRANULOCYTES NFR BLD: ABNORMAL %
LYMPHOCYTES # BLD AUTO: ABNORMAL 10*3/UL
LYMPHOCYTES # BLD MANUAL: 0 10E3/UL (ref 0.8–5.3)
LYMPHOCYTES NFR BLD AUTO: ABNORMAL %
LYMPHOCYTES NFR BLD MANUAL: 1 %
MAGNESIUM SERPL-MCNC: 1.6 MG/DL (ref 1.7–2.3)
MCH RBC QN AUTO: 29.3 PG (ref 26.5–33)
MCHC RBC AUTO-ENTMCNC: 35.1 G/DL (ref 31.5–36.5)
MCV RBC AUTO: 84 FL (ref 78–100)
METAMYELOCYTES # BLD MANUAL: 0 10E3/UL
METAMYELOCYTES NFR BLD MANUAL: 1 %
MONOCYTES # BLD AUTO: ABNORMAL 10*3/UL
MONOCYTES # BLD MANUAL: 0.4 10E3/UL (ref 0–1.3)
MONOCYTES NFR BLD AUTO: ABNORMAL %
MONOCYTES NFR BLD MANUAL: 11 %
NEUTROPHILS # BLD AUTO: ABNORMAL 10*3/UL
NEUTROPHILS # BLD MANUAL: 2.8 10E3/UL (ref 1.6–8.3)
NEUTROPHILS NFR BLD AUTO: ABNORMAL %
NEUTROPHILS NFR BLD MANUAL: 87 %
NRBC # BLD AUTO: 0 10E3/UL
NRBC BLD AUTO-RTO: 0 /100
PHOSPHATE SERPL-MCNC: 3.9 MG/DL (ref 2.5–4.5)
PLAT MORPH BLD: ABNORMAL
PLATELET # BLD AUTO: 42 10E3/UL (ref 150–450)
PLATELET # BLD AUTO: 45 10E3/UL (ref 150–450)
POTASSIUM SERPL-SCNC: 3 MMOL/L (ref 3.4–5.3)
POTASSIUM SERPL-SCNC: 3.1 MMOL/L (ref 3.4–5.3)
POTASSIUM SERPL-SCNC: 3.7 MMOL/L (ref 3.4–5.3)
RBC # BLD AUTO: 3.17 10E6/UL (ref 4.4–5.9)
RBC MORPH BLD: ABNORMAL
SODIUM SERPL-SCNC: 141 MMOL/L (ref 135–145)
WBC # BLD AUTO: 3.2 10E3/UL (ref 4–11)

## 2024-02-18 PROCEDURE — 84100 ASSAY OF PHOSPHORUS: CPT | Performed by: STUDENT IN AN ORGANIZED HEALTH CARE EDUCATION/TRAINING PROGRAM

## 2024-02-18 PROCEDURE — 80048 BASIC METABOLIC PNL TOTAL CA: CPT | Performed by: PHYSICIAN ASSISTANT

## 2024-02-18 PROCEDURE — 250N000011 HC RX IP 250 OP 636: Performed by: PHYSICIAN ASSISTANT

## 2024-02-18 PROCEDURE — 83520 IMMUNOASSAY QUANT NOS NONAB: CPT | Performed by: PHYSICIAN ASSISTANT

## 2024-02-18 PROCEDURE — 258N000003 HC RX IP 258 OP 636: Performed by: PHYSICIAN ASSISTANT

## 2024-02-18 PROCEDURE — 250N000012 HC RX MED GY IP 250 OP 636 PS 637

## 2024-02-18 PROCEDURE — 84132 ASSAY OF SERUM POTASSIUM: CPT | Performed by: PHYSICIAN ASSISTANT

## 2024-02-18 PROCEDURE — 83735 ASSAY OF MAGNESIUM: CPT | Performed by: STUDENT IN AN ORGANIZED HEALTH CARE EDUCATION/TRAINING PROGRAM

## 2024-02-18 PROCEDURE — 250N000013 HC RX MED GY IP 250 OP 250 PS 637: Performed by: PHYSICIAN ASSISTANT

## 2024-02-18 PROCEDURE — 99233 SBSQ HOSP IP/OBS HIGH 50: CPT | Mod: FS | Performed by: PHYSICIAN ASSISTANT

## 2024-02-18 PROCEDURE — 250N000013 HC RX MED GY IP 250 OP 250 PS 637: Performed by: STUDENT IN AN ORGANIZED HEALTH CARE EDUCATION/TRAINING PROGRAM

## 2024-02-18 PROCEDURE — 84132 ASSAY OF SERUM POTASSIUM: CPT | Performed by: STUDENT IN AN ORGANIZED HEALTH CARE EDUCATION/TRAINING PROGRAM

## 2024-02-18 PROCEDURE — 206N000001 HC R&B BMT UMMC

## 2024-02-18 PROCEDURE — 85027 COMPLETE CBC AUTOMATED: CPT | Performed by: PHYSICIAN ASSISTANT

## 2024-02-18 PROCEDURE — 83993 ASSAY FOR CALPROTECTIN FECAL: CPT | Performed by: PHYSICIAN ASSISTANT

## 2024-02-18 PROCEDURE — 250N000013 HC RX MED GY IP 250 OP 250 PS 637

## 2024-02-18 PROCEDURE — 99418 PROLNG IP/OBS E/M EA 15 MIN: CPT | Performed by: PHYSICIAN ASSISTANT

## 2024-02-18 PROCEDURE — 85007 BL SMEAR W/DIFF WBC COUNT: CPT | Performed by: PHYSICIAN ASSISTANT

## 2024-02-18 PROCEDURE — 85049 AUTOMATED PLATELET COUNT: CPT | Performed by: PHYSICIAN ASSISTANT

## 2024-02-18 PROCEDURE — 250N000012 HC RX MED GY IP 250 OP 636 PS 637: Performed by: PHYSICIAN ASSISTANT

## 2024-02-18 RX ORDER — POTASSIUM CHLORIDE 29.8 MG/ML
20 INJECTION INTRAVENOUS
Status: COMPLETED | OUTPATIENT
Start: 2024-02-18 | End: 2024-02-18

## 2024-02-18 RX ORDER — POTASSIUM CHLORIDE 750 MG/1
40 TABLET, EXTENDED RELEASE ORAL ONCE
Status: COMPLETED | OUTPATIENT
Start: 2024-02-18 | End: 2024-02-18

## 2024-02-18 RX ORDER — MYCOPHENOLATE MOFETIL 500 MG/1
1000 TABLET ORAL 2 TIMES DAILY
Qty: 44 TABLET | Refills: 0 | Status: SHIPPED | OUTPATIENT
Start: 2024-02-18 | End: 2024-02-20

## 2024-02-18 RX ADMIN — POTASSIUM CHLORIDE 20 MEQ: 29.8 INJECTION, SOLUTION INTRAVENOUS at 13:02

## 2024-02-18 RX ADMIN — Medication 5 CAPSULE: at 08:23

## 2024-02-18 RX ADMIN — URSODIOL 300 MG: 300 CAPSULE ORAL at 14:10

## 2024-02-18 RX ADMIN — PANTOPRAZOLE SODIUM 40 MG: 40 TABLET, DELAYED RELEASE ORAL at 08:24

## 2024-02-18 RX ADMIN — MYCOPHENOLATE MOFETIL 250 MG: 250 CAPSULE ORAL at 08:24

## 2024-02-18 RX ADMIN — Medication 2 SPRAY: at 19:54

## 2024-02-18 RX ADMIN — Medication 10 ML: at 03:47

## 2024-02-18 RX ADMIN — ACYCLOVIR 800 MG: 800 TABLET ORAL at 08:24

## 2024-02-18 RX ADMIN — METOPROLOL SUCCINATE 100 MG: 50 TABLET, EXTENDED RELEASE ORAL at 08:24

## 2024-02-18 RX ADMIN — URSODIOL 300 MG: 300 CAPSULE ORAL at 08:24

## 2024-02-18 RX ADMIN — POTASSIUM CHLORIDE 20 MEQ: 29.8 INJECTION, SOLUTION INTRAVENOUS at 14:05

## 2024-02-18 RX ADMIN — Medication 5 ML: at 17:05

## 2024-02-18 RX ADMIN — MICAFUNGIN SODIUM 150 MG: 50 INJECTION, POWDER, LYOPHILIZED, FOR SOLUTION INTRAVENOUS at 08:25

## 2024-02-18 RX ADMIN — MYCOPHENOLATE MOFETIL 1000 MG: 500 TABLET, FILM COATED ORAL at 08:23

## 2024-02-18 RX ADMIN — MYCOPHENOLATE MOFETIL 250 MG: 250 CAPSULE ORAL at 19:51

## 2024-02-18 RX ADMIN — MYCOPHENOLATE MOFETIL 1000 MG: 500 TABLET, FILM COATED ORAL at 19:51

## 2024-02-18 RX ADMIN — SIROLIMUS 3.5 MG: 2 TABLET, FILM COATED ORAL at 08:24

## 2024-02-18 RX ADMIN — APIXABAN 2.5 MG: 2.5 TABLET, FILM COATED ORAL at 08:25

## 2024-02-18 RX ADMIN — Medication 2 SPRAY: at 08:28

## 2024-02-18 RX ADMIN — LISINOPRIL 40 MG: 10 TABLET ORAL at 08:25

## 2024-02-18 RX ADMIN — NIFEDIPINE 30 MG: 30 TABLET, FILM COATED, EXTENDED RELEASE ORAL at 08:24

## 2024-02-18 RX ADMIN — Medication 5 ML: at 10:47

## 2024-02-18 RX ADMIN — POTASSIUM CHLORIDE 40 MEQ: 750 TABLET, EXTENDED RELEASE ORAL at 08:23

## 2024-02-18 RX ADMIN — URSODIOL 300 MG: 300 CAPSULE ORAL at 19:51

## 2024-02-18 RX ADMIN — ACYCLOVIR 800 MG: 800 TABLET ORAL at 19:51

## 2024-02-18 RX ADMIN — NIFEDIPINE 30 MG: 30 TABLET, FILM COATED, EXTENDED RELEASE ORAL at 19:51

## 2024-02-18 RX ADMIN — Medication 2 SPRAY: at 13:03

## 2024-02-18 RX ADMIN — POTASSIUM CHLORIDE 20 MEQ: 29.8 INJECTION, SOLUTION INTRAVENOUS at 15:11

## 2024-02-18 ASSESSMENT — ACTIVITIES OF DAILY LIVING (ADL)
ADLS_ACUITY_SCORE: 22
ADLS_ACUITY_SCORE: 22
ADLS_ACUITY_SCORE: 24
ADLS_ACUITY_SCORE: 22
ADLS_ACUITY_SCORE: 24
ADLS_ACUITY_SCORE: 22
ADLS_ACUITY_SCORE: 24
ADLS_ACUITY_SCORE: 24

## 2024-02-18 NOTE — PROGRESS NOTES
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 Continues to state he is a hot sleeper. Per nursing note he had 1 loose stool overnight, however per patient report he had episodes of diarrhea every single hour. Unclear at this point since he refuses to save his stool to be quantified. He admits they are watery, no significant abdominal cramping. He completed a course of PO Vancomycin and his repeat C. Diff and enteric panel were negative. 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 He has been able to tolerate food, minimal N/V at times. Mucositis in mouth resolved.     Review of Systems    Review of Systems: 10 point ROS negative unless stated in HPI   PHYSICAL EXAM      Weight     Wt Readings from Last 3 Encounters:   02/17/24 84.4 kg (186 lb 1.6 oz)   01/17/24 90.1 kg (198 lb 9.6 oz)   01/12/24 88.9 kg (196 lb)        KPS: 60    BP (!) 151/79 (BP Location: Left arm)   Pulse 98   Temp 98.7  F (37.1  C) (Axillary)   Resp 16   Ht 1.725 m (5' 7.91\")   Wt 84.4 kg (186 lb 1.6 oz)   SpO2 96%   BMI 28.37 kg/m       General: NAD,  Lungs: CTA  Cardiovascular: RRR, no murmur rub or gallops   Lymphatics: Bilateral lower extremity edema 2+   Abdomen: non tender, soft, BS+  Skin: rash on chest/back resolved  Neuro: A&Ox3  Additional Findings: Powell site NT, no drainage.    Current aGVHD staging:  Skin 0, UGI 0, LGI 0, Liver 0 (keep in note through day +180 for allos)      LABS AND IMAGING: I have assessed all abnormal lab values for their clinical " significance and any values considered clinically significant have been addressed in the assessment and plan.        Lab Results   Component Value Date    WBC 3.2 (L) 02/18/2024    ANEUTAUTO 2.4 02/15/2024    HGB 9.3 (L) 02/18/2024    HCT 26.5 (L) 02/18/2024    PLT 45 (LL) 02/18/2024     02/18/2024    POTASSIUM 3.1 (L) 02/18/2024    CHLORIDE 106 02/18/2024    CO2 21 (L) 02/18/2024     (H) 02/18/2024    BUN 9.6 02/18/2024    CR 1.28 (H) 02/18/2024    MAG 1.6 (L) 02/18/2024    INR 1.12 02/12/2024         SYSTEMS-BASED ASSESSMENT AND PLAN     Ziggy Hallman is a 48 year old year old male with a PMH of MDS, hx of multiple clots and MI undergoing a MA (Bu/Flu) prep for an 8/8 URD PBSCT day 24. Stay has been complicated by mucositis requiring PCA, N/F, Staph Epi bacteremia, pulmonary embolism requiring hep gtt.    BMT/IEC PROTOCOL for 2015-29  Chemo Prep:   Day -6: Admit  Day -5 through Day -2: Busulfan/Fludarabine  Day -1: Rest.   Keppra prophylaxis      8/8 DP permissive. Cell dose-9.24x10^6  ABO: Donor: O+ Recipient A-  (Minor incompatability, no flush required)   GSCF plan: GCSF to start day +5 until ANC >1500 for 3 consecutive days- stopped on 2/17  Restaging plan: Was going to discharge on 2/18 with outpatient BmBx, please discuss on 2/19 as he may need this inpatient since he is now getting worked up for GVHD.     HEME/COAG  #SEGMENTAL R PE (2/6): Low intensity hep gtt with platelet goal <50k with post platelet checks. See below      #APS work up- lupus antigen +, will follow outpt as may be unreliable in this acute setting and prior APS work up had been unremarkable prior to admission.    #Pancytopenia 2/2 chemo- anemia, leukopenia, thrombocytopenia  #Transfusion parameters: hemoglobin <8 (cardiac hx), platelets <30k     #Recurrent arterial thromboembolic events:  He has long history of various events including renal infarcts (2011, 2013, 2015), left popliteal embolic episode requiring surgery,  anticoagulation (2011), right carotid artery embolic episode with TIA/stroke-like symptoms (2012), embolic MI (2015), gangrenous right toe requiring vascular surgery/amputation (2017), 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   Extensive hypercoagulable workup to date has been unrevealing.  JAK2 testing negative.  PNH testing negative.  Elevated IgA of unknown significance, no evidence of plasma cell disorder.  - Eliquis and Prasugrel HELD starting 2/1-2/16 . Restarted Low dose Apixiban on 2/16 (2.5mg BID) as platelets stably above 30k. Will plan to increase to 5mg BID and re-add prasugrel once his platelets are stable above 50k. Will need to be followed closely in clinic.  - 2/18 PM Dose of Apixiban and 2/19 AM dose HELD for pending GI procedure tomorrow. PLEASE discuss during rounds and ensure this gets restarted post-procedure.        IMMUNOCOMPROMISED  #Nonneutropenic fever  #Diarrhea  - Respiked fever 100.5 on 2/17. Restarted cefepime 2/17-x. Monitoring cultures. Ordered , adenovirus, HHV6, EBV - pending.  His repeat c. Diff and enteric panel negative.   - Added AREG, fecal calprotectin and consulted GI luminal for eval for flex sig to further evaluate for GVHD      #Staph epi bacteremia - Resolved. Received IV vancomycin.   #Febrile neutropenia, fever resolved. See previous notes for abx therapy.   #C. Diff - Admit c. Diff triple+ - Start PO vanc 1/21 for 14 day course. - completed on 2/4, with broadening abx restart PO Vanco tx dose x 10 days. Given repeat fever on 2/17, repeat c. Diff testing, consider fidoxamicin.     -Prophylaxis plan: ACV LD, Megan HD, Levaquin while neutropenic , Bactrim +28    RISK OF GVHD  - Prophylaxis: PtC day +3, +4, , Siro/MMF to start day +5, level 6.5 on 2/16. Repeat level on 2/19     #Diarrhea  Has had ongoing diarrhea since early admission. He was treated for c. Diff for 14 days. He failed anti-diarrheal medicine. Unclear  exactly how much diarrhea he is having as reports to nursing are different than reports to providers. He is not saving his stool. He will state he had 20+ episodes of diarrhea, small amounts. Overnight on 2/18 reported by RN only 1 loose stool, however Ziggy endorses diarrhea every hour. No improvement with metamucil, loperamide or lomotil  - Nursing to quanitfy how many times he is going if he will not save it, attempt to be as accurate as possible  - Sent AREG and fecal calprotectn on 2/18   - GI Luminal consult placed on 2/18. NPO at midnight in chance of scopes tomorrow. - Did not prep yet*    CARDIOVASCULAR  #CAD  #Hx of CABG  #HTN   -PTA metoprolol, Lisinopril dose increased 2/2 HTN,  nifidepine ER 30mg BID, labetolol PRN;  #Hyperlipidemia: Holding atorvastatin peritransplant  - Risk of cardiomyopathy:  Baseline EF 50-55%, Global left ventricular function mildly reduced. Grade 1 or early diastolic dysfunction.   - Risk of arrhythmia: Baseline EKG showed NSR, Qtc -425    GI/NUTRITION  #Diarrhea - Known c. Diff, see ID for abx therapy. Continues metamucil and loperamide QID, added lomotil now that he has been treated.  - See GVHD/ID     #Oropharyngeal mucositis- resolved.   - Ulcer prophylaxis: Protonix   - VOD Prophylaxis: Ursodiol  - Risk of nausea/vomiting due to chemo: Ativan, Compazine prn   #Moderate malnutrition 2/2 acute on chronic illness- dietician to follow, appetite improving with pain control.    RENAL/ELECTROLYTES/  #CONNIE   - Likely 2/2 to diuresis. Not anuric. Will hold on diuresis on 2/14 and 2/15. Monitor creatinine daily. He is not tracking I/Os. Weights have been trending down. Will replete with IVF on 2/17    #Hematuria- No dysuria, self resolved  #Hypervolemia 2/2 cytoxan flush - resolved.   #Oliguria 2/9 (Resolved)- output <1L with diuresis and large amounts of IV given. Cr stable and WNL. Patient is having concurrent diarrhea, so this is likely difficult to measure.   -Bladder scan  "unremarkable   -UA shows proteinuria  #Proteinuria- possibly exacerbated by persistent HTN (See CV),   - Hypocalcemia in setting hypoalbuminemia, iCa2+, corrected WNL   - Electrolyte management: replace per sliding scale    Psych:    #Anxiety: admits to feeling anxious.  Start zyprexa 5mg HS.  Connect w/ SW.  Psychaitry consult added hydroxyzine.  #Insomnia- PTA ambien, Trazadone added and increased to 100mg. - Ambien and trazadone held during cytoxan period due to frequent urination and concern for falls. Discontinued with opioid PCA, melatonin added.     SOCIAL DETERMINANTS  - Caregiver: Family   - Financial/insurance concerns: See SW notes       Known issues that I take into account for medical decisions, with salient changes to the plan considering these complexities noted above.    Patient Active Problem List   Diagnosis    Amegakaryocytic thrombocytopenia (H)    Anemia due to bone marrow failure, unspecified bone marrow failure type (H)    Aplastic anemia (H)     Clinically Significant Risk Factors        # Hypokalemia: Lowest K = 3 mmol/L in last 2 days, will replace as needed     # Hypomagnesemia: Lowest Mg = 1.6 mg/dL in last 2 days, will replace as needed   # Hypoalbuminemia: Lowest albumin = 2 g/dL at 1/22/2024  4:12 AM, will monitor as appropriate     # Thrombocytopenia: Lowest platelets = 45 in last 2 days, will monitor for bleeding  # Acute Kidney Injury, unspecified: based on a >150% or 0.3 mg/dL increase in last creatinine compared to past 90 day average, will monitor renal function         # Overweight: Estimated body mass index is 28.37 kg/m  as calculated from the following:    Height as of this encounter: 1.725 m (5' 7.91\").    Weight as of this encounter: 84.4 kg (186 lb 1.6 oz).               Dispo: Remain admitted through engraftment  Please call sister Radha with updates for care and discharge    I spent 30 minutes in the care of this patient today, which included time necessary for " preparation for the visit, obtaining history, ordering medications/tests/procedures as medically indicated, review of pertinent medical literature, counseling of the patient, communication of recommendations to the care team, and documentation time.    Brenda Brunson PA-C  [phone]

## 2024-02-18 NOTE — PLAN OF CARE
"BP (!) 151/79 (BP Location: Left arm)   Pulse 98   Temp 98.7  F (37.1  C) (Axillary)   Resp 16   Ht 1.725 m (5' 7.91\")   Wt 84.4 kg (186 lb 1.6 oz)   SpO2 96%   BMI 28.37 kg/m       Patient afebrile T-max: 100.2, hypertensive but within parameters, other vital signs stable. No reports of pain or nausea. Patient had 1 loose stool overnight. Patient assist level independent. Patient will be receiving oral potassium this morning at  0800. Possible discharge today. Continue with plan of care.    Goal Outcome Evaluation:  Problem: Adult Inpatient Plan of Care  Goal: Optimal Comfort and Wellbeing  Outcome: Progressing     Problem: Stem Cell/Bone Marrow Transplant  Goal: Optimal Coping with Transplant  Outcome: Progressing  Goal: Symptom-Free Urinary Elimination  Outcome: Progressing  Goal: Improved Activity Tolerance  Outcome: Progressing  Intervention: Promote Improved Energy  Recent Flowsheet Documentation  Taken 2/18/2024 0400 by Misael May RN  Activity Management:   activity adjusted per tolerance   activity encouraged  Taken 2/18/2024 0000 by Misael May RN  Activity Management:   activity adjusted per tolerance   activity encouraged  Taken 2/17/2024 2000 by Misael May RN  Activity Management:   activity adjusted per tolerance   activity encouraged  Environmental Support:   calm environment promoted   comfort object encouraged   caregiver consistency promoted   distractions minimized   environmental consistency promoted   personal routine supported  Goal: Blood Counts Within Acceptable Range  Outcome: Progressing  Intervention: Monitor and Manage Hematologic Symptoms  Recent Flowsheet Documentation  Taken 2/18/2024 0400 by Misael May RN  Bleeding Precautions: blood pressure closely monitored  Medication Review/Management: medications reviewed  Taken 2/18/2024 0000 by Misael Mya RN  Bleeding Precautions: blood pressure closely monitored  Medication Review/Management: " medications reviewed  Taken 2/17/2024 2000 by Misael May, RN  Bleeding Precautions: blood pressure closely monitored  Medication Review/Management: medications reviewed  Goal: Absence of Hypersensitivity Reaction  Outcome: Progressing  Goal: Improved Oral Mucous Membrane Health and Integrity  Outcome: Progressing  Goal: Nausea and Vomiting Symptom Relief  Outcome: Progressing

## 2024-02-18 NOTE — PLAN OF CARE
"Goal Outcome Evaluation:      Plan of Care Reviewed With: patient     Blood pressure 133/82, pulse 92, temperature 98.2  F (36.8  C), temperature source Axillary, resp. rate 18, height 1.725 m (5' 7.91\"), weight 82.6 kg (182 lb 3.2 oz), SpO2 99%.    Patient remains afebrile,slightly hypertensive, other vitals stable, patient on R.A. remain in bed most of the day. Patient not eating, states \"I don't eat hospital food\", did not order any meals all day. Patient with persistent green  watery diarrhea,  agreed to save the stools, had almost 1L today- see the I &O flow sheet for output. Patient denies pain. Potassium low this am, replaced orally- recheck low, replaced intravenously, recheck result pending. Patient is NPO after midnight, will have enema in am then GI will perform scope to evaluate for gut GVHD,  patient is aware, continue with plan of care, report changes to provider.      "

## 2024-02-18 NOTE — CONSULTS
[facility]  GASTROENTEROLOGY CONSULTATION      Date of Admission:     1/19/2024  Requesting physician: Carlos Mayo*             Reason for Consultation:   We were asked by Dr. 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 Diff infection and ongoing diarrhea. GI consulted for EGD and flex sig to evaluate for GVHD.     # Diarrhea  # Hypokalemia   # Recent C. Diff infection  # S/p BMT   Unclear timing of when diarrhea started but reporting 15 watery stools per day. C. Diff during admission but recent enteric workup negative. Concern for MMF side effect vs GVHD. Oncology team requesting EGD and flexible sigmoidoscopy for biopsies.       Recommendations  - NPO at midnight  - hold anticoagulation  - EGD and flexible sigmoidoscopy tomorrow with biopsies   - Enemas ordered - timing to be coordinated by endoscopy staff tomorrow.     Thank you for involving us in this patient's care. Please do not hesitate to contact the GI service with any questions or concerns.     Pt care plan discussed with Dr. Silva, GI staff physician.    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 Diff infection and ongoing diarrhea. GI consulted for EGD and flex sig to evaluate for GVHD.     Patient reports 15 watery stools per day since admission (prior to transplant) which did not improve with C. Diff treatment. No nausea, vomiting, abdominal pain. Not eating well be because  "the hospital food is bad. No blood in the stool. Unclear how much stool output he is actually having because he doesn't want it collected. Some nursing notes indicate one stool per shift.           Past Medical History:   Reviewed and edited as appropriate  Past Medical History:   Diagnosis Date    AMI anterior wall (H)     Mid LAD on cath with stent    CAD S/P percutaneous coronary angioplasty 07/01/2016    Unstable agina with anterior myocardial damage reported without mycardial damage by patient's history    CVA (cerebrovascular accident) (H) 01/01/2012    Reporting thromboembolic episode on the right neck. Pt states \"I've had ten strokes.\"    Hypercoagulable state (H24)     Uncertain etiology--seeing Hematologist    MEDICAL HISTORY OF -     Possibly PFO            Past Surgical History:   Reviewed and edited as appropriate   Past Surgical History:   Procedure Laterality Date    AMPUTATE TOE(S)      ARTHROSCOPY KNEE RT/LT      Right     ARTHROSCOPY KNEE RT/LT      Left    CARDIAC CATHERIZATION      With stent placement    IR CHEST PORT PLACEMENT > 5 YRS OF AGE  9/13/2023    IR CVC TUNNEL PLACEMENT > 5 YRS OF AGE  1/19/2024              Allergies:   Reviewed and edited as appropriate     Allergies   Allergen Reactions    Blood Transfusion Related (Informational Only) Other (See Comments)     Give O RBC's and WBC's only per Cell Therapy            Medications:     Medications Prior to Admission   Medication Sig Dispense Refill Last Dose    [DISCONTINUED] amLODIPine (NORVASC) 5 MG tablet Take 1 tablet by mouth daily   1/18/2024    [DISCONTINUED] apixaban ANTICOAGULANT (ELIQUIS) 5 MG tablet    1/18/2024    [DISCONTINUED] atorvastatin (LIPITOR) 40 MG tablet Take 40 mg by mouth daily   1/18/2024    [DISCONTINUED] hydrOXYzine HCl (ATARAX) 25 MG tablet Take 1 tablet (25 mg) by mouth 3 times daily as needed for itching 20 tablet 0 Past Week    [DISCONTINUED] lisinopril-hydrochlorothiazide (ZESTORETIC) 20-25 MG tablet Take " "1 tablet by mouth daily at 2 pm   1/18/2024    [DISCONTINUED] metoprolol succinate ER (TOPROL XL) 100 MG 24 hr tablet Take 100 mg by mouth daily   1/18/2024    [DISCONTINUED] ondansetron (ZOFRAN) 8 MG tablet Take 1 tablet (8 mg) by mouth every 8 hours as needed for nausea 20 tablet 1 More than a month    [DISCONTINUED] prasugrel (EFFIENT) 10 MG TABS tablet TK 1 T PO QAM  2 1/18/2024    [DISCONTINUED] Zolpidem Tartrate (AMBIEN PO) Take by mouth as needed for sleep   More than a month             Review of Systems:   A complete 10 point review of systems was obtained.  Please see the HPI for pertinent positives and negatives.    All other systems were reviewed and were found to be negative.            Physical Exam:   /74 (BP Location: Right arm, Cuff Size: Adult Large)   Pulse 99   Temp 98  F (36.7  C) (Axillary)   Resp 18   Ht 1.725 m (5' 7.91\")   Wt 82.6 kg (182 lb 3.2 oz)   SpO2 97%   BMI 27.77 kg/m    Wt:   Wt Readings from Last 2 Encounters:   02/18/24 82.6 kg (182 lb 3.2 oz)   01/17/24 90.1 kg (198 lb 9.6 oz)      Constitutional:no acute distress   Eyes: Sclera anicteric/injected  Ears/nose/mouth/throat: Normal oropharynx without ulcers or exudate, mucus membranes moist, hearing intact  CV: No edema  Respiratory: Unlabored breathing  Abd:  Nondistended,  nontender, no peritoneal signs  Skin: warm, perfused, no jaundice  Neuro: AAO x 3, No asterixis  Psych: Normal affect  MSK: No gross deformities         Data:   Labs and imaging below were independently reviewed and interpreted    BMP  Recent Labs   Lab 02/18/24  0347 02/17/24  1125 02/17/24  0421 02/16/24  1154 02/16/24  0415 02/15/24  0438     --  143  --  144 144   POTASSIUM 3.1* 3.7 3.0* 3.8 3.0* 3.6  3.6   CHLORIDE 106  --  109*  --  111* 111*   REMY 9.0  --  8.7  --  8.5* 8.7   CO2 21*  --  22  --  22 22   BUN 9.6  --  7.1  --  5.4* 4.9*   CR 1.28*  --  1.30*  --  1.26* 1.20*   *  --  97  --  99 103*     CBC  Recent Labs   Lab " 02/18/24  0347 02/17/24  0421 02/16/24 0415 02/15/24  0438   WBC 3.2* 5.9 3.6* 2.8*   RBC 3.17* 3.11* 2.69* 2.78*   HGB 9.3* 9.2* 8.0* 8.5*   HCT 26.5* 26.3* 23.2* 24.3*   MCV 84 85 86 87   MCH 29.3 29.6 29.7 30.6   MCHC 35.1 35.0 34.5 35.0   RDW 13.2 13.4 13.2 13.1   PLT 45* 51* 40* 52*     INR  Recent Labs   Lab 02/12/24  0336   INR 1.12     LFTs  Recent Labs   Lab 02/16/24  0415 02/15/24  0438 02/12/24  0336   ALKPHOS 87 86 72   AST 29 27 26   ALT 14 14 14   BILITOTAL 0.3 0.3 0.3   PROTTOTAL 5.9* 6.0* 5.9*   ALBUMIN 2.8* 2.9* 2.8*

## 2024-02-18 NOTE — PLAN OF CARE
Goal Outcome Evaluation:  Care from 3:30 pm to 7 pm  A&Ox4. B/P runs high; within parameter. Denied pain.  Denied N/V. Cdiff positive; pt is on vanco. Watery stool x1 this evening.Up ad amna to bathroom.Pt stated that he will take a shower this evening. Continue with POC

## 2024-02-19 LAB
ALBUMIN SERPL BCG-MCNC: 3.2 G/DL (ref 3.5–5.2)
ALP SERPL-CCNC: 101 U/L (ref 40–150)
ALT SERPL W P-5'-P-CCNC: 18 U/L (ref 0–70)
AMPHIREGULIN (AREG) PLASMA: 6 PG/ML
ANION GAP SERPL CALCULATED.3IONS-SCNC: 13 MMOL/L (ref 7–15)
AST SERPL W P-5'-P-CCNC: 44 U/L (ref 0–45)
BACTERIA BLD CULT: NO GROWTH
BACTERIA BLD CULT: NO GROWTH
BASOPHILS # BLD AUTO: ABNORMAL 10*3/UL
BASOPHILS # BLD MANUAL: 0 10E3/UL (ref 0–0.2)
BASOPHILS NFR BLD AUTO: ABNORMAL %
BASOPHILS NFR BLD MANUAL: 1 %
BILIRUB DIRECT SERPL-MCNC: <0.2 MG/DL (ref 0–0.3)
BILIRUB SERPL-MCNC: 0.4 MG/DL
BUN SERPL-MCNC: 11.7 MG/DL (ref 6–20)
CALCIUM SERPL-MCNC: 9.2 MG/DL (ref 8.6–10)
CALPROTECTIN STL-MCNT: 12.4 MG/KG (ref 0–49.9)
CHLORIDE SERPL-SCNC: 105 MMOL/L (ref 98–107)
CREAT SERPL-MCNC: 1.21 MG/DL (ref 0.67–1.17)
DEPRECATED HCO3 PLAS-SCNC: 21 MMOL/L (ref 22–29)
EBV DNA # SPEC NAA+PROBE: NOT DETECTED COPIES/ML
EGFRCR SERPLBLD CKD-EPI 2021: 73 ML/MIN/1.73M2
EOSINOPHIL # BLD AUTO: ABNORMAL 10*3/UL
EOSINOPHIL # BLD MANUAL: 0 10E3/UL (ref 0–0.7)
EOSINOPHIL NFR BLD AUTO: ABNORMAL %
EOSINOPHIL NFR BLD MANUAL: 0 %
ERYTHROCYTE [DISTWIDTH] IN BLOOD BY AUTOMATED COUNT: 13.1 % (ref 10–15)
FLEXIBLE SIGMOIDOSCOPY: NORMAL
GLUCOSE SERPL-MCNC: 103 MG/DL (ref 70–99)
GRAM STAIN RESULT: NORMAL
HADV AG STL QL IA: NEGATIVE
HADV DNA # SPEC NAA+PROBE: NOT DETECTED COPIES/ML
HCT VFR BLD AUTO: 28.3 % (ref 40–53)
HGB BLD-MCNC: 10.2 G/DL (ref 13.3–17.7)
IMM GRANULOCYTES # BLD: ABNORMAL 10*3/UL
IMM GRANULOCYTES NFR BLD: ABNORMAL %
INR PPP: 1.35 (ref 0.85–1.15)
LYMPHOCYTES # BLD AUTO: ABNORMAL 10*3/UL
LYMPHOCYTES # BLD MANUAL: 0.1 10E3/UL (ref 0.8–5.3)
LYMPHOCYTES NFR BLD AUTO: ABNORMAL %
LYMPHOCYTES NFR BLD MANUAL: 5 %
MAGNESIUM SERPL-MCNC: 1.7 MG/DL (ref 1.7–2.3)
MCH RBC QN AUTO: 30.3 PG (ref 26.5–33)
MCHC RBC AUTO-ENTMCNC: 36 G/DL (ref 31.5–36.5)
MCV RBC AUTO: 84 FL (ref 78–100)
METAMYELOCYTES # BLD MANUAL: 0.1 10E3/UL
METAMYELOCYTES NFR BLD MANUAL: 3 %
MONOCYTES # BLD AUTO: ABNORMAL 10*3/UL
MONOCYTES # BLD MANUAL: 0.3 10E3/UL (ref 0–1.3)
MONOCYTES NFR BLD AUTO: ABNORMAL %
MONOCYTES NFR BLD MANUAL: 14 %
MYCOPHENOLATE SERPL LC/MS/MS-MCNC: 0.67 MG/L (ref 1–3.5)
MYCOPHENOLATE-G SERPL LC/MS/MS-MCNC: 36.8 MG/L (ref 30–95)
MYELOCYTES # BLD MANUAL: 0 10E3/UL
MYELOCYTES NFR BLD MANUAL: 2 %
NEUTROPHILS # BLD AUTO: ABNORMAL 10*3/UL
NEUTROPHILS # BLD MANUAL: 1.5 10E3/UL (ref 1.6–8.3)
NEUTROPHILS NFR BLD AUTO: ABNORMAL %
NEUTROPHILS NFR BLD MANUAL: 75 %
NRBC # BLD AUTO: 0 10E3/UL
NRBC BLD AUTO-RTO: 0 /100
PHOSPHATE SERPL-MCNC: 3.5 MG/DL (ref 2.5–4.5)
PLAT MORPH BLD: ABNORMAL
PLATELET # BLD AUTO: 48 10E3/UL (ref 150–450)
POTASSIUM SERPL-SCNC: 3.2 MMOL/L (ref 3.4–5.3)
POTASSIUM SERPL-SCNC: 3.7 MMOL/L (ref 3.4–5.3)
PROT SERPL-MCNC: 6.7 G/DL (ref 6.4–8.3)
RBC # BLD AUTO: 3.37 10E6/UL (ref 4.4–5.9)
RBC MORPH BLD: ABNORMAL
SODIUM SERPL-SCNC: 139 MMOL/L (ref 135–145)
TME LAST DOSE: ABNORMAL H
TME LAST DOSE: ABNORMAL H
TOXIC GRANULES BLD QL SMEAR: PRESENT
UPPER GI ENDOSCOPY: NORMAL
WBC # BLD AUTO: 2 10E3/UL (ref 4–11)

## 2024-02-19 PROCEDURE — 84132 ASSAY OF SERUM POTASSIUM: CPT | Performed by: STUDENT IN AN ORGANIZED HEALTH CARE EDUCATION/TRAINING PROGRAM

## 2024-02-19 PROCEDURE — 85610 PROTHROMBIN TIME: CPT | Performed by: PHYSICIAN ASSISTANT

## 2024-02-19 PROCEDURE — 250N000011 HC RX IP 250 OP 636: Mod: JZ | Performed by: PHYSICIAN ASSISTANT

## 2024-02-19 PROCEDURE — 85027 COMPLETE CBC AUTOMATED: CPT | Performed by: PHYSICIAN ASSISTANT

## 2024-02-19 PROCEDURE — 250N000011 HC RX IP 250 OP 636: Performed by: STUDENT IN AN ORGANIZED HEALTH CARE EDUCATION/TRAINING PROGRAM

## 2024-02-19 PROCEDURE — 85007 BL SMEAR W/DIFF WBC COUNT: CPT | Performed by: PHYSICIAN ASSISTANT

## 2024-02-19 PROCEDURE — 80053 COMPREHEN METABOLIC PANEL: CPT | Performed by: PHYSICIAN ASSISTANT

## 2024-02-19 PROCEDURE — 250N000013 HC RX MED GY IP 250 OP 250 PS 637

## 2024-02-19 PROCEDURE — 43239 EGD BIOPSY SINGLE/MULTIPLE: CPT | Performed by: INTERNAL MEDICINE

## 2024-02-19 PROCEDURE — 258N000003 HC RX IP 258 OP 636: Performed by: PHYSICIAN ASSISTANT

## 2024-02-19 PROCEDURE — 0DBE8ZX EXCISION OF LARGE INTESTINE, VIA NATURAL OR ARTIFICIAL OPENING ENDOSCOPIC, DIAGNOSTIC: ICD-10-PCS | Performed by: INTERNAL MEDICINE

## 2024-02-19 PROCEDURE — 45331 SIGMOIDOSCOPY AND BIOPSY: CPT | Performed by: INTERNAL MEDICINE

## 2024-02-19 PROCEDURE — 0DB68ZX EXCISION OF STOMACH, VIA NATURAL OR ARTIFICIAL OPENING ENDOSCOPIC, DIAGNOSTIC: ICD-10-PCS | Performed by: INTERNAL MEDICINE

## 2024-02-19 PROCEDURE — 88341 IMHCHEM/IMCYTCHM EA ADD ANTB: CPT | Mod: 26 | Performed by: PATHOLOGY

## 2024-02-19 PROCEDURE — 250N000012 HC RX MED GY IP 250 OP 636 PS 637

## 2024-02-19 PROCEDURE — 88342 IMHCHEM/IMCYTCHM 1ST ANTB: CPT | Mod: TC | Performed by: INTERNAL MEDICINE

## 2024-02-19 PROCEDURE — 82248 BILIRUBIN DIRECT: CPT | Performed by: PHYSICIAN ASSISTANT

## 2024-02-19 PROCEDURE — 250N000011 HC RX IP 250 OP 636: Performed by: PHYSICIAN ASSISTANT

## 2024-02-19 PROCEDURE — 0DBP8ZX EXCISION OF RECTUM, VIA NATURAL OR ARTIFICIAL OPENING ENDOSCOPIC, DIAGNOSTIC: ICD-10-PCS | Performed by: INTERNAL MEDICINE

## 2024-02-19 PROCEDURE — 0DB58ZX EXCISION OF ESOPHAGUS, VIA NATURAL OR ARTIFICIAL OPENING ENDOSCOPIC, DIAGNOSTIC: ICD-10-PCS | Performed by: INTERNAL MEDICINE

## 2024-02-19 PROCEDURE — 250N000011 HC RX IP 250 OP 636: Performed by: INTERNAL MEDICINE

## 2024-02-19 PROCEDURE — 88342 IMHCHEM/IMCYTCHM 1ST ANTB: CPT | Mod: 26 | Performed by: PATHOLOGY

## 2024-02-19 PROCEDURE — 206N000001 HC R&B BMT UMMC

## 2024-02-19 PROCEDURE — 88305 TISSUE EXAM BY PATHOLOGIST: CPT | Mod: 26 | Performed by: PATHOLOGY

## 2024-02-19 PROCEDURE — 84100 ASSAY OF PHOSPHORUS: CPT | Performed by: PHYSICIAN ASSISTANT

## 2024-02-19 PROCEDURE — 250N000012 HC RX MED GY IP 250 OP 636 PS 637: Performed by: PHYSICIAN ASSISTANT

## 2024-02-19 PROCEDURE — G0500 MOD SEDAT ENDO SERVICE >5YRS: HCPCS | Performed by: INTERNAL MEDICINE

## 2024-02-19 PROCEDURE — 0DB98ZX EXCISION OF DUODENUM, VIA NATURAL OR ARTIFICIAL OPENING ENDOSCOPIC, DIAGNOSTIC: ICD-10-PCS | Performed by: INTERNAL MEDICINE

## 2024-02-19 PROCEDURE — 83735 ASSAY OF MAGNESIUM: CPT | Performed by: PHYSICIAN ASSISTANT

## 2024-02-19 PROCEDURE — 250N000013 HC RX MED GY IP 250 OP 250 PS 637: Performed by: PHYSICIAN ASSISTANT

## 2024-02-19 RX ORDER — LOPERAMIDE HCL 2 MG
4 CAPSULE ORAL 4 TIMES DAILY
Status: DISCONTINUED | OUTPATIENT
Start: 2024-02-19 | End: 2024-02-20 | Stop reason: HOSPADM

## 2024-02-19 RX ORDER — CEFTRIAXONE 2 G/1
2 INJECTION, POWDER, FOR SOLUTION INTRAMUSCULAR; INTRAVENOUS
Status: DISCONTINUED | OUTPATIENT
Start: 2024-02-19 | End: 2024-02-20

## 2024-02-19 RX ORDER — FENTANYL CITRATE 50 UG/ML
INJECTION, SOLUTION INTRAMUSCULAR; INTRAVENOUS PRN
Status: DISCONTINUED | OUTPATIENT
Start: 2024-02-19 | End: 2024-02-20

## 2024-02-19 RX ORDER — DIPHENOXYLATE HCL/ATROPINE 2.5-.025MG
1 TABLET ORAL 4 TIMES DAILY PRN
Status: DISCONTINUED | OUTPATIENT
Start: 2024-02-19 | End: 2024-02-20 | Stop reason: HOSPADM

## 2024-02-19 RX ORDER — PHYTONADIONE 5 MG/1
5 TABLET ORAL ONCE
Status: DISCONTINUED | OUTPATIENT
Start: 2024-02-19 | End: 2024-02-19

## 2024-02-19 RX ORDER — POTASSIUM CHLORIDE 29.8 MG/ML
20 INJECTION INTRAVENOUS
Status: COMPLETED | OUTPATIENT
Start: 2024-02-19 | End: 2024-02-19

## 2024-02-19 RX ADMIN — Medication 2 SPRAY: at 12:14

## 2024-02-19 RX ADMIN — Medication 10 ML: at 04:47

## 2024-02-19 RX ADMIN — NIFEDIPINE 30 MG: 30 TABLET, FILM COATED, EXTENDED RELEASE ORAL at 21:16

## 2024-02-19 RX ADMIN — LOPERAMIDE HYDROCHLORIDE 4 MG: 2 CAPSULE ORAL at 21:16

## 2024-02-19 RX ADMIN — MYCOPHENOLATE MOFETIL 1000 MG: 500 TABLET, FILM COATED ORAL at 21:16

## 2024-02-19 RX ADMIN — Medication 2 SPRAY: at 21:19

## 2024-02-19 RX ADMIN — LOPERAMIDE HYDROCHLORIDE 4 MG: 2 CAPSULE ORAL at 08:28

## 2024-02-19 RX ADMIN — NIFEDIPINE 30 MG: 30 TABLET, FILM COATED, EXTENDED RELEASE ORAL at 07:57

## 2024-02-19 RX ADMIN — METOPROLOL SUCCINATE 100 MG: 50 TABLET, EXTENDED RELEASE ORAL at 07:56

## 2024-02-19 RX ADMIN — Medication 2 SPRAY: at 16:19

## 2024-02-19 RX ADMIN — MYCOPHENOLATE MOFETIL 250 MG: 250 CAPSULE ORAL at 07:57

## 2024-02-19 RX ADMIN — LISINOPRIL 40 MG: 10 TABLET ORAL at 07:56

## 2024-02-19 RX ADMIN — POTASSIUM CHLORIDE 20 MEQ: 29.8 INJECTION, SOLUTION INTRAVENOUS at 06:22

## 2024-02-19 RX ADMIN — URSODIOL 300 MG: 300 CAPSULE ORAL at 07:57

## 2024-02-19 RX ADMIN — URSODIOL 300 MG: 300 CAPSULE ORAL at 21:16

## 2024-02-19 RX ADMIN — URSODIOL 300 MG: 300 CAPSULE ORAL at 14:10

## 2024-02-19 RX ADMIN — ACYCLOVIR 800 MG: 800 TABLET ORAL at 07:57

## 2024-02-19 RX ADMIN — LOPERAMIDE HYDROCHLORIDE 4 MG: 2 CAPSULE ORAL at 16:19

## 2024-02-19 RX ADMIN — PANTOPRAZOLE SODIUM 40 MG: 40 TABLET, DELAYED RELEASE ORAL at 07:57

## 2024-02-19 RX ADMIN — MYCOPHENOLATE MOFETIL 1000 MG: 500 TABLET, FILM COATED ORAL at 07:57

## 2024-02-19 RX ADMIN — Medication 5 CAPSULE: at 07:56

## 2024-02-19 RX ADMIN — Medication 2 SPRAY: at 07:53

## 2024-02-19 RX ADMIN — MICAFUNGIN SODIUM 150 MG: 50 INJECTION, POWDER, LYOPHILIZED, FOR SOLUTION INTRAVENOUS at 09:31

## 2024-02-19 RX ADMIN — SIROLIMUS 3.5 MG: 2 TABLET, FILM COATED ORAL at 07:57

## 2024-02-19 RX ADMIN — ACYCLOVIR 800 MG: 800 TABLET ORAL at 21:16

## 2024-02-19 RX ADMIN — BENZOCAINE 1 ML: 220 SPRAY, METERED PERIODONTAL at 07:54

## 2024-02-19 RX ADMIN — LOPERAMIDE HYDROCHLORIDE 4 MG: 2 CAPSULE ORAL at 12:14

## 2024-02-19 RX ADMIN — POTASSIUM CHLORIDE 20 MEQ: 29.8 INJECTION, SOLUTION INTRAVENOUS at 07:54

## 2024-02-19 RX ADMIN — APIXABAN 2.5 MG: 2.5 TABLET, FILM COATED ORAL at 21:16

## 2024-02-19 RX ADMIN — MYCOPHENOLATE MOFETIL 250 MG: 250 CAPSULE ORAL at 21:16

## 2024-02-19 ASSESSMENT — ACTIVITIES OF DAILY LIVING (ADL)
ADLS_ACUITY_SCORE: 24

## 2024-02-19 NOTE — PLAN OF CARE
Care provided from [id number]    Temp: 98.3  F (36.8  C) Temp src: Axillary BP: (!) 122/96 Pulse: 102   Resp: 16 SpO2: 98 % O2 Device: None (Room air)     Neuro: A&Ox4. Speech is clear, spontaneous, and logical. Pupils equal, round, and reactive to light. Pt denies headache or changes in vision/hearing.  Cardiac: No tele orders. Vital signs stable. Afebrile. Tachycardic on spot checks. Pt denies chest pain or palpitations. S1 and S2 audible on auscultation.   Respiratory: Sating >94% on room air. Pt denies shortness of breath or difficulty breathing. Lung sounds clear/diminished on auscultation. Pt encouraged to use incentive spirometer during TV commercial breaks.   GI/: Adequate urine output. BM X2 (loose/watery). Bowel sounds audible in all quadrants. Pt passing flatus. Pt denies nausea. Pt had flex EGD this AM.   Diet/appetite: Pt was NPO for EGD. Pt resumed regular diet upon return to unit.   Activity:  Stand-by-assist/Independent  Pain: Pt denies pain and rates 0/10  Skin: No new deficits noted.  LDA's: Right peripheral IV (TKO), Left CVC    Plan: Continue with POC. Notify BMT team with changes.       Goal Outcome Evaluation:    Plan of Care Reviewed With: patient    Overall Patient Progress: no change    Outcome Evaluation: Pt to have scope performed today in Endoscopy. Pt verbalizing preexisting signs/symptoms of mucositis.

## 2024-02-19 NOTE — PLAN OF CARE
"/84   Pulse 102   Temp 99.1  F (37.3  C) (Axillary)   Resp 16   Ht 1.725 m (5' 7.91\")   Wt 82.6 kg (182 lb 3.2 oz)   SpO2 96%   BMI 27.77 kg/m       Patient afebrile T max: 99.9, other vital signs stable, no reports of pain or nausea. Patient was agreeable to saving stool overnight, 800 ml recorded as of this note. Patient continues to not save urine. Patient has been NPO since midnight. Plan for patient to have scope performed by GI today. Patient will need enema 1 hour prior to scope, and 30 minutes prior to scope. Day nurse reported that patient did not eat during the day and patient reported that they have no appetite, nutrition consult was ordered. Patient currently receiving IV potassium this morning, first bag is running and will need another bag. Patient assist level independent. Continue with plan of care.    Goal Outcome Evaluation:  Problem: Adult Inpatient Plan of Care  Goal: Optimal Comfort and Wellbeing  Outcome: Not Progressing     Problem: Stem Cell/Bone Marrow Transplant  Goal: Optimal Coping with Transplant  Outcome: Not Progressing  Goal: Diarrhea Symptom Control  Outcome: Not Progressing  Goal: Improved Activity Tolerance  Outcome: Not Progressing  Intervention: Promote Improved Energy  Recent Flowsheet Documentation  Taken 2/19/2024 0000 by Misael May, RN  Activity Management:   activity adjusted per tolerance   activity encouraged  Taken 2/18/2024 2100 by Misael May, RN  Activity Management:   activity adjusted per tolerance   activity encouraged  Goal: Optimal Nutrition Intake  Outcome: Not Progressing     Problem: Stem Cell/Bone Marrow Transplant  Goal: Symptom-Free Urinary Elimination  Outcome: Progressing  Goal: Absence of Hypersensitivity Reaction  Outcome: Progressing  Goal: Absence of Infection  Outcome: Progressing  Intervention: Prevent and Manage Infection  Recent Flowsheet Documentation  Taken 2/19/2024 0000 by Misael May, RN  Infection Prevention:   " environmental surveillance performed   cohorting utilized   equipment surfaces disinfected   hand hygiene promoted   personal protective equipment utilized  Isolation Precautions:   enteric precautions maintained   contact precautions maintained  Taken 2/18/2024 1942 by Misael May RN  Infection Prevention:   environmental surveillance performed   cohorting utilized   equipment surfaces disinfected   hand hygiene promoted   personal protective equipment utilized  Isolation Precautions:   enteric precautions maintained   contact precautions maintained  Goal: Improved Oral Mucous Membrane Health and Integrity  Outcome: Progressing  Goal: Nausea and Vomiting Symptom Relief  Outcome: Progressing

## 2024-02-19 NOTE — H&P
Brief op Note:  EGD Flex for diarrhea, post BMT     Medications:    Versed 6 mg, Fentanyl 125 mcg  IV    Total sedation time: 17 minutes    EGD with biopsies:   Hemorrhagic gastritis localized to the antrum.   Biopsies taken from duodenal, gastric, and esophageal mucosa.    Flex Sig with biopsies:  Pale mucosa, lacking vascular markings. Scope to 30 cm.      Drew Jasso MD  Associate Professor of Medicine  Division of Gastroenterology, Hepatology, and Nutrition  [facility]

## 2024-02-19 NOTE — PROGRESS NOTES
"  BMT Progress Notes      Patient ID: Ziggy Hallman is a 48 year old year old male with a PMH of MDS, hx of multiple clots and MI undergoing a MA (Bu/Flu) prep for an 8/8 URD PBSCT currently day 25    Transplant Essential Data:   Diagnosis MDS-High risk, Plasma Cell neoplasm    BMTCT Type Allogeneic    Prep Regimen Bu/Flu    Donor Match and  Source URD 8/8 DP permissive    GVHD Prophylaxis PTCy, Siro/MMF    Primary BMT MD Bacon    Clinical Trials [id number]       Interval History:   Afebrile, last low grade fever 100.2. Measured stool consistently yesterday and had ~2L. He thinks volume is getting larger, frequency is about the same. No belly pain. No nausea. Just significant lack of appetite 2/2 taste changes and not liking the food in the hospital. No rashes. No infectious symptoms aside from diarrhea. Scopes this AM.     Review of Systems    Review of Systems: 10 point ROS negative unless stated in HPI   PHYSICAL EXAM      Weight     Wt Readings from Last 3 Encounters:   02/18/24 82.6 kg (182 lb 3.2 oz)   01/17/24 90.1 kg (198 lb 9.6 oz)   01/12/24 88.9 kg (196 lb)        KPS: 60    BP (!) 139/91 (BP Location: Left arm, Cuff Size: Adult Large)   Pulse 107   Temp 99.1  F (37.3  C) (Axillary)   Resp 16   Ht 1.725 m (5' 7.91\")   Wt 82.6 kg (182 lb 3.2 oz)   SpO2 99%   BMI 27.77 kg/m       General: NAD,  Lungs: CTA  Cardiovascular: RRR, no murmur rub or gallops   Lymphatics: Trace BLE edema  Abdomen: non tender, soft, BS+  Skin: no rashes, dry skin   Neuro: A&Ox3  Additional Findings: Powell site NT, no drainage.    Current aGVHD staging:  Skin 0, UGI 0, LGI 0, Liver 0 (keep in note through day +180 for allos)      LABS AND IMAGING: I have assessed all abnormal lab values for their clinical significance and any values considered clinically significant have been addressed in the assessment and plan.        Lab Results   Component Value Date    WBC 2.0 (L) 02/19/2024    ANEUTAUTO 2.4 02/15/2024    HGB 10.2 " (L) 02/19/2024    HCT 28.3 (L) 02/19/2024    PLT 48 (LL) 02/19/2024     02/19/2024    POTASSIUM 3.2 (L) 02/19/2024    CHLORIDE 105 02/19/2024    CO2 21 (L) 02/19/2024     (H) 02/19/2024    BUN 11.7 02/19/2024    CR 1.21 (H) 02/19/2024    MAG 1.7 02/19/2024    INR 1.35 (H) 02/19/2024         SYSTEMS-BASED ASSESSMENT AND PLAN     Ziggy Hallman is a 48 year old year old male with a PMH of MDS, hx of multiple clots and MI undergoing a MA (Bu/Flu) prep for an 8/8 URD PBSCT day 25. Stay has been complicated by mucositis requiring PCA, N/F, Staph Epi bacteremia, pulmonary embolism requiring hep gtt and is prolonged 2/2 to large stool volumes requiring ID and GVHD rule out.    BMT/IEC PROTOCOL for 2015-29  Chemo Prep:   Day -6: Admit  Day -5 through Day -2: Busulfan/Fludarabine  Day -1: Rest.   Keppra prophylaxis      8/8 DP permissive. Cell dose-9.24x10^6  ABO: Donor: O+ Recipient A-  (Minor incompatability, no flush required)   GSCF plan: GCSF to start day +5 until ANC >1500 for 3 consecutive days- stopped on 2/17  Restaging plan: Restaging BMBX by end of week if remains inpatient.    HEME/COAG  #SEGMENTAL R PE (2/6): Low intensity hep gtt with platelet goal <50k with post platelet checks. See below    #APS work up- lupus antigen +, will follow outpt as may be unreliable in this acute setting and prior APS work up had been unremarkable prior to admission.  #Pancytopenia 2/2 chemo- anemia, leukopenia, thrombocytopenia  #Transfusion parameters: hemoglobin <8 (cardiac hx), platelets <30k   #Recurrent arterial thromboembolic events:  He has long history of various events including renal infarcts (2011, 2013, 2015), left popliteal embolic episode requiring surgery, anticoagulation (2011), right carotid artery embolic episode with TIA/stroke-like symptoms (2012), embolic MI (2015), gangrenous right toe requiring vascular surgery/amputation (2017), in-stent restenosis MI (2017), stroke (2020) added rivaroxaban to  prasugrel, PFO closure 2020.   Extensive hypercoagulable workup to date has been unrevealing.  JAK2 testing negative.  PNH testing negative.  Elevated IgA of unknown significance, no evidence of plasma cell disorder.  - Eliquis and Prasugrel HELD starting 2/1-2/16. Restarted Low dose Apixiban on 2/16 (2.5mg BID) as platelets stably above 30k. Will plan to increase to 5mg BID and re-add prasugrel once his platelets are stable above 50k. Will need to be followed closely in clinic. Held periprocedure 2/19, restart this evening after scopes.      IMMUNOCOMPROMISED  #Nonneutropenic fever Cefepime 2/17-2/18. Work up unremarkable. Fevers resolved.  #Diarrhea  -Monitoring cultures. Ordered , adenovirus, HHV6, EBV - pending.  His repeat c. Diff and enteric panel negative.   #Staph epi bacteremia - Resolved. Received IV vancomycin.   #Febrile neutropenia, fever resolved. See previous notes for abx therapy.   #C. Diff - Admit c. Diff triple+ - Start PO vanc 1/21 for 14 day course. - completed on 2/4, with broadening abx restart PO Vanco tx dose x 10 days. C.diff recheck negative on 2/17    -Prophylaxis plan: ACV LD, Megan HD, Levaquin while neutropenic , Bactrim +28    RISK OF GVHD  - Prophylaxis: PtC day +3, +4, , Siro/MMF to start day +5, level 6.5 on 2/16. Repeat level on 2/19  #Anorexia- UGI stage I   #Diarrhea LGI stage II 2/19  -Has had ongoing diarrhea since early admission. He was treated for c. Diff for 14 days. He failed anti-diarrheal medicine. Unclear exactly how much diarrhea he is having as reports to nursing are different than reports to providers. He is not saving his stool. He will state he had 20+ episodes of diarrhea, small amounts. Overnight on 2/18 reported by RN only 1 loose stool, however Ziggy endorses diarrhea every hour. No improvement with metamucil, loperamide or lomotil  - Quantify accurately   - Sent AREG and fecal calprotectin on 2/18 pending  - GI Luminal consult for upper and lower scope. NPO at  midnight in chance of scopes tomorrow.    CARDIOVASCULAR  #CAD  #Hx of CABG  #HTN   -PTA metoprolol, Lisinopril dose increased 2/2 HTN,  nifidepine ER 30mg BID, labetolol PRN;  #Hyperlipidemia: Holding atorvastatin peritransplant  - Risk of cardiomyopathy:  Baseline EF 50-55%, Global left ventricular function mildly reduced. Grade 1 or early diastolic dysfunction.   - Risk of arrhythmia: Baseline EKG showed NSR, Qtc -425    GI/NUTRITION  #Diarrhea - Known c. Diff, see ID for abx therapy. Continues metamucil and loperamide QID, added lomotil - See GVHD/ID   #Oropharyngeal mucositis- resolved.   - Ulcer prophylaxis: Protonix   - VOD Prophylaxis: Ursodiol  - Risk of nausea/vomiting due to chemo: Ativan, Compazine prn   #Moderate malnutrition 2/2 acute on chronic illness- dietician to follow, appetite improving with pain control.    RENAL/ELECTROLYTES/  #CONNIE - Likely 2/2 to diuresis. Not anuric. Will hold on diuresis on 2/14 and 2/15. Monitor creatinine daily. He is not tracking I/Os. Weights have been trending down. Will replete with IVF on 2/17. Stable 2/19, consider IVF replacement with volume losses.   #Hematuria- No dysuria, self resolved  #Hypervolemia 2/2 cytoxan flush - resolved.   #Oliguria 2/9 (Resolved)- output <1L with diuresis and large amounts of IV given. Cr stable and WNL. Patient is having concurrent diarrhea, so this is likely difficult to measure.   -Bladder scan unremarkable   -UA shows proteinuria  #Proteinuria- possibly exacerbated by persistent HTN (See CV),   - Hypocalcemia in setting hypoalbuminemia, iCa2+, corrected WNL   - Electrolyte management: replace per sliding scale    Psych:    #Anxiety: admits to feeling anxious.  Start zyprexa 5mg HS.  Connect w/ SW.  Psychaitry consult added hydroxyzine.  #Insomnia- PTA ambien, Trazadone added and increased to 100mg. - Ambien and trazadone held during cytoxan period due to frequent urination and concern for falls. Discontinued with opioid PCA,  melatonin added.     SOCIAL DETERMINANTS  - Caregiver: Family   - Financial/insurance concerns: See SW notes       Known issues that I take into account for medical decisions, with salient changes to the plan considering these complexities noted above.    Patient Active Problem List   Diagnosis    Amegakaryocytic thrombocytopenia (H)    Anemia due to bone marrow failure, unspecified bone marrow failure type (H)    Aplastic anemia (H)     Clinically Significant Risk Factors        # Hypokalemia: Lowest K = 3 mmol/L in last 2 days, will replace as needed     # Hypomagnesemia: Lowest Mg = 1.6 mg/dL in last 2 days, will replace as needed   # Hypoalbuminemia: Lowest albumin = 2 g/dL at 1/22/2024  4:12 AM, will monitor as appropriate    # Coagulation Defect: INR = 1.35 (Ref range: 0.85 - 1.15) and/or PTT = 40 Seconds (Ref range: 22 - 38 Seconds), will monitor for bleeding  # Thrombocytopenia: Lowest platelets = 42 in last 2 days, will monitor for bleeding                       Dispo: Remain admitted through engraftment  Please call sister Radha with updates for care and discharge    I spent 30 minutes in the care of this patient today, which included time necessary for preparation for the visit, obtaining history, ordering medications/tests/procedures as medically indicated, review of pertinent medical literature, counseling of the patient, communication of recommendations to the care team, and documentation time.    Toyin Herrmann PA-C  [phone]

## 2024-02-19 NOTE — OR NURSING
Pt tolerated EGD and flexible , both with biopsies, very well. Report was called to pts floor nurse. Return pt to PCU

## 2024-02-19 NOTE — PROGRESS NOTES
CLINICAL NUTRITION SERVICES - BRIEF NOTE      Nutrition Prescription     RECOMMENDATIONS FOR MDs/PROVIDERS TO ORDER:  Pending GI results, patient may benefit from GI rest with ongoing diarrhea, consider nutrition support and low fiber/low fat diet      Recommendations already ordered by Registered Dietitian (RD):  None at this time- NPO      Future/Additional Recommendations:  Monitor results of scopes and ability to provide additional nutrition education   Monitor need for nutrition support/GI rest       *Please see full assessment note from 2/13/24    New Findings:  Received consult from RN: Patient has had no appetite and according to day nurse did not eat during the day.     Current Diet Order: NPO   -Plan for flex sig today   75% (2/16), 100% x 2(2/15- double cheeseburger and large milk shake)     RD has been following weekly from admission.   Noted 8% weight loss in 1 month     Interventions  Collaboration with other providers- 5c rounds     RD to follow per protocol.    Liliane Odom RD, LD  5C/BMT pager: [phone]

## 2024-02-20 ENCOUNTER — APPOINTMENT (OUTPATIENT)
Dept: OCCUPATIONAL THERAPY | Facility: CLINIC | Age: 49
DRG: 014 | End: 2024-02-20
Attending: INTERNAL MEDICINE
Payer: COMMERCIAL

## 2024-02-20 VITALS
TEMPERATURE: 97.1 F | OXYGEN SATURATION: 100 % | SYSTOLIC BLOOD PRESSURE: 119 MMHG | HEART RATE: 104 BPM | WEIGHT: 173.8 LBS | RESPIRATION RATE: 16 BRPM | HEIGHT: 68 IN | DIASTOLIC BLOOD PRESSURE: 85 MMHG | BODY MASS INDEX: 26.34 KG/M2

## 2024-02-20 LAB
ABO/RH(D): NORMAL
ANION GAP SERPL CALCULATED.3IONS-SCNC: 13 MMOL/L (ref 7–15)
ANTIBODY SCREEN: NEGATIVE
BACTERIA BLD CULT: NO GROWTH
BACTERIA BLD CULT: NO GROWTH
BASOPHILS # BLD AUTO: ABNORMAL 10*3/UL
BASOPHILS # BLD MANUAL: 0 10E3/UL (ref 0–0.2)
BASOPHILS NFR BLD AUTO: ABNORMAL %
BASOPHILS NFR BLD MANUAL: 1 %
BUN SERPL-MCNC: 18.1 MG/DL (ref 6–20)
CALCIUM SERPL-MCNC: 9.3 MG/DL (ref 8.6–10)
CHLORIDE SERPL-SCNC: 103 MMOL/L (ref 98–107)
CREAT SERPL-MCNC: 1.2 MG/DL (ref 0.67–1.17)
DEPRECATED HCO3 PLAS-SCNC: 20 MMOL/L (ref 22–29)
EGFRCR SERPLBLD CKD-EPI 2021: 74 ML/MIN/1.73M2
EOSINOPHIL # BLD AUTO: ABNORMAL 10*3/UL
EOSINOPHIL # BLD MANUAL: 0 10E3/UL (ref 0–0.7)
EOSINOPHIL NFR BLD AUTO: ABNORMAL %
EOSINOPHIL NFR BLD MANUAL: 0 %
ERYTHROCYTE [DISTWIDTH] IN BLOOD BY AUTOMATED COUNT: 13.1 % (ref 10–15)
GLUCOSE SERPL-MCNC: 111 MG/DL (ref 70–99)
HCT VFR BLD AUTO: 30.6 % (ref 40–53)
HGB BLD-MCNC: 11 G/DL (ref 13.3–17.7)
IMM GRANULOCYTES # BLD: ABNORMAL 10*3/UL
IMM GRANULOCYTES NFR BLD: ABNORMAL %
LYMPHOCYTES # BLD AUTO: ABNORMAL 10*3/UL
LYMPHOCYTES # BLD MANUAL: 0.2 10E3/UL (ref 0.8–5.3)
LYMPHOCYTES NFR BLD AUTO: ABNORMAL %
LYMPHOCYTES NFR BLD MANUAL: 10 %
MAGNESIUM SERPL-MCNC: 1.8 MG/DL (ref 1.7–2.3)
MCH RBC QN AUTO: 30 PG (ref 26.5–33)
MCHC RBC AUTO-ENTMCNC: 35.9 G/DL (ref 31.5–36.5)
MCV RBC AUTO: 83 FL (ref 78–100)
METAMYELOCYTES # BLD MANUAL: 0.1 10E3/UL
METAMYELOCYTES NFR BLD MANUAL: 3 %
MONOCYTES # BLD AUTO: ABNORMAL 10*3/UL
MONOCYTES # BLD MANUAL: 0.4 10E3/UL (ref 0–1.3)
MONOCYTES NFR BLD AUTO: ABNORMAL %
MONOCYTES NFR BLD MANUAL: 18 %
NEUTROPHILS # BLD AUTO: ABNORMAL 10*3/UL
NEUTROPHILS # BLD MANUAL: 1.6 10E3/UL (ref 1.6–8.3)
NEUTROPHILS NFR BLD AUTO: ABNORMAL %
NEUTROPHILS NFR BLD MANUAL: 68 %
NRBC # BLD AUTO: 0 10E3/UL
NRBC BLD AUTO-RTO: 0 /100
PATH REPORT.COMMENTS IMP SPEC: NORMAL
PATH REPORT.FINAL DX SPEC: NORMAL
PATH REPORT.GROSS SPEC: NORMAL
PATH REPORT.MICROSCOPIC SPEC OTHER STN: NORMAL
PATH REPORT.RELEVANT HX SPEC: NORMAL
PHOSPHATE SERPL-MCNC: 3.6 MG/DL (ref 2.5–4.5)
PHOTO IMAGE: NORMAL
PLAT MORPH BLD: ABNORMAL
PLATELET # BLD AUTO: 58 10E3/UL (ref 150–450)
POTASSIUM SERPL-SCNC: 3.5 MMOL/L (ref 3.4–5.3)
RBC # BLD AUTO: 3.67 10E6/UL (ref 4.4–5.9)
RBC MORPH BLD: ABNORMAL
SIROLIMUS BLD-MCNC: 10.7 UG/L (ref 5–15)
SODIUM SERPL-SCNC: 136 MMOL/L (ref 135–145)
SPECIMEN EXPIRATION DATE: NORMAL
TME LAST DOSE: NORMAL H
TME LAST DOSE: NORMAL H
WBC # BLD AUTO: 2.3 10E3/UL (ref 4–11)

## 2024-02-20 PROCEDURE — 97530 THERAPEUTIC ACTIVITIES: CPT | Mod: GO

## 2024-02-20 PROCEDURE — 258N000003 HC RX IP 258 OP 636

## 2024-02-20 PROCEDURE — 86900 BLOOD TYPING SEROLOGIC ABO: CPT | Performed by: PHYSICIAN ASSISTANT

## 2024-02-20 PROCEDURE — 85027 COMPLETE CBC AUTOMATED: CPT | Performed by: PHYSICIAN ASSISTANT

## 2024-02-20 PROCEDURE — 250N000013 HC RX MED GY IP 250 OP 250 PS 637: Performed by: PHYSICIAN ASSISTANT

## 2024-02-20 PROCEDURE — 250N000012 HC RX MED GY IP 250 OP 636 PS 637

## 2024-02-20 PROCEDURE — 83735 ASSAY OF MAGNESIUM: CPT | Performed by: STUDENT IN AN ORGANIZED HEALTH CARE EDUCATION/TRAINING PROGRAM

## 2024-02-20 PROCEDURE — 80195 ASSAY OF SIROLIMUS: CPT

## 2024-02-20 PROCEDURE — 84100 ASSAY OF PHOSPHORUS: CPT | Performed by: STUDENT IN AN ORGANIZED HEALTH CARE EDUCATION/TRAINING PROGRAM

## 2024-02-20 PROCEDURE — 250N000011 HC RX IP 250 OP 636: Mod: JZ | Performed by: PHYSICIAN ASSISTANT

## 2024-02-20 PROCEDURE — 258N000003 HC RX IP 258 OP 636: Performed by: PHYSICIAN ASSISTANT

## 2024-02-20 PROCEDURE — 99239 HOSP IP/OBS DSCHRG MGMT >30: CPT | Mod: FS

## 2024-02-20 PROCEDURE — 250N000011 HC RX IP 250 OP 636: Performed by: PHYSICIAN ASSISTANT

## 2024-02-20 PROCEDURE — 250N000012 HC RX MED GY IP 250 OP 636 PS 637: Performed by: PHYSICIAN ASSISTANT

## 2024-02-20 PROCEDURE — 80048 BASIC METABOLIC PNL TOTAL CA: CPT | Performed by: PHYSICIAN ASSISTANT

## 2024-02-20 PROCEDURE — 85007 BL SMEAR W/DIFF WBC COUNT: CPT | Performed by: PHYSICIAN ASSISTANT

## 2024-02-20 PROCEDURE — 250N000013 HC RX MED GY IP 250 OP 250 PS 637

## 2024-02-20 RX ORDER — SIROLIMUS 0.5 MG/1
TABLET, FILM COATED ORAL
Qty: 30 TABLET | Refills: 1 | Status: SHIPPED | OUTPATIENT
Start: 2024-02-20 | End: 2024-03-07

## 2024-02-20 RX ORDER — HEPARIN SODIUM (PORCINE) LOCK FLUSH IV SOLN 100 UNIT/ML 100 UNIT/ML
5 SOLUTION INTRAVENOUS
Status: CANCELLED | OUTPATIENT
Start: 2024-02-20

## 2024-02-20 RX ORDER — PANTOPRAZOLE SODIUM 40 MG/1
40 TABLET, DELAYED RELEASE ORAL
Qty: 60 TABLET | Refills: 1 | Status: SHIPPED | OUTPATIENT
Start: 2024-02-20 | End: 2024-03-04

## 2024-02-20 RX ORDER — LOPERAMIDE HCL 2 MG
4 CAPSULE ORAL 4 TIMES DAILY PRN
Qty: 120 CAPSULE | Refills: 1 | Status: SHIPPED | OUTPATIENT
Start: 2024-02-20 | End: 2024-02-21

## 2024-02-20 RX ORDER — MYCOPHENOLATE MOFETIL 250 MG/1
250 CAPSULE ORAL 2 TIMES DAILY
Qty: 17 CAPSULE | Refills: 0 | Status: SHIPPED | OUTPATIENT
Start: 2024-02-20 | End: 2024-03-04

## 2024-02-20 RX ORDER — SIROLIMUS 1 MG/1
TABLET, FILM COATED ORAL
Qty: 90 TABLET | Refills: 1 | Status: SHIPPED | OUTPATIENT
Start: 2024-02-20 | End: 2024-03-07

## 2024-02-20 RX ORDER — PANTOPRAZOLE SODIUM 40 MG/1
40 TABLET, DELAYED RELEASE ORAL
Status: DISCONTINUED | OUTPATIENT
Start: 2024-02-20 | End: 2024-02-20 | Stop reason: HOSPADM

## 2024-02-20 RX ORDER — POTASSIUM CHLORIDE 750 MG/1
40 TABLET, EXTENDED RELEASE ORAL ONCE
Status: COMPLETED | OUTPATIENT
Start: 2024-02-20 | End: 2024-02-20

## 2024-02-20 RX ORDER — MYCOPHENOLATE MOFETIL 500 MG/1
1000 TABLET ORAL 2 TIMES DAILY
Qty: 34 TABLET | Refills: 0 | Status: SHIPPED | OUTPATIENT
Start: 2024-02-20 | End: 2024-03-04

## 2024-02-20 RX ORDER — HEPARIN SODIUM,PORCINE 10 UNIT/ML
5-20 VIAL (ML) INTRAVENOUS DAILY PRN
Status: CANCELLED | OUTPATIENT
Start: 2024-02-20

## 2024-02-20 RX ADMIN — LOPERAMIDE HYDROCHLORIDE 4 MG: 2 CAPSULE ORAL at 08:24

## 2024-02-20 RX ADMIN — SIROLIMUS 3.5 MG: 2 TABLET, FILM COATED ORAL at 08:23

## 2024-02-20 RX ADMIN — MYCOPHENOLATE MOFETIL 1000 MG: 500 TABLET, FILM COATED ORAL at 08:24

## 2024-02-20 RX ADMIN — METOPROLOL SUCCINATE 100 MG: 50 TABLET, EXTENDED RELEASE ORAL at 08:23

## 2024-02-20 RX ADMIN — MICAFUNGIN SODIUM 150 MG: 50 INJECTION, POWDER, LYOPHILIZED, FOR SOLUTION INTRAVENOUS at 08:15

## 2024-02-20 RX ADMIN — Medication 5 ML: at 04:21

## 2024-02-20 RX ADMIN — ACYCLOVIR 800 MG: 800 TABLET ORAL at 08:24

## 2024-02-20 RX ADMIN — URSODIOL 300 MG: 300 CAPSULE ORAL at 08:24

## 2024-02-20 RX ADMIN — PANTOPRAZOLE SODIUM 40 MG: 40 TABLET, DELAYED RELEASE ORAL at 08:24

## 2024-02-20 RX ADMIN — LISINOPRIL 40 MG: 10 TABLET ORAL at 08:24

## 2024-02-20 RX ADMIN — Medication 5 CAPSULE: at 08:24

## 2024-02-20 RX ADMIN — SODIUM CHLORIDE 1000 ML: 9 INJECTION, SOLUTION INTRAVENOUS at 11:02

## 2024-02-20 RX ADMIN — LOPERAMIDE HYDROCHLORIDE 4 MG: 2 CAPSULE ORAL at 11:09

## 2024-02-20 RX ADMIN — Medication 2 SPRAY: at 11:04

## 2024-02-20 RX ADMIN — APIXABAN 2.5 MG: 2.5 TABLET, FILM COATED ORAL at 08:24

## 2024-02-20 RX ADMIN — POTASSIUM CHLORIDE 40 MEQ: 750 TABLET, EXTENDED RELEASE ORAL at 14:21

## 2024-02-20 RX ADMIN — Medication 2 SPRAY: at 08:30

## 2024-02-20 RX ADMIN — NIFEDIPINE 30 MG: 30 TABLET, FILM COATED, EXTENDED RELEASE ORAL at 08:24

## 2024-02-20 RX ADMIN — Medication 5 ML: at 09:27

## 2024-02-20 RX ADMIN — URSODIOL 300 MG: 300 CAPSULE ORAL at 14:21

## 2024-02-20 RX ADMIN — MYCOPHENOLATE MOFETIL 250 MG: 250 CAPSULE ORAL at 08:24

## 2024-02-20 ASSESSMENT — ACTIVITIES OF DAILY LIVING (ADL)
ADLS_ACUITY_SCORE: 24

## 2024-02-20 NOTE — PLAN OF CARE
Occupational Therapy Discharge Summary    Reason for therapy discharge:    Discharged to home.    Progress towards therapy goal(s). See goals on Care Plan in [facility] electronic health record for goal details.  Goals partially met.  Barriers to achieving goals:   discharge from facility.    Therapy recommendation(s):    No further therapy is recommended.

## 2024-02-20 NOTE — PROGRESS NOTES
"Care Management Discharge Note    Discharge Date: 02/20/2024 or 2/21/2024     Discharge Disposition: Home    Discharge Services: None    Discharge DME: None    Discharge Transportation: family or friend will provide    Private pay costs discussed: Not applicable    Does the patient's insurance plan have a 3 day qualifying hospital stay waiver?  No    PAS Confirmation Code: N/A     Patient/family educated on Medicare website which has current facility and service quality ratings: no    Education Provided on the Discharge Plan: Yes    Persons Notified of Discharge Plans: pt, treatment team, nursing staff, and     Patient/Family in Agreement with the Plan: yes    Handoff Referral Completed: No    Additional Information:  Ziggy Hallman is a 48 year old year old male with a PMH of MDS, hx of multiple clots and MI undergoing a MA (Bu/Flu) prep for an 8/8 URD PBSCT currently day +26.    Per Med Team, pt is anticipated to be medically stable for discharge 2/20/2024 or 2/21/2024. SW spoke with pt to assess coping, provide psychosocial support and check in. SW previously provided a discharge packet with psychosocial post-transplant resources for patient and caregiver. Pt shared that he \"feels good\" and believes that his appetite and overall well-being will improve once discharged home. Pt confirmed plans to discharge possibly today or tomorrow.  Pt shared some disappointment over not being able to discharge over the weekend. No concerns/questions indicated at this time. SW provided empathetic listening, validation of concerns, and encouragement. SW encouraged pt to contact  for support, questions and/or resources.     Assessment: Pt presented as pleasant. Pt continues to be supported by his friends and family.     Discharge Plan: Pt to discharge home in [place], [place] with friends/family as primary caregivers.     RIVKA Grider, [facility]  Adult Blood & Marrow Transplant   Phone: " [phone]  Pager: [phone]

## 2024-02-20 NOTE — PLAN OF CARE
"Afebrile, OVSS. Pt denies pain, nausea and vomiting. No PRNs overnight. No replacements needed. No complaints overnight, up independently. Pt will need diet ordered changed from NPO back to regular.  Continue with plan of care.      Problem: Adult Inpatient Plan of Care  Goal: Plan of Care Review  Description: The Plan of Care Review/Shift note should be completed every shift.  The Outcome Evaluation is a brief statement about your assessment that the patient is improving, declining, or no change.  This information will be displayed automatically on your shift  note.  Outcome: Progressing  Goal: Patient-Specific Goal (Individualized)  Description: You can add care plan individualizations to a care plan. Examples of Individualization might be:  \"Parent requests to be called daily at 9am for status\", \"I have a hard time hearing out of my right ear\", or \"Do not touch me to wake me up as it startles  me\".  Outcome: Progressing  Goal: Absence of Hospital-Acquired Illness or Injury  Outcome: Progressing  Intervention: Identify and Manage Fall Risk  Recent Flowsheet Documentation  Taken 2/20/2024 0054 by Ilene Breaux, RN  Safety Promotion/Fall Prevention:   safety round/check completed   activity supervised   clutter free environment maintained   assistive device/personal items within reach   nonskid shoes/slippers when out of bed  Taken 2/19/2024 2302 by Ilene Breaux, RN  Safety Promotion/Fall Prevention:   safety round/check completed   activity supervised   clutter free environment maintained   assistive device/personal items within reach   nonskid shoes/slippers when out of bed  Taken 2/19/2024 2111 by Ilene Breaux, RN  Safety Promotion/Fall Prevention:   safety round/check completed   activity supervised   clutter free environment maintained   assistive device/personal items within reach   nonskid shoes/slippers when out of bed  Intervention: Prevent Skin Injury  Recent Flowsheet Documentation  Taken " 2/20/2024 0054 by Ilene Breaux RN  Body Position: position changed independently  Taken 2/19/2024 2111 by Ilene Breaux RN  Body Position: position changed independently  Intervention: Prevent and Manage VTE (Venous Thromboembolism) Risk  Recent Flowsheet Documentation  Taken 2/19/2024 2111 by Ilene Breaux RN  VTE Prevention/Management: SCDs (sequential compression devices) off  Intervention: Prevent Infection  Recent Flowsheet Documentation  Taken 2/20/2024 0054 by Ilene Breaux RN  Infection Prevention:   rest/sleep promoted   cohorting utilized   environmental surveillance performed   personal protective equipment utilized   equipment surfaces disinfected   hand hygiene promoted  Taken 2/19/2024 2111 by Ilene Breaux RN  Infection Prevention:   rest/sleep promoted   cohorting utilized   environmental surveillance performed   personal protective equipment utilized   equipment surfaces disinfected   hand hygiene promoted  Goal: Optimal Comfort and Wellbeing  Outcome: Progressing  Goal: Readiness for Transition of Care  Outcome: Progressing     Problem: Stem Cell/Bone Marrow Transplant  Goal: Optimal Coping with Transplant  Outcome: Progressing  Goal: Symptom-Free Urinary Elimination  Outcome: Progressing  Goal: Diarrhea Symptom Control  Outcome: Progressing  Goal: Improved Activity Tolerance  Outcome: Progressing  Goal: Blood Counts Within Acceptable Range  Outcome: Progressing  Intervention: Monitor and Manage Hematologic Symptoms  Recent Flowsheet Documentation  Taken 2/20/2024 0054 by Ilene Breaux RN  Bleeding Precautions: blood pressure closely monitored  Medication Review/Management: medications reviewed  Taken 2/19/2024 2111 by Ilene Breaux RN  Bleeding Precautions: blood pressure closely monitored  Medication Review/Management: medications reviewed  Goal: Absence of Hypersensitivity Reaction  Outcome: Progressing  Goal: Absence of Infection  Outcome:  Progressing  Intervention: Prevent and Manage Infection  Recent Flowsheet Documentation  Taken 2/20/2024 0054 by Ilene Breaux, RN  Infection Prevention:   rest/sleep promoted   cohorting utilized   environmental surveillance performed   personal protective equipment utilized   equipment surfaces disinfected   hand hygiene promoted  Infection Management: aseptic technique maintained  Isolation Precautions:   enteric precautions maintained   contact precautions maintained  Taken 2/19/2024 2111 by Ilene Breaux, RN  Infection Prevention:   rest/sleep promoted   cohorting utilized   environmental surveillance performed   personal protective equipment utilized   equipment surfaces disinfected   hand hygiene promoted  Infection Management: aseptic technique maintained  Isolation Precautions:   enteric precautions maintained   contact precautions maintained  Goal: Improved Oral Mucous Membrane Health and Integrity  Outcome: Progressing  Goal: Nausea and Vomiting Symptom Relief  Outcome: Progressing  Goal: Optimal Nutrition Intake  Outcome: Progressing

## 2024-02-20 NOTE — DISCHARGE SUMMARY
[facility] Discharge Summary   Ziggy Hallman MRN# [id number]   Age: 48 year old  YOB: 1975   Date of Admission: 1/19/2024  Date of Discharge:  2/20/24  Admitting Physician: Mariluz Cabral MD  Discharge Physician:  Dr. Oliveros   Discharge Diagnoses:    MDS  S/p MA MUD   Staph Epi bacteremia  R segmental Pulmonary embolism  Diarrhea  Pancytopenia 2/2 to chemo- anemia, leukopenia, thrombocytopenia   Mucositis  Anorexia  HLD  CAD  CONNIE 2/2 dehydration, diuresis   Proteinuria   Hypokalemia  Electrolyte abnormalities   Moderate malnutrition  N/F- resolved  Hx of recurrent arterial thromboembolic events- STEMI, TIA, renal infarcts, arterial emboli lower extremities  Discharge Medications:         Medication List        Started      acyclovir 800 MG tablet  Commonly known as: ZOVIRAX  800 mg, Oral, 2 TIMES DAILY     lisinopril 40 MG tablet  Commonly known as: ZESTRIL  40 mg, Oral, DAILY     loperamide 2 MG capsule  Commonly known as: IMODIUM  4 mg, Oral, 4 TIMES DAILY PRN     LORazepam 0.5 MG tablet  Commonly known as: ATIVAN  0.5-1 mg, Oral, EVERY 4 HOURS PRN     micafungin 50 MG injection  Commonly known as: MYCAMINE  300 mg, Intravenous, THREE TIMES WEEKLY, Inject 300 mg into vein three times a week until day +45     * mycophenolate 250 MG capsule  Commonly known as: GENERIC EQUIVALENT  250 mg, Oral, 2 TIMES DAILY, Continue until day +35, follow clinic instruction on when to stop.     * mycophenolate 500 MG tablet  Commonly known as: GENERIC EQUIVALENT  1,000 mg, Oral, 2 TIMES DAILY, Continue until day +35, follow clinic instructions on when to stop.     NIFEdipine ER 30 MG 24 hr tablet  Commonly known as: ADALAT CC  30 mg, Oral, 2 TIMES DAILY     * pantoprazole 40 MG EC tablet  Commonly known as: PROTONIX  40 mg, Oral, DAILY     * pantoprazole 40 MG EC tablet  Commonly known as: PROTONIX  40 mg, Oral, 2 TIMES DAILY BEFORE MEALS     prochlorperazine 5 MG tablet  Commonly known as: COMPAZINE  5-10  mg, Oral, EVERY 6 HOURS PRN     psyllium capsule  Commonly known as: METAMUCIL/KONSYL  5 capsules, Oral, DAILY     * sirolimus 0.5 MG tablet  Commonly known as: GENERIC EQUIVALENT  Take 1 (0.5mg) tablet with 3 (1mg) tablet for a total dose of 3.5mg daily, follow clinic instruction on dosing.     * sirolimus 1 MG tablet  Commonly known as: GENERIC EQUIVALENT  Take 3 (1mg) tablets along with 1 (0.5mg) tablet for a total dose of 3.5mg daily, follow clinic instruction on dosing     sulfamethoxazole-trimethoprim 800-160 MG tablet  Commonly known as: BACTRIM DS  1 tablet, Oral, EVERY MONDAY TUESDAY BID, HOLD until clinic instructs to start taking  Start taking on: February 26, 2024     ursodiol 300 MG capsule  Commonly known as: ACTIGALL  300 mg, Oral, 3 TIMES DAILY           * This list has 6 medication(s) that are the same as other medications prescribed for you. Read the directions carefully, and ask your doctor or other care provider to review them with you.                Modified      apixaban ANTICOAGULANT 5 MG tablet  Commonly known as: ELIQUIS  5 mg, Oral, 2 TIMES DAILY  What changed:   how much to take  when to take this            Discontinued      AMBIEN PO     amLODIPine 5 MG tablet  Commonly known as: NORVASC     atorvastatin 40 MG tablet  Commonly known as: LIPITOR     hydrOXYzine HCl 25 MG tablet  Commonly known as: ATARAX     lisinopril-hydrochlorothiazide 20-25 MG tablet  Commonly known as: ZESTORETIC     ondansetron 8 MG tablet  Commonly known as: ZOFRAN     prasugrel 10 MG Tabs tablet  Commonly known as: EFFIENT            Brief History of Illness:    **Adopted from H&P  HPI:  Ziggy is a 48 year old male who was diagnosed with high risk MDS and along with many clots. 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      Today he states he is feeling well, no new  illness, no sick contacts. No N/V/D. Eating and drinking well. Bowel movements normal. Ready for transplant.      Diagnosis and Treatment Summary      Disease presentation and baseline characteristics: one marrow biopsy on 10/07/2015 due top mild anemia in the context of his hypercoaguable state, and this was read as trilineage hematopoiesis with dysgranulopoiesis and dysmegakaryopoiesis with less than 1% blasts consistent with RCMD 40% to 50% cellular.  Cytogenetics were complex with a karyotype of 45,XY, a derivative of 5 and 17, addition of 9p13, trisomy 11, deletion 111, a minus 17 in 18 cells, and 46,XY in 3.   IPSS was 4 points for cytogenetics, 0 points for blast percentage, 0 points for hemoglobin, 0 points for platelets and 0 points for ANC, giving him a total score of 4, putting him in the intermediate risk category. Consult in BMT clinic 11/2015 for MDS. Siblings typed and 2 sibs (sisters) are full matches.  Worsening anemia in 4/2023, with hemoglobin down to 9 range. Repeat bone marrow biopsy was normocellular at 40-50%. Trilineage hematopoiesis is left shifted and dysplastic.  Reticulin stain demonstrates at least 3+ reticulin fibrosis.  CD34/ positive blasts are increased at 5-10%.  CD56 expression approximately 5%.  No increase in CD20 positive B cells and CD3 positive T cells.   positive plasma cells are somewhat increased at 10-15%; kappa and lambda immunohistochemistry are negative for clonality. NGS negative, FISH with Loss of 5p15.2 (37.625%) and negative for TP53 loss. Cytogenetics showed 45,XY, -5, add(9)(p13), -17, add(17)(p13), +mar[20].   Date Treatment Name Response Side Effects / Toxicities   12/29 Azacitadinex4 cycles                                           Lab Values     I have assessed all abnormal lab values for their clinical significance and any values considered clinically significant have been addressed in the assessment and plan.   Hospital Course:    Ziggy Hallman is  a 48 year old year old male with a PMHx of MDS, hx of CAD, HTN, multiple recurrent arterial thromboembolic events (STEMIs, renal infarcts, arterial embolism, TIA) and DVTs who is undergoing a MA (Bu/Flu) prep for an 8/8 URD PBSCT day +26. Stay has been complicated by mucositis requiring PCA, N/F, Staph Epi bacteremia, pulmonary embolism requiring anticoagulation. Hospitalization prolonged following engraftment 2/2 to large stool volumes requiring ID and GVHD rule out. Underwent flex sig/EGD results are fairly unremarkable with occasional crypt apoptotic bodies in duodenum, hemorrhagic gastritis in antrum, negative CMV HSV H pylori stains, diarrhea symptoms improved today, anorexia continues. Will follow closely in clinic.  BMT/IEC PROTOCOL for 2015-29  Chemo Prep:   Day -6: Admit  Day -5 through Day -2: Busulfan/Fludarabine  Day -1: Rest.   Keppra prophylaxis      8/8 DP permissive. Cell dose-9.24x10^6  ABO: Donor: O+ Recipient A-  (Minor incompatability, no flush required)   GSCF plan: GCSF to start day +5 until ANC >1500 for 3 consecutive days- stopped on 2/17  Restaging plan: Restaging BMBX 2/26/24  Day +21 Chimerisms: CD3+ 65%, CD33+= 82%     HEME/COAG  #Segmental R PE (2/6): Hep gtt transitioned to Eliquis 5mg once platelets tolerated  #APS work up- lupus antigen +, will follow outpt as may be unreliable in this acute setting and prior APS work up had been unremarkable prior to admission.  #Pancytopenia 2/2 chemo- anemia, leukopenia, thrombocytopenia  #Transfusion parameters: hemoglobin <8 (cardiac hx), platelets <30k   #Recurrent arterial thromboembolic events:  He has long history of various events including renal infarcts (2011, 2013, 2015), left popliteal embolic episode requiring surgery, anticoagulation (2011), right carotid artery embolic episode with TIA/stroke-like symptoms (2012), embolic MI (2015), gangrenous right toe requiring vascular surgery/amputation (2017), in-stent restenosis MI (2017),  stroke (2020) added rivaroxaban to prasugrel, PFO closure 2020.   Extensive hypercoagulable workup to date has been unrevealing.  JAK2 testing negative.  PNH testing negative.  Elevated IgA of unknown significance, no evidence of plasma cell disorder.  - Eliquis and Prasugrel HELD starting 2/1-2/16. Increase Eliquis to 5mg BID with platelet improvement and re-add prasugrel once his platelets are >/75k.      IMMUNOCOMPROMISED  #Nonneutropenic fever Cefepime 2/17-2/18. Work up unremarkable. Fevers resolved.  #Staph epi bacteremia - Resolved. Received IV vancomycin.   #Febrile neutropenia, fever resolved. See previous notes for abx therapy.   #C. Diff - Admit c. Diff triple+ - Start PO vanc 1/21 for 14 day course. - completed on 2/4, with broadening abx restart PO Vanco tx dose x 10 days. C.diff recheck negative on 2/17  -Prophylaxis plan: ACV LD, Megan HD, Bactrim +28     RISK OF GVHD, UGI stage I (anorexia), LGI II (pathology results not indicative of GVHD)  - Prophylaxis: PtC day +3, +4, , Siro/MMF to start day +5, level 10.7 on 2/20- pharmacist notified to inform patient during clinic to change dose to 3mg qday as he was sent with 3.5mg  #Anorexia  #Diarrhea LGI stage II volume >1300mL 2/20  -Has had ongoing diarrhea since early admission. He was treated for c. Diff for 14 days. He failed anti-diarrheal medicine. Unclear exactly how much diarrhea he is having as reports to nursing are different than reports to providers, but thinks +20 a day. Improved with imodium QID PRN, metamucil.   - AREG and fecal kvng protectin unremarkable, trend  - GI Luminal consult - EGD, FlexSig show occasional crypt apoptotic bodies in duodenum, hemorrhagic gastritis in antrum, negative CMV/HSV/H pylori stains.     CARDIOVASCULAR  #CAD  #Hx of CABG  #HTN   -PTA metoprolol, Lisinopril dose increased 2/2 HTN,  nifidepine ER 30mg BID, labetolol PRN;  #Hyperlipidemia: Holding atorvastatin peritransplant  - Risk of cardiomyopathy:  Baseline EF  50-55%, Global left ventricular function mildly reduced. Grade 1 or early diastolic dysfunction.   - Risk of arrhythmia: Baseline EKG showed NSR, Qtc -425     GI/NUTRITION  #Diarrhea - repeat infx work up unremarkable. Likely mucositis 2/2 radiation/chemo +/- abx. MMF trough unremarkable. Continues metamucil and loperamide QID prn.   #Oropharyngeal mucositis- resolved.   - Ulcer prophylaxis: Protonix   - VOD Prophylaxis: Ursodiol  - Risk of nausea/vomiting due to chemo: Ativan, Compazine prn   #Moderate malnutrition 2/2 acute on chronic illness- dietician to follow     RENAL/ELECTROLYTES/  #CONNIE - 2/2 intravascular depletion +/- diuresis. IVF replacement 2/20.   #Hematuria- No dysuria, resolved  #Proteinuria- possibly exacerbated by persistent HTN (See CV),   #Hypokalemia- replace as needed. 40meq PO at discharge.  - Electrolyte management: replace per sliding scale     Psych:    #Anxiety- saw inpatient psych who offered atarax, but did not work well. Health psych offered, patient declined.  #Insomnia- PTA ambien and trazadone discontinued with concerns for AMS, sedation with concurrent pain meds. melatonin added.     Known issues that I take into account for medical decisions, with salient changes to the plan considering these complexities noted above.         Patient Active Problem List   Diagnosis    Amegakaryocytic thrombocytopenia (H)    Anemia due to bone marrow failure, unspecified bone marrow failure type (H)    Aplastic anemia (H)         Clinically Significant Risk Factors          # Hypokalemia: Lowest K = 3 mmol/L in last 2 days, will replace as needed       # Hypoalbuminemia: Lowest albumin = 2 g/dL at 1/22/2024  4:12 AM, will monitor as appropriate     # Coagulation Defect: INR = 1.35 (Ref range: 0.85 - 1.15) and/or PTT = 40 Seconds (Ref range: 22 - 38 Seconds), will monitor for bleeding  # Thrombocytopenia: Lowest platelets = 42 in last 2 days, will monitor for bleeding                             Dispo: discharge 2/20     Clinically Significant Risk Factors        # Hypokalemia: Lowest K = 3.2 mmol/L in last 2 days, will replace as needed       # Hypoalbuminemia: Lowest albumin = 2 g/dL at 1/22/2024  4:12 AM, will monitor as appropriate  # Coagulation Defect: INR = 1.35 (Ref range: 0.85 - 1.15) and/or PTT = 40 Seconds (Ref range: 22 - 38 Seconds), will monitor for bleeding  # Thrombocytopenia: Lowest platelets = 42 in last 2 days, will monitor for bleeding                     CODE STATUS: Full Code  Discharge Instructions and Follow-Up:    Discharge diet: Regular diet as tolerated  Discharge activity: Activity as tolerated   Discharge follow-up: Follow up with BMT Clinic as follows:  2/21/24 BMT clinic 700a labs, 730a provider visit, 830am Pharmacy   Discharge Disposition:    Discharged to home.    Toyin Herrmann PA-C  2/20/2024    Advice for Patients concerning COVID19:  a. Avoid contact with individuals:   i. Who are sick or have recently been sick  ii. Have traveled to high risk areas (per CDC guidelines) or have been on a cruise in the last 14 days  iii. Who were or could have been exposed to COVID-19   b. If experiencing symptoms such as: Fever, cough or shortness of breath contact BMT at [phone] Mon-Fri 8am-4:30pm or After Hours at [phone] (ask to speak to a BMT Fellow) for guidance on need for clinical assessment  c. Avoid all non- essential travel at this time; if traveling is necessary use mask (N-95)   d. Wear a mask when in public areas  d. Avoid crowded places, if possible  f. Follow CDC advice https://www.cdc.gov/coronavirus/2019-ncov/index.html and travel guidelines https://www.cdc.gov/coronavirus/2019-ncov/travelers/index.html       ______________________________________________      BMT ATTENDING NOTE    Ziggy Hallman is a 48 year old male, admitted on 1/19/2024, who is ready for discharge after admission for MA allo BMT with 8/8 URD donor for high risk MDS.  He has engrafted  and was restarted on apixaban 5mg BID given his stable platelets ~50k.  He has a significant history of arterial thrombi of unclear etiology.  He should likely restart prasugrel once plts are ~75k stably as outpt unless there is another plan for this.  Biopsies from EGD/flex sig came back without evidence for GVHD and infectious stool work up was entirely negative.  Will discharge today and have follow-up in clinic tomorrow for discharge visit.        I spent 35 minutes in the care of Ziggy today, including an independent face-to-face assessment, review of vitals, medications, laboratory results, independent review of imaging studies and making arrangements for outpatient monitoring, counseling the patient about symptoms and side effects to report, and discussing this plan with the team today. I personally performed all of the medical decision making associated with this visit, and I edited the above note to reflect my current plan of care. I spent over 50% of my time counseling the patient/family today and coordinating care with the team.    Key management decisions made by me and carried out under my direction include:   -Ready for discharge.   -No evidence of GVHD on biopsies.   -Diarrhea regimen.   -Anticoagulation plan.     Counseling and/or coordination of care performed by me:    -GVHD workup results.   -Anticoagulation plan.     Ranges for vital signs:    Temp:  [97.1  F (36.2  C)-98.6  F (37  C)] 97.1  F (36.2  C)  Pulse:  [] 104  Resp:  [16-18] 16  BP: (114-160)/(81-91) 119/85  SpO2:  [96 %-100 %] 100 %    Infusions:      Scheduled Medications:        Carlos Oliveros MD, PhD  BMT/Cellular Therapy/ Oncology       Securely message with the Vocera Web Console

## 2024-02-20 NOTE — PROGRESS NOTES
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 No rash. No N/V. Completely uninterested in eating last night which he attributes to hospital food and taste changes. Diarrhea feels like was much more tolerable once he returned from his scopes, 1300mL x5 episodes (in context of receiving two enemas prior to procedure yesterday). He has no belly pain, stool color is dark green and liquid.      Review of Systems    Review of Systems: 10 point ROS negative unless stated in HPI   PHYSICAL EXAM      Weight     Wt Readings from Last 3 Encounters:   02/20/24 78.8 kg (173 lb 12.8 oz)   01/17/24 90.1 kg (198 lb 9.6 oz)   01/12/24 88.9 kg (196 lb)        KPS: 60    /82 (BP Location: Right arm)   Pulse 112   Temp 97.8  F (36.6  C) (Axillary)   Resp 16   Ht 1.725 m (5' 7.91\")   Wt 78.8 kg (173 lb 12.8 oz)   SpO2 99%   BMI 26.49 kg/m       General: NAD,  Lungs: CTA  Cardiovascular: RRR, no murmur rub or gallops   Lymphatics: Trace BLE edema  Skin: no rashes, dry skin   Neuro: A&Ox3  Additional Findings: Powell site NT, no drainage.    Current aGVHD staging:  Skin 0, UGI 1, LGI 2, Liver 0 (keep in note through day +180 for allos)      LABS AND IMAGING: I have assessed all abnormal lab values for their clinical significance and any values considered clinically significant have been addressed in the assessment and plan.        Lab Results   Component Value Date    WBC 2.3 (L) 02/20/2024    ANEUTAUTO 2.4 02/15/2024    HGB 11.0 (L) 02/20/2024    HCT 30.6 (L) 02/20/2024    PLT " 58 (L) 02/20/2024     02/20/2024    POTASSIUM 3.5 02/20/2024    CHLORIDE 103 02/20/2024    CO2 20 (L) 02/20/2024     (H) 02/20/2024    BUN 18.1 02/20/2024    CR 1.20 (H) 02/20/2024    MAG 1.8 02/20/2024    INR 1.35 (H) 02/19/2024         SYSTEMS-BASED ASSESSMENT AND PLAN     Ziggy Hallman is a 48 year old year old male with a PMHx of MDS, hx of CAD, HTN, multiple recurrent arterial thromboembolic events (STEMIs, renal infarcts, arterial embolism, TIA) and DVTs who is undergoing a MA (Bu/Flu) prep for an 8/8 URD PBSCT day +26. Stay has been complicated by mucositis requiring PCA, N/F, Staph Epi bacteremia, pulmonary embolism requiring anticoagulation. Hospitalization prolonged following engraftment 2/2 to large stool volumes requiring ID and GVHD rule out. Underwent flex sig/EGD results are fairly unremarkable with occasional crypt apoptotic bodies in duodenum, hemorrhagic gastritis in antrum, negative CMV HSV H pylori stains, diarrhea symptoms improved today, anorexia continues. Will follow closely in clinic.      BMT/IEC PROTOCOL for 2015-29  Chemo Prep:   Day -6: Admit  Day -5 through Day -2: Busulfan/Fludarabine  Day -1: Rest.   Keppra prophylaxis      8/8 DP permissive. Cell dose-9.24x10^6  ABO: Donor: O+ Recipient A-  (Minor incompatability, no flush required)   GSCF plan: GCSF to start day +5 until ANC >1500 for 3 consecutive days- stopped on 2/17  Restaging plan: Restaging BMBX 2/26/24  Day +21 Chimerisms: CD3+ 65%, CD33+= 82%     HEME/COAG  #Segmental R PE (2/6): Hep gtt transitioned to Eliquis 5mg once platelets tolerated  #APS work up- lupus antigen +, will follow outpt as may be unreliable in this acute setting and prior APS work up had been unremarkable prior to admission.  #Pancytopenia 2/2 chemo- anemia, leukopenia, thrombocytopenia  #Transfusion parameters: hemoglobin <8 (cardiac hx), platelets <30k   #Recurrent arterial thromboembolic events:  He has long history of various events  including renal infarcts (2011, 2013, 2015), left popliteal embolic episode requiring surgery, anticoagulation (2011), right carotid artery embolic episode with TIA/stroke-like symptoms (2012), embolic MI (2015), gangrenous right toe requiring vascular surgery/amputation (2017), 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   Extensive hypercoagulable workup to date has been unrevealing.  JAK2 testing negative.  PNH testing negative.  Elevated IgA of unknown significance, no evidence of plasma cell disorder.  - Eliquis and Prasugrel HELD starting 2/1-2/16. Increase Eliquis to 5mg BID with platelet improvement and re-add prasugrel once his platelets are >/75k.     IMMUNOCOMPROMISED  #Nonneutropenic fever Cefepime 2/17-2/18. Work up unremarkable. Fevers resolved.  #Staph epi bacteremia - Resolved. Received IV vancomycin.   #Febrile neutropenia, fever resolved. See previous notes for abx therapy.   #C. Diff - Admit c. Diff triple+ - Start PO vanc 1/21 for 14 day course. - completed on 2/4, with broadening abx restart PO Vanco tx dose x 10 days. C.diff recheck negative on 2/17  -Prophylaxis plan: ACV LD, Megan HD, Bactrim +28     RISK OF GVHD, UGI stage I (anorexia), LGI II (pathology results not indicative of GVHD)  - Prophylaxis: PtC day +3, +4, , Siro/MMF to start day +5, level 10.7 on 2/20- pharmacist notified to inform patient during clinic to change dose to 3mg qday as he was sent with 3.5mg  #Anorexia  #Diarrhea LGI stage II volume >1300mL 2/20  -Has had ongoing diarrhea since early admission. He was treated for c. Diff for 14 days. He failed anti-diarrheal medicine. Unclear exactly how much diarrhea he is having as reports to nursing are different than reports to providers, but thinks +20 a day. Improved with imodium QID PRN, metamucil.   - AREG and fecal kvng protectin unremarkable, trend  - GI Luminal consult - EGD, FlexSig show occasional crypt apoptotic bodies  in duodenum, hemorrhagic gastritis in antrum, negative CMV/HSV/H pylori stains.     CARDIOVASCULAR  #CAD  #Hx of CABG  #HTN   -PTA metoprolol, Lisinopril dose increased 2/2 HTN,  nifidepine ER 30mg BID, labetolol PRN;  #Hyperlipidemia: Holding atorvastatin peritransplant  - Risk of cardiomyopathy:  Baseline EF 50-55%, Global left ventricular function mildly reduced. Grade 1 or early diastolic dysfunction.   - Risk of arrhythmia: Baseline EKG showed NSR, Qtc -425     GI/NUTRITION  #Diarrhea - repeat infx work up unremarkable. Likely mucositis 2/2 radiation/chemo +/- abx. MMF trough unremarkable. Continues metamucil and loperamide QID prn.   #Oropharyngeal mucositis- resolved.   - Ulcer prophylaxis: Protonix   - VOD Prophylaxis: Ursodiol  - Risk of nausea/vomiting due to chemo: Ativan, Compazine prn   #Moderate malnutrition 2/2 acute on chronic illness- dietician to follow     RENAL/ELECTROLYTES/  #CONNIE - 2/2 intravascular depletion +/- diuresis. IVF replacement 2/20.   #Hematuria- No dysuria, resolved  #Proteinuria- possibly exacerbated by persistent HTN (See CV),   #Hypokalemia- replace as needed. 40meq PO at discharge.  - Electrolyte management: replace per sliding scale     Psych:    #Anxiety- saw inpatient psych who offered atarax, but did not work well. Health psych offered, patient declined.  #Insomnia- PTA ambien and trazadone discontinued with concerns for AMS, sedation with concurrent pain meds. melatonin added.     Known issues that I take into account for medical decisions, with salient changes to the plan considering these complexities noted above.         Patient Active Problem List   Diagnosis    Amegakaryocytic thrombocytopenia (H)    Anemia due to bone marrow failure, unspecified bone marrow failure type (H)    Aplastic anemia (H)         Clinically Significant Risk Factors          # Hypokalemia: Lowest K = 3 mmol/L in last 2 days, will replace as needed       # Hypoalbuminemia: Lowest albumin = 2  g/dL at 1/22/2024  4:12 AM, will monitor as appropriate     # Coagulation Defect: INR = 1.35 (Ref range: 0.85 - 1.15) and/or PTT = 40 Seconds (Ref range: 22 - 38 Seconds), will monitor for bleeding  # Thrombocytopenia: Lowest platelets = 42 in last 2 days, will monitor for bleeding                            Dispo: discharge 2/20         CODE STATUS: Full Code    I spent 30 minutes in the care of this patient today, which included time necessary for preparation for the visit, obtaining history, ordering medications/tests/procedures as medically indicated, review of pertinent medical literature, counseling of the patient, communication of recommendations to the care team, and documentation time.    Toyin Herrmann PA-C        ______________________________________________      BMT ATTENDING NOTE    Ziggy Hallman is a 48 year old male, admitted on 1/19/2024, who is ready for discharge after admission for MA allo BMT with 8/8 URD donor for high risk MDS.  He has engrafted and was restarted on apixaban 5mg BID given his stable platelets ~50k.  He has a significant history of arterial thrombi of unclear etiology.  He should likely restart prasugrel once plts are ~75k stably as outpt unless there is another plan for this.  Biopsies from EGD/flex sig came back without evidence for GVHD and infectious stool work up was entirely negative.  Will discharge today and have follow-up in clinic tomorrow for discharge visit.        I spent 35 minutes in the care of Ziggy today, including an independent face-to-face assessment, review of vitals, medications, laboratory results, independent review of imaging studies and making arrangements for outpatient monitoring, counseling the patient about symptoms and side effects to report, and discussing this plan with the team today. I personally performed all of the medical decision making associated with this visit, and I edited the above note to reflect my current plan of care. I  spent over 50% of my time counseling the patient/family today and coordinating care with the team.    Key management decisions made by me and carried out under my direction include:   -Ready for discharge.   -No evidence of GVHD on biopsies.   -Diarrhea regimen.   -Anticoagulation plan.     Counseling and/or coordination of care performed by me:    -GVHD workup results.   -Anticoagulation plan.     Ranges for vital signs:    Temp:  [97.1  F (36.2  C)-98.6  F (37  C)] 97.1  F (36.2  C)  Pulse:  [] 104  Resp:  [16-18] 16  BP: (114-160)/(81-91) 119/85  SpO2:  [96 %-100 %] 100 %    Infusions:      Scheduled Medications:        Carlos Oliveros MD, PhD  BMT/Cellular Therapy/ Oncology       Securely message with the Vocera Web Console

## 2024-02-20 NOTE — PROGRESS NOTES
Pt discharged to: home in [place], [place]  Via: sister's car  Time: 15:10  Reason not before 11am or 2 hrs after order written: orders not in, not planned to discharge today  Accompanied by:   Belongings: with patient, nothing to give outside of room  Teaching: medication compliance and filling med box, follow-up appointments, line care   Cap and line flushed with caregiver: went over on the weekend with RN, caregiver called and reminded that QR codes are available for further education  Pill box filled: yes, with patient and writer  Clinic appointment: tomorrow at 07:00  Local housing: yes 30 mins out    Discharge instructions given to pt. Pt caregiver called and discussed discharge teachings. Line care was educated in on over the weekend as pt was planning to leave previous days. Pill box filled by pt with RN watching. Pt has pharmacist appointment tomorrow morning. Denies further questions or concerns at this time.  Up ad amna. Denies pain, nausea, SOB. One time dose of K given and NS bolus prior to discharge.

## 2024-02-21 ENCOUNTER — ONCOLOGY VISIT (OUTPATIENT)
Dept: TRANSPLANT | Facility: CLINIC | Age: 49
End: 2024-02-21
Attending: INTERNAL MEDICINE
Payer: COMMERCIAL

## 2024-02-21 ENCOUNTER — APPOINTMENT (OUTPATIENT)
Dept: LAB | Facility: CLINIC | Age: 49
End: 2024-02-21
Attending: INTERNAL MEDICINE
Payer: COMMERCIAL

## 2024-02-21 ENCOUNTER — PATIENT OUTREACH (OUTPATIENT)
Dept: CARE COORDINATION | Facility: CLINIC | Age: 49
End: 2024-02-21

## 2024-02-21 VITALS
OXYGEN SATURATION: 99 % | DIASTOLIC BLOOD PRESSURE: 78 MMHG | HEART RATE: 102 BPM | TEMPERATURE: 97.4 F | SYSTOLIC BLOOD PRESSURE: 126 MMHG | RESPIRATION RATE: 16 BRPM

## 2024-02-21 VITALS
TEMPERATURE: 97.5 F | HEART RATE: 118 BPM | OXYGEN SATURATION: 100 % | WEIGHT: 178.6 LBS | SYSTOLIC BLOOD PRESSURE: 77 MMHG | DIASTOLIC BLOOD PRESSURE: 56 MMHG | BODY MASS INDEX: 27.23 KG/M2 | RESPIRATION RATE: 18 BRPM

## 2024-02-21 DIAGNOSIS — D46.9 MDS (MYELODYSPLASTIC SYNDROME) (H): ICD-10-CM

## 2024-02-21 DIAGNOSIS — T45.1X5A ANTINEOPLASTIC CHEMOTHERAPY INDUCED PANCYTOPENIA (H): Primary | ICD-10-CM

## 2024-02-21 DIAGNOSIS — D46.9 MDS (MYELODYSPLASTIC SYNDROME) (H): Primary | ICD-10-CM

## 2024-02-21 DIAGNOSIS — Z94.81 STATUS POST BONE MARROW TRANSPLANT (H): ICD-10-CM

## 2024-02-21 DIAGNOSIS — D61.810 ANTINEOPLASTIC CHEMOTHERAPY INDUCED PANCYTOPENIA (H): Primary | ICD-10-CM

## 2024-02-21 LAB
ACANTHOCYTES BLD QL SMEAR: NORMAL
ALBUMIN SERPL BCG-MCNC: 3.5 G/DL (ref 3.5–5.2)
ALP SERPL-CCNC: 95 U/L (ref 40–150)
ALT SERPL W P-5'-P-CCNC: 18 U/L (ref 0–70)
ANION GAP SERPL CALCULATED.3IONS-SCNC: 13 MMOL/L (ref 7–15)
AST SERPL W P-5'-P-CCNC: 41 U/L (ref 0–45)
AUER BODIES BLD QL SMEAR: NORMAL
BACTERIA BLD CULT: NO GROWTH
BASO STIPL BLD QL SMEAR: NORMAL
BASOPHILS # BLD AUTO: 0.1 10E3/UL (ref 0–0.2)
BASOPHILS NFR BLD AUTO: 1 %
BILIRUB SERPL-MCNC: 0.4 MG/DL
BITE CELLS BLD QL SMEAR: NORMAL
BLISTER CELLS BLD QL SMEAR: NORMAL
BUN SERPL-MCNC: 31.3 MG/DL (ref 6–20)
BURR CELLS BLD QL SMEAR: NORMAL
CALCIUM SERPL-MCNC: 9.3 MG/DL (ref 8.6–10)
CHLORIDE SERPL-SCNC: 104 MMOL/L (ref 98–107)
CREAT SERPL-MCNC: 2.4 MG/DL (ref 0.67–1.17)
DACRYOCYTES BLD QL SMEAR: NORMAL
DEPRECATED HCO3 PLAS-SCNC: 18 MMOL/L (ref 22–29)
EGFRCR SERPLBLD CKD-EPI 2021: 32 ML/MIN/1.73M2
ELLIPTOCYTES BLD QL SMEAR: NORMAL
EOSINOPHIL # BLD AUTO: 0 10E3/UL (ref 0–0.7)
EOSINOPHIL NFR BLD AUTO: 0 %
ERYTHROCYTE [DISTWIDTH] IN BLOOD BY AUTOMATED COUNT: 13.2 % (ref 10–15)
FRAGMENTS BLD QL SMEAR: NORMAL
GLUCOSE SERPL-MCNC: 169 MG/DL (ref 70–99)
HCT VFR BLD AUTO: 31.3 % (ref 40–53)
HGB BLD-MCNC: 11.2 G/DL (ref 13.3–17.7)
HGB C CRYSTALS: NORMAL
HHV-6 DNA COPIES/ML, INSTRUMENT: ABNORMAL COPIES/ML
HHV6 DNA SPEC NAA+PROBE-LOG#: 4.4 {LOG_COPIES}/ML
HOWELL-JOLLY BOD BLD QL SMEAR: NORMAL
IMM GRANULOCYTES # BLD: 0.2 10E3/UL
IMM GRANULOCYTES NFR BLD: 4 %
LYMPHOCYTES # BLD AUTO: 0.4 10E3/UL (ref 0.8–5.3)
LYMPHOCYTES NFR BLD AUTO: 11 %
MAGNESIUM SERPL-MCNC: 1.9 MG/DL (ref 1.7–2.3)
MCH RBC QN AUTO: 29.9 PG (ref 26.5–33)
MCHC RBC AUTO-ENTMCNC: 35.8 G/DL (ref 31.5–36.5)
MCV RBC AUTO: 84 FL (ref 78–100)
MONOCYTES # BLD AUTO: 0.9 10E3/UL (ref 0–1.3)
MONOCYTES NFR BLD AUTO: 21 %
NEUTROPHILS # BLD AUTO: 2.5 10E3/UL (ref 1.6–8.3)
NEUTROPHILS NFR BLD AUTO: 63 %
NEUTS HYPERSEG BLD QL SMEAR: NORMAL
NRBC # BLD AUTO: 0 10E3/UL
NRBC BLD AUTO-RTO: 0 /100
PHOSPHATE SERPL-MCNC: 3.8 MG/DL (ref 2.5–4.5)
PLAT MORPH BLD: NORMAL
PLATELET # BLD AUTO: 84 10E3/UL (ref 150–450)
POLYCHROMASIA BLD QL SMEAR: NORMAL
POTASSIUM SERPL-SCNC: 4 MMOL/L (ref 3.4–5.3)
PROT SERPL-MCNC: 7.2 G/DL (ref 6.4–8.3)
RBC # BLD AUTO: 3.75 10E6/UL (ref 4.4–5.9)
RBC AGGLUT BLD QL: NORMAL
RBC MORPH BLD: NORMAL
ROULEAUX BLD QL SMEAR: NORMAL
SICKLE CELLS BLD QL SMEAR: NORMAL
SIROLIMUS BLD-MCNC: 10.2 UG/L (ref 5–15)
SMUDGE CELLS BLD QL SMEAR: NORMAL
SODIUM SERPL-SCNC: 135 MMOL/L (ref 135–145)
SPHEROCYTES BLD QL SMEAR: NORMAL
STOMATOCYTES BLD QL SMEAR: NORMAL
TARGETS BLD QL SMEAR: NORMAL
TME LAST DOSE: NORMAL H
TME LAST DOSE: NORMAL H
TOXIC GRANULES BLD QL SMEAR: NORMAL
VARIANT LYMPHS BLD QL SMEAR: NORMAL
WBC # BLD AUTO: 4 10E3/UL (ref 4–11)

## 2024-02-21 PROCEDURE — 258N000003 HC RX IP 258 OP 636: Performed by: PHYSICIAN ASSISTANT

## 2024-02-21 PROCEDURE — 85025 COMPLETE CBC W/AUTO DIFF WBC: CPT

## 2024-02-21 PROCEDURE — 96365 THER/PROPH/DIAG IV INF INIT: CPT

## 2024-02-21 PROCEDURE — 36592 COLLECT BLOOD FROM PICC: CPT

## 2024-02-21 PROCEDURE — 83735 ASSAY OF MAGNESIUM: CPT

## 2024-02-21 PROCEDURE — 80195 ASSAY OF SIROLIMUS: CPT

## 2024-02-21 PROCEDURE — 80053 COMPREHEN METABOLIC PANEL: CPT

## 2024-02-21 PROCEDURE — 250N000011 HC RX IP 250 OP 636: Mod: JZ | Performed by: PHYSICIAN ASSISTANT

## 2024-02-21 PROCEDURE — 99215 OFFICE O/P EST HI 40 MIN: CPT

## 2024-02-21 PROCEDURE — G0463 HOSPITAL OUTPT CLINIC VISIT: HCPCS | Mod: 25

## 2024-02-21 PROCEDURE — 99417 PROLNG OP E/M EACH 15 MIN: CPT

## 2024-02-21 PROCEDURE — 96361 HYDRATE IV INFUSION ADD-ON: CPT

## 2024-02-21 PROCEDURE — 84100 ASSAY OF PHOSPHORUS: CPT

## 2024-02-21 RX ORDER — HEPARIN SODIUM (PORCINE) LOCK FLUSH IV SOLN 100 UNIT/ML 100 UNIT/ML
5 SOLUTION INTRAVENOUS
Status: CANCELLED | OUTPATIENT
Start: 2024-02-21

## 2024-02-21 RX ORDER — PRASUGREL 10 MG/1
10 TABLET, FILM COATED ORAL DAILY
Qty: 30 TABLET | Refills: 0 | Status: SHIPPED | OUTPATIENT
Start: 2024-02-21 | End: 2024-03-07

## 2024-02-21 RX ORDER — METOPROLOL SUCCINATE 100 MG/1
100 TABLET, EXTENDED RELEASE ORAL DAILY
COMMUNITY
Start: 2024-02-21 | End: 2024-02-22

## 2024-02-21 RX ORDER — LISINOPRIL 40 MG/1
40 TABLET ORAL DAILY
COMMUNITY
Start: 2024-02-21 | End: 2024-02-26

## 2024-02-21 RX ORDER — NIFEDIPINE 30 MG
30 TABLET, EXTENDED RELEASE ORAL 2 TIMES DAILY
COMMUNITY
Start: 2024-02-21 | End: 2024-02-28

## 2024-02-21 RX ORDER — HEPARIN SODIUM,PORCINE 10 UNIT/ML
5-20 VIAL (ML) INTRAVENOUS DAILY PRN
Status: CANCELLED | OUTPATIENT
Start: 2024-02-21

## 2024-02-21 RX ORDER — LOPERAMIDE HCL 2 MG
4 CAPSULE ORAL 4 TIMES DAILY
COMMUNITY
Start: 2024-02-21 | End: 2024-02-26

## 2024-02-21 RX ADMIN — SODIUM CHLORIDE 1000 ML: 9 INJECTION, SOLUTION INTRAVENOUS at 09:14

## 2024-02-21 RX ADMIN — SODIUM CHLORIDE 1000 ML: 9 INJECTION, SOLUTION INTRAVENOUS at 08:08

## 2024-02-21 RX ADMIN — MICAFUNGIN SODIUM 300 MG: 100 INJECTION, POWDER, LYOPHILIZED, FOR SOLUTION INTRAVENOUS at 09:55

## 2024-02-21 ASSESSMENT — PAIN SCALES - GENERAL: PAINLEVEL: NO PAIN (0)

## 2024-02-21 NOTE — PROGRESS NOTES
[facility]: [facility]    Background: Transitional Care Management program identified per system criteria and reviewed by [facility] [place] team for possible outreach.    Assessment: Upon chart review, [facility] Team member will not proceed with patient outreach related to this episode of Transitional Care Management program due to reason below:    Patient has a follow up appointment with an appropriate provider today for hospital discharge    Plan: Transitional Care Management episode addressed appropriately per reason noted above.      Andreea Glass RN  [facility] [place]    *[facility] Resource Team does NOT follow patient ongoing. Referrals are identified based on internal discharge reports and the outreach is to ensure patient has an understanding of their discharge instructions.

## 2024-02-21 NOTE — LETTER
2/21/2024         RE: Ziggy Hallman  [address] [address]        Dear Colleague,    Thank you for referring your patient, Ziggy Hallman, to the [facility] [place]. Please see a copy of my visit note below.    At Ziggy's request, I reviewed 1 week of scheduled medications into his med box.     Current Outpatient Medications   Medication Sig Dispense Refill    acyclovir (ZOVIRAX) 800 MG tablet Take 1 tablet (800 mg) by mouth 2 times daily 60 tablet 1    apixaban ANTICOAGULANT (ELIQUIS) 5 MG tablet Take 1 tablet (5 mg) by mouth 2 times daily 60 tablet 3    lisinopril (ZESTRIL) 40 MG tablet Take 1 tablet (40 mg) by mouth daily On HOLD starting 2/21      loperamide (IMODIUM) 2 MG capsule Take 2 capsules (4 mg) by mouth 4 times daily      LORazepam (ATIVAN) 0.5 MG tablet Take 1-2 tablets (0.5-1 mg) by mouth every 4 hours as needed for vomiting or nausea (nausea/vomiting/sleep) 15 tablet 0    metoprolol succinate ER (TOPROL XL) 100 MG 24 hr tablet Take 1 tablet (100 mg) by mouth daily On hold starting 2/21      micafungin (MYCAMINE) 50 MG injection Inject 30 mLs (300 mg) into the vein three times a week Inject 300 mg into vein three times a week until day +45      mycophenolate (GENERIC EQUIVALENT) 250 MG capsule Take 1 capsule (250 mg) by mouth 2 times daily Continue until day +35, follow clinic instruction on when to stop. 17 capsule 0    mycophenolate (GENERIC EQUIVALENT) 500 MG tablet Take 2 tablets (1,000 mg) by mouth 2 times daily Continue until day +35, follow clinic instructions on when to stop. 34 tablet 0    NIFEdipine ER (ADALAT CC) 30 MG 24 hr tablet Take 1 tablet (30 mg) by mouth 2 times daily On hold starting 2/21      pantoprazole (PROTONIX) 40 MG EC tablet Take 1 tablet (40 mg) by mouth 2 times daily (before meals) 60 tablet 1    prochlorperazine (COMPAZINE) 5 MG tablet Take 1-2 tablets (5-10 mg) by mouth every 6 hours as needed for  nausea or vomiting 30 tablet 1    psyllium (METAMUCIL/KONSYL) 58.6 % powder Take 3 g by mouth daily 174 g 1    sirolimus (GENERIC EQUIVALENT) 0.5 MG tablet Take 3 (1mg) tablet for a total dose of 3mg daily, use 0.5mg tabs for dose adjustments, follow instructions provided by clinic. 30 tablet 1    sirolimus (GENERIC EQUIVALENT) 1 MG tablet Take 3 (1mg) tablet for a total dose of 3mg daily, follow instructions provided by clinic for dose adjustments. 90 tablet 1    [START ON 2/26/2024] sulfamethoxazole-trimethoprim (BACTRIM DS) 800-160 MG tablet Take 1 tablet by mouth Every Mon, Tues two times daily HOLD until clinic instructs to start taking 16 tablet 1    ursodiol (ACTIGALL) 300 MG capsule Take 1 capsule (300 mg) by mouth 3 times daily 90 capsule 1        Pertinent labs considered today:  Lab Results   Component Value Date     02/21/2024     05/28/2020                 normal sodium 136-148  Lab Results   Component Value Date    POTASSIUM 4.0 02/21/2024    POTASSIUM 3.8 05/28/2020       normal potassium 3.5-5.2   Lab Results   Component Value Date    CHLORIDE 104 02/21/2024    CHLORIDE 107 05/28/2020           normal chloride    Lab Results   Component Value Date    CO2 18 02/21/2024    CO2 24 05/28/2020                normal CO2 20-32  Lab Results   Component Value Date    BUN 31.3 02/21/2024    BUN 10 05/28/2020                 normal BUN 5-24  Lab Results   Component Value Date     02/21/2024     01/24/2024     05/28/2020                 normal glucose   Lab Results   Component Value Date    CR 2.40 02/21/2024    CR 0.80 05/28/2020                 normal cr 0.8-1.5  Lab Results   Component Value Date    REMY 9.3 02/21/2024    REMY 9.3 05/28/2020               normal calcium  8.5-10.4  Lab Results   Component Value Date    BILITOTAL 0.4 02/21/2024    BILITOTAL 0.8 05/28/2020          normal bilirubin 0.2-1.3  Lab Results   Component Value Date    PROTTOTAL 7.2 02/21/2024     PROTTOTAL 8.7 05/28/2020          normal total protein 6.0-8.2  Lab Results   Component Value Date    ALBUMIN 3.5 02/21/2024    ALBUMIN 3.4 05/28/2020             normal albumin 3.2-4.5  Lab Results   Component Value Date    ALKPHOS 95 02/21/2024    ALKPHOS 114 05/28/2020            normal alkphos   Lab Results   Component Value Date    ALT 18 02/21/2024    ALT 25 05/28/2020         normal ALT 0-65  Lab Results   Component Value Date    AST 41 02/21/2024    AST 20 05/28/2020         normal AST 0-37    Lab Results   Component Value Date    HGB 11.2 02/21/2024    HGB 12.6 05/28/2020     Lab Results   Component Value Date    WBC 4.0 02/21/2024    WBC 4.9 05/28/2020      Lab Results   Component Value Date    PLT 84 02/21/2024     05/28/2020       Estimated Creatinine Clearance: 42.2 mL/min (A) (based on SCr of 2.4 mg/dL (H)).    Changes made to scheduled medication list today include:  Added: None  Deleted:  Holding lisinopril, metoprolol, and nifedipine  Changed:   Loperamide increased to 4 mg QID  Sirolimus decreased to 3 mg daily  Psyllium changed from capsules to powder    Actions taken by pharmacist (provider contacted, etc): MMF dosing was incorrect but additional tablets were added to correct dose.  New Rx for Psyllium powder was called into [facility] so enough medication is obtained to refill the med box next week, prior to the patient arriving in clinic (there were enough medications to refill the box for 1 week today). I gave Ziggy an updated medication list that indicated what meds were put in the med box.       TONIE PHAM, [facility], PharmD

## 2024-02-21 NOTE — LETTER
2/21/2024         RE: Ziggy Hallman  [address] [address]        Dear Colleague,    Thank you for referring your patient, Ziggy Hallman, to the [facility] BLOOD AND MARROW TRANSPLANT PROGRAM [place]. Please see a copy of my visit note below.    BMT Progress Notes  02/21/2024       Patient ID: Ziggy Hallman is a 48 year old year old male with a PMH of MDS, hx of multiple clots and MI undergoing a MA (Bu/Flu) prep for an 8/8 URD PBSCT currently day 27.      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 He had some lightheadedness with standing up but nothing at rest. He ate and drank well last night - no nausea or vomiting. He has no pain today. He continues to watery stools 4-6x per day - unclear on volume. He is unclear on his medications all together today. He has taken no meds yet today.     Review of Systems                                                                                                                           Review of Systems: ROS negative unless stated in HPI   PHYSICAL EXAM                                                                                                                                      Wt Readings from Last 4 Encounters:   02/21/24 81 kg (178 lb 9.6 oz)   02/20/24 78.8 kg (173 lb 12.8 oz)   01/17/24 90.1 kg (198 lb 9.6 oz)   01/12/24 88.9 kg (196 lb)      KPS: 60     BP (!) 77/56   Pulse 118   Temp 97.5  F (36.4  C)   Resp 18   Wt 81 kg (178 lb 9.6 oz)   SpO2 100%   BMI 27.23 kg/m     After 2 L of IVF - pressures improved to 109/71    General: NAD   Eyes: GARY, sclera anicteric   Nose/Mouth/Throat: MMM  Lungs: CTA  bilaterally  Cardiovascular: RRR, no M/R/G   Abdominal/Rectal: +BS, soft, NT, ND  Lymphatics: No edema  Skin: No rashes or petechaie  Neuro: A&O   Additional Findings: CVC, NT, no drainage.     Current aGVHD staging:  Skin 0, UGI 1, LGI 2, Liver 0 (keep in note through day +180 for allos)  Acute GVHD          2/21/2024    09:06 2/15/2024    12:53 2/8/2024    12:52   Acute GVHD   New evidence of Acute Acvxr-Oabzih-Yjzt Disease developed since last entry? No No No          LABS AND IMAGING: I have assessed all abnormal lab values for their clinical significance and any values considered clinically significant have been addressed in the assessment and plan.       Lab Results   Component Value Date    WBC 4.0 02/21/2024    ANEU 1.6 02/20/2024    HGB 11.2 (L) 02/21/2024    HCT 31.3 (L) 02/21/2024    PLT 84 (L) 02/21/2024     02/21/2024    POTASSIUM 4.0 02/21/2024    CHLORIDE 104 02/21/2024    CO2 18 (L) 02/21/2024     (H) 02/21/2024    BUN 31.3 (H) 02/21/2024    CR 2.40 (H) 02/21/2024    MAG 1.9 02/21/2024    INR 1.35 (H) 02/19/2024        SYSTEMS-BASED ASSESSMENT AND PLAN      Ziggy Hallman is a 48 year old male with a PMHx of MDS, hx of CAD, HTN, multiple recurrent arterial thromboembolic events (STEMIs, renal infarcts, arterial embolism, TIA) and DVTs who is undergoing a MA (Bu/Flu) prep for an 8/8 URD PBSCT day +27.     Stay has been complicated by mucositis requiring PCA, N/F, Staph Epi bacteremia, pulmonary embolism requiring anticoagulation. Hospitalization prolonged following engraftment 2/2 to large stool volumes requiring ID and GVHD rule out. Underwent flex sig/EGD results are fairly unremarkable with occasional crypt apoptotic bodies in duodenum, hemorrhagic gastritis in antrum, negative CMV HSV H pylori stains, diarrhea symptoms improved upon discharge, anorexia continues.      BMT/IEC PROTOCOL for 2015-29  - Chemo Prep: BU/Flu   - 8/8 DP permissive. Cell dose-9.24x10^6  - ABO: Donor: O+  Recipient A-  (Minor incompatability, no flush required)   - Restaging plan: Restaging BMBX 2/26/24  - Day +21 Chimerisms: CD3+ 65%, CD33+= 82%     HEME/COAG  - GSCF plan: GCSF to start day +5 until ANC >1500 for 3 consecutive days- stopped on 2/17. (2/21) WBC/ANC on their own today  - Transfusion parameters: hemoglobin <8 (cardiac hx), platelets <30k   #Pancytopenia 2/2 chemo- anemia, leukopenia, thrombocytopenia  #Segmental R PE (2/6): Hep gtt transitioned to Eliquis 5mg once platelets tolerated  #APS work up- lupus antigen +, will follow outpt as may be unreliable in this acute setting and prior APS work up had been unremarkable prior to admission.  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   Extensive hypercoagulable workup to date has been unrevealing.  JAK2 testing negative.  PNH testing negative.  Elevated IgA of unknown significance, no evidence of plasma cell disorder.  - Eliquis and Prasugrel HELD starting 2/1-2/16. Increase Eliquis to 5mg BID with platelet improvement and re-add prasugrel once his platelets are >/75k.   (2/21) PLTs 86k. New rx for Prasugrel - will start this 2/22.     IMMUNOCOMPROMISED  #Nonneutropenic fever Cefepime 2/17-2/18. Work up unremarkable. Fevers resolved.  #Staph epi bacteremia - Resolved. Received IV vancomycin.   #Febrile neutropenia, fever resolved. See previous notes for abx therapy.   #C. Diff - Admit c. Diff triple+ - Start PO vanc 1/21 for 14 day course. - completed on 2/4, with broadening abx restart PO Vanco tx dose x 10 days. C.diff recheck negative on  2/17  -Prophylaxis plan: ACV LD, Megan HD (at home via [facility], but will given in clinic today (2/21)), Bactrim +28     RISK OF GVHD  UGI stage 0, LGI II (pathology results not indicative of GVHD)  - Prophylaxis: PtC day +3, +4, , Siro/MMF to start day +5  - Siro level 10.7 on 2/20 -- will decrease dose to 3 mg/d. Recheck level on Friday 2/23.    #Anorexia -- appetite improving slowly since   #Diarrhea LGI stage II (unknown volume, watery stools 4-6x daily)  - Has had ongoing diarrhea since early admission. He was treated for c. Diff for 14 days. He failed anti-diarrheal medicine. Unclear exactly how much diarrhea he is having as reports to nursing are different than reports to providers, but thinks +20 a day. Improved with imodium QID PRN, metamucil. (2/21) Scheduled imodium 2 mg QID. Switch metamucil to powder rather than pills, patient preference. Daily 3 g/d.  - AREG and fecal kvng protectin unremarkable, trend  - GI Luminal consult - EGD, FlexSig show occasional crypt apoptotic bodies in duodenum, hemorrhagic gastritis in antrum, negative CMV/HSV/H pylori stains.     CARDIOVASCULAR  #CAD  #Hx of CABG  #HTN  - PTA metoprolol, Lisinopril dose increased 2/2 HTN, nifidepine ER 30mg BID -- (2/21) placed all ON HOLD today, consider restarting metoprolol +/- other BP meds tomorrow in clinic  #Hyperlipidemia: Holding atorvastatin peritransplant  - Risk of cardiomyopathy:  Baseline EF 50-55%, Global left ventricular function mildly reduced. Grade 1 or early diastolic dysfunction.      - Risk of arrhythmia: Baseline EKG showed NSR, Qtc -425     GI/NUTRITION  #Diarrhea - repeat infx work up unremarkable. Likely mucositis 2/2 radiation/chemo +/- abx. MMF trough unremarkable. Continues metamucil and loperamide QID.   #Oropharyngeal mucositis- resolved.   - Ulcer prophylaxis: Protonix   - VOD Prophylaxis: Ursodiol  - Risk of nausea/vomiting due to chemo: Ativan, Compazine prn   #Moderate malnutrition 2/2 acute on chronic  illness - appetite slowly improving already since discharge.     RENAL/ELECTROLYTES/  #CONNIE - 2/2 intravascular depletion +/- diuresis. IVF replacement 2/20. (2/21) Cr 2.4 -- given 2 L of IVF in clinic for this and hypotension.  #Hematuria- No dysuria, resolved  #Proteinuria- possibly exacerbated by persistent HTN (See CV),   #Hypokalemia- replace as needed. 40meq PO at discharge.  - Electrolyte management: replace per sliding scale     Psych:    #Anxiety- saw inpatient psych who offered atarax, but did not work well. Health psych offered, patient declined.  #Insomnia- PTA ambien and trazadone discontinued with concerns for AMS, sedation with concurrent pain meds. melatonin added.      Today's Summary:  Plan to add back in metoprolol tomorrow   Will plan to hold lisinopril/nifidipine today  Requested patient bring back med box tomorrow - in case we need to change anything  2 L of IVF + ELVIS today -- future ELVIS will be done at home thru [facility]  Restart Prasugrel 10 mg/d    RTC: (2/22) NIDHI/labs/infusion (2/23) NIDHI/labs/infusion (2/26) BM Bx. Requested NIDHI visit on 2/26 as well.    I spent 60 minutes in the care of this patient today, which included time necessary for preparation for the visit, obtaining history, ordering medications/tests/procedures as medically indicated, review of pertinent medical literature, counseling of the patient, communication of recommendations to the care team, and documentation time.      Vazquez Rehman PA-C   *[phone]

## 2024-02-21 NOTE — PROGRESS NOTES
Infusion Nursing Note:  Ziggy Hallman presents today for scheduled infusion.    Patient seen by provider today: Yes: Vazquez Rehman   present during visit today: Not Applicable.    Note: Labs monitored. Megan infused per treatment protocol. 2L of NS infused per provider request for hypotension and elevated creatinine. BMT pharmacist came to see patient today. Patient will be doing Megan infusions at home, however we did complete his Megan infusion here today. Dressing changed today.     Noted patients right arm looked as though scabs had fallen off. Patient stated arm is tender due to PIV access that was placed on 2/6/24 and left in for 14 days. Patient stated he repeatedly mentioned to staff that IV was still in his arm, but nothing was done. Per flowsheet, PIV was placed 2/6/2024, and removed 2/20/2024 due to it being occluded/no longer indicated. Patient also has chest port, and Powell lines. BMT provider notified.  Compass report filed.       Intravenous Access:  Powell.    Treatment Conditions:  Lab Results   Component Value Date    HGB 11.2 (L) 02/21/2024    WBC 4.0 02/21/2024    ANEU 1.6 02/20/2024    ANEUTAUTO 2.5 02/21/2024    PLT 84 (L) 02/21/2024        Lab Results   Component Value Date     02/21/2024    POTASSIUM 4.0 02/21/2024    MAG 1.9 02/21/2024    CR 2.40 (H) 02/21/2024    REMY 9.3 02/21/2024    BILITOTAL 0.4 02/21/2024    ALBUMIN 3.5 02/21/2024    ALT 18 02/21/2024    AST 41 02/21/2024       Results reviewed, labs MET treatment parameters, ok to proceed with treatment.      Post Infusion Assessment:  Patient tolerated infusion without incident.       Discharge Plan:   Patient discharged in stable condition accompanied by: self and wife.  Departure Mode: Ambulatory.      Sheeba Hernandez RN

## 2024-02-21 NOTE — NURSING NOTE
"Oncology Rooming Note    February 21, 2024 7:51 AM   Ziggy Hallman is a 48 year old male who presents for:    No chief complaint on file.    Initial Vitals: Resp 18   Wt 81 kg (178 lb 9.6 oz)   BMI 27.23 kg/m   Estimated body mass index is 27.23 kg/m  as calculated from the following:    Height as of 1/19/24: 1.725 m (5' 7.91\").    Weight as of this encounter: 81 kg (178 lb 9.6 oz). Body surface area is 1.97 meters squared.  No Pain (0) Comment: Data Unavailable   No LMP for male patient.  Allergies reviewed: Yes  Medications reviewed: No- has appt with pharmD    Medications: Medication refills not needed today.  Pharmacy name entered into [employer] ([employer]): [id number] - [place], [place] - [address] [place] [address]    Frailty Screening:   Is the patient here for a new oncology consult visit in cancer care? 2. No      Clinical concerns: none       Lora Buckley RN             "

## 2024-02-21 NOTE — NURSING NOTE
Chief Complaint   Patient presents with    Labs Only     Labs drawn via cvc by Rn in lab, vitals completed.     Labs drawn via cvc, caps changed, line flushed with saline and heparin. VS obtained, pt hypotensive, provider notified and pt escorted to infusion via wheelchair.    Lora Buckley RN

## 2024-02-21 NOTE — PROGRESS NOTES
At Ziggy's request, I reviewed 1 week of scheduled medications into his med box.     Current Outpatient Medications   Medication Sig Dispense Refill    acyclovir (ZOVIRAX) 800 MG tablet Take 1 tablet (800 mg) by mouth 2 times daily 60 tablet 1    apixaban ANTICOAGULANT (ELIQUIS) 5 MG tablet Take 1 tablet (5 mg) by mouth 2 times daily 60 tablet 3    lisinopril (ZESTRIL) 40 MG tablet Take 1 tablet (40 mg) by mouth daily On HOLD starting 2/21      loperamide (IMODIUM) 2 MG capsule Take 2 capsules (4 mg) by mouth 4 times daily      LORazepam (ATIVAN) 0.5 MG tablet Take 1-2 tablets (0.5-1 mg) by mouth every 4 hours as needed for vomiting or nausea (nausea/vomiting/sleep) 15 tablet 0    metoprolol succinate ER (TOPROL XL) 100 MG 24 hr tablet Take 1 tablet (100 mg) by mouth daily On hold starting 2/21      micafungin (MYCAMINE) 50 MG injection Inject 30 mLs (300 mg) into the vein three times a week Inject 300 mg into vein three times a week until day +45      mycophenolate (GENERIC EQUIVALENT) 250 MG capsule Take 1 capsule (250 mg) by mouth 2 times daily Continue until day +35, follow clinic instruction on when to stop. 17 capsule 0    mycophenolate (GENERIC EQUIVALENT) 500 MG tablet Take 2 tablets (1,000 mg) by mouth 2 times daily Continue until day +35, follow clinic instructions on when to stop. 34 tablet 0    NIFEdipine ER (ADALAT CC) 30 MG 24 hr tablet Take 1 tablet (30 mg) by mouth 2 times daily On hold starting 2/21      pantoprazole (PROTONIX) 40 MG EC tablet Take 1 tablet (40 mg) by mouth 2 times daily (before meals) 60 tablet 1    prochlorperazine (COMPAZINE) 5 MG tablet Take 1-2 tablets (5-10 mg) by mouth every 6 hours as needed for nausea or vomiting 30 tablet 1    psyllium (METAMUCIL/KONSYL) 58.6 % powder Take 3 g by mouth daily 174 g 1    sirolimus (GENERIC EQUIVALENT) 0.5 MG tablet Take 3 (1mg) tablet for a total dose of 3mg daily, use 0.5mg tabs for dose adjustments, follow instructions provided by clinic.  30 tablet 1    sirolimus (GENERIC EQUIVALENT) 1 MG tablet Take 3 (1mg) tablet for a total dose of 3mg daily, follow instructions provided by clinic for dose adjustments. 90 tablet 1    [START ON 2/26/2024] sulfamethoxazole-trimethoprim (BACTRIM DS) 800-160 MG tablet Take 1 tablet by mouth Every Mon, Tues two times daily HOLD until clinic instructs to start taking 16 tablet 1    ursodiol (ACTIGALL) 300 MG capsule Take 1 capsule (300 mg) by mouth 3 times daily 90 capsule 1        Pertinent labs considered today:  Lab Results   Component Value Date     02/21/2024     05/28/2020                 normal sodium 136-148  Lab Results   Component Value Date    POTASSIUM 4.0 02/21/2024    POTASSIUM 3.8 05/28/2020       normal potassium 3.5-5.2   Lab Results   Component Value Date    CHLORIDE 104 02/21/2024    CHLORIDE 107 05/28/2020           normal chloride    Lab Results   Component Value Date    CO2 18 02/21/2024    CO2 24 05/28/2020                normal CO2 20-32  Lab Results   Component Value Date    BUN 31.3 02/21/2024    BUN 10 05/28/2020                 normal BUN 5-24  Lab Results   Component Value Date     02/21/2024     01/24/2024     05/28/2020                 normal glucose   Lab Results   Component Value Date    CR 2.40 02/21/2024    CR 0.80 05/28/2020                 normal cr 0.8-1.5  Lab Results   Component Value Date    REMY 9.3 02/21/2024    REMY 9.3 05/28/2020               normal calcium  8.5-10.4  Lab Results   Component Value Date    BILITOTAL 0.4 02/21/2024    BILITOTAL 0.8 05/28/2020          normal bilirubin 0.2-1.3  Lab Results   Component Value Date    PROTTOTAL 7.2 02/21/2024    PROTTOTAL 8.7 05/28/2020          normal total protein 6.0-8.2  Lab Results   Component Value Date    ALBUMIN 3.5 02/21/2024    ALBUMIN 3.4 05/28/2020             normal albumin 3.2-4.5  Lab Results   Component Value Date    ALKPHOS 95 02/21/2024    ALKPHOS 114 05/28/2020             normal alkphos   Lab Results   Component Value Date    ALT 18 02/21/2024    ALT 25 05/28/2020         normal ALT 0-65  Lab Results   Component Value Date    AST 41 02/21/2024    AST 20 05/28/2020         normal AST 0-37    Lab Results   Component Value Date    HGB 11.2 02/21/2024    HGB 12.6 05/28/2020     Lab Results   Component Value Date    WBC 4.0 02/21/2024    WBC 4.9 05/28/2020      Lab Results   Component Value Date    PLT 84 02/21/2024     05/28/2020       Estimated Creatinine Clearance: 42.2 mL/min (A) (based on SCr of 2.4 mg/dL (H)).    Changes made to scheduled medication list today include:  Added: None  Deleted:  Holding lisinopril, metoprolol, and nifedipine  Changed:   Loperamide increased to 4 mg QID  Sirolimus decreased to 3 mg daily  Psyllium changed from capsules to powder    Actions taken by pharmacist (provider contacted, etc): MMF dosing was incorrect but additional tablets were added to correct dose.  New Rx for Psyllium powder was called into [facility] so enough medication is obtained to refill the med box next week, prior to the patient arriving in clinic (there were enough medications to refill the box for 1 week today). I gave Ziggy an updated medication list that indicated what meds were put in the med box.       TONIE PHAM, [facility], PharmD

## 2024-02-21 NOTE — PROGRESS NOTES
BMT Progress Notes  02/21/2024       Patient ID: Ziggy Hallman is a 48 year old year old male with a PMH of MDS, hx of multiple clots and MI undergoing a MA (Bu/Flu) prep for an 8/8 URD PBSCT currently day 27.      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 He had some lightheadedness with standing up but nothing at rest. He ate and drank well last night - no nausea or vomiting. He has no pain today. He continues to watery stools 4-6x per day - unclear on volume. He is unclear on his medications all together today. He has taken no meds yet today.     Review of Systems                                                                                                                           Review of Systems: ROS negative unless stated in HPI   PHYSICAL EXAM                                                                                                                                      Wt Readings from Last 4 Encounters:   02/21/24 81 kg (178 lb 9.6 oz)   02/20/24 78.8 kg (173 lb 12.8 oz)   01/17/24 90.1 kg (198 lb 9.6 oz)   01/12/24 88.9 kg (196 lb)      KPS: 60     BP (!) 77/56   Pulse 118   Temp 97.5  F (36.4  C)   Resp 18   Wt 81 kg (178 lb 9.6 oz)   SpO2 100%   BMI 27.23 kg/m     After 2 L of IVF - pressures improved to 109/71    General: NAD   Eyes: GARY, sclera anicteric   Nose/Mouth/Throat: MMM  Lungs: CTA bilaterally  Cardiovascular: RRR, no M/R/G   Abdominal/Rectal: +BS, soft, NT, ND  Lymphatics: No edema  Skin: No rashes or petechaie  Neuro: A&O   Additional Findings: CVC, NT, no drainage.     Current aGVHD staging:  Skin 0, UGI 1, LGI 2, Liver 0 (keep in note through day +180 for  allos)  Acute GVHD          2/21/2024    09:06 2/15/2024    12:53 2/8/2024    12:52   Acute GVHD   New evidence of Acute Xcoha-Txzafz-Vixg Disease developed since last entry? No No No          LABS AND IMAGING: I have assessed all abnormal lab values for their clinical significance and any values considered clinically significant have been addressed in the assessment and plan.       Lab Results   Component Value Date    WBC 4.0 02/21/2024    ANEU 1.6 02/20/2024    HGB 11.2 (L) 02/21/2024    HCT 31.3 (L) 02/21/2024    PLT 84 (L) 02/21/2024     02/21/2024    POTASSIUM 4.0 02/21/2024    CHLORIDE 104 02/21/2024    CO2 18 (L) 02/21/2024     (H) 02/21/2024    BUN 31.3 (H) 02/21/2024    CR 2.40 (H) 02/21/2024    MAG 1.9 02/21/2024    INR 1.35 (H) 02/19/2024        SYSTEMS-BASED ASSESSMENT AND PLAN      Ziggy Hallman is a 48 year old male with a PMHx of MDS, hx of CAD, HTN, multiple recurrent arterial thromboembolic events (STEMIs, renal infarcts, arterial embolism, TIA) and DVTs who is undergoing a MA (Bu/Flu) prep for an 8/8 URD PBSCT day +27.     Stay has been complicated by mucositis requiring PCA, N/F, Staph Epi bacteremia, pulmonary embolism requiring anticoagulation. Hospitalization prolonged following engraftment 2/2 to large stool volumes requiring ID and GVHD rule out. Underwent flex sig/EGD results are fairly unremarkable with occasional crypt apoptotic bodies in duodenum, hemorrhagic gastritis in antrum, negative CMV HSV H pylori stains, diarrhea symptoms improved upon discharge, anorexia continues.      BMT/IEC PROTOCOL for 2015-29  - Chemo Prep: BU/Flu   - 8/8 DP permissive. Cell dose-9.24x10^6  - ABO: Donor: O+ Recipient A-  (Minor incompatability, no flush required)   - Restaging plan: Restaging BMBX 2/26/24  - Day +21 Chimerisms: CD3+ 65%, CD33+= 82%     HEME/COAG  - GSCF plan: GCSF to start day +5 until ANC >1500 for 3 consecutive days- stopped on 2/17. (2/21) WBC/ANC on their own today  -  Transfusion parameters: hemoglobin <8 (cardiac hx), platelets <30k   #Pancytopenia 2/2 chemo- anemia, leukopenia, thrombocytopenia  #Segmental R PE (2/6): Hep gtt transitioned to Eliquis 5mg once platelets tolerated  #APS work up- lupus antigen +, will follow outpt as may be unreliable in this acute setting and prior APS work up had been unremarkable prior to admission.  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   Extensive hypercoagulable workup to date has been unrevealing.  JAK2 testing negative.  PNH testing negative.  Elevated IgA of unknown significance, no evidence of plasma cell disorder.  - Eliquis and Prasugrel HELD starting 2/1-2/16. Increase Eliquis to 5mg BID with platelet improvement and re-add prasugrel once his platelets are >/75k.   (2/21) PLTs 86k. New rx for Prasugrel - will start this 2/22.     IMMUNOCOMPROMISED  #Nonneutropenic fever Cefepime 2/17-2/18. Work up unremarkable. Fevers resolved.  #Staph epi bacteremia - Resolved. Received IV vancomycin.   #Febrile neutropenia, fever resolved. See previous notes for abx therapy.   #C. Diff - Admit c. Diff triple+ - Start PO vanc 1/21 for 14 day course. - completed on 2/4, with broadening abx restart PO Vanco tx dose x 10 days. C.diff recheck negative on 2/17  -Prophylaxis plan: ACV LD, Megan HD (at home via [facility], but will given in clinic today (2/21)), Bactrim +28     RISK OF GVHD  UGI stage 0, LGI II (pathology results not indicative of GVHD)  - Prophylaxis: PtC day +3, +4, , Siro/MMF to start day +5  - Siro level 10.7 on 2/20 -- will  decrease dose to 3 mg/d. Recheck level on Friday 2/23.    #Anorexia -- appetite improving slowly since   #Diarrhea LGI stage II (unknown volume, watery stools 4-6x daily)  - Has had ongoing diarrhea since early admission. He was treated for c. Diff for 14 days. He failed anti-diarrheal medicine. Unclear exactly how much diarrhea he is having as reports to nursing are different than reports to providers, but thinks +20 a day. Improved with imodium QID PRN, metamucil. (2/21) Scheduled imodium 2 mg QID. Switch metamucil to powder rather than pills, patient preference. Daily 3 g/d.  - AREG and fecal kvng protectin unremarkable, trend  - GI Luminal consult - EGD, FlexSig show occasional crypt apoptotic bodies in duodenum, hemorrhagic gastritis in antrum, negative CMV/HSV/H pylori stains.     CARDIOVASCULAR  #CAD  #Hx of CABG  #HTN  - PTA metoprolol, Lisinopril dose increased 2/2 HTN, nifidepine ER 30mg BID -- (2/21) placed all ON HOLD today, consider restarting metoprolol +/- other BP meds tomorrow in clinic  #Hyperlipidemia: Holding atorvastatin peritransplant  - Risk of cardiomyopathy:  Baseline EF 50-55%, Global left ventricular function mildly reduced. Grade 1 or early diastolic dysfunction.      - Risk of arrhythmia: Baseline EKG showed NSR, Qtc -425     GI/NUTRITION  #Diarrhea - repeat infx work up unremarkable. Likely mucositis 2/2 radiation/chemo +/- abx. MMF trough unremarkable. Continues metamucil and loperamide QID.   #Oropharyngeal mucositis- resolved.   - Ulcer prophylaxis: Protonix   - VOD Prophylaxis: Ursodiol  - Risk of nausea/vomiting due to chemo: Ativan, Compazine prn   #Moderate malnutrition 2/2 acute on chronic illness - appetite slowly improving already since discharge.     RENAL/ELECTROLYTES/  #CONNIE - 2/2 intravascular depletion +/- diuresis. IVF replacement 2/20. (2/21) Cr 2.4 -- given 2 L of IVF in clinic for this and hypotension.  #Hematuria- No dysuria, resolved  #Proteinuria- possibly  exacerbated by persistent HTN (See CV),   #Hypokalemia- replace as needed. 40meq PO at discharge.  - Electrolyte management: replace per sliding scale     Psych:    #Anxiety- saw inpatient psych who offered atarax, but did not work well. Health psych offered, patient declined.  #Insomnia- PTA ambien and trazadone discontinued with concerns for AMS, sedation with concurrent pain meds. melatonin added.      Today's Summary:  Plan to add back in metoprolol tomorrow   Will plan to hold lisinopril/nifidipine today  Requested patient bring back med box tomorrow - in case we need to change anything  2 L of IVF + ELVIS today -- future ELVIS will be done at home thru [facility]  Restart Prasugrel 10 mg/d    RTC: (2/22) NIDHI/labs/infusion (2/23) NIDHI/labs/infusion (2/26) BM Bx. Requested NIDHI visit on 2/26 as well.    I spent 60 minutes in the care of this patient today, which included time necessary for preparation for the visit, obtaining history, ordering medications/tests/procedures as medically indicated, review of pertinent medical literature, counseling of the patient, communication of recommendations to the care team, and documentation time.      Vazquez Rehman PA-C   *[phone]

## 2024-02-22 ENCOUNTER — ONCOLOGY VISIT (OUTPATIENT)
Dept: TRANSPLANT | Facility: CLINIC | Age: 49
End: 2024-02-22
Attending: INTERNAL MEDICINE
Payer: COMMERCIAL

## 2024-02-22 ENCOUNTER — APPOINTMENT (OUTPATIENT)
Dept: LAB | Facility: CLINIC | Age: 49
End: 2024-02-22
Attending: INTERNAL MEDICINE
Payer: COMMERCIAL

## 2024-02-22 VITALS
SYSTOLIC BLOOD PRESSURE: 149 MMHG | OXYGEN SATURATION: 99 % | TEMPERATURE: 97.9 F | WEIGHT: 180.2 LBS | HEART RATE: 107 BPM | DIASTOLIC BLOOD PRESSURE: 82 MMHG | RESPIRATION RATE: 16 BRPM | BODY MASS INDEX: 27.47 KG/M2

## 2024-02-22 DIAGNOSIS — D46.9 MDS (MYELODYSPLASTIC SYNDROME) (H): Primary | ICD-10-CM

## 2024-02-22 DIAGNOSIS — D46.9 MDS (MYELODYSPLASTIC SYNDROME) (H): ICD-10-CM

## 2024-02-22 LAB
ACANTHOCYTES BLD QL SMEAR: NORMAL
ALBUMIN SERPL BCG-MCNC: 3.4 G/DL (ref 3.5–5.2)
ALP SERPL-CCNC: 89 U/L (ref 40–150)
ALT SERPL W P-5'-P-CCNC: 21 U/L (ref 0–70)
ANION GAP SERPL CALCULATED.3IONS-SCNC: 12 MMOL/L (ref 7–15)
AST SERPL W P-5'-P-CCNC: 47 U/L (ref 0–45)
AUER BODIES BLD QL SMEAR: NORMAL
BASO STIPL BLD QL SMEAR: NORMAL
BASOPHILS # BLD AUTO: 0 10E3/UL (ref 0–0.2)
BASOPHILS NFR BLD AUTO: 2 %
BILIRUB SERPL-MCNC: 0.4 MG/DL
BITE CELLS BLD QL SMEAR: NORMAL
BLISTER CELLS BLD QL SMEAR: NORMAL
BUN SERPL-MCNC: 22.2 MG/DL (ref 6–20)
BURR CELLS BLD QL SMEAR: NORMAL
CALCIUM SERPL-MCNC: 8.9 MG/DL (ref 8.6–10)
CHLORIDE SERPL-SCNC: 109 MMOL/L (ref 98–107)
CMV DNA SPEC NAA+PROBE-ACNC: NOT DETECTED IU/ML
CREAT SERPL-MCNC: 1.4 MG/DL (ref 0.67–1.17)
DACRYOCYTES BLD QL SMEAR: NORMAL
DEPRECATED HCO3 PLAS-SCNC: 16 MMOL/L (ref 22–29)
EGFRCR SERPLBLD CKD-EPI 2021: 61 ML/MIN/1.73M2
ELLIPTOCYTES BLD QL SMEAR: NORMAL
EOSINOPHIL # BLD AUTO: 0 10E3/UL (ref 0–0.7)
EOSINOPHIL NFR BLD AUTO: 1 %
ERYTHROCYTE [DISTWIDTH] IN BLOOD BY AUTOMATED COUNT: 13.2 % (ref 10–15)
FRAGMENTS BLD QL SMEAR: NORMAL
GLUCOSE SERPL-MCNC: 110 MG/DL (ref 70–99)
HCT VFR BLD AUTO: 26.5 % (ref 40–53)
HGB BLD-MCNC: 9.4 G/DL (ref 13.3–17.7)
HGB C CRYSTALS: NORMAL
HOWELL-JOLLY BOD BLD QL SMEAR: NORMAL
IMM GRANULOCYTES # BLD: 0 10E3/UL
IMM GRANULOCYTES NFR BLD: 2 %
LYMPHOCYTES # BLD AUTO: 0.4 10E3/UL (ref 0.8–5.3)
LYMPHOCYTES NFR BLD AUTO: 18 %
MCH RBC QN AUTO: 29.5 PG (ref 26.5–33)
MCHC RBC AUTO-ENTMCNC: 35.5 G/DL (ref 31.5–36.5)
MCV RBC AUTO: 83 FL (ref 78–100)
MONOCYTES # BLD AUTO: 0.4 10E3/UL (ref 0–1.3)
MONOCYTES NFR BLD AUTO: 20 %
NEUTROPHILS # BLD AUTO: 1.2 10E3/UL (ref 1.6–8.3)
NEUTROPHILS NFR BLD AUTO: 57 %
NEUTS HYPERSEG BLD QL SMEAR: NORMAL
NRBC # BLD AUTO: 0 10E3/UL
NRBC BLD AUTO-RTO: 0 /100
PLAT MORPH BLD: NORMAL
PLATELET # BLD AUTO: 74 10E3/UL (ref 150–450)
POLYCHROMASIA BLD QL SMEAR: NORMAL
POTASSIUM SERPL-SCNC: 3.9 MMOL/L (ref 3.4–5.3)
PROT SERPL-MCNC: 6.5 G/DL (ref 6.4–8.3)
RBC # BLD AUTO: 3.19 10E6/UL (ref 4.4–5.9)
RBC AGGLUT BLD QL: NORMAL
RBC MORPH BLD: NORMAL
ROULEAUX BLD QL SMEAR: NORMAL
SICKLE CELLS BLD QL SMEAR: NORMAL
SMUDGE CELLS BLD QL SMEAR: NORMAL
SODIUM SERPL-SCNC: 137 MMOL/L (ref 135–145)
SPHEROCYTES BLD QL SMEAR: NORMAL
STOMATOCYTES BLD QL SMEAR: NORMAL
TARGETS BLD QL SMEAR: NORMAL
TOXIC GRANULES BLD QL SMEAR: NORMAL
VARIANT LYMPHS BLD QL SMEAR: NORMAL
WBC # BLD AUTO: 2.1 10E3/UL (ref 4–11)

## 2024-02-22 PROCEDURE — 258N000003 HC RX IP 258 OP 636: Performed by: PHYSICIAN ASSISTANT

## 2024-02-22 PROCEDURE — 85025 COMPLETE CBC W/AUTO DIFF WBC: CPT | Performed by: PHYSICIAN ASSISTANT

## 2024-02-22 PROCEDURE — 99214 OFFICE O/P EST MOD 30 MIN: CPT

## 2024-02-22 PROCEDURE — 250N000011 HC RX IP 250 OP 636: Performed by: INTERNAL MEDICINE

## 2024-02-22 PROCEDURE — 82247 BILIRUBIN TOTAL: CPT | Performed by: PHYSICIAN ASSISTANT

## 2024-02-22 PROCEDURE — G0463 HOSPITAL OUTPT CLINIC VISIT: HCPCS

## 2024-02-22 PROCEDURE — 96360 HYDRATION IV INFUSION INIT: CPT

## 2024-02-22 RX ORDER — HEPARIN SODIUM,PORCINE 10 UNIT/ML
5 VIAL (ML) INTRAVENOUS ONCE
Status: COMPLETED | OUTPATIENT
Start: 2024-02-22 | End: 2024-02-22

## 2024-02-22 RX ORDER — METOPROLOL SUCCINATE 100 MG/1
100 TABLET, EXTENDED RELEASE ORAL DAILY
COMMUNITY
Start: 2024-02-22 | End: 2024-02-26

## 2024-02-22 RX ADMIN — SODIUM CHLORIDE 1000 ML: 9 INJECTION, SOLUTION INTRAVENOUS at 10:09

## 2024-02-22 RX ADMIN — Medication 5 ML: at 09:09

## 2024-02-22 ASSESSMENT — PAIN SCALES - GENERAL: PAINLEVEL: NO PAIN (0)

## 2024-02-22 NOTE — NURSING NOTE
Chief Complaint   Patient presents with    Blood Draw     Labs drawn via CVC     Labs collected from CVC by RN, line flushed with saline and heparin.  Vitals taken. Pt checked in for appointment(s).     Sheeba FISHER RN PHN BSN  BMT/Oncology Lab

## 2024-02-22 NOTE — PROGRESS NOTES
BMT Progress Notes  02/22/2024       Patient ID: Ziggy Hallman is a 48 year old year old male with a PMH of MDS, hx of multiple clots and MI undergoing a MA (Bu/Flu) prep for an 8/8 URD PBSCT currently day 28.      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 He was able to eat two meals yesterday and drink fluids well after his 2 L of IVF in the clinic. No nausea or vomiting. His stools actually is slightly more formed today vs straight watery stool. No blood noted. No abdominal pain. No fevers or chills. He does not feel lightheaded today at all.      Review of Systems                                                                                                                           Review of Systems: ROS negative unless stated in HPI   PHYSICAL EXAM                                                                                                                                      Wt Readings from Last 4 Encounters:   02/22/24 81.7 kg (180 lb 3.2 oz)   02/21/24 81 kg (178 lb 9.6 oz)   02/20/24 78.8 kg (173 lb 12.8 oz)   01/17/24 90.1 kg (198 lb 9.6 oz)      KPS: 70     BP (!) 149/82   Pulse 107   Temp 97.9  F (36.6  C) (Axillary)   Resp 16   Wt 81.7 kg (180 lb 3.2 oz)   SpO2 99%   BMI 27.47 kg/m       General: NAD   Eyes: GARY, sclera anicteric   Nose/Mouth/Throat: MMM  Lungs: CTA bilaterally  Cardiovascular: Mildly tachycardic, RR, no M/R/G   Abdominal/Rectal: +BS, soft, NT, ND  Lymphatics: No edema  Skin: No rashes or petechaie  Neuro: A&O   Additional Findings: CVC, NT, no drainage.     Current aGVHD staging:  Skin 0, UGI 1, LGI 2, Liver 0 (keep in note through day +180 for allos)  Acute GVHD           2/22/2024    12:58 2/21/2024    09:06 2/15/2024    12:53   Acute GVHD   New evidence of Acute Ojcyt-Omeqwx-Hdsz Disease developed since last entry? No No No          LABS AND IMAGING: I have assessed all abnormal lab values for their clinical significance and any values considered clinically significant have been addressed in the assessment and plan.       Lab Results   Component Value Date    WBC 2.1 (L) 02/22/2024    ANEU 1.6 02/20/2024    HGB 9.4 (L) 02/22/2024    HCT 26.5 (L) 02/22/2024    PLT 74 (L) 02/22/2024     02/22/2024    POTASSIUM 3.9 02/22/2024    CHLORIDE 109 (H) 02/22/2024    CO2 16 (L) 02/22/2024     (H) 02/22/2024    BUN 22.2 (H) 02/22/2024    CR 1.40 (H) 02/22/2024    MAG 1.9 02/21/2024    INR 1.35 (H) 02/19/2024        SYSTEMS-BASED ASSESSMENT AND PLAN      Ziggy Hallman is a 48 year old male with a PMHx of MDS, hx of CAD, HTN, multiple recurrent arterial thromboembolic events (STEMIs, renal infarcts, arterial embolism, TIA) and DVTs who is undergoing a MA (Bu/Flu) prep for an 8/8 URD PBSCT day +28.     Stay has been complicated by mucositis requiring PCA, N/F, Staph Epi bacteremia, pulmonary embolism requiring anticoagulation. Hospitalization prolonged following engraftment 2/2 to large stool volumes requiring ID and GVHD rule out. Underwent flex sig/EGD results are fairly unremarkable with occasional crypt apoptotic bodies in duodenum, hemorrhagic gastritis in antrum, negative CMV HSV H pylori stains, diarrhea symptoms improved upon discharge, anorexia continues.      BMT/IEC PROTOCOL for 2015-29  - Chemo Prep: BU/Flu   - 8/8 DP permissive. Cell dose-9.24x10^6  - ABO: Donor: O+ Recipient A-  (Minor incompatability, no flush required)   - Restaging plan: Restaging BMBX 2/26/24  - Day +21 Chimerisms: CD3+ 65%, CD33+= 82%     HEME/COAG  - GSCF plan: GCSF to start day +5 until ANC >1500 for 3 consecutive days- stopped on 2/17. (2/21) WBC/ANC on their own (2/22) WBC/ANC down today -  again possibly related to dilution from the 2 L of IVF yesterday.   - Transfusion parameters: hemoglobin <8 (cardiac hx), platelets <30k   ** (2/22) Hbg did drop 2 pts after 2 L of IVF -- no signs of bleeding or blood in stools likely 2/2 to dilution.    #Pancytopenia 2/2 chemo- anemia, leukopenia, thrombocytopenia  #Segmental R PE (2/6): Hep gtt transitioned to Eliquis 5mg once platelets tolerated  #APS work up- lupus antigen +, will follow outpt as may be unreliable in this acute setting and prior APS work up had been unremarkable prior to admission.  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   Extensive hypercoagulable workup to date has been unrevealing.  JAK2 testing negative.  PNH testing negative.  Elevated IgA of unknown significance, no evidence of plasma cell disorder.  - Eliquis and Prasugrel HELD starting 2/1-2/16. Increase Eliquis to 5mg BID with platelet improvement and re-add prasugrel once his platelets are >/75k.   (2/21) PLTs 86k. New rx for Prasugrel - will start this 2/22.   (2/22) PLTs 74k - cont Prasugrel today - but may need to discontinue if PLTs don't recover after the extra fluids     IMMUNOCOMPROMISED  #Nonneutropenic fever Cefepime 2/17-2/18. Work up unremarkable. Fevers resolved.  #Staph epi bacteremia - Resolved. Received IV vancomycin.   #Febrile neutropenia, fever resolved. See previous notes for abx therapy.   #C. Diff - Admit c. Diff triple+ - Start PO vanc 1/21 for 14 day course. - completed on 2/4, with broadening abx restart PO Vanco tx dose x 10 days. C.diff recheck  negative on 2/17    -Prophylaxis plan: ACV LD, Megan HD (at home via [facility]), Bactrim +28     RISK OF GVHD  UGI stage 0, LGI II (pathology results not indicative of GVHD - improving with scheduled imodium)  - Prophylaxis: PtC day +3, +4, , Siro/MMF to start day +5  - Siro level 10.7 on 2/20 -- will decrease dose to 3 mg/d. Recheck level on Friday 2/23.    #Anorexia -- appetite improving slowly since   #Diarrhea LGI stage II (unknown volume, watery stools 4-6x daily) -- slowly improving  - Has had ongoing diarrhea since early admission. He was treated for c. Diff for 14 days. He failed anti-diarrheal medicine. Unclear exactly how much diarrhea he is having as reports to nursing are different than reports to providers, but thinks +20 a day. Improved with imodium QID PRN, metamucil. (2/21) Scheduled imodium 2 mg QID. Switch metamucil to powder rather than pills, patient preference, 3 g/d.  - AREG and fecal kvng protectin unremarkable, trend  - GI Luminal consult - EGD, FlexSig show occasional crypt apoptotic bodies in duodenum, hemorrhagic gastritis in antrum, negative CMV/HSV/H pylori stains.     CARDIOVASCULAR  #CAD  #Hx of CABG  #HTN  - PTA metoprolol, Lisinopril dose increased 2/2 HTN, nifidepine ER 30mg BID -- (2/21) placed all ON HOLD (2/22) with cardiac hx, restarted metoprolol 50 BID - consider increasing back up to normal daily dosing his BP is stable tomorrow.  #Hyperlipidemia: Holding atorvastatin peritransplant  - Risk of cardiomyopathy:  Baseline EF 50-55%, Global left ventricular function mildly reduced. Grade 1 or early diastolic dysfunction.      - Risk of arrhythmia: Baseline EKG showed NSR, Qtc -425     GI/NUTRITION  #Diarrhea - repeat infx work up unremarkable. Likely mucositis 2/2 radiation/chemo +/- abx. MMF trough unremarkable. Continues metamucil and loperamide QID. (2/22) slightly formed  #Oropharyngeal mucositis- resolved.   - Ulcer prophylaxis: Protonix   - VOD Prophylaxis: Ursodiol  - Risk  of nausea/vomiting due to chemo: Ativan, Compazine prn   #Moderate malnutrition 2/2 acute on chronic illness - appetite slowly improving already since discharge.     RENAL/ELECTROLYTES/  #CONNIE - 2/2 intravascular depletion +/- diuresis. IVF replacement 2/20. (2/21) Cr 2.4 -- given 2 L of IVF in clinic for this and hypotension. (2/22) Cr 1.4 - giving 1 L of IVF again today, no history of CKD  #Hematuria- No dysuria, resolved  #Proteinuria- possibly exacerbated by persistent HTN (See CV),   #Hypokalemia- replace as needed. 40meq PO at discharge.  - Electrolyte management: replace per sliding scale     Psych:    #Anxiety- saw inpatient psych who offered atarax, but did not work well. Health psych offered, patient declined.  #Insomnia- PTA ambien and trazadone discontinued with concerns for AMS, sedation with concurrent pain meds. melatonin added.      Today's Summary:  Restarted metoprolol (50 mg BID -- plan to return to normal 100 mg/d dosing if BP stable tomorrow)  Monitor BP on when to add back on BP meds  Ongoing CONNIE - 1 L of IVF again today  Drop in counts (WBC, Hgb, PLTs -- likely 2/2 to 2 L of IVF yesterday)  PT will plan to do ELVIS at home tomorrow    RTC: (2/23) NIDHI/labs/infusion (2/26) BM Bx. Requested NIDHI visit on 2/26 as well.    I spent 30 minutes in the care of this patient today, which included time necessary for preparation for the visit, obtaining history, ordering medications/tests/procedures as medically indicated, review of pertinent medical literature, counseling of the patient, communication of recommendations to the care team, and documentation time.      Vazquez Rehman PA-C   *[phone]

## 2024-02-22 NOTE — NURSING NOTE
"Oncology Rooming Note    February 22, 2024 9:22 AM   Ziggy Hallman is a 48 year old male who presents for:    Chief Complaint   Patient presents with    Blood Draw     Labs drawn via CVC    Oncology Clinic Visit     MDS     Initial Vitals: BP (!) 149/82   Pulse 107   Temp 97.9  F (36.6  C) (Axillary)   Resp 16   Wt 81.7 kg (180 lb 3.2 oz)   SpO2 99%   BMI 27.47 kg/m   Estimated body mass index is 27.47 kg/m  as calculated from the following:    Height as of 1/19/24: 1.725 m (5' 7.91\").    Weight as of this encounter: 81.7 kg (180 lb 3.2 oz). Body surface area is 1.98 meters squared.  No Pain (0) Comment: Data Unavailable   No LMP for male patient.  Allergies reviewed: Yes  Medications reviewed: Yes    Medications: Medication refills not needed today.  Pharmacy name entered into [employer]: [id number] - [place], [place] - [address] [place] [address]    Frailty Screening:   Is the patient here for a new oncology consult visit in cancer care? 2. No      Clinical concerns:        Payal Govea              "

## 2024-02-22 NOTE — LETTER
2/22/2024         RE: Ziggy Hallman  [address] [facility] [id number]        Dear Colleague,    Thank you for referring your patient, Ziggy Hallman, to the [facility] BLOOD AND MARROW TRANSPLANT PROGRAM [place]. Please see a copy of my visit note below.    BMT Progress Notes  02/22/2024       Patient ID: Ziggy Hallman is a 48 year old year old male with a PMH of MDS, hx of multiple clots and MI undergoing a MA (Bu/Flu) prep for an 8/8 URD PBSCT currently day 28.      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 He was able to eat two meals yesterday and drink fluids well after his 2 L of IVF in the clinic. No nausea or vomiting. His stools actually is slightly more formed today vs straight watery stool. No blood noted. No abdominal pain. No fevers or chills. He does not feel lightheaded today at all.      Review of Systems                                                                                                                           Review of Systems: ROS negative unless stated in HPI   PHYSICAL EXAM                                                                                                                                      Wt Readings from Last 4 Encounters:   02/22/24 81.7 kg (180 lb 3.2 oz)   02/21/24 81 kg (178 lb 9.6 oz)   02/20/24 78.8 kg (173 lb 12.8 oz)   01/17/24 90.1 kg (198 lb 9.6 oz)      KPS: 70     BP (!) 149/82   Pulse 107   Temp 97.9  F (36.6  C) (Axillary)   Resp 16   Wt 81.7 kg (180 lb 3.2 oz)   SpO2 99%   BMI 27.47 kg/m       General: NAD   Eyes: GARY, sclera anicteric   Nose/Mouth/Throat: MMM  Lungs: CTA bilaterally  Cardiovascular: Mildly tachycardic,  RR, no M/R/G   Abdominal/Rectal: +BS, soft, NT, ND  Lymphatics: No edema  Skin: No rashes or petechaie  Neuro: A&O   Additional Findings: CVC, NT, no drainage.     Current aGVHD staging:  Skin 0, UGI 1, LGI 2, Liver 0 (keep in note through day +180 for allos)  Acute GVHD          2/22/2024    12:58 2/21/2024    09:06 2/15/2024    12:53   Acute GVHD   New evidence of Acute Ukbbp-Dwsezj-Dply Disease developed since last entry? No No No          LABS AND IMAGING: I have assessed all abnormal lab values for their clinical significance and any values considered clinically significant have been addressed in the assessment and plan.       Lab Results   Component Value Date    WBC 2.1 (L) 02/22/2024    ANEU 1.6 02/20/2024    HGB 9.4 (L) 02/22/2024    HCT 26.5 (L) 02/22/2024    PLT 74 (L) 02/22/2024     02/22/2024    POTASSIUM 3.9 02/22/2024    CHLORIDE 109 (H) 02/22/2024    CO2 16 (L) 02/22/2024     (H) 02/22/2024    BUN 22.2 (H) 02/22/2024    CR 1.40 (H) 02/22/2024    MAG 1.9 02/21/2024    INR 1.35 (H) 02/19/2024        SYSTEMS-BASED ASSESSMENT AND PLAN      Ziggy Hallman is a 48 year old male with a PMHx of MDS, hx of CAD, HTN, multiple recurrent arterial thromboembolic events (STEMIs, renal infarcts, arterial embolism, TIA) and DVTs who is undergoing a MA (Bu/Flu) prep for an 8/8 URD PBSCT day +28.     Stay has been complicated by mucositis requiring PCA, N/F, Staph Epi bacteremia, pulmonary embolism requiring anticoagulation. Hospitalization prolonged following engraftment 2/2 to large stool volumes requiring ID and GVHD rule out. Underwent flex sig/EGD results are fairly unremarkable with occasional crypt apoptotic bodies in duodenum, hemorrhagic gastritis in antrum, negative CMV HSV H pylori stains, diarrhea symptoms improved upon discharge, anorexia continues.      BMT/IEC PROTOCOL for 2015-29  - Chemo Prep: BU/Flu   - 8/8 DP permissive. Cell dose-9.24x10^6  - ABO: Donor: O+ Recipient A-  (Minor  incompatability, no flush required)   - Restaging plan: Restaging BMBX 2/26/24  - Day +21 Chimerisms: CD3+ 65%, CD33+= 82%     HEME/COAG  - GSCF plan: GCSF to start day +5 until ANC >1500 for 3 consecutive days- stopped on 2/17. (2/21) WBC/ANC on their own (2/22) WBC/ANC down today - again possibly related to dilution from the 2 L of IVF yesterday.   - Transfusion parameters: hemoglobin <8 (cardiac hx), platelets <30k   ** (2/22) Hbg did drop 2 pts after 2 L of IVF -- no signs of bleeding or blood in stools likely 2/2 to dilution.    #Pancytopenia 2/2 chemo- anemia, leukopenia, thrombocytopenia  #Segmental R PE (2/6): Hep gtt transitioned to Eliquis 5mg once platelets tolerated  #APS work up- lupus antigen +, will follow outpt as may be unreliable in this acute setting and prior APS work up had been unremarkable prior to admission.  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   Extensive hypercoagulable workup to date has been unrevealing.  JAK2 testing negative.  PNH testing negative.  Elevated IgA of unknown significance, no evidence of plasma cell disorder.  - Eliquis and Prasugrel HELD starting 2/1-2/16. Increase Eliquis to 5mg BID with platelet improvement and re-add prasugrel once his platelets are >/75k.   (2/21) PLTs 86k. New rx for Prasugrel - will start this 2/22.   (2/22) PLTs 74k - cont Prasugrel today - but may need to discontinue if PLTs don't recover after the extra fluids     IMMUNOCOMPROMISED  #Nonneutropenic fever Cefepime 2/17-2/18. Work up unremarkable. Fevers resolved.  #Staph epi  bacteremia - Resolved. Received IV vancomycin.   #Febrile neutropenia, fever resolved. See previous notes for abx therapy.   #C. Diff - Admit c. Diff triple+ - Start PO vanc 1/21 for 14 day course. - completed on 2/4, with broadening abx restart PO Vanco tx dose x 10 days. C.diff recheck negative on 2/17    -Prophylaxis plan: ACV LD, Megan HD (at home via [facility]), Bactrim +28     RISK OF GVHD  UGI stage 0, LGI II (pathology results not indicative of GVHD - improving with scheduled imodium)  - Prophylaxis: PtC day +3, +4, , Siro/MMF to start day +5  - Siro level 10.7 on 2/20 -- will decrease dose to 3 mg/d. Recheck level on Friday 2/23.    #Anorexia -- appetite improving slowly since   #Diarrhea LGI stage II (unknown volume, watery stools 4-6x daily) -- slowly improving  - Has had ongoing diarrhea since early admission. He was treated for c. Diff for 14 days. He failed anti-diarrheal medicine. Unclear exactly how much diarrhea he is having as reports to nursing are different than reports to providers, but thinks +20 a day. Improved with imodium QID PRN, metamucil. (2/21) Scheduled imodium 2 mg QID. Switch metamucil to powder rather than pills, patient preference, 3 g/d.  - AREG and fecal kvng protectin unremarkable, trend  - GI Luminal consult - EGD, FlexSig show occasional crypt apoptotic bodies in duodenum, hemorrhagic gastritis in antrum, negative CMV/HSV/H pylori stains.     CARDIOVASCULAR  #CAD  #Hx of CABG  #HTN  - PTA metoprolol, Lisinopril dose increased 2/2 HTN, nifidepine ER 30mg BID -- (2/21) placed all ON HOLD (2/22) with cardiac hx, restarted metoprolol 50 BID - consider increasing back up to normal daily dosing his BP is stable tomorrow.  #Hyperlipidemia: Holding atorvastatin peritransplant  - Risk of cardiomyopathy:  Baseline EF 50-55%, Global left ventricular function mildly reduced. Grade 1 or early diastolic dysfunction.      - Risk of arrhythmia: Baseline EKG showed NSR, Qtc -425      GI/NUTRITION  #Diarrhea - repeat infx work up unremarkable. Likely mucositis 2/2 radiation/chemo +/- abx. MMF trough unremarkable. Continues metamucil and loperamide QID. (2/22) slightly formed  #Oropharyngeal mucositis- resolved.   - Ulcer prophylaxis: Protonix   - VOD Prophylaxis: Ursodiol  - Risk of nausea/vomiting due to chemo: Ativan, Compazine prn   #Moderate malnutrition 2/2 acute on chronic illness - appetite slowly improving already since discharge.     RENAL/ELECTROLYTES/  #CONNIE - 2/2 intravascular depletion +/- diuresis. IVF replacement 2/20. (2/21) Cr 2.4 -- given 2 L of IVF in clinic for this and hypotension. (2/22) Cr 1.4 - giving 1 L of IVF again today, no history of CKD  #Hematuria- No dysuria, resolved  #Proteinuria- possibly exacerbated by persistent HTN (See CV),   #Hypokalemia- replace as needed. 40meq PO at discharge.  - Electrolyte management: replace per sliding scale     Psych:    #Anxiety- saw inpatient psych who offered atarax, but did not work well. Health psych offered, patient declined.  #Insomnia- PTA ambien and trazadone discontinued with concerns for AMS, sedation with concurrent pain meds. melatonin added.      Today's Summary:  Restarted metoprolol (50 mg BID -- plan to return to normal 100 mg/d dosing if BP stable tomorrow)  Monitor BP on when to add back on BP meds  Ongoing CONNIE - 1 L of IVF again today  Drop in counts (WBC, Hgb, PLTs -- likely 2/2 to 2 L of IVF yesterday)  PT will plan to do ELVIS at home tomorrow    RTC: (2/23) NIDHI/labs/infusion (2/26) BM Bx. Requested NIDHI visit on 2/26 as well.    I spent 30 minutes in the care of this patient today, which included time necessary for preparation for the visit, obtaining history, ordering medications/tests/procedures as medically indicated, review of pertinent medical literature, counseling of the patient, communication of recommendations to the care team, and documentation time.      Vazquez Rehman PA-C   *[phone]

## 2024-02-22 NOTE — PROGRESS NOTES
Infusion Nursing Note:  Ziggy Hallman presents today for IVF.    Patient seen by provider today: Yes: Vazquez Rehman   present during visit today: Not Applicable.    Note: Ziggy here today for IVF. Labs monitored, no additional infusion needs identified.      Intravenous Access:  [place].    Treatment Conditions:  Lab Results   Component Value Date    HGB 9.4 (L) 02/22/2024    WBC 2.1 (L) 02/22/2024    ANEU 1.6 02/20/2024    ANEUTAUTO 1.2 (L) 02/22/2024    PLT 74 (L) 02/22/2024        Lab Results   Component Value Date     02/22/2024    POTASSIUM 3.9 02/22/2024    MAG 1.9 02/21/2024    CR 1.40 (H) 02/22/2024    REMY 8.9 02/22/2024    BILITOTAL 0.4 02/22/2024    ALBUMIN 3.4 (L) 02/22/2024    ALT 21 02/22/2024    AST 47 (H) 02/22/2024         Post Infusion Assessment:  Patient tolerated infusion without incident.  Blood return noted pre and post infusion.  Site patent and intact, free from redness, edema or discomfort.  No evidence of extravasations.  Access discontinued per protocol.       Discharge Plan:   Patient discharged in stable condition accompanied by: wife.  Departure Mode: Ambulatory.      Lora Buckley RN

## 2024-02-23 ENCOUNTER — TELEPHONE (OUTPATIENT)
Dept: TRANSPLANT | Facility: CLINIC | Age: 49
End: 2024-02-23

## 2024-02-23 ENCOUNTER — ONCOLOGY VISIT (OUTPATIENT)
Dept: TRANSPLANT | Facility: CLINIC | Age: 49
End: 2024-02-23
Attending: INTERNAL MEDICINE
Payer: COMMERCIAL

## 2024-02-23 VITALS
WEIGHT: 182 LBS | SYSTOLIC BLOOD PRESSURE: 155 MMHG | HEART RATE: 80 BPM | DIASTOLIC BLOOD PRESSURE: 88 MMHG | BODY MASS INDEX: 27.74 KG/M2 | OXYGEN SATURATION: 100 % | RESPIRATION RATE: 16 BRPM | TEMPERATURE: 97.3 F

## 2024-02-23 DIAGNOSIS — D46.9 MDS (MYELODYSPLASTIC SYNDROME) (H): ICD-10-CM

## 2024-02-23 LAB
ANION GAP SERPL CALCULATED.3IONS-SCNC: 9 MMOL/L (ref 7–15)
BUN SERPL-MCNC: 16.3 MG/DL (ref 6–20)
CALCIUM SERPL-MCNC: 8.9 MG/DL (ref 8.6–10)
CHLORIDE SERPL-SCNC: 111 MMOL/L (ref 98–107)
CREAT SERPL-MCNC: 1.18 MG/DL (ref 0.67–1.17)
DEPRECATED HCO3 PLAS-SCNC: 18 MMOL/L (ref 22–29)
EGFRCR SERPLBLD CKD-EPI 2021: 75 ML/MIN/1.73M2
GLUCOSE SERPL-MCNC: 110 MG/DL (ref 70–99)
POTASSIUM SERPL-SCNC: 4.2 MMOL/L (ref 3.4–5.3)
SIROLIMUS BLD-MCNC: 8.3 UG/L (ref 5–15)
SODIUM SERPL-SCNC: 138 MMOL/L (ref 135–145)
TME LAST DOSE: NORMAL H
TME LAST DOSE: NORMAL H

## 2024-02-23 PROCEDURE — 80048 BASIC METABOLIC PNL TOTAL CA: CPT

## 2024-02-23 PROCEDURE — 36592 COLLECT BLOOD FROM PICC: CPT

## 2024-02-23 PROCEDURE — G0463 HOSPITAL OUTPT CLINIC VISIT: HCPCS

## 2024-02-23 PROCEDURE — 99215 OFFICE O/P EST HI 40 MIN: CPT

## 2024-02-23 PROCEDURE — 80195 ASSAY OF SIROLIMUS: CPT

## 2024-02-23 PROCEDURE — 250N000011 HC RX IP 250 OP 636: Performed by: INTERNAL MEDICINE

## 2024-02-23 RX ORDER — HEPARIN SODIUM,PORCINE 10 UNIT/ML
5 VIAL (ML) INTRAVENOUS ONCE
Status: COMPLETED | OUTPATIENT
Start: 2024-02-23 | End: 2024-02-23

## 2024-02-23 RX ADMIN — Medication 5 ML: at 06:45

## 2024-02-23 ASSESSMENT — PAIN SCALES - GENERAL: PAINLEVEL: NO PAIN (0)

## 2024-02-23 NOTE — NURSING NOTE
Chief Complaint   Patient presents with    Blood Draw     Labs drawn via CVC by RN. VS taken.     Labs collected from CVC by RN, line flushed with saline and heparin.  Vitals taken. Pt checked in for appointment(s).     Pt unsure when he last took Sirolimus; FYI message sent to clinic.    Solange Be RN

## 2024-02-23 NOTE — LETTER
2/23/2024         RE: Ziggy Hallman  [address] [facility] [facility] [address]        Dear Colleague,    Thank you for referring your patient, Ziggy Hallman, to the [facility] BLOOD AND MARROW TRANSPLANT PROGRAM [place]. Please see a copy of my visit note below.    BMT Progress Notes  02/23/2024       Patient ID: Ziggy Hallman is a 48 year old year old male with a PMH of MDS, hx of multiple clots and MI undergoing a MA (Bu/Flu) prep for an 8/8 URD PBSCT currently day 29.      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 His appetite is slightly better, easily fatigued, no nausea, loose stools slowing down to about 3x/day. No CP, SOB or rash. Plan to have him take Metoprolol 100 mg in the AM and re-start his Lisinopril 40 mg in the evening since he is now hypertensive. He said he is drinking about 2-2.5L of fluids throughout the day and does not feel like he is losing as much volume with his stools. Instructed to hold eliquis and prasugrel until after his biopsy on 2/26.        Review of Systems                                                                                                                           Review of Systems: ROS negative unless stated in HPI   PHYSICAL EXAM                                                                                                                                      Wt Readings from Last 4 Encounters:   02/23/24 82.6 kg (182 lb)   02/22/24 81.7 kg (180 lb 3.2 oz)   02/21/24 81 kg (178 lb 9.6 oz)   02/20/24 78.8 kg (173 lb 12.8 oz)      KPS: 70     BP (!) 155/88 (BP Location: Right arm, Patient Position: Sitting, Cuff Size: Adult Regular)   Pulse 80   Temp 97.3  F (36.3  C) (Oral)    Resp 16   Wt 82.6 kg (182 lb)   SpO2 100%   BMI 27.74 kg/m       General: NAD   Eyes: GARY, sclera anicteric   Nose/Mouth/Throat: MMM  Lungs: CTA bilaterally  Cardiovascular: RRR, no M/R/G   Abdominal/Rectal: +BS, soft, NT, ND  Lymphatics: trace BLE edema  Skin: No rashes or petechaie  Neuro: A&O   Additional Findings: CVC, NT, no drainage.     Current aGVHD staging:  Skin 0, UGI 0, LGI 0, Liver 0 (keep in note through day +180 for allos)  Acute GVHD          2/22/2024    12:58 2/21/2024    09:06 2/15/2024    12:53   Acute GVHD   New evidence of Acute Vjsjl-Nmcnmi-Wmnr Disease developed since last entry? No No No          LABS AND IMAGING: I have assessed all abnormal lab values for their clinical significance and any values considered clinically significant have been addressed in the assessment and plan.       Lab Results   Component Value Date    WBC 2.3 (L) 02/23/2024    ANEU 1.6 02/20/2024    HGB 8.7 (L) 02/23/2024    HCT 24.8 (L) 02/23/2024    PLT 72 (L) 02/23/2024     02/23/2024    POTASSIUM 4.2 02/23/2024    CHLORIDE 111 (H) 02/23/2024    CO2 18 (L) 02/23/2024     (H) 02/23/2024    BUN 16.3 02/23/2024    CR 1.18 (H) 02/23/2024    MAG 1.9 02/21/2024    INR 1.35 (H) 02/19/2024        SYSTEMS-BASED ASSESSMENT AND PLAN      Ziggy Hallman is a 48 year old male with a PMHx of MDS, hx of CAD, HTN, multiple recurrent arterial thromboembolic events (STEMIs, renal infarcts, arterial embolism, TIA) and DVTs who is undergoing a MA (Bu/Flu) prep for an 8/8 URD PBSCT day +29.     Stay has been complicated by mucositis requiring PCA, N/F, Staph Epi bacteremia, pulmonary embolism requiring anticoagulation. Hospitalization prolonged following engraftment 2/2 to large stool volumes requiring ID and GVHD rule out. Underwent flex sig/EGD results are fairly unremarkable with occasional crypt apoptotic bodies in duodenum, hemorrhagic gastritis in antrum, negative CMV HSV H pylori stains, diarrhea symptoms improved  upon discharge, anorexia continues.      BMT/IEC PROTOCOL for 2015-29  - Chemo Prep: BU/Flu   - 8/8 DP permissive. Cell dose-9.24x10^6  - ABO: Donor: O+ Recipient A-  (Minor incompatability, no flush required)   - Restaging plan: Restaging BMBX 2/26/24  - Day +21 Chimerisms: CD3+ 65%, CD33+= 82%     HEME/COAG  - GSCF plan: GCSF to start day +5 until ANC >1500 for 3 consecutive days- stopped on 2/17.  - Transfusion parameters: hemoglobin <8 (cardiac hx), platelets <30k    (2/22) Hbg did drop 2 pts after 2 L of IVF -- no signs of bleeding or blood in stools likely 2/2 to dilution.    #Pancytopenia 2/2 chemo- anemia, leukopenia, thrombocytopenia  #Segmental R PE (2/6): Hep gtt transitioned to Eliquis 5mg once platelets tolerated  #APS work up- lupus antigen +, will follow outpt as may be unreliable in this acute setting and prior APS work up had been unremarkable prior to admission.  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   Extensive hypercoagulable workup to date has been unrevealing.  JAK2 testing negative.  PNH testing negative.  Elevated IgA of unknown significance, no evidence of plasma cell disorder.  - Eliquis and Prasugrel HELD starting 2/1-2/16. Increased Eliquis to 5mg BID with platelet improvement and re-added prasugrel on 2/22 with his platelets >75K   (2/23) eliquis and prasugrel on hold for BM bx on 2/26, monitor platelets regarding prasugrel      IMMUNOCOMPROMISED  #Nonneutropenic fever Cefepime 2/17-2/18. Work up unremarkable. Fevers resolved.  #Staph epi bacteremia - Resolved. Received IV  vancomycin.   #Febrile neutropenia, fever resolved. See previous notes for abx therapy.   #C. Diff - Admit c. Diff triple+ - Start PO vanc 1/21 for 14 day course. - completed on 2/4, with broadening abx restart PO Vanco tx dose x 10 days. C.diff recheck negative on 2/17    -Prophylaxis plan: ACV LD, Megan HD (at home via [facility]), Bactrim +28     RISK OF GVHD  UGI stage 0, LGI II (pathology results not indicative of GVHD - improving with scheduled imodium)  - Prophylaxis: PtC day +3, +4, , Siro/MMF to start day +5  - Siro level 10.7 on 2/20 -- will decrease dose to 3 mg/d. Level pending 2/23.     #Anorexia -- appetite improving slowly since   #Diarrhea LGI stage II (unknown volume, watery stools 4-6x daily) -- slowly improving  - Has had ongoing diarrhea since early admission. He was treated for c. Diff for 14 days. He failed anti-diarrheal medicine initially. At times reports 20+ stools/day. (2/21) Scheduled imodium 2 mg QID. Switch metamucil to powder rather than pills, patient preference, 3 g/d. (2/23): reports loose stools x3/day  - AREG and fecal kvng protectin unremarkable, trend  - GI Luminal consult - EGD, FlexSig show occasional crypt apoptotic bodies in duodenum, hemorrhagic gastritis in antrum, negative CMV/HSV/H pylori stains.     CARDIOVASCULAR  #CAD  #Hx of CABG  #HTN  - PTA metoprolol, Lisinopril dose increased 2/2 HTN, nifidepine ER 30mg BID --   (2/21) placed all ON HOLD   (2/22) with cardiac hx, restarted metoprolol 50 BID    (2/23): Resumed metoprolol 100 mg/day and re-start Lisinopril 40 mg/day. /88 today.   #Hyperlipidemia: Holding atorvastatin peritransplant  - Risk of cardiomyopathy:  Baseline EF 50-55%, Global left ventricular function mildly reduced. Grade 1 or early diastolic dysfunction.      - Risk of arrhythmia: Baseline EKG showed NSR, Qtc -425     GI/NUTRITION  #Diarrhea - repeat infx work up unremarkable. Likely mucositis 2/2 radiation/chemo +/- abx. MMF trough unremarkable.  Continues metamucil and loperamide QID.   #Oropharyngeal mucositis- resolved.   - Ulcer prophylaxis: Protonix   - VOD Prophylaxis: Ursodiol  - Risk of nausea/vomiting due to chemo: Ativan, Compazine prn   #Moderate malnutrition 2/2 acute on chronic illness - appetite slowly improving already since discharge.     RENAL/ELECTROLYTES/  #CONNIE - 2/2 intravascular depletion +/- diuresis. IVF replacement 2/20. (2/21) Cr 2.4 -- given 2 L of IVF in clinic for this and hypotension. (2/22) Cr 1.4 - giving 1 L of IVF again today, no history of CKD. (2/23): no IVF today, trace LE edema, diarrhea well controlled, pushing adequate fluids at home, Cr. 1.18   #Hematuria- No dysuria, resolved  #Proteinuria- possibly exacerbated by persistent HTN (See CV),   #Hypokalemia- replace as needed. 40meq PO at discharge.     Psych:    #Anxiety- saw inpatient psych who offered atarax, but did not work well. Health psych offered, patient declined.  #Insomnia- PTA ambien and trazadone discontinued with concerns for AMS, sedation with concurrent pain meds. melatonin added.      Today's Summary:  - Start Metoprolol 100 mg/day and re-start Lisinopril 40 mg   - Hold eliquis and prasugrel for BM bx on 2/26, platelets hovering 75K cut off for prasugrel, continue to monitor   - continue QID imodium for now   - Siro level pending   - No IVF fluids today, Cr 1.18     RTC: (2/26) BM Bx. Requested NIDHI visit on 2/26 as well.    I spent 30 minutes in the care of this patient today, which included time necessary for preparation for the visit, obtaining history, ordering medications/tests/procedures as medically indicated, review of pertinent medical literature, counseling of the patient, communication of recommendations to the care team, and documentation time.      Carlos Warner PA-C

## 2024-02-23 NOTE — PROGRESS NOTES
BMT Progress Notes  02/23/2024       Patient ID: Ziggy Hallman is a 48 year old year old male with a PMH of MDS, hx of multiple clots and MI undergoing a MA (Bu/Flu) prep for an 8/8 URD PBSCT currently day 29.      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 His appetite is slightly better, easily fatigued, no nausea, loose stools slowing down to about 3x/day. No CP, SOB or rash. Plan to have him take Metoprolol 100 mg in the AM and re-start his Lisinopril 40 mg in the evening since he is now hypertensive. He said he is drinking about 2-2.5L of fluids throughout the day and does not feel like he is losing as much volume with his stools. Instructed to hold eliquis and prasugrel until after his biopsy on 2/26.        Review of Systems                                                                                                                           Review of Systems: ROS negative unless stated in HPI   PHYSICAL EXAM                                                                                                                                      Wt Readings from Last 4 Encounters:   02/23/24 82.6 kg (182 lb)   02/22/24 81.7 kg (180 lb 3.2 oz)   02/21/24 81 kg (178 lb 9.6 oz)   02/20/24 78.8 kg (173 lb 12.8 oz)      KPS: 70     BP (!) 155/88 (BP Location: Right arm, Patient Position: Sitting, Cuff Size: Adult Regular)   Pulse 80   Temp 97.3  F (36.3  C) (Oral)   Resp 16   Wt 82.6 kg (182 lb)   SpO2 100%   BMI 27.74 kg/m       General: NAD   Eyes: GARY, sclera anicteric   Nose/Mouth/Throat: MMM  Lungs: CTA bilaterally  Cardiovascular: RRR, no M/R/G   Abdominal/Rectal: +BS, soft, NT, ND  Lymphatics: trace BLE edema  Skin: No rashes or  petechaie  Neuro: A&O   Additional Findings: CVC, NT, no drainage.     Current aGVHD staging:  Skin 0, UGI 0, LGI 0, Liver 0 (keep in note through day +180 for allos)  Acute GVHD          2/22/2024    12:58 2/21/2024    09:06 2/15/2024    12:53   Acute GVHD   New evidence of Acute Xwbex-Nzjqxa-Unat Disease developed since last entry? No No No          LABS AND IMAGING: I have assessed all abnormal lab values for their clinical significance and any values considered clinically significant have been addressed in the assessment and plan.       Lab Results   Component Value Date    WBC 2.3 (L) 02/23/2024    ANEU 1.6 02/20/2024    HGB 8.7 (L) 02/23/2024    HCT 24.8 (L) 02/23/2024    PLT 72 (L) 02/23/2024     02/23/2024    POTASSIUM 4.2 02/23/2024    CHLORIDE 111 (H) 02/23/2024    CO2 18 (L) 02/23/2024     (H) 02/23/2024    BUN 16.3 02/23/2024    CR 1.18 (H) 02/23/2024    MAG 1.9 02/21/2024    INR 1.35 (H) 02/19/2024        SYSTEMS-BASED ASSESSMENT AND PLAN      Ziggy Hallman is a 48 year old male with a PMHx of MDS, hx of CAD, HTN, multiple recurrent arterial thromboembolic events (STEMIs, renal infarcts, arterial embolism, TIA) and DVTs who is undergoing a MA (Bu/Flu) prep for an 8/8 URD PBSCT day +29.     Stay has been complicated by mucositis requiring PCA, N/F, Staph Epi bacteremia, pulmonary embolism requiring anticoagulation. Hospitalization prolonged following engraftment 2/2 to large stool volumes requiring ID and GVHD rule out. Underwent flex sig/EGD results are fairly unremarkable with occasional crypt apoptotic bodies in duodenum, hemorrhagic gastritis in antrum, negative CMV HSV H pylori stains, diarrhea symptoms improved upon discharge, anorexia continues.      BMT/IEC PROTOCOL for 2015-29  - Chemo Prep: BU/Flu   - 8/8 DP permissive. Cell dose-9.24x10^6  - ABO: Donor: O+ Recipient A-  (Minor incompatability, no flush required)   - Restaging plan: Restaging BMBX 2/26/24  - Day +21 Chimerisms:  CD3+ 65%, CD33+= 82%     HEME/COAG  - GSCF plan: GCSF to start day +5 until ANC >1500 for 3 consecutive days- stopped on 2/17.  - Transfusion parameters: hemoglobin <8 (cardiac hx), platelets <30k    (2/22) Hbg did drop 2 pts after 2 L of IVF -- no signs of bleeding or blood in stools likely 2/2 to dilution.    #Pancytopenia 2/2 chemo- anemia, leukopenia, thrombocytopenia  #Segmental R PE (2/6): Hep gtt transitioned to Eliquis 5mg once platelets tolerated  #APS work up- lupus antigen +, will follow outpt as may be unreliable in this acute setting and prior APS work up had been unremarkable prior to admission.  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   Extensive hypercoagulable workup to date has been unrevealing.  JAK2 testing negative.  PNH testing negative.  Elevated IgA of unknown significance, no evidence of plasma cell disorder.  - Eliquis and Prasugrel HELD starting 2/1-2/16. Increased Eliquis to 5mg BID with platelet improvement and re-added prasugrel on 2/22 with his platelets >75K   (2/23) eliquis and prasugrel on hold for BM bx on 2/26, monitor platelets regarding prasugrel      IMMUNOCOMPROMISED  #Nonneutropenic fever Cefepime 2/17-2/18. Work up unremarkable. Fevers resolved.  #Staph epi bacteremia - Resolved. Received IV vancomycin.   #Febrile neutropenia, fever resolved. See previous notes for abx therapy.   #C. Diff - Admit c. Diff triple+ - Start PO vanc 1/21 for 14 day course. - completed on 2/4, with broadening abx restart PO Vanco tx dose x 10 days. C.diff recheck negative on  2/17    -Prophylaxis plan: ACV LD, Megan HD (at home via [facility]), Bactrim +28     RISK OF GVHD  UGI stage 0, LGI II (pathology results not indicative of GVHD - improving with scheduled imodium)  - Prophylaxis: PtC day +3, +4, , Siro/MMF to start day +5  - Siro level 10.7 on 2/20 -- will decrease dose to 3 mg/d. Level pending 2/23.     #Anorexia -- appetite improving slowly since   #Diarrhea LGI stage II (unknown volume, watery stools 4-6x daily) -- slowly improving  - Has had ongoing diarrhea since early admission. He was treated for c. Diff for 14 days. He failed anti-diarrheal medicine initially. At times reports 20+ stools/day. (2/21) Scheduled imodium 2 mg QID. Switch metamucil to powder rather than pills, patient preference, 3 g/d. (2/23): reports loose stools x3/day  - AREG and fecal kvng protectin unremarkable, trend  - GI Luminal consult - EGD, FlexSig show occasional crypt apoptotic bodies in duodenum, hemorrhagic gastritis in antrum, negative CMV/HSV/H pylori stains.     CARDIOVASCULAR  #CAD  #Hx of CABG  #HTN  - PTA metoprolol, Lisinopril dose increased 2/2 HTN, nifidepine ER 30mg BID --   (2/21) placed all ON HOLD   (2/22) with cardiac hx, restarted metoprolol 50 BID    (2/23): Resumed metoprolol 100 mg/day and re-start Lisinopril 40 mg/day. /88 today.   #Hyperlipidemia: Holding atorvastatin peritransplant  - Risk of cardiomyopathy:  Baseline EF 50-55%, Global left ventricular function mildly reduced. Grade 1 or early diastolic dysfunction.      - Risk of arrhythmia: Baseline EKG showed NSR, Qtc -425     GI/NUTRITION  #Diarrhea - repeat infx work up unremarkable. Likely mucositis 2/2 radiation/chemo +/- abx. MMF trough unremarkable. Continues metamucil and loperamide QID.   #Oropharyngeal mucositis- resolved.   - Ulcer prophylaxis: Protonix   - VOD Prophylaxis: Ursodiol  - Risk of nausea/vomiting due to chemo: Ativan, Compazine prn   #Moderate malnutrition 2/2 acute on chronic illness - appetite  slowly improving already since discharge.     RENAL/ELECTROLYTES/  #CONNIE - 2/2 intravascular depletion +/- diuresis. IVF replacement 2/20. (2/21) Cr 2.4 -- given 2 L of IVF in clinic for this and hypotension. (2/22) Cr 1.4 - giving 1 L of IVF again today, no history of CKD. (2/23): no IVF today, trace LE edema, diarrhea well controlled, pushing adequate fluids at home, Cr. 1.18   #Hematuria- No dysuria, resolved  #Proteinuria- possibly exacerbated by persistent HTN (See CV),   #Hypokalemia- replace as needed. 40meq PO at discharge.     Psych:    #Anxiety- saw inpatient psych who offered atarax, but did not work well. Health psych offered, patient declined.  #Insomnia- PTA ambien and trazadone discontinued with concerns for AMS, sedation with concurrent pain meds. melatonin added.      Today's Summary:  - Start Metoprolol 100 mg/day and re-start Lisinopril 40 mg   - Hold eliquis and prasugrel for BM bx on 2/26, platelets hovering 75K cut off for prasugrel, continue to monitor   - continue QID imodium for now   - Siro level pending   - No IVF fluids today, Cr 1.18     RTC: (2/26) BM Bx. Requested NIDHI visit on 2/26 as well.    I spent 30 minutes in the care of this patient today, which included time necessary for preparation for the visit, obtaining history, ordering medications/tests/procedures as medically indicated, review of pertinent medical literature, counseling of the patient, communication of recommendations to the care team, and documentation time.      Carlos Warner PA-C

## 2024-02-23 NOTE — NURSING NOTE
"Oncology Rooming Note    February 23, 2024 7:04 AM   Ziggy Hallman is a 48 year old male who presents for:    Chief Complaint   Patient presents with    Blood Draw     Labs drawn via CVC by RN. VS taken.    Oncology Clinic Visit     MDS     Initial Vitals: BP (!) 155/88 (BP Location: Right arm, Patient Position: Sitting, Cuff Size: Adult Regular)   Pulse 80   Temp 97.3  F (36.3  C) (Oral)   Resp 16   Wt 82.6 kg (182 lb)   SpO2 100%   BMI 27.74 kg/m   Estimated body mass index is 27.74 kg/m  as calculated from the following:    Height as of 1/19/24: 1.725 m (5' 7.91\").    Weight as of this encounter: 82.6 kg (182 lb). Body surface area is 1.99 meters squared.  No Pain (0) Comment: Data Unavailable   No LMP for male patient.  Allergies reviewed: Yes  Medications reviewed: Yes    Medications: Medication refills not needed today.  Pharmacy name entered into [employer]: [id number] - [place], [place] - [address] [place] [address]    Frailty Screening:   Is the patient here for a new oncology consult visit in cancer care? 2. No      Clinical concerns:  Patient states there are no new concerns to discuss with provider.  Vazquez was not notified.        Shreya Pop CMA              "

## 2024-02-25 LAB
ABO/RH(D): NORMAL
ANTIBODY SCREEN: NEGATIVE
SPECIMEN EXPIRATION DATE: NORMAL

## 2024-02-26 ENCOUNTER — LAB (OUTPATIENT)
Dept: LAB | Facility: CLINIC | Age: 49
End: 2024-02-26
Attending: PHYSICIAN ASSISTANT
Payer: COMMERCIAL

## 2024-02-26 ENCOUNTER — OFFICE VISIT (OUTPATIENT)
Dept: TRANSPLANT | Facility: CLINIC | Age: 49
End: 2024-02-26
Attending: PHYSICIAN ASSISTANT
Payer: COMMERCIAL

## 2024-02-26 VITALS
RESPIRATION RATE: 16 BRPM | SYSTOLIC BLOOD PRESSURE: 134 MMHG | WEIGHT: 179.7 LBS | BODY MASS INDEX: 27.23 KG/M2 | TEMPERATURE: 98.3 F | OXYGEN SATURATION: 99 % | HEIGHT: 68 IN | HEART RATE: 78 BPM | DIASTOLIC BLOOD PRESSURE: 64 MMHG

## 2024-02-26 VITALS
DIASTOLIC BLOOD PRESSURE: 88 MMHG | HEART RATE: 71 BPM | SYSTOLIC BLOOD PRESSURE: 162 MMHG | RESPIRATION RATE: 16 BRPM | OXYGEN SATURATION: 99 %

## 2024-02-26 DIAGNOSIS — D46.9 MDS (MYELODYSPLASTIC SYNDROME) (H): ICD-10-CM

## 2024-02-26 DIAGNOSIS — Z94.81 STATUS POST BONE MARROW TRANSPLANT (H): Primary | ICD-10-CM

## 2024-02-26 DIAGNOSIS — Z94.81 STATUS POST BONE MARROW TRANSPLANT (H): ICD-10-CM

## 2024-02-26 DIAGNOSIS — D46.9 MDS (MYELODYSPLASTIC SYNDROME) (H): Primary | ICD-10-CM

## 2024-02-26 LAB
ALBUMIN SERPL BCG-MCNC: 3.4 G/DL (ref 3.5–5.2)
ALP SERPL-CCNC: 82 U/L (ref 40–150)
ALT SERPL W P-5'-P-CCNC: 22 U/L (ref 0–70)
ANION GAP SERPL CALCULATED.3IONS-SCNC: 11 MMOL/L (ref 7–15)
AST SERPL W P-5'-P-CCNC: 43 U/L (ref 0–45)
BASOPHILS # BLD AUTO: 0.1 10E3/UL (ref 0–0.2)
BASOPHILS NFR BLD AUTO: 2 %
BILIRUB SERPL-MCNC: 0.3 MG/DL
BUN SERPL-MCNC: 9 MG/DL (ref 6–20)
CALCIUM SERPL-MCNC: 9.4 MG/DL (ref 8.6–10)
CHLORIDE SERPL-SCNC: 108 MMOL/L (ref 98–107)
CREAT SERPL-MCNC: 1.03 MG/DL (ref 0.67–1.17)
DEPRECATED HCO3 PLAS-SCNC: 21 MMOL/L (ref 22–29)
EGFRCR SERPLBLD CKD-EPI 2021: 88 ML/MIN/1.73M2
EOSINOPHIL # BLD AUTO: 0.1 10E3/UL (ref 0–0.7)
EOSINOPHIL NFR BLD AUTO: 2 %
ERYTHROCYTE [DISTWIDTH] IN BLOOD BY AUTOMATED COUNT: 13.3 % (ref 10–15)
GLUCOSE SERPL-MCNC: 99 MG/DL (ref 70–99)
HCT VFR BLD AUTO: 25.9 % (ref 40–53)
HGB BLD-MCNC: 9.2 G/DL (ref 13.3–17.7)
IMM GRANULOCYTES # BLD: 0.1 10E3/UL
IMM GRANULOCYTES NFR BLD: 2 %
INTERPRETATION: NORMAL
LAB DIRECTOR DISCLAIMER: NORMAL
LAB DIRECTOR INTERPRETATION: NORMAL
LAB DIRECTOR METHODOLOGY: NORMAL
LAB DIRECTOR RESULTS: NORMAL
LOCATION OF TASK: NORMAL
LYMPHOCYTES # BLD AUTO: 0.8 10E3/UL (ref 0.8–5.3)
LYMPHOCYTES NFR BLD AUTO: 23 %
MAGNESIUM SERPL-MCNC: 1.4 MG/DL (ref 1.7–2.3)
MCH RBC QN AUTO: 29.9 PG (ref 26.5–33)
MCHC RBC AUTO-ENTMCNC: 35.5 G/DL (ref 31.5–36.5)
MCV RBC AUTO: 84 FL (ref 78–100)
MONOCYTES # BLD AUTO: 0.5 10E3/UL (ref 0–1.3)
MONOCYTES NFR BLD AUTO: 15 %
NEUTROPHILS # BLD AUTO: 2 10E3/UL (ref 1.6–8.3)
NEUTROPHILS NFR BLD AUTO: 56 %
NRBC # BLD AUTO: 0 10E3/UL
NRBC BLD AUTO-RTO: 0 /100
PATH REPORT.COMMENTS IMP SPEC: NORMAL
PATH REPORT.FINAL DX SPEC: NORMAL
PATH REPORT.MICROSCOPIC SPEC OTHER STN: NORMAL
PATH REPORT.RELEVANT HX SPEC: NORMAL
PLATELET # BLD AUTO: 126 10E3/UL (ref 150–450)
POTASSIUM SERPL-SCNC: 3.8 MMOL/L (ref 3.4–5.3)
PROT SERPL-MCNC: 6.5 G/DL (ref 6.4–8.3)
RBC # BLD AUTO: 3.08 10E6/UL (ref 4.4–5.9)
SIGNIFICANT RESULTS: NORMAL
SODIUM SERPL-SCNC: 140 MMOL/L (ref 135–145)
SPECIMEN DESCRIPTION: NORMAL
SPECIMEN DESCRIPTION: NORMAL
TEST DETAILS, MDL: NORMAL
WBC # BLD AUTO: 3.4 10E3/UL (ref 4–11)

## 2024-02-26 PROCEDURE — 38221 DX BONE MARROW BIOPSIES: CPT | Mod: LT | Performed by: STUDENT IN AN ORGANIZED HEALTH CARE EDUCATION/TRAINING PROGRAM

## 2024-02-26 PROCEDURE — 99215 OFFICE O/P EST HI 40 MIN: CPT

## 2024-02-26 PROCEDURE — 86900 BLOOD TYPING SEROLOGIC ABO: CPT

## 2024-02-26 PROCEDURE — 88237 TISSUE CULTURE BONE MARROW: CPT | Performed by: PHYSICIAN ASSISTANT

## 2024-02-26 PROCEDURE — 81450 HL NEO GSAP 5-50DNA/DNA&RNA: CPT

## 2024-02-26 PROCEDURE — 38222 DX BONE MARROW BX & ASPIR: CPT | Performed by: STUDENT IN AN ORGANIZED HEALTH CARE EDUCATION/TRAINING PROGRAM

## 2024-02-26 PROCEDURE — 250N000011 HC RX IP 250 OP 636: Performed by: STUDENT IN AN ORGANIZED HEALTH CARE EDUCATION/TRAINING PROGRAM

## 2024-02-26 PROCEDURE — 88369 M/PHMTRC ALYSISHQUANT/SEMIQ: CPT | Mod: 26 | Performed by: MEDICAL GENETICS

## 2024-02-26 PROCEDURE — 250N000011 HC RX IP 250 OP 636: Performed by: PHYSICIAN ASSISTANT

## 2024-02-26 PROCEDURE — G0463 HOSPITAL OUTPT CLINIC VISIT: HCPCS | Mod: 25

## 2024-02-26 PROCEDURE — 36591 DRAW BLOOD OFF VENOUS DEVICE: CPT

## 2024-02-26 PROCEDURE — 88341 IMHCHEM/IMCYTCHM EA ADD ANTB: CPT | Mod: 26 | Performed by: STUDENT IN AN ORGANIZED HEALTH CARE EDUCATION/TRAINING PROGRAM

## 2024-02-26 PROCEDURE — 88189 FLOWCYTOMETRY/READ 16 & >: CPT | Mod: GC | Performed by: STUDENT IN AN ORGANIZED HEALTH CARE EDUCATION/TRAINING PROGRAM

## 2024-02-26 PROCEDURE — 88305 TISSUE EXAM BY PATHOLOGIST: CPT | Mod: 26 | Performed by: STUDENT IN AN ORGANIZED HEALTH CARE EDUCATION/TRAINING PROGRAM

## 2024-02-26 PROCEDURE — 80053 COMPREHEN METABOLIC PANEL: CPT

## 2024-02-26 PROCEDURE — 88311 DECALCIFY TISSUE: CPT | Mod: 26 | Performed by: STUDENT IN AN ORGANIZED HEALTH CARE EDUCATION/TRAINING PROGRAM

## 2024-02-26 PROCEDURE — 81267 CHIMERISM ANAL NO CELL SELEC: CPT | Performed by: PHYSICIAN ASSISTANT

## 2024-02-26 PROCEDURE — 83735 ASSAY OF MAGNESIUM: CPT

## 2024-02-26 PROCEDURE — 85004 AUTOMATED DIFF WBC COUNT: CPT

## 2024-02-26 PROCEDURE — 250N000011 HC RX IP 250 OP 636

## 2024-02-26 PROCEDURE — 88342 IMHCHEM/IMCYTCHM 1ST ANTB: CPT | Mod: TC,XU | Performed by: PHYSICIAN ASSISTANT

## 2024-02-26 PROCEDURE — 85097 BONE MARROW INTERPRETATION: CPT | Mod: GC | Performed by: STUDENT IN AN ORGANIZED HEALTH CARE EDUCATION/TRAINING PROGRAM

## 2024-02-26 PROCEDURE — 88291 CYTO/MOLECULAR REPORT: CPT | Performed by: MEDICAL GENETICS

## 2024-02-26 PROCEDURE — 88313 SPECIAL STAINS GROUP 2: CPT | Mod: 26 | Performed by: STUDENT IN AN ORGANIZED HEALTH CARE EDUCATION/TRAINING PROGRAM

## 2024-02-26 PROCEDURE — 88185 FLOWCYTOMETRY/TC ADD-ON: CPT | Performed by: PHYSICIAN ASSISTANT

## 2024-02-26 PROCEDURE — G0452 MOLECULAR PATHOLOGY INTERPR: HCPCS | Mod: 26 | Performed by: PATHOLOGY

## 2024-02-26 PROCEDURE — 88184 FLOWCYTOMETRY/ TC 1 MARKER: CPT | Performed by: PHYSICIAN ASSISTANT

## 2024-02-26 PROCEDURE — 88342 IMHCHEM/IMCYTCHM 1ST ANTB: CPT | Mod: 26 | Performed by: STUDENT IN AN ORGANIZED HEALTH CARE EDUCATION/TRAINING PROGRAM

## 2024-02-26 PROCEDURE — 88275 CYTOGENETICS 100-300: CPT | Performed by: PHYSICIAN ASSISTANT

## 2024-02-26 PROCEDURE — 88368 INSITU HYBRIDIZATION MANUAL: CPT | Mod: 26 | Performed by: MEDICAL GENETICS

## 2024-02-26 PROCEDURE — 88271 CYTOGENETICS DNA PROBE: CPT | Mod: XU | Performed by: PHYSICIAN ASSISTANT

## 2024-02-26 RX ORDER — HEPARIN SODIUM,PORCINE 10 UNIT/ML
5 VIAL (ML) INTRAVENOUS ONCE
Status: COMPLETED | OUTPATIENT
Start: 2024-02-26 | End: 2024-02-26

## 2024-02-26 RX ORDER — METOPROLOL SUCCINATE 100 MG/1
100 TABLET, EXTENDED RELEASE ORAL DAILY
COMMUNITY
Start: 2024-02-26

## 2024-02-26 RX ORDER — HEPARIN SODIUM,PORCINE 10 UNIT/ML
5-20 VIAL (ML) INTRAVENOUS DAILY PRN
Status: CANCELLED | OUTPATIENT
Start: 2024-02-26

## 2024-02-26 RX ORDER — HEPARIN SODIUM (PORCINE) LOCK FLUSH IV SOLN 100 UNIT/ML 100 UNIT/ML
5 SOLUTION INTRAVENOUS
Status: CANCELLED | OUTPATIENT
Start: 2024-02-26

## 2024-02-26 RX ORDER — LOPERAMIDE HCL 2 MG
4 CAPSULE ORAL 4 TIMES DAILY PRN
COMMUNITY
Start: 2024-02-26 | End: 2024-02-28

## 2024-02-26 RX ORDER — LISINOPRIL 40 MG/1
40 TABLET ORAL DAILY
COMMUNITY
Start: 2024-02-26 | End: 2024-03-07

## 2024-02-26 RX ORDER — SULFAMETHOXAZOLE/TRIMETHOPRIM 800-160 MG
1 TABLET ORAL
COMMUNITY
Start: 2024-02-26 | End: 2024-03-07

## 2024-02-26 RX ORDER — HEPARIN SODIUM,PORCINE 10 UNIT/ML
5-20 VIAL (ML) INTRAVENOUS DAILY PRN
Status: DISCONTINUED | OUTPATIENT
Start: 2024-02-26 | End: 2024-02-26 | Stop reason: HOSPADM

## 2024-02-26 RX ADMIN — Medication 5 ML: at 10:47

## 2024-02-26 RX ADMIN — Medication 5 ML: at 07:49

## 2024-02-26 RX ADMIN — Medication 5 ML: at 07:48

## 2024-02-26 RX ADMIN — MIDAZOLAM 2 MG: 1 INJECTION INTRAMUSCULAR; INTRAVENOUS at 10:22

## 2024-02-26 ASSESSMENT — PAIN SCALES - GENERAL: PAINLEVEL: NO PAIN (0)

## 2024-02-26 NOTE — PROGRESS NOTES
BMT ONC Adult Bone Marrow Biopsy Procedure Note  February 26, 2024  BP (!) 162/88 (BP Location: Right arm)   Pulse 71   Resp 16   SpO2 99%      Learning needs assessment complete within 12 months? YES    DIAGNOSIS: MDS    PROCEDURE: Unilateral Bone Marrow Biopsy and Unilateral Aspirate    LOCATION: [facility] 2nd Floor    Patient s identification was positively verified by verbal identification and invasive procedure safety checklist was completed. Informed consent was obtained. Following the administration of Midazolam as pre-medication, patient was placed in the prone position and prepped and draped in a sterile manner. Approximately 10 cc of 1% Lidocaine was used over the left posterior iliac spine. Following this a 3 mm incision was made. Trephine bone marrow core(s) was (were) obtained from the [facility]. Bone marrow aspirates were obtained from the IC. Aspirates were sent for morphology, immunophenotyping, cytogenetics, and molecular diagnostics RFLP. A total of approximately 20 ml of marrow was aspirated. Following this procedure a sterile dressing was applied to the bone marrow biopsy site(s). The patient was placed in the supine position to maintain pressure on the biopsy site. Post-procedure wound care instructions were given.     Complications: NO    Pre-procedural pain: 0 out of 10 on the numeric pain rating scale.     Procedural pain: 5 out of 10 on the numeric pain rating scale.     Post-procedural pain assessment: 0 out of 10 on the numeric pain rating scale.     Interventions: NO    Length of procedure:20 minutes or less      Procedure performed by: Josefina Johnson PAC  [phone]

## 2024-02-26 NOTE — NURSING NOTE
"Oncology Rooming Note    February 26, 2024 8:20 AM   Ziggy Hallman is a 48 year old male who presents for:    Chief Complaint   Patient presents with    Blood Draw     CVC blood draw with heparin flush by lab RN. Vitals taken and appointment arrived    Oncology Clinic Visit     [facility] RETURN - MDS     Initial Vitals: /64   Pulse 78   Temp 98.3  F (36.8  C) (Axillary)   Resp 16   Ht 1.725 m (5' 7.91\")   Wt 81.5 kg (179 lb 11.2 oz)   SpO2 99%   BMI 27.39 kg/m   Estimated body mass index is 27.39 kg/m  as calculated from the following:    Height as of this encounter: 1.725 m (5' 7.91\").    Weight as of this encounter: 81.5 kg (179 lb 11.2 oz). Body surface area is 1.98 meters squared.  No Pain (0) Comment: Data Unavailable   No LMP for male patient.  Allergies reviewed: Yes  Medications reviewed: Yes    Medications: Medication refills not needed today.  Pharmacy name entered into [facility]: [facility] #[id number] - [place], [place] - [address] [place] [address]    Frailty Screening:   Is the patient here for a new oncology consult visit in cancer care? 2. No    Martin Martinez LPN              "

## 2024-02-26 NOTE — PROGRESS NOTES
I met with Ziggy Hallman to refill his med box for one week.     Changes made to scheduled medication list by today's provider include:  Added: Bactrim  Deleted: MMF will stop this week.   Changed: Restart apixaban and prasugrel tomorrow.  Loperamide moved to PRN.     He doesn't need any refills at this time.  I provided them with an updated medication list.     Current Outpatient Medications   Medication Sig Dispense Refill    acyclovir (ZOVIRAX) 800 MG tablet Take 1 tablet (800 mg) by mouth 2 times daily 60 tablet 1    apixaban ANTICOAGULANT (ELIQUIS) 5 MG tablet Take 1 tablet (5 mg) by mouth 2 times daily 60 tablet 3    lisinopril (ZESTRIL) 40 MG tablet Take 1 tablet (40 mg) by mouth daily      loperamide (IMODIUM) 2 MG capsule Take 2 capsules (4 mg) by mouth 4 times daily as needed      LORazepam (ATIVAN) 0.5 MG tablet Take 1-2 tablets (0.5-1 mg) by mouth every 4 hours as needed for vomiting or nausea (nausea/vomiting/sleep) (Patient not taking: Reported on 2/26/2024) 15 tablet 0    metoprolol succinate ER (TOPROL XL) 100 MG 24 hr tablet Take 1 tablet (100 mg) by mouth daily      micafungin (MYCAMINE) 50 MG injection Inject 30 mLs (300 mg) into the vein three times a week Inject 300 mg into vein three times a week until day +45      mycophenolate (GENERIC EQUIVALENT) 250 MG capsule Take 1 capsule (250 mg) by mouth 2 times daily Continue until day +35, follow clinic instruction on when to stop. 17 capsule 0    mycophenolate (GENERIC EQUIVALENT) 500 MG tablet Take 2 tablets (1,000 mg) by mouth 2 times daily Continue until day +35, follow clinic instructions on when to stop. 34 tablet 0    NIFEdipine ER (ADALAT CC) 30 MG 24 hr tablet Take 1 tablet (30 mg) by mouth 2 times daily On hold starting 2/21      pantoprazole (PROTONIX) 40 MG EC tablet Take 1 tablet (40 mg) by mouth 2 times daily (before meals) 60 tablet 1    prasugrel (EFFIENT) 10 MG TABS tablet Take 1 tablet (10 mg) by mouth daily for 30 days 30 tablet 0     prochlorperazine (COMPAZINE) 5 MG tablet Take 1-2 tablets (5-10 mg) by mouth every 6 hours as needed for nausea or vomiting 30 tablet 1    psyllium (METAMUCIL/KONSYL) 58.6 % powder Take 3 g by mouth daily 174 g 1    sirolimus (GENERIC EQUIVALENT) 0.5 MG tablet Take 3 (1mg) tablet for a total dose of 3mg daily, use 0.5mg tabs for dose adjustments, follow instructions provided by clinic. 30 tablet 1    sirolimus (GENERIC EQUIVALENT) 1 MG tablet Take 3 (1mg) tablet for a total dose of 3mg daily, follow instructions provided by clinic for dose adjustments. 90 tablet 1    sulfamethoxazole-trimethoprim (BACTRIM DS) 800-160 MG tablet Take 1 tablet by mouth Every Mon, Tues two times daily Start 2/26      ursodiol (ACTIGALL) 300 MG capsule Take 1 capsule (300 mg) by mouth 3 times daily 90 capsule 1        Pertinent labs considered today:  Lab Results   Component Value Date     02/23/2024     05/28/2020                 normal sodium 136-148  Lab Results   Component Value Date    POTASSIUM 4.2 02/23/2024    POTASSIUM 3.8 05/28/2020       normal potassium 3.5-5.2   Lab Results   Component Value Date    CHLORIDE 111 02/23/2024    CHLORIDE 107 05/28/2020           normal chloride    Lab Results   Component Value Date    CO2 18 02/23/2024    CO2 24 05/28/2020                normal CO2 20-32  Lab Results   Component Value Date    BUN 16.3 02/23/2024    BUN 10 05/28/2020                 normal BUN 5-24  Lab Results   Component Value Date     02/23/2024     01/24/2024     05/28/2020                 normal glucose   Lab Results   Component Value Date    CR 1.18 02/23/2024    CR 0.80 05/28/2020                 normal cr 0.8-1.5  Lab Results   Component Value Date    REMY 8.9 02/23/2024    REMY 9.3 05/28/2020               normal calcium  8.5-10.4  Lab Results   Component Value Date    BILITOTAL 0.4 02/22/2024    BILITOTAL 0.8 05/28/2020          normal bilirubin 0.2-1.3  Lab Results    Component Value Date    PROTTOTAL 6.5 02/22/2024    PROTTOTAL 8.7 05/28/2020          normal total protein 6.0-8.2  Lab Results   Component Value Date    ALBUMIN 3.4 02/22/2024    ALBUMIN 3.4 05/28/2020             normal albumin 3.2-4.5  Lab Results   Component Value Date    ALKPHOS 89 02/22/2024    ALKPHOS 114 05/28/2020            normal alkphos   Lab Results   Component Value Date    ALT 21 02/22/2024    ALT 25 05/28/2020         normal ALT 0-65  Lab Results   Component Value Date    AST 47 02/22/2024    AST 20 05/28/2020         normal AST 0-37  Lab Results   Component Value Date    HGB 9.2 02/26/2024    HGB 12.6 05/28/2020     Lab Results   Component Value Date    WBC 3.4 02/26/2024    WBC 4.9 05/28/2020      Lab Results   Component Value Date     02/26/2024     05/28/2020     Estimated Creatinine Clearance: 86.3 mL/min (A) (based on SCr of 1.18 mg/dL (H)).      Drew Adams, [facility], PharmD

## 2024-02-26 NOTE — NURSING NOTE
BMBX Teaching and Assessment       Teaching concerns addressed: Bone marrow biopsy and infection prevention.     Person(s) involved in teaching: Patient  Motivation Level  Asks Questions: Yes  Eager to Learn: Yes  Cooperative: Yes  Receptive (willing/able to accept information): Yes    Patient demonstrates understanding of the following:     Reason for the appointment, diagnosis and treatment plan: Yes  Knowledge of proper use of medications and conditions for which they are ordered (with special attention to potential side effects or drug interactions): Yes  Which situations necessitate calling provider and whom to contact: Yes    Teaching concerns addressed:   Reviewed activity restrictions if received premeds, potential for bleeding and actions to take if develops any of the issues below    Pain management techniques: Yes  Patient instructed on hand hygiene: Yes  How and/when to access community resources: Yes    Infection Control:  Patient demonstrates understanding of the following:   Bone marrow procedure site care taught: Yes  Signs and symptoms of infection taught: Yes       Instructional Materials Used/Given: Pt instructed to keep bmbx site clean and dry for 24hrs. Pt educated to monitor site for signs of infection such as redness, rash, oozing, puss, bleeding, pain, and elevated temp. Pt instructed to go to call the [facility] triage line or go to the ER if any signs of infection should occur. Pt educated to not operate machinery if receiving versed. Pt and family member verbalize understanding.       Pre-procedure labs drawn via CVC in lab this AM. VSS. Meds and allergies reviewed. Pt confirms last dose of Eliquis and Effient were Friday 2/23.     Provider order received to administer Versed 2 mg IVP as premed for BMBX. Procedural consent discussed and pt's signature obtained.  Allergies reviewed.  PT currently alert and oriented to plan of care.  Pt lying prone in stretcher.  Call light w/in reach.  Provider  and  at bedside.    Post procedure: Patient vital signs stable but BP elevated to 162/88. Pt reports he is intermittently hypertensive and denies any chest pain, palpitations, SOB, or headache at time of discharge. Pt ambulatory and site is clean, dry and intact prior to discharge. Pt discharged with family member as .        Jennifer Bowman RN

## 2024-02-26 NOTE — NURSING NOTE
Chief Complaint   Patient presents with    Blood Draw     CVC blood draw with heparin flush by lab RN. Vitals taken and appointment arrived     Leydi Mitchell RN

## 2024-02-26 NOTE — LETTER
2/26/2024         RE: Ziggy Hallman  [id number] [facility] [address]        Dear Colleague,    Thank you for referring your patient, Ziggy Hallman, to the [facility] [place]. Please see a copy of my visit note below.    BMT ONC Adult Bone Marrow Biopsy Procedure Note  February 26, 2024  BP (!) 162/88 (BP Location: Right arm)   Pulse 71   Resp 16   SpO2 99%      Learning needs assessment complete within 12 months? YES    DIAGNOSIS: MDS    PROCEDURE: Unilateral Bone Marrow Biopsy and Unilateral Aspirate    [facility]    Patient s identification was positively verified by verbal identification and invasive procedure safety checklist was completed. Informed consent was obtained. Following the administration of Midazolam as pre-medication, patient was placed in the prone position and prepped and draped in a sterile manner. Approximately 10 cc of 1% Lidocaine was used over the left posterior iliac spine. Following this a 3 mm incision was made. Trephine bone marrow core(s) was (were) obtained from the [facility]. Bone marrow aspirates were obtained from the [facility]. Aspirates were sent for morphology, immunophenotyping, cytogenetics, and molecular diagnostics RFLP. A total of approximately 20 ml of marrow was aspirated. Following this procedure a sterile dressing was applied to the bone marrow biopsy site(s). The patient was placed in the supine position to maintain pressure on the biopsy site. Post-procedure wound care instructions were given.     Complications: NO    Pre-procedural pain: 0 out of 10 on the numeric pain rating scale.     Procedural pain: 5 out of 10 on the numeric pain rating scale.     Post-procedural pain assessment: 0 out of 10 on the numeric pain rating scale.     Interventions: NO    Length of procedure:20 minutes or less      Procedure performed by: Josefina Johnson PAC  [phone]

## 2024-02-26 NOTE — PROGRESS NOTES
"BMT Progress Notes  02/26/2024       Patient ID: Ziggy Hallman is a 48 year old year old male with a PMH of MDS, hx of multiple clots and MI undergoing a MA (Bu/Flu) prep for an 8/8 URD PBSCT currently day 32.      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 Taste sensation is off. He eats ramen and peanuts. Drinking ok. No n/v but forcing himself to eat. Loose stools improving. No fevers or bleeding.      Review of Systems                                                                                                                           ROS negative unless stated in HPI     PHYSICAL EXAM                                                                                                                                      Blood pressure 134/64, pulse 78, temperature 98.3  F (36.8  C), temperature source Axillary, resp. rate 16, height 1.725 m (5' 7.91\"), weight 81.5 kg (179 lb 11.2 oz), SpO2 99%.  Wt Readings from Last 4 Encounters:   02/26/24 81.5 kg (179 lb 11.2 oz)   02/23/24 82.6 kg (182 lb)   02/22/24 81.7 kg (180 lb 3.2 oz)   02/21/24 81 kg (178 lb 9.6 oz)     KPS: 70     General: NAD   Eyes: sclera anicteric   Nose/Mouth/Throat: MMM  Lungs: CTAB  Cardiovascular: RRR, no M/R/G   Abdominal/Rectal: +BS, soft, NT, ND  Lymphatics: no edema  Skin: No rash  Neuro: A&O ; non-focal  Access: CVC R chest NT, dressing cdi. R PAC not accessed.     Current aGVHD staging:  Skin 0, UGI 0, LGI 0, Liver 0 (keep in note through day +180 for allos)  Acute GVHD          2/22/2024    12:58 2/21/2024    09:06 2/15/2024    12:53   Acute GVHD   New evidence of Acute Lujom-Uktnpd-Mkar Disease developed since last entry? No No No          LABS: I have assessed all " abnormal lab values for their clinical significance and any values considered clinically significant have been addressed in the assessment and plan.       Lab Results   Component Value Date    WBC 3.4 (L) 02/26/2024    ANEU 1.6 02/20/2024    HGB 9.2 (L) 02/26/2024    HCT 25.9 (L) 02/26/2024     (L) 02/26/2024     02/26/2024    POTASSIUM 3.8 02/26/2024    CHLORIDE 108 (H) 02/26/2024    CO2 21 (L) 02/26/2024    GLC 99 02/26/2024    BUN 9.0 02/26/2024    CR 1.03 02/26/2024    MAG 1.4 (L) 02/26/2024    INR 1.35 (H) 02/19/2024    BILITOTAL 0.3 02/26/2024    AST 43 02/26/2024    ALT 22 02/26/2024    ALKPHOS 82 02/26/2024    PROTTOTAL 6.5 02/26/2024    ALBUMIN 3.4 (L) 02/26/2024          ASSESSMENT AND PLAN      Ziggy Hallman is a 48 year old male with a PMHx of MDS, hx of CAD, HTN, multiple recurrent arterial thromboembolic events (STEMIs, renal infarcts, arterial embolism, TIA) and DVTs who is undergoing a MA (Bu/Flu) prep for an 8/8 URD PBSCT day +32.     Stay was complicated by mucositis requiring PCA, N/F, Staph Epi bacteremia, pulmonary embolism requiring anticoagulation. Hospitalization prolonged following engraftment 2/2 to large stool volumes requiring ID and GVHD rule out. Underwent flex sig/EGD results are fairly unremarkable with occasional crypt apoptotic bodies in duodenum, hemorrhagic gastritis in antrum, negative CMV HSV H pylori stains, diarrhea symptoms improved upon discharge, anorexia continues.      BMT/IEC PROTOCOL for 2015-29  - Chemo Prep: BU/Flu   - 8/8 DP permissive. Cell dose-9.24x10^6  - ABO: Donor: O+ Recipient A-  (Minor incompatability, no flush required)   - Restaging plan: Restaging BMBX 2/26/24; results pending  - Day +21 Chimerisms: CD3+ 65%, CD33+= 82%     HEME/COAG  - Transfusion parameters: hemoglobin <8 (cardiac hx), platelets <30k    (2/22) Hbg did drop 2 pts after 2 L of IVF -- no signs of bleeding or blood in stools likely 2/2 to dilution.    #Pancytopenia 2/2 chemo-  anemia, leukopenia, thrombocytopenia; improving  #Segmental R PE (2/6): Hep gtt transitioned to Eliquis 5mg once platelets tolerated  #APS work up- lupus antigen +, will follow outpt as may be unreliable in this acute setting and prior APS work up had been unremarkable prior to admission.  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   Extensive hypercoagulable workup to date has been unrevealing.  JAK2 testing negative.  PNH testing negative.  Elevated IgA of unknown significance, no evidence of plasma cell disorder.  - Eliquis and Prasugrel HELD starting 2/1-2/16. Increased Eliquis to 5mg BID with platelet improvement and re-added prasugrel on 2/22 with his platelets >75K   (2/23) eliquis and prasugrel on hold for BM bx on 2/26, monitor platelets regarding prasugrel -resume both tomorrow 2/27     IMMUNOCOMPROMISED  #Non-neutropenic fever Cefepime 2/17-2/18. Work up unremarkable. Fevers resolved.  #Staph epi bacteremia - Resolved. Received IV vancomycin.   #Febrile neutropenia, fever resolved. See previous notes for abx therapy.   #C. Diff - Admit c. Diff triple+ - Start PO vanc 1/21 for 14 day course. - completed on 2/4, with broadening abx restart PO Vanco tx dose x 10 days. C.diff recheck negative on 2/17    -Prophylaxis plan: ACV LD, [facility] HD (at home via [facility]), Bactrim started 2/26     RISK OF GVHD  UGI stage 0, LGI II (pathology results not indicative of GVHD - improving with scheduled imodium)  - Prophylaxis: PtC day +3, +4, , Siro/MMF to start day +5  - Siro level 8.3 (2/23).     #Anorexia --  appetite improving slowly since   #Diarrhea LGI stage II (unknown volume, watery stools 4-6x daily) -- slowly improving  - Has had ongoing diarrhea since early admission. He was treated for c. Diff for 14 days. He failed anti-diarrheal medicine initially. At times reports 20+ stools/day. (2/21) Scheduled imodium 2 mg QID-->to prn 2/26 . Metamucil  - AREG and fecal kvng protectin unremarkable, trend  - GI Luminal consult - EGD, FlexSig show occasional crypt apoptotic bodies in duodenum, hemorrhagic gastritis in antrum, negative CMV/HSV/H pylori stains.     CARDIOVASCULAR  #CAD  #Hx of CABG  #HTN  - PTA metoprolol, Lisinopril dose increased 2/2 HTN, nifidepine ER 30mg BID --   (2/21) placed all ON HOLD   (2/22) with cardiac hx, restarted metoprolol 50 BID    (2/23): Resumed metoprolol 100 mg/day and re-started Lisinopril 40 mg/day 2/26.   #Hyperlipidemia: Holding atorvastatin peritransplant  - Risk of cardiomyopathy:  Baseline EF 50-55%, Global left ventricular function mildly reduced. Grade 1 or early diastolic dysfunction.      - Risk of arrhythmia: Baseline EKG showed NSR, Qtc -425     GI/NUTRITION  #Diarrhea - repeat infx work up unremarkable. Likely mucositis 2/2 radiation/chemo +/- abx. MMF trough unremarkable. Continues metamucil and loperamide QID.   #Oropharyngeal mucositis- resolved.   - Ulcer prophylaxis: Protonix   - VOD Prophylaxis: Ursodiol  - Risk of nausea/vomiting due to chemo: Ativan, Compazine prn   #Moderate malnutrition 2/2 acute on chronic illness - appetite slowly improving already since discharge.     RENAL/ELECTROLYTES/  #hypoMg (resulted after visit 2/2 chem machine down); Mg rich foods and recheck Wed  #CONNIE resolved. - 2/2 intravascular depletion +/- diuresis. IVF replacement 2/20. #Hematuria- No dysuria, resolved  #Proteinuria- possibly exacerbated by persistent HTN (See CV),   #Hypokalemia- replace as needed. 40meq PO at discharge.     Psych:    #Anxiety- saw inpatient psych who offered  atarax, but did not work well. Health psych offered, patient declined.  #Insomnia- PTA ambien and trazadone discontinued with concerns for AMS, sedation with concurrent pain meds. melatonin added.      Summary:  BMbx  Cont Metoprolol & Lisinopril  Resume Eliquis and prasugrel tomorrow  Imodium to prn  Start Bactrim  PharmD filled box  Repeat Mg Wed  Cont home fabi MWF  RTC Wed with Dr. Bacon  Will request Mon 3/4 appts (labs, NIDHI, PharmD)  Has qThurs BAN appts starting 3/7    I spent 40 minutes in the care of this patient today, which included time necessary for preparation for the visit, obtaining history, ordering medications/tests/procedures as medically indicated, review of pertinent medical literature, counseling of the patient, communication of recommendations to the care team, and documentation time.      Olivia Lucia PA-C

## 2024-02-26 NOTE — LETTER
"    2/26/2024         RE: Ziggy Hallman  [address] [facility] [address]        Dear Colleague,    Thank you for referring your patient, Ziggy Hallman, to the [facility]ARR[facility]ANS[facility] [place]. Please see a copy of my visit note below.    BMT Progress Notes  02/26/2024       Patient ID: Ziggy Hallman is a 48 year old year old male with a PMH of MDS, hx of multiple clots and MI undergoing a MA (Bu/Flu) prep for an 8/8 URD PBSCT currently day 32.      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 Taste sensation is off. He eats ramen and peanuts. Drinking ok. No n/v but forcing himself to eat. Loose stools improving. No fevers or bleeding.      Review of Systems                                                                                                                           ROS negative unless stated in HPI     PHYSICAL EXAM                                                                                                                                      Blood pressure 134/64, pulse 78, temperature 98.3  F (36.8  C), temperature source Axillary, resp. rate 16, height 1.725 m (5' 7.91\"), weight 81.5 kg (179 lb 11.2 oz), SpO2 99%.  Wt Readings from Last 4 Encounters:   02/26/24 81.5 kg (179 lb 11.2 oz)   02/23/24 82.6 kg (182 lb)   02/22/24 81.7 kg (180 lb 3.2 oz)   02/21/24 81 kg (178 lb 9.6 oz)     KPS: 70     General: NAD   Eyes: sclera anicteric   Nose/Mouth/Throat: MMM  Lungs: CTAB  Cardiovascular: RRR, no M/R/G   Abdominal/Rectal: +BS, soft, NT, ND  Lymphatics: no edema  Skin: No rash  Neuro: A&O ; non-focal  Access: CVC R chest NT, dressing cdi. R PAC not accessed.     Current aGVHD staging:  Skin 0, " UGI 0, LGI 0, Liver 0 (keep in note through day +180 for allos)  Acute GVHD          2/22/2024    12:58 2/21/2024    09:06 2/15/2024    12:53   Acute GVHD   New evidence of Acute Owkvs-Zdkond-Ngfu Disease developed since last entry? No No No          LABS: I have assessed all abnormal lab values for their clinical significance and any values considered clinically significant have been addressed in the assessment and plan.       Lab Results   Component Value Date    WBC 3.4 (L) 02/26/2024    ANEU 1.6 02/20/2024    HGB 9.2 (L) 02/26/2024    HCT 25.9 (L) 02/26/2024     (L) 02/26/2024     02/26/2024    POTASSIUM 3.8 02/26/2024    CHLORIDE 108 (H) 02/26/2024    CO2 21 (L) 02/26/2024    GLC 99 02/26/2024    BUN 9.0 02/26/2024    CR 1.03 02/26/2024    MAG 1.4 (L) 02/26/2024    INR 1.35 (H) 02/19/2024    BILITOTAL 0.3 02/26/2024    AST 43 02/26/2024    ALT 22 02/26/2024    ALKPHOS 82 02/26/2024    PROTTOTAL 6.5 02/26/2024    ALBUMIN 3.4 (L) 02/26/2024          ASSESSMENT AND PLAN      Ziggy Hallman is a 48 year old male with a PMHx of MDS, hx of CAD, HTN, multiple recurrent arterial thromboembolic events (STEMIs, renal infarcts, arterial embolism, TIA) and DVTs who is undergoing a MA (Bu/Flu) prep for an 8/8 URD PBSCT day +32.     Stay was complicated by mucositis requiring PCA, N/F, Staph Epi bacteremia, pulmonary embolism requiring anticoagulation. Hospitalization prolonged following engraftment 2/2 to large stool volumes requiring ID and GVHD rule out. Underwent flex sig/EGD results are fairly unremarkable with occasional crypt apoptotic bodies in duodenum, hemorrhagic gastritis in antrum, negative CMV HSV H pylori stains, diarrhea symptoms improved upon discharge, anorexia continues.      BMT/IEC PROTOCOL for 2015-29  - Chemo Prep: BU/Flu   - 8/8 DP permissive. Cell dose-9.24x10^6  - ABO: Donor: O+ Recipient A-  (Minor incompatability, no flush required)   - Restaging plan: Restaging BMBX 2/26/24; results  pending  - Day +21 Chimerisms: CD3+ 65%, CD33+= 82%     HEME/COAG  - Transfusion parameters: hemoglobin <8 (cardiac hx), platelets <30k    (2/22) Hbg did drop 2 pts after 2 L of IVF -- no signs of bleeding or blood in stools likely 2/2 to dilution.    #Pancytopenia 2/2 chemo- anemia, leukopenia, thrombocytopenia; improving  #Segmental R PE (2/6): Hep gtt transitioned to Eliquis 5mg once platelets tolerated  #APS work up- lupus antigen +, will follow outpt as may be unreliable in this acute setting and prior APS work up had been unremarkable prior to admission.  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   Extensive hypercoagulable workup to date has been unrevealing.  JAK2 testing negative.  PNH testing negative.  Elevated IgA of unknown significance, no evidence of plasma cell disorder.  - Eliquis and Prasugrel HELD starting 2/1-2/16. Increased Eliquis to 5mg BID with platelet improvement and re-added prasugrel on 2/22 with his platelets >75K   (2/23) eliquis and prasugrel on hold for BM bx on 2/26, monitor platelets regarding prasugrel -resume both tomorrow 2/27     IMMUNOCOMPROMISED  #Non-neutropenic fever Cefepime 2/17-2/18. Work up unremarkable. Fevers resolved.  #Staph epi bacteremia - Resolved. Received IV vancomycin.   #Febrile neutropenia, fever resolved. See previous notes for abx therapy.   #C. Diff - Admit c. Diff triple+ - Start PO vanc 1/21 for 14 day course. - completed on 2/4, with broadening abx restart PO Vanco tx dose x 10 days. C.diff recheck negative on 2/17    -Prophylaxis plan: ACV LD,  [facility] HD (at home via [facility]), Bactrim started 2/26     RISK OF GVHD  UGI stage 0, LGI II (pathology results not indicative of GVHD - improving with scheduled imodium)  - Prophylaxis: PtC day +3, +4, , Siro/MMF to start day +5  - Siro level 8.3 (2/23).     #Anorexia -- appetite improving slowly since   #Diarrhea LGI stage II (unknown volume, watery stools 4-6x daily) -- slowly improving  - Has had ongoing diarrhea since early admission. He was treated for c. Diff for 14 days. He failed anti-diarrheal medicine initially. At times reports 20+ stools/day. (2/21) Scheduled imodium 2 mg QID-->to prn 2/26 . Metamucil  - AREG and fecal kvng protectin unremarkable, trend  - GI Luminal consult - EGD, FlexSig show occasional crypt apoptotic bodies in duodenum, hemorrhagic gastritis in antrum, negative CMV/HSV/H pylori stains.     CARDIOVASCULAR  #CAD  #Hx of CABG  #HTN  - PTA metoprolol, Lisinopril dose increased 2/2 HTN, nifidepine ER 30mg BID --   (2/21) placed all ON HOLD   (2/22) with cardiac hx, restarted metoprolol 50 BID    (2/23): Resumed metoprolol 100 mg/day and re-started Lisinopril 40 mg/day 2/26.   #Hyperlipidemia: Holding atorvastatin peritransplant  - Risk of cardiomyopathy:  Baseline EF 50-55%, Global left ventricular function mildly reduced. Grade 1 or early diastolic dysfunction.      - Risk of arrhythmia: Baseline EKG showed NSR, Qtc -425     GI/NUTRITION  #Diarrhea - repeat infx work up unremarkable. Likely mucositis 2/2 radiation/chemo +/- abx. MMF trough unremarkable. Continues metamucil and loperamide QID.   #Oropharyngeal mucositis- resolved.   - Ulcer prophylaxis: Protonix   - VOD Prophylaxis: Ursodiol  - Risk of nausea/vomiting due to chemo: Ativan, Compazine prn   #Moderate malnutrition 2/2 acute on chronic illness - appetite slowly improving already since discharge.     RENAL/ELECTROLYTES/  #hypoMg (resulted after visit 2/2 chem machine down); Mg rich foods and recheck Wed  #CONNIE resolved. - 2/2  intravascular depletion +/- diuresis. IVF replacement 2/20. #Hematuria- No dysuria, resolved  #Proteinuria- possibly exacerbated by persistent HTN (See CV),   #Hypokalemia- replace as needed. 40meq PO at discharge.     Psych:    #Anxiety- saw inpatient psych who offered atarax, but did not work well. Health psych offered, patient declined.  #Insomnia- PTA ambien and trazadone discontinued with concerns for AMS, sedation with concurrent pain meds. melatonin added.      Summary:  BMbx  Cont Metoprolol & Lisinopril  Resume Eliquis and prasugrel tomorrow  Imodium to prn  Start Bactrim  PharmD filled box  Repeat Mg Wed  Cont home fabi MWF  RTC Wed with Dr. Bacon  Will request Mon 3/4 appts (labs, NIDHI, PharmD)  Has qThurs BAN appts starting 3/7    I spent 40 minutes in the care of this patient today, which included time necessary for preparation for the visit, obtaining history, ordering medications/tests/procedures as medically indicated, review of pertinent medical literature, counseling of the patient, communication of recommendations to the care team, and documentation time.      Olivia Lucia PA-C

## 2024-02-27 LAB
PATH REPORT.COMMENTS IMP SPEC: NORMAL
PATH REPORT.COMMENTS IMP SPEC: NORMAL
PATH REPORT.FINAL DX SPEC: NORMAL
PATH REPORT.GROSS SPEC: NORMAL
PATH REPORT.MICROSCOPIC SPEC OTHER STN: NORMAL
PATH REPORT.MICROSCOPIC SPEC OTHER STN: NORMAL
PATH REPORT.RELEVANT HX SPEC: NORMAL

## 2024-02-28 ENCOUNTER — INFUSION THERAPY VISIT (OUTPATIENT)
Dept: TRANSPLANT | Facility: CLINIC | Age: 49
End: 2024-02-28
Attending: INTERNAL MEDICINE
Payer: COMMERCIAL

## 2024-02-28 ENCOUNTER — LAB (OUTPATIENT)
Dept: LAB | Facility: CLINIC | Age: 49
End: 2024-02-28
Attending: INTERNAL MEDICINE
Payer: COMMERCIAL

## 2024-02-28 VITALS
RESPIRATION RATE: 16 BRPM | WEIGHT: 177.3 LBS | HEART RATE: 75 BPM | BODY MASS INDEX: 27.03 KG/M2 | TEMPERATURE: 98.3 F | OXYGEN SATURATION: 99 % | DIASTOLIC BLOOD PRESSURE: 83 MMHG | SYSTOLIC BLOOD PRESSURE: 144 MMHG

## 2024-02-28 DIAGNOSIS — D46.9 MDS (MYELODYSPLASTIC SYNDROME) (H): Primary | ICD-10-CM

## 2024-02-28 DIAGNOSIS — Z94.81 STATUS POST BONE MARROW TRANSPLANT (H): ICD-10-CM

## 2024-02-28 LAB
ALBUMIN SERPL BCG-MCNC: 3.5 G/DL (ref 3.5–5.2)
ALP SERPL-CCNC: 84 U/L (ref 40–150)
ALT SERPL W P-5'-P-CCNC: 21 U/L (ref 0–70)
ANION GAP SERPL CALCULATED.3IONS-SCNC: 10 MMOL/L (ref 7–15)
AST SERPL W P-5'-P-CCNC: 38 U/L (ref 0–45)
BASOPHILS # BLD AUTO: 0.1 10E3/UL (ref 0–0.2)
BASOPHILS NFR BLD AUTO: 2 %
BILIRUB SERPL-MCNC: 0.3 MG/DL
BUN SERPL-MCNC: 8.2 MG/DL (ref 6–20)
CALCIUM SERPL-MCNC: 9.4 MG/DL (ref 8.6–10)
CHLORIDE SERPL-SCNC: 106 MMOL/L (ref 98–107)
CMV DNA SPEC NAA+PROBE-ACNC: NOT DETECTED IU/ML
CREAT SERPL-MCNC: 1.23 MG/DL (ref 0.67–1.17)
DEPRECATED HCO3 PLAS-SCNC: 23 MMOL/L (ref 22–29)
EGFRCR SERPLBLD CKD-EPI 2021: 72 ML/MIN/1.73M2
EOSINOPHIL # BLD AUTO: 0.1 10E3/UL (ref 0–0.7)
EOSINOPHIL NFR BLD AUTO: 3 %
ERYTHROCYTE [DISTWIDTH] IN BLOOD BY AUTOMATED COUNT: 13.7 % (ref 10–15)
GLUCOSE SERPL-MCNC: 103 MG/DL (ref 70–99)
HCT VFR BLD AUTO: 26.4 % (ref 40–53)
HGB BLD-MCNC: 9.2 G/DL (ref 13.3–17.7)
IMM GRANULOCYTES # BLD: 0.1 10E3/UL
IMM GRANULOCYTES NFR BLD: 2 %
LAB DIRECTOR DISCLAIMER: NORMAL
LAB DIRECTOR DISCLAIMER: NORMAL
LAB DIRECTOR INTERPRETATION: NORMAL
LAB DIRECTOR INTERPRETATION: NORMAL
LAB DIRECTOR METHODOLOGY: NORMAL
LAB DIRECTOR METHODOLOGY: NORMAL
LAB DIRECTOR RESULTS: NORMAL
LAB DIRECTOR RESULTS: NORMAL
LOCATION OF TASK: NORMAL
LOCATION OF TASK: NORMAL
LYMPHOCYTES # BLD AUTO: 0.7 10E3/UL (ref 0.8–5.3)
LYMPHOCYTES NFR BLD AUTO: 22 %
MAGNESIUM SERPL-MCNC: 1.5 MG/DL (ref 1.7–2.3)
MCH RBC QN AUTO: 29.9 PG (ref 26.5–33)
MCHC RBC AUTO-ENTMCNC: 34.8 G/DL (ref 31.5–36.5)
MCV RBC AUTO: 86 FL (ref 78–100)
MONOCYTES # BLD AUTO: 0.5 10E3/UL (ref 0–1.3)
MONOCYTES NFR BLD AUTO: 16 %
NEUTROPHILS # BLD AUTO: 1.8 10E3/UL (ref 1.6–8.3)
NEUTROPHILS NFR BLD AUTO: 55 %
NRBC # BLD AUTO: 0 10E3/UL
NRBC BLD AUTO-RTO: 0 /100
PLATELET # BLD AUTO: 127 10E3/UL (ref 150–450)
POTASSIUM SERPL-SCNC: 3.6 MMOL/L (ref 3.4–5.3)
PROT SERPL-MCNC: 6.6 G/DL (ref 6.4–8.3)
RBC # BLD AUTO: 3.08 10E6/UL (ref 4.4–5.9)
SIROLIMUS BLD-MCNC: 8 UG/L (ref 5–15)
SODIUM SERPL-SCNC: 139 MMOL/L (ref 135–145)
SPECIMEN DESCRIPTION: NORMAL
SPECIMEN DESCRIPTION: NORMAL
TME LAST DOSE: NORMAL H
TME LAST DOSE: NORMAL H
WBC # BLD AUTO: 3.3 10E3/UL (ref 4–11)

## 2024-02-28 PROCEDURE — G0452 MOLECULAR PATHOLOGY INTERPR: HCPCS | Mod: 26 | Performed by: PATHOLOGY

## 2024-02-28 PROCEDURE — 36591 DRAW BLOOD OFF VENOUS DEVICE: CPT

## 2024-02-28 PROCEDURE — 83735 ASSAY OF MAGNESIUM: CPT

## 2024-02-28 PROCEDURE — 85025 COMPLETE CBC W/AUTO DIFF WBC: CPT | Performed by: PHYSICIAN ASSISTANT

## 2024-02-28 PROCEDURE — 81268 CHIMERISM ANAL W/CELL SELECT: CPT

## 2024-02-28 PROCEDURE — 80053 COMPREHEN METABOLIC PANEL: CPT | Performed by: PHYSICIAN ASSISTANT

## 2024-02-28 PROCEDURE — 80195 ASSAY OF SIROLIMUS: CPT

## 2024-02-28 PROCEDURE — 96365 THER/PROPH/DIAG IV INF INIT: CPT

## 2024-02-28 PROCEDURE — 250N000011 HC RX IP 250 OP 636: Performed by: INTERNAL MEDICINE

## 2024-02-28 PROCEDURE — G0463 HOSPITAL OUTPT CLINIC VISIT: HCPCS | Mod: 25 | Performed by: INTERNAL MEDICINE

## 2024-02-28 PROCEDURE — 99215 OFFICE O/P EST HI 40 MIN: CPT | Performed by: INTERNAL MEDICINE

## 2024-02-28 RX ORDER — HEPARIN SODIUM,PORCINE 10 UNIT/ML
5 VIAL (ML) INTRAVENOUS
Status: DISCONTINUED | OUTPATIENT
Start: 2024-02-28 | End: 2024-02-29 | Stop reason: HOSPADM

## 2024-02-28 RX ORDER — MAGNESIUM SULFATE HEPTAHYDRATE 40 MG/ML
2 INJECTION, SOLUTION INTRAVENOUS ONCE
Status: COMPLETED | OUTPATIENT
Start: 2024-02-28 | End: 2024-02-28

## 2024-02-28 RX ADMIN — Medication 5 ML: at 08:26

## 2024-02-28 RX ADMIN — MAGNESIUM SULFATE HEPTAHYDRATE 2 G: 40 INJECTION, SOLUTION INTRAVENOUS at 09:58

## 2024-02-28 NOTE — PROGRESS NOTES
BMT Progress Notes  02/28/2024       Patient ID: Ziggy Hallman is a 48 year old year old male with a PMH of MDS, hx of multiple clots and MI undergoing a MA (Bu/Flu) prep for an 8/8 URD PBSCT currently day 34.      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 Taste sensation is off. Drinking ok. No n/v /d. No fevers. Mild nose bleed once in a while in the morning.     Review of Systems                                                                                                                           ROS negative unless stated in HPI     PHYSICAL EXAM                                                                                                                                      Blood pressure (!) 144/83, pulse 75, temperature 98.3  F (36.8  C), temperature source Axillary, resp. rate 16, weight 80.4 kg (177 lb 4.8 oz), SpO2 99%.  Wt Readings from Last 4 Encounters:   02/28/24 80.4 kg (177 lb 4.8 oz)   02/26/24 81.5 kg (179 lb 11.2 oz)   02/23/24 82.6 kg (182 lb)   02/22/24 81.7 kg (180 lb 3.2 oz)     KPS: 70     General: NAD   Eyes: sclera anicteric   Nose/Mouth/Throat: MMM  Lungs: CTAB  Cardiovascular: RRR, no M/R/G   Abdominal/Rectal: +BS, soft, NT, ND  Lymphatics: no edema  Skin: No rash  Neuro: A&O ; non-focal  Access: CVC R chest NT, dressing cdi. R PAC not accessed.     Current aGVHD staging:  Skin 0, UGI 0, LGI 0, Liver 0 (keep in note through day +180 for allos)  Acute GVHD          2/22/2024    12:58 2/21/2024    09:06 2/15/2024    12:53   Acute GVHD   New evidence of Acute Seixt-Rshagj-Advf Disease developed since last entry? No No No          LABS: I have assessed all abnormal lab values for their clinical significance and any values  considered clinically significant have been addressed in the assessment and plan.       Lab Results   Component Value Date    WBC 3.4 (L) 02/26/2024    ANEU 1.6 02/20/2024    HGB 9.2 (L) 02/26/2024    HCT 25.9 (L) 02/26/2024     (L) 02/26/2024     02/26/2024    POTASSIUM 3.8 02/26/2024    CHLORIDE 108 (H) 02/26/2024    CO2 21 (L) 02/26/2024    GLC 99 02/26/2024    BUN 9.0 02/26/2024    CR 1.03 02/26/2024    MAG 1.4 (L) 02/26/2024    INR 1.35 (H) 02/19/2024    BILITOTAL 0.3 02/26/2024    AST 43 02/26/2024    ALT 22 02/26/2024    ALKPHOS 82 02/26/2024    PROTTOTAL 6.5 02/26/2024    ALBUMIN 3.4 (L) 02/26/2024          ASSESSMENT AND PLAN      Ziggy Hallman is a 48 year old male with a PMHx of MDS, hx of CAD, HTN, multiple recurrent arterial thromboembolic events (STEMIs, renal infarcts, arterial embolism, TIA) and DVTs who is undergoing a MA (Bu/Flu) prep for an 8/8 URD PBSCT day +34.     Stay was complicated by mucositis requiring PCA, N/F, Staph Epi bacteremia, pulmonary embolism requiring anticoagulation. Hospitalization prolonged following engraftment 2/2 to large stool volumes requiring ID and GVHD rule out. Underwent flex sig/EGD results are fairly unremarkable with occasional crypt apoptotic bodies in duodenum, hemorrhagic gastritis in antrum, negative CMV HSV H pylori stains, diarrhea symptoms improved upon discharge, anorexia continues.      BMT/IEC PROTOCOL for 2015-29  - Chemo Prep: BU/Flu   - 8/8 DP permissive. Cell dose-9.24x10^6  - ABO: Donor: O+ Recipient A-  (Minor incompatability, no flush required)   - Restaging plan:                       - Day +21 peripheral blood Chimerisms: CD3+ 65%, CD33+= 82%                      - Day +30 bone marrow chimerism: donor 86% 14 % recipient : his counts are good. We will just check another peripheral  chimerism. No change in immunosuppression.       HEME/COAG  - Transfusion parameters: hemoglobin <8 (cardiac hx), platelets <30k    (2/26). No  requirement for transfusion. Has mild nose bleed once in a while in the morning. We recommended him to use humidifier /nasal spray.     #Pancytopenia 2/2 chemo- anemia, leukopenia, thrombocytopenia; improving  #Segmental R PE (2/6): Hep gtt transitioned to Eliquis 5mg once platelets tolerated  #APS work up- lupus antigen +, will follow outpt as may be unreliable in this acute setting and prior APS work up had been unremarkable prior to admission.  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   Extensive hypercoagulable workup to date has been unrevealing.  JAK2 testing negative.  PNH testing negative.  Elevated IgA of unknown significance, no evidence of plasma cell disorder.  - Eliquis and Prasugrel HELD starting 2/1-2/16. Increased Eliquis to 5mg BID with platelet improvement and re-added prasugrel on 2/22 with his platelets >75K   (2/23)  eliquis and prasugrel on hold for BM bx on 2/26, monitor platelets regarding prasugrel -resume both tomorrow 2/27  (2/28 ) eliquis and prasugrel resumed     IMMUNOCOMPROMISED  #Non-neutropenic fever Cefepime 2/17-2/18. Work up unremarkable. Fevers resolved.  #Staph epi bacteremia - Resolved. Received IV vancomycin.   #Febrile neutropenia, fever resolved. See previous notes for abx therapy.   #C. Diff - Admit c. Diff triple+ - Start PO vanc 1/21 for 14 day course. - completed on 2/4, with broadening abx restart PO Vanco tx dose x 10 days. C.diff recheck negative on 2/17    -Prophylaxis plan: ACV LD, Fabi HD (at home via [facility]), Bactrim started 2/26. Plan is to switch fabi  to posa on day 45.     RISK OF GVHD  UGI stage 0, LGI II (pathology results not indicative of GVHD - improving with scheduled imodium)  2/28/24: No signs of GVHD  - Prophylaxis: PtC day +3, +4, , Siro/MMF to start day +5  - Siro level 8.3 (2/23).     #Anorexia -- appetite improving slowly since   #Diarrhea LGI stage II (unknown volume, watery stools 4-6x daily) -- resolved  - AREG and fecal kvng protectin unremarkable, trend  - GI Luminal consult - EGD, FlexSig show occasional crypt apoptotic bodies in duodenum, hemorrhagic gastritis in antrum, negative CMV/HSV/H pylori stains.     CARDIOVASCULAR  #CAD  #Hx of CABG  #HTN  - PTA metoprolol, Lisinopril dose increased 2/2 HTN, nifidepine ER 30mg BID --   (2/21) placed all ON HOLD   (2/22) with cardiac hx, restarted metoprolol 50 BID    (2/23): Resumed metoprolol 100 mg/day and re-started Lisinopril 40 mg/day 2/26.   #Hyperlipidemia: Holding atorvastatin peritransplant  - Risk of cardiomyopathy:  Baseline EF 50-55%, Global left ventricular function mildly reduced. Grade 1 or early diastolic dysfunction.      - Risk of arrhythmia: Baseline EKG showed NSR, Qtc -425     GI/NUTRITION  #Diarrhea - resolved   #Oropharyngeal mucositis- resolved.   - Ulcer prophylaxis: Protonix   - VOD Prophylaxis: Ursodiol  - Risk of nausea/vomiting due to chemo: Ativan, Compazine prn   #Moderate malnutrition 2/2 acute on chronic illness - appetite slowly improving already since discharge.     RENAL/ELECTROLYTES/  #hypoMg (resulted after visit 2/2 chem machine down); Mg rich foods and recheck Wed  #CONNIE resolved. - 2/2 intravascular depletion +/- diuresis. IVF replacement 2/20. #Hematuria- No dysuria, resolved  #Proteinuria- possibly exacerbated by persistent HTN (See CV),   #Hypokalemia- replace as needed. 40meq PO at discharge.     Psych:    #Anxiety- saw inpatient psych who offered atarax, but did not work well. Health psych offered, patient declined.  #Insomnia- PTA ambien and trazadone  discontinued with concerns for AMS, sedation with concurrent pain meds. melatonin added.     SUMMARY:  - we check peripheral blood chimerism  - RTC once a week for lab check , siro level     I spent 40 minutes in the care of this patient today, which included time necessary for preparation for the visit, obtaining history, ordering medications/tests/procedures as medically indicated, review of pertinent medical literature, counseling of the patient, communication of recommendations to the care team, and documentation time.      Karson Kwong MD

## 2024-02-28 NOTE — NURSING NOTE
Chief Complaint   Patient presents with    Blood Draw     Vitals taken, CVC labs drawn, caps changed, heparin locked, checked into next appt     BP (!) 144/83 (BP Location: Left arm, Patient Position: Sitting, Cuff Size: Adult Large)   Pulse 75   Temp 98.3  F (36.8  C) (Axillary)   Resp 16   Wt 80.4 kg (177 lb 4.8 oz)   SpO2 99%   BMI 27.03 kg/m    Valdo Salas RN on 2/28/2024 at 8:31 AM

## 2024-02-28 NOTE — LETTER
2/28/2024         RE: Ziggy Hallman  [address] [place] [address]        Dear Colleague,    Thank you for referring your patient, Ziggy Hallman, to the [facility] [place]. Please see a copy of my visit note below.    BMT Progress Notes  02/28/2024       Patient ID: Ziggy Hallman is a 48 year old year old male with a PMH of MDS, hx of multiple clots and MI undergoing a MA (Bu/Flu) prep for an 8/8 URD PBSCT currently day 34.      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 Taste sensation is off. Drinking ok. No n/v /d. No fevers. Mild nose bleed once in a while in the morning.     Review of Systems                                                                                                                           ROS negative unless stated in HPI     PHYSICAL EXAM                                                                                                                                      Blood pressure (!) 144/83, pulse 75, temperature 98.3  F (36.8  C), temperature source Axillary, resp. rate 16, weight 80.4 kg (177 lb 4.8 oz), SpO2 99%.  Wt Readings from Last 4 Encounters:   02/28/24 80.4 kg (177 lb 4.8 oz)   02/26/24 81.5 kg (179 lb 11.2 oz)   02/23/24 82.6 kg (182 lb)   02/22/24 81.7 kg (180 lb 3.2 oz)     KPS: 70     General: NAD   Eyes: sclera anicteric   Nose/Mouth/Throat: MMM  Lungs: CTAB  Cardiovascular: RRR, no M/R/G   Abdominal/Rectal: +BS, soft, NT, ND  Lymphatics: no edema  Skin: No rash  Neuro: A&O ; non-focal  Access: CVC R chest NT, dressing cdi. R PAC not accessed.     Current aGVHD staging:  Skin 0, UGI 0, LGI 0, Liver 0 (keep in note through day +180 for  allos)  Acute GVHD          2/22/2024    12:58 2/21/2024    09:06 2/15/2024    12:53   Acute GVHD   New evidence of Acute Padha-Mmwygl-Vwik Disease developed since last entry? No No No          LABS: I have assessed all abnormal lab values for their clinical significance and any values considered clinically significant have been addressed in the assessment and plan.       Lab Results   Component Value Date    WBC 3.4 (L) 02/26/2024    ANEU 1.6 02/20/2024    HGB 9.2 (L) 02/26/2024    HCT 25.9 (L) 02/26/2024     (L) 02/26/2024     02/26/2024    POTASSIUM 3.8 02/26/2024    CHLORIDE 108 (H) 02/26/2024    CO2 21 (L) 02/26/2024    GLC 99 02/26/2024    BUN 9.0 02/26/2024    CR 1.03 02/26/2024    MAG 1.4 (L) 02/26/2024    INR 1.35 (H) 02/19/2024    BILITOTAL 0.3 02/26/2024    AST 43 02/26/2024    ALT 22 02/26/2024    ALKPHOS 82 02/26/2024    PROTTOTAL 6.5 02/26/2024    ALBUMIN 3.4 (L) 02/26/2024          ASSESSMENT AND PLAN      Ziggy Hallman is a 48 year old male with a PMHx of MDS, hx of CAD, HTN, multiple recurrent arterial thromboembolic events (STEMIs, renal infarcts, arterial embolism, TIA) and DVTs who is undergoing a MA (Bu/Flu) prep for an 8/8 URD PBSCT day +34.     Stay was complicated by mucositis requiring PCA, N/F, Staph Epi bacteremia, pulmonary embolism requiring anticoagulation. Hospitalization prolonged following engraftment 2/2 to large stool volumes requiring ID and GVHD rule out. Underwent flex sig/EGD results are fairly unremarkable with occasional crypt apoptotic bodies in duodenum, hemorrhagic gastritis in antrum, negative CMV HSV H pylori stains, diarrhea symptoms improved upon discharge, anorexia continues.      BMT/IEC PROTOCOL for 2015-29  - Chemo Prep: BU/Flu   - 8/8 DP permissive. Cell dose-9.24x10^6  - ABO: Donor: O+ Recipient A-  (Minor incompatability, no flush required)   - Restaging plan:                       - Day +21 peripheral blood Chimerisms: CD3+ 65%, CD33+= 82%                       - Day +30 bone marrow chimerism: donor 86% 14 % recipient : his counts are good. We will just check another peripheral  chimerism. No change in immunosuppression.       HEME/COAG  - Transfusion parameters: hemoglobin <8 (cardiac hx), platelets <30k    (2/26). No requirement for transfusion. Has mild nose bleed once in a while in the morning. We recommended him to use humidifier /nasal spray.     #Pancytopenia 2/2 chemo- anemia, leukopenia, thrombocytopenia; improving  #Segmental R PE (2/6): Hep gtt transitioned to Eliquis 5mg once platelets tolerated  #APS work up- lupus antigen +, will follow outpt as may be unreliable in this acute setting and prior APS work up had been unremarkable prior to admission.  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   Extensive hypercoagulable workup to date has been unrevealing.  JAK2 testing negative.  PNH testing negative.  Elevated IgA of unknown significance, no evidence of plasma cell disorder.  - Eliquis and Prasugrel HELD starting 2/1-2/16. Increased Eliquis to 5mg BID with platelet improvement and re-added prasugrel on 2/22 with his platelets >75K   (2/23)  eliquis and prasugrel on hold for BM bx on 2/26, monitor platelets regarding prasugrel -resume both tomorrow 2/27  (2/28 ) eliquis and prasugrel resumed     IMMUNOCOMPROMISED  #Non-neutropenic fever Cefepime 2/17-2/18. Work up unremarkable. Fevers resolved.  #Staph epi bacteremia - Resolved. Received IV vancomycin.   #Febrile neutropenia, fever resolved. See previous notes for abx therapy.   #C. Diff -  Admit c. Diff triple+ - Start PO vanc 1/21 for 14 day course. - completed on 2/4, with broadening abx restart PO Vanco tx dose x 10 days. C.diff recheck negative on 2/17    -Prophylaxis plan: ACV LD, Fabi HD (at home via [facility]), Bactrim started 2/26. Plan is to switch fabi to posa on day 45.     RISK OF GVHD  UGI stage 0, LGI II (pathology results not indicative of GVHD - improving with scheduled imodium)  2/28/24: No signs of GVHD  - Prophylaxis: PtC day +3, +4, , Siro/MMF to start day +5  - Siro level 8.3 (2/23).     #Anorexia -- appetite improving slowly since   #Diarrhea LGI stage II (unknown volume, watery stools 4-6x daily) -- resolved  - AREG and fecal kvng protectin unremarkable, trend  - GI Luminal consult - EGD, FlexSig show occasional crypt apoptotic bodies in duodenum, hemorrhagic gastritis in antrum, negative CMV/HSV/H pylori stains.     CARDIOVASCULAR  #CAD  #Hx of CABG  #HTN  - PTA metoprolol, Lisinopril dose increased 2/2 HTN, nifidepine ER 30mg BID --   (2/21) placed all ON HOLD   (2/22) with cardiac hx, restarted metoprolol 50 BID    (2/23): Resumed metoprolol 100 mg/day and re-started Lisinopril 40 mg/day 2/26.   #Hyperlipidemia: Holding atorvastatin peritransplant  - Risk of cardiomyopathy:  Baseline EF 50-55%, Global left ventricular function mildly reduced. Grade 1 or early diastolic dysfunction.      - Risk of arrhythmia: Baseline EKG showed NSR, Qtc -425     GI/NUTRITION  #Diarrhea - resolved   #Oropharyngeal mucositis- resolved.   - Ulcer prophylaxis: Protonix   - VOD Prophylaxis: Ursodiol  - Risk of nausea/vomiting due to chemo: Ativan, Compazine prn   #Moderate malnutrition 2/2 acute on chronic illness - appetite slowly improving already since discharge.     RENAL/ELECTROLYTES/  #hypoMg (resulted after visit 2/2 chem machine down); Mg rich foods and recheck Wed  #CONNIE resolved. - 2/2 intravascular depletion +/- diuresis. IVF replacement 2/20. #Hematuria- No dysuria,  resolved  #Proteinuria- possibly exacerbated by persistent HTN (See CV),   #Hypokalemia- replace as needed. 40meq PO at discharge.     Psych:    #Anxiety- saw inpatient psych who offered atarax, but did not work well. Health psych offered, patient declined.  #Insomnia- PTA ambien and trazadone discontinued with concerns for AMS, sedation with concurrent pain meds. melatonin added.     SUMMARY:  - we check peripheral blood chimerism  - RTC once a week for lab check , siro level     I spent 40 minutes in the care of this patient today, which included time necessary for preparation for the visit, obtaining history, ordering medications/tests/procedures as medically indicated, review of pertinent medical literature, counseling of the patient, communication of recommendations to the care team, and documentation time.      Karson Kwong MD                Attestation signed by Taran Bacon MD at 2/29/2024 10:35 AM (Updated):  I, Taran Bacon MD, have personally seen this patient independently of the fellow, Dr. Zacarias. I have personally reviewed vital signs, medications, labs, imaging, and hospital course. I agree with their findings and plan of care as documented in the note with the following additions/exceptions:    Key findings:  Doing well overall. Recovering nicely. BM with CR. No MRD by flow. NGS pending. 86% chimeric and will continue to follow up on that. Repeat PB pending. No s/s of GVHD. Siro therapeutic.  Low level HHV6 DNAemia. No sx. Will continue monitoring.  Follow up weekly. Will need voriconazole once done with fabi.  Continue anticoagulation for multiple DVTs.    Taran Bacon MD  Date of Service (when I saw the patient): 02/29/2024     40 minutes spent on the date of the encounter doing chart review, interpretation of results, patient visit, documentation and coordination of care.

## 2024-02-28 NOTE — PROGRESS NOTES
Infusion Nursing Note:  Ziggy Hallman presents today for add-on infusion.    Patient seen by provider today: Yes: Dr Bacon   present during visit today: Not Applicable.    Note: Patient received 2 g Mg per electrolyte protocol.     Patient removed BMBx dressing in infusion center and the top layer of skin peeled off (about a half-dollar size); bacitracin, vasoline gauze and mepilex applied. Patient instructed to monitor for signs of infection and to call triage line if it does not appear to be healing or if patient has any further concerns.      Intravenous Access:  [place].    Treatment Conditions:  Results reviewed, labs MET treatment parameters, ok to proceed with treatment.      Post Infusion Assessment:  Patient tolerated infusion without incident.       Discharge Plan:   Patient and/or family verbalized understanding of discharge instructions and all questions answered.      Sydney Dai RN

## 2024-02-28 NOTE — NURSING NOTE
"Oncology Rooming Note    February 28, 2024 8:38 AM   Ziggy Hallman is a 48 year old male who presents for:    Chief Complaint   Patient presents with    Blood Draw     Vitals taken, CVC labs drawn, caps changed, heparin locked, checked into next appt     Initial Vitals: BP (!) 144/83 (BP Location: Left arm, Patient Position: Sitting, Cuff Size: Adult Large)   Pulse 75   Temp 98.3  F (36.8  C) (Axillary)   Resp 16   Wt 80.4 kg (177 lb 4.8 oz)   SpO2 99%   BMI 27.03 kg/m   Estimated body mass index is 27.03 kg/m  as calculated from the following:    Height as of 2/26/24: 1.725 m (5' 7.91\").    Weight as of this encounter: 80.4 kg (177 lb 4.8 oz). Body surface area is 1.96 meters squared.  No Pain (0) Comment: Data Unavailable   No LMP for male patient.  Allergies reviewed: Yes  Medications reviewed: Yes    Medications: Medication refills not needed today.  Pharmacy name entered into [facility]: [id number] - [place], [place] - [address] [place] [address]    Frailty Screening:   Is the patient here for a new oncology consult visit in cancer care? 2. No      Clinical concerns: Frustrated with taste changes.      Taylor Best, EMT  2/28/2024              "

## 2024-02-29 ENCOUNTER — DOCUMENTATION ONLY (OUTPATIENT)
Dept: TRANSPLANT | Facility: CLINIC | Age: 49
End: 2024-02-29
Payer: COMMERCIAL

## 2024-03-02 LAB
BACTERIA BLD CULT: NO GROWTH
BACTERIA BLD CULT: NO GROWTH

## 2024-03-04 ENCOUNTER — OFFICE VISIT (OUTPATIENT)
Dept: TRANSPLANT | Facility: CLINIC | Age: 49
End: 2024-03-04
Attending: PHYSICIAN ASSISTANT
Payer: COMMERCIAL

## 2024-03-04 ENCOUNTER — APPOINTMENT (OUTPATIENT)
Dept: LAB | Facility: CLINIC | Age: 49
End: 2024-03-04
Attending: PHYSICIAN ASSISTANT
Payer: COMMERCIAL

## 2024-03-04 VITALS
BODY MASS INDEX: 26.52 KG/M2 | WEIGHT: 174 LBS | RESPIRATION RATE: 16 BRPM | HEART RATE: 73 BPM | DIASTOLIC BLOOD PRESSURE: 81 MMHG | TEMPERATURE: 97.8 F | SYSTOLIC BLOOD PRESSURE: 138 MMHG | OXYGEN SATURATION: 98 %

## 2024-03-04 DIAGNOSIS — K92.2 GASTROINTESTINAL HEMORRHAGE, UNSPECIFIED GASTROINTESTINAL HEMORRHAGE TYPE: ICD-10-CM

## 2024-03-04 DIAGNOSIS — Z94.81 S/P ALLOGENEIC BONE MARROW TRANSPLANT (H): ICD-10-CM

## 2024-03-04 DIAGNOSIS — Z94.81 STATUS POST BONE MARROW TRANSPLANT (H): Primary | ICD-10-CM

## 2024-03-04 LAB
ACANTHOCYTES BLD QL SMEAR: NORMAL
ALBUMIN SERPL BCG-MCNC: 3.4 G/DL (ref 3.5–5.2)
ALP SERPL-CCNC: 80 U/L (ref 40–150)
ALT SERPL W P-5'-P-CCNC: 22 U/L (ref 0–70)
ANION GAP SERPL CALCULATED.3IONS-SCNC: 10 MMOL/L (ref 7–15)
AST SERPL W P-5'-P-CCNC: 44 U/L (ref 0–45)
AUER BODIES BLD QL SMEAR: NORMAL
BASO STIPL BLD QL SMEAR: NORMAL
BASOPHILS # BLD AUTO: 0.1 10E3/UL (ref 0–0.2)
BASOPHILS NFR BLD AUTO: 2 %
BILIRUB SERPL-MCNC: 0.3 MG/DL
BITE CELLS BLD QL SMEAR: NORMAL
BLISTER CELLS BLD QL SMEAR: NORMAL
BUN SERPL-MCNC: 6.5 MG/DL (ref 6–20)
BURR CELLS BLD QL SMEAR: NORMAL
CALCIUM SERPL-MCNC: 9.1 MG/DL (ref 8.6–10)
CHLORIDE SERPL-SCNC: 106 MMOL/L (ref 98–107)
CMV DNA SPEC NAA+PROBE-ACNC: NOT DETECTED IU/ML
CREAT SERPL-MCNC: 0.94 MG/DL (ref 0.67–1.17)
DACRYOCYTES BLD QL SMEAR: NORMAL
DEPRECATED HCO3 PLAS-SCNC: 24 MMOL/L (ref 22–29)
EGFRCR SERPLBLD CKD-EPI 2021: >90 ML/MIN/1.73M2
ELLIPTOCYTES BLD QL SMEAR: NORMAL
EOSINOPHIL # BLD AUTO: 0.3 10E3/UL (ref 0–0.7)
EOSINOPHIL NFR BLD AUTO: 8 %
ERYTHROCYTE [DISTWIDTH] IN BLOOD BY AUTOMATED COUNT: 14.6 % (ref 10–15)
FRAGMENTS BLD QL SMEAR: NORMAL
GLUCOSE SERPL-MCNC: 89 MG/DL (ref 70–99)
HCT VFR BLD AUTO: 26.1 % (ref 40–53)
HGB BLD-MCNC: 8.9 G/DL (ref 13.3–17.7)
HGB C CRYSTALS: NORMAL
HOWELL-JOLLY BOD BLD QL SMEAR: NORMAL
IMM GRANULOCYTES # BLD: 0 10E3/UL
IMM GRANULOCYTES NFR BLD: 0 %
LAB DIRECTOR DISCLAIMER: NORMAL
LAB DIRECTOR DISCLAIMER: NORMAL
LAB DIRECTOR INTERPRETATION: NORMAL
LAB DIRECTOR INTERPRETATION: NORMAL
LAB DIRECTOR METHODOLOGY: NORMAL
LAB DIRECTOR METHODOLOGY: NORMAL
LAB DIRECTOR RESULTS: NORMAL
LAB DIRECTOR RESULTS: NORMAL
LDH SERPL L TO P-CCNC: 263 U/L (ref 0–250)
LOCATION OF TASK: NORMAL
LOCATION OF TASK: NORMAL
LYMPHOCYTES # BLD AUTO: 0.7 10E3/UL (ref 0.8–5.3)
LYMPHOCYTES NFR BLD AUTO: 24 %
MCH RBC QN AUTO: 29.7 PG (ref 26.5–33)
MCHC RBC AUTO-ENTMCNC: 34.1 G/DL (ref 31.5–36.5)
MCV RBC AUTO: 87 FL (ref 78–100)
MONOCYTES # BLD AUTO: 0.7 10E3/UL (ref 0–1.3)
MONOCYTES NFR BLD AUTO: 21 %
NEUTROPHILS # BLD AUTO: 1.4 10E3/UL (ref 1.6–8.3)
NEUTROPHILS NFR BLD AUTO: 45 %
NEUTS HYPERSEG BLD QL SMEAR: NORMAL
NRBC # BLD AUTO: 0 10E3/UL
NRBC BLD AUTO-RTO: 0 /100
PLAT MORPH BLD: NORMAL
PLATELET # BLD AUTO: 95 10E3/UL (ref 150–450)
POLYCHROMASIA BLD QL SMEAR: NORMAL
POTASSIUM SERPL-SCNC: 3.7 MMOL/L (ref 3.4–5.3)
PROT SERPL-MCNC: 6.4 G/DL (ref 6.4–8.3)
RBC # BLD AUTO: 3 10E6/UL (ref 4.4–5.9)
RBC AGGLUT BLD QL: NORMAL
RBC MORPH BLD: NORMAL
ROULEAUX BLD QL SMEAR: NORMAL
SICKLE CELLS BLD QL SMEAR: NORMAL
SIROLIMUS BLD-MCNC: 8.5 UG/L (ref 5–15)
SMUDGE CELLS BLD QL SMEAR: NORMAL
SODIUM SERPL-SCNC: 140 MMOL/L (ref 135–145)
SPECIMEN DESCRIPTION: NORMAL
SPECIMEN DESCRIPTION: NORMAL
SPHEROCYTES BLD QL SMEAR: NORMAL
STOMATOCYTES BLD QL SMEAR: NORMAL
TARGETS BLD QL SMEAR: NORMAL
TME LAST DOSE: NORMAL H
TME LAST DOSE: NORMAL H
TOXIC GRANULES BLD QL SMEAR: NORMAL
VARIANT LYMPHS BLD QL SMEAR: NORMAL
WBC # BLD AUTO: 3 10E3/UL (ref 4–11)

## 2024-03-04 PROCEDURE — 81268 CHIMERISM ANAL W/CELL SELECT: CPT

## 2024-03-04 PROCEDURE — 36592 COLLECT BLOOD FROM PICC: CPT

## 2024-03-04 PROCEDURE — 83615 LACTATE (LD) (LDH) ENZYME: CPT

## 2024-03-04 PROCEDURE — 250N000011 HC RX IP 250 OP 636: Performed by: PHYSICIAN ASSISTANT

## 2024-03-04 PROCEDURE — 85025 COMPLETE CBC W/AUTO DIFF WBC: CPT

## 2024-03-04 PROCEDURE — 87799 DETECT AGENT NOS DNA QUANT: CPT

## 2024-03-04 PROCEDURE — 80195 ASSAY OF SIROLIMUS: CPT

## 2024-03-04 PROCEDURE — G0452 MOLECULAR PATHOLOGY INTERPR: HCPCS | Mod: 26 | Performed by: PATHOLOGY

## 2024-03-04 PROCEDURE — G0463 HOSPITAL OUTPT CLINIC VISIT: HCPCS

## 2024-03-04 PROCEDURE — 82247 BILIRUBIN TOTAL: CPT

## 2024-03-04 PROCEDURE — 99215 OFFICE O/P EST HI 40 MIN: CPT

## 2024-03-04 PROCEDURE — 87533 HHV-6 DNA QUANT: CPT | Mod: XU

## 2024-03-04 RX ORDER — PANTOPRAZOLE SODIUM 40 MG/1
40 TABLET, DELAYED RELEASE ORAL DAILY
COMMUNITY
Start: 2024-03-04 | End: 2024-04-11

## 2024-03-04 RX ORDER — HEPARIN SODIUM,PORCINE 10 UNIT/ML
5 VIAL (ML) INTRAVENOUS ONCE
Status: COMPLETED | OUTPATIENT
Start: 2024-03-04 | End: 2024-03-04

## 2024-03-04 RX ORDER — VORICONAZOLE 200 MG/1
200 TABLET, FILM COATED ORAL 2 TIMES DAILY
Qty: 60 TABLET | Refills: 1 | Status: SHIPPED | OUTPATIENT
Start: 2024-03-04 | End: 2024-03-07

## 2024-03-04 RX ADMIN — Medication 5 ML: at 09:27

## 2024-03-04 ASSESSMENT — PAIN SCALES - GENERAL: PAINLEVEL: NO PAIN (0)

## 2024-03-04 NOTE — LETTER
3/4/2024         RE: Ziggy Hallman  [address] [facility] [address]        Dear Colleague,    Thank you for referring your patient, Ziggy Hallman, to the [facility] [place]. Please see a copy of my visit note below.    BMT Progress Notes  03/04/2024       Patient ID: Ziggy Hallman is a 48 year old year old male with a PMH of MDS, hx of multiple clots and MI undergoing a MA (Bu/Flu) prep for an 8/8 URD PBSCT currently day 39.      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 Overall doing okay. Eating still more of a struggle but overall okay. No bleeding. No evidence of infection. No evidence of GVHD.     Review of Systems                                                                                                                           ROS negative unless stated in HPI     PHYSICAL EXAM                                                                                                                                      Blood pressure 138/81, pulse 73, temperature 97.8  F (36.6  C), temperature source Axillary, resp. rate 16, weight 78.9 kg (174 lb), SpO2 98%.  Wt Readings from Last 4 Encounters:   03/04/24 78.9 kg (174 lb)   02/28/24 80.4 kg (177 lb 4.8 oz)   02/26/24 81.5 kg (179 lb 11.2 oz)   02/23/24 82.6 kg (182 lb)     KPS: 70     General: NAD   Eyes: sclera anicteric   Nose/Mouth/Throat: MMM  Lungs: CTAB  Cardiovascular: RRR, no M/R/G   Lymphatics: no edema  Skin: No rash  Neuro: A&O ; non-focal  Access: CVC R chest NT, dressing cdi. R PAC not accessed.     Acute GVHD          3/4/2024    11:02 2/29/2024    10:00 2/22/2024    12:58   Acute GVHD   New evidence of Acute  Sbvnf-Ydbija-Ikqt Disease developed since last entry? No No No          LABS: I have assessed all abnormal lab values for their clinical significance and any values considered clinically significant have been addressed in the assessment and plan.       Lab Results   Component Value Date    WBC 3.3 (L) 02/28/2024    ANEU 1.6 02/20/2024    HGB 9.2 (L) 02/28/2024    HCT 26.4 (L) 02/28/2024     (L) 02/28/2024     02/28/2024    POTASSIUM 3.6 02/28/2024    CHLORIDE 106 02/28/2024    CO2 23 02/28/2024     (H) 02/28/2024    BUN 8.2 02/28/2024    CR 1.23 (H) 02/28/2024    MAG 1.5 (L) 02/28/2024    INR 1.35 (H) 02/19/2024    BILITOTAL 0.3 02/28/2024    AST 38 02/28/2024    ALT 21 02/28/2024    ALKPHOS 84 02/28/2024    PROTTOTAL 6.6 02/28/2024    ALBUMIN 3.5 02/28/2024          ASSESSMENT AND PLAN      Ziggy Hallman is a 48 year old male with a PMHx of MDS, hx of CAD, HTN, multiple recurrent arterial thromboembolic events (STEMIs, renal infarcts, arterial embolism, TIA) and DVTs who is undergoing a MA (Bu/Flu) prep for an 8/8 URD PBSCT day +39.     Stay was complicated by mucositis requiring PCA, N/F, Staph Epi bacteremia, pulmonary embolism requiring anticoagulation. Hospitalization prolonged following engraftment 2/2 to large stool volumes requiring ID and GVHD rule out. Underwent flex sig/EGD results are fairly unremarkable with occasional crypt apoptotic bodies in duodenum, hemorrhagic gastritis in antrum, negative CMV HSV H pylori stains, diarrhea symptoms improved upon discharge, anorexia continues.      BMT/IEC PROTOCOL for 2015-29  - Chemo Prep: BU/Flu   - 8/8 DP permissive. Cell dose-9.24x10^6  - ABO: Donor: O+ Recipient A-  (Minor incompatability, no flush required)   - BM with CR. No MRD by flow. NGS pending. RFLP pending.     HEME/COAG  - Transfusion parameters: hemoglobin <8 (cardiac hx), platelets <30k   #Pancytopenia 2/2 chemo- anemia, leukopenia, thrombocytopenia; improving  #Segmental R PE  (2/6): see anticoagulation below  #APS work up- lupus antigen +, will follow outpt as may be unreliable in this acute setting and prior APS work up had been unremarkable prior to admission.  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   Extensive hypercoagulable workup to date has been unrevealing.  JAK2 testing negative.  PNH testing negative.  Elevated IgA of unknown significance, no evidence of plasma cell disorder.  - Eliquis 5mg BID (as long as plts>50K) and prasugrel (as long as his platelets >75K)    ID    -Prophylaxis plan: ACV LD, Fabi HD (at home via [facility]) Per Dr. JEAN BAPTISTE visit plan vori after fabi. I will send to pharmacy today to see coverage will not plan to start until next week. Siro will likely need to drop to 1mg then, Bactrim started 2/26  - CMV, EBV, HHV6 pending     RISK OF GVHD  - Prophylaxis: PtC day +3, +4, , Siro/MMF to start day +5  - Siro level pending will order for Thursday as well. Will likely start vori as above and will need to drop to maybe 1mg when we do.     #Diarrhea LGI stage II (unknown volume, watery stools 4-6x daily) -- now improving.   - Has had ongoing diarrhea since early admission. He was treated for c. Diff for 14 days.  - AREG and fecal kvng protectin unremarkable  - GI Luminal consult - EGD, FlexSig show occasional crypt apoptotic bodies in duodenum, hemorrhagic gastritis in antrum, negative CMV/HSV/H pylori stains.  - PRN imodium/metamucil.     CARDIOVASCULAR  #CAD  #Hx of CABG  #HTN  - metoprolol, Lisinopril  (nifidepine ER 30mg BID remains on  hold)  #Hyperlipidemia: Holding atorvastatin peritransplant  - Risk of cardiomyopathy:  Baseline EF 50-55%, Global left ventricular function mildly reduced. Grade 1 or early diastolic dysfunction.      - Risk of arrhythmia: Baseline EKG showed NSR, Qtc -425     GI/NUTRITION  - Ulcer prophylaxis: Protonix   - VOD Prophylaxis: Ursodiol  - Risk of nausea/vomiting due to chemo: Ativan, Compazine prn   #Moderate malnutrition 2/2 acute on chronic illness      RENAL/ELECTROLYTES/  Monitor Cr and lytes.      Psych:    #Anxiety- saw inpatient psych who offered atarax, but did not work well. Health psych offered, patient declined.  #Insomnia- PTA ambien and trazadone discontinued with concerns for AMS, sedation with concurrent pain meds. melatonin added.        RTC: Thursday as planned. Requested pharm D visit then and he will bring meds as then he has appts QThursday which should work.    I spent 40 minutes in the care of this patient today, which included time necessary for preparation for the visit, obtaining history, ordering medications/tests/procedures as medically indicated, review of pertinent medical literature, counseling of the patient, communication of recommendations to the care team, and documentation time.      Amanda Fuentes PA-C

## 2024-03-04 NOTE — NURSING NOTE
"Oncology Rooming Note    March 4, 2024 9:34 AM   Ziggy Hallman is a 48 year old male who presents for:    Chief Complaint   Patient presents with    Blood Draw     Labs collected from CVC by RN, line flushed with saline and heparin.  Vitals taken. Pt checked in for appointment(s).      Oncology Clinic Visit     RTN for MDS      Initial Vitals: /81   Pulse 73   Temp 97.8  F (36.6  C) (Axillary)   Resp 16   Wt 78.9 kg (174 lb)   SpO2 98%   BMI 26.52 kg/m   Estimated body mass index is 26.52 kg/m  as calculated from the following:    Height as of 2/26/24: 1.725 m (5' 7.91\").    Weight as of this encounter: 78.9 kg (174 lb). Body surface area is 1.94 meters squared.  No Pain (0) Comment: Data Unavailable   No LMP for male patient.  Allergies reviewed: Yes  Medications reviewed: Yes    Medications: Medication refills not needed today.  Pharmacy name entered into fypio: [id number] - [place], [place] - [address] [place] [address]    Frailty Screening:   Is the patient here for a new oncology consult visit in cancer care? 2. No      Clinical concerns: Pt said that his biceps have sharp shooting pain when lifting up out of bed.  Not eating or drinking to well.        July Bhatt MA             "

## 2024-03-04 NOTE — LETTER
3/4/2024         RE: Ziggy Hallman  [address] [facility] [address]        Dear Colleague,    Thank you for referring your patient, Ziggy Hallman, to the [facility] [place]. Please see a copy of my visit note below.    At patient's request, I filled 1 week of scheduled medications into his med box.     Current Outpatient Medications   Medication Sig Dispense Refill    acyclovir (ZOVIRAX) 800 MG tablet Take 1 tablet (800 mg) by mouth 2 times daily 60 tablet 1    apixaban ANTICOAGULANT (ELIQUIS) 5 MG tablet Take 1 tablet (5 mg) by mouth 2 times daily 60 tablet 3    lisinopril (ZESTRIL) 40 MG tablet Take 1 tablet (40 mg) by mouth daily      LORazepam (ATIVAN) 0.5 MG tablet Take 1-2 tablets (0.5-1 mg) by mouth every 4 hours as needed for vomiting or nausea (nausea/vomiting/sleep) (Patient not taking: Reported on 2/26/2024) 15 tablet 0    metoprolol succinate ER (TOPROL XL) 100 MG 24 hr tablet Take 1 tablet (100 mg) by mouth daily      micafungin (MYCAMINE) 50 MG injection Inject 30 mLs (300 mg) into the vein three times a week Inject 300 mg into vein three times a week until day +45      pantoprazole (PROTONIX) 40 MG EC tablet Take 1 tablet (40 mg) by mouth daily      prasugrel (EFFIENT) 10 MG TABS tablet Take 1 tablet (10 mg) by mouth daily for 30 days 30 tablet 0    prochlorperazine (COMPAZINE) 5 MG tablet Take 1-2 tablets (5-10 mg) by mouth every 6 hours as needed for nausea or vomiting 30 tablet 1    psyllium (METAMUCIL/KONSYL) 58.6 % powder Take 3 g by mouth daily 174 g 1    sirolimus (GENERIC EQUIVALENT) 0.5 MG tablet Take 3 (1mg) tablet for a total dose of 3mg daily, use 0.5mg tabs for dose adjustments, follow instructions provided by clinic. 30 tablet 1    sirolimus (GENERIC EQUIVALENT) 1 MG tablet Take 3 (1mg) tablet for a total dose of 3mg daily, follow instructions provided by clinic for dose adjustments. 90 tablet 1    sulfamethoxazole-trimethoprim  (BACTRIM DS) 800-160 MG tablet Take 1 tablet by mouth Every Mon, Tues two times daily Start 2/26      ursodiol (ACTIGALL) 300 MG capsule Take 1 capsule (300 mg) by mouth 3 times daily 90 capsule 1        Pertinent labs considered today:  Lab Results   Component Value Date     03/04/2024     05/28/2020                 normal sodium 136-148  Lab Results   Component Value Date    POTASSIUM 3.7 03/04/2024    POTASSIUM 3.8 05/28/2020       normal potassium 3.5-5.2   Lab Results   Component Value Date    CHLORIDE 106 03/04/2024    CHLORIDE 107 05/28/2020           normal chloride    Lab Results   Component Value Date    CO2 24 03/04/2024    CO2 24 05/28/2020                normal CO2 20-32  Lab Results   Component Value Date    BUN 6.5 03/04/2024    BUN 10 05/28/2020                 normal BUN 5-24  Lab Results   Component Value Date    GLC 89 03/04/2024     01/24/2024     05/28/2020                 normal glucose   Lab Results   Component Value Date    CR 0.94 03/04/2024    CR 0.80 05/28/2020                 normal cr 0.8-1.5  Lab Results   Component Value Date    REMY 9.1 03/04/2024    REMY 9.3 05/28/2020               normal calcium  8.5-10.4  Lab Results   Component Value Date    BILITOTAL 0.3 03/04/2024    BILITOTAL 0.8 05/28/2020          normal bilirubin 0.2-1.3  Lab Results   Component Value Date    PROTTOTAL 6.4 03/04/2024    PROTTOTAL 8.7 05/28/2020          normal total protein 6.0-8.2  Lab Results   Component Value Date    ALBUMIN 3.4 03/04/2024    ALBUMIN 3.4 05/28/2020             normal albumin 3.2-4.5  Lab Results   Component Value Date    ALKPHOS 80 03/04/2024    ALKPHOS 114 05/28/2020            normal alkphos   Lab Results   Component Value Date    ALT 22 03/04/2024    ALT 25 05/28/2020         normal ALT 0-65  Lab Results   Component Value Date    AST 44 03/04/2024    AST 20 05/28/2020         normal AST 0-37    Lab Results   Component Value Date    HGB 8.9  03/04/2024    HGB 12.6 05/28/2020     Lab Results   Component Value Date    WBC 3.0 03/04/2024    WBC 4.9 05/28/2020      Lab Results   Component Value Date    PLT 95 03/04/2024     05/28/2020       Estimated Creatinine Clearance: 104.9 mL/min (based on SCr of 0.94 mg/dL).    Changes made to scheduled medication list today include:  Added: N/A  Deleted: N/A  Changed: Pantoprazole to once daily    Actions taken by pharmacist (provider contacted, etc):None   Prior to the patient arriving in clinic (there were enough medications to refill the box for 1 week today). I gave him an updated medication list that indicated what meds were put in the med box. Voriconazole planned to start day 45, sirolimus dose will need to be adjusted at that time.     Time spent in this activity: 30       Deondre Carter [facility], PharmD

## 2024-03-04 NOTE — PROGRESS NOTES
BMT Progress Notes  03/04/2024       Patient ID: Ziggy Hallman is a 48 year old year old male with a PMH of MDS, hx of multiple clots and MI undergoing a MA (Bu/Flu) prep for an 8/8 URD PBSCT currently day 39.      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 Overall doing okay. Eating still more of a struggle but overall okay. No bleeding. No evidence of infection. No evidence of GVHD.     Review of Systems                                                                                                                           ROS negative unless stated in HPI     PHYSICAL EXAM                                                                                                                                      Blood pressure 138/81, pulse 73, temperature 97.8  F (36.6  C), temperature source Axillary, resp. rate 16, weight 78.9 kg (174 lb), SpO2 98%.  Wt Readings from Last 4 Encounters:   03/04/24 78.9 kg (174 lb)   02/28/24 80.4 kg (177 lb 4.8 oz)   02/26/24 81.5 kg (179 lb 11.2 oz)   02/23/24 82.6 kg (182 lb)     KPS: 70     General: NAD   Eyes: sclera anicteric   Nose/Mouth/Throat: MMM  Lungs: CTAB  Cardiovascular: RRR, no M/R/G   Lymphatics: no edema  Skin: No rash  Neuro: A&O ; non-focal  Access: CVC R chest NT, dressing cdi. R PAC not accessed.     Acute GVHD          3/4/2024    11:02 2/29/2024    10:00 2/22/2024    12:58   Acute GVHD   New evidence of Acute Pqurj-Hdccfx-Jyro Disease developed since last entry? No No No          LABS: I have assessed all abnormal lab values for their clinical significance and any values considered clinically significant have been addressed in the assessment and plan.       Lab Results   Component Value Date    WBC  3.3 (L) 02/28/2024    ANEU 1.6 02/20/2024    HGB 9.2 (L) 02/28/2024    HCT 26.4 (L) 02/28/2024     (L) 02/28/2024     02/28/2024    POTASSIUM 3.6 02/28/2024    CHLORIDE 106 02/28/2024    CO2 23 02/28/2024     (H) 02/28/2024    BUN 8.2 02/28/2024    CR 1.23 (H) 02/28/2024    MAG 1.5 (L) 02/28/2024    INR 1.35 (H) 02/19/2024    BILITOTAL 0.3 02/28/2024    AST 38 02/28/2024    ALT 21 02/28/2024    ALKPHOS 84 02/28/2024    PROTTOTAL 6.6 02/28/2024    ALBUMIN 3.5 02/28/2024          ASSESSMENT AND PLAN      Ziggy Hallman is a 48 year old male with a PMHx of MDS, hx of CAD, HTN, multiple recurrent arterial thromboembolic events (STEMIs, renal infarcts, arterial embolism, TIA) and DVTs who is undergoing a MA (Bu/Flu) prep for an 8/8 URD PBSCT day +39.     Stay was complicated by mucositis requiring PCA, N/F, Staph Epi bacteremia, pulmonary embolism requiring anticoagulation. Hospitalization prolonged following engraftment 2/2 to large stool volumes requiring ID and GVHD rule out. Underwent flex sig/EGD results are fairly unremarkable with occasional crypt apoptotic bodies in duodenum, hemorrhagic gastritis in antrum, negative CMV HSV H pylori stains, diarrhea symptoms improved upon discharge, anorexia continues.      BMT/IEC PROTOCOL for 2015-29  - Chemo Prep: BU/Flu   - 8/8 DP permissive. Cell dose-9.24x10^6  - ABO: Donor: O+ Recipient A-  (Minor incompatability, no flush required)   - BM with CR. No MRD by flow. NGS pending. RFLP pending.     HEME/COAG  - Transfusion parameters: hemoglobin <8 (cardiac hx), platelets <30k   #Pancytopenia 2/2 chemo- anemia, leukopenia, thrombocytopenia; improving  #Segmental R PE (2/6): see anticoagulation below  #APS work up- lupus antigen +, will follow outpt as may be unreliable in this acute setting and prior APS work up had been unremarkable prior to admission.  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   Extensive hypercoagulable workup to date has been unrevealing.  JAK2 testing negative.  PNH testing negative.  Elevated IgA of unknown significance, no evidence of plasma cell disorder.  - Eliquis 5mg BID (as long as plts>50K) and prasugrel (as long as his platelets >75K)    ID    -Prophylaxis plan: ACV LD, Fabi HD (at home via [facility]) Per Dr. JEAN BAPTISTE visit plan vori after fabi. I will send to pharmacy today to see coverage will not plan to start until next week. Siro will likely need to drop to 1mg then, Bactrim started 2/26  - CMV, EBV, HHV6 pending     RISK OF GVHD  - Prophylaxis: PtC day +3, +4, , Siro/MMF to start day +5  - Siro level pending will order for Thursday as well. Will likely start vori as above and will need to drop to maybe 1mg when we do.     #Diarrhea LGI stage II (unknown volume, watery stools 4-6x daily) -- now improving.   - Has had ongoing diarrhea since early admission. He was treated for c. Diff for 14 days.  - AREG and fecal kvng protectin unremarkable  - GI Luminal consult - EGD, FlexSig show occasional crypt apoptotic bodies in duodenum, hemorrhagic gastritis in antrum, negative CMV/HSV/H pylori stains.  - PRN imodium/metamucil.     CARDIOVASCULAR  #CAD  #Hx of CABG  #HTN  - metoprolol, Lisinopril  (nifidepine ER 30mg BID remains on hold)  #Hyperlipidemia: Holding atorvastatin peritransplant  - Risk of cardiomyopathy:  Baseline EF 50-55%, Global left ventricular function mildly reduced. Grade 1 or early diastolic dysfunction.      - Risk of arrhythmia: Baseline EKG showed NSR, Qtc -425     GI/NUTRITION  - Ulcer prophylaxis: Protonix   -  VOD Prophylaxis: Ursodiol  - Risk of nausea/vomiting due to chemo: Ativan, Compazine prn   #Moderate malnutrition 2/2 acute on chronic illness      RENAL/ELECTROLYTES/  Monitor Cr and lytes.      Psych:    #Anxiety- saw inpatient psych who offered atarax, but did not work well. Health psych offered, patient declined.  #Insomnia- PTA ambien and trazadone discontinued with concerns for AMS, sedation with concurrent pain meds. melatonin added.        RTC: Thursday as planned. Requested pharm D visit then and he will bring meds as then he has appts QThursday which should work.    I spent 40 minutes in the care of this patient today, which included time necessary for preparation for the visit, obtaining history, ordering medications/tests/procedures as medically indicated, review of pertinent medical literature, counseling of the patient, communication of recommendations to the care team, and documentation time.      Amanda Fuentes PA-C

## 2024-03-04 NOTE — PROGRESS NOTES
At patient's request, I filled 1 week of scheduled medications into his med box.     Current Outpatient Medications   Medication Sig Dispense Refill    acyclovir (ZOVIRAX) 800 MG tablet Take 1 tablet (800 mg) by mouth 2 times daily 60 tablet 1    apixaban ANTICOAGULANT (ELIQUIS) 5 MG tablet Take 1 tablet (5 mg) by mouth 2 times daily 60 tablet 3    lisinopril (ZESTRIL) 40 MG tablet Take 1 tablet (40 mg) by mouth daily      LORazepam (ATIVAN) 0.5 MG tablet Take 1-2 tablets (0.5-1 mg) by mouth every 4 hours as needed for vomiting or nausea (nausea/vomiting/sleep) (Patient not taking: Reported on 2/26/2024) 15 tablet 0    metoprolol succinate ER (TOPROL XL) 100 MG 24 hr tablet Take 1 tablet (100 mg) by mouth daily      micafungin (MYCAMINE) 50 MG injection Inject 30 mLs (300 mg) into the vein three times a week Inject 300 mg into vein three times a week until day +45      pantoprazole (PROTONIX) 40 MG EC tablet Take 1 tablet (40 mg) by mouth daily      prasugrel (EFFIENT) 10 MG TABS tablet Take 1 tablet (10 mg) by mouth daily for 30 days 30 tablet 0    prochlorperazine (COMPAZINE) 5 MG tablet Take 1-2 tablets (5-10 mg) by mouth every 6 hours as needed for nausea or vomiting 30 tablet 1    psyllium (METAMUCIL/KONSYL) 58.6 % powder Take 3 g by mouth daily 174 g 1    sirolimus (GENERIC EQUIVALENT) 0.5 MG tablet Take 3 (1mg) tablet for a total dose of 3mg daily, use 0.5mg tabs for dose adjustments, follow instructions provided by clinic. 30 tablet 1    sirolimus (GENERIC EQUIVALENT) 1 MG tablet Take 3 (1mg) tablet for a total dose of 3mg daily, follow instructions provided by clinic for dose adjustments. 90 tablet 1    sulfamethoxazole-trimethoprim (BACTRIM DS) 800-160 MG tablet Take 1 tablet by mouth Every Mon, Tues two times daily Start 2/26      ursodiol (ACTIGALL) 300 MG capsule Take 1 capsule (300 mg) by mouth 3 times daily 90 capsule 1        Pertinent labs considered today:  Lab Results   Component Value Date    NA  140 03/04/2024     05/28/2020                 normal sodium 136-148  Lab Results   Component Value Date    POTASSIUM 3.7 03/04/2024    POTASSIUM 3.8 05/28/2020       normal potassium 3.5-5.2   Lab Results   Component Value Date    CHLORIDE 106 03/04/2024    CHLORIDE 107 05/28/2020           normal chloride    Lab Results   Component Value Date    CO2 24 03/04/2024    CO2 24 05/28/2020                normal CO2 20-32  Lab Results   Component Value Date    BUN 6.5 03/04/2024    BUN 10 05/28/2020                 normal BUN 5-24  Lab Results   Component Value Date    GLC 89 03/04/2024     01/24/2024     05/28/2020                 normal glucose   Lab Results   Component Value Date    CR 0.94 03/04/2024    CR 0.80 05/28/2020                 normal cr 0.8-1.5  Lab Results   Component Value Date    REMY 9.1 03/04/2024    REMY 9.3 05/28/2020               normal calcium  8.5-10.4  Lab Results   Component Value Date    BILITOTAL 0.3 03/04/2024    BILITOTAL 0.8 05/28/2020          normal bilirubin 0.2-1.3  Lab Results   Component Value Date    PROTTOTAL 6.4 03/04/2024    PROTTOTAL 8.7 05/28/2020          normal total protein 6.0-8.2  Lab Results   Component Value Date    ALBUMIN 3.4 03/04/2024    ALBUMIN 3.4 05/28/2020             normal albumin 3.2-4.5  Lab Results   Component Value Date    ALKPHOS 80 03/04/2024    ALKPHOS 114 05/28/2020            normal alkphos   Lab Results   Component Value Date    ALT 22 03/04/2024    ALT 25 05/28/2020         normal ALT 0-65  Lab Results   Component Value Date    AST 44 03/04/2024    AST 20 05/28/2020         normal AST 0-37    Lab Results   Component Value Date    HGB 8.9 03/04/2024    HGB 12.6 05/28/2020     Lab Results   Component Value Date    WBC 3.0 03/04/2024    WBC 4.9 05/28/2020      Lab Results   Component Value Date    PLT 95 03/04/2024     05/28/2020       Estimated Creatinine Clearance: 104.9 mL/min (based on SCr of 0.94  mg/dL).    Changes made to scheduled medication list today include:  Added: N/A  Deleted: N/A  Changed: Pantoprazole to once daily    Actions taken by pharmacist (provider contacted, etc):None   Prior to the patient arriving in clinic (there were enough medications to refill the box for 1 week today). I gave him an updated medication list that indicated what meds were put in the med box. Voriconazole planned to start day 45, sirolimus dose will need to be adjusted at that time.     Time spent in this activity: 30       Deondre Carter [facility], PharmD

## 2024-03-04 NOTE — NURSING NOTE
Chief Complaint   Patient presents with    Blood Draw     Labs collected from CVC by RN, line flushed with saline and heparin.  Vitals taken. Pt checked in for appointment(s).       Labs collected from CVC by RN, both lines flushed with saline and heparin.  Vitals taken. Pt checked in for appointment(s).     Adrienne Gerber RN

## 2024-03-05 LAB
CULTURE HARVEST COMPLETE DATE: NORMAL
EBV DNA # SPEC NAA+PROBE: NOT DETECTED COPIES/ML

## 2024-03-06 LAB
HHV-6 DNA COPIES/ML, INSTRUMENT: 2628 COPIES/ML
HHV6 DNA SPEC NAA+PROBE-LOG#: 3.4 {LOG_COPIES}/ML
INTERPRETATION: NORMAL

## 2024-03-07 ENCOUNTER — APPOINTMENT (OUTPATIENT)
Dept: LAB | Facility: CLINIC | Age: 49
End: 2024-03-07
Attending: INTERNAL MEDICINE
Payer: COMMERCIAL

## 2024-03-07 ENCOUNTER — OFFICE VISIT (OUTPATIENT)
Dept: TRANSPLANT | Facility: CLINIC | Age: 49
End: 2024-03-07
Attending: INTERNAL MEDICINE
Payer: COMMERCIAL

## 2024-03-07 VITALS
WEIGHT: 173.7 LBS | HEART RATE: 72 BPM | OXYGEN SATURATION: 100 % | DIASTOLIC BLOOD PRESSURE: 68 MMHG | TEMPERATURE: 97.5 F | SYSTOLIC BLOOD PRESSURE: 98 MMHG | RESPIRATION RATE: 16 BRPM | BODY MASS INDEX: 26.48 KG/M2

## 2024-03-07 DIAGNOSIS — D46.9 MDS (MYELODYSPLASTIC SYNDROME) (H): Primary | ICD-10-CM

## 2024-03-07 DIAGNOSIS — D46.9 MDS (MYELODYSPLASTIC SYNDROME) (H): ICD-10-CM

## 2024-03-07 DIAGNOSIS — Z94.81 STATUS POST BONE MARROW TRANSPLANT (H): Primary | ICD-10-CM

## 2024-03-07 DIAGNOSIS — Z94.81 S/P ALLOGENEIC BONE MARROW TRANSPLANT (H): ICD-10-CM

## 2024-03-07 LAB
LDH SERPL L TO P-CCNC: 240 U/L (ref 0–250)
SIROLIMUS BLD-MCNC: 8 UG/L (ref 5–15)
TME LAST DOSE: NORMAL H
TME LAST DOSE: NORMAL H

## 2024-03-07 PROCEDURE — G0463 HOSPITAL OUTPT CLINIC VISIT: HCPCS | Mod: 25

## 2024-03-07 PROCEDURE — 99215 OFFICE O/P EST HI 40 MIN: CPT

## 2024-03-07 PROCEDURE — 87799 DETECT AGENT NOS DNA QUANT: CPT

## 2024-03-07 PROCEDURE — 87040 BLOOD CULTURE FOR BACTERIA: CPT

## 2024-03-07 PROCEDURE — 80195 ASSAY OF SIROLIMUS: CPT

## 2024-03-07 PROCEDURE — 250N000011 HC RX IP 250 OP 636: Performed by: INTERNAL MEDICINE

## 2024-03-07 PROCEDURE — 258N000003 HC RX IP 258 OP 636: Performed by: PHYSICIAN ASSISTANT

## 2024-03-07 PROCEDURE — 36592 COLLECT BLOOD FROM PICC: CPT

## 2024-03-07 PROCEDURE — 83615 LACTATE (LD) (LDH) ENZYME: CPT

## 2024-03-07 PROCEDURE — 96360 HYDRATION IV INFUSION INIT: CPT

## 2024-03-07 PROCEDURE — 99417 PROLNG OP E/M EACH 15 MIN: CPT

## 2024-03-07 RX ORDER — PRASUGREL 10 MG/1
10 TABLET, FILM COATED ORAL DAILY
Status: ON HOLD | COMMUNITY
Start: 2024-03-07 | End: 2024-04-16

## 2024-03-07 RX ORDER — VORICONAZOLE 200 MG/1
200 TABLET, FILM COATED ORAL 2 TIMES DAILY
COMMUNITY
Start: 2024-03-07 | End: 2024-04-04

## 2024-03-07 RX ORDER — ALBUTEROL SULFATE 5 MG/ML
2.5 SOLUTION RESPIRATORY (INHALATION)
Status: CANCELLED
Start: 2024-03-11

## 2024-03-07 RX ORDER — LISINOPRIL 40 MG/1
20 TABLET ORAL DAILY
COMMUNITY
Start: 2024-03-07 | End: 2024-03-20

## 2024-03-07 RX ORDER — PENTAMIDINE ISETHIONATE 300 MG/300MG
300 INHALANT RESPIRATORY (INHALATION)
Status: CANCELLED
Start: 2024-03-07

## 2024-03-07 RX ORDER — HEPARIN SODIUM,PORCINE 10 UNIT/ML
5 VIAL (ML) INTRAVENOUS ONCE
Status: COMPLETED | OUTPATIENT
Start: 2024-03-07 | End: 2024-03-07

## 2024-03-07 RX ORDER — PENTAMIDINE ISETHIONATE 300 MG/300MG
300 INHALANT RESPIRATORY (INHALATION)
Status: CANCELLED
Start: 2024-03-11

## 2024-03-07 RX ORDER — SIROLIMUS 1 MG/1
TABLET, FILM COATED ORAL
COMMUNITY
Start: 2024-03-07 | End: 2024-03-28

## 2024-03-07 RX ORDER — SIROLIMUS 0.5 MG/1
0.5 TABLET, FILM COATED ORAL DAILY
COMMUNITY
Start: 2024-03-07 | End: 2024-04-04

## 2024-03-07 RX ORDER — SULFAMETHOXAZOLE/TRIMETHOPRIM 800-160 MG
1 TABLET ORAL
COMMUNITY
Start: 2024-03-07 | End: 2024-04-11

## 2024-03-07 RX ORDER — ALBUTEROL SULFATE 5 MG/ML
2.5 SOLUTION RESPIRATORY (INHALATION)
Status: CANCELLED
Start: 2024-03-07

## 2024-03-07 RX ADMIN — SODIUM CHLORIDE 500 ML: 9 INJECTION, SOLUTION INTRAVENOUS at 13:14

## 2024-03-07 RX ADMIN — Medication 5 ML: at 11:57

## 2024-03-07 ASSESSMENT — PAIN SCALES - GENERAL: PAINLEVEL: NO PAIN (0)

## 2024-03-07 NOTE — NURSING NOTE
"Oncology Rooming Note    March 7, 2024 12:02 PM   Ziggy Hallman is a 48 year old male who presents for:    Chief Complaint   Patient presents with    Blood Draw     CVC blood draw with heparin flush by lab RN. Vitals taken and appointment arrived    Oncology Clinic Visit     MDS (myelodysplastic syndrome)      Initial Vitals: BP 98/68   Pulse 72   Temp 97.5  F (36.4  C) (Oral)   Resp 16   Wt 78.8 kg (173 lb 11.2 oz)   SpO2 100%   BMI 26.48 kg/m   Estimated body mass index is 26.48 kg/m  as calculated from the following:    Height as of 2/26/24: 1.725 m (5' 7.91\").    Weight as of this encounter: 78.8 kg (173 lb 11.2 oz). Body surface area is 1.94 meters squared.  No Pain (0) Comment: Data Unavailable   No LMP for male patient.  Allergies reviewed: Yes  Medications reviewed: Yes    Medications: Medication refills not needed today.  Pharmacy name entered into [facility]: [id number] - [place], [place] - [address] [place] [address]    Frailty Screening:   Is the patient here for a new oncology consult visit in cancer care? 2. No      Clinical concerns: no other complaints      Leland Arias"

## 2024-03-07 NOTE — LETTER
3/7/2024         RE: Ziggy Hallman  [address] [facility] [address]        Dear Colleague,    Thank you for referring your patient, Ziggy Hallman, to the [facility] [place]. Please see a copy of my visit note below.    BMT Progress Notes  03/07/2024       Patient ID: Ziggy Hallman is a 48 year old year old male with a PMH of MDS, hx of multiple clots and MI undergoing a MA (Bu/Flu) prep for an 8/8 URD PBSCT currently day 42.      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 Overall doing okay. Eating still more of a struggle but overall okay. Taste off. No Thrush. No bleeding. No evidence of infection. No evidence of GVHD. Denies dizzyness with his softer BP. Weight is down trending. Discussed fluid and kcal intake.      Review of Systems                                                                                                                           ROS negative unless stated in HPI     PHYSICAL EXAM                                                                                                                                      Blood pressure 98/68, pulse 72, temperature 97.5  F (36.4  C), temperature source Oral, resp. rate 16, weight 78.8 kg (173 lb 11.2 oz), SpO2 100%.  Wt Readings from Last 4 Encounters:   03/07/24 78.8 kg (173 lb 11.2 oz)   03/04/24 78.9 kg (174 lb)   02/28/24 80.4 kg (177 lb 4.8 oz)   02/26/24 81.5 kg (179 lb 11.2 oz)     KPS: 70     General: NAD   Eyes: sclera anicteric   Nose/Mouth/Throat: MMM  Lungs: CTAB  Cardiovascular: RRR, no M/R/G   Lymphatics: no edema  Skin: No rash  Neuro: A&O ; non-focal  Access: CVC R chest NT, dressing cdi. R PAC not accessed.      Acute GVHD          3/4/2024    11:02 2/29/2024    10:00 2/22/2024    12:58   Acute GVHD   New evidence of Acute Ovwzi-Hmwtvx-Sjko Disease developed since last entry? No No No          LABS: I have assessed all abnormal lab values for their clinical significance and any values considered clinically significant have been addressed in the assessment and plan.       Lab Results   Component Value Date    WBC 3.0 (L) 03/04/2024    ANEU 1.6 02/20/2024    HGB 8.9 (L) 03/04/2024    HCT 26.1 (L) 03/04/2024    PLT 95 (L) 03/04/2024     03/04/2024    POTASSIUM 3.7 03/04/2024    CHLORIDE 106 03/04/2024    CO2 24 03/04/2024    GLC 89 03/04/2024    BUN 6.5 03/04/2024    CR 0.94 03/04/2024    MAG 1.5 (L) 02/28/2024    INR 1.35 (H) 02/19/2024    BILITOTAL 0.3 03/04/2024    AST 44 03/04/2024    ALT 22 03/04/2024    ALKPHOS 80 03/04/2024    PROTTOTAL 6.4 03/04/2024    ALBUMIN 3.4 (L) 03/04/2024          ASSESSMENT AND PLAN      Ziggy Hallman is a 48 year old male with a PMHx of MDS, hx of CAD, HTN, multiple recurrent arterial thromboembolic events (STEMIs, renal infarcts, arterial embolism, TIA) and DVTs who is undergoing a MA (Bu/Flu) prep for an 8/8 URD PBSCT day +42.     Stay was complicated by mucositis requiring PCA, N/F, Staph Epi bacteremia, pulmonary embolism requiring anticoagulation. Hospitalization prolonged following engraftment 2/2 to large stool volumes requiring ID and GVHD rule out. Underwent flex sig/EGD results are fairly unremarkable with occasional crypt apoptotic bodies in duodenum, hemorrhagic gastritis in antrum, negative CMV HSV H pylori stains, diarrhea symptoms improved upon discharge, anorexia continues.      BMT/IEC PROTOCOL for 2015-29  - Chemo Prep: BU/Flu   - 8/8 DP permissive. Cell dose-9.24x10^6  - ABO: Donor: O+ Recipient A-  (Minor incompatability, no flush required)   - BM with CR. No MRD by flow. NGS pending. CD33 94% donor and CD3 99% donor.     HEME/COAG  - Transfusion parameters:  hemoglobin <8 (cardiac hx), platelets <30k   #Pancytopenia. Prelim [id number] no G given. Will check type and screen mon just in case would need blood next week.  #Segmental R PE (2/6): see anticoagulation below  #APS work up- lupus antigen +, will follow outpt as may be unreliable in this acute setting and prior APS work up had been unremarkable prior to admission.  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   Extensive hypercoagulable workup to date has been unrevealing.  JAK2 testing negative.  PNH testing negative.  Elevated IgA of unknown significance, no evidence of plasma cell disorder.  - Eliquis 5mg BID (as long as plts>50K) and prasugrel (as long as his platelets >75K)  - 3/7 removed prasugrel from pill box as plts <75K. Left eliquis on re-eval on Monday. He will bring med box and pills. Requested pharm d appt.     ID:     Will check surveillance BC today with lower BP.    -Prophylaxis plan: ACV LD, Fabi HD (at home via [facility]) Per Dr. JEAN BAPTISTE visit plan vori after fabi. I sent vori earlier in week. It is covered. Last dose of fabi 3/8. Pharm D filled vori starting Sat. Sir adjusted to 1mg starting next week Tue., Bactrim started 2/26--all cell lines down. Stop bactrim. Requested pentamidine next week Mon or Thu.   - 3/4 CMV and EBV neg, HHV6 down to 2628 copies from 25K previously. CMV and EBV pending again today as someone released it from standing orders.      RISK OF GVHD  - Prophylaxis: PtC day +3, +4, , Siro/MMF to start day +5  - Siro level pending. Starting vori as above and dropped to 1mg.      #Diarrhea LGI stage II (unknown volume, watery stools 4-6x daily) -- now improving.   - Has had ongoing diarrhea since early admission. He was treated for c. Diff for 14 days.  - AREG and fecal kvng protectin unremarkable  - GI Luminal consult - EGD, FlexSig show occasional crypt apoptotic bodies in duodenum, hemorrhagic gastritis in antrum, negative CMV/HSV/H pylori stains.  - PRN imodium/metamucil.     CARDIOVASCULAR  #CAD  #Hx of CABG  #HTN  - metoprolol, Lisinopril  (nifidepine ER 30mg BID remains on hold). Softer BP today and weight down. Given IVF and cut lisinopril from 40mg to 20mg. He is asymptomatic.    #Hyperlipidemia: Holding atorvastatin peritransplant  - Risk of cardiomyopathy:  Baseline EF 50-55%, Global left ventricular function mildly reduced. Grade 1 or early diastolic dysfunction.      - Risk of arrhythmia: Baseline EKG showed NSR, Qtc -425     GI/NUTRITION  - Ulcer prophylaxis: Protonix   - VOD Prophylaxis: Ursodiol  - Risk of nausea/vomiting due to chemo: Ativan, Compazine prn   #Moderate malnutrition 2/2 acute on chronic illness      RENAL/ELECTROLYTES/  Monitor Cr and lytes.   CONNIE: IVF today. Stopped bactrim for cytopenias as above.      Psych:    #Anxiety- saw inpatient psych who offered atarax, but did not work well. Health psych offered, patient declined.  #Insomnia- PTA ambien and trazadone discontinued with concerns for AMS, sedation with concurrent pain meds. melatonin added.        RTC: Mon labs, 60min provider, pent or Thursday, pharm D med box adjust. They bring meds and pillbox every time as a lot going on lately.     I spent 60 minutes in the care of this patient today, which included time necessary for preparation for the visit, obtaining history, ordering medications/tests/procedures as medically indicated, review of pertinent medical literature, counseling of the patient, communication of recommendations to the care team, and documentation time.      Amanda Fuentes,  MINH

## 2024-03-07 NOTE — PROGRESS NOTES
At Patients's request, I filled 1 week of scheduled medications into his med box.     Current Outpatient Medications   Medication Sig Dispense Refill    acyclovir (ZOVIRAX) 800 MG tablet Take 1 tablet (800 mg) by mouth 2 times daily 60 tablet 1    apixaban ANTICOAGULANT (ELIQUIS) 5 MG tablet Take 1 tablet (5 mg) by mouth 2 times daily 60 tablet 3    lisinopril (ZESTRIL) 40 MG tablet Take 0.5 tablets (20 mg) by mouth daily      LORazepam (ATIVAN) 0.5 MG tablet Take 1-2 tablets (0.5-1 mg) by mouth every 4 hours as needed for vomiting or nausea (nausea/vomiting/sleep) 15 tablet 0    metoprolol succinate ER (TOPROL XL) 100 MG 24 hr tablet Take 1 tablet (100 mg) by mouth daily      pantoprazole (PROTONIX) 40 MG EC tablet Take 1 tablet (40 mg) by mouth daily      prasugrel (EFFIENT) 10 MG TABS tablet Take 1 tablet (10 mg) by mouth daily HOLD EFFECTIVE 3/7 PLTS <75K      prochlorperazine (COMPAZINE) 5 MG tablet Take 1-2 tablets (5-10 mg) by mouth every 6 hours as needed for nausea or vomiting 30 tablet 1    psyllium (METAMUCIL/KONSYL) 58.6 % powder Take 3 g by mouth daily 174 g 1    sirolimus (GENERIC EQUIVALENT) 0.5 MG tablet Not currently needing the 0.5mg tab      sirolimus (GENERIC EQUIVALENT) 1 MG tablet Take 1 (1mg) tablet daily      sulfamethoxazole-trimethoprim (BACTRIM DS) 800-160 MG tablet Take 1 tablet by mouth Every Mon, Tues two times daily HOLD WITH DROPPING COUNTS STARTING 3/7      ursodiol (ACTIGALL) 300 MG capsule Take 1 capsule (300 mg) by mouth 3 times daily 90 capsule 1    voriconazole (VFEND) 200 MG tablet Take 1 tablet (200 mg) by mouth 2 times daily Start on Saturday 3/9/24          Pertinent labs considered today:  Lab Results   Component Value Date     03/07/2024     05/28/2020                 normal sodium 136-148  Lab Results   Component Value Date    POTASSIUM 3.9 03/07/2024    POTASSIUM 3.8 05/28/2020       normal potassium 3.5-5.2   Lab Results   Component Value Date    CHLORIDE  107 03/07/2024    CHLORIDE 107 05/28/2020           normal chloride    Lab Results   Component Value Date    CO2 22 03/07/2024    CO2 24 05/28/2020                normal CO2 20-32  Lab Results   Component Value Date    BUN 9.1 03/07/2024    BUN 10 05/28/2020                 normal BUN 5-24  Lab Results   Component Value Date     03/07/2024     01/24/2024     05/28/2020                 normal glucose   Lab Results   Component Value Date    CR 1.24 03/07/2024    CR 0.80 05/28/2020                 normal cr 0.8-1.5  Lab Results   Component Value Date    REMY 9.1 03/07/2024    REMY 9.3 05/28/2020               normal calcium  8.5-10.4  Lab Results   Component Value Date    BILITOTAL 0.3 03/07/2024    BILITOTAL 0.8 05/28/2020          normal bilirubin 0.2-1.3  Lab Results   Component Value Date    PROTTOTAL 6.2 03/07/2024    PROTTOTAL 8.7 05/28/2020          normal total protein 6.0-8.2  Lab Results   Component Value Date    ALBUMIN 3.3 03/07/2024    ALBUMIN 3.4 05/28/2020             normal albumin 3.2-4.5  Lab Results   Component Value Date    ALKPHOS 76 03/07/2024    ALKPHOS 114 05/28/2020            normal alkphos   Lab Results   Component Value Date    ALT 24 03/07/2024    ALT 25 05/28/2020         normal ALT 0-65  Lab Results   Component Value Date    AST 42 03/07/2024    AST 20 05/28/2020         normal AST 0-37    Lab Results   Component Value Date    HGB 8.3 03/07/2024    HGB 12.6 05/28/2020     Lab Results   Component Value Date    WBC 2.6 03/07/2024    WBC 4.9 05/28/2020      Lab Results   Component Value Date    PLT 68 03/07/2024     05/28/2020       Estimated Creatinine Clearance: 79.4 mL/min (A) (based on SCr of 1.24 mg/dL (H)).    Changes made to scheduled medication list today include:  Added: Voriconazole added to day 46 (3/12)in med box  Deleted: Bactrim and prasugrel are on hold  Changed: Lisinopril is now 20 mg, Sirolimus changed to 1 mg daily starting day  46 (3/12)    Actions taken by pharmacist (provider contacted, etc):None   Prior to the patient arriving in clinic (there were enough medications to refill the box for 1 week today). I gave him an updated medication list that indicated what meds were put in the med box.    Time spent in this activity: 30       Deondre Carter [facility], PharmD

## 2024-03-07 NOTE — PROGRESS NOTES
Infusion Nursing Note:  Ziggy Hallman presents today for IVF.    Patient seen by provider today: Yes: Amanda Fuentes   present during visit today: Not Applicable.    Note: Ziggy here today for add on IVF. No additional infusion needs identified. Tolerated 1L NS bolus without difficulty.      Intravenous Access:  [place].    Treatment Conditions:  Lab Results   Component Value Date    HGB 8.3 (L) 03/07/2024    WBC 2.6 (L) 03/07/2024    ANEU 1.6 02/20/2024    ANEUTAUTO 1.2 (L) 03/07/2024    PLT 68 (L) 03/07/2024        Lab Results   Component Value Date     03/07/2024    POTASSIUM 3.9 03/07/2024    MAG 1.5 (L) 02/28/2024    CR 1.24 (H) 03/07/2024    REMY 9.1 03/07/2024    BILITOTAL 0.3 03/07/2024    ALBUMIN 3.3 (L) 03/07/2024    ALT 24 03/07/2024    AST 42 03/07/2024         Post Infusion Assessment:  Patient tolerated infusion without incident.  Blood return noted pre and post infusion.  Site patent and intact, free from redness, edema or discomfort.  No evidence of extravasations.  Access discontinued per protocol.       Discharge Plan:   Patient discharged in stable condition accompanied by: wife.  Departure Mode: Ambulatory.      Lora Buckley RN

## 2024-03-07 NOTE — PROGRESS NOTES
BMT Progress Notes  03/07/2024       Patient ID: Ziggy Hallman is a 48 year old year old male with a PMH of MDS, hx of multiple clots and MI undergoing a MA (Bu/Flu) prep for an 8/8 URD PBSCT currently day 42.      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 Overall doing okay. Eating still more of a struggle but overall okay. Taste off. No Thrush. No bleeding. No evidence of infection. No evidence of GVHD. Denies dizzyness with his softer BP. Weight is down trending. Discussed fluid and kcal intake.      Review of Systems                                                                                                                           ROS negative unless stated in HPI     PHYSICAL EXAM                                                                                                                                      Blood pressure 98/68, pulse 72, temperature 97.5  F (36.4  C), temperature source Oral, resp. rate 16, weight 78.8 kg (173 lb 11.2 oz), SpO2 100%.  Wt Readings from Last 4 Encounters:   03/07/24 78.8 kg (173 lb 11.2 oz)   03/04/24 78.9 kg (174 lb)   02/28/24 80.4 kg (177 lb 4.8 oz)   02/26/24 81.5 kg (179 lb 11.2 oz)     KPS: 70     General: NAD   Eyes: sclera anicteric   Nose/Mouth/Throat: MMM  Lungs: CTAB  Cardiovascular: RRR, no M/R/G   Lymphatics: no edema  Skin: No rash  Neuro: A&O ; non-focal  Access: CVC R chest NT, dressing cdi. R PAC not accessed.     Acute GVHD          3/4/2024    11:02 2/29/2024    10:00 2/22/2024    12:58   Acute GVHD   New evidence of Acute Mbexh-Unvbpp-Htuf Disease developed since last entry? No No No          LABS: I have assessed all abnormal lab values for their clinical significance and any values  considered clinically significant have been addressed in the assessment and plan.       Lab Results   Component Value Date    WBC 3.0 (L) 03/04/2024    ANEU 1.6 02/20/2024    HGB 8.9 (L) 03/04/2024    HCT 26.1 (L) 03/04/2024    PLT 95 (L) 03/04/2024     03/04/2024    POTASSIUM 3.7 03/04/2024    CHLORIDE 106 03/04/2024    CO2 24 03/04/2024    GLC 89 03/04/2024    BUN 6.5 03/04/2024    CR 0.94 03/04/2024    MAG 1.5 (L) 02/28/2024    INR 1.35 (H) 02/19/2024    BILITOTAL 0.3 03/04/2024    AST 44 03/04/2024    ALT 22 03/04/2024    ALKPHOS 80 03/04/2024    PROTTOTAL 6.4 03/04/2024    ALBUMIN 3.4 (L) 03/04/2024          ASSESSMENT AND PLAN      Ziggy Hallman is a 48 year old male with a PMHx of MDS, hx of CAD, HTN, multiple recurrent arterial thromboembolic events (STEMIs, renal infarcts, arterial embolism, TIA) and DVTs who is undergoing a MA (Bu/Flu) prep for an 8/8 URD PBSCT day +42.     Stay was complicated by mucositis requiring PCA, N/F, Staph Epi bacteremia, pulmonary embolism requiring anticoagulation. Hospitalization prolonged following engraftment 2/2 to large stool volumes requiring ID and GVHD rule out. Underwent flex sig/EGD results are fairly unremarkable with occasional crypt apoptotic bodies in duodenum, hemorrhagic gastritis in antrum, negative CMV HSV H pylori stains, diarrhea symptoms improved upon discharge, anorexia continues.      BMT/IEC PROTOCOL for 2015-29  - Chemo Prep: BU/Flu   - 8/8 DP permissive. Cell dose-9.24x10^6  - ABO: Donor: O+ Recipient A-  (Minor incompatability, no flush required)   - BM with CR. No MRD by flow. NGS pending. CD33 94% donor and CD3 99% donor.     HEME/COAG  - Transfusion parameters: hemoglobin <8 (cardiac hx), platelets <30k   #Pancytopenia. Prelim [id number] no G given. Will check type and screen mon just in case would need blood next week.  #Segmental R PE (2/6): see anticoagulation below  #APS work up- lupus antigen +, will follow outpt as may be unreliable in  this acute setting and prior APS work up had been unremarkable prior to admission.  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   Extensive hypercoagulable workup to date has been unrevealing.  JAK2 testing negative.  PNH testing negative.  Elevated IgA of unknown significance, no evidence of plasma cell disorder.  - Eliquis 5mg BID (as long as plts>50K) and prasugrel (as long as his platelets >75K)  - 3/7 removed prasugrel from pill box as plts <75K. Left eliquis on re-eval on Monday. He will bring med box and pills. Requested pharm d appt.     ID:     Will check surveillance BC today with lower BP.    -Prophylaxis plan: ACV LD, Fabi HD (at home via [facility]) Per Dr. JEAN BAPTISTE visit plan vori after fabi. I sent vori earlier in week. It is covered. Last dose of fabi 3/8. Pharm D filled vori starting Sat. Sir adjusted to 1mg starting next week Tue., Bactrim started 2/26--all cell lines down. Stop bactrim. Requested pentamidine next week Mon or Thu.   - 3/4 CMV and EBV neg, HHV6 down to 2628 copies from 25K previously. CMV and EBV pending again today as someone released it from standing orders.      RISK OF GVHD  - Prophylaxis: PtC day +3, +4, , Siro/MMF to start day +5  - Siro level pending. Starting vori as above and dropped to 1mg.     #Diarrhea LGI stage II (unknown volume, watery stools 4-6x daily) -- now improving.   - Has had ongoing diarrhea since early admission. He was treated for c. Diff for 14 days.  - AREG and fecal kvng protectin unremarkable  - GI Luminal consult - EGD, FlexSig show occasional crypt  apoptotic bodies in duodenum, hemorrhagic gastritis in antrum, negative CMV/HSV/H pylori stains.  - PRN imodium/metamucil.     CARDIOVASCULAR  #CAD  #Hx of CABG  #HTN  - metoprolol, Lisinopril  (nifidepine ER 30mg BID remains on hold). Softer BP today and weight down. Given IVF and cut lisinopril from 40mg to 20mg. He is asymptomatic.    #Hyperlipidemia: Holding atorvastatin peritransplant  - Risk of cardiomyopathy:  Baseline EF 50-55%, Global left ventricular function mildly reduced. Grade 1 or early diastolic dysfunction.      - Risk of arrhythmia: Baseline EKG showed NSR, Qtc -425     GI/NUTRITION  - Ulcer prophylaxis: Protonix   - VOD Prophylaxis: Ursodiol  - Risk of nausea/vomiting due to chemo: Ativan, Compazine prn   #Moderate malnutrition 2/2 acute on chronic illness      RENAL/ELECTROLYTES/  Monitor Cr and lytes.   CONNIE: IVF today. Stopped bactrim for cytopenias as above.      Psych:    #Anxiety- saw inpatient psych who offered atarax, but did not work well. Health psych offered, patient declined.  #Insomnia- PTA ambien and trazadone discontinued with concerns for AMS, sedation with concurrent pain meds. melatonin added.        RTC: Mon labs, 60min provider, pent or Thursday, pharm D med box adjust. They bring meds and pillbox every time as a lot going on lately.     I spent 60 minutes in the care of this patient today, which included time necessary for preparation for the visit, obtaining history, ordering medications/tests/procedures as medically indicated, review of pertinent medical literature, counseling of the patient, communication of recommendations to the care team, and documentation time.      Amanda Fuentes PA-C

## 2024-03-07 NOTE — LETTER
3/7/2024         RE: Ziggy Hallman  [address] [facility] [address]        Dear Colleague,    Thank you for referring your patient, Ziggy Hallman, to the [facility] [place]. Please see a copy of my visit note below.    At Patients's request, I filled 1 week of scheduled medications into his med box.     Current Outpatient Medications   Medication Sig Dispense Refill    acyclovir (ZOVIRAX) 800 MG tablet Take 1 tablet (800 mg) by mouth 2 times daily 60 tablet 1    apixaban ANTICOAGULANT (ELIQUIS) 5 MG tablet Take 1 tablet (5 mg) by mouth 2 times daily 60 tablet 3    lisinopril (ZESTRIL) 40 MG tablet Take 0.5 tablets (20 mg) by mouth daily      LORazepam (ATIVAN) 0.5 MG tablet Take 1-2 tablets (0.5-1 mg) by mouth every 4 hours as needed for vomiting or nausea (nausea/vomiting/sleep) 15 tablet 0    metoprolol succinate ER (TOPROL XL) 100 MG 24 hr tablet Take 1 tablet (100 mg) by mouth daily      pantoprazole (PROTONIX) 40 MG EC tablet Take 1 tablet (40 mg) by mouth daily      prasugrel (EFFIENT) 10 MG TABS tablet Take 1 tablet (10 mg) by mouth daily HOLD EFFECTIVE 3/7 PLTS <75K      prochlorperazine (COMPAZINE) 5 MG tablet Take 1-2 tablets (5-10 mg) by mouth every 6 hours as needed for nausea or vomiting 30 tablet 1    psyllium (METAMUCIL/KONSYL) 58.6 % powder Take 3 g by mouth daily 174 g 1    sirolimus (GENERIC EQUIVALENT) 0.5 MG tablet Not currently needing the 0.5mg tab      sirolimus (GENERIC EQUIVALENT) 1 MG tablet Take 1 (1mg) tablet daily      sulfamethoxazole-trimethoprim (BACTRIM DS) 800-160 MG tablet Take 1 tablet by mouth Every Mon, Tues two times daily HOLD WITH DROPPING COUNTS STARTING 3/7      ursodiol (ACTIGALL) 300 MG capsule Take 1 capsule (300 mg) by mouth 3 times daily 90 capsule 1    voriconazole (VFEND) 200 MG tablet Take 1 tablet (200 mg) by mouth 2 times daily Start on Saturday 3/9/24          Pertinent labs considered today:  Lab  Results   Component Value Date     03/07/2024     05/28/2020                 normal sodium 136-148  Lab Results   Component Value Date    POTASSIUM 3.9 03/07/2024    POTASSIUM 3.8 05/28/2020       normal potassium 3.5-5.2   Lab Results   Component Value Date    CHLORIDE 107 03/07/2024    CHLORIDE 107 05/28/2020           normal chloride    Lab Results   Component Value Date    CO2 22 03/07/2024    CO2 24 05/28/2020                normal CO2 20-32  Lab Results   Component Value Date    BUN 9.1 03/07/2024    BUN 10 05/28/2020                 normal BUN 5-24  Lab Results   Component Value Date     03/07/2024     01/24/2024     05/28/2020                 normal glucose   Lab Results   Component Value Date    CR 1.24 03/07/2024    CR 0.80 05/28/2020                 normal cr 0.8-1.5  Lab Results   Component Value Date    REMY 9.1 03/07/2024    REMY 9.3 05/28/2020               normal calcium  8.5-10.4  Lab Results   Component Value Date    BILITOTAL 0.3 03/07/2024    BILITOTAL 0.8 05/28/2020          normal bilirubin 0.2-1.3  Lab Results   Component Value Date    PROTTOTAL 6.2 03/07/2024    PROTTOTAL 8.7 05/28/2020          normal total protein 6.0-8.2  Lab Results   Component Value Date    ALBUMIN 3.3 03/07/2024    ALBUMIN 3.4 05/28/2020             normal albumin 3.2-4.5  Lab Results   Component Value Date    ALKPHOS 76 03/07/2024    ALKPHOS 114 05/28/2020            normal alkphos   Lab Results   Component Value Date    ALT 24 03/07/2024    ALT 25 05/28/2020         normal ALT 0-65  Lab Results   Component Value Date    AST 42 03/07/2024    AST 20 05/28/2020         normal AST 0-37    Lab Results   Component Value Date    HGB 8.3 03/07/2024    HGB 12.6 05/28/2020     Lab Results   Component Value Date    WBC 2.6 03/07/2024    WBC 4.9 05/28/2020      Lab Results   Component Value Date    PLT 68 03/07/2024     05/28/2020       Estimated Creatinine Clearance: 79.4  mL/min (A) (based on SCr of 1.24 mg/dL (H)).    Changes made to scheduled medication list today include:  Added: N/A  Deleted: Bactrim and prasugrel are on hold  Changed: Lisinopril is now 20 mg    Actions taken by pharmacist (provider contacted, etc):None   Prior to the patient arriving in clinic (there were enough medications to refill the box for 1 week today). I gave him an updated medication list that indicated what meds were put in the med box.    Time spent in this activity: 30       Deondre Carter [facility], PharmD

## 2024-03-08 LAB — EBV DNA # SPEC NAA+PROBE: NOT DETECTED COPIES/ML

## 2024-03-10 LAB
ANTIBODY SCREEN: NEGATIVE
SPECIMEN EXPIRATION DATE: NORMAL

## 2024-03-11 ENCOUNTER — APPOINTMENT (OUTPATIENT)
Dept: LAB | Facility: CLINIC | Age: 49
End: 2024-03-11
Attending: INTERNAL MEDICINE
Payer: COMMERCIAL

## 2024-03-11 ENCOUNTER — ALLIED HEALTH/NURSE VISIT (OUTPATIENT)
Dept: TRANSPLANT | Facility: CLINIC | Age: 49
End: 2024-03-11
Attending: INTERNAL MEDICINE
Payer: COMMERCIAL

## 2024-03-11 VITALS
RESPIRATION RATE: 18 BRPM | SYSTOLIC BLOOD PRESSURE: 91 MMHG | WEIGHT: 174.9 LBS | HEART RATE: 78 BPM | OXYGEN SATURATION: 99 % | BODY MASS INDEX: 26.66 KG/M2 | TEMPERATURE: 97.6 F | DIASTOLIC BLOOD PRESSURE: 62 MMHG

## 2024-03-11 DIAGNOSIS — D46.9 MDS (MYELODYSPLASTIC SYNDROME) (H): Primary | ICD-10-CM

## 2024-03-11 DIAGNOSIS — Z94.81 S/P ALLOGENEIC BONE MARROW TRANSPLANT (H): ICD-10-CM

## 2024-03-11 DIAGNOSIS — D46.9 MDS (MYELODYSPLASTIC SYNDROME) (H): ICD-10-CM

## 2024-03-11 LAB
ABO/RH TYPE: NORMAL
ANION GAP SERPL CALCULATED.3IONS-SCNC: 9 MMOL/L (ref 7–15)
BASOPHILS # BLD AUTO: 0 10E3/UL (ref 0–0.2)
BASOPHILS NFR BLD AUTO: 1 %
BUN SERPL-MCNC: 9.7 MG/DL (ref 6–20)
CALCIUM SERPL-MCNC: 8.8 MG/DL (ref 8.6–10)
CHLORIDE SERPL-SCNC: 107 MMOL/L (ref 98–107)
CMV DNA SPEC NAA+PROBE-ACNC: NOT DETECTED IU/ML
CREAT SERPL-MCNC: 0.83 MG/DL (ref 0.67–1.17)
DEPRECATED HCO3 PLAS-SCNC: 23 MMOL/L (ref 22–29)
EGFRCR SERPLBLD CKD-EPI 2021: >90 ML/MIN/1.73M2
EOSINOPHIL # BLD AUTO: 0.1 10E3/UL (ref 0–0.7)
EOSINOPHIL NFR BLD AUTO: 6 %
ERYTHROCYTE [DISTWIDTH] IN BLOOD BY AUTOMATED COUNT: 15.8 % (ref 10–15)
GLUCOSE SERPL-MCNC: 99 MG/DL (ref 70–99)
HCT VFR BLD AUTO: 22.1 % (ref 40–53)
HGB BLD-MCNC: 7.5 G/DL (ref 13.3–17.7)
IMM GRANULOCYTES # BLD: 0 10E3/UL
IMM GRANULOCYTES NFR BLD: 1 %
LDH SERPL L TO P-CCNC: 220 U/L (ref 0–250)
LYMPHOCYTES # BLD AUTO: 0.5 10E3/UL (ref 0.8–5.3)
LYMPHOCYTES NFR BLD AUTO: 22 %
MCH RBC QN AUTO: 30.4 PG (ref 26.5–33)
MCHC RBC AUTO-ENTMCNC: 33.9 G/DL (ref 31.5–36.5)
MCV RBC AUTO: 90 FL (ref 78–100)
MONOCYTES # BLD AUTO: 0.4 10E3/UL (ref 0–1.3)
MONOCYTES NFR BLD AUTO: 19 %
NEUTROPHILS # BLD AUTO: 1 10E3/UL (ref 1.6–8.3)
NEUTROPHILS NFR BLD AUTO: 51 %
NRBC # BLD AUTO: 0 10E3/UL
NRBC BLD AUTO-RTO: 0 /100
PLATELET # BLD AUTO: 62 10E3/UL (ref 150–450)
POTASSIUM SERPL-SCNC: 3.7 MMOL/L (ref 3.4–5.3)
RBC # BLD AUTO: 2.47 10E6/UL (ref 4.4–5.9)
SIROLIMUS BLD-MCNC: 8.2 UG/L (ref 5–15)
SODIUM SERPL-SCNC: 139 MMOL/L (ref 135–145)
SPECIMEN EXPIRATION DATE: NORMAL
TME LAST DOSE: NORMAL H
TME LAST DOSE: NORMAL H
WBC # BLD AUTO: 2 10E3/UL (ref 4–11)

## 2024-03-11 PROCEDURE — 86900 BLOOD TYPING SEROLOGIC ABO: CPT

## 2024-03-11 PROCEDURE — 85025 COMPLETE CBC W/AUTO DIFF WBC: CPT

## 2024-03-11 PROCEDURE — 80048 BASIC METABOLIC PNL TOTAL CA: CPT

## 2024-03-11 PROCEDURE — 80195 ASSAY OF SIROLIMUS: CPT

## 2024-03-11 PROCEDURE — 250N000011 HC RX IP 250 OP 636: Performed by: INTERNAL MEDICINE

## 2024-03-11 PROCEDURE — 99215 OFFICE O/P EST HI 40 MIN: CPT

## 2024-03-11 PROCEDURE — 36592 COLLECT BLOOD FROM PICC: CPT

## 2024-03-11 PROCEDURE — G0463 HOSPITAL OUTPT CLINIC VISIT: HCPCS

## 2024-03-11 PROCEDURE — 86850 RBC ANTIBODY SCREEN: CPT

## 2024-03-11 PROCEDURE — 86923 COMPATIBILITY TEST ELECTRIC: CPT

## 2024-03-11 PROCEDURE — 83615 LACTATE (LD) (LDH) ENZYME: CPT

## 2024-03-11 RX ORDER — HEPARIN SODIUM,PORCINE 10 UNIT/ML
5 VIAL (ML) INTRAVENOUS ONCE
Status: COMPLETED | OUTPATIENT
Start: 2024-03-11 | End: 2024-03-11

## 2024-03-11 RX ADMIN — Medication 5 ML: at 08:13

## 2024-03-11 ASSESSMENT — PAIN SCALES - GENERAL: PAINLEVEL: NO PAIN (0)

## 2024-03-11 NOTE — NURSING NOTE
"Oncology Rooming Note    March 11, 2024 8:29 AM   Ziggy Hallman is a 48 year old male who presents for:    Chief Complaint   Patient presents with    Blood Draw     Labs drawn via CVC by RN. VS taken.    Oncology Clinic Visit     MDS     Initial Vitals: BP 91/62 (BP Location: Right arm, Patient Position: Sitting, Cuff Size: Adult Large)   Pulse 78   Temp 97.6  F (36.4  C) (Oral)   Resp 18   Wt 79.3 kg (174 lb 14.4 oz)   SpO2 99%   BMI 26.66 kg/m   Estimated body mass index is 26.66 kg/m  as calculated from the following:    Height as of 2/26/24: 1.725 m (5' 7.91\").    Weight as of this encounter: 79.3 kg (174 lb 14.4 oz). Body surface area is 1.95 meters squared.  No Pain (0) Comment: Data Unavailable   No LMP for male patient.  Allergies reviewed: Yes  Medications reviewed: Yes    Medications: MEDICATION REFILLS NEEDED TODAY. Provider was notified.  Pharmacy name entered into [employer]: [id number] - [place], [place] - [address] [place] [address]    Frailty Screening:   Is the patient here for a new oncology consult visit in cancer care? 2. No      Clinical concerns: Refill lorazepam 0.5 mg tabs  Yaneli Henson was notified.      Payal Govea              "

## 2024-03-11 NOTE — PROGRESS NOTES
BMT Progress Notes  03/11/2024       Patient ID: Ziggy Hallman is a 48 year old year old male with a PMH of MDS, hx of multiple clots and MI undergoing a MA (Bu/Flu) prep for an 8/8 URD PBSCT currently day 46.      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 No changes from last week. Still struggles with his appetite and taste. Trying to eat but taste is altered. Stools loose but not watery. Denies dizziness. No bleeding.     Review of Systems                                                                                                                           ROS negative unless stated in HPI     PHYSICAL EXAM                                                                                                                                      Blood pressure 91/62, pulse 78, temperature 97.6  F (36.4  C), temperature source Oral, resp. rate 18, weight 79.3 kg (174 lb 14.4 oz), SpO2 99%.  Wt Readings from Last 4 Encounters:   03/11/24 79.3 kg (174 lb 14.4 oz)   03/07/24 78.8 kg (173 lb 11.2 oz)   03/04/24 78.9 kg (174 lb)   02/28/24 80.4 kg (177 lb 4.8 oz)     KPS: 70     General: NAD   Eyes: sclera anicteric   Nose/Mouth/Throat: MMM  Lungs: CTAB  Cardiovascular: RRR, no M/R/G   Lymphatics: no edema  Skin: No rash  Neuro: A&O ; non-focal  Access: CVC R chest NT, dressing cdi. R PAC not accessed.     Acute GVHD          3/4/2024    11:02 2/29/2024    10:00 2/22/2024    12:58   Acute GVHD   New evidence of Acute Xwybh-Ufmolv-Pszq Disease developed since last entry? No No No          LABS: I have assessed all abnormal lab values for their clinical significance and any values considered clinically significant have been addressed in the assessment and  plan.       Lab Results   Component Value Date    WBC 2.0 (L) 03/11/2024    ANEU 1.6 02/20/2024    HGB 7.5 (L) 03/11/2024    HCT 22.1 (L) 03/11/2024    PLT 62 (L) 03/11/2024     03/11/2024    POTASSIUM 3.7 03/11/2024    CHLORIDE 107 03/11/2024    CO2 23 03/11/2024    GLC 99 03/11/2024    BUN 9.7 03/11/2024    CR 0.83 03/11/2024    MAG 1.5 (L) 02/28/2024    INR 1.35 (H) 02/19/2024    BILITOTAL 0.3 03/07/2024    AST 42 03/07/2024    ALT 24 03/07/2024    ALKPHOS 76 03/07/2024    PROTTOTAL 6.2 (L) 03/07/2024    ALBUMIN 3.3 (L) 03/07/2024          ASSESSMENT AND PLAN      Ziggy Hallman is a 48 year old male with a PMHx of MDS, hx of CAD, HTN, multiple recurrent arterial thromboembolic events (STEMIs, renal infarcts, arterial embolism, TIA) and DVTs who is undergoing a MA (Bu/Flu) prep for an 8/8 URD PBSCT day +46.     Stay was complicated by mucositis requiring PCA, N/F, Staph Epi bacteremia, pulmonary embolism requiring anticoagulation. Hospitalization prolonged following engraftment 2/2 to large stool volumes requiring ID and GVHD rule out. Underwent flex sig/EGD results are fairly unremarkable with occasional crypt apoptotic bodies in duodenum, hemorrhagic gastritis in antrum, negative CMV HSV H pylori stains, diarrhea symptoms improved upon discharge, anorexia continues.      BMT/IEC PROTOCOL for 2015-29  - Chemo Prep: BU/Flu   - 8/8 DP permissive. Cell dose-9.24x10^6  - ABO: Donor: O+ Recipient A-  (Minor incompatability, no flush required)   - BM with CR. No MRD by flow. NGS pending. CD33 94% donor and CD3 99% donor.     HEME/COAG  - Transfusion parameters: hemoglobin <8 (cardiac hx), platelets <30k   #Pancytopenia. Prelim [id number] no G given.   #Segmental R PE (2/6): see anticoagulation below  #APS work up- lupus antigen +, will follow outpt as may be unreliable in this acute setting and prior APS work up had been unremarkable prior to admission.  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   Extensive hypercoagulable workup to date has been unrevealing.  JAK2 testing negative.  PNH testing negative.  Elevated IgA of unknown significance, no evidence of plasma cell disorder.  - Eliquis 5mg BID (as long as plts>50K) and prasugrel (as long as his platelets >75K)  - 3/7 removed prasugrel from pill box as plts <75K. Left eliquis on re-eval on Monday. Med box fill today.    ID:     -Prophylaxis plan: ACV LD, Fabi HD (at home via [facility]) Per Dr. JEAN BAPTISTE visit plan vori after fabi. Last dose of fabi 3/8. Pharm D filled vori starting Sat. Sir adjusted to 1mg starting tomorrow Tue., Bactrim started 2/26--all cell lines down. Stop bactrim. Will get pentamidine Thu.   - 3/4 CMV and EBV neg, HHV6 down to 2628 copies from 25K previously. CMV and EBV pending again as someone released it from standing orders.      RISK OF GVHD  - Prophylaxis: PtC day +3, +4, , Siro/MMF to start day +5  - Siro level pending. Starting vori as above and dropped to 1mg. Siro level in process    #Diarrhea LGI stage II (unknown volume, watery stools 4-6x daily) -- now improving.   - Has had ongoing diarrhea since early admission. He was treated for c. Diff for 14 days.  - AREG and fecal kvng protectin unremarkable  - GI Luminal consult - EGD, FlexSig show occasional crypt apoptotic bodies in duodenum, hemorrhagic gastritis in antrum, negative CMV/HSV/H pylori stains.  - PRN imodium/metamucil-hasn't started but will  metamucil today.     CARDIOVASCULAR  #CAD  #Hx of CABG  #HTN  - metoprolol, Lisinopril  (nifidepine ER 30mg BID remains on hold).  Softer BP again but asymptomatic. Hold lisinopril 3/11  #Hyperlipidemia: Holding atorvastatin peritransplant  - Risk of cardiomyopathy:  Baseline EF 50-55%, Global left ventricular function mildly reduced. Grade 1 or early diastolic dysfunction.      - Risk of arrhythmia: Baseline EKG showed NSR, Qtc -425     GI/NUTRITION  - Ulcer prophylaxis: Protonix   - VOD Prophylaxis: Ursodiol  - Risk of nausea/vomiting due to chemo: Ativan, Compazine prn   #Moderate malnutrition 2/2 acute on chronic illness      RENAL/ELECTROLYTES/  Monitor Cr and lytes.   CONNIE: Stopped bactrim for cytopenias as above.      Psych:    #Anxiety- saw inpatient psych who offered atarax, but did not work well. Health psych offered, patient declined.  #Insomnia- PTA ambien and trazadone discontinued with concerns for AMS, sedation with concurrent pain meds. melatonin added.        RTC: Thurs, labs, pentamidine, add possible infusion for blood/plts. Re-eval eliquis and possible gcsf     I spent 45 minutes in the care of this patient today, which included time necessary for preparation for the visit, obtaining history, ordering medications/tests/procedures as medically indicated, review of pertinent medical literature, counseling of the patient, communication of recommendations to the care team, and documentation time.      MARYURI Burns CNP

## 2024-03-11 NOTE — PROGRESS NOTES
At Ziggy's request, I filled 1 week of scheduled medications into his med box.     Current Outpatient Medications   Medication Sig Dispense Refill    acyclovir (ZOVIRAX) 800 MG tablet Take 1 tablet (800 mg) by mouth 2 times daily 60 tablet 1    apixaban ANTICOAGULANT (ELIQUIS) 5 MG tablet Take 1 tablet (5 mg) by mouth 2 times daily 60 tablet 3    lisinopril (ZESTRIL) 40 MG tablet Take 0.5 tablets (20 mg) by mouth daily      LORazepam (ATIVAN) 0.5 MG tablet Take 1-2 tablets (0.5-1 mg) by mouth every 4 hours as needed for vomiting or nausea (nausea/vomiting/sleep) 15 tablet 0    metoprolol succinate ER (TOPROL XL) 100 MG 24 hr tablet Take 1 tablet (100 mg) by mouth daily      pantoprazole (PROTONIX) 40 MG EC tablet Take 1 tablet (40 mg) by mouth daily      prasugrel (EFFIENT) 10 MG TABS tablet Take 1 tablet (10 mg) by mouth daily HOLD EFFECTIVE 3/7 PLTS <75K (Patient not taking: Reported on 3/7/2024)      prochlorperazine (COMPAZINE) 5 MG tablet Take 1-2 tablets (5-10 mg) by mouth every 6 hours as needed for nausea or vomiting 30 tablet 1    psyllium (METAMUCIL/KONSYL) 58.6 % powder Take 3 g by mouth daily 174 g 1    sirolimus (GENERIC EQUIVALENT) 0.5 MG tablet Not currently needing the 0.5mg tab (Patient not taking: Reported on 3/7/2024)      sirolimus (GENERIC EQUIVALENT) 1 MG tablet Take 1 (1mg) tablet daily      sulfamethoxazole-trimethoprim (BACTRIM DS) 800-160 MG tablet Take 1 tablet by mouth Every Mon, Tues two times daily HOLD WITH DROPPING COUNTS STARTING 3/7 (Patient not taking: Reported on 3/7/2024)      ursodiol (ACTIGALL) 300 MG capsule Take 1 capsule (300 mg) by mouth 3 times daily 90 capsule 1    voriconazole (VFEND) 200 MG tablet Take 1 tablet (200 mg) by mouth 2 times daily Start on Saturday 3/9/24          Pertinent labs considered today:  Lab Results   Component Value Date     03/11/2024     05/28/2020                 normal sodium 136-148  Lab Results   Component Value Date    POTASSIUM  3.7 03/11/2024    POTASSIUM 3.8 05/28/2020       normal potassium 3.5-5.2   Lab Results   Component Value Date    CHLORIDE 107 03/11/2024    CHLORIDE 107 05/28/2020           normal chloride    Lab Results   Component Value Date    CO2 23 03/11/2024    CO2 24 05/28/2020                normal CO2 20-32  Lab Results   Component Value Date    BUN 9.7 03/11/2024    BUN 10 05/28/2020                 normal BUN 5-24  Lab Results   Component Value Date    GLC 99 03/11/2024     01/24/2024     05/28/2020                 normal glucose   Lab Results   Component Value Date    CR 0.83 03/11/2024    CR 0.80 05/28/2020                 normal cr 0.8-1.5  Lab Results   Component Value Date    REMY 8.8 03/11/2024    REMY 9.3 05/28/2020               normal calcium  8.5-10.4  Lab Results   Component Value Date    BILITOTAL 0.3 03/07/2024    BILITOTAL 0.8 05/28/2020          normal bilirubin 0.2-1.3  Lab Results   Component Value Date    PROTTOTAL 6.2 03/07/2024    PROTTOTAL 8.7 05/28/2020          normal total protein 6.0-8.2  Lab Results   Component Value Date    ALBUMIN 3.3 03/07/2024    ALBUMIN 3.4 05/28/2020             normal albumin 3.2-4.5  Lab Results   Component Value Date    ALKPHOS 76 03/07/2024    ALKPHOS 114 05/28/2020            normal alkphos   Lab Results   Component Value Date    ALT 24 03/07/2024    ALT 25 05/28/2020         normal ALT 0-65  Lab Results   Component Value Date    AST 42 03/07/2024    AST 20 05/28/2020         normal AST 0-37    Lab Results   Component Value Date    HGB 7.5 03/11/2024    HGB 12.6 05/28/2020     Lab Results   Component Value Date    WBC 2.0 03/11/2024    WBC 4.9 05/28/2020      Lab Results   Component Value Date    PLT 62 03/11/2024     05/28/2020       Estimated Creatinine Clearance: 119.4 mL/min (based on SCr of 0.83 mg/dL).    Changes made to scheduled medication list today include:  Added: None  Deleted: Lisinopril  Changed: None    Actions  taken by pharmacist (provider contacted, etc):None   Refills for acyclovir, apixaban, and metoprolol were called in to [facility] so enough medication is obtained to refill the med box next week, prior to the patient arriving in clinic (there were enough medications to refill the box for 1 week today). I gave him an updated medication list that indicated what meds were put in the med box.       TONIE PHAM, [facility], PharmD

## 2024-03-11 NOTE — LETTER
3/11/2024         RE: Ziggy Hallman  [address] [address]        Dear Colleague,    Thank you for referring your patient, Ziggy Hallman, to the [facility] [place]. Please see a copy of my visit note below.    BMT Progress Notes  03/11/2024       Patient ID: Ziggy Hallman is a 48 year old year old male with a PMH of MDS, hx of multiple clots and MI undergoing a MA (Bu/Flu) prep for an 8/8 URD PBSCT currently day 46.      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 No changes from last week. Still struggles with his appetite and taste. Trying to eat but taste is altered. Stools loose but not watery. Denies dizziness. No bleeding.     Review of Systems                                                                                                                           ROS negative unless stated in HPI     PHYSICAL EXAM                                                                                                                                      Blood pressure 91/62, pulse 78, temperature 97.6  F (36.4  C), temperature source Oral, resp. rate 18, weight 79.3 kg (174 lb 14.4 oz), SpO2 99%.  Wt Readings from Last 4 Encounters:   03/11/24 79.3 kg (174 lb 14.4 oz)   03/07/24 78.8 kg (173 lb 11.2 oz)   03/04/24 78.9 kg (174 lb)   02/28/24 80.4 kg (177 lb 4.8 oz)     KPS: 70     General: NAD   Eyes: sclera anicteric   Nose/Mouth/Throat: MMM  Lungs: CTAB  Cardiovascular: RRR, no M/R/G   Lymphatics: no edema  Skin: No rash  Neuro: A&O ; non-focal  Access: CVC R chest NT, dressing cdi. R PAC not accessed.     Acute GVHD          3/4/2024    11:02 2/29/2024    10:00 2/22/2024    12:58    Acute GVHD   New evidence of Acute Xtnhg-Ouovir-Gvmg Disease developed since last entry? No No No          LABS: I have assessed all abnormal lab values for their clinical significance and any values considered clinically significant have been addressed in the assessment and plan.       Lab Results   Component Value Date    WBC 2.0 (L) 03/11/2024    ANEU 1.6 02/20/2024    HGB 7.5 (L) 03/11/2024    HCT 22.1 (L) 03/11/2024    PLT 62 (L) 03/11/2024     03/11/2024    POTASSIUM 3.7 03/11/2024    CHLORIDE 107 03/11/2024    CO2 23 03/11/2024    GLC 99 03/11/2024    BUN 9.7 03/11/2024    CR 0.83 03/11/2024    MAG 1.5 (L) 02/28/2024    INR 1.35 (H) 02/19/2024    BILITOTAL 0.3 03/07/2024    AST 42 03/07/2024    ALT 24 03/07/2024    ALKPHOS 76 03/07/2024    PROTTOTAL 6.2 (L) 03/07/2024    ALBUMIN 3.3 (L) 03/07/2024          ASSESSMENT AND PLAN      Ziggy Hallman is a 48 year old male with a PMHx of MDS, hx of CAD, HTN, multiple recurrent arterial thromboembolic events (STEMIs, renal infarcts, arterial embolism, TIA) and DVTs who is undergoing a MA (Bu/Flu) prep for an 8/8 URD PBSCT day +46.     Stay was complicated by mucositis requiring PCA, N/F, Staph Epi bacteremia, pulmonary embolism requiring anticoagulation. Hospitalization prolonged following engraftment 2/2 to large stool volumes requiring ID and GVHD rule out. Underwent flex sig/EGD results are fairly unremarkable with occasional crypt apoptotic bodies in duodenum, hemorrhagic gastritis in antrum, negative CMV HSV H pylori stains, diarrhea symptoms improved upon discharge, anorexia continues.      BMT/IEC PROTOCOL for 2015-29  - Chemo Prep: BU/Flu   - 8/8 DP permissive. Cell dose-9.24x10^6  - ABO: Donor: O+ Recipient A-  (Minor incompatability, no flush required)   - BM with CR. No MRD by flow. NGS pending. CD33 94% donor and CD3 99% donor.     HEME/COAG  - Transfusion parameters: hemoglobin <8 (cardiac hx), platelets <30k   #Pancytopenia. Prelim [id number] no G  given.   #Segmental R PE (2/6): see anticoagulation below  #APS work up- lupus antigen +, will follow outpt as may be unreliable in this acute setting and prior APS work up had been unremarkable prior to admission.  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   Extensive hypercoagulable workup to date has been unrevealing.  JAK2 testing negative.  PNH testing negative.  Elevated IgA of unknown significance, no evidence of plasma cell disorder.  - Eliquis 5mg BID (as long as plts>50K) and prasugrel (as long as his platelets >75K)  - 3/7 removed prasugrel from pill box as plts <75K. Left eliquis on re-eval on Monday. Med box fill today.    ID:     -Prophylaxis plan: ACV LD, Fabi HD (at home via [facility]) Per Dr. JEAN BAPTISTE visit plan vori after fabi. Last dose of fabi 3/8. Pharm D filled vori starting Sat. Sir adjusted to 1mg starting tomorrow Tue., Bactrim started 2/26--all cell lines down. Stop bactrim. Will get pentamidine Thu.   - 3/4 CMV and EBV neg, HHV6 down to 2628 copies from 25K previously. CMV and EBV pending again as someone released it from standing orders.      RISK OF GVHD  - Prophylaxis: PtC day +3, +4, , Siro/MMF to start day +5  - Siro level pending. Starting vori as above and dropped to 1mg. Siro level in process    #Diarrhea LGI stage II (unknown volume, watery stools 4-6x daily) -- now improving.   - Has had ongoing diarrhea since early admission. He was treated for c. Diff for 14 days.  - AREG and fecal kvng protectin unremarkable  - GI Luminal consult - EGD, FlexSig show occasional crypt apoptotic  bodies in duodenum, hemorrhagic gastritis in antrum, negative CMV/HSV/H pylori stains.  - PRN imodium/metamucil-hasn't started but will  metamucil today.     CARDIOVASCULAR  #CAD  #Hx of CABG  #HTN  - metoprolol, Lisinopril  (nifidepine ER 30mg BID remains on hold). Softer BP again but asymptomatic. Hold lisinopril 3/11  #Hyperlipidemia: Holding atorvastatin peritransplant  - Risk of cardiomyopathy:  Baseline EF 50-55%, Global left ventricular function mildly reduced. Grade 1 or early diastolic dysfunction.      - Risk of arrhythmia: Baseline EKG showed NSR, Qtc -425     GI/NUTRITION  - Ulcer prophylaxis: Protonix   - VOD Prophylaxis: Ursodiol  - Risk of nausea/vomiting due to chemo: Ativan, Compazine prn   #Moderate malnutrition 2/2 acute on chronic illness      RENAL/ELECTROLYTES/  Monitor Cr and lytes.   CONNIE: Stopped bactrim for cytopenias as above.      Psych:    #Anxiety- saw inpatient psych who offered atarax, but did not work well. Health psych offered, patient declined.  #Insomnia- PTA ambien and trazadone discontinued with concerns for AMS, sedation with concurrent pain meds. melatonin added.        RTC: Thurs, labs, pentamidine, add possible infusion for blood/plts. Re-edinal eliquis and possible gcsf     I spent 45 minutes in the care of this patient today, which included time necessary for preparation for the visit, obtaining history, ordering medications/tests/procedures as medically indicated, review of pertinent medical literature, counseling of the patient, communication of recommendations to the care team, and documentation time.      MARYURI Burns CNP

## 2024-03-11 NOTE — NURSING NOTE
Chief Complaint   Patient presents with    Blood Draw     Labs drawn via CVC by RN. VS taken.     Labs collected from CVC by RN, line flushed with saline and heparin.  Vitals taken. Pt checked in for appointment(s).     Solange Be RN

## 2024-03-12 LAB
BACTERIA BLD CULT: NO GROWTH
CULTURE HARVEST COMPLETE DATE: NORMAL

## 2024-03-13 LAB
ANTIBODY SCREEN: NEGATIVE
SPECIMEN EXPIRATION DATE: NORMAL

## 2024-03-13 NOTE — PROGRESS NOTES
BMT Progress Notes  03/14/2024       Patient ID: Ziggy Hallman is a 48 year old year old male with a PMH of MDS, hx of multiple clots and MI undergoing a MA (Bu/Flu) prep for an 8/8 URD PBSCT currently day 49.      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 No changes from last week.  Did not sleep well tongith.  Appetite still down.  No n/v/d.  No fever, bleeding, cough, rash.       Review of Systems                                                                                                                           ROS negative unless stated in HPI     PHYSICAL EXAM                                                                                                                                      Blood pressure 127/82, pulse 88, temperature 98.3  F (36.8  C), temperature source Oral, resp. rate 16, weight 78.8 kg (173 lb 12.8 oz), SpO2 98%.  Wt Readings from Last 4 Encounters:   03/14/24 78.8 kg (173 lb 12.8 oz)   03/11/24 79.3 kg (174 lb 14.4 oz)   03/07/24 78.8 kg (173 lb 11.2 oz)   03/04/24 78.9 kg (174 lb)     KPS: 70     General: NAD   Eyes: sclera anicteric   Nose/Mouth/Throat: MMM  Lungs: CTAB  Cardiovascular: RRR, no M/R/G   Lymphatics: no edema  Skin: No rash  Neuro: A&O ; non-focal  Access: CVC R chest NT, dressing cdi. R PAC not accessed.     Acute GVHD          3/14/2024    11:36 3/4/2024    11:02 2/29/2024    10:00   Acute GVHD   New evidence of Acute Ogrdg-Lfkpcw-Akna Disease developed since last entry? No No No          LABS: I have assessed all abnormal lab values for their clinical significance and any values considered clinically significant have been addressed in the assessment and plan.       Lab Results   Component Value  Date    WBC 2.0 (L) 03/11/2024    ANEU 1.6 02/20/2024    HGB 7.5 (L) 03/11/2024    HCT 22.1 (L) 03/11/2024    PLT 62 (L) 03/11/2024     03/11/2024    POTASSIUM 3.7 03/11/2024    CHLORIDE 107 03/11/2024    CO2 23 03/11/2024    GLC 99 03/11/2024    BUN 9.7 03/11/2024    CR 0.83 03/11/2024    MAG 1.5 (L) 02/28/2024    INR 1.35 (H) 02/19/2024    BILITOTAL 0.3 03/07/2024    AST 42 03/07/2024    ALT 24 03/07/2024    ALKPHOS 76 03/07/2024    PROTTOTAL 6.2 (L) 03/07/2024    ALBUMIN 3.3 (L) 03/07/2024          ASSESSMENT AND PLAN      Ziggy Hallman is a 48 year old male with a PMHx of MDS, hx of CAD, HTN, multiple recurrent arterial thromboembolic events (STEMIs, renal infarcts, arterial embolism, TIA) and DVTs who is undergoing a MA (Bu/Flu) prep for an 8/8 URD PBSCT day +49.     Stay was complicated by mucositis requiring PCA, N/F, Staph Epi bacteremia, pulmonary embolism requiring anticoagulation. Hospitalization prolonged following engraftment 2/2 to large stool volumes requiring ID and GVHD rule out. Underwent flex sig/EGD results are fairly unremarkable with occasional crypt apoptotic bodies in duodenum, hemorrhagic gastritis in antrum, negative CMV HSV H pylori stains, diarrhea symptoms improved upon discharge, anorexia continues.      BMT/IEC PROTOCOL for 2015-29  - Chemo Prep: BU/Flu   - 8/8 DP permissive. Cell dose-9.24x10^6  - ABO: Donor: O+ Recipient A-  (Minor incompatability, no flush required)   - BM with CR. No MRD by flow. NGS pending. CD33 94% donor and CD3 99% donor.   - repeat PB DNA pending (3/14)    HEME/COAG  - Transfusion parameters: hemoglobin <8 (cardiac hx), platelets <30k   #Pancytopenia. ANC 1.5 today; Plt up to 67k  #Segmental R PE (2/6): see anticoagulation below  #APS work up- lupus antigen +, will follow outpt as may be unreliable in this acute setting and prior APS work up had been unremarkable prior to admission.  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   Extensive hypercoagulable workup to date has been unrevealing.  JAK2 testing negative.  PNH testing negative.  Elevated IgA of unknown significance, no evidence of plasma cell disorder.  - Eliquis 5mg BID (as long as plts>50K) and prasugrel (as long as his platelets >75K)  - 3/14 continue to hold prasugrel as plts <75K.      ID:     -Prophylaxis plan: ACV LD, Megan HD transitioned to vori (3/9); Bactrim started 2/26--all cell lines down. Stop bactrim.  Pentam (3/14).  - 3/7 CMV and EBV neg, HHV6 down to 2k (3/4) copies from 25K previously.  EBV/CMV pending from 3/14.     RISK OF GVHD  - Prophylaxis: PtC day +3, +4, , Siro/MMF to start day +5  - Siro level 8.2 (3/11), pending from today--unsure why sent again today    #Diarrhea LGI stage II --no c/o diarrhea today  - ongoing diarrhea during transplant admission. He was treated for c. Diff for 14 days.  - AREG and fecal kvng protectin unremarkable  - GI Luminal consult - EGD, FlexSig show occasional crypt apoptotic bodies in duodenum, hemorrhagic gastritis in antrum, negative CMV/HSV/H pylori stains.  - PRN imodium/metamucil-hasn't started but will  metamucil today.     CARDIOVASCULAR  #CAD  #Hx of CABG  #HTN  - metoprolol (Lisinopril & nifidepine ER 30mg BID remain on hold).   #Hyperlipidemia: Holding atorvastatin peritransplant  - Risk of cardiomyopathy:  Baseline EF 50-55%, Global left ventricular function mildly reduced. Grade 1 or early diastolic dysfunction.      - Risk of arrhythmia: Baseline EKG showed NSR, Qtc -425     GI/NUTRITION  - Ulcer prophylaxis: Protonix   - VOD  Prophylaxis: Ursodiol  - Risk of nausea/vomiting due to chemo: Ativan, Compazine prn   #Moderate malnutrition 2/2 acute on chronic illness      RENAL/ELECTROLYTES/  Monitor Cr and lytes.   CONNIE: Stopped bactrim for cytopenias as above.      Psych:    #Anxiety- saw inpatient psych who offered atarax, but did not work well. Health psych offered, patient declined.  #Insomnia- PTA ambien and trazadone discontinued with concerns for AMS, sedation with concurrent pain meds. melatonin added.        Final plan:  Pentamidine IH  today  Pharmacist filled med box  Continue eliquis  Continue to HOLD prasagrel     RTC: 3/20 w/ Dr. Bacon; sooner prn    I spent 45 minutes in the care of this patient today, which included time necessary for preparation for the visit, obtaining history, ordering medications/tests/procedures as medically indicated, review of pertinent medical literature, counseling of the patient, communication of recommendations to the care team, and documentation time.      Debora Claudio PA-C

## 2024-03-14 ENCOUNTER — LAB (OUTPATIENT)
Dept: LAB | Facility: CLINIC | Age: 49
End: 2024-03-14
Attending: INTERNAL MEDICINE
Payer: COMMERCIAL

## 2024-03-14 ENCOUNTER — OFFICE VISIT (OUTPATIENT)
Dept: TRANSPLANT | Facility: CLINIC | Age: 49
End: 2024-03-14
Attending: INTERNAL MEDICINE
Payer: COMMERCIAL

## 2024-03-14 VITALS
OXYGEN SATURATION: 98 % | HEIGHT: 68 IN | BODY MASS INDEX: 26.34 KG/M2 | SYSTOLIC BLOOD PRESSURE: 127 MMHG | HEART RATE: 88 BPM | TEMPERATURE: 98.3 F | RESPIRATION RATE: 16 BRPM | WEIGHT: 173.8 LBS | DIASTOLIC BLOOD PRESSURE: 82 MMHG

## 2024-03-14 DIAGNOSIS — D46.9 MDS (MYELODYSPLASTIC SYNDROME) (H): ICD-10-CM

## 2024-03-14 DIAGNOSIS — Z94.81 S/P ALLOGENEIC BONE MARROW TRANSPLANT (H): ICD-10-CM

## 2024-03-14 DIAGNOSIS — D46.9 MDS (MYELODYSPLASTIC SYNDROME) (H): Primary | ICD-10-CM

## 2024-03-14 DIAGNOSIS — Z94.81 STATUS POST BONE MARROW TRANSPLANT (H): Primary | ICD-10-CM

## 2024-03-14 LAB
ABO/RH TYPE: NORMAL
ADDITIONAL COMMENTS: NORMAL
ALBUMIN SERPL BCG-MCNC: 3.4 G/DL (ref 3.5–5.2)
ALP SERPL-CCNC: 82 U/L (ref 40–150)
ALT SERPL W P-5'-P-CCNC: 16 U/L (ref 0–70)
ANION GAP SERPL CALCULATED.3IONS-SCNC: 10 MMOL/L (ref 7–15)
AST SERPL W P-5'-P-CCNC: 33 U/L (ref 0–45)
BASOPHILS # BLD AUTO: 0 10E3/UL (ref 0–0.2)
BASOPHILS NFR BLD AUTO: 1 %
BILIRUB SERPL-MCNC: 0.2 MG/DL
BUN SERPL-MCNC: 13.5 MG/DL (ref 6–20)
CALCIUM SERPL-MCNC: 9.1 MG/DL (ref 8.6–10)
CHLORIDE SERPL-SCNC: 108 MMOL/L (ref 98–107)
CREAT SERPL-MCNC: 0.9 MG/DL (ref 0.67–1.17)
DEPRECATED HCO3 PLAS-SCNC: 24 MMOL/L (ref 22–29)
EGFRCR SERPLBLD CKD-EPI 2021: >90 ML/MIN/1.73M2
EOSINOPHIL # BLD AUTO: 0.2 10E3/UL (ref 0–0.7)
EOSINOPHIL NFR BLD AUTO: 7 %
ERYTHROCYTE [DISTWIDTH] IN BLOOD BY AUTOMATED COUNT: 16.2 % (ref 10–15)
GLUCOSE SERPL-MCNC: 95 MG/DL (ref 70–99)
HCT VFR BLD AUTO: 21.5 % (ref 40–53)
HGB BLD-MCNC: 7.3 G/DL (ref 13.3–17.7)
IMM GRANULOCYTES # BLD: 0 10E3/UL
IMM GRANULOCYTES NFR BLD: 2 %
INTERPRETATION: NORMAL
ISCN: NORMAL
LAB DIRECTOR DISCLAIMER: NORMAL
LAB DIRECTOR DISCLAIMER: NORMAL
LAB DIRECTOR INTERPRETATION: NORMAL
LAB DIRECTOR INTERPRETATION: NORMAL
LAB DIRECTOR METHODOLOGY: NORMAL
LAB DIRECTOR METHODOLOGY: NORMAL
LAB DIRECTOR RESULTS: NORMAL
LAB DIRECTOR RESULTS: NORMAL
LDH SERPL L TO P-CCNC: 233 U/L (ref 0–250)
LOCATION OF TASK: NORMAL
LOCATION OF TASK: NORMAL
LYMPHOCYTES # BLD AUTO: 0.5 10E3/UL (ref 0.8–5.3)
LYMPHOCYTES NFR BLD AUTO: 19 %
MCH RBC QN AUTO: 30.5 PG (ref 26.5–33)
MCHC RBC AUTO-ENTMCNC: 34 G/DL (ref 31.5–36.5)
MCV RBC AUTO: 90 FL (ref 78–100)
METHODS: NORMAL
MONOCYTES # BLD AUTO: 0.4 10E3/UL (ref 0–1.3)
MONOCYTES NFR BLD AUTO: 14 %
NEUTROPHILS # BLD AUTO: 1.5 10E3/UL (ref 1.6–8.3)
NEUTROPHILS NFR BLD AUTO: 57 %
NRBC # BLD AUTO: 0 10E3/UL
NRBC BLD AUTO-RTO: 0 /100
PLATELET # BLD AUTO: 67 10E3/UL (ref 150–450)
POTASSIUM SERPL-SCNC: 3.7 MMOL/L (ref 3.4–5.3)
PROT SERPL-MCNC: 6.3 G/DL (ref 6.4–8.3)
RBC # BLD AUTO: 2.39 10E6/UL (ref 4.4–5.9)
SIROLIMUS BLD-MCNC: 11.6 UG/L (ref 5–15)
SODIUM SERPL-SCNC: 142 MMOL/L (ref 135–145)
SPECIMEN DESCRIPTION: NORMAL
SPECIMEN DESCRIPTION: NORMAL
SPECIMEN EXPIRATION DATE: NORMAL
TME LAST DOSE: NORMAL H
TME LAST DOSE: NORMAL H
WBC # BLD AUTO: 2.6 10E3/UL (ref 4–11)

## 2024-03-14 PROCEDURE — 86850 RBC ANTIBODY SCREEN: CPT

## 2024-03-14 PROCEDURE — 81268 CHIMERISM ANAL W/CELL SELECT: CPT

## 2024-03-14 PROCEDURE — 86900 BLOOD TYPING SEROLOGIC ABO: CPT

## 2024-03-14 PROCEDURE — 36591 DRAW BLOOD OFF VENOUS DEVICE: CPT | Performed by: PHYSICIAN ASSISTANT

## 2024-03-14 PROCEDURE — 87799 DETECT AGENT NOS DNA QUANT: CPT

## 2024-03-14 PROCEDURE — 80053 COMPREHEN METABOLIC PANEL: CPT | Performed by: PHYSICIAN ASSISTANT

## 2024-03-14 PROCEDURE — 94642 AEROSOL INHALATION TREATMENT: CPT | Performed by: INTERNAL MEDICINE

## 2024-03-14 PROCEDURE — 85025 COMPLETE CBC W/AUTO DIFF WBC: CPT | Performed by: PHYSICIAN ASSISTANT

## 2024-03-14 PROCEDURE — 250N000011 HC RX IP 250 OP 636: Performed by: INTERNAL MEDICINE

## 2024-03-14 PROCEDURE — 99215 OFFICE O/P EST HI 40 MIN: CPT

## 2024-03-14 PROCEDURE — 94640 AIRWAY INHALATION TREATMENT: CPT | Performed by: INTERNAL MEDICINE

## 2024-03-14 PROCEDURE — G0452 MOLECULAR PATHOLOGY INTERPR: HCPCS | Mod: 26 | Performed by: PATHOLOGY

## 2024-03-14 PROCEDURE — G0463 HOSPITAL OUTPT CLINIC VISIT: HCPCS

## 2024-03-14 PROCEDURE — 83615 LACTATE (LD) (LDH) ENZYME: CPT

## 2024-03-14 PROCEDURE — 80195 ASSAY OF SIROLIMUS: CPT

## 2024-03-14 RX ORDER — HEPARIN SODIUM (PORCINE) LOCK FLUSH IV SOLN 100 UNIT/ML 100 UNIT/ML
500 SOLUTION INTRAVENOUS ONCE
Status: COMPLETED | OUTPATIENT
Start: 2024-03-14 | End: 2024-03-14

## 2024-03-14 RX ORDER — HEPARIN SODIUM (PORCINE) LOCK FLUSH IV SOLN 100 UNIT/ML 100 UNIT/ML
5 SOLUTION INTRAVENOUS
Status: CANCELLED | OUTPATIENT
Start: 2024-04-11

## 2024-03-14 RX ORDER — HEPARIN SODIUM,PORCINE 10 UNIT/ML
5 VIAL (ML) INTRAVENOUS ONCE
Status: COMPLETED | OUTPATIENT
Start: 2024-03-14 | End: 2024-03-14

## 2024-03-14 RX ORDER — ALBUTEROL SULFATE 5 MG/ML
2.5 SOLUTION RESPIRATORY (INHALATION)
Status: CANCELLED
Start: 2024-04-11

## 2024-03-14 RX ORDER — HEPARIN SODIUM,PORCINE 10 UNIT/ML
5-20 VIAL (ML) INTRAVENOUS DAILY PRN
Status: CANCELLED | OUTPATIENT
Start: 2024-04-11

## 2024-03-14 RX ORDER — ALBUTEROL SULFATE 5 MG/ML
2.5 SOLUTION RESPIRATORY (INHALATION)
Status: DISCONTINUED | OUTPATIENT
Start: 2024-03-14 | End: 2024-03-14 | Stop reason: HOSPADM

## 2024-03-14 RX ORDER — PENTAMIDINE ISETHIONATE 300 MG/300MG
300 INHALANT RESPIRATORY (INHALATION)
Status: DISCONTINUED | OUTPATIENT
Start: 2024-03-14 | End: 2024-03-14 | Stop reason: HOSPADM

## 2024-03-14 RX ORDER — PENTAMIDINE ISETHIONATE 300 MG/300MG
300 INHALANT RESPIRATORY (INHALATION)
Status: CANCELLED
Start: 2024-04-11

## 2024-03-14 RX ADMIN — Medication 5 ML: at 10:30

## 2024-03-14 RX ADMIN — Medication 500 UNITS: at 10:30

## 2024-03-14 RX ADMIN — PENTAMIDINE ISETHIONATE 300 MG: 300 INHALANT RESPIRATORY (INHALATION) at 11:50

## 2024-03-14 ASSESSMENT — PAIN SCALES - GENERAL: PAINLEVEL: NO PAIN (0)

## 2024-03-14 NOTE — NURSING NOTE
"Oncology Rooming Note    March 14, 2024 11:03 AM   Ziggy Hallman is a 48 year old male who presents for:    Chief Complaint   Patient presents with    Port Draw     Labs drawn via port by RN in lab    Oncology Clinic Visit     [facility] RETURN - MDS     Initial Vitals: /82 (BP Location: Right arm, Patient Position: Sitting, Cuff Size: Adult Regular)   Pulse 88   Temp 98.3  F (36.8  C) (Oral)   Resp 16   Ht 1.725 m (5' 7.91\")   Wt 78.8 kg (173 lb 12.8 oz)   SpO2 98%   BMI 26.49 kg/m   Estimated body mass index is 26.49 kg/m  as calculated from the following:    Height as of this encounter: 1.725 m (5' 7.91\").    Weight as of this encounter: 78.8 kg (173 lb 12.8 oz). Body surface area is 1.94 meters squared.  No Pain (0) Comment: Data Unavailable   No LMP for male patient.  Allergies reviewed: Yes  Medications reviewed: Yes    Medications: Medication refills not needed today.  Pharmacy name entered into [facility]: [id number] - [place], [place] - [address] [place] [address]    Frailty Screening:   Is the patient here for a new oncology consult visit in cancer care? 2. No    Martin Martinez LPN              "

## 2024-03-14 NOTE — LETTER
3/14/2024         RE: Ziggy Hallman  [address] [facility] [id number]        Dear Colleague,    Thank you for referring your patient, Ziggy Hallman, to the [facility]LOOD [facility]ANSPL[facility] [place]. Please see a copy of my visit note below.    BMT Progress Notes  03/14/2024       Patient ID: Ziggy Hallman is a 48 year old year old male with a PMH of MDS, hx of multiple clots and MI undergoing a MA (Bu/Flu) prep for an 8/8 URD PBSCT currently day 49.      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 No changes from last week.  Did not sleep well tongith.  Appetite still down.  No n/v/d.  No fever, bleeding, cough, rash.       Review of Systems                                                                                                                           ROS negative unless stated in HPI     PHYSICAL EXAM                                                                                                                                      Blood pressure 127/82, pulse 88, temperature 98.3  F (36.8  C), temperature source Oral, resp. rate 16, weight 78.8 kg (173 lb 12.8 oz), SpO2 98%.  Wt Readings from Last 4 Encounters:   03/14/24 78.8 kg (173 lb 12.8 oz)   03/11/24 79.3 kg (174 lb 14.4 oz)   03/07/24 78.8 kg (173 lb 11.2 oz)   03/04/24 78.9 kg (174 lb)     KPS: 70     General: NAD   Eyes: sclera anicteric   Nose/Mouth/Throat: MMM  Lungs: CTAB  Cardiovascular: RRR, no M/R/G   Lymphatics: no edema  Skin: No rash  Neuro: A&O ; non-focal  Access: CVC R chest NT, dressing cdi. R PAC not accessed.     Acute GVHD          3/14/2024    11:36 3/4/2024    11:02 2/29/2024    10:00   Acute GVHD   New evidence of Acute  Kqhbx-Lroqld-Zdgw Disease developed since last entry? No No No          LABS: I have assessed all abnormal lab values for their clinical significance and any values considered clinically significant have been addressed in the assessment and plan.       Lab Results   Component Value Date    WBC 2.0 (L) 03/11/2024    ANEU 1.6 02/20/2024    HGB 7.5 (L) 03/11/2024    HCT 22.1 (L) 03/11/2024    PLT 62 (L) 03/11/2024     03/11/2024    POTASSIUM 3.7 03/11/2024    CHLORIDE 107 03/11/2024    CO2 23 03/11/2024    GLC 99 03/11/2024    BUN 9.7 03/11/2024    CR 0.83 03/11/2024    MAG 1.5 (L) 02/28/2024    INR 1.35 (H) 02/19/2024    BILITOTAL 0.3 03/07/2024    AST 42 03/07/2024    ALT 24 03/07/2024    ALKPHOS 76 03/07/2024    PROTTOTAL 6.2 (L) 03/07/2024    ALBUMIN 3.3 (L) 03/07/2024          ASSESSMENT AND PLAN      Ziggy Hallman is a 48 year old male with a PMHx of MDS, hx of CAD, HTN, multiple recurrent arterial thromboembolic events (STEMIs, renal infarcts, arterial embolism, TIA) and DVTs who is undergoing a MA (Bu/Flu) prep for an 8/8 URD PBSCT day +49.     Stay was complicated by mucositis requiring PCA, N/F, Staph Epi bacteremia, pulmonary embolism requiring anticoagulation. Hospitalization prolonged following engraftment 2/2 to large stool volumes requiring ID and GVHD rule out. Underwent flex sig/EGD results are fairly unremarkable with occasional crypt apoptotic bodies in duodenum, hemorrhagic gastritis in antrum, negative CMV HSV H pylori stains, diarrhea symptoms improved upon discharge, anorexia continues.      BMT/IEC PROTOCOL for 2015-29  - Chemo Prep: BU/Flu   - 8/8 DP permissive. Cell dose-9.24x10^6  - ABO: Donor: O+ Recipient A-  (Minor incompatability, no flush required)   - BM with CR. No MRD by flow. NGS pending. CD33 94% donor and CD3 99% donor.   - repeat PB DNA pending (3/14)    HEME/COAG  - Transfusion parameters: hemoglobin <8 (cardiac hx), platelets <30k   #Pancytopenia. ANC 1.5 today; Plt up to  67k  #Segmental R PE (2/6): see anticoagulation below  #APS work up- lupus antigen +, will follow outpt as may be unreliable in this acute setting and prior APS work up had been unremarkable prior to admission.  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   Extensive hypercoagulable workup to date has been unrevealing.  JAK2 testing negative.  PNH testing negative.  Elevated IgA of unknown significance, no evidence of plasma cell disorder.  - Eliquis 5mg BID (as long as plts>50K) and prasugrel (as long as his platelets >75K)  - 3/14 continue to hold prasugrel as plts <75K.      ID:     -Prophylaxis plan: ACV LD, Megan HD transitioned to vori (3/9); Bactrim started 2/26--all cell lines down. Stop bactrim.  Pentam (3/14).  - 3/7 CMV and EBV neg, HHV6 down to 2k (3/4) copies from 25K previously.  EBV/CMV pending from 3/14.     RISK OF GVHD  - Prophylaxis: PtC day +3, +4, , Siro/MMF to start day +5  - Siro level 8.2 (3/11), pending from today--unsure why sent again today    #Diarrhea LGI stage II --no c/o diarrhea today  - ongoing diarrhea during transplant admission. He was treated for c. Diff for 14 days.  - AREG and fecal kvng protectin unremarkable  - GI Luminal consult - EGD, FlexSig show occasional crypt apoptotic bodies in duodenum, hemorrhagic gastritis in antrum, negative CMV/HSV/H pylori stains.  - PRN imodium/metamucil-hasn't started but will  metamucil today.     CARDIOVASCULAR  #CAD  #Hx of CABG  #HTN  - metoprolol (Lisinopril & nifidepine ER 30mg BID remain on hold).   #Hyperlipidemia: Holding  atorvastatin peritransplant  - Risk of cardiomyopathy:  Baseline EF 50-55%, Global left ventricular function mildly reduced. Grade 1 or early diastolic dysfunction.      - Risk of arrhythmia: Baseline EKG showed NSR, Qtc -425     GI/NUTRITION  - Ulcer prophylaxis: Protonix   - VOD Prophylaxis: Ursodiol  - Risk of nausea/vomiting due to chemo: Ativan, Compazine prn   #Moderate malnutrition 2/2 acute on chronic illness      RENAL/ELECTROLYTES/  Monitor Cr and lytes.   CONNIE: Stopped bactrim for cytopenias as above.      Psych:    #Anxiety- saw inpatient psych who offered atarax, but did not work well. Health psych offered, patient declined.  #Insomnia- PTA ambien and trazadone discontinued with concerns for AMS, sedation with concurrent pain meds. melatonin added.        Final plan:  Pentamidine IH  today  Pharmacist filled med box  Continue eliquis  Continue to HOLD prasagrel     RTC: 3/20 w/ Dr. Bacon; sooner prn    I spent 45 minutes in the care of this patient today, which included time necessary for preparation for the visit, obtaining history, ordering medications/tests/procedures as medically indicated, review of pertinent medical literature, counseling of the patient, communication of recommendations to the care team, and documentation time.      Debora Claudio PA-C

## 2024-03-14 NOTE — PROGRESS NOTES
Patient was seen today for a Pentamidine nebulizer tx ordered by Dr. Bacon.    Patient was first given 2.5 mg of albuterol nebulizer (NDC # 05588-291-75), after which Pentamidine 300 mg (Lot # [id number]) mixed with 6cc Sterile H20 was administered through a filtered nebulizer.    Pre-treatment: SpO2 = 100%   HR = 75   BBS = clear   Post-treatment: SpO2 = 100%  HR = 93  BBS = clear    No adverse side effects noted by the patient.    This service today was provided under the direct supervision of Dr. Perlman, who was available if needed.     Procedure was completed by Payal Winkler.

## 2024-03-14 NOTE — PROGRESS NOTES
At Ziggy's request, I filled 1 week of scheduled medications into his med box.     Current Outpatient Medications   Medication Sig Dispense Refill    acyclovir (ZOVIRAX) 800 MG tablet Take 1 tablet (800 mg) by mouth 2 times daily 60 tablet 1    apixaban ANTICOAGULANT (ELIQUIS) 5 MG tablet Take 1 tablet (5 mg) by mouth 2 times daily 60 tablet 3    lisinopril (ZESTRIL) 40 MG tablet Take 20 mg by mouth daily Hold starting 3/11      LORazepam (ATIVAN) 0.5 MG tablet Take 1-2 tablets (0.5-1 mg) by mouth every 4 hours as needed for vomiting or nausea (nausea/vomiting/sleep) 15 tablet 0    metoprolol succinate ER (TOPROL XL) 100 MG 24 hr tablet Take 1 tablet (100 mg) by mouth daily      pantoprazole (PROTONIX) 40 MG EC tablet Take 1 tablet (40 mg) by mouth daily      prasugrel (EFFIENT) 10 MG TABS tablet Take 1 tablet (10 mg) by mouth daily HOLD EFFECTIVE 3/7 PLTS <75K (Patient not taking: Reported on 3/7/2024)      prochlorperazine (COMPAZINE) 5 MG tablet Take 1-2 tablets (5-10 mg) by mouth every 6 hours as needed for nausea or vomiting 30 tablet 1    psyllium (METAMUCIL/KONSYL) 58.6 % powder Take 3 g by mouth daily 174 g 1    sirolimus (GENERIC EQUIVALENT) 0.5 MG tablet Not currently needing the 0.5mg tab (Patient not taking: Reported on 3/7/2024)      sirolimus (GENERIC EQUIVALENT) 1 MG tablet Take 1 (1mg) tablet daily      sulfamethoxazole-trimethoprim (BACTRIM DS) 800-160 MG tablet Take 1 tablet by mouth Every Mon, Tues two times daily HOLD WITH DROPPING COUNTS STARTING 3/7 (Patient not taking: Reported on 3/7/2024)      ursodiol (ACTIGALL) 300 MG capsule Take 1 capsule (300 mg) by mouth 3 times daily 90 capsule 1    voriconazole (VFEND) 200 MG tablet Take 1 tablet (200 mg) by mouth 2 times daily Start on Saturday 3/9/24          Pertinent labs considered today:  Lab Results   Component Value Date     03/14/2024     05/28/2020                 normal sodium 136-148  Lab Results   Component Value Date     POTASSIUM 3.7 03/14/2024    POTASSIUM 3.8 05/28/2020       normal potassium 3.5-5.2   Lab Results   Component Value Date    CHLORIDE 108 03/14/2024    CHLORIDE 107 05/28/2020           normal chloride    Lab Results   Component Value Date    CO2 24 03/14/2024    CO2 24 05/28/2020                normal CO2 20-32  Lab Results   Component Value Date    BUN 13.5 03/14/2024    BUN 10 05/28/2020                 normal BUN 5-24  Lab Results   Component Value Date    GLC 95 03/14/2024     01/24/2024     05/28/2020                 normal glucose   Lab Results   Component Value Date    CR 0.90 03/14/2024    CR 0.80 05/28/2020                 normal cr 0.8-1.5  Lab Results   Component Value Date    REMY 9.1 03/14/2024    REMY 9.3 05/28/2020               normal calcium  8.5-10.4  Lab Results   Component Value Date    BILITOTAL 0.2 03/14/2024    BILITOTAL 0.8 05/28/2020          normal bilirubin 0.2-1.3  Lab Results   Component Value Date    PROTTOTAL 6.3 03/14/2024    PROTTOTAL 8.7 05/28/2020          normal total protein 6.0-8.2  Lab Results   Component Value Date    ALBUMIN 3.4 03/14/2024    ALBUMIN 3.4 05/28/2020             normal albumin 3.2-4.5  Lab Results   Component Value Date    ALKPHOS 82 03/14/2024    ALKPHOS 114 05/28/2020            normal alkphos   Lab Results   Component Value Date    ALT 16 03/14/2024    ALT 25 05/28/2020         normal ALT 0-65  Lab Results   Component Value Date    AST 33 03/14/2024    AST 20 05/28/2020         normal AST 0-37    Lab Results   Component Value Date    HGB 7.3 03/14/2024    HGB 12.6 05/28/2020     Lab Results   Component Value Date    WBC 2.6 03/14/2024    WBC 4.9 05/28/2020      Lab Results   Component Value Date    PLT 67 03/14/2024     05/28/2020       Estimated Creatinine Clearance: 109.4 mL/min (based on SCr of 0.9 mg/dL).    Changes made to scheduled medication list today include:  Added: None  Deleted: None  Changed: None    Actions  taken by pharmacist (provider contacted, etc):None   Refills for ursodiol were called in to [facility] so enough medication is obtained to refill the med box next week, prior to the patient arriving in clinic (there were enough medications to refill the box for 1 week today). I gave him an updated medication list that indicated what meds were put in the med box.       TONIE PHAM, [facility], PharmD

## 2024-03-14 NOTE — NURSING NOTE
Chief Complaint   Patient presents with    Port Draw     Labs drawn via port by RN in lab     Port used for lab draw as patient is due for his monthly port flush. Port accessed with 20 gauge, 3/4 inch gripper needle by RN, labs collected, line flushed with saline and heparin. Port de-accessed. CVC line flushed with saline and heparin and caps changed on both lumens. Vitals taken. Pt checked in for appointment(s).     Mercedes Smith RN

## 2024-03-15 ENCOUNTER — TELEPHONE (OUTPATIENT)
Dept: TRANSPLANT | Facility: CLINIC | Age: 49
End: 2024-03-15
Payer: COMMERCIAL

## 2024-03-15 LAB
CMV DNA SPEC NAA+PROBE-ACNC: NOT DETECTED IU/ML
EBV DNA # SPEC NAA+PROBE: NOT DETECTED COPIES/ML

## 2024-03-15 NOTE — TELEPHONE ENCOUNTER
I spoke with Ziggy Hallman regarding sirolimus level from clinic visit dated 3/14/24, which resulted as 11.6 on 1 mg daily. Per Debora, he is to hold dose today (3/15) and decrease dose to 0.5 mg starting 3/16. Plan to recheck level 3/20, pt was instructed to hold dose prior to labs. he repeated these directions to me and voiced his understanding.     Moraima Zapien, PharmD

## 2024-03-19 LAB
ANTIBODY SCREEN: NEGATIVE
SPECIMEN EXPIRATION DATE: NORMAL

## 2024-03-20 ENCOUNTER — OFFICE VISIT (OUTPATIENT)
Dept: TRANSPLANT | Facility: CLINIC | Age: 49
End: 2024-03-20
Attending: INTERNAL MEDICINE
Payer: COMMERCIAL

## 2024-03-20 ENCOUNTER — LAB (OUTPATIENT)
Dept: LAB | Facility: CLINIC | Age: 49
End: 2024-03-20
Attending: INTERNAL MEDICINE
Payer: COMMERCIAL

## 2024-03-20 VITALS
SYSTOLIC BLOOD PRESSURE: 150 MMHG | BODY MASS INDEX: 26.59 KG/M2 | WEIGHT: 174.4 LBS | DIASTOLIC BLOOD PRESSURE: 79 MMHG | RESPIRATION RATE: 16 BRPM | OXYGEN SATURATION: 99 % | TEMPERATURE: 97.9 F | HEART RATE: 84 BPM

## 2024-03-20 DIAGNOSIS — D46.9 MDS (MYELODYSPLASTIC SYNDROME) (H): ICD-10-CM

## 2024-03-20 DIAGNOSIS — Z94.81 STATUS POST BONE MARROW TRANSPLANT (H): Primary | ICD-10-CM

## 2024-03-20 DIAGNOSIS — Z94.81 S/P ALLOGENEIC BONE MARROW TRANSPLANT (H): ICD-10-CM

## 2024-03-20 DIAGNOSIS — D46.9 MDS (MYELODYSPLASTIC SYNDROME) (H): Primary | ICD-10-CM

## 2024-03-20 LAB
ABO/RH TYPE: NORMAL
ACANTHOCYTES BLD QL SMEAR: NORMAL
ALBUMIN SERPL BCG-MCNC: 3.3 G/DL (ref 3.5–5.2)
ALP SERPL-CCNC: 83 U/L (ref 40–150)
ALT SERPL W P-5'-P-CCNC: 14 U/L (ref 0–70)
ANION GAP SERPL CALCULATED.3IONS-SCNC: 10 MMOL/L (ref 7–15)
AST SERPL W P-5'-P-CCNC: 36 U/L (ref 0–45)
AUER BODIES BLD QL SMEAR: NORMAL
BASO STIPL BLD QL SMEAR: NORMAL
BASOPHILS # BLD AUTO: 0 10E3/UL (ref 0–0.2)
BASOPHILS NFR BLD AUTO: 1 %
BILIRUB SERPL-MCNC: 0.2 MG/DL
BITE CELLS BLD QL SMEAR: NORMAL
BLD PROD TYP BPU: NORMAL
BLISTER CELLS BLD QL SMEAR: NORMAL
BLOOD COMPONENT TYPE: NORMAL
BUN SERPL-MCNC: 13.2 MG/DL (ref 6–20)
BURR CELLS BLD QL SMEAR: NORMAL
CALCIUM SERPL-MCNC: 8.4 MG/DL (ref 8.6–10)
CHLORIDE SERPL-SCNC: 110 MMOL/L (ref 98–107)
CMV DNA SPEC NAA+PROBE-ACNC: NOT DETECTED IU/ML
CODING SYSTEM: NORMAL
CREAT SERPL-MCNC: 0.93 MG/DL (ref 0.67–1.17)
CROSSMATCH: NORMAL
CROSSMATCH: NORMAL
DACRYOCYTES BLD QL SMEAR: NORMAL
DEPRECATED HCO3 PLAS-SCNC: 23 MMOL/L (ref 22–29)
EGFRCR SERPLBLD CKD-EPI 2021: >90 ML/MIN/1.73M2
ELLIPTOCYTES BLD QL SMEAR: NORMAL
EOSINOPHIL # BLD AUTO: 0.1 10E3/UL (ref 0–0.7)
EOSINOPHIL NFR BLD AUTO: 6 %
ERYTHROCYTE [DISTWIDTH] IN BLOOD BY AUTOMATED COUNT: 17 % (ref 10–15)
FRAGMENTS BLD QL SMEAR: NORMAL
GLUCOSE SERPL-MCNC: 114 MG/DL (ref 70–99)
HCT VFR BLD AUTO: 20.2 % (ref 40–53)
HGB BLD-MCNC: 6.7 G/DL (ref 13.3–17.7)
HGB C CRYSTALS: NORMAL
HOWELL-JOLLY BOD BLD QL SMEAR: NORMAL
IMM GRANULOCYTES # BLD: 0 10E3/UL
IMM GRANULOCYTES NFR BLD: 1 %
ISSUE DATE AND TIME: NORMAL
LAB DIRECTOR DISCLAIMER: NORMAL
LAB DIRECTOR DISCLAIMER: NORMAL
LAB DIRECTOR INTERPRETATION: NORMAL
LAB DIRECTOR INTERPRETATION: NORMAL
LAB DIRECTOR METHODOLOGY: NORMAL
LAB DIRECTOR METHODOLOGY: NORMAL
LAB DIRECTOR RESULTS: NORMAL
LAB DIRECTOR RESULTS: NORMAL
LDH SERPL L TO P-CCNC: 252 U/L (ref 0–250)
LYMPHOCYTES # BLD AUTO: 0.5 10E3/UL (ref 0.8–5.3)
LYMPHOCYTES NFR BLD AUTO: 24 %
MCH RBC QN AUTO: 31 PG (ref 26.5–33)
MCHC RBC AUTO-ENTMCNC: 33.2 G/DL (ref 31.5–36.5)
MCV RBC AUTO: 94 FL (ref 78–100)
MONOCYTES # BLD AUTO: 0.2 10E3/UL (ref 0–1.3)
MONOCYTES NFR BLD AUTO: 11 %
NEUTROPHILS # BLD AUTO: 1.1 10E3/UL (ref 1.6–8.3)
NEUTROPHILS NFR BLD AUTO: 57 %
NEUTS HYPERSEG BLD QL SMEAR: NORMAL
NRBC # BLD AUTO: 0 10E3/UL
NRBC BLD AUTO-RTO: 0 /100
PLAT MORPH BLD: NORMAL
PLATELET # BLD AUTO: 46 10E3/UL (ref 150–450)
POLYCHROMASIA BLD QL SMEAR: NORMAL
POTASSIUM SERPL-SCNC: 3.4 MMOL/L (ref 3.4–5.3)
PROT SERPL-MCNC: 6 G/DL (ref 6.4–8.3)
RBC # BLD AUTO: 2.16 10E6/UL (ref 4.4–5.9)
RBC AGGLUT BLD QL: NORMAL
RBC MORPH BLD: NORMAL
ROULEAUX BLD QL SMEAR: NORMAL
SICKLE CELLS BLD QL SMEAR: NORMAL
SIROLIMUS BLD-MCNC: 8.7 UG/L (ref 5–15)
SMUDGE CELLS BLD QL SMEAR: NORMAL
SODIUM SERPL-SCNC: 143 MMOL/L (ref 135–145)
SPECIMEN DESCRIPTION: NORMAL
SPECIMEN DESCRIPTION: NORMAL
SPECIMEN EXPIRATION DATE: NORMAL
SPHEROCYTES BLD QL SMEAR: NORMAL
STOMATOCYTES BLD QL SMEAR: NORMAL
TARGETS BLD QL SMEAR: NORMAL
TME LAST DOSE: NORMAL H
TME LAST DOSE: NORMAL H
TOXIC GRANULES BLD QL SMEAR: NORMAL
UNIT ABO/RH: NORMAL
UNIT NUMBER: NORMAL
UNIT STATUS: NORMAL
UNIT TYPE ISBT: 6200
UNIT TYPE ISBT: 9500
UNIT TYPE ISBT: 9500
VARIANT LYMPHS BLD QL SMEAR: NORMAL
WBC # BLD AUTO: 1.9 10E3/UL (ref 4–11)

## 2024-03-20 PROCEDURE — G0463 HOSPITAL OUTPT CLINIC VISIT: HCPCS | Performed by: INTERNAL MEDICINE

## 2024-03-20 PROCEDURE — 86923 COMPATIBILITY TEST ELECTRIC: CPT

## 2024-03-20 PROCEDURE — 85025 COMPLETE CBC W/AUTO DIFF WBC: CPT

## 2024-03-20 PROCEDURE — 250N000011 HC RX IP 250 OP 636

## 2024-03-20 PROCEDURE — 86850 RBC ANTIBODY SCREEN: CPT

## 2024-03-20 PROCEDURE — 80195 ASSAY OF SIROLIMUS: CPT | Performed by: INTERNAL MEDICINE

## 2024-03-20 PROCEDURE — 87533 HHV-6 DNA QUANT: CPT | Performed by: INTERNAL MEDICINE

## 2024-03-20 PROCEDURE — 80053 COMPREHEN METABOLIC PANEL: CPT

## 2024-03-20 PROCEDURE — 81268 CHIMERISM ANAL W/CELL SELECT: CPT | Performed by: INTERNAL MEDICINE

## 2024-03-20 PROCEDURE — 87799 DETECT AGENT NOS DNA QUANT: CPT

## 2024-03-20 PROCEDURE — 36592 COLLECT BLOOD FROM PICC: CPT | Performed by: INTERNAL MEDICINE

## 2024-03-20 PROCEDURE — G0452 MOLECULAR PATHOLOGY INTERPR: HCPCS | Mod: 26 | Performed by: PATHOLOGY

## 2024-03-20 PROCEDURE — 86900 BLOOD TYPING SEROLOGIC ABO: CPT

## 2024-03-20 PROCEDURE — 99215 OFFICE O/P EST HI 40 MIN: CPT | Performed by: INTERNAL MEDICINE

## 2024-03-20 PROCEDURE — 36592 COLLECT BLOOD FROM PICC: CPT

## 2024-03-20 PROCEDURE — 83615 LACTATE (LD) (LDH) ENZYME: CPT

## 2024-03-20 RX ORDER — LISINOPRIL 40 MG/1
20 TABLET ORAL DAILY
COMMUNITY
Start: 2024-03-20 | End: 2024-04-18

## 2024-03-20 RX ORDER — HEPARIN SODIUM (PORCINE) LOCK FLUSH IV SOLN 100 UNIT/ML 100 UNIT/ML
5 SOLUTION INTRAVENOUS
Status: CANCELLED | OUTPATIENT
Start: 2024-03-20

## 2024-03-20 RX ORDER — HEPARIN SODIUM,PORCINE 10 UNIT/ML
5-20 VIAL (ML) INTRAVENOUS DAILY PRN
Status: DISCONTINUED | OUTPATIENT
Start: 2024-03-20 | End: 2024-03-20 | Stop reason: HOSPADM

## 2024-03-20 RX ORDER — HEPARIN SODIUM (PORCINE) LOCK FLUSH IV SOLN 100 UNIT/ML 100 UNIT/ML
5 SOLUTION INTRAVENOUS
Status: CANCELLED | OUTPATIENT
Start: 2024-04-11

## 2024-03-20 RX ORDER — PENTAMIDINE ISETHIONATE 300 MG/300MG
300 INHALANT RESPIRATORY (INHALATION)
Status: CANCELLED
Start: 2024-04-11

## 2024-03-20 RX ORDER — HEPARIN SODIUM,PORCINE 10 UNIT/ML
5-20 VIAL (ML) INTRAVENOUS DAILY PRN
Status: CANCELLED | OUTPATIENT
Start: 2024-04-11

## 2024-03-20 RX ORDER — HEPARIN SODIUM,PORCINE 10 UNIT/ML
5-20 VIAL (ML) INTRAVENOUS DAILY PRN
Status: CANCELLED | OUTPATIENT
Start: 2024-03-20

## 2024-03-20 RX ORDER — ALBUTEROL SULFATE 5 MG/ML
2.5 SOLUTION RESPIRATORY (INHALATION)
Status: CANCELLED
Start: 2024-04-11

## 2024-03-20 RX ADMIN — Medication 5 ML: at 12:38

## 2024-03-20 RX ADMIN — Medication 5 ML: at 12:39

## 2024-03-20 RX ADMIN — Medication 5 ML: at 11:33

## 2024-03-20 RX ADMIN — Medication 5 ML: at 11:32

## 2024-03-20 ASSESSMENT — PAIN SCALES - GENERAL: PAINLEVEL: NO PAIN (0)

## 2024-03-20 NOTE — NURSING NOTE
"Oncology Rooming Note    March 20, 2024 11:40 AM   Ziggy Hallman is a 48 year old male who presents for:    Chief Complaint   Patient presents with    Oncology Clinic Visit     Status post bone marrow transplant     Initial Vitals: BP (!) 150/79 (BP Location: Right arm, Patient Position: Sitting, Cuff Size: Adult Regular)   Pulse 84   Temp 97.9  F (36.6  C) (Oral)   Resp 16   Wt 79.1 kg (174 lb 6.4 oz)   SpO2 99%   BMI 26.59 kg/m   Estimated body mass index is 26.59 kg/m  as calculated from the following:    Height as of 3/14/24: 1.725 m (5' 7.91\").    Weight as of this encounter: 79.1 kg (174 lb 6.4 oz). Body surface area is 1.95 meters squared.  No Pain (0) Comment: Data Unavailable   No LMP for male patient.  Allergies reviewed: Yes  Medications reviewed: Yes    Medications: Medication refills needed today.   Pharmacy name entered into [facility]: [id number] - [place], [place] - [address] [place] [address]    Frailty Screening:   Is the patient here for a new oncology consult visit in cancer care? 2. No      Clinical concerns: Refill lorazepam. Pt requests a list of his medications that tells him what time of day he is to take each medication. Pt has lump on R side of his lateral obliques that he first noticed 6 weeks ago.       Leland Arias              "

## 2024-03-20 NOTE — PROGRESS NOTES
Date: Mar 20, 2024    Chief Complaint: bmt d 53     History Of Present Illness:    I met Ziggy in January of 2024. To summarize his history, he has had multiple episodes of arterial and venous thrombosis and developed cytopenias. He was seen in 2015 by Dr. 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 He was also worked up for DKC and no mutations were found. She recommended continued observation and treating his hypercoagulability. More recently, his anemia worsened and he had a repeat bone marrow biopsy that showed 5-10% blasts. That was at [facility] and he was referred here and met with Dr. Cabral, shortly before her departure. She calculated his R-IPSS as 7.5 and recommended 4 cycles of azacitidine followed by evaluation for transplant. He has a repeat bone marrow on 12/29 also at [facility]. It indicated some improvement in blast count down to 5-6% and a plasma cell neoplasm. We did SPEP and a PET scan that was negative. We also reviewed the bone marrow here. Preliminarily, it appears that the plasma cells may not be monoclonal and the blast count might be a bit less. The flow cytometry was negative. His counts have improved. We proceeded with BMT and comes in for follow up.    Overall, he reports that he feels OK. 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 He comes in to discuss.    ROS: 14-point ROS is negative unless otherwise stated in HPI.    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    Medications:  Current Outpatient Medications   Medication    acyclovir (ZOVIRAX) 800 MG tablet    apixaban ANTICOAGULANT (ELIQUIS) 5 MG tablet    lisinopril (ZESTRIL) 40 MG tablet    LORazepam (ATIVAN) 0.5 MG tablet    metoprolol succinate ER (TOPROL XL) 100 MG 24 hr tablet    pantoprazole (PROTONIX) 40 MG EC tablet    prasugrel (EFFIENT) 10 MG TABS tablet    prochlorperazine (COMPAZINE) 5 MG  "tablet    psyllium (METAMUCIL/KONSYL) 58.6 % powder    sirolimus (GENERIC EQUIVALENT) 0.5 MG tablet    sirolimus (GENERIC EQUIVALENT) 1 MG tablet    sulfamethoxazole-trimethoprim (BACTRIM DS) 800-160 MG tablet    ursodiol (ACTIGALL) 300 MG capsule    voriconazole (VFEND) 200 MG tablet     Current Facility-Administered Medications   Medication    heparin lock flush 10 UNIT/ML injection 5-20 mL     Updated PMH:  Past Medical History:   Diagnosis Date    AMI anterior wall (H)     Mid LAD on cath with stent    CAD S/P percutaneous coronary angioplasty 07/01/2016    Unstable agina with anterior myocardial damage reported without mycardial damage by patient's history    CVA (cerebrovascular accident) (H) 01/01/2012    Reporting thromboembolic episode on the right neck. Pt states \"I've had ten strokes.\"    Hypercoagulable state (H24)     Uncertain etiology--seeing Hematologist    MEDICAL HISTORY OF -     Possibly PFO     Updated PSH:  Past Surgical History:   Procedure Laterality Date    AMPUTATE TOE(S)      ARTHROSCOPY KNEE RT/LT      Right     ARTHROSCOPY KNEE RT/LT      Left    CARDIAC CATHERIZATION      With stent placement    ESOPHAGOSCOPY, GASTROSCOPY, DUODENOSCOPY (EGD), COMBINED N/A 2/19/2024    Procedure: Esophagoscopy, gastroscopy, duodenoscopy (EGD), combined;  Surgeon: Drew Jasso MD;  Location: [facility]    IR CHEST PORT PLACEMENT > 5 YRS OF AGE  9/13/2023    IR CVC TUNNEL PLACEMENT > 5 YRS OF AGE  1/19/2024     Updated FH:  Family History   Problem Relation Age of Onset    Diabetes No family hx of     Coronary Artery Disease No family hx of     Hypertension No family hx of     Hyperlipidemia No family hx of     Breast Cancer No family hx of      Updated SH:  Social History     Tobacco Use    Smoking status: Former     Packs/day: .5     Types: Cigarettes     Passive exposure: Past (Mom smoked cigs indoors)    Smokeless tobacco: Never    Tobacco comments:     cigarette once in a while   Substance Use Topics    " Alcohol use: Yes     Alcohol/week: 0.0 standard drinks of alcohol     Comment: 4 times per week 5 drinks    Drug use: No     Physical Exam:  Vitals: BP (!) 150/79 (BP Location: Right arm, Patient Position: Sitting, Cuff Size: Adult Regular)   Pulse 84   Temp 97.9  F (36.6  C) (Oral)   Resp 16   Wt 79.1 kg (174 lb 6.4 oz)   SpO2 99%   BMI 26.59 kg/m    GA: NAD, appears as stated age  HEENT: EOMI, PERRL, OP clear  Neck: Supple, no LAD  Psyc: appropriate, reactive  GVHD: NTD  Access: none    KPS: 80    Labs, pathology and other diagnostics reviewed    WBC   Date Value Ref Range Status   05/28/2020 4.9 4.0 - 11.0 10e9/L Final     WBC Count   Date Value Ref Range Status   03/14/2024 2.6 (L) 4.0 - 11.0 10e3/uL Final     Hemoglobin   Date Value Ref Range Status   03/14/2024 7.3 (L) 13.3 - 17.7 g/dL Final   05/28/2020 12.6 (L) 13.3 - 17.7 g/dL Final     Platelet Count   Date Value Ref Range Status   03/14/2024 67 (L) 150 - 450 10e3/uL Final   05/28/2020 280 150 - 450 10e9/L Final     Creatinine   Date Value Ref Range Status   03/14/2024 0.90 0.67 - 1.17 mg/dL Final   05/28/2020 0.80 0.66 - 1.25 mg/dL Final     Potassium   Date Value Ref Range Status   03/14/2024 3.7 3.4 - 5.3 mmol/L Final   05/28/2020 3.8 3.4 - 5.3 mmol/L Final     Magnesium   Date Value Ref Range Status   02/28/2024 1.5 (L) 1.7 - 2.3 mg/dL Final     EBV DNA Copies/mL   Date Value Ref Range Status   03/14/2024 Not Detected Not Detected copies/mL Final         A/P    Date Mar 20, 2024   Diagnosis High risk MDS, R-IPSS 7.5   Planned Treatment MA Flu/Bu   GVHD Prophylaxis PTCy/Siro/MMF   HLA Match 8/8, DP permissive   PRA Negative   CMV Match -/-   Sex Match F -> M   Donor Age 26   ABO Match O -> A (minor)   KPS PS 80   BMI Body mass index is 26.59 kg/m .   PET Negative   LVEF 50-55%   Valvular abnormalities*  Mild mitral insufficiency   History of MI or arrhythmias Yes   ECG/QTC NSR/425   FEV1 73%   DLCOcor 93   Cr/GFR Creatinine   Date Value Ref Range  Status   03/14/2024 0.90 0.67 - 1.17 mg/dL Final   05/28/2020 0.80 0.66 - 1.25 mg/dL Final      Bilirubin Lab Results   Component Value Date    BILITOTAL 0.2 01/17/2024    BILITOTAL 0.8 05/28/2020      ALT/AST Lab Results   Component Value Date    ALT 18 01/17/2024    ALT 25 05/28/2020    ;   AST   Date Value Ref Range Status   03/14/2024 33 0 - 45 U/L Final     Comment:     Reference intervals for this test were updated on 6/12/2023 to more accurately reflect our healthy population. There may be differences in the flagging of prior results with similar values performed with this method. Interpretation of those prior results can be made in the context of the updated reference intervals.   05/28/2020 20 0 - 45 U/L Final      Dental Clearance    Adequate Marrow reserve Yes   Chest CT Few sub 4 mm nodules (negative on PET)   Infectious work-up     Pregnancy Test    Lupron Injection    HCT CI 4 points   Disease status at transplant CR with MRD+   LDH    Planned maintenance NGS pending   Anticoagulation On prasugrel and apixaban. Hold when Plt < 50 and resume when able. High risk for thrombosis.   Toxoplasma IgG Negative   Other**    Proposed Clinical Trials      MDS s/p MAC allo  - Counts are dropping. Chimerisms 100% CD3 and 96% CD33. CMV pending. No recent illness. Will recheck chimerisms and HHV6. His bactrim has been held. Will bring for transfusions tomorrow and continue on a weekly schedule for now. Hold apixaban and prasugrel.  - OI ppx: acv, pentamidine, vori    40 minutes spent on the date of the encounter doing chart review, interpretation of results, patient visit, documentation and coordination of care.

## 2024-03-20 NOTE — LETTER
3/20/2024         RE: Ziggy Hallman  [address] [facility] [address]        Dear Colleague,    Thank you for referring your patient, Ziggy Hallman, to the [facility] BLOOD AND MARROW TRANSPLANT PROGRAM [place]. Please see a copy of my visit note below.    Date: Mar 20, 2024    Chief Complaint: bmt d 53     History Of Present Illness:    I met Ziggy in January of 2024. To summarize his history, he has had multiple episodes of arterial and venous thrombosis and developed cytopenias. He was seen in 2015 by Dr. 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 He was also worked up for DKC and no mutations were found. She recommended continued observation and treating his hypercoagulability. More recently, his anemia worsened and he had a repeat bone marrow biopsy that showed 5-10% blasts. That was at [facility] and he was referred here and met with Dr. Cabral, shortly before her departure. She calculated his R-IPSS as 7.5 and recommended 4 cycles of azacitidine followed by evaluation for transplant. He has a repeat bone marrow on 12/29 also at [facility]. It indicated some improvement in blast count down to 5-6% and a plasma cell neoplasm. We did SPEP and a PET scan that was negative. We also reviewed the bone marrow here. Preliminarily, it appears that the plasma cells may not be monoclonal and the blast count might be a bit less. The flow cytometry was negative. His counts have improved. We proceeded with BMT and comes in for follow up.    Overall, he reports that he feels OK. 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 He comes in to discuss.    ROS: 14-point ROS is negative unless otherwise stated in HPI.    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    Medications:  Current Outpatient Medications   Medication    acyclovir (ZOVIRAX) 800 MG tablet    apixaban  "ANTICOAGULANT (ELIQUIS) 5 MG tablet    lisinopril (ZESTRIL) 40 MG tablet    LORazepam (ATIVAN) 0.5 MG tablet    metoprolol succinate ER (TOPROL XL) 100 MG 24 hr tablet    pantoprazole (PROTONIX) 40 MG EC tablet    prasugrel (EFFIENT) 10 MG TABS tablet    prochlorperazine (COMPAZINE) 5 MG tablet    psyllium (METAMUCIL/KONSYL) 58.6 % powder    sirolimus (GENERIC EQUIVALENT) 0.5 MG tablet    sirolimus (GENERIC EQUIVALENT) 1 MG tablet    sulfamethoxazole-trimethoprim (BACTRIM DS) 800-160 MG tablet    ursodiol (ACTIGALL) 300 MG capsule    voriconazole (VFEND) 200 MG tablet     Current Facility-Administered Medications   Medication    heparin lock flush 10 UNIT/ML injection 5-20 mL     Updated PMH:  Past Medical History:   Diagnosis Date    AMI anterior wall (H)     Mid LAD on cath with stent    CAD S/P percutaneous coronary angioplasty 07/01/2016    Unstable agina with anterior myocardial damage reported without mycardial damage by patient's history    CVA (cerebrovascular accident) (H) 01/01/2012    Reporting thromboembolic episode on the right neck. Pt states \"I've had ten strokes.\"    Hypercoagulable state (H24)     Uncertain etiology--seeing Hematologist    MEDICAL HISTORY OF -     Possibly PFO     Updated PSH:  Past Surgical History:   Procedure Laterality Date    AMPUTATE TOE(S)      ARTHROSCOPY KNEE RT/LT      Right     ARTHROSCOPY KNEE RT/LT      Left    CARDIAC CATHERIZATION      With stent placement    ESOPHAGOSCOPY, GASTROSCOPY, DUODENOSCOPY (EGD), COMBINED N/A 2/19/2024    Procedure: Esophagoscopy, gastroscopy, duodenoscopy (EGD), combined;  Surgeon: Drew Jasso MD;  Location: [facility]    IR CHEST PORT PLACEMENT > 5 YRS OF AGE  9/13/2023    IR CVC TUNNEL PLACEMENT > 5 YRS OF AGE  1/19/2024     Updated FH:  Family History   Problem Relation Age of Onset    Diabetes No family hx of     Coronary Artery Disease No family hx of     Hypertension No family hx of     Hyperlipidemia No family hx of     Breast Cancer " No family hx of      Updated SH:  Social History     Tobacco Use    Smoking status: Former     Packs/day: .5     Types: Cigarettes     Passive exposure: Past (Mom smoked cigs indoors)    Smokeless tobacco: Never    Tobacco comments:     cigarette once in a while   Substance Use Topics    Alcohol use: Yes     Alcohol/week: 0.0 standard drinks of alcohol     Comment: 4 times per week 5 drinks    Drug use: No     Physical Exam:  Vitals: BP (!) 150/79 (BP Location: Right arm, Patient Position: Sitting, Cuff Size: Adult Regular)   Pulse 84   Temp 97.9  F (36.6  C) (Oral)   Resp 16   Wt 79.1 kg (174 lb 6.4 oz)   SpO2 99%   BMI 26.59 kg/m    GA: NAD, appears as stated age  HEENT: EOMI, PERRL, OP clear  Neck: Supple, no LAD  Psyc: appropriate, reactive  GVHD: NTD  Access: none    KPS: 80    Labs, pathology and other diagnostics reviewed    WBC   Date Value Ref Range Status   05/28/2020 4.9 4.0 - 11.0 10e9/L Final     WBC Count   Date Value Ref Range Status   03/14/2024 2.6 (L) 4.0 - 11.0 10e3/uL Final     Hemoglobin   Date Value Ref Range Status   03/14/2024 7.3 (L) 13.3 - 17.7 g/dL Final   05/28/2020 12.6 (L) 13.3 - 17.7 g/dL Final     Platelet Count   Date Value Ref Range Status   03/14/2024 67 (L) 150 - 450 10e3/uL Final   05/28/2020 280 150 - 450 10e9/L Final     Creatinine   Date Value Ref Range Status   03/14/2024 0.90 0.67 - 1.17 mg/dL Final   05/28/2020 0.80 0.66 - 1.25 mg/dL Final     Potassium   Date Value Ref Range Status   03/14/2024 3.7 3.4 - 5.3 mmol/L Final   05/28/2020 3.8 3.4 - 5.3 mmol/L Final     Magnesium   Date Value Ref Range Status   02/28/2024 1.5 (L) 1.7 - 2.3 mg/dL Final     EBV DNA Copies/mL   Date Value Ref Range Status   03/14/2024 Not Detected Not Detected copies/mL Final         A/P    Date Mar 20, 2024   Diagnosis High risk MDS, R-IPSS 7.5   Planned Treatment MA Flu/Bu   GVHD Prophylaxis PTCy/Siro/MMF   HLA Match 8/8, DP permissive   PRA Negative   CMV Match -/-   Sex Match F -> M    Donor Age 26   ABO Match O -> A (minor)   KPS PS 80   BMI Body mass index is 26.59 kg/m .   PET Negative   LVEF 50-55%   Valvular abnormalities*  Mild mitral insufficiency   History of MI or arrhythmias Yes   ECG/QTC NSR/425   FEV1 73%   DLCOcor 93   Cr/GFR Creatinine   Date Value Ref Range Status   03/14/2024 0.90 0.67 - 1.17 mg/dL Final   05/28/2020 0.80 0.66 - 1.25 mg/dL Final      Bilirubin Lab Results   Component Value Date    BILITOTAL 0.2 01/17/2024    BILITOTAL 0.8 05/28/2020      ALT/AST Lab Results   Component Value Date    ALT 18 01/17/2024    ALT 25 05/28/2020    ;   AST   Date Value Ref Range Status   03/14/2024 33 0 - 45 U/L Final     Comment:     Reference intervals for this test were updated on 6/12/2023 to more accurately reflect our healthy population. There may be differences in the flagging of prior results with similar values performed with this method. Interpretation of those prior results can be made in the context of the updated reference intervals.   05/28/2020 20 0 - 45 U/L Final      Dental Clearance    Adequate Marrow reserve Yes   Chest CT Few sub 4 mm nodules (negative on PET)   Infectious work-up     Pregnancy Test    Lupron Injection    HCT CI 4 points   Disease status at transplant CR with MRD+   LDH    Planned maintenance NGS pending   Anticoagulation On prasugrel and apixaban. Hold when Plt < 50 and resume when able. High risk for thrombosis.   Toxoplasma IgG Negative   Other**    Proposed Clinical Trials      MDS s/p MAC allo  - Counts are dropping. Chimerisms 100% CD3 and 96% CD33. CMV pending. No recent illness. Will recheck chimerisms and HHV6. His bactrim has been held. Will bring for transfusions tomorrow and continue on a weekly schedule for now. Hold apixaban and prasugrel.  - OI ppx: acv, pentamidine, vori    40 minutes spent on the date of the encounter doing chart review, interpretation of results, patient visit, documentation and coordination of  care.    Sincerely,        Taran Bacon MD

## 2024-03-20 NOTE — NURSING NOTE
DATE/TIME OF CALL RECEIVED FROM LAB:  03/20/24 at 11:59 AM   LAB TEST:  Hemoglobin 6.7  LAB VALUE:  6.7  PROVIDER NOTIFIED?: Yes  PROVIDER NAME: Taran Bacon  DATE/TIME LAB VALUE REPORTED TO PROVIDER: 03/20/2024  MECHANISM OF PROVIDER NOTIFICATION: Face-To-Face

## 2024-03-21 ENCOUNTER — APPOINTMENT (OUTPATIENT)
Dept: LAB | Facility: CLINIC | Age: 49
End: 2024-03-21
Attending: INTERNAL MEDICINE
Payer: COMMERCIAL

## 2024-03-21 ENCOUNTER — INFUSION THERAPY VISIT (OUTPATIENT)
Dept: TRANSPLANT | Facility: CLINIC | Age: 49
End: 2024-03-21
Attending: INTERNAL MEDICINE
Payer: COMMERCIAL

## 2024-03-21 ENCOUNTER — ANCILLARY PROCEDURE (OUTPATIENT)
Dept: INTERVENTIONAL RADIOLOGY/VASCULAR | Facility: CLINIC | Age: 49
End: 2024-03-21
Attending: INTERNAL MEDICINE
Payer: COMMERCIAL

## 2024-03-21 VITALS
DIASTOLIC BLOOD PRESSURE: 85 MMHG | TEMPERATURE: 98.1 F | HEART RATE: 65 BPM | OXYGEN SATURATION: 100 % | WEIGHT: 174.9 LBS | BODY MASS INDEX: 26.66 KG/M2 | RESPIRATION RATE: 16 BRPM | SYSTOLIC BLOOD PRESSURE: 156 MMHG

## 2024-03-21 DIAGNOSIS — D46.9 MDS (MYELODYSPLASTIC SYNDROME) (H): ICD-10-CM

## 2024-03-21 DIAGNOSIS — Z94.81 S/P ALLOGENEIC BONE MARROW TRANSPLANT (H): ICD-10-CM

## 2024-03-21 DIAGNOSIS — T45.1X5A ANTINEOPLASTIC CHEMOTHERAPY INDUCED PANCYTOPENIA (H): Primary | ICD-10-CM

## 2024-03-21 DIAGNOSIS — D61.810 ANTINEOPLASTIC CHEMOTHERAPY INDUCED PANCYTOPENIA (H): Primary | ICD-10-CM

## 2024-03-21 LAB
ALBUMIN SERPL BCG-MCNC: 3.2 G/DL (ref 3.5–5.2)
ALP SERPL-CCNC: 81 U/L (ref 40–150)
ALT SERPL W P-5'-P-CCNC: 15 U/L (ref 0–70)
ANION GAP SERPL CALCULATED.3IONS-SCNC: 9 MMOL/L (ref 7–15)
AST SERPL W P-5'-P-CCNC: 34 U/L (ref 0–45)
BASOPHILS # BLD AUTO: 0 10E3/UL (ref 0–0.2)
BASOPHILS NFR BLD AUTO: 1 %
BILIRUB SERPL-MCNC: <0.2 MG/DL
BUN SERPL-MCNC: 14 MG/DL (ref 6–20)
CALCIUM SERPL-MCNC: 8.3 MG/DL (ref 8.6–10)
CHLORIDE SERPL-SCNC: 110 MMOL/L (ref 98–107)
CREAT SERPL-MCNC: 0.87 MG/DL (ref 0.67–1.17)
DEPRECATED HCO3 PLAS-SCNC: 22 MMOL/L (ref 22–29)
EBV DNA # SPEC NAA+PROBE: NOT DETECTED COPIES/ML
EGFRCR SERPLBLD CKD-EPI 2021: >90 ML/MIN/1.73M2
EOSINOPHIL # BLD AUTO: 0.1 10E3/UL (ref 0–0.7)
EOSINOPHIL NFR BLD AUTO: 7 %
ERYTHROCYTE [DISTWIDTH] IN BLOOD BY AUTOMATED COUNT: 17.4 % (ref 10–15)
GLUCOSE SERPL-MCNC: 93 MG/DL (ref 70–99)
HCT VFR BLD AUTO: 20.9 % (ref 40–53)
HGB BLD-MCNC: 6.8 G/DL (ref 13.3–17.7)
IMM GRANULOCYTES # BLD: 0 10E3/UL
IMM GRANULOCYTES NFR BLD: 1 %
LDH SERPL L TO P-CCNC: 242 U/L (ref 0–250)
LYMPHOCYTES # BLD AUTO: 0.5 10E3/UL (ref 0.8–5.3)
LYMPHOCYTES NFR BLD AUTO: 23 %
MCH RBC QN AUTO: 30.5 PG (ref 26.5–33)
MCHC RBC AUTO-ENTMCNC: 32.5 G/DL (ref 31.5–36.5)
MCV RBC AUTO: 94 FL (ref 78–100)
MONOCYTES # BLD AUTO: 0.3 10E3/UL (ref 0–1.3)
MONOCYTES NFR BLD AUTO: 13 %
NEUTROPHILS # BLD AUTO: 1.1 10E3/UL (ref 1.6–8.3)
NEUTROPHILS NFR BLD AUTO: 55 %
NRBC # BLD AUTO: 0 10E3/UL
NRBC BLD AUTO-RTO: 0 /100
PLATELET # BLD AUTO: 39 10E3/UL (ref 150–450)
POTASSIUM SERPL-SCNC: 3.7 MMOL/L (ref 3.4–5.3)
PROT SERPL-MCNC: 5.9 G/DL (ref 6.4–8.3)
RBC # BLD AUTO: 2.23 10E6/UL (ref 4.4–5.9)
SODIUM SERPL-SCNC: 141 MMOL/L (ref 135–145)
WBC # BLD AUTO: 2 10E3/UL (ref 4–11)

## 2024-03-21 PROCEDURE — 83615 LACTATE (LD) (LDH) ENZYME: CPT

## 2024-03-21 PROCEDURE — 85025 COMPLETE CBC W/AUTO DIFF WBC: CPT

## 2024-03-21 PROCEDURE — 36592 COLLECT BLOOD FROM PICC: CPT

## 2024-03-21 PROCEDURE — 36589 REMOVAL TUNNELED CV CATH: CPT | Mod: LT | Performed by: PHYSICIAN ASSISTANT

## 2024-03-21 PROCEDURE — P9040 RBC LEUKOREDUCED IRRADIATED: HCPCS

## 2024-03-21 PROCEDURE — 36430 TRANSFUSION BLD/BLD COMPNT: CPT

## 2024-03-21 PROCEDURE — 80053 COMPREHEN METABOLIC PANEL: CPT

## 2024-03-21 RX ORDER — LIDOCAINE HYDROCHLORIDE 10 MG/ML
5 INJECTION, SOLUTION EPIDURAL; INFILTRATION; INTRACAUDAL; PERINEURAL ONCE
Status: DISCONTINUED | OUTPATIENT
Start: 2024-03-21 | End: 2024-03-21

## 2024-03-21 RX ORDER — LIDOCAINE HYDROCHLORIDE 10 MG/ML
5 INJECTION, SOLUTION EPIDURAL; INFILTRATION; INTRACAUDAL; PERINEURAL ONCE
Status: COMPLETED | OUTPATIENT
Start: 2024-03-21 | End: 2024-03-21

## 2024-03-21 RX ADMIN — LIDOCAINE HYDROCHLORIDE 5 ML: 10 INJECTION, SOLUTION EPIDURAL; INFILTRATION; INTRACAUDAL; PERINEURAL at 12:39

## 2024-03-21 ASSESSMENT — PAIN SCALES - GENERAL: PAINLEVEL: NO PAIN (0)

## 2024-03-21 NOTE — PROGRESS NOTES
Infusion Nursing Note:  Ziggy Hallman presents today for 1u PRBCs.    Patient seen by provider today: No   present during visit today: Not Applicable.    Note: Ziggy here today for RBC transfusion. Labs monitored, no additional infusion needs identified. Tolerated infusion without side effect or symptom of reaction.      Intravenous Access:  [place].    Treatment Conditions:  Lab Results   Component Value Date    HGB 6.8 (LL) 03/21/2024    WBC 2.0 (L) 03/21/2024    ANEU 1.6 02/20/2024    ANEUTAUTO 1.1 (L) 03/21/2024    PLT 39 (LL) 03/21/2024        Lab Results   Component Value Date     03/21/2024    POTASSIUM 3.7 03/21/2024    MAG 1.5 (L) 02/28/2024    CR 0.87 03/21/2024    REMY 8.3 (L) 03/21/2024    BILITOTAL <0.2 03/21/2024    ALBUMIN 3.2 (L) 03/21/2024    ALT 15 03/21/2024    AST 34 03/21/2024         Post Infusion Assessment:  Patient tolerated infusion without incident.  Blood return noted pre and post infusion.  Site patent and intact, free from redness, edema or discomfort.  No evidence of extravasations.  Access discontinued per protocol.       Discharge Plan:   Patient discharged in stable condition accompanied by: self.  Departure Mode: Ambulatory.      Lora Buckley RN

## 2024-03-21 NOTE — PROGRESS NOTES
Interventional Radiology Brief Post Procedure Note    Procedure: IR TCVC removal, left    Proceduralist: Kaylah Mendez PA-C    Time Out: Prior to the start of the procedure and with procedural staff participation, I verbally confirmed the patient s identity using two indicators, relevant allergies, that the procedure was appropriate and matched the consent or emergent situation, and that the correct equipment/implants were available. Immediately prior to starting the procedure I conducted the Time Out with the procedural staff and re-confirmed the patient s name, procedure, and site/side. (The Joint Commission universal protocol was followed.)  Yes    Medications   Medication Event Details Admin User Admin Time       Sedation: None. Local Anesthestic used, 3ml    Findings: TCVC removal, left    Estimated Blood Loss: None    Fluoroscopy Time:  0minute(s)    SPECIMENS: None    Complications: 1. None     Condition: Stable    Plan: Patient to remain upright for the next 2 hours. No heavy lifting or straining for the next 3-5 days. No submersion under water until site is completely healed.     Comments: See dictated procedure note for full details.    Kaylah Mendez PA-C

## 2024-03-21 NOTE — DISCHARGE INSTRUCTIONS
A collaboration between [facility] and [facility]  Experts in minimally invasive, targeted treatments performed using imaging guidance    Tunneled Central Venous Catheter Removal    Today you had your existing tunneled central venous catheter removed because it was no longer needed for treatment.        After you go home:  Avoid lying flat or bending at the waist for 2 hours following removal of the catheter to reduce risk of bleeding from catheter site.  Keep any applied tape/gauze dressings clean and dry. Change tape/gauze dressings if they get wet or soiled.  You may shower following the procedure, however cover and protect from moisture any tape/gauze dressings.   You may remove tape/gauze dressings after 3 days if the site looks like it is in the process of healing or scabbing over.  Avoid strenuous activities (elevating heart rate) or lifting more than 10 pounds for the next 3 days. Walking, elliptical and golf are examples of acceptable activities.  If there is bleeding or oozing from the procedure site, apply firm pressure to the area above your collar bone (where the catheter entered the bloodstream) for 10 minutes.  If the bleeding continues, continue to hold pressure and seek medical attention at the phone numbers below.  Mild procedure site discomfort can be treated with an ice pack and over-the-counter pain relievers.           Call our Interventional Radiology (IR) service if:  If you start bleeding from the procedure site. Our radiology provider can help you decide if you need to return to the hospital.  If you have new or worsening pain related to the procedure.  If you have concerning swelling at the procedure site.  If you develop hives or a rash or any unexplained itching.  If you have a fever of greater than 100.5  F and chills in the first 5 days after procedure.  Any other concerns related to your procedure.    Contact Number:  [phone]   (IR control desk)  Monday - Friday 8:00 am - 4:30 pm    After hours for urgent concerns:  [phone]  After 4:30 pm Monday - Friday, Weekends and Holidays.   Ask for Interventional Radiology on-call.  Someone is available 24 hours a day.  [facility] toll free number:  [phone]

## 2024-03-21 NOTE — NURSING NOTE
Chief Complaint   Patient presents with    Blood Draw     CVC blood draw with saline flush by lab RN. Vitals taken and appointment arrived     Leydi Mitchell RN

## 2024-03-22 ENCOUNTER — TELEPHONE (OUTPATIENT)
Dept: TRANSPLANT | Facility: CLINIC | Age: 49
End: 2024-03-22
Payer: COMMERCIAL

## 2024-03-22 LAB
HHV-6 DNA COPIES/ML, INSTRUMENT: 9812 COPIES/ML
HHV6 DNA SPEC NAA+PROBE-LOG#: 4 {LOG_COPIES}/ML

## 2024-03-22 NOTE — TELEPHONE ENCOUNTER
Blood and Marrow Transplant Clinic - Care Coordination    RNCC called patient to let him know we moved up bone marrow biopsy per Dr. Bacon due to low counts. Will get bone marrow biopsy on Wednesday 3/27. Patient verbalized understanding.     Kandy العلي, RN BSN  BMT RN Care Coordinator   Phone [phone]

## 2024-03-25 DIAGNOSIS — Z94.81 S/P ALLOGENEIC BONE MARROW TRANSPLANT (H): Primary | ICD-10-CM

## 2024-03-27 ENCOUNTER — OFFICE VISIT (OUTPATIENT)
Dept: TRANSPLANT | Facility: CLINIC | Age: 49
End: 2024-03-27
Payer: COMMERCIAL

## 2024-03-27 ENCOUNTER — DOCUMENTATION ONLY (OUTPATIENT)
Dept: TRANSPLANT | Facility: CLINIC | Age: 49
End: 2024-03-27

## 2024-03-27 ENCOUNTER — APPOINTMENT (OUTPATIENT)
Dept: LAB | Facility: CLINIC | Age: 49
End: 2024-03-27
Payer: COMMERCIAL

## 2024-03-27 VITALS
OXYGEN SATURATION: 100 % | RESPIRATION RATE: 16 BRPM | TEMPERATURE: 97.3 F | DIASTOLIC BLOOD PRESSURE: 85 MMHG | HEART RATE: 69 BPM | BODY MASS INDEX: 27.01 KG/M2 | SYSTOLIC BLOOD PRESSURE: 182 MMHG | WEIGHT: 177.2 LBS

## 2024-03-27 DIAGNOSIS — D46.9 MDS (MYELODYSPLASTIC SYNDROME) (H): Primary | ICD-10-CM

## 2024-03-27 DIAGNOSIS — Z94.81 S/P ALLOGENEIC BONE MARROW TRANSPLANT (H): ICD-10-CM

## 2024-03-27 LAB
ALBUMIN SERPL BCG-MCNC: 3.1 G/DL (ref 3.5–5.2)
ALP SERPL-CCNC: 80 U/L (ref 40–150)
ALT SERPL W P-5'-P-CCNC: 19 U/L (ref 0–70)
ANION GAP SERPL CALCULATED.3IONS-SCNC: 7 MMOL/L (ref 7–15)
AST SERPL W P-5'-P-CCNC: 35 U/L (ref 0–45)
BASOPHILS # BLD AUTO: 0 10E3/UL (ref 0–0.2)
BASOPHILS NFR BLD AUTO: 1 %
BILIRUB SERPL-MCNC: 0.2 MG/DL
BUN SERPL-MCNC: 13.1 MG/DL (ref 6–20)
CALCIUM SERPL-MCNC: 8.8 MG/DL (ref 8.6–10)
CHLORIDE SERPL-SCNC: 111 MMOL/L (ref 98–107)
CMV DNA SPEC NAA+PROBE-ACNC: NOT DETECTED IU/ML
CREAT SERPL-MCNC: 0.88 MG/DL (ref 0.67–1.17)
DEPRECATED HCO3 PLAS-SCNC: 24 MMOL/L (ref 22–29)
EGFRCR SERPLBLD CKD-EPI 2021: >90 ML/MIN/1.73M2
EOSINOPHIL # BLD AUTO: 0.1 10E3/UL (ref 0–0.7)
EOSINOPHIL NFR BLD AUTO: 4 %
ERYTHROCYTE [DISTWIDTH] IN BLOOD BY AUTOMATED COUNT: 16.4 % (ref 10–15)
GLUCOSE SERPL-MCNC: 96 MG/DL (ref 70–99)
HCT VFR BLD AUTO: 22.8 % (ref 40–53)
HGB BLD-MCNC: 7.5 G/DL (ref 13.3–17.7)
IMM GRANULOCYTES # BLD: 0 10E3/UL
IMM GRANULOCYTES NFR BLD: 1 %
LAB DIRECTOR DISCLAIMER: NORMAL
LAB DIRECTOR INTERPRETATION: NORMAL
LAB DIRECTOR METHODOLOGY: NORMAL
LAB DIRECTOR RESULTS: NORMAL
LDH SERPL L TO P-CCNC: 237 U/L (ref 0–250)
LYMPHOCYTES # BLD AUTO: 0.3 10E3/UL (ref 0.8–5.3)
LYMPHOCYTES NFR BLD AUTO: 20 %
MCH RBC QN AUTO: 30.7 PG (ref 26.5–33)
MCHC RBC AUTO-ENTMCNC: 32.9 G/DL (ref 31.5–36.5)
MCV RBC AUTO: 93 FL (ref 78–100)
MONOCYTES # BLD AUTO: 0.2 10E3/UL (ref 0–1.3)
MONOCYTES NFR BLD AUTO: 11 %
NEUTROPHILS # BLD AUTO: 1.1 10E3/UL (ref 1.6–8.3)
NEUTROPHILS NFR BLD AUTO: 63 %
NRBC # BLD AUTO: 0 10E3/UL
NRBC BLD AUTO-RTO: 0 /100
PLATELET # BLD AUTO: 31 10E3/UL (ref 150–450)
POTASSIUM SERPL-SCNC: 3.5 MMOL/L (ref 3.4–5.3)
PROT SERPL-MCNC: 5.9 G/DL (ref 6.4–8.3)
RBC # BLD AUTO: 2.44 10E6/UL (ref 4.4–5.9)
SODIUM SERPL-SCNC: 142 MMOL/L (ref 135–145)
SPECIMEN DESCRIPTION: NORMAL
WBC # BLD AUTO: 1.7 10E3/UL (ref 4–11)

## 2024-03-27 PROCEDURE — 80053 COMPREHEN METABOLIC PANEL: CPT

## 2024-03-27 PROCEDURE — 81268 CHIMERISM ANAL W/CELL SELECT: CPT

## 2024-03-27 PROCEDURE — 88185 FLOWCYTOMETRY/TC ADD-ON: CPT | Performed by: PHYSICIAN ASSISTANT

## 2024-03-27 PROCEDURE — 88311 DECALCIFY TISSUE: CPT | Mod: 26 | Performed by: PATHOLOGY

## 2024-03-27 PROCEDURE — 88368 INSITU HYBRIDIZATION MANUAL: CPT | Mod: 26 | Performed by: MEDICAL GENETICS

## 2024-03-27 PROCEDURE — 88291 CYTO/MOLECULAR REPORT: CPT | Performed by: MEDICAL GENETICS

## 2024-03-27 PROCEDURE — 88237 TISSUE CULTURE BONE MARROW: CPT | Performed by: PHYSICIAN ASSISTANT

## 2024-03-27 PROCEDURE — 88342 IMHCHEM/IMCYTCHM 1ST ANTB: CPT | Mod: 26 | Performed by: PATHOLOGY

## 2024-03-27 PROCEDURE — 85097 BONE MARROW INTERPRETATION: CPT | Performed by: PATHOLOGY

## 2024-03-27 PROCEDURE — 81267 CHIMERISM ANAL NO CELL SELEC: CPT | Performed by: PHYSICIAN ASSISTANT

## 2024-03-27 PROCEDURE — 88305 TISSUE EXAM BY PATHOLOGIST: CPT | Mod: 26 | Performed by: PATHOLOGY

## 2024-03-27 PROCEDURE — 87799 DETECT AGENT NOS DNA QUANT: CPT | Mod: XU

## 2024-03-27 PROCEDURE — 88275 CYTOGENETICS 100-300: CPT | Performed by: PHYSICIAN ASSISTANT

## 2024-03-27 PROCEDURE — 38221 DX BONE MARROW BIOPSIES: CPT

## 2024-03-27 PROCEDURE — 38222 DX BONE MARROW BX & ASPIR: CPT | Mod: LT

## 2024-03-27 PROCEDURE — 83615 LACTATE (LD) (LDH) ENZYME: CPT

## 2024-03-27 PROCEDURE — 250N000011 HC RX IP 250 OP 636

## 2024-03-27 PROCEDURE — 88311 DECALCIFY TISSUE: CPT | Mod: TC | Performed by: PHYSICIAN ASSISTANT

## 2024-03-27 PROCEDURE — 88341 IMHCHEM/IMCYTCHM EA ADD ANTB: CPT | Mod: 26 | Performed by: PATHOLOGY

## 2024-03-27 PROCEDURE — 36591 DRAW BLOOD OFF VENOUS DEVICE: CPT

## 2024-03-27 PROCEDURE — G0452 MOLECULAR PATHOLOGY INTERPR: HCPCS | Mod: 26 | Performed by: STUDENT IN AN ORGANIZED HEALTH CARE EDUCATION/TRAINING PROGRAM

## 2024-03-27 PROCEDURE — 85004 AUTOMATED DIFF WBC COUNT: CPT

## 2024-03-27 PROCEDURE — 88189 FLOWCYTOMETRY/READ 16 & >: CPT | Mod: GC | Performed by: PATHOLOGY

## 2024-03-27 RX ORDER — HEPARIN SODIUM (PORCINE) LOCK FLUSH IV SOLN 100 UNIT/ML 100 UNIT/ML
5 SOLUTION INTRAVENOUS DAILY PRN
Status: DISCONTINUED | OUTPATIENT
Start: 2024-03-27 | End: 2024-03-27 | Stop reason: HOSPADM

## 2024-03-27 RX ORDER — LORAZEPAM 0.5 MG/1
.5-1 TABLET ORAL EVERY 4 HOURS PRN
Qty: 15 TABLET | Refills: 0 | Status: SHIPPED | OUTPATIENT
Start: 2024-03-27

## 2024-03-27 RX ADMIN — Medication 5 ML: at 08:31

## 2024-03-27 RX ADMIN — Medication 5 ML: at 07:49

## 2024-03-27 RX ADMIN — MIDAZOLAM 1 MG: 1 INJECTION INTRAMUSCULAR; INTRAVENOUS at 08:27

## 2024-03-27 ASSESSMENT — PAIN SCALES - GENERAL: PAINLEVEL: NO PAIN (0)

## 2024-03-27 NOTE — NURSING NOTE
Chief Complaint   Patient presents with    Port Draw     Labs drawn via port by RN in lab. VS taken.      Labs drawn via Port accessed using 20g flat needle. Line flushed and Heparin locked. Vital signs taken. Checked into next appointment.     Margie Ch RN

## 2024-03-27 NOTE — PROGRESS NOTES
"Oncology Rooming Note    March 27, 2024 8:07 AM   Ziggy Hallman is a 48 year old male who presents for:    Chief Complaint   Patient presents with    Port Draw     Labs drawn via port by RN in lab. VS taken.     Oncology Clinic Visit     Bone Marrow Biopsy post bone marrow transplant with MDS     Initial Vitals: /81   Pulse 74   Temp 97.6  F (36.4  C) (Oral)   Resp 16   Wt 80.4 kg (177 lb 3.2 oz)   SpO2 98%   BMI 27.01 kg/m   Estimated body mass index is 27.01 kg/m  as calculated from the following:    Height as of 3/14/24: 1.725 m (5' 7.91\").    Weight as of this encounter: 80.4 kg (177 lb 3.2 oz). Body surface area is 1.96 meters squared.  No Pain (0) Comment: Data Unavailable   No LMP for male patient.  Allergies reviewed: Yes  Medications reviewed: Yes    Medications: MEDICATION REFILLS NEEDED TODAY. Provider was notified.  Pharmacy name entered into Fastnote: [id number] - [place], [place] - [address] [place] [address]    Frailty Screening:   Is the patient here for a new oncology consult visit in cancer care?       Clinical concerns: Here for BMBx with versed. No concerns, no pain. Has  to take patient home at end of day.     BMBX Teaching and Assessment       Teaching concerns addressed: Bone marrow biopsy and infection prevention.     Person(s) involved in teaching: Patient  Motivation Level  Asks Questions: Yes  Eager to Learn: Yes  Cooperative: Yes  Receptive (willing/able to accept information): Yes    Patient demonstrates understanding of the following:     Reason for the appointment, diagnosis and treatment plan: Yes  Knowledge of proper use of medications and conditions for which they are ordered (with special attention to potential side effects or drug interactions): Yes  Which situations necessitate calling provider and whom to contact: Yes    Teaching concerns addressed:   Reviewed activity restrictions if received premeds, potential for bleeding and actions to take if develops " any of the issues below    Pain management techniques: Yes  Patient instructed on hand hygiene: Yes  How and/when to access community resources: Yes    Infection Control:  Patient demonstrates understanding of the following:   Bone marrow procedure site care taught: Yes  Signs and symptoms of infection taught: Yes       Instructional Materials Used/Given: Pt instructed to keep bmbx site clean and dry for 24hrs. Pt educated to monitor site for signs of infection such as redness, rash, oozing, puss, bleeding, pain, and elevated temp. Pt instructed to go to call the [facility] triage line or go to the ER if any signs of infection should occur. Pt educated to not operate machinery if receiving versed. Pt and  verbalize understanding.     Pre-procedure labs drawn via port (by lab RN). Post procedure: Patient vital signs stable, ambulating, site is clean, dry and intact prior to discharge and line removed. Pt discharged with .      Provider order received to administer Versed 1mg IVP as premed for BMBX. Procedural consent discussed and pt's signature obtained.  Allergies reviewed.  PT currently alert and oriented to plan of care.  Pt lying prone in stretcher.  Call light w/in reach.  Provider and  at bedside.    Per verbal 3/27/24 @0964 from DELL Bautista/Brenda Knott RN:   - OK to let patient discharge though BP is elevated at 182/85 since patient is asymptomatic.    Brenda Knott RN

## 2024-03-27 NOTE — PROGRESS NOTES
BMT ONC Adult Bone Marrow Biopsy Procedure Note  March 27, 2024  BP (!) 182/85   Pulse 69   Temp 97.3  F (36.3  C) (Oral)   Resp 16   Wt 80.4 kg (177 lb 3.2 oz)   SpO2 100%   BMI 27.01 kg/m       Learning needs assessment complete within 12 months? YES    DIAGNOSIS: MDS     PROCEDURE: Unilateral Bone Marrow Biopsy and Unilateral Aspirate    LOCATION: [facility] 2nd Floor    Patient s identification was positively verified by verbal identification and invasive procedure safety checklist was completed. Informed consent was obtained. Following the administration of Midazolam as pre-medication, patient was placed in the prone position and prepped and draped in a sterile manner. Approximately 20 cc of 1% Lidocaine was used over the left posterior iliac spine. Following this a 3 mm incision was made. Trephine bone marrow core(s) was (were) obtained from the [facility]. Bone marrow aspirates were obtained from the IC. Aspirates were sent for morphology, immunophenotyping, cytogenetics, molecular diagnostics RFLP, and NGS. A total of approximately 16 ml of marrow was aspirated. Following this procedure a sterile dressing was applied to the bone marrow biopsy site(s). The patient was placed in the supine position to maintain pressure on the biopsy site. Post-procedure wound care instructions were given.     Complications: NO    Pre-procedural pain: 0 out of 10 on the numeric pain rating scale.     Procedural pain: 8 out of 10 on the numeric pain rating scale. (Better pain control after anchoring in a different spot)     Post-procedural pain assessment: 3 out of 10 on the numeric pain rating scale.     Interventions: NO    Length of procedure:21 minutes to 45 minutes    Procedure performed by: Carlos Warner PA-C

## 2024-03-27 NOTE — PROGRESS NOTES
At Ziggy's request I refilled his medication box. With 1 week of medications. Per discharge summary patient is to continue holding apixaban and prasugrel due to low platelets, not added to box.    Current Outpatient Medications   Medication Sig Dispense Refill    acyclovir (ZOVIRAX) 800 MG tablet Take 1 tablet (800 mg) by mouth 2 times daily 60 tablet 1    apixaban ANTICOAGULANT (ELIQUIS) 5 MG tablet Take 1 tablet (5 mg) by mouth 2 times daily 60 tablet 3    lisinopril (ZESTRIL) 40 MG tablet Take 0.5 tablets (20 mg) by mouth daily      LORazepam (ATIVAN) 0.5 MG tablet Take 1-2 tablets (0.5-1 mg) by mouth every 4 hours as needed for vomiting or nausea (nausea/vomiting/sleep) 15 tablet 0    metoprolol succinate ER (TOPROL XL) 100 MG 24 hr tablet Take 1 tablet (100 mg) by mouth daily      pantoprazole (PROTONIX) 40 MG EC tablet Take 1 tablet (40 mg) by mouth daily      prasugrel (EFFIENT) 10 MG TABS tablet Take 1 tablet (10 mg) by mouth daily HOLD EFFECTIVE 3/7 PLTS <75K (Patient not taking: Reported on 3/7/2024)      prochlorperazine (COMPAZINE) 5 MG tablet Take 1-2 tablets (5-10 mg) by mouth every 6 hours as needed for nausea or vomiting 30 tablet 1    psyllium (METAMUCIL/KONSYL) 58.6 % powder Take 3 g by mouth daily (Patient not taking: Reported on 3/21/2024) 174 g 1    sirolimus (GENERIC EQUIVALENT) 0.5 MG tablet Take 0.5 mg by mouth daily Take 0.5 mg daily      sirolimus (GENERIC EQUIVALENT) 1 MG tablet Take 1 (1mg) tablet daily (Patient not taking: Reported on 3/15/2024)      sulfamethoxazole-trimethoprim (BACTRIM DS) 800-160 MG tablet Take 1 tablet by mouth Every Mon, Tues two times daily HOLD WITH DROPPING COUNTS STARTING 3/7 (Patient not taking: Reported on 3/7/2024)      voriconazole (VFEND) 200 MG tablet Take 1 tablet (200 mg) by mouth 2 times daily Start on Saturday 3/9/24          Pertinent labs considered today:  Lab Results   Component Value Date     03/27/2024     05/28/2020                  normal sodium 136-148  Lab Results   Component Value Date    POTASSIUM 3.5 03/27/2024    POTASSIUM 3.8 05/28/2020       normal potassium 3.5-5.2   Lab Results   Component Value Date    CHLORIDE 111 03/27/2024    CHLORIDE 107 05/28/2020           normal chloride    Lab Results   Component Value Date    CO2 24 03/27/2024    CO2 24 05/28/2020                normal CO2 20-32  Lab Results   Component Value Date    BUN 13.1 03/27/2024    BUN 10 05/28/2020                 normal BUN 5-24  Lab Results   Component Value Date    GLC 96 03/27/2024     01/24/2024     05/28/2020                 normal glucose   Lab Results   Component Value Date    CR 0.88 03/27/2024    CR 0.80 05/28/2020                 normal cr 0.8-1.5  Lab Results   Component Value Date    REMY 8.8 03/27/2024    REMY 9.3 05/28/2020               normal calcium  8.5-10.4  Lab Results   Component Value Date    BILITOTAL 0.2 03/27/2024    BILITOTAL 0.8 05/28/2020          normal bilirubin 0.2-1.3  Lab Results   Component Value Date    PROTTOTAL 5.9 03/27/2024    PROTTOTAL 8.7 05/28/2020          normal total protein 6.0-8.2  Lab Results   Component Value Date    ALBUMIN 3.1 03/27/2024    ALBUMIN 3.4 05/28/2020             normal albumin 3.2-4.5  Lab Results   Component Value Date    ALKPHOS 80 03/27/2024    ALKPHOS 114 05/28/2020            normal alkphos   Lab Results   Component Value Date    ALT 19 03/27/2024    ALT 25 05/28/2020         normal ALT 0-65  Lab Results   Component Value Date    AST 35 03/27/2024    AST 20 05/28/2020         normal AST 0-37  Lab Results   Component Value Date    HGB 7.5 03/27/2024    HGB 12.6 05/28/2020     Lab Results   Component Value Date    WBC 1.7 03/27/2024    WBC 4.9 05/28/2020      Lab Results   Component Value Date    PLT 31 03/27/2024     05/28/2020     Estimated Creatinine Clearance: 114.2 mL/min (based on SCr of 0.88 mg/dL).      MARIBEL VALENCIA, KANE, PharmD

## 2024-03-28 ENCOUNTER — OFFICE VISIT (OUTPATIENT)
Dept: TRANSPLANT | Facility: CLINIC | Age: 49
End: 2024-03-28
Attending: INTERNAL MEDICINE
Payer: COMMERCIAL

## 2024-03-28 ENCOUNTER — LAB (OUTPATIENT)
Dept: LAB | Facility: CLINIC | Age: 49
End: 2024-03-28
Attending: INTERNAL MEDICINE
Payer: COMMERCIAL

## 2024-03-28 VITALS
SYSTOLIC BLOOD PRESSURE: 147 MMHG | BODY MASS INDEX: 26.88 KG/M2 | DIASTOLIC BLOOD PRESSURE: 76 MMHG | RESPIRATION RATE: 16 BRPM | OXYGEN SATURATION: 97 % | WEIGHT: 176.3 LBS | TEMPERATURE: 98.3 F | HEART RATE: 87 BPM

## 2024-03-28 DIAGNOSIS — D46.9 MDS (MYELODYSPLASTIC SYNDROME) (H): ICD-10-CM

## 2024-03-28 DIAGNOSIS — Z94.81 S/P ALLOGENEIC BONE MARROW TRANSPLANT (H): ICD-10-CM

## 2024-03-28 DIAGNOSIS — Z94.81 S/P ALLOGENEIC BONE MARROW TRANSPLANT (H): Primary | ICD-10-CM

## 2024-03-28 LAB
ACANTHOCYTES BLD QL SMEAR: NORMAL
ALBUMIN SERPL BCG-MCNC: 3.2 G/DL (ref 3.5–5.2)
ALP SERPL-CCNC: 82 U/L (ref 40–150)
ALT SERPL W P-5'-P-CCNC: 16 U/L (ref 0–70)
ANION GAP SERPL CALCULATED.3IONS-SCNC: 8 MMOL/L (ref 7–15)
AST SERPL W P-5'-P-CCNC: 36 U/L (ref 0–45)
AUER BODIES BLD QL SMEAR: NORMAL
BASO STIPL BLD QL SMEAR: NORMAL
BASOPHILS # BLD AUTO: 0 10E3/UL (ref 0–0.2)
BASOPHILS NFR BLD AUTO: 1 %
BILIRUB SERPL-MCNC: 0.2 MG/DL
BITE CELLS BLD QL SMEAR: NORMAL
BLISTER CELLS BLD QL SMEAR: NORMAL
BUN SERPL-MCNC: 14.2 MG/DL (ref 6–20)
BURR CELLS BLD QL SMEAR: NORMAL
CALCIUM SERPL-MCNC: 8.8 MG/DL (ref 8.6–10)
CHLORIDE SERPL-SCNC: 110 MMOL/L (ref 98–107)
CREAT SERPL-MCNC: 0.86 MG/DL (ref 0.67–1.17)
DACRYOCYTES BLD QL SMEAR: NORMAL
DEPRECATED HCO3 PLAS-SCNC: 24 MMOL/L (ref 22–29)
EBV DNA # SPEC NAA+PROBE: NOT DETECTED COPIES/ML
EGFRCR SERPLBLD CKD-EPI 2021: >90 ML/MIN/1.73M2
ELLIPTOCYTES BLD QL SMEAR: NORMAL
EOSINOPHIL # BLD AUTO: 0.1 10E3/UL (ref 0–0.7)
EOSINOPHIL NFR BLD AUTO: 4 %
ERYTHROCYTE [DISTWIDTH] IN BLOOD BY AUTOMATED COUNT: 16.1 % (ref 10–15)
FRAGMENTS BLD QL SMEAR: NORMAL
GLUCOSE SERPL-MCNC: 87 MG/DL (ref 70–99)
HCT VFR BLD AUTO: 22.1 % (ref 40–53)
HGB BLD-MCNC: 7.4 G/DL (ref 13.3–17.7)
HGB C CRYSTALS: NORMAL
HOWELL-JOLLY BOD BLD QL SMEAR: NORMAL
IMM GRANULOCYTES # BLD: 0 10E3/UL
IMM GRANULOCYTES NFR BLD: 1 %
LYMPHOCYTES # BLD AUTO: 0.5 10E3/UL (ref 0.8–5.3)
LYMPHOCYTES NFR BLD AUTO: 27 %
MCH RBC QN AUTO: 30.8 PG (ref 26.5–33)
MCHC RBC AUTO-ENTMCNC: 33.5 G/DL (ref 31.5–36.5)
MCV RBC AUTO: 92 FL (ref 78–100)
MONOCYTES # BLD AUTO: 0.2 10E3/UL (ref 0–1.3)
MONOCYTES NFR BLD AUTO: 12 %
NEUTROPHILS # BLD AUTO: 1 10E3/UL (ref 1.6–8.3)
NEUTROPHILS NFR BLD AUTO: 55 %
NEUTS HYPERSEG BLD QL SMEAR: NORMAL
NRBC # BLD AUTO: 0 10E3/UL
NRBC BLD AUTO-RTO: 0 /100
PATH REPORT.COMMENTS IMP SPEC: NORMAL
PATH REPORT.FINAL DX SPEC: NORMAL
PATH REPORT.FINAL DX SPEC: NORMAL
PATH REPORT.GROSS SPEC: NORMAL
PATH REPORT.MICROSCOPIC SPEC OTHER STN: NORMAL
PATH REPORT.RELEVANT HX SPEC: NORMAL
PATH REPORT.RELEVANT HX SPEC: NORMAL
PLAT MORPH BLD: NORMAL
PLATELET # BLD AUTO: 35 10E3/UL (ref 150–450)
POLYCHROMASIA BLD QL SMEAR: NORMAL
POTASSIUM SERPL-SCNC: 3.5 MMOL/L (ref 3.4–5.3)
PROT SERPL-MCNC: 6 G/DL (ref 6.4–8.3)
RBC # BLD AUTO: 2.4 10E6/UL (ref 4.4–5.9)
RBC AGGLUT BLD QL: NORMAL
RBC MORPH BLD: NORMAL
ROULEAUX BLD QL SMEAR: NORMAL
SICKLE CELLS BLD QL SMEAR: NORMAL
SMUDGE CELLS BLD QL SMEAR: NORMAL
SODIUM SERPL-SCNC: 142 MMOL/L (ref 135–145)
SPHEROCYTES BLD QL SMEAR: NORMAL
STOMATOCYTES BLD QL SMEAR: NORMAL
TARGETS BLD QL SMEAR: NORMAL
TOXIC GRANULES BLD QL SMEAR: NORMAL
VARIANT LYMPHS BLD QL SMEAR: NORMAL
WBC # BLD AUTO: 1.8 10E3/UL (ref 4–11)

## 2024-03-28 PROCEDURE — G0463 HOSPITAL OUTPT CLINIC VISIT: HCPCS

## 2024-03-28 PROCEDURE — 99215 OFFICE O/P EST HI 40 MIN: CPT | Mod: 24

## 2024-03-28 PROCEDURE — 85025 COMPLETE CBC W/AUTO DIFF WBC: CPT

## 2024-03-28 PROCEDURE — 80053 COMPREHEN METABOLIC PANEL: CPT

## 2024-03-28 PROCEDURE — 250N000011 HC RX IP 250 OP 636: Performed by: INTERNAL MEDICINE

## 2024-03-28 PROCEDURE — 36591 DRAW BLOOD OFF VENOUS DEVICE: CPT

## 2024-03-28 RX ORDER — TRAZODONE HYDROCHLORIDE 50 MG/1
50 TABLET, FILM COATED ORAL AT BEDTIME
Qty: 30 TABLET | Refills: 1 | Status: SHIPPED | OUTPATIENT
Start: 2024-03-28

## 2024-03-28 RX ORDER — HEPARIN SODIUM (PORCINE) LOCK FLUSH IV SOLN 100 UNIT/ML 100 UNIT/ML
5 SOLUTION INTRAVENOUS ONCE
Status: COMPLETED | OUTPATIENT
Start: 2024-03-28 | End: 2024-03-28

## 2024-03-28 RX ADMIN — Medication 5 ML: at 12:51

## 2024-03-28 ASSESSMENT — PAIN SCALES - GENERAL: PAINLEVEL: NO PAIN (0)

## 2024-03-28 NOTE — NURSING NOTE
Chief Complaint   Patient presents with    Blood Draw     Port blood draw with heparin flush by lab RN. Vitals taken and appointment arrived     Leydi Mitchell RN

## 2024-03-28 NOTE — NURSING NOTE
"Oncology Rooming Note    March 28, 2024 12:58 PM   Ziggy Hallman is a 48 year old male who presents for:    Chief Complaint   Patient presents with    Blood Draw     Port blood draw with heparin flush by lab RN. Vitals taken and appointment arrived    Oncology Clinic Visit     MDS (myelodysplastic syndrome)     Initial Vitals: BP (!) 147/76   Pulse 87   Temp 98.3  F (36.8  C) (Oral)   Resp 16   Wt 80 kg (176 lb 4.8 oz)   SpO2 97%   BMI 26.88 kg/m   Estimated body mass index is 26.88 kg/m  as calculated from the following:    Height as of 3/14/24: 1.725 m (5' 7.91\").    Weight as of this encounter: 80 kg (176 lb 4.8 oz). Body surface area is 1.96 meters squared.  No Pain (0) Comment: Data Unavailable   No LMP for male patient.  Allergies reviewed: Yes  Medications reviewed: Yes    Medications: Medication refills not needed today.  Pharmacy name entered into [facility]: [id number] - [place], [place] - [address] [place] [address]    Frailty Screening:   Is the patient here for a new oncology consult visit in cancer care? 2. No      Clinical concerns:        Shreya Pop CMA                           "

## 2024-03-28 NOTE — PROGRESS NOTES
BMT Progress Notes  03/28/2024       Patient ID: Ziggy Hallman is a 48 year old year old male with a PMH of MDS, hx of multiple clots and MI undergoing a MA (Bu/Flu) prep for an 8/8 URD PBSCT currently day 63.      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 No acute concerns. Appetite on the low side, food doesn't taste good. No N/V/D or rashes. Bone marrow biopsy completed yesterday. His hgb is 7.4, threshold is <8 for his cardiac history. He elected to get blood at his next visit on 4/4, instead of sooner.        Review of Systems                                                                                                                           ROS negative unless stated in HPI     PHYSICAL EXAM                                                                                                                                      Blood pressure (!) 147/76, pulse 87, temperature 98.3  F (36.8  C), temperature source Oral, resp. rate 16, weight 80 kg (176 lb 4.8 oz), SpO2 97%.  Wt Readings from Last 4 Encounters:   03/28/24 80 kg (176 lb 4.8 oz)   03/27/24 80.4 kg (177 lb 3.2 oz)   03/21/24 79.3 kg (174 lb 14.4 oz)   03/20/24 79.1 kg (174 lb 6.4 oz)     KPS: 70     General: NAD   Eyes: sclera anicteric   Nose/Mouth/Throat: MMM  Lungs: CTAB  Cardiovascular: RRR, no M/R/G   Lymphatics: no edema  Skin: No rash  Neuro: A&O ; non-focal  Access: CVC R chest NT, dressing cdi. R PAC not accessed.     Acute GVHD          3/14/2024    11:36 3/4/2024    11:02 2/29/2024    10:00   Acute GVHD   New evidence of Acute Gsedu-Klkcdx-Ugrk Disease developed since last entry? No No No          LABS: I have assessed all abnormal lab values for their clinical  significance and any values considered clinically significant have been addressed in the assessment and plan.       Lab Results   Component Value Date    WBC 1.7 (L) 03/27/2024    ANEU 1.6 02/20/2024    HGB 7.5 (L) 03/27/2024    HCT 22.8 (L) 03/27/2024    PLT 31 (LL) 03/27/2024     03/27/2024    POTASSIUM 3.5 03/27/2024    CHLORIDE 111 (H) 03/27/2024    CO2 24 03/27/2024    GLC 96 03/27/2024    BUN 13.1 03/27/2024    CR 0.88 03/27/2024    MAG 1.5 (L) 02/28/2024    INR 1.35 (H) 02/19/2024    BILITOTAL 0.2 03/27/2024    AST 35 03/27/2024    ALT 19 03/27/2024    ALKPHOS 80 03/27/2024    PROTTOTAL 5.9 (L) 03/27/2024    ALBUMIN 3.1 (L) 03/27/2024          ASSESSMENT AND PLAN      Ziggy Hallman is a 48 year old male with a PMHx of MDS, hx of CAD, HTN, multiple recurrent arterial thromboembolic events (STEMIs, renal infarcts, arterial embolism, TIA) and DVTs who is undergoing a MA (Bu/Flu) prep for an 8/8 URD PBSCT day +63.     Stay was complicated by mucositis requiring PCA, N/F, Staph Epi bacteremia, pulmonary embolism requiring anticoagulation. Hospitalization prolonged following engraftment 2/2 to large stool volumes requiring ID and GVHD rule out. Underwent flex sig/EGD results are fairly unremarkable with occasional crypt apoptotic bodies in duodenum, hemorrhagic gastritis in antrum, negative CMV HSV H pylori stains, diarrhea symptoms improved upon discharge, anorexia continues.      BMT/IEC PROTOCOL for 2015-29  - Chemo Prep: BU/Flu   - 8/8 DP permissive. Cell dose-9.24x10^6  - ABO: Donor: O+ Recipient A-  (Minor incompatability, no flush required)   - BM with CR. No MRD by flow. NGS pending. CD33 98% donor and CD3 100% donor (3/20)   - repeat PB DNA pending (3/27)    HEME/COAG  - Transfusion parameters: hemoglobin <8 (cardiac hx), platelets <30k   #Pancytopenia. ANC 1.5 today; Plt up to 67k  #Segmental R PE (2/6): see anticoagulation below  #APS work up- lupus antigen +, will follow outpt as may be  unreliable in this acute setting and prior APS work up had been unremarkable prior to admission.  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   Extensive hypercoagulable workup to date has been unrevealing.  JAK2 testing negative.  PNH testing negative.  Elevated IgA of unknown significance, no evidence of plasma cell disorder.  - Eliquis & Prasugrel on hold with plts <50K, resume when able     ID:     -Prophylaxis plan: ACV LD, Megan HD transitioned to vori (3/9); Bactrim started 2/26--all cell lines down. Stop bactrim.  Pentam (3/14).  - HHV6 down to 9.8k (3/20) copies from 25K previously.  EBV/CMV ND 3/27.     RISK OF GVHD  - Prophylaxis: PtC day +3, +4, , Siro/MMF to start day +5  - Siro level 8.7 (3/20), 0.5 mg daily     Historical   #Diarrhea LGI stage II --no c/o diarrhea today  - ongoing diarrhea during transplant admission. He was treated for c. Diff for 14 days.  - AREG and fecal kvng protectin unremarkable  - GI Luminal consult - EGD, FlexSig show occasional crypt apoptotic bodies in duodenum, hemorrhagic gastritis in antrum, negative CMV/HSV/H pylori stains.     CARDIOVASCULAR  #CAD  #Hx of CABG  #HTN  - metoprolol & lisinopril (nifidepine ER 30mg BID remain on hold).   #Hyperlipidemia: Holding atorvastatin peritransplant  - Risk of cardiomyopathy:  Baseline EF 50-55%, Global left ventricular function mildly reduced. Grade 1 or early diastolic dysfunction.      - Risk of arrhythmia: Baseline EKG showed NSR, Qtc -425       RENAL/ELECTROLYTES/  Monitor Cr and lytes.   CONNIE: Stopped bactrim for  cytopenias as above.      Psych:    #Anxiety- saw inpatient psych who offered atarax, but did not work well. Health psych offered, patient declined.  #Insomnia- previously used Ambien and trazodone which were discontinued inpatient d/t AMS. He does not like Ambien. Prescribed trazodone 3/28.       Summary:   - holding eliquis and prasugrel with plts <50K   - stop protonix   - prescribed trazodone for insomnia, instructed to use instead of ativan   - 4/4 RBC transfusion, NIDHI, BM bx review   - peripheral chimerisms improving     I spent 40 minutes in the care of this patient today, which included time necessary for preparation for the visit, obtaining history, ordering medications/tests/procedures as medically indicated, review of pertinent medical literature, counseling of the patient, communication of recommendations to the care team, and documentation time.      Carlos Warner PA-C

## 2024-03-28 NOTE — LETTER
3/28/2024         RE: Ziggy Hallman  [address] [facility] [address]        Dear Colleague,    Thank you for referring your patient, Ziggy Hallman, to the [facility]. Please see a copy of my visit note below.    BMT Progress Notes  03/28/2024       Patient ID: Ziggy Hallman is a 48 year old year old male with a PMH of MDS, hx of multiple clots and MI undergoing a MA (Bu/Flu) prep for an 8/8 URD PBSCT currently day 63.      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 No acute concerns. Appetite on the low side, food doesn't taste good. No N/V/D or rashes. Bone marrow biopsy completed yesterday. His hgb is 7.4, threshold is <8 for his cardiac history. He elected to get blood at his next visit on 4/4, instead of sooner.        Review of Systems                                                                                                                           ROS negative unless stated in HPI     PHYSICAL EXAM                                                                                                                                      Blood pressure (!) 147/76, pulse 87, temperature 98.3  F (36.8  C), temperature source Oral, resp. rate 16, weight 80 kg (176 lb 4.8 oz), SpO2 97%.  Wt Readings from Last 4 Encounters:   03/28/24 80 kg (176 lb 4.8 oz)   03/27/24 80.4 kg (177 lb 3.2 oz)   03/21/24 79.3 kg (174 lb 14.4 oz)   03/20/24 79.1 kg (174 lb 6.4 oz)     KPS: 70     General: NAD   Eyes: sclera anicteric   Nose/Mouth/Throat: MMM  Lungs: CTAB  Cardiovascular: RRR, no M/R/G   Lymphatics: no edema  Skin: No rash  Neuro: A&O ; non-focal  Access: CVC R chest NT, dressing cdi.  R PAC not accessed.     Acute GVHD          3/14/2024    11:36 3/4/2024    11:02 2/29/2024    10:00   Acute GVHD   New evidence of Acute Nrgln-Ixvgxk-Smbx Disease developed since last entry? No No No          LABS: I have assessed all abnormal lab values for their clinical significance and any values considered clinically significant have been addressed in the assessment and plan.       Lab Results   Component Value Date    WBC 1.7 (L) 03/27/2024    ANEU 1.6 02/20/2024    HGB 7.5 (L) 03/27/2024    HCT 22.8 (L) 03/27/2024    PLT 31 (LL) 03/27/2024     03/27/2024    POTASSIUM 3.5 03/27/2024    CHLORIDE 111 (H) 03/27/2024    CO2 24 03/27/2024    GLC 96 03/27/2024    BUN 13.1 03/27/2024    CR 0.88 03/27/2024    MAG 1.5 (L) 02/28/2024    INR 1.35 (H) 02/19/2024    BILITOTAL 0.2 03/27/2024    AST 35 03/27/2024    ALT 19 03/27/2024    ALKPHOS 80 03/27/2024    PROTTOTAL 5.9 (L) 03/27/2024    ALBUMIN 3.1 (L) 03/27/2024          ASSESSMENT AND PLAN      Ziggy Hallman is a 48 year old male with a PMHx of MDS, hx of CAD, HTN, multiple recurrent arterial thromboembolic events (STEMIs, renal infarcts, arterial embolism, TIA) and DVTs who is undergoing a MA (Bu/Flu) prep for an 8/8 URD PBSCT day +63.     Stay was complicated by mucositis requiring PCA, N/F, Staph Epi bacteremia, pulmonary embolism requiring anticoagulation. Hospitalization prolonged following engraftment 2/2 to large stool volumes requiring ID and GVHD rule out. Underwent flex sig/EGD results are fairly unremarkable with occasional crypt apoptotic bodies in duodenum, hemorrhagic gastritis in antrum, negative CMV HSV H pylori stains, diarrhea symptoms improved upon discharge, anorexia continues.      BMT/IEC PROTOCOL for 2015-29  - Chemo Prep: BU/Flu   - 8/8 DP permissive. Cell dose-9.24x10^6  - ABO: Donor: O+ Recipient A-  (Minor incompatability, no flush required)   - BM with CR. No MRD by flow. NGS pending. CD33 98% donor and CD3 100% donor (3/20)   -  repeat PB DNA pending (3/27)    HEME/COAG  - Transfusion parameters: hemoglobin <8 (cardiac hx), platelets <30k   #Pancytopenia. ANC 1.5 today; Plt up to 67k  #Segmental R PE (2/6): see anticoagulation below  #APS work up- lupus antigen +, will follow outpt as may be unreliable in this acute setting and prior APS work up had been unremarkable prior to admission.  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   Extensive hypercoagulable workup to date has been unrevealing.  JAK2 testing negative.  PNH testing negative.  Elevated IgA of unknown significance, no evidence of plasma cell disorder.  - Eliquis & Prasugrel on hold with plts <50K, resume when able     ID:     -Prophylaxis plan: ACV LD, Megan HD transitioned to vori (3/9); Bactrim started 2/26--all cell lines down. Stop bactrim.  Pentam (3/14).  - HHV6 down to 9.8k (3/20) copies from 25K previously.  EBV/CMV ND 3/27.     RISK OF GVHD  - Prophylaxis: PtC day +3, +4, , Siro/MMF to start day +5  - Siro level 8.7 (3/20), 0.5 mg daily     Historical   #Diarrhea LGI stage II --no c/o diarrhea today  - ongoing diarrhea during transplant admission. He was treated for c. Diff for 14 days.  - AREG and fecal kvng protectin unremarkable  - GI Luminal consult - EGD, FlexSig show occasional crypt apoptotic bodies in duodenum, hemorrhagic gastritis in antrum, negative CMV/HSV/H pylori stains.     CARDIOVASCULAR  #CAD  #Hx of CABG  #HTN  - metoprolol & lisinopril (nifidepine ER 30mg BID remain on hold).   #Hyperlipidemia: Holding atorvastatin peritransplant  - Risk of  cardiomyopathy:  Baseline EF 50-55%, Global left ventricular function mildly reduced. Grade 1 or early diastolic dysfunction.      - Risk of arrhythmia: Baseline EKG showed NSR, Qtc -425       RENAL/ELECTROLYTES/  Monitor Cr and lytes.   CONNIE: Stopped bactrim for cytopenias as above.      Psych:    #Anxiety- saw inpatient psych who offered atarax, but did not work well. Health psych offered, patient declined.  #Insomnia- previously used Ambien and trazodone which were discontinued inpatient d/t AMS. He does not like Ambien. Prescribed trazodone 3/28.       Summary:   - holding eliquis and prasugrel with plts <50K   - stop protonix   - prescribed trazodone for insomnia, instructed to use instead of ativan   - 4/4 RBC transfusion, NIDHI, BM bx review   - peripheral chimerisms improving     I spent 40 minutes in the care of this patient today, which included time necessary for preparation for the visit, obtaining history, ordering medications/tests/procedures as medically indicated, review of pertinent medical literature, counseling of the patient, communication of recommendations to the care team, and documentation time.      Carlos Warner PA-C               In my judgment no risk for PPH has been identified at this time.

## 2024-03-29 ENCOUNTER — DOCUMENTATION ONLY (OUTPATIENT)
Dept: TRANSPLANT | Facility: CLINIC | Age: 49
End: 2024-03-29
Payer: COMMERCIAL

## 2024-04-03 LAB
BLD PROD TYP BPU: NORMAL
BLD PROD TYP BPU: NORMAL
BLOOD COMPONENT TYPE: NORMAL
BLOOD COMPONENT TYPE: NORMAL
CODING SYSTEM: NORMAL
CODING SYSTEM: NORMAL
CROSSMATCH: NORMAL
CROSSMATCH: NORMAL
ISSUE DATE AND TIME: NORMAL
UNIT ABO/RH: NORMAL
UNIT ABO/RH: NORMAL
UNIT NUMBER: NORMAL
UNIT NUMBER: NORMAL
UNIT STATUS: NORMAL
UNIT STATUS: NORMAL
UNIT TYPE ISBT: 9500
UNIT TYPE ISBT: 9500

## 2024-04-03 RX ORDER — HEPARIN SODIUM,PORCINE 10 UNIT/ML
5-20 VIAL (ML) INTRAVENOUS DAILY PRN
Status: CANCELLED | OUTPATIENT
Start: 2024-04-03

## 2024-04-03 RX ORDER — HEPARIN SODIUM (PORCINE) LOCK FLUSH IV SOLN 100 UNIT/ML 100 UNIT/ML
5 SOLUTION INTRAVENOUS
Status: CANCELLED | OUTPATIENT
Start: 2024-04-03

## 2024-04-04 ENCOUNTER — LAB (OUTPATIENT)
Dept: LAB | Facility: CLINIC | Age: 49
End: 2024-04-04
Attending: INTERNAL MEDICINE
Payer: COMMERCIAL

## 2024-04-04 ENCOUNTER — INFUSION THERAPY VISIT (OUTPATIENT)
Dept: TRANSPLANT | Facility: CLINIC | Age: 49
End: 2024-04-04
Attending: INTERNAL MEDICINE
Payer: COMMERCIAL

## 2024-04-04 ENCOUNTER — DOCUMENTATION ONLY (OUTPATIENT)
Dept: TRANSPLANT | Facility: CLINIC | Age: 49
End: 2024-04-04

## 2024-04-04 VITALS
TEMPERATURE: 97.7 F | OXYGEN SATURATION: 98 % | RESPIRATION RATE: 16 BRPM | SYSTOLIC BLOOD PRESSURE: 147 MMHG | BODY MASS INDEX: 27.09 KG/M2 | WEIGHT: 177.69 LBS | HEART RATE: 74 BPM | DIASTOLIC BLOOD PRESSURE: 83 MMHG

## 2024-04-04 VITALS
DIASTOLIC BLOOD PRESSURE: 88 MMHG | TEMPERATURE: 98.7 F | RESPIRATION RATE: 18 BRPM | HEART RATE: 71 BPM | OXYGEN SATURATION: 98 % | SYSTOLIC BLOOD PRESSURE: 156 MMHG

## 2024-04-04 DIAGNOSIS — D46.9 MDS (MYELODYSPLASTIC SYNDROME) (H): ICD-10-CM

## 2024-04-04 DIAGNOSIS — Z94.81 STATUS POST BONE MARROW TRANSPLANT (H): ICD-10-CM

## 2024-04-04 DIAGNOSIS — T45.1X5A ANTINEOPLASTIC CHEMOTHERAPY INDUCED PANCYTOPENIA (H): Primary | ICD-10-CM

## 2024-04-04 DIAGNOSIS — Z94.81 S/P ALLOGENEIC BONE MARROW TRANSPLANT (H): Primary | ICD-10-CM

## 2024-04-04 DIAGNOSIS — Z94.81 S/P ALLOGENEIC BONE MARROW TRANSPLANT (H): ICD-10-CM

## 2024-04-04 DIAGNOSIS — I26.93 SINGLE SUBSEGMENTAL PULMONARY EMBOLISM WITHOUT ACUTE COR PULMONALE (H): ICD-10-CM

## 2024-04-04 DIAGNOSIS — D61.810 ANTINEOPLASTIC CHEMOTHERAPY INDUCED PANCYTOPENIA (H): Primary | ICD-10-CM

## 2024-04-04 LAB — EBV DNA SERPL NAA+PROBE-ACNC: NOT DETECTED IU/ML

## 2024-04-04 PROCEDURE — 250N000011 HC RX IP 250 OP 636: Mod: JZ | Performed by: INTERNAL MEDICINE

## 2024-04-04 PROCEDURE — 36430 TRANSFUSION BLD/BLD COMPNT: CPT

## 2024-04-04 PROCEDURE — 96374 THER/PROPH/DIAG INJ IV PUSH: CPT

## 2024-04-04 PROCEDURE — 96372 THER/PROPH/DIAG INJ SC/IM: CPT | Mod: XS | Performed by: INTERNAL MEDICINE

## 2024-04-04 PROCEDURE — G0463 HOSPITAL OUTPT CLINIC VISIT: HCPCS | Mod: 25

## 2024-04-04 PROCEDURE — 87799 DETECT AGENT NOS DNA QUANT: CPT

## 2024-04-04 PROCEDURE — P9040 RBC LEUKOREDUCED IRRADIATED: HCPCS

## 2024-04-04 PROCEDURE — 99215 OFFICE O/P EST HI 40 MIN: CPT

## 2024-04-04 RX ORDER — ALBUTEROL SULFATE 5 MG/ML
2.5 SOLUTION RESPIRATORY (INHALATION)
Status: CANCELLED
Start: 2024-04-11

## 2024-04-04 RX ORDER — HEPARIN SODIUM,PORCINE 10 UNIT/ML
5-20 VIAL (ML) INTRAVENOUS DAILY PRN
Status: CANCELLED | OUTPATIENT
Start: 2024-04-11

## 2024-04-04 RX ORDER — VORICONAZOLE 200 MG/1
200 TABLET, FILM COATED ORAL 2 TIMES DAILY
Qty: 90 TABLET | Refills: 11 | Status: SHIPPED | OUTPATIENT
Start: 2024-04-04 | End: 2024-05-09

## 2024-04-04 RX ORDER — PENTAMIDINE ISETHIONATE 300 MG/300MG
300 INHALANT RESPIRATORY (INHALATION)
Status: CANCELLED
Start: 2024-04-11

## 2024-04-04 RX ORDER — HYDRALAZINE HYDROCHLORIDE 20 MG/ML
10 INJECTION INTRAMUSCULAR; INTRAVENOUS ONCE
Status: COMPLETED | OUTPATIENT
Start: 2024-04-04 | End: 2024-04-04

## 2024-04-04 RX ORDER — HEPARIN SODIUM (PORCINE) LOCK FLUSH IV SOLN 100 UNIT/ML 100 UNIT/ML
5 SOLUTION INTRAVENOUS
Status: CANCELLED | OUTPATIENT
Start: 2024-04-11

## 2024-04-04 RX ORDER — SIROLIMUS 0.5 MG/1
0.5 TABLET, FILM COATED ORAL DAILY
Qty: 28 TABLET | Refills: 0 | Status: ON HOLD | OUTPATIENT
Start: 2024-04-04 | End: 2024-04-16

## 2024-04-04 RX ADMIN — FILGRASTIM-SNDZ 480 MCG: 480 INJECTION, SOLUTION INTRAVENOUS; SUBCUTANEOUS at 11:48

## 2024-04-04 RX ADMIN — HYDRALAZINE HYDROCHLORIDE 10 MG: 20 INJECTION, SOLUTION INTRAMUSCULAR; INTRAVENOUS at 17:17

## 2024-04-04 ASSESSMENT — PAIN SCALES - GENERAL: PAINLEVEL: NO PAIN (0)

## 2024-04-04 NOTE — LETTER
4/4/2024         RE: Ziggy Hallman  [address] [facility] [address]        Dear Colleague,    Thank you for referring your patient, Ziggy Hallman, to the [facility]ARR[facility] [place]. Please see a copy of my visit note below.    Date: Apr 4, 2024    Chief Complaint: bmt d 68     History Of Present Illness:    I met Ziggy in January of 2024. To summarize his history, he has had multiple episodes of arterial and venous thrombosis and developed cytopenias. He was seen in 2015 by Dr. 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 He was also worked up for DKC and no mutations were found. She recommended continued observation and treating his hypercoagulability. More recently, his anemia worsened and he had a repeat bone marrow biopsy that showed 5-10% blasts. That was at [facility] and he was referred here and met with Dr. Cabral, shortly before her departure. She calculated his R-IPSS as 7.5 and recommended 4 cycles of azacitidine followed by evaluation for transplant. He has a repeat bone marrow on 12/29 also at [facility]. It indicated some improvement in blast count down to 5-6% and a plasma cell neoplasm. We did SPEP and a PET scan that was negative. We also reviewed the bone marrow here. Preliminarily, it appears that the plasma cells may not be monoclonal and the blast count might be a bit less. The flow cytometry was negative. His counts have improved. We proceeded with BMT and comes in for follow up.    He continues to feel well. He is going to the gym now. No rashes or fevers. No GI complaints.     ROS: 14-point ROS is negative unless otherwise stated in HPI.    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    Medications:  Current Outpatient Medications   Medication Sig Dispense Refill    apixaban ANTICOAGULANT (ELIQUIS) 2.5 MG tablet Take 1 tablet (2.5 mg) by mouth 2 times daily 90 tablet 11     sirolimus (GENERIC EQUIVALENT) 0.5 MG tablet Take 1 tablet (0.5 mg) by mouth daily for 28 days Take 0.5 mg daily 28 tablet 0    voriconazole (VFEND) 200 MG tablet Take 1 tablet (200 mg) by mouth 2 times daily Start on Saturday 3/9/24 90 tablet 11    acyclovir (ZOVIRAX) 800 MG tablet Take 1 tablet (800 mg) by mouth 2 times daily 60 tablet 1    lisinopril (ZESTRIL) 40 MG tablet Take 0.5 tablets (20 mg) by mouth daily      LORazepam (ATIVAN) 0.5 MG tablet Take 1-2 tablets (0.5-1 mg) by mouth every 4 hours as needed for vomiting or nausea (nausea/vomiting/sleep) 15 tablet 0    metoprolol succinate ER (TOPROL XL) 100 MG 24 hr tablet Take 1 tablet (100 mg) by mouth daily      pantoprazole (PROTONIX) 40 MG EC tablet Take 1 tablet (40 mg) by mouth daily      prasugrel (EFFIENT) 10 MG TABS tablet Take 1 tablet (10 mg) by mouth daily HOLD EFFECTIVE 3/7 PLTS <75K (Patient not taking: Reported on 3/7/2024)      psyllium (METAMUCIL/KONSYL) 58.6 % powder Take 3 g by mouth daily (Patient not taking: Reported on 3/21/2024) 174 g 1    sulfamethoxazole-trimethoprim (BACTRIM DS) 800-160 MG tablet Take 1 tablet by mouth Every Mon, Tues two times daily HOLD WITH DROPPING COUNTS STARTING 3/7 (Patient not taking: Reported on 3/7/2024)      traZODone (DESYREL) 50 MG tablet Take 1 tablet (50 mg) by mouth at bedtime 30 tablet 1     No current facility-administered medications for this visit.     Facility-Administered Medications Ordered in Other Visits   Medication Dose Route Frequency Provider Last Rate Last Admin    filgrastim-sndz (ZARXIO) injection syringe 480 mcg  480 mcg Subcutaneous Daily PRN Taran Bacon MD   480 mcg at 04/04/24 1148     Updated PMH:  Past Medical History:   Diagnosis Date    AMI anterior wall (H)     Mid LAD on cath with stent    CAD S/P percutaneous coronary angioplasty 07/01/2016    Unstable agina with anterior myocardial damage reported without mycardial damage by patient's history    CVA (cerebrovascular  "accident) (H) 2012    Reporting thromboembolic episode on the right neck. Pt states \"I've had ten strokes.\"    Hypercoagulable state (H24)     Uncertain etiology--seeing Hematologist    MEDICAL HISTORY OF -     Possibly PFO     Updated PSH:  Past Surgical History:   Procedure Laterality Date    AMPUTATE TOE(S)      ARTHROSCOPY KNEE RT/LT      Right     ARTHROSCOPY KNEE RT/LT      Left    CARDIAC CATHERIZATION      With stent placement    ESOPHAGOSCOPY, GASTROSCOPY, DUODENOSCOPY (EGD), COMBINED N/A 2024    Procedure: Esophagoscopy, gastroscopy, duodenoscopy (EGD), combined;  Surgeon: Drew Jasso MD;  Location: [facility]    IR CHEST PORT PLACEMENT > 5 YRS OF AGE  2023    IR CVC TUNNEL PLACEMENT > 5 YRS OF AGE  2024    IR CVC TUNNEL REMOVAL LEFT  3/21/2024     Updated FH:  Family History   Problem Relation Age of Onset    Diabetes No family hx of     Coronary Artery Disease No family hx of     Hypertension No family hx of     Hyperlipidemia No family hx of     Breast Cancer No family hx of      Updated SH:  Social History     Tobacco Use    Smoking status: Former     Packs/day: .5     Types: Cigarettes     Passive exposure: Past (Mom smoked cigs indoors)    Smokeless tobacco: Never    Tobacco comments:     cigarette once in a while   Substance Use Topics    Alcohol use: Yes     Alcohol/week: 0.0 standard drinks of alcohol     Comment: 4 times per week 5 drinks    Drug use: No     Physical Exam:  BP (!) 147/83   Pulse 74   Temp 97.7  F (36.5  C) (Oral)   Resp 16   Wt 80.6 kg (177 lb 11.1 oz)   SpO2 98%   BMI 27.09 kg/m    GA: NAD. Appears as stated age.  HEENT: PERRL, OP Clear  Neck: Supple, no JVD  CV: RRR, no mrg  Lungs: CTAB, no wheezes  Abd: Soft, nt, nd  Lymph: No cervical LAD, no HSM  Ext: No edema. Warm  Neuro: AAO, moving all ext. Symmetric face  Skin: warm, no rashes  Psyc: Appropriate and cooperative  Access: port    ECO    GVHD: 0/0/0/0    Labs, pathology and other diagnostics " reviewed    WBC   Date Value Ref Range Status   05/28/2020 4.9 4.0 - 11.0 10e9/L Final     WBC Count   Date Value Ref Range Status   04/04/2024 1.5 (L) 4.0 - 11.0 10e3/uL Final     Hemoglobin   Date Value Ref Range Status   04/04/2024 7.4 (L) 13.3 - 17.7 g/dL Final   05/28/2020 12.6 (L) 13.3 - 17.7 g/dL Final     Platelet Count   Date Value Ref Range Status   04/04/2024 35 (LL) 150 - 450 10e3/uL Final   05/28/2020 280 150 - 450 10e9/L Final     Creatinine   Date Value Ref Range Status   04/04/2024 0.81 0.67 - 1.17 mg/dL Final   05/28/2020 0.80 0.66 - 1.25 mg/dL Final     Potassium   Date Value Ref Range Status   04/04/2024 3.9 3.4 - 5.3 mmol/L Final   05/28/2020 3.8 3.4 - 5.3 mmol/L Final     Magnesium   Date Value Ref Range Status   02/28/2024 1.5 (L) 1.7 - 2.3 mg/dL Final     EBV DNA Copies/mL   Date Value Ref Range Status   03/27/2024 Not Detected Not Detected copies/mL Final     A/P    Date Apr 4, 2024   Diagnosis High risk MDS, R-IPSS 7.5   Planned Treatment MA Flu/Bu   GVHD Prophylaxis PTCy/Siro/MMF   HLA Match 8/8, DP permissive   PRA Negative   CMV Match -/-   Sex Match F -> M   Donor Age 26   ABO Match O -> A (minor)   KPS PS 80   BMI Body mass index is 27.09 kg/m .   PET Negative   LVEF 50-55%   Valvular abnormalities*  Mild mitral insufficiency   History of MI or arrhythmias Yes   ECG/QTC NSR/425   FEV1 73%   DLCOcor 93   Cr/GFR Creatinine   Date Value Ref Range Status   04/04/2024 0.81 0.67 - 1.17 mg/dL Final   05/28/2020 0.80 0.66 - 1.25 mg/dL Final      Bilirubin Lab Results   Component Value Date    BILITOTAL 0.2 01/17/2024    BILITOTAL 0.8 05/28/2020      ALT/AST Lab Results   Component Value Date    ALT 18 01/17/2024    ALT 25 05/28/2020    ;   AST   Date Value Ref Range Status   04/04/2024 40 0 - 45 U/L Final     Comment:     Reference intervals for this test were updated on 6/12/2023 to more accurately reflect our healthy population. There may be differences in the flagging of prior results with  similar values performed with this method. Interpretation of those prior results can be made in the context of the updated reference intervals.   05/28/2020 20 0 - 45 U/L Final      Dental Clearance    Adequate Marrow reserve Yes   Chest CT Few sub 4 mm nodules (negative on PET)   Infectious work-up     Pregnancy Test    Lupron Injection    HCT CI 4 points   Disease status at transplant CR with MRD+   LDH    Planned maintenance NGS pending   Anticoagulation On prasugrel and apixaban. Hold when Plt < 50 and resume when able. High risk for thrombosis.   Toxoplasma IgG Negative   Other**    Proposed Clinical Trials      MDS s/p MAC allo  - Pancytopenia. Getting pRBC today and GCSF. Will change anticoagulation to 2.5 bid. Prasugrel is off. Etiology is unknown but he is 88% chimeric in the bone marrow only, up from 86% at day 30. There is some mild dyserythropoeisis. We are awaiting MRD testing. We will start his taper over 4 weeks now. I discussed that after the taper, if counts and chimerisms don't improve, we will need to do some chemotherapy.  - OI ppx: acv, pentamidine, vori.    Continue with weekly visits for now.    40 minutes spent on the date of the encounter doing chart review, interpretation of results, patient visit, documentation and coordination of care.         BMT Auto Cell Therapy

## 2024-04-04 NOTE — PHARMACY
I met with Ziggy Hallman to review his medication list and fill his med box for 1 week per his request:    1. Pill box was empty at fill today.     2. Changes made by provider at today's visit:   Eliquis reduced to 2.5 mg BID   Begin sirolimus taper over 4 weeks. Calendar uploaded.     3: New prescription sent for Eliquis 2.5 mg BID by Dr. Bacon to [facility]. Refills for voriconazole and sirolimus 0.5 mg also sent. Ziggy aware to  Eliquis and that he will need to add it to his med box. There was enough voriconazole and sirolimus to fill his med box for the week.     Current Outpatient Medications   Medication Sig Dispense Refill    acyclovir (ZOVIRAX) 800 MG tablet Take 1 tablet (800 mg) by mouth 2 times daily 60 tablet 1    apixaban ANTICOAGULANT (ELIQUIS) 2.5 MG tablet Take 1 tablet (2.5 mg) by mouth 2 times daily 90 tablet 11    lisinopril (ZESTRIL) 40 MG tablet Take 0.5 tablets (20 mg) by mouth daily      LORazepam (ATIVAN) 0.5 MG tablet Take 1-2 tablets (0.5-1 mg) by mouth every 4 hours as needed for vomiting or nausea (nausea/vomiting/sleep) 15 tablet 0    metoprolol succinate ER (TOPROL XL) 100 MG 24 hr tablet Take 1 tablet (100 mg) by mouth daily      pantoprazole (PROTONIX) 40 MG EC tablet Take 1 tablet (40 mg) by mouth daily      prasugrel (EFFIENT) 10 MG TABS tablet Take 1 tablet (10 mg) by mouth daily HOLD EFFECTIVE 3/7 PLTS <75K (Patient not taking: Reported on 3/7/2024)      psyllium (METAMUCIL/KONSYL) 58.6 % powder Take 3 g by mouth daily (Patient not taking: Reported on 3/21/2024) 174 g 1    sirolimus (GENERIC EQUIVALENT) 0.5 MG tablet Take 1 tablet (0.5 mg) by mouth daily for 28 days Take 0.5 mg daily 28 tablet 0    sulfamethoxazole-trimethoprim (BACTRIM DS) 800-160 MG tablet Take 1 tablet by mouth Every Mon, Tues two times daily HOLD WITH DROPPING COUNTS STARTING 3/7 (Patient not taking: Reported on 3/7/2024)      traZODone (DESYREL) 50 MG tablet Take 1 tablet (50 mg) by mouth at  bedtime 30 tablet 1    voriconazole (VFEND) 200 MG tablet Take 1 tablet (200 mg) by mouth 2 times daily Start on Saturday 3/9/24 90 tablet 11        Pertinent labs considered today:  Lab Results   Component Value Date     04/04/2024     05/28/2020                 normal sodium 136-148  Lab Results   Component Value Date    POTASSIUM 3.9 04/04/2024    POTASSIUM 3.8 05/28/2020       normal potassium 3.5-5.2   Lab Results   Component Value Date    CHLORIDE 110 04/04/2024    CHLORIDE 107 05/28/2020           normal chloride    Lab Results   Component Value Date    CO2 22 04/04/2024    CO2 24 05/28/2020                normal CO2 20-32  Lab Results   Component Value Date    BUN 13.6 04/04/2024    BUN 10 05/28/2020                 normal BUN 5-24  Lab Results   Component Value Date    GLC 93 04/04/2024     01/24/2024     05/28/2020                 normal glucose   Lab Results   Component Value Date    CR 0.81 04/04/2024    CR 0.80 05/28/2020                 normal cr 0.8-1.5  Lab Results   Component Value Date    REMY 8.6 04/04/2024    REMY 9.3 05/28/2020               normal calcium  8.5-10.4  Lab Results   Component Value Date    BILITOTAL 0.2 04/04/2024    BILITOTAL 0.8 05/28/2020          normal bilirubin 0.2-1.3  Lab Results   Component Value Date    PROTTOTAL 6.0 04/04/2024    PROTTOTAL 8.7 05/28/2020          normal total protein 6.0-8.2  Lab Results   Component Value Date    ALBUMIN 3.1 04/04/2024    ALBUMIN 3.4 05/28/2020             normal albumin 3.2-4.5  Lab Results   Component Value Date    ALKPHOS 88 04/04/2024    ALKPHOS 114 05/28/2020            normal alkphos   Lab Results   Component Value Date    ALT 20 04/04/2024    ALT 25 05/28/2020         normal ALT 0-65  Lab Results   Component Value Date    AST 40 04/04/2024    AST 20 05/28/2020         normal AST 0-37  Lab Results   Component Value Date    HGB 7.4 04/04/2024    HGB 12.6 05/28/2020     Lab Results    Component Value Date    WBC 1.5 04/04/2024    WBC 4.9 05/28/2020      Lab Results   Component Value Date    PLT 35 04/04/2024     05/28/2020     Estimated Creatinine Clearance: 124.4 mL/min (based on SCr of 0.81 mg/dL).    Thank you,  Sterling Quiles, PharmD

## 2024-04-04 NOTE — PROGRESS NOTES
Date: Apr 4, 2024    Chief Complaint: bmt d 68     History Of Present Illness:    I met Ziggy in January of 2024. To summarize his history, he has had multiple episodes of arterial and venous thrombosis and developed cytopenias. He was seen in 2015 by Dr. 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 He was also worked up for DKC and no mutations were found. She recommended continued observation and treating his hypercoagulability. More recently, his anemia worsened and he had a repeat bone marrow biopsy that showed 5-10% blasts. That was at [facility] and he was referred here and met with Dr. Cabral, shortly before her departure. She calculated his R-IPSS as 7.5 and recommended 4 cycles of azacitidine followed by evaluation for transplant. He has a repeat bone marrow on 12/29 also at [facility]. It indicated some improvement in blast count down to 5-6% and a plasma cell neoplasm. We did SPEP and a PET scan that was negative. We also reviewed the bone marrow here. Preliminarily, it appears that the plasma cells may not be monoclonal and the blast count might be a bit less. The flow cytometry was negative. His counts have improved. We proceeded with BMT and comes in for follow up.    He continues to feel well. He is going to the gym now. No rashes or fevers. No GI complaints.     ROS: 14-point ROS is negative unless otherwise stated in HPI.    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    Medications:  Current Outpatient Medications   Medication Sig Dispense Refill    apixaban ANTICOAGULANT (ELIQUIS) 2.5 MG tablet Take 1 tablet (2.5 mg) by mouth 2 times daily 90 tablet 11    sirolimus (GENERIC EQUIVALENT) 0.5 MG tablet Take 1 tablet (0.5 mg) by mouth daily for 28 days Take 0.5 mg daily 28 tablet 0    voriconazole (VFEND) 200 MG tablet Take 1 tablet (200 mg) by mouth 2 times daily Start on Saturday 3/9/24 90 tablet 11    acyclovir (ZOVIRAX) 800 MG tablet Take  "1 tablet (800 mg) by mouth 2 times daily 60 tablet 1    lisinopril (ZESTRIL) 40 MG tablet Take 0.5 tablets (20 mg) by mouth daily      LORazepam (ATIVAN) 0.5 MG tablet Take 1-2 tablets (0.5-1 mg) by mouth every 4 hours as needed for vomiting or nausea (nausea/vomiting/sleep) 15 tablet 0    metoprolol succinate ER (TOPROL XL) 100 MG 24 hr tablet Take 1 tablet (100 mg) by mouth daily      pantoprazole (PROTONIX) 40 MG EC tablet Take 1 tablet (40 mg) by mouth daily      prasugrel (EFFIENT) 10 MG TABS tablet Take 1 tablet (10 mg) by mouth daily HOLD EFFECTIVE 3/7 PLTS <75K (Patient not taking: Reported on 3/7/2024)      psyllium (METAMUCIL/KONSYL) 58.6 % powder Take 3 g by mouth daily (Patient not taking: Reported on 3/21/2024) 174 g 1    sulfamethoxazole-trimethoprim (BACTRIM DS) 800-160 MG tablet Take 1 tablet by mouth Every Mon, Tues two times daily HOLD WITH DROPPING COUNTS STARTING 3/7 (Patient not taking: Reported on 3/7/2024)      traZODone (DESYREL) 50 MG tablet Take 1 tablet (50 mg) by mouth at bedtime 30 tablet 1     No current facility-administered medications for this visit.     Facility-Administered Medications Ordered in Other Visits   Medication Dose Route Frequency Provider Last Rate Last Admin    filgrastim-sndz (ZARXIO) injection syringe 480 mcg  480 mcg Subcutaneous Daily PRN Taran Bacon MD   480 mcg at 04/04/24 1148     Updated PMH:  Past Medical History:   Diagnosis Date    AMI anterior wall (H)     Mid LAD on cath with stent    CAD S/P percutaneous coronary angioplasty 07/01/2016    Unstable agina with anterior myocardial damage reported without mycardial damage by patient's history    CVA (cerebrovascular accident) (H) 01/01/2012    Reporting thromboembolic episode on the right neck. Pt states \"I've had ten strokes.\"    Hypercoagulable state (H24)     Uncertain etiology--seeing Hematologist    MEDICAL HISTORY OF -     Possibly PFO     Updated PSH:  Past Surgical History:   Procedure " Laterality Date    AMPUTATE TOE(S)      ARTHROSCOPY KNEE RT/LT      Right     ARTHROSCOPY KNEE RT/LT      Left    CARDIAC CATHERIZATION      With stent placement    ESOPHAGOSCOPY, GASTROSCOPY, DUODENOSCOPY (EGD), COMBINED N/A 2024    Procedure: Esophagoscopy, gastroscopy, duodenoscopy (EGD), combined;  Surgeon: Drew Jasso MD;  Location: [facility]    IR CHEST PORT PLACEMENT > 5 YRS OF AGE  2023    IR CVC TUNNEL PLACEMENT > 5 YRS OF AGE  2024    IR CVC TUNNEL REMOVAL LEFT  3/21/2024     Updated FH:  Family History   Problem Relation Age of Onset    Diabetes No family hx of     Coronary Artery Disease No family hx of     Hypertension No family hx of     Hyperlipidemia No family hx of     Breast Cancer No family hx of      Updated SH:  Social History     Tobacco Use    Smoking status: Former     Packs/day: .5     Types: Cigarettes     Passive exposure: Past (Mom smoked cigs indoors)    Smokeless tobacco: Never    Tobacco comments:     cigarette once in a while   Substance Use Topics    Alcohol use: Yes     Alcohol/week: 0.0 standard drinks of alcohol     Comment: 4 times per week 5 drinks    Drug use: No     Physical Exam:  BP (!) 147/83   Pulse 74   Temp 97.7  F (36.5  C) (Oral)   Resp 16   Wt 80.6 kg (177 lb 11.1 oz)   SpO2 98%   BMI 27.09 kg/m    GA: NAD. Appears as stated age.  HEENT: PERRL, OP Clear  Neck: Supple, no JVD  CV: RRR, no mrg  Lungs: CTAB, no wheezes  Abd: Soft, nt, nd  Lymph: No cervical LAD, no HSM  Ext: No edema. Warm  Neuro: AAO, moving all ext. Symmetric face  Skin: warm, no rashes  Psyc: Appropriate and cooperative  Access: port    ECO    GVHD: 0/0/0/0    Labs, pathology and other diagnostics reviewed    WBC   Date Value Ref Range Status   2020 4.9 4.0 - 11.0 10e9/L Final     WBC Count   Date Value Ref Range Status   2024 1.5 (L) 4.0 - 11.0 10e3/uL Final     Hemoglobin   Date Value Ref Range Status   2024 7.4 (L) 13.3 - 17.7 g/dL Final   2020 12.6  (L) 13.3 - 17.7 g/dL Final     Platelet Count   Date Value Ref Range Status   04/04/2024 35 (LL) 150 - 450 10e3/uL Final   05/28/2020 280 150 - 450 10e9/L Final     Creatinine   Date Value Ref Range Status   04/04/2024 0.81 0.67 - 1.17 mg/dL Final   05/28/2020 0.80 0.66 - 1.25 mg/dL Final     Potassium   Date Value Ref Range Status   04/04/2024 3.9 3.4 - 5.3 mmol/L Final   05/28/2020 3.8 3.4 - 5.3 mmol/L Final     Magnesium   Date Value Ref Range Status   02/28/2024 1.5 (L) 1.7 - 2.3 mg/dL Final     EBV DNA Copies/mL   Date Value Ref Range Status   03/27/2024 Not Detected Not Detected copies/mL Final     A/P    Date Apr 4, 2024   Diagnosis High risk MDS, R-IPSS 7.5   Planned Treatment MA Flu/Bu   GVHD Prophylaxis PTCy/Siro/MMF   HLA Match 8/8, DP permissive   PRA Negative   CMV Match -/-   Sex Match F -> M   Donor Age 26   ABO Match O -> A (minor)   KPS PS 80   BMI Body mass index is 27.09 kg/m .   PET Negative   LVEF 50-55%   Valvular abnormalities*  Mild mitral insufficiency   History of MI or arrhythmias Yes   ECG/QTC NSR/425   FEV1 73%   DLCOcor 93   Cr/GFR Creatinine   Date Value Ref Range Status   04/04/2024 0.81 0.67 - 1.17 mg/dL Final   05/28/2020 0.80 0.66 - 1.25 mg/dL Final      Bilirubin Lab Results   Component Value Date    BILITOTAL 0.2 01/17/2024    BILITOTAL 0.8 05/28/2020      ALT/AST Lab Results   Component Value Date    ALT 18 01/17/2024    ALT 25 05/28/2020    ;   AST   Date Value Ref Range Status   04/04/2024 40 0 - 45 U/L Final     Comment:     Reference intervals for this test were updated on 6/12/2023 to more accurately reflect our healthy population. There may be differences in the flagging of prior results with similar values performed with this method. Interpretation of those prior results can be made in the context of the updated reference intervals.   05/28/2020 20 0 - 45 U/L Final      Dental Clearance    Adequate Marrow reserve Yes   Chest CT Few sub 4 mm nodules (negative on PET)    Infectious work-up     Pregnancy Test    Lupron Injection    HCT CI 4 points   Disease status at transplant CR with MRD+   LDH    Planned maintenance NGS pending   Anticoagulation On prasugrel and apixaban. Hold when Plt < 50 and resume when able. High risk for thrombosis.   Toxoplasma IgG Negative   Other**    Proposed Clinical Trials      MDS s/p MAC allo  - Pancytopenia. Getting pRBC today and GCSF. Will change anticoagulation to 2.5 bid. Prasugrel is off. Etiology is unknown but he is 88% chimeric in the bone marrow only, up from 86% at day 30. There is some mild dyserythropoeisis. We are awaiting MRD testing. We will start his taper over 4 weeks now. I discussed that after the taper, if counts and chimerisms don't improve, we will need to do some chemotherapy.  - OI ppx: acv, pentamidine, vori.    Continue with weekly visits for now.    40 minutes spent on the date of the encounter doing chart review, interpretation of results, patient visit, documentation and coordination of care.

## 2024-04-04 NOTE — PROGRESS NOTES
Infusion Nursing Note:  Ziggy Hallman presents today for add-on infusion.    Patient seen by provider today: Yes: Dr. Bacon   present during visit today: Not Applicable.    Note: Pt assessed by provider. Labs monitored; Hgb 7.4 and ANC 0.9- both meet parameters. Pt received 1 unit of RBC and 480 mcg Zarxio. At end of blood transfusion /93 and pt reported he forgot to take his medications all day, including his lisinopril and metoprolol. Dr. Bacon notified. VORB 4/4/24 at 1615 Dr. Bacon/Jennifer Bowman RN- Keep pt for 30-60 minutes after taking medications and okay to discharge if  or less. BP rechecked 30 minutes post PO antihypertensives and /93.     Writers shift ended prior to achieving BP goal. RN to RN report given to Rajiv Alvarado RN to recheck BP again in 15 minutes and follow up with Dr. Bacon if needed.      Intravenous Access:  Implanted Port.    Treatment Conditions:  Lab Results   Component Value Date    HGB 7.4 (L) 04/04/2024    WBC 1.5 (L) 04/04/2024    ANEU 1.6 02/20/2024    ANEUTAUTO 0.9 (L) 04/04/2024    PLT 35 (LL) 04/04/2024        Results reviewed, labs MET treatment parameters, ok to proceed with treatment.      Post Infusion Assessment:  Patient tolerated infusion without incident.  Patient tolerated injection without incident.       Jennifer Bowman, MILLI

## 2024-04-04 NOTE — NURSING NOTE
Elevated b/p 170s/180s systolic. Pt states he took his b/p meds about an hour ago. Provider notified. 10mg IV hydralazine ordered and administered. Pt's b/p responded. 150s systolic. See flowsheet. Pt educated to check b/p at home and to monitor for hypotension and to call on-call if lower than usual b/p and or dizziness. Pt verbalizes understanding. Pt d/c'd in stable condition.

## 2024-04-04 NOTE — PHARMACY-TAPERING SERVICE
[facility]     Name: Ziggy Hallman   MRN: [id number]  : 71    Medication:  Sirolimus Taper - 4 Week     Giuliano Monday Tuesday Wednesday Thursday Friday Saturday   31-Mar-2024 1-Apr-2024 2-Apr-2024 3-Apr-2024 4-Apr-2024 5 6   0.5 mg  0.5 mg  0.5 mg  0.5 mg  0.5 mg  0 0.5 mg   7 8 9-2024       0.5 mg 0.5 mg 0.5 mg 0 0.5 mg 0 0.5 mg        19    0.5 mg 0 0.5 mg 0 0.5 mg 0 0.5 mg   2024 23 242024 26 27   0 0 0.5 mg 0 0.5 mg 0 0   28-Apr-2024 29-Apr-2024 30-Apr-2024 1-May-2024 2-May-2024 3-May-2024 4-May-2024   0.5 mg 0 0 0 0.5 mg Stop

## 2024-04-09 ENCOUNTER — TELEPHONE (OUTPATIENT)
Dept: TRANSPLANT | Facility: CLINIC | Age: 49
End: 2024-04-09
Payer: COMMERCIAL

## 2024-04-09 ENCOUNTER — HOSPITAL ENCOUNTER (OUTPATIENT)
Dept: ULTRASOUND IMAGING | Facility: HOSPITAL | Age: 49
Discharge: HOME | End: 2024-04-09
Attending: INTERNAL MEDICINE | Admitting: INTERNAL MEDICINE
Payer: COMMERCIAL

## 2024-04-09 DIAGNOSIS — D46.9 MDS (MYELODYSPLASTIC SYNDROME) (H): Primary | ICD-10-CM

## 2024-04-09 DIAGNOSIS — D46.9 MDS (MYELODYSPLASTIC SYNDROME) (H): ICD-10-CM

## 2024-04-09 PROCEDURE — 93970 EXTREMITY STUDY: CPT

## 2024-04-09 NOTE — TELEPHONE ENCOUNTER
Patient called triage line with swollen feet and knee pain. RNCC discussed with Dr. Bacon, who ordered lower extremity ultrasounds for patient. Scheduled at 1:30 at [facility]. Will watch for results and call patient if intervention is needed. Has appointment scheduled for Thursday.

## 2024-04-10 ENCOUNTER — TELEPHONE (OUTPATIENT)
Dept: TRANSPLANT | Facility: CLINIC | Age: 49
End: 2024-04-10
Payer: COMMERCIAL

## 2024-04-10 LAB
CULTURE HARVEST COMPLETE DATE: NORMAL
INTERPRETATION: NORMAL

## 2024-04-11 ENCOUNTER — OFFICE VISIT (OUTPATIENT)
Dept: TRANSPLANT | Facility: CLINIC | Age: 49
End: 2024-04-11
Attending: INTERNAL MEDICINE
Payer: COMMERCIAL

## 2024-04-11 ENCOUNTER — LAB (OUTPATIENT)
Dept: LAB | Facility: CLINIC | Age: 49
End: 2024-04-11
Attending: INTERNAL MEDICINE
Payer: COMMERCIAL

## 2024-04-11 VITALS
RESPIRATION RATE: 16 BRPM | TEMPERATURE: 98.3 F | WEIGHT: 178.4 LBS | HEART RATE: 69 BPM | SYSTOLIC BLOOD PRESSURE: 164 MMHG | BODY MASS INDEX: 27.2 KG/M2 | OXYGEN SATURATION: 98 % | DIASTOLIC BLOOD PRESSURE: 88 MMHG

## 2024-04-11 DIAGNOSIS — Z94.81 S/P ALLOGENEIC BONE MARROW TRANSPLANT (H): ICD-10-CM

## 2024-04-11 DIAGNOSIS — D46.9 MDS (MYELODYSPLASTIC SYNDROME) (H): ICD-10-CM

## 2024-04-11 DIAGNOSIS — Z94.81 STATUS POST BONE MARROW TRANSPLANT (H): Primary | ICD-10-CM

## 2024-04-11 DIAGNOSIS — K92.2 GASTROINTESTINAL HEMORRHAGE, UNSPECIFIED GASTROINTESTINAL HEMORRHAGE TYPE: ICD-10-CM

## 2024-04-11 DIAGNOSIS — I26.93 SINGLE SUBSEGMENTAL PULMONARY EMBOLISM WITHOUT ACUTE COR PULMONALE (H): ICD-10-CM

## 2024-04-11 DIAGNOSIS — D46.9 MDS (MYELODYSPLASTIC SYNDROME) (H): Primary | ICD-10-CM

## 2024-04-11 LAB
ALBUMIN SERPL BCG-MCNC: 3.1 G/DL (ref 3.5–5.2)
ALP SERPL-CCNC: 83 U/L (ref 40–150)
ALT SERPL W P-5'-P-CCNC: 21 U/L (ref 0–70)
ANION GAP SERPL CALCULATED.3IONS-SCNC: 9 MMOL/L (ref 7–15)
AST SERPL W P-5'-P-CCNC: 33 U/L (ref 0–45)
BASOPHILS # BLD AUTO: ABNORMAL 10*3/UL
BASOPHILS # BLD MANUAL: 0 10E3/UL (ref 0–0.2)
BASOPHILS NFR BLD AUTO: ABNORMAL %
BASOPHILS NFR BLD MANUAL: 0 %
BILIRUB SERPL-MCNC: 0.2 MG/DL
BUN SERPL-MCNC: 10 MG/DL (ref 6–20)
CALCIUM SERPL-MCNC: 8.6 MG/DL (ref 8.6–10)
CHLORIDE SERPL-SCNC: 110 MMOL/L (ref 98–107)
CMV DNA SPEC NAA+PROBE-ACNC: NOT DETECTED IU/ML
CREAT SERPL-MCNC: 0.89 MG/DL (ref 0.67–1.17)
DEPRECATED HCO3 PLAS-SCNC: 23 MMOL/L (ref 22–29)
EBV DNA SERPL NAA+PROBE-ACNC: NOT DETECTED IU/ML
EGFRCR SERPLBLD CKD-EPI 2021: >90 ML/MIN/1.73M2
EOSINOPHIL # BLD AUTO: ABNORMAL 10*3/UL
EOSINOPHIL # BLD MANUAL: 0 10E3/UL (ref 0–0.7)
EOSINOPHIL NFR BLD AUTO: ABNORMAL %
EOSINOPHIL NFR BLD MANUAL: 2 %
ERYTHROCYTE [DISTWIDTH] IN BLOOD BY AUTOMATED COUNT: 15.2 % (ref 10–15)
GLUCOSE SERPL-MCNC: 98 MG/DL (ref 70–99)
HCT VFR BLD AUTO: 26.3 % (ref 40–53)
HGB BLD-MCNC: 8.8 G/DL (ref 13.3–17.7)
IMM GRANULOCYTES # BLD: ABNORMAL 10*3/UL
IMM GRANULOCYTES NFR BLD: ABNORMAL %
LDH SERPL L TO P-CCNC: 253 U/L (ref 0–250)
LYMPHOCYTES # BLD AUTO: ABNORMAL 10*3/UL
LYMPHOCYTES # BLD MANUAL: 0.4 10E3/UL (ref 0.8–5.3)
LYMPHOCYTES NFR BLD AUTO: ABNORMAL %
LYMPHOCYTES NFR BLD MANUAL: 22 %
MCH RBC QN AUTO: 30.7 PG (ref 26.5–33)
MCHC RBC AUTO-ENTMCNC: 33.5 G/DL (ref 31.5–36.5)
MCV RBC AUTO: 92 FL (ref 78–100)
METAMYELOCYTES # BLD MANUAL: 0 10E3/UL
METAMYELOCYTES NFR BLD MANUAL: 1 %
MONOCYTES # BLD AUTO: ABNORMAL 10*3/UL
MONOCYTES # BLD MANUAL: 0.2 10E3/UL (ref 0–1.3)
MONOCYTES NFR BLD AUTO: ABNORMAL %
MONOCYTES NFR BLD MANUAL: 15 %
MYELOCYTES # BLD MANUAL: 0 10E3/UL
MYELOCYTES NFR BLD MANUAL: 2 %
NEUTROPHILS # BLD AUTO: ABNORMAL 10*3/UL
NEUTROPHILS # BLD MANUAL: 0.9 10E3/UL (ref 1.6–8.3)
NEUTROPHILS NFR BLD AUTO: ABNORMAL %
NEUTROPHILS NFR BLD MANUAL: 58 %
NRBC # BLD AUTO: 0 10E3/UL
NRBC BLD AUTO-RTO: 0 /100
PLAT MORPH BLD: ABNORMAL
PLATELET # BLD AUTO: 40 10E3/UL (ref 150–450)
POTASSIUM SERPL-SCNC: 3.7 MMOL/L (ref 3.4–5.3)
PROT SERPL-MCNC: 5.9 G/DL (ref 6.4–8.3)
RBC # BLD AUTO: 2.87 10E6/UL (ref 4.4–5.9)
RBC MORPH BLD: ABNORMAL
SODIUM SERPL-SCNC: 142 MMOL/L (ref 135–145)
WBC # BLD AUTO: 1.6 10E3/UL (ref 4–11)

## 2024-04-11 PROCEDURE — 82247 BILIRUBIN TOTAL: CPT | Performed by: PHYSICIAN ASSISTANT

## 2024-04-11 PROCEDURE — 85027 COMPLETE CBC AUTOMATED: CPT | Performed by: PHYSICIAN ASSISTANT

## 2024-04-11 PROCEDURE — 94640 AIRWAY INHALATION TREATMENT: CPT | Performed by: INTERNAL MEDICINE

## 2024-04-11 PROCEDURE — G0463 HOSPITAL OUTPT CLINIC VISIT: HCPCS

## 2024-04-11 PROCEDURE — 250N000011 HC RX IP 250 OP 636: Performed by: INTERNAL MEDICINE

## 2024-04-11 PROCEDURE — 36591 DRAW BLOOD OFF VENOUS DEVICE: CPT

## 2024-04-11 PROCEDURE — 36591 DRAW BLOOD OFF VENOUS DEVICE: CPT | Performed by: PHYSICIAN ASSISTANT

## 2024-04-11 PROCEDURE — 87533 HHV-6 DNA QUANT: CPT

## 2024-04-11 PROCEDURE — 85007 BL SMEAR W/DIFF WBC COUNT: CPT | Performed by: PHYSICIAN ASSISTANT

## 2024-04-11 PROCEDURE — 94642 AEROSOL INHALATION TREATMENT: CPT | Performed by: INTERNAL MEDICINE

## 2024-04-11 PROCEDURE — 99215 OFFICE O/P EST HI 40 MIN: CPT

## 2024-04-11 PROCEDURE — 84155 ASSAY OF PROTEIN SERUM: CPT | Performed by: PHYSICIAN ASSISTANT

## 2024-04-11 PROCEDURE — 83615 LACTATE (LD) (LDH) ENZYME: CPT

## 2024-04-11 PROCEDURE — 87799 DETECT AGENT NOS DNA QUANT: CPT

## 2024-04-11 RX ORDER — PANTOPRAZOLE SODIUM 40 MG/1
40 TABLET, DELAYED RELEASE ORAL DAILY
Qty: 30 TABLET | Refills: 1 | Status: SHIPPED | OUTPATIENT
Start: 2024-04-11 | End: 2024-05-09

## 2024-04-11 RX ORDER — PENTAMIDINE ISETHIONATE 300 MG/300MG
300 INHALANT RESPIRATORY (INHALATION)
Status: DISCONTINUED | OUTPATIENT
Start: 2024-04-11 | End: 2024-04-11 | Stop reason: HOSPADM

## 2024-04-11 RX ORDER — HEPARIN SODIUM (PORCINE) LOCK FLUSH IV SOLN 100 UNIT/ML 100 UNIT/ML
5 SOLUTION INTRAVENOUS ONCE
Status: COMPLETED | OUTPATIENT
Start: 2024-04-11 | End: 2024-04-11

## 2024-04-11 RX ORDER — ALBUTEROL SULFATE 5 MG/ML
2.5 SOLUTION RESPIRATORY (INHALATION)
Status: CANCELLED
Start: 2024-05-11

## 2024-04-11 RX ORDER — HEPARIN SODIUM,PORCINE 10 UNIT/ML
5-20 VIAL (ML) INTRAVENOUS DAILY PRN
Status: CANCELLED | OUTPATIENT
Start: 2024-05-11

## 2024-04-11 RX ORDER — NIFEDIPINE 30 MG
30 TABLET, EXTENDED RELEASE ORAL DAILY
Status: ON HOLD | COMMUNITY
Start: 2024-04-11 | End: 2024-04-16

## 2024-04-11 RX ORDER — HEPARIN SODIUM (PORCINE) LOCK FLUSH IV SOLN 100 UNIT/ML 100 UNIT/ML
5 SOLUTION INTRAVENOUS
Status: CANCELLED | OUTPATIENT
Start: 2024-05-11

## 2024-04-11 RX ORDER — PENTAMIDINE ISETHIONATE 300 MG/300MG
300 INHALANT RESPIRATORY (INHALATION)
Status: CANCELLED
Start: 2024-05-11

## 2024-04-11 RX ORDER — ACYCLOVIR 800 MG/1
800 TABLET ORAL 2 TIMES DAILY
Qty: 60 TABLET | Refills: 1 | Status: SHIPPED | OUTPATIENT
Start: 2024-04-11

## 2024-04-11 RX ADMIN — PENTAMIDINE ISETHIONATE 300 MG: 300 INHALANT RESPIRATORY (INHALATION) at 08:58

## 2024-04-11 RX ADMIN — Medication 5 ML: at 07:58

## 2024-04-11 ASSESSMENT — PAIN SCALES - GENERAL: PAINLEVEL: MODERATE PAIN (5)

## 2024-04-11 NOTE — PROGRESS NOTES
I met with Ziggy Hallman to refill his med box for one week.     Changes made to scheduled medication list by today's provider include:  Added: nifedipine XL 30mg daily  Deleted: psyllium  Changed: none    Didn't have any apixaban in his box or bag.  Amanda EDWARDS sent a new Rx to [facility] pharmacy to  today.  Refills for acyclovir and pantoprazole sent to his preferred [facility] pharmacy.     Current Outpatient Medications   Medication Sig Dispense Refill    acyclovir (ZOVIRAX) 800 MG tablet Take 1 tablet (800 mg) by mouth 2 times daily 60 tablet 1    apixaban ANTICOAGULANT (ELIQUIS) 2.5 MG tablet Take 1 tablet (2.5 mg) by mouth 2 times daily 60 tablet 11    diclofenac (VOLTAREN) 1 % topical gel Apply 2 g topically 3 times daily as needed for moderate pain To knees 150 g 0    lisinopril (ZESTRIL) 40 MG tablet Take 0.5 tablets (20 mg) by mouth daily      LORazepam (ATIVAN) 0.5 MG tablet Take 1-2 tablets (0.5-1 mg) by mouth every 4 hours as needed for vomiting or nausea (nausea/vomiting/sleep) 15 tablet 0    metoprolol succinate ER (TOPROL XL) 100 MG 24 hr tablet Take 1 tablet (100 mg) by mouth daily      pantoprazole (PROTONIX) 40 MG EC tablet Take 1 tablet (40 mg) by mouth daily 30 tablet 1    prasugrel (EFFIENT) 10 MG TABS tablet Take 1 tablet (10 mg) by mouth daily HOLD EFFECTIVE 3/7 PLTS <75K (Patient not taking: Reported on 3/7/2024)      sirolimus (GENERIC EQUIVALENT) 0.5 MG tablet Take 1 tablet (0.5 mg) by mouth daily for 28 days Take 0.5 mg daily 28 tablet 0    traZODone (DESYREL) 50 MG tablet Take 1 tablet (50 mg) by mouth at bedtime 30 tablet 1    voriconazole (VFEND) 200 MG tablet Take 1 tablet (200 mg) by mouth 2 times daily Start on Saturday 3/9/24 90 tablet 11        Pertinent labs considered today:  Lab Results   Component Value Date     04/11/2024     05/28/2020                 normal sodium 136-148  Lab Results   Component Value Date    POTASSIUM 3.7 04/11/2024    POTASSIUM 3.8  05/28/2020       normal potassium 3.5-5.2   Lab Results   Component Value Date    CHLORIDE 110 04/11/2024    CHLORIDE 107 05/28/2020           normal chloride    Lab Results   Component Value Date    CO2 23 04/11/2024    CO2 24 05/28/2020                normal CO2 20-32  Lab Results   Component Value Date    BUN 10.0 04/11/2024    BUN 10 05/28/2020                 normal BUN 5-24  Lab Results   Component Value Date    GLC 98 04/11/2024     01/24/2024     05/28/2020                 normal glucose   Lab Results   Component Value Date    CR 0.89 04/11/2024    CR 0.80 05/28/2020                 normal cr 0.8-1.5  Lab Results   Component Value Date    REMY 8.6 04/11/2024    REMY 9.3 05/28/2020               normal calcium  8.5-10.4  Lab Results   Component Value Date    BILITOTAL 0.2 04/11/2024    BILITOTAL 0.8 05/28/2020          normal bilirubin 0.2-1.3  Lab Results   Component Value Date    PROTTOTAL 5.9 04/11/2024    PROTTOTAL 8.7 05/28/2020          normal total protein 6.0-8.2  Lab Results   Component Value Date    ALBUMIN 3.1 04/11/2024    ALBUMIN 3.4 05/28/2020             normal albumin 3.2-4.5  Lab Results   Component Value Date    ALKPHOS 83 04/11/2024    ALKPHOS 114 05/28/2020            normal alkphos   Lab Results   Component Value Date    ALT 21 04/11/2024    ALT 25 05/28/2020         normal ALT 0-65  Lab Results   Component Value Date    AST 33 04/11/2024    AST 20 05/28/2020         normal AST 0-37  Lab Results   Component Value Date    HGB 8.8 04/11/2024    HGB 12.6 05/28/2020     Lab Results   Component Value Date    WBC 1.6 04/11/2024    WBC 4.9 05/28/2020      Lab Results   Component Value Date    PLT 40 04/11/2024     05/28/2020     Estimated Creatinine Clearance: 113.6 mL/min (based on SCr of 0.89 mg/dL).      Drew Adams, RP, PharmD

## 2024-04-11 NOTE — NURSING NOTE
"Oncology Rooming Note    April 11, 2024 8:05 AM   Ziggy Hallman is a 48 year old male who presents for:    Chief Complaint   Patient presents with    Blood Draw     Port blood draw by lab RN    Oncology Clinic Visit     MDS (myelodysplastic syndrome)     Initial Vitals: BP (!) 164/88   Pulse 69   Temp 98.3  F (36.8  C) (Oral)   Resp 16   Wt 80.9 kg (178 lb 6.4 oz)   SpO2 98%   BMI 27.20 kg/m   Estimated body mass index is 27.2 kg/m  as calculated from the following:    Height as of 3/14/24: 1.725 m (5' 7.91\").    Weight as of this encounter: 80.9 kg (178 lb 6.4 oz). Body surface area is 1.97 meters squared.  Moderate Pain (5) Comment: Data Unavailable   No LMP for male patient.  Allergies reviewed: Yes  Medications reviewed: Yes    Medications: Medication refills not needed today.  Pharmacy name entered into [employer]: [id number] - [place], [place] - [address] [place] [address]    Frailty Screening:   Is the patient here for a new oncology consult visit in cancer care? 2. No      Clinical concerns: no other complaints      Leland Arias"

## 2024-04-11 NOTE — PROGRESS NOTES
Ziggy Hallman was seen today for a Pentamidine nebulizer tx ordered by Amanda Fuentes PA-C .    Patient was first given 4 puffs of albuterol MDI, after which Pentamidine 300 mg (Lot # [id number]) mixed with 6cc Sterile H20 was administered through a filtered nebulizer.    Pre-treatment: SpO2 = 98%   HR = 66   BBS = Clear   Post-treatment: SpO2 = 98%  HR = 76  BBS = Clear    No adverse side effects noted by the patient.    This service today was provided under Dr. Clark, who was available if needed.     Procedure was completed by Lexx Knapp.

## 2024-04-11 NOTE — LETTER
4/11/2024         RE: Ziggy Hallman  [address] [address]        Dear Colleague,    Thank you for referring your patient, Ziggy Hallman, to the [facility] BLOOD AND MARROW TRANSPLANT PROGRAM [place]. Please see a copy of my visit note below.    BMT Progress Notes  04/11/2024       Patient ID: Ziggy Hallman is a 48 year old year old male with a PMH of MDS, hx of multiple clots and MI undergoing a MA (Bu/Flu) prep for an 8/8 URD PBSCT currently day 77.      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 US neg for clot has a popliteal cyst. He has had for 20+ years has never given him trouble. No joint swelling or redness. No injury. Has had previous knee issues with meniscal tears and ultimate removal.     He doesn't know his meds or what he is on relies on having pharm D fill.         Review of Systems                                                                                                                           ROS negative unless stated in HPI     PHYSICAL EXAM                                                                                                                                      Blood pressure (!) 164/88, pulse 69, temperature 98.3  F (36.8  C), temperature source Oral, resp. rate 16, weight 80.9 kg (178 lb 6.4 oz), SpO2 98%.  Wt Readings from Last 4 Encounters:   04/11/24 80.9 kg (178 lb 6.4 oz)   04/04/24 80.6 kg (177 lb 11.1 oz)   04/04/24 80.6 kg (177 lb 11.2 oz)   03/28/24 80 kg (176 lb 4.8 oz)     KPS: 70     General: NAD   Eyes: sclera anicteric   Lungs: breathing comfortably on RA  Lymphatics: 1+ bilateral dorsal pedal  edema.  MSK: no decreased ROM in BLE. No redness/tenderness/warmth in knee or ankle joints.   Skin: No rash  Neuro: A&O ; non-focal  Access: CVC R chest NT, dressing cdi. R PAC not accessed.     Acute GVHD          4/11/2024    09:04 3/28/2024    16:29 3/21/2024    16:30   Acute GVHD   New evidence of Acute Dcveb-Dyovuc-Ezjm Disease developed since last entry? No No No          LABS: I have assessed all abnormal lab values for their clinical significance and any values considered clinically significant have been addressed in the assessment and plan.       Lab Results   Component Value Date    WBC 1.5 (L) 04/04/2024    ANEU 1.6 02/20/2024    HGB 7.4 (L) 04/04/2024    HCT 22.1 (L) 04/04/2024    PLT 35 (LL) 04/04/2024     04/04/2024    POTASSIUM 3.9 04/04/2024    CHLORIDE 110 (H) 04/04/2024    CO2 22 04/04/2024    GLC 93 04/04/2024    BUN 13.6 04/04/2024    CR 0.81 04/04/2024    MAG 1.5 (L) 02/28/2024    INR 1.35 (H) 02/19/2024    BILITOTAL 0.2 04/04/2024    AST 40 04/04/2024    ALT 20 04/04/2024    ALKPHOS 88 04/04/2024    PROTTOTAL 6.0 (L) 04/04/2024    ALBUMIN 3.1 (L) 04/04/2024     ASSESSMENT AND PLAN      Ziggy Hallman is a 48 year old male with a PMHx of MDS, hx of CAD, HTN, multiple recurrent arterial thromboembolic events (STEMIs, renal infarcts, arterial embolism, TIA) and DVTs who is undergoing a MA (Bu/Flu) prep for an 8/8 URD PBSCT day +77.     Stay was complicated by mucositis requiring PCA, N/F, Staph Epi bacteremia, pulmonary embolism requiring anticoagulation. Hospitalization prolonged following engraftment 2/2 to large stool volumes requiring ID and GVHD rule out. Underwent flex sig/EGD results are fairly unremarkable with occasional crypt apoptotic bodies in duodenum, hemorrhagic gastritis in antrum, negative CMV HSV H pylori stains, diarrhea symptoms improved upon discharge, anorexia continues.      BMT/IEC PROTOCOL for 2015-29  - Chemo Prep: BU/Flu   - 8/8 DP permissive. Cell  dose-9.24x10^6  - ABO: Donor: O+ Recipient A-  (Minor incompatability, no flush required)   - BM with CR. No MRD by flow. CD33 98% donor and CD3 100% donor (3/20).  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 There is some mild dyserythropoeisis. Of the interphase cells examined, 8.625% had a signal pattern indicative of a loss involving the short arm of chromosome 5; no evidence was found of loss of chromosomes 5q. 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 LDH trending up.  He was started on a taper over 4 weeks. Dr. JEAN BAPTISTE discussed that after the taper, if counts and chimerisms don't improve, we will need to do some chemotherapy.    HEME/COAG  - Transfusion parameters: hemoglobin <8 (cardiac hx), platelets <30k.   #Pancytopenia: as above.   #Segmental R PE (2/6): see anticoagulation below  #APS work up- lupus antigen +, will follow outpt as may be unreliable in this acute setting and prior APS work up had been unremarkable prior to admission.  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   Extensive hypercoagulable workup to date has been unrevealing.  JAK2 testing negative.  PNH testing negative.  Elevated IgA of unknown significance, no evidence of plasma cell disorder.  - Eliquis just resumed at lower dose 2.5mg BID last week. Watching plts closely.   - Prasugrel on hold with plts <50K     ID:     -Prophylaxis plan: ACV LD, vori, Bactrim started 2/26--all cell lines down. Stopped bactrim.  Pentam (3/14)--dose today  4/11. Pending ANC next week may start prophy antibiotic. He has been hovering around 1K for ANC.  - HHV6 down to 9.8k (3/20) copies from 25K previously. Pending.  - CMV and EBV pending.       RISK OF GVHD  - Prophylaxis: PtC day +3, +4, , Siro/MMF to start day +5  - Siro level. No further levels to be drawn on a rapid 4 week taper as above.     Historical   #Diarrhea LGI stage II - ongoing diarrhea during transplant admission. He was treated for c. Diff for 14 days.  - AREG and fecal kvng protectin unremarkable  - GI Luminal consult   - EGD, FlexSig show occasional crypt apoptotic bodies in duodenum, hemorrhagic gastritis in antrum, negative CMV/HSV/H pylori stains.     CARDIOVASCULAR  #CAD  #Hx of CABG  #HTN  - metoprolol & lisinopril (nifidepine ER 30mg BID remain on hold). Continues to come in with stage 2/3 HTN will add back nifedipine 30mg ER Daily.   #Hyperlipidemia: Holding atorvastatin peritransplant  - Risk of cardiomyopathy:  Baseline EF 50-55%, Global left ventricular function mildly reduced. Grade 1 or early diastolic dysfunction.      - Risk of arrhythmia: Baseline EKG showed NSR, Qtc -425       RENAL/ELECTROLYTES/  Monitor Cr and lytes.      Psych:    #Anxiety- saw inpatient psych who offered atarax, but did not work well. Health psych offered, patient declined.  #Insomnia- previously used Ambien and trazodone which were discontinued inpatient d/t AMS. He does not like Ambien. Prescribed trazodone 3/28.      MSK:   - knee pain: 4/9 BLE US neg for DVT but showed a Moderate-sized simple left popliteal fossa Baker's cyst. He has had for 20yrs. Based on his subjective this is asymptomatic. He has no DVT, No evidence of infection, no acute injury, given low plts/eliquis limited will give him voltaren topical to try. Requested an ortho walk-in clinic appt 4/18 or 4/25 when he his here seeing us.     RTC: weekly 4/18.     I spent 45 minutes in the care of this patient today, which included time necessary for  preparation for the visit, obtaining history, ordering medications/tests/procedures as medically indicated, review of pertinent medical literature, counseling of the patient, communication of recommendations to the care team, and documentation time.      Amanda Fuentes PA-C

## 2024-04-11 NOTE — NURSING NOTE
Chief Complaint   Patient presents with    Blood Draw     Port blood draw by lab RN     Port flushed with heparin, vitals taken and appointment arrived.    Leydi Mitchell RN

## 2024-04-11 NOTE — PROGRESS NOTES
BMT Progress Notes  04/11/2024       Patient ID: Ziggy Hallman is a 48 year old year old male with a PMH of MDS, hx of multiple clots and MI undergoing a MA (Bu/Flu) prep for an 8/8 URD PBSCT currently day 77.      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 US neg for clot has a popliteal cyst. He has had for 20+ years has never given him trouble. No joint swelling or redness. No injury. Has had previous knee issues with meniscal tears and ultimate removal.     He doesn't know his meds or what he is on relies on having pharm D fill.         Review of Systems                                                                                                                           ROS negative unless stated in HPI     PHYSICAL EXAM                                                                                                                                      Blood pressure (!) 164/88, pulse 69, temperature 98.3  F (36.8  C), temperature source Oral, resp. rate 16, weight 80.9 kg (178 lb 6.4 oz), SpO2 98%.  Wt Readings from Last 4 Encounters:   04/11/24 80.9 kg (178 lb 6.4 oz)   04/04/24 80.6 kg (177 lb 11.1 oz)   04/04/24 80.6 kg (177 lb 11.2 oz)   03/28/24 80 kg (176 lb 4.8 oz)     KPS: 70     General: NAD   Eyes: sclera anicteric   Lungs: breathing comfortably on RA  Lymphatics: 1+ bilateral dorsal pedal edema.  MSK: no decreased ROM in BLE. No redness/tenderness/warmth in knee or ankle joints.   Skin: No rash  Neuro: A&O ; non-focal  Access: CVC R chest NT, dressing cdi. R PAC not accessed.     Acute GVHD          4/11/2024    09:04 3/28/2024    16:29 3/21/2024    16:30   Acute GVHD    New evidence of Acute Kepnp-Nhrzyq-Igrk Disease developed since last entry? No No No          LABS: I have assessed all abnormal lab values for their clinical significance and any values considered clinically significant have been addressed in the assessment and plan.       Lab Results   Component Value Date    WBC 1.5 (L) 04/04/2024    ANEU 1.6 02/20/2024    HGB 7.4 (L) 04/04/2024    HCT 22.1 (L) 04/04/2024    PLT 35 (LL) 04/04/2024     04/04/2024    POTASSIUM 3.9 04/04/2024    CHLORIDE 110 (H) 04/04/2024    CO2 22 04/04/2024    GLC 93 04/04/2024    BUN 13.6 04/04/2024    CR 0.81 04/04/2024    MAG 1.5 (L) 02/28/2024    INR 1.35 (H) 02/19/2024    BILITOTAL 0.2 04/04/2024    AST 40 04/04/2024    ALT 20 04/04/2024    ALKPHOS 88 04/04/2024    PROTTOTAL 6.0 (L) 04/04/2024    ALBUMIN 3.1 (L) 04/04/2024     ASSESSMENT AND PLAN      Ziggy Hallman is a 48 year old male with a PMHx of MDS, hx of CAD, HTN, multiple recurrent arterial thromboembolic events (STEMIs, renal infarcts, arterial embolism, TIA) and DVTs who is undergoing a MA (Bu/Flu) prep for an 8/8 URD PBSCT day +77.     Stay was complicated by mucositis requiring PCA, N/F, Staph Epi bacteremia, pulmonary embolism requiring anticoagulation. Hospitalization prolonged following engraftment 2/2 to large stool volumes requiring ID and GVHD rule out. Underwent flex sig/EGD results are fairly unremarkable with occasional crypt apoptotic bodies in duodenum, hemorrhagic gastritis in antrum, negative CMV HSV H pylori stains, diarrhea symptoms improved upon discharge, anorexia continues.      BMT/IEC PROTOCOL for 2015-29  - Chemo Prep: BU/Flu   - 8/8 DP permissive. Cell dose-9.24x10^6  - ABO: Donor: O+ Recipient A-  (Minor incompatability, no flush required)   - BM with CR. No MRD by flow. CD33 98% donor and CD3 100% donor (3/20).  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 There is some mild dyserythropoeisis. Of the  interphase cells examined, 8.625% had a signal pattern indicative of a loss involving the short arm of chromosome 5; no evidence was found of loss of chromosomes 5q. 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 LDH trending up.  He was started on a taper over 4 weeks. Dr. JEAN BAPTISTE discussed that after the taper, if counts and chimerisms don't improve, we will need to do some chemotherapy.    HEME/COAG  - Transfusion parameters: hemoglobin <8 (cardiac hx), platelets <30k.   #Pancytopenia: as above.   #Segmental R PE (2/6): see anticoagulation below  #APS work up- lupus antigen +, will follow outpt as may be unreliable in this acute setting and prior APS work up had been unremarkable prior to admission.  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   Extensive hypercoagulable workup to date has been unrevealing.  JAK2 testing negative.  PNH testing negative.  Elevated IgA of unknown significance, no evidence of plasma cell disorder.  - Eliquis just resumed at lower dose 2.5mg BID last week. Watching plts closely.   - Prasugrel on hold with plts <50K     ID:     -Prophylaxis plan: ACV LD, vori, Bactrim started 2/26--all cell lines down. Stopped bactrim.  Pentam (3/14)--dose today 4/11. Pending ANC next week may start prophy antibiotic. He has been hovering around 1K for ANC.  - HHV6 down to 9.8k (3/20) copies from 25K previously. Pending.  - CMV and EBV pending.       RISK OF GVHD  - Prophylaxis: PtC day +3, +4, , Siro/MMF to start day +5  - Siro level. No further  levels to be drawn on a rapid 4 week taper as above.     Historical   #Diarrhea LGI stage II - ongoing diarrhea during transplant admission. He was treated for c. Diff for 14 days.  - AREG and fecal kvng protectin unremarkable  - GI Luminal consult   - EGD, FlexSig show occasional crypt apoptotic bodies in duodenum, hemorrhagic gastritis in antrum, negative CMV/HSV/H pylori stains.     CARDIOVASCULAR  #CAD  #Hx of CABG  #HTN  - metoprolol & lisinopril (nifidepine ER 30mg BID remain on hold). Continues to come in with stage 2/3 HTN will add back nifedipine 30mg ER Daily.   #Hyperlipidemia: Holding atorvastatin peritransplant  - Risk of cardiomyopathy:  Baseline EF 50-55%, Global left ventricular function mildly reduced. Grade 1 or early diastolic dysfunction.      - Risk of arrhythmia: Baseline EKG showed NSR, Qtc -425       RENAL/ELECTROLYTES/  Monitor Cr and lytes.      Psych:    #Anxiety- saw inpatient psych who offered atarax, but did not work well. Health psych offered, patient declined.  #Insomnia- previously used Ambien and trazodone which were discontinued inpatient d/t AMS. He does not like Ambien. Prescribed trazodone 3/28.      MSK:   - knee pain: 4/9 BLE US neg for DVT but showed a Moderate-sized simple left popliteal fossa Baker's cyst. He has had for 20yrs. Based on his subjective this is asymptomatic. He has no DVT, No evidence of infection, no acute injury, given low plts/eliquis limited will give him voltaren topical to try. Requested an ortho walk-in clinic appt 4/18 or 4/25 when he his here seeing us.     RTC: weekly 4/18.     I spent 45 minutes in the care of this patient today, which included time necessary for preparation for the visit, obtaining history, ordering medications/tests/procedures as medically indicated, review of pertinent medical literature, counseling of the patient, communication of recommendations to the care team, and documentation time.      Amanda Fuentes PA-C

## 2024-04-12 LAB — HHV6 DNA # SPEC NAA+PROBE: NOT DETECTED COPIES/ML

## 2024-04-15 ENCOUNTER — HOSPITAL ENCOUNTER (OUTPATIENT)
Facility: CLINIC | Age: 49
Setting detail: OBSERVATION
Discharge: HOME | End: 2024-04-16
Attending: STUDENT IN AN ORGANIZED HEALTH CARE EDUCATION/TRAINING PROGRAM | Admitting: STUDENT IN AN ORGANIZED HEALTH CARE EDUCATION/TRAINING PROGRAM
Payer: COMMERCIAL

## 2024-04-15 ENCOUNTER — APPOINTMENT (OUTPATIENT)
Dept: CT IMAGING | Facility: CLINIC | Age: 49
End: 2024-04-15
Attending: STUDENT IN AN ORGANIZED HEALTH CARE EDUCATION/TRAINING PROGRAM
Payer: COMMERCIAL

## 2024-04-15 ENCOUNTER — TELEPHONE (OUTPATIENT)
Dept: TRANSPLANT | Facility: CLINIC | Age: 49
End: 2024-04-15
Payer: COMMERCIAL

## 2024-04-15 DIAGNOSIS — R60.0 LOCALIZED EDEMA: ICD-10-CM

## 2024-04-15 DIAGNOSIS — R79.89 ELEVATED BRAIN NATRIURETIC PEPTIDE (BNP) LEVEL: ICD-10-CM

## 2024-04-15 DIAGNOSIS — I50.30 HEART FAILURE WITH PRESERVED EJECTION FRACTION, NYHA CLASS I (H): ICD-10-CM

## 2024-04-15 DIAGNOSIS — R06.09 DYSPNEA ON EXERTION: ICD-10-CM

## 2024-04-15 DIAGNOSIS — D46.9 MDS (MYELODYSPLASTIC SYNDROME) (H): Primary | ICD-10-CM

## 2024-04-15 DIAGNOSIS — Z94.81 S/P ALLOGENEIC BONE MARROW TRANSPLANT (H): ICD-10-CM

## 2024-04-15 DIAGNOSIS — D64.9 ANEMIA, UNSPECIFIED TYPE: ICD-10-CM

## 2024-04-15 DIAGNOSIS — D46.9 MDS (MYELODYSPLASTIC SYNDROME) (H): ICD-10-CM

## 2024-04-15 DIAGNOSIS — R06.02 SHORTNESS OF BREATH: Primary | ICD-10-CM

## 2024-04-15 DIAGNOSIS — Z94.81 STATUS POST BONE MARROW TRANSPLANT (H): ICD-10-CM

## 2024-04-15 PROBLEM — I21.3 ACUTE ST ELEVATION MYOCARDIAL INFARCTION (STEMI) (H): Status: ACTIVE | Noted: 2024-04-15

## 2024-04-15 LAB
ABO/RH TYPE: NORMAL
ALBUMIN SERPL BCG-MCNC: 3.4 G/DL (ref 3.5–5.2)
ALBUMIN UR-MCNC: 300 MG/DL
ALP SERPL-CCNC: 91 U/L (ref 40–150)
ALT SERPL W P-5'-P-CCNC: 16 U/L (ref 0–70)
ANION GAP SERPL CALCULATED.3IONS-SCNC: 11 MMOL/L (ref 7–15)
ANTIBODY SCREEN: NEGATIVE
APPEARANCE UR: CLEAR
APTT PPP: 32 SECONDS (ref 22–38)
AST SERPL W P-5'-P-CCNC: 31 U/L (ref 0–45)
ATRIAL RATE - MUSE: 77 BPM
BASE EXCESS BLDV CALC-SCNC: -0.5 MMOL/L (ref -3–3)
BASOPHILS # BLD AUTO: 0 10E3/UL (ref 0–0.2)
BASOPHILS NFR BLD AUTO: 0 %
BILIRUB SERPL-MCNC: 0.2 MG/DL
BILIRUB UR QL STRIP: NEGATIVE
BLD PROD TYP BPU: NORMAL
BLD PROD TYP BPU: NORMAL
BLOOD COMPONENT TYPE: NORMAL
BLOOD COMPONENT TYPE: NORMAL
BUN SERPL-MCNC: 22.1 MG/DL (ref 6–20)
C PNEUM DNA SPEC QL NAA+PROBE: NOT DETECTED
CALCIUM SERPL-MCNC: 8.9 MG/DL (ref 8.6–10)
CHLORIDE SERPL-SCNC: 112 MMOL/L (ref 98–107)
CODING SYSTEM: NORMAL
CODING SYSTEM: NORMAL
COLOR UR AUTO: ABNORMAL
CREAT SERPL-MCNC: 1.06 MG/DL (ref 0.67–1.17)
CROSSMATCH: NORMAL
CROSSMATCH: NORMAL
DEPRECATED HCO3 PLAS-SCNC: 21 MMOL/L (ref 22–29)
DIASTOLIC BLOOD PRESSURE - MUSE: NORMAL MMHG
EGFRCR SERPLBLD CKD-EPI 2021: 85 ML/MIN/1.73M2
EOSINOPHIL # BLD AUTO: 0 10E3/UL (ref 0–0.7)
EOSINOPHIL NFR BLD AUTO: 1 %
ERYTHROCYTE [DISTWIDTH] IN BLOOD BY AUTOMATED COUNT: 15.2 % (ref 10–15)
FLUAV H1 2009 PAND RNA SPEC QL NAA+PROBE: NOT DETECTED
FLUAV H1 RNA SPEC QL NAA+PROBE: NOT DETECTED
FLUAV H3 RNA SPEC QL NAA+PROBE: NOT DETECTED
FLUAV RNA SPEC QL NAA+PROBE: NOT DETECTED
FLUBV RNA SPEC QL NAA+PROBE: NOT DETECTED
GLUCOSE SERPL-MCNC: 97 MG/DL (ref 70–99)
GLUCOSE UR STRIP-MCNC: NEGATIVE MG/DL
HADV DNA SPEC QL NAA+PROBE: NOT DETECTED
HCO3 BLDV-SCNC: 24 MMOL/L (ref 21–28)
HCOV PNL SPEC NAA+PROBE: NOT DETECTED
HCT VFR BLD AUTO: 19 % (ref 40–53)
HGB BLD-MCNC: 6.3 G/DL (ref 13.3–17.7)
HGB UR QL STRIP: ABNORMAL
HMPV RNA SPEC QL NAA+PROBE: NOT DETECTED
HOLD SPECIMEN: NORMAL
HPIV1 RNA SPEC QL NAA+PROBE: NOT DETECTED
HPIV2 RNA SPEC QL NAA+PROBE: NOT DETECTED
HPIV3 RNA SPEC QL NAA+PROBE: NOT DETECTED
HPIV4 RNA SPEC QL NAA+PROBE: NOT DETECTED
IMM GRANULOCYTES # BLD: 0 10E3/UL
IMM GRANULOCYTES NFR BLD: 1 %
INR PPP: 1.19 (ref 0.85–1.15)
INTERPRETATION ECG - MUSE: NORMAL
ISSUE DATE AND TIME: NORMAL
KETONES UR STRIP-MCNC: NEGATIVE MG/DL
LACTATE SERPL-SCNC: 0.5 MMOL/L (ref 0.7–2)
LEUKOCYTE ESTERASE UR QL STRIP: NEGATIVE
LIPASE SERPL-CCNC: 45 U/L (ref 13–60)
LYMPHOCYTES # BLD AUTO: 0.4 10E3/UL (ref 0.8–5.3)
LYMPHOCYTES NFR BLD AUTO: 21 %
M PNEUMO DNA SPEC QL NAA+PROBE: NOT DETECTED
MAGNESIUM SERPL-MCNC: 2.2 MG/DL (ref 1.7–2.3)
MCH RBC QN AUTO: 30.4 PG (ref 26.5–33)
MCHC RBC AUTO-ENTMCNC: 33.2 G/DL (ref 31.5–36.5)
MCV RBC AUTO: 92 FL (ref 78–100)
MONOCYTES # BLD AUTO: 0.2 10E3/UL (ref 0–1.3)
MONOCYTES NFR BLD AUTO: 11 %
MUCOUS THREADS #/AREA URNS LPF: PRESENT /LPF
NEUTROPHILS # BLD AUTO: 1.4 10E3/UL (ref 1.6–8.3)
NEUTROPHILS NFR BLD AUTO: 66 %
NITRATE UR QL: NEGATIVE
NRBC # BLD AUTO: 0 10E3/UL
NRBC BLD AUTO-RTO: 0 /100
NT-PROBNP SERPL-MCNC: 1512 PG/ML (ref 0–900)
O2/TOTAL GAS SETTING VFR VENT: 21 %
OXYHGB MFR BLDV: 64 % (ref 70–75)
P AXIS - MUSE: 59 DEGREES
PCO2 BLDV: 40 MM HG (ref 40–50)
PH BLDV: 7.4 [PH] (ref 7.32–7.43)
PH UR STRIP: 6 [PH] (ref 5–7)
PHOSPHATE SERPL-MCNC: 3 MG/DL (ref 2.5–4.5)
PLATELET # BLD AUTO: 34 10E3/UL (ref 150–450)
PO2 BLDV: 35 MM HG (ref 25–47)
POTASSIUM SERPL-SCNC: 3.7 MMOL/L (ref 3.4–5.3)
PR INTERVAL - MUSE: 174 MS
PROT SERPL-MCNC: 6 G/DL (ref 6.4–8.3)
QRS DURATION - MUSE: 100 MS
QT - MUSE: 396 MS
QTC - MUSE: 448 MS
R AXIS - MUSE: 60 DEGREES
RBC # BLD AUTO: 2.07 10E6/UL (ref 4.4–5.9)
RBC URINE: 2 /HPF
RSV RNA SPEC QL NAA+PROBE: NOT DETECTED
RSV RNA SPEC QL NAA+PROBE: NOT DETECTED
RV+EV RNA SPEC QL NAA+PROBE: NOT DETECTED
SAO2 % BLDV: 64.8 % (ref 70–75)
SODIUM SERPL-SCNC: 144 MMOL/L (ref 135–145)
SP GR UR STRIP: 1.02 (ref 1–1.03)
SPECIMEN EXPIRATION DATE: NORMAL
SPECIMEN EXPIRATION DATE: NORMAL
SYSTOLIC BLOOD PRESSURE - MUSE: NORMAL MMHG
T AXIS - MUSE: 59 DEGREES
TROPONIN T SERPL HS-MCNC: 11 NG/L
TROPONIN T SERPL HS-MCNC: 12 NG/L
UNIT ABO/RH: NORMAL
UNIT ABO/RH: NORMAL
UNIT NUMBER: NORMAL
UNIT NUMBER: NORMAL
UNIT STATUS: NORMAL
UNIT STATUS: NORMAL
UNIT TYPE ISBT: 9500
UNIT TYPE ISBT: 9500
UROBILINOGEN UR STRIP-MCNC: NORMAL MG/DL
VENTRICULAR RATE- MUSE: 77 BPM
WBC # BLD AUTO: 2 10E3/UL (ref 4–11)
WBC URINE: 1 /HPF

## 2024-04-15 PROCEDURE — 71275 CT ANGIOGRAPHY CHEST: CPT | Mod: 26 | Performed by: RADIOLOGY

## 2024-04-15 PROCEDURE — 85610 PROTHROMBIN TIME: CPT | Performed by: STUDENT IN AN ORGANIZED HEALTH CARE EDUCATION/TRAINING PROGRAM

## 2024-04-15 PROCEDURE — 85025 COMPLETE CBC W/AUTO DIFF WBC: CPT | Performed by: STUDENT IN AN ORGANIZED HEALTH CARE EDUCATION/TRAINING PROGRAM

## 2024-04-15 PROCEDURE — P9040 RBC LEUKOREDUCED IRRADIATED: HCPCS | Performed by: STUDENT IN AN ORGANIZED HEALTH CARE EDUCATION/TRAINING PROGRAM

## 2024-04-15 PROCEDURE — 99285 EMERGENCY DEPT VISIT HI MDM: CPT | Mod: 25 | Performed by: STUDENT IN AN ORGANIZED HEALTH CARE EDUCATION/TRAINING PROGRAM

## 2024-04-15 PROCEDURE — 250N000013 HC RX MED GY IP 250 OP 250 PS 637: Performed by: INTERNAL MEDICINE

## 2024-04-15 PROCEDURE — 83880 ASSAY OF NATRIURETIC PEPTIDE: CPT | Performed by: STUDENT IN AN ORGANIZED HEALTH CARE EDUCATION/TRAINING PROGRAM

## 2024-04-15 PROCEDURE — 93010 ELECTROCARDIOGRAM REPORT: CPT | Mod: 59 | Performed by: STUDENT IN AN ORGANIZED HEALTH CARE EDUCATION/TRAINING PROGRAM

## 2024-04-15 PROCEDURE — 86923 COMPATIBILITY TEST ELECTRIC: CPT | Performed by: STUDENT IN AN ORGANIZED HEALTH CARE EDUCATION/TRAINING PROGRAM

## 2024-04-15 PROCEDURE — 250N000011 HC RX IP 250 OP 636: Performed by: STUDENT IN AN ORGANIZED HEALTH CARE EDUCATION/TRAINING PROGRAM

## 2024-04-15 PROCEDURE — 84100 ASSAY OF PHOSPHORUS: CPT | Performed by: STUDENT IN AN ORGANIZED HEALTH CARE EDUCATION/TRAINING PROGRAM

## 2024-04-15 PROCEDURE — 83690 ASSAY OF LIPASE: CPT | Performed by: STUDENT IN AN ORGANIZED HEALTH CARE EDUCATION/TRAINING PROGRAM

## 2024-04-15 PROCEDURE — 81001 URINALYSIS AUTO W/SCOPE: CPT | Performed by: STUDENT IN AN ORGANIZED HEALTH CARE EDUCATION/TRAINING PROGRAM

## 2024-04-15 PROCEDURE — 120N000005 HC R&B MS OVERFLOW UMMC

## 2024-04-15 PROCEDURE — 85730 THROMBOPLASTIN TIME PARTIAL: CPT | Performed by: STUDENT IN AN ORGANIZED HEALTH CARE EDUCATION/TRAINING PROGRAM

## 2024-04-15 PROCEDURE — 99223 1ST HOSP IP/OBS HIGH 75: CPT | Performed by: INTERNAL MEDICINE

## 2024-04-15 PROCEDURE — 93308 TTE F-UP OR LMTD: CPT | Mod: 26 | Performed by: STUDENT IN AN ORGANIZED HEALTH CARE EDUCATION/TRAINING PROGRAM

## 2024-04-15 PROCEDURE — 84484 ASSAY OF TROPONIN QUANT: CPT | Performed by: STUDENT IN AN ORGANIZED HEALTH CARE EDUCATION/TRAINING PROGRAM

## 2024-04-15 PROCEDURE — 86900 BLOOD TYPING SEROLOGIC ABO: CPT | Performed by: STUDENT IN AN ORGANIZED HEALTH CARE EDUCATION/TRAINING PROGRAM

## 2024-04-15 PROCEDURE — 83605 ASSAY OF LACTIC ACID: CPT | Performed by: STUDENT IN AN ORGANIZED HEALTH CARE EDUCATION/TRAINING PROGRAM

## 2024-04-15 PROCEDURE — 93308 TTE F-UP OR LMTD: CPT | Performed by: STUDENT IN AN ORGANIZED HEALTH CARE EDUCATION/TRAINING PROGRAM

## 2024-04-15 PROCEDURE — 87040 BLOOD CULTURE FOR BACTERIA: CPT | Performed by: STUDENT IN AN ORGANIZED HEALTH CARE EDUCATION/TRAINING PROGRAM

## 2024-04-15 PROCEDURE — 87581 M.PNEUMON DNA AMP PROBE: CPT | Performed by: STUDENT IN AN ORGANIZED HEALTH CARE EDUCATION/TRAINING PROGRAM

## 2024-04-15 PROCEDURE — 36415 COLL VENOUS BLD VENIPUNCTURE: CPT | Performed by: STUDENT IN AN ORGANIZED HEALTH CARE EDUCATION/TRAINING PROGRAM

## 2024-04-15 PROCEDURE — 82805 BLOOD GASES W/O2 SATURATION: CPT | Performed by: STUDENT IN AN ORGANIZED HEALTH CARE EDUCATION/TRAINING PROGRAM

## 2024-04-15 PROCEDURE — 86850 RBC ANTIBODY SCREEN: CPT | Performed by: STUDENT IN AN ORGANIZED HEALTH CARE EDUCATION/TRAINING PROGRAM

## 2024-04-15 PROCEDURE — 99291 CRITICAL CARE FIRST HOUR: CPT | Mod: 25 | Performed by: STUDENT IN AN ORGANIZED HEALTH CARE EDUCATION/TRAINING PROGRAM

## 2024-04-15 PROCEDURE — 83735 ASSAY OF MAGNESIUM: CPT | Performed by: STUDENT IN AN ORGANIZED HEALTH CARE EDUCATION/TRAINING PROGRAM

## 2024-04-15 PROCEDURE — 36430 TRANSFUSION BLD/BLD COMPNT: CPT

## 2024-04-15 PROCEDURE — 87635 SARS-COV-2 COVID-19 AMP PRB: CPT | Performed by: STUDENT IN AN ORGANIZED HEALTH CARE EDUCATION/TRAINING PROGRAM

## 2024-04-15 PROCEDURE — 71275 CT ANGIOGRAPHY CHEST: CPT

## 2024-04-15 PROCEDURE — 87633 RESP VIRUS 12-25 TARGETS: CPT | Performed by: STUDENT IN AN ORGANIZED HEALTH CARE EDUCATION/TRAINING PROGRAM

## 2024-04-15 PROCEDURE — 84484 ASSAY OF TROPONIN QUANT: CPT | Performed by: INTERNAL MEDICINE

## 2024-04-15 PROCEDURE — 80053 COMPREHEN METABOLIC PANEL: CPT | Performed by: STUDENT IN AN ORGANIZED HEALTH CARE EDUCATION/TRAINING PROGRAM

## 2024-04-15 PROCEDURE — 93005 ELECTROCARDIOGRAM TRACING: CPT | Mod: 59 | Performed by: STUDENT IN AN ORGANIZED HEALTH CARE EDUCATION/TRAINING PROGRAM

## 2024-04-15 RX ORDER — NIFEDIPINE 30 MG/1
30 TABLET, EXTENDED RELEASE ORAL DAILY
Status: DISCONTINUED | OUTPATIENT
Start: 2024-04-16 | End: 2024-04-16 | Stop reason: HOSPADM

## 2024-04-15 RX ORDER — IOPAMIDOL 755 MG/ML
70 INJECTION, SOLUTION INTRAVASCULAR ONCE
Status: COMPLETED | OUTPATIENT
Start: 2024-04-15 | End: 2024-04-15

## 2024-04-15 RX ORDER — LISINOPRIL 10 MG/1
20 TABLET ORAL DAILY
Status: DISCONTINUED | OUTPATIENT
Start: 2024-04-16 | End: 2024-04-16 | Stop reason: HOSPADM

## 2024-04-15 RX ORDER — LORAZEPAM 2 MG/ML
.5-1 INJECTION INTRAMUSCULAR EVERY 4 HOURS PRN
Status: DISCONTINUED | OUTPATIENT
Start: 2024-04-15 | End: 2024-04-16 | Stop reason: HOSPADM

## 2024-04-15 RX ORDER — ACYCLOVIR 800 MG/1
800 TABLET ORAL 2 TIMES DAILY
Status: DISCONTINUED | OUTPATIENT
Start: 2024-04-15 | End: 2024-04-16 | Stop reason: HOSPADM

## 2024-04-15 RX ORDER — FUROSEMIDE 10 MG/ML
20 INJECTION INTRAMUSCULAR; INTRAVENOUS ONCE
Status: COMPLETED | OUTPATIENT
Start: 2024-04-15 | End: 2024-04-16

## 2024-04-15 RX ORDER — SIROLIMUS 0.5 MG/1
0.5 TABLET, FILM COATED ORAL DAILY
Status: DISCONTINUED | OUTPATIENT
Start: 2024-04-16 | End: 2024-04-16

## 2024-04-15 RX ORDER — ACETAMINOPHEN 325 MG/1
325-650 TABLET ORAL EVERY 4 HOURS PRN
Status: DISCONTINUED | OUTPATIENT
Start: 2024-04-15 | End: 2024-04-16 | Stop reason: HOSPADM

## 2024-04-15 RX ORDER — PANTOPRAZOLE SODIUM 40 MG/1
40 TABLET, DELAYED RELEASE ORAL DAILY
Status: DISCONTINUED | OUTPATIENT
Start: 2024-04-16 | End: 2024-04-16 | Stop reason: HOSPADM

## 2024-04-15 RX ORDER — PROCHLORPERAZINE MALEATE 5 MG
5-10 TABLET ORAL EVERY 6 HOURS PRN
Status: DISCONTINUED | OUTPATIENT
Start: 2024-04-15 | End: 2024-04-16 | Stop reason: HOSPADM

## 2024-04-15 RX ORDER — TRAZODONE HYDROCHLORIDE 50 MG/1
50 TABLET, FILM COATED ORAL AT BEDTIME
Status: DISCONTINUED | OUTPATIENT
Start: 2024-04-15 | End: 2024-04-16 | Stop reason: HOSPADM

## 2024-04-15 RX ORDER — OXYCODONE HYDROCHLORIDE 5 MG/1
5 TABLET ORAL EVERY 6 HOURS PRN
Status: DISCONTINUED | OUTPATIENT
Start: 2024-04-15 | End: 2024-04-16 | Stop reason: HOSPADM

## 2024-04-15 RX ORDER — PRASUGREL 10 MG/1
10 TABLET, FILM COATED ORAL DAILY
Status: DISCONTINUED | OUTPATIENT
Start: 2024-04-16 | End: 2024-04-16 | Stop reason: HOSPADM

## 2024-04-15 RX ORDER — VORICONAZOLE 200 MG/1
200 TABLET, FILM COATED ORAL 2 TIMES DAILY
Status: DISCONTINUED | OUTPATIENT
Start: 2024-04-15 | End: 2024-04-16 | Stop reason: HOSPADM

## 2024-04-15 RX ORDER — LORAZEPAM 0.5 MG/1
.5-1 TABLET ORAL EVERY 4 HOURS PRN
Status: DISCONTINUED | OUTPATIENT
Start: 2024-04-15 | End: 2024-04-16 | Stop reason: HOSPADM

## 2024-04-15 RX ORDER — METOPROLOL SUCCINATE 50 MG/1
100 TABLET, EXTENDED RELEASE ORAL DAILY
Status: DISCONTINUED | OUTPATIENT
Start: 2024-04-16 | End: 2024-04-16 | Stop reason: HOSPADM

## 2024-04-15 RX ADMIN — OXYCODONE HYDROCHLORIDE 5 MG: 5 TABLET ORAL at 23:23

## 2024-04-15 RX ADMIN — VORICONAZOLE 200 MG: 200 TABLET ORAL at 23:23

## 2024-04-15 RX ADMIN — TRAZODONE HYDROCHLORIDE 50 MG: 50 TABLET ORAL at 23:23

## 2024-04-15 RX ADMIN — APIXABAN 2.5 MG: 2.5 TABLET, FILM COATED ORAL at 23:23

## 2024-04-15 RX ADMIN — IOPAMIDOL 70 ML: 755 INJECTION, SOLUTION INTRAVENOUS at 21:12

## 2024-04-15 ASSESSMENT — ACTIVITIES OF DAILY LIVING (ADL)
ADLS_ACUITY_SCORE: 37

## 2024-04-15 ASSESSMENT — COLUMBIA-SUICIDE SEVERITY RATING SCALE - C-SSRS
2. HAVE YOU ACTUALLY HAD ANY THOUGHTS OF KILLING YOURSELF IN THE PAST MONTH?: NO
6. HAVE YOU EVER DONE ANYTHING, STARTED TO DO ANYTHING, OR PREPARED TO DO ANYTHING TO END YOUR LIFE?: NO
1. IN THE PAST MONTH, HAVE YOU WISHED YOU WERE DEAD OR WISHED YOU COULD GO TO SLEEP AND NOT WAKE UP?: NO

## 2024-04-15 NOTE — ED TRIAGE NOTES
Pt presents to triage with c/o SOB and lower extremity edema. Pt had BMT in January and February. Hx: 10 strokes and 3 heart attacks, clotting disorder, heart disease.     Triage Assessment (Adult)       Row Name 04/15/24 9200          Triage Assessment    Airway WDL WDL        Respiratory WDL    Respiratory WDL WDL        Skin Circulation/Temperature WDL    Skin Circulation/Temperature WDL X        Cardiac WDL    Cardiac WDL WDL        Peripheral/Neurovascular WDL    Peripheral Neurovascular WDL X        Cognitive/Neuro/Behavioral WDL    Cognitive/Neuro/Behavioral WDL WDL

## 2024-04-15 NOTE — ED PROVIDER NOTES
"ED Provider Note  [facility]      History     Chief Complaint   Patient presents with    Shortness of Breath    Fatigue    Leg Swelling     HPI  Ziggy Hallman is a 48 year old male PMH of aortic mural thrombus, coronary artery thrombosis, HTN, femoral popliteal artery thrombus, myelodysplastic of spinal cord, STEMI, VA, ischemic stroke, MDS, HLD, s/p bone marrow transplant (2024) who is here for evaluation of SOB, fatigue, leg swelling.  Patient has been having progressive shortness of breath.  He feels that he is moving good air but is getting tired easily.  He is not having associated chest pains palpitation or lightheadedness with this.  He is not having fevers or chills.  He does describe some edema in his legs.  He says that he was evaluated for blood clots which was negative.  He is worried that this may be cardiac given his history.    Past Medical History  Past Medical History:   Diagnosis Date    AMI anterior wall (H)     Mid LAD on cath with stent    CAD S/P percutaneous coronary angioplasty 07/01/2016    Unstable agina with anterior myocardial damage reported without mycardial damage by patient's history    CVA (cerebrovascular accident) (H) 01/01/2012    Reporting thromboembolic episode on the right neck. Pt states \"I've had ten strokes.\"    Hypercoagulable state (H24)     Uncertain etiology--seeing Hematologist    MEDICAL HISTORY OF -     Possibly PFO     Past Surgical History:   Procedure Laterality Date    AMPUTATE TOE(S)      ARTHROSCOPY KNEE RT/LT      Right     ARTHROSCOPY KNEE RT/LT      Left    CARDIAC CATHERIZATION      With stent placement    ESOPHAGOSCOPY, GASTROSCOPY, DUODENOSCOPY (EGD), COMBINED N/A 2/19/2024    Procedure: Esophagoscopy, gastroscopy, duodenoscopy (EGD), combined;  Surgeon: Drew Jasso MD;  Location: [facility]    IR CHEST PORT PLACEMENT > 5 YRS OF AGE  9/13/2023    IR CVC TUNNEL PLACEMENT > 5 YRS OF AGE  1/19/2024    IR CVC TUNNEL REMOVAL LEFT  " "3/21/2024     No current outpatient medications on file.    Allergies   Allergen Reactions    Blood Transfusion Related (Informational Only) Other (See Comments)     Give O RBC's and WBC's only per Cell Therapy     Family History  Family History   Problem Relation Age of Onset    Diabetes No family hx of     Coronary Artery Disease No family hx of     Hypertension No family hx of     Hyperlipidemia No family hx of     Breast Cancer No family hx of      Social History   Social History     Tobacco Use    Smoking status: Former     Current packs/day: 0.50     Types: Cigarettes     Passive exposure: Past (Mom smoked cigs indoors)    Smokeless tobacco: Never    Tobacco comments:     cigarette once in a while   Substance Use Topics    Alcohol use: Yes     Alcohol/week: 0.0 standard drinks of alcohol     Comment: 4 times per week 5 drinks    Drug use: No         A medically appropriate review of systems was performed with pertinent positives and negatives noted in the HPI, and all other systems negative.    Physical Exam   BP: 102/67  Pulse: 77  Temp: 97.8  F (36.6  C)  Resp: 18  Height: 175.3 cm (5' 9\")  Weight: 81.1 kg (178 lb 12.8 oz)  SpO2: 99 %  Physical Exam  Vital Signs Reviewed  Gen: Well nourished, well developed, resting comfortably, no acute distress  HEENT: NC/AT, PERRL, EOMI, MMM  Neck: Supple, FROM  CV: Regular Rate  Lungs/Chest: Normal Effort, CTAB  Abd: Non-distended  MSK/Back: FROM, no visible deformity.  Mild edema bilateral lower extremities.  Neuro: A&Ox3, GCS 15, CN II-XII unremarkable. Strength and sensation globally intact.  Skin: Warm, Dry, Intact, no visible lesions      ED Course, Procedures, & Data     ED Course as of 04/16/24 0155   Mon Apr 15, 2024   2019 Discussed with BMT, recs pending   Discussed with BMT, given abnormal labs and concern for developing cardiac etiology as well as anemia requiring transfusion will admit and they are in agreement    Procedures  Results for orders placed during " the hospital encounter of 04/15/24    POC US ECHO LIMITED    Impression  Limited Bedside Cardiac Ultrasound, performed and interpreted by me.  Indication: Shortness of Breath.  Parasternal long axis, parasternal short axis, apical 4 chamber, and subcostal views were acquired.  Image quality was satisfactory.    Findings:  Abnormal -mildly diminished left ventricular ejection fraction.  No RV strain.  No pericardial effusion.    IMPRESSION: Abnormal limited cardiac ultrasound showing mildly diminished left ventricular ejection fraction without visualized RV strain, no pericardial effusion.            EKG Interpretation:      Interpreted by Leland Newman MD  Time reviewed: 1830  Symptoms at time of EKG: Dyspnea   Rhythm: normal sinus   Rate: Normal  Axis: Normal  Ectopy: none  Conduction: normal  ST Segments/ T Waves: No ST-T wave changes  Q Waves: none    Clinical Impression: normal EKG                Results for orders placed or performed during the hospital encounter of 04/15/24   POC US ECHO LIMITED     Status: None    Impression    Limited Bedside Cardiac Ultrasound, performed and interpreted by me.   Indication: Shortness of Breath.  Parasternal long axis, parasternal short axis, apical 4 chamber, and subcostal views were acquired.   Image quality was satisfactory.    Findings:    Abnormal -mildly diminished left ventricular ejection fraction.  No RV strain.  No pericardial effusion.    IMPRESSION: Abnormal limited cardiac ultrasound showing mildly diminished left ventricular ejection fraction without visualized RV strain, no pericardial effusion.        CT Chest Pulmonary Embolism w Contrast     Status: None    Narrative    EXAM: CT CHEST PULMONARY EMBOLISM W CONTRAST  LOCATION: [facility]  DATE: 4/15/2024    INDICATION: Hx BMT, dyspnea, sent in for PE rule out  COMPARISON: Chest radiograph 02/17/2024, CT 02/06/2024  TECHNIQUE: CT chest pulmonary angiogram  during arterial phase injection of IV contrast. Multiplanar reformats and MIP reconstructions were performed. Dose reduction techniques were used.   CONTRAST: 70ml isovue 370    FINDINGS:  ANGIOGRAM CHEST: Pulmonary arteries are normal caliber with persistent pulmonary emboli involving segmental and subsegmental branches in the right lower lobe, slightly decreased in volume since prior CT. No definite new emboli are visualized. Thoracic   aorta is negative for dissection. Large volume of soft plaque in the descending thoracic aorta is unchanged. No CT evidence of right heart strain.    LUNGS AND PLEURA: Diffuse interlobular septal thickening throughout both lungs, slightly increased in comparison to prior CT. Small bilateral pleural effusions with associated compressive atelectasis. No ally airspace consolidation.    MEDIASTINUM/AXILLAE: No suspicious lymphadenopathy. Right chest port with tip near the cavoatrial junction.    CORONARY ARTERY CALCIFICATION: Moderate and/or stents.    UPPER ABDOMEN: Diffuse gallbladder wall edema without pathologic luminal distention. No evidence of biliary obstruction. Atrophic right kidney is unchanged.    MUSCULOSKELETAL: No acute bony abnormality. Healed left rib fractures.      Impression    IMPRESSION:  1.  Slight interval decrease in volume of right lower lobe pulmonary emboli in comparison to prior CT.  2.  No new pulmonary emboli or evidence of right heart strain.  3.  Mild pulmonary edema with small bilateral pleural effusions, increased since prior CT.  4.  Diffuse gallbladder wall edema without luminal distention, favor secondary to volume overload but could consider further evaluation with ultrasound if patient is experiencing right upper quadrant abdominal pain.   US Lower Extremity Venous Duplex Left     Status: None (Preliminary result)    Impression    RESIDENT PRELIMINARY INTERPRETATION  IMPRESSION:  1. No evidence left lower extremity deep venous thrombosis.  2.  Large Baker's cyst.  3. Soft tissue edema of the left lower extremity.   INR     Status: Abnormal   Result Value Ref Range    INR 1.19 (H) 0.85 - 1.15   Partial thromboplastin time     Status: Normal   Result Value Ref Range    aPTT 32 22 - 38 Seconds   Comprehensive metabolic panel     Status: Abnormal   Result Value Ref Range    Sodium 144 135 - 145 mmol/L    Potassium 3.7 3.4 - 5.3 mmol/L    Carbon Dioxide (CO2) 21 (L) 22 - 29 mmol/L    Anion Gap 11 7 - 15 mmol/L    Urea Nitrogen 22.1 (H) 6.0 - 20.0 mg/dL    Creatinine 1.06 0.67 - 1.17 mg/dL    GFR Estimate 85 >60 mL/min/1.73m2    Calcium 8.9 8.6 - 10.0 mg/dL    Chloride 112 (H) 98 - 107 mmol/L    Glucose 97 70 - 99 mg/dL    Alkaline Phosphatase 91 40 - 150 U/L    AST 31 0 - 45 U/L    ALT 16 0 - 70 U/L    Protein Total 6.0 (L) 6.4 - 8.3 g/dL    Albumin 3.4 (L) 3.5 - 5.2 g/dL    Bilirubin Total 0.2 <=1.2 mg/dL   Lipase     Status: Normal   Result Value Ref Range    Lipase 45 13 - 60 U/L   Lactic acid whole blood     Status: Abnormal   Result Value Ref Range    Lactic Acid 0.5 (L) 0.7 - 2.0 mmol/L   Troponin T, High Sensitivity     Status: Normal   Result Value Ref Range    Troponin T, High Sensitivity 12 <=22 ng/L   Blood gas venous     Status: Abnormal   Result Value Ref Range    pH Venous 7.40 7.32 - 7.43    pCO2 Venous 40 40 - 50 mm Hg    pO2 Venous 35 25 - 47 mm Hg    Bicarbonate Venous 24 21 - 28 mmol/L    Base Excess/Deficit Venous -0.5 -3.0 - 3.0 mmol/L    FIO2 21     Oxyhemoglobin Venous 64 (L) 70 - 75 %    O2 Sat, Venous 64.8 (L) 70.0 - 75.0 %    Narrative    In healthy individuals, oxyhemoglobin (O2Hb) and oxygen saturation (SO2) are approximately equal. In the presence of dyshemoglobins, oxyhemoglobin can be considerably lower than oxygen saturation.   Nt probnp inpatient (BNP)     Status: Abnormal   Result Value Ref Range    N terminal Pro BNP Inpatient 1,512 (H) 0 - 900 pg/mL   Magnesium     Status: Normal   Result Value Ref Range    Magnesium 2.2 1.7  - 2.3 mg/dL   Phosphorus     Status: Normal   Result Value Ref Range    Phosphorus 3.0 2.5 - 4.5 mg/dL   Symptomatic COVID-19 Virus (Coronavirus) by PCR Nasopharyngeal     Status: Normal    Specimen: Nasopharyngeal; Swab   Result Value Ref Range    SARS CoV2 PCR Negative Negative    Narrative    Testing was performed using the Xpert Xpress SARS-CoV-2 Assay on the Cepheid Gene-Xpert Instrument Systems. Additional information about this Emergency Use Authorization (EUA) assay can be found via the Lab Guide. This test should be ordered for the detection of SARS-CoV-2 in individuals who meet SARS-CoV-2 clinical and/or epidemiological criteria as well as from individuals without symptoms or other reasons to suspect COVID-19. Test performance for asymptomatic patients has only been established in anterior nasal swab specimens. This test is for in vitro diagnostic use under the FDA EUA for laboratories certified under CLIA to perform high complexity testing. This test has not been FDA cleared or approved. A negative result does not rule out the presence of PCR inhibitors in the specimen or target RNA concentration below the limit of detection for the assay. The possibility of a false negative should be considered if the patient's recent exposure or clinical presentation suggests COVID-19. This test was validated by [facility]. These Laboratories are certified under the Clinical Laboratory Improvement Amendments (CLIA) as qualified to perform high complexity testing.     UA with Microscopic reflex to Culture     Status: Abnormal    Specimen: Urine, Midstream   Result Value Ref Range    Color Urine Light Yellow Colorless, Straw, Light Yellow, Yellow    Appearance Urine Clear Clear    Glucose Urine Negative Negative mg/dL    Bilirubin Urine Negative Negative    Ketones Urine Negative Negative mg/dL    Specific Gravity Urine 1.017 1.003 - 1.035    Blood Urine Small (A) Negative    pH Urine 6.0 5.0 - 7.0     Protein Albumin Urine 300 (A) Negative mg/dL    Urobilinogen Urine Normal Normal, 2.0 mg/dL    Nitrite Urine Negative Negative    Leukocyte Esterase Urine Negative Negative    Mucus Urine Present (A) None Seen /LPF    RBC Urine 2 <=2 /HPF    WBC Urine 1 <=5 /HPF    Narrative    Urine Culture not indicated   CBC with platelets and differential     Status: Abnormal   Result Value Ref Range    WBC Count 2.0 (L) 4.0 - 11.0 10e3/uL    RBC Count 2.07 (L) 4.40 - 5.90 10e6/uL    Hemoglobin 6.3 (LL) 13.3 - 17.7 g/dL    Hematocrit 19.0 (L) 40.0 - 53.0 %    MCV 92 78 - 100 fL    MCH 30.4 26.5 - 33.0 pg    MCHC 33.2 31.5 - 36.5 g/dL    RDW 15.2 (H) 10.0 - 15.0 %    Platelet Count 34 (LL) 150 - 450 10e3/uL    % Neutrophils 66 %    % Lymphocytes 21 %    % Monocytes 11 %    % Eosinophils 1 %    % Basophils 0 %    % Immature Granulocytes 1 %    NRBCs per 100 WBC 0 <1 /100    Absolute Neutrophils 1.4 (L) 1.6 - 8.3 10e3/uL    Absolute Lymphocytes 0.4 (L) 0.8 - 5.3 10e3/uL    Absolute Monocytes 0.2 0.0 - 1.3 10e3/uL    Absolute Eosinophils 0.0 0.0 - 0.7 10e3/uL    Absolute Basophils 0.0 0.0 - 0.2 10e3/uL    Absolute Immature Granulocytes 0.0 <=0.4 10e3/uL    Absolute NRBCs 0.0 10e3/uL   Extra Tube     Status: None    Narrative    The following orders were created for panel order Extra Tube.  Procedure                               Abnormality         Status                     ---------                               -----------         ------                     Extra Green Top (Lithium...[[id number]]                      Final result                 Please view results for these tests on the individual orders.   Extra Green Top (Lithium Heparin) Tube     Status: None   Result Value Ref Range    Hold Specimen JIC    Troponin T, High Sensitivity     Status: Normal   Result Value Ref Range    Troponin T, High Sensitivity 11 <=22 ng/L   EKG 12-lead, tracing only     Status: None   Result Value Ref Range    Systolic Blood Pressure  mmHg     Diastolic Blood Pressure  mmHg    Ventricular Rate 77 BPM    Atrial Rate 77 BPM    MA Interval 174 ms    QRS Duration 100 ms     ms    QTc 448 ms    P Axis 59 degrees    R AXIS 60 degrees    T Axis 59 degrees    Interpretation ECG       Sinus rhythm  Normal ECG  Unconfirmed report - interpretation of this ECG is computer generated - see medical record for final interpretation  Confirmed by - EMERGENCY ROOM, PHYSICIAN ([id number]),  SOL ANAYA ([id number]) on 4/15/2024 8:07:03 PM     Adult Type and Screen     Status: None   Result Value Ref Range    Antibody Screen Negative Negative    SPECIMEN EXPIRATION DATE [id number]    ABO/RH Type & Screen     Status: None   Result Value Ref Range    SPECIMEN EXPIRATION DATE [id number]     ABORH A NEG    Prepare red blood cells (unit)     Status: None (Preliminary result)   Result Value Ref Range    Blood Component Type Red Blood Cells     Product Code [id number]     Unit Status Ready for issue     Unit Number [id number]     CROSSMATCH Compatible     CODING SYSTEM [id number]    Prepare red blood cells (unit)     Status: None   Result Value Ref Range    Blood Component Type Red Blood Cells     Product Code [id number]     Unit Status Transfused     Unit Number [id number]     CROSSMATCH Compatible     CODING SYSTEM [id number]     ISSUE DATE AND TIME [id number]     UNIT ABO/RH O-     UNIT TYPE ISBT 9500    Respiratory Panel PCR     Status: Normal    Specimen: Nasopharyngeal; Swab    Narrative    The following orders were created for panel order Respiratory Panel PCR.  Procedure                               Abnormality         Status                     ---------                               -----------         ------                     Respiratory Panel PCR, N...[[id number]]  Normal              Final result                 Please view results for these tests on the individual orders.   Respiratory Panel PCR, Nasopharyngeal     Status: Normal    Specimen:  Nasopharyngeal; Swab   Result Value Ref Range    Adenovirus Not Detected Not Detected    Coronavirus Not Detected Not Detected    Human Metapneumovirus Not Detected Not Detected    Human Rhin/Enterovirus Not Detected Not Detected    Influenza A Not Detected Not Detected    Influenza A, H1 Not Detected Not Detected    Influenza A 2009 H1N1 Not Detected Not Detected    Influenza A, H3 Not Detected Not Detected    Influenza B Not Detected Not Detected    Parainfluenza Virus 1 Not Detected Not Detected    Parainfluenza Virus 2 Not Detected Not Detected    Parainfluenza Virus 3 Not Detected Not Detected    Parainfluenza Virus 4 Not Detected Not Detected    Respiratory Syncytial Virus A Not Detected Not Detected    Respiratory Syncytial Virus B Not Detected Not Detected    Chlamydia Pneumoniae Not Detected Not Detected    Mycoplasma Pneumoniae Not Detected Not Detected    Narrative    The [employer] Respiratory Panel is a qualitative nucleic acid, multiplex, in vitro diagnostic test for the simultaneous detection and identification of multiple respiratory viral and bacterial nucleic acids in nasopharyngeal swabs collected in viral transport media from individual exhibiting signs and symptoms of respiratory infection. The assay has received FDA approval for the testing of nasopharyngeal (NP) swabs only. The [facility] at [facility] has validated the performance characteristics for bronchial alveolar lavage specimens. This test is used for clinical purposes and should not be regarded as investigational or for research. This laboratory is certified under the Clinical Laboratory Improvement Amendments of 1988 (CLIA-88) as qualified to perform high complexity clinical laboratory testing.    CBC with platelets differential     Status: Abnormal    Narrative    The following orders were created for panel order CBC with platelets differential.  Procedure                               Abnormality          Status                     ---------                               -----------         ------                     CBC with platelets and d...[[id number]]  Abnormal            Final result                 Please view results for these tests on the individual orders.   ABO/Rh type and screen *Canceled*     Status: None ()    Narrative    The following orders were created for panel order ABO/Rh type and screen.  Procedure                               Abnormality         Status                     ---------                               -----------         ------                       Please view results for these tests on the individual orders.   ABO/Rh type and screen *Canceled*     Status: None ()    Narrative    The following orders were created for panel order ABO/Rh type and screen.  Procedure                               Abnormality         Status                     ---------                               -----------         ------                     Adult Type and Screen[[id number]]                                                         Please view results for these tests on the individual orders.   ABO/Rh type and screen     Status: None    Narrative    The following orders were created for panel order ABO/Rh type and screen.  Procedure                               Abnormality         Status                     ---------                               -----------         ------                     Adult Type and Screen[[id number]]                            Final result                 Please view results for these tests on the individual orders.   ABO/RH Type and Screen  *Canceled*     Status: None ()    Narrative    The following orders were created for panel order ABO/RH Type and Screen .  Procedure                               Abnormality         Status                     ---------                               -----------         ------                       Please view results for these tests on  the individual orders.     Medications   acyclovir (ZOVIRAX) tablet 800 mg (800 mg Oral $Given 4/16/24 0043)   apixaban ANTICOAGULANT (ELIQUIS) tablet 2.5 mg (2.5 mg Oral $Given 4/15/24 2323)   diclofenac (VOLTAREN) 1 % topical gel 2 g (has no administration in time range)   lisinopril (ZESTRIL) tablet 20 mg (has no administration in time range)   LORazepam (ATIVAN) tablet 0.5-1 mg (has no administration in time range)   metoprolol succinate ER (TOPROL XL) 24 hr tablet 100 mg (has no administration in time range)   NIFEdipine ER OSMOTIC (PROCARDIA XL) 24 hr tablet 30 mg ( Oral Automatically Held 4/19/24 0800)   pantoprazole (PROTONIX) EC tablet 40 mg (has no administration in time range)   prasugrel (EFFIENT) tablet 10 mg ( Oral Automatically Held 4/19/24 0800)   sirolimus (GENERIC EQUIVALENT) tablet 0.5 mg (has no administration in time range)   traZODone (DESYREL) tablet 50 mg (50 mg Oral $Given 4/15/24 2323)   voriconazole (VFEND) tablet 200 mg (200 mg Oral $Given 4/15/24 2323)   acetaminophen (TYLENOL) tablet 325-650 mg (has no administration in time range)   prochlorperazine (COMPAZINE) injection 5-10 mg (has no administration in time range)     Or   prochlorperazine (COMPAZINE) tablet 5-10 mg (has no administration in time range)   LORazepam (ATIVAN) injection 0.5-1 mg (has no administration in time range)     Or   LORazepam (ATIVAN) tablet 0.5-1 mg (has no administration in time range)   oxyCODONE (ROXICODONE) tablet 5 mg (5 mg Oral $Given 4/15/24 2323)   sodium chloride (PF) 0.9% PF flush 91 mL (91 mLs Intravenous $Given 4/15/24 2112)   iopamidol (ISOVUE-370) solution 70 mL (70 mLs Intravenous $Given 4/15/24 2112)   furosemide (LASIX) injection 20 mg (20 mg Intravenous $Given 4/16/24 0038)     Labs Ordered and Resulted from Time of ED Arrival to Time of ED Departure   INR - Abnormal       Result Value    INR 1.19 (*)    COMPREHENSIVE METABOLIC PANEL - Abnormal    Sodium 144      Potassium 3.7      Carbon  Dioxide (CO2) 21 (*)     Anion Gap 11      Urea Nitrogen 22.1 (*)     Creatinine 1.06      GFR Estimate 85      Calcium 8.9      Chloride 112 (*)     Glucose 97      Alkaline Phosphatase 91      AST 31      ALT 16      Protein Total 6.0 (*)     Albumin 3.4 (*)     Bilirubin Total 0.2     LACTIC ACID WHOLE BLOOD - Abnormal    Lactic Acid 0.5 (*)    BLOOD GAS VENOUS - Abnormal    pH Venous 7.40      pCO2 Venous 40      pO2 Venous 35      Bicarbonate Venous 24      Base Excess/Deficit Venous -0.5      FIO2 21      Oxyhemoglobin Venous 64 (*)     O2 Sat, Venous 64.8 (*)    NT PROBNP INPATIENT - Abnormal    N terminal Pro BNP Inpatient 1,512 (*)    ROUTINE UA WITH MICROSCOPIC REFLEX TO CULTURE - Abnormal    Color Urine Light Yellow      Appearance Urine Clear      Glucose Urine Negative      Bilirubin Urine Negative      Ketones Urine Negative      Specific Gravity Urine 1.017      Blood Urine Small (*)     pH Urine 6.0      Protein Albumin Urine 300 (*)     Urobilinogen Urine Normal      Nitrite Urine Negative      Leukocyte Esterase Urine Negative      Mucus Urine Present (*)     RBC Urine 2      WBC Urine 1     CBC WITH PLATELETS AND DIFFERENTIAL - Abnormal    WBC Count 2.0 (*)     RBC Count 2.07 (*)     Hemoglobin 6.3 (*)     Hematocrit 19.0 (*)     MCV 92      MCH 30.4      MCHC 33.2      RDW 15.2 (*)     Platelet Count 34 (*)     % Neutrophils 66      % Lymphocytes 21      % Monocytes 11      % Eosinophils 1      % Basophils 0      % Immature Granulocytes 1      NRBCs per 100 WBC 0      Absolute Neutrophils 1.4 (*)     Absolute Lymphocytes 0.4 (*)     Absolute Monocytes 0.2      Absolute Eosinophils 0.0      Absolute Basophils 0.0      Absolute Immature Granulocytes 0.0      Absolute NRBCs 0.0     PARTIAL THROMBOPLASTIN TIME - Normal    aPTT 32     LIPASE - Normal    Lipase 45     TROPONIN T, HIGH SENSITIVITY - Normal    Troponin T, High Sensitivity 12     MAGNESIUM - Normal    Magnesium 2.2     PHOSPHORUS - Normal     Phosphorus 3.0     COVID-19 VIRUS (CORONAVIRUS) BY PCR - Normal    SARS CoV2 PCR Negative     TROPONIN T, HIGH SENSITIVITY - Normal    Troponin T, High Sensitivity 11     RESPIRATORY PANEL PCR, NASOPHARYNGEAL - Normal    Adenovirus Not Detected      Coronavirus Not Detected      Human Metapneumovirus Not Detected      Human Rhin/Enterovirus Not Detected      Influenza A Not Detected      Influenza A, H1 Not Detected      Influenza A 2009 H1N1 Not Detected      Influenza A, H3 Not Detected      Influenza B Not Detected      Parainfluenza Virus 1 Not Detected      Parainfluenza Virus 2 Not Detected      Parainfluenza Virus 3 Not Detected      Parainfluenza Virus 4 Not Detected      Respiratory Syncytial Virus A Not Detected      Respiratory Syncytial Virus B Not Detected      Chlamydia Pneumoniae Not Detected      Mycoplasma Pneumoniae Not Detected     TYPE AND SCREEN, ADULT    Antibody Screen Negative      SPECIMEN EXPIRATION DATE 20240418235900     ABO/RH TYPE (2)    SPECIMEN EXPIRATION DATE [id number]      ABORH A NEG     PREPARE RED BLOOD CELLS (UNIT)    Blood Component Type Red Blood Cells      Product Code S7943Q01      Unit Status Ready for issue      Unit Number [id number]      CROSSMATCH Compatible      CODING SYSTEM [id number]     PREPARE RED BLOOD CELLS (UNIT)    Blood Component Type Red Blood Cells      Product Code Y2678C10      Unit Status Transfused      Unit Number [id number]      CROSSMATCH Compatible      CODING SYSTEM [id number]      ISSUE DATE AND TIME [id number]      UNIT ABO/RH O-      UNIT TYPE ISBT 9500     PREPARE RED BLOOD CELLS (UNIT)   RESPIRATORY PANEL PCR   BLOOD CULTURE   BLOOD CULTURE   C. DIFFICILE TOXIN B PCR WITH REFLEX TO C. DIFFICILE ANTIGEN AND TOXINS A/B EIA   VANCOMYCIN RESISTANT ENTEROCOCCUS CULTURE (VRE)   TRANSFUSE RED BLOOD CELLS (UNIT)   TRANSFUSE RED BLOOD CELLS (UNIT)   ABO/RH TYPE AND SCREEN     US Lower Extremity Venous Duplex Left   Preliminary Result    RESIDENT PRELIMINARY INTERPRETATION   IMPRESSION:   1. No evidence left lower extremity deep venous thrombosis.   2. Large Baker's cyst.   3. Soft tissue edema of the left lower extremity.      CT Chest Pulmonary Embolism w Contrast   Final Result   IMPRESSION:   1.  Slight interval decrease in volume of right lower lobe pulmonary emboli in comparison to prior CT.   2.  No new pulmonary emboli or evidence of right heart strain.   3.  Mild pulmonary edema with small bilateral pleural effusions, increased since prior CT.   4.  Diffuse gallbladder wall edema without luminal distention, favor secondary to volume overload but could consider further evaluation with ultrasound if patient is experiencing right upper quadrant abdominal pain.      POC US ECHO LIMITED   Final Result   Limited Bedside Cardiac Ultrasound, performed and interpreted by me.    Indication: Shortness of Breath.   Parasternal long axis, parasternal short axis, apical 4 chamber, and subcostal views were acquired.    Image quality was satisfactory.      Findings:     Abnormal -mildly diminished left ventricular ejection fraction.  No RV strain.  No pericardial effusion.      IMPRESSION: Abnormal limited cardiac ultrasound showing mildly diminished left ventricular ejection fraction without visualized RV strain, no pericardial effusion.             Echo Complete    (Results Pending)          Critical care was performed.   Critical Care Addendum  My initial assessment, based on my review of prehospital provider report, review of nursing observations, review of vital signs, focused history, physical exam, and interpretation of labs , established a high suspicion that Ziggy Hallman has  symptomatic anemia , which requires immediate intervention, and therefore he is critically ill.     After the initial assessment, the care team initiated medication therapy with PRBCs  to provide stabilization care. Due to the critical nature of this patient, I reassessed  nursing observations, vital signs, physical exam, mental status, neurologic status, and respiratory status multiple times prior to his disposition.     Time also spent performing documentation, reviewing test results, discussion with consultants, and coordination of care.     Critical care time (excluding teaching time and procedures): 35 minutes.     Assessment & Plan      Ziggy Hallman is a 48 year old male PMH of aortic mural thrombus, coronary artery thrombosis, HTN, femoral popliteal artery thrombus, myelodysplastic of spinal cord, STEMI, VA, ischemic stroke, MDS, HLD, s/p bone marrow transplant (2024) who is here for evaluation of SOB, fatigue, leg swelling.       He was sent in by his oncology team for evaluation of progressive edema and shortness of breath on exertion as well as fatigue.  On arrival the patient had reassuring hemodynamics and an unremarkable physical exam.  That said echocardiography was concerning for slightly diminished ejection fraction without severe failure.  Occasional B-lines but not overwhelmingly so.  Possible mild pulmonary edema.    EKG was reassuring.  Labs were obtained which were notable for reassuring troponin but elevated BNP.  Chest imaging shows pulmonary edema.  No new pulmonary embolism.  Hemoglobin was low at 6.3.  Discussed with the oncology team that was on-call and they recommended transfusion.  Transfusion goal of 8.  No obvious infection.    Patient will be admitted for correction of hematologic abnormalities as well as further cardiac workup for possible development of CHF.    I have reviewed the nursing notes. I have reviewed the findings, diagnosis, plan and need for follow up with the patient.    Current Discharge Medication List          Final diagnoses:   Dyspnea on exertion   Elevated brain natriuretic peptide (BNP) level   Anemia, unspecified type       Leland Newman Jr., MD   [facility] EMERGENCY DEPARTMENT  4/15/2024     Trent  MD Leland  04/16/24 0157

## 2024-04-15 NOTE — TELEPHONE ENCOUNTER
BMT Nurse Triage - Generic/Other Symptoms     Treating Provider:   Jordi    Date of last office visit: 4/11/2024    Onset of symptoms: over this past weekend     Brief description of symptoms: Patient called to report increased shortness of breath and lower extremity swelling. Recent ultrasound of lower extremities negative for DVT, however, given extensive clotting history, Dr. Bacon would like patient to go to the ED. Called patient who verbalized understanding.

## 2024-04-16 ENCOUNTER — APPOINTMENT (OUTPATIENT)
Dept: CARDIOLOGY | Facility: CLINIC | Age: 49
End: 2024-04-16
Attending: INTERNAL MEDICINE
Payer: COMMERCIAL

## 2024-04-16 ENCOUNTER — APPOINTMENT (OUTPATIENT)
Dept: ULTRASOUND IMAGING | Facility: CLINIC | Age: 49
End: 2024-04-16
Attending: INTERNAL MEDICINE
Payer: COMMERCIAL

## 2024-04-16 VITALS
HEART RATE: 77 BPM | TEMPERATURE: 97.6 F | OXYGEN SATURATION: 100 % | DIASTOLIC BLOOD PRESSURE: 63 MMHG | WEIGHT: 174.7 LBS | SYSTOLIC BLOOD PRESSURE: 119 MMHG | HEIGHT: 69 IN | RESPIRATION RATE: 18 BRPM | BODY MASS INDEX: 25.87 KG/M2

## 2024-04-16 PROBLEM — D64.9 ANEMIA, UNSPECIFIED TYPE: Status: ACTIVE | Noted: 2024-04-16

## 2024-04-16 PROBLEM — R60.0 LOCALIZED EDEMA: Status: ACTIVE | Noted: 2024-04-16

## 2024-04-16 PROBLEM — R06.02 SHORTNESS OF BREATH: Status: ACTIVE | Noted: 2024-04-16

## 2024-04-16 LAB
ALBUMIN MFR UR ELPH: 92.6 MG/DL
ANION GAP SERPL CALCULATED.3IONS-SCNC: 10 MMOL/L (ref 7–15)
ANION GAP SERPL CALCULATED.3IONS-SCNC: 9 MMOL/L (ref 7–15)
BASOPHILS # BLD AUTO: 0 10E3/UL (ref 0–0.2)
BASOPHILS NFR BLD AUTO: 0 %
BUN SERPL-MCNC: 18.2 MG/DL (ref 6–20)
BUN SERPL-MCNC: 18.6 MG/DL (ref 6–20)
CALCIUM SERPL-MCNC: 8.6 MG/DL (ref 8.6–10)
CALCIUM SERPL-MCNC: 8.9 MG/DL (ref 8.6–10)
CHLORIDE SERPL-SCNC: 109 MMOL/L (ref 98–107)
CHLORIDE SERPL-SCNC: 112 MMOL/L (ref 98–107)
CREAT SERPL-MCNC: 1.02 MG/DL (ref 0.67–1.17)
CREAT SERPL-MCNC: 1.05 MG/DL (ref 0.67–1.17)
CREAT UR-MCNC: 33.9 MG/DL
CULTURE HARVEST COMPLETE DATE: NORMAL
DEPRECATED HCO3 PLAS-SCNC: 22 MMOL/L (ref 22–29)
DEPRECATED HCO3 PLAS-SCNC: 24 MMOL/L (ref 22–29)
EGFRCR SERPLBLD CKD-EPI 2021: 86 ML/MIN/1.73M2
EGFRCR SERPLBLD CKD-EPI 2021: 90 ML/MIN/1.73M2
EOSINOPHIL # BLD AUTO: 0 10E3/UL (ref 0–0.7)
EOSINOPHIL NFR BLD AUTO: 1 %
ERYTHROCYTE [DISTWIDTH] IN BLOOD BY AUTOMATED COUNT: 16.3 % (ref 10–15)
GLUCOSE SERPL-MCNC: 85 MG/DL (ref 70–99)
GLUCOSE SERPL-MCNC: 94 MG/DL (ref 70–99)
HCT VFR BLD AUTO: 24.2 % (ref 40–53)
HGB BLD-MCNC: 8.4 G/DL (ref 13.3–17.7)
IMM GRANULOCYTES # BLD: 0 10E3/UL
IMM GRANULOCYTES NFR BLD: 1 %
INR PPP: 1.24 (ref 0.85–1.15)
LVEF ECHO: NORMAL
LYMPHOCYTES # BLD AUTO: 0.3 10E3/UL (ref 0.8–5.3)
LYMPHOCYTES NFR BLD AUTO: 23 %
MCH RBC QN AUTO: 30.2 PG (ref 26.5–33)
MCHC RBC AUTO-ENTMCNC: 34.7 G/DL (ref 31.5–36.5)
MCV RBC AUTO: 87 FL (ref 78–100)
MONOCYTES # BLD AUTO: 0.2 10E3/UL (ref 0–1.3)
MONOCYTES NFR BLD AUTO: 17 %
NEUTROPHILS # BLD AUTO: 0.7 10E3/UL (ref 1.6–8.3)
NEUTROPHILS NFR BLD AUTO: 58 %
NRBC # BLD AUTO: 0 10E3/UL
NRBC BLD AUTO-RTO: 0 /100
PHOSPHATE SERPL-MCNC: 3.7 MG/DL (ref 2.5–4.5)
PLATELET # BLD AUTO: 25 10E3/UL (ref 150–450)
POTASSIUM SERPL-SCNC: 3.6 MMOL/L (ref 3.4–5.3)
POTASSIUM SERPL-SCNC: 3.6 MMOL/L (ref 3.4–5.3)
PROT/CREAT 24H UR: 2.73 MG/MG CR (ref 0–0.2)
RBC # BLD AUTO: 2.78 10E6/UL (ref 4.4–5.9)
SARS-COV-2 RNA RESP QL NAA+PROBE: NEGATIVE
SODIUM SERPL-SCNC: 143 MMOL/L (ref 135–145)
SODIUM SERPL-SCNC: 143 MMOL/L (ref 135–145)
WBC # BLD AUTO: 1.2 10E3/UL (ref 4–11)

## 2024-04-16 PROCEDURE — G0378 HOSPITAL OBSERVATION PER HR: HCPCS

## 2024-04-16 PROCEDURE — 250N000013 HC RX MED GY IP 250 OP 250 PS 637: Performed by: INTERNAL MEDICINE

## 2024-04-16 PROCEDURE — 96374 THER/PROPH/DIAG INJ IV PUSH: CPT | Mod: 59

## 2024-04-16 PROCEDURE — 99238 HOSP IP/OBS DSCHRG MGMT 30/<: CPT | Mod: FS | Performed by: PHYSICIAN ASSISTANT

## 2024-04-16 PROCEDURE — 99253 IP/OBS CNSLTJ NEW/EST LOW 45: CPT | Mod: 25 | Performed by: INTERNAL MEDICINE

## 2024-04-16 PROCEDURE — 85025 COMPLETE CBC W/AUTO DIFF WBC: CPT | Performed by: INTERNAL MEDICINE

## 2024-04-16 PROCEDURE — 250N000011 HC RX IP 250 OP 636: Performed by: PHYSICIAN ASSISTANT

## 2024-04-16 PROCEDURE — 84100 ASSAY OF PHOSPHORUS: CPT | Performed by: INTERNAL MEDICINE

## 2024-04-16 PROCEDURE — 96376 TX/PRO/DX INJ SAME DRUG ADON: CPT

## 2024-04-16 PROCEDURE — 93321 DOPPLER ECHO F-UP/LMTD STD: CPT | Mod: 26 | Performed by: INTERNAL MEDICINE

## 2024-04-16 PROCEDURE — 250N000011 HC RX IP 250 OP 636: Performed by: STUDENT IN AN ORGANIZED HEALTH CARE EDUCATION/TRAINING PROGRAM

## 2024-04-16 PROCEDURE — 85610 PROTHROMBIN TIME: CPT | Performed by: INTERNAL MEDICINE

## 2024-04-16 PROCEDURE — 93971 EXTREMITY STUDY: CPT | Mod: LT

## 2024-04-16 PROCEDURE — 84156 ASSAY OF PROTEIN URINE: CPT | Performed by: PHYSICIAN ASSISTANT

## 2024-04-16 PROCEDURE — 80048 BASIC METABOLIC PNL TOTAL CA: CPT | Performed by: PHYSICIAN ASSISTANT

## 2024-04-16 PROCEDURE — 250N000011 HC RX IP 250 OP 636: Performed by: INTERNAL MEDICINE

## 2024-04-16 PROCEDURE — 250N000012 HC RX MED GY IP 250 OP 636 PS 637: Performed by: PHYSICIAN ASSISTANT

## 2024-04-16 PROCEDURE — 250N000013 HC RX MED GY IP 250 OP 250 PS 637: Performed by: PHYSICIAN ASSISTANT

## 2024-04-16 PROCEDURE — 93971 EXTREMITY STUDY: CPT | Mod: 26 | Performed by: RADIOLOGY

## 2024-04-16 PROCEDURE — 96372 THER/PROPH/DIAG INJ SC/IM: CPT | Performed by: PHYSICIAN ASSISTANT

## 2024-04-16 PROCEDURE — 80048 BASIC METABOLIC PNL TOTAL CA: CPT | Performed by: INTERNAL MEDICINE

## 2024-04-16 PROCEDURE — 93325 DOPPLER ECHO COLOR FLOW MAPG: CPT

## 2024-04-16 PROCEDURE — 999N000111 HC STATISTIC OT IP EVAL DEFER

## 2024-04-16 PROCEDURE — 999N000147 HC STATISTIC PT IP EVAL DEFER

## 2024-04-16 PROCEDURE — 93325 DOPPLER ECHO COLOR FLOW MAPG: CPT | Mod: 26 | Performed by: INTERNAL MEDICINE

## 2024-04-16 PROCEDURE — 93308 TTE F-UP OR LMTD: CPT | Mod: 26 | Performed by: INTERNAL MEDICINE

## 2024-04-16 RX ORDER — HEPARIN SODIUM,PORCINE 10 UNIT/ML
5 VIAL (ML) INTRAVENOUS
Status: DISCONTINUED | OUTPATIENT
Start: 2024-04-16 | End: 2024-04-16 | Stop reason: HOSPADM

## 2024-04-16 RX ORDER — FUROSEMIDE 20 MG
20 TABLET ORAL DAILY
Qty: 30 TABLET | Refills: 0 | Status: SHIPPED | OUTPATIENT
Start: 2024-04-16 | End: 2024-04-16

## 2024-04-16 RX ORDER — SPIRONOLACTONE 25 MG/1
25 TABLET ORAL DAILY
Status: DISCONTINUED | OUTPATIENT
Start: 2024-04-16 | End: 2024-04-16 | Stop reason: HOSPADM

## 2024-04-16 RX ORDER — FUROSEMIDE 10 MG/ML
20 INJECTION INTRAMUSCULAR; INTRAVENOUS ONCE
Status: COMPLETED | OUTPATIENT
Start: 2024-04-16 | End: 2024-04-16

## 2024-04-16 RX ORDER — HEPARIN SODIUM (PORCINE) LOCK FLUSH IV SOLN 100 UNIT/ML 100 UNIT/ML
5 SOLUTION INTRAVENOUS
Status: DISCONTINUED | OUTPATIENT
Start: 2024-04-16 | End: 2024-04-16 | Stop reason: HOSPADM

## 2024-04-16 RX ORDER — FUROSEMIDE 10 MG/ML
40 INJECTION INTRAMUSCULAR; INTRAVENOUS
Status: DISCONTINUED | OUTPATIENT
Start: 2024-04-16 | End: 2024-04-16 | Stop reason: HOSPADM

## 2024-04-16 RX ORDER — SPIRONOLACTONE 25 MG/1
25 TABLET ORAL DAILY
Qty: 30 TABLET | Refills: 0 | Status: SHIPPED | OUTPATIENT
Start: 2024-04-17 | End: 2024-05-09

## 2024-04-16 RX ORDER — SIROLIMUS 0.5 MG/1
0.5 TABLET, FILM COATED ORAL
Status: DISCONTINUED | OUTPATIENT
Start: 2024-04-16 | End: 2024-04-16 | Stop reason: HOSPADM

## 2024-04-16 RX ORDER — SIROLIMUS 0.5 MG/1
TABLET, FILM COATED ORAL
COMMUNITY
Start: 2024-04-16 | End: 2024-05-09

## 2024-04-16 RX ORDER — FUROSEMIDE 10 MG/ML
20 INJECTION INTRAMUSCULAR; INTRAVENOUS DAILY
Status: DISCONTINUED | OUTPATIENT
Start: 2024-04-16 | End: 2024-04-16

## 2024-04-16 RX ORDER — FUROSEMIDE 10 MG/ML
40 INJECTION INTRAMUSCULAR; INTRAVENOUS DAILY
Status: DISCONTINUED | OUTPATIENT
Start: 2024-04-17 | End: 2024-04-16

## 2024-04-16 RX ORDER — PRASUGREL 10 MG/1
10 TABLET, FILM COATED ORAL DAILY
COMMUNITY
Start: 2024-04-16

## 2024-04-16 RX ORDER — FUROSEMIDE 20 MG
40 TABLET ORAL DAILY
Qty: 60 TABLET | Refills: 0 | Status: SHIPPED | OUTPATIENT
Start: 2024-04-16 | End: 2024-05-02

## 2024-04-16 RX ORDER — NIFEDIPINE 30 MG
TABLET, EXTENDED RELEASE ORAL
COMMUNITY
Start: 2024-04-16

## 2024-04-16 RX ADMIN — EMPAGLIFLOZIN 10 MG: 10 TABLET, FILM COATED ORAL at 12:35

## 2024-04-16 RX ADMIN — SPIRONOLACTONE 25 MG: 25 TABLET, FILM COATED ORAL at 12:35

## 2024-04-16 RX ADMIN — FUROSEMIDE 20 MG: 10 INJECTION, SOLUTION INTRAMUSCULAR; INTRAVENOUS at 12:35

## 2024-04-16 RX ADMIN — Medication 5 ML: at 07:15

## 2024-04-16 RX ADMIN — APIXABAN 2.5 MG: 2.5 TABLET, FILM COATED ORAL at 08:04

## 2024-04-16 RX ADMIN — ACYCLOVIR 800 MG: 800 TABLET ORAL at 00:43

## 2024-04-16 RX ADMIN — OXYCODONE HYDROCHLORIDE 5 MG: 5 TABLET ORAL at 12:42

## 2024-04-16 RX ADMIN — FUROSEMIDE 20 MG: 10 INJECTION, SOLUTION INTRAMUSCULAR; INTRAVENOUS at 00:38

## 2024-04-16 RX ADMIN — Medication 5 ML: at 16:17

## 2024-04-16 RX ADMIN — PANTOPRAZOLE SODIUM 40 MG: 40 TABLET, DELAYED RELEASE ORAL at 08:04

## 2024-04-16 RX ADMIN — ACYCLOVIR 800 MG: 800 TABLET ORAL at 08:04

## 2024-04-16 RX ADMIN — SIROLIMUS 0.5 MG: 0.5 TABLET ORAL at 08:04

## 2024-04-16 RX ADMIN — LISINOPRIL 20 MG: 10 TABLET ORAL at 11:49

## 2024-04-16 RX ADMIN — Medication 5 ML: at 12:35

## 2024-04-16 RX ADMIN — METOPROLOL SUCCINATE 100 MG: 50 TABLET, EXTENDED RELEASE ORAL at 11:50

## 2024-04-16 RX ADMIN — Medication 5 ML: at 11:49

## 2024-04-16 RX ADMIN — FILGRASTIM-AAFI 480 MCG: 480 INJECTION, SOLUTION INTRAVENOUS; SUBCUTANEOUS at 16:15

## 2024-04-16 RX ADMIN — FUROSEMIDE 20 MG: 10 INJECTION, SOLUTION INTRAMUSCULAR; INTRAVENOUS at 11:49

## 2024-04-16 RX ADMIN — VORICONAZOLE 200 MG: 200 TABLET ORAL at 08:04

## 2024-04-16 ASSESSMENT — ACTIVITIES OF DAILY LIVING (ADL)
ADLS_ACUITY_SCORE: 26
ADLS_ACUITY_SCORE: 37
ADLS_ACUITY_SCORE: 26
DEPENDENT_IADLS:: INDEPENDENT
ADLS_ACUITY_SCORE: 26
ADLS_ACUITY_SCORE: 26

## 2024-04-16 NOTE — CONSULTS
Cardiology Inpatient Consultation  April 16, 2024    Reason for Consult:  A cardiology consult was requested to provide clinical guidance regarding concerns for HFpEF.     Assessment and Recommendation:  Acute on chronic HFpEF NYHA II  Uncontrolled hypertension   CAD  S/P PFO clousure     Patient is currently hemodynamic stable, He looks slightly hypervolemic on the physical exam. Diastolic function was not evaluated on TTE done inpatient. I think that one of the probable reason of hypervolemia is due to HFpEF, he also needs better control of BP at home, it might be driving HFpEF.     Plan:  Lasix 40mg IV BID as inpatient just for 24h, he is just slighly hypervoelmic - Diuretic naive.   When euvolemia is achieved transition to Lasix 40mg daily PO as outpatient   GDMT for HFpEF - Start jardiance 10mg daily and spirinolactone 25mg daily.       Plan of care discussed with Dr. Solorzano, who agrees with above plan. We will sign off.      Thank you for consulting the cardiovascular services at the [facility]. Please do not hesitate to call us with any questions.     Elijah Canchola MD  Cardiology Fellow    I very much appreciated the opportunity to see and assess Ziggy Hallman in the hospital with CV Fellow Dr Canchola.I agree with the note above which summarizes my findings and current recommendations.  Please do not hesitate to contact my office if you have any questions or concerns.      Douglas Orona MD  Cardiac Arrhythmia Service  [facility]  [phone]         HPI:   Ziggy Hallman is a 48 year old male MDS s/p MA MUD on day 81, prior PE, prior recurrent thromboembolic events who presented to the ED with 10 days of lower extremity swelling and 3 days of dyspnea on exertion. Cardiology was concern due to HFpEF.   Patient refers that he has been feeling SOB for the last 2-3 months but it started to get worse on Sunday. In addition to feeling SOB, he has  "noticed Lower extremity swelling, worst on the tops of his feet, maybe slightly worse on the Left than right per patient. He has history of HTN that refers that is not that controlled at home, and history of CAD post PCI-ABILIO, and PFO closure.   On the other hand, there is no chest pain, no radiating pain to his neck or left arm, no diaphoresis. No nausea and no vomiting. There is no orthopnea or paroxysmal nocturnal dyspnea. He says he weighs himself every other day and has not gained weight. His weight today and weight at clinic on 4/11 are about equal. He is worried about his heart given his prior history     At the moment of my assessment patient is hemodynamic stable and feeling fine. TTE done today did not evaluated diastolic function. BNP was found elevated.   Review of Systems:    Complete review of systems was performed and negative except per HPI.    PMH:  Past Medical History:   Diagnosis Date    AMI anterior wall (H)     Mid LAD on cath with stent    CAD S/P percutaneous coronary angioplasty 07/01/2016    Unstable agina with anterior myocardial damage reported without mycardial damage by patient's history    CVA (cerebrovascular accident) (H) 01/01/2012    Reporting thromboembolic episode on the right neck. Pt states \"I've had ten strokes.\"    Hypercoagulable state (H24)     Uncertain etiology--seeing Hematologist    MEDICAL HISTORY OF -     Possibly PFO     Active Problems:  Patient Active Problem List    Diagnosis Date Noted    Shortness of breath 04/16/2024     Priority: Medium    Localized edema 04/16/2024     Priority: Medium    Anemia, unspecified type 04/16/2024     Priority: Medium    Acute ST elevation myocardial infarction (STEMI) (H) 04/15/2024     Priority: Medium    Dyspnea on exertion 04/15/2024     Priority: Medium    Elevated brain natriuretic peptide (BNP) level 04/15/2024     Priority: Medium    Status post bone marrow transplant (H) 02/17/2024     Priority: Medium    Antineoplastic " chemotherapy induced pancytopenia (H24) 09/26/2023     Priority: Medium    MDS (myelodysplastic syndrome) (H) 11/20/2015     Priority: Medium    Hyperlipidemia LDL goal <100 08/18/2015     Priority: Medium    Low back pain 08/18/2015     Priority: Medium     Diagnosis updated by automated process. Provider to review and confirm.      Hypercoagulable state (H24)      Priority: Medium     Uncertain etiology--seeing Hematologist      CVA (cerebrovascular accident) (H)      Priority: Medium     Reporting thromboembolic episode on the right neck       CAD S/P percutaneous coronary angioplasty      Priority: Medium     Social History:  Social History     Tobacco Use    Smoking status: Former     Current packs/day: 0.50     Types: Cigarettes     Passive exposure: Past (Mom smoked cigs indoors)    Smokeless tobacco: Never    Tobacco comments:     cigarette once in a while   Substance Use Topics    Alcohol use: Yes     Alcohol/week: 0.0 standard drinks of alcohol     Comment: 4 times per week 5 drinks    Drug use: No     Family History:  Family History   Problem Relation Age of Onset    Diabetes No family hx of     Coronary Artery Disease No family hx of     Hypertension No family hx of     Hyperlipidemia No family hx of     Breast Cancer No family hx of        Medications:  Current Facility-Administered Medications   Medication Dose Route Frequency Provider Last Rate Last Admin    acyclovir (ZOVIRAX) tablet 800 mg  800 mg Oral BID Drew Calvin MD   800 mg at 04/16/24 0804    apixaban ANTICOAGULANT (ELIQUIS) tablet 2.5 mg  2.5 mg Oral BID Drew Calvin MD   2.5 mg at 04/16/24 0804    empagliflozin (JARDIANCE) tablet 10 mg  10 mg Oral Daily Debora Claudio PA-C   10 mg at 04/16/24 1235    furosemide (LASIX) injection 40 mg  40 mg Intravenous BID Debora Claudio PA-C        heparin 100 unit/mL injection 5 mL  5 mL Intracatheter Q28 Days aCpri Dawson MBBS   5 mL at 04/16/24 1617    lisinopril (ZESTRIL)  tablet 20 mg  20 mg Oral Daily Drew Calvin MD   20 mg at 04/16/24 1149    metoprolol succinate ER (TOPROL XL) 24 hr tablet 100 mg  100 mg Oral Daily Drew Calvin MD   100 mg at 04/16/24 1150    [Held by provider] NIFEdipine ER OSMOTIC (PROCARDIA XL) 24 hr tablet 30 mg  30 mg Oral Daily Drew Calvin MD        pantoprazole (PROTONIX) EC tablet 40 mg  40 mg Oral Daily Drew Calvin MD   40 mg at 04/16/24 0804    [Held by provider] prasugrel (EFFIENT) tablet 10 mg  10 mg Oral Daily Drew Calvin MD        sirolimus (GENERIC EQUIVALENT) tablet 0.5 mg  0.5 mg Oral Q48H Debora Claudio PA-C   0.5 mg at 04/16/24 0804    spironolactone (ALDACTONE) tablet 25 mg  25 mg Oral Daily Debora Claudio PA-C   25 mg at 04/16/24 1235    traZODone (DESYREL) tablet 50 mg  50 mg Oral At Bedtime Drew Calvin MD   50 mg at 04/15/24 2323    voriconazole (VFEND) tablet 200 mg  200 mg Oral BID Drew Calvin MD   200 mg at 04/16/24 0804       Current Facility-Administered Medications   Medication Dose Route Frequency Provider Last Rate Last Admin       Physical Exam:  Temp:  [97.3  F (36.3  C)-98.8  F (37.1  C)] 97.6  F (36.4  C)  Pulse:  [73-84] 77  Resp:  [16-18] 18  BP: ()/(60-73) 119/63  SpO2:  [97 %-100 %] 100 %    Intake/Output Summary (Last 24 hours) at 4/16/2024 1752  Last data filed at 4/16/2024 1535  Gross per 24 hour   Intake 620 ml   Output 0 ml   Net 620 ml     GEN: pleasant, no acute distress  HEENT: No discharge  EYES: no icterus  CV: RRR, normal s1/s2, no murmurs/rubs/s3/s4, no heave. JVD was not seen by me at 90 degrees.   CHEST: clear to ausculation bilaterally, no rales or wheezing  ABD: soft, non-tender, normal active bowel sounds  : no flank/suprapubic tenderness  EXTR: pulses present  No clubbing, cyanosis or edema.   NEURO: alert oriented, speech fluent/appropriate, motor grossly nonfocal  PSYCH: cooperative, affect appropriate, pleasant      Diagnostics:  All labs and imaging  were reviewed, of note:    CMP  Recent Labs   Lab 04/16/24  1459 04/16/24  0714 04/15/24  1850 04/11/24  0756    143 144 142   POTASSIUM 3.6 3.6 3.7 3.7   CHLORIDE 109* 112* 112* 110*   CO2 24 22 21* 23   ANIONGAP 10 9 11 9   GLC 94 85 97 98   BUN 18.2 18.6 22.1* 10.0   CR 1.05 1.02 1.06 0.89   GFRESTIMATED 86 90 85 >90   REMY 8.9 8.6 8.9 8.6   MAG  --   --  2.2  --    PHOS  --  3.7 3.0  --    PROTTOTAL  --   --  6.0* 5.9*   ALBUMIN  --   --  3.4* 3.1*   BILITOTAL  --   --  0.2 0.2   ALKPHOS  --   --  91 83   AST  --   --  31 33   ALT  --   --  16 21     CBC  Recent Labs   Lab 04/16/24  0714 04/15/24  1850 04/11/24  0756   WBC 1.2* 2.0* 1.6*   RBC 2.78* 2.07* 2.87*   HGB 8.4* 6.3* 8.8*   HCT 24.2* 19.0* 26.3*   MCV 87 92 92   MCH 30.2 30.4 30.7   MCHC 34.7 33.2 33.5   RDW 16.3* 15.2* 15.2*   PLT 25* 34* 40*     INR  Recent Labs   Lab 04/16/24  0714 04/15/24  1850   INR 1.24* 1.19*     Arterial Blood Gas  Recent Labs   Lab 04/15/24  1850   O2PER 21

## 2024-04-16 NOTE — PHARMACY-ADMISSION MEDICATION HISTORY
Pharmacy Intern Admission Medication History    Admission medication history is complete. The information provided in this note is only as accurate as the sources available at the time of the update.    Information Source(s): Patient via in-person    Pertinent Information:   Patient gets his medications set up in a pill box in clinic, was unsure exactly what he takes but says he is adherent to his pill box.  Confirmed patient is currently holding prasugrel    Changes made to PTA medication list:  Added: None  Deleted: None  Changed: None    Allergies reviewed with patient and updates made in EHR: yes    Medication History Completed By: Brittni French 4/16/2024 1:26 PM    PTA Med List   Medication Sig Last Dose    acyclovir (ZOVIRAX) 800 MG tablet Take 1 tablet (800 mg) by mouth 2 times daily 4/15/2024 at noon    apixaban ANTICOAGULANT (ELIQUIS) 2.5 MG tablet Take 1 tablet (2.5 mg) by mouth 2 times daily 4/15/2024 at noon    diclofenac (VOLTAREN) 1 % topical gel Apply 2 g topically 3 times daily as needed for moderate pain To knees 4/14/2024 at PM    lisinopril (ZESTRIL) 40 MG tablet Take 0.5 tablets (20 mg) by mouth daily 4/15/2024 at noon    LORazepam (ATIVAN) 0.5 MG tablet Take 1-2 tablets (0.5-1 mg) by mouth every 4 hours as needed for vomiting or nausea (nausea/vomiting/sleep) Past Week    metoprolol succinate ER (TOPROL XL) 100 MG 24 hr tablet Take 1 tablet (100 mg) by mouth daily 4/15/2024 at noon    NIFEdipine ER (ADALAT CC) 30 MG 24 hr tablet Take 1 tablet (30 mg) by mouth daily 4/15/2024 at noon    pantoprazole (PROTONIX) 40 MG EC tablet Take 1 tablet (40 mg) by mouth daily 4/15/2024 at noon    sirolimus (GENERIC EQUIVALENT) 0.5 MG tablet Take 1 tablet (0.5 mg) by mouth daily for 28 days Take 0.5 mg daily 4/15/2024 at noon    traZODone (DESYREL) 50 MG tablet Take 1 tablet (50 mg) by mouth at bedtime Past Week    voriconazole (VFEND) 200 MG tablet Take 1 tablet (200 mg) by mouth 2 times daily Start on  Saturday 3/9/24 4/15/2024 at noon

## 2024-04-16 NOTE — PLAN OF CARE
Physical Therapy: Orders received. Chart reviewed and discussed with care team.? Physical Therapy not indicated due to patient mobilizing at baseline, declined IP PT needs. Anticipate discharge in coming days.? Defer discharge recommendations to medical team.? Will complete orders.

## 2024-04-16 NOTE — H&P
[facility]    History and Physical - Hospitalist Service  BMT Admission       Date of Admission:  4/15/2024    Assessment & Plan      Ziggy Hallman is a 48 year old male admitted on 4/15/2024. 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 He is admitted for ACS rule out and evaluation of possible new heart failure with dyspnea on exertion, lower extremity swelling for 5 days.     Acute Dyspnea on Exertion  Possible acute HFpEF  Pulmonary edema  Small Bilateral Pleural effusions  - fluid overloaded on exam, 1+ lower extremity edema, CT PE with pulmonary edema, no evidence of PE  - dyspnea less likely from anemia as patient is chronicaly in the 6-7 range  - Echo ordered  - EKG without St segment changes per my read  - Troponin negative on intial, will repeat  - NT Pro BNP elevated to 1,512  - Cardiology consult in the AM as patient does not appear to be in ACS  - Lasix 20mg IV x1  - LLE duplex as swelling is slightly worse on left    MDS s/p MA MUD  Immunosuppressed  - day 81, A-, donor O+  - continue acyclovir and voriconazole ppx, received pentamidine 4/11  - continue sirolimus for GVHD prevention    Pancytopenia due to chemotherapy  - transfuse irradiated blood for hgb <7  - transfuse irradiated platelets for plt <10  - daily CBC with diff    Recurrent arterial thromboembolic events  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  - prior question of APS lingers, though testing has been negative in the past, hypercoagulable work up has been negative  - Hx of renal infacts (2011, 2013, 2015), L popliteal embolic occlusion (2011), TIA 2012, Embolic MI (2015), R toe gangrene (2017), In stent restenosis MI (2017), Stroke (2020)  - PFO closure 2020  - continue home eliquis 2.5mg BID  - Prasugrel had been on hold prior to admission with platelets <50, will continue to hold with thrombocytopenia    CAD  Hx of CABG  HTN  - continue metoprolol and  "lisinopril   - nifedipine on hole prior to admission, will continue to hold  - continue to hold statin  - Last echo 1/15/24 with normal EF (50-55%), but grade I diastolic dysfunction    Non anion gap metabolic acidosis  - monitor fluid status  - pre renal BUN/Cr ratio consistent with possible heart failure as above          Diet:  general diet  DVT Prophylaxis: DOAC  Stanton Catheter: Not present  Lines: PRESENT             Cardiac Monitoring: None  Code Status:  FULL CODE    Clinically Significant Risk Factors Present on Admission              # Hypoalbuminemia: Lowest albumin = 3.4 g/dL at 4/15/2024  6:50 PM, will monitor as appropriate  # Drug Induced Coagulation Defect: home medication list includes an anticoagulant medication  # Drug Induced Platelet Defect: home medication list includes an antiplatelet medication   # Hypertension: Home medication list includes antihypertensive(s)      # Overweight: Estimated body mass index is 26.4 kg/m  as calculated from the following:    Height as of this encounter: 1.753 m (5' 9\").    Weight as of this encounter: 81.1 kg (178 lb 12.8 oz).              Disposition Plan     Medically Ready for Discharge: Anticipated in 2-4 Days           Drew Calvin MD  Hospitalist Service  [facility]  Securely message with [employer] (more info)  Text page via [facility] Paging/Directory     ______________________________________________________________________    Chief Complaint   Dyspnea on exertion    History is obtained from the patient    History of Present Illness   Ziggy Hallman is a 48 year old male MDS s/p MA MUD on day 81, prior PE, prior recurrent thromboembolic events who presented to the ED with 10 days of lower extremity swelling and 3 days of dyspnea on exertion. Lower extremity swelling is bilateral, worst on the tops of his feet, maybe slightly worse on the Left than right per patient. This has not happened prior. Dyspnea on exertion " "when walking between room in his house, needing to stop to catch his breath frequently. There is no chest pain, no radiating pain to his neck or left arm, no diaphoresis. No nausea and no vomiting. There is no orthopnea or paroxysmal nocturnal dyspnea. He says he weighs himself every other day and has not gained weight. His weight today and weight at clinic on 4/11 are about equal. He is worried about his heart given his prior history      Past Medical History    Past Medical History:   Diagnosis Date    AMI anterior wall (H)     Mid LAD on cath with stent    CAD S/P percutaneous coronary angioplasty 07/01/2016    Unstable agina with anterior myocardial damage reported without mycardial damage by patient's history    CVA (cerebrovascular accident) (H) 01/01/2012    Reporting thromboembolic episode on the right neck. Pt states \"I've had ten strokes.\"    Hypercoagulable state (H24)     Uncertain etiology--seeing Hematologist    MEDICAL HISTORY OF -     Possibly PFO       Past Surgical History   Past Surgical History:   Procedure Laterality Date    AMPUTATE TOE(S)      ARTHROSCOPY KNEE RT/LT      Right     ARTHROSCOPY KNEE RT/LT      Left    CARDIAC CATHERIZATION      With stent placement    ESOPHAGOSCOPY, GASTROSCOPY, DUODENOSCOPY (EGD), COMBINED N/A 2/19/2024    Procedure: Esophagoscopy, gastroscopy, duodenoscopy (EGD), combined;  Surgeon: Drew Jasso MD;  Location: [facility]    IR CHEST PORT PLACEMENT > 5 YRS OF AGE  9/13/2023    IR CVC TUNNEL PLACEMENT > 5 YRS OF AGE  1/19/2024    IR CVC TUNNEL REMOVAL LEFT  3/21/2024       Prior to Admission Medications   Prior to Admission Medications   Prescriptions Last Dose Informant Patient Reported? Taking?   LORazepam (ATIVAN) 0.5 MG tablet   No No   Sig: Take 1-2 tablets (0.5-1 mg) by mouth every 4 hours as needed for vomiting or nausea (nausea/vomiting/sleep)   NIFEdipine ER (ADALAT CC) 30 MG 24 hr tablet   Yes No   Sig: Take 1 tablet (30 mg) by mouth daily   acyclovir " (ZOVIRAX) 800 MG tablet   No No   Sig: Take 1 tablet (800 mg) by mouth 2 times daily   apixaban ANTICOAGULANT (ELIQUIS) 2.5 MG tablet   No No   Sig: Take 1 tablet (2.5 mg) by mouth 2 times daily   diclofenac (VOLTAREN) 1 % topical gel   No No   Sig: Apply 2 g topically 3 times daily as needed for moderate pain To knees   lisinopril (ZESTRIL) 40 MG tablet   Yes No   Sig: Take 0.5 tablets (20 mg) by mouth daily   metoprolol succinate ER (TOPROL XL) 100 MG 24 hr tablet   Yes No   Sig: Take 1 tablet (100 mg) by mouth daily   pantoprazole (PROTONIX) 40 MG EC tablet   No No   Sig: Take 1 tablet (40 mg) by mouth daily   prasugrel (EFFIENT) 10 MG TABS tablet   Yes No   Sig: Take 1 tablet (10 mg) by mouth daily HOLD EFFECTIVE 3/7 PLTS <75K   Patient not taking: Reported on 3/7/2024   sirolimus (GENERIC EQUIVALENT) 0.5 MG tablet   No No   Sig: Take 1 tablet (0.5 mg) by mouth daily for 28 days Take 0.5 mg daily   traZODone (DESYREL) 50 MG tablet   No No   Sig: Take 1 tablet (50 mg) by mouth at bedtime   voriconazole (VFEND) 200 MG tablet   No No   Sig: Take 1 tablet (200 mg) by mouth 2 times daily Start on Saturday 3/9/24      Facility-Administered Medications: None        Allergies   Allergies   Allergen Reactions    Blood Transfusion Related (Informational Only) Other (See Comments)     Give O RBC's and WBC's only per Cell Therapy        Physical Exam   Vital Signs: Temp: 98.2  F (36.8  C) Temp src: Oral BP: 113/66 Pulse: 76   Resp: 18 SpO2: 100 % O2 Device: None (Room air)    Weight: 178 lbs 12.8 oz    Gen: NAD, lying comfortably in bed in ambulance bay  Eyes: PERRLA, EOMI, conjuctiva clear  Mouth: OP clear, no lesions  CV: RRR, 2+ radial pulses  RESP: CTA bilaterally anteriorly, no w/r/c appreciated, though the ambulance bay has high degree of ambient noise  Abd: soft, nontender, nondistended  Ext: 1+ edema bilaterally just past the ankle  Neuro: CNII-CNXII intact     Medical Decision Making       85 MINUTES SPENT BY ME on  the date of service doing chart review, history, exam, documentation & further activities per the note.      Data     I have personally reviewed the following data over the past 24 hrs:    2.0 (L)  \   6.3 (LL)   / 34 (LL)     144 112 (H) 22.1 (H) /  97   3.7 21 (L) 1.06 \     ALT: 16 AST: 31 AP: 91 TBILI: 0.2   ALB: 3.4 (L) TOT PROTEIN: 6.0 (L) LIPASE: 45     Trop: 12 BNP: 1,512 (H)     Procal: N/A CRP: N/A Lactic Acid: 0.5 (L)       INR:  1.19 (H) PTT:  32   D-dimer:  N/A Fibrinogen:  N/A       Imaging results reviewed over the past 24 hrs:   Recent Results (from the past 24 hour(s))   CT Chest Pulmonary Embolism w Contrast    Narrative    EXAM: CT CHEST PULMONARY EMBOLISM W CONTRAST  LOCATION: [facility]  DATE: 4/15/2024    INDICATION: Hx BMT, dyspnea, sent in for PE rule out  COMPARISON: Chest radiograph 02/17/2024, CT 02/06/2024  TECHNIQUE: CT chest pulmonary angiogram during arterial phase injection of IV contrast. Multiplanar reformats and MIP reconstructions were performed. Dose reduction techniques were used.   CONTRAST: 70ml isovue 370    FINDINGS:  ANGIOGRAM CHEST: Pulmonary arteries are normal caliber with persistent pulmonary emboli involving segmental and subsegmental branches in the right lower lobe, slightly decreased in volume since prior CT. No definite new emboli are visualized. Thoracic   aorta is negative for dissection. Large volume of soft plaque in the descending thoracic aorta is unchanged. No CT evidence of right heart strain.    LUNGS AND PLEURA: Diffuse interlobular septal thickening throughout both lungs, slightly increased in comparison to prior CT. Small bilateral pleural effusions with associated compressive atelectasis. No ally airspace consolidation.    MEDIASTINUM/AXILLAE: No suspicious lymphadenopathy. Right chest port with tip near the cavoatrial junction.    CORONARY ARTERY CALCIFICATION: Moderate and/or stents.    UPPER ABDOMEN:  Diffuse gallbladder wall edema without pathologic luminal distention. No evidence of biliary obstruction. Atrophic right kidney is unchanged.    MUSCULOSKELETAL: No acute bony abnormality. Healed left rib fractures.      Impression    IMPRESSION:  1.  Slight interval decrease in volume of right lower lobe pulmonary emboli in comparison to prior CT.  2.  No new pulmonary emboli or evidence of right heart strain.  3.  Mild pulmonary edema with small bilateral pleural effusions, increased since prior CT.  4.  Diffuse gallbladder wall edema without luminal distention, favor secondary to volume overload but could consider further evaluation with ultrasound if patient is experiencing right upper quadrant abdominal pain.

## 2024-04-16 NOTE — PROGRESS NOTES
"  BMT note  Chief complaint:  Ziggy Hallman is a 48 year old male admitted on 4/15/2024. 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   CV: RRR.   RESP: CTA bilaterally; no rales or wheezes.   EXT: 1+ pitting LE edema, symmetric  SKIN: no rash  NEURO: A&O x3   PSYCH: Appropriate affect   VASCULAR ACCESS: accessed, intact    ROUTINE LABS:    Lab Results   Component Value Date    WBC 1.2 (L) 04/16/2024    ANEU 0.9 (L) 04/11/2024    HGB 8.4 (L) 04/16/2024    HCT 24.2 (L) 04/16/2024    PLT 25 (LL) 04/16/2024     04/16/2024    POTASSIUM 3.6 04/16/2024    CHLORIDE 112 (H) 04/16/2024    CO2 22 04/16/2024    GLC 85 04/16/2024    BUN 18.6 04/16/2024    CR 1.02 04/16/2024    MAG 2.2 04/15/2024    INR 1.24 (H) 04/16/2024          ASSESSMENT AND PLAN:   Ziggy Hallman is a 48 year old male admitted on 4/15/2024. 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 He is admitted for ACS rule out and evaluation of possible new heart failure with dyspnea on exertion, lower extremity swelling for 5 days.     BMT/IEC PROTOCOL for 2015-29  - Chemo Prep: BU/Flu   - 8/8 DP permissive. Cell dose-9.24x10^6  - ABO: Donor: O+ Recipient A-  " (Minor incompatability, no flush required)   - BM with CR. No MRD by flow. CD33 98% donor and CD3 100% donor (3/20).  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 There is some mild dyserythropoeisis. Of the interphase cells examined, 8.625% had a signal pattern indicative of a loss involving the short arm of chromosome 5; no evidence was found of loss of chromosomes 5q. 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 LDH trending up.  He was started on a taper over 4 weeks. Dr. JEAN BAPTISTE discussed that after the taper, if counts and chimerisms don't improve, we will need to do some chemotherapy.     HEME/COAG  #Pancytopenia--see above  - Transfusion parameters: hemoglobin <8g/dL (cardiac hx), platelets <30k.   #Segmental R PE (2/6): see anticoagulation below  #APS work up- lupus antigen +, will follow outpt as may be unreliable in this acute setting and prior APS work up had been unremarkable prior to admission.  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   Extensive hypercoagulable workup to date has been unrevealing.  JAK2 testing negative.  PNH testing negative.  Elevated IgA of unknown significance, no evidence of plasma cell disorder.  - Eliquis just resumed at lower dose 2.5mg BID last week. Watching plts closely.   - Prasugrel on hold with plts <50K      CARDIOLOGY:  # Acute Dyspnea on Exertion  - Hx of Grade 1 or early diastolic dysfunction prior to transplant.  Now with presumed HFpEF  - Now with HFpEF--seen by  cardiology.  Formal note pending.  Will diurese with lasix 40mg IV BID while inpt.  Will start PO lasix, spironolactone and jardiance per cards.  Will follow up with cardiology as outpt (cards are arranging this)  - CT PE with pulmonary edema, no evidence of PE  - Echo (4/16) (did NOT evaluate for diastolic dysfunction): Global and regional left ventricular function is normal with an EF of 55-60%.  Global right ventricular function is normal. Estimated mean right atrial pressure is normal. No pericardial effusion is present.  - EKG ok  - Troponin negative on initial and repeat  - NT Pro BNP 1,512  - LLE duplex is negative    #CAD  # Hx of CABG  # Hx of HTN  - continue metoprolol and lisinopril; nifedipine--continue to hold   #Hyperlipidemia: Holding atorvastatin peritransplant    ID:   -Prophylaxis plan: ACV LD, vori, Bactrim started 2/26--all cell lines down. Stopped bactrim.  Pentam (4/11). Pending ANC next week may start prophy antibiotic. He has been hovering around 1K for ANC.  - HHV6 neg (4/11) copies from 25K previously.   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      Psych:    #Anxiety- saw inpatient psych who offered atarax, but did not work well. Health psych offered, patient declined.  #Insomnia- previously used Ambien and trazodone which were discontinued inpatient d/t AMS. He does not like Ambien.      MSK:   - knee pain: 4/9 BLE US neg for DVT but showed a Moderate-sized simple left popliteal fossa Baker's cyst. He has had for 20yrs. Has ortho appt scheduled     Clinically Significant Risk Factors Present on Admission              # Hypoalbuminemia: Lowest albumin = 3.4 g/dL at 4/15/2024  6:50 PM, will monitor as appropriate  # Drug Induced Coagulation Defect: home medication list includes an anticoagulant medication  # Drug Induced Platelet Defect: home medication list includes an antiplatelet  "medication   # Hypertension: Home medication list includes antihypertensive(s)      # Overweight: Estimated body mass index is 25.8 kg/m  as calculated from the following:    Height as of this encounter: 1.753 m (5' 9\").    Weight as of this encounter: 79.2 kg (174 lb 11.2 oz).            Medically Ready for Discharge: Anticipated Today    Dispo:  Will discharge today with lasix 40mg po daily; spironolactone 25mg/day and jardiance 10mg/day.  - check cards consult note at f/up  RTC 4/18  9:45 AM lab, provider and pharmacy  Cardiology f/up pending (appt requested per cards)      Debora Claudio PA-c  Pager [phone]  4/16/2024  1:14 PM    "

## 2024-04-16 NOTE — PLAN OF CARE
Goal Outcome Evaluation:      Plan of Care Reviewed With: patient        Patient afebrile, vitals stable, patient c/o mild to moderate LE pain 2/2 edema- Lasix IV given per orders. Patient verbalized readiness to discharge, was seen by cardiology, imaging was done and patient's heart failure will be managed as outpatient. Patient is in agreeable with the plan. Port de-accessed, Nivestym given per orders, discharge orders reviewed with patient, new discharge ,medication orders reviewed with patient, patient verbalized understanding of the discharge plan, patient discharged.

## 2024-04-16 NOTE — UTILIZATION REVIEW
"Inpatient to Observation note:    Admission Status; Secondary Review Determination         Under the authority of the Utilization Management Committee, the utilization review process indicated a secondary review on the above patient.  The review outcome is based on review of the medical records, discussions with staff, and applying clinical experience noted on the date of the review.          (x) Observation Status Appropriate - This patient does not meet hospital inpatient criteria and is placed in observation status. If this patient's primary payer is Medicare and was admitted as an inpatient, Condition Code 44 should be used and patient status changed to \"observation\".     RATIONALE FOR DETERMINATION    48-year-old male with MDS s/p  MA MUD day 81, prior PE and arterial thromboembolic events was admitted to [facility] on 4/15/2022 with dyspnea on new exertion and lower extremity swelling.  He was diagnosed with heart failure with preserved EF.  He was diuresed initially with IV diuretics, transition to oral diuretic today and will be discharged home with outpatient cardiology follow-up.  He does not meet inpatient criteria at this time.      The severity of illness, intensity of service provided, expected LOS and risk for adverse outcome make the care appropriate for further observation; however, doesn't meet criteria for hospital inpatient admission. DELL Olivares notified of this determination.    This document was produced using voice recognition software.      The information on this document is developed by the utilization review team in order for the business office to ensure compliance.  This only denotes the appropriateness of proper admission status and does not reflect the quality of care rendered.         The definitions of Inpatient Status and Observation Status used in making the determination above are those provided in the CMS Coverage Manual, Chapter 1 and Chapter 6, section 70.4.    "   Sincerely,  Pranay Cha MD    Utilization Review  Physician Advisor  [facility].

## 2024-04-16 NOTE — CONSULTS
Care Management Initial Consult    General Information  Assessment completed with: Patient, VM-chart review,    Type of CM/SW Visit: Initial Assessment  Primary Care Provider verified and updated as needed: Yes   Readmission within the last 30 days: no previous admission in last 30 days   Return Category: Progression of disease    Reason for Consult: discharge planning  Advance Care Planning: Advance Care Planning Reviewed: no concerns identified, questions answered, concerns discussed        Communication Assessment  Patient's communication style: spoken language (English or Bilingual)    Hearing Difficulty or Deaf: yes (Decreased heaing)   Wear Glasses or Blind: yes    Cognitive  Cognitive/Neuro/Behavioral: WDL                      Living Environment:   People in home: child(yimi), dependent     Current living Arrangements: house      Able to return to prior arrangements: yes       Family/Social Support:  Care provided by: self, other (see comments) (ex partner and sisters)  Provides care for: no one  Marital Status: Single  Support System: Children, Sibling(s), Ex-Partner          Description of Support System: Supportive, Involved    Support Assessment: Adequate family and caregiver support, Adequate social supports    Current Resources:   Patient receiving home care services: No  Community Resources: None  Equipment currently used at home: none  Supplies currently used at home: None    Employment/Financial:  Employment Status: disabled     Financial Concerns:     Referral to Financial Worker: No       Does the patient's insurance plan have a 3 day qualifying hospital stay waiver?  No    Lifestyle & Psychosocial Needs:  Social Determinants of Health     Food Insecurity: No Food Insecurity (6/7/2022)    [email or web address] [email or web address] [facility], [facility]    Food Insecurity     Worried About Running Out of Food in the Last Year: 1   Depression: Not at risk  (1/15/2024)    PHQ-2     PHQ-2 Score: 0   Housing Stability: Low Risk  (6/7/2022)    [facility], [facility]    Housing Stability     Unable to Pay for Housing in the Last Year: 1   Tobacco Use: Medium Risk (4/15/2024)    Patient History     Smoking Tobacco Use: Former     Smokeless Tobacco Use: Never     Passive Exposure: Past   Financial Resource Strain: Low Risk  (6/7/2022)    Received from [facility], [facility]    Financial Resource Strain     Difficulty of Paying Living Expenses: 3     Difficulty of Paying Living Expenses: Not on file   Alcohol Use: Not on file   Transportation Needs: No Transportation Needs (6/7/2022)    Received from [facility], [facility]    Transportation Needs     Lack of Transportation (Medical): 1   Physical Activity: Not on file   Interpersonal Safety: Not on file   Stress: Not on file   Social Connections: Unknown (6/12/2023)    Received from [facility], [facility]    Social Connections     Frequency of Communication with Friends and Family: Not on file   Health Literacy: Not on file       Functional Status:  Prior to admission patient needed assistance:   Dependent ADLs: Independent  Dependent IADLs: Independent  Assesssment of Functional Status: At functional baseline    Mental Health Status:  Mental Health Status: No Current Concerns       Chemical Dependency Status:  Chemical Dependency Status: No Current Concerns             Values/Beliefs:  Spiritual, Cultural Beliefs, Tenriism Practices, Values that affect care: no               Additional Information:  Pt is a 49 y/o male s/p allogeneic transplant, day +82,  readmitted due to SOB, ACS rule out, and lower extremity swelling.     Please see initial  BMT psychosocial assessment dated 01/19/24 for additional details.      Leydi Obrien MSW, [facility]  Clinical   [facility]  Adult Blood and Marrow Transplant and Cellular Therapy Program  Phone: [phone]  [employer] searchable - BMT SW 1     Primary BMT SW   RIVKA Grider, [facility]  Adult Blood & Marrow Transplant   Phone: [phone]  [employer] searchable - BMT SW 4

## 2024-04-16 NOTE — PROGRESS NOTES
Admitted/transferred from:   2 RN full   skin assessment completed by Pamela Yeung RN and Payal Hamlin.  Skin assessment finding:  open area on right hand, scares and scabs on left shin    Interventions/actions: No adjustment needed.   Will continue to monitor.

## 2024-04-16 NOTE — PLAN OF CARE
Problem: Adult Inpatient Plan of Care  Goal: Plan of Care Review  Description: The Plan of Care Review/Shift note should be completed every shift.  The Outcome Evaluation is a brief statement about your assessment that the patient is improving, declining, or no change.  This information will be displayed automatically on your shift  note.  Outcome: Progressing  Flowsheets (Taken 4/16/2024 1187)  Outcome Evaluation: IDT will follow for support and discharge planning needs  Plan of Care Reviewed With:   patient   sibling  Overall Patient Progress: no change

## 2024-04-16 NOTE — DISCHARGE SUMMARY
[facility] Discharge Summary   Ziggy Hallman MRN# [id number]   Age: 48 year old  YOB: 1975   Date of Admission: 4/15/2024  Date of Discharge:  4/16/2024  Admitting Physician: FERNANDO Oconnor  Discharge Physician: Dr. Dawson  BMT Physician: Dr. Bacon  Primary MD: Cathleen, Woody Lovett    Discharge Diagnoses:    1. 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 Mixed chimera with cytogenetic abnormality  3. 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 Presumed EFpHF; know hx of diastolic dysfunction  5. Hx of recurrent arterial thromboembolic events    Discharge Medications:         Medication List        Started      empagliflozin 10 MG Tabs tablet  Commonly known as: JARDIANCE  10 mg, Oral, DAILY  Start taking on: April 17, 2024     furosemide 20 MG tablet  Commonly known as: LASIX  40 mg, Oral, DAILY     spironolactone 25 MG tablet  Commonly known as: ALDACTONE  25 mg, Oral, DAILY  Start taking on: April 17, 2024            Modified      NIFEdipine ER 30 MG 24 hr tablet  Commonly known as: ADALAT CC  What changed:   how much to take  how to take this  when to take this  additional instructions     prasugrel 10 MG Tabs tablet  Commonly known as: EFFIENT  What changed:   how much to take  how to take this  when to take this  additional instructions     sirolimus 0.5 MG tablet  Commonly known as: GENERIC EQUIVALENT  What changed:   how much to take  how to take this  when to take this  additional instructions              Brief History of Illness:    Ziggy Hallman is a 48 year old male MDS s/p MA MUD on day 81, prior PE, prior recurrent thromboembolic events who presented to the ED with 10 days of lower extremity swelling and 3 days of dyspnea on exertion. Lower extremity swelling is bilateral, worst on the tops of his feet, maybe slightly worse on the Left than right per patient. This has not happened prior. Dyspnea on exertion when walking between room in his house, needing to stop to catch his breath  frequently. There is no chest pain, no radiating pain to his neck or left arm, no diaphoresis. No nausea and no vomiting. There is no orthopnea or paroxysmal nocturnal dyspnea. He says he weighs himself every other day and has not gained weight. His weight today and weight at clinic on 4/11 are about equal. He is worried about his heart given his prior history   For the patient's family history, social history, past medical and surgical history, bone marrow transplant workup, admission medications, hospital admission review of systems and physical exam, please refer to hospital admission H&P dated 4/15.     Hospital Course:      BMT/IEC PROTOCOL for 2015-29  - Chemo Prep: BU/Flu   - 8/8 DP permissive. Cell dose-9.24x10^6  - ABO: Donor: O+ Recipient A-  (Minor incompatability, no flush required)   - BM with CR. No MRD by flow. CD33 98% donor and CD3 100% donor (3/20).  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 There is some mild dyserythropoeisis. Of the interphase cells examined, 8.625% had a signal pattern indicative of a loss involving the short arm of chromosome 5; no evidence was found of loss of chromosomes 5q. 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 LDH trending up.  He was started on a taper over 4 weeks. Dr. JEAN BAPTISTE discussed that after the taper, if counts and chimerisms don't improve, we will need to do some chemotherapy.     HEME/COAG  #Pancytopenia--see above  - Transfusion parameters: hemoglobin <8g/dL (cardiac hx), platelets <30k.   #Segmental R PE (2/6): see anticoagulation below  #APS work up- lupus antigen +, will follow outpt as may be unreliable in this acute setting and prior APS work up had been unremarkable prior to admission.  #Recurrent arterial thromboembolic events:  He has long history of various events including renal infarcts (2011, 2013, 2015), left popliteal embolic episode requiring surgery,  anticoagulation (2011), right carotid artery embolic episode with TIA/stroke-like symptoms (2012), embolic MI (2015), gangrenous right toe requiring vascular surgery/amputation (2017), 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   Extensive hypercoagulable workup to date has been unrevealing.  JAK2 testing negative.  PNH testing negative.  Elevated IgA of unknown significance, no evidence of plasma cell disorder.  - Eliquis just resumed at lower dose 2.5mg BID last week. Watching plts closely.   - Prasugrel on hold with plts <50K      CARDIOLOGY:  # Acute Dyspnea on Exertion  - Hx of Grade 1 or early diastolic dysfunction prior to transplant.  Now with presumed HFpEF  - Now with HFpEF--seen by cardiology.  Formal note pending.  Will diurese with lasix 40mg IV BID while inpt.  Will start PO lasix, spironolactone and jardiance per cards.  Will follow up with cardiology as outpt (cards are arranging this)  - CT PE with pulmonary edema, no evidence of PE  - Echo (4/16) (did NOT evaluate for diastolic dysfunction): Global and regional left ventricular function is normal with an EF of 55-60%.  Global right ventricular function is normal. Estimated mean right atrial pressure is normal. No pericardial effusion is present.  - EKG ok  - Troponin negative on initial and repeat  - NT Pro BNP 1,512  - LLE duplex is negative    #CAD  # Hx of CABG  # Hx of HTN  - continue metoprolol and lisinopril; nifedipine--continue to hold   #Hyperlipidemia: Holding atorvastatin peritransplant    ID:   -Prophylaxis plan: ACV LD, vori, Bactrim started 2/26--all cell lines down. Stopped bactrim.  Pentam (4/11). Pending ANC next week may start prophy antibiotic. He has been hovering around 1K for ANC.  - HHV6 neg (4/11) copies from 25K previously.   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     "  Psych:    #Anxiety- saw inpatient psych who offered atarax, but did not work well. Health psych offered, patient declined.  #Insomnia- previously used Ambien and trazodone which were discontinued inpatient d/t AMS. He does not like Ambien.      MSK:   - knee pain: 4/9 BLE US neg for DVT but showed a Moderate-sized simple left popliteal fossa Baker's cyst. He has had for 20yrs. Has ortho appt scheduled     Clinically Significant Risk Factors Present on Admission              # Hypoalbuminemia: Lowest albumin = 3.4 g/dL at 4/15/2024  6:50 PM, will monitor as appropriate  # Drug Induced Coagulation Defect: home medication list includes an anticoagulant medication  # Drug Induced Platelet Defect: home medication list includes an antiplatelet medication   # Hypertension: Home medication list includes antihypertensive(s)      # Overweight: Estimated body mass index is 25.8 kg/m  as calculated from the following:    Height as of this encounter: 1.753 m (5' 9\").    Weight as of this encounter: 79.2 kg (174 lb 11.2 oz).            Medically Ready for Discharge: Anticipated Today    Dispo:  Will discharge today with lasix 40mg po daily; spironolactone 25mg/day and jardiance 10mg/day.  - check cards consult note at f/up  RTC 4/18  9:45 AM lab, provider and pharmacy  Cardiology f/up pending (appt requested per cards)    Discharge Instructions and Follow-Up:    Discharge diet: Regular diet as tolerated  Discharge activity: Activity as tolerated   Discharge follow-up: Follow up with BMT Clinic as follows:  RTC 4/18  9:45 AM lab, provider and pharmacy  Cardiology f/up pending (appt requested per cards)    Discharge Disposition:    Discharged to home in chronic stable condition.        "

## 2024-04-16 NOTE — PLAN OF CARE
"Arrived from ED at 0115. Alert and oriented x 4. Denies shortness of breath, nausea and pain. Oriented to room and call light system. Admission profile done. Up independently to the BR. Lab pending.  Problem: Adult Inpatient Plan of Care  Goal: Plan of Care Review  Description: The Plan of Care Review/Shift note should be completed every shift.  The Outcome Evaluation is a brief statement about your assessment that the patient is improving, declining, or no change.  This information will be displayed automatically on your shift  note.  Outcome: Progressing  Goal: Patient-Specific Goal (Individualized)  Description: You can add care plan individualizations to a care plan. Examples of Individualization might be:  \"Parent requests to be called daily at 9am for status\", \"I have a hard time hearing out of my right ear\", or \"Do not touch me to wake me up as it startles  me\".  Outcome: Progressing  Goal: Absence of Hospital-Acquired Illness or Injury  Outcome: Progressing  Intervention: Identify and Manage Fall Risk  Recent Flowsheet Documentation  Taken 4/16/2024 0200 by Pamela Yeung RN  Safety Promotion/Fall Prevention:   assistive device/personal items within reach   clutter free environment maintained   lighting adjusted   nonskid shoes/slippers when out of bed   room near nurse's station   safety round/check completed  Intervention: Prevent Skin Injury  Recent Flowsheet Documentation  Taken 4/16/2024 0200 by Pamela Yeung RN  Body Position: position changed independently  Intervention: Prevent and Manage VTE (Venous Thromboembolism) Risk  Recent Flowsheet Documentation  Taken 4/16/2024 0200 by Pamela Yeung RN  VTE Prevention/Management: SCDs (sequential compression devices) off  Intervention: Prevent Infection  Recent Flowsheet Documentation  Taken 4/16/2024 0200 by Pamela Yeung RN  Infection Prevention:   hand hygiene promoted   personal protective equipment utilized   rest/sleep promoted   " single patient room provided  Goal: Optimal Comfort and Wellbeing  Outcome: Progressing  Goal: Readiness for Transition of Care  Outcome: Progressing  Intervention: Mutually Develop Transition Plan  Recent Flowsheet Documentation  Taken 4/16/2024 0137 by Pamela Yeung RN  Patient/Family Anticipates Transition to: home  Equipment Currently Used at Home: none   Goal Outcome Evaluation:

## 2024-04-16 NOTE — PLAN OF CARE
Occupational Therapy: Orders received. Chart reviewed and discussed with care team.? Occupational Therapy not indicated. Per discussion with pt, pt mobilizing and performing ADLs with IND. Per RN and chart review, plan to discharge later this PM. Educated pt on conservative management for BLE edema with pt reporting wearing compression socks as needed at home and reporting improved BLE edema, although trace edema noted in BLE. Educated pt on outpatient lymphedema therapy and cardiac rehab with pt declining referrals at this time but verbalizing understanding and agreeable to reach out to PCP if interested.? Defer discharge recommendations to medical team.? Will complete orders.

## 2024-04-17 ENCOUNTER — PATIENT OUTREACH (OUTPATIENT)
Dept: CARE COORDINATION | Facility: CLINIC | Age: 49
End: 2024-04-17
Payer: COMMERCIAL

## 2024-04-17 DIAGNOSIS — M25.562 BILATERAL KNEE PAIN: Primary | ICD-10-CM

## 2024-04-17 DIAGNOSIS — M25.561 BILATERAL KNEE PAIN: Primary | ICD-10-CM

## 2024-04-17 LAB
ANTIBODY SCREEN: NEGATIVE
ANTIBODY SCREEN: NEGATIVE
SPECIMEN EXPIRATION DATE: NORMAL
SPECIMEN EXPIRATION DATE: NORMAL

## 2024-04-17 NOTE — PROGRESS NOTES
[facility]    Background: Transitional Care Management program auto-identified and prompting a chart review by [facility] team.    Assessment: Upon chart review, CCR Team member will Enroll this episode of Transitional Care Management program due to reason below:    Upon chart review, patient is followed by Bone Marrow Transplant team who follow their patients closely. [facility] will not conduct outreach to avoid duplication of outreach to patient.     Plan: Transitional Care Management episode enrolled per reason above.      *Connected Care Resource Team does NOT follow patient ongoing. Referrals are identified based on internal discharge reports and the outreach is to ensure patient has an understanding of their discharge instructions.

## 2024-04-18 ENCOUNTER — DOCUMENTATION ONLY (OUTPATIENT)
Dept: TRANSPLANT | Facility: CLINIC | Age: 49
End: 2024-04-18

## 2024-04-18 ENCOUNTER — ALLIED HEALTH/NURSE VISIT (OUTPATIENT)
Dept: TRANSPLANT | Facility: CLINIC | Age: 49
End: 2024-04-18
Attending: INTERNAL MEDICINE
Payer: COMMERCIAL

## 2024-04-18 ENCOUNTER — LAB (OUTPATIENT)
Dept: LAB | Facility: CLINIC | Age: 49
End: 2024-04-18
Attending: INTERNAL MEDICINE
Payer: COMMERCIAL

## 2024-04-18 VITALS
RESPIRATION RATE: 16 BRPM | BODY MASS INDEX: 25.49 KG/M2 | TEMPERATURE: 97.8 F | SYSTOLIC BLOOD PRESSURE: 112 MMHG | OXYGEN SATURATION: 98 % | DIASTOLIC BLOOD PRESSURE: 63 MMHG | WEIGHT: 172.6 LBS | HEART RATE: 76 BPM

## 2024-04-18 DIAGNOSIS — Z94.81 STATUS POST BONE MARROW TRANSPLANT (H): ICD-10-CM

## 2024-04-18 DIAGNOSIS — D46.9 MDS (MYELODYSPLASTIC SYNDROME) (H): Primary | ICD-10-CM

## 2024-04-18 DIAGNOSIS — D46.9 MDS (MYELODYSPLASTIC SYNDROME) (H): ICD-10-CM

## 2024-04-18 DIAGNOSIS — R06.02 SHORTNESS OF BREATH: ICD-10-CM

## 2024-04-18 DIAGNOSIS — Z94.81 S/P ALLOGENEIC BONE MARROW TRANSPLANT (H): ICD-10-CM

## 2024-04-18 DIAGNOSIS — Z94.81 S/P ALLOGENEIC BONE MARROW TRANSPLANT (H): Primary | ICD-10-CM

## 2024-04-18 LAB
ABO/RH TYPE: NORMAL
ABO/RH TYPE: NORMAL
ACANTHOCYTES BLD QL SMEAR: NORMAL
ALBUMIN SERPL BCG-MCNC: 3.4 G/DL (ref 3.5–5.2)
ALP SERPL-CCNC: 105 U/L (ref 40–150)
ALT SERPL W P-5'-P-CCNC: 13 U/L (ref 0–70)
ANION GAP SERPL CALCULATED.3IONS-SCNC: 12 MMOL/L (ref 7–15)
AST SERPL W P-5'-P-CCNC: 27 U/L (ref 0–45)
AUER BODIES BLD QL SMEAR: NORMAL
BASO STIPL BLD QL SMEAR: NORMAL
BASOPHILS # BLD AUTO: 0 10E3/UL (ref 0–0.2)
BASOPHILS NFR BLD AUTO: 0 %
BILIRUB SERPL-MCNC: 0.3 MG/DL
BITE CELLS BLD QL SMEAR: NORMAL
BLD PROD TYP BPU: NORMAL
BLISTER CELLS BLD QL SMEAR: NORMAL
BLOOD COMPONENT TYPE: NORMAL
BUN SERPL-MCNC: 18.8 MG/DL (ref 6–20)
BURR CELLS BLD QL SMEAR: NORMAL
CALCIUM SERPL-MCNC: 9.1 MG/DL (ref 8.6–10)
CHLORIDE SERPL-SCNC: 106 MMOL/L (ref 98–107)
CODING SYSTEM: NORMAL
CREAT SERPL-MCNC: 0.97 MG/DL (ref 0.67–1.17)
DACRYOCYTES BLD QL SMEAR: NORMAL
DEPRECATED HCO3 PLAS-SCNC: 24 MMOL/L (ref 22–29)
EGFRCR SERPLBLD CKD-EPI 2021: >90 ML/MIN/1.73M2
ELLIPTOCYTES BLD QL SMEAR: NORMAL
EOSINOPHIL # BLD AUTO: 0 10E3/UL (ref 0–0.7)
EOSINOPHIL NFR BLD AUTO: 1 %
ERYTHROCYTE [DISTWIDTH] IN BLOOD BY AUTOMATED COUNT: 16.1 % (ref 10–15)
FRAGMENTS BLD QL SMEAR: NORMAL
GLUCOSE SERPL-MCNC: 101 MG/DL (ref 70–99)
HCT VFR BLD AUTO: 29.1 % (ref 40–53)
HGB BLD-MCNC: 10 G/DL (ref 13.3–17.7)
HGB C CRYSTALS: NORMAL
HOWELL-JOLLY BOD BLD QL SMEAR: NORMAL
IMM GRANULOCYTES # BLD: 0.1 10E3/UL
IMM GRANULOCYTES NFR BLD: 1 %
ISSUE DATE AND TIME: NORMAL
LYMPHOCYTES # BLD AUTO: 0.4 10E3/UL (ref 0.8–5.3)
LYMPHOCYTES NFR BLD AUTO: 11 %
MCH RBC QN AUTO: 29.9 PG (ref 26.5–33)
MCHC RBC AUTO-ENTMCNC: 34.4 G/DL (ref 31.5–36.5)
MCV RBC AUTO: 87 FL (ref 78–100)
MONOCYTES # BLD AUTO: 0.3 10E3/UL (ref 0–1.3)
MONOCYTES NFR BLD AUTO: 9 %
NEUTROPHILS # BLD AUTO: 3.2 10E3/UL (ref 1.6–8.3)
NEUTROPHILS NFR BLD AUTO: 78 %
NEUTS HYPERSEG BLD QL SMEAR: NORMAL
NRBC # BLD AUTO: 0 10E3/UL
NRBC BLD AUTO-RTO: 0 /100
PLAT MORPH BLD: NORMAL
PLATELET # BLD AUTO: 27 10E3/UL (ref 150–450)
POLYCHROMASIA BLD QL SMEAR: NORMAL
POTASSIUM SERPL-SCNC: 3.8 MMOL/L (ref 3.4–5.3)
PROT SERPL-MCNC: 6.3 G/DL (ref 6.4–8.3)
RBC # BLD AUTO: 3.35 10E6/UL (ref 4.4–5.9)
RBC AGGLUT BLD QL: NORMAL
RBC MORPH BLD: NORMAL
ROULEAUX BLD QL SMEAR: NORMAL
SICKLE CELLS BLD QL SMEAR: NORMAL
SMUDGE CELLS BLD QL SMEAR: NORMAL
SODIUM SERPL-SCNC: 142 MMOL/L (ref 135–145)
SPECIMEN EXPIRATION DATE: NORMAL
SPECIMEN EXPIRATION DATE: NORMAL
SPHEROCYTES BLD QL SMEAR: NORMAL
STOMATOCYTES BLD QL SMEAR: NORMAL
TARGETS BLD QL SMEAR: NORMAL
TOXIC GRANULES BLD QL SMEAR: NORMAL
UNIT ABO/RH: NORMAL
UNIT NUMBER: NORMAL
UNIT STATUS: NORMAL
UNIT TYPE ISBT: 6200
VARIANT LYMPHS BLD QL SMEAR: NORMAL
WBC # BLD AUTO: 4 10E3/UL (ref 4–11)

## 2024-04-18 PROCEDURE — 99215 OFFICE O/P EST HI 40 MIN: CPT

## 2024-04-18 PROCEDURE — G0463 HOSPITAL OUTPT CLINIC VISIT: HCPCS

## 2024-04-18 PROCEDURE — 36591 DRAW BLOOD OFF VENOUS DEVICE: CPT | Performed by: PHYSICIAN ASSISTANT

## 2024-04-18 PROCEDURE — 86900 BLOOD TYPING SEROLOGIC ABO: CPT

## 2024-04-18 PROCEDURE — 80053 COMPREHEN METABOLIC PANEL: CPT | Performed by: PHYSICIAN ASSISTANT

## 2024-04-18 PROCEDURE — 250N000011 HC RX IP 250 OP 636: Performed by: INTERNAL MEDICINE

## 2024-04-18 PROCEDURE — 85025 COMPLETE CBC W/AUTO DIFF WBC: CPT | Performed by: PHYSICIAN ASSISTANT

## 2024-04-18 PROCEDURE — 87533 HHV-6 DNA QUANT: CPT | Performed by: PHYSICIAN ASSISTANT

## 2024-04-18 PROCEDURE — 86850 RBC ANTIBODY SCREEN: CPT

## 2024-04-18 RX ORDER — HEPARIN SODIUM (PORCINE) LOCK FLUSH IV SOLN 100 UNIT/ML 100 UNIT/ML
5 SOLUTION INTRAVENOUS ONCE
Status: COMPLETED | OUTPATIENT
Start: 2024-04-18 | End: 2024-04-18

## 2024-04-18 RX ORDER — HEPARIN SODIUM,PORCINE 10 UNIT/ML
5-20 VIAL (ML) INTRAVENOUS DAILY PRN
Status: CANCELLED | OUTPATIENT
Start: 2024-04-18

## 2024-04-18 RX ORDER — HEPARIN SODIUM (PORCINE) LOCK FLUSH IV SOLN 100 UNIT/ML 100 UNIT/ML
5 SOLUTION INTRAVENOUS
Status: CANCELLED | OUTPATIENT
Start: 2024-04-18

## 2024-04-18 RX ORDER — LISINOPRIL 40 MG/1
20 TABLET ORAL DAILY
Qty: 60 TABLET | Refills: 2 | Status: SHIPPED | OUTPATIENT
Start: 2024-04-18

## 2024-04-18 RX ADMIN — Medication 5 ML: at 10:03

## 2024-04-18 ASSESSMENT — PAIN SCALES - GENERAL: PAINLEVEL: MILD PAIN (3)

## 2024-04-18 NOTE — LETTER
4/18/2024         RE: Ziggy Hallman  [address] [address]        Dear Colleague,    Thank you for referring your patient, Ziggy Hallman, to the [facility] BLOOD AND MARROW TRANSPLANT PROGRAM [place]. Please see a copy of my visit note below.    BMT Progress Note   4/18/2024    Chief complaint:  Ziggy Hallman is a 48 year old male admitted on 4/15/2024. 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 He was admitted for ACS rule out and evaluation of possible new heart failure with dyspnea on exertion, lower extremity swelling.      Transplant Essential Data:   Diagnosis MDS-High risk, Plasma Cell neoplasm     BMTCT Type Allogeneic    Prep Regimen Bu/Flu     Donor Match and  Source URD 8/8 DP permissive    GVHD Prophylaxis PTCy, Siro/MMF     Primary BMT MD Bacon    Clinical Trials [id number]      INTERIM HISTORY:     Ziggy is here for follow up after his hospital discharge. He presented to the ED with 1 week of SOB. He was diagnosed with HFpEF and started on Lasix, spironolactone and jardiance. His weight is trending down, lower extremities with 1-2+ edema (improving), still fatigued but getting a little better. He reports his appetite is poor (ate small amount of chinese food), drinks water & milk without issue, no nausea, stools are formed & daily, no rashes.       REVIEW OF SYSTEMS: Otherwise unremarkable other than what is noted in the   PHYSICAL EXAM:   Vitals:  /63 (BP Location: Right arm, Patient Position: Sitting, Cuff Size: Adult Regular)   Pulse 76   Temp 97.8  F (36.6  C) (Oral)   Resp 16   Wt 78.3 kg (172 lb 9.6 oz)   SpO2 98%   BMI 25.49 kg/m         General Appearance: NAD  HEENT: sclera anicteric.   CV: RRR.   RESP: CTA bilaterally; no rales or wheezes.   EXT: 1-2+ pitting LE edema, symmetric  SKIN: no rash  NEURO: A&O x3   PSYCH: Appropriate affect   VASCULAR ACCESS: accessed, intact     ROUTINE LABS:           Lab  Results   Component Value Date     WBC 1.2 (L) 04/16/2024     ANEU 0.9 (L) 04/11/2024     HGB 8.4 (L) 04/16/2024     HCT 24.2 (L) 04/16/2024     PLT 25 (LL) 04/16/2024      04/16/2024     POTASSIUM 3.6 04/16/2024     CHLORIDE 112 (H) 04/16/2024     CO2 22 04/16/2024     GLC 85 04/16/2024     BUN 18.6 04/16/2024     CR 1.02 04/16/2024     MAG 2.2 04/15/2024     INR 1.24 (H) 04/16/2024            ASSESSMENT AND PLAN:   Ziggy Hallman is a 48 year old male admitted on 4/15/2024. 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 He was admitted for ACS rule out and evaluation of possible new heart failure with dyspnea on exertion, lower extremity swelling.      BMT/IEC PROTOCOL for 2015-29  - Chemo Prep: BU/Flu   - 8/8 DP permissive. Cell dose-9.24x10^6  - ABO: Donor: O+ Recipient A-  (Minor incompatability, no flush required)   - BM with CR. No MRD by flow. CD33 98% donor and CD3 100% donor (3/20).  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 There is some mild dyserythropoeisis. Of the interphase cells examined, 8.625% had a signal pattern indicative of a loss involving the short arm of chromosome 5; no evidence was found of loss of chromosomes 5q. 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 LDH trending up.  He was started on a taper over 4 weeks. Dr. JEAN BAPTISTE discussed that after the taper, if counts and chimerisms don't improve, we will need to do some chemotherapy.     HEME/COAG  #Pancytopenia--see above  - Transfusion parameters: hemoglobin <8g/dL (cardiac hx), platelets <30k.   #Segmental R PE (2/6): see anticoagulation below  #APS work up- lupus antigen +, will follow outpt as may be unreliable in this acute setting and prior APS work up had been unremarkable prior to admission.  #Recurrent arterial thromboembolic events:  He has long history of various events including renal infarcts  (2011, 2013, 2015), left popliteal embolic episode requiring surgery, anticoagulation (2011), right carotid artery embolic episode with TIA/stroke-like symptoms (2012), embolic MI (2015), gangrenous right toe requiring vascular surgery/amputation (2017), 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   Extensive hypercoagulable workup to date has been unrevealing.  JAK2 testing negative.  PNH testing negative.  Elevated IgA of unknown significance, no evidence of plasma cell disorder.  - Eliquis just resumed at lower dose 2.5mg BID last week. Watching plts closely. 1 unit platelets on 4/19 to keep above 30K.   - Prasugrel on hold with plts <50K       CARDIOLOGY:  # Acute Dyspnea on Exertion  - Hx of Grade 1 or early diastolic dysfunction prior to transplant.   - Now with HFpEF--seen by cardiology.  Formal note pending.  Will diurese with lasix 40mg IV BID while inpt.  Will start PO lasix, spironolactone and jardiance per cards.  Will follow up with cardiology as outpt on 6/3/24.  - CT PE with pulmonary edema, no evidence of PE  - Echo (4/16) (did NOT evaluate for diastolic dysfunction): Global and regional left ventricular function is normal with an EF of 55-60%.  Global right ventricular function is normal. Estimated mean right atrial pressure is normal. No pericardial effusion is present.  - EKG ok  - Troponin negative on initial and repeat  - NT Pro BNP 1,512  - LLE duplex is negative     #CAD  # Hx of CABG  # Hx of HTN  - continue metoprolol and lisinopril; nifedipine--continue to hold   #Hyperlipidemia: Holding atorvastatin peritransplant     ID:   -Prophylaxis plan: ACV LD, vori, Pentamidine (4/11).  Bactrim started 2/26--all cell lines down. Stopped bactrim.    - HHV6 neg (4/11) copies from 25K previously.   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       Psych:    #Anxiety- saw inpatient psych who offered atarax, but did not work well. Health psych offered, patient declined.  #Insomnia- trazodone PRN      MSK:   - knee pain: 4/9 BLE US neg for DVT but showed a Moderate-sized simple left popliteal fossa Baker's cyst. He has had for 20yrs. Has ortho appt scheduled on 4/25.     Summary:   - If appetite is more diminished at next visit, consider EGD vs. Budesonide   - medbox refilled by pharamcy   - following sirolimus taper appropriately   - 1 unit of platelets 4/19 to keep above 30K   - HHV6 pending   - RTC: 4/25 for labs, infusion (possible platelets), NIDHI, XR knee and Ortho appt     40 minutes spent on the date of the encounter doing chart review, history and exam, lab review, documentation and coordniating care.     Jaron Warner PA-C

## 2024-04-18 NOTE — PROGRESS NOTES
I met with Ziggy Hallman to review his medication and fill his med box for 1 week per his request:    1. Sirolimus - continues on taper. See calendar from 4/4    2. Nifedipine and prasugrel remain on hold.     3. Refill for Eliquis 2.5 mg BID picked up from [facility] and added to his med box.     4. Refill for lisinopril sent by NIDHI to [facility]. Brain is aware he needs to  this lisinopril and acyclovir prior next week or won't have enough medication.     An up to date medication list was provided to the patient.     Current Outpatient Medications   Medication Sig Dispense Refill    acyclovir (ZOVIRAX) 800 MG tablet Take 1 tablet (800 mg) by mouth 2 times daily 60 tablet 1    apixaban ANTICOAGULANT (ELIQUIS) 2.5 MG tablet Take 1 tablet (2.5 mg) by mouth 2 times daily 60 tablet 11    empagliflozin (JARDIANCE) 10 MG TABS tablet Take 1 tablet (10 mg) by mouth daily 90 tablet 1    furosemide (LASIX) 20 MG tablet Take 2 tablets (40 mg) by mouth daily 60 tablet 0    lisinopril (ZESTRIL) 40 MG tablet Take 0.5 tablets (20 mg) by mouth daily 60 tablet 2    metoprolol succinate ER (TOPROL XL) 100 MG 24 hr tablet Take 1 tablet (100 mg) by mouth daily      pantoprazole (PROTONIX) 40 MG EC tablet Take 1 tablet (40 mg) by mouth daily 30 tablet 1    sirolimus (GENERIC EQUIVALENT) 0.5 MG tablet Take 0.5 mg every other day; taper further per taper calendar      spironolactone (ALDACTONE) 25 MG tablet Take 1 tablet (25 mg) by mouth daily 30 tablet 0    traZODone (DESYREL) 50 MG tablet Take 1 tablet (50 mg) by mouth at bedtime 30 tablet 1    voriconazole (VFEND) 200 MG tablet Take 1 tablet (200 mg) by mouth 2 times daily Start on Saturday 3/9/24 90 tablet 11    diclofenac (VOLTAREN) 1 % topical gel Apply 2 g topically 3 times daily as needed for moderate pain To knees 150 g 0    LORazepam (ATIVAN) 0.5 MG tablet Take 1-2 tablets (0.5-1 mg) by mouth every 4 hours as needed for vomiting or nausea (nausea/vomiting/sleep) 15  tablet 0    NIFEdipine ER (ADALAT CC) 30 MG 24 hr tablet On hold x 4/16 (Patient not taking: Reported on 4/18/2024)      prasugrel (EFFIENT) 10 MG TABS tablet Continue to HOLD EFFECTIVE 4/16 (Patient not taking: Reported on 4/18/2024)          Pertinent labs considered today:  Lab Results   Component Value Date     04/18/2024     05/28/2020                 normal sodium 136-148  Lab Results   Component Value Date    POTASSIUM 3.8 04/18/2024    POTASSIUM 3.8 05/28/2020       normal potassium 3.5-5.2   Lab Results   Component Value Date    CHLORIDE 106 04/18/2024    CHLORIDE 107 05/28/2020           normal chloride    Lab Results   Component Value Date    CO2 24 04/18/2024    CO2 24 05/28/2020                normal CO2 20-32  Lab Results   Component Value Date    BUN 18.8 04/18/2024    BUN 10 05/28/2020                 normal BUN 5-24  Lab Results   Component Value Date     04/18/2024     01/24/2024     05/28/2020                 normal glucose   Lab Results   Component Value Date    CR 0.97 04/18/2024    CR 0.80 05/28/2020                 normal cr 0.8-1.5  Lab Results   Component Value Date    REMY 9.1 04/18/2024    REMY 9.3 05/28/2020               normal calcium  8.5-10.4  Lab Results   Component Value Date    BILITOTAL 0.3 04/18/2024    BILITOTAL 0.8 05/28/2020          normal bilirubin 0.2-1.3  Lab Results   Component Value Date    PROTTOTAL 6.3 04/18/2024    PROTTOTAL 8.7 05/28/2020          normal total protein 6.0-8.2  Lab Results   Component Value Date    ALBUMIN 3.4 04/18/2024    ALBUMIN 3.4 05/28/2020             normal albumin 3.2-4.5  Lab Results   Component Value Date    ALKPHOS 105 04/18/2024    ALKPHOS 114 05/28/2020            normal alkphos   Lab Results   Component Value Date    ALT 13 04/18/2024    ALT 25 05/28/2020         normal ALT 0-65  Lab Results   Component Value Date    AST 27 04/18/2024    AST 20 05/28/2020         normal AST 0-37  Lab  Results   Component Value Date    HGB 10.0 04/18/2024    HGB 12.6 05/28/2020     Lab Results   Component Value Date    WBC 4.0 04/18/2024    WBC 4.9 05/28/2020      Lab Results   Component Value Date    PLT 27 04/18/2024     05/28/2020     Estimated Creatinine Clearance: 100.9 mL/min (based on SCr of 0.97 mg/dL).    Thank you,  Sterling Quiles, PharmD

## 2024-04-18 NOTE — TELEPHONE ENCOUNTER
DIAGNOSIS: (B) Knee Pain / [facility]    APPOINTMENT DATE: 4/25/24   NOTES STATUS DETAILS   OFFICE NOTE from referring provider Internal SELF   MEDICATION LIST Internal

## 2024-04-18 NOTE — NURSING NOTE
"Oncology Rooming Note    April 18, 2024 10:21 AM   Ziggy Hallman is a 48 year old male who presents for:    Chief Complaint   Patient presents with    Port Draw     Labs drawn via port by RN in lab. VS taken.     Oncology Clinic Visit     MDS     Initial Vitals: /63 (BP Location: Right arm, Patient Position: Sitting, Cuff Size: Adult Regular)   Pulse 76   Temp 97.8  F (36.6  C) (Oral)   Resp 16   Wt 78.3 kg (172 lb 9.6 oz)   SpO2 98%   BMI 25.49 kg/m   Estimated body mass index is 25.49 kg/m  as calculated from the following:    Height as of 4/16/24: 1.753 m (5' 9\").    Weight as of this encounter: 78.3 kg (172 lb 9.6 oz). Body surface area is 1.95 meters squared.  Mild Pain (3) Comment: Data Unavailable   No LMP for male patient.  Allergies reviewed: Yes  Medications reviewed: Yes    Medications: Medication refills not needed today.  Pharmacy name entered into [employer]: [id number] - [place], [place] - [address] [place] [address]    Frailty Screening:   Is the patient here for a new oncology consult visit in cancer care? 2. No      Clinical concerns:        Payal Govea              "

## 2024-04-18 NOTE — PROGRESS NOTES
BMT Progress Note   4/18/2024    Chief complaint:  Ziggy Hallman is a 48 year old male admitted on 4/15/2024. 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 He was admitted for ACS rule out and evaluation of possible new heart failure with dyspnea on exertion, lower extremity swelling.      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 He presented to the ED with 1 week of SOB. He was diagnosed with HFpEF and started on Lasix, spironolactone and jardiance. His weight is trending down, lower extremities with 1-2+ edema (improving), still fatigued but getting a little better. He reports his appetite is poor (ate small amount of chinese food), drinks water & milk without issue, no nausea, stools are formed & daily, no rashes.       REVIEW OF SYSTEMS: Otherwise unremarkable other than what is noted in the   PHYSICAL EXAM:   Vitals:  /63 (BP Location: Right arm, Patient Position: Sitting, Cuff Size: Adult Regular)   Pulse 76   Temp 97.8  F (36.6  C) (Oral)   Resp 16   Wt 78.3 kg (172 lb 9.6 oz)   SpO2 98%   BMI 25.49 kg/m         General Appearance: NAD  HEENT: sclera anicteric.   CV: RRR.   RESP: CTA bilaterally; no rales or wheezes.   EXT: 1-2+ pitting LE edema, symmetric  SKIN: no rash  NEURO: A&O x3   PSYCH: Appropriate affect   VASCULAR ACCESS: accessed, intact     ROUTINE LABS:           Lab Results   Component Value Date     WBC 1.2 (L) 04/16/2024     ANEU 0.9 (L) 04/11/2024     HGB 8.4 (L) 04/16/2024     HCT 24.2 (L) 04/16/2024     PLT 25 (LL) 04/16/2024      04/16/2024     POTASSIUM 3.6 04/16/2024     CHLORIDE 112 (H) 04/16/2024     CO2 22 04/16/2024     GLC 85  04/16/2024     BUN 18.6 04/16/2024     CR 1.02 04/16/2024     MAG 2.2 04/15/2024     INR 1.24 (H) 04/16/2024            ASSESSMENT AND PLAN:   Ziggy Hallman is a 48 year old male admitted on 4/15/2024. 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 He was admitted for ACS rule out and evaluation of possible new heart failure with dyspnea on exertion, lower extremity swelling.      BMT/IEC PROTOCOL for 2015-29  - Chemo Prep: BU/Flu   - 8/8 DP permissive. Cell dose-9.24x10^6  - ABO: Donor: O+ Recipient A-  (Minor incompatability, no flush required)   - BM with CR. No MRD by flow. CD33 98% donor and CD3 100% donor (3/20).  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 There is some mild dyserythropoeisis. Of the interphase cells examined, 8.625% had a signal pattern indicative of a loss involving the short arm of chromosome 5; no evidence was found of loss of chromosomes 5q. 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 LDH trending up.  He was started on a taper over 4 weeks. Dr. JEAN BAPTISTE discussed that after the taper, if counts and chimerisms don't improve, we will need to do some chemotherapy.     HEME/COAG  #Pancytopenia--see above  - Transfusion parameters: hemoglobin <8g/dL (cardiac hx), platelets <30k.   #Segmental R PE (2/6): see anticoagulation below  #APS work up- lupus antigen +, will follow outpt as may be unreliable in this acute setting and prior APS work up had been unremarkable prior to admission.  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   Extensive hypercoagulable workup to date has been unrevealing.  JAK2 testing negative.  PNH testing negative.  Elevated IgA of unknown significance, no evidence of plasma cell disorder.  - Eliquis just resumed at lower dose 2.5mg BID last week. Watching plts closely. 1 unit platelets on 4/19 to keep above 30K.   - Prasugrel on hold with plts <50K       CARDIOLOGY:  # Acute Dyspnea on Exertion  - Hx of Grade 1 or early diastolic dysfunction prior to transplant.   - Now with HFpEF--seen by cardiology.  Formal note pending.  Will diurese with lasix 40mg IV BID while inpt.  Will start PO lasix, spironolactone and jardiance per cards.  Will follow up with cardiology as outpt on 6/3/24.  - CT PE with pulmonary edema, no evidence of PE  - Echo (4/16) (did NOT evaluate for diastolic dysfunction): Global and regional left ventricular function is normal with an EF of 55-60%.  Global right ventricular function is normal. Estimated mean right atrial pressure is normal. No pericardial effusion is present.  - EKG ok  - Troponin negative on initial and repeat  - NT Pro BNP 1,512  - LLE duplex is negative     #CAD  # Hx of CABG  # Hx of HTN  - continue metoprolol and lisinopril; nifedipine--continue to hold   #Hyperlipidemia: Holding atorvastatin peritransplant     ID:   -Prophylaxis plan: ACV LD, vori, Pentamidine (4/11).  Bactrim started 2/26--all cell lines down. Stopped bactrim.    - HHV6 neg (4/11) copies from 25K previously.   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      Psych:    #Anxiety- saw inpatient psych who offered atarax, but did not work well. Health psych offered, patient declined.  #Insomnia- trazodone PRN      MSK:   - knee pain: 4/9 BLE US neg for DVT but showed a Moderate-sized simple left popliteal fossa Baker's cyst. He has had for  20yrs. Has ortho appt scheduled on 4/25.     Summary:   - If appetite is more diminished at next visit, consider EGD vs. Budesonide   - medbox refilled by pharamcy   - following sirolimus taper appropriately   - 1 unit of platelets 4/19 to keep above 30K   - HHV6 pending   - RTC: 4/25 for labs, infusion (possible platelets), NIDHI, XR knee and Ortho appt     40 minutes spent on the date of the encounter doing chart review, history and exam, lab review, documentation and coordniating care.     Jaron Warner PA-C

## 2024-04-18 NOTE — NURSING NOTE
Chief Complaint   Patient presents with    Port Draw     Labs drawn via port by RN in lab. VS taken.      Labs drawn via port by RN. Port accessed with 20g flat needle. Flushed with saline and heparin. Pt tolerated well. Vitals taken. Pt checked into next appt.     Carrie Ca RN

## 2024-04-19 ENCOUNTER — INFUSION THERAPY VISIT (OUTPATIENT)
Dept: TRANSPLANT | Facility: CLINIC | Age: 49
End: 2024-04-19
Attending: INTERNAL MEDICINE
Payer: COMMERCIAL

## 2024-04-19 VITALS
RESPIRATION RATE: 16 BRPM | OXYGEN SATURATION: 99 % | SYSTOLIC BLOOD PRESSURE: 114 MMHG | TEMPERATURE: 98.1 F | HEART RATE: 76 BPM | DIASTOLIC BLOOD PRESSURE: 72 MMHG

## 2024-04-19 DIAGNOSIS — T45.1X5A ANTINEOPLASTIC CHEMOTHERAPY INDUCED PANCYTOPENIA (H): Primary | ICD-10-CM

## 2024-04-19 DIAGNOSIS — D61.810 ANTINEOPLASTIC CHEMOTHERAPY INDUCED PANCYTOPENIA (H): Primary | ICD-10-CM

## 2024-04-19 DIAGNOSIS — D46.9 MDS (MYELODYSPLASTIC SYNDROME) (H): ICD-10-CM

## 2024-04-19 LAB
CMV DNA SPEC NAA+PROBE-ACNC: NOT DETECTED IU/ML
HHV-6 DNA COPIES/ML, INSTRUMENT: 1277 COPIES/ML
HHV6 DNA SPEC NAA+PROBE-LOG#: 3.1 {LOG_COPIES}/ML

## 2024-04-19 PROCEDURE — P9037 PLATE PHERES LEUKOREDU IRRAD: HCPCS

## 2024-04-19 PROCEDURE — 36430 TRANSFUSION BLD/BLD COMPNT: CPT

## 2024-04-19 PROCEDURE — 250N000011 HC RX IP 250 OP 636: Performed by: NURSE PRACTITIONER

## 2024-04-19 RX ORDER — HEPARIN SODIUM (PORCINE) LOCK FLUSH IV SOLN 100 UNIT/ML 100 UNIT/ML
5 SOLUTION INTRAVENOUS
Status: DISCONTINUED | OUTPATIENT
Start: 2024-04-19 | End: 2024-04-19 | Stop reason: HOSPADM

## 2024-04-19 RX ADMIN — Medication 5 ML: at 12:36

## 2024-04-19 NOTE — PROGRESS NOTES
Infusion Nursing Note:  Ziggy Hallman presents today for platelet infusion.    Patient seen by provider today: No   present during visit today: Not Applicable.    Note: Pt received x1 unit of platelets, based off 4/18 labs results. Allergies and home mediations reviewed, pt reports feeling overall but just tired. Pt tolerated infusion well, all VSS. Pt discharged with no further concerns at this time.       Intravenous Access:  Implanted Port.    Treatment Conditions:  Lab Results   Component Value Date    HGB 10.0 (L) 04/18/2024    WBC 4.0 04/18/2024    ANEU 0.9 (L) 04/11/2024    ANEUTAUTO 3.2 04/18/2024    PLT 27 (LL) 04/18/2024            Post Infusion Assessment:  Patient tolerated infusion without incident.  Blood return noted pre and post infusion.       Discharge Plan:   Patient and/or family verbalized understanding of discharge instructions and all questions answered.  Patient discharged in stable condition.      Tiana Horton RN

## 2024-04-20 LAB
ADDITIONAL COMMENTS: NORMAL
BACTERIA BLD CULT: NO GROWTH
BACTERIA BLD CULT: NO GROWTH
INTERPRETATION: NORMAL
ISCN: NORMAL
METHODS: NORMAL

## 2024-04-25 ENCOUNTER — OFFICE VISIT (OUTPATIENT)
Dept: TRANSPLANT | Facility: CLINIC | Age: 49
End: 2024-04-25
Attending: INTERNAL MEDICINE
Payer: COMMERCIAL

## 2024-04-25 ENCOUNTER — ANCILLARY PROCEDURE (OUTPATIENT)
Dept: GENERAL RADIOLOGY | Facility: CLINIC | Age: 49
End: 2024-04-25
Attending: FAMILY MEDICINE
Payer: COMMERCIAL

## 2024-04-25 ENCOUNTER — PRE VISIT (OUTPATIENT)
Dept: ORTHOPEDICS | Facility: CLINIC | Age: 49
End: 2024-04-25

## 2024-04-25 ENCOUNTER — OFFICE VISIT (OUTPATIENT)
Dept: ORTHOPEDICS | Facility: CLINIC | Age: 49
End: 2024-04-25
Payer: COMMERCIAL

## 2024-04-25 ENCOUNTER — LAB (OUTPATIENT)
Dept: LAB | Facility: CLINIC | Age: 49
End: 2024-04-25
Attending: INTERNAL MEDICINE
Payer: COMMERCIAL

## 2024-04-25 VITALS
BODY MASS INDEX: 24.5 KG/M2 | DIASTOLIC BLOOD PRESSURE: 73 MMHG | OXYGEN SATURATION: 99 % | SYSTOLIC BLOOD PRESSURE: 117 MMHG | RESPIRATION RATE: 16 BRPM | TEMPERATURE: 98.2 F | HEART RATE: 74 BPM | WEIGHT: 165.9 LBS

## 2024-04-25 DIAGNOSIS — Z94.81 S/P ALLOGENEIC BONE MARROW TRANSPLANT (H): ICD-10-CM

## 2024-04-25 DIAGNOSIS — M25.561 ACUTE PAIN OF BOTH KNEES: Primary | ICD-10-CM

## 2024-04-25 DIAGNOSIS — M25.562 ACUTE PAIN OF BOTH KNEES: Primary | ICD-10-CM

## 2024-04-25 DIAGNOSIS — Z94.81 STATUS POST BONE MARROW TRANSPLANT (H): Primary | ICD-10-CM

## 2024-04-25 DIAGNOSIS — D46.9 MDS (MYELODYSPLASTIC SYNDROME) (H): Primary | ICD-10-CM

## 2024-04-25 LAB
ACANTHOCYTES BLD QL SMEAR: NORMAL
ALBUMIN SERPL BCG-MCNC: 3.5 G/DL (ref 3.5–5.2)
ALP SERPL-CCNC: 96 U/L (ref 40–150)
ALT SERPL W P-5'-P-CCNC: 24 U/L (ref 0–70)
ANION GAP SERPL CALCULATED.3IONS-SCNC: 10 MMOL/L (ref 7–15)
AST SERPL W P-5'-P-CCNC: 34 U/L (ref 0–45)
AUER BODIES BLD QL SMEAR: NORMAL
BASO STIPL BLD QL SMEAR: NORMAL
BASOPHILS # BLD AUTO: 0 10E3/UL (ref 0–0.2)
BASOPHILS NFR BLD AUTO: 0 %
BILIRUB SERPL-MCNC: 0.2 MG/DL
BITE CELLS BLD QL SMEAR: NORMAL
BLISTER CELLS BLD QL SMEAR: NORMAL
BUN SERPL-MCNC: 26.5 MG/DL (ref 6–20)
BURR CELLS BLD QL SMEAR: NORMAL
CALCIUM SERPL-MCNC: 9.2 MG/DL (ref 8.6–10)
CHLORIDE SERPL-SCNC: 107 MMOL/L (ref 98–107)
CREAT SERPL-MCNC: 1.06 MG/DL (ref 0.67–1.17)
DACRYOCYTES BLD QL SMEAR: NORMAL
DEPRECATED HCO3 PLAS-SCNC: 24 MMOL/L (ref 22–29)
EGFRCR SERPLBLD CKD-EPI 2021: 85 ML/MIN/1.73M2
ELLIPTOCYTES BLD QL SMEAR: NORMAL
EOSINOPHIL # BLD AUTO: 0 10E3/UL (ref 0–0.7)
EOSINOPHIL NFR BLD AUTO: 1 %
ERYTHROCYTE [DISTWIDTH] IN BLOOD BY AUTOMATED COUNT: 15 % (ref 10–15)
FRAGMENTS BLD QL SMEAR: NORMAL
GLUCOSE SERPL-MCNC: 107 MG/DL (ref 70–99)
HCT VFR BLD AUTO: 28.2 % (ref 40–53)
HGB BLD-MCNC: 9.3 G/DL (ref 13.3–17.7)
HGB C CRYSTALS: NORMAL
HOWELL-JOLLY BOD BLD QL SMEAR: NORMAL
IMM GRANULOCYTES # BLD: 0 10E3/UL
IMM GRANULOCYTES NFR BLD: 1 %
LDH SERPL L TO P-CCNC: 195 U/L (ref 0–250)
LYMPHOCYTES # BLD AUTO: 0.3 10E3/UL (ref 0.8–5.3)
LYMPHOCYTES NFR BLD AUTO: 25 %
MCH RBC QN AUTO: 29 PG (ref 26.5–33)
MCHC RBC AUTO-ENTMCNC: 33 G/DL (ref 31.5–36.5)
MCV RBC AUTO: 88 FL (ref 78–100)
MONOCYTES # BLD AUTO: 0.3 10E3/UL (ref 0–1.3)
MONOCYTES NFR BLD AUTO: 22 %
NEUTROPHILS # BLD AUTO: 0.6 10E3/UL (ref 1.6–8.3)
NEUTROPHILS NFR BLD AUTO: 51 %
NEUTS HYPERSEG BLD QL SMEAR: NORMAL
NRBC # BLD AUTO: 0 10E3/UL
NRBC BLD AUTO-RTO: 0 /100
PLAT MORPH BLD: NORMAL
PLATELET # BLD AUTO: 50 10E3/UL (ref 150–450)
POLYCHROMASIA BLD QL SMEAR: NORMAL
POTASSIUM SERPL-SCNC: 4.1 MMOL/L (ref 3.4–5.3)
PROT SERPL-MCNC: 6.4 G/DL (ref 6.4–8.3)
RBC # BLD AUTO: 3.21 10E6/UL (ref 4.4–5.9)
RBC AGGLUT BLD QL: NORMAL
RBC MORPH BLD: NORMAL
ROULEAUX BLD QL SMEAR: NORMAL
SICKLE CELLS BLD QL SMEAR: NORMAL
SMUDGE CELLS BLD QL SMEAR: NORMAL
SODIUM SERPL-SCNC: 141 MMOL/L (ref 135–145)
SPHEROCYTES BLD QL SMEAR: NORMAL
STOMATOCYTES BLD QL SMEAR: NORMAL
TARGETS BLD QL SMEAR: NORMAL
TOXIC GRANULES BLD QL SMEAR: NORMAL
VARIANT LYMPHS BLD QL SMEAR: NORMAL
WBC # BLD AUTO: 1.2 10E3/UL (ref 4–11)

## 2024-04-25 PROCEDURE — 80053 COMPREHEN METABOLIC PANEL: CPT | Performed by: PHYSICIAN ASSISTANT

## 2024-04-25 PROCEDURE — 73562 X-RAY EXAM OF KNEE 3: CPT | Mod: LT | Performed by: RADIOLOGY

## 2024-04-25 PROCEDURE — 85004 AUTOMATED DIFF WBC COUNT: CPT | Performed by: PHYSICIAN ASSISTANT

## 2024-04-25 PROCEDURE — 99203 OFFICE O/P NEW LOW 30 MIN: CPT | Performed by: FAMILY MEDICINE

## 2024-04-25 PROCEDURE — 250N000011 HC RX IP 250 OP 636: Performed by: INTERNAL MEDICINE

## 2024-04-25 PROCEDURE — 36591 DRAW BLOOD OFF VENOUS DEVICE: CPT | Performed by: PHYSICIAN ASSISTANT

## 2024-04-25 PROCEDURE — G0463 HOSPITAL OUTPT CLINIC VISIT: HCPCS | Mod: 25

## 2024-04-25 PROCEDURE — 96372 THER/PROPH/DIAG INJ SC/IM: CPT | Performed by: INTERNAL MEDICINE

## 2024-04-25 PROCEDURE — 83615 LACTATE (LD) (LDH) ENZYME: CPT

## 2024-04-25 PROCEDURE — 99214 OFFICE O/P EST MOD 30 MIN: CPT

## 2024-04-25 RX ORDER — HEPARIN SODIUM,PORCINE 10 UNIT/ML
5-20 VIAL (ML) INTRAVENOUS DAILY PRN
OUTPATIENT
Start: 2024-05-11

## 2024-04-25 RX ORDER — ALBUTEROL SULFATE 5 MG/ML
2.5 SOLUTION RESPIRATORY (INHALATION)
Start: 2024-05-11

## 2024-04-25 RX ORDER — HEPARIN SODIUM (PORCINE) LOCK FLUSH IV SOLN 100 UNIT/ML 100 UNIT/ML
5 SOLUTION INTRAVENOUS ONCE
Status: COMPLETED | OUTPATIENT
Start: 2024-04-25 | End: 2024-04-25

## 2024-04-25 RX ORDER — HEPARIN SODIUM (PORCINE) LOCK FLUSH IV SOLN 100 UNIT/ML 100 UNIT/ML
5 SOLUTION INTRAVENOUS
OUTPATIENT
Start: 2024-05-11

## 2024-04-25 RX ORDER — PENTAMIDINE ISETHIONATE 300 MG/300MG
300 INHALANT RESPIRATORY (INHALATION)
Start: 2024-05-11

## 2024-04-25 RX ADMIN — Medication 5 ML: at 10:23

## 2024-04-25 RX ADMIN — FILGRASTIM-SNDZ 480 MCG: 480 INJECTION, SOLUTION INTRAVENOUS; SUBCUTANEOUS at 11:26

## 2024-04-25 ASSESSMENT — PAIN SCALES - GENERAL: PAINLEVEL: NO PAIN (0)

## 2024-04-25 NOTE — NURSING NOTE
Chief Complaint   Patient presents with    Blood Draw     Port blood draw by lab RN       Port accessed with blood draw and heparin flush by lab RN. Vitals taken and next appointment arrived.    Leydi Mitchell RN

## 2024-04-25 NOTE — PROGRESS NOTES
[facility]  SPORTS & ORTHOPEDIC CLINIC VISIT     Apr 25, 2024        ASSESSMENT & PLAN    48-year-old with acute episode of bilateral mostly anterior knee pain and swelling by history that has since resolved.  No obvious etiology as he is relatively asymptomatic today.  Given the presence of chondrocalcinosis we did discuss possible flare of pseudogout.  May also have been medication related    Reviewed imaging and assessment with patient in detail  We discussed the symptomatic/supportive treatment if he should have continued episodes.  Additionally if he has episodes of pain and swelling in the future would be happy to work him into clinic to get a better evaluation and localization of his symptoms to create a treatment plan    Felice Cobb MD  [facility]ICINE [facility]IN[facility] [place]    -----  Chief Complaint   Patient presents with    Left Knee - Pain    Right Knee - Pain       SUBJECTIVE  Ziggy Hallman is a/an 48 year old male who is seen in consultation at the request of [facility] for evaluation of bilateral knee pain.     The patient is seen by themselves.  The patient is Right handed    Onset: 3 week(s) ago. Reports insidious onset without acute precipitating event.  Location of Pain: bilateral knee   Worsened by: Touching them, stepping over things, walking   Better with: NA  Treatments tried: no treatment tried to date  Associated symptoms: no distal numbness or tingling; denies swelling or warmth    Orthopedic/Surgical history: Bilateral knees- Arthroscopy for Meniscus   Social History/Occupation: No       REVIEW OF SYSTEMS:  Do you have fever, chills, weight loss? No  Do you have any vision problems? No  Do you have any chest pain or edema? No  Do you have any shortness of breath or wheezing?  No  Do you have stomach problems? No  Do you have any numbness or focal weakness? No  Do you have diabetes? No  Do you have problems with bleeding or clotting? Yes, 40  clots over time.   Do you have an rashes or other skin lesions? No    OBJECTIVE:  There were no vitals taken for this visit.     Patient is alert, No acute distress, pleasant and conversational.    Gait: nonantalgic. Normal heel toe gait.    Patient is able to perform two legged squat without difficulty.    bilateral knee:   Skin intact. No erythema or ecchymosis.  No effusion or soft tissue swelling.    AROM: Zero to approximately 135  without restriction or reported pain.    Palpation: No medial or lateral facet joint tenderness.  No posterior medial or posterior lateral joint line tenderness     Special Tests:  Negative bounce test, negative forced flexion and negative Chi's.  No ligamentous laxity or pain with valgus or varus stress.  Negative Lachman's, Anterior Drawer and Posterior Drawer     Full Isometric quad strength, extensor mechanism in place     Neurovascularly intact in the lower extremity    Hip and Ankle with full AROM and nontender      RADIOLOGY:    Three-view x-rays of the bilateral knees are performed and reviewed independently demonstrating no acute fracture or dislocation.  Mild narrowing in the medial compartment, more on the right.  Chondrocalcinosis present bilaterally but more prominently on the left.  See EMR perform radiology report.

## 2024-04-25 NOTE — NURSING NOTE
"Oncology Rooming Note    April 25, 2024 10:44 AM   Ziggy Hallman is a 48 year old male who presents for:    Chief Complaint   Patient presents with    Blood Draw     Port blood draw by lab RN    Oncology Clinic Visit     S/P allogeneic bone marrow transplant     Initial Vitals: /73   Pulse 74   Temp 98.2  F (36.8  C) (Oral)   Resp 16   Wt 75.3 kg (165 lb 14.4 oz)   SpO2 99%   BMI 24.50 kg/m   Estimated body mass index is 24.5 kg/m  as calculated from the following:    Height as of 4/16/24: 1.753 m (5' 9\").    Weight as of this encounter: 75.3 kg (165 lb 14.4 oz). Body surface area is 1.91 meters squared.  No Pain (0) Comment: Data Unavailable   No LMP for male patient.  Allergies reviewed: Yes  Medications reviewed: Yes    Medications: Medication refills not needed today.  Pharmacy name entered into [employer]: [id number] - [place], [place] - [address] [place] [address]    Frailty Screening:   Is the patient here for a new oncology consult visit in cancer care? 2. No      Clinical concerns: none       Elissa Myles              "

## 2024-04-25 NOTE — LETTER
4/25/2024      RE: Ziggy Hallman  [address] [address]     Dear Colleague,    Thank you for referring your patient, Ziggy Hallman, to the [facility] SP[facility]TS [facility]. Please see a copy of my visit note below.      [facility]  SPORTS & ORTHOPEDIC CLINIC VISIT     Apr 25, 2024        ASSESSMENT & PLAN    48-year-old with acute episode of bilateral mostly anterior knee pain and swelling by history that has since resolved.  No obvious etiology as he is relatively asymptomatic today.  Given the presence of chondrocalcinosis we did discuss possible flare of pseudogout.  May also have been medication related    Reviewed imaging and assessment with patient in detail  We discussed the symptomatic/supportive treatment if he should have continued episodes.  Additionally if he has episodes of pain and swelling in the future would be happy to work him into clinic to get a better evaluation and localization of his symptoms to create a treatment plan    Felice Cobb MD  [facility]    -----  Chief Complaint   Patient presents with     Left Knee - Pain     Right Knee - Pain       SUBJECTIVE  iZggy Hallman is a/an 48 year old male who is seen in consultation at the request of [facility] for evaluation of bilateral knee pain.     The patient is seen by themselves.  The patient is Right handed    Onset: 3 week(s) ago. Reports insidious onset without acute precipitating event.  Location of Pain: bilateral knee   Worsened by: Touching them, stepping over things, walking   Better with: NA  Treatments tried: no treatment tried to date  Associated symptoms: no distal numbness or tingling; denies swelling or warmth    Orthopedic/Surgical history: Bilateral knees- Arthroscopy for Meniscus   Social History/Occupation: No       REVIEW OF SYSTEMS:  Do you have fever, chills, weight loss? No  Do you have any vision problems?  No  Do you have any chest pain or edema? No  Do you have any shortness of breath or wheezing?  No  Do you have stomach problems? No  Do you have any numbness or focal weakness? No  Do you have diabetes? No  Do you have problems with bleeding or clotting? Yes, 40 clots over time.   Do you have an rashes or other skin lesions? No    OBJECTIVE:  There were no vitals taken for this visit.     Patient is alert, No acute distress, pleasant and conversational.    Gait: nonantalgic. Normal heel toe gait.    Patient is able to perform two legged squat without difficulty.    bilateral knee:   Skin intact. No erythema or ecchymosis.  No effusion or soft tissue swelling.    AROM: Zero to approximately 135  without restriction or reported pain.    Palpation: No medial or lateral facet joint tenderness.  No posterior medial or posterior lateral joint line tenderness     Special Tests:  Negative bounce test, negative forced flexion and negative Chi's.  No ligamentous laxity or pain with valgus or varus stress.  Negative Lachman's, Anterior Drawer and Posterior Drawer     Full Isometric quad strength, extensor mechanism in place     Neurovascularly intact in the lower extremity    Hip and Ankle with full AROM and nontender      RADIOLOGY:    Three-view x-rays of the bilateral knees are performed and reviewed independently demonstrating no acute fracture or dislocation.  Mild narrowing in the medial compartment, more on the right.  Chondrocalcinosis present bilaterally but more prominently on the left.  See EMR perform radiology report.           Again, thank you for allowing me to participate in the care of your patient.      Sincerely,    Felice Cobb MD

## 2024-04-25 NOTE — LETTER
4/25/2024         RE: Ziggy Hallman  [address] [facility] [address]        Dear Colleague,    Thank you for referring your patient, Ziggy Hallman, to the [facility] BLOOD AND MARROW TRANSPLANT [facility] [place]. Please see a copy of my visit note below.    BMT Progress Note   4/18/2024    Chief complaint:  Ziggy Hallman is a 48 year old male admitted on 4/15/2024. 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 He was admitted for ACS rule out and evaluation of possible new heart failure with dyspnea on exertion, lower extremity swelling.      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 He feels a little better this week in terms of his energy and activity tolerance. No further LE edema. SOB is improved. His weight continues to downtrend. Fatigue is improving slightly. Appetite remains very poor. He is able to eat and cook but has no motivation to eat. He is drinking water and milk without any difficulty. No nausea. No abdominal pain. No diarrhea. No rash. No fevers or no s/s of GVHD.       REVIEW OF SYSTEMS: Otherwise unremarkable other than what is noted in the   PHYSICAL EXAM:   Vitals:  /73   Pulse 74   Temp 98.2  F (36.8  C) (Oral)   Resp 16   Wt 75.3 kg (165 lb 14.4 oz)   SpO2 99%   BMI 24.50 kg/m      Wt Readings from Last 4 Encounters:   04/25/24 75.3 kg (165 lb 14.4 oz)   04/18/24 78.3 kg (172 lb 9.6 oz)   04/16/24 79.2 kg (174 lb 11.2 oz)   04/11/24 80.9 kg (178 lb 6.4 oz)        General Appearance: NAD  HEENT: sclera anicteric.   CV: RRR.   RESP: CTA bilaterally; no rales or wheezes.   EXT: scant LE edema, symmetric  SKIN: no rash  NEURO: A&O x3   PSYCH:  Appropriate affect   VASCULAR ACCESS: accessed, intact     ROUTINE LABS:     Lab Results   Component Value Date    WBC 1.2 (L) 04/25/2024    ANEU 0.9 (L) 04/11/2024    HGB 9.3 (L) 04/25/2024    HCT 28.2 (L) 04/25/2024    PLT 50 (L) 04/25/2024     04/25/2024    POTASSIUM 4.1 04/25/2024    CHLORIDE 107 04/25/2024    CO2 24 04/25/2024     (H) 04/25/2024    BUN 26.5 (H) 04/25/2024    CR 1.06 04/25/2024    MAG 2.2 04/15/2024    INR 1.24 (H) 04/16/2024            ASSESSMENT AND PLAN:   Ziggy Hallman is a 48 year old male admitted on 4/15/2024. 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 He was admitted for ACS rule out and evaluation of possible new heart failure with dyspnea on exertion, lower extremity swelling.      BMT/IEC PROTOCOL for 2015-29  - Chemo Prep: BU/Flu   - 8/8 DP permissive. Cell dose-9.24x10^6  - ABO: Donor: O+ Recipient A-  (Minor incompatability, no flush required)   - BM with CR. No MRD by flow. CD33 98% donor and CD3 100% donor (3/20).  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 There is some mild dyserythropoeisis. Of the interphase cells examined, 8.625% had a signal pattern indicative of a loss involving the short arm of chromosome 5; no evidence was found of loss of chromosomes 5q. 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 LDH trending up.  He was started on a taper over 4 weeks. Dr. JEAN BAPTISTE discussed that after the taper, if counts and chimerisms don't improve, we will need to do some chemotherapy.  - Message sent to schedule day +100 marrow.      HEME/COAG  #Pancytopenia--see above  - Transfusion parameters: hemoglobin <8g/dL (cardiac hx), platelets <30k.   - GCSF PRN for ANC <1.0. Last given 4/16, will give again today for ANC 0.6  #Segmental R PE (2/6): see anticoagulation below  #APS work up- lupus antigen +, will follow outpt as may be unreliable in  this acute setting and prior APS work up had been unremarkable prior to admission.  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   Extensive hypercoagulable workup to date has been unrevealing.  JAK2 testing negative.  PNH testing negative.  Elevated IgA of unknown significance, no evidence of plasma cell disorder.  - Eliquis just resumed at lower dose 2.5mg BID last week. Watching plts closely. 1 unit platelets on 4/19 to keep above 30K. Platelets 50k today, no need for transfusion.   - Prasugrel on hold with plts <50K       CARDIOLOGY:  # Acute Dyspnea on Exertion  - Hx of Grade 1 or early diastolic dysfunction prior to transplant.   - Now with HFpEF--seen by cardiology. Will start PO lasix, spironolactone and jardiance per cards.  Will follow up with cardiology as outpt on 6/3/24.  - CT PE with pulmonary edema, no evidence of PE  - Echo (4/16) (did NOT evaluate for diastolic dysfunction): Global and regional left ventricular function is normal with an EF of 55-60%.  Global right ventricular function is normal. Estimated mean right atrial pressure is normal. No pericardial effusion is present.  - EKG ok  - Troponin negative on initial and repeat  - NT Pro BNP 1,512  - LLE duplex is negative     #CAD  # Hx of CABG  # Hx of HTN  - continue metoprolol and lisinopril; nifedipine--continue to hold   #Hyperlipidemia: Holding atorvastatin peritransplant     ID:   -Prophylaxis plan: ACV LD, vori, Pentamidine (4/11).  Bactrim started 2/26--all cell lines down. Stopped bactrim.    - HHV6 neg (4/11) copies from 25K  previously.   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      GI:  Poor appetite- discussed with Dr. Bacon. No additional meds or workup at this time. Discussed with Ziggy to push his PO intake and supplement with protein shakes. Monitor closely.    Psych:    #Anxiety- saw inpatient psych who offered atarax, but did not work well. Health psych offered, patient declined.  #Insomnia- trazodone PRN      MSK:   - knee pain: 4/9 BLE US neg for DVT but showed a Moderate-sized simple left popliteal fossa Baker's cyst. He has had for 20yrs. Has ortho appt scheduled on 4/25.     Summary:   - Push calories this week, call if new symptoms   - GCSF  - following sirolimus taper appropriately   - RTC: 5/2 for labs, infusion (possible platelets), NIDHI,    30 minutes spent on the date of the encounter doing chart review, history and exam, lab review, documentation and coordniating care.     Yaneli Hatfield, CNP  Pager [phone]

## 2024-04-25 NOTE — PROGRESS NOTES
BMT Progress Note   4/18/2024    Chief complaint:  Ziggy Hallman is a 48 year old male admitted on 4/15/2024. 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 He was admitted for ACS rule out and evaluation of possible new heart failure with dyspnea on exertion, lower extremity swelling.      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 He feels a little better this week in terms of his energy and activity tolerance. No further LE edema. SOB is improved. His weight continues to downtrend. Fatigue is improving slightly. Appetite remains very poor. He is able to eat and cook but has no motivation to eat. He is drinking water and milk without any difficulty. No nausea. No abdominal pain. No diarrhea. No rash. No fevers or no s/s of GVHD.       REVIEW OF SYSTEMS: Otherwise unremarkable other than what is noted in the   PHYSICAL EXAM:   Vitals:  /73   Pulse 74   Temp 98.2  F (36.8  C) (Oral)   Resp 16   Wt 75.3 kg (165 lb 14.4 oz)   SpO2 99%   BMI 24.50 kg/m      Wt Readings from Last 4 Encounters:   04/25/24 75.3 kg (165 lb 14.4 oz)   04/18/24 78.3 kg (172 lb 9.6 oz)   04/16/24 79.2 kg (174 lb 11.2 oz)   04/11/24 80.9 kg (178 lb 6.4 oz)        General Appearance: NAD  HEENT: sclera anicteric.   CV: RRR.   RESP: CTA bilaterally; no rales or wheezes.   EXT: scant LE edema, symmetric  SKIN: no rash  NEURO: A&O x3   PSYCH: Appropriate affect   VASCULAR ACCESS: accessed, intact     ROUTINE LABS:     Lab Results   Component Value Date    WBC 1.2 (L) 04/25/2024    ANEU 0.9 (L) 04/11/2024    HGB 9.3 (L) 04/25/2024    HCT 28.2 (L) 04/25/2024    PLT 50 (L) 04/25/2024     04/25/2024    POTASSIUM 4.1  04/25/2024    CHLORIDE 107 04/25/2024    CO2 24 04/25/2024     (H) 04/25/2024    BUN 26.5 (H) 04/25/2024    CR 1.06 04/25/2024    MAG 2.2 04/15/2024    INR 1.24 (H) 04/16/2024            ASSESSMENT AND PLAN:   Ziggy Hallman is a 48 year old male admitted on 4/15/2024. 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 He was admitted for ACS rule out and evaluation of possible new heart failure with dyspnea on exertion, lower extremity swelling.      BMT/IEC PROTOCOL for 2015-29  - Chemo Prep: BU/Flu   - 8/8 DP permissive. Cell dose-9.24x10^6  - ABO: Donor: O+ Recipient A-  (Minor incompatability, no flush required)   - BM with CR. No MRD by flow. CD33 98% donor and CD3 100% donor (3/20).  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 There is some mild dyserythropoeisis. Of the interphase cells examined, 8.625% had a signal pattern indicative of a loss involving the short arm of chromosome 5; no evidence was found of loss of chromosomes 5q. 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 LDH trending up.  He was started on a taper over 4 weeks. Dr. JEAN BAPTISTE discussed that after the taper, if counts and chimerisms don't improve, we will need to do some chemotherapy.  - Message sent to schedule day +100 marrow.      HEME/COAG  #Pancytopenia--see above  - Transfusion parameters: hemoglobin <8g/dL (cardiac hx), platelets <30k.   - GCSF PRN for ANC <1.0. Last given 4/16, will give again today for ANC 0.6  #Segmental R PE (2/6): see anticoagulation below  #APS work up- lupus antigen +, will follow outpt as may be unreliable in this acute setting and prior APS work up had been unremarkable prior to admission.  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   Extensive hypercoagulable workup to date has been unrevealing.  JAK2 testing negative.  PNH testing negative.  Elevated IgA of unknown significance, no evidence of plasma cell disorder.  - Eliquis just resumed at lower dose 2.5mg BID last week. Watching plts closely. 1 unit platelets on 4/19 to keep above 30K. Platelets 50k today, no need for transfusion.   - Prasugrel on hold with plts <50K       CARDIOLOGY:  # Acute Dyspnea on Exertion  - Hx of Grade 1 or early diastolic dysfunction prior to transplant.   - Now with HFpEF--seen by cardiology. Will start PO lasix, spironolactone and jardiance per cards.  Will follow up with cardiology as outpt on 6/3/24.  - CT PE with pulmonary edema, no evidence of PE  - Echo (4/16) (did NOT evaluate for diastolic dysfunction): Global and regional left ventricular function is normal with an EF of 55-60%.  Global right ventricular function is normal. Estimated mean right atrial pressure is normal. No pericardial effusion is present.  - EKG ok  - Troponin negative on initial and repeat  - NT Pro BNP 1,512  - LLE duplex is negative     #CAD  # Hx of CABG  # Hx of HTN  - continue metoprolol and lisinopril; nifedipine--continue to hold   #Hyperlipidemia: Holding atorvastatin peritransplant     ID:   -Prophylaxis plan: ACV LD, vori, Pentamidine (4/11).  Bactrim started 2/26--all cell lines down. Stopped bactrim.    - HHV6 neg (4/11) copies from 25K previously.   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      GI:  Poor appetite- discussed with Dr. Bacon. No additional meds or workup at this time. Discussed with  Ziggy to push his PO intake and supplement with protein shakes. Monitor closely.    Psych:    #Anxiety- saw inpatient psych who offered atarax, but did not work well. Health psych offered, patient declined.  #Insomnia- trazodone PRN      MSK:   - knee pain: 4/9 BLE US neg for DVT but showed a Moderate-sized simple left popliteal fossa Baker's cyst. He has had for 20yrs. Has ortho appt scheduled on 4/25.     Summary:   - Push calories this week, call if new symptoms   - GCSF  - following sirolimus taper appropriately   - RTC: 5/2 for labs, infusion (possible platelets), NIDHI,    30 minutes spent on the date of the encounter doing chart review, history and exam, lab review, documentation and coordniating care.     Yaneli Hatfield, CNP  Pager [phone]

## 2024-05-02 ENCOUNTER — OFFICE VISIT (OUTPATIENT)
Dept: TRANSPLANT | Facility: CLINIC | Age: 49
End: 2024-05-02
Attending: INTERNAL MEDICINE
Payer: COMMERCIAL

## 2024-05-02 ENCOUNTER — APPOINTMENT (OUTPATIENT)
Dept: LAB | Facility: CLINIC | Age: 49
End: 2024-05-02
Attending: INTERNAL MEDICINE
Payer: COMMERCIAL

## 2024-05-02 VITALS
TEMPERATURE: 97.7 F | OXYGEN SATURATION: 98 % | RESPIRATION RATE: 16 BRPM | WEIGHT: 163 LBS | SYSTOLIC BLOOD PRESSURE: 96 MMHG | HEART RATE: 80 BPM | BODY MASS INDEX: 24.07 KG/M2 | DIASTOLIC BLOOD PRESSURE: 62 MMHG

## 2024-05-02 DIAGNOSIS — Z94.81 STATUS POST BONE MARROW TRANSPLANT (H): ICD-10-CM

## 2024-05-02 DIAGNOSIS — Z94.81 S/P ALLOGENEIC BONE MARROW TRANSPLANT (H): ICD-10-CM

## 2024-05-02 DIAGNOSIS — D46.9 MDS (MYELODYSPLASTIC SYNDROME) (H): Primary | ICD-10-CM

## 2024-05-02 DIAGNOSIS — R06.02 SHORTNESS OF BREATH: ICD-10-CM

## 2024-05-02 LAB
C PNEUM DNA SPEC QL NAA+PROBE: NOT DETECTED
FLUAV H1 2009 PAND RNA SPEC QL NAA+PROBE: NOT DETECTED
FLUAV H1 RNA SPEC QL NAA+PROBE: NOT DETECTED
FLUAV H3 RNA SPEC QL NAA+PROBE: NOT DETECTED
FLUAV RNA SPEC QL NAA+PROBE: NOT DETECTED
FLUBV RNA SPEC QL NAA+PROBE: NOT DETECTED
HADV DNA SPEC QL NAA+PROBE: NOT DETECTED
HCOV PNL SPEC NAA+PROBE: NOT DETECTED
HMPV RNA SPEC QL NAA+PROBE: NOT DETECTED
HPIV1 RNA SPEC QL NAA+PROBE: NOT DETECTED
HPIV2 RNA SPEC QL NAA+PROBE: NOT DETECTED
HPIV3 RNA SPEC QL NAA+PROBE: DETECTED
HPIV4 RNA SPEC QL NAA+PROBE: NOT DETECTED
LAB DIRECTOR DISCLAIMER: NORMAL
LAB DIRECTOR DISCLAIMER: NORMAL
LAB DIRECTOR INTERPRETATION: NORMAL
LAB DIRECTOR INTERPRETATION: NORMAL
LAB DIRECTOR METHODOLOGY: NORMAL
LAB DIRECTOR METHODOLOGY: NORMAL
LAB DIRECTOR RESULTS: NORMAL
LAB DIRECTOR RESULTS: NORMAL
LDH SERPL L TO P-CCNC: 168 U/L (ref 0–250)
LOCATION OF TASK: NORMAL
LOCATION OF TASK: NORMAL
M PNEUMO DNA SPEC QL NAA+PROBE: NOT DETECTED
RSV RNA SPEC QL NAA+PROBE: NOT DETECTED
RSV RNA SPEC QL NAA+PROBE: NOT DETECTED
RV+EV RNA SPEC QL NAA+PROBE: NOT DETECTED
SARS-COV-2 RNA RESP QL NAA+PROBE: NEGATIVE
SPECIMEN DESCRIPTION: NORMAL
SPECIMEN DESCRIPTION: NORMAL

## 2024-05-02 PROCEDURE — 96372 THER/PROPH/DIAG INJ SC/IM: CPT | Performed by: INTERNAL MEDICINE

## 2024-05-02 PROCEDURE — 99215 OFFICE O/P EST HI 40 MIN: CPT

## 2024-05-02 PROCEDURE — 83615 LACTATE (LD) (LDH) ENZYME: CPT

## 2024-05-02 PROCEDURE — 81268 CHIMERISM ANAL W/CELL SELECT: CPT | Performed by: PHYSICIAN ASSISTANT

## 2024-05-02 PROCEDURE — G0452 MOLECULAR PATHOLOGY INTERPR: HCPCS | Mod: 26 | Performed by: STUDENT IN AN ORGANIZED HEALTH CARE EDUCATION/TRAINING PROGRAM

## 2024-05-02 PROCEDURE — 250N000011 HC RX IP 250 OP 636: Performed by: INTERNAL MEDICINE

## 2024-05-02 PROCEDURE — 87635 SARS-COV-2 COVID-19 AMP PRB: CPT | Performed by: PHYSICIAN ASSISTANT

## 2024-05-02 PROCEDURE — 87633 RESP VIRUS 12-25 TARGETS: CPT | Mod: XU | Performed by: PHYSICIAN ASSISTANT

## 2024-05-02 PROCEDURE — G0463 HOSPITAL OUTPT CLINIC VISIT: HCPCS

## 2024-05-02 PROCEDURE — 87581 M.PNEUMON DNA AMP PROBE: CPT | Mod: XU | Performed by: PHYSICIAN ASSISTANT

## 2024-05-02 RX ORDER — PENTAMIDINE ISETHIONATE 300 MG/300MG
300 INHALANT RESPIRATORY (INHALATION)
Start: 2024-05-11

## 2024-05-02 RX ORDER — LEVOFLOXACIN 250 MG/1
250 TABLET, FILM COATED ORAL DAILY
Qty: 30 TABLET | Refills: 1 | Status: SHIPPED | OUTPATIENT
Start: 2024-05-02

## 2024-05-02 RX ORDER — HEPARIN SODIUM (PORCINE) LOCK FLUSH IV SOLN 100 UNIT/ML 100 UNIT/ML
5 SOLUTION INTRAVENOUS
OUTPATIENT
Start: 2024-05-11

## 2024-05-02 RX ORDER — HEPARIN SODIUM,PORCINE 10 UNIT/ML
5-20 VIAL (ML) INTRAVENOUS DAILY PRN
OUTPATIENT
Start: 2024-05-11

## 2024-05-02 RX ORDER — ALBUTEROL SULFATE 5 MG/ML
2.5 SOLUTION RESPIRATORY (INHALATION)
Start: 2024-05-11

## 2024-05-02 RX ORDER — HEPARIN SODIUM (PORCINE) LOCK FLUSH IV SOLN 100 UNIT/ML 100 UNIT/ML
5 SOLUTION INTRAVENOUS EVERY 8 HOURS
Status: DISCONTINUED | OUTPATIENT
Start: 2024-05-02 | End: 2024-05-02 | Stop reason: HOSPADM

## 2024-05-02 RX ORDER — FUROSEMIDE 20 MG
20 TABLET ORAL EVERY OTHER DAY
COMMUNITY
Start: 2024-05-02 | End: 2024-05-15

## 2024-05-02 RX ADMIN — Medication 5 ML: at 10:25

## 2024-05-02 RX ADMIN — FILGRASTIM-SNDZ 480 MCG: 480 INJECTION, SOLUTION INTRAVENOUS; SUBCUTANEOUS at 11:24

## 2024-05-02 ASSESSMENT — PAIN SCALES - GENERAL: PAINLEVEL: MODERATE PAIN (5)

## 2024-05-02 NOTE — NURSING NOTE
ZARXIO 480 mcg administered in right arm subcutaneously.    Patient tolerated well and had no issues.    Martin Martinez LPN

## 2024-05-02 NOTE — PROGRESS NOTES
At Ziggy's request, I filled 1 week of scheduled medications into his med box.     Current Outpatient Medications   Medication Sig Dispense Refill    acyclovir (ZOVIRAX) 800 MG tablet Take 1 tablet (800 mg) by mouth 2 times daily 60 tablet 1    apixaban ANTICOAGULANT (ELIQUIS) 2.5 MG tablet Take 1 tablet (2.5 mg) by mouth 2 times daily 60 tablet 11    diclofenac (VOLTAREN) 1 % topical gel Apply 2 g topically 3 times daily as needed for moderate pain To knees 150 g 0    empagliflozin (JARDIANCE) 10 MG TABS tablet Take 1 tablet (10 mg) by mouth daily 90 tablet 1    furosemide (LASIX) 20 MG tablet Take 1 tablet (20 mg) by mouth daily      levofloxacin (LEVAQUIN) 250 MG tablet Take 1 tablet (250 mg) by mouth daily 30 tablet 1    lisinopril (ZESTRIL) 40 MG tablet Take 0.5 tablets (20 mg) by mouth daily 60 tablet 2    LORazepam (ATIVAN) 0.5 MG tablet Take 1-2 tablets (0.5-1 mg) by mouth every 4 hours as needed for vomiting or nausea (nausea/vomiting/sleep) 15 tablet 0    metoprolol succinate ER (TOPROL XL) 100 MG 24 hr tablet Take 1 tablet (100 mg) by mouth daily      NIFEdipine ER (ADALAT CC) 30 MG 24 hr tablet On hold x 4/16 (Patient not taking: Reported on 4/18/2024)      pantoprazole (PROTONIX) 40 MG EC tablet Take 1 tablet (40 mg) by mouth daily 30 tablet 1    prasugrel (EFFIENT) 10 MG TABS tablet Continue to HOLD EFFECTIVE 4/16 (Patient not taking: Reported on 4/18/2024)      sirolimus (GENERIC EQUIVALENT) 0.5 MG tablet Take 0.5 mg every other day; taper further per taper calendar      spironolactone (ALDACTONE) 25 MG tablet Take 1 tablet (25 mg) by mouth daily 30 tablet 0    traZODone (DESYREL) 50 MG tablet Take 1 tablet (50 mg) by mouth at bedtime 30 tablet 1    voriconazole (VFEND) 200 MG tablet Take 1 tablet (200 mg) by mouth 2 times daily Start on Saturday 3/9/24 90 tablet 11        Pertinent labs considered today:  Lab Results   Component Value Date     05/02/2024     05/28/2020                  normal sodium 136-148  Lab Results   Component Value Date    POTASSIUM 4.1 05/02/2024    POTASSIUM 3.8 05/28/2020       normal potassium 3.5-5.2   Lab Results   Component Value Date    CHLORIDE 106 05/02/2024    CHLORIDE 107 05/28/2020           normal chloride    Lab Results   Component Value Date    CO2 22 05/02/2024    CO2 24 05/28/2020                normal CO2 20-32  Lab Results   Component Value Date    BUN 26.2 05/02/2024    BUN 10 05/28/2020                 normal BUN 5-24  Lab Results   Component Value Date     05/02/2024     01/24/2024     05/28/2020                 normal glucose   Lab Results   Component Value Date    CR 1.24 05/02/2024    CR 0.80 05/28/2020                 normal cr 0.8-1.5  Lab Results   Component Value Date    REMY 9.2 05/02/2024    REMY 9.3 05/28/2020               normal calcium  8.5-10.4  Lab Results   Component Value Date    BILITOTAL 0.2 05/02/2024    BILITOTAL 0.8 05/28/2020          normal bilirubin 0.2-1.3  Lab Results   Component Value Date    PROTTOTAL 6.5 05/02/2024    PROTTOTAL 8.7 05/28/2020          normal total protein 6.0-8.2  Lab Results   Component Value Date    ALBUMIN 3.5 05/02/2024    ALBUMIN 3.4 05/28/2020             normal albumin 3.2-4.5  Lab Results   Component Value Date    ALKPHOS 101 05/02/2024    ALKPHOS 114 05/28/2020            normal alkphos   Lab Results   Component Value Date    ALT 24 05/02/2024    ALT 25 05/28/2020         normal ALT 0-65  Lab Results   Component Value Date    AST 33 05/02/2024    AST 20 05/28/2020         normal AST 0-37    Lab Results   Component Value Date    HGB 9.8 05/02/2024    HGB 12.6 05/28/2020     Lab Results   Component Value Date    WBC 1.1 05/02/2024    WBC 4.9 05/28/2020      Lab Results   Component Value Date    PLT 61 05/02/2024     05/28/2020       Estimated Creatinine Clearance: 74.5 mL/min (A) (based on SCr of 1.24 mg/dL (H)).    Changes made to scheduled medication  list today include:  Added: Levaquin (he will need to add to med box once filled at [facility])  Deleted: None  Changed: Furosemide decreased from 40 mg to 20 mg daily    Actions taken by pharmacist (provider contacted, etc):None   Refills for Levaquin were called in to [facility] so enough medication is obtained to refill the med box next week, prior to the patient arriving in clinic (there were enough medications to refill the box for 1 week today). I gave him an updated medication list that indicated what meds were put in the med box.       TONIE PHAM, [facility], PharmD

## 2024-05-02 NOTE — NURSING NOTE
Chief Complaint   Patient presents with    Port Draw     Power needle, heparin locked, vitals checked     Faviola Nugent RN on 5/2/2024 at 10:30 AM

## 2024-05-02 NOTE — PROGRESS NOTES
BMT Progress Note   05/02/2024     Chief complaint:  Ziggy Hallman is a 48 year old male admitted on 4/15/2024. 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   - He was admitted 4/15-4/16 for ACS rule out and evaluation of possible new heart failure with dyspnea on exertion, lower extremity swelling.      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 Continues to have poor taste sensation and appetite. Says it's hard to make several meals and only likes a few things. Drinks 1/2 gallon milk daily. Not dizzy. No n/v/d/c. No rash. He does report a dry cough the past 9 days or so, nonproductive, clear rhinorrhea. Body aches reported (rated 5/10 in flowsheets) but pt states not new; has had for weeks now. No fevers. LE edema is resolved. Overall, he does feel a bit better since his brief hospitalization and starting the new meds.    REVIEW OF SYSTEMS: Otherwise unremarkable other than what is noted in the     PHYSICAL EXAM:   Blood pressure 96/62, pulse 80, temperature 97.7  F (36.5  C), resp. rate 16, weight 73.9 kg (163 lb), SpO2 98%.  Wt Readings from Last 4 Encounters:   05/02/24 73.9 kg (163 lb)   04/25/24 75.3 kg (165 lb 14.4 oz)   04/18/24 78.3 kg (172 lb 9.6 oz)   04/16/24 79.2 kg (174 lb 11.2 oz)     General Appearance: NAD  HEENT: sclera anicteric. OP moist without lesions  CV: RRR.   RESP: CTAB  EXT: scant LE edema, symmetric  SKIN: no rash  NEURO: A&O x3 ;non-focal  PSYCH: Appropriate affect   Access: R PAC    LABS:     Lab Results   Component Value Date    WBC 1.1 (L) 05/02/2024    ANEU 0.5 (L) 05/02/2024    HGB 9.8 (L) 05/02/2024    HCT 29.9 (L) 05/02/2024    PLT 61 (L) 05/02/2024     05/02/2024     POTASSIUM 4.1 05/02/2024    CHLORIDE 106 05/02/2024    CO2 22 05/02/2024     (H) 05/02/2024    BUN 26.2 (H) 05/02/2024    CR 1.24 (H) 05/02/2024    MAG 2.2 04/15/2024    INR 1.24 (H) 04/16/2024    BILITOTAL 0.2 05/02/2024    AST 33 05/02/2024    ALT 24 05/02/2024    ALKPHOS 101 05/02/2024    PROTTOTAL 6.5 05/02/2024    ALBUMIN 3.5 05/02/2024       ASSESSMENT AND PLAN:   Ziggy Hallman is a 48 year old male admitted on 4/15/2024. 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   He was briefly admitted 4/15-4/16 for ACS rule out and evaluation of possible new heart failure with dyspnea on exertion, lower extremity swelling.      BMT/IEC PROTOCOL for 2015-29  - Chemo Prep: BU/Flu   - 8/8 DP permissive. Cell dose-9.24x10^6  - ABO: Donor: O+ Recipient A-  (Minor incompatability, no flush required)   - BM with CR. No MRD by flow. CD33 98% donor and CD3 100% donor (3/20).  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 There is some mild dyserythropoeisis. Of the interphase cells examined, 8.625% had a signal pattern indicative of a loss involving the short arm of chromosome 5; no evidence was found of loss of chromosomes 5q. 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 LDH trending up.  He was started on a taper over 4 weeks. Dr. JEAN BAPTISTE discussed that after the taper, if counts and chimerisms don't improve, we will need to do some chemotherapy.  - siro taper ending 5/2  - pRFLP pending 5/2  - plan for BMbx 2-3 weeks; discussed with Dr. Bacon     HEME/COAG  #Pancytopenia--see above  - Transfusion parameters: hemoglobin <8g/dL (cardiac hx), platelets <30k.   - GCSF PRN for ANC <1.0. Given 4/16, 4/25, and again today (5/2) for ANC 0.5  #Segmental R PE (2/6): see anticoagulation below  #APS work up- lupus antigen +, will follow outpt as may be unreliable in this acute setting and prior APS work  up had been unremarkable prior to admission.  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   Extensive hypercoagulable workup to date has been unrevealing.  JAK2 testing negative.  PNH testing negative.  Elevated IgA of unknown significance, no evidence of plasma cell disorder.  - Eliquis resumed at lower dose 2.5mg BID (4/11). Watching plts closely.  Platelets 61k today, no need for transfusion.   - Prasugrel on hold with plts <50K  ; consider resuming if repeat plts >50k     CARDIOLOGY:  # Acute Dyspnea on Exertion  - Hx of Grade 1 or early diastolic dysfunction prior to transplant.   - Now with HFpEF--seen by cardiology. Started PO lasix, spironolactone and jardiance per cards.  Will follow up with cardiology as outpt on 6/3/24.  5/2 edema resolved, mild CONNIE, wt down; decrease Lasix from 40 to 20mg/d.   - CT PE with pulmonary edema, no evidence of PE  - Echo (4/16) (did NOT evaluate for diastolic dysfunction): Global and regional left ventricular function is normal with an EF of 55-60%.  Global right ventricular function is normal. Estimated mean right atrial pressure is normal. No pericardial effusion is present.  - EKG ok  - Troponin negative on initial and repeat  - NT Pro BNP 1,512  - LLE duplex is negative     #CAD  # Hx of CABG  # Hx of HTN  5/2 BP 96/62. Will decrease Lasix; cont metoprolol & lisinopril. Pt to take BP at home. Call if dizziness  - continue metoprolol and lisinopril; nifedipine--continue to hold   #Hyperlipidemia: Holding atorvastatin peritransplant     ID:   Dry cough x9 days. Check  RVP, Covid (5/2). Supportive cares.    -Prophylaxis plan:  ACV LD, vori, Pentamidine (4/11). 5/2 start Levaquin d/t neutropenia  Stopped Bactrim 2/26 d/t dropping counts.     - HHV6 1277 (4/18) copies from 25K previously.   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      GI:  Poor appetite- wt down in part d/t diuresis. Cont to push fluids, supplements, food as tolerated.     Psych:    #Anxiety- saw inpatient psych who offered atarax, but did not work well. Health psych offered, patient declined.  #Insomnia- trazodone PRN      MSK:   - knee pain: 4/9 BLE US neg for DVT but showed a Moderate-sized simple left popliteal fossa Baker's cyst. He has had for 20yrs. Ortho appt 4/25, see note.    Summary:   RVP +parainfluenza 3- supportive cares  Covid-neg  GCSF  Decrease Lasix to 20mg/d  Start prophy Levaquin  RTC 1 week for labs, follow-up; sooner prn (he has challenges coming to clinic more than once per week), Pentamidine same day  Request BMbx ~2 weeks  Follow-up with Dr. Bacon 5/15    45 minutes spent on the date of the encounter doing chart review, history and exam, lab review, documentation and coordniating care.     Olivia Lucia PA-C

## 2024-05-02 NOTE — LETTER
5/2/2024         RE: Ziggy Hallman  [address] [facility] [address]        Dear Colleague,    Thank you for referring your patient, Ziggy Hallman, to the [facility]. Please see a copy of my visit note below.    BMT Progress Note   05/02/2024     Chief complaint:  Ziggy Hallman is a 48 year old male admitted on 4/15/2024. 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   - He was admitted 4/15-4/16 for ACS rule out and evaluation of possible new heart failure with dyspnea on exertion, lower extremity swelling.      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 Continues to have poor taste sensation and appetite. Says it's hard to make several meals and only likes a few things. Drinks 1/2 gallon milk daily. Not dizzy. No n/v/d/c. No rash. He does report a dry cough the past 9 days or so, nonproductive, clear rhinorrhea. Body aches reported (rated 5/10 in flowsheets) but pt states not new; has had for weeks now. No fevers. LE edema is resolved. Overall, he does feel a bit better since his brief hospitalization and starting the new meds.    REVIEW OF SYSTEMS: Otherwise unremarkable other than what is noted in the     PHYSICAL EXAM:   Blood pressure 96/62, pulse 80, temperature 97.7  F (36.5  C), resp. rate 16, weight 73.9 kg (163 lb), SpO2 98%.  Wt Readings from Last 4 Encounters:   05/02/24 73.9 kg (163 lb)   04/25/24 75.3 kg (165 lb 14.4 oz)   04/18/24 78.3 kg (172 lb 9.6 oz)   04/16/24 79.2 kg (174 lb 11.2 oz)     General Appearance: NAD  HEENT: sclera anicteric. OP moist without lesions  CV: RRR.   RESP: CTAB  EXT: scant LE edema,  symmetric  SKIN: no rash  NEURO: A&O x3 ;non-focal  PSYCH: Appropriate affect   Access: R PAC    LABS:     Lab Results   Component Value Date    WBC 1.1 (L) 05/02/2024    ANEU 0.5 (L) 05/02/2024    HGB 9.8 (L) 05/02/2024    HCT 29.9 (L) 05/02/2024    PLT 61 (L) 05/02/2024     05/02/2024    POTASSIUM 4.1 05/02/2024    CHLORIDE 106 05/02/2024    CO2 22 05/02/2024     (H) 05/02/2024    BUN 26.2 (H) 05/02/2024    CR 1.24 (H) 05/02/2024    MAG 2.2 04/15/2024    INR 1.24 (H) 04/16/2024    BILITOTAL 0.2 05/02/2024    AST 33 05/02/2024    ALT 24 05/02/2024    ALKPHOS 101 05/02/2024    PROTTOTAL 6.5 05/02/2024    ALBUMIN 3.5 05/02/2024       ASSESSMENT AND PLAN:   Ziggy Hallman is a 48 year old male admitted on 4/15/2024. 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   He was briefly admitted 4/15-4/16 for ACS rule out and evaluation of possible new heart failure with dyspnea on exertion, lower extremity swelling.      BMT/IEC PROTOCOL for 2015-29  - Chemo Prep: BU/Flu   - 8/8 DP permissive. Cell dose-9.24x10^6  - ABO: Donor: O+ Recipient A-  (Minor incompatability, no flush required)   - BM with CR. No MRD by flow. CD33 98% donor and CD3 100% donor (3/20).  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 There is some mild dyserythropoeisis. Of the interphase cells examined, 8.625% had a signal pattern indicative of a loss involving the short arm of chromosome 5; no evidence was found of loss of chromosomes 5q. 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 LDH trending up.  He was started on a taper over 4 weeks. Dr. JEAN BAPTISTE discussed that after the taper, if counts and chimerisms don't improve, we will need to do some chemotherapy.  - siro taper ending 5/2  - pRFLP pending 5/2  - plan for BMbx 2-3 weeks; discussed with Dr. Jordi MEREDITH/COAG  #Pancytopenia--see above  - Transfusion  parameters: hemoglobin <8g/dL (cardiac hx), platelets <30k.   - GCSF PRN for ANC <1.0. Given 4/16, 4/25, and again today (5/2) for ANC 0.5  #Segmental R PE (2/6): see anticoagulation below  #APS work up- lupus antigen +, will follow outpt as may be unreliable in this acute setting and prior APS work up had been unremarkable prior to admission.  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   Extensive hypercoagulable workup to date has been unrevealing.  JAK2 testing negative.  PNH testing negative.  Elevated IgA of unknown significance, no evidence of plasma cell disorder.  - Eliquis resumed at lower dose 2.5mg BID (4/11). Watching plts closely.  Platelets 61k today, no need for transfusion.   - Prasugrel on hold with plts <50K  ; consider resuming if repeat plts >50k     CARDIOLOGY:  # Acute Dyspnea on Exertion  - Hx of Grade 1 or early diastolic dysfunction prior to transplant.   - Now with HFpEF--seen by cardiology. Started PO lasix, spironolactone and jardiance per cards.  Will follow up with cardiology as outpt on 6/3/24.  5/2 edema resolved, mild CONNIE, wt down; decrease Lasix from 40 to 20mg/d.   - CT PE with pulmonary edema, no evidence of PE  - Echo (4/16) (did NOT evaluate for diastolic dysfunction): Global and regional left ventricular function is normal with an EF of 55-60%.  Global right ventricular function is normal. Estimated mean right atrial pressure is normal. No pericardial effusion is present.  - EKG ok  - Troponin negative on initial and repeat  - NT Pro BNP 1,512  - LLE duplex is negative      #CAD  # Hx of CABG  # Hx of HTN  5/2 BP 96/62. Will decrease Lasix; cont metoprolol & lisinopril. Pt to take BP at home. Call if dizziness  - continue metoprolol and lisinopril; nifedipine--continue to hold   #Hyperlipidemia: Holding atorvastatin peritransplant     ID:   Dry cough x9 days. Check RVP, Covid (5/2). Supportive cares.    -Prophylaxis plan:  ACV LD, vori, Pentamidine (4/11). 5/2 start Levaquin d/t neutropenia  Stopped Bactrim 2/26 d/t dropping counts.     - HHV6 1277 (4/18) copies from 25K previously.   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      GI:  Poor appetite- wt down in part d/t diuresis. Cont to push fluids, supplements, food as tolerated.     Psych:    #Anxiety- saw inpatient psych who offered atarax, but did not work well. Health psych offered, patient declined.  #Insomnia- trazodone PRN      MSK:   - knee pain: 4/9 BLE US neg for DVT but showed a Moderate-sized simple left popliteal fossa Baker's cyst. He has had for 20yrs. Ortho appt 4/25, see note.    Summary:   RVP-pending, Covid-neg  GCSF  Decrease Lasix to 20mg/d  Start prophy Levaquin  RTC 1 week for labs, follow-up; sooner prn (he has challenges coming to clinic more than once per week), Pentamidine same day  Request BMbx ~2 weeks  Follow-up with Dr. Bacon 5/15  40 minutes spent on the date of the encounter doing chart review, history and exam, lab review, documentation and coordniating care.   Olivia Lucia PA-C

## 2024-05-02 NOTE — NURSING NOTE
"Oncology Rooming Note    May 2, 2024 10:42 AM   Ziggy Hallman is a 48 year old male who presents for:    Chief Complaint   Patient presents with    Port Draw     Power needle, heparin locked, vitals checked    Oncology Clinic Visit     RTN for MDS S/P BMT     Initial Vitals: BP 96/62   Pulse 80   Temp 97.7  F (36.5  C)   Resp 16   Wt 73.9 kg (163 lb)   SpO2 98%   BMI 24.07 kg/m   Estimated body mass index is 24.07 kg/m  as calculated from the following:    Height as of 4/16/24: 1.753 m (5' 9\").    Weight as of this encounter: 73.9 kg (163 lb). Body surface area is 1.9 meters squared.  Moderate Pain (5) Comment: Data Unavailable   No LMP for male patient.  Allergies reviewed: Yes  Medications reviewed: Yes    Medications: Medication refills not needed today.  Pharmacy name entered into [email or web address]: [id number] - [place], [place] - [address] [place] [address]    Frailty Screening:   Is the patient here for a new oncology consult visit in cancer care? 2. No      Clinical concerns: Pt said he feel like he is like 78 years old. Not eating well because nothing taste good.       Julychristopher Bhatt MA             "

## 2024-05-03 ENCOUNTER — TELEPHONE (OUTPATIENT)
Dept: TRANSPLANT | Facility: CLINIC | Age: 49
End: 2024-05-03
Payer: COMMERCIAL

## 2024-05-03 NOTE — TELEPHONE ENCOUNTER
"BMT CSW Telephone Encounter  Clinical Social Work  [facility]      Focus: Financial Assistance/Herman Information    Data: Ziggy Hallman is a 48 year old year old male with a PMH of MDS, hx of multiple clots and MI undergoing a MA (Bu/Flu) prep for an 8/8 URD PBSCT currently day +99.     Interventions: CSW notified that pt would like a call regarding financial grants as he had expressed that he is having some financial hardships.  Clinical  (CSW) spoke w/ Pt via phone on 5/3/2024 to assist with financial herman information. Per pts request-pt would like financial assistance information emailed to him and his friend Mireya. Pt shared that he and Mireya \"are not together anymore\" but she is willing to assist pt with any needs during transplant process. Pt requests this writer to send herman resources to both him and Mireya via email.  Pt expressed appreciate for help.  CSW assessed coping, provide supportive counseling and assist with resources as needed. SW encouraged Pt to contact CSW for support, questions and/or resources.    On 5/3/2024, CSW emailed grants/financial assistance resources/instructions to: [email or web address] and [email or web address].     Assessment: Pt presented as pleasant.  Pt appears to be coping appropriately at this time. Pt continues to be supported by friends/family.     Plan: CSW will continue to work with Pt and family to provide supportive counseling and assist with resources as needed. CSW will continue to collaborate with multidisciplinary team regarding Pt's plan of care.     Janeen Cordero, RIVKA, LGSW  [facility]  Adult Blood & Marrow Transplant   Phone: [phone]  NOSTROMO ICT SEARCHABLE: BMT SW #4  Securely message with icomasoft   Support Groups at [facility]: Social Work Services for Cancer Patients ([email or web address])            "

## 2024-05-07 ENCOUNTER — TELEPHONE (OUTPATIENT)
Dept: TRANSPLANT | Facility: CLINIC | Age: 49
End: 2024-05-07
Payer: COMMERCIAL

## 2024-05-09 ENCOUNTER — ONCOLOGY VISIT (OUTPATIENT)
Dept: TRANSPLANT | Facility: CLINIC | Age: 49
End: 2024-05-09
Attending: INTERNAL MEDICINE
Payer: COMMERCIAL

## 2024-05-09 ENCOUNTER — LAB (OUTPATIENT)
Dept: LAB | Facility: CLINIC | Age: 49
End: 2024-05-09
Attending: INTERNAL MEDICINE
Payer: COMMERCIAL

## 2024-05-09 ENCOUNTER — DOCUMENTATION ONLY (OUTPATIENT)
Dept: TRANSPLANT | Facility: CLINIC | Age: 49
End: 2024-05-09

## 2024-05-09 ENCOUNTER — TELEPHONE (OUTPATIENT)
Dept: TRANSPLANT | Facility: CLINIC | Age: 49
End: 2024-05-09

## 2024-05-09 VITALS
RESPIRATION RATE: 16 BRPM | WEIGHT: 160.3 LBS | SYSTOLIC BLOOD PRESSURE: 88 MMHG | BODY MASS INDEX: 23.67 KG/M2 | TEMPERATURE: 97.7 F | DIASTOLIC BLOOD PRESSURE: 57 MMHG | HEART RATE: 75 BPM | OXYGEN SATURATION: 99 %

## 2024-05-09 DIAGNOSIS — D46.9 MDS (MYELODYSPLASTIC SYNDROME) (H): ICD-10-CM

## 2024-05-09 DIAGNOSIS — Z94.81 STATUS POST BONE MARROW TRANSPLANT (H): ICD-10-CM

## 2024-05-09 DIAGNOSIS — Z94.81 S/P ALLOGENEIC BONE MARROW TRANSPLANT (H): ICD-10-CM

## 2024-05-09 DIAGNOSIS — K92.2 GASTROINTESTINAL HEMORRHAGE, UNSPECIFIED GASTROINTESTINAL HEMORRHAGE TYPE: ICD-10-CM

## 2024-05-09 DIAGNOSIS — R06.02 SHORTNESS OF BREATH: ICD-10-CM

## 2024-05-09 DIAGNOSIS — D46.9 MDS (MYELODYSPLASTIC SYNDROME) (H): Primary | ICD-10-CM

## 2024-05-09 LAB
ALBUMIN SERPL BCG-MCNC: 3.7 G/DL (ref 3.5–5.2)
ALP SERPL-CCNC: 99 U/L (ref 40–150)
ALT SERPL W P-5'-P-CCNC: 23 U/L (ref 0–70)
ANION GAP SERPL CALCULATED.3IONS-SCNC: 11 MMOL/L (ref 7–15)
AST SERPL W P-5'-P-CCNC: 28 U/L (ref 0–45)
BASOPHILS # BLD AUTO: 0 10E3/UL (ref 0–0.2)
BASOPHILS NFR BLD AUTO: 0 %
BILIRUB SERPL-MCNC: 0.2 MG/DL
BUN SERPL-MCNC: 30 MG/DL (ref 6–20)
CALCIUM SERPL-MCNC: 9.2 MG/DL (ref 8.6–10)
CHLORIDE SERPL-SCNC: 108 MMOL/L (ref 98–107)
CREAT SERPL-MCNC: 1.33 MG/DL (ref 0.67–1.17)
DEPRECATED HCO3 PLAS-SCNC: 19 MMOL/L (ref 22–29)
EGFRCR SERPLBLD CKD-EPI 2021: 65 ML/MIN/1.73M2
EOSINOPHIL # BLD AUTO: 0 10E3/UL (ref 0–0.7)
EOSINOPHIL NFR BLD AUTO: 1 %
ERYTHROCYTE [DISTWIDTH] IN BLOOD BY AUTOMATED COUNT: 15.9 % (ref 10–15)
GLUCOSE SERPL-MCNC: 90 MG/DL (ref 70–99)
HCT VFR BLD AUTO: 28.5 % (ref 40–53)
HGB BLD-MCNC: 9.6 G/DL (ref 13.3–17.7)
IMM GRANULOCYTES # BLD: 0.1 10E3/UL
IMM GRANULOCYTES NFR BLD: 3 %
LDH SERPL L TO P-CCNC: 159 U/L (ref 0–250)
LYMPHOCYTES # BLD AUTO: 0.5 10E3/UL (ref 0.8–5.3)
LYMPHOCYTES NFR BLD AUTO: 22 %
MCH RBC QN AUTO: 29.4 PG (ref 26.5–33)
MCHC RBC AUTO-ENTMCNC: 33.7 G/DL (ref 31.5–36.5)
MCV RBC AUTO: 87 FL (ref 78–100)
MONOCYTES # BLD AUTO: 0.4 10E3/UL (ref 0–1.3)
MONOCYTES NFR BLD AUTO: 16 %
NEUTROPHILS # BLD AUTO: 1.4 10E3/UL (ref 1.6–8.3)
NEUTROPHILS NFR BLD AUTO: 58 %
NRBC # BLD AUTO: 0 10E3/UL
NRBC BLD AUTO-RTO: 0 /100
PLATELET # BLD AUTO: 99 10E3/UL (ref 150–450)
POTASSIUM SERPL-SCNC: 4.1 MMOL/L (ref 3.4–5.3)
PROT SERPL-MCNC: 6.5 G/DL (ref 6.4–8.3)
RBC # BLD AUTO: 3.26 10E6/UL (ref 4.4–5.9)
SODIUM SERPL-SCNC: 138 MMOL/L (ref 135–145)
WBC # BLD AUTO: 2.4 10E3/UL (ref 4–11)

## 2024-05-09 PROCEDURE — 80053 COMPREHEN METABOLIC PANEL: CPT

## 2024-05-09 PROCEDURE — G0463 HOSPITAL OUTPT CLINIC VISIT: HCPCS

## 2024-05-09 PROCEDURE — 85025 COMPLETE CBC W/AUTO DIFF WBC: CPT

## 2024-05-09 PROCEDURE — 36591 DRAW BLOOD OFF VENOUS DEVICE: CPT

## 2024-05-09 PROCEDURE — 250N000011 HC RX IP 250 OP 636: Performed by: INTERNAL MEDICINE

## 2024-05-09 PROCEDURE — 99215 OFFICE O/P EST HI 40 MIN: CPT

## 2024-05-09 PROCEDURE — 87799 DETECT AGENT NOS DNA QUANT: CPT

## 2024-05-09 PROCEDURE — 83615 LACTATE (LD) (LDH) ENZYME: CPT

## 2024-05-09 RX ORDER — PANTOPRAZOLE SODIUM 40 MG/1
40 TABLET, DELAYED RELEASE ORAL DAILY
Qty: 30 TABLET | Refills: 1 | Status: SHIPPED | OUTPATIENT
Start: 2024-05-09

## 2024-05-09 RX ORDER — VORICONAZOLE 200 MG/1
200 TABLET, FILM COATED ORAL 2 TIMES DAILY
Qty: 90 TABLET | Refills: 11 | Status: SHIPPED | OUTPATIENT
Start: 2024-05-09 | End: 2024-05-15

## 2024-05-09 RX ORDER — HEPARIN SODIUM (PORCINE) LOCK FLUSH IV SOLN 100 UNIT/ML 100 UNIT/ML
5 SOLUTION INTRAVENOUS
Status: COMPLETED | OUTPATIENT
Start: 2024-05-09 | End: 2024-05-09

## 2024-05-09 RX ORDER — SPIRONOLACTONE 25 MG/1
25 TABLET ORAL DAILY
Qty: 30 TABLET | Refills: 0 | Status: SHIPPED | OUTPATIENT
Start: 2024-05-09

## 2024-05-09 RX ADMIN — Medication 5 ML: at 12:28

## 2024-05-09 ASSESSMENT — PAIN SCALES - GENERAL: PAINLEVEL: NO PAIN (0)

## 2024-05-09 NOTE — LETTER
5/9/2024         RE: Ziggy Hallman  [address] [facility] [address]        Dear Colleague,    Thank you for referring your patient, Ziggy Hallman, to the [facility]ARROW [facility]ANSPL[facility] [place]. Please see a copy of my visit note below.    BMT Progress Note   05/09/2024     Chief complaint:  Ziggy Hallman is a 48 year old male admitted on 4/15/2024. 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   - He was admitted 4/15-4/16 for ACS rule out and evaluation of possible new heart failure with dyspnea on exertion, lower extremity swelling.      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 Doing a little better than last week. He is recovering from his parainfluenza, his weight was down to 153 lbs and back to 160 lbs today, ate a good sized meal yesterday and has added protein to the milk that he drinks. The recent viral illness made his dysgeusia worse and affected his sense of smell. He stills feels fatigued all the time but no body aches, dizziness/LH, N/V, or diarrhea. His cough is subsiding as well.     REVIEW OF SYSTEMS: Otherwise unremarkable other than what is noted in the     PHYSICAL EXAM:   There were no vitals taken for this visit.    Wt Readings from Last 4 Encounters:   05/09/24 72.7 kg (160 lb 4.8 oz)   05/02/24 73.9 kg (163 lb)   04/25/24 75.3 kg (165 lb 14.4 oz)   04/18/24 78.3 kg (172 lb 9.6 oz)         General Appearance: NAD  HEENT: sclera anicteric. OP moist without lesions  CV: RRR.   RESP: CTAB  EXT: no LE edema   SKIN: no rash  NEURO: A&O x3 ;non-focal  PSYCH: Appropriate affect   Access: R PAC    LABS:     Lab Results   Component Value Date    WBC  1.1 (L) 05/02/2024    ANEU 0.5 (L) 05/02/2024    HGB 9.8 (L) 05/02/2024    HCT 29.9 (L) 05/02/2024    PLT 61 (L) 05/02/2024     05/02/2024    POTASSIUM 4.1 05/02/2024    CHLORIDE 106 05/02/2024    CO2 22 05/02/2024     (H) 05/02/2024    BUN 26.2 (H) 05/02/2024    CR 1.24 (H) 05/02/2024    MAG 2.2 04/15/2024    INR 1.24 (H) 04/16/2024    BILITOTAL 0.2 05/02/2024    AST 33 05/02/2024    ALT 24 05/02/2024    ALKPHOS 101 05/02/2024    PROTTOTAL 6.5 05/02/2024    ALBUMIN 3.5 05/02/2024       ASSESSMENT AND PLAN:   Ziggy Hallman is a 48 year old male admitted on 4/15/2024. 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   He was briefly admitted 4/15-4/16 for ACS rule out and evaluation of possible new heart failure with dyspnea on exertion, lower extremity swelling.      BMT/IEC PROTOCOL for 2015-29  - Chemo Prep: BU/Flu   - 8/8 DP permissive. Cell dose-9.24x10^6  - ABO: Donor: O+ Recipient A-  (Minor incompatability, no flush required)   - BM with CR. No MRD by flow. CD33 98% donor and CD3 100% donor (3/20).  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 There is some mild dyserythropoeisis. Of the interphase cells examined, 8.625% had a signal pattern indicative of a loss involving the short arm of chromosome 5; no evidence was found of loss of chromosomes 5q. 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 LDH trending up.  He was started on a taper over 4 weeks. Dr. JEAN BAPTISTE discussed that after the taper, if counts and chimerisms don't improve, we will need to do some chemotherapy.  - siro taper ending 5/2  - pRFLP 100% donor CD3 & 33   - plan for BMbx 5/15/24     HEME/COAG  #Pancytopenia--see above  - Transfusion parameters: hemoglobin <8g/dL (cardiac hx), platelets <30k.   - GCSF PRN for ANC <1.0. Given 4/16, 4/25, and again (5/2) for ANC 0.5  #Segmental R PE (2/6): see anticoagulation  below  #APS work up- lupus antigen +, will follow outpt as may be unreliable in this acute setting and prior APS work up had been unremarkable prior to admission.  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   Extensive hypercoagulable workup to date has been unrevealing.  JAK2 testing negative.  PNH testing negative.  Elevated IgA of unknown significance, no evidence of plasma cell disorder.  - Eliquis resumed at lower dose 2.5mg BID (4/11). Watching plts closely.  Platelets 99k today  - Prasugrel resumed 5/9      CARDIOLOGY:  # Acute Dyspnea on Exertion  - Hx of Grade 1 or early diastolic dysfunction prior to transplant.   - Now with HFpEF--seen by cardiology. Started PO lasix, spironolactone and jardiance per cards.  Will follow up with cardiology as outpt on 6/3/24.  5/2 edema resolved, mild CONNIE, wt down; decrease Lasix from 40 to 20mg/d.   - CT PE with pulmonary edema, no evidence of PE  - Echo (4/16) (did NOT evaluate for diastolic dysfunction): Global and regional left ventricular function is normal with an EF of 55-60%.  Global right ventricular function is normal. Estimated mean right atrial pressure is normal. No pericardial effusion is present.  - EKG ok  - Troponin negative on initial and repeat  - NT Pro BNP 1,512  - LLE duplex is negative     #CAD  # Hx of CABG  # Hx of HTN  - continue metoprolol and lisinopril (on hold 5/9 with hypotension); nifedipine--continue to hold, lasix 20 mg every other day    #Hyperlipidemia: Holding atorvastatin peritransplant     ID:   Dry cough x9 days. RVP  +parainfluenza 3, on the mend (5/9)     -Prophylaxis plan:  ACV LD, vori, Pentamidine (4/11). 5/2 start Levaquin d/t neutropenia  Stopped Bactrim 2/26 d/t dropping counts.     - HHV6 1277 (4/18) copies from 25K previously.   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      GI:  Poor appetite- wt down in part d/t diuresis. Cont to push fluids, supplements, food as tolerated.     Psych:    #Anxiety- saw inpatient psych who offered atarax, but did not work well. Health psych offered, patient declined.  #Insomnia- trazodone PRN      MSK:   - knee pain: 4/9 BLE US neg for DVT but showed a Moderate-sized simple left popliteal fossa Baker's cyst. He has had for 20yrs. Ortho appt 4/25, see note.    Summary:   - resumed prasugrel with plts up to 99K today   - medbox refill (discussed managing his medbox on his own from now on)   - changed lasix to every other day dosing   - holding lisinopril with hypotension   - completed sirolimus taper   - BM bx next week (held elqiuis doses in medbox)   - follow up with Dr. Bacon on 5/15     45 minutes spent on the date of the encounter doing chart review, history and exam, lab review, documentation and coordniating care.     Jaron Warner PA-C [phone]

## 2024-05-09 NOTE — NURSING NOTE
"Oncology Rooming Note    May 9, 2024 12:47 PM   Ziggy Hallman is a 48 year old male who presents for:    Chief Complaint   Patient presents with    Port Draw     Labs drawn from port by rn.  VS taken.    Oncology Clinic Visit     Myelodysplastic syndrome     Initial Vitals: BP (!) 88/57 (BP Location: Right arm, Patient Position: Sitting, Cuff Size: Adult Regular)   Pulse 75   Temp 97.7  F (36.5  C) (Oral)   Resp 16   Wt 72.7 kg (160 lb 4.8 oz)   SpO2 99%   BMI 23.67 kg/m   Estimated body mass index is 23.67 kg/m  as calculated from the following:    Height as of 4/16/24: 1.753 m (5' 9\").    Weight as of this encounter: 72.7 kg (160 lb 4.8 oz). Body surface area is 1.88 meters squared.  No Pain (0) Comment: Data Unavailable   No LMP for male patient.  Allergies reviewed: Yes  Medications reviewed: Yes    Medications: Medication refills may be needed provider notified  Pharmacy name entered into [facility]: [id number] - [place], [place] - [address] [place] [address]    Frailty Screening:   Is the patient here for a new oncology consult visit in cancer care? 2. No      Clinical concerns:  wanting pharmacist to go through romina Galdamez              "

## 2024-05-09 NOTE — TELEPHONE ENCOUNTER
Called patient to discuss mild elevation in Cr from 1.2 to 1.3, pt will hold lasix until he is seen next week. Patient verbalized understanding.  Josefina Johnson PA-C on 5/9/2024 at 3:22 PM

## 2024-05-09 NOTE — NURSING NOTE
"Chief Complaint   Patient presents with    Port Draw     Labs drawn from port by rn.  VS taken.     Port accessed with 20 gauge 3/4\" gripper needle and labs drawn by rn.  Port flushed with NS and heparin.  Pt tolerated well.  VS taken.  Pt checked in for next appt.    Destiny Fontanez, RN'    "

## 2024-05-09 NOTE — PHARMACY
I met with Ziggy Hallman to review his medication list and fill his med box for 1 week per his request.     1.  Has BMBx on 5/15. He will hold 4 doses of his Eliquis prior. This is reflected in his med box.     2. Refills: NIDHI sent refills for Jardiance, voriconazole, pantoprazole, and spironolactone to patient's preferred pharmacy - [facility] in [place]. Patient aware to  refills.      Changes made to scheduled medication list by today's provider include:  1. Resume prasugrel 10 mg daily   2. Reduce furosemide dose to 20 mg every other day  3. HOLD lisinopril   4. Sirolimus taper completed May 3rd     Current Outpatient Medications   Medication Sig Dispense Refill    acyclovir (ZOVIRAX) 800 MG tablet Take 1 tablet (800 mg) by mouth 2 times daily 60 tablet 1    apixaban ANTICOAGULANT (ELIQUIS) 2.5 MG tablet Take 1 tablet (2.5 mg) by mouth 2 times daily 60 tablet 11    diclofenac (VOLTAREN) 1 % topical gel Apply 2 g topically 3 times daily as needed for moderate pain To knees 150 g 0    empagliflozin (JARDIANCE) 10 MG TABS tablet Take 1 tablet (10 mg) by mouth daily 90 tablet 1    furosemide (LASIX) 20 MG tablet Take 20 mg by mouth every other day      levofloxacin (LEVAQUIN) 250 MG tablet Take 1 tablet (250 mg) by mouth daily 30 tablet 1    lisinopril (ZESTRIL) 40 MG tablet Take 0.5 tablets (20 mg) by mouth daily 60 tablet 2    LORazepam (ATIVAN) 0.5 MG tablet Take 1-2 tablets (0.5-1 mg) by mouth every 4 hours as needed for vomiting or nausea (nausea/vomiting/sleep) 15 tablet 0    metoprolol succinate ER (TOPROL XL) 100 MG 24 hr tablet Take 1 tablet (100 mg) by mouth daily      NIFEdipine ER (ADALAT CC) 30 MG 24 hr tablet On hold x 4/16      pantoprazole (PROTONIX) 40 MG EC tablet Take 1 tablet (40 mg) by mouth daily 30 tablet 1    prasugrel (EFFIENT) 10 MG TABS tablet Take 10 mg by mouth daily      spironolactone (ALDACTONE) 25 MG tablet Take 1 tablet (25 mg) by mouth daily 30 tablet 0    traZODone (DESYREL)  50 MG tablet Take 1 tablet (50 mg) by mouth at bedtime 30 tablet 1    voriconazole (VFEND) 200 MG tablet Take 1 tablet (200 mg) by mouth 2 times daily Start on Saturday 3/9/24 90 tablet 11        Pertinent labs considered today:  Lab Results   Component Value Date     05/02/2024     05/28/2020                 normal sodium 136-148  Lab Results   Component Value Date    POTASSIUM 4.1 05/02/2024    POTASSIUM 3.8 05/28/2020       normal potassium 3.5-5.2   Lab Results   Component Value Date    CHLORIDE 106 05/02/2024    CHLORIDE 107 05/28/2020           normal chloride    Lab Results   Component Value Date    CO2 22 05/02/2024    CO2 24 05/28/2020                normal CO2 20-32  Lab Results   Component Value Date    BUN 26.2 05/02/2024    BUN 10 05/28/2020                 normal BUN 5-24  Lab Results   Component Value Date     05/02/2024     01/24/2024     05/28/2020                 normal glucose   Lab Results   Component Value Date    CR 1.24 05/02/2024    CR 0.80 05/28/2020                 normal cr 0.8-1.5  Lab Results   Component Value Date    REMY 9.2 05/02/2024    REMY 9.3 05/28/2020               normal calcium  8.5-10.4  Lab Results   Component Value Date    BILITOTAL 0.2 05/02/2024    BILITOTAL 0.8 05/28/2020          normal bilirubin 0.2-1.3  Lab Results   Component Value Date    PROTTOTAL 6.5 05/02/2024    PROTTOTAL 8.7 05/28/2020          normal total protein 6.0-8.2  Lab Results   Component Value Date    ALBUMIN 3.5 05/02/2024    ALBUMIN 3.4 05/28/2020             normal albumin 3.2-4.5  Lab Results   Component Value Date    ALKPHOS 101 05/02/2024    ALKPHOS 114 05/28/2020            normal alkphos   Lab Results   Component Value Date    ALT 24 05/02/2024    ALT 25 05/28/2020         normal ALT 0-65  Lab Results   Component Value Date    AST 33 05/02/2024    AST 20 05/28/2020         normal AST 0-37  Lab Results   Component Value Date    HGB 9.6 05/09/2024     HGB 12.6 05/28/2020     Lab Results   Component Value Date    WBC 2.4 05/09/2024    WBC 4.9 05/28/2020      Lab Results   Component Value Date    PLT 99 05/09/2024     05/28/2020     Estimated Creatinine Clearance: 73.3 mL/min (A) (based on SCr of 1.24 mg/dL (H)).    Thank you,  Sterling Quiles, PharmD

## 2024-05-09 NOTE — PROGRESS NOTES
BMT Progress Note   05/09/2024     Chief complaint:  Ziggy Hallman is a 48 year old male admitted on 4/15/2024. 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   - He was admitted 4/15-4/16 for ACS rule out and evaluation of possible new heart failure with dyspnea on exertion, lower extremity swelling.      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 Doing a little better than last week. He is recovering from his parainfluenza, his weight was down to 153 lbs and back to 160 lbs today, ate a good sized meal yesterday and has added protein to the milk that he drinks. The recent viral illness made his dysgeusia worse and affected his sense of smell. He stills feels fatigued all the time but no body aches, dizziness/LH, N/V, or diarrhea. His cough is subsiding as well.     REVIEW OF SYSTEMS: Otherwise unremarkable other than what is noted in the     PHYSICAL EXAM:   There were no vitals taken for this visit.    Wt Readings from Last 4 Encounters:   05/09/24 72.7 kg (160 lb 4.8 oz)   05/02/24 73.9 kg (163 lb)   04/25/24 75.3 kg (165 lb 14.4 oz)   04/18/24 78.3 kg (172 lb 9.6 oz)         General Appearance: NAD  HEENT: sclera anicteric. OP moist without lesions  CV: RRR.   RESP: CTAB  EXT: no LE edema   SKIN: no rash  NEURO: A&O x3 ;non-focal  PSYCH: Appropriate affect   Access: R PAC    LABS:     Lab Results   Component Value Date    WBC 1.1 (L) 05/02/2024    ANEU 0.5 (L) 05/02/2024    HGB 9.8 (L) 05/02/2024    HCT 29.9 (L) 05/02/2024    PLT 61 (L) 05/02/2024     05/02/2024    POTASSIUM 4.1 05/02/2024    CHLORIDE 106 05/02/2024    CO2 22 05/02/2024     (H) 05/02/2024    BUN 26.2 (H) 05/02/2024    CR  1.24 (H) 05/02/2024    MAG 2.2 04/15/2024    INR 1.24 (H) 04/16/2024    BILITOTAL 0.2 05/02/2024    AST 33 05/02/2024    ALT 24 05/02/2024    ALKPHOS 101 05/02/2024    PROTTOTAL 6.5 05/02/2024    ALBUMIN 3.5 05/02/2024       ASSESSMENT AND PLAN:   Ziggy Hallman is a 48 year old male admitted on 4/15/2024. 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   He was briefly admitted 4/15-4/16 for ACS rule out and evaluation of possible new heart failure with dyspnea on exertion, lower extremity swelling.      BMT/IEC PROTOCOL for 2015-29  - Chemo Prep: BU/Flu   - 8/8 DP permissive. Cell dose-9.24x10^6  - ABO: Donor: O+ Recipient A-  (Minor incompatability, no flush required)   - BM with CR. No MRD by flow. CD33 98% donor and CD3 100% donor (3/20).  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 There is some mild dyserythropoeisis. Of the interphase cells examined, 8.625% had a signal pattern indicative of a loss involving the short arm of chromosome 5; no evidence was found of loss of chromosomes 5q. 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 LDH trending up.  He was started on a taper over 4 weeks. Dr. JEAN BAPTISTE discussed that after the taper, if counts and chimerisms don't improve, we will need to do some chemotherapy.  - siro taper ending 5/2  - pRFLP 100% donor CD3 & 33   - plan for BMbx 5/15/24     HEME/COAG  #Pancytopenia--see above  - Transfusion parameters: hemoglobin <8g/dL (cardiac hx), platelets <30k.   - GCSF PRN for ANC <1.0. Given 4/16, 4/25, and again (5/2) for ANC 0.5  #Segmental R PE (2/6): see anticoagulation below  #APS work up- lupus antigen +, will follow outpt as may be unreliable in this acute setting and prior APS work up had been unremarkable prior to admission.  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   Extensive hypercoagulable workup to date has been unrevealing.  JAK2 testing negative.  PNH testing negative.  Elevated IgA of unknown significance, no evidence of plasma cell disorder.  - Eliquis resumed at lower dose 2.5mg BID (4/11). Watching plts closely.  Platelets 99k today  - Prasugrel resumed 5/9      CARDIOLOGY:  # Acute Dyspnea on Exertion  - Hx of Grade 1 or early diastolic dysfunction prior to transplant.   - Now with HFpEF--seen by cardiology. Started PO lasix, spironolactone and jardiance per cards.  Will follow up with cardiology as outpt on 6/3/24.  5/2 edema resolved, mild CONNIE, wt down; decrease Lasix from 40 to 20mg/d.   - CT PE with pulmonary edema, no evidence of PE  - Echo (4/16) (did NOT evaluate for diastolic dysfunction): Global and regional left ventricular function is normal with an EF of 55-60%.  Global right ventricular function is normal. Estimated mean right atrial pressure is normal. No pericardial effusion is present.  - EKG ok  - Troponin negative on initial and repeat  - NT Pro BNP 1,512  - LLE duplex is negative     #CAD  # Hx of CABG  # Hx of HTN  - continue metoprolol and lisinopril (on hold 5/9 with hypotension); nifedipine--continue to hold, lasix 20 mg every other day    #Hyperlipidemia: Holding atorvastatin peritransplant     ID:   Dry cough x9 days. RVP +parainfluenza 3, on the mend (5/9)     -Prophylaxis plan:  ACV LD, vori, Pentamidine (4/11). 5/2 start Levaquin d/t neutropenia  Stopped Bactrim 2/26 d/t dropping counts.     - HHV6 1277 (4/18) copies from 25K previously.   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      GI:  Poor appetite- wt down in part d/t diuresis. Cont to push fluids, supplements, food as tolerated.     Psych:    #Anxiety- saw inpatient psych who offered atarax, but did not work well. Health psych offered, patient declined.  #Insomnia- trazodone PRN      MSK:   - knee pain: 4/9 BLE US neg for DVT but showed a Moderate-sized simple left popliteal fossa Baker's cyst. He has had for 20yrs. Ortho appt 4/25, see note.    Summary:   - resumed prasugrel with plts up to 99K today   - medbox refill (discussed managing his medbox on his own from now on)   - changed lasix to every other day dosing   - holding lisinopril with hypotension   - completed sirolimus taper   - BM bx next week (held elqiuis doses in medbox)   - follow up with Dr. Bacon on 5/15     45 minutes spent on the date of the encounter doing chart review, history and exam, lab review, documentation and coordniating care.     Jaron Warner PA-C [phone]

## 2024-05-10 LAB
CMV DNA SPEC NAA+PROBE-ACNC: NOT DETECTED IU/ML
EBV DNA SERPL NAA+PROBE-ACNC: NOT DETECTED IU/ML

## 2024-05-15 ENCOUNTER — OFFICE VISIT (OUTPATIENT)
Dept: TRANSPLANT | Facility: CLINIC | Age: 49
End: 2024-05-15
Attending: INTERNAL MEDICINE
Payer: COMMERCIAL

## 2024-05-15 ENCOUNTER — APPOINTMENT (OUTPATIENT)
Dept: LAB | Facility: CLINIC | Age: 49
End: 2024-05-15
Attending: INTERNAL MEDICINE
Payer: COMMERCIAL

## 2024-05-15 VITALS
RESPIRATION RATE: 16 BRPM | DIASTOLIC BLOOD PRESSURE: 77 MMHG | OXYGEN SATURATION: 100 % | BODY MASS INDEX: 24.76 KG/M2 | SYSTOLIC BLOOD PRESSURE: 129 MMHG | HEART RATE: 73 BPM | WEIGHT: 167.7 LBS | TEMPERATURE: 97.1 F

## 2024-05-15 DIAGNOSIS — Z94.81 STATUS POST BONE MARROW TRANSPLANT (H): ICD-10-CM

## 2024-05-15 DIAGNOSIS — D46.9 MDS (MYELODYSPLASTIC SYNDROME) (H): ICD-10-CM

## 2024-05-15 DIAGNOSIS — Z94.81 S/P ALLOGENEIC BONE MARROW TRANSPLANT (H): ICD-10-CM

## 2024-05-15 LAB
ALBUMIN SERPL BCG-MCNC: 3.3 G/DL (ref 3.5–5.2)
ALP SERPL-CCNC: 86 U/L (ref 40–150)
ALT SERPL W P-5'-P-CCNC: 17 U/L (ref 0–70)
ANION GAP SERPL CALCULATED.3IONS-SCNC: 10 MMOL/L (ref 7–15)
AST SERPL W P-5'-P-CCNC: 19 U/L (ref 0–45)
BASOPHILS # BLD AUTO: 0 10E3/UL (ref 0–0.2)
BASOPHILS NFR BLD AUTO: 0 %
BILIRUB SERPL-MCNC: 0.2 MG/DL
BUN SERPL-MCNC: 19.4 MG/DL (ref 6–20)
CALCIUM SERPL-MCNC: 8.6 MG/DL (ref 8.6–10)
CHLORIDE SERPL-SCNC: 112 MMOL/L (ref 98–107)
CREAT SERPL-MCNC: 1.02 MG/DL (ref 0.67–1.17)
DEPRECATED HCO3 PLAS-SCNC: 21 MMOL/L (ref 22–29)
EGFRCR SERPLBLD CKD-EPI 2021: 90 ML/MIN/1.73M2
EOSINOPHIL # BLD AUTO: 0 10E3/UL (ref 0–0.7)
EOSINOPHIL NFR BLD AUTO: 1 %
ERYTHROCYTE [DISTWIDTH] IN BLOOD BY AUTOMATED COUNT: 16.9 % (ref 10–15)
GLUCOSE SERPL-MCNC: 97 MG/DL (ref 70–99)
HCT VFR BLD AUTO: 25.3 % (ref 40–53)
HGB BLD-MCNC: 8.5 G/DL (ref 13.3–17.7)
IMM GRANULOCYTES # BLD: 0 10E3/UL
IMM GRANULOCYTES NFR BLD: 0 %
LAB DIRECTOR DISCLAIMER: NORMAL
LAB DIRECTOR INTERPRETATION: NORMAL
LAB DIRECTOR METHODOLOGY: NORMAL
LAB DIRECTOR RESULTS: NORMAL
LOCATION OF TASK: NORMAL
LYMPHOCYTES # BLD AUTO: 0.5 10E3/UL (ref 0.8–5.3)
LYMPHOCYTES NFR BLD AUTO: 11 %
MCH RBC QN AUTO: 29.6 PG (ref 26.5–33)
MCHC RBC AUTO-ENTMCNC: 33.6 G/DL (ref 31.5–36.5)
MCV RBC AUTO: 88 FL (ref 78–100)
MONOCYTES # BLD AUTO: 0.3 10E3/UL (ref 0–1.3)
MONOCYTES NFR BLD AUTO: 5 %
NEUTROPHILS # BLD AUTO: 4 10E3/UL (ref 1.6–8.3)
NEUTROPHILS NFR BLD AUTO: 83 %
NRBC # BLD AUTO: 0 10E3/UL
NRBC BLD AUTO-RTO: 0 /100
PLATELET # BLD AUTO: 105 10E3/UL (ref 150–450)
POTASSIUM SERPL-SCNC: 3.7 MMOL/L (ref 3.4–5.3)
PROT SERPL-MCNC: 5.6 G/DL (ref 6.4–8.3)
RBC # BLD AUTO: 2.87 10E6/UL (ref 4.4–5.9)
SODIUM SERPL-SCNC: 143 MMOL/L (ref 135–145)
SPECIMEN DESCRIPTION: NORMAL
WBC # BLD AUTO: 4.9 10E3/UL (ref 4–11)

## 2024-05-15 PROCEDURE — 38222 DX BONE MARROW BX & ASPIR: CPT

## 2024-05-15 PROCEDURE — 88275 CYTOGENETICS 100-300: CPT

## 2024-05-15 PROCEDURE — 36591 DRAW BLOOD OFF VENOUS DEVICE: CPT | Performed by: INTERNAL MEDICINE

## 2024-05-15 PROCEDURE — 80053 COMPREHEN METABOLIC PANEL: CPT | Performed by: INTERNAL MEDICINE

## 2024-05-15 PROCEDURE — 250N000011 HC RX IP 250 OP 636

## 2024-05-15 PROCEDURE — 88341 IMHCHEM/IMCYTCHM EA ADD ANTB: CPT | Mod: 26 | Performed by: STUDENT IN AN ORGANIZED HEALTH CARE EDUCATION/TRAINING PROGRAM

## 2024-05-15 PROCEDURE — G0463 HOSPITAL OUTPT CLINIC VISIT: HCPCS | Mod: 25 | Performed by: INTERNAL MEDICINE

## 2024-05-15 PROCEDURE — 88311 DECALCIFY TISSUE: CPT | Mod: 26 | Performed by: STUDENT IN AN ORGANIZED HEALTH CARE EDUCATION/TRAINING PROGRAM

## 2024-05-15 PROCEDURE — 88305 TISSUE EXAM BY PATHOLOGIST: CPT | Mod: 26 | Performed by: STUDENT IN AN ORGANIZED HEALTH CARE EDUCATION/TRAINING PROGRAM

## 2024-05-15 PROCEDURE — 85097 BONE MARROW INTERPRETATION: CPT | Mod: GC | Performed by: STUDENT IN AN ORGANIZED HEALTH CARE EDUCATION/TRAINING PROGRAM

## 2024-05-15 PROCEDURE — 88189 FLOWCYTOMETRY/READ 16 & >: CPT | Mod: GC | Performed by: STUDENT IN AN ORGANIZED HEALTH CARE EDUCATION/TRAINING PROGRAM

## 2024-05-15 PROCEDURE — 88237 TISSUE CULTURE BONE MARROW: CPT

## 2024-05-15 PROCEDURE — G0452 MOLECULAR PATHOLOGY INTERPR: HCPCS | Mod: 26 | Performed by: PATHOLOGY

## 2024-05-15 PROCEDURE — 81267 CHIMERISM ANAL NO CELL SELEC: CPT

## 2024-05-15 PROCEDURE — 88368 INSITU HYBRIDIZATION MANUAL: CPT | Mod: 26 | Performed by: MEDICAL GENETICS

## 2024-05-15 PROCEDURE — 250N000011 HC RX IP 250 OP 636: Performed by: INTERNAL MEDICINE

## 2024-05-15 PROCEDURE — 88342 IMHCHEM/IMCYTCHM 1ST ANTB: CPT | Mod: TC,XU

## 2024-05-15 PROCEDURE — 88342 IMHCHEM/IMCYTCHM 1ST ANTB: CPT | Mod: 26 | Performed by: STUDENT IN AN ORGANIZED HEALTH CARE EDUCATION/TRAINING PROGRAM

## 2024-05-15 PROCEDURE — 99215 OFFICE O/P EST HI 40 MIN: CPT | Mod: 25 | Performed by: INTERNAL MEDICINE

## 2024-05-15 PROCEDURE — 85041 AUTOMATED RBC COUNT: CPT | Performed by: INTERNAL MEDICINE

## 2024-05-15 PROCEDURE — 38221 DX BONE MARROW BIOPSIES: CPT

## 2024-05-15 PROCEDURE — 88185 FLOWCYTOMETRY/TC ADD-ON: CPT

## 2024-05-15 PROCEDURE — 38222 DX BONE MARROW BX & ASPIR: CPT | Mod: LT

## 2024-05-15 PROCEDURE — 88271 CYTOGENETICS DNA PROBE: CPT

## 2024-05-15 RX ORDER — HEPARIN SODIUM (PORCINE) LOCK FLUSH IV SOLN 100 UNIT/ML 100 UNIT/ML
5 SOLUTION INTRAVENOUS ONCE
Status: COMPLETED | OUTPATIENT
Start: 2024-05-15 | End: 2024-05-15

## 2024-05-15 RX ADMIN — MIDAZOLAM HYDROCHLORIDE 2 MG: 1 INJECTION, SOLUTION INTRAMUSCULAR; INTRAVENOUS at 08:25

## 2024-05-15 RX ADMIN — Medication 5 ML: at 08:26

## 2024-05-15 RX ADMIN — Medication 5 ML: at 07:59

## 2024-05-15 ASSESSMENT — PAIN SCALES - GENERAL: PAINLEVEL: NO PAIN (0)

## 2024-05-15 NOTE — LETTER
5/15/2024         RE: Ziggy Hallman  [address] [facility] [address]        Dear Colleague,    Thank you for referring your patient, Ziggy Hallman, to the [facility] BLOOD AND MARROW TRANSPLANT PROGRAM [place]. Please see a copy of my visit note below.    BMT ONC Adult Bone Marrow Biopsy Procedure Note  May 15, 2024  /77 (BP Location: Right arm, Patient Position: Sitting, Cuff Size: Adult Regular)   Pulse 73   Temp 97.1  F (36.2  C) (Oral)   Resp 16   Wt 76.1 kg (167 lb 11.2 oz)   SpO2 100%   BMI 24.76 kg/m       Learning needs assessment complete within 12 months? YES    DIAGNOSIS: MDS     PROCEDURE: Unilateral Bone Marrow Biopsy and Unilateral Aspirate    [place] [facility] [place] [facility] [place]    Patient s identification was positively verified by verbal identification and invasive procedure safety checklist was completed. Informed consent was obtained. Following the administration of Midazolam as pre-medication, patient was placed in the prone position and prepped and draped in a sterile manner. Approximately 15 cc of 1% Lidocaine was used over the left posterior iliac spine. Following this a 3 mm incision was made. Trephine bone marrow core(s) was (were) obtained from the [facility]. Bone marrow aspirates were obtained from the IC. Aspirates were sent for morphology, immunophenotyping, cytogenetics, and molecular diagnostics RFLP. A total of approximately 22 ml of marrow was aspirated. Following this procedure a sterile dressing was applied to the bone marrow biopsy site(s). The patient was placed in the supine position to maintain pressure on the biopsy site. Post-procedure wound care instructions were given.     Complications: NO    Pre-procedural pain: 0 out of 10 on the numeric pain rating scale.     Procedural pain: 4 out of 10 on the numeric pain rating scale.     Post-procedural pain assessment: 0 out of 10 on the numeric pain rating scale.     Interventions: NO    Length of procedure:21  minutes to 45 minutes    Procedure performed by: Yaneli Hatfield NP

## 2024-05-15 NOTE — NURSING NOTE
Chief Complaint   Patient presents with    Blood Draw     Labs collected from port by RN. Vitals taken. Checked in for appointment(s).       Port accessed with 20 gauge 3/4 inch flat needle by RN, labs collected, line flushed with saline and heparin.  Vitals taken. Pt checked in for appointment(s).    Adrienne Gerber RN

## 2024-05-15 NOTE — PROGRESS NOTES
BMT Progress Note   05/15/2024     Chief complaint:  Ziggy Hallman is a 48 year old male with MDS s/p MA MUD day 111.      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 Doing a little better than last week. Gaining weight and appetite, energy has been stable. Denies fever, N/V, or diarrhea. Denies dry eyes/mouth, skin change, or difficulty with joints.    REVIEW OF SYSTEMS: Otherwise unremarkable other than what is noted in the     PHYSICAL EXAM:   There were no vitals taken for this visit.    Wt Readings from Last 4 Encounters:   05/15/24 76.1 kg (167 lb 11.2 oz)   05/09/24 72.7 kg (160 lb 4.8 oz)   05/02/24 73.9 kg (163 lb)   04/25/24 75.3 kg (165 lb 14.4 oz)     General Appearance: NAD  HEENT: sclera anicteric. OP moist without lesions  CV: RRR.   RESP: CTAB  EXT: no LE edema   SKIN: no rash  NEURO: A&O x3 ;non-focal  PSYCH: Appropriate affect   Access: R PAC    LABS:     Lab Results   Component Value Date    WBC 4.9 05/15/2024    ANEU 0.5 (L) 05/02/2024    HGB 8.5 (L) 05/15/2024    HCT 25.3 (L) 05/15/2024     (L) 05/15/2024     05/15/2024    POTASSIUM 3.7 05/15/2024    CHLORIDE 112 (H) 05/15/2024    CO2 21 (L) 05/15/2024    GLC 97 05/15/2024    BUN 19.4 05/15/2024    CR 1.02 05/15/2024    MAG 2.2 04/15/2024    INR 1.24 (H) 04/16/2024    BILITOTAL 0.2 05/15/2024    AST 19 05/15/2024    ALT 17 05/15/2024    ALKPHOS 86 05/15/2024    PROTTOTAL 5.6 (L) 05/15/2024    ALBUMIN 3.3 (L) 05/15/2024       ASSESSMENT AND PLAN:   Ziggy Hlalman is a 48 year old male with MDS s/p MA MUD day 111    BMT/IEC PROTOCOL for 2015-29  - Chemo Prep: BU/Flu   - 8/8 DP permissive. Cell dose-9.24x10^6  - ABO: Donor: O+ Recipient A-  (Minor  incompatability, no flush required)   - BM with CR. No MRD by flow. CD33 98% donor and CD3 100% donor (3/20).    MDS s/p MA MUD  Pancytopenia with incomplete chimerism  Has incomplete chimerism (88%) with persistent pancytopenia early after transplant. BMBx done early on around D +60 showed some mild dyserythropoeisis. Of the interphase cells examined, 8.625% had a signal pattern indicative of a loss involving the short arm of chromosome 5; no evidence was found of loss of chromosomes 5q. 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     Counts recovering with 100% donor chimerism in peripheral after rapid taper off of sirolimus.  Follow BMBx and molecular testing performed today (5/15/2024). This will further determine if any treatment needed for relapse. We discussed on the potential of restarting chemotherapy (possibly venetoclax +HMA) if his disease relapse.      HEME/COAG  # Pancytopenia--see above  - Transfusion parameters: hemoglobin < 8g/dL (cardiac hx), platelets < 30k.     # Segmental R PE (2/6): see anticoagulation below  # APS work up- lupus antigen +, will follow outpt as may be unreliable in this acute setting and prior APS work up had been unremarkable prior to admission.  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    Extensive hypercoagulable workup to date has been unrevealing. JAK2 testing negative. PNH testing negative. Elevated IgA of unknown significance, no evidence of plasma cell disorder.  - Eliquis resumed at lower dose 2.5mg BID  (4/11/2024)  - Prasugrel resumed 5/9      CARDIOLOGY:  # HFpEF  Seen by cardiology. Started PO lasix, spironolactone and jardiance per cards. Will follow up with cardiology on 6/3/24.  - Echo (4/16/2024) (did NOT evaluate for diastolic dysfunction): Global and regional left ventricular function is normal with an EF of 55-60%.  Global right ventricular function is normal. Estimated mean right atrial pressure is normal. No pericardial effusion is present.     #CAD  # Hx of CABG  # Hx of HTN  - Continue metoprolol. Lisinopril on hold 5/9 with hypotension. Nifedipine on hold as well. Follow-up with cardiology.   #Hyperlipidemia: Holding atorvastatin peritransplant     ID:   - Prophylaxis plan: ACV, Pentamidine (4/11). Stopped Bactrim 2/26/2024 due to cytopenia.    - Discontinue levofloxacin and voricozazole  - CMV and EBV negative on surveillance. HHV6 1277 (4/18) copies from 25K previously.      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 Jordi Zacarias MD  Hematology/Medical Oncology/BMT (PGY-5)  P: [phone]

## 2024-05-15 NOTE — LETTER
5/15/2024         RE: Ziggy Hallman  [address] [facility] [address]        Dear Colleague,    Thank you for referring your patient, Ziggy Hallman, to the [facility] [place]. Please see a copy of my visit note below.    BMT Progress Note   05/15/2024     Chief complaint:  Ziggy Hallman is a 48 year old male with MDS s/p MA MUD day 111.      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 Doing a little better than last week. Gaining weight and appetite, energy has been stable. Denies fever, N/V, or diarrhea. Denies dry eyes/mouth, skin change, or difficulty with joints.    REVIEW OF SYSTEMS: Otherwise unremarkable other than what is noted in the     PHYSICAL EXAM:   There were no vitals taken for this visit.    Wt Readings from Last 4 Encounters:   05/15/24 76.1 kg (167 lb 11.2 oz)   05/09/24 72.7 kg (160 lb 4.8 oz)   05/02/24 73.9 kg (163 lb)   04/25/24 75.3 kg (165 lb 14.4 oz)     General Appearance: NAD  HEENT: sclera anicteric. OP moist without lesions  CV: RRR.   RESP: CTAB  EXT: no LE edema   SKIN: no rash  NEURO: A&O x3 ;non-focal  PSYCH: Appropriate affect   Access: R PAC    LABS:     Lab Results   Component Value Date    WBC 4.9 05/15/2024    ANEU 0.5 (L) 05/02/2024    HGB 8.5 (L) 05/15/2024    HCT 25.3 (L) 05/15/2024     (L) 05/15/2024     05/15/2024    POTASSIUM 3.7 05/15/2024    CHLORIDE 112 (H) 05/15/2024    CO2 21 (L) 05/15/2024    GLC 97 05/15/2024    BUN 19.4 05/15/2024    CR 1.02 05/15/2024    MAG 2.2 04/15/2024    INR 1.24 (H) 04/16/2024    BILITOTAL 0.2 05/15/2024    AST 19 05/15/2024    ALT 17 05/15/2024    ALKPHOS 86 05/15/2024    PROTTOTAL 5.6 (L)  05/15/2024    ALBUMIN 3.3 (L) 05/15/2024       ASSESSMENT AND PLAN:   Ziggy Hallman is a 48 year old male with MDS s/p MA MUD day 111    BMT/IEC PROTOCOL for 2015-29  - Chemo Prep: BU/Flu   - 8/8 DP permissive. Cell dose-9.24x10^6  - ABO: Donor: O+ Recipient A-  (Minor incompatability, no flush required)   - BM with CR. No MRD by flow. CD33 98% donor and CD3 100% donor (3/20).    MDS s/p MA MUD  Pancytopenia with incomplete chimerism  Has incomplete chimerism (88%) with persistent pancytopenia early after transplant. BMBx done early on around D +60 showed some mild dyserythropoeisis. Of the interphase cells examined, 8.625% had a signal pattern indicative of a loss involving the short arm of chromosome 5; no evidence was found of loss of chromosomes 5q. 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     Counts recovering with 100% donor chimerism in peripheral after rapid taper off of sirolimus.  Follow BMBx and molecular testing performed today (5/15/2024). This will further determine if any treatment needed for relapse. We discussed on the potential of restarting chemotherapy (possibly venetoclax +HMA) if his disease relapse.      HEME/COAG  # Pancytopenia--see above  - Transfusion parameters: hemoglobin < 8g/dL (cardiac hx), platelets < 30k.     # Segmental R PE (2/6): see anticoagulation below  # APS work up- lupus antigen +, will follow outpt as may be unreliable in this acute setting and prior APS work up had been unremarkable prior to admission.  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    Extensive hypercoagulable workup to date has been unrevealing. JAK2 testing negative. PNH testing negative. Elevated IgA of unknown significance, no evidence of plasma cell disorder.  - Eliquis resumed at lower dose 2.5mg BID (4/11/2024)  - Prasugrel resumed 5/9      CARDIOLOGY:  # HFpEF  Seen by cardiology. Started PO lasix, spironolactone and jardiance per cards. Will follow up with cardiology on 6/3/24.  - Echo (4/16/2024) (did NOT evaluate for diastolic dysfunction): Global and regional left ventricular function is normal with an EF of 55-60%.  Global right ventricular function is normal. Estimated mean right atrial pressure is normal. No pericardial effusion is present.     #CAD  # Hx of CABG  # Hx of HTN  - Continue metoprolol. Lisinopril on hold 5/9 with hypotension. Nifedipine on hold as well. Follow-up with cardiology.   #Hyperlipidemia: Holding atorvastatin peritransplant     ID:   - Prophylaxis plan: ACV, Pentamidine (4/11). Stopped Bactrim 2/26/2024 due to cytopenia.    - Discontinue levofloxacin and voricozazole  - CMV and EBV negative on surveillance. HHV6 1277 (4/18) copies from 25K previously.      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 Jordi Zacarias MD  Hematology/Medical Oncology/BMT (PGY-5)  P: [phone]      Attestation signed by Taran Bacon MD at 5/15/2024  9:44 PM:  ITaran MD, have personally seen this patient independently of the fellow, Dr. Zacarias. I have personally reviewed vital signs, medications, labs, imaging, and hospital  "course. I agree with their findings and plan of care as documented in the note with the following additions/exceptions:    Key findings:  BMT day +111 for high risk MDS. Had molecular relapse with 8% by FISH around day 60. Tapered off I/S and comes in to discuss further steps. Accompanied by his wife. In the last week or so, his counts have come up nicely. We discussed that recovering counts is in general a good prognostic feature and we will await his biopsy prior to any further steps. We discussed that in general, relapse is treated with chemotherapy and we might need to do that; however, there is no urgency as long as counts are decent and there is no sense in \"early therapy\" so we agreed on waiting for this bone marrow and discussing further, but likely wait 1 month and follow up then.    Taran Bacon MD  Date of Service (when I saw the patient): 05/15/2024     40 minutes spent on the date of the encounter doing chart review, interpretation of results, patient visit, documentation and coordination of care.    The longitudinal plan of care for these issues were addressed during this visit. Due to the added complexity in care, my team and I will continue to support the patient in the subsequent management and ongoing continuity of care of these conditions.  "

## 2024-05-15 NOTE — NURSING NOTE
"Oncology Rooming Note    May 15, 2024 8:12 AM   Ziggy Hallman is a 48 year old male who presents for:    Chief Complaint   Patient presents with    Oncology Clinic Visit     BMBx for MDS     Initial Vitals: /77 (BP Location: Right arm, Patient Position: Sitting, Cuff Size: Adult Regular)   Pulse 73   Temp 97.1  F (36.2  C) (Oral)   Resp 16   Wt 76.1 kg (167 lb 11.2 oz)   SpO2 100%   BMI 24.76 kg/m   Estimated body mass index is 24.76 kg/m  as calculated from the following:    Height as of 4/16/24: 1.753 m (5' 9\").    Weight as of this encounter: 76.1 kg (167 lb 11.2 oz). Body surface area is 1.92 meters squared.  No Pain (0) Comment: Data Unavailable   No LMP for male patient.  Allergies reviewed: Yes  Medications reviewed: Yes    Medications: Medication refills not needed today.  Pharmacy name entered into [facility]: [id number] - [place], [place] - [address] [place] [address]    Frailty Screening:   Is the patient here for a new oncology consult visit in cancer care? 2. No      Clinical concerns: Pain with last procedure, has requested more sedation  Yaneli Henson BMT NIDHI was notified.  BMBX Teaching and Assessment       Teaching concerns addressed: Bone marrow biopsy and infection prevention.     Person(s) involved in teaching: Patient  Motivation Level  Asks Questions: Yes  Eager to Learn: Yes  Cooperative: Yes  Receptive (willing/able to accept information): Yes    Patient demonstrates understanding of the following:     Reason for the appointment, diagnosis and treatment plan: Yes  Knowledge of proper use of medications and conditions for which they are ordered (with special attention to potential side effects or drug interactions): Yes  Which situations necessitate calling provider and whom to contact: Yes    Teaching concerns addressed:   Reviewed activity restrictions if received premeds, potential for bleeding and actions to take if develops any of the issues below    Pain management techniques: " Yes  Patient instructed on hand hygiene: Yes  How and/when to access community resources: Yes    Infection Control:  Patient demonstrates understanding of the following:   Bone marrow procedure site care taught: Yes  Signs and symptoms of infection taught: Yes       Instructional Materials Used/Given: Pt instructed to keep bmbx site clean and dry for 24hrs. Pt educated to monitor site for signs of infection such as redness, rash, oozing, puss, bleeding, pain, and elevated temp. Pt instructed to go to call the [facility] triage line or go to the ER if any signs of infection should occur. Pt educated to not operate machinery if receiving versed. Pt and wife verbalize understanding.     Pre-procedure labs drawn via PIV in lab. Post procedure: Patient vital signs stable, ambulating, site is clean, dry and intact prior to discharge and line removed. Pt discharged with wife as .      Provider order received to administer Versed 2mg IVP as premed for BMBX. Procedural consent discussed and pt's signature obtained.  Allergies reviewed.  PT currently alert and oriented to plan of care.  Pt lying prone in stretcher.  Call light w/in reach.  Provider and  at bedside.      Brenda Knott RN

## 2024-05-15 NOTE — PROGRESS NOTES
BMT ONC Adult Bone Marrow Biopsy Procedure Note  May 15, 2024  /77 (BP Location: Right arm, Patient Position: Sitting, Cuff Size: Adult Regular)   Pulse 73   Temp 97.1  F (36.2  C) (Oral)   Resp 16   Wt 76.1 kg (167 lb 11.2 oz)   SpO2 100%   BMI 24.76 kg/m       Learning needs assessment complete within 12 months? YES    DIAGNOSIS: MDS     PROCEDURE: Unilateral Bone Marrow Biopsy and Unilateral Aspirate    LOCATION: [facility] 2nd Floor    Patient s identification was positively verified by verbal identification and invasive procedure safety checklist was completed. Informed consent was obtained. Following the administration of Midazolam as pre-medication, patient was placed in the prone position and prepped and draped in a sterile manner. Approximately 15 cc of 1% Lidocaine was used over the left posterior iliac spine. Following this a 3 mm incision was made. Trephine bone marrow core(s) was (were) obtained from the [facility]. Bone marrow aspirates were obtained from the [facility]. Aspirates were sent for morphology, immunophenotyping, cytogenetics, and molecular diagnostics RFLP. A total of approximately 22 ml of marrow was aspirated. Following this procedure a sterile dressing was applied to the bone marrow biopsy site(s). The patient was placed in the supine position to maintain pressure on the biopsy site. Post-procedure wound care instructions were given.     Complications: NO    Pre-procedural pain: 0 out of 10 on the numeric pain rating scale.     Procedural pain: 4 out of 10 on the numeric pain rating scale.     Post-procedural pain assessment: 0 out of 10 on the numeric pain rating scale.     Interventions: NO    Length of procedure:21 minutes to 45 minutes    Procedure performed by: Yaneli Haftield NP

## 2024-05-16 ENCOUNTER — DOCUMENTATION ONLY (OUTPATIENT)
Dept: TRANSPLANT | Facility: CLINIC | Age: 49
End: 2024-05-16
Payer: COMMERCIAL

## 2024-05-17 NOTE — TELEPHONE ENCOUNTER
Called patient to let him know his ultrasound yesterday was negative for DVT. Did show a Baker's cyst. Patient still complaining of discomfort. Taking tylenol to help with this. Has an appointment with an NIDHI tomorrow but will call before then if symptoms worsen.   [facility]    Melissa, Transfer RN    [address]    [phone]    Dr. Rashid Arriola

## 2024-05-20 DIAGNOSIS — R06.02 SHORTNESS OF BREATH: ICD-10-CM

## 2024-05-20 DIAGNOSIS — Z94.81 S/P ALLOGENEIC BONE MARROW TRANSPLANT (H): ICD-10-CM

## 2024-05-20 DIAGNOSIS — Z94.81 STATUS POST BONE MARROW TRANSPLANT (H): ICD-10-CM

## 2024-05-20 RX ORDER — VORICONAZOLE 200 MG/1
200 TABLET, FILM COATED ORAL 2 TIMES DAILY
Qty: 90 TABLET | Refills: 11 | OUTPATIENT
Start: 2024-05-20

## 2024-05-20 RX ORDER — FUROSEMIDE 20 MG
20 TABLET ORAL EVERY OTHER DAY
OUTPATIENT
Start: 2024-05-20

## 2024-05-29 LAB
CULTURE HARVEST COMPLETE DATE: NORMAL
INTERPRETATION: NORMAL

## 2024-05-30 LAB
CULTURE HARVEST COMPLETE DATE: NORMAL
INTERPRETATION: NORMAL

## 2024-06-03 ENCOUNTER — OFFICE VISIT (OUTPATIENT)
Dept: CARDIOLOGY | Facility: CLINIC | Age: 49
End: 2024-06-03
Attending: INTERNAL MEDICINE
Payer: COMMERCIAL

## 2024-06-03 VITALS
DIASTOLIC BLOOD PRESSURE: 64 MMHG | RESPIRATION RATE: 16 BRPM | BODY MASS INDEX: 24.91 KG/M2 | OXYGEN SATURATION: 94 % | SYSTOLIC BLOOD PRESSURE: 92 MMHG | HEART RATE: 76 BPM | HEIGHT: 69 IN | WEIGHT: 168.2 LBS

## 2024-06-03 DIAGNOSIS — I50.30 HEART FAILURE WITH PRESERVED EJECTION FRACTION, NYHA CLASS I (H): ICD-10-CM

## 2024-06-03 LAB
ANION GAP SERPL CALCULATED.3IONS-SCNC: 14 MMOL/L (ref 7–15)
BUN SERPL-MCNC: 20 MG/DL (ref 6–20)
CALCIUM SERPL-MCNC: 9.4 MG/DL (ref 8.6–10)
CHLORIDE SERPL-SCNC: 102 MMOL/L (ref 98–107)
CREAT SERPL-MCNC: 0.94 MG/DL (ref 0.67–1.17)
DEPRECATED HCO3 PLAS-SCNC: 22 MMOL/L (ref 22–29)
EGFRCR SERPLBLD CKD-EPI 2021: >90 ML/MIN/1.73M2
GLUCOSE SERPL-MCNC: 99 MG/DL (ref 70–99)
NT-PROBNP SERPL-MCNC: 276 PG/ML (ref 0–900)
POTASSIUM SERPL-SCNC: 4.1 MMOL/L (ref 3.4–5.3)
SODIUM SERPL-SCNC: 138 MMOL/L (ref 135–145)

## 2024-06-03 PROCEDURE — 36415 COLL VENOUS BLD VENIPUNCTURE: CPT | Performed by: INTERNAL MEDICINE

## 2024-06-03 PROCEDURE — 83880 ASSAY OF NATRIURETIC PEPTIDE: CPT | Performed by: INTERNAL MEDICINE

## 2024-06-03 PROCEDURE — 99204 OFFICE O/P NEW MOD 45 MIN: CPT | Performed by: INTERNAL MEDICINE

## 2024-06-03 PROCEDURE — 80048 BASIC METABOLIC PNL TOTAL CA: CPT | Performed by: INTERNAL MEDICINE

## 2024-06-03 NOTE — PROGRESS NOTES
CARDIOLOGY CLINIC CONSULTATION    PRIMARY CARE PHYSICIAN:  [facility]    HISTORY OF PRESENT ILLNESS:  This is a very pleasant 48-year-old gentleman who was recently admitted to [facility] and was given a new diagnosis of heart failure with preserved ejection fraction for which he has been referred to me in cardiology clinic at [facility].  I am seeing him for the first time in heart failure clinic today.    The patient has a history of hypercoagulable disorder myelodysplastic syndrome and bone marrow transplant.  He has marked anemia and thrombocytopenia.  He is on anticoagulation and antiplatelet therapy.    He has had cardiovascular care in [facility] and last seen in November 2022.  In the past this patient has had multiple thromboembolic events including stroke renal infarcts and an embolic event on his LAD for which he had angioplasty many years ago.  He was also diagnosed with a PFO which was closed a few years ago due to concerns of embolic event and hypercoagulable state.  Lastly the patient has a history of hypertension and hyperlipidemia.    The patient was recently at [facility] in April 2024.  He presented with shortness of breath bilateral pedal edema and elevated NT proBNP level of 1500.  He was given a diagnosis of acute diastolic heart failure.  This was likely in the setting of underlying hypertension.  Of note the patient had been on hydrochlorothiazide prior to that which had been stopped which may have caused this precipitation.  In any case during the hospitalization he was discharged on Lasix spironolactone and Jardiance.  He was continued on his lisinopril and metoprolol for his hypertension.    In follow-up, the patient had acute kidney injury and his creatinine bumped to 1.3 in May 2024.  Subsequently on 15 May his creatinine was normal at 1.0.  Currently from a medication standpoint this patient is on metoprolol succinate 100 mg daily,  "spironolactone 25 mg daily, Jardiance 10 mg daily, and 20 mg of lisinopril.  He is on prasugrel and Eliquis for his hypercoagulable disorder.  Patient was on nifedipine in the past but that has been on hold.  He is not on Lasix.    Today he denies any cardiovascular symptoms.  No angina heart failure symptoms syncope presyncope.  His blood pressure has been running a little on the lower side but he is asymptomatic.  His edema has resolved.    In regards to pertinent data, echocardiography in January and April of this year have shown normal ejection fraction with normal IVC size.  He has anemia with hemoglobin in the 8 range and thrombocytopenia with platelets in the 100 or lower range.    PAST MEDICAL HISTORY:  Past Medical History:   Diagnosis Date    AMI anterior wall (H)     Mid LAD on cath with stent    CAD S/P percutaneous coronary angioplasty 07/01/2016    Unstable agina with anterior myocardial damage reported without mycardial damage by patient's history    CVA (cerebrovascular accident) (H) 01/01/2012    Reporting thromboembolic episode on the right neck. Pt states \"I've had ten strokes.\"    Hypercoagulable state (H24)     Uncertain etiology--seeing Hematologist    MEDICAL HISTORY OF -     Possibly PFO       MEDICATIONS:  Current Outpatient Medications   Medication Sig Dispense Refill    acyclovir (ZOVIRAX) 800 MG tablet Take 1 tablet (800 mg) by mouth 2 times daily 60 tablet 1    apixaban ANTICOAGULANT (ELIQUIS) 2.5 MG tablet Take 1 tablet (2.5 mg) by mouth 2 times daily 60 tablet 11    diclofenac (VOLTAREN) 1 % topical gel Apply 2 g topically 3 times daily as needed for moderate pain To knees 150 g 0    empagliflozin (JARDIANCE) 10 MG TABS tablet Take 1 tablet (10 mg) by mouth daily 90 tablet 1    levofloxacin (LEVAQUIN) 250 MG tablet Take 1 tablet (250 mg) by mouth daily 30 tablet 1    lisinopril (ZESTRIL) 40 MG tablet Take 0.5 tablets (20 mg) by mouth daily 60 tablet 2    LORazepam (ATIVAN) 0.5 MG " tablet Take 1-2 tablets (0.5-1 mg) by mouth every 4 hours as needed for vomiting or nausea (nausea/vomiting/sleep) 15 tablet 0    metoprolol succinate ER (TOPROL XL) 100 MG 24 hr tablet Take 1 tablet (100 mg) by mouth daily      NIFEdipine ER (ADALAT CC) 30 MG 24 hr tablet On hold x 4/16      pantoprazole (PROTONIX) 40 MG EC tablet Take 1 tablet (40 mg) by mouth daily 30 tablet 1    prasugrel (EFFIENT) 10 MG TABS tablet Take 10 mg by mouth daily      spironolactone (ALDACTONE) 25 MG tablet Take 1 tablet (25 mg) by mouth daily 30 tablet 0    traZODone (DESYREL) 50 MG tablet Take 1 tablet (50 mg) by mouth at bedtime 30 tablet 1     No current facility-administered medications for this visit.       SOCIAL HISTORY:  I have reviewed this patient's social history and updated it with pertinent information if needed. Ziggy JEAN BAPTISTE Lexx  reports that he has quit smoking. His smoking use included cigarettes. He has been exposed to tobacco smoke. He has never used smokeless tobacco. He reports current alcohol use. He reports that he does not use drugs.    PHYSICAL EXAM:  Pulse:  [76] 76  Resp:  [16] 16  BP: (92)/(64) 92/64  SpO2:  [94 %] 94 %  168 lbs 3.2 oz    Constitutional: alert, no distress  Respiratory: Good bilateral air entry  Cardiovascular: Regular heart sounds of systolic murmur no rubs or gallops no edema JVP is normal  GI: nondistended  Neuropsychiatric: appropriate affact    ASSESSMENT: Pertinent issues addressed/ reviewed during this cardiology visit  Hypercoag state myelodysplastic disorder  Recurrent thromboembolic events  History of LAD STEMI with LAD stenting likely from above  History of PFO closure  Uncontrolled hypertension  Mild heart failure with preserved ejection fraction from above    RECOMMENDATIONS:  Today patient appears very compensated on clinical exam.  He is on a combination of Jardiance lisinopril and spironolactone.  He is not on loop diuretics.  Volume status appears normal.  Blood pressure is  on the lower side.  I recommend repeating a basic metabolic panel today.  If creatinine is bumping, I would recommend stopping spironolactone.  He appears very compensated from a fluid standpoint.  He can always use as needed Lasix 20 mg daily PRN if needed.  Etiology of this presentation very likely is his other noncardiac comorbidities and hypertension.  As far as his blood pressure is under good control, I think his volume status will remain okay. He denies any anginal symptoms.  Would recommend continuing Jardiance long-term if tolerated well.  Antiplatelet and anticoagulation management per oncology and hematology.    He needs to continue ongoing follow-up with hematology oncology for his marked anemia and thrombocytopenia.  Would recommend continuing long-term follow-up with his primary cardiology team at [facility].  I would be happy to follow-up up with him as needed in the cardiology clinic if needed.  For now I will have him follow-up with our [facility] in 4 months from now in clinic    It was a pleasure seeing this patient in clinic today. Please do not hesitate to contact me with any future questions.     FERNANDO Tavarez, [facility]  Cardiology - [facility]  Airam 3, 2024    Review of the result(s) of each unique test - Last CBC BMP lipids echocardiogram ECG     The level of medical decision making during this visit was of moderate complexity.    This note was completed in part using dictation via the Dragon voice recognition software. Some word and grammatical errors may occur and must be interpreted in the appropriate clinical context.  If there are any questions pertaining to this issue, please contact me for further clarification.

## 2024-06-03 NOTE — LETTER
6/3/2024    [facility]  [address]  [place] [address]    RE: Ziggy Hallman       Dear Colleague,     I had the pleasure of seeing Ziggy Hallman in the [facility].  CARDIOLOGY CLINIC CONSULTATION    PR[facility]YSIC[facility]    HISTORY OF PRESENT ILLNESS:  This is a very pleasant 48-year-old gentleman who was recently admitted to [facility] and was given a new diagnosis of heart failure with preserved ejection fraction for which he has been referred to me in cardiology clinic at [facility].  I am seeing him for the first time in heart failure clinic today.    The patient has a history of hypercoagulable disorder myelodysplastic syndrome and bone marrow transplant.  He has marked anemia and thrombocytopenia.  He is on anticoagulation and antiplatelet therapy.    He has had cardiovascular care in [facility] and last seen in November 2022.  In the past this patient has had multiple thromboembolic events including stroke renal infarcts and an embolic event on his LAD for which he had angioplasty many years ago.  He was also diagnosed with a PFO which was closed a few years ago due to concerns of embolic event and hypercoagulable state.  Lastly the patient has a history of hypertension and hyperlipidemia.    The patient was recently at [facility] in April 2024.  He presented with shortness of breath bilateral pedal edema and elevated NT proBNP level of 1500.  He was given a diagnosis of acute diastolic heart failure.  This was likely in the setting of underlying hypertension.  Of note the patient had been on hydrochlorothiazide prior to that which had been stopped which may have caused this precipitation.  In any case during the hospitalization he was discharged on Lasix spironolactone and Jardiance.  He was continued on his lisinopril and metoprolol for his hypertension.    In follow-up, the patient had  "acute kidney injury and his creatinine bumped to 1.3 in May 2024.  Subsequently on 15 May his creatinine was normal at 1.0.  Currently from a medication standpoint this patient is on metoprolol succinate 100 mg daily, spironolactone 25 mg daily, Jardiance 10 mg daily, and 20 mg of lisinopril.  He is on prasugrel and Eliquis for his hypercoagulable disorder.  Patient was on nifedipine in the past but that has been on hold.  He is not on Lasix.    Today he denies any cardiovascular symptoms.  No angina heart failure symptoms syncope presyncope.  His blood pressure has been running a little on the lower side but he is asymptomatic.  His edema has resolved.    In regards to pertinent data, echocardiography in January and April of this year have shown normal ejection fraction with normal IVC size.  He has anemia with hemoglobin in the 8 range and thrombocytopenia with platelets in the 100 or lower range.    PAST MEDICAL HISTORY:  Past Medical History:   Diagnosis Date    AMI anterior wall (H)     Mid LAD on cath with stent    CAD S/P percutaneous coronary angioplasty 07/01/2016    Unstable agina with anterior myocardial damage reported without mycardial damage by patient's history    CVA (cerebrovascular accident) (H) 01/01/2012    Reporting thromboembolic episode on the right neck. Pt states \"I've had ten strokes.\"    Hypercoagulable state (H24)     Uncertain etiology--seeing Hematologist    MEDICAL HISTORY OF -     Possibly PFO       MEDICATIONS:  Current Outpatient Medications   Medication Sig Dispense Refill    acyclovir (ZOVIRAX) 800 MG tablet Take 1 tablet (800 mg) by mouth 2 times daily 60 tablet 1    apixaban ANTICOAGULANT (ELIQUIS) 2.5 MG tablet Take 1 tablet (2.5 mg) by mouth 2 times daily 60 tablet 11    diclofenac (VOLTAREN) 1 % topical gel Apply 2 g topically 3 times daily as needed for moderate pain To knees 150 g 0    empagliflozin (JARDIANCE) 10 MG TABS tablet Take 1 tablet (10 mg) by mouth daily 90 " tablet 1    levofloxacin (LEVAQUIN) 250 MG tablet Take 1 tablet (250 mg) by mouth daily 30 tablet 1    lisinopril (ZESTRIL) 40 MG tablet Take 0.5 tablets (20 mg) by mouth daily 60 tablet 2    LORazepam (ATIVAN) 0.5 MG tablet Take 1-2 tablets (0.5-1 mg) by mouth every 4 hours as needed for vomiting or nausea (nausea/vomiting/sleep) 15 tablet 0    metoprolol succinate ER (TOPROL XL) 100 MG 24 hr tablet Take 1 tablet (100 mg) by mouth daily      NIFEdipine ER (ADALAT CC) 30 MG 24 hr tablet On hold x 4/16      pantoprazole (PROTONIX) 40 MG EC tablet Take 1 tablet (40 mg) by mouth daily 30 tablet 1    prasugrel (EFFIENT) 10 MG TABS tablet Take 10 mg by mouth daily      spironolactone (ALDACTONE) 25 MG tablet Take 1 tablet (25 mg) by mouth daily 30 tablet 0    traZODone (DESYREL) 50 MG tablet Take 1 tablet (50 mg) by mouth at bedtime 30 tablet 1     No current facility-administered medications for this visit.       SOCIAL HISTORY:  I have reviewed this patient's social history and updated it with pertinent information if needed. Ziggy Hallman  reports that he has quit smoking. His smoking use included cigarettes. He has been exposed to tobacco smoke. He has never used smokeless tobacco. He reports current alcohol use. He reports that he does not use drugs.    PHYSICAL EXAM:  Pulse:  [76] 76  Resp:  [16] 16  BP: (92)/(64) 92/64  SpO2:  [94 %] 94 %  168 lbs 3.2 oz    Constitutional: alert, no distress  Respiratory: Good bilateral air entry  Cardiovascular: Regular heart sounds of systolic murmur no rubs or gallops no edema JVP is normal  GI: nondistended  Neuropsychiatric: appropriate affact    ASSESSMENT: Pertinent issues addressed/ reviewed during this cardiology visit  Hypercoag state myelodysplastic disorder  Recurrent thromboembolic events  History of LAD STEMI with LAD stenting likely from above  History of PFO closure  Uncontrolled hypertension  Mild heart failure with preserved ejection fraction from  above    RECOMMENDATIONS:  Today patient appears very compensated on clinical exam.  He is on a combination of Jardiance lisinopril and spironolactone.  He is not on loop diuretics.  Volume status appears normal.  Blood pressure is on the lower side.  I recommend repeating a basic metabolic panel today.  If creatinine is bumping, I would recommend stopping spironolactone.  He appears very compensated from a fluid standpoint.  He can always use as needed Lasix 20 mg daily PRN if needed.  Etiology of this presentation very likely is his other noncardiac comorbidities and hypertension.  As far as his blood pressure is under good control, I think his volume status will remain okay. He denies any anginal symptoms.  Would recommend continuing Jardiance long-term if tolerated well.  Antiplatelet and anticoagulation management per oncology and hematology.    He needs to continue ongoing follow-up with hematology oncology for his marked anemia and thrombocytopenia.  Would recommend continuing long-term follow-up with his primary cardiology team at [facility].  I would be happy to follow-up up with him as needed in the cardiology clinic if needed.  For now I will have him follow-up with our [facility] in 4 months from now in clinic    It was a pleasure seeing this patient in clinic today. Please do not hesitate to contact me with any future questions.     FERNANDO Tavarez, [facility]  Cardiology - [facility]  Airam 3, 2024    Review of the result(s) of each unique test - Last CBC BMP lipids echocardiogram ECG     The level of medical decision making during this visit was of moderate complexity.    This note was completed in part using dictation via the Dragon voice recognition software. Some word and grammatical errors may occur and must be interpreted in the appropriate clinical context.  If there are any questions pertaining to this issue, please contact me for further clarification.      Thank you for allowing me to participate in  the care of your patient.      Sincerely,     Matt Rojas MD     [facility]  cc:   Elijah Canchola MD  [address] [place] [id number]

## 2024-06-12 ENCOUNTER — ONCOLOGY VISIT (OUTPATIENT)
Dept: TRANSPLANT | Facility: CLINIC | Age: 49
End: 2024-06-12
Attending: INTERNAL MEDICINE
Payer: COMMERCIAL

## 2024-06-12 ENCOUNTER — APPOINTMENT (OUTPATIENT)
Dept: LAB | Facility: CLINIC | Age: 49
End: 2024-06-12
Attending: INTERNAL MEDICINE
Payer: COMMERCIAL

## 2024-06-12 VITALS
OXYGEN SATURATION: 100 % | BODY MASS INDEX: 25.96 KG/M2 | WEIGHT: 175.8 LBS | DIASTOLIC BLOOD PRESSURE: 78 MMHG | TEMPERATURE: 97.4 F | RESPIRATION RATE: 16 BRPM | HEART RATE: 78 BPM | SYSTOLIC BLOOD PRESSURE: 131 MMHG

## 2024-06-12 DIAGNOSIS — Z94.81 S/P ALLOGENEIC BONE MARROW TRANSPLANT (H): ICD-10-CM

## 2024-06-12 DIAGNOSIS — D46.9 MDS (MYELODYSPLASTIC SYNDROME) (H): ICD-10-CM

## 2024-06-12 LAB
ALBUMIN SERPL BCG-MCNC: 3.7 G/DL (ref 3.5–5.2)
ALP SERPL-CCNC: 72 U/L (ref 40–150)
ALT SERPL W P-5'-P-CCNC: 19 U/L (ref 0–70)
ANION GAP SERPL CALCULATED.3IONS-SCNC: 9 MMOL/L (ref 7–15)
AST SERPL W P-5'-P-CCNC: 22 U/L (ref 0–45)
BASOPHILS # BLD AUTO: 0 10E3/UL (ref 0–0.2)
BASOPHILS NFR BLD AUTO: 0 %
BILIRUB SERPL-MCNC: 0.4 MG/DL
BUN SERPL-MCNC: 27.6 MG/DL (ref 6–20)
CALCIUM SERPL-MCNC: 9.5 MG/DL (ref 8.6–10)
CHLORIDE SERPL-SCNC: 110 MMOL/L (ref 98–107)
CREAT SERPL-MCNC: 0.98 MG/DL (ref 0.67–1.17)
DEPRECATED HCO3 PLAS-SCNC: 21 MMOL/L (ref 22–29)
EBV DNA SERPL NAA+PROBE-ACNC: NOT DETECTED IU/ML
EGFRCR SERPLBLD CKD-EPI 2021: >90 ML/MIN/1.73M2
EOSINOPHIL # BLD AUTO: 0.1 10E3/UL (ref 0–0.7)
EOSINOPHIL NFR BLD AUTO: 2 %
ERYTHROCYTE [DISTWIDTH] IN BLOOD BY AUTOMATED COUNT: 19.2 % (ref 10–15)
GLUCOSE SERPL-MCNC: 105 MG/DL (ref 70–99)
HCT VFR BLD AUTO: 37.4 % (ref 40–53)
HGB BLD-MCNC: 12.3 G/DL (ref 13.3–17.7)
IMM GRANULOCYTES # BLD: 0 10E3/UL
IMM GRANULOCYTES NFR BLD: 0 %
LDH SERPL L TO P-CCNC: 153 U/L (ref 0–250)
LYMPHOCYTES # BLD AUTO: 1.1 10E3/UL (ref 0.8–5.3)
LYMPHOCYTES NFR BLD AUTO: 21 %
MCH RBC QN AUTO: 30.7 PG (ref 26.5–33)
MCHC RBC AUTO-ENTMCNC: 32.9 G/DL (ref 31.5–36.5)
MCV RBC AUTO: 93 FL (ref 78–100)
MONOCYTES # BLD AUTO: 0.6 10E3/UL (ref 0–1.3)
MONOCYTES NFR BLD AUTO: 11 %
NEUTROPHILS # BLD AUTO: 3.5 10E3/UL (ref 1.6–8.3)
NEUTROPHILS NFR BLD AUTO: 66 %
NRBC # BLD AUTO: 0 10E3/UL
NRBC BLD AUTO-RTO: 0 /100
PLATELET # BLD AUTO: 213 10E3/UL (ref 150–450)
POTASSIUM SERPL-SCNC: 4.4 MMOL/L (ref 3.4–5.3)
PROT SERPL-MCNC: 6.3 G/DL (ref 6.4–8.3)
RBC # BLD AUTO: 4.01 10E6/UL (ref 4.4–5.9)
SODIUM SERPL-SCNC: 140 MMOL/L (ref 135–145)
WBC # BLD AUTO: 5.3 10E3/UL (ref 4–11)

## 2024-06-12 PROCEDURE — 99214 OFFICE O/P EST MOD 30 MIN: CPT

## 2024-06-12 PROCEDURE — 36415 COLL VENOUS BLD VENIPUNCTURE: CPT

## 2024-06-12 PROCEDURE — 82040 ASSAY OF SERUM ALBUMIN: CPT

## 2024-06-12 PROCEDURE — 85025 COMPLETE CBC W/AUTO DIFF WBC: CPT

## 2024-06-12 PROCEDURE — 83615 LACTATE (LD) (LDH) ENZYME: CPT

## 2024-06-12 PROCEDURE — G0463 HOSPITAL OUTPT CLINIC VISIT: HCPCS

## 2024-06-12 PROCEDURE — 87799 DETECT AGENT NOS DNA QUANT: CPT

## 2024-06-12 ASSESSMENT — PAIN SCALES - GENERAL: PAINLEVEL: MILD PAIN (3)

## 2024-06-12 NOTE — PROGRESS NOTES
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 Appetite is back to normal. No GI complaints. No rashes. Sore muscles noted after activities. No dry eyes or mouth pain.     REVIEW OF SYSTEMS: Otherwise unremarkable other than what is noted in the     PHYSICAL EXAM:   Blood pressure 131/78, pulse 78, temperature 97.4  F (36.3  C), temperature source Oral, resp. rate 16, weight 79.7 kg (175 lb 12.8 oz), SpO2 100%.    Wt Readings from Last 4 Encounters:   06/12/24 79.7 kg (175 lb 12.8 oz)   06/03/24 76.3 kg (168 lb 3.2 oz)   05/15/24 76.1 kg (167 lb 11.2 oz)   05/09/24 72.7 kg (160 lb 4.8 oz)     General Appearance: NAD  HEENT: sclera anicteric.   CV: well perfused   RESP: breathing comfortably on RA  EXT: no LE edema   SKIN: no rash  NEURO: A&O x3 ;non-focal  PSYCH: Appropriate affect        LABS:     Lab Results   Component Value Date    WBC 5.3 06/12/2024    ANEU 0.5 (L) 05/02/2024    HGB 12.3 (L) 06/12/2024    HCT 37.4 (L) 06/12/2024     06/12/2024     06/12/2024    POTASSIUM 4.4 06/12/2024    CHLORIDE 110 (H) 06/12/2024    CO2 21 (L) 06/12/2024     (H) 06/12/2024    BUN 27.6 (H) 06/12/2024    CR 0.98 06/12/2024    MAG 2.2 04/15/2024    INR 1.24 (H) 04/16/2024    BILITOTAL 0.4 06/12/2024    AST 22 06/12/2024    ALT 19 06/12/2024    ALKPHOS 72 06/12/2024    PROTTOTAL 6.3 (L) 06/12/2024    ALBUMIN 3.7 06/12/2024       ASSESSMENT AND PLAN:   Ziggy Hallman is a 48 year old male with 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 Cell dose-9.24x10^6  - ABO:  Donor: O+ Recipient A-  (Minor incompatability, no flush required)   - BM with CR. No MRD by flow. CD33 98% donor and CD3 100% donor (3/20).    MDS s/p MA MUD  Pancytopenia with incomplete chimerism  Has incomplete chimerism (88%) with persistent pancytopenia early after transplant. BMBx done early on around D +60 showed some mild dyserythropoeisis. Of the interphase cells examined, 8.625% had a signal pattern indicative of a loss involving the short arm of chromosome 5; no evidence was found of loss of chromosomes 5q. 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     Counts recovering with 100% donor chimerism in peripheral after rapid taper off of sirolimus.  Follow BMBx and molecular testing performed (5/15/2024). This will further determine if any treatment needed for relapse. We discussed on the potential of restarting chemotherapy (possibly venetoclax +HMA) if his disease relapse.      HEME/COAG  # Pancytopenia--see above  - Transfusion parameters: hemoglobin < 8g/dL (cardiac hx), platelets < 30k.     # Segmental R PE (2/6): see anticoagulation below  # APS work up- lupus antigen +, will follow outpt as may be unreliable in this acute setting and prior APS work up had been unremarkable prior to admission.  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    Extensive hypercoagulable workup to date has been unrevealing. JAK2 testing negative. PNH testing negative. Elevated IgA of unknown significance, no evidence of plasma cell disorder.  - Eliquis resumed  at lower dose 2.5mg BID (4/11/2024)  - Prasugrel resumed 5/9      CARDIOLOGY:  # HFpEF  Seen by cardiology. Started PO lasix, spironolactone and jardiance per cards. Will follow up with cardiology on 6/3/24.  - Echo (4/16/2024) (did NOT evaluate for diastolic dysfunction): Global and regional left ventricular function is normal with an EF of 55-60%.  Global right ventricular function is normal. Estimated mean right atrial pressure is normal. No pericardial effusion is present.     #CAD  # Hx of CABG  # Hx of HTN  - Continue metoprolol. Lisinopril on hold 5/9 with hypotension. Nifedipine on hold as well. Follow-up with cardiology.   #Hyperlipidemia: Holding atorvastatin peritransplant     ID:   - Prophylaxis plan: ACV, Pentamidine (4/11). Stopped Bactrim 2/26/2024 due to cytopenia.  Request pentamidine  - CMV and EBV negative on surveillance. HHV6 [id number] (4/18) copies from 25K previously.      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 Pt would like in [place]  - Follow BMBx in July or call earlier jose g Fink NP  [phone]    I spent 30 minutes in the care of this patient today, which included time necessary for preparation for the visit, obtaining history, ordering medications/tests/procedures as medically indicated, review of pertinent medical literature, counseling of the patient, communication of recommendations to the care team, and documentation time.

## 2024-06-12 NOTE — LETTER
6/12/2024      Ziggy Hallman  [address] [facility] [address] [facility] [address]      Dear Colleague,    Thank you for referring your patient, Ziggy Hallman, to the [facility] BLOOD AND MARROW TRANSPLANT PROGRAM [place]. Please see a copy of my visit note below.    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 Appetite is back to normal. No GI complaints. No rashes. Sore muscles noted after activities. No dry eyes or mouth pain.     REVIEW OF SYSTEMS: Otherwise unremarkable other than what is noted in the     PHYSICAL EXAM:   Blood pressure 131/78, pulse 78, temperature 97.4  F (36.3  C), temperature source Oral, resp. rate 16, weight 79.7 kg (175 lb 12.8 oz), SpO2 100%.    Wt Readings from Last 4 Encounters:   06/12/24 79.7 kg (175 lb 12.8 oz)   06/03/24 76.3 kg (168 lb 3.2 oz)   05/15/24 76.1 kg (167 lb 11.2 oz)   05/09/24 72.7 kg (160 lb 4.8 oz)     General Appearance: NAD  HEENT: sclera anicteric.   CV: well perfused   RESP: breathing comfortably on RA  EXT: no LE edema   SKIN: no rash  NEURO: A&O x3 ;non-focal  PSYCH: Appropriate affect        LABS:     Lab Results   Component Value Date    WBC 5.3 06/12/2024    ANEU 0.5 (L) 05/02/2024    HGB 12.3 (L) 06/12/2024    HCT 37.4 (L) 06/12/2024     06/12/2024     06/12/2024    POTASSIUM 4.4 06/12/2024    CHLORIDE 110 (H) 06/12/2024    CO2 21 (L) 06/12/2024     (H) 06/12/2024    BUN 27.6 (H) 06/12/2024    CR 0.98 06/12/2024    MAG 2.2 04/15/2024    INR 1.24 (H) 04/16/2024    BILITOTAL 0.4 06/12/2024    AST 22 06/12/2024    ALT 19 06/12/2024    ALKPHOS 72  06/12/2024    PROTTOTAL 6.3 (L) 06/12/2024    ALBUMIN 3.7 06/12/2024       ASSESSMENT AND PLAN:   Ziggy Hallman is a 48 year old male with 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 Cell dose-9.24x10^6  - ABO: Donor: O+ Recipient A-  (Minor incompatability, no flush required)   - BM with CR. No MRD by flow. CD33 98% donor and CD3 100% donor (3/20).    MDS s/p MA MUD  Pancytopenia with incomplete chimerism  Has incomplete chimerism (88%) with persistent pancytopenia early after transplant. BMBx done early on around D +60 showed some mild dyserythropoeisis. Of the interphase cells examined, 8.625% had a signal pattern indicative of a loss involving the short arm of chromosome 5; no evidence was found of loss of chromosomes 5q. 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     Counts recovering with 100% donor chimerism in peripheral after rapid taper off of sirolimus.  Follow BMBx and molecular testing performed (5/15/2024). This will further determine if any treatment needed for relapse. We discussed on the potential of restarting chemotherapy (possibly venetoclax +HMA) if his disease relapse.      HEME/COAG  # Pancytopenia--see above  - Transfusion parameters: hemoglobin < 8g/dL (cardiac hx), platelets < 30k.     # Segmental R PE (2/6): see anticoagulation below  # APS work up- lupus antigen +, will follow outpt as may be unreliable in this acute setting and prior APS work up had been unremarkable prior to admission.  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    Extensive hypercoagulable workup to date has been unrevealing. JAK2 testing negative. PNH testing negative. Elevated IgA of unknown significance, no evidence of plasma cell disorder.  - Eliquis resumed at lower dose 2.5mg BID (4/11/2024)  - Prasugrel resumed 5/9      CARDIOLOGY:  # HFpEF  Seen by cardiology. Started PO lasix, spironolactone and jardiance per cards. Will follow up with cardiology on 6/3/24.  - Echo (4/16/2024) (did NOT evaluate for diastolic dysfunction): Global and regional left ventricular function is normal with an EF of 55-60%.  Global right ventricular function is normal. Estimated mean right atrial pressure is normal. No pericardial effusion is present.     #CAD  # Hx of CABG  # Hx of HTN  - Continue metoprolol. Lisinopril on hold 5/9 with hypotension. Nifedipine on hold as well. Follow-up with cardiology.   #Hyperlipidemia: Holding atorvastatin peritransplant     ID:   - Prophylaxis plan: ACV, Pentamidine (4/11). Stopped Bactrim 2/26/2024 due to cytopenia.  Request pentamidine  - CMV and EBV negative on surveillance. HHV6 1277 (4/18) copies from 25K previously.      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 Pt would like in [place]  - Follow BMBx in July or call earlier prn      Tory Fink NP  [id number]    I spent 30 minutes in the care of this patient today, which included time necessary for preparation for the visit, obtaining history, ordering medications/tests/procedures as medically indicated, review of pertinent medical literature, counseling of the patient, communication of recommendations to the care team, and documentation time.

## 2024-06-12 NOTE — NURSING NOTE
"Oncology Rooming Note    June 12, 2024 11:13 AM   Ziggy Hallman is a 48 year old male who presents for:    Chief Complaint   Patient presents with    Blood Draw     Labs drawn via  by RN in lab.  VS taken    Oncology Clinic Visit     RTN for MDS     Initial Vitals: /78   Pulse 78   Temp 97.4  F (36.3  C) (Oral)   Resp 16   Wt 79.7 kg (175 lb 12.8 oz)   SpO2 100%   BMI 25.96 kg/m   Estimated body mass index is 25.96 kg/m  as calculated from the following:    Height as of 6/3/24: 1.753 m (5' 9\").    Weight as of this encounter: 79.7 kg (175 lb 12.8 oz). Body surface area is 1.97 meters squared.  Mild Pain (3) Comment: Data Unavailable   No LMP for male patient.  Allergies reviewed: Yes  Medications reviewed: Yes    Medications: Medication refills not needed today.  Pharmacy name entered into [employer]: [id number] - [place], [place] - [address] [place] [address]    Frailty Screening:   Is the patient here for a new oncology consult visit in cancer care? 2. No      Clinical concerns:  None      Polo Warner              "

## 2024-06-12 NOTE — NURSING NOTE
Chief Complaint   Patient presents with    Blood Draw     Labs drawn via  by RN in lab.  VS taken       Labs collected from venipuncture by RN. Vitals taken. Checked in for appointment(s).    Narda Moncada RN

## 2024-06-24 ENCOUNTER — HOSPITAL ENCOUNTER (OUTPATIENT)
Dept: RESPIRATORY THERAPY | Facility: CLINIC | Age: 49
Discharge: HOME | End: 2024-06-24
Attending: INTERNAL MEDICINE | Admitting: INTERNAL MEDICINE
Payer: COMMERCIAL

## 2024-06-24 PROCEDURE — 250N000009 HC RX 250: Mod: JZ | Performed by: PHYSICIAN ASSISTANT

## 2024-06-24 PROCEDURE — 94642 AEROSOL INHALATION TREATMENT: CPT

## 2024-06-24 RX ORDER — PENTAMIDINE ISETHIONATE 300 MG/300MG
300 INHALANT RESPIRATORY (INHALATION) ONCE
Status: COMPLETED | OUTPATIENT
Start: 2024-06-24 | End: 2024-06-24

## 2024-06-24 RX ORDER — ALBUTEROL SULFATE 90 UG/1
4 AEROSOL, METERED RESPIRATORY (INHALATION) ONCE
Status: COMPLETED | OUTPATIENT
Start: 2024-06-24 | End: 2024-06-24

## 2024-06-24 RX ADMIN — PENTAMIDINE ISETHIONATE 300 MG: 300 INHALANT RESPIRATORY (INHALATION) at 15:11

## 2024-06-24 RX ADMIN — ALBUTEROL SULFATE 4 PUFF: 90 INHALANT RESPIRATORY (INHALATION) at 15:01

## 2024-06-24 NOTE — PROGRESS NOTES
Pentamidine Nebulizer Treatment given.    Pre-treatment: SpO2 99%, HR 74, RR 18, /62, BS clear.  4 puffs Albuterol given, followed by Pentamidine neb (Nebupent) 300 mg mixed with 6 ml of sterile water.  Post-treatment: SpO2 99%, HR 80, RR 18, /70, BS Clear    Patient tolerated treatment well and left lab in no distress.

## 2024-07-09 ENCOUNTER — TELEPHONE (OUTPATIENT)
Dept: TRANSPLANT | Facility: CLINIC | Age: 49
End: 2024-07-09
Payer: COMMERCIAL

## 2024-07-24 ENCOUNTER — LAB (OUTPATIENT)
Dept: LAB | Facility: CLINIC | Age: 49
End: 2024-07-24
Attending: INTERNAL MEDICINE
Payer: COMMERCIAL

## 2024-07-24 ENCOUNTER — OFFICE VISIT (OUTPATIENT)
Dept: TRANSPLANT | Facility: CLINIC | Age: 49
End: 2024-07-24
Attending: INTERNAL MEDICINE
Payer: COMMERCIAL

## 2024-07-24 VITALS
TEMPERATURE: 97.7 F | DIASTOLIC BLOOD PRESSURE: 83 MMHG | OXYGEN SATURATION: 100 % | HEART RATE: 68 BPM | RESPIRATION RATE: 16 BRPM | BODY MASS INDEX: 26.14 KG/M2 | WEIGHT: 177 LBS | SYSTOLIC BLOOD PRESSURE: 134 MMHG

## 2024-07-24 DIAGNOSIS — Z94.81 S/P ALLOGENEIC BONE MARROW TRANSPLANT (H): ICD-10-CM

## 2024-07-24 DIAGNOSIS — D46.9 MDS (MYELODYSPLASTIC SYNDROME) (H): Primary | ICD-10-CM

## 2024-07-24 LAB
ALBUMIN SERPL BCG-MCNC: 4.1 G/DL (ref 3.5–5.2)
ALP SERPL-CCNC: 86 U/L (ref 40–150)
ALT SERPL W P-5'-P-CCNC: 33 U/L (ref 0–70)
ANION GAP SERPL CALCULATED.3IONS-SCNC: 10 MMOL/L (ref 7–15)
AST SERPL W P-5'-P-CCNC: 37 U/L (ref 0–45)
BASOPHILS # BLD AUTO: 0 10E3/UL (ref 0–0.2)
BASOPHILS NFR BLD AUTO: 1 %
BILIRUB SERPL-MCNC: 0.3 MG/DL
BUN SERPL-MCNC: 27.2 MG/DL (ref 6–20)
CALCIUM SERPL-MCNC: 9.7 MG/DL (ref 8.8–10.4)
CHLORIDE SERPL-SCNC: 110 MMOL/L (ref 98–107)
CREAT SERPL-MCNC: 0.91 MG/DL (ref 0.67–1.17)
EBV DNA SERPL NAA+PROBE-ACNC: NOT DETECTED IU/ML
EGFRCR SERPLBLD CKD-EPI 2021: >90 ML/MIN/1.73M2
EOSINOPHIL # BLD AUTO: 0.2 10E3/UL (ref 0–0.7)
EOSINOPHIL NFR BLD AUTO: 3 %
ERYTHROCYTE [DISTWIDTH] IN BLOOD BY AUTOMATED COUNT: 14.4 % (ref 10–15)
GLUCOSE SERPL-MCNC: 99 MG/DL (ref 70–99)
HCO3 SERPL-SCNC: 23 MMOL/L (ref 22–29)
HCT VFR BLD AUTO: 39.8 % (ref 40–53)
HGB BLD-MCNC: 13.6 G/DL (ref 13.3–17.7)
IMM GRANULOCYTES # BLD: 0 10E3/UL
IMM GRANULOCYTES NFR BLD: 0 %
LAB DIRECTOR DISCLAIMER: NORMAL
LAB DIRECTOR INTERPRETATION: NORMAL
LAB DIRECTOR METHODOLOGY: NORMAL
LAB DIRECTOR RESULTS: NORMAL
LDH SERPL L TO P-CCNC: 144 U/L (ref 0–250)
LYMPHOCYTES # BLD AUTO: 1.6 10E3/UL (ref 0.8–5.3)
LYMPHOCYTES NFR BLD AUTO: 26 %
MCH RBC QN AUTO: 32.9 PG (ref 26.5–33)
MCHC RBC AUTO-ENTMCNC: 34.2 G/DL (ref 31.5–36.5)
MCV RBC AUTO: 96 FL (ref 78–100)
MONOCYTES # BLD AUTO: 0.6 10E3/UL (ref 0–1.3)
MONOCYTES NFR BLD AUTO: 11 %
NEUTROPHILS # BLD AUTO: 3.6 10E3/UL (ref 1.6–8.3)
NEUTROPHILS NFR BLD AUTO: 59 %
NRBC # BLD AUTO: 0 10E3/UL
NRBC BLD AUTO-RTO: 0 /100
PLATELET # BLD AUTO: 259 10E3/UL (ref 150–450)
POTASSIUM SERPL-SCNC: 4.2 MMOL/L (ref 3.4–5.3)
PROT SERPL-MCNC: 7.1 G/DL (ref 6.4–8.3)
RBC # BLD AUTO: 4.13 10E6/UL (ref 4.4–5.9)
SODIUM SERPL-SCNC: 143 MMOL/L (ref 135–145)
SPECIMEN DESCRIPTION: NORMAL
WBC # BLD AUTO: 6 10E3/UL (ref 4–11)

## 2024-07-24 PROCEDURE — 80053 COMPREHEN METABOLIC PANEL: CPT | Performed by: PHYSICIAN ASSISTANT

## 2024-07-24 PROCEDURE — 88342 IMHCHEM/IMCYTCHM 1ST ANTB: CPT | Mod: 26 | Performed by: STUDENT IN AN ORGANIZED HEALTH CARE EDUCATION/TRAINING PROGRAM

## 2024-07-24 PROCEDURE — 81268 CHIMERISM ANAL W/CELL SELECT: CPT | Performed by: PHYSICIAN ASSISTANT

## 2024-07-24 PROCEDURE — 36591 DRAW BLOOD OFF VENOUS DEVICE: CPT | Performed by: PHYSICIAN ASSISTANT

## 2024-07-24 PROCEDURE — 88341 IMHCHEM/IMCYTCHM EA ADD ANTB: CPT | Mod: 26 | Performed by: STUDENT IN AN ORGANIZED HEALTH CARE EDUCATION/TRAINING PROGRAM

## 2024-07-24 PROCEDURE — 88291 CYTO/MOLECULAR REPORT: CPT | Performed by: MEDICAL GENETICS

## 2024-07-24 PROCEDURE — 85097 BONE MARROW INTERPRETATION: CPT | Mod: GC | Performed by: STUDENT IN AN ORGANIZED HEALTH CARE EDUCATION/TRAINING PROGRAM

## 2024-07-24 PROCEDURE — 88369 M/PHMTRC ALYSISHQUANT/SEMIQ: CPT | Mod: 26 | Performed by: MEDICAL GENETICS

## 2024-07-24 PROCEDURE — 88189 FLOWCYTOMETRY/READ 16 & >: CPT | Mod: GC | Performed by: STUDENT IN AN ORGANIZED HEALTH CARE EDUCATION/TRAINING PROGRAM

## 2024-07-24 PROCEDURE — G0452 MOLECULAR PATHOLOGY INTERPR: HCPCS | Mod: 26 | Performed by: STUDENT IN AN ORGANIZED HEALTH CARE EDUCATION/TRAINING PROGRAM

## 2024-07-24 PROCEDURE — 88368 INSITU HYBRIDIZATION MANUAL: CPT | Mod: 26 | Performed by: MEDICAL GENETICS

## 2024-07-24 PROCEDURE — 87799 DETECT AGENT NOS DNA QUANT: CPT | Mod: XU

## 2024-07-24 PROCEDURE — 88185 FLOWCYTOMETRY/TC ADD-ON: CPT | Performed by: PHYSICIAN ASSISTANT

## 2024-07-24 PROCEDURE — 83615 LACTATE (LD) (LDH) ENZYME: CPT

## 2024-07-24 PROCEDURE — 85041 AUTOMATED RBC COUNT: CPT | Performed by: PHYSICIAN ASSISTANT

## 2024-07-24 PROCEDURE — 88271 CYTOGENETICS DNA PROBE: CPT | Performed by: PHYSICIAN ASSISTANT

## 2024-07-24 PROCEDURE — 250N000011 HC RX IP 250 OP 636: Performed by: INTERNAL MEDICINE

## 2024-07-24 PROCEDURE — 88342 IMHCHEM/IMCYTCHM 1ST ANTB: CPT | Mod: TC | Performed by: PHYSICIAN ASSISTANT

## 2024-07-24 PROCEDURE — 88237 TISSUE CULTURE BONE MARROW: CPT | Performed by: PHYSICIAN ASSISTANT

## 2024-07-24 PROCEDURE — 88305 TISSUE EXAM BY PATHOLOGIST: CPT | Mod: 26 | Performed by: STUDENT IN AN ORGANIZED HEALTH CARE EDUCATION/TRAINING PROGRAM

## 2024-07-24 PROCEDURE — 38222 DX BONE MARROW BX & ASPIR: CPT | Mod: LT | Performed by: PHYSICIAN ASSISTANT

## 2024-07-24 PROCEDURE — 88311 DECALCIFY TISSUE: CPT | Mod: 26 | Performed by: STUDENT IN AN ORGANIZED HEALTH CARE EDUCATION/TRAINING PROGRAM

## 2024-07-24 PROCEDURE — 250N000011 HC RX IP 250 OP 636: Performed by: PHYSICIAN ASSISTANT

## 2024-07-24 PROCEDURE — 81267 CHIMERISM ANAL NO CELL SELEC: CPT | Mod: XU | Performed by: PHYSICIAN ASSISTANT

## 2024-07-24 PROCEDURE — 36415 COLL VENOUS BLD VENIPUNCTURE: CPT

## 2024-07-24 RX ORDER — HEPARIN SODIUM (PORCINE) LOCK FLUSH IV SOLN 100 UNIT/ML 100 UNIT/ML
5 SOLUTION INTRAVENOUS ONCE
Status: COMPLETED | OUTPATIENT
Start: 2024-07-24 | End: 2024-07-24

## 2024-07-24 RX ADMIN — Medication 5 ML: at 09:24

## 2024-07-24 RX ADMIN — MIDAZOLAM HYDROCHLORIDE 2 MG: 1 INJECTION, SOLUTION INTRAMUSCULAR; INTRAVENOUS at 09:18

## 2024-07-24 ASSESSMENT — PAIN SCALES - GENERAL
PAINLEVEL: SEVERE PAIN (6)
PAINLEVEL: SEVERE PAIN (6)

## 2024-07-24 NOTE — NURSING NOTE
"Oncology Rooming Note    July 24, 2024 8:44 AM   Ziggy Hallman is a 48 year old male who presents for:    Chief Complaint   Patient presents with    Bone Marrow Biopsy     BMBX r/t MDS.     Initial Vitals: /74   Pulse 76   Temp 97.6  F (36.4  C)   Resp 16   Wt 80.3 kg (177 lb)   SpO2 99%   BMI 26.14 kg/m   Estimated body mass index is 26.14 kg/m  as calculated from the following:    Height as of 6/3/24: 1.753 m (5' 9\").    Weight as of this encounter: 80.3 kg (177 lb). Body surface area is 1.98 meters squared.  Severe Pain (6) Comment: Data Unavailable   No LMP for male patient.  Allergies reviewed: Yes  Medications reviewed: Yes    Medications: Medication refills not needed today.  Pharmacy name entered into [facility]: [facility] #[id number] - [place], [place] - [address] [place] [address]        Clinical concerns: Hoping to get muscle relaxant refilled today. Fell and bruised ribs last week.       Albert Alvarez RN              "

## 2024-07-24 NOTE — PROGRESS NOTES
BMT ONC Adult Bone Marrow Biopsy Procedure Note  July 24, 2024  /74   Pulse 76   Temp 97.6  F (36.4  C)   Resp 16   Wt 80.3 kg (177 lb)   SpO2 99%   BMI 26.14 kg/m       DIAGNOSIS: MDS     PROCEDURE: Unilateral Bone Marrow Biopsy and Unilateral Aspirate    LOCATION: [facility] 2nd Floor    Patient s identification was positively verified by verbal identification and invasive procedure safety checklist was completed. Informed consent was obtained. Following the administration of 2mg Midazolam as pre-medication, patient was placed in the prone position and prepped and draped in a sterile manner. Approximately 10 cc of 1% Lidocaine was used over the left posterior iliac spine. Following this a 3 mm incision was made. Trephine bone marrow core(s) was (were) obtained from the [facility]. Bone marrow aspirates were obtained from the IC. Aspirates were sent for morphology, immunophenotyping, cytogenetics, and molecular diagnostics RFLP. A total of approximately 20 ml of marrow was aspirated. Following this procedure a sterile dressing was applied to the bone marrow biopsy site(s). The patient was placed in the supine position to maintain pressure on the biopsy site. Post-procedure wound care instructions were given.     Complications: NO    Length of procedure:20 minutes or less      Procedure performed by: Amanda Fuentes PA-C

## 2024-07-24 NOTE — NURSING NOTE
BMBX Teaching and Assessment       Teaching concerns addressed: Bone marrow biopsy and infection prevention.     Person(s) involved in teaching: Patient  Motivation Level  Asks Questions: Yes  Eager to Learn: Yes  Cooperative: Yes  Receptive (willing/able to accept information): Yes    Patient demonstrates understanding of the following:     Reason for the appointment, diagnosis and treatment plan: Yes  Knowledge of proper use of medications and conditions for which they are ordered (with special attention to potential side effects or drug interactions): Yes  Which situations necessitate calling provider and whom to contact: Yes    Teaching concerns addressed:   Reviewed activity restrictions if received premeds, potential for bleeding and actions to take if develops any of the issues below    Pain management techniques: Yes  Patient instructed on hand hygiene: Yes  How and/when to access community resources: Yes    Infection Control:  Patient demonstrates understanding of the following:   Bone marrow procedure site care taught: Yes  Signs and symptoms of infection taught: Yes       Instructional Materials Used/Given: Pt instructed to keep bmbx site clean and dry for 24hrs. Pt educated to monitor site for signs of infection such as redness, rash, oozing, puss, bleeding, pain, and elevated temp. Pt instructed to go to call the [facility] [place] or go to the ER if any signs of infection should occur. Pt educated to not operate machinery if receiving versed. Pt and son verbalize understanding.    Provider order received to administer Versed 2mg IVP as premed for BMBX. Procedural consent discussed and pt's signature obtained.  Allergies reviewed.  PT currently alert and oriented to plan of care.  Pt lying prone in stretcher.  Call light w/in reach.  Provider and  at bedside.       Pre-procedure labs drawn via port. Post procedure: Patient vital signs stable, ambulating, site is clean, dry and intact prior to  discharge and line removed. Pt discharged with son as .     Albert Alvarez RN

## 2024-07-24 NOTE — LETTER
7/24/2024      Ziggy Hallman  [address] [facility] [address]      Dear Colleague,    Thank you for referring your patient, Ziggy Hallman, to the [facility] BLOOD AND MARROW TRANSPLANT PROGRAM [place]. Please see a copy of my visit note below.    BMT ONC Adult Bone Marrow Biopsy Procedure Note  July 24, 2024  /74   Pulse 76   Temp 97.6  F (36.4  C)   Resp 16   Wt 80.3 kg (177 lb)   SpO2 99%   BMI 26.14 kg/m       DIAGNOSIS: MDS     PROCEDURE: Unilateral Bone Marrow Biopsy and Unilateral Aspirate    [place] [facility]    Patient s identification was positively verified by verbal identification and invasive procedure safety checklist was completed. Informed consent was obtained. Following the administration of 2mg Midazolam as pre-medication, patient was placed in the prone position and prepped and draped in a sterile manner. Approximately 10 cc of 1% Lidocaine was used over the left posterior iliac spine. Following this a 3 mm incision was made. Trephine bone marrow core(s) was (were) obtained from the [facility]. Bone marrow aspirates were obtained from the [facility]. Aspirates were sent for morphology, immunophenotyping, cytogenetics, and molecular diagnostics RFLP. A total of approximately 20 ml of marrow was aspirated. Following this procedure a sterile dressing was applied to the bone marrow biopsy site(s). The patient was placed in the supine position to maintain pressure on the biopsy site. Post-procedure wound care instructions were given.     Complications: NO    Length of procedure:20 minutes or less    Procedure performed by: Amanda Fuentes PA-C

## 2024-07-25 LAB
PATH REPORT.COMMENTS IMP SPEC: NORMAL
PATH REPORT.FINAL DX SPEC: NORMAL
PATH REPORT.FINAL DX SPEC: NORMAL
PATH REPORT.GROSS SPEC: NORMAL
PATH REPORT.MICROSCOPIC SPEC OTHER STN: NORMAL
PATH REPORT.RELEVANT HX SPEC: NORMAL
PATH REPORT.RELEVANT HX SPEC: NORMAL

## 2024-07-31 ENCOUNTER — HOSPITAL ENCOUNTER (OUTPATIENT)
Dept: RESPIRATORY THERAPY | Facility: CLINIC | Age: 49
Discharge: HOME | End: 2024-07-31
Attending: INTERNAL MEDICINE | Admitting: INTERNAL MEDICINE
Payer: COMMERCIAL

## 2024-07-31 DIAGNOSIS — D46.9 MDS (MYELODYSPLASTIC SYNDROME) (H): Primary | ICD-10-CM

## 2024-07-31 PROCEDURE — 94642 AEROSOL INHALATION TREATMENT: CPT

## 2024-07-31 PROCEDURE — 250N000012 HC RX MED GY IP 250 OP 636 PS 637: Performed by: PHYSICIAN ASSISTANT

## 2024-07-31 RX ORDER — PENTAMIDINE ISETHIONATE 300 MG/300MG
300 INHALANT RESPIRATORY (INHALATION) ONCE
Status: COMPLETED | OUTPATIENT
Start: 2024-07-31 | End: 2024-07-31

## 2024-07-31 RX ADMIN — PENTAMIDINE ISETHIONATE 300 MG: 300 INHALANT RESPIRATORY (INHALATION) at 14:17

## 2024-07-31 NOTE — PROGRESS NOTES
Pentamidine Treatment Performed at [facility] July 31, 2024     Pretreatment Vitals:  HR 75 ,RR 16, Sp02 97, Breath Sounds clear .    4 puffs Albuterol HFA administered with spacer. Pentamidine 300 mg mixed with 6 ml sterile water nebulized at 6 LPM.    Posttreatment Vitals:  HR 80, RR 16,Sp02 100, Breath Sounds unchanged    Patient scheduled for follow up 8/22 0800

## 2024-08-02 ENCOUNTER — VIRTUAL VISIT (OUTPATIENT)
Dept: FAMILY MEDICINE | Facility: CLINIC | Age: 49
End: 2024-08-02
Payer: COMMERCIAL

## 2024-08-02 DIAGNOSIS — S20.211A RIB CONTUSION, RIGHT, INITIAL ENCOUNTER: Primary | ICD-10-CM
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
## 2024-08-02 PROCEDURE — 99213 OFFICE O/P EST LOW 20 MIN: CPT | Mod: 95 | Performed by: NURSE PRACTITIONER

## 2024-08-02 RX ORDER — CYCLOBENZAPRINE HCL 10 MG
10 TABLET ORAL 3 TIMES DAILY PRN
Qty: 45 TABLET | Refills: 1 | Status: SHIPPED | OUTPATIENT
Start: 2024-08-02

## 2024-08-02 NOTE — PROGRESS NOTES
Ziggy is a 48 year old who is being evaluated via a billable video visit.    How would you like to obtain your AVS? MyChart  If the video visit is dropped, the invitation should be resent by: Text to cell phone: [phone]  Will anyone else be joining your video visit? No    Assessment & Plan     Rib contusion, right, initial encounter  Patient declines any shortness of breath and declines pain medication.  I refilled his flexeril for as needed use as he heals.  Advised him on signs and symptoms that he needs to follow-up with and he will follow-up as needed.  - cyclobenzaprine (FLEXERIL) 10 MG tablet; Take 1 tablet (10 mg) by mouth 3 times daily as needed for muscle spasms    Fall, initial encounter  - cyclobenzaprine (FLEXERIL) 10 MG tablet; Take 1 tablet (10 mg) by mouth 3 times daily as needed for muscle spasms      See Patient Instructions    Subjective   Ziggy is a 48 year old, presenting for the following health issues:  Pain        8/2/2024    10:22 AM   Additional Questions   Roomed by glenna   Accompanied by self         8/2/2024    10:22 AM   Patient Reported Additional Medications   Patient reports taking the following new medications none       Video Start Time: 1:04 PM    HPI     Pain History:  When did you first notice your pain? 3 weeks   Have you seen anyone else for your pain? No  How has your pain affected your ability to work? Not applicable  Where in your body do you have pain? Back Pain  Onset/Duration: 3 weeks  Description:   Location of pain: upper back right  Character of pain: sharp and shooting  Pain radiation: none  New numbness or weakness in legs, not attributed to pain: no   Intensity: Currently 5/10  Progression of Symptoms: worsening  History:   Specific cause: fall   Pain interferes with job: No  History of back problems: previous herniated disc  Any previous MRI or X-rays: Yes--at [place].  Date no sure date  Sees a specialist for back pain: No  Alleviating factors:   Improved by:  "muscle relaxants    Precipitating factors:  Worsened by: working  Therapies tried and outcome: muscle relaxants    Patient fell out of his work truck 3 weeks ago and hit the posterior right upper back and feels that he bruised or has some cracked ribs.  He denies any shortness of breath or chest pain.  He states that it is still sore but improving a little.  He has been using some flexeril which he got the last time this occurred and this has been helping but he is out and is requesting refill today.    Accompanying Signs & Symptoms:  Risk of Fracture: Recent history of trauma or blunt force and not concerned since this is ribs and no SOB  Risk of Cauda Equina: None  Risk of Infection: None  Risk of Cancer: None  Risk of Ankylosing Spondylitis: Onset at age <35, male, AND morning back stiffness  no       Review of Systems  CONSTITUTIONAL: NEGATIVE for fever, chills, change in weight  RESP: NEGATIVE for significant cough or SOB  CV: NEGATIVE for chest pain, palpitations or peripheral edema  MUSCULOSKELETAL: POSITIVE  for posterior right upper back pain after a fall out of his work truck striking the back and causing some \"road rash\" and possible bruising more likely than cracked ribs since he did not feel anything give way.  PSYCHIATRIC: NEGATIVE for changes in mood or affect  ROS otherwise negative      Objective    Vitals - Patient Reported  Pain Score: Moderate Pain (5)  Pain Loc: Upper Leg      Vitals:  No vitals were obtained today due to virtual visit.    Physical Exam   GENERAL: alert and no distress  EYES: Eyes grossly normal to inspection.  No discharge or erythema, or obvious scleral/conjunctival abnormalities.  RESP: No audible wheeze, cough, or visible cyanosis.    SKIN: Visible skin clear. No significant rash, abnormal pigmentation or lesions.  NEURO: Cranial nerves grossly intact.  Mentation and speech appropriate for age.  PSYCH: Appropriate affect, tone, and pace of words    Video-Visit " Details    Type of service:  Video Visit   Video End Time:1:13 PM  Originating Location (pt. Location): Home  Distant Location (provider location):  On-site  Platform used for Video Visit: Richard  Signed Electronically by: Radha Quinn NP

## 2024-08-02 NOTE — PATIENT INSTRUCTIONS
I refilled your flexeril to use as needed.  Follow-up in urgent care or with your PCP if any worsening symptoms.

## 2024-08-08 ENCOUNTER — PATIENT OUTREACH (OUTPATIENT)
Dept: ONCOLOGY | Facility: HOSPITAL | Age: 49
End: 2024-08-08
Payer: COMMERCIAL

## 2024-08-19 LAB
CULTURE HARVEST COMPLETE DATE: NORMAL
CULTURE HARVEST COMPLETE DATE: NORMAL

## 2024-08-20 LAB
ADDITIONAL COMMENTS: NORMAL
CULTURE HARVEST COMPLETE DATE: NORMAL
INTERPRETATION: NORMAL
INTERPRETATION: NORMAL
ISCN: NORMAL
METHODS: NORMAL

## 2024-08-27 ENCOUNTER — TELEPHONE (OUTPATIENT)
Dept: TRANSPLANT | Facility: CLINIC | Age: 49
End: 2024-08-27
Payer: COMMERCIAL

## 2024-08-27 NOTE — TELEPHONE ENCOUNTER
Blood and Marrow Transplant Clinic - Care Coordination    Patient has missed recent MD visit and pentamidine visit. Called patient to check in to see if everything was okay. He reported that he was out of town for his visit with Dr. Bacon and then didn't know he had a pentamidine appointment. Discussed with Dr. Bacon and we will plan to transfer him back to Dr. Mcbride and then see him back at 1 year. Will send message to the schedulers to have them reschedule the pentamidine. Discussed plan with patient who is agreeable. Deidreet sent to [facility] for Dr. Mcbride..     Kandy العلي, RN BSN  BMT RN Care Coordinator   Phone [phone]  Pager [phone]

## 2024-08-29 ENCOUNTER — HOSPITAL ENCOUNTER (OUTPATIENT)
Dept: RESPIRATORY THERAPY | Facility: CLINIC | Age: 49
Discharge: HOME | End: 2024-08-29
Attending: INTERNAL MEDICINE | Admitting: INTERNAL MEDICINE
Payer: COMMERCIAL

## 2024-08-29 PROCEDURE — 250N000012 HC RX MED GY IP 250 OP 636 PS 637: Performed by: INTERNAL MEDICINE

## 2024-08-29 PROCEDURE — 250N000009 HC RX 250: Performed by: INTERNAL MEDICINE

## 2024-08-29 PROCEDURE — 94642 AEROSOL INHALATION TREATMENT: CPT

## 2024-08-29 RX ORDER — PENTAMIDINE ISETHIONATE 300 MG/300MG
300 INHALANT RESPIRATORY (INHALATION) ONCE
Status: COMPLETED | OUTPATIENT
Start: 2024-08-29 | End: 2024-08-29

## 2024-08-29 RX ORDER — ALBUTEROL SULFATE 90 UG/1
4 AEROSOL, METERED RESPIRATORY (INHALATION) ONCE
Status: COMPLETED | OUTPATIENT
Start: 2024-08-29 | End: 2024-08-29

## 2024-08-29 RX ADMIN — PENTAMIDINE ISETHIONATE 300 MG: 300 INHALANT RESPIRATORY (INHALATION) at 15:15

## 2024-08-29 RX ADMIN — ALBUTEROL SULFATE 4 PUFF: 90 AEROSOL, METERED RESPIRATORY (INHALATION) at 15:07

## 2024-08-29 NOTE — PROGRESS NOTES
Pentamidine nebulizer treatment given.    Pre-tx: SpO2 99%, HR 88, BP 82/62, RR 16, BS clear    4 puffs Albuterol MDI given, followed by Pentamidine nebulizer (Nebupent) 300 mg mixed with 6 ml sterile water.    Post-tx: SpO2 99%, HR 84, RR 16, BP 82/62, BS clear    No symptoms of dizziness or light headedness.  Patient tolerated treatment well and left with no complaints.

## 2024-09-23 ENCOUNTER — HOSPITAL ENCOUNTER (OUTPATIENT)
Dept: RESPIRATORY THERAPY | Facility: CLINIC | Age: 49
Discharge: HOME | End: 2024-09-23
Attending: INTERNAL MEDICINE | Admitting: INTERNAL MEDICINE
Payer: COMMERCIAL

## 2024-09-23 DIAGNOSIS — D46.9 MDS (MYELODYSPLASTIC SYNDROME) (H): Primary | ICD-10-CM

## 2024-09-23 PROCEDURE — 94642 AEROSOL INHALATION TREATMENT: CPT

## 2024-09-23 PROCEDURE — 250N000012 HC RX MED GY IP 250 OP 636 PS 637: Performed by: PHYSICIAN ASSISTANT

## 2024-09-23 RX ORDER — PENTAMIDINE ISETHIONATE 300 MG/300MG
300 INHALANT RESPIRATORY (INHALATION) ONCE
Status: COMPLETED | OUTPATIENT
Start: 2024-09-23 | End: 2024-09-23

## 2024-09-23 RX ADMIN — PENTAMIDINE ISETHIONATE 300 MG: 300 INHALANT RESPIRATORY (INHALATION) at 14:12

## 2024-09-23 NOTE — PROGRESS NOTES
Pentamidine Treatment Performed at [facility] September 23, 2024     Pretreatment Vitals:  HR 92,RR 16, Sp02 99, Breath Sounds clear .    4 puffs Albuterol HFA administered with spacer. Pentamidine 300 mg mixed with 6 ml sterile water nebulized at 6 LPM.    Posttreatment Vitals:  HR 84, RR 18,Sp02 97, Breath Sounds unchanged

## 2024-10-03 PROBLEM — Z87.74 S/P PATENT FORAMEN OVALE CLOSURE: Status: ACTIVE | Noted: 2024-10-03

## 2024-10-03 PROBLEM — I25.2 HISTORY OF MI (MYOCARDIAL INFARCTION): Status: ACTIVE | Noted: 2024-10-03

## 2024-10-03 PROBLEM — I50.32 CHRONIC HEART FAILURE WITH PRESERVED EJECTION FRACTION (H): Status: ACTIVE | Noted: 2024-10-03

## 2024-10-03 PROBLEM — I10 BENIGN ESSENTIAL HYPERTENSION: Status: ACTIVE | Noted: 2024-10-03

## 2024-10-13 ENCOUNTER — HEALTH MAINTENANCE LETTER (OUTPATIENT)
Age: 49
End: 2024-10-13

## 2024-10-15 ENCOUNTER — OFFICE VISIT (OUTPATIENT)
Dept: PEDIATRICS | Facility: CLINIC | Age: 49
End: 2024-10-15
Payer: COMMERCIAL

## 2024-10-15 ENCOUNTER — HOSPITAL ENCOUNTER (OUTPATIENT)
Dept: ULTRASOUND IMAGING | Facility: CLINIC | Age: 49
Discharge: HOME | End: 2024-10-15
Payer: COMMERCIAL

## 2024-10-15 VITALS
WEIGHT: 185.6 LBS | TEMPERATURE: 97.3 F | HEIGHT: 69 IN | DIASTOLIC BLOOD PRESSURE: 92 MMHG | HEART RATE: 76 BPM | BODY MASS INDEX: 27.49 KG/M2 | RESPIRATION RATE: 18 BRPM | OXYGEN SATURATION: 99 % | SYSTOLIC BLOOD PRESSURE: 154 MMHG

## 2024-10-15 DIAGNOSIS — D68.59 HYPERCOAGULABLE STATE (H): ICD-10-CM

## 2024-10-15 DIAGNOSIS — M79.605 LEFT LEG PAIN: ICD-10-CM

## 2024-10-15 DIAGNOSIS — M79.605 LEFT LEG PAIN: Primary | ICD-10-CM

## 2024-10-15 LAB
ANION GAP SERPL CALCULATED.3IONS-SCNC: 12 MMOL/L (ref 7–15)
BASOPHILS # BLD AUTO: 0 10E3/UL (ref 0–0.2)
BASOPHILS NFR BLD AUTO: 0 %
BUN SERPL-MCNC: 20.9 MG/DL (ref 6–20)
CALCIUM SERPL-MCNC: 9.7 MG/DL (ref 8.8–10.4)
CHLORIDE SERPL-SCNC: 108 MMOL/L (ref 98–107)
CREAT SERPL-MCNC: 0.93 MG/DL (ref 0.67–1.17)
EGFRCR SERPLBLD CKD-EPI 2021: >90 ML/MIN/1.73M2
EOSINOPHIL # BLD AUTO: 0.1 10E3/UL (ref 0–0.7)
EOSINOPHIL NFR BLD AUTO: 3 %
ERYTHROCYTE [DISTWIDTH] IN BLOOD BY AUTOMATED COUNT: 13 % (ref 10–15)
GLUCOSE SERPL-MCNC: 99 MG/DL (ref 70–99)
HCO3 SERPL-SCNC: 21 MMOL/L (ref 22–29)
HCT VFR BLD AUTO: 40.3 % (ref 40–53)
HGB BLD-MCNC: 13.7 G/DL (ref 13.3–17.7)
IMM GRANULOCYTES # BLD: 0 10E3/UL
IMM GRANULOCYTES NFR BLD: 0 %
INR PPP: 0.98 (ref 0.85–1.15)
LYMPHOCYTES # BLD AUTO: 1.4 10E3/UL (ref 0.8–5.3)
LYMPHOCYTES NFR BLD AUTO: 28 %
MCH RBC QN AUTO: 32.5 PG (ref 26.5–33)
MCHC RBC AUTO-ENTMCNC: 34 G/DL (ref 31.5–36.5)
MCV RBC AUTO: 96 FL (ref 78–100)
MONOCYTES # BLD AUTO: 0.6 10E3/UL (ref 0–1.3)
MONOCYTES NFR BLD AUTO: 12 %
NEUTROPHILS # BLD AUTO: 2.8 10E3/UL (ref 1.6–8.3)
NEUTROPHILS NFR BLD AUTO: 56 %
PLATELET # BLD AUTO: 240 10E3/UL (ref 150–450)
POTASSIUM SERPL-SCNC: 3.9 MMOL/L (ref 3.4–5.3)
RBC # BLD AUTO: 4.21 10E6/UL (ref 4.4–5.9)
SODIUM SERPL-SCNC: 141 MMOL/L (ref 135–145)
WBC # BLD AUTO: 5 10E3/UL (ref 4–11)

## 2024-10-15 PROCEDURE — 85610 PROTHROMBIN TIME: CPT

## 2024-10-15 PROCEDURE — 93971 EXTREMITY STUDY: CPT | Mod: LT

## 2024-10-15 PROCEDURE — 36415 COLL VENOUS BLD VENIPUNCTURE: CPT

## 2024-10-15 PROCEDURE — 85025 COMPLETE CBC W/AUTO DIFF WBC: CPT

## 2024-10-15 PROCEDURE — 99214 OFFICE O/P EST MOD 30 MIN: CPT

## 2024-10-15 PROCEDURE — 80048 BASIC METABOLIC PNL TOTAL CA: CPT

## 2024-10-15 RX ORDER — KETOROLAC TROMETHAMINE 10 MG/1
10 TABLET, FILM COATED ORAL EVERY 6 HOURS PRN
Qty: 20 TABLET | Refills: 0 | Status: SHIPPED | OUTPATIENT
Start: 2024-10-15

## 2024-10-15 ASSESSMENT — PAIN SCALES - GENERAL: PAINLEVEL: SEVERE PAIN (6)

## 2024-10-15 NOTE — PATIENT INSTRUCTIONS
"Fortunately, the ultrasound of your left leg today shows no evidence of deep venous thrombosis (blood clot).  Although this is obviously good news, I do not know how to explain your new left leg pain.  Perhaps your theory that your increased activity, both at work and walking the pit bulls, resulted in you \"tweaking\" something in the muscles or joints of that left leg, which should resolve on its own.  In the meantime, I have given you a supply of ketorolac 10 mg tablets.  You can take 1 of these every 6 hours as needed for moderate to severe pain.  For milder pain, please rely on acetaminophen or small doses of ibuprofen.  Although ketorolac can add to the blood thinning properties of the apixaban (Eliquis) you are taking, you are on a small dose of apixaban, so that risk should be minor.  Nevertheless, please use as little ketorolac as you need to control your pain.    It was a pleasure meeting you today.  I hope your pain improves soon.  "

## 2024-10-15 NOTE — Clinical Note
"I had the pleasure of seeing Ziggy today in the [place] [facility] regarding left lower extremity pain.  Because of his history of hypercoagulable state and previous venous and arterial clots, we sent him to ultrasound to exclude DVT.  Fortunately, there was no evidence of DVT on the ultrasound, and only a popliteal Baker's cyst, which is probably not contributing to his symptoms.  I am at a loss to explain the etiology of his left leg pain, though he has just recently gone back to work and resumed some activity after nearly a year and a half of more sedentary lifestyle, so he is Chun that he \"tweaked\" something in his left leg is a reasonable one.  I gave him a small supply of ketorolac as he has not found OTC analgesics to be effective, though I have advised him to limit use to as little as possible as he is also on apixaban.  He agrees to contact you should his left leg pain fail to improve.  Thank you."

## 2024-10-15 NOTE — PROGRESS NOTES
Acute and Diagnostic Services Clinic Visit    Assessment & Plan     (M79.605) Left leg pain  (primary encounter diagnosis)  Comment: Ziggy presents with left lower extremity pain of 6 days duration, extending from left hip to left ankle.  There has been no antecedent trauma.  He does not recall if this discomfort is similar to discomfort that he has had with previous clots.  Exam suggests intact arterial supply to the foot.  Therefore, the primary concern is that of recurrent deep venous thrombosis within the left leg.  Note that he is already on prasugrel and apixaban for the treatment of hypercoagulable state associated with previous arterial and venous clots.  Plan:   US Lower Extremity Venous Duplex Left ordered.  If new DVT is found, will need to consult with his hematologist regarding options for anticoagulation.    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  JAK2 testing was negative, as well as PNH testing.  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  He is currently being treated with prasugrel and apixaban.  Oncology note indicates that apixaban dose was reduced to 2.5 mg twice a day on 4/11/2024.  Plan:   Continue hematology/oncology management.    DISCUSSION/MEDICAL DECISION MAKING:  Left lower extremity ultrasound was negative for DVT.  There was evidence of a popliteal Baker's cyst, not likely a contributor to his discomfort.    I met with Ziggy this afternoon to discuss these results.  Although he was pleased to hear that he does not have clot, he is puzzled as to the etiology of his left leg  "pain.  After further discussion, he wonders if he might of \"tweaked something\" in his left leg due to his recent resumption of increased physical activity.  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    Ziggy has not found that OTC analgesia with acetaminophen or NSAIDs has been effective at controlling his pain, and asks about prescription analgesia.  We discussed the benefit and risk of a short course of ketorolac 10 mg tablets.  Ziggy readily agrees to treatment.  Ziggy can use ketorolac 10 mg p.o. every 6 hours as needed moderate to severe pain.  I have asked him to continue using acetaminophen or OTC NSAIDs for milder pain.  I made Ziggy aware that ketorolac can add to the anticoagulant properties of apixaban, so I advised him to use as little ketorolac as he needs to to control his pain.  He agrees to contact his primary care provider should his left leg pain fail to improve over time.    36 minutes were spent doing chart review, history and exam, documentation and further activities per the note.       BMI  Estimated body mass index is 27.41 kg/m  as calculated from the following:    Height as of this encounter: 1.753 m (5' 9\").    Weight as of this encounter: 84.2 kg (185 lb 9.6 oz).     Subjective   Ziggy is a 49 year old, presenting for the following health issues:  Deep Vein Thrombosis (Left leg pain from hip to his ankle /PATIENT DOES NOT KNOW WHAT MEDICATIONS HE IS OR IS NOT TAKING  ////)  HPI     Evaluation for possible DVT  Onset/Duration: LAST WEDNESDAY  Description:       Location: Left leg from hip to ankle       Redness: no        Pain: moderate       Warmth: no        Joint swelling no   Progression of symptoms same  Accompanying signs and symptoms:       Fevers: no        Numbness/tingling/weakness: no        Chest pain/pleurisy: no        Shortness of breath: no   History        Trauma: no         Recent travel/when: no         " "Previous history of DVT: YES        Family history of DVT: no         Recent surgery: no   Aggravating factors include: none  Therapies tried and outcome: muscle relaxer's  Prior surgery on arteries of veins in this area: Yes, date 9 -10 years ago    Ziggy Hallman is a 49-year-old man seen today in the Acute Diagnostic Services ([facility]) clinic at [facility] regarding left leg pain of 6 days duration.    Ziggy has a history of myelodysplastic syndrome, and is status post bone marrow transplant.  He also has a history of hypercoagulable state.  His hypercoagulable state remains of unclear etiology.  JAK2 testing was negative, as was PNH testing.  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  He is currently being treated with prasugrel and apixaban.  Oncology note indicates that apixaban dose was reduced to 2.5 mg twice a day on 4/11/2024.    Ziggy has been having constant, \"pulsating\" pain of the left lower extremity beginning Wednesday, 10/9/2024.  He recalls no antecedent trauma, and has had no recent change in activity.  The pain involves the left leg from the region around the left hip to the left ankle.  Although the majority of his pain is lateral on the leg, there is some circumferential discomfort.  Because of his history of hypercoagulable state and multiple arterial and venous embolism/thrombosis, he \"knew we had to get it checked out\", contacted his primary care provider today, and was referred here to the ADS for evaluation.    Ziggy has not noticed decreased pulses to his left foot, and has no left foot pain.  The left foot is not cold.  He has no discomfort from the right leg.  He denies bleeding complications to prasugrel and apixaban therapy.  He confirms that his " "apixaban dose was reduced in 4/2024 to his current 2.5 mg twice daily.  He does not recall why that dose reduction is made, though does not think that he was having bleeding complications.    Review of Systems  Respiratory: Denies dyspnea, cough, hemoptysis, or respiratory chest pain.      Objective    BP (!) 154/92 (BP Location: Right arm, Patient Position: Sitting, Cuff Size: Adult Large)   Pulse 76   Temp 97.3  F (36.3  C) (Tympanic)   Resp 18   Ht 1.753 m (5' 9\")   Wt 84.2 kg (185 lb 9.6 oz)   SpO2 99%   BMI 27.41 kg/m    Body mass index is 27.41 kg/m .  Physical Exam   GENERAL: Very pleasant man, who appears in no acute distress.  EXTREM: There are scars on the medial left leg at the site of previous thrombectomy versus embolectomy.  The distal left lower extremity is circumferentially more firm to palpation than on the right, though the 2 circumferences are the same.  There is no firmness to palpation of the left proximal lower extremity.  I do not palpate any venous cords.  Dorsalis pedis pulse to the left lower extremity is strong.  SKIN: Warm and dry, no rashes.  There is no erythema or warmth to the skin of the left lower extremity.  NEURO: Alert, oriented, conversant.  Cranial nerves III - XII grossly intact.  No gross motor or sensory deficits.  PSYCH: Calm, alert, conversant.  Able to articulate logical thoughts, no tangential thoughts, no hallucinations or delusions.  Affect normal.    US LOWER EXTREMITY VENOUS DUPLEX LEFT 10/15/2024 2:28 PM     CLINICAL HISTORY: Hypercoagulable state with a history of arterial and  venous thrombosis, including thrombosis requiring thrombectomy left  lower extremity.  Now presents with left lower extremity pain and  circumferential swelling.   Left leg pain; Hypercoagulable state (H)  TECHNIQUE: Venous Duplex ultrasound of the left lower extremity with  and without compression, augmentation and duplex. Color flow and  spectral Doppler with waveform analysis " performed.     COMPARISON: Left leg venous ultrasound 4/16/2024     FINDINGS: Exam includes the common femoral, femoral, popliteal, and  contralateral common femoral veins as well as segmentally visualized  deep calf veins and greater saphenous vein.      LEFT: No deep vein thrombosis. Posterior tibial veins suboptimally  visualized due to calf edema however fully fill in with color Doppler  and are favored to be patent. No superficial thrombophlebitis. 4.1 x  2.7 x 2.3 cm popliteal fossa cyst which contains echogenic debris                                                                      IMPRESSION:  1.  No deep venous thrombosis in the left lower extremity.     SKIP ABREU MD    Results for orders placed or performed in visit on 10/15/24 (from the past 24 hour(s))   CBC with platelets differential    Narrative    The following orders were created for panel order CBC with platelets differential.  Procedure                               Abnormality         Status                     ---------                               -----------         ------                     CBC with platelets and d...[[id number]]  Abnormal            Final result                 Please view results for these tests on the individual orders.   Basic metabolic panel   Result Value Ref Range    Sodium 141 135 - 145 mmol/L    Potassium 3.9 3.4 - 5.3 mmol/L    Chloride 108 (H) 98 - 107 mmol/L    Carbon Dioxide (CO2) 21 (L) 22 - 29 mmol/L    Anion Gap 12 7 - 15 mmol/L    Urea Nitrogen 20.9 (H) 6.0 - 20.0 mg/dL    Creatinine 0.93 0.67 - 1.17 mg/dL    GFR Estimate >90 >60 mL/min/1.73m2    Calcium 9.7 8.8 - 10.4 mg/dL    Glucose 99 70 - 99 mg/dL   INR   Result Value Ref Range    INR 0.98 0.85 - 1.15   CBC with platelets and differential   Result Value Ref Range    WBC Count 5.0 4.0 - 11.0 10e3/uL    RBC Count 4.21 (L) 4.40 - 5.90 10e6/uL    Hemoglobin 13.7 13.3 - 17.7 g/dL    Hematocrit 40.3 40.0 - 53.0 %    MCV 96 78 - 100 fL    MCH 32.5  26.5 - 33.0 pg    MCHC 34.0 31.5 - 36.5 g/dL    RDW 13.0 10.0 - 15.0 %    Platelet Count 240 150 - 450 10e3/uL    % Neutrophils 56 %    % Lymphocytes 28 %    % Monocytes 12 %    % Eosinophils 3 %    % Basophils 0 %    % Immature Granulocytes 0 %    Absolute Neutrophils 2.8 1.6 - 8.3 10e3/uL    Absolute Lymphocytes 1.4 0.8 - 5.3 10e3/uL    Absolute Monocytes 0.6 0.0 - 1.3 10e3/uL    Absolute Eosinophils 0.1 0.0 - 0.7 10e3/uL    Absolute Basophils 0.0 0.0 - 0.2 10e3/uL    Absolute Immature Granulocytes 0.0 <=0.4 10e3/uL         In 1238  Out 1248  In 1302  Out 1314  In 1445  Out 1459    Signed Electronically by: Albert Hatfield MD

## 2024-10-17 ENCOUNTER — DOCUMENTATION ONLY (OUTPATIENT)
Dept: TRANSPLANT | Facility: CLINIC | Age: 49
End: 2024-10-17
Payer: COMMERCIAL

## 2024-10-22 ENCOUNTER — ONCOLOGY VISIT (OUTPATIENT)
Dept: ONCOLOGY | Facility: CLINIC | Age: 49
End: 2024-10-22
Attending: INTERNAL MEDICINE
Payer: COMMERCIAL

## 2024-10-22 VITALS
OXYGEN SATURATION: 97 % | SYSTOLIC BLOOD PRESSURE: 162 MMHG | HEIGHT: 69 IN | TEMPERATURE: 98.3 F | DIASTOLIC BLOOD PRESSURE: 99 MMHG | WEIGHT: 188 LBS | BODY MASS INDEX: 27.85 KG/M2 | HEART RATE: 85 BPM | RESPIRATION RATE: 16 BRPM

## 2024-10-22 DIAGNOSIS — Z94.84 HISTORY OF ALLOGENEIC STEM CELL TRANSPLANT (H): ICD-10-CM

## 2024-10-22 DIAGNOSIS — D46.9 MDS (MYELODYSPLASTIC SYNDROME) (H): ICD-10-CM

## 2024-10-22 DIAGNOSIS — B02.9 HERPES ZOSTER WITHOUT COMPLICATION: Primary | ICD-10-CM

## 2024-10-22 PROCEDURE — G0463 HOSPITAL OUTPT CLINIC VISIT: HCPCS | Performed by: INTERNAL MEDICINE

## 2024-10-22 PROCEDURE — 99215 OFFICE O/P EST HI 40 MIN: CPT | Performed by: INTERNAL MEDICINE

## 2024-10-22 PROCEDURE — G2211 COMPLEX E/M VISIT ADD ON: HCPCS | Performed by: INTERNAL MEDICINE

## 2024-10-22 RX ORDER — VALACYCLOVIR HYDROCHLORIDE 1 G/1
1000 TABLET, FILM COATED ORAL 3 TIMES DAILY
Qty: 21 TABLET | Refills: 0 | Status: SHIPPED | OUTPATIENT
Start: 2024-10-22 | End: 2024-10-22

## 2024-10-22 RX ORDER — PRASUGREL 10 MG/1
10 TABLET, FILM COATED ORAL DAILY
Qty: 60 TABLET | Refills: 1 | Status: SHIPPED | OUTPATIENT
Start: 2024-10-22

## 2024-10-22 RX ORDER — VALACYCLOVIR HYDROCHLORIDE 1 G/1
1000 TABLET, FILM COATED ORAL 3 TIMES DAILY
Qty: 21 TABLET | Refills: 0 | Status: SHIPPED | OUTPATIENT
Start: 2024-10-22

## 2024-10-22 ASSESSMENT — PAIN SCALES - GENERAL: PAINLEVEL: MODERATE PAIN (5)

## 2024-10-22 NOTE — PROGRESS NOTES
"Oncology Rooming Note    October 22, 2024 12:28 PM   Ziggy Hallman is a 49 year old male who presents for:    Chief Complaint   Patient presents with    Oncology Clinic Visit     MDS (myelodysplastic syndrome) - Provider visit only     Initial Vitals: BP (!) 162/99 (BP Location: Right arm, Patient Position: Sitting, Cuff Size: Adult Regular)   Pulse 85   Temp 98.3  F (36.8  C) (Tympanic)   Resp 16   Ht 1.753 m (5' 9\")   Wt 85.3 kg (188 lb)   SpO2 97%   BMI 27.76 kg/m   Estimated body mass index is 27.76 kg/m  as calculated from the following:    Height as of this encounter: 1.753 m (5' 9\").    Weight as of this encounter: 85.3 kg (188 lb). Body surface area is 2.04 meters squared.  Moderate Pain (5) Comment: Data Unavailable   No LMP for male patient.  Allergies reviewed: Yes  Medications reviewed: No, unsure of medications taken.    Medications: Medication refills not needed today.  Pharmacy name entered into [employer]: [id number] - [place], [place] - [address] [place] [address]    Frailty Screening:   Is the patient here for a new oncology consult visit in cancer care? 2. No      Clinical concerns:  None      Elena Reyes CMA              "

## 2024-10-22 NOTE — LETTER
10/22/2024      Ziggy Hallman  [address] [facility] [address]      Dear Colleague,    Thank you for referring your patient, Ziggy Hallman, to the [facility]. Please see a copy of my visit note below.    [facility] Progress Note    Patient: Ziggy Hallman  MRN: [id number]  Date of Service: 05/26/2023        Reason for Visit    Chief Complaint   Patient presents with     Oncology Clinic Visit     MDS (myelodysplastic syndrome) - Provider visit only         Problem List Items Addressed This Visit       MDS (myelodysplastic syndrome) (H)    Relevant Medications    prasugrel (EFFIENT) 10 MG TABS tablet    Other Relevant Orders    CBC with Platelets & Differential    Comprehensive metabolic panel (BMP + Alb, Alk Phos, ALT, AST, Total. 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  Following that he underwent allogenic stem cell transplant at the [facility] on 1/25/2024.  Received fludarabine and busulfan as conditioning regimen.  Post transplant period was fairly uncomplicated.    No significant GVH.  He is currently off of immunosuppression.    Post transplant bone marrow biopsy showing complete response with no evidence of increased blasts.  However FISH studies posttransplant showed continued  presence of 5p loss (2/26/24 and 3/27/24).    Repeat bone marrow biopsy on 7/24/2024 showing no evidence of any dysplasia.  FISH negative for 5p or 17p loss.    100% donor chimerism noted on 7/24/2024.    He is on acyclovir 800 mg twice daily but he is not sure if that he is taking it or not.  Also getting pentamadine elation monthly for PJP prophylaxis.  He is about 9 months post transplant.  He is here to reestablish care.  He no-show to his appointment in July.  Labs from couple days ago reviewed today.  Serum chemistry within normal limits.  Normal creatinine at 0.93.  Normal electrolytes.  LFTs were not done.  CBC shows normal hemoglobin 13.7.  Normal white count and platelet counts.  He has developed a skin rash on his left lower extremity.  Started last week sometime on Tuesday or Wednesday.    Has blisters on his skin especially over the left great toe.  Also has other scabbed lesions on the foot and in the calf.  To me the look most like shingles.  Will start him on valacyclovir 1000 mg 3 times a day for 7 days.  After that he will do daily.  If the skin lesions do not look like GVH to me.  He has no other evidence of GVHD elsewhere.  Will continue pentamadine elation on a monthly basis.  I will see him back in 2 months with repeat labs.      Coronary artery disease  History of CABG  Arterial thrombosis  I asked him to continue prasugrel and apixaban for his previous arterial thromboembolism.  He follows with cardiology.  He missed his appointment earlier this month.  I will reschedule this.  He is also on diuretics and other blood pressure medications which need to be reviewed by them.     Cancer Staging   No matching staging information was found for the patient.      ECOG Performance    0 - Independent         Problem List    Patient Active Problem List   Diagnosis     Hypercoagulable state (H)     CVA (cerebrovascular accident) (H)     Low back pain     MDS (myelodysplastic syndrome) (H)      Antineoplastic chemotherapy induced pancytopenia (H)     Status post bone marrow transplant (H)     Acute ST elevation myocardial infarction (STEMI) (H)     Dyspnea on exertion     Elevated brain natriuretic peptide (BNP) level     Shortness of breath     Localized edema     Anemia, unspecified type     Benign essential hypertension     Chronic heart failure with preserved ejection fraction (H)     S/P patent foramen ovale closure     History of MI (myocardial infarction)        Oncology history  His history of MDS dates back to 2015.      Had worsening hemoglobin in 2023.      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 Cell dose-9.24x10^6  - ABO: Donor: O+ Recipient A-  (Minor incompatability, no flush required)     Post transplant bone marrow biopsy showing complete response with no evidence of increased blasts.  However FISH studies posttransplant showed continued presence of 5p loss (2/26/24 and 3/27/24).    Repeat bone marrow biopsy on 7/24/2024 showing no evidence of any dysplasia.  FISH negative for 5p or 17p loss.    100% donor chimerism noted on 7/24/2024    Counts fully recovered.  Currently on observation.  Is off of immunosuppression.        Interval History   Ziggy Hallman is a 47 year old male with MDS with excess blasts 1 with complex karyotype status post induction treatment with 4 cycles of Vidaza from August 2023 to December 2023 followed by allogenic matched  unrelated donor stem cell transplant on 1/25/2024, who is here for follow-up.    After 4 cycles of Vidaza he underwent allogenic stem cell transplant earlier this year.  Post transplant bone marrow biopsy showed no increase in blasts but FISH studies showed persistence of the 5p.  Serial bone marrow biopsies along with for studies however have shown significant improvement and last bone marrow biopsy from 7/24/2024 and FISH study done on the same day shows no evidence of any dysplasia and cytogenetics was completely normal.  He has 100% donor chimerism.  His counts have fully normalized.  No evidence of any GVH.  Unfortunately he has not kept his follow-ups with the transplant team and no-show to his appointment in July.  He has been asked to continue his care here.  Here with repeat labs.    He has developed a new rash on his left lower extremity which started sometime midweek last week.  Started as a red rash along with pain that goes down his legs.  Rash is now spread down towards his feet.  He has a few blisters on his left great toe on the ventral side.  Also has few other scabbed lesions on the shin and calf area.  No other lesions elsewhere.  No dry eyes or dry mouth.  No mouth sores.  No diarrhea.    He is supposed to be taking acyclovir 800 mg twice daily which she is not sure that he is taking it or not.  He is no longer on immunosuppression.  He gets pentamadine monthly inhalation for PJP prophylaxis.  He was taken off of levofloxacin in May.    He also continues to be on low-dose Eliquis and prasugrel for previous cardiovascular disease history and arterial thrombosis.  He is also on multiple other blood pressure medications which I am not sure if he is taking them all.  He missed his cardiology appointment earlier this month.    Review of Systems  A comprehensive review of systems was negative except for what is noted in the interval history    Current Outpatient Medications   Medication Sig Dispense  Refill     prasugrel (EFFIENT) 10 MG TABS tablet Take 1 tablet (10 mg) by mouth daily. 60 tablet 1     valACYclovir (VALTREX) 1000 mg tablet Take 1 tablet (1,000 mg) by mouth 3 times daily. 21 tablet 0     acyclovir (ZOVIRAX) 800 MG tablet Take 1 tablet (800 mg) by mouth 2 times daily 60 tablet 1     apixaban ANTICOAGULANT (ELIQUIS) 2.5 MG tablet Take 1 tablet (2.5 mg) by mouth 2 times daily 60 tablet 11     cyclobenzaprine (FLEXERIL) 10 MG tablet Take 1 tablet (10 mg) by mouth 3 times daily as needed for muscle spasms 45 tablet 1     diclofenac (VOLTAREN) 1 % topical gel Apply 2 g topically 3 times daily as needed for moderate pain To knees (Patient not taking: Reported on 10/15/2024) 150 g 0     empagliflozin (JARDIANCE) 10 MG TABS tablet Take 1 tablet (10 mg) by mouth daily 90 tablet 1     ketorolac (TORADOL) 10 MG tablet Take 1 tablet (10 mg) by mouth every 6 hours as needed for moderate pain. 20 tablet 0     levofloxacin (LEVAQUIN) 250 MG tablet Take 1 tablet (250 mg) by mouth daily (Patient not taking: Reported on 10/15/2024) 30 tablet 1     lisinopril (ZESTRIL) 40 MG tablet Take 0.5 tablets (20 mg) by mouth daily 60 tablet 2     LORazepam (ATIVAN) 0.5 MG tablet Take 1-2 tablets (0.5-1 mg) by mouth every 4 hours as needed for vomiting or nausea (nausea/vomiting/sleep) (Patient not taking: Reported on 10/15/2024) 15 tablet 0     metoprolol succinate ER (TOPROL XL) 100 MG 24 hr tablet Take 1 tablet (100 mg) by mouth daily       NIFEdipine ER (ADALAT CC) 30 MG 24 hr tablet On hold x 4/16 (Patient not taking: Reported on 10/15/2024)       pantoprazole (PROTONIX) 40 MG EC tablet Take 1 tablet (40 mg) by mouth daily 30 tablet 1     spironolactone (ALDACTONE) 25 MG tablet Take 1 tablet (25 mg) by mouth daily (Patient not taking: Reported on 10/15/2024) 30 tablet 0     traZODone (DESYREL) 50 MG tablet Take 1 tablet (50 mg) by mouth at bedtime (Patient not taking: Reported on 10/15/2024) 30 tablet 1     No current  facility-administered medications for this visit.        Physical Exam    Failed to redirect to the Timeline version of the REVFS SmartLink.  General: alert and cooperative  Eyes: No signs of inflammation   Lungs: Clear to auscultation bilaterally  CVS: RRR   Abdomen: Soft and nontender  Skin: Erythematous blistering skin lesions noted on the left lower extremity especially over the left great toe. Suspicious for herpes zoster  CNS: alert and oriented x 3    Lab Results    No results found for this or any previous visit (from the past 168 hour(s)).        Imaging    US Lower Extremity Venous Duplex Left    Result Date: 10/15/2024  US LOWER EXTREMITY VENOUS DUPLEX LEFT 10/15/2024 2:28 PM CLINICAL HISTORY: Hypercoagulable state with a history of arterial and venous thrombosis, including thrombosis requiring thrombectomy left lower extremity.  Now presents with left lower extremity pain and circumferential swelling.   Left leg pain; Hypercoagulable state (H) TECHNIQUE: Venous Duplex ultrasound of the left lower extremity with and without compression, augmentation and duplex. Color flow and spectral Doppler with waveform analysis performed. COMPARISON: Left leg venous ultrasound 4/16/2024 FINDINGS: Exam includes the common femoral, femoral, popliteal, and contralateral common femoral veins as well as segmentally visualized deep calf veins and greater saphenous vein. LEFT: No deep vein thrombosis. Posterior tibial veins suboptimally visualized due to calf edema however fully fill in with color Doppler and are favored to be patent. No superficial thrombophlebitis. 4.1 x 2.7 x 2.3 cm popliteal fossa cyst which contains echogenic debris     IMPRESSION: 1.  No deep venous thrombosis in the left lower extremity. SKIP ABREU MD   SYSTEM ID:  [id number]     A total of 50 min was spent today on this visit which included face to face conversation with the patient, EMR review, counseling and co-ordination of care and medical  "documentation.    I independently reviewed and interpreted lab studies and imaging, reviewed pertinent notes from physicians and care providers from other specialties and ordered lab tests.    The longitudinal plan of care for the diagnosis(es)/condition(s) as documented were addressed during this visit. Due to the added complexity in care, I will continue to support Ziggy in the subsequent management and with ongoing continuity of care.    Highly complex patient.    Signed by: Lynda Mcbride MD    Oncology Rooming Note    October 22, 2024 12:28 PM   Ziggy Hallman is a 49 year old male who presents for:    Chief Complaint   Patient presents with     Oncology Clinic Visit     MDS (myelodysplastic syndrome) - Provider visit only     Initial Vitals: BP (!) 162/99 (BP Location: Right arm, Patient Position: Sitting, Cuff Size: Adult Regular)   Pulse 85   Temp 98.3  F (36.8  C) (Tympanic)   Resp 16   Ht 1.753 m (5' 9\")   Wt 85.3 kg (188 lb)   SpO2 97%   BMI 27.76 kg/m   Estimated body mass index is 27.76 kg/m  as calculated from the following:    Height as of this encounter: 1.753 m (5' 9\").    Weight as of this encounter: 85.3 kg (188 lb). Body surface area is 2.04 meters squared.  Moderate Pain (5) Comment: Data Unavailable   No LMP for male patient.  Allergies reviewed: Yes  Medications reviewed: No, unsure of medications taken.    Medications: Medication refills not needed today.  Pharmacy name entered into Exit Games: [id number] - [place], [place] - [address] [place] [address]    Frailty Screening:   Is the patient here for a new oncology consult visit in cancer care? 2. No      Clinical concerns:  None      Elena Reyse CMA                Again, thank you for allowing me to participate in the care of your patient.        Sincerely,        Lynda Mcbride MD  "

## 2024-10-22 NOTE — PROGRESS NOTES
[facility] Hematology and Oncology Progress Note    Patient: Ziggy Hallman  MRN: [id number]  Date of Service: 05/26/2023        Reason for Visit    Chief Complaint   Patient presents with    Oncology Clinic Visit     MDS (myelodysplastic syndrome) - Provider visit only         Problem List Items Addressed This Visit       MDS (myelodysplastic syndrome) (H)    Relevant Medications    prasugrel (EFFIENT) 10 MG TABS tablet    Other Relevant Orders    CBC with Platelets & Differential    Comprehensive metabolic panel (BMP + Alb, Alk Phos, ALT, AST, Total. 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  Following that he underwent allogenic stem cell transplant at the [facility] on 1/25/2024.  Received fludarabine and busulfan as conditioning regimen.  Post transplant period was fairly uncomplicated.    No significant GVH.  He is currently off of immunosuppression.    Post transplant bone marrow biopsy showing complete response with no evidence of increased blasts.  However FISH studies posttransplant showed continued presence of 5p loss (2/26/24 and 3/27/24).    Repeat bone marrow biopsy on 7/24/2024 showing no evidence of any dysplasia.  FISH negative for 5p or 17p loss.    100% donor chimerism noted on 7/24/2024.    He is on acyclovir 800 mg twice daily but he  is not sure if that he is taking it or not.  Also getting pentamadine elation monthly for PJP prophylaxis.  He is about 9 months post transplant.  He is here to reestablish care.  He no-show to his appointment in July.  Labs from couple days ago reviewed today.  Serum chemistry within normal limits.  Normal creatinine at 0.93.  Normal electrolytes.  LFTs were not done.  CBC shows normal hemoglobin 13.7.  Normal white count and platelet counts.  He has developed a skin rash on his left lower extremity.  Started last week sometime on Tuesday or Wednesday.    Has blisters on his skin especially over the left great toe.  Also has other scabbed lesions on the foot and in the calf.  To me the look most like shingles.  Will start him on valacyclovir 1000 mg 3 times a day for 7 days.  After that he will do daily.  If the skin lesions do not look like GVH to me.  He has no other evidence of GVHD elsewhere.  Will continue pentamadine elation on a monthly basis.  I will see him back in 2 months with repeat labs.      Coronary artery disease  History of CABG  Arterial thrombosis  I asked him to continue prasugrel and apixaban for his previous arterial thromboembolism.  He follows with cardiology.  He missed his appointment earlier this month.  I will reschedule this.  He is also on diuretics and other blood pressure medications which need to be reviewed by them.     Cancer Staging   No matching staging information was found for the patient.      ECOG Performance    0 - Independent         Problem List    Patient Active Problem List   Diagnosis    Hypercoagulable state (H)    CVA (cerebrovascular accident) (H)    Low back pain    MDS (myelodysplastic syndrome) (H)    Antineoplastic chemotherapy induced pancytopenia (H)    Status post bone marrow transplant (H)    Acute ST elevation myocardial infarction (STEMI) (H)    Dyspnea on exertion    Elevated brain natriuretic peptide (BNP) level    Shortness of breath    Localized edema     Anemia, unspecified type    Benign essential hypertension    Chronic heart failure with preserved ejection fraction (H)    S/P patent foramen ovale closure    History of MI (myocardial infarction)        Oncology history  His history of MDS dates back to 2015.      Had worsening hemoglobin in 2023.      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 Cell dose-9.24x10^6  - ABO: Donor: O+ Recipient A-  (Minor incompatability, no flush required)     Post transplant bone marrow biopsy showing complete response with no evidence of increased blasts.  However FISH studies posttransplant showed continued presence of 5p loss (2/26/24 and 3/27/24).    Repeat bone marrow biopsy on 7/24/2024 showing no evidence of any dysplasia.  FISH negative for 5p or 17p loss.    100% donor chimerism noted on 7/24/2024    Counts fully recovered.  Currently on observation.  Is off of immunosuppression.        Interval History   Ziggy Hallman is a 47 year old male with MDS with excess blasts 1 with complex karyotype status post induction treatment with 4 cycles of Vidaza from August 2023 to December 2023 followed by allogenic matched unrelated donor stem cell transplant on 1/25/2024, who is here for follow-up.    After 4 cycles of Vidaza he underwent allogenic stem cell transplant earlier this year.  Post transplant bone marrow biopsy showed no increase in blasts but FISH studies showed persistence of the 5p.   Serial bone marrow biopsies along with for studies however have shown significant improvement and last bone marrow biopsy from 7/24/2024 and FISH study done on the same day shows no evidence of any dysplasia and cytogenetics was completely normal.  He has 100% donor chimerism.  His counts have fully normalized.  No evidence of any GVH.  Unfortunately he has not kept his follow-ups with the transplant team and no-show to his appointment in July.  He has been asked to continue his care here.  Here with repeat labs.    He has developed a new rash on his left lower extremity which started sometime midweek last week.  Started as a red rash along with pain that goes down his legs.  Rash is now spread down towards his feet.  He has a few blisters on his left great toe on the ventral side.  Also has few other scabbed lesions on the shin and calf area.  No other lesions elsewhere.  No dry eyes or dry mouth.  No mouth sores.  No diarrhea.    He is supposed to be taking acyclovir 800 mg twice daily which she is not sure that he is taking it or not.  He is no longer on immunosuppression.  He gets pentamadine monthly inhalation for PJP prophylaxis.  He was taken off of levofloxacin in May.    He also continues to be on low-dose Eliquis and prasugrel for previous cardiovascular disease history and arterial thrombosis.  He is also on multiple other blood pressure medications which I am not sure if he is taking them all.  He missed his cardiology appointment earlier this month.    Review of Systems  A comprehensive review of systems was negative except for what is noted in the interval history    Current Outpatient Medications   Medication Sig Dispense Refill    prasugrel (EFFIENT) 10 MG TABS tablet Take 1 tablet (10 mg) by mouth daily. 60 tablet 1    valACYclovir (VALTREX) 1000 mg tablet Take 1 tablet (1,000 mg) by mouth 3 times daily. 21 tablet 0    acyclovir (ZOVIRAX) 800 MG tablet Take 1 tablet (800 mg) by mouth 2 times daily  60 tablet 1    apixaban ANTICOAGULANT (ELIQUIS) 2.5 MG tablet Take 1 tablet (2.5 mg) by mouth 2 times daily 60 tablet 11    cyclobenzaprine (FLEXERIL) 10 MG tablet Take 1 tablet (10 mg) by mouth 3 times daily as needed for muscle spasms 45 tablet 1    diclofenac (VOLTAREN) 1 % topical gel Apply 2 g topically 3 times daily as needed for moderate pain To knees (Patient not taking: Reported on 10/15/2024) 150 g 0    empagliflozin (JARDIANCE) 10 MG TABS tablet Take 1 tablet (10 mg) by mouth daily 90 tablet 1    ketorolac (TORADOL) 10 MG tablet Take 1 tablet (10 mg) by mouth every 6 hours as needed for moderate pain. 20 tablet 0    levofloxacin (LEVAQUIN) 250 MG tablet Take 1 tablet (250 mg) by mouth daily (Patient not taking: Reported on 10/15/2024) 30 tablet 1    lisinopril (ZESTRIL) 40 MG tablet Take 0.5 tablets (20 mg) by mouth daily 60 tablet 2    LORazepam (ATIVAN) 0.5 MG tablet Take 1-2 tablets (0.5-1 mg) by mouth every 4 hours as needed for vomiting or nausea (nausea/vomiting/sleep) (Patient not taking: Reported on 10/15/2024) 15 tablet 0    metoprolol succinate ER (TOPROL XL) 100 MG 24 hr tablet Take 1 tablet (100 mg) by mouth daily      NIFEdipine ER (ADALAT CC) 30 MG 24 hr tablet On hold x 4/16 (Patient not taking: Reported on 10/15/2024)      pantoprazole (PROTONIX) 40 MG EC tablet Take 1 tablet (40 mg) by mouth daily 30 tablet 1    spironolactone (ALDACTONE) 25 MG tablet Take 1 tablet (25 mg) by mouth daily (Patient not taking: Reported on 10/15/2024) 30 tablet 0    traZODone (DESYREL) 50 MG tablet Take 1 tablet (50 mg) by mouth at bedtime (Patient not taking: Reported on 10/15/2024) 30 tablet 1     No current facility-administered medications for this visit.        Physical Exam    Failed to redirect to the Timeline version of the [facility] SmartLink.  General: alert and cooperative  Eyes: No signs of inflammation   Lungs: Clear to auscultation bilaterally  CVS: RRR   Abdomen: Soft and nontender  Skin:  Erythematous blistering skin lesions noted on the left lower extremity especially over the left great toe. Suspicious for herpes zoster  CNS: alert and oriented x 3    Lab Results    No results found for this or any previous visit (from the past 168 hour(s)).        Imaging    US Lower Extremity Venous Duplex Left    Result Date: 10/15/2024  US LOWER EXTREMITY VENOUS DUPLEX LEFT 10/15/2024 2:28 PM CLINICAL HISTORY: Hypercoagulable state with a history of arterial and venous thrombosis, including thrombosis requiring thrombectomy left lower extremity.  Now presents with left lower extremity pain and circumferential swelling.   Left leg pain; Hypercoagulable state (H) TECHNIQUE: Venous Duplex ultrasound of the left lower extremity with and without compression, augmentation and duplex. Color flow and spectral Doppler with waveform analysis performed. COMPARISON: Left leg venous ultrasound 4/16/2024 FINDINGS: Exam includes the common femoral, femoral, popliteal, and contralateral common femoral veins as well as segmentally visualized deep calf veins and greater saphenous vein. LEFT: No deep vein thrombosis. Posterior tibial veins suboptimally visualized due to calf edema however fully fill in with color Doppler and are favored to be patent. No superficial thrombophlebitis. 4.1 x 2.7 x 2.3 cm popliteal fossa cyst which contains echogenic debris     IMPRESSION: 1.  No deep venous thrombosis in the left lower extremity. SKIP ABREU MD   SYSTEM ID:  [id number]     A total of 50 min was spent today on this visit which included face to face conversation with the patient, EMR review, counseling and co-ordination of care and medical documentation.    I independently reviewed and interpreted lab studies and imaging, reviewed pertinent notes from physicians and care providers from other specialties and ordered lab tests.    The longitudinal plan of care for the diagnosis(es)/condition(s) as documented were addressed during this  visit. Due to the added complexity in care, I will continue to support Ziggy in the subsequent management and with ongoing continuity of care.    Highly complex patient.    Signed by: Lynda Mcbride MD

## 2024-10-23 ENCOUNTER — HOSPITAL ENCOUNTER (OUTPATIENT)
Dept: RESPIRATORY THERAPY | Facility: CLINIC | Age: 49
Discharge: HOME | End: 2024-10-23
Attending: INTERNAL MEDICINE | Admitting: INTERNAL MEDICINE
Payer: COMMERCIAL

## 2024-10-23 DIAGNOSIS — D46.9 MDS (MYELODYSPLASTIC SYNDROME) (H): Primary | ICD-10-CM

## 2024-10-23 PROCEDURE — 250N000012 HC RX MED GY IP 250 OP 636 PS 637: Performed by: PHYSICIAN ASSISTANT

## 2024-10-23 PROCEDURE — 94642 AEROSOL INHALATION TREATMENT: CPT

## 2024-10-23 RX ORDER — PENTAMIDINE ISETHIONATE 300 MG/300MG
300 INHALANT RESPIRATORY (INHALATION) ONCE
Status: COMPLETED | OUTPATIENT
Start: 2024-10-23 | End: 2024-10-23

## 2024-10-23 RX ADMIN — PENTAMIDINE ISETHIONATE 300 MG: 300 INHALANT RESPIRATORY (INHALATION) at 14:25

## 2024-10-23 NOTE — PROGRESS NOTES
Pentamidine Treatment Performed at [facility] October 23, 2024     Pretreatment Vitals:  HR 82,RR 16, Sp02 100%, Breath Sounds clear.    4 puffs Albuterol HFA administered with spacer. Pentamidine 300 mg mixed with 6 ml sterile water nebulized at 6 LPM.    Posttreatment Vitals:  HR 84, RR 18,Sp02 97%, Breath Sounds unchanged.

## 2024-11-04 ENCOUNTER — MYC MEDICAL ADVICE (OUTPATIENT)
Dept: FAMILY MEDICINE | Facility: CLINIC | Age: 49
End: 2024-11-04
Payer: COMMERCIAL

## 2024-11-04 NOTE — TELEPHONE ENCOUNTER
Patient Quality Outreach    Patient is due for the following:   Hypertension -  Hypertension follow-up visit    Next Steps:   Schedule a office visit for recheck blood pressure and labs    Type of outreach:    Sent [employer] message.    Next Steps:  Reach out within 90 days via Letter.    Max number of attempts reached: No. Will try again in 90 days if patient still on fail list.    Questions for provider review:    None     Radha Quinn NP

## 2024-11-11 ENCOUNTER — MYC MEDICAL ADVICE (OUTPATIENT)
Dept: ONCOLOGY | Facility: CLINIC | Age: 49
End: 2024-11-11

## 2024-12-02 ENCOUNTER — HOSPITAL ENCOUNTER (OUTPATIENT)
Dept: RESPIRATORY THERAPY | Facility: CLINIC | Age: 49
Discharge: HOME | End: 2024-12-02
Attending: INTERNAL MEDICINE | Admitting: INTERNAL MEDICINE
Payer: COMMERCIAL

## 2024-12-02 PROCEDURE — 94642 AEROSOL INHALATION TREATMENT: CPT

## 2024-12-02 PROCEDURE — 250N000012 HC RX MED GY IP 250 OP 636 PS 637: Performed by: INTERNAL MEDICINE

## 2024-12-02 RX ORDER — PENTAMIDINE ISETHIONATE 300 MG/300MG
300 INHALANT RESPIRATORY (INHALATION) ONCE
Status: COMPLETED | OUTPATIENT
Start: 2024-12-02 | End: 2024-12-02

## 2024-12-02 RX ORDER — ALBUTEROL SULFATE 90 UG/1
4 INHALANT RESPIRATORY (INHALATION) ONCE
Status: COMPLETED | OUTPATIENT
Start: 2024-12-02 | End: 2024-12-02

## 2024-12-02 RX ADMIN — PENTAMIDINE ISETHIONATE 300 MG: 300 INHALANT RESPIRATORY (INHALATION) at 11:25

## 2024-12-02 RX ADMIN — ALBUTEROL SULFATE 4 PUFF: 90 INHALANT RESPIRATORY (INHALATION) at 11:15

## 2024-12-02 NOTE — PROGRESS NOTES
Pentamidine Nebulizer treatment given.    Pre-tx: SpO2 98%, HR 80, RR 16, /82, BS clear.    4 puffs Albuterol MDI HFA, followed by Pentamidine Nebulizer (Nebupent) 300 mg mixed with 6 ml sterile water.    Post-tx: SpO2 98, HR 84, RR18, /82, BS clear    Treatment was well tolerated, and patient left without complaint.

## 2024-12-10 ENCOUNTER — VIRTUAL VISIT (OUTPATIENT)
Dept: INTERNAL MEDICINE | Facility: CLINIC | Age: 49
End: 2024-12-10
Payer: COMMERCIAL

## 2024-12-10 DIAGNOSIS — J20.9 ACUTE BRONCHITIS, UNSPECIFIED ORGANISM: Primary | ICD-10-CM

## 2024-12-10 PROCEDURE — 99213 OFFICE O/P EST LOW 20 MIN: CPT | Mod: 95 | Performed by: INTERNAL MEDICINE

## 2024-12-10 RX ORDER — PREDNISONE 20 MG/1
20 TABLET ORAL 2 TIMES DAILY
Qty: 10 TABLET | Refills: 0 | Status: SHIPPED | OUTPATIENT
Start: 2024-12-10 | End: 2024-12-15

## 2024-12-10 NOTE — PROGRESS NOTES
"Ziggy is a 49 year old who is being evaluated via a billable video visit.    How would you like to obtain your AVS? MyChart  If the video visit is dropped, the invitation should be resent by: Text to cell phone: [phone]  Will anyone else be joining your video visit? No      Assessment & Plan     Acute bronchitis, unspecified organism  Prednisone prescribed.  Continue OTC Robitussin DM as needed.            BMI  Estimated body mass index is 27.76 kg/m  as calculated from the following:    Height as of 10/22/24: 1.753 m (5' 9\").    Weight as of 10/22/24: 85.3 kg (188 lb).             Subjective   Ziggy is a 49 year old, presenting for the following health issues:  Cough      Video Start Time:  11:42 AM    HPI     Acute Illness  Acute illness concerns: cough  Onset/Duration: a week   Symptoms:  Fever: No  Chills/Sweats: No  Headache (location?): No  Sinus Pressure: No  Conjunctivitis:  No  Ear Pain: no  Rhinorrhea: No  Congestion: YES  Sore Throat: No  Cough: YES-non-productive  Wheeze: YES  Decreased Appetite: No  Nausea: slight  Vomiting: No  Diarrhea: No  Dysuria/Freq.: No  Dysuria or Hematuria: No  Fatigue/Achiness: No  Sick/Strep Exposure: YES- got it from son   Therapies tried and outcome: OTC Robitussin     No H/O Asthma.    Received Bone marrow transplant and is getting Pentamidine neb treatments.      Review of Systems  Constitutional, neuro, ENT, endocrine, pulmonary, cardiac, gastrointestinal, genitourinary, musculoskeletal, integument and psychiatric systems are negative, except as otherwise noted.      Objective           Vitals:  No vitals were obtained today due to virtual visit.    Physical Exam   GENERAL: alert and no distress  EYES: Eyes grossly normal to inspection.  No discharge or erythema, or obvious scleral/conjunctival abnormalities.  RESP: No audible wheeze, cough, or visible cyanosis.    SKIN: Visible skin clear. No significant rash, abnormal pigmentation or lesions.  NEURO: Cranial " nerves grossly intact.  Mentation and speech appropriate for age.  PSYCH: Appropriate affect, tone, and pace of words          Video-Visit Details    Type of service:  Video Visit   Video End Time: 11:46 AM  Originating Location (pt. Location): Home    Distant Location (provider location):  On-site  Platform used for Video Visit: Richard  Signed Electronically by: Gaston Lee MD

## 2024-12-30 ENCOUNTER — LAB (OUTPATIENT)
Dept: LAB | Facility: CLINIC | Age: 49
End: 2024-12-30
Payer: COMMERCIAL

## 2024-12-30 DIAGNOSIS — D46.9 MDS (MYELODYSPLASTIC SYNDROME) (H): ICD-10-CM

## 2024-12-30 LAB
ALBUMIN SERPL BCG-MCNC: 4.1 G/DL (ref 3.5–5.2)
ALP SERPL-CCNC: 102 U/L (ref 40–150)
ALT SERPL W P-5'-P-CCNC: 33 U/L (ref 0–70)
ANION GAP SERPL CALCULATED.3IONS-SCNC: 12 MMOL/L (ref 7–15)
AST SERPL W P-5'-P-CCNC: 26 U/L (ref 0–45)
BASOPHILS # BLD AUTO: 0 10E3/UL (ref 0–0.2)
BASOPHILS NFR BLD AUTO: 1 %
BILIRUB SERPL-MCNC: 0.4 MG/DL
BUN SERPL-MCNC: 47.4 MG/DL (ref 6–20)
CALCIUM SERPL-MCNC: 10.1 MG/DL (ref 8.8–10.4)
CHLORIDE SERPL-SCNC: 108 MMOL/L (ref 98–107)
CREAT SERPL-MCNC: 1.43 MG/DL (ref 0.67–1.17)
EGFRCR SERPLBLD CKD-EPI 2021: 59 ML/MIN/1.73M2
EOSINOPHIL # BLD AUTO: 0.2 10E3/UL (ref 0–0.7)
EOSINOPHIL NFR BLD AUTO: 3 %
ERYTHROCYTE [DISTWIDTH] IN BLOOD BY AUTOMATED COUNT: 12.2 % (ref 10–15)
GLUCOSE SERPL-MCNC: 113 MG/DL (ref 70–99)
HCO3 SERPL-SCNC: 20 MMOL/L (ref 22–29)
HCT VFR BLD AUTO: 44.9 % (ref 40–53)
HGB BLD-MCNC: 15.4 G/DL (ref 13.3–17.7)
IMM GRANULOCYTES # BLD: 0 10E3/UL
IMM GRANULOCYTES NFR BLD: 0 %
LDH SERPL L TO P-CCNC: 178 U/L (ref 0–250)
LYMPHOCYTES # BLD AUTO: 1.9 10E3/UL (ref 0.8–5.3)
LYMPHOCYTES NFR BLD AUTO: 28 %
MCH RBC QN AUTO: 33 PG (ref 26.5–33)
MCHC RBC AUTO-ENTMCNC: 34.3 G/DL (ref 31.5–36.5)
MCV RBC AUTO: 96 FL (ref 78–100)
MONOCYTES # BLD AUTO: 0.6 10E3/UL (ref 0–1.3)
MONOCYTES NFR BLD AUTO: 10 %
NEUTROPHILS # BLD AUTO: 3.9 10E3/UL (ref 1.6–8.3)
NEUTROPHILS NFR BLD AUTO: 58 %
NRBC # BLD AUTO: 0 10E3/UL
NRBC BLD AUTO-RTO: 0 /100
PLATELET # BLD AUTO: 309 10E3/UL (ref 150–450)
POTASSIUM SERPL-SCNC: 4.7 MMOL/L (ref 3.4–5.3)
PROT SERPL-MCNC: 7.5 G/DL (ref 6.4–8.3)
RBC # BLD AUTO: 4.67 10E6/UL (ref 4.4–5.9)
SODIUM SERPL-SCNC: 140 MMOL/L (ref 135–145)
WBC # BLD AUTO: 6.6 10E3/UL (ref 4–11)

## 2024-12-30 PROCEDURE — 36415 COLL VENOUS BLD VENIPUNCTURE: CPT

## 2024-12-30 PROCEDURE — 83615 LACTATE (LD) (LDH) ENZYME: CPT

## 2024-12-30 PROCEDURE — 85025 COMPLETE CBC W/AUTO DIFF WBC: CPT

## 2024-12-30 PROCEDURE — 80053 COMPREHEN METABOLIC PANEL: CPT

## 2024-12-31 NOTE — TELEPHONE ENCOUNTER
Received refill request for pantoprazole from patient. Noticed that only a two month supply was ordered and filled in May, so called him to discuss. He has resumed care from Dr Mcbride and is scheduled to see him on 1/2. Explained to Ziggy that he should speak with him on Thursday and discuss his symptoms. If he still needs to be on this medication, Dr Mcbride will take over filling it. Ziggy voiced understanding.

## 2025-01-02 ENCOUNTER — VIRTUAL VISIT (OUTPATIENT)
Dept: ONCOLOGY | Facility: CLINIC | Age: 50
End: 2025-01-02
Attending: INTERNAL MEDICINE
Payer: COMMERCIAL

## 2025-01-02 VITALS — HEIGHT: 69 IN | BODY MASS INDEX: 26.66 KG/M2 | WEIGHT: 180 LBS

## 2025-01-02 DIAGNOSIS — R79.89 ELEVATED SERUM CREATININE: ICD-10-CM

## 2025-01-02 DIAGNOSIS — Z94.81 STATUS POST BONE MARROW TRANSPLANT (H): ICD-10-CM

## 2025-01-02 DIAGNOSIS — D46.9 MDS (MYELODYSPLASTIC SYNDROME) (H): Primary | ICD-10-CM

## 2025-01-02 ASSESSMENT — PAIN SCALES - GENERAL: PAINLEVEL_OUTOF10: NO PAIN (0)

## 2025-01-02 NOTE — NURSING NOTE
Current patient location: [address] [place] [id number]    Is the patient currently in the state of [place]? YES    Visit mode:VIDEO    If the visit is dropped, the patient can be reconnected by:VIDEO VISIT: Text to cell phone:   Telephone Information:   Mobile [phone]       Will anyone else be joining the visit? NO  (If patient encounters technical issues they should call [phone]    Are changes needed to the allergy or medication list? No    Are refills needed on medications prescribed by this physician? Discuss with provider    Rooming Documentation:  Unable to complete questionnaire(s) due to time    Reason for visit: MIO RASMUSSEN

## 2025-01-02 NOTE — LETTER
1/2/2025      Ziggy Hallman  [address] [facility] [address]      Dear Colleague,    Thank you for referring your patient, Ziggy Hallman, to the [facility]. Please see a copy of my visit note below.    Virtual Visit Details    Type of service:  Video Visit   Video Start Time:  11:21 AM  Video End Time: 11:28 AM    Originating Location (pt. Location): Home    Distant Location (provider location):  Off-site  Platform used for Video Visit: [facility] Hematology and Oncology Progress Note    Patient: Ziggy Hallman  MRN: [id number]  Date of Service: 05/26/2023        Reason for Visit    Chief Complaint   Patient presents with     RECHECK         Problem List Items Addressed This Visit       MDS (myelodysplastic syndrome) (H) - Primary    Status post bone marrow transplant (H)     Other Visit Diagnoses       Elevated serum creatinine                        Assessment and Plan  Myelodysplastic syndrome excess blasts 1  Cytogenetics: 45,XY,-5,add(9)(p13),-17,add(17)(p13),+mar[20]  FISH and comprehensive myeloid neoplasm NGS: Loss of 5 p15, no loss of T p53 noted, FLT3 wild-type  R-IPSS; very high risk if we consider him as complex karyotype (more than 3 abnormalities on cytogenetics along with a bone marrow blast percentage of 5 to 10%)    He finished 4 cycles of Vidaza in December last year.  Following that he underwent allogenic stem cell transplant at the [facility] on 1/25/2024.  Received fludarabine and busulfan as conditioning regimen.  Post transplant period was fairly uncomplicated.    No significant GVH.  He is currently off of immunosuppression.    Post transplant bone marrow biopsy showing complete response with no evidence of increased blasts.  However FISH studies posttransplant showed continued presence of 5p loss (2/26/24 and 3/27/24).    Repeat bone marrow biopsy on 7/24/2024 showing no evidence of any dysplasia.  FISH negative for 5p or  17p loss.    100% donor chimerism noted on 7/24/2024.    He has missed appointments with the transplant team and with cardiology.  Is asked to follow-up with me for surveillance. When I saw him in October he had rash in his left lower extremity.  They looked like shingles to me.  So I prescribed him valacyclovir 1000 mg 3 times a day for a week.  He was supposed to be taking acyclovir prior to that but he was not.  And it looks like after treatment with valacyclovir his symptoms significantly improved and pain went away and the rash also went away.  So in retrospect I think he probably had shingles reactivation.  Again he has not been taking acyclovir since then.  He is getting pentamadine inhalation once a month.  He is due for one tomorrow.  He is approaching 1 year post transplant and has an upcoming appointment with the BMT for bone marrow biopsy and follow-up with them.  He has also not started posttransplant vaccinations.  He has missed appointment with cardiology.  I reiterated the importance of keeping up with the appointments with the different specialties.  His labs from last week reviewed today.  His CBC is completely within normal limits.  His CMP shows elevated creatinine at 1.43.  To me it looks like he is probably a bit dehydrated.  LFTs are within normal limits.  LDH is normal.  He does not have any signs or symptoms of any GVH.  No mouth sores, dry eyes or skin rash.  No shortness of breath.  So from the myeloid malignancy standpoint he appears to be doing well without any evidence of chronic GVH.  I asked him to not to miss the bone marrow biopsy appointment and also follow-up with Dr. Manzanares.  Will try to get vaccination schedule from them so we can restart.  Will also make an appointment with cardiology for him to reestablish.  He has not been taking any of his cardiology medications.  He supposed to be on a low-dose Eliquis and prasugrel.  I will see him back in 3 months with repeat labs.  He is  agreeable to the plan.    With respect to pentamadine prophylaxis, since he is approaching 1 year paras I think tomorrow will be his last dose.  Will again confirm this with the BMT team.    Coronary artery disease  History of CABG  Arterial thrombosis  I asked him to continue prasugrel and apixaban for his previous arterial thromboembolism.  He follows with cardiology.  He missed his appointment. He is also on diuretics and other blood pressure medications which need to be reviewed by them.     Cancer Staging   No matching staging information was found for the patient.      ECOG Performance    0 - Independent         Problem List    Patient Active Problem List   Diagnosis     Hypercoagulable state (H)     CVA (cerebrovascular accident) (H)     Low back pain     MDS (myelodysplastic syndrome) (H)     Antineoplastic chemotherapy induced pancytopenia (H)     Status post bone marrow transplant (H)     Acute ST elevation myocardial infarction (STEMI) (H)     Dyspnea on exertion     Elevated brain natriuretic peptide (BNP) level     Shortness of breath     Localized edema     Anemia, unspecified type     Benign essential hypertension     Chronic heart failure with preserved ejection fraction (H)     S/P patent foramen ovale closure     History of MI (myocardial infarction)        Oncology history  His history of MDS dates back to 2015.      Had worsening hemoglobin in 2023.      Bone marrow biopsy on 5/3/2023:  Myelodysplastic syndrome excess blasts 1  Cytogenetics: 45,XY,-5,add(9)(p13),-17,add(17)(p13),+mar[20]  FISH and comprehensive myeloid neoplasm NGS: Loss of 5 p15, no loss of T p53 noted, FLT3 wild-type  R-IPSS; very high risk if we consider him as complex karyotype (more than 3 abnormalities on cytogenetics along with a bone marrow blast percentage of 5 to 10%)     Received 4 cycles of Vidaza from 8/25/2023 to 12/11/2023    Followed by allogenic stem cell transplant on 1/25/2024    BMT/IEC PROTOCOL   - Chemo Prep:  BU/Flu   - 8/8 DP permissive. Cell dose-9.24x10^6  - ABO: Donor: O+ Recipient A-  (Minor incompatability, no flush required)     Post transplant bone marrow biopsy showing complete response with no evidence of increased blasts.  However FISH studies posttransplant showed continued presence of 5p loss (2/26/24 and 3/27/24).    Repeat bone marrow biopsy on 7/24/2024 showing no evidence of any dysplasia.  FISH negative for 5p or 17p loss.    100% donor chimerism noted on 7/24/2024    Counts fully recovered.  Currently on observation.  Is off of immunosuppression.        Interval History   Ziggy Hallman is a 47 year old male with MDS with excess blasts 1 with complex karyotype status post induction treatment with 4 cycles of Vidaza from August 2023 to December 2023 followed by allogenic matched unrelated donor stem cell transplant on 1/25/2024, who is here for follow-up.    After 4 cycles of Vidaza he underwent allogenic stem cell transplant earlier this year.  Post transplant bone marrow biopsy showed no increase in blasts but FISH studies showed persistence of the 5p.  Serial bone marrow biopsies along with for studies however have shown significant improvement and last bone marrow biopsy from 7/24/2024 and FISH study done on the same day shows no evidence of any dysplasia and cytogenetics was completely normal.  He has 100% donor chimerism.  His counts have fully normalized.  No evidence of any GVH.  Unfortunately he has not kept his follow-ups with the transplant team and no-show to his appointment in July 2024.  He has been asked to continue his care here.     When I saw him 2 months ago he had some skin rash in the legs.  I thought he probably had shingles and I put him on valacyclovir.  He was also supposed to be taking acyclovir 800 mg twice daily for prophylaxis which she was not doing.  He is getting pentamadine inhalation every month for PJP prophylaxis.  He is about a year out from transplant.  Has not  started any posttransplant vaccinations as far as I can see.  Has also missed appointments with cardiology.    But clinically he is doing well.  He is here with repeat labs.  Denies any fevers chills or night sweats.  Did have some URI symptoms recently and was treated as an outpatient.  But these have resolved now.      Review of Systems  A comprehensive review of systems was negative except for what is noted in the interval history    Current Outpatient Medications   Medication Sig Dispense Refill     acyclovir (ZOVIRAX) 800 MG tablet Take 1 tablet (800 mg) by mouth 2 times daily 60 tablet 1     apixaban ANTICOAGULANT (ELIQUIS) 2.5 MG tablet Take 1 tablet (2.5 mg) by mouth 2 times daily 60 tablet 11     cyclobenzaprine (FLEXERIL) 10 MG tablet Take 1 tablet (10 mg) by mouth 3 times daily as needed for muscle spasms 45 tablet 1     diclofenac (VOLTAREN) 1 % topical gel Apply 2 g topically 3 times daily as needed for moderate pain To knees (Patient not taking: Reported on 12/10/2024) 150 g 0     empagliflozin (JARDIANCE) 10 MG TABS tablet Take 1 tablet (10 mg) by mouth daily 90 tablet 1     ketorolac (TORADOL) 10 MG tablet Take 1 tablet (10 mg) by mouth every 6 hours as needed for moderate pain. 20 tablet 0     lisinopril (ZESTRIL) 40 MG tablet Take 0.5 tablets (20 mg) by mouth daily 60 tablet 2     LORazepam (ATIVAN) 0.5 MG tablet Take 1-2 tablets (0.5-1 mg) by mouth every 4 hours as needed for vomiting or nausea (nausea/vomiting/sleep) (Patient not taking: Reported on 12/10/2024) 15 tablet 0     metoprolol succinate ER (TOPROL XL) 100 MG 24 hr tablet Take 1 tablet (100 mg) by mouth daily       NIFEdipine ER (ADALAT CC) 30 MG 24 hr tablet On hold x 4/16 (Patient not taking: Reported on 12/10/2024)       pantoprazole (PROTONIX) 40 MG EC tablet Take 1 tablet (40 mg) by mouth daily 30 tablet 1     prasugrel (EFFIENT) 10 MG TABS tablet Take 1 tablet (10 mg) by mouth daily. 60 tablet 1     spironolactone (ALDACTONE) 25  MG tablet Take 1 tablet (25 mg) by mouth daily (Patient not taking: Reported on 12/10/2024) 30 tablet 0     traZODone (DESYREL) 50 MG tablet Take 1 tablet (50 mg) by mouth at bedtime (Patient not taking: Reported on 12/10/2024) 30 tablet 1     valACYclovir (VALTREX) 1000 mg tablet Take 1 tablet (1,000 mg) by mouth 3 times daily. 21 tablet 0     No current facility-administered medications for this visit.        Physical Exam    Failed to redirect to the Timeline version of the [facility] SmartLink.  General: alert and cooperative      Lab Results    Recent Results (from the past week)   Lactate Dehydrogenase   Result Value Ref Range    Lactate Dehydrogenase 178 0 - 250 U/L   Comprehensive metabolic panel (BMP + Alb, Alk Phos, ALT, AST, Total. Bili, TP)   Result Value Ref Range    Sodium 140 135 - 145 mmol/L    Potassium 4.7 3.4 - 5.3 mmol/L    Carbon Dioxide (CO2) 20 (L) 22 - 29 mmol/L    Anion Gap 12 7 - 15 mmol/L    Urea Nitrogen 47.4 (H) 6.0 - 20.0 mg/dL    Creatinine 1.43 (H) 0.67 - 1.17 mg/dL    GFR Estimate 59 (L) >60 mL/min/1.73m2    Calcium 10.1 8.8 - 10.4 mg/dL    Chloride 108 (H) 98 - 107 mmol/L    Glucose 113 (H) 70 - 99 mg/dL    Alkaline Phosphatase 102 40 - 150 U/L    AST 26 0 - 45 U/L    ALT 33 0 - 70 U/L    Protein Total 7.5 6.4 - 8.3 g/dL    Albumin 4.1 3.5 - 5.2 g/dL    Bilirubin Total 0.4 <=1.2 mg/dL   CBC with platelets and differential   Result Value Ref Range    WBC Count 6.6 4.0 - 11.0 10e3/uL    RBC Count 4.67 4.40 - 5.90 10e6/uL    Hemoglobin 15.4 13.3 - 17.7 g/dL    Hematocrit 44.9 40.0 - 53.0 %    MCV 96 78 - 100 fL    MCH 33.0 26.5 - 33.0 pg    MCHC 34.3 31.5 - 36.5 g/dL    RDW 12.2 10.0 - 15.0 %    Platelet Count 309 150 - 450 10e3/uL    % Neutrophils 58 %    % Lymphocytes 28 %    % Monocytes 10 %    % Eosinophils 3 %    % Basophils 1 %    % Immature Granulocytes 0 %    NRBCs per 100 WBC 0 <1 /100    Absolute Neutrophils 3.9 1.6 - 8.3 10e3/uL    Absolute Lymphocytes 1.9 0.8 - 5.3 10e3/uL     Absolute Monocytes 0.6 0.0 - 1.3 10e3/uL    Absolute Eosinophils 0.2 0.0 - 0.7 10e3/uL    Absolute Basophils 0.0 0.0 - 0.2 10e3/uL    Absolute Immature Granulocytes 0.0 <=0.4 10e3/uL    Absolute NRBCs 0.0 10e3/uL           Imaging    No results found.    Complex patient with history of myeloid malignancy status post allogenic stem cell transplant requiring intensive monitoring and follow-up.    I reviewed each unique lab study personally and independently interpreted the results.      Signed by: Lynda Mcbride MD      Again, thank you for allowing me to participate in the care of your patient.        Sincerely,        Lynda Mcbride MD    Electronically signed

## 2025-01-02 NOTE — PROGRESS NOTES
Virtual Visit Details    Type of service:  Video Visit   Video Start Time:  11:21 AM  Video End Time: 11:28 AM    Originating Location (pt. Location): Home    Distant Location (provider location):  Off-site  Platform used for Video Visit: [facility] Progress Note    Patient: Ziggy Hallman  MRN: [id number]  Date of Service: 05/26/2023        Reason for Visit    Chief Complaint   Patient presents with    RECHECK         Problem List Items Addressed This Visit       MDS (myelodysplastic syndrome) (H) - Primary    Status post bone marrow transplant (H)     Other Visit Diagnoses       Elevated serum creatinine                        Assessment and Plan  Myelodysplastic syndrome excess blasts 1  Cytogenetics: 45,XY,-5,add(9)(p13),-17,add(17)(p13),+mar[20]  FISH and comprehensive myeloid neoplasm NGS: Loss of 5 p15, no loss of T p53 noted, FLT3 wild-type  R-IPSS; very high risk if we consider him as complex karyotype (more than 3 abnormalities on cytogenetics along with a bone marrow blast percentage of 5 to 10%)    He finished 4 cycles of Vidaza in December last year.  Following that he underwent allogenic stem cell transplant at the [facility] on 1/25/2024.  Received fludarabine and busulfan as conditioning regimen.  Post transplant period was fairly uncomplicated.    No significant GVH.  He is currently off of immunosuppression.    Post transplant bone marrow biopsy showing complete response with no evidence of increased blasts.  However FISH studies posttransplant showed continued presence of 5p loss (2/26/24 and 3/27/24).    Repeat bone marrow biopsy on 7/24/2024 showing no evidence of any dysplasia.  FISH negative for 5p or 17p loss.    100% donor chimerism noted on 7/24/2024.    He has missed appointments with the transplant team and with cardiology.  Is asked to follow-up with me for surveillance. When I saw him in October he had rash in his left lower  extremity.  They looked like shingles to me.  So I prescribed him valacyclovir 1000 mg 3 times a day for a week.  He was supposed to be taking acyclovir prior to that but he was not.  And it looks like after treatment with valacyclovir his symptoms significantly improved and pain went away and the rash also went away.  So in retrospect I think he probably had shingles reactivation.  Again he has not been taking acyclovir since then.  He is getting pentamadine inhalation once a month.  He is due for one tomorrow.  He is approaching 1 year post transplant and has an upcoming appointment with the BMT for bone marrow biopsy and follow-up with them.  He has also not started posttransplant vaccinations.  He has missed appointment with cardiology.  I reiterated the importance of keeping up with the appointments with the different specialties.  His labs from last week reviewed today.  His CBC is completely within normal limits.  His CMP shows elevated creatinine at 1.43.  To me it looks like he is probably a bit dehydrated.  LFTs are within normal limits.  LDH is normal.  He does not have any signs or symptoms of any GVH.  No mouth sores, dry eyes or skin rash.  No shortness of breath.  So from the myeloid malignancy standpoint he appears to be doing well without any evidence of chronic GVH.  I asked him to not to miss the bone marrow biopsy appointment and also follow-up with Dr. Manzanares.  Will try to get vaccination schedule from them so we can restart.  Will also make an appointment with cardiology for him to reestablish.  He has not been taking any of his cardiology medications.  He supposed to be on a low-dose Eliquis and prasugrel.  I will see him back in 3 months with repeat labs.  He is agreeable to the plan.    With respect to pentamadine prophylaxis, since he is approaching 1 year paras I think tomorrow will be his last dose.  Will again confirm this with the BMT team.    Coronary artery disease  History of  CABG  Arterial thrombosis  I asked him to continue prasugrel and apixaban for his previous arterial thromboembolism.  He follows with cardiology.  He missed his appointment. He is also on diuretics and other blood pressure medications which need to be reviewed by them.     Cancer Staging   No matching staging information was found for the patient.      ECOG Performance    0 - Independent         Problem List    Patient Active Problem List   Diagnosis    Hypercoagulable state (H)    CVA (cerebrovascular accident) (H)    Low back pain    MDS (myelodysplastic syndrome) (H)    Antineoplastic chemotherapy induced pancytopenia (H)    Status post bone marrow transplant (H)    Acute ST elevation myocardial infarction (STEMI) (H)    Dyspnea on exertion    Elevated brain natriuretic peptide (BNP) level    Shortness of breath    Localized edema    Anemia, unspecified type    Benign essential hypertension    Chronic heart failure with preserved ejection fraction (H)    S/P patent foramen ovale closure    History of MI (myocardial infarction)        Oncology history  His history of MDS dates back to 2015.      Had worsening hemoglobin in 2023.      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 Cell dose-9.24x10^6  - ABO: Donor: O+ Recipient A-  (Minor incompatability, no flush required)     Post transplant bone marrow biopsy showing complete response with no evidence of increased blasts.  However FISH  studies posttransplant showed continued presence of 5p loss (2/26/24 and 3/27/24).    Repeat bone marrow biopsy on 7/24/2024 showing no evidence of any dysplasia.  FISH negative for 5p or 17p loss.    100% donor chimerism noted on 7/24/2024    Counts fully recovered.  Currently on observation.  Is off of immunosuppression.        Interval History   Ziggy Hallman is a 47 year old male with MDS with excess blasts 1 with complex karyotype status post induction treatment with 4 cycles of Vidaza from August 2023 to December 2023 followed by allogenic matched unrelated donor stem cell transplant on 1/25/2024, who is here for follow-up.    After 4 cycles of Vidaza he underwent allogenic stem cell transplant earlier this year.  Post transplant bone marrow biopsy showed no increase in blasts but FISH studies showed persistence of the 5p.  Serial bone marrow biopsies along with for studies however have shown significant improvement and last bone marrow biopsy from 7/24/2024 and FISH study done on the same day shows no evidence of any dysplasia and cytogenetics was completely normal.  He has 100% donor chimerism.  His counts have fully normalized.  No evidence of any GVH.  Unfortunately he has not kept his follow-ups with the transplant team and no-show to his appointment in July 2024.  He has been asked to continue his care here.     When I saw him 2 months ago he had some skin rash in the legs.  I thought he probably had shingles and I put him on valacyclovir.  He was also supposed to be taking acyclovir 800 mg twice daily for prophylaxis which she was not doing.  He is getting pentamadine inhalation every month for PJP prophylaxis.  He is about a year out from transplant.  Has not started any posttransplant vaccinations as far as I can see.  Has also missed appointments with cardiology.    But clinically he is doing well.  He is here with repeat labs.  Denies any fevers chills or night sweats.  Did have some URI symptoms  recently and was treated as an outpatient.  But these have resolved now.      Review of Systems  A comprehensive review of systems was negative except for what is noted in the interval history    Current Outpatient Medications   Medication Sig Dispense Refill    acyclovir (ZOVIRAX) 800 MG tablet Take 1 tablet (800 mg) by mouth 2 times daily 60 tablet 1    apixaban ANTICOAGULANT (ELIQUIS) 2.5 MG tablet Take 1 tablet (2.5 mg) by mouth 2 times daily 60 tablet 11    cyclobenzaprine (FLEXERIL) 10 MG tablet Take 1 tablet (10 mg) by mouth 3 times daily as needed for muscle spasms 45 tablet 1    diclofenac (VOLTAREN) 1 % topical gel Apply 2 g topically 3 times daily as needed for moderate pain To knees (Patient not taking: Reported on 12/10/2024) 150 g 0    empagliflozin (JARDIANCE) 10 MG TABS tablet Take 1 tablet (10 mg) by mouth daily 90 tablet 1    ketorolac (TORADOL) 10 MG tablet Take 1 tablet (10 mg) by mouth every 6 hours as needed for moderate pain. 20 tablet 0    lisinopril (ZESTRIL) 40 MG tablet Take 0.5 tablets (20 mg) by mouth daily 60 tablet 2    LORazepam (ATIVAN) 0.5 MG tablet Take 1-2 tablets (0.5-1 mg) by mouth every 4 hours as needed for vomiting or nausea (nausea/vomiting/sleep) (Patient not taking: Reported on 12/10/2024) 15 tablet 0    metoprolol succinate ER (TOPROL XL) 100 MG 24 hr tablet Take 1 tablet (100 mg) by mouth daily      NIFEdipine ER (ADALAT CC) 30 MG 24 hr tablet On hold x 4/16 (Patient not taking: Reported on 12/10/2024)      pantoprazole (PROTONIX) 40 MG EC tablet Take 1 tablet (40 mg) by mouth daily 30 tablet 1    prasugrel (EFFIENT) 10 MG TABS tablet Take 1 tablet (10 mg) by mouth daily. 60 tablet 1    spironolactone (ALDACTONE) 25 MG tablet Take 1 tablet (25 mg) by mouth daily (Patient not taking: Reported on 12/10/2024) 30 tablet 0    traZODone (DESYREL) 50 MG tablet Take 1 tablet (50 mg) by mouth at bedtime (Patient not taking: Reported on 12/10/2024) 30 tablet 1    valACYclovir  (VALTREX) 1000 mg tablet Take 1 tablet (1,000 mg) by mouth 3 times daily. 21 tablet 0     No current facility-administered medications for this visit.        Physical Exam    Failed to redirect to the Timeline version of the [facility] SmartLink.  General: alert and cooperative      Lab Results    Recent Results (from the past week)   Lactate Dehydrogenase   Result Value Ref Range    Lactate Dehydrogenase 178 0 - 250 U/L   Comprehensive metabolic panel (BMP + Alb, Alk Phos, ALT, AST, Total. Bili, TP)   Result Value Ref Range    Sodium 140 135 - 145 mmol/L    Potassium 4.7 3.4 - 5.3 mmol/L    Carbon Dioxide (CO2) 20 (L) 22 - 29 mmol/L    Anion Gap 12 7 - 15 mmol/L    Urea Nitrogen 47.4 (H) 6.0 - 20.0 mg/dL    Creatinine 1.43 (H) 0.67 - 1.17 mg/dL    GFR Estimate 59 (L) >60 mL/min/1.73m2    Calcium 10.1 8.8 - 10.4 mg/dL    Chloride 108 (H) 98 - 107 mmol/L    Glucose 113 (H) 70 - 99 mg/dL    Alkaline Phosphatase 102 40 - 150 U/L    AST 26 0 - 45 U/L    ALT 33 0 - 70 U/L    Protein Total 7.5 6.4 - 8.3 g/dL    Albumin 4.1 3.5 - 5.2 g/dL    Bilirubin Total 0.4 <=1.2 mg/dL   CBC with platelets and differential   Result Value Ref Range    WBC Count 6.6 4.0 - 11.0 10e3/uL    RBC Count 4.67 4.40 - 5.90 10e6/uL    Hemoglobin 15.4 13.3 - 17.7 g/dL    Hematocrit 44.9 40.0 - 53.0 %    MCV 96 78 - 100 fL    MCH 33.0 26.5 - 33.0 pg    MCHC 34.3 31.5 - 36.5 g/dL    RDW 12.2 10.0 - 15.0 %    Platelet Count 309 150 - 450 10e3/uL    % Neutrophils 58 %    % Lymphocytes 28 %    % Monocytes 10 %    % Eosinophils 3 %    % Basophils 1 %    % Immature Granulocytes 0 %    NRBCs per 100 WBC 0 <1 /100    Absolute Neutrophils 3.9 1.6 - 8.3 10e3/uL    Absolute Lymphocytes 1.9 0.8 - 5.3 10e3/uL    Absolute Monocytes 0.6 0.0 - 1.3 10e3/uL    Absolute Eosinophils 0.2 0.0 - 0.7 10e3/uL    Absolute Basophils 0.0 0.0 - 0.2 10e3/uL    Absolute Immature Granulocytes 0.0 <=0.4 10e3/uL    Absolute NRBCs 0.0 10e3/uL           Imaging    No results  found.    Complex patient with history of myeloid malignancy status post allogenic stem cell transplant requiring intensive monitoring and follow-up.    I reviewed each unique lab study personally and independently interpreted the results.      Signed by: Lynda Mcbride MD

## 2025-01-02 NOTE — LETTER
1/2/2025      Ziggy Hallman  [address] [address]      Dear Colleague,    Thank you for referring your patient, Ziggy Hallman, to the [facility]. Please see a copy of my visit note below.    Virtual Visit Details    Type of service:  Video Visit   Video Start Time:  11:21 AM  Video End Time: 11:28 AM    Originating Location (pt. Location): Home    Distant Location (provider location):  Off-site  Platform used for Video Visit: [facility] Hematology and Oncology Progress Note    Patient: Ziggy Hallman  MRN: [id number]  Date of Service: 05/26/2023        Reason for Visit    Chief Complaint   Patient presents with     RECHECK         Problem List Items Addressed This Visit       MDS (myelodysplastic syndrome) (H) - Primary    Status post bone marrow transplant (H)     Other Visit Diagnoses       Elevated serum creatinine                        Assessment and Plan  Myelodysplastic syndrome excess blasts 1  Cytogenetics: 45,XY,-5,add(9)(p13),-17,add(17)(p13),+mar[20]  FISH and comprehensive myeloid neoplasm NGS: Loss of 5 p15, no loss of T p53 noted, FLT3 wild-type  R-IPSS; very high risk if we consider him as complex karyotype (more than 3 abnormalities on cytogenetics along with a bone marrow blast percentage of 5 to 10%)    He finished 4 cycles of Vidaza in December last year.  Following that he underwent allogenic stem cell transplant at the [facility] on 1/25/2024.  Received fludarabine and busulfan as conditioning regimen.  Post transplant period was fairly uncomplicated.    No significant GVH.  He is currently off of immunosuppression.    Post transplant bone marrow biopsy showing complete response with no evidence of increased blasts.  However FISH studies posttransplant showed continued presence of 5p loss (2/26/24 and 3/27/24).    Repeat bone marrow biopsy on 7/24/2024 showing no evidence of any dysplasia.  FISH negative for 5p or  17p loss.    100% donor chimerism noted on 7/24/2024.    He has missed appointments with the transplant team and with cardiology.  Is asked to follow-up with me for surveillance. When I saw him in October he had rash in his left lower extremity.  They looked like shingles to me.  So I prescribed him valacyclovir 1000 mg 3 times a day for a week.  He was supposed to be taking acyclovir prior to that but he was not.  And it looks like after treatment with valacyclovir his symptoms significantly improved and pain went away and the rash also went away.  So in retrospect I think he probably had shingles reactivation.  Again he has not been taking acyclovir since then.  He is getting pentamadine inhalation once a month.  He is due for one tomorrow.  He is approaching 1 year post transplant and has an upcoming appointment with the BMT for bone marrow biopsy and follow-up with them.  He has also not started posttransplant vaccinations.  He has missed appointment with cardiology.  I reiterated the importance of keeping up with the appointments with the different specialties.  His labs from last week reviewed today.  His CBC is completely within normal limits.  His CMP shows elevated creatinine at 1.43.  To me it looks like he is probably a bit dehydrated.  LFTs are within normal limits.  LDH is normal.  He does not have any signs or symptoms of any GVH.  No mouth sores, dry eyes or skin rash.  No shortness of breath.  So from the myeloid malignancy standpoint he appears to be doing well without any evidence of chronic GVH.  I asked him to not to miss the bone marrow biopsy appointment and also follow-up with Dr. Manzanares.  Will try to get vaccination schedule from them so we can restart.  Will also make an appointment with cardiology for him to reestablish.  He has not been taking any of his cardiology medications.  He supposed to be on a low-dose Eliquis and prasugrel.  I will see him back in 3 months with repeat labs.  He is  agreeable to the plan.    With respect to pentamadine prophylaxis, since he is approaching 1 year paras I think tomorrow will be his last dose.  Will again confirm this with the BMT team.    Coronary artery disease  History of CABG  Arterial thrombosis  I asked him to continue prasugrel and apixaban for his previous arterial thromboembolism.  He follows with cardiology.  He missed his appointment. He is also on diuretics and other blood pressure medications which need to be reviewed by them.     Cancer Staging   No matching staging information was found for the patient.      ECOG Performance    0 - Independent         Problem List    Patient Active Problem List   Diagnosis     Hypercoagulable state (H)     CVA (cerebrovascular accident) (H)     Low back pain     MDS (myelodysplastic syndrome) (H)     Antineoplastic chemotherapy induced pancytopenia (H)     Status post bone marrow transplant (H)     Acute ST elevation myocardial infarction (STEMI) (H)     Dyspnea on exertion     Elevated brain natriuretic peptide (BNP) level     Shortness of breath     Localized edema     Anemia, unspecified type     Benign essential hypertension     Chronic heart failure with preserved ejection fraction (H)     S/P patent foramen ovale closure     History of MI (myocardial infarction)        Oncology history  His history of MDS dates back to 2015.      Had worsening hemoglobin in 2023.      Bone marrow biopsy on 5/3/2023:  Myelodysplastic syndrome excess blasts 1  Cytogenetics: 45,XY,-5,add(9)(p13),-17,add(17)(p13),+mar[20]  FISH and comprehensive myeloid neoplasm NGS: Loss of 5 p15, no loss of T p53 noted, FLT3 wild-type  R-IPSS; very high risk if we consider him as complex karyotype (more than 3 abnormalities on cytogenetics along with a bone marrow blast percentage of 5 to 10%)     Received 4 cycles of Vidaza from 8/25/2023 to 12/11/2023    Followed by allogenic stem cell transplant on 1/25/2024    BMT/IEC PROTOCOL   - Chemo Prep:  BU/Flu   - 8/8 DP permissive. Cell dose-9.24x10^6  - ABO: Donor: O+ Recipient A-  (Minor incompatability, no flush required)     Post transplant bone marrow biopsy showing complete response with no evidence of increased blasts.  However FISH studies posttransplant showed continued presence of 5p loss (2/26/24 and 3/27/24).    Repeat bone marrow biopsy on 7/24/2024 showing no evidence of any dysplasia.  FISH negative for 5p or 17p loss.    100% donor chimerism noted on 7/24/2024    Counts fully recovered.  Currently on observation.  Is off of immunosuppression.        Interval History   Ziggy Hallman is a 47 year old male with MDS with excess blasts 1 with complex karyotype status post induction treatment with 4 cycles of Vidaza from August 2023 to December 2023 followed by allogenic matched unrelated donor stem cell transplant on 1/25/2024, who is here for follow-up.    After 4 cycles of Vidaza he underwent allogenic stem cell transplant earlier this year.  Post transplant bone marrow biopsy showed no increase in blasts but FISH studies showed persistence of the 5p.  Serial bone marrow biopsies along with for studies however have shown significant improvement and last bone marrow biopsy from 7/24/2024 and FISH study done on the same day shows no evidence of any dysplasia and cytogenetics was completely normal.  He has 100% donor chimerism.  His counts have fully normalized.  No evidence of any GVH.  Unfortunately he has not kept his follow-ups with the transplant team and no-show to his appointment in July 2024.  He has been asked to continue his care here.     When I saw him 2 months ago he had some skin rash in the legs.  I thought he probably had shingles and I put him on valacyclovir.  He was also supposed to be taking acyclovir 800 mg twice daily for prophylaxis which she was not doing.  He is getting pentamadine inhalation every month for PJP prophylaxis.  He is about a year out from transplant.  Has not  started any posttransplant vaccinations as far as I can see.  Has also missed appointments with cardiology.    But clinically he is doing well.  He is here with repeat labs.  Denies any fevers chills or night sweats.  Did have some URI symptoms recently and was treated as an outpatient.  But these have resolved now.      Review of Systems  A comprehensive review of systems was negative except for what is noted in the interval history    Current Outpatient Medications   Medication Sig Dispense Refill     acyclovir (ZOVIRAX) 800 MG tablet Take 1 tablet (800 mg) by mouth 2 times daily 60 tablet 1     apixaban ANTICOAGULANT (ELIQUIS) 2.5 MG tablet Take 1 tablet (2.5 mg) by mouth 2 times daily 60 tablet 11     cyclobenzaprine (FLEXERIL) 10 MG tablet Take 1 tablet (10 mg) by mouth 3 times daily as needed for muscle spasms 45 tablet 1     diclofenac (VOLTAREN) 1 % topical gel Apply 2 g topically 3 times daily as needed for moderate pain To knees (Patient not taking: Reported on 12/10/2024) 150 g 0     empagliflozin (JARDIANCE) 10 MG TABS tablet Take 1 tablet (10 mg) by mouth daily 90 tablet 1     ketorolac (TORADOL) 10 MG tablet Take 1 tablet (10 mg) by mouth every 6 hours as needed for moderate pain. 20 tablet 0     lisinopril (ZESTRIL) 40 MG tablet Take 0.5 tablets (20 mg) by mouth daily 60 tablet 2     LORazepam (ATIVAN) 0.5 MG tablet Take 1-2 tablets (0.5-1 mg) by mouth every 4 hours as needed for vomiting or nausea (nausea/vomiting/sleep) (Patient not taking: Reported on 12/10/2024) 15 tablet 0     metoprolol succinate ER (TOPROL XL) 100 MG 24 hr tablet Take 1 tablet (100 mg) by mouth daily       NIFEdipine ER (ADALAT CC) 30 MG 24 hr tablet On hold x 4/16 (Patient not taking: Reported on 12/10/2024)       pantoprazole (PROTONIX) 40 MG EC tablet Take 1 tablet (40 mg) by mouth daily 30 tablet 1     prasugrel (EFFIENT) 10 MG TABS tablet Take 1 tablet (10 mg) by mouth daily. 60 tablet 1     spironolactone (ALDACTONE) 25  MG tablet Take 1 tablet (25 mg) by mouth daily (Patient not taking: Reported on 12/10/2024) 30 tablet 0     traZODone (DESYREL) 50 MG tablet Take 1 tablet (50 mg) by mouth at bedtime (Patient not taking: Reported on 12/10/2024) 30 tablet 1     valACYclovir (VALTREX) 1000 mg tablet Take 1 tablet (1,000 mg) by mouth 3 times daily. 21 tablet 0     No current facility-administered medications for this visit.        Physical Exam    Failed to redirect to the Timeline version of the [facility] SmartLink.  General: alert and cooperative      Lab Results    Recent Results (from the past week)   Lactate Dehydrogenase   Result Value Ref Range    Lactate Dehydrogenase 178 0 - 250 U/L   Comprehensive metabolic panel (BMP + Alb, Alk Phos, ALT, AST, Total. Bili, TP)   Result Value Ref Range    Sodium 140 135 - 145 mmol/L    Potassium 4.7 3.4 - 5.3 mmol/L    Carbon Dioxide (CO2) 20 (L) 22 - 29 mmol/L    Anion Gap 12 7 - 15 mmol/L    Urea Nitrogen 47.4 (H) 6.0 - 20.0 mg/dL    Creatinine 1.43 (H) 0.67 - 1.17 mg/dL    GFR Estimate 59 (L) >60 mL/min/1.73m2    Calcium 10.1 8.8 - 10.4 mg/dL    Chloride 108 (H) 98 - 107 mmol/L    Glucose 113 (H) 70 - 99 mg/dL    Alkaline Phosphatase 102 40 - 150 U/L    AST 26 0 - 45 U/L    ALT 33 0 - 70 U/L    Protein Total 7.5 6.4 - 8.3 g/dL    Albumin 4.1 3.5 - 5.2 g/dL    Bilirubin Total 0.4 <=1.2 mg/dL   CBC with platelets and differential   Result Value Ref Range    WBC Count 6.6 4.0 - 11.0 10e3/uL    RBC Count 4.67 4.40 - 5.90 10e6/uL    Hemoglobin 15.4 13.3 - 17.7 g/dL    Hematocrit 44.9 40.0 - 53.0 %    MCV 96 78 - 100 fL    MCH 33.0 26.5 - 33.0 pg    MCHC 34.3 31.5 - 36.5 g/dL    RDW 12.2 10.0 - 15.0 %    Platelet Count 309 150 - 450 10e3/uL    % Neutrophils 58 %    % Lymphocytes 28 %    % Monocytes 10 %    % Eosinophils 3 %    % Basophils 1 %    % Immature Granulocytes 0 %    NRBCs per 100 WBC 0 <1 /100    Absolute Neutrophils 3.9 1.6 - 8.3 10e3/uL    Absolute Lymphocytes 1.9 0.8 - 5.3 10e3/uL     Absolute Monocytes 0.6 0.0 - 1.3 10e3/uL    Absolute Eosinophils 0.2 0.0 - 0.7 10e3/uL    Absolute Basophils 0.0 0.0 - 0.2 10e3/uL    Absolute Immature Granulocytes 0.0 <=0.4 10e3/uL    Absolute NRBCs 0.0 10e3/uL           Imaging    No results found.    Complex patient with history of myeloid malignancy status post allogenic stem cell transplant requiring intensive monitoring and follow-up.    I reviewed each unique lab study personally and independently interpreted the results.      Signed by: Lynda Mcbride MD      Again, thank you for allowing me to participate in the care of your patient.        Sincerely,        Lynda Mcbride MD    Electronically signed

## 2025-01-24 ENCOUNTER — LAB (OUTPATIENT)
Dept: LAB | Facility: CLINIC | Age: 50
End: 2025-01-24
Attending: INTERNAL MEDICINE
Payer: COMMERCIAL

## 2025-01-24 DIAGNOSIS — D46.9 MDS (MYELODYSPLASTIC SYNDROME) (H): Primary | ICD-10-CM

## 2025-01-24 LAB — EBV DNA SERPL NAA+PROBE-ACNC: NOT DETECTED IU/ML

## 2025-01-24 PROCEDURE — 36415 COLL VENOUS BLD VENIPUNCTURE: CPT

## 2025-01-24 PROCEDURE — 87799 DETECT AGENT NOS DNA QUANT: CPT

## 2025-01-28 ENCOUNTER — VIRTUAL VISIT (OUTPATIENT)
Dept: TRANSPLANT | Facility: CLINIC | Age: 50
End: 2025-01-28
Attending: INTERNAL MEDICINE
Payer: COMMERCIAL

## 2025-01-28 VITALS — BODY MASS INDEX: 26.96 KG/M2 | WEIGHT: 182 LBS | HEIGHT: 69 IN

## 2025-01-28 DIAGNOSIS — Z91.89 AT HIGH RISK FOR OSTEOPOROSIS: ICD-10-CM

## 2025-01-28 DIAGNOSIS — D46.9 MDS (MYELODYSPLASTIC SYNDROME) (H): ICD-10-CM

## 2025-01-28 DIAGNOSIS — I50.32 CHRONIC HEART FAILURE WITH PRESERVED EJECTION FRACTION (H): ICD-10-CM

## 2025-01-28 DIAGNOSIS — Z91.89 AT RISK FOR HYPOTHYROIDISM: ICD-10-CM

## 2025-01-28 DIAGNOSIS — Z94.81 STATUS POST BONE MARROW TRANSPLANT (H): Primary | ICD-10-CM

## 2025-01-28 DIAGNOSIS — Z92.21 STATUS POST CHEMOTHERAPY: ICD-10-CM

## 2025-01-28 DIAGNOSIS — Z92.89 HISTORY OF BLOOD TRANSFUSION: ICD-10-CM

## 2025-01-28 PROCEDURE — 98007 SYNCH AUDIO-VIDEO EST HI 40: CPT | Performed by: NURSE PRACTITIONER

## 2025-01-28 ASSESSMENT — PAIN SCALES - GENERAL: PAINLEVEL_OUTOF10: NO PAIN (0)

## 2025-01-28 NOTE — PROGRESS NOTES
Virtual Visit Details    Type of service:  Video Visit   Video Start Time: 9:00 am  Video End Time: 9:50 am    Originating Location (pt. Location): Home  {PROVIDER LOCATION On-site should be selected for visits conducted from your clinic location or adjoining [facility] hospital, academic office, or other nearby [facility] building. Off-site should be selected for all other provider locations, including home:[id number]}  Distant Location (provider location):  Off-site  Platform used for Video Visit: [id number]

## 2025-01-28 NOTE — LETTER
1/28/2025      Ziggy Hallman  [address] [address]      Dear Colleague,    Thank you for referring your patient, Ziggy Hallman, to the [facility] [place]. Please see a copy of my visit note below.        Is the patient currently in the state of [place]? YES    Visit mode: VIDEO converted to phone call    If the visit is dropped, the patient can be reconnected by:VIDEO VISIT: Send to e-mail at: [email or web address]    Will anyone else be joining the visit? NO  (If patient encounters technical issues they should call [phone] :[id number])    Are changes needed to the allergy or medication list? No    Are refills needed on medications prescribed by this physician? NO    Rooming Documentation:  Questionnaire(s) completed    Reason for visit: Video Visit (Follow up )    Maren Cordero VVF       Virtual Visit Details    Type of service:  Video Visit   Video Start Time: 9:00 am  Video End Time: 9:50 am    Originating Location (pt. Location): Home  {PROVIDER LOCATION On-site should be selected for visits conducted from your clinic location or adjoining [facility] hospital, academic office, or other nearby [facility] building. Off-site should be selected for all other provider locations, including home:[id number]}  Distant Location (provider location):  Off-site  Platform used for Video Visit: [facility]          BMT 1-Year Post-Allogeneic BMT   Survivorship Care Plan    Date: January 28, 2025    Treatment Team:   Patient Care Team:  Radha Quinn NP as PCP - General (Family Medicine)  Taran Bacon MD as BMT Physician (Transplant)  Kandy العلي RN as BMT Nurse Coordinator (Transplant)  Elijah Canchola MD as Fellow (Cardiovascular Disease)  Matt Rojas MD as MD (Cardiovascular Disease)  Felice Cobb MD as Assigned Musculoskeletal Provider  Matt Rojas MD as Assigned Heart and Vascular Provider  Radha Quinn NP as Assigned  "PCP  Taran Bacon MD as Assigned Cancer Care Provider    Placed referral to PCP at the [place] [place] [facility].     BMT Transplant Date: Allo BMT Txp; Day 1y (1/25/24)    Patient ID:  Ziggy Hallman is a 49 year old male, currently day 1-year post HSCT.     CC: Ziggy Hallman was seen in the BMT Survivorship clinic on January 28, 2025 for a comprehensive, 1-year post allogeneic stem cell transplant, consultation and evaluation for transplant late effects. This is a 60-minute visit designed to educate patients about recommended follow-up care based on contemporary clinical practice guidelines, create a care plan that can be used as a guide to follow up care for the patient and their care team, and place any screening or diagnostic tests recommended for the screening of post-transplant complications based on patient risk and transplant type. All recommendations are outlined in the note below. Any tests not ordered during this visit are traditionally performed by the patient's PCP. All patients are advised to schedule a routine preventative care or \"Well Visit\" with their PCP if they have not done so already in the past year since transplant, to discuss these current and future recommendations.    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  Following that he underwent allogenic stem cell transplant at the [facility] on 1/25/2024.  Received fludarabine and busulfan as conditioning regimen.  Post transplant period was fairly uncomplicated.     No significant GVH.  He is currently off " "of immunosuppression.     Post transplant bone marrow biopsy showing complete response with no evidence of increased blasts.  However FISH studies posttransplant showed continued presence of 5p loss (2/26/24 and 3/27/24).     Repeat bone marrow biopsy on 7/24/2024 showing no evidence of any dysplasia.  FISH negative for 5p or 17p loss.     100% donor chimerism noted on 7/24/2024.     He has missed appointments with the transplant team and with cardiology. Currently being followed by local oncology team and BMT for anniversary visits.     With respect to pentamadine prophylaxis, since he is approaching 1 year paras I think tomorrow will be his last dose.    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  He follows with cardiology. He was advised to reschedule a missed appointment with the cardiology team. He is also on diuretics and other blood pressure medications which need to be reviewed by them.    HPI: Ziggy states he has been feeling well overall. He is currently on disability and has not returned to work since the stem cell transplant. Currently, he is living with his sons, 23 yrs old and 19 yrs old. Notes he has been eating a regular diet and continues to exercises daily. He denied any significant mental health, wellness, or sexual health concerns.     Medical History:  Past Medical History:   Diagnosis Date     AMI anterior wall (H)     Mid LAD on cath with stent     CAD S/P percutaneous coronary angioplasty 07/01/2016    Unstable agina with anterior myocardial damage reported without mycardial damage by patient's history     CVA (cerebrovascular accident) (H) 01/01/2012    Reporting thromboembolic episode on the right neck. Pt states \"I've had ten strokes.\"     Hypercoagulable state     Uncertain etiology--seeing Hematologist     MEDICAL HISTORY OF -     Possibly PFO       Surgical History:  Past Surgical History:   Procedure Laterality Date     AMPUTATE " TOE(S)       ARTHROSCOPY KNEE RT/LT      Right      ARTHROSCOPY KNEE RT/LT      Left     CARDIAC CATHERIZATION      With stent placement     ESOPHAGOSCOPY, GASTROSCOPY, DUODENOSCOPY (EGD), COMBINED N/A 2/19/2024    Procedure: Esophagoscopy, gastroscopy, duodenoscopy (EGD), combined;  Surgeon: Drew Jasso MD;  Location: [facility]     IR CHEST PORT PLACEMENT > 5 YRS OF AGE  9/13/2023     IR CVC TUNNEL PLACEMENT > 5 YRS OF AGE  1/19/2024     IR CVC TUNNEL REMOVAL LEFT  3/21/2024       Family History:  Family History   Problem Relation Age of Onset     Diabetes No family hx of      Coronary Artery Disease No family hx of      Hypertension No family hx of      Hyperlipidemia No family hx of      Breast Cancer No family hx of        Social History:  Social History     Socioeconomic History     Marital status: Single     Spouse name: Not on file     Number of children: Not on file     Years of education: Not on file     Highest education level: Not on file   Occupational History     Not on file   Tobacco Use     Smoking status: Former     Current packs/day: 0.50     Types: Cigarettes     Passive exposure: Past (Mom smoked cigs indoors)     Smokeless tobacco: Never     Tobacco comments:     cigarette once in a while   Substance and Sexual Activity     Alcohol use: Yes     Alcohol/week: 0.0 standard drinks of alcohol     Comment: 4 times per week 5 drinks     Drug use: No     Sexual activity: Yes     Partners: Female   Other Topics Concern     Parent/sibling w/ CABG, MI or angioplasty before 65F 55M? No   Social History Narrative     Not on file     Social Drivers of Health     Financial Resource Strain: Low Risk  (6/7/2022)    [facility], [facility]    Financial Resource Strain      Difficulty of Paying Living Expenses: 3      Difficulty of Paying Living Expenses: Not on file   Food Insecurity: No Food Insecurity (6/7/2022)    Received from  [facility], [facility]    Food Insecurity      Worried About Running Out of Food in the Last Year: 1   Transportation Needs: No Transportation Needs (6/7/2022)    Received from [facility], [facility]    Transportation Needs      Lack of Transportation (Medical): 1   Physical Activity: Not on file   Stress: Not on file   Social Connections: Unknown (6/12/2023)    Received from [facility], [facility]    Social Connections      Frequency of Communication with Friends and Family: Not on file   Interpersonal Safety: Not on file   Housing Stability: Low Risk  (6/7/2022)    Received from [facility], [facility]    Housing Stability      Unable to Pay for Housing in the Last Year: 1       Survivorship Care Plan:     This individualized care plan is designed to inform you and your healthcare team of the recommended follow-up visits, tests, health maintenance activities, and cancer screening you should receive after transplant.     General Health Maintenance:     Call the BMT clinic if you develop a skin rash, oral sores, eye pain or excessive dryness, shortness of breath, new cough, bleeding, diarrhea, nausea, or vomiting.   Vaccinations should be given at 1 and 2 years after your transplant, these may be given at your annual anniversary visits in the BMT clinic, by your primary care provider, or your local oncologist. An exception is the influenza vaccine, which can be given after day +60 post-transplant during influenza season. See table below for more information.   You can receive the COVID19 vaccine if you have not already, for more information on how to sign up for an appointment you can the [employer] vaccine website at  https://Plexisoft.org/covid19/covid19-vaccine or the        [place] Department of Health Vaccine Connector portal at https://[place].gov/covid19/vaccine/connector/connector.jsp  For general health concerns you can be seen by your primary care provider.   If you have questions about your transplant or follow up tests contact your BMT RN coordinator.      Vaccine Administration Schedule       Vaccinations Post-BMT: Please refer to our [facility] BMT Vaccination Guidelines updated in March of 2021.         Xskaj-tu-Iyqz-Disease Screening:    Has Ziggy Hallman been diagnosed with chronic GVHD? No    Immune System:  Risks Preventative Measures Recommendations   Infections, viral reactivations Continue to take prescribed medications to prevent infection if you are treated for otcbe-ay-buzo disease  Symptoms of a cold such as fever, cough, congestion, and shortness of breath should be reported to your primary care provider  Immunizations will be administered at 1 & 2 years after transplant. See schedule above. Other vaccines were discussed. He will receive them at his anniversary visit.     Eyes:   Risks Preventative Measures Recommendations   Cataracts, dry eyes, GVH of the eyes, viral infections, and other eye changes Yearly eye exam   Screening and treatment for high blood pressure or diabetes  Call if you have eye pain, visual changes, or floaters immediately  Call If you are experiencing dry eyes and symptoms do not improve with Refresh eye drops  Patient will schedule an annual routine exam with community ophthalmologist    Last eye exam was just over 1 year ago.      Mouth:  Risks Preventative Measures Recommendations   Dry mouth, oral GVH, cavities, and oral cancer Report any new symptoms such as dry mouth or painful sores   You can use over the counter Biotene for dry mouth or try sucking on sour candy before meals  Get a dental checkup every year and a cleaning every 6 months  If you have a prosthetic  heart valve, central venous catheter or  port , or are still taking immunosuppression you may need to take an antibiotic before your dental visits. None at this time, patient has dental provider and will schedule routine exam    Recommended he contact a local dentist and set up an appointment.       Lungs  Risks Preventative Measures Recommendations   Changes in function from chemotherapy or radiation; lung infections (pneumonia) Tell your provider about difficulty breathing, a cough, or new shortness of breath   Avoid use of tobacco products or smoking  Routine lung exams   Pulmonary Function Tests (Recommended annually post-transplant)    Placed order for 1-year PFT test. Going forward recommend PCP order PFT's annually or anytime the patient becomes symptomatic.       Heart and Blood Vessels  Heart Disease Risk Score: The ASCVD Risk score (Rayray COMBS, et al., 2019) failed to calculate for the following reasons:    Risk score cannot be calculated because patient has a medical history suggesting prior/existing ASCVD  Risks Preventative Measures Recommendations   Damage from chemotherapy and/or radiation; early development of heart valve disease  Ask your provider if you should receive consultation from a cardiologist (heart doctor) or have special screening  Follow a  heart healthy  lifestyle. For more information visit the National Heart, Lung, and Blood Helotes website at: https://www.nhlbi.nih.gov/health/health-topics/topics/heart-healthy-lifestyle-changes   Don't smoke. If you currently smoke and are ready to quit, we can help you find ways to quit  Maintain a healthy weight  Exercise regularly  Avoid foods that have high amounts of:  Salt/sodium (less than 2,300 mg of sodium per day)  Saturated and trans fats  Limit alcohol to less than 1 drink for women and 2 drinks for men per day  Sugar such as soft drinks or sugary snacks Fasting Lipid Profile or Direct LDL (Recommended annually)    Recommend he make an  appointment with his PCP. He will need a fasting lipid profile annually going forward.       Hormones  Risks Preventative Measures Recommendations   Low thyroid function, low function of other glands (adrenals and others) Certain endocrine/hormone disorders are more common after transplant. For this reason, talk to your provider about:  Fatigue  Muscle weakness  Changes in cold tolerance  Reduced interest in sex  Erectile dysfunction   Certain tests may be used to monitor for hormone changes if you are experiencing symptoms. These tests are done at 1 year  If you have been on steroids you will need to slowly taper or decrease the dose as recommended by your provider/pharmacist. Do not stop taking steroids without consulting with your provider.   If you have been on steroids in the past, you may need steroids during periods of illness FSH/LH (Women, Recommended 1 year post-transplant, if post-menopausal, or concerned about infertility), Testosterone/FSH/LH (Men, Ordered based on symptoms), Fasting Glucose (Recommended 6 mos & annually post-transplant), and Hgb A1C (Recommended annually post-transplant)      Placed order for TSH and testosterone levels. Recommend PCP check fasting glucose and HgbA1c annually going forward.      Liver:  Risks Preventative Measures Recommendations      Damage from chemotherapy or other drugs, buildup of iron from blood transfusions, infections, and GVHD   Certain tests may be ordered at your next survivorship visit to evaluate liver function based on your individual risk factors.  Talk to your provider before taking herbal supplements or over the counter drugs like Tylenol.  Ask your doctor if you should be treated for iron overload  Avoid alcohol in excess     Hepatic Panel/LFTs (Recommended every 3-6 mos the 1st year post-transplant & annually) and Serum Ferritin (Recommended 1 year post-transplant)       Kidneys and Bladder:  Risks Preventative Measures Recommendations   Damage from  chemotherapy or other drugs, infections, high blood pressure Monitoring blood tests of kidney function during follow up visits  Treating high blood pressure and diabetes   Drink adequate amounts of water  Report symptoms of infection such as frequent urination, pain with urination, foul odor to urine, or blood in the urine  Talk to your provider before taking herbal supplements or over the counter drugs like Ibuprofen Serum creatinine checked as part of anniversary labs or at least annually by primary provider       Nervous System:  Risks Preventative Measures Recommendations   Neuropathy from chemotherapy, changes in cognitive function Report changes in sensation or discomfort in feet or hands  Tell your provider about ongoing changes in memory, ability to concentrate, or inability to make decisions   None at this time       Muscle & Connective Tissue  Risks Preventative Measures Recommendations   Reduced muscle strength, reduced stamina, GVHD causing hardening of muscle tissues (scleroderma) or inflammation of tissue over muscles (fascitis)  Let your provider know if:  You notice changes in your muscle strength  Require extra assistance with daily activities  Experience pain or difficult bending or moving joints  If you have been on steroids and are experiencing weakness particularly when standing up from a chair   Need help creating an exercise routine  Notice reduced range of motion of the arms, hips, or legs  Follow general guidelines for physical activity as recommended by the Office of Disease Prevention & Health Promotion:    Avoid Inactivity  Some physical activity is better than none -- any amount has benefits.    Do Aerobic Activity  Do aerobic physical activity in episodes of at least 10 minutes, as many times as possible per day. This could include going for walks or using the elliptical or stationary bike.  Ask your doctor what aerobic activities would be safe and helpful for you, and set a goal for  yourself!    Strengthen Muscles  Do muscle-strengthening activities (such as lifting light weights or using resistance bands and/or going up and down stairs) that are moderate or high intensity and involve all major muscle groups at least 4 days a week. Bone Scan (Recommended 1 year) and Calcium & Vitamin D Levels (Recommended annually)    Placed order for DXA and Vitamin D level.    Recommend PCP check Vitamin D level yearly going forward.     Emotional Health  Risks Preventative Measures Recommendations   Stress, depression, anxiety Talk to your provider about:  Changes in feelings, mood, or emotional wellbeing  Interest in support groups  If you are concerned about your caregivers emotional wellbeing  Desire to speak with a counselor for ongoing support  None at this time       Sexual Health  Risks Preventative Measures Recommendations   Reduced libido due to hormonal changes, erectile dysfunction, vaginal dryness, vaginal meptp-ad-sksf disease, vaginal infections, sexually transmitted diseases Talk to your provider about:  Reduced libido or concerns regarding your sexual health  Men: Erectile dysfunction   Women: Vaginal dryness, pain during intercourse, vaginal bleeding after intercourse, changes in vaginal discharge that may indicate infection (green/white/foul odor)   General Recommendations:  It is safe to have sex if your platelet count is > 50,000 and you feel physically and emotionally ready   Women can use water-based lubricants to reduce discomfort from dryness. Prescription topical estrogen may help as well.  Use barrier protection such as condoms to prevent sexually transmitted diseases, you are at higher risk for infections due to a weakened immune system  For more information check out the National Marrow Donor Program web page on this topic:  https://bethematch.org/patients-and-families/life-after-transplant/coping-with-life-after-transplant/relationships-and-sexual-health/  None at this time          Fertility (delete if N/A)  Risks Preventative Measures Recommendations   Difficulty with sexual intercourse; difficulty having a baby Women should avoid becoming pregnant for 2 years  Birth control should be used to prevent pregnancy  If you are interested in having a baby, discuss this with your provider None at this time    Currently has 3 children, states he does not plan to have any more.     Cancer Screening:  Risks Preventative Measures Recommendations   Higher risk for the development of solid tumors, PTLD, and blood cancers Preform a full self skin exam monthly to assess for any changes in moles or signs of skin cancer  Minimize excessive sun exposure and wear sunscreen  Screening for colorectal cancer should begin at age 50.   Men should perform a monthly testicular self-exam if they have been exposed to radiation as part of their cancer treatment.    Fecal Occult Blood Test (Recommended annually), Colonoscopy (Recommended every 10 years after the age of 50), and Dermatology Referral (Annual skin exam or evaluation of lesion)    Recommend he gets a full colonoscopy. He can arrange this with his PCP.                 Recommended Tests & Follow-Up:  Referrals & Tests Ordered Today:  Your BMT physician will review these tests and referral results. If you require treatment based on the results, a member of the BMT team will contact you.  Orders placed today:  No orders of the defined types were placed in this encounter.    (Note to providers: After signing orders use the refresh tool in the note window to display results)   Future Tests/Referrals:   All recommendations above should be completed within 2 months either by your primary care provider or local oncologist (cancer doctor).   Recommendations that were not ordered today should be completed by your:  Primary Care Provider     MARYURI Harmon CNP    I spent 40 minutes with the patient and family, over half of which was spent discussing  preventative care strategies, self-management practices, and potential complications after transplant.      Information used for these recommendations was obtained from: GRAHAM Foster., LUIS Javed, DESIRAE Hanson, PHILLIP Nials, JERRY Ventura., CARYN Shannon.,   Daisy, KEdwin JEAN BAPTISTE. (2013). Prevalence of Hematopoietic Cell Transplant Survivors in the [place]. Biology of Blood and Marrow Transplantation?: Journal of the American Society for Blood and Marrow Transplantation, 19(10), 6151-5301. doi 10.1016/j.bbmt.2013.07.020                                                    Again, thank you for allowing me to participate in the care of your patient.        Sincerely,        MARYURI Harmon CNP    Electronically signed

## 2025-01-28 NOTE — PROGRESS NOTES
"    BMT 1-Year Post-Allogeneic BMT   Survivorship Care Plan    Date: January 28, 2025    Treatment Team:   Patient Care Team:  Radha Quinn NP as PCP - General (Family Medicine)  Taran Bacon MD as BMT Physician (Transplant)  Kandy العلي RN as BMT Nurse Coordinator (Transplant)  Elijah Canchola MD as Fellow (Cardiovascular Disease)  Matt Rojas MD as MD (Cardiovascular Disease)  Felice Cobb MD as Assigned Musculoskeletal Provider  Matt Rojas MD as Assigned Heart and Vascular Provider  Radha Quinn NP as Assigned PCP  Taran Bacon MD as Assigned Cancer Care Provider    Placed referral to PCP at the [place] [place] [facility].     BMT Transplant Date: Allo BMT Txp; Day 1y (1/25/24)    Patient ID:  Ziggy Hallman is a 49 year old male, currently day 1-year post HSCT.     CC: Ziggy Hallman was seen in the BMT Survivorship clinic on January 28, 2025 for a comprehensive, 1-year post allogeneic stem cell transplant, consultation and evaluation for transplant late effects. This is a 60-minute visit designed to educate patients about recommended follow-up care based on contemporary clinical practice guidelines, create a care plan that can be used as a guide to follow up care for the patient and their care team, and place any screening or diagnostic tests recommended for the screening of post-transplant complications based on patient risk and transplant type. All recommendations are outlined in the note below. Any tests not ordered during this visit are traditionally performed by the patient's PCP. All patients are advised to schedule a routine preventative care or \"Well Visit\" with their PCP if they have not done so already in the past year since transplant, to discuss these current and future recommendations.    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  Following that he underwent allogenic stem cell transplant at [facility] on 1/25/2024.  Received fludarabine and busulfan as conditioning regimen.  Post transplant period was fairly uncomplicated.     No significant GVH.  He is currently off of immunosuppression.     Post transplant bone marrow biopsy showing complete response with no evidence of increased blasts.  However FISH studies posttransplant showed continued presence of 5p loss (2/26/24 and 3/27/24).     Repeat bone marrow biopsy on 7/24/2024 showing no evidence of any dysplasia.  FISH negative for 5p or 17p loss.     100% donor chimerism noted on 7/24/2024.     He has missed appointments with the transplant team and with cardiology. Currently being followed by local oncology team and BMT for anniversary visits.     With respect to pentamadine prophylaxis, since he is approaching 1 year paras I think tomorrow will be his last dose.    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  He follows with cardiology. He was advised to reschedule a missed appointment with the cardiology team. He is also on diuretics and other blood pressure medications which need to be reviewed by them.    HPI: Ziggy states he has been feeling well overall. He is currently on disability and has not returned to work since the stem cell transplant. Currently, he is living with his sons, 23 yrs old and 19 yrs old. Notes he has been eating a regular diet and continues to exercises daily. He denied any significant mental health, wellness, or sexual health  "concerns.     Medical History:  Past Medical History:   Diagnosis Date    AMI anterior wall (H)     Mid LAD on cath with stent    CAD S/P percutaneous coronary angioplasty 07/01/2016    Unstable agina with anterior myocardial damage reported without mycardial damage by patient's history    CVA (cerebrovascular accident) (H) 01/01/2012    Reporting thromboembolic episode on the right neck. Pt states \"I've had ten strokes.\"    Hypercoagulable state     Uncertain etiology--seeing Hematologist    MEDICAL HISTORY OF -     Possibly PFO       Surgical History:  Past Surgical History:   Procedure Laterality Date    AMPUTATE TOE(S)      ARTHROSCOPY KNEE RT/LT      Right     ARTHROSCOPY KNEE RT/LT      Left    CARDIAC CATHERIZATION      With stent placement    ESOPHAGOSCOPY, GASTROSCOPY, DUODENOSCOPY (EGD), COMBINED N/A 2/19/2024    Procedure: Esophagoscopy, gastroscopy, duodenoscopy (EGD), combined;  Surgeon: Drew Jasso MD;  Location: [facility]    IR CHEST PORT PLACEMENT > 5 YRS OF AGE  9/13/2023    IR CVC TUNNEL PLACEMENT > 5 YRS OF AGE  1/19/2024    IR CVC TUNNEL REMOVAL LEFT  3/21/2024       Family History:  Family History   Problem Relation Age of Onset    Diabetes No family hx of     Coronary Artery Disease No family hx of     Hypertension No family hx of     Hyperlipidemia No family hx of     Breast Cancer No family hx of        Social History:  Social History     Socioeconomic History    Marital status: Single     Spouse name: Not on file    Number of children: Not on file    Years of education: Not on file    Highest education level: Not on file   Occupational History    Not on file   Tobacco Use    Smoking status: Former     Current packs/day: 0.50     Types: Cigarettes     Passive exposure: Past (Mom smoked cigs indoors)    Smokeless tobacco: Never    Tobacco comments:     cigarette once in a while   Substance and Sexual Activity    Alcohol use: Yes     Alcohol/week: 0.0 standard drinks of alcohol     Comment: 4 " times per week 5 drinks    Drug use: No    Sexual activity: Yes     Partners: Female   Other Topics Concern    Parent/sibling w/ CABG, MI or angioplasty before 65F 55M? No   Social History Narrative    Not on file     Social Drivers of Health     Financial Resource Strain: Low Risk  (6/7/2022)    [facility], [facility]    Financial Resource Strain     Difficulty of Paying Living Expenses: 3     Difficulty of Paying Living Expenses: Not on file   Food Insecurity: No Food Insecurity (6/7/2022)    [facility], [facility]    Food Insecurity     Worried About Running Out of Food in the Last Year: 1   Transportation Needs: No Transportation Needs (6/7/2022)    [facility], [facility]    Transportation Needs     Lack of Transportation (Medical): 1   Physical Activity: Not on file   Stress: Not on file   Social Connections: Unknown (6/12/2023)    Received [facility], [facility] [employer] [facility]    Social Connections     Frequency of Communication with Friends and Family: Not on file   Interpersonal Safety: Not on file   Housing Stability: Low Risk  (6/7/2022)    Received from [facility], [facility] [employer] [facility]    Housing Stability     Unable to Pay for Housing in the Last Year: 1       Survivorship Care Plan:     This individualized care plan is designed to inform you and your healthcare team of the recommended follow-up visits, tests, health maintenance activities, and cancer screening you should receive after transplant.     General Health Maintenance:     Call the BMT clinic if you develop a skin rash, oral sores, eye pain or excessive dryness, shortness of breath, new  cough, bleeding, diarrhea, nausea, or vomiting.   Vaccinations should be given at 1 and 2 years after your transplant, these may be given at your annual anniversary visits in the BMT clinic, by your primary care provider, or your local oncologist. An exception is the influenza vaccine, which can be given after day +60 post-transplant during influenza season. See table below for more information.   You can receive the COVID19 vaccine if you have not already, for more information on how to sign up for an appointment you can the [employer] vaccine website at [email or web address] or the        [place] Department of Health Vaccine Connector portal at [place]://[place].gov/covid19/vaccine/connector/connector.jsp  For general health concerns you can be seen by your primary care provider.   If you have questions about your transplant or follow up tests contact your BMT RN coordinator.      Vaccine Administration Schedule       Vaccinations Post-BMT: Please refer to our [facility] BMT Vaccination Guidelines updated in March of 2021.         Vohvc-dw-Nnrk-Disease Screening:    Has Ziggy Hallman been diagnosed with chronic GVHD? No    Immune System:  Risks Preventative Measures Recommendations   Infections, viral reactivations Continue to take prescribed medications to prevent infection if you are treated for zvfso-hi-ugan disease  Symptoms of a cold such as fever, cough, congestion, and shortness of breath should be reported to your primary care provider  Immunizations will be administered at 1 & 2 years after transplant. See schedule above. Other vaccines were discussed. He will receive them at his anniversary visit.     Eyes:   Risks Preventative Measures Recommendations   Cataracts, dry eyes, GVH of the eyes, viral infections, and other eye changes Yearly eye exam   Screening and treatment for high blood pressure or diabetes  Call if you have eye pain, visual changes, or floaters  immediately  Call If you are experiencing dry eyes and symptoms do not improve with Refresh eye drops  Patient will schedule an annual routine exam with community ophthalmologist    Last eye exam was just over 1 year ago.      Mouth:  Risks Preventative Measures Recommendations   Dry mouth, oral GVH, cavities, and oral cancer Report any new symptoms such as dry mouth or painful sores   You can use over the counter Biotene for dry mouth or try sucking on sour candy before meals  Get a dental checkup every year and a cleaning every 6 months  If you have a prosthetic heart valve, central venous catheter or  port , or are still taking immunosuppression you may need to take an antibiotic before your dental visits. None at this time, patient has dental provider and will schedule routine exam    Recommended he contact a local dentist and set up an appointment.       Lungs  Risks Preventative Measures Recommendations   Changes in function from chemotherapy or radiation; lung infections (pneumonia) Tell your provider about difficulty breathing, a cough, or new shortness of breath   Avoid use of tobacco products or smoking  Routine lung exams   Pulmonary Function Tests (Recommended annually post-transplant)    Placed order for 1-year PFT test. Going forward recommend PCP order PFT's annually or anytime the patient becomes symptomatic.       Heart and Blood Vessels  Heart Disease Risk Score: The ASCVD Risk score (Rayray COMBS, et al., 2019) failed to calculate for the following reasons:    Risk score cannot be calculated because patient has a medical history suggesting prior/existing ASCVD  Risks Preventative Measures Recommendations   Damage from chemotherapy and/or radiation; early development of heart valve disease  Ask your provider if you should receive consultation from a cardiologist (heart doctor) or have special screening  Follow a  heart healthy  lifestyle. For more information visit the National Heart, Lung, and Blood  [place] website at: https://www.nhlbi.nih.gov/health/health-topics/topics/heart-healthy-lifestyle-changes   Don't smoke. If you currently smoke and are ready to quit, we can help you find ways to quit  Maintain a healthy weight  Exercise regularly  Avoid foods that have high amounts of:  Salt/sodium (less than 2,300 mg of sodium per day)  Saturated and trans fats  Limit alcohol to less than 1 drink for women and 2 drinks for men per day  Sugar such as soft drinks or sugary snacks Fasting Lipid Profile or Direct LDL (Recommended annually)    Recommend he make an appointment with his PCP. He will need a fasting lipid profile annually going forward.       Hormones  Risks Preventative Measures Recommendations   Low thyroid function, low function of other glands (adrenals and others) Certain endocrine/hormone disorders are more common after transplant. For this reason, talk to your provider about:  Fatigue  Muscle weakness  Changes in cold tolerance  Reduced interest in sex  Erectile dysfunction   Certain tests may be used to monitor for hormone changes if you are experiencing symptoms. These tests are done at 1 year  If you have been on steroids you will need to slowly taper or decrease the dose as recommended by your provider/pharmacist. Do not stop taking steroids without consulting with your provider.   If you have been on steroids in the past, you may need steroids during periods of illness FSH/LH (Women, Recommended 1 year post-transplant, if post-menopausal, or concerned about infertility), Testosterone/FSH/LH (Men, Ordered based on symptoms), Fasting Glucose (Recommended 6 mos & annually post-transplant), and Hgb A1C (Recommended annually post-transplant)      Placed order for TSH and testosterone levels. Recommend PCP check fasting glucose and HgbA1c annually going forward.      Liver:  Risks Preventative Measures Recommendations      Damage from chemotherapy or other drugs, buildup of iron from blood  transfusions, infections, and GVHD   Certain tests may be ordered at your next survivorship visit to evaluate liver function based on your individual risk factors.  Talk to your provider before taking herbal supplements or over the counter drugs like Tylenol.  Ask your doctor if you should be treated for iron overload  Avoid alcohol in excess     Hepatic Panel/LFTs (Recommended every 3-6 mos the 1st year post-transplant & annually) and Serum Ferritin (Recommended 1 year post-transplant)       Kidneys and Bladder:  Risks Preventative Measures Recommendations   Damage from chemotherapy or other drugs, infections, high blood pressure Monitoring blood tests of kidney function during follow up visits  Treating high blood pressure and diabetes   Drink adequate amounts of water  Report symptoms of infection such as frequent urination, pain with urination, foul odor to urine, or blood in the urine  Talk to your provider before taking herbal supplements or over the counter drugs like Ibuprofen Serum creatinine checked as part of anniversary labs or at least annually by primary provider       Nervous System:  Risks Preventative Measures Recommendations   Neuropathy from chemotherapy, changes in cognitive function Report changes in sensation or discomfort in feet or hands  Tell your provider about ongoing changes in memory, ability to concentrate, or inability to make decisions   None at this time       Muscle & Connective Tissue  Risks Preventative Measures Recommendations   Reduced muscle strength, reduced stamina, GVHD causing hardening of muscle tissues (scleroderma) or inflammation of tissue over muscles (fascitis)  Let your provider know if:  You notice changes in your muscle strength  Require extra assistance with daily activities  Experience pain or difficult bending or moving joints  If you have been on steroids and are experiencing weakness particularly when standing up from a chair   Need help creating an exercise  routine  Notice reduced range of motion of the arms, hips, or legs  Follow general guidelines for physical activity as recommended by the Office of Disease Prevention & Health Promotion:    Avoid Inactivity  Some physical activity is better than none -- any amount has benefits.    Do Aerobic Activity  Do aerobic physical activity in episodes of at least 10 minutes, as many times as possible per day. This could include going for walks or using the elliptical or stationary bike.  Ask your doctor what aerobic activities would be safe and helpful for you, and set a goal for yourself!    Strengthen Muscles  Do muscle-strengthening activities (such as lifting light weights or using resistance bands and/or going up and down stairs) that are moderate or high intensity and involve all major muscle groups at least 4 days a week. Bone Scan (Recommended 1 year) and Calcium & Vitamin D Levels (Recommended annually)    Placed order for DXA and Vitamin D level.    Recommend PCP check Vitamin D level yearly going forward.     Emotional Health  Risks Preventative Measures Recommendations   Stress, depression, anxiety Talk to your provider about:  Changes in feelings, mood, or emotional wellbeing  Interest in support groups  If you are concerned about your caregivers emotional wellbeing  Desire to speak with a counselor for ongoing support  None at this time       Sexual Health  Risks Preventative Measures Recommendations   Reduced libido due to hormonal changes, erectile dysfunction, vaginal dryness, vaginal eizgk-va-jjnw disease, vaginal infections, sexually transmitted diseases Talk to your provider about:  Reduced libido or concerns regarding your sexual health  Men: Erectile dysfunction   Women: Vaginal dryness, pain during intercourse, vaginal bleeding after intercourse, changes in vaginal discharge that may indicate infection (green/white/foul odor)   General Recommendations:  It is safe to have sex if your platelet count is >  50,000 and you feel physically and emotionally ready   Women can use water-based lubricants to reduce discomfort from dryness. Prescription topical estrogen may help as well.  Use barrier protection such as condoms to prevent sexually transmitted diseases, you are at higher risk for infections due to a weakened immune system  For more information check out the National Marrow Donor Program web page on this topic:  https://bethematch.org/patients-and-families/life-after-transplant/coping-with-life-after-transplant/relationships-and-sexual-health/  None at this time         Fertility (delete if N/A)  Risks Preventative Measures Recommendations   Difficulty with sexual intercourse; difficulty having a baby Women should avoid becoming pregnant for 2 years  Birth control should be used to prevent pregnancy  If you are interested in having a baby, discuss this with your provider None at this time    Currently has 3 children, states he does not plan to have any more.     Cancer Screening:  Risks Preventative Measures Recommendations   Higher risk for the development of solid tumors, PTLD, and blood cancers Preform a full self skin exam monthly to assess for any changes in moles or signs of skin cancer  Minimize excessive sun exposure and wear sunscreen  Screening for colorectal cancer should begin at age 50.   Men should perform a monthly testicular self-exam if they have been exposed to radiation as part of their cancer treatment.    Fecal Occult Blood Test (Recommended annually), Colonoscopy (Recommended every 10 years after the age of 50), and Dermatology Referral (Annual skin exam or evaluation of lesion)    Recommend he gets a full colonoscopy. He can arrange this with his PCP.                 Recommended Tests & Follow-Up:  Referrals & Tests Ordered Today:  Your BMT physician will review these tests and referral results. If you require treatment based on the results, a member of the BMT team will contact you.  Orders  placed today:  No orders of the defined types were placed in this encounter.    (Note to providers: After signing orders use the refresh tool in the note window to display results)   Future Tests/Referrals:   All recommendations above should be completed within 2 months either by your primary care provider or local oncologist (cancer doctor).   Recommendations that were not ordered today should be completed by your:  Primary Care Provider     MARYURI Harmon CNP    I spent 40 minutes with the patient and family, over half of which was spent discussing preventative care strategies, self-management practices, and potential complications after transplant.      Information used for these recommendations was obtained from: GRAHAM Foster., LUIS Javed, DESIRAE Hanson, Jesu S., JERRY Ventura., Mirtha, JEdwin L.,   Daisy, K. M. (2013). Prevalence of Hematopoietic Cell Transplant Survivors in the [place]. Biology of Blood and Marrow Transplantation?: Journal of the American Society for Blood and Marrow Transplantation, 19(10), 7475-1151. doi 10.1016/j.bbmt.2013.07.020

## 2025-01-28 NOTE — PROGRESS NOTES
Is the patient currently in the state of [place]? YES    Visit mode: VIDEO converted to phone call    If the visit is dropped, the patient can be reconnected by:VIDEO VISIT: Send to e-mail at: [email or web address]    Will anyone else be joining the visit? NO  (If patient encounters technical issues they should call [phone])    Are changes needed to the allergy or medication list? No    Are refills needed on medications prescribed by this physician? NO    Rooming Documentation:  Questionnaire(s) completed    Reason for visit: Video Visit (Follow up )    Maren RASMUSSEN

## 2025-02-18 ENCOUNTER — OFFICE VISIT (OUTPATIENT)
Dept: FAMILY MEDICINE | Facility: CLINIC | Age: 50
End: 2025-02-18
Payer: COMMERCIAL

## 2025-02-18 VITALS
WEIGHT: 192 LBS | SYSTOLIC BLOOD PRESSURE: 130 MMHG | TEMPERATURE: 98.2 F | HEIGHT: 68 IN | DIASTOLIC BLOOD PRESSURE: 86 MMHG | BODY MASS INDEX: 29.1 KG/M2 | RESPIRATION RATE: 20 BRPM | HEART RATE: 77 BPM | OXYGEN SATURATION: 98 %

## 2025-02-18 DIAGNOSIS — Z95.1 HX OF CABG: ICD-10-CM

## 2025-02-18 DIAGNOSIS — Z94.81 STATUS POST BONE MARROW TRANSPLANT (H): ICD-10-CM

## 2025-02-18 DIAGNOSIS — Z13.220 LIPID SCREENING: ICD-10-CM

## 2025-02-18 DIAGNOSIS — I25.119 CORONARY ARTERY DISEASE WITH ANGINA PECTORIS, UNSPECIFIED VESSEL OR LESION TYPE, UNSPECIFIED WHETHER NATIVE OR TRANSPLANTED HEART: ICD-10-CM

## 2025-02-18 DIAGNOSIS — I26.93 SINGLE SUBSEGMENTAL PULMONARY EMBOLISM WITHOUT ACUTE COR PULMONALE (H): ICD-10-CM

## 2025-02-18 DIAGNOSIS — Z76.89 ENCOUNTER TO ESTABLISH CARE: Primary | ICD-10-CM

## 2025-02-18 DIAGNOSIS — D46.9 MDS (MYELODYSPLASTIC SYNDROME) (H): ICD-10-CM

## 2025-02-18 DIAGNOSIS — I50.32 CHRONIC HEART FAILURE WITH PRESERVED EJECTION FRACTION (H): ICD-10-CM

## 2025-02-18 PROCEDURE — G2211 COMPLEX E/M VISIT ADD ON: HCPCS | Performed by: STUDENT IN AN ORGANIZED HEALTH CARE EDUCATION/TRAINING PROGRAM

## 2025-02-18 PROCEDURE — 99213 OFFICE O/P EST LOW 20 MIN: CPT | Performed by: STUDENT IN AN ORGANIZED HEALTH CARE EDUCATION/TRAINING PROGRAM

## 2025-02-18 RX ORDER — METOPROLOL SUCCINATE 50 MG/1
50 TABLET, EXTENDED RELEASE ORAL DAILY
Qty: 90 TABLET | Refills: 3 | Status: SHIPPED | OUTPATIENT
Start: 2025-02-18

## 2025-02-18 RX ORDER — LISINOPRIL 20 MG/1
20 TABLET ORAL DAILY
Qty: 90 TABLET | Refills: 3 | Status: SHIPPED | OUTPATIENT
Start: 2025-02-18

## 2025-02-18 ASSESSMENT — PAIN SCALES - GENERAL: PAINLEVEL_OUTOF10: MILD PAIN (2)

## 2025-02-18 NOTE — PROGRESS NOTES
Assessment & Plan     Encounter to establish care  MDS (myelodysplastic syndrome) (H)  Status post bone marrow transplant (H)  - has follow-up with oncology, is s/p 4 cycles of Vidaza and allogenic stem cell transplant at the [facility] on 1/25/2024.  Received fludarabine and busulfan as conditioning regimen.  Post transplant period was fairly uncomplicated per oncology documentation     Chronic heart failure with preserved ejection fraction (H)  Coronary artery disease  History of CABG  > Patient was referred to cardiology, does not appear that he was able to follow-up with them, will place new referral   - empagliflozin (JARDIANCE) 10 MG TABS tablet; Take 1 tablet (10 mg) by mouth daily.  - metoprolol succinate ER (TOPROL XL) 50 MG 24 hr tablet; Take 1 tablet (50 mg) by mouth daily.  - lisinopril (ZESTRIL) 20 MG tablet; Take 1 tablet (20 mg) by mouth daily.    Single subsegmental pulmonary embolism without acute cor pulmonale (H)  Arterial thrombosis   - apixaban ANTICOAGULANT (ELIQUIS) 2.5 MG tablet; Take 1 tablet (2.5 mg) by mouth 2 times daily.    Lipid screening  - Lipid panel reflex to direct LDL Fasting; Future    The longitudinal plan of care for the diagnosis(es)/condition(s) as documented were addressed during this visit. Due to the added complexity in care, I will continue to support Ziggy in the subsequent management and with ongoing continuity of care.      Subjective   Ziggy is a 49 year old, presenting for the following health issues:  Establish Care and Health Maintenance (Declines vaccinations due to cancer )        2/18/2025    11:26 AM   Additional Questions   Roomed by Phuogn FISHER LPN   Accompanied by Self          2/18/2025    11:26 AM   Patient Reported Additional Medications   Patient reports taking the following new medications no new meds     History of Present Illness       Reason for visit:  Establish care   He is taking medications regularly.     Was diagnosed with MDS  "roughly 10 years ago, initial symptom was a blood clot (reports he has had multiple blood clots) will plan to keep him on apixaban     He finished 4 cycles of Vidaza in December last year.  Following that he underwent allogenic stem cell transplant at the [facility] on 1/25/2024.  Received fludarabine and busulfan as conditioning regimen.  Post transplant period was fairly uncomplicated     Reports having been off metoprolol for the past 2-3 months   Was given Jardiance for \"fluid around my heart\"       ROS:   As above         Objective    /86 (BP Location: Left arm, Patient Position: Sitting, Cuff Size: Adult Regular)   Pulse 77   Temp 98.2  F (36.8  C) (Tympanic)   Resp 20   Ht 1.734 m (5' 8.27\")   Wt 87.1 kg (192 lb)   SpO2 98%   BMI 28.96 kg/m    Body mass index is 28.96 kg/m .  Physical Exam  Constitutional:       General: He is not in acute distress.  HENT:      Head: Normocephalic and atraumatic.      Right Ear: External ear normal.      Left Ear: External ear normal.      Mouth/Throat:      Mouth: Mucous membranes are moist.      Pharynx: Oropharynx is clear. No oropharyngeal exudate or posterior oropharyngeal erythema.   Eyes:      Extraocular Movements: Extraocular movements intact.   Cardiovascular:      Rate and Rhythm: Normal rate and regular rhythm.      Heart sounds: Normal heart sounds.   Pulmonary:      Effort: Pulmonary effort is normal. No respiratory distress.      Breath sounds: Normal breath sounds. No wheezing or rhonchi.   Abdominal:      Palpations: Abdomen is soft. There is no mass.      Tenderness: There is no abdominal tenderness.   Musculoskeletal:         General: No deformity. Normal range of motion.      Cervical back: Normal range of motion and neck supple.   Skin:     General: Skin is warm.      Findings: No rash.   Neurological:      General: No focal deficit present.      Mental Status: He is alert and oriented to person, place, and time.   Psychiatric:   "       Mood and Affect: Mood normal.                   Signed Electronically by: SHE QUICK MD

## 2025-02-19 ENCOUNTER — LAB (OUTPATIENT)
Dept: LAB | Facility: CLINIC | Age: 50
End: 2025-02-19
Attending: INTERNAL MEDICINE
Payer: COMMERCIAL

## 2025-02-19 ENCOUNTER — ANCILLARY PROCEDURE (OUTPATIENT)
Dept: BONE DENSITY | Facility: CLINIC | Age: 50
End: 2025-02-19
Attending: NURSE PRACTITIONER
Payer: COMMERCIAL

## 2025-02-19 ENCOUNTER — OFFICE VISIT (OUTPATIENT)
Dept: TRANSPLANT | Facility: CLINIC | Age: 50
End: 2025-02-19
Attending: INTERNAL MEDICINE
Payer: COMMERCIAL

## 2025-02-19 VITALS
DIASTOLIC BLOOD PRESSURE: 83 MMHG | TEMPERATURE: 97.8 F | OXYGEN SATURATION: 99 % | BODY MASS INDEX: 28.32 KG/M2 | HEART RATE: 85 BPM | SYSTOLIC BLOOD PRESSURE: 138 MMHG | RESPIRATION RATE: 16 BRPM | WEIGHT: 187.7 LBS

## 2025-02-19 DIAGNOSIS — Z94.81 STATUS POST BONE MARROW TRANSPLANT (H): Primary | ICD-10-CM

## 2025-02-19 DIAGNOSIS — Z92.89 HISTORY OF BLOOD TRANSFUSION: ICD-10-CM

## 2025-02-19 DIAGNOSIS — Z94.81 S/P ALLOGENEIC BONE MARROW TRANSPLANT (H): ICD-10-CM

## 2025-02-19 DIAGNOSIS — Z13.220 LIPID SCREENING: ICD-10-CM

## 2025-02-19 DIAGNOSIS — D46.9 MDS (MYELODYSPLASTIC SYNDROME) (H): ICD-10-CM

## 2025-02-19 DIAGNOSIS — Z92.21 STATUS POST CHEMOTHERAPY: ICD-10-CM

## 2025-02-19 DIAGNOSIS — Z91.89 AT RISK FOR HYPOTHYROIDISM: ICD-10-CM

## 2025-02-19 DIAGNOSIS — D46.9 MDS (MYELODYSPLASTIC SYNDROME) (H): Primary | ICD-10-CM

## 2025-02-19 DIAGNOSIS — Z94.81 STATUS POST BONE MARROW TRANSPLANT (H): ICD-10-CM

## 2025-02-19 DIAGNOSIS — Z91.89 AT HIGH RISK FOR OSTEOPOROSIS: ICD-10-CM

## 2025-02-19 LAB
ALBUMIN SERPL BCG-MCNC: 4.4 G/DL (ref 3.5–5.2)
ALP SERPL-CCNC: 109 U/L (ref 40–150)
ALT SERPL W P-5'-P-CCNC: 49 U/L (ref 0–70)
ANION GAP SERPL CALCULATED.3IONS-SCNC: 12 MMOL/L (ref 7–15)
AST SERPL W P-5'-P-CCNC: 30 U/L (ref 0–45)
BASOPHILS # BLD AUTO: 0 10E3/UL (ref 0–0.2)
BASOPHILS NFR BLD AUTO: 0 %
BILIRUB SERPL-MCNC: 0.3 MG/DL
BUN SERPL-MCNC: 25.2 MG/DL (ref 6–20)
CALCIUM SERPL-MCNC: 9.9 MG/DL (ref 8.8–10.4)
CHLORIDE SERPL-SCNC: 108 MMOL/L (ref 98–107)
CHOLEST SERPL-MCNC: 271 MG/DL
CREAT SERPL-MCNC: 1.15 MG/DL (ref 0.67–1.17)
EBV DNA SERPL NAA+PROBE-ACNC: NOT DETECTED IU/ML
EGFRCR SERPLBLD CKD-EPI 2021: 77 ML/MIN/1.73M2
EOSINOPHIL # BLD AUTO: 0.1 10E3/UL (ref 0–0.7)
EOSINOPHIL NFR BLD AUTO: 2 %
ERYTHROCYTE [DISTWIDTH] IN BLOOD BY AUTOMATED COUNT: 12.5 % (ref 10–15)
FASTING STATUS PATIENT QL REPORTED: YES
FASTING STATUS PATIENT QL REPORTED: YES
FERRITIN SERPL-MCNC: 689 NG/ML (ref 31–409)
GLUCOSE SERPL-MCNC: 120 MG/DL (ref 70–99)
HCO3 SERPL-SCNC: 21 MMOL/L (ref 22–29)
HCT VFR BLD AUTO: 44.7 % (ref 40–53)
HDLC SERPL-MCNC: 42 MG/DL
HGB BLD-MCNC: 15.7 G/DL (ref 13.3–17.7)
IMM GRANULOCYTES # BLD: 0 10E3/UL
IMM GRANULOCYTES NFR BLD: 0 %
LDH SERPL L TO P-CCNC: 148 U/L (ref 0–250)
LDLC SERPL CALC-MCNC: 192 MG/DL
LYMPHOCYTES # BLD AUTO: 2 10E3/UL (ref 0.8–5.3)
LYMPHOCYTES NFR BLD AUTO: 28 %
MCH RBC QN AUTO: 32 PG (ref 26.5–33)
MCHC RBC AUTO-ENTMCNC: 35.1 G/DL (ref 31.5–36.5)
MCV RBC AUTO: 91 FL (ref 78–100)
MONOCYTES # BLD AUTO: 0.6 10E3/UL (ref 0–1.3)
MONOCYTES NFR BLD AUTO: 9 %
NEUTROPHILS # BLD AUTO: 4.3 10E3/UL (ref 1.6–8.3)
NEUTROPHILS NFR BLD AUTO: 61 %
NONHDLC SERPL-MCNC: 229 MG/DL
NRBC # BLD AUTO: 0 10E3/UL
NRBC BLD AUTO-RTO: 0 /100
PLATELET # BLD AUTO: 284 10E3/UL (ref 150–450)
POTASSIUM SERPL-SCNC: 4.2 MMOL/L (ref 3.4–5.3)
PROT SERPL-MCNC: 7.5 G/DL (ref 6.4–8.3)
RBC # BLD AUTO: 4.91 10E6/UL (ref 4.4–5.9)
SODIUM SERPL-SCNC: 141 MMOL/L (ref 135–145)
TRIGL SERPL-MCNC: 184 MG/DL
TSH SERPL DL<=0.005 MIU/L-ACNC: 2.15 UIU/ML (ref 0.3–4.2)
VIT D+METAB SERPL-MCNC: 26 NG/ML (ref 20–50)
WBC # BLD AUTO: 7 10E3/UL (ref 4–11)

## 2025-02-19 PROCEDURE — 90697 DTAP-IPV-HIB-HEPB VACCINE IM: CPT | Performed by: INTERNAL MEDICINE

## 2025-02-19 PROCEDURE — 80061 LIPID PANEL: CPT | Performed by: INTERNAL MEDICINE

## 2025-02-19 PROCEDURE — G2211 COMPLEX E/M VISIT ADD ON: HCPCS | Performed by: INTERNAL MEDICINE

## 2025-02-19 PROCEDURE — 82306 VITAMIN D 25 HYDROXY: CPT | Performed by: INTERNAL MEDICINE

## 2025-02-19 PROCEDURE — 83615 LACTATE (LD) (LDH) ENZYME: CPT | Performed by: INTERNAL MEDICINE

## 2025-02-19 PROCEDURE — 84443 ASSAY THYROID STIM HORMONE: CPT | Performed by: INTERNAL MEDICINE

## 2025-02-19 PROCEDURE — 80053 COMPREHEN METABOLIC PANEL: CPT | Performed by: INTERNAL MEDICINE

## 2025-02-19 PROCEDURE — 77080 DXA BONE DENSITY AXIAL: CPT | Performed by: INTERNAL MEDICINE

## 2025-02-19 PROCEDURE — 36415 COLL VENOUS BLD VENIPUNCTURE: CPT | Performed by: INTERNAL MEDICINE

## 2025-02-19 PROCEDURE — 87799 DETECT AGENT NOS DNA QUANT: CPT

## 2025-02-19 PROCEDURE — 90472 IMMUNIZATION ADMIN EACH ADD: CPT | Performed by: INTERNAL MEDICINE

## 2025-02-19 PROCEDURE — 90471 IMMUNIZATION ADMIN: CPT | Performed by: INTERNAL MEDICINE

## 2025-02-19 PROCEDURE — 85025 COMPLETE CBC W/AUTO DIFF WBC: CPT | Performed by: INTERNAL MEDICINE

## 2025-02-19 PROCEDURE — 82728 ASSAY OF FERRITIN: CPT | Performed by: INTERNAL MEDICINE

## 2025-02-19 PROCEDURE — G0463 HOSPITAL OUTPT CLINIC VISIT: HCPCS | Performed by: INTERNAL MEDICINE

## 2025-02-19 PROCEDURE — 90677 PCV20 VACCINE IM: CPT | Performed by: INTERNAL MEDICINE

## 2025-02-19 PROCEDURE — 250N000021 HC RX MED A9270 GY (STAT IND- M) 250: Performed by: INTERNAL MEDICINE

## 2025-02-19 PROCEDURE — 99215 OFFICE O/P EST HI 40 MIN: CPT | Performed by: INTERNAL MEDICINE

## 2025-02-19 PROCEDURE — 90750 HZV VACC RECOMBINANT IM: CPT | Performed by: INTERNAL MEDICINE

## 2025-02-19 PROCEDURE — G0009 ADMIN PNEUMOCOCCAL VACCINE: HCPCS | Performed by: INTERNAL MEDICINE

## 2025-02-19 PROCEDURE — 250N000011 HC RX IP 250 OP 636: Performed by: INTERNAL MEDICINE

## 2025-02-19 RX ORDER — PENTAMIDINE ISETHIONATE 300 MG/300MG
300 INHALANT RESPIRATORY (INHALATION)
Start: 2025-03-11

## 2025-02-19 RX ORDER — ALBUTEROL SULFATE 5 MG/ML
2.5 SOLUTION RESPIRATORY (INHALATION)
Start: 2025-03-11

## 2025-02-19 RX ORDER — HEPARIN SODIUM (PORCINE) LOCK FLUSH IV SOLN 100 UNIT/ML 100 UNIT/ML
5 SOLUTION INTRAVENOUS
OUTPATIENT
Start: 2025-03-11

## 2025-02-19 RX ORDER — HEPARIN SODIUM,PORCINE 10 UNIT/ML
5-20 VIAL (ML) INTRAVENOUS DAILY PRN
OUTPATIENT
Start: 2025-03-11

## 2025-02-19 RX ADMIN — DIPHTHERIA AND TETANUS TOXOIDS AND ACELLULAR PERTUSSIS, INACTIVATED POLIOVIRUS, HAEMOPHILUS B CONJUGATE AND HEPATITIS B VACCINE 0.5 ML: 15; 5; 20; 20; 3; 5; 29; 7; 26; 10; 3 INJECTION, SUSPENSION INTRAMUSCULAR at 08:59

## 2025-02-19 RX ADMIN — ZOSTER VACCINE RECOMBINANT, ADJUVANTED 0.5 ML: KIT at 08:54

## 2025-02-19 RX ADMIN — PNEUMOCOCCAL 20-VALENT CONJUGATE VACCINE 0.5 ML
2.2; 2.2; 2.2; 2.2; 2.2; 2.2; 2.2; 2.2; 2.2; 2.2; 2.2; 2.2; 2.2; 2.2; 2.2; 2.2; 4.4; 2.2; 2.2; 2.2 INJECTION, SUSPENSION INTRAMUSCULAR at 08:56

## 2025-02-19 ASSESSMENT — PAIN SCALES - GENERAL: PAINLEVEL_OUTOF10: NO PAIN (0)

## 2025-02-19 NOTE — NURSING NOTE
Chief Complaint   Patient presents with    Blood Draw     Labs drawn via  by RN in lab.  VS taken       Labs collected from venipuncture by RN. Vitals taken. Checked in for appointment(s).    Narda Moncada RN     Is This A New Presentation, Or A Follow-Up?: Rash

## 2025-02-19 NOTE — NURSING NOTE
"Oncology Rooming Note    February 19, 2025 8:29 AM   Ziggy Hallman is a 49 year old male who presents for:    Chief Complaint   Patient presents with    Blood Draw     Labs drawn via  by RN in lab.  VS taken    Oncology Clinic Visit     Myelodysplastic syndrome; Status post bone marrow transplant     Initial Vitals: /83   Pulse 85   Temp 97.8  F (36.6  C) (Oral)   Resp 16   Wt 85.1 kg (187 lb 11.2 oz)   SpO2 99%   BMI 28.32 kg/m   Estimated body mass index is 28.32 kg/m  as calculated from the following:    Height as of 2/18/25: 1.734 m (5' 8.27\").    Weight as of this encounter: 85.1 kg (187 lb 11.2 oz). Body surface area is 2.02 meters squared.  No Pain (0) Comment: Data Unavailable   No LMP for male patient.  Allergies reviewed: Yes  Medications reviewed: Yes    Medications: Medication refills not needed today.  Pharmacy name entered into [employer]: [id number] - [place], [place] - [address] [place] [address]    Frailty Screening:   Is the patient here for a new oncology consult visit in cancer care? 2. No    PHQ9:  Did this patient require a PHQ9?: No      Clinical concerns:        Payal Govea              "

## 2025-02-19 NOTE — NURSING NOTE
Immunizations Administered       Name Date Dose VIS Date Route    DTAP,IPV,HIB,HEPB (VAXELIS) 2/19/25  8:59 AM 0.5 mL 10/15/2021, Given Today Intramuscular    Pneumococcal 20 valent Conjugate (Prevnar 20) 2/19/25  8:56 AM 0.5 mL 05/12/2023, Given Today Intramuscular    Zoster recombinant adjuvanted (SHINGRIX) 2/19/25  8:54 AM 0.5 mL 02/04/2022, Given Today Intramuscular

## 2025-02-19 NOTE — LETTER
2/19/2025      Ziggy Hallman  [address] [facility] [address]      Dear Colleague,    Thank you for referring your patient, Ziggy Hallman, to the [facility]ARROW [facility]PL[facility] [place]. Please see a copy of my visit note below.    Date: Feb 19, 2025    Chief Complaint: bmt d 391     History Of Present Illness:    I met Ziggy in January of 2024. To summarize his history, he has had multiple episodes of arterial and venous thrombosis and developed cytopenias. He was seen in 2015 by Dr. 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 He was also worked up for [facility] and no mutations were found. She recommended continued observation and treating his hypercoagulability. More recently, his anemia worsened and he had a repeat bone marrow biopsy that showed 5-10% blasts. That was at [facility] and he was referred here and met with Dr. Cabral, shortly before her departure. She calculated his R-IPSS as 7.5 and recommended 4 cycles of azacitidine followed by evaluation for transplant. He has a repeat bone marrow on 12/29 also at [facility]. It indicated some improvement in blast count down to 5-6% and a plasma cell neoplasm. We did SPEP and a PET scan that was negative. We also reviewed the bone marrow here. Preliminarily, it appears that the plasma cells may not be monoclonal and the blast count might be a bit less. The flow cytometry was negative. His counts have improved. We proceeded with BMT and comes in for follow up.    He feels well today. Energy and appetite. No dry mouth or dry eyes. 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 No complaints.    ROS: 14-point ROS is negative unless otherwise stated in HPI.    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 60 tablet 11     cyclobenzaprine (FLEXERIL) 10 MG tablet Take 1 tablet (10 mg) by mouth 3 times daily as needed for muscle spasms 45 tablet 1     empagliflozin (JARDIANCE) 10 MG TABS tablet Take 1 tablet (10 mg) by mouth daily. 90 tablet 3     lisinopril (ZESTRIL) 20 MG tablet Take 1 tablet (20 mg) by mouth daily. 90 tablet 3     metoprolol succinate ER (TOPROL XL) 50 MG 24 hr tablet Take 1 tablet (50 mg) by mouth daily. 90 tablet 3     prasugrel (EFFIENT) 10 MG TABS tablet Take 1 tablet (10 mg) by mouth daily. 60 tablet 1     NIFEdipine ER (ADALAT CC) 30 MG 24 hr tablet On hold x 4/16 (Patient not taking: Reported on 10/15/2024)       Current Facility-Administered Medications   Medication Dose Route Frequency Provider Last Rate Last Admin     KAaH-FQV-Usq-Hepatitis B Recmb (VAXELIS) injection (pre-filled syringe) 0.5 mL  0.5 mL Intramuscular Once Taran Bacon MD         pneumococcal (PREVNAR 20) injection 0.5 mL  0.5 mL Intramuscular Once Taran Bacon MD         zoster vaccine recombinant adjuvanted (SHINGRIX) injection 0.5 mL  0.5 mL Intramuscular Once Taran Bacon MD         Updated PMH:  Past Medical History:   Diagnosis Date     AMI anterior wall (H)     Mid LAD on cath with stent     CAD S/P percutaneous coronary angioplasty 07/01/2016    Unstable agina with anterior myocardial damage reported without mycardial damage by patient's history     CVA (cerebrovascular accident) (H) 01/01/2012    Reporting thromboembolic episode on the right neck. Pt states \"I've had ten strokes.\"     Hypercoagulable state     Uncertain etiology--seeing Hematologist     MEDICAL HISTORY OF -     Possibly PFO     Updated PSH:  Past Surgical History:   Procedure Laterality Date     AMPUTATE TOE(S)       ARTHROSCOPY KNEE RT/LT      Right      ARTHROSCOPY KNEE RT/LT      Left     CARDIAC " CATHERIZATION      With stent placement     ESOPHAGOSCOPY, GASTROSCOPY, DUODENOSCOPY (EGD), COMBINED N/A 2024    Procedure: Esophagoscopy, gastroscopy, duodenoscopy (EGD), combined;  Surgeon: Drew Jasso MD;  Location: [facility]     IR CHEST PORT PLACEMENT > 5 YRS OF AGE  2023     IR CVC TUNNEL PLACEMENT > 5 YRS OF AGE  2024     IR CVC TUNNEL REMOVAL LEFT  3/21/2024     Updated FH:  Family History   Problem Relation Age of Onset     Diabetes No family hx of      Coronary Artery Disease No family hx of      Hypertension No family hx of      Hyperlipidemia No family hx of      Breast Cancer No family hx of      Updated SH:  Social History     Tobacco Use     Smoking status: Former     Current packs/day: 0.50     Types: Cigarettes     Passive exposure: Past (Mom smoked cigs indoors)     Smokeless tobacco: Never     Tobacco comments:     cigarette once in a while   Vaping Use     Vaping status: Never Used   Substance Use Topics     Alcohol use: Yes     Alcohol/week: 0.0 standard drinks of alcohol     Comment: 4 times per week 5 drinks     Drug use: No     Physical Exam:  /83   Pulse 85   Temp 97.8  F (36.6  C) (Oral)   Resp 16   Wt 85.1 kg (187 lb 11.2 oz)   SpO2 99%   BMI 28.32 kg/m    GA: NAD. Appears as stated age.  Psyc: Appropriate and cooperative  Access: port    ECO    GVHD: 0/0/0/0    Labs, pathology and other diagnostics reviewed    WBC   Date Value Ref Range Status   2020 4.9 4.0 - 11.0 10e9/L Final     WBC Count   Date Value Ref Range Status   2025 7.0 4.0 - 11.0 10e3/uL Final     Hemoglobin   Date Value Ref Range Status   2025 15.7 13.3 - 17.7 g/dL Final   2020 12.6 (L) 13.3 - 17.7 g/dL Final     Platelet Count   Date Value Ref Range Status   2025 284 150 - 450 10e3/uL Final   2020 280 150 - 450 10e9/L Final     Creatinine   Date Value Ref Range Status   2025 1.15 0.67 - 1.17 mg/dL Final   2020 0.80 0.66 - 1.25 mg/dL Final      Potassium   Date Value Ref Range Status   02/19/2025 4.2 3.4 - 5.3 mmol/L Final   05/28/2020 3.8 3.4 - 5.3 mmol/L Final     Magnesium   Date Value Ref Range Status   04/15/2024 2.2 1.7 - 2.3 mg/dL Final     EBV DNA Copies/mL   Date Value Ref Range Status   03/27/2024 Not Detected Not Detected copies/mL Final     A/P    Date Feb 19, 2025   Diagnosis High risk MDS, R-IPSS 7.5   Planned Treatment MA Flu/Bu   GVHD Prophylaxis PTCy/Siro/MMF   HLA Match 8/8, DP permissive   PRA Negative   CMV Match -/-   Sex Match F -> M   Donor Age 26   ABO Match O -> A (minor)   KPS PS 80   BMI Body mass index is 28.32 kg/m .   PET Negative   LVEF 50-55%   Valvular abnormalities*  Mild mitral insufficiency   History of MI or arrhythmias Yes   ECG/QTC NSR/425   FEV1 73%   DLCOcor 93   Cr/GFR Creatinine   Date Value Ref Range Status   02/19/2025 1.15 0.67 - 1.17 mg/dL Final   05/28/2020 0.80 0.66 - 1.25 mg/dL Final      Bilirubin Lab Results   Component Value Date    BILITOTAL 0.2 01/17/2024    BILITOTAL 0.8 05/28/2020      ALT/AST Lab Results   Component Value Date    ALT 18 01/17/2024    ALT 25 05/28/2020    ;   AST   Date Value Ref Range Status   02/19/2025 30 0 - 45 U/L Final   05/28/2020 20 0 - 45 U/L Final      Dental Clearance    Adequate Marrow reserve Yes   Chest CT Few sub 4 mm nodules (negative on PET)   Infectious work-up     Pregnancy Test    Lupron Injection    HCT CI 4 points   Disease status at transplant CR with MRD+   LDH    Planned maintenance NGS pending   Anticoagulation On prasugrel and apixaban. Hold when Plt < 50 and resume when able. High risk for thrombosis.   Toxoplasma IgG Negative   Other**    Proposed Clinical Trials      MDS s/p MAC allo  - 1 year in CR, no MRD, 100% donor.  - OI ppx: acv. He says he has not been compliant with his meds. He did get shingles several months ago. He has recovered now. We will proceed with 1 yr vaccines.    2. Health maintenance:  - Vaccines today. Will need boosters in 2  months.  - He was seen in our survivorship clinic. He also met with a PCP yesterday. His LDL was high and he said his PCP will be managing that.  - Port out  - Dexa scheduled after this and our survivorship will follow up on that. Finally, we discussed healthy living and incorporating diet and exercise as well as having an established PCP    RTC in 2 months for booster and 6 months for follow up    40 minutes spent on the date of the encounter doing chart review, interpretation of results, patient visit, documentation and coordination of care.  The longitudinal plan of care for the diagnosis(es)/condition(s) as documented were addressed during this visit. Due to the added complexity in care, I will continue to support Ziggy in the subsequent management and with ongoing continuity of care.     Again, thank you for allowing me to participate in the care of your patient.        Sincerely,        Taran Bacon MD    Electronically signed

## 2025-02-19 NOTE — PROGRESS NOTES
Date: Feb 19, 2025    Chief Complaint: bmt d 391     History Of Present Illness:    I met Ziggy in January of 2024. To summarize his history, he has had multiple episodes of arterial and venous thrombosis and developed cytopenias. He was seen in 2015 by Dr. 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 He was also worked up for [facility] and no mutations were found. She recommended continued observation and treating his hypercoagulability. More recently, his anemia worsened and he had a repeat bone marrow biopsy that showed 5-10% blasts. That was at [facility] and he was referred here and met with Dr. Cabral, shortly before her departure. She calculated his R-IPSS as 7.5 and recommended 4 cycles of azacitidine followed by evaluation for transplant. He has a repeat bone marrow on 12/29 also at [facility]. It indicated some improvement in blast count down to 5-6% and a plasma cell neoplasm. We did SPEP and a PET scan that was negative. We also reviewed the bone marrow here. Preliminarily, it appears that the plasma cells may not be monoclonal and the blast count might be a bit less. The flow cytometry was negative. His counts have improved. We proceeded with BMT and comes in for follow up.    He feels well today. Energy and appetite. No dry mouth or dry eyes. 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 No complaints.    ROS: 14-point ROS is negative unless otherwise stated in HPI.    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 60 tablet 11    cyclobenzaprine (FLEXERIL) 10 MG tablet Take 1 tablet (10 mg) by mouth 3 times daily as needed for muscle spasms 45 tablet 1     "empagliflozin (JARDIANCE) 10 MG TABS tablet Take 1 tablet (10 mg) by mouth daily. 90 tablet 3    lisinopril (ZESTRIL) 20 MG tablet Take 1 tablet (20 mg) by mouth daily. 90 tablet 3    metoprolol succinate ER (TOPROL XL) 50 MG 24 hr tablet Take 1 tablet (50 mg) by mouth daily. 90 tablet 3    prasugrel (EFFIENT) 10 MG TABS tablet Take 1 tablet (10 mg) by mouth daily. 60 tablet 1    NIFEdipine ER (ADALAT CC) 30 MG 24 hr tablet On hold x 4/16 (Patient not taking: Reported on 10/15/2024)       Current Facility-Administered Medications   Medication Dose Route Frequency Provider Last Rate Last Admin    GCaW-MTU-Zrw-Hepatitis B Recmb (VAXELIS) injection (pre-filled syringe) 0.5 mL  0.5 mL Intramuscular Once Taran Bacon MD        pneumococcal (PREVNAR 20) injection 0.5 mL  0.5 mL Intramuscular Once Taran Bacon MD        zoster vaccine recombinant adjuvanted (SHINGRIX) injection 0.5 mL  0.5 mL Intramuscular Once Taran Bacon MD         Updated PMH:  Past Medical History:   Diagnosis Date    AMI anterior wall (H)     Mid LAD on cath with stent    CAD S/P percutaneous coronary angioplasty 07/01/2016    Unstable agina with anterior myocardial damage reported without mycardial damage by patient's history    CVA (cerebrovascular accident) (H) 01/01/2012    Reporting thromboembolic episode on the right neck. Pt states \"I've had ten strokes.\"    Hypercoagulable state     Uncertain etiology--seeing Hematologist    MEDICAL HISTORY OF -     Possibly PFO     Updated PSH:  Past Surgical History:   Procedure Laterality Date    AMPUTATE TOE(S)      ARTHROSCOPY KNEE RT/LT      Right     ARTHROSCOPY KNEE RT/LT      Left    CARDIAC CATHERIZATION      With stent placement    ESOPHAGOSCOPY, GASTROSCOPY, DUODENOSCOPY (EGD), COMBINED N/A 2/19/2024    Procedure: Esophagoscopy, gastroscopy, duodenoscopy (EGD), combined;  Surgeon: Drew Jasso MD;  Location: [facility]    IR CHEST PORT PLACEMENT > 5 YRS OF AGE  9/13/2023    IR CVC " TUNNEL PLACEMENT > 5 YRS OF AGE  2024    IR CVC TUNNEL REMOVAL LEFT  3/21/2024     Updated FH:  Family History   Problem Relation Age of Onset    Diabetes No family hx of     Coronary Artery Disease No family hx of     Hypertension No family hx of     Hyperlipidemia No family hx of     Breast Cancer No family hx of      Updated SH:  Social History     Tobacco Use    Smoking status: Former     Current packs/day: 0.50     Types: Cigarettes     Passive exposure: Past (Mom smoked cigs indoors)    Smokeless tobacco: Never    Tobacco comments:     cigarette once in a while   Vaping Use    Vaping status: Never Used   Substance Use Topics    Alcohol use: Yes     Alcohol/week: 0.0 standard drinks of alcohol     Comment: 4 times per week 5 drinks    Drug use: No     Physical Exam:  /83   Pulse 85   Temp 97.8  F (36.6  C) (Oral)   Resp 16   Wt 85.1 kg (187 lb 11.2 oz)   SpO2 99%   BMI 28.32 kg/m    GA: NAD. Appears as stated age.  Psyc: Appropriate and cooperative  Access: port    ECO    GVHD: 0/0/0/0    Labs, pathology and other diagnostics reviewed    WBC   Date Value Ref Range Status   2020 4.9 4.0 - 11.0 10e9/L Final     WBC Count   Date Value Ref Range Status   2025 7.0 4.0 - 11.0 10e3/uL Final     Hemoglobin   Date Value Ref Range Status   2025 15.7 13.3 - 17.7 g/dL Final   2020 12.6 (L) 13.3 - 17.7 g/dL Final     Platelet Count   Date Value Ref Range Status   2025 284 150 - 450 10e3/uL Final   2020 280 150 - 450 10e9/L Final     Creatinine   Date Value Ref Range Status   2025 1.15 0.67 - 1.17 mg/dL Final   2020 0.80 0.66 - 1.25 mg/dL Final     Potassium   Date Value Ref Range Status   2025 4.2 3.4 - 5.3 mmol/L Final   2020 3.8 3.4 - 5.3 mmol/L Final     Magnesium   Date Value Ref Range Status   04/15/2024 2.2 1.7 - 2.3 mg/dL Final     EBV DNA Copies/mL   Date Value Ref Range Status   2024 Not Detected Not Detected copies/mL Final      A/P    Date Feb 19, 2025   Diagnosis High risk MDS, R-IPSS 7.5   Planned Treatment MA Flu/Bu   GVHD Prophylaxis PTCy/Siro/MMF   HLA Match 8/8, DP permissive   PRA Negative   CMV Match -/-   Sex Match F -> M   Donor Age 26   ABO Match O -> A (minor)   KPS PS 80   BMI Body mass index is 28.32 kg/m .   PET Negative   LVEF 50-55%   Valvular abnormalities*  Mild mitral insufficiency   History of MI or arrhythmias Yes   ECG/QTC NSR/425   FEV1 73%   DLCOcor 93   Cr/GFR Creatinine   Date Value Ref Range Status   02/19/2025 1.15 0.67 - 1.17 mg/dL Final   05/28/2020 0.80 0.66 - 1.25 mg/dL Final      Bilirubin Lab Results   Component Value Date    BILITOTAL 0.2 01/17/2024    BILITOTAL 0.8 05/28/2020      ALT/AST Lab Results   Component Value Date    ALT 18 01/17/2024    ALT 25 05/28/2020    ;   AST   Date Value Ref Range Status   02/19/2025 30 0 - 45 U/L Final   05/28/2020 20 0 - 45 U/L Final      Dental Clearance    Adequate Marrow reserve Yes   Chest CT Few sub 4 mm nodules (negative on PET)   Infectious work-up     Pregnancy Test    Lupron Injection    HCT CI 4 points   Disease status at transplant CR with MRD+   LDH    Planned maintenance NGS pending   Anticoagulation On prasugrel and apixaban. Hold when Plt < 50 and resume when able. High risk for thrombosis.   Toxoplasma IgG Negative   Other**    Proposed Clinical Trials      MDS s/p MAC allo  - 1 year in CR, no MRD, 100% donor.  - OI ppx: acv. He says he has not been compliant with his meds. He did get shingles several months ago. He has recovered now. We will proceed with 1 yr vaccines.    2. Health maintenance:  - Vaccines today. Will need boosters in 2 months.  - He was seen in our survivorship clinic. He also met with a PCP yesterday. His LDL was high and he said his PCP will be managing that.  - Port out  - Dexa scheduled after this and our survivorship will follow up on that. Finally, we discussed healthy living and incorporating diet and exercise as well as  having an established PCP    RTC in 2 months for booster and 6 months for follow up    40 minutes spent on the date of the encounter doing chart review, interpretation of results, patient visit, documentation and coordination of care.  The longitudinal plan of care for the diagnosis(es)/condition(s) as documented were addressed during this visit. Due to the added complexity in care, I will continue to support Ziggy in the subsequent management and with ongoing continuity of care.

## 2025-02-20 LAB
DLCOUNC-%PRED-PRE: 91 %
DLCOUNC-PRE: 25.88 ML/MIN/MMHG
DLCOUNC-PRED: 28.25 ML/MIN/MMHG
ERV-%PRED-PRE: 22 %
ERV-PRE: 0.36 L
ERV-PRED: 1.6 L
EXPTIME-PRE: 5.84 SEC
FEF2575-%PRED-PRE: 87 %
FEF2575-PRE: 2.83 L/SEC
FEF2575-PRED: 3.23 L/SEC
FEFMAX-%PRED-PRE: 56 %
FEFMAX-PRE: 5.42 L/SEC
FEFMAX-PRED: 9.53 L/SEC
FEV1-%PRED-PRE: 79 %
FEV1-PRE: 2.79 L
FEV1FEV6-PRE: 82 %
FEV1FEV6-PRED: 80 %
FEV1FVC-PRE: 82 %
FEV1FVC-PRED: 80 %
FEV1SVC-PRE: 85 %
FEV1SVC-PRED: 76 %
FIFMAX-PRE: 4.68 L/SEC
FRCPLETH-%PRED-PRE: 111 %
FRCPLETH-PRE: 3.84 L
FRCPLETH-PRED: 3.45 L
FVC-%PRED-PRE: 77 %
FVC-PRE: 3.41 L
FVC-PRED: 4.39 L
IC-%PRED-PRE: 91 %
IC-PRE: 2.93 L
IC-PRED: 3.2 L
RVPLETH-%PRED-PRE: 158 %
RVPLETH-PRE: 3.49 L
RVPLETH-PRED: 2.2 L
TLCPLETH-%PRED-PRE: 97 %
TLCPLETH-PRE: 6.77 L
TLCPLETH-PRED: 6.92 L
VA-%PRED-PRE: 85 %
VA-PRE: 5.44 L
VC-%PRED-PRE: 71 %
VC-PRE: 3.28 L
VC-PRED: 4.61 L

## 2025-03-05 PROCEDURE — 99284 EMERGENCY DEPT VISIT MOD MDM: CPT | Performed by: STUDENT IN AN ORGANIZED HEALTH CARE EDUCATION/TRAINING PROGRAM

## 2025-03-05 PROCEDURE — 99285 EMERGENCY DEPT VISIT HI MDM: CPT | Mod: 25 | Performed by: STUDENT IN AN ORGANIZED HEALTH CARE EDUCATION/TRAINING PROGRAM

## 2025-03-06 ENCOUNTER — APPOINTMENT (OUTPATIENT)
Dept: CT IMAGING | Facility: CLINIC | Age: 50
End: 2025-03-06
Attending: STUDENT IN AN ORGANIZED HEALTH CARE EDUCATION/TRAINING PROGRAM
Payer: COMMERCIAL

## 2025-03-06 ENCOUNTER — HOSPITAL ENCOUNTER (EMERGENCY)
Facility: CLINIC | Age: 50
Discharge: HOME | End: 2025-03-06
Attending: STUDENT IN AN ORGANIZED HEALTH CARE EDUCATION/TRAINING PROGRAM
Payer: COMMERCIAL

## 2025-03-06 VITALS
SYSTOLIC BLOOD PRESSURE: 167 MMHG | TEMPERATURE: 97.7 F | WEIGHT: 183 LBS | DIASTOLIC BLOOD PRESSURE: 114 MMHG | HEIGHT: 69 IN | BODY MASS INDEX: 27.11 KG/M2 | RESPIRATION RATE: 18 BRPM | HEART RATE: 79 BPM | OXYGEN SATURATION: 99 %

## 2025-03-06 DIAGNOSIS — R11.2 NAUSEA AND VOMITING, UNSPECIFIED VOMITING TYPE: ICD-10-CM

## 2025-03-06 DIAGNOSIS — R10.84 GENERALIZED ABDOMINAL PAIN: ICD-10-CM

## 2025-03-06 LAB
ALBUMIN SERPL BCG-MCNC: 4.4 G/DL (ref 3.5–5.2)
ALP SERPL-CCNC: 141 U/L (ref 40–150)
ALT SERPL W P-5'-P-CCNC: 44 U/L (ref 0–70)
ANION GAP SERPL CALCULATED.3IONS-SCNC: 16 MMOL/L (ref 7–15)
AST SERPL W P-5'-P-CCNC: 28 U/L (ref 0–45)
BASOPHILS # BLD AUTO: 0 10E3/UL (ref 0–0.2)
BASOPHILS NFR BLD AUTO: 0 %
BILIRUB SERPL-MCNC: 0.7 MG/DL
BUN SERPL-MCNC: 21.9 MG/DL (ref 6–20)
CALCIUM SERPL-MCNC: 10.1 MG/DL (ref 8.8–10.4)
CHLORIDE SERPL-SCNC: 102 MMOL/L (ref 98–107)
CREAT SERPL-MCNC: 1.04 MG/DL (ref 0.67–1.17)
EGFRCR SERPLBLD CKD-EPI 2021: 87 ML/MIN/1.73M2
EOSINOPHIL # BLD AUTO: 0 10E3/UL (ref 0–0.7)
EOSINOPHIL NFR BLD AUTO: 0 %
ERYTHROCYTE [DISTWIDTH] IN BLOOD BY AUTOMATED COUNT: 12.2 % (ref 10–15)
FLUAV RNA SPEC QL NAA+PROBE: NEGATIVE
FLUBV RNA RESP QL NAA+PROBE: NEGATIVE
GLUCOSE SERPL-MCNC: 159 MG/DL (ref 70–99)
HCO3 SERPL-SCNC: 17 MMOL/L (ref 22–29)
HCT VFR BLD AUTO: 43.6 % (ref 40–53)
HGB BLD-MCNC: 15.5 G/DL (ref 13.3–17.7)
IMM GRANULOCYTES # BLD: 0.1 10E3/UL
IMM GRANULOCYTES NFR BLD: 1 %
LYMPHOCYTES # BLD AUTO: 2.2 10E3/UL (ref 0.8–5.3)
LYMPHOCYTES NFR BLD AUTO: 23 %
MCH RBC QN AUTO: 32.3 PG (ref 26.5–33)
MCHC RBC AUTO-ENTMCNC: 35.6 G/DL (ref 31.5–36.5)
MCV RBC AUTO: 91 FL (ref 78–100)
MONOCYTES # BLD AUTO: 0.9 10E3/UL (ref 0–1.3)
MONOCYTES NFR BLD AUTO: 10 %
NEUTROPHILS # BLD AUTO: 6.4 10E3/UL (ref 1.6–8.3)
NEUTROPHILS NFR BLD AUTO: 67 %
NRBC # BLD AUTO: 0 10E3/UL
NRBC BLD AUTO-RTO: 0 /100
PLAT MORPH BLD: ABNORMAL
PLATELET # BLD AUTO: 160 10E3/UL (ref 150–450)
POTASSIUM SERPL-SCNC: 3.9 MMOL/L (ref 3.4–5.3)
PROT SERPL-MCNC: 7.5 G/DL (ref 6.4–8.3)
RBC # BLD AUTO: 4.8 10E6/UL (ref 4.4–5.9)
RBC MORPH BLD: ABNORMAL
RSV RNA SPEC NAA+PROBE: NEGATIVE
SARS-COV-2 RNA RESP QL NAA+PROBE: NEGATIVE
SODIUM SERPL-SCNC: 135 MMOL/L (ref 135–145)
VARIANT LYMPHS BLD QL SMEAR: PRESENT
WBC # BLD AUTO: 9.7 10E3/UL (ref 4–11)

## 2025-03-06 PROCEDURE — 87637 SARSCOV2&INF A&B&RSV AMP PRB: CPT | Performed by: STUDENT IN AN ORGANIZED HEALTH CARE EDUCATION/TRAINING PROGRAM

## 2025-03-06 PROCEDURE — 96375 TX/PRO/DX INJ NEW DRUG ADDON: CPT | Performed by: STUDENT IN AN ORGANIZED HEALTH CARE EDUCATION/TRAINING PROGRAM

## 2025-03-06 PROCEDURE — 258N000003 HC RX IP 258 OP 636: Performed by: STUDENT IN AN ORGANIZED HEALTH CARE EDUCATION/TRAINING PROGRAM

## 2025-03-06 PROCEDURE — 36415 COLL VENOUS BLD VENIPUNCTURE: CPT | Performed by: STUDENT IN AN ORGANIZED HEALTH CARE EDUCATION/TRAINING PROGRAM

## 2025-03-06 PROCEDURE — 250N000011 HC RX IP 250 OP 636: Performed by: STUDENT IN AN ORGANIZED HEALTH CARE EDUCATION/TRAINING PROGRAM

## 2025-03-06 PROCEDURE — 96374 THER/PROPH/DIAG INJ IV PUSH: CPT | Mod: 59 | Performed by: STUDENT IN AN ORGANIZED HEALTH CARE EDUCATION/TRAINING PROGRAM

## 2025-03-06 PROCEDURE — 96361 HYDRATE IV INFUSION ADD-ON: CPT | Performed by: STUDENT IN AN ORGANIZED HEALTH CARE EDUCATION/TRAINING PROGRAM

## 2025-03-06 PROCEDURE — 85004 AUTOMATED DIFF WBC COUNT: CPT | Performed by: STUDENT IN AN ORGANIZED HEALTH CARE EDUCATION/TRAINING PROGRAM

## 2025-03-06 PROCEDURE — 85018 HEMOGLOBIN: CPT | Performed by: STUDENT IN AN ORGANIZED HEALTH CARE EDUCATION/TRAINING PROGRAM

## 2025-03-06 PROCEDURE — 82947 ASSAY GLUCOSE BLOOD QUANT: CPT | Performed by: STUDENT IN AN ORGANIZED HEALTH CARE EDUCATION/TRAINING PROGRAM

## 2025-03-06 PROCEDURE — 250N000009 HC RX 250: Performed by: STUDENT IN AN ORGANIZED HEALTH CARE EDUCATION/TRAINING PROGRAM

## 2025-03-06 PROCEDURE — 250N000013 HC RX MED GY IP 250 OP 250 PS 637: Performed by: STUDENT IN AN ORGANIZED HEALTH CARE EDUCATION/TRAINING PROGRAM

## 2025-03-06 PROCEDURE — 74177 CT ABD & PELVIS W/CONTRAST: CPT

## 2025-03-06 PROCEDURE — 84520 ASSAY OF UREA NITROGEN: CPT | Performed by: STUDENT IN AN ORGANIZED HEALTH CARE EDUCATION/TRAINING PROGRAM

## 2025-03-06 PROCEDURE — 82310 ASSAY OF CALCIUM: CPT | Performed by: STUDENT IN AN ORGANIZED HEALTH CARE EDUCATION/TRAINING PROGRAM

## 2025-03-06 RX ORDER — IOPAMIDOL 755 MG/ML
90 INJECTION, SOLUTION INTRAVASCULAR ONCE
Status: COMPLETED | OUTPATIENT
Start: 2025-03-06 | End: 2025-03-06

## 2025-03-06 RX ORDER — HYDROMORPHONE HYDROCHLORIDE 1 MG/ML
0.5 INJECTION, SOLUTION INTRAMUSCULAR; INTRAVENOUS; SUBCUTANEOUS
Status: COMPLETED | OUTPATIENT
Start: 2025-03-06 | End: 2025-03-06

## 2025-03-06 RX ORDER — ACETAMINOPHEN 500 MG
1000 TABLET ORAL ONCE
Status: COMPLETED | OUTPATIENT
Start: 2025-03-06 | End: 2025-03-06

## 2025-03-06 RX ORDER — KETOROLAC TROMETHAMINE 15 MG/ML
10 INJECTION, SOLUTION INTRAMUSCULAR; INTRAVENOUS ONCE
Status: COMPLETED | OUTPATIENT
Start: 2025-03-06 | End: 2025-03-06

## 2025-03-06 RX ORDER — ONDANSETRON 2 MG/ML
4 INJECTION INTRAMUSCULAR; INTRAVENOUS EVERY 30 MIN PRN
Status: DISCONTINUED | OUTPATIENT
Start: 2025-03-06 | End: 2025-03-06 | Stop reason: HOSPADM

## 2025-03-06 RX ORDER — ONDANSETRON 4 MG/1
4 TABLET, ORALLY DISINTEGRATING ORAL EVERY 8 HOURS PRN
Qty: 10 TABLET | Refills: 0 | Status: SHIPPED | OUTPATIENT
Start: 2025-03-06

## 2025-03-06 RX ORDER — HEPARIN SODIUM (PORCINE) LOCK FLUSH IV SOLN 100 UNIT/ML 100 UNIT/ML
5-10 SOLUTION INTRAVENOUS
Status: DISCONTINUED | OUTPATIENT
Start: 2025-03-06 | End: 2025-03-06 | Stop reason: HOSPADM

## 2025-03-06 RX ADMIN — Medication 5 ML: at 04:12

## 2025-03-06 RX ADMIN — SODIUM CHLORIDE 1000 ML: 9 INJECTION, SOLUTION INTRAVENOUS at 00:52

## 2025-03-06 RX ADMIN — ONDANSETRON 4 MG: 2 INJECTION, SOLUTION INTRAMUSCULAR; INTRAVENOUS at 00:53

## 2025-03-06 RX ADMIN — ACETAMINOPHEN 1000 MG: 500 TABLET, FILM COATED ORAL at 03:00

## 2025-03-06 RX ADMIN — KETOROLAC TROMETHAMINE 10 MG: 15 INJECTION, SOLUTION INTRAMUSCULAR; INTRAVENOUS at 00:53

## 2025-03-06 RX ADMIN — IOPAMIDOL 90 ML: 755 INJECTION, SOLUTION INTRAVENOUS at 02:07

## 2025-03-06 RX ADMIN — SODIUM CHLORIDE 62 ML: 9 INJECTION, SOLUTION INTRAVENOUS at 02:07

## 2025-03-06 RX ADMIN — HYDROMORPHONE HYDROCHLORIDE 0.5 MG: 1 INJECTION, SOLUTION INTRAMUSCULAR; INTRAVENOUS; SUBCUTANEOUS at 01:52

## 2025-03-06 ASSESSMENT — ACTIVITIES OF DAILY LIVING (ADL)
ADLS_ACUITY_SCORE: 58

## 2025-03-06 NOTE — ED PROVIDER NOTES
History     Chief Complaint   Patient presents with    Nausea    Shortness of Breath    Fatigue     HPI  Ziggy Hallman is a 49 year old male who has coronary artery disease status post LAD stenting, heart failure with preserved ejection fraction, hypertension, myelodysplastic syndrome, history of CVA, hypercoagulable state on Eliquis, who presents to the ER for evaluation of nausea, vomiting and diarrhea.  Patient states he was in his normal state of health until about noon today and he has been throwing up all day.  He is also had numerous episodes of diarrhea.  He does not report seeing any blood in the vomitus or diarrhea.  He states that he feels weak all over.  He could not sleep due to the nausea so he came in for evaluation.  He endorses an upset stomach, but that denies abdominal pain otherwise he denies fever.  Denies chest pain.  Denies trouble breathing.  No back pain.  He ate eggs, cintron and sausage for breakfast.  No alcohol or drugs.  No recent travel.  No known ill contacts.  No history of abdominal surgeries.  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  He recently followed up with oncology and everything looked good.  That visit was reviewed. Last echocardiogram in April 2024 was reviewed and showed a EF of 55 to 60% with grossly normal RV and LV function.  Allergies:  Allergies   Allergen Reactions    Blood Transfusion Related (Informational Only) Other (See Comments)     Give O RBC's and WBC's only per Cell Therapy       Problem List:    Patient Active Problem List    Diagnosis Date Noted    Benign essential hypertension 10/03/2024     Priority: Medium    Chronic heart failure with preserved ejection fraction (H) 10/03/2024     Priority: Medium     Hospitalized 4/2024 with new onset heart failure and being of underlying hypertension  Has a history of thrombocytopenia and anemia      S/P patent foramen ovale closure 10/03/2024     Priority: Medium     History of closure due to concerns of  embolic event with hypercoagulable state      History of MI (myocardial infarction) 10/03/2024     Priority: Medium     History of hypercoagulable disorder with history of multiple thromboembolic events  embolic event on his LAD for which he had angioplasty many years ago.      Shortness of breath 04/16/2024     Priority: Medium    Localized edema 04/16/2024     Priority: Medium    Anemia, unspecified type 04/16/2024     Priority: Medium    Acute ST elevation myocardial infarction (STEMI) (H) 04/15/2024     Priority: Medium    Dyspnea on exertion 04/15/2024     Priority: Medium    Elevated brain natriuretic peptide (BNP) level 04/15/2024     Priority: Medium    Status post bone marrow transplant (H) 02/17/2024     Priority: Medium    Antineoplastic chemotherapy induced pancytopenia 09/26/2023     Priority: Medium    MDS (myelodysplastic syndrome) (H) 11/20/2015     Priority: Medium     H/o hypercoagulable disorder myelodysplastic syndrome and bone marrow transplant.  He has marked anemia and thrombocytopenia.  He is on anticoagulation and antiplatelet therapy.  Has had multiple thromboembolic events including stroke renal infarcts and an embolic event on his LAD for which he had angioplasty many years ago.       Low back pain 08/18/2015     Priority: Medium     Diagnosis updated by automated process. Provider to review and confirm.      Hypercoagulable state      Priority: Medium     Uncertain etiology--seeing Hematologist      CVA (cerebrovascular accident) (H)      Priority: Medium     Reporting thromboembolic episode on the right neck           Past Medical History:    Past Medical History:   Diagnosis Date    AMI anterior wall (H)     CAD S/P percutaneous coronary angioplasty 07/01/2016    CVA (cerebrovascular accident) (H) 01/01/2012    Hypercoagulable state     MEDICAL HISTORY OF -        Past Surgical History:    Past Surgical History:   Procedure Laterality Date    AMPUTATE TOE(S)      ARTHROSCOPY KNEE RT/LT  "     Right     ARTHROSCOPY KNEE RT/LT      Left    CARDIAC CATHERIZATION      With stent placement    ESOPHAGOSCOPY, GASTROSCOPY, DUODENOSCOPY (EGD), COMBINED N/A 2/19/2024    Procedure: Esophagoscopy, gastroscopy, duodenoscopy (EGD), combined;  Surgeon: Drew Jasso MD;  Location: [facility]    IR CHEST PORT PLACEMENT > 5 YRS OF AGE  9/13/2023    IR CVC TUNNEL PLACEMENT > 5 YRS OF AGE  1/19/2024    IR CVC TUNNEL REMOVAL LEFT  3/21/2024       Family History:    Family History   Problem Relation Age of Onset    Diabetes No family hx of     Coronary Artery Disease No family hx of     Hypertension No family hx of     Hyperlipidemia No family hx of     Breast Cancer No family hx of        Social History:  Marital Status:  Single [1]  Social History     Tobacco Use    Smoking status: Former     Current packs/day: 0.50     Types: Cigarettes     Passive exposure: Past (Mom smoked cigs indoors)    Smokeless tobacco: Never    Tobacco comments:     cigarette once in a while   Vaping Use    Vaping status: Never Used   Substance Use Topics    Alcohol use: Yes     Alcohol/week: 0.0 standard drinks of alcohol     Comment: 4 times per week 5 drinks    Drug use: No        Medications:    acyclovir (ZOVIRAX) 800 MG tablet  [START ON 4/8/2025] apixaban ANTICOAGULANT (ELIQUIS) 2.5 MG tablet  cyclobenzaprine (FLEXERIL) 10 MG tablet  empagliflozin (JARDIANCE) 10 MG TABS tablet  lisinopril (ZESTRIL) 20 MG tablet  metoprolol succinate ER (TOPROL XL) 50 MG 24 hr tablet  NIFEdipine ER (ADALAT CC) 30 MG 24 hr tablet  ondansetron (ZOFRAN ODT) 4 MG ODT tab  prasugrel (EFFIENT) 10 MG TABS tablet          Review of Systems  See HPI  Physical Exam   BP: (!) 160/81  Pulse: 71  Temp: 97.7  F (36.5  C)  Resp: 18  Height: 175.3 cm (5' 9\")  Weight: 83 kg (183 lb)  SpO2: 98 %      Physical Exam  BP (!) 167/114   Pulse 77   Temp 97.7  F (36.5  C) (Oral)   Resp 18   Ht 1.753 m (5' 9\")   Wt 83 kg (183 lb)   SpO2 99%   BMI 27.02 kg/m    General: " Uncomfortable appearing, but alert and conversant.  Nontoxic-appearing  Head: atraumatic  Nose: no rhinorrhea or epistaxis  Ears: no external auditory canal discharge or bleeding.    Eyes: Sclera nonicteric. Conjunctiva noninjected. PERRL, EOMI  Mouth: no tonsillar erythema, edema, or exudate.  Moist mucous membranes  Neck: supple, moving spontaneously  Lungs: No increased work of breathing.  Clear to auscultation bilaterally.  CV: RRR, peripheral pulses palpable and symmetric  Abdomen: soft, mild generalized tenderness throughout.  No rigidity, rebound or guarding  Extremities: Warm and well-perfused.    Skin: no rash or diaphoresis  Neuro: CN II-XII grossly intact, following commands, moving all extremities spontaneously    ED Course        Procedures             Critical Care time:  none             Results for orders placed or performed during the hospital encounter of 03/06/25 (from the past 24 hours)   Comprehensive metabolic panel   Result Value Ref Range    Sodium 135 135 - 145 mmol/L    Potassium 3.9 3.4 - 5.3 mmol/L    Carbon Dioxide (CO2) 17 (L) 22 - 29 mmol/L    Anion Gap 16 (H) 7 - 15 mmol/L    Urea Nitrogen 21.9 (H) 6.0 - 20.0 mg/dL    Creatinine 1.04 0.67 - 1.17 mg/dL    GFR Estimate 87 >60 mL/min/1.73m2    Calcium 10.1 8.8 - 10.4 mg/dL    Chloride 102 98 - 107 mmol/L    Glucose 159 (H) 70 - 99 mg/dL    Alkaline Phosphatase 141 40 - 150 U/L    AST 28 0 - 45 U/L    ALT 44 0 - 70 U/L    Protein Total 7.5 6.4 - 8.3 g/dL    Albumin 4.4 3.5 - 5.2 g/dL    Bilirubin Total 0.7 <=1.2 mg/dL   CBC with platelets differential    Narrative    The following orders were created for panel order CBC with platelets differential.  Procedure                               Abnormality         Status                     ---------                               -----------         ------                     CBC with platelets and d...[[id number]]                      Final result               RBC and Platelet  Morphology[[id number]]  Abnormal            Final result                 Please view results for these tests on the individual orders.   CBC with platelets and differential   Result Value Ref Range    WBC Count 9.7 4.0 - 11.0 10e3/uL    RBC Count 4.80 4.40 - 5.90 10e6/uL    Hemoglobin 15.5 13.3 - 17.7 g/dL    Hematocrit 43.6 40.0 - 53.0 %    MCV 91 78 - 100 fL    MCH 32.3 26.5 - 33.0 pg    MCHC 35.6 31.5 - 36.5 g/dL    RDW 12.2 10.0 - 15.0 %    Platelet Count 160 150 - 450 10e3/uL    % Neutrophils 67 %    % Lymphocytes 23 %    % Monocytes 10 %    % Eosinophils 0 %    % Basophils 0 %    % Immature Granulocytes 1 %    NRBCs per 100 WBC 0 <1 /100    Absolute Neutrophils 6.4 1.6 - 8.3 10e3/uL    Absolute Lymphocytes 2.2 0.8 - 5.3 10e3/uL    Absolute Monocytes 0.9 0.0 - 1.3 10e3/uL    Absolute Eosinophils 0.0 0.0 - 0.7 10e3/uL    Absolute Basophils 0.0 0.0 - 0.2 10e3/uL    Absolute Immature Granulocytes 0.1 <=0.4 10e3/uL    Absolute NRBCs 0.0 10e3/uL   RBC and Platelet Morphology   Result Value Ref Range    RBC Morphology Confirmed RBC Indices     Platelet Assessment  Automated Count Confirmed. Platelet morphology is normal.     Automated Count Confirmed. Platelet morphology is normal.    Reactive Lymphocytes Present (A) None Seen   CT Abdomen Pelvis w Contrast    Narrative    EXAM: CT ABDOMEN PELVIS W CONTRAST  LOCATION: [facility]  DATE: 3/6/2025    INDICATION: Generalized abdominal pain and bloating, vomiting  COMPARISON: CT abdomen pelvis 11/8/2011.  TECHNIQUE: CT scan of the abdomen and pelvis was performed following injection of IV contrast. Multiplanar reformats were obtained. Dose reduction techniques were used.  CONTRAST: 90mL Isovue 370    FINDINGS:   LOWER CHEST: Normal.    HEPATOBILIARY: Normal.    PANCREAS: Normal.    SPLEEN: Normal.    ADRENAL GLANDS: Normal.    KIDNEYS/BLADDER: Severe chronic atrophy of the right kidney. Normal left kidney and bladder.    BOWEL: Normal. No  obstruction or inflammation. Normal appendix.    LYMPH NODES: Normal.    VASCULATURE: Moderate aortoiliac atherosclerosis. No aneurysm.    PELVIC ORGANS: Normal.    MUSCULOSKELETAL: Degenerative changes of the spine.      Impression    IMPRESSION:   1.  No acute process in the abdomen or pelvis.  2.  Severe chronic atrophy of the right kidney.   Influenza A/B, RSV and SARS-CoV2 PCR (COVID-19) Nose    Specimen: Nose; Swab   Result Value Ref Range    Influenza A PCR Negative Negative    Influenza B PCR Negative Negative    RSV PCR Negative Negative    SARS CoV2 PCR Negative Negative    Narrative    Testing was performed using the Xpert Xpress CoV2/Flu/RSV Assay on the Cepheid GeneXpert Instrument. This test should be ordered for the detection of SARS-CoV2, influenza, and RSV viruses in individuals with signs and symptoms of respiratory tract infection. This test is for in vitro diagnostic use under the [place] FDA for laboratories certified under CLIA to perform high or moderate complexity testing. This test has been [place] FDA cleared. A negative result does not rule out the presence of PCR inhibitors in the specimen or target RNA in concentration below the limit of detection for the assay. If only one viral target is positive but coinfection with multiple targets is suspected, the sample should be re-tested with another FDA cleared, approved, or authorized test, if coninfection would change clinical management. This test was validated by the [facility]. These laboratories are certified under the Clinical Laboratory Improvement Amendments of 1988 (CLIA-88) as qualified to perfom high complexity laboratory testing.       Medications   ondansetron (ZOFRAN) injection 4 mg (4 mg Intravenous $Given 3/6/25 0053)   sodium chloride 0.9% BOLUS 1,000 mL ( Intravenous Rate/Dose Verify 3/6/25 0300)   ketorolac (TORADOL) injection 10 mg (10 mg Intravenous $Given 3/6/25 0053)   HYDROmorphone (PF) (DILAUDID) injection  0.5 mg (0.5 mg Intravenous $Given 3/6/25 0152)   CT Saline (62 mLs Intravenous $Given 3/6/25 0207)   iopamidol (ISOVUE-370) solution 90 mL (90 mLs Intravenous $Given 3/6/25 0207)   acetaminophen (TYLENOL) tablet 1,000 mg (1,000 mg Oral $Given 3/6/25 0300)       Assessments & Plan (with Medical Decision Making)     I have reviewed the nursing notes.    I have reviewed the findings, diagnosis, plan and need for follow up with the patient.    Medical Decision Making  Ziggy Hallman is a 49 year old male who has coronary artery disease status post LAD stenting, heart failure with preserved ejection fraction, hypertension, myelodysplastic syndrome, history of CVA, hypercoagulable state on Eliquis, who presents to the ER for evaluation of nausea, vomiting and diarrhea.  Vital signs reviewed notable for hypertension otherwise reassuring.  Patient has some generalized abdominal tenderness, but no localizable tenderness.  He is nontoxic-appearing.  He is given a fluid bolus, Toradol and Zofran.  He has no leukocytosis or anemia.  He has slightly increased anion gap of 16 with decreased bicarb, but no significant hyperglycemia to suggest DKA.  His renal function and LFTs are normal.  Suspect this is due to GI losses.  I reassessed the patient and his nausea is improved, but he is continuing to complain of abdominal pain and pain all over.  He is a poor time describing his pain and states it feels like all of his joints and muscles are aching.  He is given pain medication, Tylenol, viral swabs obtained and CT of the abdomen and pelvis was obtained.  However, no clear etiology of the patient's symptoms were identified.  His CT showed chronic atrophy of his right kidney, but no acute findings.  His viral swabs were negative.  He is feeling a little bit better after medications, but still not feeling well.  He is primarily complaining of myalgias.  He is not having any cough or chest pain or trouble breathing and not having  urinary symptoms, so chest x-ray and UA were not obtained.  Given his reassuring workup I think outpatient management is appropriate.  Patient likely has an unspecified viral illness.  I reassured him that this should improve the next couple of days.  I recommended he get plenty of rest, drink lots of fluids and use Zofran as needed.  Recommended he follow-up with his regular doctor if not proving.  Return precautions were discussed as well.        New Prescriptions    ONDANSETRON (ZOFRAN ODT) 4 MG ODT TAB    Take 1 tablet (4 mg) by mouth every 8 hours as needed for nausea.       Final diagnoses:   Nausea and vomiting, unspecified vomiting type   Generalized abdominal pain       3/5/2025   [facility] EMERGENCY DEPT       Felice Mg MD  03/06/25 0835

## 2025-03-06 NOTE — DISCHARGE INSTRUCTIONS
I am not exactly sure what is causing your symptoms.  Your lab workup was all reassuring.  You tested negative for COVID, influenza and RSV.  The CAT scan of your abdomen and pelvis did not show any clear cause of your symptoms.  I suspect that you have a virus causing your symptoms.  If this is the case, your symptoms should start improving the next couple of days.  Antibiotics are not helpful for viral infections.  Make sure you get lots of rest and you are drinking plenty of fluids.  Use the Zofran as needed.  You should be taking scheduled Tylenol and you can use ibuprofen as needed.  You should only take the ibuprofen as needed since you are on a blood thinner.  Follow-up with your regular doctor if not improving.  Return to the ER with concerning or worsening symptoms.

## 2025-03-06 NOTE — ED TRIAGE NOTES
Generalized weakness and pain started around 1200 today. Complains of nausea, vomiting, SOB, body aches. Had bone marrow transplant 1 year ago. Had shots a few days ago.     Triage Assessment (Adult)       Row Name 03/06/25 [id number]          Triage Assessment    Airway WDL WDL        Respiratory WDL    Respiratory WDL WDL        Skin Circulation/Temperature WDL    Skin Circulation/Temperature WDL WDL        Cardiac WDL    Cardiac WDL WDL        Peripheral/Neurovascular WDL    Peripheral Neurovascular WDL WDL        Cognitive/Neuro/Behavioral WDL    Cognitive/Neuro/Behavioral WDL WDL

## 2025-03-10 ENCOUNTER — OFFICE VISIT (OUTPATIENT)
Dept: PEDIATRICS | Facility: CLINIC | Age: 50
End: 2025-03-10
Payer: COMMERCIAL

## 2025-03-10 ENCOUNTER — HOSPITAL ENCOUNTER (OUTPATIENT)
Dept: CT IMAGING | Facility: CLINIC | Age: 50
Discharge: HOME | End: 2025-03-10
Payer: COMMERCIAL

## 2025-03-10 ENCOUNTER — ANCILLARY PROCEDURE (OUTPATIENT)
Dept: GENERAL RADIOLOGY | Facility: CLINIC | Age: 50
End: 2025-03-10
Attending: STUDENT IN AN ORGANIZED HEALTH CARE EDUCATION/TRAINING PROGRAM
Payer: COMMERCIAL

## 2025-03-10 ENCOUNTER — OFFICE VISIT (OUTPATIENT)
Dept: FAMILY MEDICINE | Facility: CLINIC | Age: 50
End: 2025-03-10
Payer: COMMERCIAL

## 2025-03-10 VITALS
OXYGEN SATURATION: 98 % | HEART RATE: 73 BPM | SYSTOLIC BLOOD PRESSURE: 116 MMHG | TEMPERATURE: 97.9 F | RESPIRATION RATE: 18 BRPM | HEIGHT: 69 IN | DIASTOLIC BLOOD PRESSURE: 80 MMHG | BODY MASS INDEX: 26.51 KG/M2 | WEIGHT: 179 LBS

## 2025-03-10 VITALS
SYSTOLIC BLOOD PRESSURE: 111 MMHG | TEMPERATURE: 99 F | WEIGHT: 179 LBS | OXYGEN SATURATION: 98 % | HEIGHT: 69 IN | HEART RATE: 104 BPM | RESPIRATION RATE: 20 BRPM | BODY MASS INDEX: 26.51 KG/M2 | DIASTOLIC BLOOD PRESSURE: 83 MMHG

## 2025-03-10 DIAGNOSIS — D68.59 HYPERCOAGULABLE STATE: ICD-10-CM

## 2025-03-10 DIAGNOSIS — R06.02 SHORTNESS OF BREATH: Primary | ICD-10-CM

## 2025-03-10 DIAGNOSIS — D89.813 GRAFT VS HOST DISEASE (H): ICD-10-CM

## 2025-03-10 DIAGNOSIS — E78.00 ELEVATED LDL CHOLESTEROL LEVEL: ICD-10-CM

## 2025-03-10 DIAGNOSIS — R06.02 SHORTNESS OF BREATH: ICD-10-CM

## 2025-03-10 DIAGNOSIS — D46.9 MDS (MYELODYSPLASTIC SYNDROME) (H): ICD-10-CM

## 2025-03-10 DIAGNOSIS — Z09 HOSPITAL DISCHARGE FOLLOW-UP: Primary | ICD-10-CM

## 2025-03-10 LAB
ALBUMIN SERPL BCG-MCNC: 4.1 G/DL (ref 3.5–5.2)
ALP SERPL-CCNC: 105 U/L (ref 40–150)
ALT SERPL W P-5'-P-CCNC: 85 U/L (ref 0–70)
ANION GAP SERPL CALCULATED.3IONS-SCNC: 14 MMOL/L (ref 7–15)
AST SERPL W P-5'-P-CCNC: 32 U/L (ref 0–45)
BASOPHILS # BLD AUTO: 0.1 10E3/UL (ref 0–0.2)
BASOPHILS NFR BLD AUTO: 1 %
BILIRUB SERPL-MCNC: 0.5 MG/DL
BUN SERPL-MCNC: 22.8 MG/DL (ref 6–20)
CALCIUM SERPL-MCNC: 9.2 MG/DL (ref 8.8–10.4)
CHLORIDE SERPL-SCNC: 103 MMOL/L (ref 98–107)
CREAT SERPL-MCNC: 1.12 MG/DL (ref 0.67–1.17)
CRP SERPL-MCNC: 5.62 MG/L
D DIMER PPP FEU-MCNC: 1.64 UG/ML FEU (ref 0–0.5)
EGFRCR SERPLBLD CKD-EPI 2021: 80 ML/MIN/1.73M2
EOSINOPHIL # BLD AUTO: 0.4 10E3/UL (ref 0–0.7)
EOSINOPHIL NFR BLD AUTO: 4 %
ERYTHROCYTE [DISTWIDTH] IN BLOOD BY AUTOMATED COUNT: 12.8 % (ref 10–15)
GLUCOSE SERPL-MCNC: 95 MG/DL (ref 70–99)
HCO3 SERPL-SCNC: 19 MMOL/L (ref 22–29)
HCT VFR BLD AUTO: 46.8 % (ref 40–53)
HGB BLD-MCNC: 16.6 G/DL (ref 13.3–17.7)
IMM GRANULOCYTES # BLD: 0 10E3/UL
IMM GRANULOCYTES NFR BLD: 0 %
LYMPHOCYTES # BLD AUTO: 4 10E3/UL (ref 0.8–5.3)
LYMPHOCYTES NFR BLD AUTO: 46 %
MCH RBC QN AUTO: 31.6 PG (ref 26.5–33)
MCHC RBC AUTO-ENTMCNC: 35.5 G/DL (ref 31.5–36.5)
MCV RBC AUTO: 89 FL (ref 78–100)
MONOCYTES # BLD AUTO: 1.2 10E3/UL (ref 0–1.3)
MONOCYTES NFR BLD AUTO: 14 %
NEUTROPHILS # BLD AUTO: 3 10E3/UL (ref 1.6–8.3)
NEUTROPHILS NFR BLD AUTO: 35 %
NT-PROBNP SERPL-MCNC: 96 PG/ML (ref 0–900)
PLATELET # BLD AUTO: 192 10E3/UL (ref 150–450)
POTASSIUM SERPL-SCNC: 3.6 MMOL/L (ref 3.4–5.3)
PROT SERPL-MCNC: 6.9 G/DL (ref 6.4–8.3)
RBC # BLD AUTO: 5.25 10E6/UL (ref 4.4–5.9)
SODIUM SERPL-SCNC: 136 MMOL/L (ref 135–145)
T4 FREE SERPL-MCNC: 1.7 NG/DL (ref 0.9–1.7)
TROPONIN T SERPL HS-MCNC: 10 NG/L
TSH SERPL DL<=0.005 MIU/L-ACNC: 4.67 UIU/ML (ref 0.3–4.2)
WBC # BLD AUTO: 8.6 10E3/UL (ref 4–11)

## 2025-03-10 PROCEDURE — 85379 FIBRIN DEGRADATION QUANT: CPT

## 2025-03-10 PROCEDURE — 71275 CT ANGIOGRAPHY CHEST: CPT

## 2025-03-10 PROCEDURE — 71046 X-RAY EXAM CHEST 2 VIEWS: CPT | Mod: TC | Performed by: RADIOLOGY

## 2025-03-10 PROCEDURE — 80053 COMPREHEN METABOLIC PANEL: CPT

## 2025-03-10 PROCEDURE — 84484 ASSAY OF TROPONIN QUANT: CPT

## 2025-03-10 PROCEDURE — 83880 ASSAY OF NATRIURETIC PEPTIDE: CPT

## 2025-03-10 PROCEDURE — 85025 COMPLETE CBC W/AUTO DIFF WBC: CPT

## 2025-03-10 PROCEDURE — 250N000009 HC RX 250

## 2025-03-10 PROCEDURE — 1125F AMNT PAIN NOTED PAIN PRSNT: CPT | Performed by: STUDENT IN AN ORGANIZED HEALTH CARE EDUCATION/TRAINING PROGRAM

## 2025-03-10 PROCEDURE — 3074F SYST BP LT 130 MM HG: CPT | Performed by: STUDENT IN AN ORGANIZED HEALTH CARE EDUCATION/TRAINING PROGRAM

## 2025-03-10 PROCEDURE — 3079F DIAST BP 80-89 MM HG: CPT

## 2025-03-10 PROCEDURE — 3079F DIAST BP 80-89 MM HG: CPT | Performed by: STUDENT IN AN ORGANIZED HEALTH CARE EDUCATION/TRAINING PROGRAM

## 2025-03-10 PROCEDURE — 99207 PR FIRST ORDER ACUTE REFERRAL: CPT | Performed by: STUDENT IN AN ORGANIZED HEALTH CARE EDUCATION/TRAINING PROGRAM

## 2025-03-10 PROCEDURE — 86140 C-REACTIVE PROTEIN: CPT

## 2025-03-10 PROCEDURE — 93000 ELECTROCARDIOGRAM COMPLETE: CPT

## 2025-03-10 PROCEDURE — 250N000011 HC RX IP 250 OP 636

## 2025-03-10 PROCEDURE — 99417 PROLNG OP E/M EACH 15 MIN: CPT

## 2025-03-10 PROCEDURE — 36415 COLL VENOUS BLD VENIPUNCTURE: CPT

## 2025-03-10 PROCEDURE — 84439 ASSAY OF FREE THYROXINE: CPT

## 2025-03-10 PROCEDURE — 96374 THER/PROPH/DIAG INJ IV PUSH: CPT

## 2025-03-10 PROCEDURE — 1125F AMNT PAIN NOTED PAIN PRSNT: CPT

## 2025-03-10 PROCEDURE — 3074F SYST BP LT 130 MM HG: CPT

## 2025-03-10 PROCEDURE — 84443 ASSAY THYROID STIM HORMONE: CPT

## 2025-03-10 PROCEDURE — 99215 OFFICE O/P EST HI 40 MIN: CPT | Mod: 25

## 2025-03-10 RX ORDER — IOPAMIDOL 755 MG/ML
81 INJECTION, SOLUTION INTRAVASCULAR ONCE
Status: COMPLETED | OUTPATIENT
Start: 2025-03-10 | End: 2025-03-10

## 2025-03-10 RX ORDER — HEPARIN SODIUM (PORCINE) LOCK FLUSH IV SOLN 100 UNIT/ML 100 UNIT/ML
5-10 SOLUTION INTRAVENOUS
Status: ACTIVE | OUTPATIENT
Start: 2025-03-10

## 2025-03-10 RX ORDER — HEPARIN SODIUM,PORCINE 10 UNIT/ML
5-10 VIAL (ML) INTRAVENOUS
Status: ACTIVE | OUTPATIENT
Start: 2025-03-10

## 2025-03-10 RX ORDER — HEPARIN SODIUM,PORCINE 10 UNIT/ML
5-10 VIAL (ML) INTRAVENOUS EVERY 24 HOURS
Status: ACTIVE | OUTPATIENT
Start: 2025-03-10

## 2025-03-10 RX ORDER — ATORVASTATIN CALCIUM 40 MG/1
40 TABLET, FILM COATED ORAL DAILY
Qty: 90 TABLET | Refills: 3 | Status: SHIPPED | OUTPATIENT
Start: 2025-03-10

## 2025-03-10 RX ADMIN — Medication 5 ML: at 15:20

## 2025-03-10 RX ADMIN — IOPAMIDOL 81 ML: 755 INJECTION, SOLUTION INTRAVENOUS at 15:31

## 2025-03-10 RX ADMIN — SODIUM CHLORIDE 100 ML: 9 INJECTION, SOLUTION INTRAVENOUS at 15:32

## 2025-03-10 RX ADMIN — HEPARIN SODIUM (PORCINE) LOCK FLUSH IV SOLN 100 UNIT/ML 5 ML: 100 SOLUTION at 17:08

## 2025-03-10 ASSESSMENT — PAIN SCALES - GENERAL
PAINLEVEL_OUTOF10: MODERATE PAIN (6)
PAINLEVEL_OUTOF10: MODERATE PAIN (5)

## 2025-03-10 NOTE — PROGRESS NOTES
Acute and Diagnostic Services Clinic Visit    Assessment & Plan     (R06.02) Shortness of breath  (primary encounter diagnosis)  Comment: Has been having bothersome dyspnea since 3/3/2025.  Symptoms started with a systemic illness which included GI symptoms, myalgia, arthralgia, and dyspnea.  Although the GI symptoms have improved, the dyspnea has not, and in fact seems to be worsening.  Chest x-ray was done and a PCP visit this morning, which was clear.  Cardiopulmonary exams today are normal.  SpO2 is 98% on room air.  Etiology of dyspnea is unclear.  Plan:   Labs: Comprehensive metabolic panel, CRP         inflammation, D dimer quantitative, CBC with         platelets differential, Troponin T, High         Sensitivity, TSH with free T4 reflex, BNP-N terminal pro.  2.   EKG 12-lead, tracing only.  3.   If labs are nonrevealing, I think we should consider CT chest.  CT would be done as an angiogram technique if D-dimer is elevated.  If D-dimer is normal, no would probably proceed with a standard contrast CT.    (D68.59) Hypercoagulable state  Comment: Included in Ziggy's problem list.  It appears that he has had an arterial thrombus in the past, but I do not find any evidence of prior venous thromboembolism.  He is prescribed apixaban 2.5 mg twice daily and prasugrel 10 mg daily.  However, he says that he has not taken apixaban for the past week while he has been ill, and he does not think that he has been taking prasugrel for about the same period of time.  Plan:  See plans for D-dimer followed by CT chest with angiogram technique if nonnegative.    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  He is no longer on immunosuppressive medications.  Plan:  Continue oncology follow-up.    Update (3077):  Labs reviewed.  D-dimer was mildly elevated at 1.64, discussed more below.  CRP was  close to normal at 5.62.  Troponin was normal at 10.  ProBNP was normal at 96.  TSH is slightly elevated at 4.67 with T4 pending.  Comprehensive metabolic panel shows a mild reduction in HCO3, not significantly different from 4 months ago.  AST is slightly elevated at 85, though the remaining LFTs are normal.  CBC is normal.    EKG (reviewed personally) shows a normal sinus rhythm.  There are Q waves in leads III and aVF suggesting old inferior infarction.  There are no acute ST or T wave changes noted.  No comparison tracings are available.    Ziggy and I discussed that's pulmonary embolism is not excluded by today's D-dimer value, which was mildly elevated.  I recommended CT with angiogram technique as the next step in evaluation for possible pulmonary embolism; Ziggy readily agrees.  CT has been ordered.    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  The lungs had a normal appearance.  Therefore, our dyspnea workup here today was nonrevealing despite multiple labs, EKG, chest x-ray, and chest CT with angiogram technique.    When I went back into talk to him about the CT, Ziggy pointed out areas of rash on his wrists and palmar surfaces, macular, slightly raised, and slightly hyperpigmented.  He also has a more papular rash over his anterior trunk, and, to a lesser degree, his lower back.  Ziggy actually asked me if this could represent aoxyp-vrihts-xgdz disease.  I have little experience with this diagnosis, so had to excuse myself to do some reading.  GVHD would certainly explain the cutaneous findings on his wrists and hands.  His recent problems with diarrhea and abdominal bloating and discomfort are also compatible with GVHD.  GVHD could explain the mild, isolated elevation in AST.  With normal lung windows on CT, it is hard to attribute his recent onset dyspnea to GVHD, though we would need to be on  "the look out for the development of an interstitial pneumonitis.  He does not appear to have any ocular or oral involvement.  We did not do a UA looking for renal abnormalities, though creatinine is at baseline.  If we attribute his rash and transaminitis to GVHD, it appears that he would have Grade B involvement.    I think that it is important that Ziggy let his BMT team know about these findings and discussed the possibility of GVHD with them.  I will send a copy of this note to his BMT team providers for reference when Ziggy calls.    Ziggy is comfortable with [facility] discharge this evening.  He is going to lay low at home, monitor his symptoms, and contact his BMT transplant team in the morning.            74 minutes were spent doing chart review, history and exam, documentation and further activities per the note.       BMI  Estimated body mass index is 26.43 kg/m  as calculated from the following:    Height as of this encounter: 1.753 m (5' 9\").    Weight as of this encounter: 81.2 kg (179 lb).     Subjective   Ziggy is a 49 year old, presenting for the following health issues:  Shortness of Breath    HPI      Shortness of Breath/Breathing Problem  Onset/Duration: about a week  Progression of symptoms: same and constant  Accompanying signs and symptoms:       SOB at rest: YES       SOB with activity: YES       Pain with inspiration: No       Cough: YES       Pink tinged sputum: No       Sweating: No       Nausea/vomiting: No       Lightheadedness: No       Palpitations: No       Fever/chills: YES       Heartburn: No  History   Family history of coagulation disorders: YES  Tobacco use: No  Previous similar symptoms: no   Precipitating factors:  Related to eating: No  Better with burping: No  Therapies tried and outcome: tylenol, ibuprofen, nyquil    Ziggy Hallman is a 49-year-old man seen today in the [facility] Services ([facility]) clinic at [facility] regarding dyspnea.    Ziggy has a history of " "myelodysplastic syndrome.  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  He is no longer on immunosuppressive medications.    His problem list also describes a hypercoagulable state, and has a history of arterial thromboembolic events, but not apparently of venous thromboembolism.  He is prescribed apixaban 2.5 mg twice daily and prasugrel 10 mg daily.  He reports that he has probably not taken either medication in the last 6 or 7 days while he has been feeling poorly.    Ziggy became ill on 3/3/2025.  His first symptom was dyspnea, followed by myalgia, arthralgia, abdominal bloating, nausea, emesis, and diarrhea.  He was seen in ED on 3/6/2025 when he was still having GI symptoms.  Labs were notable for a mild anion gap acidosis, HCO3 17 and AGAP 16.  CBC was normal.  Nasal swabs for influenza, RSV, and COVID-19 were negative.  CT abdomen and pelvis showed no acute process in the abdomen or pelvis, along with severe chronic atrophy of the right kidney.  He was discharged with a supply of ondansetron.    Since that ED visit, GI symptoms have gradually resolved.  However, dyspnea has persisted.  He was seen in a PCP visit this morning regarding dyspnea.  Chest x-ray was clear, and the patient was initially sent home.  However, his provider gave me a call here in [facility] and we discussed Ziggy's symptoms.  On the basis of that discussion, Dr. Jenkins referred brine here to [facility].    Ziggy is not short of breath at complete rest, though he is relying on THC Gummies for relief of dyspnea and associated anxiety.  He becomes short of breath simply walking room to room at home.  He denies cough, sputum production, or hemoptysis.  He has no respiratory chest pain.    Ziggy describes a history of \"heart attacks\" in the past.  However, echocardiogram 4/15/2024 showed normal left ventricular size and function, and no wall motion " "abnormalities were described.  He has not had exertional chest pain, denies palpitations, orthopnea, or lower extremity edema.      Review of Systems  Extremities: Ziggy has noticed that his bilateral hands are vaguely erythematous, both dorsal and palmar surfaces.  He says that they also feel mildly tingly.      Objective    /80   Pulse 73   Temp 97.9  F (36.6  C) (Tympanic)   Resp 18   Ht 1.753 m (5' 9\")   Wt 81.2 kg (179 lb)   SpO2 98%   BMI 26.43 kg/m    Body mass index is 26.43 kg/m .  Physical Exam   GENERAL: Very pleasant man, who does not look acutely ill.  NECK: Trachea midline, no stridor.   RESP: No accessory muscle use.  Lungs clear throughout on inspiration and expiration.  Expiration not prolonged, no wheeze.  CV: Regular rate and rhythm, non-tachycardic.  Normal S1 S2, no murmur or extra sound.  No lower extremity edema.  ABDOMEN: Soft, non-tender, no guarding.  Bowel sounds positive.  MS: No bony deformities noted.  No red or inflamed joints.  SKIN: Warm and dry.  On both wrists he has a macular, slightly raised, slightly erythematous rash, with a few flat macules on both palms of the hands.  On his anterior trunk, he has a more papular rash, which also involves the posterior lower back.  NEURO: Alert, oriented, conversant.  Cranial nerves III - XII grossly intact.  No gross motor or sensory deficits.    PSYCH: Calm, alert, conversant.  Able to articulate logical thoughts, no tangential thoughts, no hallucinations or delusions.  Affect normal.    EXAM: CT CHEST PULMONARY EMBOLISM W CONTRAST  LOCATION: [facility]  DATE: 3/10/2025     INDICATION: Patient with underlying hypercoagulable state who has not been taking anticoagulants over the last week, presents with unexplained dyspnea.  D-dimer is slightly elevated.  Please evaluate for pulmonary embolism.  COMPARISON: CT 04/15/2024  TECHNIQUE: CT chest pulmonary angiogram during arterial phase injection of IV " contrast. Multiplanar reformats and MIP reconstructions were performed. Dose reduction techniques were used.   CONTRAST: 81mL isovue 370     FINDINGS:  ANGIOGRAM CHEST: Pulmonary arteries are normal caliber and negative for pulmonary emboli. Thoracic aorta is negative for dissection. No CT evidence of right heart strain.     LUNGS AND PLEURA: Normal.     MEDIASTINUM/AXILLAE: There is a Port-A-Cath and this appears to be in good position. There are moderate atheromatous changes seen involving the descending thoracic aorta with no significant associated calcification. This appears stable since 04/15/2024.   Postoperative changes involving the interatrial septum.     CORONARY ARTERY CALCIFICATION: Mild.     UPPER ABDOMEN: There is significant atrophy seen involving the right kidney. Advanced atheromatous changes with some associated vascular calcification involving the visualized upper abdominal aorta.     MUSCULOSKELETAL: Mild to moderate scattered hypertrophic changes in the spine. Old/healed rib fractures.                                                                      IMPRESSION:  1.  No PE, dissection, or aneurysm.     2.  Significant atheromatous changes in the descending thoracic aorta and visualized upper abdominal aorta.     3.  Advanced atrophy of the right kidney likely secondary to significant right renal arterial stenosis.     4.  Port-A-Cath appears to be in good position.     5.  Postoperative changes involving the interatrial septum.    Results for orders placed or performed in visit on 03/10/25 (from the past 24 hours)   Comprehensive metabolic panel   Result Value Ref Range    Sodium 136 135 - 145 mmol/L    Potassium 3.6 3.4 - 5.3 mmol/L    Carbon Dioxide (CO2) 19 (L) 22 - 29 mmol/L    Anion Gap 14 7 - 15 mmol/L    Urea Nitrogen 22.8 (H) 6.0 - 20.0 mg/dL    Creatinine 1.12 0.67 - 1.17 mg/dL    GFR Estimate 80 >60 mL/min/1.73m2    Calcium 9.2 8.8 - 10.4 mg/dL    Chloride 103 98 - 107 mmol/L     Glucose 95 70 - 99 mg/dL    Alkaline Phosphatase 105 40 - 150 U/L    AST 32 0 - 45 U/L    ALT 85 (H) 0 - 70 U/L    Protein Total 6.9 6.4 - 8.3 g/dL    Albumin 4.1 3.5 - 5.2 g/dL    Bilirubin Total 0.5 <=1.2 mg/dL   CRP inflammation   Result Value Ref Range    CRP Inflammation 5.62 (H) <5.00 mg/L   D dimer quantitative   Result Value Ref Range    D-Dimer Quantitative 1.64 (H) 0.00 - 0.50 ug/mL FEU    Narrative    This D-dimer assay is intended for use in conjunction with a clinical pretest probability assessment model to exclude pulmonary embolism (PE) and deep venous thrombosis (DVT) in outpatients suspected of PE or DVT. The cut-off value is 0.50 ug/mL FEU.    For patients 50 years of age or older, the application of age-adjusted cut-off values for D-Dimer may increase the specificity without significant effect on sensitivity. The literature suggested calculation age adjusted cut-off in ug/L = age in years x 10 ug/L. The results in this laboratory are reported as ug/mL rather than ug/L. The calculation for age adjusted cut off in ug/mL= age in years x 0.01 ug/mL. For example, the cut off for a 76 year old male is 74 x 0.01 ug/mL = 0.76 ug/mL (760 ug/L).    M Tyler et al. Age adjusted D-dimer cut-off levels to rule out pulmonary embolism: The ADJUST-PE Study. IRENA 2014;311:7124-6358.; HJ Omer et al. Diagnostic accuracy of conventional or age adjusted D-dimer cutoff values in older patients with suspected venous thromboembolism. Systemic review and meta-analysis. BMJ 2013:346:f2492.   CBC with platelets differential    Narrative    The following orders were created for panel order CBC with platelets differential.  Procedure                               Abnormality         Status                     ---------                               -----------         ------                     CBC with platelets and ...[[id number]]                      Final result                 Please view results for these tests on  the individual orders.   Troponin T, High Sensitivity   Result Value Ref Range    Troponin T, High Sensitivity 10 <=22 ng/L   TSH with free T4 reflex   Result Value Ref Range    TSH 4.67 (H) 0.30 - 4.20 uIU/mL   BNP-N terminal pro   Result Value Ref Range    N Terminal Pro BNP Outpatient 96 0 - 900 pg/mL   CBC with platelets and differential   Result Value Ref Range    WBC Count 8.6 4.0 - 11.0 10e3/uL    RBC Count 5.25 4.40 - 5.90 10e6/uL    Hemoglobin 16.6 13.3 - 17.7 g/dL    Hematocrit 46.8 40.0 - 53.0 %    MCV 89 78 - 100 fL    MCH 31.6 26.5 - 33.0 pg    MCHC 35.5 31.5 - 36.5 g/dL    RDW 12.8 10.0 - 15.0 %    Platelet Count 192 150 - 450 10e3/uL    % Neutrophils 35 %    % Lymphocytes 46 %    % Monocytes 14 %    % Eosinophils 4 %    % Basophils 1 %    % Immature Granulocytes 0 %    Absolute Neutrophils 3.0 1.6 - 8.3 10e3/uL    Absolute Lymphocytes 4.0 0.8 - 5.3 10e3/uL    Absolute Monocytes 1.2 0.0 - 1.3 10e3/uL    Absolute Eosinophils 0.4 0.0 - 0.7 10e3/uL    Absolute Basophils 0.1 0.0 - 0.2 10e3/uL    Absolute Immature Granulocytes 0.0 <=0.4 10e3/uL   T4 free   Result Value Ref Range    Free T4 1.70 0.90 - 1.70 ng/dL         In 1333  Out 1411  In 1438  Out 1448  In 1657  Out 1723    Signed Electronically by: Albert Hatfield MD

## 2025-03-10 NOTE — PATIENT INSTRUCTIONS
Although we talked about the results one of the time, in summary, we were not able to explain your recent onset shortness of breath with any of the labs we did, the chest x-ray, the EKG, or the CT scan of your chest.    I think that your observation that this may represent ohwiz-svnojp-qohk disease is a good one.  I have very little experience taking care of individuals with GVHD, so I had to do some reading both during your visit here, and after you left.  I think the rash on your wrists and hands is indeed suggestive of GVHD.  The mild elevation of one of your liver functions tests is also potentially attributable to GVHD.  Sometimes individuals with GVHD will get lung involvement in the form of interstitial (between the air sacs) pneumonias or something called bronchiolitis obliterans, though the normal CT scan of your lungs we got today is reassuring.    It is important that you contact your BMT team tomorrow.  I am going to send them a copy of the note from your visit here today summarizing all of these findings from workup here.    It was good to see you again today.  I hope that you're able to find a solution to these ongoing symptoms soon.

## 2025-03-10 NOTE — PROGRESS NOTES
Assessment & Plan     Hospital discharge follow-up  Shortness of breath  > suspect a viral infection given that patient reports improvement in nausea and diarrhea   > XR Chest 2 Views: lungs were described as clear, called patient and informed him of these results, he is in good understanding he likely doesn't have pneumonia   - although patient has a history of clots, lower suspicion for this given that he is currently on eliquis and satting 98% on RA, but with his history of multiple blood clots and MDS I requested the patient go to [facility] for a CT angio give his shortness of breath and tachycardia he expressed understanding and informed me he would come back to [facility] for the additional imaging   - spoke with [facility] provider Dr. Hatfield who kindly agreed to evaluate the patient     Elevated LDL cholesterol level  > LDL on fasting blood work was greater than 190   - prescription sent for atorvastatin (LIPITOR) 40 MG tablet; Take 1 tablet (40 mg) by mouth daily.  - patient reports he had been on statins in the past without side effects        The longitudinal plan of care for the diagnosis(es)/condition(s) as documented were addressed during this visit. Due to the added complexity in care, I will continue to support Ziggy in the subsequent management and with ongoing continuity of care.      Subjective   Ziggy is a 49 year old, presenting for the following health issues:  Breathing Problem, ER F/U, and Lipids        3/10/2025     8:18 AM   Additional Questions   Roomed by Phuong COSTELLO MA   Accompanied by self         3/10/2025     8:18 AM   Patient Reported Additional Medications   Patient reports taking the following new medications none     History of Present Illness       Reason for visit:  ER follow up He is missing 2 dose(s) of medications per week.  He is not taking prescribed medications regularly due to remembering to take.          Hyperlipidemia Follow-Up    Are you regularly taking any medication or supplement to  "lower your cholesterol?   No  Are you having muscle aches or other side effects that you think could be caused by your cholesterol lowering medication?  No  How many servings of fruits and vegetables do you eat daily?  0-1  On average, how many sweetened beverages do you drink each day (Examples: soda, juice, sweet tea, etc.  Do NOT count diet or artificially sweetened beverages)?   1  How many days per week do you exercise enough to make your heart beat faster? 4  How many minutes a day do you exercise enough to make your heart beat faster? 30 - 60  How many days per week do you miss taking your medication? 2  What makes it hard for you to take your medications?  remembering to take    ED/UC Followup:    Facility:  [facility]  Date of visit: 3/6/25  Reason for visit: Nausea and vomiting, unspecified vomiting type   Current Status: Somewhat improved but still experiencing significant shortness of breath.   Hasn't even required zofran nausea, vomiting has resolved, but dyspnea is staying relatively the same   Patient is immunocompromised, was given the pneumonia vaccine and ever since then has been experiencing shortness of breath and fatigue   Reports he is staying well hydrated   Denies any known sick contact       ROS:  As above         Objective    /83 (BP Location: Right arm, Patient Position: Sitting, Cuff Size: Adult Regular)   Pulse 104   Temp 99  F (37.2  C) (Tympanic)   Resp 20   Ht 1.753 m (5' 9\")   Wt 81.2 kg (179 lb)   SpO2 98%   BMI 26.43 kg/m    Body mass index is 26.43 kg/m .  Physical Exam  Constitutional:       General: He is not in acute distress.  HENT:      Head: Normocephalic and atraumatic.      Right Ear: External ear normal.      Left Ear: External ear normal.      Mouth/Throat:      Mouth: Mucous membranes are moist.      Pharynx: Oropharynx is clear. No oropharyngeal exudate or posterior oropharyngeal erythema.   Eyes:      Extraocular Movements: " Extraocular movements intact.   Cardiovascular:      Rate and Rhythm: Normal rate and regular rhythm.      Heart sounds: Normal heart sounds.   Pulmonary:      Effort: Pulmonary effort is normal. No respiratory distress.      Breath sounds: Normal breath sounds. No wheezing or rhonchi.   Abdominal:      Palpations: Abdomen is soft. There is no mass.      Tenderness: There is no abdominal tenderness.   Musculoskeletal:         General: No deformity. Normal range of motion.      Cervical back: Normal range of motion and neck supple.   Skin:     General: Skin is warm.      Findings: No rash.   Neurological:      General: No focal deficit present.      Mental Status: He is alert and oriented to person, place, and time.   Psychiatric:         Mood and Affect: Mood normal.                   Signed Electronically by: SHE QUICK MD

## 2025-03-11 DIAGNOSIS — D46.9 MDS (MYELODYSPLASTIC SYNDROME) (H): Primary | ICD-10-CM

## 2025-03-11 DIAGNOSIS — Z94.81 STATUS POST BONE MARROW TRANSPLANT (H): ICD-10-CM

## 2025-03-12 ENCOUNTER — ONCOLOGY VISIT (OUTPATIENT)
Dept: TRANSPLANT | Facility: CLINIC | Age: 50
End: 2025-03-12
Attending: INTERNAL MEDICINE
Payer: COMMERCIAL

## 2025-03-12 ENCOUNTER — LAB (OUTPATIENT)
Dept: LAB | Facility: CLINIC | Age: 50
End: 2025-03-12
Payer: COMMERCIAL

## 2025-03-12 VITALS
WEIGHT: 179 LBS | HEART RATE: 100 BPM | TEMPERATURE: 97.7 F | SYSTOLIC BLOOD PRESSURE: 159 MMHG | RESPIRATION RATE: 18 BRPM | DIASTOLIC BLOOD PRESSURE: 108 MMHG | OXYGEN SATURATION: 100 % | BODY MASS INDEX: 26.43 KG/M2

## 2025-03-12 DIAGNOSIS — D46.9 MDS (MYELODYSPLASTIC SYNDROME) (H): ICD-10-CM

## 2025-03-12 DIAGNOSIS — D46.9 MDS (MYELODYSPLASTIC SYNDROME) (H): Primary | ICD-10-CM

## 2025-03-12 DIAGNOSIS — Z94.81 STATUS POST BONE MARROW TRANSPLANT (H): ICD-10-CM

## 2025-03-12 LAB
ALBUMIN SERPL BCG-MCNC: 4.2 G/DL (ref 3.5–5.2)
ALP SERPL-CCNC: 94 U/L (ref 40–150)
ALT SERPL W P-5'-P-CCNC: 53 U/L (ref 0–70)
ANION GAP SERPL CALCULATED.3IONS-SCNC: 12 MMOL/L (ref 7–15)
AST SERPL W P-5'-P-CCNC: 24 U/L (ref 0–45)
BASOPHILS # BLD AUTO: 0.1 10E3/UL (ref 0–0.2)
BASOPHILS NFR BLD AUTO: 1 %
BILIRUB SERPL-MCNC: 0.5 MG/DL
BUN SERPL-MCNC: 14.2 MG/DL (ref 6–20)
CALCIUM SERPL-MCNC: 9.6 MG/DL (ref 8.8–10.4)
CHLORIDE SERPL-SCNC: 105 MMOL/L (ref 98–107)
CMV DNA SPEC NAA+PROBE-ACNC: NOT DETECTED IU/ML
CREAT SERPL-MCNC: 0.81 MG/DL (ref 0.67–1.17)
CRP SERPL-MCNC: 4.09 MG/L
EGFRCR SERPLBLD CKD-EPI 2021: >90 ML/MIN/1.73M2
EOSINOPHIL # BLD AUTO: 0.3 10E3/UL (ref 0–0.7)
EOSINOPHIL NFR BLD AUTO: 3 %
ERYTHROCYTE [DISTWIDTH] IN BLOOD BY AUTOMATED COUNT: 12.6 % (ref 10–15)
ERYTHROCYTE [SEDIMENTATION RATE] IN BLOOD BY WESTERGREN METHOD: 12 MM/HR (ref 0–20)
GLUCOSE SERPL-MCNC: 139 MG/DL (ref 70–99)
HCO3 SERPL-SCNC: 18 MMOL/L (ref 22–29)
HCT VFR BLD AUTO: 42.7 % (ref 40–53)
HGB BLD-MCNC: 15.5 G/DL (ref 13.3–17.7)
IMM GRANULOCYTES # BLD: 0 10E3/UL
IMM GRANULOCYTES NFR BLD: 0 %
LDH SERPL L TO P-CCNC: 186 U/L (ref 0–250)
LYMPHOCYTES # BLD AUTO: 3.2 10E3/UL (ref 0.8–5.3)
LYMPHOCYTES NFR BLD AUTO: 30 %
MCH RBC QN AUTO: 31.8 PG (ref 26.5–33)
MCHC RBC AUTO-ENTMCNC: 36.3 G/DL (ref 31.5–36.5)
MCV RBC AUTO: 88 FL (ref 78–100)
MONOCYTES # BLD AUTO: 0.9 10E3/UL (ref 0–1.3)
MONOCYTES NFR BLD AUTO: 9 %
NEUTROPHILS # BLD AUTO: 6.1 10E3/UL (ref 1.6–8.3)
NEUTROPHILS NFR BLD AUTO: 57 %
NRBC # BLD AUTO: 0 10E3/UL
NRBC BLD AUTO-RTO: 0 /100
PLATELET # BLD AUTO: 287 10E3/UL (ref 150–450)
POTASSIUM SERPL-SCNC: 3.9 MMOL/L (ref 3.4–5.3)
PROT SERPL-MCNC: 7.1 G/DL (ref 6.4–8.3)
RBC # BLD AUTO: 4.88 10E6/UL (ref 4.4–5.9)
SODIUM SERPL-SCNC: 135 MMOL/L (ref 135–145)
SPECIMEN TYPE: NORMAL
WBC # BLD AUTO: 10.7 10E3/UL (ref 4–11)

## 2025-03-12 PROCEDURE — 36415 COLL VENOUS BLD VENIPUNCTURE: CPT | Performed by: PATHOLOGY

## 2025-03-12 PROCEDURE — 85652 RBC SED RATE AUTOMATED: CPT | Performed by: PATHOLOGY

## 2025-03-12 PROCEDURE — 83615 LACTATE (LD) (LDH) ENZYME: CPT | Performed by: PATHOLOGY

## 2025-03-12 PROCEDURE — 86140 C-REACTIVE PROTEIN: CPT | Performed by: PATHOLOGY

## 2025-03-12 PROCEDURE — 85025 COMPLETE CBC W/AUTO DIFF WBC: CPT | Performed by: PATHOLOGY

## 2025-03-12 PROCEDURE — G0463 HOSPITAL OUTPT CLINIC VISIT: HCPCS | Performed by: INTERNAL MEDICINE

## 2025-03-12 PROCEDURE — 99000 SPECIMEN HANDLING OFFICE-LAB: CPT | Performed by: PATHOLOGY

## 2025-03-12 PROCEDURE — 80053 COMPREHEN METABOLIC PANEL: CPT | Performed by: PATHOLOGY

## 2025-03-12 ASSESSMENT — PAIN SCALES - GENERAL: PAINLEVEL_OUTOF10: NO PAIN (0)

## 2025-03-12 NOTE — PROGRESS NOTES
"Date: Mar 12, 2025    Chief Complaint: bmt d 412     History Of Present Illness:    I met Ziggy in January of 2024. To summarize his history, he has had multiple episodes of arterial and venous thrombosis and developed cytopenias. He was seen in 2015 by Dr. 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 He was also worked up for DKC and no mutations were found. She recommended continued observation and treating his hypercoagulability. More recently, his anemia worsened and he had a repeat bone marrow biopsy that showed 5-10% blasts. That was at [facility] and he was referred here and met with Dr. Cabral, shortly before her departure. She calculated his R-IPSS as 7.5 and recommended 4 cycles of azacitidine followed by evaluation for transplant. He has a repeat bone marrow on 12/29 also at [facility]. It indicated some improvement in blast count down to 5-6% and a plasma cell neoplasm. We did SPEP and a PET scan that was negative. We also reviewed the bone marrow here. Preliminarily, it appears that the plasma cells may not be monoclonal and the blast count might be a bit less. The flow cytometry was negative. His counts have improved. We proceeded with BMT and comes in for follow up. He is a little over a year from transplant.    Interval History:    This was an \"add-on\" visit for acute illness. He reports that 11 days ago, he started feeling poorly.  He denies any fevers but reports that it was elevated when he went to his local clinic at 98  F.  He also recalls feeling tired and wanting to sleep all day.  He did have a brief episode of diarrhea that has since resolved.  He had low appetite and says that he lost 10 pounds in these last 10-11 days.  He also developed a rash around his ankles and feet.  He had seen his local clinic twice and on the second occasion, he reported shortness of breath.  He was worked up for acute cardiac causes as well as pulmonary causes and the workup was negative.  When asked about that today, " he says that he does not really feel short of breath but when he gets sick, he gets anxious and tries to calm his breath which results in feeling short of breath.  He also reported that he thinks he had started feeling better in the last couple of days.  No other complaints    ROS: 14-point ROS is negative unless otherwise stated in HPI.    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 60 tablet 11    atorvastatin (LIPITOR) 40 MG tablet Take 1 tablet (40 mg) by mouth daily. 90 tablet 3    cyclobenzaprine (FLEXERIL) 10 MG tablet Take 1 tablet (10 mg) by mouth 3 times daily as needed for muscle spasms 45 tablet 1    empagliflozin (JARDIANCE) 10 MG TABS tablet Take 1 tablet (10 mg) by mouth daily. 90 tablet 3    lisinopril (ZESTRIL) 20 MG tablet Take 1 tablet (20 mg) by mouth daily. 90 tablet 3    metoprolol succinate ER (TOPROL XL) 50 MG 24 hr tablet Take 1 tablet (50 mg) by mouth daily. 90 tablet 3    NIFEdipine ER (ADALAT CC) 30 MG 24 hr tablet On hold x 4/16      ondansetron (ZOFRAN ODT) 4 MG ODT tab Take 1 tablet (4 mg) by mouth every 8 hours as needed for nausea. 10 tablet 0    prasugrel (EFFIENT) 10 MG TABS tablet Take 1 tablet (10 mg) by mouth daily. 60 tablet 1     Current Facility-Administered Medications   Medication Dose Route Frequency Provider Last Rate Last Admin    heparin lock flush 10 unit/mL injection 5-10 mL  5-10 mL Intracatheter Q1H PRN Albert Hatfield MD        heparin lock flush 10 unit/mL injection 5-10 mL  5-10 mL Intracatheter Q24H Albert Hatfield MD   5 mL at 03/10/25 1520    heparin lock flush 100 unit/mL injection 5-10 mL  5-10 mL  "Intracatheter Q28 Days Albert Hatfield MD   5 mL at 03/10/25 1708    sodium chloride (PF) 0.9% PF flush 10-20 mL  10-20 mL Intracatheter q1 min prn Albert Hatfield MD        sodium chloride (PF) 0.9% PF flush 10-20 mL  10-20 mL Intracatheter Q1H PRN Albert Hatfield MD        sodium chloride (PF) 0.9% PF flush 10-20 mL  10-20 mL Intracatheter Q28 Days Albert Hatfield MD   10 mL at 03/10/25 1709     Updated PMH:  Past Medical History:   Diagnosis Date    AMI anterior wall (H)     Mid LAD on cath with stent    CAD S/P percutaneous coronary angioplasty 07/01/2016    Unstable agina with anterior myocardial damage reported without mycardial damage by patient's history    CVA (cerebrovascular accident) (H) 01/01/2012    Reporting thromboembolic episode on the right neck. Pt states \"I've had ten strokes.\"    Hypercoagulable state     Uncertain etiology--seeing Hematologist    MEDICAL HISTORY OF -     Possibly PFO     Updated PSH:  Past Surgical History:   Procedure Laterality Date    AMPUTATE TOE(S)      ARTHROSCOPY KNEE RT/LT      Right     ARTHROSCOPY KNEE RT/LT      Left    CARDIAC CATHERIZATION      With stent placement    ESOPHAGOSCOPY, GASTROSCOPY, DUODENOSCOPY (EGD), COMBINED N/A 2/19/2024    Procedure: Esophagoscopy, gastroscopy, duodenoscopy (EGD), combined;  Surgeon: Drew Jasso MD;  Location: [facility]    IR CHEST PORT PLACEMENT > 5 YRS OF AGE  9/13/2023    IR CVC TUNNEL PLACEMENT > 5 YRS OF AGE  1/19/2024    IR CVC TUNNEL REMOVAL LEFT  3/21/2024     Updated FH:  Family History   Problem Relation Age of Onset    Diabetes No family hx of     Coronary Artery Disease No family hx of     Hypertension No family hx of     Hyperlipidemia No family hx of     Breast Cancer No family hx of      Updated SH:  Social History     Tobacco Use    Smoking status: Former     Current packs/day: 0.50     Types: Cigarettes     Passive exposure: Past (Mom smoked cigs indoors)    Smokeless tobacco: Never    Tobacco " comments:     cigarette once in a while   Vaping Use    Vaping status: Never Used   Substance Use Topics    Alcohol use: Yes     Alcohol/week: 0.0 standard drinks of alcohol     Comment: 4 times per week 5 drinks    Drug use: No     Physical Exam:    BP (!) 159/108 (BP Location: Left arm, Patient Position: Sitting, Cuff Size: Adult Regular)   Pulse 100   Temp 97.7  F (36.5  C) (Tympanic)   Resp 18   Wt 81.2 kg (179 lb)   SpO2 100%   BMI 26.43 kg/m    GA: NAD. Appears as stated age.  HEENT: PERRL, OP with a punctate rash over the soft and hard palate  Neck: Supple, no JVD  CV: RRR, no mrg  Lungs: CTAB, no wheezes  Abd: Soft, nt, nd  Lymph: No cervical LAD, no HSM  Ext: No edema. Warm  Neuro: AAO, moving all ext. Symmetric face  Skin: warm, punctate rash over wrists and hands including palms, also over distal legs, ankles, feet, and soles  Psyc: Appropriate and cooperative    ECO    GVHD: 0/0/0/0    Labs, pathology and other diagnostics reviewed    WBC   Date Value Ref Range Status   2020 4.9 4.0 - 11.0 10e9/L Final     WBC Count   Date Value Ref Range Status   2025 10.7 4.0 - 11.0 10e3/uL Final     Hemoglobin   Date Value Ref Range Status   2025 15.5 13.3 - 17.7 g/dL Final   2020 12.6 (L) 13.3 - 17.7 g/dL Final     Platelet Count   Date Value Ref Range Status   2025 287 150 - 450 10e3/uL Final   2020 280 150 - 450 10e9/L Final     Creatinine   Date Value Ref Range Status   2025 0.81 0.67 - 1.17 mg/dL Final   2020 0.80 0.66 - 1.25 mg/dL Final     Potassium   Date Value Ref Range Status   2025 3.9 3.4 - 5.3 mmol/L Final   2020 3.8 3.4 - 5.3 mmol/L Final     Magnesium   Date Value Ref Range Status   04/15/2024 2.2 1.7 - 2.3 mg/dL Final     EBV DNA Copies/mL   Date Value Ref Range Status   2024 Not Detected Not Detected copies/mL Final     A/P    Date Mar 12, 2025   Diagnosis High risk MDS, R-IPSS 7.5   Planned Treatment MA Flu/Bu   GVHD  Prophylaxis PTCy/Siro/MMF   HLA Match 8/8, DP permissive   PRA Negative   CMV Match -/-   Sex Match F -> M   Donor Age 26   ABO Match O -> A (minor)   KPS PS 80   BMI Body mass index is 26.43 kg/m .   PET Negative   LVEF 50-55%   Valvular abnormalities*  Mild mitral insufficiency   History of MI or arrhythmias Yes   ECG/QTC NSR/425   FEV1 73%   DLCOcor 93   Cr/GFR Creatinine   Date Value Ref Range Status   03/12/2025 0.81 0.67 - 1.17 mg/dL Final   05/28/2020 0.80 0.66 - 1.25 mg/dL Final      Bilirubin Lab Results   Component Value Date    BILITOTAL 0.2 01/17/2024    BILITOTAL 0.8 05/28/2020      ALT/AST Lab Results   Component Value Date    ALT 18 01/17/2024    ALT 25 05/28/2020    ;   AST   Date Value Ref Range Status   03/12/2025 24 0 - 45 U/L Final   05/28/2020 20 0 - 45 U/L Final      Dental Clearance    Adequate Marrow reserve Yes   Chest CT Few sub 4 mm nodules (negative on PET)   Infectious work-up     Pregnancy Test    Lupron Injection    HCT CI 4 points   Disease status at transplant CR with MRD+   LDH    Planned maintenance NGS pending   Anticoagulation On prasugrel and apixaban. Hold when Plt < 50 and resume when able. High risk for thrombosis.   Toxoplasma IgG Negative   Other**    Proposed Clinical Trials      A/P    Acute illness -likely representing hand-foot-and-mouth syndrome  -His GI symptoms were very transient.  His labs today are completely normal.  His CRP is actually downtrending from when he saw urgent care couple days ago and has now normalized.  His rash is localized to the hands and feet and is already resolving.  It is nonpruritic.  I do not believe this represents amero-mxesob-iiby disease and I believe his acute illness was a viral infection that is now passed.  He is already feeling better.  I advised him to try to hydrate and resume his exercise regimen as able.    MDS s/p MAC allo  - 1 year in CR, no MRD, 100% donor.  - OI ppx: acv. He says he has not been compliant with his meds. He  did get shingles several months ago. He has recovered now. Received 1 year vaccines    2. Health maintenance:  - Vaccines completed. Will need boosters.  - He was seen in our survivorship clinic.  PCP managing his hypercholesterolemia  - Dexa scheduled after this and our survivorship will follow up on that. Finally, we discussed healthy living and incorporating diet and exercise as well as having an established PCP    RTC in 1 month for booster and 6 months for follow up    40 minutes spent on the date of the encounter doing chart review, interpretation of results, patient visit, documentation and coordination of care.  The longitudinal plan of care for the diagnosis(es)/condition(s) as documented were addressed during this visit. Due to the added complexity in care, I will continue to support Ziggy in the subsequent management and with ongoing continuity of care.

## 2025-03-12 NOTE — LETTER
"3/12/2025      Ziggy Hallman  [address] [address]      Dear Colleague,    Thank you for referring your patient, Ziggy Hallman, to the [facility] BLOOD AND MARROW TRANSPLANT PROGRAM [place]. Please see a copy of my visit note below.    Date: Mar 12, 2025    Chief Complaint: bmt d 412     History Of Present Illness:    I met Ziggy in January of 2024. To summarize his history, he has had multiple episodes of arterial and venous thrombosis and developed cytopenias. He was seen in 2015 by Dr. 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 He was also worked up for DKC and no mutations were found. She recommended continued observation and treating his hypercoagulability. More recently, his anemia worsened and he had a repeat bone marrow biopsy that showed 5-10% blasts. That was at [facility] and he was referred here and met with Dr. Cabral, shortly before her departure. She calculated his R-IPSS as 7.5 and recommended 4 cycles of azacitidine followed by evaluation for transplant. He has a repeat bone marrow on 12/29 also at [facility]. It indicated some improvement in blast count down to 5-6% and a plasma cell neoplasm. We did SPEP and a PET scan that was negative. We also reviewed the bone marrow here. Preliminarily, it appears that the plasma cells may not be monoclonal and the blast count might be a bit less. The flow cytometry was negative. His counts have improved. We proceeded with BMT and comes in for follow up. He is a little over a year from transplant.    Interval History:    This was an \"add-on\" visit for acute illness. He reports that 11 days ago, he started feeling poorly.  He denies any fevers but reports that it was elevated when he went to his local clinic at 98  F.  He also recalls feeling tired and wanting to sleep all day.  He did have a brief episode of diarrhea that has since resolved.  He had low appetite and says that he lost 10 pounds in these last 10-11 days.  He also " developed a rash around his ankles and feet.  He had seen his local clinic twice and on the second occasion, he reported shortness of breath.  He was worked up for acute cardiac causes as well as pulmonary causes and the workup was negative.  When asked about that today, he says that he does not really feel short of breath but when he gets sick, he gets anxious and tries to calm his breath which results in feeling short of breath.  He also reported that he thinks he had started feeling better in the last couple of days.  No other complaints    ROS: 14-point ROS is negative unless otherwise stated in HPI.    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 60 tablet 11     atorvastatin (LIPITOR) 40 MG tablet Take 1 tablet (40 mg) by mouth daily. 90 tablet 3     cyclobenzaprine (FLEXERIL) 10 MG tablet Take 1 tablet (10 mg) by mouth 3 times daily as needed for muscle spasms 45 tablet 1     empagliflozin (JARDIANCE) 10 MG TABS tablet Take 1 tablet (10 mg) by mouth daily. 90 tablet 3     lisinopril (ZESTRIL) 20 MG tablet Take 1 tablet (20 mg) by mouth daily. 90 tablet 3     metoprolol succinate ER (TOPROL XL) 50 MG 24 hr tablet Take 1 tablet (50 mg) by mouth daily. 90 tablet 3     NIFEdipine ER (ADALAT CC) 30 MG 24 hr tablet On hold x 4/16       ondansetron (ZOFRAN ODT) 4 MG ODT tab Take 1 tablet (4 mg) by mouth every 8 hours as needed for nausea. 10 tablet 0     prasugrel (EFFIENT) 10 MG TABS tablet Take 1 tablet (10 mg) by mouth daily. 60 tablet 1     Current Facility-Administered Medications   Medication Dose Route Frequency Provider Last Rate Last Admin     heparin  "lock flush 10 unit/mL injection 5-10 mL  5-10 mL Intracatheter Q1H PRN Albert Hatfield MD         heparin lock flush 10 unit/mL injection 5-10 mL  5-10 mL Intracatheter Q24H Albert Hatfield MD   5 mL at 03/10/25 1520     heparin lock flush 100 unit/mL injection 5-10 mL  5-10 mL Intracatheter Q28 Days Albert Hatfield MD   5 mL at 03/10/25 1708     sodium chloride (PF) 0.9% PF flush 10-20 mL  10-20 mL Intracatheter q1 min prn Albert Hatfield MD         sodium chloride (PF) 0.9% PF flush 10-20 mL  10-20 mL Intracatheter Q1H PRN Albert Hatfield MD         sodium chloride (PF) 0.9% PF flush 10-20 mL  10-20 mL Intracatheter Q28 Days Albert Hatfield MD   10 mL at 03/10/25 1709     Updated PMH:  Past Medical History:   Diagnosis Date     AMI anterior wall (H)     Mid LAD on cath with stent     CAD S/P percutaneous coronary angioplasty 07/01/2016    Unstable agina with anterior myocardial damage reported without mycardial damage by patient's history     CVA (cerebrovascular accident) (H) 01/01/2012    Reporting thromboembolic episode on the right neck. Pt states \"I've had ten strokes.\"     Hypercoagulable state     Uncertain etiology--seeing Hematologist     MEDICAL HISTORY OF -     Possibly PFO     Updated PSH:  Past Surgical History:   Procedure Laterality Date     AMPUTATE TOE(S)       ARTHROSCOPY KNEE RT/LT      Right      ARTHROSCOPY KNEE RT/LT      Left     CARDIAC CATHERIZATION      With stent placement     ESOPHAGOSCOPY, GASTROSCOPY, DUODENOSCOPY (EGD), COMBINED N/A 2/19/2024    Procedure: Esophagoscopy, gastroscopy, duodenoscopy (EGD), combined;  Surgeon: Drew Jasso MD;  Location: [facility]     IR CHEST PORT PLACEMENT > 5 YRS OF AGE  9/13/2023     IR CVC TUNNEL PLACEMENT > 5 YRS OF AGE  1/19/2024     IR CVC TUNNEL REMOVAL LEFT  3/21/2024     Updated FH:  Family History   Problem Relation Age of Onset     Diabetes No family hx of      Coronary Artery Disease No family hx of      " Hypertension No family hx of      Hyperlipidemia No family hx of      Breast Cancer No family hx of      Updated SH:  Social History     Tobacco Use     Smoking status: Former     Current packs/day: 0.50     Types: Cigarettes     Passive exposure: Past (Mom smoked cigs indoors)     Smokeless tobacco: Never     Tobacco comments:     cigarette once in a while   Vaping Use     Vaping status: Never Used   Substance Use Topics     Alcohol use: Yes     Alcohol/week: 0.0 standard drinks of alcohol     Comment: 4 times per week 5 drinks     Drug use: No     Physical Exam:    BP (!) 159/108 (BP Location: Left arm, Patient Position: Sitting, Cuff Size: Adult Regular)   Pulse 100   Temp 97.7  F (36.5  C) (Tympanic)   Resp 18   Wt 81.2 kg (179 lb)   SpO2 100%   BMI 26.43 kg/m    GA: NAD. Appears as stated age.  HEENT: PERRL, OP with a punctate rash over the soft and hard palate  Neck: Supple, no JVD  CV: RRR, no mrg  Lungs: CTAB, no wheezes  Abd: Soft, nt, nd  Lymph: No cervical LAD, no HSM  Ext: No edema. Warm  Neuro: AAO, moving all ext. Symmetric face  Skin: warm, punctate rash over wrists and hands including palms, also over distal legs, ankles, feet, and soles  Psyc: Appropriate and cooperative    ECO    GVHD: 0/0/0/0    Labs, pathology and other diagnostics reviewed    WBC   Date Value Ref Range Status   2020 4.9 4.0 - 11.0 10e9/L Final     WBC Count   Date Value Ref Range Status   2025 10.7 4.0 - 11.0 10e3/uL Final     Hemoglobin   Date Value Ref Range Status   2025 15.5 13.3 - 17.7 g/dL Final   2020 12.6 (L) 13.3 - 17.7 g/dL Final     Platelet Count   Date Value Ref Range Status   2025 287 150 - 450 10e3/uL Final   2020 280 150 - 450 10e9/L Final     Creatinine   Date Value Ref Range Status   2025 0.81 0.67 - 1.17 mg/dL Final   2020 0.80 0.66 - 1.25 mg/dL Final     Potassium   Date Value Ref Range Status   2025 3.9 3.4 - 5.3 mmol/L Final   2020 3.8  3.4 - 5.3 mmol/L Final     Magnesium   Date Value Ref Range Status   04/15/2024 2.2 1.7 - 2.3 mg/dL Final     EBV DNA Copies/mL   Date Value Ref Range Status   03/27/2024 Not Detected Not Detected copies/mL Final     A/P    Date Mar 12, 2025   Diagnosis High risk MDS, R-IPSS 7.5   Planned Treatment MA Flu/Bu   GVHD Prophylaxis PTCy/Siro/MMF   HLA Match 8/8, DP permissive   PRA Negative   CMV Match -/-   Sex Match F -> M   Donor Age 26   ABO Match O -> A (minor)   KPS PS 80   BMI Body mass index is 26.43 kg/m .   PET Negative   LVEF 50-55%   Valvular abnormalities*  Mild mitral insufficiency   History of MI or arrhythmias Yes   ECG/QTC NSR/425   FEV1 73%   DLCOcor 93   Cr/GFR Creatinine   Date Value Ref Range Status   03/12/2025 0.81 0.67 - 1.17 mg/dL Final   05/28/2020 0.80 0.66 - 1.25 mg/dL Final      Bilirubin Lab Results   Component Value Date    BILITOTAL 0.2 01/17/2024    BILITOTAL 0.8 05/28/2020      ALT/AST Lab Results   Component Value Date    ALT 18 01/17/2024    ALT 25 05/28/2020    ;   AST   Date Value Ref Range Status   03/12/2025 24 0 - 45 U/L Final   05/28/2020 20 0 - 45 U/L Final      Dental Clearance    Adequate Marrow reserve Yes   Chest CT Few sub 4 mm nodules (negative on PET)   Infectious work-up     Pregnancy Test    Lupron Injection    HCT CI 4 points   Disease status at transplant CR with MRD+   LDH    Planned maintenance NGS pending   Anticoagulation On prasugrel and apixaban. Hold when Plt < 50 and resume when able. High risk for thrombosis.   Toxoplasma IgG Negative   Other**    Proposed Clinical Trials      A/P    Acute illness -likely representing hand-foot-and-mouth syndrome  -His GI symptoms were very transient.  His labs today are completely normal.  His CRP is actually downtrending from when he saw urgent care couple days ago and has now normalized.  His rash is localized to the hands and feet and is already resolving.  It is nonpruritic.  I do not believe this represents  byzlu-hxvmdl-alwr disease and I believe his acute illness was a viral infection that is now passed.  He is already feeling better.  I advised him to try to hydrate and resume his exercise regimen as able.    MDS s/p MAC allo  - 1 year in CR, no MRD, 100% donor.  - OI ppx: acv. He says he has not been compliant with his meds. He did get shingles several months ago. He has recovered now. Received 1 year vaccines    2. Health maintenance:  - Vaccines completed. Will need boosters.  - He was seen in our survivorship clinic.  PCP managing his hypercholesterolemia  - Dexa scheduled after this and our survivorship will follow up on that. Finally, we discussed healthy living and incorporating diet and exercise as well as having an established PCP    RTC in 1 month for booster and 6 months for follow up    40 minutes spent on the date of the encounter doing chart review, interpretation of results, patient visit, documentation and coordination of care.  The longitudinal plan of care for the diagnosis(es)/condition(s) as documented were addressed during this visit. Due to the added complexity in care, I will continue to support Ziggy in the subsequent management and with ongoing continuity of care.       Again, thank you for allowing me to participate in the care of your patient.        Sincerely,        Taran Bacon MD    Electronically signed

## 2025-03-12 NOTE — NURSING NOTE
"Oncology Rooming Note    March 12, 2025 10:24 AM   Ziggy Hallman is a 49 year old male who presents for:    Chief Complaint   Patient presents with    Oncology Clinic Visit     MDS     Initial Vitals: BP (!) 159/108 (BP Location: Left arm, Patient Position: Sitting, Cuff Size: Adult Regular)   Pulse 100   Temp 97.7  F (36.5  C) (Tympanic)   Resp 18   Wt 81.2 kg (179 lb)   SpO2 100%   BMI 26.43 kg/m   Estimated body mass index is 26.43 kg/m  as calculated from the following:    Height as of 3/10/25: 1.753 m (5' 9\").    Weight as of this encounter: 81.2 kg (179 lb). Body surface area is 1.99 meters squared.  No Pain (0) Comment: generalized discomfort/no specific pain   No LMP for male patient.  Allergies reviewed: Yes  Medications reviewed: Yes    Medications: Medication refills not needed today.  Pharmacy name entered into noodls:    [id number] - [place], [place] - [address] [place] [address] AND [facility]    Frailty Screening:   Is the patient here for a new oncology consult visit in cancer care? 2. No    PHQ9:  Did this patient require a PHQ9?: No      Clinical concerns: Pt has generalized body aches and discomfort.  Difficulty sleeping and limited appetite.  Dr aBcon is notified.      Caryn Carrillo LPN  3/12/2025              "

## 2025-04-28 ENCOUNTER — TELEPHONE (OUTPATIENT)
Dept: FAMILY MEDICINE | Facility: CLINIC | Age: 50
End: 2025-04-28
Payer: COMMERCIAL

## 2025-04-28 NOTE — TELEPHONE ENCOUNTER
Patient is scheduled for post BMT vaccines 4/29/25 . There are no orders stating what he needs. I called and left a message with BMT RN to call us back with orders and place in chart. - Dameon HOBBS CMA

## 2025-04-29 ENCOUNTER — ONCOLOGY VISIT (OUTPATIENT)
Dept: ONCOLOGY | Facility: CLINIC | Age: 50
End: 2025-04-29
Attending: INTERNAL MEDICINE
Payer: COMMERCIAL

## 2025-04-29 ENCOUNTER — ALLIED HEALTH/NURSE VISIT (OUTPATIENT)
Dept: FAMILY MEDICINE | Facility: CLINIC | Age: 50
End: 2025-04-29
Payer: COMMERCIAL

## 2025-04-29 ENCOUNTER — LAB (OUTPATIENT)
Dept: LAB | Facility: CLINIC | Age: 50
End: 2025-04-29
Attending: INTERNAL MEDICINE
Payer: COMMERCIAL

## 2025-04-29 VITALS — TEMPERATURE: 98.1 F

## 2025-04-29 VITALS
HEART RATE: 61 BPM | RESPIRATION RATE: 16 BRPM | OXYGEN SATURATION: 95 % | HEIGHT: 69 IN | BODY MASS INDEX: 26.96 KG/M2 | DIASTOLIC BLOOD PRESSURE: 102 MMHG | WEIGHT: 182 LBS | SYSTOLIC BLOOD PRESSURE: 141 MMHG | TEMPERATURE: 98 F

## 2025-04-29 DIAGNOSIS — D46.9 MDS (MYELODYSPLASTIC SYNDROME) (H): ICD-10-CM

## 2025-04-29 DIAGNOSIS — Z94.81 STATUS POST BONE MARROW TRANSPLANT (H): ICD-10-CM

## 2025-04-29 DIAGNOSIS — Z23 ENCOUNTER FOR IMMUNIZATION: Primary | ICD-10-CM

## 2025-04-29 DIAGNOSIS — D46.9 MDS (MYELODYSPLASTIC SYNDROME) (H): Primary | ICD-10-CM

## 2025-04-29 DIAGNOSIS — D68.59 HYPERCOAGULABLE STATE: ICD-10-CM

## 2025-04-29 DIAGNOSIS — I25.2 HISTORY OF MI (MYOCARDIAL INFARCTION): ICD-10-CM

## 2025-04-29 LAB
ALBUMIN SERPL BCG-MCNC: 4.5 G/DL (ref 3.5–5.2)
ALP SERPL-CCNC: 108 U/L (ref 40–150)
ALT SERPL W P-5'-P-CCNC: 68 U/L (ref 0–70)
ANION GAP SERPL CALCULATED.3IONS-SCNC: 12 MMOL/L (ref 7–15)
AST SERPL W P-5'-P-CCNC: 47 U/L (ref 0–45)
BASOPHILS # BLD AUTO: 0 10E3/UL (ref 0–0.2)
BASOPHILS NFR BLD AUTO: 0 %
BILIRUB SERPL-MCNC: 0.7 MG/DL
BUN SERPL-MCNC: 19 MG/DL (ref 6–20)
CALCIUM SERPL-MCNC: 10.2 MG/DL (ref 8.8–10.4)
CHLORIDE SERPL-SCNC: 107 MMOL/L (ref 98–107)
CREAT SERPL-MCNC: 1.02 MG/DL (ref 0.67–1.17)
EGFRCR SERPLBLD CKD-EPI 2021: 89 ML/MIN/1.73M2
EOSINOPHIL # BLD AUTO: 0.1 10E3/UL (ref 0–0.7)
EOSINOPHIL NFR BLD AUTO: 2 %
ERYTHROCYTE [DISTWIDTH] IN BLOOD BY AUTOMATED COUNT: 13.2 % (ref 10–15)
GLUCOSE SERPL-MCNC: 94 MG/DL (ref 70–99)
HCO3 SERPL-SCNC: 21 MMOL/L (ref 22–29)
HCT VFR BLD AUTO: 45.1 % (ref 40–53)
HGB BLD-MCNC: 15.6 G/DL (ref 13.3–17.7)
HOLD SPECIMEN: NORMAL
HOLD SPECIMEN: NORMAL
IMM GRANULOCYTES # BLD: 0 10E3/UL
IMM GRANULOCYTES NFR BLD: 0 %
LYMPHOCYTES # BLD AUTO: 2 10E3/UL (ref 0.8–5.3)
LYMPHOCYTES NFR BLD AUTO: 32 %
MCH RBC QN AUTO: 32 PG (ref 26.5–33)
MCHC RBC AUTO-ENTMCNC: 34.6 G/DL (ref 31.5–36.5)
MCV RBC AUTO: 93 FL (ref 78–100)
MONOCYTES # BLD AUTO: 0.6 10E3/UL (ref 0–1.3)
MONOCYTES NFR BLD AUTO: 9 %
NEUTROPHILS # BLD AUTO: 3.6 10E3/UL (ref 1.6–8.3)
NEUTROPHILS NFR BLD AUTO: 58 %
NRBC # BLD AUTO: 0 10E3/UL
NRBC BLD AUTO-RTO: 0 /100
PLATELET # BLD AUTO: 290 10E3/UL (ref 150–450)
POTASSIUM SERPL-SCNC: 3.6 MMOL/L (ref 3.4–5.3)
PROT SERPL-MCNC: 7.8 G/DL (ref 6.4–8.3)
RBC # BLD AUTO: 4.87 10E6/UL (ref 4.4–5.9)
SODIUM SERPL-SCNC: 140 MMOL/L (ref 135–145)
WBC # BLD AUTO: 6.2 10E3/UL (ref 4–11)

## 2025-04-29 PROCEDURE — G0463 HOSPITAL OUTPT CLINIC VISIT: HCPCS | Performed by: INTERNAL MEDICINE

## 2025-04-29 PROCEDURE — 84155 ASSAY OF PROTEIN SERUM: CPT

## 2025-04-29 PROCEDURE — 90697 DTAP-IPV-HIB-HEPB VACCINE IM: CPT

## 2025-04-29 PROCEDURE — 90471 IMMUNIZATION ADMIN: CPT

## 2025-04-29 PROCEDURE — 90750 HZV VACC RECOMBINANT IM: CPT

## 2025-04-29 PROCEDURE — 85004 AUTOMATED DIFF WBC COUNT: CPT

## 2025-04-29 PROCEDURE — 36415 COLL VENOUS BLD VENIPUNCTURE: CPT

## 2025-04-29 PROCEDURE — 90472 IMMUNIZATION ADMIN EACH ADD: CPT

## 2025-04-29 PROCEDURE — 90677 PCV20 VACCINE IM: CPT

## 2025-04-29 ASSESSMENT — PAIN SCALES - GENERAL: PAINLEVEL_OUTOF10: NO PAIN (0)

## 2025-04-29 NOTE — PROGRESS NOTES
"Oncology Rooming Note    April 29, 2025 3:49 PM   Ziggy Hallman is a 49 year old male who presents for:    Chief Complaint   Patient presents with    Oncology Clinic Visit     MDS (myelodysplastic syndrome) - Labs and provider visits     Initial Vitals: BP (!) 141/102 (BP Location: Right arm, Patient Position: Sitting, Cuff Size: Adult Regular)   Pulse 61   Temp 98  F (36.7  C) (Tympanic)   Resp 16   Ht 1.753 m (5' 9\")   Wt 82.6 kg (182 lb)   SpO2 95%   BMI 26.88 kg/m   Estimated body mass index is 26.88 kg/m  as calculated from the following:    Height as of this encounter: 1.753 m (5' 9\").    Weight as of this encounter: 82.6 kg (182 lb). Body surface area is 2.01 meters squared.  No Pain (0) Comment: Data Unavailable   No LMP for male patient.  Allergies reviewed: Yes  Medications reviewed: Yes    Medications: Medication refills not needed today.  Pharmacy name entered into [employer]:    [id number] - [place], [place] - [address]    Frailty Screening:   Is the patient here for a new oncology consult visit in cancer care? 2. No    PHQ9:  Did this patient require a PHQ9?: No      Clinical concerns:  None today       Elena Reyes CMA            "

## 2025-04-29 NOTE — PROGRESS NOTES
Prior to immunization administration, verified patients identity using patient s name and date of birth. Please see Immunization Activity for additional information.     Screening Questionnaire for Adult Immunization    Are you sick today?   No   Do you have allergies to medications, food, a vaccine component or latex?   No   Have you ever had a serious reaction after receiving a vaccination?   No   Do you have a long-term health problem with heart, lung, kidney, or metabolic disease (e.g., diabetes), asthma, a blood disorder, no spleen, complement component deficiency, a cochlear implant, or a spinal fluid leak?  Are you on long-term aspirin therapy?   Bone Marrow Transplant    Do you have cancer, leukemia, HIV/AIDS, or any other immune system problem?   No   Do you have a parent, brother, or sister with an immune system problem?   No   In the past 3 months, have you taken medications that affect  your immune system, such as prednisone, other steroids, or anticancer drugs; drugs for the treatment of rheumatoid arthritis, Crohn s disease, or psoriasis; or have you had radiation treatments?   No   Have you had a seizure, or a brain or other nervous system problem?   No   During the past year, have you received a transfusion of blood or blood    products, or been given immune (gamma) globulin or antiviral drug?   No   For women: Are you pregnant or is there a chance you could become       pregnant during the next month?   No   Have you received any vaccinations in the past 4 weeks?   No     Immunization questionnaire was positive for at least one answer.  Notified  BMT orders for vaccines were placed .    I have reviewed the following standing orders:   This patient is due and qualifies for the HIB vaccine.    Click here for HIB (Adult) Standing Order     I have reviewed the vaccines inclusion and exclusion criteria;No concerns regarding eligibility.         This patient is due and qualifies for the Pneumococcal  vaccine.    Click here for Pneumococcal (Adult) Standing Order    I have reviewed the vaccines inclusion and exclusion criteria;No concerns regarding eligibility.         This patient is due and qualifies for a TDAP vaccine.    Click here for Tdap Standing Order    I have reviewed the vaccines inclusion and exclusion criteria; No concerns regarding eligibility.         This patient is due and qualifies for the Zoster vaccine.    Click here for Zoster Standing Order    I have reviewed the vaccines inclusion and exclusion criteria; No concerns regarding eligibility.     Patient instructed to remain in clinic for 15 minutes afterwards, and to report any adverse reactions.     Screening performed by Liss Hopkins CMA on 4/29/2025 at 3:04 PM.   Prior to immunization administration, verified patients identity using patient s name and date of birth. Please see Immunization Activity for additional information.     Is the patient's temperature normal (100.5 or less)? Yes     Patient MEETS CRITERIA. PROCEED with vaccine administration.          4/29/2025   Hepatitis B   Have you had a serious reaction to a hepatitis B vaccine or to something in a hepatitis B vaccine, including yeast)? No   Are you getting kidney dialysis (either peritoneal or hemodialysis)? No   Do you have an allergy to latex? No         Patient MEETS CRITERIA. PROCEED with vaccine administration.            4/29/2025   Zoster   Have you had a serious reaction to the shingles vaccine or something in the shingles vaccine? No   Do you have shingles now? No   Are you getting treatment for cancer, organ transplant, or bone marrow transplant? !YES   Do you have an autoimmune or inflammatory condition? No   Is the patient immunocompromised and wanting to complete the Shingles vaccine series in less than 2 months? !YES   Have you had Guillain-Harrisburg syndrome within 6 weeks of getting a vaccine? No         DO NOT VACCINATE. Patient is NOT eligible for vaccination.  Discuss with provider at upcoming appointment if they have questions.      Patient instructed to remain in clinic for 15 minutes afterwards, and to report any adverse reactions.      Link to Ancillary Visit Immunization Standing Orders SmartSet     Screening performed by Liss Hopkins CMA on 4/29/2025 at 3:07 PM.

## 2025-04-29 NOTE — LETTER
"4/29/2025      Ziggy Hallman  [address] [facility] [address]      Dear Colleague,    Thank you for referring your patient, Ziggy Hallman, to the [facility]. Please see a copy of my visit note below.    Oncology Rooming Note    April 29, 2025 3:49 PM   Ziggy Hallman is a 49 year old male who presents for:    Chief Complaint   Patient presents with     Oncology Clinic Visit     MDS (myelodysplastic syndrome) - Labs and provider visits     Initial Vitals: BP (!) 141/102 (BP Location: Right arm, Patient Position: Sitting, Cuff Size: Adult Regular)   Pulse 61   Temp 98  F (36.7  C) (Tympanic)   Resp 16   Ht 1.753 m (5' 9\")   Wt 82.6 kg (182 lb)   SpO2 95%   BMI 26.88 kg/m   Estimated body mass index is 26.88 kg/m  as calculated from the following:    Height as of this encounter: 1.753 m (5' 9\").    Weight as of this encounter: 82.6 kg (182 lb). Body surface area is 2.01 meters squared.  No Pain (0) Comment: Data Unavailable   No LMP for male patient.  Allergies reviewed: Yes  Medications reviewed: Yes    Medications: Medication refills not needed today.  Pharmacy name entered into [employer]:    [id number] - [place], [place] - [address] [facility] [address]    Frailty Screening:   Is the patient here for a new oncology consult visit in cancer care? 2. No    PHQ9:  Did this patient require a PHQ9?: No      Clinical concerns:  None today       Elena Reyes, [facility] Hematology and Oncology Progress Note    Patient: Ziggy Hallman  MRN: [id number]  Date of Service: 05/26/2023        Reason for Visit    Chief Complaint   Patient presents with     Oncology Clinic Visit     MDS (myelodysplastic syndrome) - Labs and provider visits         Problem List Items Addressed This Visit       Hypercoagulable state    MDS (myelodysplastic syndrome) (H) - Primary    Relevant Orders    CBC with Platelets & Differential    " Comprehensive metabolic panel (BMP + Alb, Alk Phos, ALT, AST, Total. 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  Following that he underwent allogenic stem cell transplant at the [facility] on 1/25/2024.  Received fludarabine and busulfan as conditioning regimen.  Post transplant period was fairly uncomplicated.    No significant GVH.  He is currently off of immunosuppression.    Post transplant bone marrow biopsy showing complete response with no evidence of increased blasts.  However FISH studies posttransplant showed continued presence of 5p loss (2/26/24 and 3/27/24).    Repeat bone marrow biopsy on 7/24/2024 showing no evidence of any dysplasia.  FISH negative for 5p or 17p loss.    100% donor chimerism noted on 7/24/2024.    Most recent BM bx on 1/25/25 shows complete remission.    Results  - Bone marrow results from January: Complete remission, no increase in blasts.  - Blood counts from March: All numbers normal, including hemoglobin at 15.5, normal white cells, normal platelets, electrolytes, liver functions, and kidneys.  - No evidence of GVH (Graft Versus Host Disease).  - Blood pressure today: Elevated diastolic number.  - I reviewed lab results personally and independently interpreted the results.      Plan   MDS  Status post bone marrow transplant (H):  In complete remission.  No current issues with GVH.  Wear protective clothing, such as hats and foot sleeves, when exposed to sunlight.    Will see  him back in 6 months with repeat labs.  He already has follow-up scheduled with BMT again in August.  He has completed all vaccinations.    History of MI (myocardial infarction):  Importance of cardiology follow-up emphasized due to past heart attacks. Current medications include Eliquis and prasugrel.  Ensure cardiology appointment is scheduled. Continue taking Eliquis and prasugrel as prescribed.       Cancer Staging   No matching staging information was found for the patient.      ECOG Performance    0 - Independent         Problem List    Patient Active Problem List   Diagnosis     Hypercoagulable state     CVA (cerebrovascular accident) (H)     Low back pain     MDS (myelodysplastic syndrome) (H)     Antineoplastic chemotherapy induced pancytopenia     Status post bone marrow transplant (H)     Acute ST elevation myocardial infarction (STEMI) (H)     Dyspnea on exertion     Elevated brain natriuretic peptide (BNP) level     Shortness of breath     Localized edema     Anemia, unspecified type     Benign essential hypertension     Chronic heart failure with preserved ejection fraction (H)     S/P patent foramen ovale closure     History of MI (myocardial infarction)        Oncology history  His history of MDS dates back to 2015.      Had worsening hemoglobin in 2023.      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 Cell dose-9.24x10^6  - ABO: Donor: O+ Recipient A-  (Minor  incompatability, no flush required)     Post transplant bone marrow biopsy showing complete response with no evidence of increased blasts.  However FISH studies posttransplant showed continued presence of 5p loss (2/26/24 and 3/27/24).    Repeat bone marrow biopsy on 7/24/2024 showing no evidence of any dysplasia.  FISH negative for 5p or 17p loss.    100% donor chimerism noted on 7/24/2024    Counts fully recovered.  Currently on observation.  Is off of immunosuppression.        Interval History   Ziggy Hallman is a 47 year old male with MDS with excess blasts 1 with complex karyotype status post induction treatment with 4 cycles of Vidaza from August 2023 to December 2023 followed by allogenic matched unrelated donor stem cell transplant on 1/25/2024, who is here for follow-up.    After 4 cycles of Vidaza he underwent allogenic stem cell transplant earlier this year.  Post transplant bone marrow biopsy showed no increase in blasts but FISH studies showed persistence of the 5p.  Serial bone marrow biopsies along with for studies however have shown significant improvement. Most recent bone marrow biopsy from 1/24/2025 shows no morphologic or immunophenotypic evidence of myelodysplastic syndrome.  His marrow was slightly hypocellular with estimated cellularity of 30%.  Chromosome analysis shows no evidence of any abnormalities.  100% donor cell engraftment.  Doing well today.  Denies any new issues today.  No skin rashes.  No evidence of any acute or chronic GVH.      Review of Systems  A comprehensive review of systems was negative except for what is noted in the interval history    Current Outpatient Medications   Medication Sig Dispense Refill     acyclovir (ZOVIRAX) 800 MG tablet Take 1 tablet (800 mg) by mouth 2 times daily 60 tablet 1     apixaban ANTICOAGULANT (ELIQUIS) 2.5 MG tablet Take 1 tablet (2.5 mg) by mouth 2 times daily. 60 tablet 11     atorvastatin (LIPITOR) 40 MG tablet Take 1 tablet (40 mg) by  mouth daily. 90 tablet 3     cyclobenzaprine (FLEXERIL) 10 MG tablet Take 1 tablet (10 mg) by mouth 3 times daily as needed for muscle spasms 45 tablet 1     empagliflozin (JARDIANCE) 10 MG TABS tablet Take 1 tablet (10 mg) by mouth daily. 90 tablet 3     lisinopril (ZESTRIL) 20 MG tablet Take 1 tablet (20 mg) by mouth daily. 90 tablet 3     metoprolol succinate ER (TOPROL XL) 50 MG 24 hr tablet Take 1 tablet (50 mg) by mouth daily. 90 tablet 3     NIFEdipine ER (ADALAT CC) 30 MG 24 hr tablet On hold x 4/16       prasugrel (EFFIENT) 10 MG TABS tablet Take 1 tablet (10 mg) by mouth daily. 60 tablet 1     ondansetron (ZOFRAN ODT) 4 MG ODT tab Take 1 tablet (4 mg) by mouth every 8 hours as needed for nausea. (Patient not taking: Reported on 4/29/2025) 10 tablet 0     Current Facility-Administered Medications   Medication Dose Route Frequency Provider Last Rate Last Admin     heparin lock flush 10 unit/mL injection 5-10 mL  5-10 mL Intracatheter Q1H PRN Albert Hatfield MD         heparin lock flush 10 unit/mL injection 5-10 mL  5-10 mL Intracatheter Q24H Albert Hatfield MD   5 mL at 03/10/25 1520     heparin lock flush 100 unit/mL injection 5-10 mL  5-10 mL Intracatheter Q28 Days Albert Hatfield MD   5 mL at 03/10/25 1708     sodium chloride (PF) 0.9% PF flush 10-20 mL  10-20 mL Intracatheter q1 min prAlbert Rhoades MD         sodium chloride (PF) 0.9% PF flush 10-20 mL  10-20 mL Intracatheter Q1H PRAlbert Rhoades MD         sodium chloride (PF) 0.9% PF flush 10-20 mL  10-20 mL Intracatheter Q28 Days Albert Hatfield MD   10 mL at 03/10/25 1709        Physical Exam    Failed to redirect to the Timeline version of the REVFS SmartLink.  General: alert and cooperative      Lab Results    Recent Results (from the past week)   Paroxysmal nocturnal hemoglobinuria   Result Value Ref Range    Case Report       Flow Cytometry Report                             Case: [id number]                                   Authorizing Provider:  Taran Bacon MD       Collected:           04/29/2025 03:48 PM          Ordering Location:     [facility]    Received:            04/29/2025 03:59 PM                                 and Marrow Transplant                                                                               Program [place]                                                          Pathologist:           Adiee Grady MD                                                           Specimen:    Peripheral Blood                                                                           Flow Interpretation       A. PNH, Peripheral Blood:  - No immunophenotypic evidence of paroxysmal nocturnal hemoglobinuria (PNH) or PNH-type clone          Flow Phenotypic Data       The patient's erythrocytes have levels of CD59 in a normal pattern. The patient's monocytes have levels of CD14 and fluorescent aerolysin (FLAER) in a normal pattern. The patient's neutrophils have levels of CD24 and FLAER in a normal pattern.          Flow Processing Information       White cells are gated to isolate populations (CD45 versus side scatter and forward scatter versus side scatter), to exclude debris (forward scatter versus side scatter) and to exclude cell doublets (forward scatter height versus forward scatter area. Erythrocytes are gated with forward scatter, side scatter, and [id number] and then assessed for CD59. Monocytes and granulocytes are gated with forward scatter, side scatter, CD15, CD45, and CD64 and then assessed for CD14 (monocytes only), CD24 (granulocytes only), and fluorescent aerolysin (FLAER). The sensitivity of this assay is 0.01%.        Clinical Information       49 year old male with a history of MDS        FDA Disclaimer       This test was developed and its performance characteristics determined by the [facility]. It has not been  cleared or approved by the US Food and Drug Administration.  FDA does not require this test to go through premarket FDA review. This test is used for clinical purposes and should not be regarded as investigational or for research. This laboratory is certified under the Clinical Laboratory Improvement Amendments (CLIA) as qualified to perform high complexity clinical laboratory testing.      Performing Labs       The technical component of this testing was completed at [facility].    Stain controls for all stains resulted within this report have been reviewed and show appropriate reactivity.     Comprehensive metabolic panel (BMP + Alb, Alk Phos, ALT, AST, Total. Bili, TP)   Result Value Ref Range    Sodium 140 135 - 145 mmol/L    Potassium 3.6 3.4 - 5.3 mmol/L    Carbon Dioxide (CO2) 21 (L) 22 - 29 mmol/L    Anion Gap 12 7 - 15 mmol/L    Urea Nitrogen 19.0 6.0 - 20.0 mg/dL    Creatinine 1.02 0.67 - 1.17 mg/dL    GFR Estimate 89 >60 mL/min/1.73m2    Calcium 10.2 8.8 - 10.4 mg/dL    Chloride 107 98 - 107 mmol/L    Glucose 94 70 - 99 mg/dL    Alkaline Phosphatase 108 40 - 150 U/L    AST 47 (H) 0 - 45 U/L    ALT 68 0 - 70 U/L    Protein Total 7.8 6.4 - 8.3 g/dL    Albumin 4.5 3.5 - 5.2 g/dL    Bilirubin Total 0.7 <=1.2 mg/dL   Extra Purple Top EDTA (LAB USE ONLY)   Result Value Ref Range    Hold Specimen JIC    Extra Red Top Tube (LAB USE ONLY)   Result Value Ref Range    Hold Specimen JIC    CBC with platelets and differential   Result Value Ref Range    WBC Count 6.2 4.0 - 11.0 10e3/uL    RBC Count 4.87 4.40 - 5.90 10e6/uL    Hemoglobin 15.6 13.3 - 17.7 g/dL    Hematocrit 45.1 40.0 - 53.0 %    MCV 93 78 - 100 fL    MCH 32.0 26.5 - 33.0 pg    MCHC 34.6 31.5 - 36.5 g/dL    RDW 13.2 10.0 - 15.0 %    Platelet Count 290 150 - 450 10e3/uL    % Neutrophils 58 %    % Lymphocytes 32 %    % Monocytes 9 %    % Eosinophils 2 %    % Basophils 0 %    % Immature  Granulocytes 0 %    NRBCs per 100 WBC 0 <1 /100    Absolute Neutrophils 3.6 1.6 - 8.3 10e3/uL    Absolute Lymphocytes 2.0 0.8 - 5.3 10e3/uL    Absolute Monocytes 0.6 0.0 - 1.3 10e3/uL    Absolute Eosinophils 0.1 0.0 - 0.7 10e3/uL    Absolute Basophils 0.0 0.0 - 0.2 10e3/uL    Absolute Immature Granulocytes 0.0 <=0.4 10e3/uL    Absolute NRBCs 0.0 10e3/uL             Imaging    No results found.    Complex patient with history of myeloid malignancy status post allogenic stem cell transplant requiring intensive monitoring and follow-up.    I reviewed each unique lab study personally and independently interpreted the results.      Signed by: Lynda Mcbride MD      Again, thank you for allowing me to participate in the care of your patient.        Sincerely,        Lynda Mcbride MD    Electronically signed

## 2025-04-29 NOTE — TELEPHONE ENCOUNTER
Orders are now in chart under snapshot, which I verified with Dameon Mtz MA.    Claudia Gamboa RN

## 2025-04-29 NOTE — PROGRESS NOTES
[facility] Progress Note    Patient: Ziggy Hallman  MRN: [id number]  Date of Service: 4/29/2025        Reason for Visit    Chief Complaint   Patient presents with    Oncology Clinic Visit     MDS (myelodysplastic syndrome) - Labs and provider visits         Problem List Items Addressed This Visit       Hypercoagulable state    MDS (myelodysplastic syndrome) (H) - Primary    Relevant Orders    CBC with Platelets & Differential    Comprehensive metabolic panel (BMP + Alb, Alk Phos, ALT, AST, Total. 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  Following that he underwent allogenic stem cell transplant at the [facility] on 1/25/2024.  Received fludarabine and busulfan as conditioning regimen.  Post transplant period was fairly uncomplicated.    No significant GVH.  He is currently off of immunosuppression.    Post transplant bone marrow biopsy showing complete response with no evidence of increased blasts.  However FISH studies posttransplant showed continued presence of 5p loss (2/26/24 and 3/27/24).    Repeat bone marrow biopsy on 7/24/2024 showing no evidence of any dysplasia.  FISH negative for 5p or 17p loss.    100% donor chimerism noted on 7/24/2024.    Most recent BM bx on 1/25/25 shows complete remission.    Results  - Bone marrow results from January: Complete remission, no increase in blasts.  - Blood counts from March: All numbers normal, including hemoglobin at 15.5,  normal white cells, normal platelets, electrolytes, liver functions, and kidneys.  - No evidence of GVH (Graft Versus Host Disease).  - Blood pressure today: Elevated diastolic number.  - I reviewed lab results personally and independently interpreted the results.      Plan   MDS  Status post bone marrow transplant (H):  In complete remission.  No current issues with GVH.  Wear protective clothing, such as hats and foot sleeves, when exposed to sunlight.    Will see him back in 6 months with repeat labs.  He already has follow-up scheduled with BMT again in August.  He has completed all vaccinations.    History of MI (myocardial infarction):  Importance of cardiology follow-up emphasized due to past heart attacks. Current medications include Eliquis and prasugrel.  Ensure cardiology appointment is scheduled. Continue taking Eliquis and prasugrel as prescribed.       Cancer Staging   No matching staging information was found for the patient.      ECOG Performance    0 - Independent         Problem List    Patient Active Problem List   Diagnosis    Hypercoagulable state    CVA (cerebrovascular accident) (H)    Low back pain    MDS (myelodysplastic syndrome) (H)    Antineoplastic chemotherapy induced pancytopenia    Status post bone marrow transplant (H)    Acute ST elevation myocardial infarction (STEMI) (H)    Dyspnea on exertion    Elevated brain natriuretic peptide (BNP) level    Shortness of breath    Localized edema    Anemia, unspecified type    Benign essential hypertension    Chronic heart failure with preserved ejection fraction (H)    S/P patent foramen ovale closure    History of MI (myocardial infarction)        Oncology history  His history of MDS dates back to 2015.      Had worsening hemoglobin in 2023.      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 Cell dose-9.24x10^6  - ABO: Donor: O+ Recipient A-  (Minor incompatability, no flush required)     Post transplant bone marrow biopsy showing complete response with no evidence of increased blasts.  However FISH studies posttransplant showed continued presence of 5p loss (2/26/24 and 3/27/24).    Repeat bone marrow biopsy on 7/24/2024 showing no evidence of any dysplasia.  FISH negative for 5p or 17p loss.    100% donor chimerism noted on 7/24/2024    Counts fully recovered.  Currently on observation.  Is off of immunosuppression.        Interval History   Ziggy Hallman is a 47 year old male with MDS with excess blasts 1 with complex karyotype status post induction treatment with 4 cycles of Vidaza from August 2023 to December 2023 followed by allogenic matched unrelated donor stem cell transplant on 1/25/2024, who is here for follow-up.    After 4 cycles of Vidaza he underwent allogenic stem cell transplant earlier this year.  Post transplant bone marrow biopsy showed no increase in blasts but FISH studies showed persistence of the 5p.  Serial bone marrow biopsies along with for studies however have shown significant improvement. Most recent bone marrow biopsy from 1/24/2025 shows no morphologic or immunophenotypic evidence of myelodysplastic syndrome.  His marrow was slightly hypocellular with estimated cellularity of 30%.  Chromosome analysis shows no evidence of any abnormalities.  100% donor cell engraftment.  Doing well today.  Denies any new issues today.  No skin rashes.  No evidence of any acute or chronic GVH.      Review of  Systems  A comprehensive review of systems was negative except for what is noted in the interval history    Current Outpatient Medications   Medication Sig Dispense Refill    acyclovir (ZOVIRAX) 800 MG tablet Take 1 tablet (800 mg) by mouth 2 times daily 60 tablet 1    apixaban ANTICOAGULANT (ELIQUIS) 2.5 MG tablet Take 1 tablet (2.5 mg) by mouth 2 times daily. 60 tablet 11    atorvastatin (LIPITOR) 40 MG tablet Take 1 tablet (40 mg) by mouth daily. 90 tablet 3    cyclobenzaprine (FLEXERIL) 10 MG tablet Take 1 tablet (10 mg) by mouth 3 times daily as needed for muscle spasms 45 tablet 1    empagliflozin (JARDIANCE) 10 MG TABS tablet Take 1 tablet (10 mg) by mouth daily. 90 tablet 3    lisinopril (ZESTRIL) 20 MG tablet Take 1 tablet (20 mg) by mouth daily. 90 tablet 3    metoprolol succinate ER (TOPROL XL) 50 MG 24 hr tablet Take 1 tablet (50 mg) by mouth daily. 90 tablet 3    NIFEdipine ER (ADALAT CC) 30 MG 24 hr tablet On hold x 4/16      prasugrel (EFFIENT) 10 MG TABS tablet Take 1 tablet (10 mg) by mouth daily. 60 tablet 1    ondansetron (ZOFRAN ODT) 4 MG ODT tab Take 1 tablet (4 mg) by mouth every 8 hours as needed for nausea. (Patient not taking: Reported on 4/29/2025) 10 tablet 0     Current Facility-Administered Medications   Medication Dose Route Frequency Provider Last Rate Last Admin    heparin lock flush 10 unit/mL injection 5-10 mL  5-10 mL Intracatheter Q1H PRN Albert Hatfield MD        heparin lock flush 10 unit/mL injection 5-10 mL  5-10 mL Intracatheter Q24H Albert Hatfield MD   5 mL at 03/10/25 1520    heparin lock flush 100 unit/mL injection 5-10 mL  5-10 mL Intracatheter Q28 Days Albert Hatfield MD   5 mL at 03/10/25 1708    sodium chloride (PF) 0.9% PF flush 10-20 mL  10-20 mL Intracatheter q1 min prn Albert Hatfield MD        sodium chloride (PF) 0.9% PF flush 10-20 mL  10-20 mL Intracatheter Q1H PRN Albert Hatfield MD        sodium chloride (PF) 0.9% PF flush 10-20 mL   10-20 mL Intracatheter Q28 Days Albert Hatfield MD   10 mL at 03/10/25 1709        Physical Exam    Failed to redirect to the Timeline version of the REVFS SmartLink.  General: alert and cooperative      Lab Results    Recent Results (from the past week)   Paroxysmal nocturnal hemoglobinuria   Result Value Ref Range    Case Report       Flow Cytometry Report                             Case: [id number]                                  Authorizing Provider:  Taran Bacon MD       Collected:           04/29/2025 03:48 PM          Ordering Location:     [facility]    Received:            04/29/2025 03:59 PM                                 and Marrow Transplant                                                                               [facility]                                                          Pathologist:           Aidee Grady MD                                                           Specimen:    Peripheral Blood                                                                           Flow Interpretation       A. PNH, Peripheral Blood:  - No immunophenotypic evidence of paroxysmal nocturnal hemoglobinuria (PNH) or PNH-type clone          Flow Phenotypic Data       The patient's erythrocytes have levels of CD59 in a normal pattern. The patient's monocytes have levels of CD14 and fluorescent aerolysin (FLAER) in a normal pattern. The patient's neutrophils have levels of CD24 and FLAER in a normal pattern.          Flow Processing Information       White cells are gated to isolate populations (CD45 versus side scatter and forward scatter versus side scatter), to exclude debris (forward scatter versus side scatter) and to exclude cell doublets (forward scatter height versus forward scatter area. Erythrocytes are gated with forward scatter, side scatter, and WV095g and then assessed for CD59. Monocytes and granulocytes are gated with forward scatter, side scatter, CD15,  CD45, and CD64 and then assessed for CD14 (monocytes only), CD24 (granulocytes only), and fluorescent aerolysin (FLAER). The sensitivity of this assay is 0.01%.        Clinical Information       49 year old male with a history of MDS        FDA Disclaimer       This test was developed and its performance characteristics determined by the [facility]. It has not been cleared or approved by the US Food and Drug Administration.  FDA does not require this test to go through premarket FDA review. This test is used for clinical purposes and should not be regarded as investigational or for research. This laboratory is certified under the Clinical Laboratory Improvement Amendments (CLIA) as qualified to perform high complexity clinical laboratory testing.      Performing Labs       The technical component of this testing was completed at [facility].    Stain controls for all stains resulted within this report have been reviewed and show appropriate reactivity.     Comprehensive metabolic panel (BMP + Alb, Alk Phos, ALT, AST, Total. Bili, TP)   Result Value Ref Range    Sodium 140 135 - 145 mmol/L    Potassium 3.6 3.4 - 5.3 mmol/L    Carbon Dioxide (CO2) 21 (L) 22 - 29 mmol/L    Anion Gap 12 7 - 15 mmol/L    Urea Nitrogen 19.0 6.0 - 20.0 mg/dL    Creatinine 1.02 0.67 - 1.17 mg/dL    GFR Estimate 89 >60 mL/min/1.73m2    Calcium 10.2 8.8 - 10.4 mg/dL    Chloride 107 98 - 107 mmol/L    Glucose 94 70 - 99 mg/dL    Alkaline Phosphatase 108 40 - 150 U/L    AST 47 (H) 0 - 45 U/L    ALT 68 0 - 70 U/L    Protein Total 7.8 6.4 - 8.3 g/dL    Albumin 4.5 3.5 - 5.2 g/dL    Bilirubin Total 0.7 <=1.2 mg/dL   Extra Purple Top EDTA (LAB USE ONLY)   Result Value Ref Range    Hold Specimen JIC    Extra Red Top Tube (LAB USE ONLY)   Result Value Ref Range    Hold Specimen JIC    CBC with platelets and differential   Result  Value Ref Range    WBC Count 6.2 4.0 - 11.0 10e3/uL    RBC Count 4.87 4.40 - 5.90 10e6/uL    Hemoglobin 15.6 13.3 - 17.7 g/dL    Hematocrit 45.1 40.0 - 53.0 %    MCV 93 78 - 100 fL    MCH 32.0 26.5 - 33.0 pg    MCHC 34.6 31.5 - 36.5 g/dL    RDW 13.2 10.0 - 15.0 %    Platelet Count 290 150 - 450 10e3/uL    % Neutrophils 58 %    % Lymphocytes 32 %    % Monocytes 9 %    % Eosinophils 2 %    % Basophils 0 %    % Immature Granulocytes 0 %    NRBCs per 100 WBC 0 <1 /100    Absolute Neutrophils 3.6 1.6 - 8.3 10e3/uL    Absolute Lymphocytes 2.0 0.8 - 5.3 10e3/uL    Absolute Monocytes 0.6 0.0 - 1.3 10e3/uL    Absolute Eosinophils 0.1 0.0 - 0.7 10e3/uL    Absolute Basophils 0.0 0.0 - 0.2 10e3/uL    Absolute Immature Granulocytes 0.0 <=0.4 10e3/uL    Absolute NRBCs 0.0 10e3/uL             Imaging    No results found.    Complex patient with history of myeloid malignancy status post allogenic stem cell transplant requiring intensive monitoring and follow-up.    I reviewed each unique lab study personally and independently interpreted the results.      Signed by: Lynda Mcbride MD

## 2025-05-01 PROCEDURE — 88185 FLOWCYTOMETRY/TC ADD-ON: CPT | Performed by: INTERNAL MEDICINE

## 2025-05-01 PROCEDURE — 88187 FLOWCYTOMETRY/READ 2-8: CPT | Performed by: PATHOLOGY

## 2025-05-01 PROCEDURE — 88184 FLOWCYTOMETRY/ TC 1 MARKER: CPT | Performed by: PATHOLOGY

## 2025-05-21 ENCOUNTER — NURSE TRIAGE (OUTPATIENT)
Dept: FAMILY MEDICINE | Facility: CLINIC | Age: 50
End: 2025-05-21
Payer: COMMERCIAL

## 2025-05-21 ENCOUNTER — APPOINTMENT (OUTPATIENT)
Dept: CT IMAGING | Facility: CLINIC | Age: 50
End: 2025-05-21
Attending: EMERGENCY MEDICINE
Payer: COMMERCIAL

## 2025-05-21 ENCOUNTER — HOSPITAL ENCOUNTER (EMERGENCY)
Facility: CLINIC | Age: 50
Discharge: HOME | End: 2025-05-21
Attending: EMERGENCY MEDICINE
Payer: COMMERCIAL

## 2025-05-21 VITALS
WEIGHT: 182 LBS | DIASTOLIC BLOOD PRESSURE: 104 MMHG | HEART RATE: 91 BPM | SYSTOLIC BLOOD PRESSURE: 154 MMHG | BODY MASS INDEX: 26.88 KG/M2 | TEMPERATURE: 98 F | RESPIRATION RATE: 18 BRPM | OXYGEN SATURATION: 98 %

## 2025-05-21 DIAGNOSIS — R31.9 HEMATURIA, UNSPECIFIED TYPE: ICD-10-CM

## 2025-05-21 LAB
ALBUMIN UR-MCNC: 30 MG/DL
ANION GAP SERPL CALCULATED.3IONS-SCNC: 15 MMOL/L (ref 7–15)
APPEARANCE UR: CLEAR
BASOPHILS # BLD AUTO: 0.1 10E3/UL (ref 0–0.2)
BASOPHILS NFR BLD AUTO: 1 %
BILIRUB UR QL STRIP: NEGATIVE
BUN SERPL-MCNC: 21 MG/DL (ref 6–20)
CALCIUM SERPL-MCNC: 9.8 MG/DL (ref 8.8–10.4)
CHLORIDE SERPL-SCNC: 102 MMOL/L (ref 98–107)
COLOR UR AUTO: ABNORMAL
CREAT SERPL-MCNC: 0.91 MG/DL (ref 0.67–1.17)
EGFRCR SERPLBLD CKD-EPI 2021: >90 ML/MIN/1.73M2
EOSINOPHIL # BLD AUTO: 0.3 10E3/UL (ref 0–0.7)
EOSINOPHIL NFR BLD AUTO: 3 %
ERYTHROCYTE [DISTWIDTH] IN BLOOD BY AUTOMATED COUNT: 13 % (ref 10–15)
GLUCOSE SERPL-MCNC: 91 MG/DL (ref 70–99)
GLUCOSE UR STRIP-MCNC: 200 MG/DL
HCO3 SERPL-SCNC: 20 MMOL/L (ref 22–29)
HCT VFR BLD AUTO: 44.4 % (ref 40–53)
HGB BLD-MCNC: 15.3 G/DL (ref 13.3–17.7)
HGB UR QL STRIP: NEGATIVE
IMM GRANULOCYTES # BLD: 0 10E3/UL
IMM GRANULOCYTES NFR BLD: 0 %
KETONES UR STRIP-MCNC: ABNORMAL MG/DL
LEUKOCYTE ESTERASE UR QL STRIP: NEGATIVE
LYMPHOCYTES # BLD AUTO: 3.2 10E3/UL (ref 0.8–5.3)
LYMPHOCYTES NFR BLD AUTO: 35 %
MCH RBC QN AUTO: 31.8 PG (ref 26.5–33)
MCHC RBC AUTO-ENTMCNC: 34.5 G/DL (ref 31.5–36.5)
MCV RBC AUTO: 92 FL (ref 78–100)
MONOCYTES # BLD AUTO: 0.8 10E3/UL (ref 0–1.3)
MONOCYTES NFR BLD AUTO: 9 %
NEUTROPHILS # BLD AUTO: 4.7 10E3/UL (ref 1.6–8.3)
NEUTROPHILS NFR BLD AUTO: 51 %
NITRATE UR QL: NEGATIVE
NRBC # BLD AUTO: 0 10E3/UL
NRBC BLD AUTO-RTO: 0 /100
PH UR STRIP: 5.5 [PH] (ref 5–7)
PLATELET # BLD AUTO: 330 10E3/UL (ref 150–450)
POTASSIUM SERPL-SCNC: 3.9 MMOL/L (ref 3.4–5.3)
RBC # BLD AUTO: 4.81 10E6/UL (ref 4.4–5.9)
RBC URINE: 1 /HPF
SODIUM SERPL-SCNC: 137 MMOL/L (ref 135–145)
SP GR UR STRIP: 1.02 (ref 1–1.03)
UROBILINOGEN UR STRIP-MCNC: NORMAL MG/DL
WBC # BLD AUTO: 9 10E3/UL (ref 4–11)
WBC URINE: 1 /HPF

## 2025-05-21 PROCEDURE — 74177 CT ABD & PELVIS W/CONTRAST: CPT

## 2025-05-21 PROCEDURE — 36415 COLL VENOUS BLD VENIPUNCTURE: CPT | Performed by: EMERGENCY MEDICINE

## 2025-05-21 PROCEDURE — 250N000011 HC RX IP 250 OP 636: Performed by: EMERGENCY MEDICINE

## 2025-05-21 PROCEDURE — 250N000009 HC RX 250: Performed by: EMERGENCY MEDICINE

## 2025-05-21 PROCEDURE — 84132 ASSAY OF SERUM POTASSIUM: CPT | Performed by: EMERGENCY MEDICINE

## 2025-05-21 PROCEDURE — 99285 EMERGENCY DEPT VISIT HI MDM: CPT | Mod: 25 | Performed by: EMERGENCY MEDICINE

## 2025-05-21 PROCEDURE — 81003 URINALYSIS AUTO W/O SCOPE: CPT | Performed by: EMERGENCY MEDICINE

## 2025-05-21 PROCEDURE — 99284 EMERGENCY DEPT VISIT MOD MDM: CPT | Performed by: EMERGENCY MEDICINE

## 2025-05-21 PROCEDURE — 85004 AUTOMATED DIFF WBC COUNT: CPT | Performed by: EMERGENCY MEDICINE

## 2025-05-21 RX ORDER — IOPAMIDOL 755 MG/ML
89 INJECTION, SOLUTION INTRAVASCULAR ONCE
Status: COMPLETED | OUTPATIENT
Start: 2025-05-21 | End: 2025-05-21

## 2025-05-21 RX ORDER — HEPARIN SODIUM (PORCINE) LOCK FLUSH IV SOLN 100 UNIT/ML 100 UNIT/ML
5-10 SOLUTION INTRAVENOUS
Status: DISCONTINUED | OUTPATIENT
Start: 2025-05-21 | End: 2025-05-21 | Stop reason: HOSPADM

## 2025-05-21 RX ORDER — HEPARIN SODIUM (PORCINE) LOCK FLUSH IV SOLN 100 UNIT/ML 100 UNIT/ML
5 SOLUTION INTRAVENOUS EVERY 8 HOURS
Status: DISCONTINUED | OUTPATIENT
Start: 2025-05-21 | End: 2025-05-21 | Stop reason: HOSPADM

## 2025-05-21 RX ADMIN — IOPAMIDOL 89 ML: 755 INJECTION, SOLUTION INTRAVENOUS at 20:51

## 2025-05-21 RX ADMIN — SODIUM CHLORIDE 62 ML: 9 INJECTION, SOLUTION INTRAVENOUS at 20:51

## 2025-05-21 RX ADMIN — HEPARIN 5 ML: 100 SYRINGE at 19:17

## 2025-05-21 ASSESSMENT — COLUMBIA-SUICIDE SEVERITY RATING SCALE - C-SSRS
6. HAVE YOU EVER DONE ANYTHING, STARTED TO DO ANYTHING, OR PREPARED TO DO ANYTHING TO END YOUR LIFE?: NO
2. HAVE YOU ACTUALLY HAD ANY THOUGHTS OF KILLING YOURSELF IN THE PAST MONTH?: NO
1. IN THE PAST MONTH, HAVE YOU WISHED YOU WERE DEAD OR WISHED YOU COULD GO TO SLEEP AND NOT WAKE UP?: NO

## 2025-05-21 ASSESSMENT — ACTIVITIES OF DAILY LIVING (ADL)
ADLS_ACUITY_SCORE: 58

## 2025-05-21 NOTE — ED TRIAGE NOTES
Presents with hematuria x1 day.   Pt also noted increased bruising over the last week.   On xarelto.   Had BMT for MDS 1/2024     Triage Assessment (Adult)       Row Name 05/21/25 1642          Triage Assessment    Airway WDL WDL        Respiratory WDL    Respiratory WDL WDL        Skin Circulation/Temperature WDL    Skin Circulation/Temperature WDL WDL        Cardiac WDL    Cardiac WDL WDL        Peripheral/Neurovascular WDL    Peripheral Neurovascular WDL WDL        Cognitive/Neuro/Behavioral WDL    Cognitive/Neuro/Behavioral WDL WDL

## 2025-05-21 NOTE — ED PROVIDER NOTES
[facility]  Emergency Department Visit Note    PATIENT:  Ziggy Hallman     49 year old     male      [id number]    Chief complaint:  Chief Complaint   Patient presents with    Hematuria    Bleeding/Bruising        History of present illness:  Patient is a 49 year old male with a medical history significant for myelodysplastic syndrome, hx of bone marrow transplant in complete remission, history of MI,  presenting for evaluation of blood in his urine. He is concerned for his urine related to the anticoagulation that he is on.  Patient has a history of multiple blood clots and these were all spontaneous.  This was all before he had the bone marrow transplant and he was in complete remission for his myelodysplastic disease.  He reports that he noticed some darkness in his urine this morning.  Had blood in his urine only 1 time.  He also noticed some severe bruising last week on his hands arms and legs.  This is getting a little bit better but he notes that this bruising is much more than normal.  He reports that he does forget to take his Eliquis regularly.  He notes that he probably takes it more like 4 times a week.  He thinks maybe he does not need to be on anticoagulation because his myelodysplastic disease is treated with after his bone marrow transplant. He is unsure who is driving the prescribing of eliquis. Otherwise has been feeling well, no fevers, chills, other medication changes.     Review of Systems:  As in HPI above    BP (!) 133/91   Pulse 104   Temp 98  F (36.7  C) (Oral)   Resp 18   Wt 82.6 kg (182 lb)   SpO2 98%   BMI 26.88 kg/m      Physical Exam  Constitutional: laying in hospital bed, alert, oriented, in no apparent distress, conversant, and answering questions appropriately  HEENT: normocephalic, atraumatic, pupils 3mm, equal, round, and reactive to light, sclerae anicteric, extraocular motions intact, and moist mucous membranes  Neck: able to fully range and no  midline tenderness  Cardiovascular: regular rate and rhythm  Pulmonary: breathing comfortably on room air and lungs clear to auscultation bilaterally  Abdominal: soft, non-tender, non-distended  Genitourinary: deferred  Extremities/MSK: no peripheral edema, no cyanosis , and extremities otherwise ranged and palpated without deformity  Skin: warm, dry, non-diaphoretic, no rashes or lesions, and no mottling  Neurologic: moves all four extremities spontaneously and GCS 15  Psychiatric: calm, appropriate      MDM:  Patient is a 49 year old male with above history presenting for evaluation of blood in urine this AM and bruising all over his body.    Vitals reassuring and within normal limits. Exam reassuring and within normal limits.    Differential diagnosis for hematuria includes but is not limited to malignancy, anticoagulation, low platelets, UTI, intraabdominal mass, other coagulopathy, recurrent myelodysplastic disease.     Plan for basic labs, UA and CT abdomen considering patients history of malignancy.     Remainder of ED course below.    ED COURSE:  ED Course as of 05/22/25 1807   Wed May 21, 2025   2127 CT Abdomen Pelvis w Contrast  IMPRESSION:   1.  Severe right renal cortical atrophy, unchanged.  2.  The left kidney and bladder are unremarkable. No stones or hydronephrosis. No findings to account for the patient's hematuria.  3.  Sigmoid diverticulosis.  4.  Advanced atherosclerotic disease.     u May 22, 2025   1806 Labs are completely reassuring and within normal limits. Platelets normal.      Unsure as to what is causing patient's symptoms but encouraged him to follow-up with his primary care team and also the team was prescribing his antiplatelet.  It may not be indicated now I am not sure.  Ultimately patient is stable for outpatient management all questions answered and discharged in stable condition.    Encounter Diagnoses:  Final diagnoses:   Hematuria, unspecified type       Final disposition:  discharge    Keely Irwin DO  EM Physician  [facility]       Keely Irwin DO  05/22/25 2057

## 2025-05-21 NOTE — TELEPHONE ENCOUNTER
"The patient was referred to ER. The patient agrees and understands.    Thank you    Radha LAUGHLIN RN    Reason for Disposition   Blood in the urine is main symptom   Taking Coumadin (warfarin) or other strong blood thinner, or known bleeding disorder (e.g., thrombocytopenia)    Additional Information   Negative: Shock suspected (e.g., cold/pale/clammy skin, too weak to stand, low BP, rapid pulse)   Negative: Sounds like a life-threatening emergency to the triager    Answer Assessment - Initial Assessment Questions  1. SYMPTOM: \"What's the main symptom you're concerned about?\" (e.g., frequency, incontinence)      Blood in the urine  2. ONSET: \"When did the    start?\"      today  3. PAIN: \"Is there any pain?\" If Yes, ask: \"How bad is it?\" (Scale: 1-10; mild, moderate, severe)      No pain  4. CAUSE: \"What do you think is causing the symptoms?\"      The patient is suppose to be on Xarelto and has not been taking his medications  5. OTHER SYMPTOMS: \"Do you have any other symptoms?\" (e.g., blood in urine, fever, flank pain, pain with urination)      Blood in urine and bruising.  6. PREGNANCY: \"Is there any chance you are pregnant?\" \"When was your last menstrual period?\"      na    Protocols used: Urinary Symptoms-A-OH, Urine - Blood In-A-OH    "

## 2025-05-22 ENCOUNTER — PATIENT OUTREACH (OUTPATIENT)
Dept: ONCOLOGY | Facility: CLINIC | Age: 50
End: 2025-05-22
Payer: COMMERCIAL

## 2025-05-22 NOTE — DISCHARGE INSTRUCTIONS
You were seen in the emergency department today with a chief complaint of blood in your urine.  I recommend that you follow-up with your primary care provider as well as the doctors who are prescribing your anticoagulation to discuss whether or not this is truly necessary.  If you find you are having new or changing symptoms please come back.  Your CT of your abdomen today was reassuring and your labs were also reassuring.  There actually was no blood seen in your urine today and the sample that we collected.  However this does not mean you do not have hematuria rather just means that there was not blood in your urine when you came to the ED.

## 2025-05-22 NOTE — CONFIDENTIAL NOTE
"[facility]: Cancer Care                                                                                        Patient is calling stating he had some severe bruising last week on hands, arms, legs. 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 This has cleared up now. Then yesterday he had blood in his urine X1. He went to ED and looks like labs and imaging and UA were done. Platelets were normal. He is on Eliquis 2.5mg BID, but states he forgets to take it a lot and states he probably does it more like 4X/Wk. He is wondering if he needs a follow up visit with Dr. Mcbride for this and what he thinks. Discussed with  who reviewed his ED records and stated:  \"No his labs were looking pretty good.  I do not see any major issues on the CBC.  Concern for leukemia recurrence is pretty low.  I think Eliquis might have worsened bruising from minor trauma.  Has he followed up with cardiology?  They are driving his Eliquis.  He needs to follow-up with them.\"    Called patient back then and let him know what Dr. Mcbride said and that he needs to follow up with Cardiology. Patient verbalized understanding and states he will call them.    Signature:  Sharri Paige RN   "

## 2025-07-08 ENCOUNTER — TELEPHONE (OUTPATIENT)
Dept: FAMILY MEDICINE | Facility: CLINIC | Age: 50
End: 2025-07-08
Payer: COMMERCIAL

## 2025-07-08 NOTE — TELEPHONE ENCOUNTER
Patient Quality Outreach    Patient is due for the following:   Hypertension -  BP check  Physical Preventive Adult Physical    Action(s) Taken:   Patient has upcoming appointment, these items will be addressed at that time.    Type of outreach:    Chart review performed, no outreach needed.    Questions for provider review:    None         Phuong FISHER LPN  Chart routed to None.

## 2025-07-18 ENCOUNTER — TELEPHONE (OUTPATIENT)
Dept: CARDIOLOGY | Facility: CLINIC | Age: 50
End: 2025-07-18
Payer: COMMERCIAL

## 2025-07-23 ENCOUNTER — PATIENT OUTREACH (OUTPATIENT)
Dept: CARE COORDINATION | Facility: CLINIC | Age: 50
End: 2025-07-23
Payer: COMMERCIAL

## 2025-08-19 ENCOUNTER — OFFICE VISIT (OUTPATIENT)
Dept: FAMILY MEDICINE | Facility: CLINIC | Age: 50
End: 2025-08-19
Payer: COMMERCIAL

## 2025-08-19 VITALS
OXYGEN SATURATION: 96 % | HEART RATE: 83 BPM | DIASTOLIC BLOOD PRESSURE: 98 MMHG | BODY MASS INDEX: 27.63 KG/M2 | TEMPERATURE: 98 F | WEIGHT: 182.3 LBS | RESPIRATION RATE: 16 BRPM | SYSTOLIC BLOOD PRESSURE: 158 MMHG | HEIGHT: 68 IN

## 2025-08-19 DIAGNOSIS — M54.50 CHRONIC BILATERAL LOW BACK PAIN WITHOUT SCIATICA: ICD-10-CM

## 2025-08-19 DIAGNOSIS — E78.5 HYPERLIPIDEMIA LDL GOAL <100: ICD-10-CM

## 2025-08-19 DIAGNOSIS — Z59.71 INSURANCE COVERAGE PROBLEMS: ICD-10-CM

## 2025-08-19 DIAGNOSIS — I50.32 CHRONIC HEART FAILURE WITH PRESERVED EJECTION FRACTION (H): ICD-10-CM

## 2025-08-19 DIAGNOSIS — E78.00 ELEVATED LDL CHOLESTEROL LEVEL: ICD-10-CM

## 2025-08-19 DIAGNOSIS — I26.93 SINGLE SUBSEGMENTAL PULMONARY EMBOLISM WITHOUT ACUTE COR PULMONALE (H): ICD-10-CM

## 2025-08-19 DIAGNOSIS — D46.9 MDS (MYELODYSPLASTIC SYNDROME) (H): ICD-10-CM

## 2025-08-19 DIAGNOSIS — G89.29 CHRONIC BILATERAL LOW BACK PAIN WITHOUT SCIATICA: ICD-10-CM

## 2025-08-19 DIAGNOSIS — I10 BENIGN ESSENTIAL HYPERTENSION: Primary | ICD-10-CM

## 2025-08-19 DIAGNOSIS — Z94.81 STATUS POST BONE MARROW TRANSPLANT (H): ICD-10-CM

## 2025-08-19 PROCEDURE — 3077F SYST BP >= 140 MM HG: CPT | Performed by: STUDENT IN AN ORGANIZED HEALTH CARE EDUCATION/TRAINING PROGRAM

## 2025-08-19 PROCEDURE — 99214 OFFICE O/P EST MOD 30 MIN: CPT | Performed by: STUDENT IN AN ORGANIZED HEALTH CARE EDUCATION/TRAINING PROGRAM

## 2025-08-19 PROCEDURE — 1126F AMNT PAIN NOTED NONE PRSNT: CPT | Performed by: STUDENT IN AN ORGANIZED HEALTH CARE EDUCATION/TRAINING PROGRAM

## 2025-08-19 PROCEDURE — G2211 COMPLEX E/M VISIT ADD ON: HCPCS | Performed by: STUDENT IN AN ORGANIZED HEALTH CARE EDUCATION/TRAINING PROGRAM

## 2025-08-19 PROCEDURE — 3080F DIAST BP >= 90 MM HG: CPT | Performed by: STUDENT IN AN ORGANIZED HEALTH CARE EDUCATION/TRAINING PROGRAM

## 2025-08-19 RX ORDER — LISINOPRIL 20 MG/1
20 TABLET ORAL DAILY
Qty: 90 TABLET | Refills: 3 | Status: CANCELLED | OUTPATIENT
Start: 2025-08-19

## 2025-08-19 RX ORDER — ATORVASTATIN CALCIUM 40 MG/1
40 TABLET, FILM COATED ORAL DAILY
Qty: 90 TABLET | Refills: 3 | Status: CANCELLED | OUTPATIENT
Start: 2025-08-19

## 2025-08-19 RX ORDER — METOPROLOL SUCCINATE 50 MG/1
50 TABLET, EXTENDED RELEASE ORAL DAILY
Qty: 90 TABLET | Refills: 3 | Status: CANCELLED | OUTPATIENT
Start: 2025-08-19

## 2025-08-19 RX ORDER — CYCLOBENZAPRINE HCL 10 MG
10 TABLET ORAL 3 TIMES DAILY PRN
Qty: 45 TABLET | Refills: 1 | Status: SHIPPED | OUTPATIENT
Start: 2025-08-19

## 2025-08-19 RX ORDER — LISINOPRIL 20 MG/1
20 TABLET ORAL DAILY
Qty: 90 TABLET | Refills: 3 | Status: SHIPPED | OUTPATIENT
Start: 2025-08-19

## 2025-08-19 RX ORDER — PRASUGREL 10 MG/1
10 TABLET, FILM COATED ORAL DAILY
Qty: 60 TABLET | Refills: 1 | Status: SHIPPED | OUTPATIENT
Start: 2025-08-19

## 2025-08-19 RX ORDER — METOPROLOL SUCCINATE 50 MG/1
50 TABLET, EXTENDED RELEASE ORAL DAILY
Qty: 90 TABLET | Refills: 3 | Status: SHIPPED | OUTPATIENT
Start: 2025-08-19

## 2025-08-19 RX ORDER — ATORVASTATIN CALCIUM 40 MG/1
40 TABLET, FILM COATED ORAL DAILY
Qty: 90 TABLET | Refills: 3 | Status: SHIPPED | OUTPATIENT
Start: 2025-08-19

## 2025-08-19 RX ORDER — CYCLOBENZAPRINE HCL 10 MG
10 TABLET ORAL 3 TIMES DAILY PRN
Qty: 45 TABLET | Refills: 1 | Status: CANCELLED | OUTPATIENT
Start: 2025-08-19

## 2025-08-19 ASSESSMENT — PAIN SCALES - GENERAL: PAINLEVEL_OUTOF10: NO PAIN (0)

## 2025-08-20 ENCOUNTER — PATIENT OUTREACH (OUTPATIENT)
Dept: CARE COORDINATION | Facility: CLINIC | Age: 50
End: 2025-08-20

## 2025-08-20 ENCOUNTER — LAB (OUTPATIENT)
Dept: LAB | Facility: CLINIC | Age: 50
End: 2025-08-20
Payer: COMMERCIAL

## 2025-08-20 ENCOUNTER — ONCOLOGY VISIT (OUTPATIENT)
Dept: TRANSPLANT | Facility: CLINIC | Age: 50
End: 2025-08-20
Attending: INTERNAL MEDICINE
Payer: COMMERCIAL

## 2025-08-20 VITALS
OXYGEN SATURATION: 97 % | TEMPERATURE: 97.7 F | RESPIRATION RATE: 18 BRPM | DIASTOLIC BLOOD PRESSURE: 98 MMHG | BODY MASS INDEX: 27.79 KG/M2 | SYSTOLIC BLOOD PRESSURE: 158 MMHG | WEIGHT: 185.3 LBS | HEART RATE: 72 BPM

## 2025-08-20 DIAGNOSIS — D46.9 MDS (MYELODYSPLASTIC SYNDROME) (H): ICD-10-CM

## 2025-08-20 DIAGNOSIS — D46.9 MDS (MYELODYSPLASTIC SYNDROME) (H): Primary | ICD-10-CM

## 2025-08-20 DIAGNOSIS — Z94.81 S/P ALLOGENEIC BONE MARROW TRANSPLANT (H): ICD-10-CM

## 2025-08-20 LAB
ALBUMIN SERPL BCG-MCNC: 4 G/DL (ref 3.5–5.2)
ALP SERPL-CCNC: 107 U/L (ref 40–150)
ALT SERPL W P-5'-P-CCNC: 34 U/L (ref 0–70)
ALT SERPL W P-5'-P-CCNC: 34 U/L (ref 0–70)
ANION GAP SERPL CALCULATED.3IONS-SCNC: 15 MMOL/L (ref 7–15)
AST SERPL W P-5'-P-CCNC: 34 U/L (ref 0–45)
BASOPHILS # BLD AUTO: 0.06 10E3/UL (ref 0–0.2)
BASOPHILS NFR BLD AUTO: 0.8 %
BILIRUB SERPL-MCNC: 0.3 MG/DL
BUN SERPL-MCNC: 13.1 MG/DL (ref 6–20)
CALCIUM SERPL-MCNC: 9.9 MG/DL (ref 8.8–10.4)
CHLORIDE SERPL-SCNC: 104 MMOL/L (ref 98–107)
CHOLEST SERPL-MCNC: 227 MG/DL
CREAT SERPL-MCNC: 0.9 MG/DL (ref 0.67–1.17)
EGFRCR SERPLBLD CKD-EPI 2021: >90 ML/MIN/1.73M2
EOSINOPHIL # BLD AUTO: 0.24 10E3/UL (ref 0–0.7)
EOSINOPHIL NFR BLD AUTO: 3.4 %
ERYTHROCYTE [DISTWIDTH] IN BLOOD BY AUTOMATED COUNT: 13.5 % (ref 10–15)
FASTING STATUS PATIENT QL REPORTED: YES
GLUCOSE SERPL-MCNC: 108 MG/DL (ref 70–99)
HCO3 SERPL-SCNC: 20 MMOL/L (ref 22–29)
HCT VFR BLD AUTO: 49.7 % (ref 40–53)
HDLC SERPL-MCNC: 51 MG/DL
HGB BLD-MCNC: 17.1 G/DL (ref 13.3–17.7)
IMM GRANULOCYTES # BLD: <0.03 10E3/UL
IMM GRANULOCYTES NFR BLD: 0.3 %
LDLC SERPL CALC-MCNC: 156 MG/DL
LYMPHOCYTES # BLD AUTO: 1.98 10E3/UL (ref 0.8–5.3)
LYMPHOCYTES NFR BLD AUTO: 28 %
MCH RBC QN AUTO: 32.5 PG (ref 26.5–33)
MCHC RBC AUTO-ENTMCNC: 34.4 G/DL (ref 31.5–36.5)
MCV RBC AUTO: 94.5 FL (ref 78–100)
MONOCYTES # BLD AUTO: 0.52 10E3/UL (ref 0–1.3)
MONOCYTES NFR BLD AUTO: 7.3 %
NEUTROPHILS # BLD AUTO: 4.26 10E3/UL (ref 1.6–8.3)
NEUTROPHILS NFR BLD AUTO: 60.2 %
NONHDLC SERPL-MCNC: 176 MG/DL
NRBC # BLD AUTO: <0.03 10E3/UL
NRBC BLD AUTO-RTO: 0 /100
PLATELET # BLD AUTO: 258 10E3/UL (ref 150–450)
POTASSIUM SERPL-SCNC: 4 MMOL/L (ref 3.4–5.3)
PROT SERPL-MCNC: 7.3 G/DL (ref 6.4–8.3)
RBC # BLD AUTO: 5.26 10E6/UL (ref 4.4–5.9)
SODIUM SERPL-SCNC: 139 MMOL/L (ref 135–145)
TRIGL SERPL-MCNC: 101 MG/DL
WBC # BLD AUTO: 7.08 10E3/UL (ref 4–11)

## 2025-08-20 PROCEDURE — G0463 HOSPITAL OUTPT CLINIC VISIT: HCPCS | Performed by: INTERNAL MEDICINE

## 2025-08-20 PROCEDURE — 80061 LIPID PANEL: CPT | Performed by: STUDENT IN AN ORGANIZED HEALTH CARE EDUCATION/TRAINING PROGRAM

## 2025-08-20 PROCEDURE — 36415 COLL VENOUS BLD VENIPUNCTURE: CPT | Performed by: INTERNAL MEDICINE

## 2025-08-20 PROCEDURE — 82374 ASSAY BLOOD CARBON DIOXIDE: CPT | Performed by: INTERNAL MEDICINE

## 2025-08-20 PROCEDURE — 85004 AUTOMATED DIFF WBC COUNT: CPT | Performed by: INTERNAL MEDICINE

## 2025-08-20 PROCEDURE — 87799 DETECT AGENT NOS DNA QUANT: CPT

## 2025-08-20 ASSESSMENT — PAIN SCALES - GENERAL: PAINLEVEL_OUTOF10: NO PAIN (0)

## 2025-08-21 ENCOUNTER — TELEPHONE (OUTPATIENT)
Dept: FAMILY MEDICINE | Facility: CLINIC | Age: 50
End: 2025-08-21
Payer: COMMERCIAL

## 2025-08-21 LAB — EBV DNA SERPL NAA+PROBE-ACNC: NOT DETECTED IU/ML

## 2025-08-22 RX ORDER — LIDOCAINE 40 MG/G
CREAM TOPICAL
OUTPATIENT
Start: 2025-08-22

## 2025-08-22 RX ORDER — NICOTINE POLACRILEX 4 MG
15-30 LOZENGE BUCCAL
OUTPATIENT
Start: 2025-08-22

## 2025-08-22 RX ORDER — DEXTROSE MONOHYDRATE 25 G/50ML
25-50 INJECTION, SOLUTION INTRAVENOUS
OUTPATIENT
Start: 2025-08-22

## 2025-08-25 ENCOUNTER — HOSPITAL ENCOUNTER (OUTPATIENT)
Dept: INTERVENTIONAL RADIOLOGY/VASCULAR | Facility: HOSPITAL | Age: 50
Discharge: HOME | End: 2025-08-25
Attending: INTERNAL MEDICINE | Admitting: INTERNAL MEDICINE
Payer: COMMERCIAL

## 2025-08-25 VITALS
RESPIRATION RATE: 14 BRPM | OXYGEN SATURATION: 99 % | HEART RATE: 76 BPM | TEMPERATURE: 97.4 F | DIASTOLIC BLOOD PRESSURE: 87 MMHG | SYSTOLIC BLOOD PRESSURE: 141 MMHG

## 2025-08-25 DIAGNOSIS — D46.9 MDS (MYELODYSPLASTIC SYNDROME) (H): ICD-10-CM

## 2025-08-25 PROCEDURE — 250N000009 HC RX 250

## 2025-08-25 PROCEDURE — 250N000011 HC RX IP 250 OP 636

## 2025-08-25 PROCEDURE — 99152 MOD SED SAME PHYS/QHP 5/>YRS: CPT

## 2025-08-25 RX ORDER — ACETAMINOPHEN 325 MG/1
650 TABLET ORAL
OUTPATIENT
Start: 2025-08-25

## 2025-08-25 RX ORDER — FLUMAZENIL 0.1 MG/ML
0.2 INJECTION, SOLUTION INTRAVENOUS
Status: DISCONTINUED | OUTPATIENT
Start: 2025-08-25 | End: 2025-08-26 | Stop reason: HOSPADM

## 2025-08-25 RX ORDER — NALOXONE HYDROCHLORIDE 0.4 MG/ML
0.2 INJECTION, SOLUTION INTRAMUSCULAR; INTRAVENOUS; SUBCUTANEOUS
Status: DISCONTINUED | OUTPATIENT
Start: 2025-08-25 | End: 2025-08-26 | Stop reason: HOSPADM

## 2025-08-25 RX ORDER — FENTANYL CITRATE 50 UG/ML
25-50 INJECTION, SOLUTION INTRAMUSCULAR; INTRAVENOUS EVERY 5 MIN PRN
Refills: 0 | Status: DISCONTINUED | OUTPATIENT
Start: 2025-08-25 | End: 2025-08-26 | Stop reason: HOSPADM

## 2025-08-25 RX ORDER — NALOXONE HYDROCHLORIDE 0.4 MG/ML
0.4 INJECTION, SOLUTION INTRAMUSCULAR; INTRAVENOUS; SUBCUTANEOUS
Status: DISCONTINUED | OUTPATIENT
Start: 2025-08-25 | End: 2025-08-26 | Stop reason: HOSPADM

## 2025-08-25 RX ORDER — ONDANSETRON 2 MG/ML
4 INJECTION INTRAMUSCULAR; INTRAVENOUS
Status: DISCONTINUED | OUTPATIENT
Start: 2025-08-25 | End: 2025-08-26 | Stop reason: HOSPADM

## 2025-08-25 RX ADMIN — FENTANYL CITRATE 25 MCG: 50 INJECTION, SOLUTION INTRAMUSCULAR; INTRAVENOUS at 08:54

## 2025-08-25 RX ADMIN — FENTANYL CITRATE 50 MCG: 50 INJECTION, SOLUTION INTRAMUSCULAR; INTRAVENOUS at 08:48

## 2025-08-25 RX ADMIN — LIDOCAINE HYDROCHLORIDE 15 ML: 10 INJECTION, SOLUTION INFILTRATION; PERINEURAL at 09:11

## 2025-08-25 RX ADMIN — MIDAZOLAM HYDROCHLORIDE 0.5 MG: 1 INJECTION, SOLUTION INTRAMUSCULAR; INTRAVENOUS at 08:56

## 2025-08-25 RX ADMIN — MIDAZOLAM HYDROCHLORIDE 1 MG: 1 INJECTION, SOLUTION INTRAMUSCULAR; INTRAVENOUS at 08:46

## 2025-08-25 RX ADMIN — MIDAZOLAM HYDROCHLORIDE 0.5 MG: 1 INJECTION, SOLUTION INTRAMUSCULAR; INTRAVENOUS at 08:52

## 2025-08-26 ENCOUNTER — OFFICE VISIT (OUTPATIENT)
Dept: DERMATOLOGY | Facility: CLINIC | Age: 50
End: 2025-08-26
Attending: NURSE PRACTITIONER
Payer: COMMERCIAL

## 2025-08-26 DIAGNOSIS — D23.9 DERMAL NEVUS: Primary | ICD-10-CM

## 2025-08-26 DIAGNOSIS — D46.9 MDS (MYELODYSPLASTIC SYNDROME) (H): ICD-10-CM

## 2025-08-26 DIAGNOSIS — L30.9 CHRONIC DERMATITIS OF HANDS: ICD-10-CM

## 2025-08-26 DIAGNOSIS — L81.4 LENTIGO: ICD-10-CM

## 2025-08-26 DIAGNOSIS — D22.9 MULTIPLE BENIGN NEVI: ICD-10-CM

## 2025-08-26 DIAGNOSIS — Z94.81 STATUS POST BONE MARROW TRANSPLANT (H): ICD-10-CM

## 2025-08-26 DIAGNOSIS — L82.1 SEBORRHEIC KERATOSES: ICD-10-CM

## 2025-08-26 DIAGNOSIS — D18.01 ANGIOMA OF SKIN: ICD-10-CM

## 2025-08-26 PROCEDURE — 99243 OFF/OP CNSLTJ NEW/EST LOW 30: CPT | Performed by: DERMATOLOGY

## 2025-08-26 RX ORDER — BETAMETHASONE DIPROPIONATE 0.5 MG/G
CREAM TOPICAL
Qty: 300 G | Refills: 3 | Status: SHIPPED | OUTPATIENT
Start: 2025-08-26

## 2025-09-03 ENCOUNTER — TELEPHONE (OUTPATIENT)
Dept: DERMATOLOGY | Facility: CLINIC | Age: 50
End: 2025-09-03
Payer: COMMERCIAL

## (undated) RX ORDER — FENTANYL CITRATE 50 UG/ML
INJECTION, SOLUTION INTRAMUSCULAR; INTRAVENOUS
Status: DISPENSED
Start: 2025-08-25

## (undated) RX ORDER — LIDOCAINE HYDROCHLORIDE 10 MG/ML
INJECTION, SOLUTION EPIDURAL; INFILTRATION; INTRACAUDAL; PERINEURAL
Status: DISPENSED
Start: 2024-03-21

## (undated) RX ORDER — FENTANYL CITRATE 50 UG/ML
INJECTION, SOLUTION INTRAMUSCULAR; INTRAVENOUS
Status: DISPENSED
Start: 2024-02-19

## (undated) RX ORDER — HEPARIN SODIUM,PORCINE 10 UNIT/ML
VIAL (ML) INTRAVENOUS
Status: DISPENSED
Start: 2024-01-19

## (undated) RX ORDER — LIDOCAINE HYDROCHLORIDE 10 MG/ML
INJECTION, SOLUTION EPIDURAL; INFILTRATION; INTRACAUDAL; PERINEURAL
Status: DISPENSED
Start: 2024-01-19

## (undated) RX ORDER — CEFAZOLIN SODIUM/WATER 2 G/20 ML
SYRINGE (ML) INTRAVENOUS
Status: DISPENSED
Start: 2023-09-13

## (undated) RX ORDER — PENTAMIDINE ISETHIONATE 300 MG/300MG
INHALANT RESPIRATORY (INHALATION)
Status: DISPENSED
Start: 2024-04-11

## (undated) RX ORDER — PENTAMIDINE ISETHIONATE 300 MG/300MG
INHALANT RESPIRATORY (INHALATION)
Status: DISPENSED
Start: 2024-03-14

## (undated) RX ORDER — ALBUTEROL SULFATE 0.83 MG/ML
SOLUTION RESPIRATORY (INHALATION)
Status: DISPENSED
Start: 2024-03-14

## (undated) RX ORDER — FENTANYL CITRATE 50 UG/ML
INJECTION, SOLUTION INTRAMUSCULAR; INTRAVENOUS
Status: DISPENSED
Start: 2024-01-19

## (undated) RX ORDER — CEFAZOLIN SODIUM 2 G/100ML
INJECTION, SOLUTION INTRAVENOUS
Status: DISPENSED
Start: 2024-01-19

## (undated) RX ORDER — SODIUM CHLORIDE 9 MG/ML
INJECTION, SOLUTION INTRAVENOUS
Status: DISPENSED
Start: 2024-01-19

## (undated) RX ORDER — FENTANYL CITRATE 50 UG/ML
INJECTION, SOLUTION INTRAMUSCULAR; INTRAVENOUS
Status: DISPENSED
Start: 2023-09-13